# Patient Record
Sex: FEMALE | Race: WHITE
[De-identification: names, ages, dates, MRNs, and addresses within clinical notes are randomized per-mention and may not be internally consistent; named-entity substitution may affect disease eponyms.]

---

## 2017-08-21 ENCOUNTER — HOSPITAL ENCOUNTER (INPATIENT)
Dept: HOSPITAL 17 - NEPE | Age: 37
LOS: 45 days | Discharge: HOME | DRG: 314 | End: 2017-10-05
Attending: HOSPITALIST | Admitting: HOSPITALIST
Payer: MEDICAID

## 2017-08-21 VITALS
DIASTOLIC BLOOD PRESSURE: 56 MMHG | TEMPERATURE: 103.6 F | OXYGEN SATURATION: 100 % | RESPIRATION RATE: 23 BRPM | HEART RATE: 115 BPM | SYSTOLIC BLOOD PRESSURE: 84 MMHG

## 2017-08-21 VITALS
OXYGEN SATURATION: 100 % | HEART RATE: 156 BPM | TEMPERATURE: 104.6 F | SYSTOLIC BLOOD PRESSURE: 118 MMHG | RESPIRATION RATE: 32 BRPM | DIASTOLIC BLOOD PRESSURE: 76 MMHG

## 2017-08-21 VITALS
DIASTOLIC BLOOD PRESSURE: 51 MMHG | RESPIRATION RATE: 30 BRPM | HEART RATE: 117 BPM | SYSTOLIC BLOOD PRESSURE: 94 MMHG | TEMPERATURE: 102.6 F | OXYGEN SATURATION: 100 %

## 2017-08-21 VITALS
HEART RATE: 112 BPM | OXYGEN SATURATION: 100 % | TEMPERATURE: 102.9 F | DIASTOLIC BLOOD PRESSURE: 58 MMHG | SYSTOLIC BLOOD PRESSURE: 99 MMHG | RESPIRATION RATE: 26 BRPM

## 2017-08-21 VITALS
OXYGEN SATURATION: 100 % | DIASTOLIC BLOOD PRESSURE: 55 MMHG | RESPIRATION RATE: 28 BRPM | SYSTOLIC BLOOD PRESSURE: 92 MMHG | TEMPERATURE: 102.9 F | HEART RATE: 113 BPM

## 2017-08-21 VITALS
TEMPERATURE: 104 F | SYSTOLIC BLOOD PRESSURE: 85 MMHG | HEART RATE: 110 BPM | DIASTOLIC BLOOD PRESSURE: 55 MMHG | RESPIRATION RATE: 26 BRPM | OXYGEN SATURATION: 100 %

## 2017-08-21 VITALS
SYSTOLIC BLOOD PRESSURE: 95 MMHG | TEMPERATURE: 103.3 F | RESPIRATION RATE: 25 BRPM | DIASTOLIC BLOOD PRESSURE: 58 MMHG | OXYGEN SATURATION: 100 % | HEART RATE: 112 BPM

## 2017-08-21 VITALS
TEMPERATURE: 103.6 F | OXYGEN SATURATION: 100 % | RESPIRATION RATE: 32 BRPM | SYSTOLIC BLOOD PRESSURE: 123 MMHG | DIASTOLIC BLOOD PRESSURE: 90 MMHG | HEART RATE: 123 BPM

## 2017-08-21 VITALS
DIASTOLIC BLOOD PRESSURE: 51 MMHG | SYSTOLIC BLOOD PRESSURE: 93 MMHG | HEART RATE: 117 BPM | OXYGEN SATURATION: 100 % | TEMPERATURE: 102.2 F | RESPIRATION RATE: 30 BRPM

## 2017-08-21 VITALS — OXYGEN SATURATION: 100 %

## 2017-08-21 VITALS
SYSTOLIC BLOOD PRESSURE: 90 MMHG | RESPIRATION RATE: 16 BRPM | HEART RATE: 119 BPM | DIASTOLIC BLOOD PRESSURE: 70 MMHG | TEMPERATURE: 101.9 F | OXYGEN SATURATION: 92 %

## 2017-08-21 VITALS — OXYGEN SATURATION: 98 %

## 2017-08-21 VITALS
SYSTOLIC BLOOD PRESSURE: 97 MMHG | DIASTOLIC BLOOD PRESSURE: 59 MMHG | TEMPERATURE: 102.9 F | OXYGEN SATURATION: 100 % | HEART RATE: 113 BPM | RESPIRATION RATE: 29 BRPM

## 2017-08-21 VITALS
DIASTOLIC BLOOD PRESSURE: 52 MMHG | OXYGEN SATURATION: 100 % | SYSTOLIC BLOOD PRESSURE: 96 MMHG | TEMPERATURE: 102.2 F | HEART RATE: 112 BPM | RESPIRATION RATE: 30 BRPM

## 2017-08-21 VITALS
TEMPERATURE: 102.9 F | HEART RATE: 115 BPM | RESPIRATION RATE: 27 BRPM | OXYGEN SATURATION: 100 % | SYSTOLIC BLOOD PRESSURE: 94 MMHG | DIASTOLIC BLOOD PRESSURE: 54 MMHG

## 2017-08-21 VITALS — OXYGEN SATURATION: 96 %

## 2017-08-21 VITALS
RESPIRATION RATE: 33 BRPM | HEART RATE: 113 BPM | SYSTOLIC BLOOD PRESSURE: 92 MMHG | DIASTOLIC BLOOD PRESSURE: 56 MMHG | TEMPERATURE: 102.6 F | OXYGEN SATURATION: 100 %

## 2017-08-21 VITALS
RESPIRATION RATE: 34 BRPM | OXYGEN SATURATION: 100 % | SYSTOLIC BLOOD PRESSURE: 88 MMHG | TEMPERATURE: 103.6 F | DIASTOLIC BLOOD PRESSURE: 57 MMHG | HEART RATE: 108 BPM

## 2017-08-21 VITALS
TEMPERATURE: 103.3 F | SYSTOLIC BLOOD PRESSURE: 99 MMHG | DIASTOLIC BLOOD PRESSURE: 56 MMHG | HEART RATE: 114 BPM | OXYGEN SATURATION: 100 % | RESPIRATION RATE: 50 BRPM

## 2017-08-21 VITALS
DIASTOLIC BLOOD PRESSURE: 55 MMHG | OXYGEN SATURATION: 100 % | SYSTOLIC BLOOD PRESSURE: 93 MMHG | HEART RATE: 121 BPM | RESPIRATION RATE: 31 BRPM | TEMPERATURE: 102.2 F

## 2017-08-21 VITALS
RESPIRATION RATE: 31 BRPM | DIASTOLIC BLOOD PRESSURE: 53 MMHG | OXYGEN SATURATION: 100 % | HEART RATE: 117 BPM | SYSTOLIC BLOOD PRESSURE: 92 MMHG | TEMPERATURE: 102.2 F

## 2017-08-21 VITALS
TEMPERATURE: 102.6 F | DIASTOLIC BLOOD PRESSURE: 60 MMHG | OXYGEN SATURATION: 100 % | RESPIRATION RATE: 59 BRPM | HEART RATE: 114 BPM | SYSTOLIC BLOOD PRESSURE: 94 MMHG

## 2017-08-21 VITALS — HEIGHT: 67 IN | WEIGHT: 210.54 LBS | BODY MASS INDEX: 33.04 KG/M2

## 2017-08-21 DIAGNOSIS — E87.6: ICD-10-CM

## 2017-08-21 DIAGNOSIS — R19.7: ICD-10-CM

## 2017-08-21 DIAGNOSIS — Z66: ICD-10-CM

## 2017-08-21 DIAGNOSIS — D50.9: ICD-10-CM

## 2017-08-21 DIAGNOSIS — B19.20: ICD-10-CM

## 2017-08-21 DIAGNOSIS — E86.0: ICD-10-CM

## 2017-08-21 DIAGNOSIS — G93.41: ICD-10-CM

## 2017-08-21 DIAGNOSIS — J96.02: ICD-10-CM

## 2017-08-21 DIAGNOSIS — I11.0: ICD-10-CM

## 2017-08-21 DIAGNOSIS — I07.1: ICD-10-CM

## 2017-08-21 DIAGNOSIS — R47.01: ICD-10-CM

## 2017-08-21 DIAGNOSIS — R65.21: ICD-10-CM

## 2017-08-21 DIAGNOSIS — F17.200: ICD-10-CM

## 2017-08-21 DIAGNOSIS — F41.9: ICD-10-CM

## 2017-08-21 DIAGNOSIS — A41.9: ICD-10-CM

## 2017-08-21 DIAGNOSIS — T82.6XXA: Primary | ICD-10-CM

## 2017-08-21 DIAGNOSIS — Z95.1: ICD-10-CM
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
## 2017-08-21 DIAGNOSIS — E87.2: ICD-10-CM

## 2017-08-21 DIAGNOSIS — E87.0: ICD-10-CM

## 2017-08-21 DIAGNOSIS — R57.0: ICD-10-CM

## 2017-08-21 DIAGNOSIS — I33.0: ICD-10-CM

## 2017-08-21 DIAGNOSIS — I48.92: ICD-10-CM

## 2017-08-21 DIAGNOSIS — F12.90: ICD-10-CM

## 2017-08-21 DIAGNOSIS — G00.3: ICD-10-CM

## 2017-08-21 DIAGNOSIS — E43: ICD-10-CM

## 2017-08-21 DIAGNOSIS — I63.9: ICD-10-CM

## 2017-08-21 DIAGNOSIS — Z95.2: ICD-10-CM

## 2017-08-21 DIAGNOSIS — I47.1: ICD-10-CM

## 2017-08-21 DIAGNOSIS — Z99.11: ICD-10-CM

## 2017-08-21 DIAGNOSIS — Z51.5: ICD-10-CM

## 2017-08-21 DIAGNOSIS — J96.01: ICD-10-CM

## 2017-08-21 DIAGNOSIS — R15.9: ICD-10-CM

## 2017-08-21 DIAGNOSIS — N17.9: ICD-10-CM

## 2017-08-21 DIAGNOSIS — M25.512: ICD-10-CM

## 2017-08-21 DIAGNOSIS — F19.10: ICD-10-CM

## 2017-08-21 DIAGNOSIS — I50.21: ICD-10-CM

## 2017-08-21 DIAGNOSIS — E83.39: ICD-10-CM

## 2017-08-21 DIAGNOSIS — D73.5: ICD-10-CM

## 2017-08-21 DIAGNOSIS — R10.9: ICD-10-CM

## 2017-08-21 DIAGNOSIS — R74.8: ICD-10-CM

## 2017-08-21 DIAGNOSIS — R73.9: ICD-10-CM

## 2017-08-21 LAB
APTT BLD: 43.2 SEC (ref 24.3–30.1)
BACTERIA #/AREA URNS HPF: (no result) /HPF
BASE EXCESS BLD CALC-SCNC: -7.4 MMOL/L (ref -2–2)
BASOPHILS # BLD AUTO: 0 TH/MM3 (ref 0–0.2)
BASOPHILS NFR BLD: 0.3 % (ref 0–2)
BENZODIAZEPINES PNL UR: 98 % (ref 90–100)
BETA HCG QUANT: (no result) MIU/ML (ref 0–5)
BLOOD GAS CARBOXYHEMOGLOBIN: 1.2 % (ref 0–4)
BLOOD GAS HCO3: 17 MMOL/L (ref 22–26)
BLOOD GAS OXYGEN CONTENT: 12.9 VOL % (ref 12–20)
BLOOD GAS PCO2: 31 MMHG (ref 38–42)
CK SERPL-CCNC: 103 U/L (ref 26–192)
COLOR UR: YELLOW
CRITICAL VALUE: NO
DRAW SITE: (no result)
EOSINOPHIL # BLD: 0 TH/MM3 (ref 0–0.4)
EOSINOPHIL NFR BLD: 0 % (ref 0–4)
ERYTHROCYTE [DISTWIDTH] IN BLOOD BY AUTOMATED COUNT: 15.5 % (ref 11.6–17.2)
FIBRINOGEN PPP-MCNC: 289 MG/DL (ref 227–377)
GLUCOSE UR STRIP-MCNC: (no result) MG/DL
HCT VFR BLD CALC: 31.1 % (ref 35–46)
HEMO FLAGS: (no result)
HGB UR QL STRIP: (no result)
I-STAT POTASSIUM: 3.3 MMOL/L (ref 3.5–4.9)
I-STAT SODIUM: 133 MMOL/L (ref 138–146)
INR PPP: 1.4 RATIO
KETONES UR STRIP-MCNC: (no result) MG/DL
LYMPHOCYTES # BLD AUTO: 0.4 TH/MM3 (ref 1–4.8)
LYMPHOCYTES NFR BLD AUTO: 2.2 % (ref 9–44)
MCH RBC QN AUTO: 24 PG (ref 27–34)
MCHC RBC AUTO-ENTMCNC: 31.9 % (ref 32–36)
MCV RBC AUTO: 75.4 FL (ref 80–100)
METHGB MFR BLDA: 0.2 % (ref 0–2)
MONOCYTES NFR BLD: 3.7 % (ref 0–8)
NEUTROPHILS # BLD AUTO: 17.7 TH/MM3 (ref 1.8–7.7)
NEUTROPHILS NFR BLD AUTO: 93.8 % (ref 16–70)
NEUTS BAND # BLD MANUAL: 18 TH/MM3 (ref 1.8–7.7)
NEUTS BAND NFR BLD: 34 % (ref 0–6)
NEUTS SEG NFR BLD MANUAL: 62 % (ref 16–70)
NITRITE UR QL STRIP: (no result)
NUMBER OF ARTERIAL PUNCTURES: 1
O2/TOTAL GAS SETTING VFR VENT: 50 %
OVALOCYTES BLD QL SMEAR: (no result)
OXYGEN DEVICE: (no result)
PLAT MORPH BLD: NORMAL
PLATELET # BLD: 200 TH/MM3 (ref 150–450)
PLATELET BLD QL SMEAR: NORMAL
PO2 BLD: 206 MMHG (ref 61–120)
PROTHROMBIN TIME: 15.4 SEC (ref 9.8–11.6)
RBC # BLD AUTO: 4.13 MIL/MM3 (ref 4–5.3)
SALICYLATES SERPL-MCNC: 9 G/DL (ref 12–16)
SCAN/DIFF: (no result)
SP GR UR STRIP: 1.03 (ref 1–1.03)
SQUAMOUS #/AREA URNS HPF: 2 /HPF (ref 0–5)
STAT: NO
TEMP CORR TO: 98.6
ULNAR PULSE: PRESENT
VENT SETTINGS: (no result)
WBC # BLD AUTO: 18.8 TH/MM3 (ref 4–11)
WBC DIFF SAMPLE: 100

## 2017-08-21 PROCEDURE — 83615 LACTATE (LD) (LDH) ENZYME: CPT

## 2017-08-21 PROCEDURE — 84100 ASSAY OF PHOSPHORUS: CPT

## 2017-08-21 PROCEDURE — 82565 ASSAY OF CREATININE: CPT

## 2017-08-21 PROCEDURE — 86592 SYPHILIS TEST NON-TREP QUAL: CPT

## 2017-08-21 PROCEDURE — 84295 ASSAY OF SERUM SODIUM: CPT

## 2017-08-21 PROCEDURE — 84520 ASSAY OF UREA NITROGEN: CPT

## 2017-08-21 PROCEDURE — 87205 SMEAR GRAM STAIN: CPT

## 2017-08-21 PROCEDURE — 86403 PARTICLE AGGLUT ANTBDY SCRN: CPT

## 2017-08-21 PROCEDURE — 80170 ASSAY OF GENTAMICIN: CPT

## 2017-08-21 PROCEDURE — 87116 MYCOBACTERIA CULTURE: CPT

## 2017-08-21 PROCEDURE — 85007 BL SMEAR W/DIFF WBC COUNT: CPT

## 2017-08-21 PROCEDURE — 94003 VENT MGMT INPAT SUBQ DAY: CPT

## 2017-08-21 PROCEDURE — 93306 TTE W/DOPPLER COMPLETE: CPT

## 2017-08-21 PROCEDURE — 84132 ASSAY OF SERUM POTASSIUM: CPT

## 2017-08-21 PROCEDURE — 74160 CT ABDOMEN W/CONTRAST: CPT

## 2017-08-21 PROCEDURE — 70450 CT HEAD/BRAIN W/O DYE: CPT

## 2017-08-21 PROCEDURE — 86850 RBC ANTIBODY SCREEN: CPT

## 2017-08-21 PROCEDURE — 84484 ASSAY OF TROPONIN QUANT: CPT

## 2017-08-21 PROCEDURE — 85384 FIBRINOGEN ACTIVITY: CPT

## 2017-08-21 PROCEDURE — 71010: CPT

## 2017-08-21 PROCEDURE — 87529 HSV DNA AMP PROBE: CPT

## 2017-08-21 PROCEDURE — 85025 COMPLETE CBC W/AUTO DIFF WBC: CPT

## 2017-08-21 PROCEDURE — 0BH17EZ INSERTION OF ENDOTRACHEAL AIRWAY INTO TRACHEA, VIA NATURAL OR ARTIFICIAL OPENING: ICD-10-PCS | Performed by: EMERGENCY MEDICINE

## 2017-08-21 PROCEDURE — 009U3ZX DRAINAGE OF SPINAL CANAL, PERCUTANEOUS APPROACH, DIAGNOSTIC: ICD-10-PCS | Performed by: INTERNAL MEDICINE

## 2017-08-21 PROCEDURE — 83735 ASSAY OF MAGNESIUM: CPT

## 2017-08-21 PROCEDURE — 80048 BASIC METABOLIC PNL TOTAL CA: CPT

## 2017-08-21 PROCEDURE — 87070 CULTURE OTHR SPECIMN AEROBIC: CPT

## 2017-08-21 PROCEDURE — 93005 ELECTROCARDIOGRAM TRACING: CPT

## 2017-08-21 PROCEDURE — 84702 CHORIONIC GONADOTROPIN TEST: CPT

## 2017-08-21 PROCEDURE — 05HM33Z INSERTION OF INFUSION DEVICE INTO RIGHT INTERNAL JUGULAR VEIN, PERCUTANEOUS APPROACH: ICD-10-PCS | Performed by: EMERGENCY MEDICINE

## 2017-08-21 PROCEDURE — 87040 BLOOD CULTURE FOR BACTERIA: CPT

## 2017-08-21 PROCEDURE — 70490 CT SOFT TISSUE NECK W/O DYE: CPT

## 2017-08-21 PROCEDURE — 87186 SC STD MICRODIL/AGAR DIL: CPT

## 2017-08-21 PROCEDURE — 86901 BLOOD TYPING SEROLOGIC RH(D): CPT

## 2017-08-21 PROCEDURE — 94668 MNPJ CHEST WALL SBSQ: CPT

## 2017-08-21 PROCEDURE — 86618 LYME DISEASE ANTIBODY: CPT

## 2017-08-21 PROCEDURE — 86900 BLOOD TYPING SEROLOGIC ABO: CPT

## 2017-08-21 PROCEDURE — 31500 INSERT EMERGENCY AIRWAY: CPT

## 2017-08-21 PROCEDURE — 82947 ASSAY GLUCOSE BLOOD QUANT: CPT

## 2017-08-21 PROCEDURE — 94002 VENT MGMT INPAT INIT DAY: CPT

## 2017-08-21 PROCEDURE — 76937 US GUIDE VASCULAR ACCESS: CPT

## 2017-08-21 PROCEDURE — 81001 URINALYSIS AUTO W/SCOPE: CPT

## 2017-08-21 PROCEDURE — A9579 GAD-BASE MR CONTRAST NOS,1ML: HCPCS

## 2017-08-21 PROCEDURE — 82945 GLUCOSE OTHER FLUID: CPT

## 2017-08-21 PROCEDURE — 85730 THROMBOPLASTIN TIME PARTIAL: CPT

## 2017-08-21 PROCEDURE — 84157 ASSAY OF PROTEIN OTHER: CPT

## 2017-08-21 PROCEDURE — 80076 HEPATIC FUNCTION PANEL: CPT

## 2017-08-21 PROCEDURE — 87493 C DIFF AMPLIFIED PROBE: CPT

## 2017-08-21 PROCEDURE — 5A1955Z RESPIRATORY VENTILATION, GREATER THAN 96 CONSECUTIVE HOURS: ICD-10-PCS | Performed by: EMERGENCY MEDICINE

## 2017-08-21 PROCEDURE — 85610 PROTHROMBIN TIME: CPT

## 2017-08-21 PROCEDURE — 87102 FUNGUS ISOLATION CULTURE: CPT

## 2017-08-21 PROCEDURE — 87206 SMEAR FLUORESCENT/ACID STAI: CPT

## 2017-08-21 PROCEDURE — 87015 SPECIMEN INFECT AGNT CONCNTJ: CPT

## 2017-08-21 PROCEDURE — 82435 ASSAY OF BLOOD CHLORIDE: CPT

## 2017-08-21 PROCEDURE — 96365 THER/PROPH/DIAG IV INF INIT: CPT

## 2017-08-21 PROCEDURE — 36556 INSERT NON-TUNNEL CV CATH: CPT

## 2017-08-21 PROCEDURE — 85027 COMPLETE CBC AUTOMATED: CPT

## 2017-08-21 PROCEDURE — 96374 THER/PROPH/DIAG INJ IV PUSH: CPT

## 2017-08-21 PROCEDURE — 51702 INSERT TEMP BLADDER CATH: CPT

## 2017-08-21 PROCEDURE — 96375 TX/PRO/DX INJ NEW DRUG ADDON: CPT

## 2017-08-21 PROCEDURE — 87185 SC STD ENZYME DETCJ PER NZM: CPT

## 2017-08-21 PROCEDURE — 82948 REAGENT STRIP/BLOOD GLUCOSE: CPT

## 2017-08-21 PROCEDURE — 70553 MRI BRAIN STEM W/O & W/DYE: CPT

## 2017-08-21 PROCEDURE — 36600 WITHDRAWAL OF ARTERIAL BLOOD: CPT

## 2017-08-21 PROCEDURE — 80053 COMPREHEN METABOLIC PANEL: CPT

## 2017-08-21 PROCEDURE — 87641 MR-STAPH DNA AMP PROBE: CPT

## 2017-08-21 PROCEDURE — 89051 BODY FLUID CELL COUNT: CPT

## 2017-08-21 PROCEDURE — 94640 AIRWAY INHALATION TREATMENT: CPT

## 2017-08-21 PROCEDURE — 82805 BLOOD GASES W/O2 SATURATION: CPT

## 2017-08-21 PROCEDURE — 82550 ASSAY OF CK (CPK): CPT

## 2017-08-21 PROCEDURE — 95819 EEG AWAKE AND ASLEEP: CPT

## 2017-08-21 PROCEDURE — 83605 ASSAY OF LACTIC ACID: CPT

## 2017-08-21 PROCEDURE — 82140 ASSAY OF AMMONIA: CPT

## 2017-08-21 PROCEDURE — 94664 DEMO&/EVAL PT USE INHALER: CPT

## 2017-08-21 PROCEDURE — 80307 DRUG TEST PRSMV CHEM ANLYZR: CPT

## 2017-08-21 PROCEDURE — 94667 MNPJ CHEST WALL 1ST: CPT

## 2017-08-21 RX ADMIN — PROPOFOL PRN MLS/HR: 10 INJECTION, EMULSION INTRAVENOUS at 21:00

## 2017-08-21 RX ADMIN — MIDAZOLAM HYDROCHLORIDE PRN MLS/HR: 5 INJECTION, SOLUTION INTRAMUSCULAR; INTRAVENOUS at 19:35

## 2017-08-21 RX ADMIN — NOREPINEPHRINE BITARTRATE PRN MLS/HR: 1 INJECTION INTRAVENOUS at 21:00

## 2017-08-21 NOTE — RADRPT
EXAM DATE/TIME:  08/21/2017 21:06 

 

[facility] COMPARISON:     

CHEST SINGLE AP, August 21, 2017, 20:13.

 

                     

INDICATIONS :     

Post procedure, line placement.

                     

 

MEDICAL HISTORY :     

None.          

 

SURGICAL HISTORY :     

CABG.   

 

ENCOUNTER:     

Initial                                        

 

ACUITY:     

1 day      

 

PAIN SCORE:     

Non-responsive.

 

LOCATION:     

Bilateral chest 

 

FINDINGS:     

A single view of the chest demonstrates repositioning of the endotracheal tube which is in good posit
ion above the yudelka. There has been interval placement of a right jugular central venous catheter. C
atheter is in good position. There is no evidence of pneumothorax.

 

Vascular congestive changes remain evident. There is mild left basilar airspace disease.

 

CONCLUSION:     

1. Repositioning of endotracheal tube which is now in good position.

2. Interval placement of a right jugular central venous catheter which is in good position.

3. No evidence of pneumothorax.

4. Vascular congestive changes and mild left basilar airspace disease.

 

 

 

 Kristian Frankel MD on August 21, 2017 at 21:27           

Board Certified Radiologist.

 This report was verified electronically.

## 2017-08-21 NOTE — PD
HPI


Chief Complaint:  Stroke Alert


Time Seen by Provider:  19:35


Travel History


International Travel<30 days:  No


Contact w/Intl Traveler<30days:  No


Traveled to known affect area:  No





History of Present Illness


HPI


The patient is a 37-year-old  female who presents to the emergency 

department via EMS as a stroke alert.  According to EMS the patient has a 

history of fungal endocarditis with previous IVDA.  EMS states that the patient 

is currently on hospice, however, they are unsure if the patient is a DNR.  EMS 

states the patient apparently complained of a headache earlier today, was found 

lying on the floor and incontinent of stool at approximate, last seen normal at 

6:45 PM.  Therefore, EMS called a stroke alert in the field as the patient was 

aphasic and confused.  Upon arrival the patient groans, moves all 4 extremities 

and withdraws all 4 extremities, but is unable to provide any information.





PFSH


Past Medical History


Anxiety:  Yes


Cancer:  No


Cardiovascular Problems:  Yes (ENDOCARDITIS 2016)


Diminished Hearing:  No


Endocrine:  No


Immune Disorder:  No


Musculoskeletal:  No


Neurologic:  Yes


Psychiatric:  No


Reproductive:  No


Respiratory:  No


Pneumonia:  Yes


:  2


Para:  2





Social History


Alcohol Use:  Yes (SOC)


Tobacco Use:  No


Substance Use:  Yes (pills percocet, ocycodone and dilaudid last time yesterday

, uses daily )





Allergies-Medications


(Allergen,Severity, Reaction):  


Coded Allergies:  


     No Known Allergies (Verified , 16)


Reported Meds & Prescriptions





Reported Meds & Active Scripts


Active


Kcl 20 Meq Tab (Potassium Chloride) 20 Meq Tabcr 20 Meq PO DAILY 30 Days


Percocet 5-325 mg (Oxycodone/Acetaminophen) 1 Tab 1 Tab PO Q4H PRN


Nebulizer (Miscellaneous Medication)  Mis 1 Unit INH AS DIRECTED


Resp: Albuterol/Ipratropium 2.5 Mg/0.5 Mg (Albuterol/Ipratropium) 1 Amp Nebu 1 

Ampule INH Q4HR NEB PRN 30 Days


Aldactone 25 mg (Spironolactone) 25 Mg Tab 25 Mg PO Q12HR 30 Days


Metoprolol Tartrate 25 mg (Metoprolol Tartrate) 25 Mg Tab 25 Mg PO Q12HR 30 Days


Furosemide 40 Mg Tab 40 Mg PO Q8H 30 Days








Review of Systems


ROS Limitations:  Clinical Condition, Altered Mental Status


Except as stated in HPI:  all other systems reviewed are Neg


Genitourinary:  Positive: Incontinence (incontinent of stool according to EMS)


Neurologic:  Positive: Change in Mentation, Other (aphasia)





Physical Exam


Exam Limitations:  Clinical Condition, Altered Mental Status


Narrative


GENERAL: Eyes open, nonverbal, confused appearing 37-year-old female.


SKIN: Focused skin assessment warm/dry.


HEAD: Atraumatic. Normocephalic. 


EYES: Pupils equal and round.  Pupils are 4 mm bilateral and reactive.


ENT: No nasal bleeding or discharge.  Mucous membranes pink and moist.


NECK: Trachea midline. No JVD. 


CARDIOVASCULAR: Regular, tachycardic with a heart rate of 120.  Well-healed 

midline sternal scar.


RESPIRATORY: No accessory muscle use. Clear to auscultation. Breath sounds 

equal bilaterally. 


GASTROINTESTINAL: Abdomen soft, mild distention.


MUSCULOSKELETAL: No obvious deformities.  No edema noted to lower extremities 

bilaterally.


NEUROLOGICAL: Awake, eyes open, nonverbal, groans.  Withdraws all 4 extremities 

to pain but does not follow commands.  Unable to answer questions regards to 

the person, place, and time.  Does not follow commands in regards to drift of 

the upper or lower extremity is, extraocular muscle movements, visual field 

testing, heel-to-shin, or finger to nose.


PSYCHIATRIC: Appears confused.





Data


Data


Orders





 Orders


Midazolam 100 Mg/100 Ml Inj (Versed Inj) (17 19:36)


Diet Npo (17 Breakfast)


Activity Bed Rest (17 )


Electrocardiogram (17 )


I-Stat Creatinine (17 19:35)


I-Stat Profile (17 19:35)


Prothrombin Time / Inr (Pt) (17 19:35)


Act Partial Throm Time (Ptt) (17 19:35)


Complete Blood Count With Diff (17 19:35)


Fibrinogen (17 19:35)


Creatine Kinase (Cpk) (17 19:35)


Troponin I (17 19:35)


Ua Includes Microscopic (17 19:35)


Drug Screen, Random Urine (17 19:35)


Type And Screen (17 19:35)


Ct Brain W/O Iv Contrast(Rout) (17 )


Chest, Single Ap (17 )


Beta Hcg (Quant/Titer) (17 19:35)


Consult Neurology (17 )


Blood Glucose (17 19:35)


Ecg Monitoring (17 19:35)


Neuro Checks Q2HX12,Q4H (17 19:35)


Nursing Bedside Swallow Assess .ONCE (17 19:35)


Iv Access Insert/Monitor (17 19:35)


NPO (17 19:35)


Oximetry (17 19:35)


Oxygen Administration (17 19:35)


Sodium Chlor 0.9% 1000 Ml Inj (Ns 1000 M (17 19:35)


Resp Oxygen Hal C Titrat 1-4 L (17 19:35)


Cath For Specimen (17 19:35)


Etomidate Inj (Amidate Inj) (17 19:45)


Succinylcholine Inj (Quelicin Inj) (17 19:45)


Sodium Chlor 0.9% 1000 Ml Inj (Ns 1000 M (17 19:45)


Lactic Acid (17 19:35)


Blood Culture (17 19:35)


Ammonia (17 19:35)


Midazolam Inj (Versed Inj) (17 19:45)


Acetaminophen Supp (Tylenol Supp) (17 19:45)


Midazolam Inj (Versed Inj) (17 20:00)


Vecuronium 10 Mg Inj (Norcuron 10 Mg Inj (17 20:00)


Vancomycin Inj (Vancomycin Inj) (17 20:15)


Ceftriaxone Inj (Rocephin Inj) (17 20:15)


Sodium Chlor 0.9% 1000 Ml Inj (Ns 1000 M (17 20:15)


Adenosine Inj (Adenocard Inj) (17 20:15)


Propofol 1000 Mg/100 Ml Inj (Diprivan 10 (17 20:15)


^ Infusion (17 20:05)


RASS (17 20:05)


Neurological Rass Scale GOYO.Q2H (17 20:05)


Diltiazem Inj (Cardizem Inj) (17 20:30)


Ketorolac Inj (Toradol Inj) (17 20:47)


Chest, Single Ap (17 )


Ketorolac Inj (Toradol Inj) (17 21:00)


Sodium Chlor 0.9% 1000 Ml Inj (Ns 1000 M (17 21:00)





Labs





Laboratory Tests








Test


  17


19:30


 


White Blood Count 18.8 TH/MM3 


 


Red Blood Count 4.13 MIL/MM3 


 


Hemoglobin 9.9 GM/DL 


 


Bedside Hemoglobin 10.9 G/DL 


 


Hematocrit 31.1 % 


 


Bedside Hematocrit 32.0 % 


 


Mean Corpuscular Volume 75.4 FL 


 


Mean Corpuscular Hemoglobin 24.0 PG 


 


Mean Corpuscular Hemoglobin


Concent 31.9 % 


 


 


Red Cell Distribution Width 15.5 % 


 


Platelet Count 200 TH/MM3 


 


Mean Platelet Volume 7.7 FL 


 


Neutrophils (%) (Auto) 93.8 % 


 


Lymphocytes (%) (Auto) 2.2 % 


 


Monocytes (%) (Auto) 3.7 % 


 


Eosinophils (%) (Auto) 0.0 % 


 


Basophils (%) (Auto) 0.3 % 


 


Neutrophils # (Auto) 17.7 TH/MM3 


 


Lymphocytes # (Auto) 0.4 TH/MM3 


 


Monocytes # (Auto) 0.7 TH/MM3 


 


Eosinophils # (Auto) 0.0 TH/MM3 


 


Basophils # (Auto) 0.0 TH/MM3 


 


CBC Comment AUTO DIFF 


 


Prothrombin Time 15.4 SEC 


 


Prothromb Time International


Ratio 1.4 RATIO 


 


 


Activated Partial


Thromboplast Time 43.2 SEC 


 


 


Fibrinogen 289 mg/dL 


 


Bedside Sodium 133 MMOL/L 


 


Bedside Potassium 3.3 MMOL/L 


 


Bedside Chloride 98 MMOL/L 


 


Bedside Blood Urea Nitrogen 15 MG/DL 


 


Bedside Creatinine 0.7 MG/DL 


 


Bedside Glucose 139 MG/DL 


 


Total Creatine Kinase 103 U/L 


 


Troponin I 0.61 NG/ML 


 


Human Chorionic Gonadotropin,


Quant LESS THAN 1


MIU/ML











[facility]


Medical Screen Exam Complete:  Yes


Emergency Medical Condition:  Yes


Medical Record Reviewed:  Yes


EKG Prior to Arrival:  Yes


Differential Diagnosis


Differential diagnosis includes meningitis, encephalitis, sepsis, CVA, 

intracranial hemorrhage, IVDA, endocarditis, epidural abscess, UTI, pneumonia.


Narrative Course


IV was established, labs were drawn and sent, and the patient was placed on 

cardiac telemetry monitoring and continuous pulse oximetry monitoring.  The 

patient was intubated secondary to confusion, inability to follow commands, and 

inability to stay still for CT of the brain.  The patient was intubated using 

rapid sequence intubation with etomidate and succinylcholine using a C Mac 

glide scope with a 8.0 endotracheal tube.  The patient was administered Versed 

for sedation, IV fluid bolus, and Tylenol rectally for temperature of 103.9.  

EMS states the patient's Accu-Chek prior to arrival was 149.  The patient went 

immediately to CT for CT of the brain.  I discussed the patient with the on-

call neurologist, Dr. West, who agrees the patient would not be a candidate 

for TPA secondary to the fever, altered mental status, history of IVDA with 

endocarditis, an atypical presentation for acute ischemic CVA.  I discussed the 

patient with the radiologist who states the CT the brain is negative, no acute 

hemorrhage or obvious infarct.





The patient's initial assessment appeared to be encephalopathy, most likely 

sepsis related.  Therefore, a quick NIHSS was performed, approximately 10, the 

patient was intubated and went to CT which was negative.  The patient returned 

a central line was placed in the right internal jugular under ultrasound 

guidance.  Repeat chest x-ray was performed, endotracheal tube was moved 

backwards 2 cm as it was on the yudelka on the initial chest x-ray.  The patient 

did receive Rocephin, vancomycin, and 3 L of IV fluids.  The patient did go in 

atrial flutter with 2:1 block with a rate of 150, was administered adenosine 12 

mg intravenously which did not touch her heart rate, therefore, patient 

received Cardizem 20 mg intravenously.  The patient's rate came down into the 

130s.  The patient appears to have sepsis, most likely endocarditis or blood-

borne infection, cannot rule out meningitis.  Therefore, patient was covered 

with Rocephin 2 g intravenously and acyclovir 80 mg/kg.  I discussed the 

patient with the on-call intensivist, Dr. Virgen, who agrees with admission.


Critical Care Narrative


Aggregate critical care time was 45 minutes. Time to perform other separately 

billable procedures was not included in the critical care time. My time did not 

include minutes spent treating any other patients simultaneously or on 

activities that did not directly contribute to the patient's treatment.  





The services I provided to this patient were to treat and/or prevent clinically 

significant deterioration that could result in: Anoxia, hypoxia, aspiration, 

sepsis, septic shock, death.





I provided critical care services requiring my management, as noted below:


Chart data review, documentation time, medication orders and management, vital 

sign assessments/reviewing monitor data, ordering and reviewing lab tests, 

ordering and interpreting/reviewing x-rays and diagnostic studies, care of the 

patient and discussion of the patient with the admitting physicians.


Stroke Alert  NIHSS


NIH Stroke Scale Result:  10


NIHSS Time Completed:  19:30





Procedures


**Procedure Narrative**





INTUBATION: The patient was put in optimal position for the procedure.  Rapid 

sequence intubation was initiated by me using 20 milligrams of etomidate IV and 

100 milligrams of succinylcholine IV.  The patient was intubated with a 8-0 

cuffed endotracheal tube.  Tube placement was confirmed by visualization of the 

tube and balloon passing through the cords, capnometry and subsequent chest x-

ray.  Breath sounds were equal and well aerated bilaterally postintubation.  No 

breath sounds over stomach.  Patient tolerated procedure well.





CENTRAL VENOUS LINE: The site was prepped with Betadine and sterilely draped. 

It was infiltrated with 1% lidocaine plain. The deep vein was cannulated using 

normal Seldinger technique.  A triple lumen central line was placed in the 

right internal jugular site and secured with simple interrupted suture. The 

site was sterilely dressed.  The patient tolerated the procedure well.


Interpretation(s)


EKG reveals sinus tachycardia with a rate of 120.  RSR prime V1.








Laboratory Tests








Test


  17


19:30


 


White Blood Count 18.8 TH/MM3 


 


Red Blood Count 4.13 MIL/MM3 


 


Hemoglobin 9.9 GM/DL 


 


Bedside Hemoglobin 10.9 G/DL 


 


Hematocrit 31.1 % 


 


Bedside Hematocrit 32.0 % 


 


Mean Corpuscular Volume 75.4 FL 


 


Mean Corpuscular Hemoglobin 24.0 PG 


 


Mean Corpuscular Hemoglobin


Concent 31.9 % 


 


 


Red Cell Distribution Width 15.5 % 


 


Platelet Count 200 TH/MM3 


 


Mean Platelet Volume 7.7 FL 


 


Neutrophils (%) (Auto) 93.8 % 


 


Lymphocytes (%) (Auto) 2.2 % 


 


Monocytes (%) (Auto) 3.7 % 


 


Eosinophils (%) (Auto) 0.0 % 


 


Basophils (%) (Auto) 0.3 % 


 


Neutrophils # (Auto) 17.7 TH/MM3 


 


Lymphocytes # (Auto) 0.4 TH/MM3 


 


Monocytes # (Auto) 0.7 TH/MM3 


 


Eosinophils # (Auto) 0.0 TH/MM3 


 


Basophils # (Auto) 0.0 TH/MM3 


 


CBC Comment AUTO DIFF 


 


Prothrombin Time 15.4 SEC 


 


Prothromb Time International


Ratio 1.4 RATIO 


 


 


Activated Partial


Thromboplast Time 43.2 SEC 


 


 


Fibrinogen 289 mg/dL 


 


Bedside Sodium 133 MMOL/L 


 


Bedside Potassium 3.3 MMOL/L 


 


Bedside Chloride 98 MMOL/L 


 


Bedside Blood Urea Nitrogen 15 MG/DL 


 


Bedside Creatinine 0.7 MG/DL 


 


Bedside Glucose 139 MG/DL 


 


Total Creatine Kinase 103 U/L 


 


Troponin I 0.61 NG/ML 


 


Human Chorionic Gonadotropin,


Quant LESS THAN 1


MIU/ML





CT the brain reveals nothing acute


Chest x-ray reveals cardiomegaly, postoperative changes, bilateral vascular 

congestion.





Physician Communication


Physician Communication


I discussed the patient with the on-call intensivist who agrees with admission.





Diagnosis


Diagnosis:  


 Primary Impression:  


 Sepsis


 Qualified Codes:  A41.9 - Sepsis, unspecified organism


 Additional Impressions:  


 Elevated troponin


 Altered mental status


 Qualified Codes:  R41.82 - Altered mental status, unspecified


Admitting Physician Requests:  Admit


Condition:  Critical











Jovani Perez MD Aug 21, 2017 19:58

## 2017-08-21 NOTE — RADRPT
EXAM DATE/TIME:  08/21/2017 20:13 

 

[facility] COMPARISON:     

CHEST SINGLE AP, July 23, 2016, 12:21.

 

                     

INDICATIONS :     

Stroke alert.

                     

 

MEDICAL HISTORY :     

None.          

 

SURGICAL HISTORY :     

CABG.   

 

ENCOUNTER:     

Initial                                        

 

ACUITY:     

1 day      

 

PAIN SCORE:     

Non-responsive.

 

LOCATION:     

Bilateral cranial 

 

FINDINGS:     

An endotracheal tube has been placed. Its tip is at the level of the yudelka.

 

The lungs are hypoaerated with diffuse vascular engorgement. There is no evidence of consolidating in
filtrate.

 

Heart is moderately enlarged. The sternotomy wires from previous surgery are noted.

 

 

 

CONCLUSION:     

1. Tip of endotracheal tube at the yudelka.

2. Mild diffuse vascular congestion.

3. No evidence of consolidating airspace disease.

4. Cardiomegaly with evidence of previous surgery.

 

 

 

 Kristian Frankel MD on August 21, 2017 at 20:31           

Board Certified Radiologist.

 This report was verified electronically.

## 2017-08-21 NOTE — HHI.HP
HPI


Service


Critical Care Medicine


Primary Care Physician


Unknown


Admission Diagnosis





sepsis, elevated troponin, history of endocarditis/IVDA


Diagnosis:  


Travel History


International Travel<30 Days:  No


Contact w/Intl Traveler <30 Da:  No


Traveled to Known Affected Are:  No


History of Present Illness


37-year-old  female who presents initially as a stroke alert.  

According to EMS report the patient has a history of fungal endocarditis with 

previous IVDA.  EMS states that the patient is currently on hospice, however, 

they are unsure if the patient is a DNR.  EMS states the patient apparently 

complained of a headache earlier today, was found lying on the floor and 

incontinent of stool at approximate, last seen normal at 6:45 PM.  Therefore, 

EMS called a stroke alert in the field as the patient was aphasic and confused.

  Upon arrival the patient groans, moves all 4 extremities and withdraws all 4 

extremities, but is unable to provide any information.  She was intubated in 

the emergency department by ER attending for airway protection.





Review of Systems


ROS


Unable to obtain patient is sedated and intubated





Past Family Social History


Allergies:  


Coded Allergies:  


     No Known Allergies (Verified , 16)


Past Medical History


Hepatitis C


IV drug abuse


Narcotic dependency


Bacterial endocarditis


Past Surgical History


Left forearm surgery


Reported Medications





Reported Meds & Active Scripts


Active


Kcl 20 Meq Tab (Potassium Chloride) 20 Meq Tabcr 20 Meq PO DAILY 30 Days


Percocet 5-325 mg (Oxycodone/Acetaminophen) 1 Tab 1 Tab PO Q4H PRN


Nebulizer (Miscellaneous Medication)  Mis 1 Unit INH AS DIRECTED


Resp: Albuterol/Ipratropium 2.5 Mg/0.5 Mg (Albuterol/Ipratropium) 1 Amp Nebu 1 

Ampule INH Q4HR NEB PRN 30 Days


Aldactone 25 mg (Spironolactone) 25 Mg Tab 25 Mg PO Q12HR 30 Days


Metoprolol Tartrate 25 mg (Metoprolol Tartrate) 25 Mg Tab 25 Mg PO Q12HR 30 Days


Furosemide 40 Mg Tab 40 Mg PO Q8H 30 Days


Active Ordered Medications





Current Medications








 Medications


  (Trade)  Dose


 Ordered  Sig/Nikhil


 Route


 PRN Reason  Start Time


 Stop Time Status Last Admin


Dose Admin


 


 Sodium Chloride  1,000 ml @ 


 70 mls/hr  [id number] ONCE


 IV


   17 19:35


 17 09:52   


 


 


 Propofol  100 ml @ 0


 mls/hr  TITRATE  PRN


 IV


 Ordered RASS  17 20:15


     


 


 


 Norepinephrine


 Bitartrate  250 ml @ 


 7.5 mls/hr  TITRATE  PRN


 IV


 Blood pressure management  17 21:30


     


 


 


 Terbutaline


 Sulfate


  (Brethine Inj)  1 mg  UNSCH  PRN


 SQ


 For Extravasation  17 21:30


     


 


 


 Sodium Chloride


  (NS Flush)  2 ml  UNSCH  PRN


 .XX


 FLUSH AFTER USING IV ACCESS  17 21:30


     


 


 


 Sodium Chloride


  (NS Flush)  2 ml  BID


 .XX


   17 09:00


     


 


 


 Acetaminophen


  (Tylenol)  650 mg  Q6H  PRN


 PO


 PAIN 1-10 AND/OR FEVER >101F  17 21:30


     


 


 


 Morphine Sulfate


  (Morphine Inj)  2 mg  Q2H  PRN


 IV


 PAIN SCALE 6 TO 10  17 21:30


     


 


 


 Famotidine


  (Pepcid Inj)  20 mg  Q12HR


 IV PUSH


   17 09:00


     


 


 


 Midazolam HCl


  (Versed Inj)  2 mg  Q1H  PRN


 IV


 SEDATION  17 21:30


     


 


 


 Artificial Tears


  (Tears Naturale


 Opth Soln)  1 drop  TID


 EACH EYE


   17 09:00


     


 


 


 Ondansetron HCl


  (Zofran Inj)  4 mg  Q6H  PRN


 IV


 NAUSEA OR VOMITING  17 21:30


     


 


 


 Albuterol/


 Ipratropium


  (Duoneb Neb)  1 ampule  Q2HR NEB  PRN


 INH


 WHEEZING  17 21:30


     


 


 


 Heparin Sodium


  (Porcine)


  (Heparin Inj)  5,000 units  Q12H


 SQ


   17 22:00


     


 


 


 Miscellaneous


 Information  1  [id number]


 XX


   17 21:30


     


 


 


 Chlorhexidine


 Gluconate


  (Chlorhexidine


 2% Cloth)  3 pack


 Taper  DAILY@04


 TOP


   17 04:00


 18 03:59   


 


 


 Chlorhexidine


 Gluconate


  (Chlorhexidine


 2% Cloth)  3 pack  UNSCH  PRN


 TOP


 HYGIENIC CARE  17 21:30


     


 


 


 Senna/Docusate


 Sodium


  (Arlen-Colace)  1 tab  BID


 PO


   17 09:00


     


 


 


 Magnesium


 Hydroxide


  (Milk Of


 Magnesia Liq)  30 ml  Q12H  PRN


 PO


 MILD - MODERATE CONSTIPATION  17 21:30


     


 


 


 Sennosides


  (Senokot)  17.2 mg  Q12H  PRN


 PO


 MODERATE - SEVERE CONSTIPATION  17 21:30


     


 


 


 Bisacodyl


  (Dulcolax Supp)  10 mg  DAILY  PRN


 RECTAL


 SEVERE CONSITIPATION  17 21:30


     


 


 


 Lactulose


  (Lactulose Liq)  30 ml  DAILY  PRN


 PO


 SEVERE CONSITIPATION  17 21:30


     


 








Family History


Unobtainable


Social History


History of IV drug abuse


History of opioid dependency


Medical marijuana use





Physical Exam


Vital Signs





Vital Signs








  Date Time  Temp Pulse Resp B/P (MAP) Pulse Ox O2 Delivery O2 Flow Rate FiO2


 


17 22:03 101.9 119 16 90/70 (77) 92   








Physical Exam


GENERAL: Well-nourished, well-developed patient.  Sedated and intubated


SKIN: Warm and dry.


HEAD: Normocephalic.


EYES: No scleral icterus. No injection or drainage. 


NECK: Supple, trachea midline. No JVD or lymphadenopathy.


CARDIOVASCULAR: Regular rate and rhythm without murmurs, gallops, or rubs. 


RESPIRATORY: Breath sounds equal bilaterally. No accessory muscle use.


GASTROINTESTINAL: Abdomen soft, non-tender, nondistended. 


MUSCULOSKELETAL: No cyanosis, or edema. 


BACK: Nontender without obvious deformity. 


NEURO EXAM:


GCS: M 5 V T E 3


Mental Status: The patient is sedated and intubated


Cranial Nerves:Pupils are round, reactive to light. Extraocular movements 

unable to examine


Reflexes: Biceps, patellar, and Achilles are 2/4 bilaterally. No clonus. 


Sensation: Sensation unable to examine


Motor: Good muscle tone.


Cerebellar: Finger-to-nose and heel-to-shin test unable to examine


Laboratory





Laboratory Tests








Test


  17


19:30 17


20:30 17


21:00 17


21:23


 


White Blood Count 18.8    


 


Red Blood Count 4.13    


 


Hemoglobin 9.9    


 


Bedside Hemoglobin 10.9    


 


Hematocrit 31.1    


 


Bedside Hematocrit 32.0    


 


Mean Corpuscular Volume 75.4    


 


Mean Corpuscular Hemoglobin 24.0    


 


Mean Corpuscular Hemoglobin


Concent 31.9 


  


  


  


 


 


Red Cell Distribution Width 15.5    


 


Platelet Count 200    


 


Mean Platelet Volume 7.7    


 


Neutrophils (%) (Auto) 93.8    


 


Lymphocytes (%) (Auto) 2.2    


 


Monocytes (%) (Auto) 3.7    


 


Eosinophils (%) (Auto) 0.0    


 


Basophils (%) (Auto) 0.3    


 


Neutrophils # (Auto) 17.7    


 


Lymphocytes # (Auto) 0.4    


 


Monocytes # (Auto) 0.7    


 


Eosinophils # (Auto) 0.0    


 


Basophils # (Auto) 0.0    


 


CBC Comment AUTO DIFF    


 


Differential Total Cells


Counted 100 


  


  


  


 


 


Neutrophils % (Manual) 62    


 


Band Neutrophils % 34    


 


Lymphocytes % 2    


 


Monocytes % 2    


 


Neutrophils # (Manual) 18.0    


 


Differential Comment


  FINAL DIFF


MANUAL 


  


  


 


 


Platelet Estimate NORMAL    


 


Platelet Morphology Comment NORMAL    


 


Ovalocytes 2+    


 


Prothrombin Time 15.4    


 


Prothromb Time International


Ratio 1.4 


  


  


  


 


 


Activated Partial


Thromboplast Time 43.2 


  


  


  


 


 


Fibrinogen 289    


 


Bedside Sodium 133    


 


Bedside Potassium 3.3    


 


Bedside Chloride 98    


 


Bedside Blood Urea Nitrogen 15    


 


Bedside Creatinine 0.7    


 


Bedside Glucose 139    


 


Total Creatine Kinase 103    


 


Troponin I 0.61    


 


Human Chorionic Gonadotropin,


Quant LESS THAN 1 


  


  


  


 


 


Urine Color  YELLOW   


 


Urine Turbidity  HAZY   


 


Urine pH  6.0   


 


Urine Specific Gravity  1.027   


 


Urine Protein  300   


 


Urine Glucose (UA)  NEG   


 


Urine Ketones  NEG   


 


Urine Occult Blood  MOD   


 


Urine Nitrite  NEG   


 


Urine Bilirubin  NEG   


 


Urine Urobilinogen  LESS THAN 2.0   


 


Urine Leukocyte Esterase  TRACE   


 


Urine RBC  27   


 


Urine WBC  31   


 


Urine Squamous Epithelial


Cells 


  2 


  


  


 


 


Urine Bacteria  OCC   


 


Urine Opiates Screen  POS   


 


Urine Barbiturates Screen  NEG   


 


Urine Amphetamines Screen  NEG   


 


Urine Benzodiazepines Screen  POS   


 


Urine Cocaine Screen  NEG   


 


Urine Cannabinoids Screen  POS   


 


Lactic Acid Level   3.3  


 


Ammonia   60  


 


Blood Gas Puncture Site    RT RADIAL 


 


Blood Gas Patient Temperature    98.6 


 


Blood Gas HCO3    17 


 


Blood Gas Base Excess    -7.4 


 


Blood Gas Oxygen Saturation    98 


 


Arterial Blood pH    7.36 


 


Arterial Blood Partial


Pressure CO2 


  


  


  31 


 


 


Arterial Blood Partial


Pressure O2 


  


  


  206 


 


 


Arterial Blood Oxygen Content    12.9 


 


Arterial Blood


Carboxyhemoglobin 


  


  


  1.2 


 


 


Arterial Blood Methemoglobin    0.2 


 


Blood Gas Hemoglobin    9.0 


 


Oxygen Delivery Device    VENTILATOR 


 


Blood Gas Ventilator Setting


  


  


  


  AC/[id number]


 


 


Blood Gas Inspired Oxygen    50 














 Date/Time


Source Procedure


Growth Status


 


 


 17 20:22


Blood Peripheral Aerobic Blood Culture


Pending Received


 


 17 20:22


Blood Peripheral Anaerobic Blood Culture


Pending Received








Result Diagram:  


17








Caprini VTE Risk Assessment


Caprini VTE Risk Assessment:  Mod/High Risk (score >= 2)


Caprini Risk Assessment Model











 Point Value = 1          Point Value = 2  Point Value = 3  Point Value = 5


 


Age 41-60


Minor surgery


BMI > 25 kg/m2


Swollen legs


Varicose veins


Pregnancy or postpartum


History of unexplained or recurrent


   spontaneous 


Oral contraceptives or hormone


   replacement


Sepsis (< 1 month)


Serious lung disease, including


   pneumonia (< 1 month)


Abnormal pulmonary function


Acute myocardial infarction


Congestive heart failure (< 1 month)


History of inflammatory bowel disease


Medical patient at bed rest Age 61-74


Arthroscopic surgery


Major open surgery (> 45 min)


Laparoscopic surgery (> 45 min)


Malignancy


Confined to bed (> 72 hours)


Immobilizing plaster cast


Central venous access Age >= 75


History of VTE


Family history of VTE


Factor V Leiden


Prothrombin 21819K


Lupus anticoagulant


Anticardiolipin antibodies


Elevated serum homocysteine


Heparin-induced thrombocytopenia


Other congenital or acquired


   thrombophilia Stroke (< 1 month)


Elective arthroplasty


Hip, pelvis, or leg fracture


Acute spinal cord injury (< 1 month)








Prophylaxis Regimen











   Total Risk


Factor Score Risk Level Prophylaxis Regimen


 


0-1      Low Early ambulation


 


2 Moderate Order ONE of the following:


*Sequential Compression Device (SCD)


*Heparin 5000 units SQ BID


 


3-4 Higher Order ONE of the following medications:


*Heparin 5000 units SQ TID


*Enoxaparin/Lovenox 40 mg SQ daily (WT < 150 kg, CrCl > 30 mL/min)


*Enoxaparin/Lovenox 30 mg SQ daily (WT < 150 kg, CrCl > 10-29 mL/min)


*Enoxaparin/Lovenox 30 mg SQ BID (WT < 150 kg, CrCl > 30 mL/min)


AND/OR


*Sequential Compression Device (SCD)


 


5 or more Highest Order ONE of the following medications:


*Heparin 5000 units SQ TID (Preferred with Epidurals)


*Enoxaparin/Lovenox 40 mg SQ daily (WT < 150 kg, CrCl > 30 mL/min)


*Enoxaparin/Lovenox 30 mg SQ daily (WT < 150 kg, CrCl > 10-29 mL/min)


*Enoxaparin/Lovenox 30 mg SQ BID (WT < 150 kg, CrCl > 30 mL/min)


AND


*Sequential Compression Device (SCD)











Assessment and Plan


Assessment and Plan


Respiratory failure


- Intubated for airway protection


- No weaning until neurologically and hemodynamically stable





Altered mental status


- Stat LP


- CT head negative


- Neuro checks per unit protocol





History of fungal endocarditis


- History of IVDA


- Broad-spectrum antibiotic and antiviral and antifungal


- Infectious disease consultation





Hypertension


- Septic shock


- Aggressive IV fluid hydration


- Broad-spectrum antibiotic


- Levophed when necessary to keep MEP above 65





DVT GI prophylaxis


- Teds SCDs


- Subcutaneous heparin


- Pepcid





Critical Care:


The total critical care time was 35 minutes. Time to perform other separately 

billable procedures was not included in the critical care time.











Andres Virgen MD Aug 21, 2017 22:32

## 2017-08-21 NOTE — RADRPT
EXAM DATE/TIME:  08/21/2017 19:47 

 

[facility] COMPARISON:     

CT BRAIN W/O CONTRAST, June 20, 2016, 10:06.

 

 

INDICATIONS :     

***Stroke alert; slurred speach, altered mental status.

                      

 

RADIATION DOSE:     

56.35 CTDIvol (mGy) 

 

This report was called by Yari to Dr. Perez at 1958

 

 

MEDICAL HISTORY :     

Non-responsive.  

 

SURGICAL HISTORY :      

Non-responsive. 

 

ENCOUNTER:      

Initial

 

ACUITY:      

1 day

 

PAIN SCALE:      

Non-responsive

 

LOCATION:        

cranial 

 

TECHNIQUE:     

Multiple contiguous axial images were obtained of the head.  Using automated exposure control and adj
ustment of the mA and/or kV according to patient size, radiation dose was kept as low as reasonably a
chievable to obtain optimal diagnostic quality images.   DICOM format image data is available electro
nically for review and comparison.  

 

FINDINGS:     

 

CEREBRUM:     

The ventricles are normal for age.  No evidence of midline shift, mass lesion, hemorrhage or acute in
farction.  No extra-axial fluid collections are seen.

 

POSTERIOR FOSSA:     

The cerebellum and brainstem are intact.  The 4th ventricle is midline.  The cerebellopontine angle i
s unremarkable.

 

EXTRACRANIAL:     

The visualized portion of the orbits is intact.

 

SKULL:     

The calvaria is intact.  No evidence of skull fracture.

 

CONCLUSION:     

No evidence of acute infarct, hemorrhage, mass or edema.

 

 

 

 Kristian Frankel MD on August 21, 2017 at 19:57           

Board Certified Radiologist.

 This report was verified electronically.

## 2017-08-21 NOTE — PD.PROCEDR
Procedure Note


Procedure


Lumbar puncture





A time-out was completed verifying correct patient, procedure, site, positioning

, and special equipment if applicable. The patient was placed in the left 

lateral decubitus position in a semi-fetal position with help from the nursing 

staff. The area was cleansed and draped in usual sterile fashion. 1% lidocaine 

was used anesthetize the surrounding skin area. A <20-gauge 3.5-inch> spinal 

needle was placed in the <L3-L4/L4-L5> interspace. Clear cerebral spinal fluid 

was obtained and the opening pressure was noted to be <44 cm>. Four tubes were 

filled with 4 mL of CSF. These were sent for the usual tests, including 1 tube 

to be held for further analysis if needed.  Closing pressure was noted to be 38 

cm.


Estimated Blood Loss: 0


The patient tolerated the procedure well and there were no complications.











Andres Virgen MD Aug 21, 2017 22:24

## 2017-08-22 VITALS
RESPIRATION RATE: 30 BRPM | SYSTOLIC BLOOD PRESSURE: 90 MMHG | DIASTOLIC BLOOD PRESSURE: 54 MMHG | OXYGEN SATURATION: 100 % | HEART RATE: 113 BPM

## 2017-08-22 VITALS
OXYGEN SATURATION: 100 % | SYSTOLIC BLOOD PRESSURE: 107 MMHG | RESPIRATION RATE: 30 BRPM | HEART RATE: 118 BPM | DIASTOLIC BLOOD PRESSURE: 62 MMHG | TEMPERATURE: 102.6 F

## 2017-08-22 VITALS
DIASTOLIC BLOOD PRESSURE: 68 MMHG | OXYGEN SATURATION: 100 % | SYSTOLIC BLOOD PRESSURE: 96 MMHG | HEART RATE: 105 BPM | RESPIRATION RATE: 28 BRPM

## 2017-08-22 VITALS
SYSTOLIC BLOOD PRESSURE: 90 MMHG | DIASTOLIC BLOOD PRESSURE: 66 MMHG | OXYGEN SATURATION: 100 % | HEART RATE: 107 BPM | RESPIRATION RATE: 33 BRPM

## 2017-08-22 VITALS
RESPIRATION RATE: 31 BRPM | OXYGEN SATURATION: 100 % | SYSTOLIC BLOOD PRESSURE: 96 MMHG | DIASTOLIC BLOOD PRESSURE: 69 MMHG | HEART RATE: 109 BPM

## 2017-08-22 VITALS
HEART RATE: 114 BPM | RESPIRATION RATE: 35 BRPM | SYSTOLIC BLOOD PRESSURE: 93 MMHG | DIASTOLIC BLOOD PRESSURE: 69 MMHG | OXYGEN SATURATION: 100 %

## 2017-08-22 VITALS — HEART RATE: 115 BPM

## 2017-08-22 VITALS
OXYGEN SATURATION: 100 % | RESPIRATION RATE: 32 BRPM | SYSTOLIC BLOOD PRESSURE: 91 MMHG | DIASTOLIC BLOOD PRESSURE: 66 MMHG | HEART RATE: 107 BPM

## 2017-08-22 VITALS
TEMPERATURE: 102.2 F | DIASTOLIC BLOOD PRESSURE: 52 MMHG | SYSTOLIC BLOOD PRESSURE: 96 MMHG | HEART RATE: 113 BPM | OXYGEN SATURATION: 100 % | RESPIRATION RATE: 30 BRPM

## 2017-08-22 VITALS — HEART RATE: 116 BPM

## 2017-08-22 VITALS
DIASTOLIC BLOOD PRESSURE: 64 MMHG | SYSTOLIC BLOOD PRESSURE: 99 MMHG | RESPIRATION RATE: 31 BRPM | OXYGEN SATURATION: 100 % | HEART RATE: 114 BPM

## 2017-08-22 VITALS
DIASTOLIC BLOOD PRESSURE: 64 MMHG | OXYGEN SATURATION: 100 % | HEART RATE: 106 BPM | RESPIRATION RATE: 34 BRPM | SYSTOLIC BLOOD PRESSURE: 92 MMHG

## 2017-08-22 VITALS
RESPIRATION RATE: 31 BRPM | HEART RATE: 113 BPM | DIASTOLIC BLOOD PRESSURE: 59 MMHG | OXYGEN SATURATION: 100 % | SYSTOLIC BLOOD PRESSURE: 93 MMHG

## 2017-08-22 VITALS
HEART RATE: 111 BPM | OXYGEN SATURATION: 100 % | DIASTOLIC BLOOD PRESSURE: 64 MMHG | SYSTOLIC BLOOD PRESSURE: 89 MMHG | RESPIRATION RATE: 31 BRPM

## 2017-08-22 VITALS
SYSTOLIC BLOOD PRESSURE: 89 MMHG | RESPIRATION RATE: 28 BRPM | OXYGEN SATURATION: 100 % | HEART RATE: 108 BPM | DIASTOLIC BLOOD PRESSURE: 59 MMHG

## 2017-08-22 VITALS
HEART RATE: 106 BPM | SYSTOLIC BLOOD PRESSURE: 92 MMHG | RESPIRATION RATE: 28 BRPM | DIASTOLIC BLOOD PRESSURE: 64 MMHG | OXYGEN SATURATION: 100 %

## 2017-08-22 VITALS
OXYGEN SATURATION: 100 % | DIASTOLIC BLOOD PRESSURE: 68 MMHG | HEART RATE: 106 BPM | SYSTOLIC BLOOD PRESSURE: 93 MMHG | RESPIRATION RATE: 31 BRPM

## 2017-08-22 VITALS
OXYGEN SATURATION: 100 % | DIASTOLIC BLOOD PRESSURE: 63 MMHG | HEART RATE: 111 BPM | SYSTOLIC BLOOD PRESSURE: 93 MMHG | RESPIRATION RATE: 31 BRPM

## 2017-08-22 VITALS
DIASTOLIC BLOOD PRESSURE: 53 MMHG | SYSTOLIC BLOOD PRESSURE: 97 MMHG | TEMPERATURE: 102.6 F | HEART RATE: 116 BPM | OXYGEN SATURATION: 100 % | RESPIRATION RATE: 31 BRPM

## 2017-08-22 VITALS
OXYGEN SATURATION: 100 % | RESPIRATION RATE: 28 BRPM | DIASTOLIC BLOOD PRESSURE: 66 MMHG | HEART RATE: 113 BPM | SYSTOLIC BLOOD PRESSURE: 96 MMHG

## 2017-08-22 VITALS — OXYGEN SATURATION: 100 %

## 2017-08-22 VITALS
HEART RATE: 109 BPM | OXYGEN SATURATION: 100 % | RESPIRATION RATE: 32 BRPM | DIASTOLIC BLOOD PRESSURE: 68 MMHG | SYSTOLIC BLOOD PRESSURE: 88 MMHG

## 2017-08-22 VITALS
DIASTOLIC BLOOD PRESSURE: 61 MMHG | RESPIRATION RATE: 31 BRPM | OXYGEN SATURATION: 100 % | HEART RATE: 106 BPM | SYSTOLIC BLOOD PRESSURE: 95 MMHG

## 2017-08-22 VITALS
OXYGEN SATURATION: 100 % | SYSTOLIC BLOOD PRESSURE: 92 MMHG | RESPIRATION RATE: 31 BRPM | DIASTOLIC BLOOD PRESSURE: 60 MMHG | HEART RATE: 113 BPM

## 2017-08-22 VITALS
RESPIRATION RATE: 30 BRPM | DIASTOLIC BLOOD PRESSURE: 60 MMHG | HEART RATE: 114 BPM | OXYGEN SATURATION: 100 % | SYSTOLIC BLOOD PRESSURE: 99 MMHG

## 2017-08-22 VITALS
SYSTOLIC BLOOD PRESSURE: 93 MMHG | HEART RATE: 117 BPM | OXYGEN SATURATION: 100 % | RESPIRATION RATE: 33 BRPM | DIASTOLIC BLOOD PRESSURE: 64 MMHG

## 2017-08-22 VITALS
HEART RATE: 115 BPM | OXYGEN SATURATION: 100 % | RESPIRATION RATE: 31 BRPM | SYSTOLIC BLOOD PRESSURE: 95 MMHG | DIASTOLIC BLOOD PRESSURE: 62 MMHG

## 2017-08-22 VITALS
DIASTOLIC BLOOD PRESSURE: 62 MMHG | HEART RATE: 109 BPM | RESPIRATION RATE: 30 BRPM | SYSTOLIC BLOOD PRESSURE: 90 MMHG | OXYGEN SATURATION: 100 %

## 2017-08-22 VITALS
TEMPERATURE: 102.6 F | SYSTOLIC BLOOD PRESSURE: 97 MMHG | RESPIRATION RATE: 30 BRPM | OXYGEN SATURATION: 100 % | DIASTOLIC BLOOD PRESSURE: 58 MMHG | HEART RATE: 116 BPM

## 2017-08-22 VITALS
HEART RATE: 116 BPM | RESPIRATION RATE: 30 BRPM | OXYGEN SATURATION: 100 % | DIASTOLIC BLOOD PRESSURE: 59 MMHG | TEMPERATURE: 102.6 F | SYSTOLIC BLOOD PRESSURE: 96 MMHG

## 2017-08-22 VITALS
HEART RATE: 113 BPM | OXYGEN SATURATION: 100 % | RESPIRATION RATE: 31 BRPM | SYSTOLIC BLOOD PRESSURE: 97 MMHG | DIASTOLIC BLOOD PRESSURE: 61 MMHG

## 2017-08-22 VITALS
SYSTOLIC BLOOD PRESSURE: 95 MMHG | DIASTOLIC BLOOD PRESSURE: 63 MMHG | RESPIRATION RATE: 31 BRPM | HEART RATE: 113 BPM | OXYGEN SATURATION: 100 %

## 2017-08-22 VITALS
SYSTOLIC BLOOD PRESSURE: 95 MMHG | HEART RATE: 122 BPM | RESPIRATION RATE: 30 BRPM | OXYGEN SATURATION: 100 % | DIASTOLIC BLOOD PRESSURE: 57 MMHG | TEMPERATURE: 102.6 F

## 2017-08-22 VITALS
OXYGEN SATURATION: 99 % | RESPIRATION RATE: 30 BRPM | SYSTOLIC BLOOD PRESSURE: 90 MMHG | DIASTOLIC BLOOD PRESSURE: 60 MMHG | HEART RATE: 115 BPM

## 2017-08-22 VITALS
OXYGEN SATURATION: 100 % | DIASTOLIC BLOOD PRESSURE: 54 MMHG | HEART RATE: 109 BPM | SYSTOLIC BLOOD PRESSURE: 88 MMHG | RESPIRATION RATE: 27 BRPM

## 2017-08-22 VITALS
DIASTOLIC BLOOD PRESSURE: 60 MMHG | OXYGEN SATURATION: 100 % | RESPIRATION RATE: 30 BRPM | HEART RATE: 109 BPM | SYSTOLIC BLOOD PRESSURE: 89 MMHG

## 2017-08-22 VITALS
OXYGEN SATURATION: 100 % | SYSTOLIC BLOOD PRESSURE: 94 MMHG | RESPIRATION RATE: 31 BRPM | DIASTOLIC BLOOD PRESSURE: 58 MMHG | HEART RATE: 113 BPM

## 2017-08-22 VITALS
RESPIRATION RATE: 32 BRPM | DIASTOLIC BLOOD PRESSURE: 68 MMHG | OXYGEN SATURATION: 100 % | HEART RATE: 109 BPM | SYSTOLIC BLOOD PRESSURE: 97 MMHG

## 2017-08-22 VITALS
HEART RATE: 113 BPM | DIASTOLIC BLOOD PRESSURE: 61 MMHG | SYSTOLIC BLOOD PRESSURE: 96 MMHG | RESPIRATION RATE: 30 BRPM | OXYGEN SATURATION: 100 %

## 2017-08-22 VITALS
OXYGEN SATURATION: 100 % | RESPIRATION RATE: 31 BRPM | HEART RATE: 109 BPM | DIASTOLIC BLOOD PRESSURE: 65 MMHG | SYSTOLIC BLOOD PRESSURE: 95 MMHG

## 2017-08-22 VITALS — HEART RATE: 115 BPM | RESPIRATION RATE: 32 BRPM | OXYGEN SATURATION: 100 %

## 2017-08-22 VITALS
OXYGEN SATURATION: 100 % | RESPIRATION RATE: 44 BRPM | HEART RATE: 111 BPM | DIASTOLIC BLOOD PRESSURE: 66 MMHG | SYSTOLIC BLOOD PRESSURE: 97 MMHG

## 2017-08-22 VITALS
OXYGEN SATURATION: 100 % | HEART RATE: 106 BPM | SYSTOLIC BLOOD PRESSURE: 90 MMHG | RESPIRATION RATE: 29 BRPM | DIASTOLIC BLOOD PRESSURE: 62 MMHG

## 2017-08-22 VITALS
DIASTOLIC BLOOD PRESSURE: 68 MMHG | OXYGEN SATURATION: 100 % | RESPIRATION RATE: 31 BRPM | HEART RATE: 108 BPM | SYSTOLIC BLOOD PRESSURE: 96 MMHG

## 2017-08-22 VITALS
DIASTOLIC BLOOD PRESSURE: 62 MMHG | HEART RATE: 111 BPM | SYSTOLIC BLOOD PRESSURE: 92 MMHG | OXYGEN SATURATION: 100 % | RESPIRATION RATE: 32 BRPM

## 2017-08-22 VITALS — HEART RATE: 105 BPM

## 2017-08-22 VITALS — OXYGEN SATURATION: 99 %

## 2017-08-22 VITALS
HEART RATE: 105 BPM | RESPIRATION RATE: 24 BRPM | DIASTOLIC BLOOD PRESSURE: 69 MMHG | SYSTOLIC BLOOD PRESSURE: 95 MMHG | OXYGEN SATURATION: 100 %

## 2017-08-22 VITALS
DIASTOLIC BLOOD PRESSURE: 62 MMHG | HEART RATE: 113 BPM | RESPIRATION RATE: 30 BRPM | SYSTOLIC BLOOD PRESSURE: 97 MMHG | OXYGEN SATURATION: 100 %

## 2017-08-22 VITALS
HEART RATE: 113 BPM | SYSTOLIC BLOOD PRESSURE: 97 MMHG | OXYGEN SATURATION: 100 % | RESPIRATION RATE: 33 BRPM | DIASTOLIC BLOOD PRESSURE: 69 MMHG

## 2017-08-22 VITALS
RESPIRATION RATE: 38 BRPM | OXYGEN SATURATION: 100 % | HEART RATE: 114 BPM | SYSTOLIC BLOOD PRESSURE: 89 MMHG | DIASTOLIC BLOOD PRESSURE: 52 MMHG

## 2017-08-22 VITALS
HEART RATE: 111 BPM | DIASTOLIC BLOOD PRESSURE: 57 MMHG | RESPIRATION RATE: 29 BRPM | OXYGEN SATURATION: 100 % | SYSTOLIC BLOOD PRESSURE: 90 MMHG

## 2017-08-22 VITALS — HEART RATE: 114 BPM

## 2017-08-22 VITALS
RESPIRATION RATE: 33 BRPM | TEMPERATURE: 102.4 F | SYSTOLIC BLOOD PRESSURE: 86 MMHG | DIASTOLIC BLOOD PRESSURE: 58 MMHG | HEART RATE: 116 BPM | OXYGEN SATURATION: 100 %

## 2017-08-22 LAB
ALP SERPL-CCNC: 93 U/L (ref 45–117)
ALT SERPL-CCNC: 16 U/L (ref 10–53)
ANION GAP SERPL CALC-SCNC: 13 MEQ/L (ref 5–15)
AST SERPL-CCNC: 44 U/L (ref 15–37)
BASOPHILS # BLD AUTO: 0 TH/MM3 (ref 0–0.2)
BASOPHILS NFR BLD: 0.2 % (ref 0–2)
BILIRUB SERPL-MCNC: 0.7 MG/DL (ref 0.2–1)
BUN SERPL-MCNC: 27 MG/DL (ref 7–18)
CALCIUM TP COR SERPL-MCNC: 7.5 MG/DL (ref 8.5–10.1)
CHLORIDE SERPL-SCNC: 100 MEQ/L (ref 98–107)
COLOR CSF: CLEAR
EOSINOPHIL # BLD: 0.1 TH/MM3 (ref 0–0.4)
EOSINOPHIL NFR BLD: 0.4 % (ref 0–4)
ERYTHROCYTE [DISTWIDTH] IN BLOOD BY AUTOMATED COUNT: 16 % (ref 11.6–17.2)
GFR SERPLBLD BASED ON 1.73 SQ M-ARVRAT: 47 ML/MIN (ref 89–?)
GLUCOSE CSF-MCNC: 68 MG/DL (ref 40–80)
GROSS BLOOD TUBE #1: (no result)
GROSS BLOOD TUBE #2: (no result)
GROSS BLOOD TUBE #4: (no result)
HCO3 BLD-SCNC: 18.8 MEQ/L (ref 21–32)
HCT VFR BLD CALC: 31.4 % (ref 35–46)
HEMO FLAGS: (no result)
INR PPP: 1.7 RATIO
LACTATE CSF-MCNC: 4.9 MMOL/L (ref 0–3)
LACTIC ACID GHOST: (no result)
LYMPHOCYTES # BLD AUTO: 0.6 TH/MM3 (ref 1–4.8)
LYMPHOCYTES NFR BLD AUTO: 3.3 % (ref 9–44)
LYMPHOCYTES NFR CSF MANUAL: 1 %
MAGNESIUM SERPL-MCNC: 1.4 MG/DL (ref 1.5–2.5)
MCH RBC QN AUTO: 24 PG (ref 27–34)
MCHC RBC AUTO-ENTMCNC: 31.8 % (ref 32–36)
MCV RBC AUTO: 75.5 FL (ref 80–100)
MONOCYTES NFR BLD: 2.4 % (ref 0–8)
MONOCYTES NFR CSF: 2 %
NEUTROPHILS # BLD AUTO: 17.4 TH/MM3 (ref 1.8–7.7)
NEUTROPHILS NFR BLD AUTO: 93.7 % (ref 16–70)
NEUTROPHILS NFR CSF: 97 %
OTHER ELEMENTS URNS MICRO: 3 ML
PLATELET # BLD: 137 TH/MM3 (ref 150–450)
POTASSIUM SERPL-SCNC: 2.7 MEQ/L (ref 3.5–5.1)
PROTHROMBIN TIME: 18.9 SEC (ref 9.8–11.6)
RBC # BLD AUTO: 4.16 MIL/MM3 (ref 4–5.3)
SODIUM SERPL-SCNC: 132 MEQ/L (ref 136–145)
SPECIMEN VOL CSF: 3.4 ML
SUPERNATE COLOR TUBE #2: CLEAR
SUPERNATE COLOR TUBE #4: CLEAR
TRICHOMONAS UR QL MICRO: CLEAR
VOLUME TUBE # 2: 2.9 ML
VOLUME TUBE # 4: 3.8 ML
WBC # BLD AUTO: 18.6 TH/MM3 (ref 4–11)
WBC TUBE4 # CSF: 1846 /MM3 (ref 0–10)
YEAST URNS QL MICRO: (no result)

## 2017-08-22 PROCEDURE — 0T9B70Z DRAINAGE OF BLADDER WITH DRAINAGE DEVICE, VIA NATURAL OR ARTIFICIAL OPENING: ICD-10-PCS | Performed by: INTERNAL MEDICINE

## 2017-08-22 PROCEDURE — 05H633Z INSERTION OF INFUSION DEVICE INTO LEFT SUBCLAVIAN VEIN, PERCUTANEOUS APPROACH: ICD-10-PCS | Performed by: INTERNAL MEDICINE

## 2017-08-22 RX ADMIN — HEPARIN SODIUM SCH UNITS: 10000 INJECTION, SOLUTION INTRAVENOUS; SUBCUTANEOUS at 02:56

## 2017-08-22 RX ADMIN — MIDAZOLAM HYDROCHLORIDE PRN MLS/HR: 5 INJECTION, SOLUTION INTRAMUSCULAR; INTRAVENOUS at 07:54

## 2017-08-22 RX ADMIN — STANDARDIZED SENNA CONCENTRATE AND DOCUSATE SODIUM SCH TAB: 8.6; 5 TABLET, FILM COATED ORAL at 09:12

## 2017-08-22 RX ADMIN — PROPOFOL PRN MLS/HR: 10 INJECTION, EMULSION INTRAVENOUS at 14:37

## 2017-08-22 RX ADMIN — PROPOFOL PRN MLS/HR: 10 INJECTION, EMULSION INTRAVENOUS at 23:32

## 2017-08-22 RX ADMIN — FLUCONAZOLE SCH MLS/HR: 2 INJECTION INTRAVENOUS at 22:52

## 2017-08-22 RX ADMIN — OXACILLIN SODIUM SCH MLS/HR: 2 INJECTION, POWDER, FOR SOLUTION INTRAMUSCULAR; INTRAVENOUS at 22:52

## 2017-08-22 RX ADMIN — PROPOFOL PRN MLS/HR: 10 INJECTION, EMULSION INTRAVENOUS at 20:56

## 2017-08-22 RX ADMIN — STANDARDIZED SENNA CONCENTRATE AND DOCUSATE SODIUM SCH TAB: 8.6; 5 TABLET, FILM COATED ORAL at 21:00

## 2017-08-22 RX ADMIN — POTASSIUM CHLORIDE PRN MLS/HR: 400 INJECTION, SOLUTION INTRAVENOUS at 06:10

## 2017-08-22 RX ADMIN — ACYCLOVIR SODIUM SCH MLS/HR: 50 INJECTION, SOLUTION INTRAVENOUS at 09:54

## 2017-08-22 RX ADMIN — PROPOFOL PRN MLS/HR: 10 INJECTION, EMULSION INTRAVENOUS at 07:54

## 2017-08-22 RX ADMIN — POTASSIUM CHLORIDE PRN MLS/HR: 400 INJECTION, SOLUTION INTRAVENOUS at 07:54

## 2017-08-22 RX ADMIN — NOREPINEPHRINE BITARTRATE PRN MLS/HR: 1 INJECTION INTRAVENOUS at 03:47

## 2017-08-22 RX ADMIN — CHLORHEXIDINE GLUCONATE SCH PACK: 500 CLOTH TOPICAL at 04:00

## 2017-08-22 RX ADMIN — NOREPINEPHRINE BITARTRATE PRN MLS/HR: 1 INJECTION INTRAVENOUS at 09:41

## 2017-08-22 RX ADMIN — CEFTRIAXONE SODIUM SCH MLS/HR: 2 INJECTION, POWDER, FOR SOLUTION INTRAMUSCULAR; INTRAVENOUS at 07:55

## 2017-08-22 RX ADMIN — FLUCONAZOLE SCH MLS/HR: 2 INJECTION INTRAVENOUS at 20:57

## 2017-08-22 RX ADMIN — POLYVINYL ALCOHOL SCH DROP: 14 SOLUTION/ DROPS OPHTHALMIC at 09:00

## 2017-08-22 RX ADMIN — HEPARIN SODIUM SCH UNITS: 10000 INJECTION, SOLUTION INTRAVENOUS; SUBCUTANEOUS at 09:11

## 2017-08-22 RX ADMIN — PROPOFOL PRN MLS/HR: 10 INJECTION, EMULSION INTRAVENOUS at 03:48

## 2017-08-22 RX ADMIN — RIFAMPIN SCH MLS/HR: 600 INJECTION, POWDER, LYOPHILIZED, FOR SOLUTION INTRAVENOUS at 22:52

## 2017-08-22 RX ADMIN — Medication SCH ML: at 09:11

## 2017-08-22 RX ADMIN — OXACILLIN SODIUM SCH MLS/HR: 2 INJECTION, POWDER, FOR SOLUTION INTRAMUSCULAR; INTRAVENOUS at 18:28

## 2017-08-22 RX ADMIN — FAMOTIDINE SCH MG: 10 INJECTION, SOLUTION INTRAVENOUS at 09:12

## 2017-08-22 RX ADMIN — POLYVINYL ALCOHOL SCH DROP: 14 SOLUTION/ DROPS OPHTHALMIC at 13:00

## 2017-08-22 RX ADMIN — HEPARIN SODIUM SCH UNITS: 10000 INJECTION, SOLUTION INTRAVENOUS; SUBCUTANEOUS at 22:53

## 2017-08-22 RX ADMIN — NOREPINEPHRINE BITARTRATE PRN MLS/HR: 1 INJECTION INTRAVENOUS at 17:24

## 2017-08-22 RX ADMIN — CEFTRIAXONE SODIUM SCH MLS/HR: 2 INJECTION, POWDER, FOR SOLUTION INTRAMUSCULAR; INTRAVENOUS at 20:57

## 2017-08-22 RX ADMIN — Medication SCH ML: at 20:57

## 2017-08-22 RX ADMIN — FAMOTIDINE SCH MG: 10 INJECTION, SOLUTION INTRAVENOUS at 20:57

## 2017-08-22 RX ADMIN — HUMAN INSULIN SCH: 100 INJECTION, SOLUTION SUBCUTANEOUS at 18:00

## 2017-08-22 RX ADMIN — ACYCLOVIR SODIUM SCH MLS/HR: 50 INJECTION, SOLUTION INTRAVENOUS at 01:16

## 2017-08-22 RX ADMIN — GENTAMICIN SULFATE SCH MLS/HR: 0.8 INJECTION, SOLUTION INTRAVENOUS at 17:24

## 2017-08-22 RX ADMIN — PROPOFOL PRN MLS/HR: 10 INJECTION, EMULSION INTRAVENOUS at 17:24

## 2017-08-22 RX ADMIN — POLYVINYL ALCOHOL SCH DROP: 14 SOLUTION/ DROPS OPHTHALMIC at 17:25

## 2017-08-22 RX ADMIN — PROPOFOL PRN MLS/HR: 10 INJECTION, EMULSION INTRAVENOUS at 11:56

## 2017-08-22 NOTE — PD.ID.CON
History of Present Illness


Service


ID


Consult Requested By


Dr Hargrove


Reason for Consult


sepsis, endocarditis ?


Primary Care Physician


Unknown


Diagnoses:  


History of Present Illness


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 

tricuspid valve surgery who presents initially as a stroke alert. 


Per EMS report  the patient apparently complained of a headache earlier today, 

was found lying on the floor and incontinent of stool at approximate, last seen 

normal at 6:45 PM.  Therefore, EMS called a stroke alert in the field as the 

patient was aphasic and confused.  Upon arrival the patient groans, moves all 4 

extremities and withdraws all 4 extremities, but is unable to provide any 

information.  She was intubated in the emergency department by ER attending for 

airway protection.


 SHe remains intubated


LP was done and CSF is c bacterial infx (WBC 1800+ 97% neutrophils, protein 180)

, culture neg @ 24 hrs


Blood clx growintg MSSA 4/4 bottles


Previously she had MSSA PVE in Jan 2016 and  Entrococcal PVE in June 2016. SHe 

was in care of Dr Sher back  then, his notes were reviewed


No mentioning of fungal endocarditis in pt's note other then  per EMS report


SHe remains febrile, hypotensive , on pressors 


SHe has lactic acidosis, leukocytosis of 18 K and NOÉ





Review of Systems


ROS Limitations:  Clinical Condition, Intubated, Altered Mental Status





Past Family Social History


Allergies:  


Coded Allergies:  


     No Known Allergies (Verified , 2/24/16)


Past Medical History


MSSA aortic valve endocarditis 2011


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 


 Hepatitis C, IV


drug use,


Past Surgical History


AVR 2011


Redo AVR 06/2016


history of left forearm surgery.





Active Ordered Medications


Medications where reviewed in EMR


Antibiotics Include:  


AMB


Acyclovir


CTX


vanco


Family History


reviewed. Non-Contributory.


Social History


 


No Tobacco.


No ETOH.


IVDA, active





Physical Exam


Vital Signs





Vital Signs








  Date Time  Temp Pulse Resp B/P (MAP) Pulse Ox O2 Delivery O2 Flow Rate FiO2


 


8/22/17 13:25  115  95/62    


 


8/22/17 13:03     100   40


 


8/22/17 12:00  114      


 


8/22/17 12:00        40


 


8/22/17 11:28 100.2 114 38 89/52 (64) 100   


 


8/22/17 11:15 100.2 111 44 97/66 (76) 100   


 


8/22/17 11:00 100.2 106 31 95/61 (72) 100   


 


8/22/17 10:45 100.4 106 34 92/64 (73) 100   


 


8/22/17 10:30 100.4 106 29 90/62 (71) 100   


 


8/22/17 10:15 100.4 108 31 96/68 (77) 100   


 


8/22/17 10:00 100.4 105 24 95/69 (78) 100   


 


8/22/17 10:00  105      


 


8/22/17 09:45 100.6 106 31 93/68 (76) 100   


 


8/22/17 09:41  107  90/66    


 


8/22/17 09:30 100.6 107 33 90/66 (74) 100   


 


8/22/17 09:15 100.6 107 32 91/66 (74) 100   


 


8/22/17 09:13     100   45


 


8/22/17 09:00 100.8 109 32 88/68 (75) 100   


 


8/22/17 08:45 100.8 109 30 90/62 (71) 100   


 


8/22/17 08:30 100.9 109 30 89/60 (70) 100   


 


8/22/17 08:15 100.9 111 31 89/64 (72) 100   


 


8/22/17 08:00  109      


 


8/22/17 08:00 100.6 109 31 96/69 (78) 100   


 


8/22/17 08:00        50


 


8/22/17 07:46 100.9 109 31 95/65 (75) 100   


 


8/22/17 07:40 100.9 109 32 97/68 (78) 100   


 


8/22/17 07:20 100.8 111 31 93/63 (73) 100   


 


8/22/17 07:00 100.8 111 32 92/62 (72) 100   


 


8/22/17 06:00  114      


 


8/22/17 04:00  117      


 


8/22/17 04:00 102.0 117 33 93/64 (74) 100   


 


8/22/17 04:00        50


 


8/22/17 03:47  121  90/61    


 


8/22/17 03:36        


 


8/22/17 03:07 102.4 116 33 86/58 (67) 100   


 


8/22/17 03:06  116      


 


8/22/17 03:00     100   50


 


8/22/17 01:00 102.6 118 30 107/62 (77) 100   


 


8/22/17 00:32 102.6 122 30 95/57 (70) 100   


 


8/22/17 00:31 102.6 116 30 97/58 (71) 100   


 


8/22/17 00:30 102.6 116 30 96/59 (71) 100   


 


8/22/17 00:15 102.6 116 31 97/53 (68) 100   


 


8/22/17 00:00 102.2 113 30 96/52 (67) 100   


 


8/21/17 23:45 102.2 117 31 92/53 (66) 100   


 


8/21/17 23:35 102.2 121 31 93/55 (68) 100   


 


8/21/17 23:25 102.2 112 30 96/52 (67) 100   


 


8/21/17 23:23 102.2 117 30 93/51 (65) 100   


 


8/21/17 23:19 102.6 117 30 94/51 (65) 100   


 


8/21/17 23:09 102.6 113 33 92/56 (68) 100   


 


8/21/17 22:59 102.6 114 59 94/60 (71) 100   


 


8/21/17 22:49 102.9 113 29 97/59 (72) 100   


 


8/21/17 22:39 102.9 113 28 92/55 (67) 100   


 


8/21/17 22:29 102.9 115 27 94/54 (67) 100   


 


8/21/17 22:19 102.9 112 26 99/58 (72) 100   


 


8/21/17 22:09 103.3 112 25 95/58 (70) 100   


 


8/21/17 22:05 103.3 114 50 99/56 (70) 100   


 


8/21/17 22:03 101.9 119 16 90/70 (77) 92   


 


8/21/17 21:40 103.6 108 34 88/57 (67) 100   


 


8/21/17 21:35 103.6 115 23 84/56 (65) 100   


 


8/21/17 21:30 104.0 110 26 85/55 (65) 100   


 


8/21/17 21:11     96   50


 


8/21/17 21:00  156  78/38    


 


8/21/17 20:58     98   50


 


8/21/17 20:07     100   40


 


8/21/17 20:00     100   100


 


8/21/17 19:57 104.6 156 32 118/76 (90) 100   


 


8/21/17 19:40     100   100


 


8/21/17 19:40 103.6 123 32 123/90 (101) 96   


 


8/21/17 19:34        50


 


8/21/17 19:30     96  4.00 








Physical Exam


CONSTITUTIONAL/GENERAL: This is an adequately nourished patient, in no apparent 

distress. Sedated, int'd on [facility] vent


TUBES/LINES/DRAINS:


SKIN: No jaundice, rashes, or lesions.   Skin temperature appropriate. Not 

diaphoretic. 


No Janeway lesions


HEAD: Atraumatic. Normocephalic.


EYES: Pupils pinpoint, non reactive  equal and round  .   No scleral icterus. 

No injection or drainage. Fundi not examined.


ENT: Hearing grossly normal. Nose without bleeding or purulent drainage. Oral 

musosae  without visible erythema, exudates, masses, or lesions.


NECK: Trachea midline. Supple, nontender.  


CARDIOVASCULAR: Regular rate and rhythm without murmurs, gallops, or rubs. 

Mechanical heart sounds No JVD. Peripheral pulses symmetric.


RESPIRATORY/CHEST: Symmetric, unlabored respirations. Scattered rhonchi to 

auscultation. Breath sounds equal bilaterally.  


GASTROINTESTINAL: Abdomen soft, non-tender,  moderately distended. No hepato-

splenomegaly, or palpable masses. No guarding. Bowel sounds present, hypoactive 


GENITOURINARY: Without palpable bladder distension. Stokes catheter in place 

with clear yellow urine


MUSCULOSKELETAL: Extremities without clubbing, 


+ mild cyanosis of toes 


2+ edema. No joint tenderness or effusion noted. No calf tenderness.  


LYMPHATICS: No palpable cervical or supraclavicular adenopathy.


NEUROLOGICAL:Sedfated. Unresponsive.


Per RN unresponsive of sedation


PSYCHIATRIC: unable to assess


Laboratory





Laboratory Tests








Test


  8/21/17


19:30 8/21/17


20:30 8/21/17


21:00 8/21/17


21:23


 


White Blood Count 18.8    


 


Red Blood Count 4.13    


 


Hemoglobin 9.9    


 


Bedside Hemoglobin 10.9    


 


Hematocrit 31.1    


 


Bedside Hematocrit 32.0    


 


Mean Corpuscular Volume 75.4    


 


Mean Corpuscular Hemoglobin 24.0    


 


Mean Corpuscular Hemoglobin


Concent 31.9 


  


  


  


 


 


Red Cell Distribution Width 15.5    


 


Platelet Count 200    


 


Mean Platelet Volume 7.7    


 


Neutrophils (%) (Auto) 93.8    


 


Lymphocytes (%) (Auto) 2.2    


 


Monocytes (%) (Auto) 3.7    


 


Eosinophils (%) (Auto) 0.0    


 


Basophils (%) (Auto) 0.3    


 


Neutrophils # (Auto) 17.7    


 


Lymphocytes # (Auto) 0.4    


 


Monocytes # (Auto) 0.7    


 


Eosinophils # (Auto) 0.0    


 


Basophils # (Auto) 0.0    


 


CBC Comment AUTO DIFF    


 


Differential Total Cells


Counted 100 


  


  


  


 


 


Neutrophils % (Manual) 62    


 


Band Neutrophils % 34    


 


Lymphocytes % 2    


 


Monocytes % 2    


 


Neutrophils # (Manual) 18.0    


 


Differential Comment


  FINAL DIFF


MANUAL 


  


  


 


 


Platelet Estimate NORMAL    


 


Platelet Morphology Comment NORMAL    


 


Ovalocytes 2+    


 


Prothrombin Time 15.4    


 


Prothromb Time International


Ratio 1.4 


  


  


  


 


 


Activated Partial


Thromboplast Time 43.2 


  


  


  


 


 


Fibrinogen 289    


 


Bedside Sodium 133    


 


Bedside Potassium 3.3    


 


Bedside Chloride 98    


 


Bedside Blood Urea Nitrogen 15    


 


Bedside Creatinine 0.7    


 


Bedside Glucose 139    


 


Total Creatine Kinase 103    


 


Troponin I 0.61    


 


Human Chorionic Gonadotropin,


Quant LESS THAN 1 


  


  


  


 


 


Urine Color  YELLOW   


 


Urine Turbidity  HAZY   


 


Urine pH  6.0   


 


Urine Specific Gravity  1.027   


 


Urine Protein  300   


 


Urine Glucose (UA)  NEG   


 


Urine Ketones  NEG   


 


Urine Occult Blood  MOD   


 


Urine Nitrite  NEG   


 


Urine Bilirubin  NEG   


 


Urine Urobilinogen  LESS THAN 2.0   


 


Urine Leukocyte Esterase  TRACE   


 


Urine RBC  27   


 


Urine WBC  31   


 


Urine Squamous Epithelial


Cells 


  2 


  


  


 


 


Urine Bacteria  OCC   


 


Urine Opiates Screen  POS   


 


Urine Barbiturates Screen  NEG   


 


Urine Amphetamines Screen  NEG   


 


Urine Benzodiazepines Screen  POS   


 


Urine Cocaine Screen  NEG   


 


Urine Cannabinoids Screen  POS   


 


Lactic Acid Level   3.3  


 


Ammonia   60  


 


Blood Gas Puncture Site    RT RADIAL 


 


Blood Gas Patient Temperature    98.6 


 


Blood Gas HCO3    17 


 


Blood Gas Base Excess    -7.4 


 


Blood Gas Oxygen Saturation    98 


 


Arterial Blood pH    7.36 


 


Arterial Blood Partial


Pressure CO2 


  


  


  31 


 


 


Arterial Blood Partial


Pressure O2 


  


  


  206 


 


 


Arterial Blood Oxygen Content    12.9 


 


Arterial Blood


Carboxyhemoglobin 


  


  


  1.2 


 


 


Arterial Blood Methemoglobin    0.2 


 


Blood Gas Hemoglobin    9.0 


 


Oxygen Delivery Device    VENTILATOR 


 


Blood Gas Ventilator Setting


  


  


  


  AC/RR14//[id number]


 


 


Blood Gas Inspired Oxygen    50 


 


Test


  8/21/17


22:00 8/21/17


23:40 8/22/17


01:40 8/22/17


03:00


 


CSF Volume (Tube 1) 3.4    


 


CSF Supernatant Color (tube 1) CLEAR    


 


CSF Gross Blood (Tube 1) 3+    


 


CSF Volume (Tube 2) 2.9    


 


CSF Supernatant Color (tube 2) CLEAR    


 


CSF Gross Blood (Tube 2) 4+    


 


CSF Volume (Tube 3) 3.0    


 


CSF Supernatant Color (tube 3) CLEAR    


 


CSF Gross Blood (Tube 3) 3+    


 


CSF Volume (Tube 4) 3.8    


 


CSF Supernatant Color (tube 4) CLEAR    


 


CSF Gross Blood (Tube 4) 4+    


 


CSF WBC (Tube 4) 1846    


 


CSF RBC (Tube 4) 1909    


 


CSF Neutrophils 97    


 


CSF Lymphocytes 1    


 


CSF Monocytes 2    


 


CSF Glucose 68    


 


CSF Lactate Dehydrogenase 38    


 


CSF Lactic Acid 4.9    


 


CSF Total Protein 180.9    


 


Lactic Acid Level  2.8   


 


Troponin I   1.91  


 


Nasal Screen MRSA (PCR)


  


  


  


  MRSA NOT


DETECTED


 


Test


  8/22/17


04:50 8/22/17


12:05 


  


 


 


White Blood Count 18.6    


 


Red Blood Count 4.16    


 


Hemoglobin 10.0    


 


Hematocrit 31.4    


 


Mean Corpuscular Volume 75.5    


 


Mean Corpuscular Hemoglobin 24.0    


 


Mean Corpuscular Hemoglobin


Concent 31.8 


  


  


  


 


 


Red Cell Distribution Width 16.0    


 


Platelet Count 137    


 


Mean Platelet Volume 7.8    


 


Neutrophils (%) (Auto) 93.7    


 


Lymphocytes (%) (Auto) 3.3    


 


Monocytes (%) (Auto) 2.4    


 


Eosinophils (%) (Auto) 0.4    


 


Basophils (%) (Auto) 0.2    


 


Neutrophils # (Auto) 17.4    


 


Lymphocytes # (Auto) 0.6    


 


Monocytes # (Auto) 0.4    


 


Eosinophils # (Auto) 0.1    


 


Basophils # (Auto) 0.0    


 


CBC Comment DIFF FINAL    


 


Differential Comment     


 


Prothrombin Time 18.9    


 


Prothromb Time International


Ratio 1.7 


  


  


  


 


 


Blood Urea Nitrogen 27    


 


Creatinine 1.30    


 


Random Glucose 137    


 


Total Protein 6.5    


 


Albumin 2.8    


 


Calcium Level 7.2    


 


Phosphorus Level 3.2  3.0   


 


Magnesium Level 1.4    


 


Alkaline Phosphatase 93    


 


Aspartate Amino Transf


(AST/SGOT) 44 


  


  


  


 


 


Alanine Aminotransferase


(ALT/SGPT) 16 


  


  


  


 


 


Total Bilirubin 0.7    


 


Sodium Level 132    


 


Potassium Level 2.7    


 


Chloride Level 100    


 


Carbon Dioxide Level 18.8    


 


Anion Gap 13    


 


Estimat Glomerular Filtration


Rate 47 


  


  


  


 


 


Lactic Acid Level 2.9    


 


Protein Corrected Calcium 7.5    














 Date/Time


Source Procedure


Growth Status


 


 


 8/21/17 20:22


Blood Peripheral Aerobic Blood Culture - Preliminary


Gram Positive Cocci Resulted


 


 8/21/17 20:22 Anaerobic Blood Culture - Preliminary


Staphylococcus Aureus Resulted





 8/21/17 22:00


Cerebral Spinal Fluid Lumbar Puncture Fungal Smear


Pending Received


 


 8/21/17 22:00


Cerebral Spinal Fluid Lumbar Puncture Fungal Culture


Pending Received








Result Diagram:  


8/22/17 0450                                                                   

             8/22/17 0450








Assessment and Plan


Assessment and Plan


Probabale recurrent prosthetic valve endocarditis , PVE  due to MSSA


Previousy multiple episodes of  PVE


   dw Dr Keenan hospitalised in April 2017 for tricuspid valve PVE  - [place] 

parapsolosis (March 15 thru April 15) , Streptoccii


   pt was Rx with combination diflucan 800 + micafungin 150, and was also on 

ambisionme at some point x 2 weeks and did not clear 


Probable bacterial meningitis 


    ? MSSA


Acute VDRF


NOÉ


Elevated troponine ? cardiac injury from infx


Critically ill , unstable





   cont AMB


   add fluconazole


   dc acyclovir


   dc vsancomycin


   start oxacillin + gentamin +RIfampin


   Dc CFTX if CSF clx neg or MSSA


Discussed Condition With


RN


Shlomo Vega,  Catalina Sanchez MD Aug 22, 2017 14:40

## 2017-08-22 NOTE — EKG
Date Performed: 08/21/2017       Time Performed: 19:38:32

 

PTAGE:      37 years

 

EKG:      Probable sinus tachycardia, although atrial flutter cannot be entirely excluded. RIGHT BUND
LE BRANCH BLOCK ABNORMAL ECG

 

PREVIOUS TRACING        7/2/16 Rhythm strip is advised. Since the prior tracing, there has been an in
crease in the sinus tachycardia and some variation in the ST-T wave changes, but no other significant
 serial change.

 

DOCTOR:   Benita Streeter  Interpretating Date/Time  08/22/2017 13:57:00

## 2017-08-22 NOTE — RADRPT
EXAM DATE/TIME:  08/22/2017 00:34 

 

[facility] COMPARISON:     

CHEST SINGLE AP, August 21, 2017, 21:06.

 

                     

INDICATIONS :     

Respiratory disease.

                     

 

MEDICAL HISTORY :     

None.          

 

SURGICAL HISTORY :     

CABG.   

 

ENCOUNTER:     

Subsequent                                        

 

ACUITY:     

1 day      

 

PAIN SCORE:     

0/10

 

LOCATION:     

Bilateral  chest

 

FINDINGS:     

Endotracheal tube tip in gastric position. NG enters stomach. Right central line in superior vena cav
a. Subsegmental basilar air space disease. No pneumothorax. No significant effusion.

 

CONCLUSION:     

1. Endotracheal tube, nasogastric tube and right central line in good position. Subsegmental basilar 
airspace disease, left greater than right similar to August 21.

 

 

 

 Jaspal Issa MD on August 22, 2017 at 1:05           

Board Certified Radiologist.

 This report was verified electronically.

## 2017-08-22 NOTE — ECHRPT
Indication:   cva/tia

 

 CONCLUSIONS

 The left ventricular systolic function is severely reduced with an estimated ejection fraction in th
e range of 

 25-30%. 

 Normal left ventricular size. 

 Mild concentric left ventricular hypertrophy. 

 Trace-to-mild mitral valve regurgitation. 

 No mitral valve stenosis. 

 AV repair 

 meanThere is mild to moderate tricuspid valve regurgitation. 

 The estimated pulmonary arterial pressure is _38_ mmHg. 

 TV vegetation present

 The pulmonary valve is not well visualized.  

 

 BP:        /         HR:                          Rhythm:

 

 MEASUREMENTS  (Male / Female) Normal Values       Technical Quality:Good

 2D ECHO

 LV Diastolic Diameter PLAX        4.4 cm                4.2 - 5.9 / 3.9 - 5.3 cm

 LV Systolic Diameter PLAX         4.0 cm                

 IVS Diastolic Thickness           1.2 cm                0.6 - 1.0 / 0.6 - 0.9 cm

 LVPW Diastolic Thickness          1.3 cm                0.6 - 1.0 / 0.6 - 0.9 cm

 LV Relative Wall Thickness        0.6                   

 RV Internal Dim ED PLAX           2.8 cm                

 LVOT Diameter                     2.0 cm                

 

 M-MODE

 Aortic Root Diameter MM           3.1 cm                

 LA Systolic Diameter MM           4.3 cm                

 LA Ao Ratio MM                    1.4                   

 AV Cusp Separation MM             1.4 cm                

 

 DOPPLER

 AV Peak Velocity                  323.0 cm/s            

 AV Peak Gradient                  41.7 mmHg             

 AV Mean Gradient                  25.0 mmHg             

 AV Velocity Time Integral         63.6 cm               

 LVOT Peak Velocity                78.2 cm/s             

 LVOT Peak Gradient                2.4 mmHg              

 LVOT Velocity Time Integral       12.5 cm               

 AV Area Cont Eq vti               0.6 cm               

 AV Area Cont Eq pk                0.8 cm               

 TR Peak Velocity                  263.0 cm/s            

 TR Peak Gradient                  27.7 mmHg             

 

 

 FINDINGS

 

 LEFT VENTRICLE

 The left ventricular systolic function is severely reduced with an estimated ejection fraction in th
e range of 

 25-30%. 

 Normal left ventricular size. 

 Mild concentric left ventricular hypertrophy. 

 

 RIGHT VENTRICLE

 Normal right ventricular size and systolic function.  

 

 LEFT ATRIUM

 The left atrial size is normal.  

 

 RIGHT ATRIUM

 The right atrial size is normal.  

 

 ATRIAL SEPTUM

 Normal atrial septal thickness without atrial level shunting by limited color doppler interrogation.
  

 

 AORTA

 The aortic root and proximal ascending aorta are normal in size on limited imaging.  

 

 MITRAL VALVE

 Trace-to-mild mitral valve regurgitation. 

 No mitral valve stenosis. 

 

 AORTIC VALVE

 AV repair  Av max 42mmHg, mean 25mmHg

 

 TRICUSPID VALVE

 There is mild to moderate tricuspid valve regurgitation. 

 The estimated pulmonary arterial pressure is _38_ mmHg. 

 TV vegetation

 

 PULMONARY VALVE

 The pulmonary valve is not well visualized.  

 

 VESSELS

 The inferior vena cava is normal in size.  

 

 PERICARDIUM

 No pericardial effusion.  

 

 

 

 

  Lenora Leung MD, FACC

  (Electronically Signed)

  Final Date:22 August 2017 18:21

## 2017-08-22 NOTE — PD.PROCEDR
Procedure Note


Procedure


Central Line Procedure Note





Left subclavian 7 Micronesian triple lumen catheter





Diagnosis: Infective endocarditis





Indications: Need for highly potent vasoactive substances





Consent: Emergent





Anesthesia: None





Description of the Procedure: The patient was placed in the supine, mild-

Trendelenburg position.  The area was prepped and draped sterilely.  A 19g 

needle was inserted under negative pressure aspiration and dark venous blood 

was obtained.  A guidewire was inserted easily without resistance.  A small 

incision was made using a #11 blade. Using a modified Seldinger technique, the 

dilator and 7 Micronesian, 20 cm catheter were advanced over the guidewire without 

resistance.  All ports were aspirated and flushed, and had brisk blood return.  

The line was secured at 20 cm at the skin using 2-0 silk interrupted sutures.  

A Biopatch and Transparent sterile dressing were applied.  There were no 

immediate complications noted.  There was minimal EBL.  The patient tolerated 

the procedure well.





Ultrasound guidance was not used for this procedure





A Chest x-ray has been ordered.





I personally performed the procedure.











Harjeet Garrison MD Aug 22, 2017 13:04

## 2017-08-22 NOTE — EKG
Date Performed: 08/21/2017       Time Performed: 20:19:27

 

PTAGE:      37 years

 

EKG:      Probable sinus tachycardia, although atrial flutter cannot be excluded. INDETERMINATE AXIS 
RIGHT BUNDLE BRANCH BLOCK ABNORMAL ECG

 

PREVIOUS TRACING        8/21/17 Since the prior tracing, there has been an increase in the heart rate
 and the patient now has evidence of a fairly extreme sinus tachycardia. There has otherwise been no 
significant serial change.

 

DOCTOR:   Benita Streeter  Interpretating Date/Time  08/22/2017 13:58:22

## 2017-08-22 NOTE — PD.CONS
Consult


Service


Palliative Care


.


Consult Requested By


Dr. Garrison


.


Primary Care Physician


Unknown


 .


Reason for Consultation


   a.  To assist with evaluation and management of symptoms including: dyspnea. 


   b.  To assist medical decision maker(s) with: better understanding of 

current medical conditions; weighing benefits/burdens of medical treatment 

options; making        


        medical treatment decisions.


.





HPI


History of Present Illness


Miss Hagan is a 37 year old female with past medical history of endocarditis, 

IV drug use, pneumonia and anxiety.  Patient has a history of arteritis status 

post valve replacement in July 2016 at [facility]. Patient was on 

[facility] admitted on 8/10/17 and revoked 8/22/17. In review of 

Hospice notes patient elected FULL CODE indicating she wanted life support for 

2 weeks. No written advanced directives completed, the family indicates patient 

may have completed at [facility].  Hospice notes indicate patient wanted 

to name her stepfather Won as healthcare surrogate though documentation was 

never completed.  Patient was previously hospitalized at [facility] in 

April 2017 for tricuspid prosthetic valve endocarditis, Candiada parapsolosis (

March 15 thru April 15) , Streptococci.  She was treated with combination 

Diflucan and Micafungin and ambisionme for 2 weeks, timing not clear. 





Patient presented to [facility] on 8/21/17 as a stroke alert. Notes 

indicate patient reported a headache earlier in the day was found lying on the 

floor incontinent of stool. Upon EMS arrival patient was a phasic and confused.

  She was unable to provide any history, she was moving extremities and 

intermittently groaning. Since admission, lumbar puncture was done consistent 

with bacterial infection. 





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 

bacterial meningitis, respiratory failure, acute kidney injury and elevated 

troponin secondary to cardiac injury related to infection.  Infectious disease, 

Dr. Teague was consulted with continued antibiotic recommendations.  She 

indicates that patient will need 8 weeks of IV antibiotic with lifetime oral 

regimen.  Dr. Garrison and Dr. Teague indicate overall prognosis poor, patient 

is not a candidate for surgical intervention.   





Palliative care is consulted to assist with further clarification of treatment 

goals.


.





Past Family Social History


Coded Allergies:  


     No Known Allergies (Verified , 2/24/16)


Past Medical History


MSSA aortic valve Endocarditis 2011 


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


CHF


Hepatitis C


IV drug abuse


Anxiety


Pneumonia





.


Past Surgical History


Aortic valve replacement 2011


Redo aortic valve replacement 6/2016 - [facility]


Left wrist cyst removed 


.


Reported Medications


Reported Meds & Active Scripts


Active


Kcl 20 Meq Tab (Potassium Chloride) 20 Meq Tabcr 20 Meq PO DAILY 30 Days


Percocet 5-325 mg (Oxycodone/Acetaminophen) 1 Tab 1 Tab PO Q4H PRN


Nebulizer (Miscellaneous Medication)  Mis 1 Unit INH AS DIRECTED


Resp: Albuterol/Ipratropium 2.5 Mg/0.5 Mg (Albuterol/Ipratropium) 1 Amp Nebu 1 

Ampule INH Q4HR NEB PRN 30 Days


Aldactone 25 mg (Spironolactone) 25 Mg Tab 25 Mg PO Q12HR 30 Days


Metoprolol Tartrate 25 mg (Metoprolol Tartrate) 25 Mg Tab 25 Mg PO Q12HR 30 Days


Furosemide 40 Mg Tab 40 Mg PO Q8H 30 Days


.





Current Medications








 Medications


  (Trade)  Dose


 Ordered  Sig/Nikhil


 Route  Start Time


 Stop Time Status Last Admin


 


 Propofol  100 ml @ 0


 mls/hr  TITRATE  PRN


 IV  8/21/17 20:15


    8/22/17 17:24


 


 


 Norepinephrine


 Bitartrate  250 ml @ 


 7.5 mls/hr  TITRATE  PRN


 IV  8/21/17 21:30


    8/22/17 17:24


 


 


  (Brethine Inj)  1 mg  UNSCH  PRN


 SQ  8/21/17 21:30


     


 


 


  (Tylenol)  650 mg  Q6H  PRN


 PO  8/21/17 21:30


     


 


 


  (Morphine Inj)  2 mg  Q2H  PRN


 IV  8/21/17 21:30


     


 


 


  (Pepcid Inj)  20 mg  Q12HR


 IV PUSH  8/22/17 09:00


    8/22/17 09:12


 


 


  (Tears Naturale


 Opth Soln)  1 drop  TID


 EACH EYE  8/22/17 09:00


     


 


 


  (Zofran Inj)  4 mg  Q6H  PRN


 IV  8/21/17 21:30


     


 


 


  (Duoneb Neb)  1 ampule  Q2HR NEB  PRN


 INH  8/21/17 21:30


     


 


 


  (Heparin Inj)  5,000 units  Q12H


 SQ  8/21/17 22:00


    8/22/17 09:11


 


 


 Miscellaneous


 Information  1  Q361D


 XX  8/21/17 21:30


    8/21/17 21:30


 


 


  (Chlorhexidine


 2% Cloth)  3 pack


 Taper  DAILY@04


 TOP  8/22/17 04:00


 8/18/18 03:59  8/22/17 04:00


 


 


  (Chlorhexidine


 2% Cloth)  3 pack  UNSCH  PRN


 TOP  8/21/17 21:30


     


 


 


  (Arlen-Colace)  1 tab  BID


 PO  8/22/17 09:00


    8/22/17 09:12


 


 


  (Milk Of


 Magnesia Liq)  30 ml  Q12H  PRN


 PO  8/21/17 21:30


     


 


 


  (Senokot)  17.2 mg  Q12H  PRN


 PO  8/21/17 21:30


     


 


 


  (Dulcolax Supp)  10 mg  DAILY  PRN


 RECTAL  8/21/17 21:30


     


 


 


  (Lactulose Liq)  30 ml  DAILY  PRN


 PO  8/21/17 21:30


     


 


 


  (NS Flush)  2 ml  UNSCH  PRN


 IV FLUSH  8/21/17 22:45


     


 


 


  (NS Flush)  2 ml  BID


 IV FLUSH  8/22/17 09:00


    8/22/17 09:11


 


 


 Ceftriaxone


 Sodium 2000 mg/


 Sodium Chloride  100 ml @ 


 200 mls/hr  Q12H


 IV  8/22/17 08:00


    8/22/17 07:55


 


 


 Amphotericin B


 Liposome 340 mg/


 Dextrose  250 ml @ 


 125 mls/hr  Q24H


 IV  8/22/17 02:00


    8/22/17 02:00


 


 


 Potassium Chloride  100 ml @ 


 50 mls/hr  Q2H  PRN


 IV  8/22/17 06:00


    8/22/17 07:54


 


 


 Potassium Chloride  100 ml @ 


 50 mls/hr  Q2H  PRN


 IV  8/22/17 06:00


     


 


 


  (K-Lyte Cl  Eff)  50 meq  UNSCH  PRN


 PO  8/22/17 06:00


     


 


 


 Potassium Chloride  100 ml @ 


 25 mls/hr  UNSCH  PRN


 IV  8/22/17 06:00


     


 


 


 Potassium Chloride  100 ml @ 


 50 mls/hr  Q2H  PRN


 IV  8/22/17 06:00


     


 


 


 Magnesium Sulfate


 4 gm/Sodium


 Chloride  100 ml @ 


 50 mls/hr  UNSCH  PRN


 IV  8/22/17 06:00


     


 


 


  (Mag-Ox)  800 mg  UNSCH  PRN


 PO  8/22/17 06:00


     


 


 


 Magnesium Sulfate


 2 gm/Sodium


 Chloride  100 ml @ 


 50 mls/hr  UNSCH  PRN


 IV  8/22/17 06:00


     


 


 


  (K-Phos)  2,000 mg  Q4H  PRN


 PO  8/22/17 06:00


     


 


 


 Sodium Phosphate


 30 mmol/Sodium


 Chloride  250 ml @ 


 42 mls/hr  UNSCH  PRN


 IV  8/22/17 06:00


     


 


 


  (K-Phos)  2,000 mg  UNSCH  PRN


 PO/TUBE  8/22/17 06:00


     


 


 


 Potassium


 Phosphate 30 mmol/


 Sodium Chloride  260 ml @ 


 42 mls/hr  UNSCH  PRN


 IV  8/22/17 06:00


     


 


 


 Fentanyl Citrate  250 ml @ 0


 mls/hr  TITRATE


 IV  8/22/17 13:00


    8/22/17 14:38


 


 


  (D50w (Vial) Inj)  25 ml  UNSCH  PRN


 IV PUSH  8/22/17 13:00


     


 


 


  (NovoLIN R


 SUPPLEMENTAL


 SCALE)  1  Q6HR


 SQ  8/22/17 18:00


     


 


 


 Oxacillin Sodium


 2 gm/Sodium


 Chloride  100 ml @ 


 200 mls/hr  Q4H


 IV  8/22/17 18:00


     


 


 


 Pharmacy Profile


 Note  0 ml @ 0


 mls/hr  UNSCH


 OTHER  8/22/17 15:30


     


 


 


 Rifampin 300 mg/


 Sodium Chloride  100 ml @ 


 100 mls/hr  Q12H


 IV  8/22/17 21:00


     


 


 


 Gentamicin


 Sulfate/Sodium


 Chloride  100 ml @ 


 200 mls/hr  Q8H


 IV  8/22/17 18:00


    8/22/17 17:24


 


 


 Miscellaneous


 Information  SPECIFIC LAB TO BE


 DRAWN:GENTAMICIN


 TROUGH DATE TO...  ONCE  ONCE


 .XX  8/23/17 09:45


 8/23/17 09:46   


 


 


 Miscellaneous


 Information  SPECIFIC LAB TO BE


 DRAWN:GENTAMICIN


 PEAK DATE TO...  ONCE  ONCE


 .XX  8/23/17 11:30


 8/23/17 11:31   


 


 


 Fluconazole/


 Sodium Chloride  200 ml @ 


 100 mls/hr  Q24H


 IV  8/22/17 20:00


     


 


 


 Fluconazole/


 Sodium Chloride  200 ml @ 


 100 mls/hr  Q24H


 IV  8/22/17 22:00


     


 








Family History


Mother alive. Has 2 sons. Sister with drug abuse. 


.


Substance Use


Per mother report and EMR review:


Tobacco: Smokes daily. 


Alcohol: Drinks 3-5 alcoholic beverages daily. 


Prescription med abuse: Has prescription meds, uses more than prescribed on a 

regular basis. 


Illicits: IV drug abuse and Marijuana. Has used drugs since 20 years of age. 


.


Psychosocial History


From [place]. High school graduate. No college. IV drug use since she was 20. 

Recently worked at [employer]. Single, has a boyfriend Humble. Has 2 juvenile 

children, ages 11 (pts mother has had custody of him since birth) and 8 lives 

with patient (his biologic father not involved, her boyfriend Humble has been 

caring for the child and is planning to get temporary custody). She is 

supported by her mother, Kelly (in [place]), her ex-step-father, Dilshad (lives [facility]) 

and boyfriend, Humble Lives [facility]. 


.


Spiritual/Cultural Factors


Unknown. 


.


Living Will:  Never completed


Health Care Surrogate:  Never completed


Durable Power of :  Never completed


Health Care Surrogate(s):


No known written advanced directives, family is going to try to find HCS 

paperwork they think pt previously completed.  Patient a single.  Children are 

juvenile.  If no written advanced directives, according to [place] statutes 

health care proxy decision-making falls to a parent. 


.


Ethical and Legal Issues


No known written advanced directives, family is going to try to find HCS 

paperwork they think pt previously completed.  Patient a single.  Children are 

juvenile.  If no written advanced directives, according to [place] statutes 

health care proxy decision-making falls to a parent. 


.





Physical Exam





Vital Signs








  Date Time  Temp Pulse Resp B/P (MAP) Pulse Ox O2 Delivery O2 Flow Rate FiO2


 


8/22/17 17:24  115  90/61    


 


8/22/17 13:25  115  95/62    


 


8/22/17 13:03     100   40


 


8/22/17 12:00  114      


 


8/22/17 12:00        40


 


8/22/17 11:28 100.2 114 38 89/52 (64) 100   


 


8/22/17 11:15 100.2 111 44 97/66 (76) 100   


 


8/22/17 11:00 100.2 106 31 95/61 (72) 100   


 


8/22/17 10:45 100.4 106 34 92/64 (73) 100   


 


8/22/17 10:30 100.4 106 29 90/62 (71) 100   


 


8/22/17 10:15 100.4 108 31 96/68 (77) 100   


 


8/22/17 10:00 100.4 105 24 95/69 (78) 100   


 


8/22/17 10:00  105      


 


8/22/17 09:45 100.6 106 31 93/68 (76) 100   


 


8/22/17 09:41  107  90/66    


 


8/22/17 09:30 100.6 107 33 90/66 (74) 100   


 


8/22/17 09:15 100.6 107 32 91/66 (74) 100   


 


8/22/17 09:13     100   45


 


8/22/17 09:00 100.8 109 32 88/68 (75) 100   


 


8/22/17 08:45 100.8 109 30 90/62 (71) 100   


 


8/22/17 08:30 100.9 109 30 89/60 (70) 100   


 


8/22/17 08:15 100.9 111 31 89/64 (72) 100   


 


8/22/17 08:00  109      


 


8/22/17 08:00 100.6 109 31 96/69 (78) 100   


 


8/22/17 08:00        50


 


8/22/17 07:46 100.9 109 31 95/65 (75) 100   


 


8/22/17 07:40 100.9 109 32 97/68 (78) 100   


 


8/22/17 07:20 100.8 111 31 93/63 (73) 100   


 


8/22/17 07:00 100.8 111 32 92/62 (72) 100   


 


8/22/17 06:00  114      


 


8/22/17 04:00  117      


 


8/22/17 04:00 102.0 117 33 93/64 (74) 100   


 


8/22/17 04:00        50


 


8/22/17 03:47  121  90/61    


 


8/22/17 03:36        


 


8/22/17 03:07 102.4 116 33 86/58 (67) 100   


 


8/22/17 03:06  116      


 


8/22/17 03:00     100   50


 


8/22/17 01:00 102.6 118 30 107/62 (77) 100   


 


8/22/17 00:32 102.6 122 30 95/57 (70) 100   


 


8/22/17 00:31 102.6 116 30 97/58 (71) 100   


 


8/22/17 00:30 102.6 116 30 96/59 (71) 100   


 


8/22/17 00:15 102.6 116 31 97/53 (68) 100   


 


8/22/17 00:00 102.2 113 30 96/52 (67) 100   


 


8/21/17 23:45 102.2 117 31 92/53 (66) 100   


 


8/21/17 23:35 102.2 121 31 93/55 (68) 100   


 


8/21/17 23:25 102.2 112 30 96/52 (67) 100   


 


8/21/17 23:23 102.2 117 30 93/51 (65) 100   


 


8/21/17 23:19 102.6 117 30 94/51 (65) 100   


 


8/21/17 23:09 102.6 113 33 92/56 (68) 100   


 


8/21/17 22:59 102.6 114 59 94/60 (71) 100   


 


8/21/17 22:49 102.9 113 29 97/59 (72) 100   


 


8/21/17 22:39 102.9 113 28 92/55 (67) 100   


 


8/21/17 22:29 102.9 115 27 94/54 (67) 100   


 


8/21/17 22:19 102.9 112 26 99/58 (72) 100   


 


8/21/17 22:09 103.3 112 25 95/58 (70) 100   


 


8/21/17 22:05 103.3 114 50 99/56 (70) 100   


 


8/21/17 22:03 101.9 119 16 90/70 (77) 92   


 


8/21/17 21:40 103.6 108 34 88/57 (67) 100   


 


8/21/17 21:35 103.6 115 23 84/56 (65) 100   


 


8/21/17 21:30 104.0 110 26 85/55 (65) 100   


 


8/21/17 21:11     96   50


 


8/21/17 21:00  156  78/38    


 


8/21/17 20:58     98   50


 


8/21/17 20:07     100   40


 


8/21/17 20:00     100   100


 


8/21/17 19:57 104.6 156 32 118/76 (90) 100   


 


8/21/17 19:40     100   100


 


8/21/17 19:40 103.6 123 32 123/90 (101) 96   


 


8/21/17 19:34        50


 


8/21/17 19:30     96  4.00 

















 8/22/17 8/23/17





 19:00 07:00


 


Intake Total 262 ml 


 


Balance 262 ml 


 


  


 


Intake IV Total 262 ml 








Exam


CONSTITUTIONAL/GENERAL: This is an adequately nourished patient, sedated on 

mechanical ventilation.


TUBES/LINES/DRAINS: ETT, NG, left subclavian centerline, PIV right AC, Stokes, 

SCDs. 


SKIN: No jaundice, rashes, or lesions. Skin temperature appropriate. Not 

diaphoretic. 


HEAD: Atraumatic. Normocephalic.


EYES: Pupils pinpoint, non reactive equal and round. No scleral icterus. No 

injection or drainage.


ENT: Unable to assess hearing. Nose with NG tube right nare.  Throat difficult 

to visualize due to ETT.   


NECK: Trachea midline. 


CARDIOVASCULAR: tachycardic, regular rhythm. No JVD. Peripheral pulses 

symmetric.


RESPIRATORY/CHEST: Symmetric, unlabored respirations on vent. Scattered rhonchi 

to auscultation. 


GASTROINTESTINAL: Abdomen soft, moderately distended. Bowel sounds hypoactive. 


GENITOURINARY: Without palpable bladder distension. Stokes catheter in place. 


MUSCULOSKELETAL: Extremities without clubbing, mild cyanosis of toes, 2+ edema. 


LYMPHATICS: No palpable cervical or supraclavicular adenopathy.


NEUROLOGICAL: Sedated. Unresponsive.  No withdrawal to painful stimuli.


PSYCHIATRIC: sedated: unable to assess. 


.





Diagnostic Tests


Laboratory





Laboratory Tests








Test


  8/21/17


19:30 8/21/17


20:30 8/21/17


21:00 8/21/17


21:23


 


White Blood Count


  18.8 TH/MM3


(4.0-11.0) 


  


  


 


 


Red Blood Count


  4.13 MIL/MM3


(4.00-5.30) 


  


  


 


 


Hemoglobin


  9.9 GM/DL


(11.6-15.3) 


  


  


 


 


Bedside Hemoglobin


  10.9 G/DL


(12.0-17.0) 


  


  


 


 


Hematocrit


  31.1 %


(35.0-46.0) 


  


  


 


 


Bedside Hematocrit


  32.0 %


(38.0-51.0) 


  


  


 


 


Mean Corpuscular Volume


  75.4 FL


(80.0-100.0) 


  


  


 


 


Mean Corpuscular Hemoglobin


  24.0 PG


(27.0-34.0) 


  


  


 


 


Mean Corpuscular Hemoglobin


Concent 31.9 %


(32.0-36.0) 


  


  


 


 


Red Cell Distribution Width


  15.5 %


(11.6-17.2) 


  


  


 


 


Platelet Count


  200 TH/MM3


(150-450) 


  


  


 


 


Mean Platelet Volume


  7.7 FL


(7.0-11.0) 


  


  


 


 


Neutrophils (%) (Auto)


  93.8 %


(16.0-70.0) 


  


  


 


 


Lymphocytes (%) (Auto)


  2.2 %


(9.0-44.0) 


  


  


 


 


Monocytes (%) (Auto)


  3.7 %


(0.0-8.0) 


  


  


 


 


Eosinophils (%) (Auto)


  0.0 %


(0.0-4.0) 


  


  


 


 


Basophils (%) (Auto)


  0.3 %


(0.0-2.0) 


  


  


 


 


Neutrophils # (Auto)


  17.7 TH/MM3


(1.8-7.7) 


  


  


 


 


Lymphocytes # (Auto)


  0.4 TH/MM3


(1.0-4.8) 


  


  


 


 


Monocytes # (Auto)


  0.7 TH/MM3


(0-0.9) 


  


  


 


 


Eosinophils # (Auto)


  0.0 TH/MM3


(0-0.4) 


  


  


 


 


Basophils # (Auto)


  0.0 TH/MM3


(0-0.2) 


  


  


 


 


CBC Comment AUTO DIFF    


 


Differential Total Cells


Counted 100 


  


  


  


 


 


Neutrophils % (Manual) 62 % (16-70)    


 


Band Neutrophils % 34 % (0-6)    


 


Lymphocytes % 2 % (9-44)    


 


Monocytes % 2 % (0-8)    


 


Neutrophils # (Manual)


  18.0 TH/MM3


(1.8-7.7) 


  


  


 


 


Differential Comment


  FINAL DIFF


MANUAL 


  


  


 


 


Platelet Estimate


  NORMAL


(NORMAL) 


  


  


 


 


Platelet Morphology Comment


  NORMAL


(NORMAL) 


  


  


 


 


Ovalocytes 2+ (NORMAL)    


 


Prothrombin Time


  15.4 SEC


(9.8-11.6) 


  


  


 


 


Prothromb Time International


Ratio 1.4 RATIO 


  


  


  


 


 


Activated Partial


Thromboplast Time 43.2 SEC


(24.3-30.1) 


  


  


 


 


Fibrinogen


  289 mg/dL


(227-377) 


  


  


 


 


Bedside Sodium


  133 MMOL/L


(138-146) 


  


  


 


 


Bedside Potassium


  3.3 MMOL/L


(3.5-4.9) 


  


  


 


 


Bedside Chloride


  98 MMOL/L


() 


  


  


 


 


Bedside Blood Urea Nitrogen


  15 MG/DL


(8-26) 


  


  


 


 


Bedside Creatinine


  0.7 MG/DL


(0.6-1.0) 


  


  


 


 


Bedside Glucose


  139 MG/DL


(60-95) 


  


  


 


 


Total Creatine Kinase


  103 U/L


() 


  


  


 


 


Troponin I


  0.61 NG/ML


(0.02-0.05) 


  


  


 


 


Human Chorionic Gonadotropin,


Quant LESS THAN 1


MIU/ML (0-5) 


  


  


 


 


Urine Color


  


  YELLOW


(YELLW/STRAW) 


  


 


 


Urine Turbidity  HAZY (CLEAR)   


 


Urine pH  6.0 (5.0-8.5)   


 


Urine Specific Gravity


  


  1.027


(1.002-1.035) 


  


 


 


Urine Protein


  


  300 mg/dL


(NEG-TRACE) 


  


 


 


Urine Glucose (UA)


  


  NEG mg/dL


(NEG) 


  


 


 


Urine Ketones


  


  NEG mg/dL


(NEG) 


  


 


 


Urine Occult Blood  MOD (NEG)   


 


Urine Nitrite  NEG (NEG)   


 


Urine Bilirubin  NEG (NEG)   


 


Urine Urobilinogen


  


  LESS THAN 2.0


MG/DL (LESS 


  


 


 


Urine Leukocyte Esterase  TRACE (NEG)   


 


Urine RBC  27 /hpf (0-3)   


 


Urine WBC  31 /hpf (0-5)   


 


Urine Squamous Epithelial


Cells 


  2 /hpf (0-5) 


  


  


 


 


Urine Bacteria


  


  OCC /hpf


(NONE) 


  


 


 


Urine Opiates Screen  POS (NEG)   


 


Urine Barbiturates Screen  NEG (NEG)   


 


Urine Amphetamines Screen  NEG (NEG)   


 


Urine Benzodiazepines Screen  POS (NEG)   


 


Urine Cocaine Screen  NEG (NEG)   


 


Urine Cannabinoids Screen  POS (NEG)   


 


Lactic Acid Level


  


  


  3.3 mmol/L


(0.4-2.0) 


 


 


Ammonia


  


  


  60 MCMOL/L


(11-32) 


 


 


Blood Gas Puncture Site    RT RADIAL 


 


Blood Gas Patient Temperature    98.6 


 


Blood Gas HCO3


  


  


  


  17 mmol/L


(22-26)


 


Blood Gas Base Excess


  


  


  


  -7.4 mmol/L


(-2-2)


 


Blood Gas Oxygen Saturation    98 % () 


 


Arterial Blood pH


  


  


  


  7.36


(7.380-7.420)


 


Arterial Blood Partial


Pressure CO2 


  


  


  31 mmHg


(38-42)


 


Arterial Blood Partial


Pressure O2 


  


  


  206 mmHG


()


 


Arterial Blood Oxygen Content


  


  


  


  12.9 Vol %


(12.0-20.0)


 


Arterial Blood


Carboxyhemoglobin 


  


  


  1.2 % (0-4) 


 


 


Arterial Blood Methemoglobin    0.2 % (0-2) 


 


Blood Gas Hemoglobin


  


  


  


  9.0 G/DL


(12.0-16.0)


 


Oxygen Delivery Device    VENTILATOR 


 


Blood Gas Ventilator Setting


  


  


  


  AC/RR14//[id number]


 


 


Blood Gas Inspired Oxygen    50 % 


 


Test


  8/21/17


22:00 8/21/17


23:40 8/22/17


01:40 8/22/17


03:00


 


CSF Volume (Tube 1) 3.4 ML    


 


CSF Supernatant Color (tube 1) CLEAR (CLEAR)    


 


CSF Gross Blood (Tube 1) 3+ (0)    


 


CSF Volume (Tube 2) 2.9 ML    


 


CSF Supernatant Color (tube 2) CLEAR (CLEAR)    


 


CSF Gross Blood (Tube 2) 4+ (0)    


 


CSF Volume (Tube 3) 3.0 ML    


 


CSF Supernatant Color (tube 3) CLEAR (CLEAR)    


 


CSF Gross Blood (Tube 3) 3+ (0)    


 


CSF Volume (Tube 4) 3.8 ML    


 


CSF Supernatant Color (tube 4) CLEAR (CLEAR)    


 


CSF Gross Blood (Tube 4) 4+ (0)    


 


CSF WBC (Tube 4)


  1846 /MM3


(0-10) 


  


  


 


 


CSF RBC (Tube 4)


  1909 /MM3


(NONE) 


  


  


 


 


CSF Neutrophils 97 %    


 


CSF Lymphocytes 1 %    


 


CSF Monocytes 2 %    


 


CSF Glucose


  68 MG/DL


(40-80) 


  


  


 


 


CSF Lactate Dehydrogenase 38 U/L    


 


CSF Lactic Acid


  4.9 MMOL/L


(0.0-3.0) 


  


  


 


 


CSF Total Protein


  180.9 MG/DL


(15.0-45.0) 


  


  


 


 


Lactic Acid Level


  


  2.8 mmol/L


(0.4-2.0) 


  


 


 


Troponin I


  


  


  1.91 NG/ML


(0.02-0.05) 


 


 


Nasal Screen MRSA (PCR)


  


  


  


  MRSA NOT


DETECTED (NOT


 


Test


  8/22/17


04:50 8/22/17


12:05 


  


 


 


White Blood Count


  18.6 TH/MM3


(4.0-11.0) 


  


  


 


 


Red Blood Count


  4.16 MIL/MM3


(4.00-5.30) 


  


  


 


 


Hemoglobin


  10.0 GM/DL


(11.6-15.3) 


  


  


 


 


Hematocrit


  31.4 %


(35.0-46.0) 


  


  


 


 


Mean Corpuscular Volume


  75.5 FL


(80.0-100.0) 


  


  


 


 


Mean Corpuscular Hemoglobin


  24.0 PG


(27.0-34.0) 


  


  


 


 


Mean Corpuscular Hemoglobin


Concent 31.8 %


(32.0-36.0) 


  


  


 


 


Red Cell Distribution Width


  16.0 %


(11.6-17.2) 


  


  


 


 


Platelet Count


  137 TH/MM3


(150-450) 


  


  


 


 


Mean Platelet Volume


  7.8 FL


(7.0-11.0) 


  


  


 


 


Neutrophils (%) (Auto)


  93.7 %


(16.0-70.0) 


  


  


 


 


Lymphocytes (%) (Auto)


  3.3 %


(9.0-44.0) 


  


  


 


 


Monocytes (%) (Auto)


  2.4 %


(0.0-8.0) 


  


  


 


 


Eosinophils (%) (Auto)


  0.4 %


(0.0-4.0) 


  


  


 


 


Basophils (%) (Auto)


  0.2 %


(0.0-2.0) 


  


  


 


 


Neutrophils # (Auto)


  17.4 TH/MM3


(1.8-7.7) 


  


  


 


 


Lymphocytes # (Auto)


  0.6 TH/MM3


(1.0-4.8) 


  


  


 


 


Monocytes # (Auto)


  0.4 TH/MM3


(0-0.9) 


  


  


 


 


Eosinophils # (Auto)


  0.1 TH/MM3


(0-0.4) 


  


  


 


 


Basophils # (Auto)


  0.0 TH/MM3


(0-0.2) 


  


  


 


 


CBC Comment DIFF FINAL    


 


Differential Comment     


 


Prothrombin Time


  18.9 SEC


(9.8-11.6) 


  


  


 


 


Prothromb Time International


Ratio 1.7 RATIO 


  


  


  


 


 


Blood Urea Nitrogen


  27 MG/DL


(7-18) 


  


  


 


 


Creatinine


  1.30 MG/DL


(0.50-1.00) 


  


  


 


 


Random Glucose


  137 MG/DL


() 


  


  


 


 


Total Protein


  6.5 GM/DL


(6.4-8.2) 


  


  


 


 


Albumin


  2.8 GM/DL


(3.4-5.0) 


  


  


 


 


Calcium Level


  7.2 MG/DL


(8.5-10.1) 


  


  


 


 


Phosphorus Level


  3.2 MG/DL


(2.5-4.9) 3.0 MG/DL


(2.5-4.9) 


  


 


 


Magnesium Level


  1.4 MG/DL


(1.5-2.5) 


  


  


 


 


Alkaline Phosphatase


  93 U/L


() 


  


  


 


 


Aspartate Amino Transf


(AST/SGOT) 44 U/L (15-37) 


  


  


  


 


 


Alanine Aminotransferase


(ALT/SGPT) 16 U/L (10-53) 


  


  


  


 


 


Total Bilirubin


  0.7 MG/DL


(0.2-1.0) 


  


  


 


 


Sodium Level


  132 MEQ/L


(136-145) 


  


  


 


 


Potassium Level


  2.7 MEQ/L


(3.5-5.1) 


  


  


 


 


Chloride Level


  100 MEQ/L


() 


  


  


 


 


Carbon Dioxide Level


  18.8 MEQ/L


(21.0-32.0) 


  


  


 


 


Anion Gap


  13 MEQ/L


(5-15) 


  


  


 


 


Estimat Glomerular Filtration


Rate 47 ML/MIN


(>89) 


  


  


 


 


Lactic Acid Level


  2.9 mmol/L


(0.4-2.0) 


  


  


 


 


Protein Corrected Calcium


  7.5 MG/DL


(8.5-10.1) 


  


  


 


 


Troponin I


  


  2.34 NG/ML


(0.02-0.05) 


  


 








Result Diagram:  


8/22/17 0450                                                                   

             8/22/17 0450





Microbiology





Microbiology








 Date/Time


Source Procedure


Growth Status


 


 


 8/21/17 20:22


Blood Peripheral Aerobic Blood Culture - Preliminary


Staphylococcus Aureus


Enterococcus Faecalis Resulted


 


 8/21/17 20:22 Anaerobic Blood Culture - Preliminary


Staphylococcus Aureus Resulted





 8/21/17 20:15


Blood Peripheral Aerobic Blood Culture - Preliminary


Gram Positive Cocci Resulted


 


 8/21/17 20:15 Anaerobic Blood Culture - Preliminary


Gram Positive Cocci Resulted





 8/21/17 22:00


Cerebral Spinal Fluid Lumbar Puncture Fungal Smear - Final


NO FUNGAL ELEMENTS SEEN. Resulted


 


 8/21/17 22:00


Cerebral Spinal Fluid Lumbar Puncture Fungal Culture


Pending Resulted





 8/21/17 22:00


Cerebral Spinal Fluid Lumbar Puncture Acid Fast Stain


Pending Received


 


 8/21/17 22:00


Cerebral Spinal Fluid Lumbar Puncture Mycobacterial Culture


Pending Received





 8/21/17 22:00


Cerebral Spinal Fluid Lumbar Puncture Gram Stain - Final Resulted


 


 8/21/17 22:00


Cerebral Spinal Fluid Lumbar Puncture CSF Culture - Preliminary


NO GROWTH IN 24 HOURS. Resulted








Imaging


Last Impressions








Head CT 8/21/17 0000 Signed





Impressions: 





 Service Date/Time:  Monday, August 21, 2017 19:47 - CONCLUSION:  No evidence 

of 





 acute infarct, hemorrhage, mass or edema.     Kristian Frankel MD 


 


Chest X-Ray 8/21/17 0000 Signed





Impressions: 





 Service Date/Time:  Monday, August 21, 2017 21:06 - CONCLUSION:  1. 





 Repositioning of endotracheal tube which is now in good position. 2. Interval 





 placement of a right jugular central venous catheter which is in good 

position. 





 3. No evidence of pneumothorax. 4. Vascular congestive changes and mild left 





 basilar airspace disease.     Kristian Frankel MD 





.


Procedures


* 8/21/17 - left subclavian central placement. 


* 8/21/17 - lumbar puncture


* 8/21/17 - Intubated. 


.





Patient/Family Conference


Present at Family Conference:


Will attempt to call family in AM as they have spoken with hospice today to 

revoke services and reported desire to continue aggressive care including FULL 

CODE. 


.





Assessment and Plan


Disease Oriented Problem List:  


(1) Protein-calorie malnutrition, severe


(2) Elevated troponin


(3) Meningitis


(4) Encephalopathy


(5) Bacterial endocarditis


(6) Polysubstance abuse


(7) Sepsis


(8) Acute kidney injury


Symptom Scale:  


(1) Pain


0-10 Scale:  Unable to quantify





(2) Dyspnea


0-10 Scale:  Unable to quantify





(3) Encephalopathy


0-10 Scale:  Unable to quantify





Pertinent Non-Medical Issues


Psychosocial: single.  2 juvenile children.


Spiritual: Unknown.


Legal: No known written advanced directives, family is going to try to find HCS 

paperwork they think pt previously completed.  Patient a single.  Children are 

juvenile.  If no written advanced directives, according to [place] statutes 

health care proxy decision-making falls to a parent. 


Ethical issues impacting care: No known concerns at this time. 


.


Important Contacts


* Kelly Le, mother: [phone]


* Won Le, stepfather: [phone]


.


Prognosis


Prognosis poor.


Code Status:  Full Code


Plan


* Decision Maker: No known written advanced directives, family is going to try 

to find HCS paperwork they think pt previously completed, will bring copy.  

Patient a single.  Children are juvenile.  If no written advanced directives, 

according to [place] statutes health care proxy decision-making falls to a 

parent. 


* FULL CODE - mother is considering NO CODE, she wants to speak with family 

first. 


* Palliative care spoke with mother via phone for 50 minutes. She has a good 

understanding of current critical illness. She has seen ongoing trajectory of 

decline and continued drug abuse. She understands infection will not be cured 

and that patient may not survive this hospitalization. She is asking 

appropriate questions. She feels patient should be DNR, wants to speak with 

additional family before making a final decision. 


* will attempt to call patient's mother in AM to introduce palliative care 

service, provide medical update and to further clarify treatment goals. She has 

spoken with hospice recently and revoked services with aggressive goals. 


* SYMPTOMS: Pain: No obvious signs of pain.  Potential sources include 

endocarditis, bedbound status, tubes etc.  Currently on fentanyl and propofol.  

Patient with likely high tolerance given history of IV drug use, will monitor 

effective medications.  Dyspnea: remains sedated on mechanical ventilation.  No 

new medication recommendations at this time.


* Palliative care will continue to follow throughout hospital course to assist 

with symptom management and clarification of goals as needed. 


.





Thank you for the opportunity to participate in the care of Ms. Hagan.





Attestation


To help prompt me to consider important information that might be impacting 

today's encounter and assessment, information from prior notes written by 

myself or my colleagues may have been "brought forward" into today's note.  My 

signature on this note, however, is an attestation that I personally performed 

the exam, history, and/or decision-making noted today, and, unless otherwise 

indicated, the interactions with patient, family, and staff as well as the 

review of records all occurred today.  I also attest that the listed assessment 

and stated plan reflect my best clinical judgment today based on the 

combination of historical information, prior notes, and today's exam/ 

interactions.  When time spent is documented, it refers only to time spent 

today by the signer, or if indicated, combined time spent today by 

collaborating physician/nurse practitioner.











Ileana Dang Aug 22, 2017 18:50

## 2017-08-22 NOTE — HHI.CCPN
Subjective


Remarks/Hospital Course


Hospital Course:





37-year-old  female who presents initially as a stroke alert.  

According to EMS report the patient has a history of fungal endocarditis with 

previous IVDA.  EMS states that the patient is currently on hospice, however, 

they are unsure if the patient is a DNR.  EMS states the patient apparently 

complained of a headache earlier today, was found lying on the floor and 

incontinent of stool at approximate, last seen normal at 6:45 PM.  Therefore, 

EMS called a stroke alert in the field as the patient was aphasic and confused.

  Upon arrival the patient groans, moves all 4 extremities and withdraws all 4 

extremities, but is unable to provide any information.  She was intubated in 

the emergency department by ER attending for airway protection.





subjective:





8/22: remains encephalopathic. Blood cultures growing Staph in 4/4 bottles.





Objective





Vital Signs








  Date Time  Temp Pulse Resp B/P (MAP) Pulse Ox O2 Delivery O2 Flow Rate FiO2


 


8/22/17 12:00        40


 


8/22/17 11:28 100.2 114 38 89/52 (64) 100   


 


8/21/17 19:30       4.00 














Intake and Output   


 


 8/22/17 8/22/17 8/22/17





 07:59 15:59 23:59


 


Intake Total 2264 ml  


 


Output Total 500 ml  


 


Balance 1764 ml  








Result Diagram:  


8/22/17 0450                                                                   

             8/22/17 0450





Other Results





Laboratory Tests








Test


  8/21/17


21:23


 


Blood Gas Puncture Site RT RADIAL 


 


Blood Gas Patient Temperature 98.6 


 


Blood Gas HCO3


  17 mmol/L


(22-26)


 


Blood Gas Base Excess


  -7.4 mmol/L


(-2-2)


 


Blood Gas Oxygen Saturation 98 % () 


 


Arterial Blood pH


  7.36


(7.380-7.420)


 


Arterial Blood Partial


Pressure CO2 31 mmHg


(38-42)


 


Arterial Blood Partial


Pressure O2 206 mmHG


()


 


Arterial Blood Oxygen Content


  12.9 Vol %


(12.0-20.0)


 


Arterial Blood


Carboxyhemoglobin 1.2 % (0-4) 


 


 


Arterial Blood Methemoglobin 0.2 % (0-2) 


 


Blood Gas Hemoglobin


  9.0 G/DL


(12.0-16.0)


 


Oxygen Delivery Device VENTILATOR 


 


Blood Gas Ventilator Setting


  AC/[id number]//[id number]


 


 


Blood Gas Inspired Oxygen 50 % 








Objective Remarks


GENERAL: critically ill middle-aged female, Sedated and intubated


SKIN: Warm and dry.


HEAD: Normocephalic.


EYES: No scleral icterus. No injection or drainage. 


NECK:  trachea midline. No JVD 


CARDIOVASCULAR: tachycardic rate, regular rhythm.  


RESPIRATORY: equal chest rise. PRVC, 40% fio2.  No accessory muscle use.


GASTROINTESTINAL: Abdomen soft, non-tender, nondistended.  no guarding.


MUSCULOSKELETAL: No cyanosis, or edema. 


NEURO EXAM:


GCS: M 5 V T E 3


Mental Status: The patient is sedated and intubated, encephalopathic


Cranial Nerves:Pupils are round, reactive to light. Extraocular movements 

unable to examine


RASS -4, does not withdraw to pain.





A/P


Assessment and Plan


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 

meningitis possible bacterial secondary to IVDA, respiratory failure, multi-

organ failure.  Very critically ill. very poor prognosis given her ongoing drug 

abuse. will ask palliative to see. appreciate ID and neuro recs.





Acute hypoxic and hypercarbic Respiratory failure


- no SBT today given persistent encephalopathy


- No weaning until neurologically and hemodynamically stable


- wean fio2 for spo2 > 90%


- hob at 30 degrees, vent bundle, nebs





Acute Encephalopathy


Acute meningitis, possible bacterial


- LP with elevated protein, and low glucose relative to serum glucose.


- CT head negative


- Neuro checks per unit protocol


- f/u MRI





History of fungal endocarditis


Staph Bacteremia


- History of IVDA


- Broad-spectrum antibiotic and antiviral and antifungal


- Infectious disease consultation


- f/u repeat echo





Septic Shock


- Septic shock


- Aggressive IV fluid hydration


- Broad-spectrum antibiotic


- Levophed when necessary to keep MAP above 65





Acute Kidney Injury


-secondary to septic shock


- ivf hydration


- trend.


- molina catheter





Acute Protein calorie malnutrition- severe


- secondary to long-standing IVDA and endocarditis


- start TF





DVT GI prophylaxis


- Teds SCDs


- Subcutaneous heparin


- Pepcid





Critical Care:


The total critical care time was 44 minutes. Time to perform other separately 

billable procedures was not included in the critical care time.











Harjeet Garrison MD Aug 22, 2017 13:02

## 2017-08-22 NOTE — PD.CONS
History of Present Illness


Service


Neurology


Consult Requested By


[facility]


Reason for Consult


confusion


Primary Care Physician


Unknown


History of Present Illness


37-year-old  female who presents initially as a stroke alert.  

According to EMS report the patient has a history of fungal endocarditis with 

previous IVDA.  EMS states that the patient is currently on hospice?


pt intubated on sedation, unable to give any hx. on iv abx. +fever, + 

leukocytosis. 


ct brain naicp.





Review of Systems


ROS


Unable to obtain patient is sedated and intubated





Past Family Social History


Allergies:  


Coded Allergies:  


     No Known Allergies (Verified , 2/24/16)





Past Medical History


Hepatitis C


IV drug abuse


Narcotic dependency


Bacterial endocarditis





Past Surgical History


Left forearm surgery





Family History


Unobtainable





Social History


History of IV drug abuse


History of opioid dependency


Medical marijuana use





Review of Systems


All other ROS:  Unable to obtain





Past Family Social History


Allergies:  


Coded Allergies:  


     No Known Allergies (Verified , 2/24/16)


Active Ordered Medications





Current Medications








 Medications


  (Trade)  Dose


 Ordered  Sig/Nikhil


 Route  Start Time


 Stop Time Status Last Admin


 


 Sodium Chloride  1,000 ml @ 


 70 mls/hr  [id number] ONCE


 IV  8/21/17 19:35


 8/22/17 09:52   


 


 


 Propofol  100 ml @ 0


 mls/hr  TITRATE  PRN


 IV  8/21/17 20:15


    8/22/17 03:48


 


 


 Norepinephrine


 Bitartrate  250 ml @ 


 7.5 mls/hr  TITRATE  PRN


 IV  8/21/17 21:30


    8/22/17 03:47


 


 


  (Brethine Inj)  1 mg  UNSCH  PRN


 SQ  8/21/17 21:30


     


 


 


  (Tylenol)  650 mg  Q6H  PRN


 PO  8/21/17 21:30


     


 


 


  (Morphine Inj)  2 mg  Q2H  PRN


 IV  8/21/17 21:30


     


 


 


  (Pepcid Inj)  20 mg  Q12HR


 IV PUSH  8/22/17 09:00


     


 


 


  (Versed Inj)  2 mg  Q1H  PRN


 IV  8/21/17 21:30


     


 


 


  (Tears Naturale


 Opth Soln)  1 drop  TID


 EACH EYE  8/22/17 09:00


     


 


 


  (Zofran Inj)  4 mg  Q6H  PRN


 IV  8/21/17 21:30


     


 


 


  (Duoneb Neb)  1 ampule  Q2HR NEB  PRN


 INH  8/21/17 21:30


     


 


 


  (Heparin Inj)  5,000 units  Q12H


 SQ  8/21/17 22:00


     


 


 


 Miscellaneous


 Information  1  Q361D


 XX  8/21/17 21:30


    8/21/17 21:30


 


 


  (Chlorhexidine


 2% Cloth)  3 pack


 Taper  DAILY@04


 TOP  8/22/17 04:00


 8/18/18 03:59  8/22/17 04:00


 


 


  (Chlorhexidine


 2% Cloth)  3 pack  UNSCH  PRN


 TOP  8/21/17 21:30


     


 


 


  (Arlen-Colace)  1 tab  BID


 PO  8/22/17 09:00


     


 


 


  (Milk Of


 Magnesia Liq)  30 ml  Q12H  PRN


 PO  8/21/17 21:30


     


 


 


  (Senokot)  17.2 mg  Q12H  PRN


 PO  8/21/17 21:30


     


 


 


  (Dulcolax Supp)  10 mg  DAILY  PRN


 RECTAL  8/21/17 21:30


     


 


 


  (Lactulose Liq)  30 ml  DAILY  PRN


 PO  8/21/17 21:30


     


 


 


  (NS Flush)  2 ml  UNSCH  PRN


 IV FLUSH  8/21/17 22:45


     


 


 


  (NS Flush)  2 ml  BID


 IV FLUSH  8/22/17 09:00


     


 


 


 Ceftriaxone


 Sodium 2000 mg/


 Sodium Chloride  100 ml @ 


 200 mls/hr  Q12H


 IV  8/22/17 08:00


     


 


 


 Pharmacy Profile


 Note  0 ml @ 0


 mls/hr  UNSCH


 OTHER  8/21/17 22:45


     


 


 


 Acyclovir Sodium


 1050 mg/Sodium


 Chloride  150 ml @ 


 150 mls/hr  Q8H


 IV  8/22/17 01:00


    8/22/17 01:16


 


 


 Amphotericin B


 Liposome 340 mg/


 Dextrose  250 ml @ 


 125 mls/hr  Q24H


 IV  8/22/17 02:00


    8/22/17 02:00


 


 


 Potassium Chloride  100 ml @ 


 50 mls/hr  Q2H  PRN


 IV  8/22/17 06:00


    8/22/17 06:10


 


 


 Potassium Chloride  100 ml @ 


 50 mls/hr  Q2H  PRN


 IV  8/22/17 06:00


     


 


 


  (K-Lyte Cl  Eff)  50 meq  UNSCH  PRN


 PO  8/22/17 06:00


     


 


 


 Potassium Chloride  100 ml @ 


 25 mls/hr  UNSCH  PRN


 IV  8/22/17 06:00


     


 


 


 Potassium Chloride  100 ml @ 


 50 mls/hr  Q2H  PRN


 IV  8/22/17 06:00


     


 


 


 Magnesium Sulfate


 4 gm/Sodium


 Chloride  100 ml @ 


 50 mls/hr  UNSCH  PRN


 IV  8/22/17 06:00


     


 


 


  (Mag-Ox)  800 mg  UNSCH  PRN


 PO  8/22/17 06:00


     


 


 


 Magnesium Sulfate


 2 gm/Sodium


 Chloride  100 ml @ 


 50 mls/hr  UNSCH  PRN


 IV  8/22/17 06:00


     


 


 


  (K-Phos)  2,000 mg  Q4H  PRN


 PO  8/22/17 06:00


     


 


 


 Sodium Phosphate


 30 mmol/Sodium


 Chloride  250 ml @ 


 42 mls/hr  UNSCH  PRN


 IV  8/22/17 06:00


     


 


 


  (K-Phos)  2,000 mg  UNSCH  PRN


 PO/TUBE  8/22/17 06:00


     


 


 


 Potassium


 Phosphate 30 mmol/


 Sodium Chloride  260 ml @ 


 42 mls/hr  UNSCH  PRN


 IV  8/22/17 06:00


     


 











Exam


I&O / VS





Vital Signs








  Date Time  Temp Pulse Resp B/P (MAP) Pulse Ox O2 Delivery O2 Flow Rate FiO2


 


8/22/17 06:00  114      


 


8/22/17 04:00  117      


 


8/22/17 04:00 102.0 117 33 93/64 (74) 100   


 


8/22/17 04:00        50


 


8/22/17 03:47  121  90/61    


 


8/22/17 03:36        


 


8/22/17 03:07 102.4 116 33 86/58 (67) 100   


 


8/22/17 03:06  116      


 


8/22/17 03:00     100   50


 


8/22/17 01:00 102.6 118 30 107/62 (77) 100   


 


8/22/17 00:32 102.6 122 30 95/57 (70) 100   


 


8/22/17 00:31 102.6 116 30 97/58 (71) 100   


 


8/22/17 00:30 102.6 116 30 96/59 (71) 100   


 


8/22/17 00:15 102.6 116 31 97/53 (68) 100   


 


8/22/17 00:00 102.2 113 30 96/52 (67) 100   


 


8/21/17 23:45 102.2 117 31 92/53 (66) 100   


 


8/21/17 23:35 102.2 121 31 93/55 (68) 100   


 


8/21/17 23:25 102.2 112 30 96/52 (67) 100   


 


8/21/17 23:23 102.2 117 30 93/51 (65) 100   


 


8/21/17 23:19 102.6 117 30 94/51 (65) 100   


 


8/21/17 23:09 102.6 113 33 92/56 (68) 100   


 


8/21/17 22:59 102.6 114 59 94/60 (71) 100   


 


8/21/17 22:49 102.9 113 29 97/59 (72) 100   


 


8/21/17 22:39 102.9 113 28 92/55 (67) 100   


 


8/21/17 22:29 102.9 115 27 94/54 (67) 100   


 


8/21/17 22:19 102.9 112 26 99/58 (72) 100   


 


8/21/17 22:09 103.3 112 25 95/58 (70) 100   


 


8/21/17 22:05 103.3 114 50 99/56 (70) 100   


 


8/21/17 22:03 101.9 119 16 90/70 (77) 92   


 


8/21/17 21:40 103.6 108 34 88/57 (67) 100   


 


8/21/17 21:35 103.6 115 23 84/56 (65) 100   


 


8/21/17 21:30 104.0 110 26 85/55 (65) 100   


 


8/21/17 21:11     96   50


 


8/21/17 20:58     98   50


 


8/21/17 20:07     100   40


 


8/21/17 20:00     100   100


 


8/21/17 19:57 104.6 156 32 118/76 (90) 100   


 


8/21/17 19:40     100   100


 


8/21/17 19:40 103.6 123 32 123/90 (101) 96   


 


8/21/17 19:34        50


 


8/21/17 19:30     96  4.00 








Exam Comments


intubated, on multiple gtt's, coma state, not following, not verbal, eyes 

looking down, no grimace, ou 3mm sluggish, diminished corneals and dolls, no 

ext movement, no clonus, planterflexor





Review/Management


Diagnosis/Plan:  


(1) Meningitis


ICD Codes:  G03.9 - Meningitis, unspecified


Status:  Acute


Plan:  +neutrophilic pleocytosis on CSF


glucose nml, elevated protein. 





recs


mri brain


ID eval


eeg/ echo





if pt is in hospice, ? endpoint of medical care. may need palliative care 

assistance




















Nacho Montana MD Aug 22, 2017 06:49

## 2017-08-22 NOTE — OTSOAPIP
TIME SESSION COMPLETED:  AM



TREATMENT TIME:  0    MINS.

CHART REVIEWED.  



RECEIVED ORDERS FROM DR BURT LOZOYA TO EVALUATE AND TREAT SINCE THEN PATIENT UNDERWENT CHANGE 
IN MEDICAL STATUS AND WAS REQUIRE TO BE INTUBATED. 



INTERDISCIPLINARY COMMUNICATION: SPOKE WITH NURSING PATIENT WILL REQUIRE RESTART ORDERS 
ONCE MEDICALLY STABLE 



PLAN: OCCUPATIONAL THERAPY SIGNING OFF WILL AWAIT NEW ORDER 







Therapist: VAL FORBES OTR/L

                          Signature on file

## 2017-08-22 NOTE — RADRPT
EXAM DATE/TIME:  08/22/2017 11:56 

 

[facility] COMPARISON:     

CHEST SINGLE AP, August 22, 2017, 0:34.

 

                     

INDICATIONS :     

Central line placement.

                     

 

MEDICAL HISTORY :            

Endocarditis, pneumonia, poly substance abuse. renal failure, respiratory failure.    

 

SURGICAL HISTORY :     

CABG.   

 

ENCOUNTER:     

Initial                                        

 

ACUITY:     

2 months      

 

PAIN SCORE:     

Non-responsive.

 

LOCATION:     

Bilateral cranial 

 

FINDINGS:     

A single portable frontal view of the chest shows interval placement of a left subclavian central dory
e. The tip is at the cavoatrial junction. No pneumothorax. Tip of the endotracheal tube 3 cm from the
 yudelka. Nasogastric tube tip courses off the inferior margin of the film. The right-sided central li
ne has been removed. Median sternotomy wires. Prosthetic aortic valve. Heart is mildly enlarged. Lung
s are grossly clear although somewhat limited in evaluation due to motion artifact.

 

CONCLUSION:     

Left subclavian central line in good position without pneumothorax.

 

 

 

 Scooter Dawson Jr., MD on August 22, 2017 at 12:44           

Board Certified Radiologist.

 This report was verified electronically.

## 2017-08-23 VITALS
RESPIRATION RATE: 24 BRPM | SYSTOLIC BLOOD PRESSURE: 95 MMHG | DIASTOLIC BLOOD PRESSURE: 64 MMHG | OXYGEN SATURATION: 98 % | HEART RATE: 106 BPM

## 2017-08-23 VITALS
OXYGEN SATURATION: 98 % | DIASTOLIC BLOOD PRESSURE: 64 MMHG | RESPIRATION RATE: 24 BRPM | SYSTOLIC BLOOD PRESSURE: 90 MMHG | HEART RATE: 104 BPM

## 2017-08-23 VITALS
HEART RATE: 108 BPM | SYSTOLIC BLOOD PRESSURE: 100 MMHG | RESPIRATION RATE: 25 BRPM | OXYGEN SATURATION: 98 % | DIASTOLIC BLOOD PRESSURE: 68 MMHG

## 2017-08-23 VITALS
SYSTOLIC BLOOD PRESSURE: 93 MMHG | OXYGEN SATURATION: 99 % | HEART RATE: 103 BPM | RESPIRATION RATE: 25 BRPM | DIASTOLIC BLOOD PRESSURE: 57 MMHG

## 2017-08-23 VITALS
OXYGEN SATURATION: 98 % | RESPIRATION RATE: 24 BRPM | DIASTOLIC BLOOD PRESSURE: 60 MMHG | HEART RATE: 104 BPM | SYSTOLIC BLOOD PRESSURE: 94 MMHG

## 2017-08-23 VITALS
OXYGEN SATURATION: 98 % | DIASTOLIC BLOOD PRESSURE: 71 MMHG | HEART RATE: 93 BPM | RESPIRATION RATE: 22 BRPM | SYSTOLIC BLOOD PRESSURE: 96 MMHG

## 2017-08-23 VITALS
OXYGEN SATURATION: 98 % | DIASTOLIC BLOOD PRESSURE: 65 MMHG | HEART RATE: 109 BPM | SYSTOLIC BLOOD PRESSURE: 97 MMHG | RESPIRATION RATE: 23 BRPM

## 2017-08-23 VITALS
OXYGEN SATURATION: 99 % | HEART RATE: 93 BPM | DIASTOLIC BLOOD PRESSURE: 72 MMHG | SYSTOLIC BLOOD PRESSURE: 102 MMHG | TEMPERATURE: 99.2 F | RESPIRATION RATE: 23 BRPM

## 2017-08-23 VITALS — HEART RATE: 106 BPM

## 2017-08-23 VITALS
OXYGEN SATURATION: 98 % | HEART RATE: 97 BPM | SYSTOLIC BLOOD PRESSURE: 87 MMHG | RESPIRATION RATE: 22 BRPM | DIASTOLIC BLOOD PRESSURE: 65 MMHG

## 2017-08-23 VITALS
OXYGEN SATURATION: 98 % | DIASTOLIC BLOOD PRESSURE: 63 MMHG | HEART RATE: 97 BPM | SYSTOLIC BLOOD PRESSURE: 94 MMHG | RESPIRATION RATE: 22 BRPM

## 2017-08-23 VITALS
SYSTOLIC BLOOD PRESSURE: 88 MMHG | RESPIRATION RATE: 24 BRPM | DIASTOLIC BLOOD PRESSURE: 66 MMHG | OXYGEN SATURATION: 98 % | HEART RATE: 105 BPM

## 2017-08-23 VITALS
HEART RATE: 108 BPM | DIASTOLIC BLOOD PRESSURE: 67 MMHG | OXYGEN SATURATION: 98 % | SYSTOLIC BLOOD PRESSURE: 97 MMHG | RESPIRATION RATE: 24 BRPM

## 2017-08-23 VITALS
SYSTOLIC BLOOD PRESSURE: 103 MMHG | DIASTOLIC BLOOD PRESSURE: 72 MMHG | RESPIRATION RATE: 24 BRPM | OXYGEN SATURATION: 99 % | HEART RATE: 93 BPM

## 2017-08-23 VITALS
HEART RATE: 109 BPM | RESPIRATION RATE: 24 BRPM | DIASTOLIC BLOOD PRESSURE: 67 MMHG | SYSTOLIC BLOOD PRESSURE: 99 MMHG | OXYGEN SATURATION: 98 %

## 2017-08-23 VITALS
RESPIRATION RATE: 22 BRPM | OXYGEN SATURATION: 98 % | HEART RATE: 96 BPM | DIASTOLIC BLOOD PRESSURE: 68 MMHG | SYSTOLIC BLOOD PRESSURE: 94 MMHG

## 2017-08-23 VITALS
HEART RATE: 93 BPM | SYSTOLIC BLOOD PRESSURE: 96 MMHG | OXYGEN SATURATION: 98 % | RESPIRATION RATE: 23 BRPM | DIASTOLIC BLOOD PRESSURE: 71 MMHG

## 2017-08-23 VITALS
HEART RATE: 94 BPM | DIASTOLIC BLOOD PRESSURE: 64 MMHG | RESPIRATION RATE: 20 BRPM | OXYGEN SATURATION: 98 % | SYSTOLIC BLOOD PRESSURE: 94 MMHG

## 2017-08-23 VITALS
DIASTOLIC BLOOD PRESSURE: 67 MMHG | HEART RATE: 109 BPM | OXYGEN SATURATION: 97 % | SYSTOLIC BLOOD PRESSURE: 92 MMHG | RESPIRATION RATE: 23 BRPM

## 2017-08-23 VITALS
RESPIRATION RATE: 23 BRPM | OXYGEN SATURATION: 98 % | HEART RATE: 100 BPM | DIASTOLIC BLOOD PRESSURE: 63 MMHG | SYSTOLIC BLOOD PRESSURE: 90 MMHG

## 2017-08-23 VITALS
HEART RATE: 92 BPM | SYSTOLIC BLOOD PRESSURE: 90 MMHG | OXYGEN SATURATION: 98 % | DIASTOLIC BLOOD PRESSURE: 66 MMHG | RESPIRATION RATE: 23 BRPM

## 2017-08-23 VITALS
SYSTOLIC BLOOD PRESSURE: 89 MMHG | OXYGEN SATURATION: 98 % | HEART RATE: 103 BPM | RESPIRATION RATE: 24 BRPM | DIASTOLIC BLOOD PRESSURE: 65 MMHG

## 2017-08-23 VITALS
HEART RATE: 108 BPM | DIASTOLIC BLOOD PRESSURE: 67 MMHG | RESPIRATION RATE: 25 BRPM | OXYGEN SATURATION: 98 % | SYSTOLIC BLOOD PRESSURE: 98 MMHG

## 2017-08-23 VITALS
SYSTOLIC BLOOD PRESSURE: 95 MMHG | OXYGEN SATURATION: 98 % | RESPIRATION RATE: 24 BRPM | DIASTOLIC BLOOD PRESSURE: 68 MMHG | HEART RATE: 108 BPM

## 2017-08-23 VITALS
SYSTOLIC BLOOD PRESSURE: 98 MMHG | DIASTOLIC BLOOD PRESSURE: 71 MMHG | HEART RATE: 92 BPM | OXYGEN SATURATION: 98 % | RESPIRATION RATE: 23 BRPM

## 2017-08-23 VITALS
HEART RATE: 101 BPM | DIASTOLIC BLOOD PRESSURE: 68 MMHG | OXYGEN SATURATION: 98 % | SYSTOLIC BLOOD PRESSURE: 91 MMHG | RESPIRATION RATE: 22 BRPM

## 2017-08-23 VITALS
HEART RATE: 109 BPM | RESPIRATION RATE: 24 BRPM | SYSTOLIC BLOOD PRESSURE: 99 MMHG | OXYGEN SATURATION: 98 % | DIASTOLIC BLOOD PRESSURE: 63 MMHG

## 2017-08-23 VITALS
RESPIRATION RATE: 22 BRPM | DIASTOLIC BLOOD PRESSURE: 60 MMHG | SYSTOLIC BLOOD PRESSURE: 88 MMHG | OXYGEN SATURATION: 98 % | HEART RATE: 97 BPM

## 2017-08-23 VITALS
DIASTOLIC BLOOD PRESSURE: 67 MMHG | RESPIRATION RATE: 24 BRPM | HEART RATE: 107 BPM | SYSTOLIC BLOOD PRESSURE: 94 MMHG | OXYGEN SATURATION: 98 %

## 2017-08-23 VITALS — HEART RATE: 93 BPM

## 2017-08-23 VITALS
DIASTOLIC BLOOD PRESSURE: 70 MMHG | RESPIRATION RATE: 24 BRPM | HEART RATE: 98 BPM | OXYGEN SATURATION: 98 % | SYSTOLIC BLOOD PRESSURE: 92 MMHG

## 2017-08-23 VITALS
RESPIRATION RATE: 23 BRPM | SYSTOLIC BLOOD PRESSURE: 92 MMHG | DIASTOLIC BLOOD PRESSURE: 63 MMHG | OXYGEN SATURATION: 98 % | HEART RATE: 104 BPM

## 2017-08-23 VITALS — OXYGEN SATURATION: 98 %

## 2017-08-23 VITALS
SYSTOLIC BLOOD PRESSURE: 95 MMHG | HEART RATE: 108 BPM | OXYGEN SATURATION: 98 % | DIASTOLIC BLOOD PRESSURE: 65 MMHG | RESPIRATION RATE: 24 BRPM

## 2017-08-23 VITALS — HEART RATE: 101 BPM

## 2017-08-23 VITALS
OXYGEN SATURATION: 97 % | DIASTOLIC BLOOD PRESSURE: 67 MMHG | HEART RATE: 108 BPM | RESPIRATION RATE: 24 BRPM | SYSTOLIC BLOOD PRESSURE: 94 MMHG

## 2017-08-23 VITALS
SYSTOLIC BLOOD PRESSURE: 90 MMHG | HEART RATE: 100 BPM | RESPIRATION RATE: 22 BRPM | OXYGEN SATURATION: 98 % | DIASTOLIC BLOOD PRESSURE: 63 MMHG

## 2017-08-23 VITALS — OXYGEN SATURATION: 99 %

## 2017-08-23 VITALS
SYSTOLIC BLOOD PRESSURE: 101 MMHG | OXYGEN SATURATION: 99 % | DIASTOLIC BLOOD PRESSURE: 69 MMHG | HEART RATE: 92 BPM | RESPIRATION RATE: 23 BRPM

## 2017-08-23 VITALS
RESPIRATION RATE: 23 BRPM | SYSTOLIC BLOOD PRESSURE: 92 MMHG | HEART RATE: 104 BPM | DIASTOLIC BLOOD PRESSURE: 62 MMHG | OXYGEN SATURATION: 98 %

## 2017-08-23 VITALS
HEART RATE: 103 BPM | OXYGEN SATURATION: 98 % | SYSTOLIC BLOOD PRESSURE: 89 MMHG | DIASTOLIC BLOOD PRESSURE: 66 MMHG | RESPIRATION RATE: 23 BRPM

## 2017-08-23 VITALS — OXYGEN SATURATION: 100 %

## 2017-08-23 VITALS
RESPIRATION RATE: 26 BRPM | SYSTOLIC BLOOD PRESSURE: 92 MMHG | OXYGEN SATURATION: 99 % | HEART RATE: 101 BPM | DIASTOLIC BLOOD PRESSURE: 66 MMHG

## 2017-08-23 VITALS — OXYGEN SATURATION: 97 %

## 2017-08-23 VITALS — HEART RATE: 96 BPM

## 2017-08-23 LAB
ANION GAP SERPL CALC-SCNC: 11 MEQ/L (ref 5–15)
BUN SERPL-MCNC: 25 MG/DL (ref 7–18)
CHLORIDE SERPL-SCNC: 105 MEQ/L (ref 98–107)
ERYTHROCYTE [DISTWIDTH] IN BLOOD BY AUTOMATED COUNT: 15.8 % (ref 11.6–17.2)
GFR SERPLBLD BASED ON 1.73 SQ M-ARVRAT: 47 ML/MIN (ref 89–?)
HCO3 BLD-SCNC: 19.9 MEQ/L (ref 21–32)
HCT VFR BLD CALC: 30.8 % (ref 35–46)
MCH RBC QN AUTO: 24.1 PG (ref 27–34)
MCHC RBC AUTO-ENTMCNC: 32.3 % (ref 32–36)
MCV RBC AUTO: 74.6 FL (ref 80–100)
PLATELET # BLD: 138 TH/MM3 (ref 150–450)
POTASSIUM SERPL-SCNC: 3.3 MEQ/L (ref 3.5–5.1)
RBC # BLD AUTO: 4.13 MIL/MM3 (ref 4–5.3)
REVIEW FLAG: (no result)
SODIUM SERPL-SCNC: 136 MEQ/L (ref 136–145)
WBC # BLD AUTO: 18.1 TH/MM3 (ref 4–11)

## 2017-08-23 RX ADMIN — STANDARDIZED SENNA CONCENTRATE AND DOCUSATE SODIUM SCH TAB: 8.6; 5 TABLET, FILM COATED ORAL at 20:05

## 2017-08-23 RX ADMIN — RIFAMPIN SCH MLS/HR: 600 INJECTION, POWDER, LYOPHILIZED, FOR SOLUTION INTRAVENOUS at 09:00

## 2017-08-23 RX ADMIN — OXACILLIN SODIUM SCH MLS/HR: 2 INJECTION, POWDER, FOR SOLUTION INTRAMUSCULAR; INTRAVENOUS at 06:08

## 2017-08-23 RX ADMIN — PROPOFOL PRN MLS/HR: 10 INJECTION, EMULSION INTRAVENOUS at 14:06

## 2017-08-23 RX ADMIN — HUMAN INSULIN SCH: 100 INJECTION, SOLUTION SUBCUTANEOUS at 12:00

## 2017-08-23 RX ADMIN — PROPOFOL PRN MLS/HR: 10 INJECTION, EMULSION INTRAVENOUS at 06:08

## 2017-08-23 RX ADMIN — HUMAN INSULIN SCH: 100 INJECTION, SOLUTION SUBCUTANEOUS at 18:00

## 2017-08-23 RX ADMIN — OXACILLIN SODIUM SCH MLS/HR: 2 INJECTION, POWDER, FOR SOLUTION INTRAMUSCULAR; INTRAVENOUS at 10:13

## 2017-08-23 RX ADMIN — POTASSIUM CHLORIDE PRN MLS/HR: 400 INJECTION, SOLUTION INTRAVENOUS at 06:10

## 2017-08-23 RX ADMIN — PROPOFOL PRN MLS/HR: 10 INJECTION, EMULSION INTRAVENOUS at 17:06

## 2017-08-23 RX ADMIN — NOREPINEPHRINE BITARTRATE PRN MLS/HR: 1 INJECTION INTRAVENOUS at 01:15

## 2017-08-23 RX ADMIN — HUMAN INSULIN SCH: 100 INJECTION, SOLUTION SUBCUTANEOUS at 06:00

## 2017-08-23 RX ADMIN — HUMAN INSULIN SCH: 100 INJECTION, SOLUTION SUBCUTANEOUS at 00:00

## 2017-08-23 RX ADMIN — FAMOTIDINE SCH MG: 10 INJECTION, SOLUTION INTRAVENOUS at 20:04

## 2017-08-23 RX ADMIN — HEPARIN SODIUM SCH UNITS: 10000 INJECTION, SOLUTION INTRAVENOUS; SUBCUTANEOUS at 10:13

## 2017-08-23 RX ADMIN — Medication SCH ML: at 20:04

## 2017-08-23 RX ADMIN — Medication SCH ML: at 09:00

## 2017-08-23 RX ADMIN — PROPOFOL PRN MLS/HR: 10 INJECTION, EMULSION INTRAVENOUS at 11:51

## 2017-08-23 RX ADMIN — BUMETANIDE SCH MG: 0.25 INJECTION, SOLUTION INTRAMUSCULAR; INTRAVENOUS at 12:00

## 2017-08-23 RX ADMIN — PROPOFOL PRN MLS/HR: 10 INJECTION, EMULSION INTRAVENOUS at 02:55

## 2017-08-23 RX ADMIN — POLYVINYL ALCOHOL SCH DROP: 14 SOLUTION/ DROPS OPHTHALMIC at 09:00

## 2017-08-23 RX ADMIN — FAMOTIDINE SCH MG: 10 INJECTION, SOLUTION INTRAVENOUS at 09:00

## 2017-08-23 RX ADMIN — HEPARIN SODIUM SCH UNITS: 10000 INJECTION, SOLUTION INTRAVENOUS; SUBCUTANEOUS at 20:04

## 2017-08-23 RX ADMIN — PROPOFOL PRN MLS/HR: 10 INJECTION, EMULSION INTRAVENOUS at 23:06

## 2017-08-23 RX ADMIN — CEFTRIAXONE SODIUM SCH MLS/HR: 2 INJECTION, POWDER, FOR SOLUTION INTRAMUSCULAR; INTRAVENOUS at 08:00

## 2017-08-23 RX ADMIN — OXACILLIN SODIUM SCH MLS/HR: 2 INJECTION, POWDER, FOR SOLUTION INTRAMUSCULAR; INTRAVENOUS at 17:03

## 2017-08-23 RX ADMIN — GENTAMICIN SULFATE SCH MLS/HR: 0.8 INJECTION, SOLUTION INTRAVENOUS at 10:13

## 2017-08-23 RX ADMIN — PROPOFOL PRN MLS/HR: 10 INJECTION, EMULSION INTRAVENOUS at 20:05

## 2017-08-23 RX ADMIN — POLYVINYL ALCOHOL SCH DROP: 14 SOLUTION/ DROPS OPHTHALMIC at 17:03

## 2017-08-23 RX ADMIN — OXACILLIN SODIUM SCH MLS/HR: 2 INJECTION, POWDER, FOR SOLUTION INTRAMUSCULAR; INTRAVENOUS at 13:22

## 2017-08-23 RX ADMIN — OXACILLIN SODIUM SCH MLS/HR: 2 INJECTION, POWDER, FOR SOLUTION INTRAMUSCULAR; INTRAVENOUS at 02:14

## 2017-08-23 RX ADMIN — POLYVINYL ALCOHOL SCH DROP: 14 SOLUTION/ DROPS OPHTHALMIC at 12:56

## 2017-08-23 RX ADMIN — RIFAMPIN SCH MLS/HR: 600 INJECTION, POWDER, LYOPHILIZED, FOR SOLUTION INTRAVENOUS at 20:05

## 2017-08-23 RX ADMIN — FLUCONAZOLE SCH MLS/HR: 2 INJECTION INTRAVENOUS at 20:04

## 2017-08-23 RX ADMIN — OXACILLIN SODIUM SCH MLS/HR: 2 INJECTION, POWDER, FOR SOLUTION INTRAMUSCULAR; INTRAVENOUS at 22:24

## 2017-08-23 RX ADMIN — FLUCONAZOLE SCH MLS/HR: 2 INJECTION INTRAVENOUS at 22:25

## 2017-08-23 RX ADMIN — STANDARDIZED SENNA CONCENTRATE AND DOCUSATE SODIUM SCH TAB: 8.6; 5 TABLET, FILM COATED ORAL at 09:00

## 2017-08-23 RX ADMIN — CHLORHEXIDINE GLUCONATE SCH PACK: 500 CLOTH TOPICAL at 04:00

## 2017-08-23 RX ADMIN — GENTAMICIN SULFATE SCH MLS/HR: 0.8 INJECTION, SOLUTION INTRAVENOUS at 02:14

## 2017-08-23 NOTE — HHI.IDPN
Subjective


Subjective


Remarks


Doing poorly


remains on vent


Unresponsive


MSSA in CSF


MSSA and enterocci in blood clx


2D echo with TV vegertationand severely reduced EF


Antibiotics


oxacillin


gent


rifampin


AMB


fluconazole


CFTX


Allergies:  


Coded Allergies:  


     No Known Allergies (Verified , 2/24/16)





Objective


.





Vital Signs








  Date Time  Temp Pulse Resp B/P (MAP) Pulse Ox O2 Delivery O2 Flow Rate FiO2


 


8/23/17 16:31     98   35


 


8/23/17 11:11     98   35


 


8/23/17 09:04 99.2 93 23 102/72 (82) 99   


 


8/23/17 08:00  93      


 


8/23/17 08:00        35


 


8/23/17 07:34     99   35


 


8/23/17 06:00  96      


 


8/23/17 04:10     100   35


 


8/23/17 04:00        35


 


8/23/17 04:00  101      


 


8/23/17 04:00 100.0 101 26 92/66 (75) 99   


 


8/23/17 02:00  101      


 


8/23/17 01:15  102      


 


8/23/17 00:00        40


 


8/23/17 00:00 99.9 103 25 93/57 (69) 99   


 


8/23/17 00:00  103      


 


8/22/17 23:31     99   40


 


8/22/17 22:00  105      


 


8/22/17 20:37     99   40


 


8/22/17 20:00        40


 


8/22/17 20:00  115      


 


8/22/17 20:00 100.4 115 30 90/60 (70) 99   








.





Laboratory Tests








Test


  8/22/17


04:50 8/23/17


05:00


 


White Blood Count 18.6 TH/MM3  18.1 TH/MM3 


 


Red Blood Count 4.16 MIL/MM3  4.13 MIL/MM3 


 


Hemoglobin 10.0 GM/DL  9.9 GM/DL 


 


Hematocrit 31.4 %  30.8 % 


 


Mean Corpuscular Volume 75.5 FL  74.6 FL 


 


Mean Corpuscular Hemoglobin 24.0 PG  24.1 PG 


 


Mean Corpuscular Hemoglobin


Concent 31.8 % 


  32.3 % 


 


 


Red Cell Distribution Width 16.0 %  15.8 % 


 


Platelet Count 137 TH/MM3  138 TH/MM3 


 


Mean Platelet Volume 7.8 FL  8.9 FL 


 


Neutrophils (%) (Auto) 93.7 %  


 


Lymphocytes (%) (Auto) 3.3 %  


 


Monocytes (%) (Auto) 2.4 %  


 


Eosinophils (%) (Auto) 0.4 %  


 


Basophils (%) (Auto) 0.2 %  


 


Neutrophils # (Auto) 17.4 TH/MM3  


 


Lymphocytes # (Auto) 0.6 TH/MM3  


 


Monocytes # (Auto) 0.4 TH/MM3  


 


Eosinophils # (Auto) 0.1 TH/MM3  


 


Basophils # (Auto) 0.0 TH/MM3  


 


CBC Comment DIFF FINAL  


 


Differential Comment   








Laboratory Tests








Test


  8/21/17


21:00 8/21/17


23:40 8/22/17


01:40 8/22/17


04:50


 


Lactic Acid Level 3.3 mmol/L  2.8 mmol/L   2.9 mmol/L 


 


Ammonia 60 MCMOL/L    


 


Troponin I   1.91 NG/ML  


 


Blood Urea Nitrogen    27 MG/DL 


 


Creatinine    1.30 MG/DL 


 


Random Glucose    137 MG/DL 


 


Total Protein    6.5 GM/DL 


 


Albumin    2.8 GM/DL 


 


Calcium Level    7.2 MG/DL 


 


Phosphorus Level    3.2 MG/DL 


 


Magnesium Level    1.4 MG/DL 


 


Alkaline Phosphatase    93 U/L 


 


Aspartate Amino Transf


(AST/SGOT) 


  


  


  44 U/L 


 


 


Alanine Aminotransferase


(ALT/SGPT) 


  


  


  16 U/L 


 


 


Total Bilirubin    0.7 MG/DL 


 


Sodium Level    132 MEQ/L 


 


Potassium Level    2.7 MEQ/L 


 


Chloride Level    100 MEQ/L 


 


Carbon Dioxide Level    18.8 MEQ/L 


 


Anion Gap    13 MEQ/L 


 


Estimat Glomerular Filtration


Rate 


  


  


  47 ML/MIN 


 


 


Protein Corrected Calcium    7.5 MG/DL 


 


Test


  8/22/17


12:05 8/23/17


05:00 


  


 


 


Phosphorus Level 3.0 MG/DL    


 


Troponin I 2.34 NG/ML    


 


Blood Urea Nitrogen  25 MG/DL   


 


Creatinine  1.29 MG/DL   


 


Random Glucose  132 MG/DL   


 


Calcium Level  7.8 MG/DL   


 


Sodium Level  136 MEQ/L   


 


Potassium Level  3.3 MEQ/L   


 


Chloride Level  105 MEQ/L   


 


Carbon Dioxide Level  19.9 MEQ/L   


 


Anion Gap  11 MEQ/L   


 


Estimat Glomerular Filtration


Rate 


  47 ML/MIN 


  


  


 








Microbiology








 Date/Time


Source Procedure


Growth Status


 


 


 8/21/17 20:22


Blood Peripheral Aerobic Blood Culture - Preliminary


Staphylococcus Aureus


Enterococcus Faecalis Resulted


 


 8/21/17 20:22 Anaerobic Blood Culture - Preliminary


Staphylococcus Aureus


Enterococcus Faecalis Resulted





 8/21/17 20:15


Blood Peripheral Aerobic Blood Culture - Preliminary


Staphylococcus Aureus


Enterococcus Faecalis Resulted


 


 8/21/17 20:15 Anaerobic Blood Culture - Preliminary


Staphylococcus Aureus


Enterococcus Faecalis Resulted





 8/21/17 22:00


Cerebral Spinal Fluid Lumbar Puncture Fungal Smear - Final


NO FUNGAL ELEMENTS SEEN. Resulted


 


 8/21/17 22:00


Cerebral Spinal Fluid Lumbar Puncture Fungal Culture


Pending Resulted





 8/21/17 22:00


Cerebral Spinal Fluid Lumbar Puncture Acid Fast Stain - Final


NO ACID FAST BACILLI SEEN Resulted


 


 8/21/17 22:00


Cerebral Spinal Fluid Lumbar Puncture Mycobacterial Culture


Pending Resulted





 8/21/17 22:00


Cerebral Spinal Fluid Lumbar Puncture Gram Stain - Final Resulted


 


 8/21/17 22:00 CSF Culture - Preliminary


Staphylococcus Aureus Resulted








Imaging





Last Impressions








Chest X-Ray 8/22/17 0000 Signed





Impressions: 





 Service Date/Time:  Tuesday, August 22, 2017 11:56 - CONCLUSION:  Left 





 subclavian central line in good position without pneumothorax.     Scooter Dawson Jr., MD 


 


Head CT 8/21/17 0000 Signed





Impressions: 





 Service Date/Time:  Monday, August 21, 2017 19:47 - CONCLUSION:  No evidence 

of 





 acute infarct, hemorrhage, mass or edema.     Kristian Frankel MD 








Physical Exam


CONSTITUTIONAL/GENERAL: This is an adequately nourished patient, in no apparent 

distress. Sedated, int'd on [facility] vent


TUBES/LINES/DRAINS:


SKIN: No jaundice, rashes, or lesions.   Skin temperature appropriate. Not 

diaphoretic. 


No Janeway lesions


purple lesion on 2 nd L toe liekly septic embolization


HEAD: Atraumatic. Normocephalic.


EYES: Pupils small  non reactive  equal and round  .   No scleral icterus. No 

injection or drainage. Fundi not examined.


 CARDIOVASCULAR: Regular rate and rhythm without murmurs, gallops, or rubs. 

Mechanical heart sounds No JVD. Peripheral pulses symmetric.


RESPIRATORY/CHEST: Symmetric, unlabored respirations. Scattered rhonchi to 

auscultation. Breath sounds equal bilaterally.  


GASTROINTESTINAL: Abdomen soft, non-tender,  moderately distended. No hepato-

splenomegaly, or palpable masses. No guarding. Bowel sounds present, hypoactive 


GENITOURINARY: Without palpable bladder distension. Stokes catheter in place 

with clear yellow urine


MUSCULOSKELETAL: Extremities without clubbing, 


+ mild cyanosis of toes 


2+ edema. No joint tenderness or effusion noted. No calf tenderness.  


LYMPHATICS: No palpable cervical or supraclavicular adenopathy.


NEUROLOGICAL  Unresponsive.


 PSYCHIATRIC: unable to assess








Assessment & Plan


Remarks


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 

enterococci


   - pt was previously infected with above organisms


Previousy multiple episodes of  PVE


   dw Dr Keenan hospitalised in April 2017 for tricuspid valve PVE  - [place] 

parapsolosis (March 15 thru April 15) , Streptoccii


   pt was Rx with combination diflucan 800 + micafungin 150, and was also on 

ambisionme at some point x 2 weeks and did not clear 


Confiormed  bacterial meningitis  2/2 MSSA - embolic ethiology


Acute VDRF


NOÉ


Elevated troponine ? cardiac injury from infx


Critically ill , unstable


Poor prognosis


Not a surgical candidate 





   dc AMB


   cont  fluconazole


   cont  oxacillin + gentamin +RIfampin


   Dc CFTX











Catalina Teague MD Aug 23, 2017 19:48

## 2017-08-23 NOTE — MG
cc:

TODD LEAL MD

****

 

 

Lab No:  Date:  8/22/17 Age:      Sex:  F Race:

 

DATE OF BIRTH

[birth year]

 

REFERRING PHYSICIAN

Dr. Montana

 

MEDICAL HISTORY

History of headache, congestive heart failure,  abuse of  medication Percocet,

Dilaudid, Oxycodone, Marijuana use, fungal endocarditis with IVAD, alcohol use,

hepatitis C, and tobacco use.

 

MEDICATIONS

1. Levophed

2. Ceftriaxone

3. Acyclovir

4. Norepinephrine

5. Diprivan

6. Versed.

 

DESCRIPTION

Slowing of the background activity superimposed by excess beta rhythm.  There is

polymorphic low amplitude delta and theta activity. Photic stimulation did not

elicit a driving response.Hyperventilation was not done.There were no 
epileptiform discharges or electrographic seizures noted.

 

INTERPRETATION

This is an abnormal electroencephalogram due to generalized background slowing

and encephalopathic pattern.  Excess beta is a non specific finding that may be 
related to

medications adverse effects such as benzo and barbiturates. There were no 
electrocardiographic seizures or epileptiform discharges. Clinical correlation 
is recommended.

 

 

 

                              _________________________________

                              MD VICENTE Salmon/

D:  8/22/2017/10:22 PM

T:  8/23/2017/8:15 AM

Visit #:  [id number]

Job #:  [id number]

MTDD

## 2017-08-23 NOTE — HHI.HCPN
Reason for visit


   a.  To assist with evaluation and management of symptoms including: dyspnea. 


   b.  To assist medical decision maker(s) with: better understanding of 

current medical conditions; weighing benefits/burdens of medical treatment 

options; making        


        medical treatment decisions.


.





Subjective/Interval History


Patient seen and examined in ICU.  No family or friends bedside.  Discussed 

with respiratory therapist and nursing staff. Patient remains sedated Diprivan 

50 and Fentanyl 25mcg) on mech vent. On Levophed at 7. Slight withdraw noted 

bilateral LEs. 





Tmax 100.6. WBC 18.1, hemoglobin 9.9, hematocrit 30.8, platelets 138. 

Creatinine 1.29. Chest xray subsegmental basilar airspace disease, left greater 

than right.  8/21/17 blood cultures positive Staphylococcus aureus and 

enterococcus faecalis all 4 bottles and CSF culture positive rare growth 

Staphylococcus aureus, further sensitivity to follow.





.


Family/friend interactions


Spoke with mother, Kelly and step-father, Won via phone. Review of past 

medical and social history. Lengthy conversation to provide medical update and 

review of current medical conditions, prognosis and concern patient will not 

survive this hospitalization. Family was not able to find written advanced 

directives, therefore health care proxy decision making falls to a parent. 

Mother and step-father elect NO CODE. They understand poor prognosis. I suspect 

they will transition to comfort if further setback or no clinical improvement. 

Family is very appreciative of time spent to provide. 


.





Advance Directives


Living Will:  Never completed


Health Care Surrogate:  Never completed


Durable Power of :  Never completed


Advance Directive Specifics


Health Care Surrogate(s):


No written advanced directives.  Patient a single.  Children are juvenile.  

According to [place] statutes health care proxy decision-making falls to a 

parent. 


.


Significant change in goals:


NO CODE. Goals aggressive short of NO CODE at this time. Will consider 

transition to comfort if no clinical improvement or further setbacks. 


.





Objective





Vital Signs








  Date Time  Temp Pulse Resp B/P (MAP) Pulse Ox O2 Delivery O2 Flow Rate FiO2


 


8/23/17 11:11     98   35


 


8/23/17 09:04 99.2 93 23 102/72 (82) 99   


 


8/23/17 08:00  93      


 


8/23/17 08:00        35


 


8/23/17 07:34     99   35


 


8/23/17 06:00  96      


 


8/23/17 04:10     100   35


 


8/23/17 04:00        35


 


8/23/17 04:00  101      


 


8/23/17 04:00 100.0 101 26 92/66 (75) 99   


 


8/23/17 02:00  101      


 


8/23/17 01:15  102      


 


8/23/17 00:00        40


 


8/23/17 00:00 99.9 103 25 93/57 (69) 99   


 


8/23/17 00:00  103      


 


8/22/17 23:31     99   40


 


8/22/17 22:00  105      


 


8/22/17 20:37     99   40


 


8/22/17 20:00        40


 


8/22/17 20:00  115      


 


8/22/17 20:00 100.4 115 30 90/60 (70) 99   


 


8/22/17 18:00  115      


 


8/22/17 17:24  115  90/61    


 


8/22/17 16:00        40


 


8/22/17 16:00  115      


 


8/22/17 16:00 100.6 115 32  100   


 


8/22/17 15:45 100.4 113 30 90/54 (66) 100   


 


8/22/17 15:30 100.4 113 30 97/62 (74) 100   


 


8/22/17 15:15 100.4 113 30 96/61 (73) 100   


 


8/22/17 15:00 100.4 113 31 94/58 (70) 100   


 


8/22/17 14:45 100.2 113 31 97/61 (73) 100   


 


8/22/17 14:30 100.2 113 31 92/60 (71) 100   


 


8/22/17 14:15 100.0 113 28 96/66 (76) 100   














Intake & Output  


 


 8/23/17 8/23/17





 07:00 19:00


 


Intake Total 3187 ml 


 


Output Total 1750 ml 


 


Balance 1437 ml 


 


  


 


Intake IV Total 3187 ml 


 


Output Urine Total 850 ml 


 


Stool Total 500 ml 


 


Gastric Drainage Total 400 ml 








Physical Exam


CONSTITUTIONAL/GENERAL: This is an adequately nourished patient, sedated on 

mechanical ventilation.


TUBES/LINES/DRAINS: ETT, NG, left subclavian central line, PIV right AC, Stokes, 

SCDs. 


SKIN: No jaundice, rashes, or lesions. Skin temperature appropriate. Not 

diaphoretic. 


EYES: Pupils pinpoint, non reactive equal and round. No scleral icterus. No 

injection or drainage.


ENT: Unable to assess hearing. Nose with NG tube right nare.  Throat difficult 

to visualize due to ETT.   


CARDIOVASCULAR: tachycardic, regular rhythm. 


RESPIRATORY/CHEST: Symmetric, unlabored respirations on vent. Scattered rhonchi 

to auscultation. 


GASTROINTESTINAL: Abdomen soft, moderately distended. Bowel sounds hypoactive. 


GENITOURINARY: Without palpable bladder distension. Stokes catheter in place. 


MUSCULOSKELETAL: Extremities without clubbing, mild cyanosis of toes, 2+ edema. 


NEUROLOGICAL: Sedated. Slight withdrawal to painful stimuli bilateral LEs. 


PSYCHIATRIC: sedated: unable to assess. 


.





Diagnostic Tests


Laboratory





Laboratory Tests








Test


  8/21/17


19:30 8/21/17


20:30 8/21/17


21:00 8/21/17


21:23


 


White Blood Count


  18.8 TH/MM3


(4.0-11.0) 


  


  


 


 


Red Blood Count


  4.13 MIL/MM3


(4.00-5.30) 


  


  


 


 


Hemoglobin


  9.9 GM/DL


(11.6-15.3) 


  


  


 


 


Bedside Hemoglobin


  10.9 G/DL


(12.0-17.0) 


  


  


 


 


Hematocrit


  31.1 %


(35.0-46.0) 


  


  


 


 


Bedside Hematocrit


  32.0 %


(38.0-51.0) 


  


  


 


 


Mean Corpuscular Volume


  75.4 FL


(80.0-100.0) 


  


  


 


 


Mean Corpuscular Hemoglobin


  24.0 PG


(27.0-34.0) 


  


  


 


 


Mean Corpuscular Hemoglobin


Concent 31.9 %


(32.0-36.0) 


  


  


 


 


Red Cell Distribution Width


  15.5 %


(11.6-17.2) 


  


  


 


 


Platelet Count


  200 TH/MM3


(150-450) 


  


  


 


 


Mean Platelet Volume


  7.7 FL


(7.0-11.0) 


  


  


 


 


Neutrophils (%) (Auto)


  93.8 %


(16.0-70.0) 


  


  


 


 


Lymphocytes (%) (Auto)


  2.2 %


(9.0-44.0) 


  


  


 


 


Monocytes (%) (Auto)


  3.7 %


(0.0-8.0) 


  


  


 


 


Eosinophils (%) (Auto)


  0.0 %


(0.0-4.0) 


  


  


 


 


Basophils (%) (Auto)


  0.3 %


(0.0-2.0) 


  


  


 


 


Neutrophils # (Auto)


  17.7 TH/MM3


(1.8-7.7) 


  


  


 


 


Lymphocytes # (Auto)


  0.4 TH/MM3


(1.0-4.8) 


  


  


 


 


Monocytes # (Auto)


  0.7 TH/MM3


(0-0.9) 


  


  


 


 


Eosinophils # (Auto)


  0.0 TH/MM3


(0-0.4) 


  


  


 


 


Basophils # (Auto)


  0.0 TH/MM3


(0-0.2) 


  


  


 


 


CBC Comment AUTO DIFF    


 


Differential Total Cells


Counted 100 


  


  


  


 


 


Neutrophils % (Manual) 62 % (16-70)    


 


Band Neutrophils % 34 % (0-6)    


 


Lymphocytes % 2 % (9-44)    


 


Monocytes % 2 % (0-8)    


 


Neutrophils # (Manual)


  18.0 TH/MM3


(1.8-7.7) 


  


  


 


 


Differential Comment


  FINAL DIFF


MANUAL 


  


  


 


 


Platelet Estimate


  NORMAL


(NORMAL) 


  


  


 


 


Platelet Morphology Comment


  NORMAL


(NORMAL) 


  


  


 


 


Ovalocytes 2+ (NORMAL)    


 


Prothrombin Time


  15.4 SEC


(9.8-11.6) 


  


  


 


 


Prothromb Time International


Ratio 1.4 RATIO 


  


  


  


 


 


Activated Partial


Thromboplast Time 43.2 SEC


(24.3-30.1) 


  


  


 


 


Fibrinogen


  289 mg/dL


(227-377) 


  


  


 


 


Bedside Sodium


  133 MMOL/L


(138-146) 


  


  


 


 


Bedside Potassium


  3.3 MMOL/L


(3.5-4.9) 


  


  


 


 


Bedside Chloride


  98 MMOL/L


() 


  


  


 


 


Bedside Blood Urea Nitrogen


  15 MG/DL


(8-26) 


  


  


 


 


Bedside Creatinine


  0.7 MG/DL


(0.6-1.0) 


  


  


 


 


Bedside Glucose


  139 MG/DL


(60-95) 


  


  


 


 


Total Creatine Kinase


  103 U/L


() 


  


  


 


 


Troponin I


  0.61 NG/ML


(0.02-0.05) 


  


  


 


 


Human Chorionic Gonadotropin,


Quant LESS THAN 1


MIU/ML (0-5) 


  


  


 


 


Urine Color


  


  YELLOW


(YELLW/STRAW) 


  


 


 


Urine Turbidity  HAZY (CLEAR)   


 


Urine pH  6.0 (5.0-8.5)   


 


Urine Specific Gravity


  


  1.027


(1.002-1.035) 


  


 


 


Urine Protein


  


  300 mg/dL


(NEG-TRACE) 


  


 


 


Urine Glucose (UA)


  


  NEG mg/dL


(NEG) 


  


 


 


Urine Ketones


  


  NEG mg/dL


(NEG) 


  


 


 


Urine Occult Blood  MOD (NEG)   


 


Urine Nitrite  NEG (NEG)   


 


Urine Bilirubin  NEG (NEG)   


 


Urine Urobilinogen


  


  LESS THAN 2.0


MG/DL (LESS 


  


 


 


Urine Leukocyte Esterase  TRACE (NEG)   


 


Urine RBC  27 /hpf (0-3)   


 


Urine WBC  31 /hpf (0-5)   


 


Urine Squamous Epithelial


Cells 


  2 /hpf (0-5) 


  


  


 


 


Urine Bacteria


  


  OCC /hpf


(NONE) 


  


 


 


Urine Opiates Screen  POS (NEG)   


 


Urine Barbiturates Screen  NEG (NEG)   


 


Urine Amphetamines Screen  NEG (NEG)   


 


Urine Benzodiazepines Screen  POS (NEG)   


 


Urine Cocaine Screen  NEG (NEG)   


 


Urine Cannabinoids Screen  POS (NEG)   


 


Lactic Acid Level


  


  


  3.3 mmol/L


(0.4-2.0) 


 


 


Ammonia


  


  


  60 MCMOL/L


(11-32) 


 


 


Blood Gas Puncture Site    RT RADIAL 


 


Blood Gas Patient Temperature    98.6 


 


Blood Gas HCO3


  


  


  


  17 mmol/L


(22-26)


 


Blood Gas Base Excess


  


  


  


  -7.4 mmol/L


(-2-2)


 


Blood Gas Oxygen Saturation    98 % () 


 


Arterial Blood pH


  


  


  


  7.36


(7.380-7.420)


 


Arterial Blood Partial


Pressure CO2 


  


  


  31 mmHg


(38-42)


 


Arterial Blood Partial


Pressure O2 


  


  


  206 mmHG


()


 


Arterial Blood Oxygen Content


  


  


  


  12.9 Vol %


(12.0-20.0)


 


Arterial Blood


Carboxyhemoglobin 


  


  


  1.2 % (0-4) 


 


 


Arterial Blood Methemoglobin    0.2 % (0-2) 


 


Blood Gas Hemoglobin


  


  


  


  9.0 G/DL


(12.0-16.0)


 


Oxygen Delivery Device    VENTILATOR 


 


Blood Gas Ventilator Setting


  


  


  


  AC/RR14//[id number]


 


 


Blood Gas Inspired Oxygen    50 % 


 


Test


  8/21/17


22:00 8/21/17


23:40 8/22/17


01:40 8/22/17


03:00


 


CSF Volume (Tube 1) 3.4 ML    


 


CSF Supernatant Color (tube 1) CLEAR (CLEAR)    


 


CSF Gross Blood (Tube 1) 3+ (0)    


 


CSF Volume (Tube 2) 2.9 ML    


 


CSF Supernatant Color (tube 2) CLEAR (CLEAR)    


 


CSF Gross Blood (Tube 2) 4+ (0)    


 


CSF Volume (Tube 3) 3.0 ML    


 


CSF Supernatant Color (tube 3) CLEAR (CLEAR)    


 


CSF Gross Blood (Tube 3) 3+ (0)    


 


CSF Volume (Tube 4) 3.8 ML    


 


CSF Supernatant Color (tube 4) CLEAR (CLEAR)    


 


CSF Gross Blood (Tube 4) 4+ (0)    


 


CSF WBC (Tube 4)


  1846 /MM3


(0-10) 


  


  


 


 


CSF RBC (Tube 4)


  1909 /MM3


(NONE) 


  


  


 


 


CSF Neutrophils 97 %    


 


CSF Lymphocytes 1 %    


 


CSF Monocytes 2 %    


 


CSF Glucose


  68 MG/DL


(40-80) 


  


  


 


 


CSF Lactate Dehydrogenase 38 U/L    


 


CSF Lactic Acid


  4.9 MMOL/L


(0.0-3.0) 


  


  


 


 


CSF Total Protein


  180.9 MG/DL


(15.0-45.0) 


  


  


 


 


Herpes Simplex Virus I DNA


(PCR) Negative


(Negative) 


  


  


 


 


Herpes Simplex Virus II DNA


(PCR) Negative


(Negative) 


  


  


 


 


Lactic Acid Level


  


  2.8 mmol/L


(0.4-2.0) 


  


 


 


Troponin I


  


  


  1.91 NG/ML


(0.02-0.05) 


 


 


Nasal Screen MRSA (PCR)


  


  


  


  MRSA NOT


DETECTED (NOT


 


Test


  8/22/17


04:50 8/22/17


12:05 8/23/17


05:00 8/23/17


09:50


 


White Blood Count


  18.6 TH/MM3


(4.0-11.0) 


  18.1 TH/MM3


(4.0-11.0) 


 


 


Red Blood Count


  4.16 MIL/MM3


(4.00-5.30) 


  4.13 MIL/MM3


(4.00-5.30) 


 


 


Hemoglobin


  10.0 GM/DL


(11.6-15.3) 


  9.9 GM/DL


(11.6-15.3) 


 


 


Hematocrit


  31.4 %


(35.0-46.0) 


  30.8 %


(35.0-46.0) 


 


 


Mean Corpuscular Volume


  75.5 FL


(80.0-100.0) 


  74.6 FL


(80.0-100.0) 


 


 


Mean Corpuscular Hemoglobin


  24.0 PG


(27.0-34.0) 


  24.1 PG


(27.0-34.0) 


 


 


Mean Corpuscular Hemoglobin


Concent 31.8 %


(32.0-36.0) 


  32.3 %


(32.0-36.0) 


 


 


Red Cell Distribution Width


  16.0 %


(11.6-17.2) 


  15.8 %


(11.6-17.2) 


 


 


Platelet Count


  137 TH/MM3


(150-450) 


  138 TH/MM3


(150-450) 


 


 


Mean Platelet Volume


  7.8 FL


(7.0-11.0) 


  8.9 FL


(7.0-11.0) 


 


 


Neutrophils (%) (Auto)


  93.7 %


(16.0-70.0) 


  


  


 


 


Lymphocytes (%) (Auto)


  3.3 %


(9.0-44.0) 


  


  


 


 


Monocytes (%) (Auto)


  2.4 %


(0.0-8.0) 


  


  


 


 


Eosinophils (%) (Auto)


  0.4 %


(0.0-4.0) 


  


  


 


 


Basophils (%) (Auto)


  0.2 %


(0.0-2.0) 


  


  


 


 


Neutrophils # (Auto)


  17.4 TH/MM3


(1.8-7.7) 


  


  


 


 


Lymphocytes # (Auto)


  0.6 TH/MM3


(1.0-4.8) 


  


  


 


 


Monocytes # (Auto)


  0.4 TH/MM3


(0-0.9) 


  


  


 


 


Eosinophils # (Auto)


  0.1 TH/MM3


(0-0.4) 


  


  


 


 


Basophils # (Auto)


  0.0 TH/MM3


(0-0.2) 


  


  


 


 


CBC Comment DIFF FINAL    


 


Differential Comment     


 


Prothrombin Time


  18.9 SEC


(9.8-11.6) 


  


  


 


 


Prothromb Time International


Ratio 1.7 RATIO 


  


  


  


 


 


Blood Urea Nitrogen


  27 MG/DL


(7-18) 


  25 MG/DL


(7-18) 


 


 


Creatinine


  1.30 MG/DL


(0.50-1.00) 


  1.29 MG/DL


(0.50-1.00) 


 


 


Random Glucose


  137 MG/DL


() 


  132 MG/DL


() 


 


 


Total Protein


  6.5 GM/DL


(6.4-8.2) 


  


  


 


 


Albumin


  2.8 GM/DL


(3.4-5.0) 


  


  


 


 


Calcium Level


  7.2 MG/DL


(8.5-10.1) 


  7.8 MG/DL


(8.5-10.1) 


 


 


Phosphorus Level


  3.2 MG/DL


(2.5-4.9) 3.0 MG/DL


(2.5-4.9) 


  


 


 


Magnesium Level


  1.4 MG/DL


(1.5-2.5) 


  


  


 


 


Alkaline Phosphatase


  93 U/L


() 


  


  


 


 


Aspartate Amino Transf


(AST/SGOT) 44 U/L (15-37) 


  


  


  


 


 


Alanine Aminotransferase


(ALT/SGPT) 16 U/L (10-53) 


  


  


  


 


 


Total Bilirubin


  0.7 MG/DL


(0.2-1.0) 


  


  


 


 


Sodium Level


  132 MEQ/L


(136-145) 


  136 MEQ/L


(136-145) 


 


 


Potassium Level


  2.7 MEQ/L


(3.5-5.1) 


  3.3 MEQ/L


(3.5-5.1) 


 


 


Chloride Level


  100 MEQ/L


() 


  105 MEQ/L


() 


 


 


Carbon Dioxide Level


  18.8 MEQ/L


(21.0-32.0) 


  19.9 MEQ/L


(21.0-32.0) 


 


 


Anion Gap


  13 MEQ/L


(5-15) 


  11 MEQ/L


(5-15) 


 


 


Estimat Glomerular Filtration


Rate 47 ML/MIN


(>89) 


  47 ML/MIN


(>89) 


 


 


Lactic Acid Level


  2.9 mmol/L


(0.4-2.0) 


  


  


 


 


Protein Corrected Calcium


  7.5 MG/DL


(8.5-10.1) 


  


  


 


 


Troponin I


  


  2.34 NG/ML


(0.02-0.05) 


  


 


 


Gentamicin Level Trough


  


  


  


  1.7 MCG/ML


(0.0-2.0)


 


Test


  8/23/17


11:50 


  


  


 


 


Gentamicin Level Peak


  3.3 MCG/ML


(4.0-8.0) 


  


  


 








Result Diagram:  


8/23/17 0500                                                                   

             8/23/17 0500





Microbiology





Microbiology








 Date/Time


Source Procedure


Growth Status


 


 


 8/21/17 20:22


Blood Peripheral Aerobic Blood Culture - Preliminary


Staphylococcus Aureus


Enterococcus Faecalis Resulted


 


 8/21/17 20:22 Anaerobic Blood Culture - Preliminary


Staphylococcus Aureus


Enterococcus Faecalis Resulted





 8/21/17 20:15


Blood Peripheral Aerobic Blood Culture - Preliminary


Staphylococcus Aureus


Enterococcus Faecalis Resulted


 


 8/21/17 20:15 Anaerobic Blood Culture - Preliminary


Staphylococcus Aureus


Enterococcus Faecalis Resulted





 8/21/17 22:00


Cerebral Spinal Fluid Lumbar Puncture Fungal Smear - Final


NO FUNGAL ELEMENTS SEEN. Resulted


 


 8/21/17 22:00


Cerebral Spinal Fluid Lumbar Puncture Fungal Culture


Pending Resulted





 8/21/17 22:00


Cerebral Spinal Fluid Lumbar Puncture Acid Fast Stain - Final


NO ACID FAST BACILLI SEEN Resulted


 


 8/21/17 22:00


Cerebral Spinal Fluid Lumbar Puncture Mycobacterial Culture


Pending Resulted





 8/21/17 22:00


Cerebral Spinal Fluid Lumbar Puncture Gram Stain - Final Resulted


 


 8/21/17 22:00 CSF Culture - Preliminary


Staphylococcus Aureus Resulted








Imaging


Last Impressions








Chest X-Ray 8/22/17 0000 Signed





Impressions: 





 Service Date/Time:  Tuesday, August 22, 2017 11:56 - CONCLUSION:  Left 





 subclavian central line in good position without pneumothorax.     Scooter Dawson Jr., MD 


 


Head CT 8/21/17 0000 Signed





Impressions: 





 Service Date/Time:  Monday, August 21, 2017 19:47 - CONCLUSION:  No evidence 

of 





 acute infarct, hemorrhage, mass or edema.     Kristian Frankel MD 





.


Procedures


* 8/21/17 - left subclavian central placement. 


* 8/21/17 - lumbar puncture


* 8/21/17 - Intubated. 


.





Assessment and Plan


Disease Oriented Problem List:  


(1) Protein-calorie malnutrition, severe


(2) Elevated troponin


(3) Meningitis


(4) Encephalopathy


(5) Bacterial endocarditis


(6) Polysubstance abuse


(7) Sepsis


(8) Acute kidney injury


Symptom Scale:  


(1) Pain


0-10 Scale:  Unable to quantify





(2) Dyspnea


0-10 Scale:  Unable to quantify





(3) Encephalopathy


0-10 Scale:  Unable to quantify





Pertinent Non-Medical Issues


Psychosocial: single.  2 juvenile children. Supported by mother, stepfather and 

boyfriend. 


Spiritual: Unknown.


Legal: patient is incapacitated.  No written advanced directives.  Patient a 

single.  Children are juvenile.  According to [place] statutes health care 

proxy decision-making falls to a parent.


Ethical issues impacting care: No known concerns at this time. 


.


Important Contacts


* Kelly Le, mother: [phone]


* Won Le, stepfather: [phone]


.


Prognosis


Prognosis poor.


Code Status:  No Code


Plan


* Decision Maker: patient is incapacitated.  No written advanced directives. 

Patient a single. Children are juvenile. According to [place] statutes health 

care proxy decision-making falls to a parent. 


* NO CODE 


* Spoke with mother, Kelly and step-fatherWon via phone. Review of past 

medical and social history. Lengthy conversation to provide medical update and 

review of current medical conditions, prognosis and concern patient will not 

survive this hospitalization. Family was not able to find written advanced 

directives, therefore health care proxy decision making falls to a parent. 

Mother and step-father elect NO CODE. They understand poor prognosis. I suspect 

they will transition to comfort if further setback or no clinical improvement. 

Family is very appreciative of time spent to provide. 


* SYMPTOMS: Pain: No obvious signs of pain.  Potential sources include 

endocarditis, bedbound status, tubes etc.  Currently on fentanyl and propofol.  

Patient with likely high tolerance given history of IV drug use, will monitor 

effective medications.  Dyspnea: remains sedated on mechanical ventilation.  No 

new medication recommendations at this time.


* Palliative care will continue to follow throughout hospital course to assist 

with symptom management and clarification of goals as needed. 


.





Attestation


To help prompt me to consider important information that might be impacting 

today's encounter and assessment, information from prior notes written by 

myself or my colleagues may have been "brought forward" into today's note.  My 

signature on this note, however, is an attestation that I personally performed 

the exam, history, and/or decision-making noted today, and, unless otherwise 

indicated, the interactions with patient, family, and staff as well as the 

review of records all occurred today.  I also attest that the listed assessment 

and stated plan reflect my best clinical judgment today based on the 

combination of historical information, prior notes, and today's exam/ 

interactions.  When time spent is documented, it refers only to time spent 

today by the signer, or if indicated, combined time spent today by 

collaborating physician/nurse practitioner.











Ileana Dagn Aug 23, 2017 14:48

## 2017-08-23 NOTE — HHI.CCPN
Subjective


Remarks/Hospital Course


Hospital Course:





37-year-old  female who presents initially as a stroke alert.  

According to EMS report the patient has a history of fungal endocarditis with 

previous IVDA.  EMS states that the patient is currently on hospice, however, 

they are unsure if the patient is a DNR.  EMS states the patient apparently 

complained of a headache earlier today, was found lying on the floor and 

incontinent of stool at approximate, last seen normal at 6:45 PM.  Therefore, 

EMS called a stroke alert in the field as the patient was aphasic and confused.

  Upon arrival the patient groans, moves all 4 extremities and withdraws all 4 

extremities, but is unable to provide any information.  She was intubated in 

the emergency department by ER attending for airway protection.





subjective:





8/22: remains encephalopathic. Blood cultures growing Staph in 4/4 bottles. 





8/23:  Remains sedated/ encephalopathic, orally intubated on mechanical 

ventilation.





Objective





Vital Signs








  Date Time  Temp Pulse Resp B/P (MAP) Pulse Ox O2 Delivery O2 Flow Rate FiO2


 


8/23/17 09:04 99.2 93 23 102/72 (82) 99   


 


8/23/17 08:00        35


 


8/21/17 19:30       4.00 


 


8/21/17 19:30      Nasal Cannula  














Intake and Output   


 


 8/23/17 8/23/17 8/24/17





 08:00 16:00 00:00


 


Intake Total 2687 ml  


 


Output Total 1750 ml  


 


Balance 937 ml  








Result Diagram:  


8/23/17 0500                                                                   

             8/23/17 0500





Imaging





Last Impressions








Chest X-Ray 8/22/17 0000 Signed





Impressions: 





 Service Date/Time:  Tuesday, August 22, 2017 11:56 - CONCLUSION:  Left 





 subclavian central line in good position without pneumothorax.     Scooter Dawson Jr., MD 


 


Head CT 8/21/17 0000 Signed





Impressions: 





 Service Date/Time:  Monday, August 21, 2017 19:47 - CONCLUSION:  No evidence 

of 





 acute infarct, hemorrhage, mass or edema.     Kristian Frankel MD 








Objective Remarks


GENERAL: critically ill middle-aged female, Sedated and intubated


SKIN: Warm and dry.


HEAD: Normocephalic.


EYES: No scleral icterus. No injection or drainage. 


NECK:  trachea midline. No JVD 


CARDIOVASCULAR: tachycardic rate, regular rhythm.  


RESPIRATORY: equal chest rise. PRVC, 40% fio2.  No accessory muscle use.


GASTROINTESTINAL: Abdomen soft, non-tender, nondistended.  no guarding.


MUSCULOSKELETAL: No cyanosis, or edema. 


NEURO EXAM:


GCS: M 5 V T E 3


Mental Status: The patient is sedated and intubated, encephalopathic


Cranial Nerves:Pupils are round, reactive to light. Extraocular movements 

unable to examine


RASS -4, does not withdraw to pain.


Urinary Catheter:  Yes


Assessment to:  Continue





A/P


Assessment and Plan


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 

meningitis possible bacterial secondary to IVDA, respiratory failure, multi-

organ failure.  Very critically ill. very poor prognosis given her ongoing drug 

abuse. will ask palliative to see. appreciate ID and neuro recs.





Acute hypoxic and hypercarbic Respiratory failure


- no SBT today given persistent encephalopathy


- No weaning until neurologically and hemodynamically stable


- wean fio2 for spo2 > 90%


- hob at 30 degrees, vent bundle, nebs





Acute Encephalopathy


Acute meningitis, possible bacterial


- LP with elevated protein, and low glucose relative to serum glucose.


- CT head negative


- Neuro checks per unit protocol


- f/u MRI





History of fungal endocarditis


Staph Bacteremia


- History of IVDA


- Broad-spectrum antibiotic and antiviral and antifungal


- Infectious disease consultation


- f/u repeat echo





Septic Shock


- Septic shock


- Aggressive IV fluid hydration


- Broad-spectrum antibiotic


- Levophed when necessary to keep MAP above 65





Acute Kidney Injury


-secondary to septic shock


- Decrease IVF. Start Bumex 1mg IV daily to keep in even fluid balance.


- trend.


- molina catheter





Acute Protein calorie malnutrition- severe


- secondary to long-standing IVDA and endocarditis


- start TF





DVT GI prophylaxis


- Teds SCDs


- Subcutaneous heparin


- Pepcid





Critical Care:


The total critical care time was 40 minutes. Time to perform other separately 

billable procedures was not included in the critical care time.











Clarke Lee MD Aug 23, 2017 10:19

## 2017-08-24 VITALS
HEART RATE: 107 BPM | OXYGEN SATURATION: 99 % | RESPIRATION RATE: 28 BRPM | DIASTOLIC BLOOD PRESSURE: 70 MMHG | SYSTOLIC BLOOD PRESSURE: 96 MMHG | TEMPERATURE: 101.1 F

## 2017-08-24 VITALS
OXYGEN SATURATION: 97 % | DIASTOLIC BLOOD PRESSURE: 64 MMHG | RESPIRATION RATE: 24 BRPM | SYSTOLIC BLOOD PRESSURE: 91 MMHG | HEART RATE: 100 BPM

## 2017-08-24 VITALS — HEART RATE: 111 BPM

## 2017-08-24 VITALS
OXYGEN SATURATION: 97 % | HEART RATE: 108 BPM | RESPIRATION RATE: 22 BRPM | DIASTOLIC BLOOD PRESSURE: 71 MMHG | SYSTOLIC BLOOD PRESSURE: 101 MMHG

## 2017-08-24 VITALS — HEART RATE: 104 BPM

## 2017-08-24 VITALS
DIASTOLIC BLOOD PRESSURE: 70 MMHG | SYSTOLIC BLOOD PRESSURE: 98 MMHG | HEART RATE: 111 BPM | RESPIRATION RATE: 24 BRPM | OXYGEN SATURATION: 97 %

## 2017-08-24 VITALS
SYSTOLIC BLOOD PRESSURE: 100 MMHG | RESPIRATION RATE: 23 BRPM | HEART RATE: 107 BPM | DIASTOLIC BLOOD PRESSURE: 71 MMHG | OXYGEN SATURATION: 97 %

## 2017-08-24 VITALS
SYSTOLIC BLOOD PRESSURE: 97 MMHG | DIASTOLIC BLOOD PRESSURE: 63 MMHG | HEART RATE: 107 BPM | RESPIRATION RATE: 23 BRPM | OXYGEN SATURATION: 97 %

## 2017-08-24 VITALS
HEART RATE: 105 BPM | DIASTOLIC BLOOD PRESSURE: 72 MMHG | SYSTOLIC BLOOD PRESSURE: 99 MMHG | OXYGEN SATURATION: 97 % | RESPIRATION RATE: 23 BRPM

## 2017-08-24 VITALS — OXYGEN SATURATION: 98 %

## 2017-08-24 VITALS
DIASTOLIC BLOOD PRESSURE: 71 MMHG | HEART RATE: 104 BPM | SYSTOLIC BLOOD PRESSURE: 103 MMHG | RESPIRATION RATE: 31 BRPM | OXYGEN SATURATION: 100 %

## 2017-08-24 VITALS
RESPIRATION RATE: 24 BRPM | HEART RATE: 104 BPM | OXYGEN SATURATION: 99 % | DIASTOLIC BLOOD PRESSURE: 63 MMHG | SYSTOLIC BLOOD PRESSURE: 108 MMHG

## 2017-08-24 VITALS
HEART RATE: 109 BPM | SYSTOLIC BLOOD PRESSURE: 95 MMHG | OXYGEN SATURATION: 97 % | DIASTOLIC BLOOD PRESSURE: 68 MMHG | RESPIRATION RATE: 23 BRPM

## 2017-08-24 VITALS
DIASTOLIC BLOOD PRESSURE: 79 MMHG | RESPIRATION RATE: 22 BRPM | OXYGEN SATURATION: 98 % | HEART RATE: 103 BPM | SYSTOLIC BLOOD PRESSURE: 107 MMHG

## 2017-08-24 VITALS
DIASTOLIC BLOOD PRESSURE: 67 MMHG | RESPIRATION RATE: 22 BRPM | OXYGEN SATURATION: 98 % | SYSTOLIC BLOOD PRESSURE: 98 MMHG | HEART RATE: 106 BPM

## 2017-08-24 VITALS
SYSTOLIC BLOOD PRESSURE: 93 MMHG | DIASTOLIC BLOOD PRESSURE: 80 MMHG | OXYGEN SATURATION: 98 % | HEART RATE: 101 BPM | RESPIRATION RATE: 23 BRPM

## 2017-08-24 VITALS
SYSTOLIC BLOOD PRESSURE: 94 MMHG | OXYGEN SATURATION: 98 % | DIASTOLIC BLOOD PRESSURE: 83 MMHG | HEART RATE: 101 BPM | RESPIRATION RATE: 23 BRPM

## 2017-08-24 VITALS
HEART RATE: 104 BPM | DIASTOLIC BLOOD PRESSURE: 83 MMHG | SYSTOLIC BLOOD PRESSURE: 98 MMHG | RESPIRATION RATE: 21 BRPM | OXYGEN SATURATION: 97 %

## 2017-08-24 VITALS — OXYGEN SATURATION: 99 %

## 2017-08-24 VITALS
DIASTOLIC BLOOD PRESSURE: 68 MMHG | OXYGEN SATURATION: 98 % | HEART RATE: 104 BPM | SYSTOLIC BLOOD PRESSURE: 98 MMHG | RESPIRATION RATE: 24 BRPM

## 2017-08-24 VITALS
OXYGEN SATURATION: 97 % | DIASTOLIC BLOOD PRESSURE: 68 MMHG | SYSTOLIC BLOOD PRESSURE: 95 MMHG | HEART RATE: 109 BPM | RESPIRATION RATE: 25 BRPM

## 2017-08-24 VITALS
SYSTOLIC BLOOD PRESSURE: 102 MMHG | HEART RATE: 107 BPM | RESPIRATION RATE: 22 BRPM | OXYGEN SATURATION: 97 % | DIASTOLIC BLOOD PRESSURE: 70 MMHG

## 2017-08-24 VITALS
OXYGEN SATURATION: 98 % | RESPIRATION RATE: 21 BRPM | DIASTOLIC BLOOD PRESSURE: 70 MMHG | SYSTOLIC BLOOD PRESSURE: 93 MMHG | HEART RATE: 105 BPM

## 2017-08-24 VITALS
DIASTOLIC BLOOD PRESSURE: 62 MMHG | OXYGEN SATURATION: 99 % | RESPIRATION RATE: 24 BRPM | HEART RATE: 104 BPM | SYSTOLIC BLOOD PRESSURE: 109 MMHG

## 2017-08-24 VITALS
HEART RATE: 104 BPM | RESPIRATION RATE: 25 BRPM | DIASTOLIC BLOOD PRESSURE: 67 MMHG | SYSTOLIC BLOOD PRESSURE: 97 MMHG | OXYGEN SATURATION: 98 %

## 2017-08-24 VITALS
SYSTOLIC BLOOD PRESSURE: 109 MMHG | OXYGEN SATURATION: 98 % | DIASTOLIC BLOOD PRESSURE: 84 MMHG | HEART RATE: 103 BPM | RESPIRATION RATE: 23 BRPM

## 2017-08-24 VITALS — HEART RATE: 110 BPM

## 2017-08-24 VITALS
SYSTOLIC BLOOD PRESSURE: 90 MMHG | RESPIRATION RATE: 23 BRPM | DIASTOLIC BLOOD PRESSURE: 81 MMHG | HEART RATE: 100 BPM | OXYGEN SATURATION: 98 %

## 2017-08-24 VITALS — HEART RATE: 113 BPM

## 2017-08-24 VITALS
HEART RATE: 106 BPM | RESPIRATION RATE: 23 BRPM | SYSTOLIC BLOOD PRESSURE: 95 MMHG | OXYGEN SATURATION: 98 % | DIASTOLIC BLOOD PRESSURE: 69 MMHG

## 2017-08-24 VITALS — HEART RATE: 109 BPM

## 2017-08-24 VITALS — HEART RATE: 114 BPM

## 2017-08-24 VITALS — OXYGEN SATURATION: 97 %

## 2017-08-24 VITALS — HEART RATE: 103 BPM

## 2017-08-24 LAB
ANION GAP SERPL CALC-SCNC: 10 MEQ/L (ref 5–15)
ANION GAP SERPL CALC-SCNC: 8 MEQ/L (ref 5–15)
ANION GAP SERPL CALC-SCNC: 9 MEQ/L (ref 5–15)
BUN SERPL-MCNC: 19 MG/DL (ref 7–18)
BUN SERPL-MCNC: 19 MG/DL (ref 7–18)
BUN SERPL-MCNC: 20 MG/DL (ref 7–18)
CHLORIDE SERPL-SCNC: 111 MEQ/L (ref 98–107)
CHLORIDE SERPL-SCNC: 112 MEQ/L (ref 98–107)
CHLORIDE SERPL-SCNC: 114 MEQ/L (ref 98–107)
ERYTHROCYTE [DISTWIDTH] IN BLOOD BY AUTOMATED COUNT: 16 % (ref 11.6–17.2)
GFR SERPLBLD BASED ON 1.73 SQ M-ARVRAT: 46 ML/MIN (ref 89–?)
GFR SERPLBLD BASED ON 1.73 SQ M-ARVRAT: 47 ML/MIN (ref 89–?)
GFR SERPLBLD BASED ON 1.73 SQ M-ARVRAT: 47 ML/MIN (ref 89–?)
HCO3 BLD-SCNC: 21.2 MEQ/L (ref 21–32)
HCO3 BLD-SCNC: 21.9 MEQ/L (ref 21–32)
HCO3 BLD-SCNC: 22.9 MEQ/L (ref 21–32)
HCT VFR BLD CALC: 27.2 % (ref 35–46)
MAGNESIUM SERPL-MCNC: 2 MG/DL (ref 1.5–2.5)
MAGNESIUM SERPL-MCNC: 2.1 MG/DL (ref 1.5–2.5)
MCH RBC QN AUTO: 25 PG (ref 27–34)
MCHC RBC AUTO-ENTMCNC: 33.7 % (ref 32–36)
MCV RBC AUTO: 74.3 FL (ref 80–100)
PLATELET # BLD: 138 TH/MM3 (ref 150–450)
POTASSIUM SERPL-SCNC: 3.2 MEQ/L (ref 3.5–5.1)
POTASSIUM SERPL-SCNC: 3.3 MEQ/L (ref 3.5–5.1)
POTASSIUM SERPL-SCNC: 3.8 MEQ/L (ref 3.5–5.1)
RBC # BLD AUTO: 3.67 MIL/MM3 (ref 4–5.3)
REVIEW FLAG: (no result)
SODIUM SERPL-SCNC: 143 MEQ/L (ref 136–145)
SODIUM SERPL-SCNC: 143 MEQ/L (ref 136–145)
SODIUM SERPL-SCNC: 144 MEQ/L (ref 136–145)
WBC # BLD AUTO: 10.8 TH/MM3 (ref 4–11)

## 2017-08-24 PROCEDURE — 03HY32Z INSERTION OF MONITORING DEVICE INTO UPPER ARTERY, PERCUTANEOUS APPROACH: ICD-10-PCS | Performed by: INTERNAL MEDICINE

## 2017-08-24 PROCEDURE — 4A133J1 MONITORING OF ARTERIAL PULSE, PERIPHERAL, PERCUTANEOUS APPROACH: ICD-10-PCS | Performed by: INTERNAL MEDICINE

## 2017-08-24 PROCEDURE — 4A133B1 MONITORING OF ARTERIAL PRESSURE, PERIPHERAL, PERCUTANEOUS APPROACH: ICD-10-PCS | Performed by: INTERNAL MEDICINE

## 2017-08-24 RX ADMIN — HUMAN INSULIN SCH: 100 INJECTION, SOLUTION SUBCUTANEOUS at 00:00

## 2017-08-24 RX ADMIN — FAMOTIDINE SCH MG: 10 INJECTION, SOLUTION INTRAVENOUS at 08:36

## 2017-08-24 RX ADMIN — PROPOFOL PRN MLS/HR: 10 INJECTION, EMULSION INTRAVENOUS at 02:27

## 2017-08-24 RX ADMIN — POLYVINYL ALCOHOL SCH DROP: 14 SOLUTION/ DROPS OPHTHALMIC at 16:03

## 2017-08-24 RX ADMIN — RIFAMPIN SCH MLS/HR: 600 INJECTION, POWDER, LYOPHILIZED, FOR SOLUTION INTRAVENOUS at 21:00

## 2017-08-24 RX ADMIN — AMPICILLIN SODIUM SCH MLS/HR: 2 INJECTION, POWDER, FOR SOLUTION INTRAMUSCULAR; INTRAVENOUS at 13:12

## 2017-08-24 RX ADMIN — PROPOFOL PRN MLS/HR: 10 INJECTION, EMULSION INTRAVENOUS at 23:21

## 2017-08-24 RX ADMIN — PROPOFOL PRN MLS/HR: 10 INJECTION, EMULSION INTRAVENOUS at 21:02

## 2017-08-24 RX ADMIN — CHLORHEXIDINE GLUCONATE SCH PACK: 500 CLOTH TOPICAL at 04:00

## 2017-08-24 RX ADMIN — Medication SCH ML: at 21:00

## 2017-08-24 RX ADMIN — AMPICILLIN SODIUM SCH MLS/HR: 2 INJECTION, POWDER, FOR SOLUTION INTRAMUSCULAR; INTRAVENOUS at 20:59

## 2017-08-24 RX ADMIN — Medication SCH MLS/HR: at 16:11

## 2017-08-24 RX ADMIN — POLYVINYL ALCOHOL SCH DROP: 14 SOLUTION/ DROPS OPHTHALMIC at 08:33

## 2017-08-24 RX ADMIN — POTASSIUM CHLORIDE PRN MLS/HR: 400 INJECTION, SOLUTION INTRAVENOUS at 06:07

## 2017-08-24 RX ADMIN — PROPOFOL PRN MLS/HR: 10 INJECTION, EMULSION INTRAVENOUS at 11:00

## 2017-08-24 RX ADMIN — PROPOFOL PRN MLS/HR: 10 INJECTION, EMULSION INTRAVENOUS at 04:56

## 2017-08-24 RX ADMIN — HUMAN INSULIN SCH: 100 INJECTION, SOLUTION SUBCUTANEOUS at 06:00

## 2017-08-24 RX ADMIN — HUMAN INSULIN SCH: 100 INJECTION, SOLUTION SUBCUTANEOUS at 17:13

## 2017-08-24 RX ADMIN — HEPARIN SODIUM SCH UNITS: 10000 INJECTION, SOLUTION INTRAVENOUS; SUBCUTANEOUS at 08:37

## 2017-08-24 RX ADMIN — ACETAMINOPHEN PRN MG: 325 TABLET ORAL at 17:13

## 2017-08-24 RX ADMIN — RIFAMPIN SCH MLS/HR: 600 INJECTION, POWDER, LYOPHILIZED, FOR SOLUTION INTRAVENOUS at 09:02

## 2017-08-24 RX ADMIN — OXACILLIN SODIUM SCH MLS/HR: 2 INJECTION, POWDER, FOR SOLUTION INTRAMUSCULAR; INTRAVENOUS at 04:56

## 2017-08-24 RX ADMIN — CLINDAMYCIN PHOSPHATE SCH MLS/HR: 150 INJECTION, SOLUTION INTRAMUSCULAR; INTRAVENOUS at 11:36

## 2017-08-24 RX ADMIN — OXACILLIN SODIUM SCH MLS/HR: 2 INJECTION, POWDER, FOR SOLUTION INTRAMUSCULAR; INTRAVENOUS at 21:01

## 2017-08-24 RX ADMIN — STANDARDIZED SENNA CONCENTRATE AND DOCUSATE SODIUM SCH TAB: 8.6; 5 TABLET, FILM COATED ORAL at 08:33

## 2017-08-24 RX ADMIN — FAMOTIDINE SCH MG: 10 INJECTION, SOLUTION INTRAVENOUS at 21:00

## 2017-08-24 RX ADMIN — OXACILLIN SODIUM SCH MLS/HR: 2 INJECTION, POWDER, FOR SOLUTION INTRAMUSCULAR; INTRAVENOUS at 02:27

## 2017-08-24 RX ADMIN — PROPOFOL PRN MLS/HR: 10 INJECTION, EMULSION INTRAVENOUS at 18:01

## 2017-08-24 RX ADMIN — POLYVINYL ALCOHOL SCH DROP: 14 SOLUTION/ DROPS OPHTHALMIC at 13:12

## 2017-08-24 RX ADMIN — PROPOFOL PRN MLS/HR: 10 INJECTION, EMULSION INTRAVENOUS at 08:35

## 2017-08-24 RX ADMIN — Medication SCH ML: at 08:37

## 2017-08-24 RX ADMIN — BUMETANIDE SCH MG: 0.25 INJECTION, SOLUTION INTRAMUSCULAR; INTRAVENOUS at 08:37

## 2017-08-24 RX ADMIN — FLUCONAZOLE SCH MLS/HR: 2 INJECTION INTRAVENOUS at 21:00

## 2017-08-24 RX ADMIN — OXACILLIN SODIUM SCH MLS/HR: 2 INJECTION, POWDER, FOR SOLUTION INTRAMUSCULAR; INTRAVENOUS at 18:01

## 2017-08-24 RX ADMIN — FLUCONAZOLE SCH MLS/HR: 2 INJECTION INTRAVENOUS at 22:55

## 2017-08-24 RX ADMIN — HUMAN INSULIN SCH: 100 INJECTION, SOLUTION SUBCUTANEOUS at 12:00

## 2017-08-24 RX ADMIN — OXACILLIN SODIUM SCH MLS/HR: 2 INJECTION, POWDER, FOR SOLUTION INTRAMUSCULAR; INTRAVENOUS at 11:05

## 2017-08-24 RX ADMIN — CLINDAMYCIN PHOSPHATE SCH MLS/HR: 150 INJECTION, SOLUTION INTRAMUSCULAR; INTRAVENOUS at 00:23

## 2017-08-24 RX ADMIN — HEPARIN SODIUM SCH UNITS: 10000 INJECTION, SOLUTION INTRAVENOUS; SUBCUTANEOUS at 21:01

## 2017-08-24 RX ADMIN — STANDARDIZED SENNA CONCENTRATE AND DOCUSATE SODIUM SCH TAB: 8.6; 5 TABLET, FILM COATED ORAL at 21:00

## 2017-08-24 RX ADMIN — OXACILLIN SODIUM SCH MLS/HR: 2 INJECTION, POWDER, FOR SOLUTION INTRAMUSCULAR; INTRAVENOUS at 13:52

## 2017-08-24 RX ADMIN — AMPICILLIN SODIUM SCH MLS/HR: 2 INJECTION, POWDER, FOR SOLUTION INTRAMUSCULAR; INTRAVENOUS at 16:03

## 2017-08-24 RX ADMIN — BUMETANIDE SCH MLS/HR: 0.25 INJECTION INTRAMUSCULAR; INTRAVENOUS at 10:59

## 2017-08-24 RX ADMIN — POTASSIUM CHLORIDE PRN MLS/HR: 200 INJECTION, SOLUTION INTRAVENOUS at 21:01

## 2017-08-24 RX ADMIN — PROPOFOL PRN MLS/HR: 10 INJECTION, EMULSION INTRAVENOUS at 11:38

## 2017-08-24 RX ADMIN — POTASSIUM CHLORIDE PRN MLS/HR: 400 INJECTION, SOLUTION INTRAVENOUS at 08:34

## 2017-08-24 NOTE — PD.PROCEDR
Procedure Note


Procedure


Procedure: Arterial Line Placement


Left radial arterial line





Diagnosis: Infective endocarditis





Indications: Septic shock requiring vasopressors, need for beat to beat 

hemodynamic monitoring





Consent: Emergent





Description of the Procedure:  The left wrist was prepped and draped sterilely.

  1% lidocaine was used for local anesthesia. The pulse was located and a 

needle was advanced into the artery.  A 20 gauge, 12 cm catheter was advanced 

into the artery using a modified Seldinger technique.  The catheter was sutured 

to the skin and a sterile dressing was applied.  The catheter was connected to 

a pressure transducer and an arterial waveform was noted.





There were no immediate complications noted.  There was minimal EBL.





I personally performed the procedure.











Harjeet Garrison MD Aug 24, 2017 07:46

## 2017-08-24 NOTE — HHI.CCPN
Subjective


Remarks/Hospital Course


Hospital Course:





37-year-old  female who presents initially as a stroke alert.  

According to EMS report the patient has a history of fungal endocarditis with 

previous IVDA.  EMS states that the patient is currently on hospice, however, 

they are unsure if the patient is a DNR.  EMS states the patient apparently 

complained of a headache earlier today, was found lying on the floor and 

incontinent of stool at approximate, last seen normal at 6:45 PM.  Therefore, 

EMS called a stroke alert in the field as the patient was aphasic and confused.

  Upon arrival the patient groans, moves all 4 extremities and withdraws all 4 

extremities, but is unable to provide any information.  She was intubated in 

the emergency department by ER attending for airway protection.





subjective:





8/22: remains encephalopathic. Blood cultures growing Staph in 4/4 bottles. 


8/23:  Remains sedated/ encephalopathic, orally intubated on mechanical 

ventilation.





8/24: still in shock on vasopressors. grossly volume overloaded and 

intravascularly volume overloaded based on echo and clinically. needs 

aggressive diuresis, but still in shock. family made patient DNR last night, 

but want to continue aggressive therapies. still encephalopathic. no 

significant improvements.





Objective





Vital Signs








  Date Time  Temp Pulse Resp B/P (MAP) Pulse Ox O2 Delivery O2 Flow Rate FiO2


 


8/24/17 06:00  104      


 


8/24/17 04:04     98   35


 


8/24/17 04:00 101.1  25 97/67 (77)    


 


8/21/17 19:30       4.00 


 


8/21/17 19:30      Nasal Cannula  














Intake and Output   


 


 8/24/17 8/24/17 8/25/17





 08:00 16:00 00:00


 


Intake Total 2110 ml  


 


Output Total 1350 ml  


 


Balance 760 ml  








Result Diagram:  


8/24/17 0505                                                                   

             8/24/17 0505





Imaging





Last Impressions








Chest X-Ray 8/22/17 0000 Signed





Impressions: 





 Service Date/Time:  Tuesday, August 22, 2017 11:56 - CONCLUSION:  Left 





 subclavian central line in good position without pneumothorax.     Scooter Dawson Jr., MD 


 


Head CT 8/21/17 0000 Signed





Impressions: 





 Service Date/Time:  Monday, August 21, 2017 19:47 - CONCLUSION:  No evidence 

of 





 acute infarct, hemorrhage, mass or edema.     Kristian Frankel MD 








Objective Remarks


GENERAL: critically ill middle-aged female, Sedated and intubated


SKIN: Warm and dry.


HEAD: Normocephalic.


EYES: No scleral icterus. No injection or drainage. 


NECK:  trachea midline. +JVD


CARDIOVASCULAR: tachycardic rate, regular rhythm.  


RESPIRATORY: equal chest rise. PRVC, 40% fio2.  No accessory muscle use.


GASTROINTESTINAL: Abdomen soft, non-tender, nondistended.  no guarding.


MUSCULOSKELETAL: No cyanosis. 3+ edema above thighs. gross anasarca.


NEURO EXAM: purposeful. moves all extremities spontaneously. w/d to pain. 

grimaces to pain. does not open eyes to stimulation. does not follow commands. 

RASS -3.





A/P


Assessment and Plan


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 

meningitis probable bacterial secondary to IVDA, respiratory failure, multi-

organ failure.  Very critically ill. very poor prognosis given her ongoing drug 

abuse. palliative following. still aggressive care for now, though DNR. off 

pathway; if we withdraw support she would die. continues to be critically ill 

at this time.





Acute hypoxic and hypercarbic Respiratory failure


- no SBT today given persistent encephalopathy


- No weaning until neurologically and hemodynamically stable


- wean fio2 for spo2 > 90%


- hob at 30 degrees, vent bundle, nebs





Acute Encephalopathy


Acute meningitis, possible bacterial


- LP with elevated protein, and low glucose relative to serum glucose.


- CT head negative


- Neuro checks per unit protocol





History of fungal endocarditis


Staph Bacteremia


Tricuspid valve infective endocarditis


- History of IVDA


- Broad-spectrum antibiotic and antifungal


- Infectious disease consultation





Mixed Septic/cardiogenic Shock


Acute congestive heart failure secondary to valvulopathy


Tricuspid Regurgitation


- start Bumex drip


- will need to continue vasopressors while we force diuresis to optimize 

cardiac function despite ongoing mixed shock.





Acute Kidney Injury


-secondary to shock


- will need to pursue forced diuresis to optimize cardiac output despite NOÉ.


- molina catheter





Acute Protein calorie malnutrition- severe


- secondary to long-standing IVDA and endocarditis


- continue TF





DVT GI prophylaxis


- Teds SCDs


- Subcutaneous heparin


- Pepcid





Critical Care:


The total critical care time was 40 minutes. Time to perform other separately 

billable procedures was not included in the critical care time.











Harjeet Garrison MD Aug 24, 2017 08:13

## 2017-08-24 NOTE — HHI.IDPN
Subjective


Subjective


Remarks


Doing poorly


remains on vent


Unresponsive


MSSA in CSF


MSSA and enterocci in blood clx


2D echo with TV vegertationand severely reduced EF


off pressors


good UOP


Fever up to 101.7


heavy secretions


Antibiotics


oxacillin


gent


rifampin


 fluconazole


Allergies:  


Coded Allergies:  


     No Known Allergies (Verified , 2/24/16)





Objective


.





Vital Signs








  Date Time  Temp Pulse Resp B/P (MAP) Pulse Ox O2 Delivery O2 Flow Rate FiO2


 


8/24/17 13:18     97   35


 


8/24/17 10:16     98   35


 


8/24/17 08:13     99   35


 


8/24/17 08:00        35


 


8/24/17 06:00  104      


 


8/24/17 04:04     98   35


 


8/24/17 04:00        35


 


8/24/17 04:00  104      


 


8/24/17 04:00 101.1 104 25 97/67 (77) 98   


 


8/24/17 02:00  103      


 


8/24/17 00:00  109      


 


8/24/17 00:00        35


 


8/24/17 00:00 101.3 109 25 95/68 (77) 97   


 


8/23/17 23:28     97   35


 


8/23/17 22:00  106      


 


8/23/17 20:14     98   35


 


8/23/17 20:00  108      


 


8/23/17 20:00 100.9 108 25 100/68 (79) 98   


 


8/23/17 20:00        35


 


8/23/17 19:40 100.9 109 23 97/65 (76) 98   


 


8/23/17 19:20 100.9 108 24 94/67 (76) 97   


 


8/23/17 19:00 100.8 108 24 95/68 (77) 98   


 


8/23/17 18:40 100.8 108 25 98/67 (77) 98   


 


8/23/17 18:20 100.8 109 24 99/63 (75) 98   


 


8/23/17 18:00  109      


 


8/23/17 18:00 100.6 109 24 99/67 (78) 98   


 


8/23/17 17:40 100.6 109 23 92/67 (75) 97   


 


8/23/17 17:20 100.4 108 24 95/65 (75) 98   


 


8/23/17 17:00 100.4 108 24 97/67 (77) 98   


 


8/23/17 16:40 100.4 107 24 94/67 (76) 98   


 


8/23/17 16:31     98   35


 


8/23/17 16:20 100.4 106 24 95/64 (74) 98   


 


8/23/17 16:00        35


 


8/23/17 16:00  105      


 


8/23/17 16:00 100.4 105 24 88/66 (73) 98   


 


8/23/17 15:40 100.2 104 23 92/63 (73) 98   


 


8/23/17 15:20 100.2 104 23 92/62 (72) 98   














 8/24/17 8/24/17 8/25/17





 15:00 23:00 07:00


 


Intake Total 500 ml  


 


Balance 500 ml  


 


   


 


Intake IV Total 500 ml  








.





Laboratory Tests








Test


  8/23/17


05:00 8/24/17


05:05


 


White Blood Count 18.1 TH/MM3  10.8 TH/MM3 


 


Red Blood Count 4.13 MIL/MM3  3.67 MIL/MM3 


 


Hemoglobin 9.9 GM/DL  9.2 GM/DL 


 


Hematocrit 30.8 %  27.2 % 


 


Mean Corpuscular Volume 74.6 FL  74.3 FL 


 


Mean Corpuscular Hemoglobin 24.1 PG  25.0 PG 


 


Mean Corpuscular Hemoglobin


Concent 32.3 % 


  33.7 % 


 


 


Red Cell Distribution Width 15.8 %  16.0 % 


 


Platelet Count 138 TH/MM3  138 TH/MM3 


 


Mean Platelet Volume 8.9 FL  8.9 FL 








Laboratory Tests








Test


  8/23/17


05:00 8/24/17


05:05 8/24/17


11:42


 


Blood Urea Nitrogen 25 MG/DL  20 MG/DL  19 MG/DL 


 


Creatinine 1.29 MG/DL  1.28 MG/DL  1.29 MG/DL 


 


Random Glucose 132 MG/DL  104 MG/DL  105 MG/DL 


 


Calcium Level 7.8 MG/DL  8.0 MG/DL  8.0 MG/DL 


 


Sodium Level 136 MEQ/L  143 MEQ/L  144 MEQ/L 


 


Potassium Level 3.3 MEQ/L  3.2 MEQ/L  3.8 MEQ/L 


 


Chloride Level 105 MEQ/L  112 MEQ/L  114 MEQ/L 


 


Carbon Dioxide Level 19.9 MEQ/L  22.9 MEQ/L  21.2 MEQ/L 


 


Anion Gap 11 MEQ/L  8 MEQ/L  9 MEQ/L 


 


Estimat Glomerular Filtration


Rate 47 ML/MIN 


  47 ML/MIN 


  47 ML/MIN 


 


 


Magnesium Level   2.1 MG/DL 








Microbiology








 Date/Time


Source Procedure


Growth Status


 


 


 8/24/17 09:38


Blood Peripheral Aerobic Blood Culture


Pending Received


 


 8/24/17 09:38


Blood Peripheral Anaerobic Blood Culture


Pending Received





 8/24/17 09:33


Blood Peripheral Aerobic Blood Culture


Pending Received


 


 8/24/17 09:33


Blood Peripheral Anaerobic Blood Culture


Pending Received





 8/21/17 20:22


Blood Peripheral Aerobic Blood Culture - Final


Staphylococcus Aureus


Enterococcus Faecalis Complete


 


 8/21/17 20:22 Anaerobic Blood Culture - Final


Staphylococcus Aureus


Enterococcus Faecalis Complete





 8/21/17 20:15


Blood Peripheral Aerobic Blood Culture - Final


Staphylococcus Aureus


Enterococcus Faecalis Complete


 


 8/21/17 20:15 Anaerobic Blood Culture - Final


Staphylococcus Aureus


Enterococcus Faecalis Complete





 8/21/17 22:00


Cerebral Spinal Fluid Lumbar Puncture Fungal Smear - Final


NO FUNGAL ELEMENTS SEEN. Resulted


 


 8/21/17 22:00


Cerebral Spinal Fluid Lumbar Puncture Fungal Culture


Pending Resulted





 8/21/17 22:00


Cerebral Spinal Fluid Lumbar Puncture Acid Fast Stain - Final


NO ACID FAST BACILLI SEEN Resulted


 


 8/21/17 22:00


Cerebral Spinal Fluid Lumbar Puncture Mycobacterial Culture


Pending Resulted





 8/21/17 22:00


Cerebral Spinal Fluid Lumbar Puncture Gram Stain - Final Complete


 


 8/21/17 22:00 CSF Culture - Final


Staphylococcus Aureus Complete








Imaging





Last Impressions








Chest X-Ray 8/22/17 0000 Signed





Impressions: 





 Service Date/Time:  Tuesday, August 22, 2017 11:56 - CONCLUSION:  Left 





 subclavian central line in good position without pneumothorax.     Scooter Dawson Jr., MD 


 


Head CT 8/21/17 0000 Signed





Impressions: 





 Service Date/Time:  Monday, August 21, 2017 19:47 - CONCLUSION:  No evidence 

of 





 acute infarct, hemorrhage, mass or edema.     Kristian Frankel MD 








Physical Exam


CONSTITUTIONAL/GENERAL: This is an adequately nourished patient, in no apparent 

distress. Sedated, int'd on [facility] vent


TUBES/LINES/DRAINS:


SKIN: No jaundice, rashes, or lesions.   Skin temperature appropriate. Not 

diaphoretic. 


No Janeway lesions


multiple purple lesions cw septic embolization involving both feet


HEAD: Atraumatic. Normocephalic.


EYES: Pupils small  non reactive  equal and round  .   No scleral icterus. No 

injection or drainage. Fundi not examined.


 CARDIOVASCULAR: Regular rate and rhythm without murmurs, gallops, or rubs. 

Mechanical heart sounds No JVD. Peripheral pulses symmetric.


RESPIRATORY/CHEST: Symmetric, unlabored respirations. Scattered rhonchi to 

auscultation. Breath sounds equal bilaterally.  


GASTROINTESTINAL: Abdomen soft, non-tender,  moderately distended. No hepato-

splenomegaly, or palpable masses. No guarding. Bowel sounds present, hypoactive 


GENITOURINARY: Without palpable bladder distension. Stokes catheter in place 

with clear yellow urine


MUSCULOSKELETAL: Extremities without clubbing, 


4+ edema. Anasarca


LYMPHATICS: No palpable cervical or supraclavicular adenopathy.


NEUROLOGICAL  Unresponsive. Off sedation purposeful, buit not following 


 PSYCHIATRIC: unable to assess








Assessment & Plan


Remarks


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 

fecalis S amp/gent 


   - pt was previously infected with above organisms


Previousy multiple episodes of  PVE


   dw Dr Keenan hospitalised in April 2017 for tricuspid valve PVE  - Candiada 

parapsolosis (March 15 thru April 15) , Streptoccii


   pt was Rx with combination diflucan 800 + micafungin 150, and was also on 

ambisionme at some point x 2 weeks and did not clear 


Confiormed  bacterial meningitis  2/2 MSSA - embolic ethiology


Acute VDRF


NOÉ


Elevated troponine ? cardiac injury from infx


Critically ill , unstable


Poor prognosis


Not a surgical candidate 


? PNA





   cont  fluconazole


   cont  oxacillin + gentamin +RIfampin


   start ampicillin for Enterococci


   Dc CFTX 





dw Catalina Gonzalez MD Aug 24, 2017 15:19

## 2017-08-24 NOTE — HHI.PR
Review/Management


Diagnosis/Plan:  


(1) Meningitis


ICD Codes:  G03.9 - Meningitis, unspecified


Status:  Acute


Plan:  Staph meningitis


+neutrophilic pleocytosis on CSF


glucose nml, elevated protein. 





echo TV vegetation. depressed EF








recs


mri brain-pending





prognosis very guarded





appreciate palliative care assistance














Subjective


Subjective Comments


No acute events reported


Active Medications





Current Medications








 Medications


  (Trade)  Dose


 Ordered  Sig/Nikhil


 Route  Start Time


 Stop Time Status Last Admin


 


 Propofol  100 ml @ 0


 mls/hr  TITRATE  PRN


 IV  8/21/17 20:15


    8/24/17 08:35


 


 


 Norepinephrine


 Bitartrate  250 ml @ 


 7.5 mls/hr  TITRATE  PRN


 IV  8/21/17 21:30


    8/23/17 01:15


 


 


  (Brethine Inj)  1 mg  UNSCH  PRN


 SQ  8/21/17 21:30


     


 


 


  (Tylenol)  650 mg  Q6H  PRN


 PO  8/21/17 21:30


     


 


 


  (Morphine Inj)  2 mg  Q2H  PRN


 IV  8/21/17 21:30


     


 


 


  (Pepcid Inj)  20 mg  Q12HR


 IV PUSH  8/22/17 09:00


    8/24/17 08:36


 


 


  (Tears Naturale


 Opth Soln)  1 drop  TID


 EACH EYE  8/22/17 09:00


    8/24/17 08:33


 


 


  (Zofran Inj)  4 mg  Q6H  PRN


 IV  8/21/17 21:30


     


 


 


  (Duoneb Neb)  1 ampule  Q2HR NEB  PRN


 INH  8/21/17 21:30


     


 


 


  (Heparin Inj)  5,000 units  Q12H


 SQ  8/21/17 22:00


    8/24/17 08:37


 


 


 Miscellaneous


 Information  1  Q361D


 XX  8/21/17 21:30


    8/21/17 21:30


 


 


  (Chlorhexidine


 2% Cloth)  3 pack


 Taper  DAILY@04


 TOP  8/22/17 04:00


 8/18/18 03:59  8/24/17 04:00


 


 


  (Chlorhexidine


 2% Cloth)  3 pack  UNSCH  PRN


 TOP  8/21/17 21:30


     


 


 


  (Arlen-Colace)  1 tab  BID


 PO  8/22/17 09:00


    8/24/17 08:33


 


 


  (Milk Of


 Magnesia Liq)  30 ml  Q12H  PRN


 PO  8/21/17 21:30


     


 


 


  (Senokot)  17.2 mg  Q12H  PRN


 PO  8/21/17 21:30


     


 


 


  (Dulcolax Supp)  10 mg  DAILY  PRN


 RECTAL  8/21/17 21:30


     


 


 


  (Lactulose Liq)  30 ml  DAILY  PRN


 PO  8/21/17 21:30


     


 


 


  (NS Flush)  2 ml  UNSCH  PRN


 IV FLUSH  8/21/17 22:45


     


 


 


  (NS Flush)  2 ml  BID


 IV FLUSH  8/22/17 09:00


    8/24/17 08:37


 


 


 Potassium Chloride  100 ml @ 


 50 mls/hr  Q2H  PRN


 IV  8/22/17 06:00


    8/24/17 08:34


 


 


 Potassium Chloride  100 ml @ 


 50 mls/hr  Q2H  PRN


 IV  8/22/17 06:00


     


 


 


  (K-Lyte Cl  Eff)  50 meq  UNSCH  PRN


 PO  8/22/17 06:00


     


 


 


 Potassium Chloride  100 ml @ 


 25 mls/hr  UNSCH  PRN


 IV  8/22/17 06:00


     


 


 


 Potassium Chloride  100 ml @ 


 50 mls/hr  Q2H  PRN


 IV  8/22/17 06:00


     


 


 


 Magnesium Sulfate


 4 gm/Sodium


 Chloride  100 ml @ 


 50 mls/hr  UNSCH  PRN


 IV  8/22/17 06:00


     


 


 


  (Mag-Ox)  800 mg  UNSCH  PRN


 PO  8/22/17 06:00


     


 


 


 Magnesium Sulfate


 2 gm/Sodium


 Chloride  100 ml @ 


 50 mls/hr  UNSCH  PRN


 IV  8/22/17 06:00


     


 


 


  (K-Phos)  2,000 mg  Q4H  PRN


 PO  8/22/17 06:00


     


 


 


 Sodium Phosphate


 30 mmol/Sodium


 Chloride  250 ml @ 


 42 mls/hr  UNSCH  PRN


 IV  8/22/17 06:00


     


 


 


  (K-Phos)  2,000 mg  UNSCH  PRN


 PO/TUBE  8/22/17 06:00


     


 


 


 Potassium


 Phosphate 30 mmol/


 Sodium Chloride  260 ml @ 


 42 mls/hr  UNSCH  PRN


 IV  8/22/17 06:00


     


 


 


 Fentanyl Citrate  250 ml @ 0


 mls/hr  TITRATE


 IV  8/22/17 13:00


    8/22/17 14:38


 


 


  (D50w (Vial) Inj)  25 ml  UNSCH  PRN


 IV PUSH  8/22/17 13:00


     


 


 


  (NovoLIN R


 SUPPLEMENTAL


 SCALE)  1  Q6HR


 SQ  8/22/17 18:00


     


 


 


 Oxacillin Sodium


 2 gm/Sodium


 Chloride  100 ml @ 


 200 mls/hr  Q4H


 IV  8/22/17 18:00


    8/24/17 04:56


 


 


 Pharmacy Profile


 Note  0 ml @ 0


 mls/hr  UNSCH


 OTHER  8/22/17 15:30


     


 


 


 Rifampin 300 mg/


 Sodium Chloride  100 ml @ 


 100 mls/hr  Q12H


 IV  8/22/17 21:00


    8/24/17 09:02


 


 


 Fluconazole/


 Sodium Chloride  200 ml @ 


 100 mls/hr  Q24H


 IV  8/22/17 20:00


    8/23/17 20:04


 


 


 Fluconazole/


 Sodium Chloride  200 ml @ 


 100 mls/hr  Q24H


 IV  8/22/17 22:00


    8/23/17 22:25


 


 


  (Bumex Inj)  1 mg  DAILY


 IV PUSH  8/23/17 10:30


    8/24/17 08:37


 


 


 Gentamicin


 Sulfate 100 mg/


 Sodium Chloride  102.5 ml @ 


 200 mls/hr  Q12H


 IV  8/24/17 00:00


    8/24/17 00:23


 


 


 Miscellaneous


 Information  SPECIFIC LAB TO BE


 DRAWN:GENTAMICIN


 TROUGH DATE TO...  ONCE  ONCE


 .XX  8/25/17 11:45


 8/25/17 11:46   


 








Allergies





Allergies


Coded Allergies


  No Known Allergies (Verified2/24/16)





Review of Systems


All other ROS:  Unable to obtain





Exam


I&O / VS





Vital Signs








  Date Time  Temp Pulse Resp B/P (MAP) Pulse Ox O2 Delivery O2 Flow Rate FiO2


 


8/24/17 08:13     99   35


 


8/24/17 06:00  104      


 


8/24/17 04:04     98   35


 


8/24/17 04:00        35


 


8/24/17 04:00  104      


 


8/24/17 04:00 101.1 104 25 97/67 (77) 98   


 


8/24/17 02:00  103      


 


8/24/17 00:00  109      


 


8/24/17 00:00        35


 


8/24/17 00:00 101.3 109 25 95/68 (77) 97   


 


8/23/17 23:28     97   35


 


8/23/17 22:00  106      


 


8/23/17 20:14     98   35


 


8/23/17 20:00  108      


 


8/23/17 20:00 100.9 108 25 100/68 (79) 98   


 


8/23/17 20:00        35


 


8/23/17 19:40 100.9 109 23 97/65 (76) 98   


 


8/23/17 19:20 100.9 108 24 94/67 (76) 97   


 


8/23/17 19:00 100.8 108 24 95/68 (77) 98   


 


8/23/17 18:40 100.8 108 25 98/67 (77) 98   


 


8/23/17 18:20 100.8 109 24 99/63 (75) 98   


 


8/23/17 18:00  109      


 


8/23/17 18:00 100.6 109 24 99/67 (78) 98   


 


8/23/17 17:40 100.6 109 23 92/67 (75) 97   


 


8/23/17 17:20 100.4 108 24 95/65 (75) 98   


 


8/23/17 17:00 100.4 108 24 97/67 (77) 98   


 


8/23/17 16:40 100.4 107 24 94/67 (76) 98   


 


8/23/17 16:31     98   35


 


8/23/17 16:20 100.4 106 24 95/64 (74) 98   


 


8/23/17 16:00        35


 


8/23/17 16:00  105      


 


8/23/17 16:00 100.4 105 24 88/66 (73) 98   


 


8/23/17 15:40 100.2 104 23 92/63 (73) 98   


 


8/23/17 15:20 100.2 104 23 92/62 (72) 98   


 


8/23/17 15:00 100.0 104 24 90/64 (73) 98   


 


8/23/17 15:00 100.0 104 24 90/64 (73) 98   


 


8/23/17 14:40 100.0 104 24 94/60 (71) 98   


 


8/23/17 14:20 99.9 103 24 89/65 (73) 98   


 


8/23/17 14:00  103      


 


8/23/17 14:00 99.7 103 23 89/66 (74) 98   


 


8/23/17 13:40 99.7 101 22 91/68 (76) 98   


 


8/23/17 13:20 99.5 100 23 90/63 (72) 98   


 


8/23/17 13:00 99.5 100 22 90/63 (72) 98   


 


8/23/17 12:40 99.3 97 22 94/63 (73) 98   


 


8/23/17 12:20 99.3 97 22 87/65 (72) 98   


 


8/23/17 12:00 99.5 98 24 92/70 (77) 98   


 


8/23/17 12:00  98      


 


8/23/17 11:40 99.5 97 22 88/60 (69) 98   


 


8/23/17 11:20 99.5 96 22 94/68 (77) 98   


 


8/23/17 11:11     98   35


 


8/23/17 11:00 99.5 93 22 96/71 (79) 98   


 


8/23/17 10:40 99.5 92 23 98/71 (80) 98   


 


8/23/17 10:20 99.5 93 23 96/71 (79) 98   


 


8/23/17 10:00 99.5 92 23 90/66 (74) 98   


 


8/23/17 10:00  92      


 


8/23/17 09:40 99.5 94 20 94/64 (74) 98   


 


8/23/17 09:20 99.3 92 23 101/69 (80) 99   








Exam Comments


intubated, on propofol gtt's, deep stupor state, not following, not verbal, 

eyes looking down, mild grimace, ou 3mm sluggish, diminished corneals and dolls

, mild foot withdrawal, no clonus, planterflexor





Objective


Micro and Labs





Laboratory Tests








Test


  8/23/17


09:50 8/23/17


11:50 8/24/17


05:05


 


Gentamicin Level Trough 1.7   


 


Gentamicin Level Peak  3.3  


 


White Blood Count   10.8 


 


Red Blood Count   3.67 


 


Hemoglobin   9.2 


 


Hematocrit   27.2 


 


Mean Corpuscular Volume   74.3 


 


Mean Corpuscular Hemoglobin   25.0 


 


Mean Corpuscular Hemoglobin


Concent 


  


  33.7 


 


 


Red Cell Distribution Width   16.0 


 


Platelet Count   138 


 


Mean Platelet Volume   8.9 


 


Blood Urea Nitrogen   20 


 


Creatinine   1.28 


 


Random Glucose   104 


 


Calcium Level   8.0 


 


Sodium Level   143 


 


Potassium Level   3.2 


 


Chloride Level   112 


 


Carbon Dioxide Level   22.9 


 


Anion Gap   8 


 


Estimat Glomerular Filtration


Rate 


  


  47 


 














 Date/Time


Source Procedure


Growth Status


 


 


 8/21/17 20:22


Blood Peripheral Aerobic Blood Culture - Preliminary


Staphylococcus Aureus


Enterococcus Faecalis Resulted


 


 8/21/17 20:22 Anaerobic Blood Culture - Preliminary


Staphylococcus Aureus


Enterococcus Faecalis Resulted





 8/21/17 22:00


Cerebral Spinal Fluid Lumbar Puncture Fungal Smear - Final


NO FUNGAL ELEMENTS SEEN. Resulted


 


 8/21/17 22:00


Cerebral Spinal Fluid Lumbar Puncture Fungal Culture


Pending Resulted

















Nacho Montana MD Aug 24, 2017 09:18

## 2017-08-24 NOTE — HHI.HCPN
Attempted to contact mom, Kelly, to provide additional ongoing support. No 

answer, left VM offered contact for call back if desired. No family at bedside. 

MRI pending. Spoke with RN Jessica. Palliative care will continue to follow 

throughout hospitalization and further address goals of care as needed.











Zoila Dawson MSW, PUJAW Aug 24, 2017 11:29

## 2017-08-25 VITALS
DIASTOLIC BLOOD PRESSURE: 70 MMHG | RESPIRATION RATE: 23 BRPM | SYSTOLIC BLOOD PRESSURE: 98 MMHG | HEART RATE: 114 BPM | OXYGEN SATURATION: 97 %

## 2017-08-25 VITALS — HEART RATE: 93 BPM

## 2017-08-25 VITALS
OXYGEN SATURATION: 98 % | HEART RATE: 102 BPM | SYSTOLIC BLOOD PRESSURE: 98 MMHG | TEMPERATURE: 101.3 F | DIASTOLIC BLOOD PRESSURE: 73 MMHG

## 2017-08-25 VITALS
DIASTOLIC BLOOD PRESSURE: 72 MMHG | RESPIRATION RATE: 23 BRPM | SYSTOLIC BLOOD PRESSURE: 91 MMHG | HEART RATE: 99 BPM | OXYGEN SATURATION: 95 %

## 2017-08-25 VITALS
OXYGEN SATURATION: 98 % | SYSTOLIC BLOOD PRESSURE: 98 MMHG | RESPIRATION RATE: 22 BRPM | HEART RATE: 111 BPM | TEMPERATURE: 101.7 F | DIASTOLIC BLOOD PRESSURE: 76 MMHG

## 2017-08-25 VITALS
HEART RATE: 106 BPM | RESPIRATION RATE: 22 BRPM | DIASTOLIC BLOOD PRESSURE: 63 MMHG | SYSTOLIC BLOOD PRESSURE: 89 MMHG | OXYGEN SATURATION: 97 %

## 2017-08-25 VITALS — HEART RATE: 100 BPM

## 2017-08-25 VITALS — HEART RATE: 109 BPM

## 2017-08-25 VITALS
DIASTOLIC BLOOD PRESSURE: 73 MMHG | HEART RATE: 112 BPM | RESPIRATION RATE: 24 BRPM | OXYGEN SATURATION: 97 % | SYSTOLIC BLOOD PRESSURE: 102 MMHG

## 2017-08-25 VITALS — OXYGEN SATURATION: 98 %

## 2017-08-25 VITALS — HEART RATE: 114 BPM

## 2017-08-25 VITALS
SYSTOLIC BLOOD PRESSURE: 96 MMHG | HEART RATE: 95 BPM | DIASTOLIC BLOOD PRESSURE: 69 MMHG | TEMPERATURE: 99.6 F | OXYGEN SATURATION: 98 % | RESPIRATION RATE: 19 BRPM

## 2017-08-25 VITALS
DIASTOLIC BLOOD PRESSURE: 60 MMHG | RESPIRATION RATE: 24 BRPM | OXYGEN SATURATION: 98 % | SYSTOLIC BLOOD PRESSURE: 92 MMHG | HEART RATE: 109 BPM

## 2017-08-25 VITALS
OXYGEN SATURATION: 97 % | DIASTOLIC BLOOD PRESSURE: 68 MMHG | SYSTOLIC BLOOD PRESSURE: 90 MMHG | RESPIRATION RATE: 23 BRPM | HEART RATE: 104 BPM

## 2017-08-25 VITALS — OXYGEN SATURATION: 100 %

## 2017-08-25 VITALS
DIASTOLIC BLOOD PRESSURE: 68 MMHG | OXYGEN SATURATION: 97 % | HEART RATE: 93 BPM | TEMPERATURE: 99.5 F | RESPIRATION RATE: 21 BRPM | SYSTOLIC BLOOD PRESSURE: 92 MMHG

## 2017-08-25 VITALS — HEART RATE: 101 BPM

## 2017-08-25 VITALS — OXYGEN SATURATION: 97 %

## 2017-08-25 VITALS — HEART RATE: 116 BPM

## 2017-08-25 VITALS — OXYGEN SATURATION: 94 %

## 2017-08-25 LAB
ANION GAP SERPL CALC-SCNC: 12 MEQ/L (ref 5–15)
BUN SERPL-MCNC: 22 MG/DL (ref 7–18)
BUN SERPL-MCNC: 23 MG/DL (ref 7–18)
BUN SERPL-MCNC: 25 MG/DL (ref 7–18)
CHLORIDE SERPL-SCNC: 104 MEQ/L (ref 98–107)
CHLORIDE SERPL-SCNC: 105 MEQ/L (ref 98–107)
CHLORIDE SERPL-SCNC: 106 MEQ/L (ref 98–107)
ERYTHROCYTE [DISTWIDTH] IN BLOOD BY AUTOMATED COUNT: 16 % (ref 11.6–17.2)
GFR SERPLBLD BASED ON 1.73 SQ M-ARVRAT: 42 ML/MIN (ref 89–?)
GFR SERPLBLD BASED ON 1.73 SQ M-ARVRAT: 44 ML/MIN (ref 89–?)
GFR SERPLBLD BASED ON 1.73 SQ M-ARVRAT: 45 ML/MIN (ref 89–?)
HCO3 BLD-SCNC: 24.3 MEQ/L (ref 21–32)
HCO3 BLD-SCNC: 24.9 MEQ/L (ref 21–32)
HCO3 BLD-SCNC: 25.6 MEQ/L (ref 21–32)
HCT VFR BLD CALC: 30.2 % (ref 35–46)
MAGNESIUM SERPL-MCNC: 1.8 MG/DL (ref 1.5–2.5)
MAGNESIUM SERPL-MCNC: 2.1 MG/DL (ref 1.5–2.5)
MAGNESIUM SERPL-MCNC: 2.2 MG/DL (ref 1.5–2.5)
MCH RBC QN AUTO: 24 PG (ref 27–34)
MCHC RBC AUTO-ENTMCNC: 32.5 % (ref 32–36)
MCV RBC AUTO: 73.8 FL (ref 80–100)
PLATELET # BLD: 183 TH/MM3 (ref 150–450)
POTASSIUM SERPL-SCNC: 3 MEQ/L (ref 3.5–5.1)
POTASSIUM SERPL-SCNC: 3.2 MEQ/L (ref 3.5–5.1)
POTASSIUM SERPL-SCNC: 3.7 MEQ/L (ref 3.5–5.1)
RBC # BLD AUTO: 4.1 MIL/MM3 (ref 4–5.3)
REAGIN AB CSF QL: (no result)
REVIEW FLAG: (no result)
SODIUM SERPL-SCNC: 141 MEQ/L (ref 136–145)
SODIUM SERPL-SCNC: 142 MEQ/L (ref 136–145)
SODIUM SERPL-SCNC: 143 MEQ/L (ref 136–145)
WBC # BLD AUTO: 10.7 TH/MM3 (ref 4–11)

## 2017-08-25 RX ADMIN — AMPICILLIN SODIUM SCH MLS/HR: 2 INJECTION, POWDER, FOR SOLUTION INTRAMUSCULAR; INTRAVENOUS at 00:29

## 2017-08-25 RX ADMIN — OXACILLIN SODIUM SCH MLS/HR: 2 INJECTION, POWDER, FOR SOLUTION INTRAMUSCULAR; INTRAVENOUS at 23:44

## 2017-08-25 RX ADMIN — PROPOFOL PRN MLS/HR: 10 INJECTION, EMULSION INTRAVENOUS at 21:40

## 2017-08-25 RX ADMIN — AMPICILLIN SODIUM SCH MLS/HR: 2 INJECTION, POWDER, FOR SOLUTION INTRAMUSCULAR; INTRAVENOUS at 20:27

## 2017-08-25 RX ADMIN — POTASSIUM CHLORIDE PRN MLS/HR: 400 INJECTION, SOLUTION INTRAVENOUS at 08:15

## 2017-08-25 RX ADMIN — POLYVINYL ALCOHOL SCH DROP: 14 SOLUTION/ DROPS OPHTHALMIC at 09:45

## 2017-08-25 RX ADMIN — FLUCONAZOLE SCH MLS/HR: 2 INJECTION INTRAVENOUS at 23:36

## 2017-08-25 RX ADMIN — OXACILLIN SODIUM SCH MLS/HR: 2 INJECTION, POWDER, FOR SOLUTION INTRAMUSCULAR; INTRAVENOUS at 13:05

## 2017-08-25 RX ADMIN — ACETAMINOPHEN PRN MG: 325 TABLET ORAL at 00:29

## 2017-08-25 RX ADMIN — POTASSIUM CHLORIDE PRN MLS/HR: 400 INJECTION, SOLUTION INTRAVENOUS at 10:21

## 2017-08-25 RX ADMIN — OXACILLIN SODIUM SCH MLS/HR: 2 INJECTION, POWDER, FOR SOLUTION INTRAMUSCULAR; INTRAVENOUS at 18:24

## 2017-08-25 RX ADMIN — RIFAMPIN SCH MLS/HR: 600 INJECTION, POWDER, LYOPHILIZED, FOR SOLUTION INTRAVENOUS at 20:28

## 2017-08-25 RX ADMIN — Medication SCH MLS/HR: at 15:22

## 2017-08-25 RX ADMIN — FAMOTIDINE SCH MG: 10 INJECTION, SOLUTION INTRAVENOUS at 20:28

## 2017-08-25 RX ADMIN — BUMETANIDE SCH MLS/HR: 0.25 INJECTION INTRAMUSCULAR; INTRAVENOUS at 18:43

## 2017-08-25 RX ADMIN — FLUCONAZOLE SCH MLS/HR: 2 INJECTION INTRAVENOUS at 20:27

## 2017-08-25 RX ADMIN — BUMETANIDE SCH MG: 0.25 INJECTION, SOLUTION INTRAMUSCULAR; INTRAVENOUS at 09:44

## 2017-08-25 RX ADMIN — POTASSIUM CHLORIDE PRN MLS/HR: 200 INJECTION, SOLUTION INTRAVENOUS at 22:59

## 2017-08-25 RX ADMIN — OXACILLIN SODIUM SCH MLS/HR: 2 INJECTION, POWDER, FOR SOLUTION INTRAMUSCULAR; INTRAVENOUS at 02:37

## 2017-08-25 RX ADMIN — HEPARIN SODIUM SCH UNITS: 10000 INJECTION, SOLUTION INTRAVENOUS; SUBCUTANEOUS at 23:46

## 2017-08-25 RX ADMIN — HUMAN INSULIN SCH: 100 INJECTION, SOLUTION SUBCUTANEOUS at 00:00

## 2017-08-25 RX ADMIN — STANDARDIZED SENNA CONCENTRATE AND DOCUSATE SODIUM SCH TAB: 8.6; 5 TABLET, FILM COATED ORAL at 20:28

## 2017-08-25 RX ADMIN — HEPARIN SODIUM SCH UNITS: 10000 INJECTION, SOLUTION INTRAVENOUS; SUBCUTANEOUS at 09:45

## 2017-08-25 RX ADMIN — CLINDAMYCIN PHOSPHATE SCH MLS/HR: 150 INJECTION, SOLUTION INTRAMUSCULAR; INTRAVENOUS at 00:28

## 2017-08-25 RX ADMIN — AMPICILLIN SODIUM SCH MLS/HR: 2 INJECTION, POWDER, FOR SOLUTION INTRAMUSCULAR; INTRAVENOUS at 16:09

## 2017-08-25 RX ADMIN — HUMAN INSULIN SCH: 100 INJECTION, SOLUTION SUBCUTANEOUS at 05:39

## 2017-08-25 RX ADMIN — POTASSIUM CHLORIDE PRN MLS/HR: 200 INJECTION, SOLUTION INTRAVENOUS at 20:52

## 2017-08-25 RX ADMIN — STANDARDIZED SENNA CONCENTRATE AND DOCUSATE SODIUM SCH TAB: 8.6; 5 TABLET, FILM COATED ORAL at 09:45

## 2017-08-25 RX ADMIN — OXACILLIN SODIUM SCH MLS/HR: 2 INJECTION, POWDER, FOR SOLUTION INTRAMUSCULAR; INTRAVENOUS at 05:39

## 2017-08-25 RX ADMIN — PROPOFOL PRN MLS/HR: 10 INJECTION, EMULSION INTRAVENOUS at 05:40

## 2017-08-25 RX ADMIN — AMPICILLIN SODIUM SCH MLS/HR: 2 INJECTION, POWDER, FOR SOLUTION INTRAMUSCULAR; INTRAVENOUS at 12:23

## 2017-08-25 RX ADMIN — POLYVINYL ALCOHOL SCH DROP: 14 SOLUTION/ DROPS OPHTHALMIC at 12:23

## 2017-08-25 RX ADMIN — PROPOFOL PRN MLS/HR: 10 INJECTION, EMULSION INTRAVENOUS at 02:37

## 2017-08-25 RX ADMIN — POLYVINYL ALCOHOL SCH DROP: 14 SOLUTION/ DROPS OPHTHALMIC at 16:09

## 2017-08-25 RX ADMIN — HUMAN INSULIN SCH: 100 INJECTION, SOLUTION SUBCUTANEOUS at 17:25

## 2017-08-25 RX ADMIN — PROPOFOL PRN MLS/HR: 10 INJECTION, EMULSION INTRAVENOUS at 15:23

## 2017-08-25 RX ADMIN — HUMAN INSULIN SCH: 100 INJECTION, SOLUTION SUBCUTANEOUS at 12:00

## 2017-08-25 RX ADMIN — RIFAMPIN SCH MLS/HR: 600 INJECTION, POWDER, LYOPHILIZED, FOR SOLUTION INTRAVENOUS at 09:00

## 2017-08-25 RX ADMIN — AMPICILLIN SODIUM SCH MLS/HR: 2 INJECTION, POWDER, FOR SOLUTION INTRAMUSCULAR; INTRAVENOUS at 05:38

## 2017-08-25 RX ADMIN — Medication SCH ML: at 09:45

## 2017-08-25 RX ADMIN — POTASSIUM CHLORIDE PRN MLS/HR: 200 INJECTION, SOLUTION INTRAVENOUS at 00:29

## 2017-08-25 RX ADMIN — PROPOFOL PRN MLS/HR: 10 INJECTION, EMULSION INTRAVENOUS at 09:32

## 2017-08-25 RX ADMIN — AMPICILLIN SODIUM SCH MLS/HR: 2 INJECTION, POWDER, FOR SOLUTION INTRAMUSCULAR; INTRAVENOUS at 09:44

## 2017-08-25 RX ADMIN — PROPOFOL PRN MLS/HR: 10 INJECTION, EMULSION INTRAVENOUS at 12:15

## 2017-08-25 RX ADMIN — AMPICILLIN SODIUM SCH MLS/HR: 2 INJECTION, POWDER, FOR SOLUTION INTRAMUSCULAR; INTRAVENOUS at 16:13

## 2017-08-25 RX ADMIN — Medication SCH ML: at 23:45

## 2017-08-25 RX ADMIN — FAMOTIDINE SCH MG: 10 INJECTION, SOLUTION INTRAVENOUS at 09:45

## 2017-08-25 RX ADMIN — CHLORHEXIDINE GLUCONATE SCH PACK: 500 CLOTH TOPICAL at 05:37

## 2017-08-25 RX ADMIN — OXACILLIN SODIUM SCH MLS/HR: 2 INJECTION, POWDER, FOR SOLUTION INTRAMUSCULAR; INTRAVENOUS at 10:06

## 2017-08-25 RX ADMIN — CLINDAMYCIN PHOSPHATE SCH MLS/HR: 150 INJECTION, SOLUTION INTRAMUSCULAR; INTRAVENOUS at 12:13

## 2017-08-25 RX ADMIN — ACETAMINOPHEN PRN MG: 325 TABLET ORAL at 18:24

## 2017-08-25 NOTE — HHI.CCPN
Subjective


Remarks/Hospital Course


Hospital Course:





37-year-old  female who presents initially as a stroke alert.  

According to EMS report the patient has a history of fungal endocarditis with 

previous IVDA.  EMS states that the patient is currently on hospice, however, 

they are unsure if the patient is a DNR.  EMS states the patient apparently 

complained of a headache earlier today, was found lying on the floor and 

incontinent of stool at approximate, last seen normal at 6:45 PM.  Therefore, 

EMS called a stroke alert in the field as the patient was aphasic and confused.

  Upon arrival the patient groans, moves all 4 extremities and withdraws all 4 

extremities, but is unable to provide any information.  She was intubated in 

the emergency department by ER attending for airway protection.





subjective:





8/22: remains encephalopathic. Blood cultures growing Staph in 4/4 bottles. 


8/23:  Remains sedated/ encephalopathic, orally intubated on mechanical 

ventilation.


8/24: still in shock on vasopressors. grossly volume overloaded and 

intravascularly volume overloaded based on echo and clinically. needs 

aggressive diuresis, but still in shock. family made patient DNR last night, 

but want to continue aggressive therapies. still encephalopathic. no 

significant improvements.





8/25: seen and examined at around 06:45am. delayed note entry. remains 

encephalopathic. no significant changes. unlikely to survive hospital stay, no 

additional overall therapies. family wants aggressive treatment. diuresing on 

bumex drip.    net 3.3L negative. still volume overloaded.





Objective





Vital Signs








  Date Time  Temp Pulse Resp B/P (MAP) Pulse Ox O2 Delivery O2 Flow Rate FiO2


 


8/25/17 20:39     98   35


 


8/25/17 18:00  116      


 


8/25/17 16:00 101.8  24 101/70 (80)    





    102/73 (83)    


 


8/21/17 19:30       4.00 


 


8/21/17 19:30      Nasal Cannula  














Intake and Output   


 


 8/25/17 8/25/17 8/25/17





 07:59 15:59 23:59


 


Intake Total 2307 ml 1450 ml 531 ml


 


Output Total 4600 ml 1370 ml 1450 ml


 


Balance -2293 ml 80 ml -919 ml








Result Diagram:  


8/25/17 0600                                                                   

             8/25/17 1814





Imaging





Last Impressions








Chest X-Ray 8/22/17 0000 Signed





Impressions: 





 Service Date/Time:  Tuesday, August 22, 2017 11:56 - CONCLUSION:  Left 





 subclavian central line in good position without pneumothorax.     Scooter Dawson Jr., MD 


 


Head CT 8/21/17 0000 Signed





Impressions: 





 Service Date/Time:  Monday, August 21, 2017 19:47 - CONCLUSION:  No evidence 

of 





 acute infarct, hemorrhage, mass or edema.     Kristian Frankel MD 








Objective Remarks


GENERAL: critically ill middle-aged female, Sedated and intubated


SKIN: Warm and dry.


HEAD: Normocephalic.


EYES: No scleral icterus. No injection or drainage. 


NECK:  trachea midline. +JVD


CARDIOVASCULAR: tachycardic rate, regular rhythm.  


RESPIRATORY: equal chest rise. PRVC, 40% fio2.  No accessory muscle use.


GASTROINTESTINAL: Abdomen soft, non-tender, nondistended.  no guarding.


MUSCULOSKELETAL: No cyanosis. 3+ edema above thighs. gross anasarca.


NEURO EXAM: purposeful. moves all extremities spontaneously. w/d to pain. 

grimaces to pain. does not open eyes to stimulation. does not follow commands. 

RASS -3.





A/P


Assessment and Plan


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 

meningitis probable bacterial secondary to IVDA, respiratory failure, multi-

organ failure.  Very critically ill. very poor prognosis given her ongoing drug 

abuse. palliative following. still aggressive care for now, though DNR. off 

pathway; if we withdraw support she would die. continues to be critically ill 

at this time.





Acute hypoxic and hypercarbic Respiratory failure


- no SBT today given persistent encephalopathy


- No weaning until neurologically and hemodynamically stable


- wean fio2 for spo2 > 90%


- hob at 30 degrees, vent bundle, nebs





Acute Encephalopathy


Acute meningitis, possible bacterial


- LP with elevated protein, and low glucose relative to serum glucose.


- CT head negative


- Neuro checks per unit protocol





History of fungal endocarditis


Staph Bacteremia


Tricuspid valve infective endocarditis


- History of IVDA


- Broad-spectrum antibiotic and antifungal


- Infectious disease consultation





Mixed Septic/cardiogenic Shock


Acute congestive heart failure secondary to valvulopathy


Tricuspid Regurgitation


- start Bumex drip


- will need to continue vasopressors while we force diuresis to optimize 

cardiac function despite ongoing mixed shock.





Acute Kidney Injury


-secondary to shock


- will need to pursue forced diuresis to optimize cardiac output despite NOÉ.


- molina catheter





Acute Protein calorie malnutrition- severe


- secondary to long-standing IVDA and endocarditis


- continue TF





DVT GI prophylaxis


- Teds SCDs


- Subcutaneous heparin


- Harjeet Rodrígeuz MD Aug 25, 2017 22:10

## 2017-08-25 NOTE — HHI.IDPN
Subjective


Subjective


Remarks


Doing poorly


remains on vent


Opens eyes to voice


MSSA in CSF


MSSA and enterocci in blood clx


2D echo with TV vegertationand severely reduced EF


off pressors


good UOP


Fever, low grade 


less secretions


Antibiotics


ampicillin 


oxacillin 


gent 


rifampin 


 fluconazole


Allergies:  


Coded Allergies:  


     No Known Allergies (Verified , 2/24/16)





Objective


.





Vital Signs








  Date Time  Temp Pulse Resp B/P (MAP) Pulse Ox O2 Delivery O2 Flow Rate FiO2


 


8/25/17 16:02     97   35


 


8/25/17 13:11     94   35


 


8/25/17 13:00  109      


 


8/25/17 12:00  106      


 


8/25/17 12:00        35


 


8/25/17 12:00 100.8 106 22 92/69 (77) 97   





    89/63 (72)    


 


8/25/17 11:00  101      


 


8/25/17 10:12     97   35


 


8/25/17 10:00 100.0 104 23 94/68 (77) 97   





    90/64 (73)    


 


8/25/17 10:00  104      


 


8/25/17 09:00 99.7 99 23 94/72 (79) 95   





    91/64 (73)    


 


8/25/17 08:00 99.3 93 21 95/68 (77) 97   





    92/65 (74)    


 


8/25/17 08:00        35


 


8/25/17 08:00 99.5       


 


8/25/17 08:00  93      


 


8/25/17 07:46     97   35


 


8/25/17 06:00  93      


 


8/25/17 04:23     98   35


 


8/25/17 04:00        35


 


8/25/17 04:00 99.6 95 19 104/77 (86) 98   





    96/69 (78)    


 


8/25/17 04:00  95      


 


8/25/17 02:00  100      


 


8/25/17 01:35     98   35


 


8/25/17 00:00        35


 


8/25/17 00:00 101.3 102  100/73 (82) 98   





    98/70 (79)    


 


8/25/17 00:00  102      


 


8/24/17 22:28     98   35


 


8/24/17 22:00  104      


 


8/24/17 20:00 101.1 103 22 114/79 (91) 98   





    107/74 (85)    


 


8/24/17 20:00        35


 


8/24/17 20:00  103      


 


8/24/17 19:58     98   35


 


8/24/17 18:20  110      


 


8/24/17 18:00  111      


 


8/24/17 17:40  109      


 


8/24/17 17:20  111      


 


8/24/17 17:00  114      


 


8/24/17 16:40  114      














 8/25/17 8/25/17 8/26/17





 15:00 23:00 07:00


 


Intake Total 1350 ml 100 ml 


 


Output Total 1370 ml  


 


Balance -20 ml 100 ml 


 


   


 


IV Total 1350 ml 100 ml 


 


Output Urine Total 1370 ml  








.





Laboratory Tests








Test


  8/24/17


05:05 8/25/17


06:00


 


White Blood Count 10.8 TH/MM3  10.7 TH/MM3 


 


Red Blood Count 3.67 MIL/MM3  4.10 MIL/MM3 


 


Hemoglobin 9.2 GM/DL  9.8 GM/DL 


 


Hematocrit 27.2 %  30.2 % 


 


Mean Corpuscular Volume 74.3 FL  73.8 FL 


 


Mean Corpuscular Hemoglobin 25.0 PG  24.0 PG 


 


Mean Corpuscular Hemoglobin


Concent 33.7 % 


  32.5 % 


 


 


Red Cell Distribution Width 16.0 %  16.0 % 


 


Platelet Count 138 TH/MM3  183 TH/MM3 


 


Mean Platelet Volume 8.9 FL  8.5 FL 








Laboratory Tests








Test


  8/24/17


05:05 8/24/17


11:42 8/24/17


15:15 8/25/17


06:00


 


Blood Urea Nitrogen 20 MG/DL  19 MG/DL  19 MG/DL  22 MG/DL 


 


Creatinine 1.28 MG/DL  1.29 MG/DL  1.32 MG/DL  1.35 MG/DL 


 


Random Glucose 104 MG/DL  105 MG/DL  105 MG/DL  106 MG/DL 


 


Calcium Level 8.0 MG/DL  8.0 MG/DL  8.2 MG/DL  8.0 MG/DL 


 


Sodium Level 143 MEQ/L  144 MEQ/L  143 MEQ/L  142 MEQ/L 


 


Potassium Level 3.2 MEQ/L  3.8 MEQ/L  3.3 MEQ/L  3.0 MEQ/L 


 


Chloride Level 112 MEQ/L  114 MEQ/L  111 MEQ/L  106 MEQ/L 


 


Carbon Dioxide Level 22.9 MEQ/L  21.2 MEQ/L  21.9 MEQ/L  24.3 MEQ/L 


 


Anion Gap 8 MEQ/L  9 MEQ/L  10 MEQ/L  12 MEQ/L 


 


Estimat Glomerular Filtration


Rate 47 ML/MIN 


  47 ML/MIN 


  46 ML/MIN 


  45 ML/MIN 


 


 


Magnesium Level  2.1 MG/DL  2.0 MG/DL  1.8 MG/DL 


 


Test


  8/25/17


10:57 


  


  


 


 


Blood Urea Nitrogen 23 MG/DL    


 


Creatinine 1.37 MG/DL    


 


Random Glucose 119 MG/DL    


 


Calcium Level 8.2 MG/DL    


 


Magnesium Level 2.1 MG/DL    


 


Sodium Level 143 MEQ/L    


 


Potassium Level 3.7 MEQ/L    


 


Chloride Level 105 MEQ/L    


 


Carbon Dioxide Level 25.6 MEQ/L    


 


Anion Gap 12 MEQ/L    


 


Estimat Glomerular Filtration


Rate 44 ML/MIN 


  


  


  


 








Microbiology








 Date/Time


Source Procedure


Growth Status


 


 


 8/24/17 09:38


Blood Peripheral Aerobic Blood Culture - Preliminary


NO GROWTH IN 1 DAY Resulted


 


 8/24/17 09:38


Blood Peripheral Anaerobic Blood Culture - Preliminary


NO GROWTH IN 1 DAY Resulted





 8/24/17 09:33


Blood Peripheral Aerobic Blood Culture - Preliminary


NO GROWTH IN 1 DAY Resulted


 


 8/24/17 09:33


Blood Peripheral Anaerobic Blood Culture - Preliminary


NO GROWTH IN 1 DAY Resulted





 8/24/17 21:15


Sputum Endotracheal Gram Stain - Final Resulted


 


 8/24/17 21:15


Sputum Endotracheal Sputum Culture - Preliminary


RESULTS PENDING Resulted








Imaging





Last Impressions








Chest X-Ray 8/22/17 0000 Signed





Impressions: 





 Service Date/Time:  Tuesday, August 22, 2017 11:56 - CONCLUSION:  Left 





 subclavian central line in good position without pneumothorax.     Scooter Dawson Jr., MD 


 


Head CT 8/21/17 0000 Signed





Impressions: 





 Service Date/Time:  Monday, August 21, 2017 19:47 - CONCLUSION:  No evidence 

of 





 acute infarct, hemorrhage, mass or edema.     Kristian Frankel MD 








Physical Exam


CONSTITUTIONAL/GENERAL: This is an adequately nourished patient, in no apparent 

distress. Sedated, int'd on [facility] vent


TUBES/LINES/DRAINS:


SKIN: No jaundice, rashes, or lesions.   Skin temperature appropriate. Not 

diaphoretic. 


 


multiple embolic  lesions cw septic embolization involving L lower leg and  

both feet


HEAD: Atraumatic. Normocephalic.


EYES: Pupils small  non reactive  equal and round  .   No scleral icterus. No 

injection or drainage. Fundi not examined.


 CARDIOVASCULAR: Regular rate and rhythm without murmurs, gallops, or rubs. 

Mechanical heart sounds No JVD. Peripheral pulses symmetric.


RESPIRATORY/CHEST: Symmetric, unlabored respirations. Scattered rhonchi to 

auscultation. Breath sounds equal bilaterally.  


GASTROINTESTINAL: Abdomen soft, non-tender,  moderately distended. No hepato-

splenomegaly, or palpable masses. No guarding. Bowel sounds present, hypoactive 


GENITOURINARY: Without palpable bladder distension. Stokes catheter in place 

with clear yellow urine


MUSCULOSKELETAL: Extremities without clubbing, 


4+ edema. Anasarca


LYMPHATICS: No palpable cervical or supraclavicular adenopathy.


NEUROLOGICAL  Unresponsive. Off sedation purposeful, buit not following 


 PSYCHIATRIC: unable to assess








Assessment & Plan


Remarks


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 

fecalis S amp/gent 


   - pt was previously infected with above organisms


Previousy multiple episodes of  PVE


   dw Dr Keenan hospitalised in April 2017 for tricuspid valve PVE  - [place] 

parapsolosis (March 15 thru April 15) , Streptoccii


   pt was Rx with combination diflucan 800 + micafungin 150, and was also on 

ambisionme at some point x 2 weeks and did not clear 


Confiormed  bacterial meningitis  2/2 MSSA - embolic ethiology


Acute VDRF


NOÉ


Elevated troponine ? cardiac injury from infx


Critically ill , unstable


Poor prognosis


Not a surgical candidate 


? PNA, clx P





   dc  fluconazole


   cont  oxacillin + gentamin +RIfampin


   cont  ampicillin for Enterococci


   fu repeat blood clx











Catalina Teague MD Aug 25, 2017 16:40

## 2017-08-25 NOTE — EKG
Date Performed: 08/24/2017       Time Performed: 19:04:00

 

PTAGE:      37 years

 

EKG:      SINUS TACHYCARDIA RIGHT BUNDLE BRANCH BLOCK MARKED T-WAVE ABNORMALITY, CONSIDER ANTERIOR IS
CHEMIA Prolonged QT interval ABNORMAL ECG

 

PREVIOUS TRACING       : 08/21/2017 20.19

 

DOCTOR:   Yemi Teague  Interpretating Date/Time  08/25/2017 11:36:30

## 2017-08-26 VITALS — DIASTOLIC BLOOD PRESSURE: 60 MMHG | SYSTOLIC BLOOD PRESSURE: 91 MMHG | HEART RATE: 91 BPM | OXYGEN SATURATION: 98 %

## 2017-08-26 VITALS
OXYGEN SATURATION: 100 % | SYSTOLIC BLOOD PRESSURE: 99 MMHG | DIASTOLIC BLOOD PRESSURE: 63 MMHG | HEART RATE: 92 BPM | TEMPERATURE: 98.6 F

## 2017-08-26 VITALS — OXYGEN SATURATION: 96 %

## 2017-08-26 VITALS
SYSTOLIC BLOOD PRESSURE: 107 MMHG | HEART RATE: 110 BPM | DIASTOLIC BLOOD PRESSURE: 57 MMHG | OXYGEN SATURATION: 100 % | RESPIRATION RATE: 17 BRPM | TEMPERATURE: 99.1 F

## 2017-08-26 VITALS
HEART RATE: 115 BPM | OXYGEN SATURATION: 98 % | RESPIRATION RATE: 21 BRPM | DIASTOLIC BLOOD PRESSURE: 72 MMHG | SYSTOLIC BLOOD PRESSURE: 107 MMHG | TEMPERATURE: 102.6 F

## 2017-08-26 VITALS
DIASTOLIC BLOOD PRESSURE: 66 MMHG | OXYGEN SATURATION: 98 % | HEART RATE: 119 BPM | SYSTOLIC BLOOD PRESSURE: 93 MMHG | TEMPERATURE: 102.7 F

## 2017-08-26 VITALS — HEART RATE: 92 BPM | DIASTOLIC BLOOD PRESSURE: 62 MMHG | OXYGEN SATURATION: 100 % | SYSTOLIC BLOOD PRESSURE: 98 MMHG

## 2017-08-26 VITALS — HEART RATE: 106 BPM | OXYGEN SATURATION: 97 % | SYSTOLIC BLOOD PRESSURE: 85 MMHG | DIASTOLIC BLOOD PRESSURE: 60 MMHG

## 2017-08-26 VITALS — HEART RATE: 93 BPM

## 2017-08-26 VITALS — HEART RATE: 93 BPM | DIASTOLIC BLOOD PRESSURE: 57 MMHG | OXYGEN SATURATION: 100 % | SYSTOLIC BLOOD PRESSURE: 88 MMHG

## 2017-08-26 VITALS — OXYGEN SATURATION: 100 %

## 2017-08-26 VITALS — OXYGEN SATURATION: 95 % | HEART RATE: 125 BPM | DIASTOLIC BLOOD PRESSURE: 71 MMHG | SYSTOLIC BLOOD PRESSURE: 101 MMHG

## 2017-08-26 VITALS
TEMPERATURE: 97.7 F | DIASTOLIC BLOOD PRESSURE: 64 MMHG | OXYGEN SATURATION: 99 % | HEART RATE: 85 BPM | SYSTOLIC BLOOD PRESSURE: 98 MMHG

## 2017-08-26 VITALS — OXYGEN SATURATION: 98 %

## 2017-08-26 VITALS — HEART RATE: 198 BPM | DIASTOLIC BLOOD PRESSURE: 64 MMHG | SYSTOLIC BLOOD PRESSURE: 81 MMHG | OXYGEN SATURATION: 98 %

## 2017-08-26 VITALS — SYSTOLIC BLOOD PRESSURE: 80 MMHG | OXYGEN SATURATION: 100 % | DIASTOLIC BLOOD PRESSURE: 53 MMHG | HEART RATE: 93 BPM

## 2017-08-26 VITALS — OXYGEN SATURATION: 100 % | DIASTOLIC BLOOD PRESSURE: 63 MMHG | SYSTOLIC BLOOD PRESSURE: 99 MMHG | HEART RATE: 92 BPM

## 2017-08-26 VITALS — HEART RATE: 119 BPM

## 2017-08-26 VITALS
SYSTOLIC BLOOD PRESSURE: 98 MMHG | HEART RATE: 123 BPM | OXYGEN SATURATION: 96 % | DIASTOLIC BLOOD PRESSURE: 75 MMHG | TEMPERATURE: 103.6 F

## 2017-08-26 VITALS — HEART RATE: 118 BPM

## 2017-08-26 VITALS — HEART RATE: 84 BPM

## 2017-08-26 VITALS — OXYGEN SATURATION: 99 %

## 2017-08-26 VITALS — OXYGEN SATURATION: 97 %

## 2017-08-26 LAB
ANION GAP SERPL CALC-SCNC: 11 MEQ/L (ref 5–15)
ANION GAP SERPL CALC-SCNC: 12 MEQ/L (ref 5–15)
ANION GAP SERPL CALC-SCNC: 13 MEQ/L (ref 5–15)
BASE EXCESS BLD CALC-SCNC: 0.2 MMOL/L (ref -2–2)
BENZODIAZEPINES PNL UR: 96 % (ref 90–100)
BLOOD GAS CARBOXYHEMOGLOBIN: 1.5 % (ref 0–4)
BLOOD GAS HCO3: 24 MMOL/L (ref 22–26)
BLOOD GAS OXYGEN CONTENT: 14.5 VOL % (ref 12–20)
BLOOD GAS PCO2: 35 MMHG (ref 38–42)
BUN SERPL-MCNC: 29 MG/DL (ref 7–18)
BUN SERPL-MCNC: 33 MG/DL (ref 7–18)
BUN SERPL-MCNC: 39 MG/DL (ref 7–18)
CHLORIDE SERPL-SCNC: 104 MEQ/L (ref 98–107)
CHLORIDE SERPL-SCNC: 105 MEQ/L (ref 98–107)
CHLORIDE SERPL-SCNC: 108 MEQ/L (ref 98–107)
CRITICAL VALUE: NO
DRAW SITE: (no result)
ERYTHROCYTE [DISTWIDTH] IN BLOOD BY AUTOMATED COUNT: 16.3 % (ref 11.6–17.2)
GFR SERPLBLD BASED ON 1.73 SQ M-ARVRAT: 37 ML/MIN (ref 89–?)
GFR SERPLBLD BASED ON 1.73 SQ M-ARVRAT: 41 ML/MIN (ref 89–?)
GFR SERPLBLD BASED ON 1.73 SQ M-ARVRAT: 41 ML/MIN (ref 89–?)
HCO3 BLD-SCNC: 24.8 MEQ/L (ref 21–32)
HCO3 BLD-SCNC: 25.3 MEQ/L (ref 21–32)
HCO3 BLD-SCNC: 26.4 MEQ/L (ref 21–32)
HCT VFR BLD CALC: 31.2 % (ref 35–46)
MAGNESIUM SERPL-MCNC: 2.1 MG/DL (ref 1.5–2.5)
MAGNESIUM SERPL-MCNC: 2.1 MG/DL (ref 1.5–2.5)
MAGNESIUM SERPL-MCNC: 2.4 MG/DL (ref 1.5–2.5)
MCH RBC QN AUTO: 24.1 PG (ref 27–34)
MCHC RBC AUTO-ENTMCNC: 32.7 % (ref 32–36)
MCV RBC AUTO: 73.8 FL (ref 80–100)
METHGB MFR BLDA: 1.1 % (ref 0–2)
O2/TOTAL GAS SETTING VFR VENT: 35 %
OXYGEN DEVICE: (no result)
PLATELET # BLD: 217 TH/MM3 (ref 150–450)
PO2 BLD: 128 MMHG (ref 61–120)
POTASSIUM SERPL-SCNC: 3.1 MEQ/L (ref 3.5–5.1)
POTASSIUM SERPL-SCNC: 3.3 MEQ/L (ref 3.5–5.1)
POTASSIUM SERPL-SCNC: 3.4 MEQ/L (ref 3.5–5.1)
RBC # BLD AUTO: 4.23 MIL/MM3 (ref 4–5.3)
REVIEW FLAG: (no result)
SALICYLATES SERPL-MCNC: 10.6 G/DL (ref 12–16)
SODIUM SERPL-SCNC: 141 MEQ/L (ref 136–145)
SODIUM SERPL-SCNC: 143 MEQ/L (ref 136–145)
SODIUM SERPL-SCNC: 145 MEQ/L (ref 136–145)
STAT: NO
TEMP CORR TO: 98.6
ULNAR PULSE: PRESENT
WBC # BLD AUTO: 12.3 TH/MM3 (ref 4–11)

## 2017-08-26 RX ADMIN — Medication SCH ML: at 08:12

## 2017-08-26 RX ADMIN — BUMETANIDE SCH MG: 0.25 INJECTION, SOLUTION INTRAMUSCULAR; INTRAVENOUS at 08:12

## 2017-08-26 RX ADMIN — OXACILLIN SODIUM SCH MLS/HR: 2 INJECTION, POWDER, FOR SOLUTION INTRAMUSCULAR; INTRAVENOUS at 05:26

## 2017-08-26 RX ADMIN — HUMAN INSULIN SCH: 100 INJECTION, SOLUTION SUBCUTANEOUS at 11:17

## 2017-08-26 RX ADMIN — CHLORHEXIDINE GLUCONATE SCH PACK: 500 CLOTH TOPICAL at 04:00

## 2017-08-26 RX ADMIN — OXACILLIN SODIUM SCH MLS/HR: 2 INJECTION, POWDER, FOR SOLUTION INTRAMUSCULAR; INTRAVENOUS at 22:07

## 2017-08-26 RX ADMIN — AMPICILLIN SODIUM SCH MLS/HR: 2 INJECTION, POWDER, FOR SOLUTION INTRAMUSCULAR; INTRAVENOUS at 08:11

## 2017-08-26 RX ADMIN — STANDARDIZED SENNA CONCENTRATE AND DOCUSATE SODIUM SCH TAB: 8.6; 5 TABLET, FILM COATED ORAL at 20:59

## 2017-08-26 RX ADMIN — HEPARIN SODIUM SCH UNITS: 10000 INJECTION, SOLUTION INTRAVENOUS; SUBCUTANEOUS at 20:45

## 2017-08-26 RX ADMIN — AMPICILLIN SODIUM SCH MLS/HR: 2 INJECTION, POWDER, FOR SOLUTION INTRAMUSCULAR; INTRAVENOUS at 13:16

## 2017-08-26 RX ADMIN — Medication SCH ML: at 20:43

## 2017-08-26 RX ADMIN — HUMAN INSULIN SCH: 100 INJECTION, SOLUTION SUBCUTANEOUS at 06:00

## 2017-08-26 RX ADMIN — HUMAN INSULIN SCH: 100 INJECTION, SOLUTION SUBCUTANEOUS at 18:00

## 2017-08-26 RX ADMIN — PROPOFOL PRN MLS/HR: 10 INJECTION, EMULSION INTRAVENOUS at 20:33

## 2017-08-26 RX ADMIN — HUMAN INSULIN SCH: 100 INJECTION, SOLUTION SUBCUTANEOUS at 00:00

## 2017-08-26 RX ADMIN — FLUCONAZOLE SCH MLS/HR: 2 INJECTION INTRAVENOUS at 20:37

## 2017-08-26 RX ADMIN — POTASSIUM CHLORIDE PRN MLS/HR: 200 INJECTION, SOLUTION INTRAVENOUS at 13:04

## 2017-08-26 RX ADMIN — OXACILLIN SODIUM SCH MLS/HR: 2 INJECTION, POWDER, FOR SOLUTION INTRAMUSCULAR; INTRAVENOUS at 18:00

## 2017-08-26 RX ADMIN — AMPICILLIN SODIUM SCH MLS/HR: 2 INJECTION, POWDER, FOR SOLUTION INTRAMUSCULAR; INTRAVENOUS at 01:23

## 2017-08-26 RX ADMIN — GENTAMICIN SULFATE SCH MLS/HR: 0.8 INJECTION, SOLUTION INTRAVENOUS at 22:44

## 2017-08-26 RX ADMIN — HUMAN INSULIN SCH: 100 INJECTION, SOLUTION SUBCUTANEOUS at 23:16

## 2017-08-26 RX ADMIN — POTASSIUM CHLORIDE PRN MLS/HR: 200 INJECTION, SOLUTION INTRAVENOUS at 03:38

## 2017-08-26 RX ADMIN — GENTAMICIN SULFATE SCH MLS/HR: 0.8 INJECTION, SOLUTION INTRAVENOUS at 12:29

## 2017-08-26 RX ADMIN — OXACILLIN SODIUM SCH MLS/HR: 2 INJECTION, POWDER, FOR SOLUTION INTRAMUSCULAR; INTRAVENOUS at 01:22

## 2017-08-26 RX ADMIN — OXACILLIN SODIUM SCH MLS/HR: 2 INJECTION, POWDER, FOR SOLUTION INTRAMUSCULAR; INTRAVENOUS at 09:10

## 2017-08-26 RX ADMIN — POLYVINYL ALCOHOL SCH DROP: 14 SOLUTION/ DROPS OPHTHALMIC at 18:00

## 2017-08-26 RX ADMIN — POTASSIUM CHLORIDE PRN MLS/HR: 200 INJECTION, SOLUTION INTRAVENOUS at 01:32

## 2017-08-26 RX ADMIN — AMPICILLIN SODIUM SCH MLS/HR: 2 INJECTION, POWDER, FOR SOLUTION INTRAMUSCULAR; INTRAVENOUS at 16:38

## 2017-08-26 RX ADMIN — AMPICILLIN SODIUM SCH MLS/HR: 2 INJECTION, POWDER, FOR SOLUTION INTRAMUSCULAR; INTRAVENOUS at 20:40

## 2017-08-26 RX ADMIN — POTASSIUM CHLORIDE PRN MLS/HR: 400 INJECTION, SOLUTION INTRAVENOUS at 09:11

## 2017-08-26 RX ADMIN — STANDARDIZED SENNA CONCENTRATE AND DOCUSATE SODIUM SCH TAB: 8.6; 5 TABLET, FILM COATED ORAL at 08:12

## 2017-08-26 RX ADMIN — FLUCONAZOLE SCH MLS/HR: 2 INJECTION INTRAVENOUS at 22:43

## 2017-08-26 RX ADMIN — PROPOFOL PRN MLS/HR: 10 INJECTION, EMULSION INTRAVENOUS at 07:07

## 2017-08-26 RX ADMIN — PROPOFOL PRN MLS/HR: 10 INJECTION, EMULSION INTRAVENOUS at 04:09

## 2017-08-26 RX ADMIN — ACETAMINOPHEN PRN MG: 10 INJECTION, SOLUTION INTRAVENOUS at 11:10

## 2017-08-26 RX ADMIN — AMPICILLIN SODIUM SCH MLS/HR: 2 INJECTION, POWDER, FOR SOLUTION INTRAMUSCULAR; INTRAVENOUS at 05:26

## 2017-08-26 RX ADMIN — FAMOTIDINE SCH MG: 10 INJECTION, SOLUTION INTRAVENOUS at 20:44

## 2017-08-26 RX ADMIN — ACETAMINOPHEN PRN MG: 325 TABLET ORAL at 09:07

## 2017-08-26 RX ADMIN — GENTAMICIN SULFATE SCH MLS/HR: 0.8 INJECTION, SOLUTION INTRAVENOUS at 00:58

## 2017-08-26 RX ADMIN — PROPOFOL PRN MLS/HR: 10 INJECTION, EMULSION INTRAVENOUS at 00:57

## 2017-08-26 RX ADMIN — AMPICILLIN SODIUM SCH MLS/HR: 2 INJECTION, POWDER, FOR SOLUTION INTRAMUSCULAR; INTRAVENOUS at 23:17

## 2017-08-26 RX ADMIN — ACETAMINOPHEN PRN MG: 325 TABLET ORAL at 00:57

## 2017-08-26 RX ADMIN — PROPOFOL PRN MLS/HR: 10 INJECTION, EMULSION INTRAVENOUS at 13:24

## 2017-08-26 RX ADMIN — POLYVINYL ALCOHOL SCH DROP: 14 SOLUTION/ DROPS OPHTHALMIC at 08:14

## 2017-08-26 RX ADMIN — PROPOFOL PRN MLS/HR: 10 INJECTION, EMULSION INTRAVENOUS at 16:39

## 2017-08-26 RX ADMIN — PROPOFOL PRN MLS/HR: 10 INJECTION, EMULSION INTRAVENOUS at 10:57

## 2017-08-26 RX ADMIN — PROPOFOL PRN MLS/HR: 10 INJECTION, EMULSION INTRAVENOUS at 23:11

## 2017-08-26 RX ADMIN — POLYVINYL ALCOHOL SCH DROP: 14 SOLUTION/ DROPS OPHTHALMIC at 13:03

## 2017-08-26 RX ADMIN — OXACILLIN SODIUM SCH MLS/HR: 2 INJECTION, POWDER, FOR SOLUTION INTRAMUSCULAR; INTRAVENOUS at 13:47

## 2017-08-26 RX ADMIN — FAMOTIDINE SCH MG: 10 INJECTION, SOLUTION INTRAVENOUS at 08:12

## 2017-08-26 RX ADMIN — HEPARIN SODIUM SCH UNITS: 10000 INJECTION, SOLUTION INTRAVENOUS; SUBCUTANEOUS at 08:12

## 2017-08-26 RX ADMIN — RIFAMPIN SCH MLS/HR: 600 INJECTION, POWDER, LYOPHILIZED, FOR SOLUTION INTRAVENOUS at 20:59

## 2017-08-26 RX ADMIN — Medication SCH MLS/HR: at 22:05

## 2017-08-26 RX ADMIN — RIFAMPIN SCH MLS/HR: 600 INJECTION, POWDER, LYOPHILIZED, FOR SOLUTION INTRAVENOUS at 10:39

## 2017-08-26 NOTE — EKG
Date Performed: 08/26/2017       Time Performed: 10:23:16

 

PTAGE:      37 years

 

EKG:      Probable supraventricular tachycardia Right bundle branch block Abnormal ECG

 

PREVIOUS TRACING       : 08/24/2017 19.04 Compared to the previous tracing tachycardia now present

 

DOCTOR:   Demetrius Dunn  Interpretating Date/Time  08/26/2017 15:17:31

## 2017-08-26 NOTE — HHI.CCPN
Subjective


Remarks/Hospital Course


Hospital Course:





37-year-old  female who presents initially as a stroke alert.  

According to EMS report the patient has a history of fungal endocarditis with 

previous IVDA.  EMS states that the patient is currently on hospice, however, 

they are unsure if the patient is a DNR.  EMS states the patient apparently 

complained of a headache earlier today, was found lying on the floor and 

incontinent of stool at approximate, last seen normal at 6:45 PM.  Therefore, 

EMS called a stroke alert in the field as the patient was aphasic and confused.

  Upon arrival the patient groans, moves all 4 extremities and withdraws all 4 

extremities, but is unable to provide any information.  She was intubated in 

the emergency department by ER attending for airway protection.





subjective:





8/22: remains encephalopathic. Blood cultures growing Staph in 4/4 bottles. 


8/23:  Remains sedated/ encephalopathic, orally intubated on mechanical 

ventilation.


8/24: still in shock on vasopressors. grossly volume overloaded and 

intravascularly volume overloaded based on echo and clinically. needs 

aggressive diuresis, but still in shock. family made patient DNR last night, 

but want to continue aggressive therapies. still encephalopathic. no 

significant improvements.





8/25: seen and examined at around 06:45am. delayed note entry. remains 

encephalopathic. no significant changes. unlikely to survive hospital stay, no 

additional overall therapies. family wants aggressive treatment. diuresing on 

bumex drip.    net 3.3L negative. still volume overloaded.





8/26: Tmax 102.8 This am patient went into SVT rhythm heart rate 190's.  

Cooling blanket placed, ice packs in place, metoprolol 5 mg IV push given, 

resolution heart rate mid 90s, MAP ranges 66-68.  Ofirmev ordered.  Bumex 

infusion discontinued secondary to elevation in creatinine.  Chest x-ray 

pending.





Objective





Vital Signs








  Date Time  Temp Pulse Resp B/P (MAP) Pulse Ox O2 Delivery O2 Flow Rate FiO2


 


8/26/17 11:01   19     


 


8/26/17 10:10     97   35


 


8/26/17 06:00  118      


 


8/26/17 04:00 102.7   109/70 (83)    





    93/66 (75)    














Intake and Output   


 


 8/26/17 8/26/17 8/26/17





 07:59 15:59 23:59


 


Intake Total 2849 ml 307 ml 


 


Output Total 2925 ml  


 


Balance -76 ml 307 ml 








Result Diagram:  


8/26/17 0317 8/26/17 0317





Imaging





Last Impressions








Chest X-Ray 8/22/17 0000 Signed





Impressions: 





 Service Date/Time:  Tuesday, August 22, 2017 11:56 - CONCLUSION:  Left 





 subclavian central line in good position without pneumothorax.     Scooter Dawson Jr., MD 


 


Head CT 8/21/17 0000 Signed





Impressions: 





 Service Date/Time:  Monday, August 21, 2017 19:47 - CONCLUSION:  No evidence 

of 





 acute infarct, hemorrhage, mass or edema.     Kristian Frankel MD 








Objective Remarks


GENERAL: This is obese ,critically ill middle-aged female, sedated and intubated


SKIN: Warm and dry.  Ecchymotic bruising noted on peripheral extremities


HEAD: Normocephalic.


EYES: No scleral icterus. No injection or drainage. 


NECK:  trachea midline. +JVD


CARDIOVASCULAR: tachycardic rate, regular rhythm.  


RESPIRATORY: Mechanical ventilation equal chest rise. PRVC, 35% fio2.  No 

accessory muscle use.


GASTROINTESTINAL: Abdomen soft, non-tender, nondistended.  no guarding.


MUSCULOSKELETAL: No cyanosis. 2+ peripheral edema.  Generalized anasarca.  

Multiple petechiae and ecchymotic bruising secondary to most likely septic 

emboli


NEURO EXAM: GCS 6T. moves all extremities spontaneously.  Withdraws to pain. 

does not open eyes to stimulation. does not follow commands. RASS -3.





A/P


Assessment and Plan


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 

meningitis probable bacterial secondary to IVDA, respiratory failure, multi-

organ failure.  Critically ill. Poor prognosis given her ongoing drug abuse. 

Palliative Care  following. still aggressive care for now, though DNR. 





Acute hypoxic and hypercarbic Respiratory failure


- no SBT today given persistent encephalopathy


- No weaning until neurologically and hemodynamically stable


- wean fio2 for spo2 > 90%


- hob at 30 degrees, vent bundle, nebs


-Obtain chest x-ray follow-up results


-ABG 7.44/35/128/23/0.2





Acute Encephalopathy


Acute meningitis, possible bacterial


- LP with elevated protein, and low glucose relative to serum glucose.


- CT head negative


-MRI-small acute and subacute cortical infarct of left parietal lobe


- Neuro checks per unit protocol


-Ofirmev 1 g every 6 hours when necessary temp greater than 101.0


-Apply cooling blanket for temp greater than 101.0





SVT


- Metoprolol 2.5 mg every 6 hours when necessary heart rate greater than 100bpm





History of fungal endocarditis


Staph Bacteremia


Tricuspid valve infective endocarditis


- History of IVDA


- Broad-spectrum antibiotic and antifungal


- Infectious disease following, Dr. Teague





Mixed Septic/cardiogenic Shock


Acute congestive heart failure secondary to valvulopathy


Tricuspid Regurgitation


- Discontinued Bumex drip 8/26


- Monitor CVP, vasopressor support if indicated





Acute Kidney Injury


-secondary to shock


- Strict I and O's


- molina catheter





Acute Protein calorie malnutrition- severe


- secondary to long-standing IVDA and endocarditis


- continue TF





DVT GI prophylaxis


- Teds SCDs


- Subcutaneous heparin


- Pepcid


Dispo:


Discussed with ICU RN at bedside.


 


This patient remains critically ill with one or more organ systems which are or 

may become a threat to life. I have spent in excess of 35 minutes 

discontinuously in the care and management of this patient. This time is 

exclusive of procedures, and includes, but is not limited to, evaluation of the 

patient, review of the medical record, discussions with family, consultants, 

nursing staff, or respiratory therapy, and documentation in the medical record.


Physician


Julia Lewis MD Aug 26, 2017 11:19

## 2017-08-26 NOTE — RADRPT
EXAM DATE/TIME:  08/26/2017 12:00 

 

[facility] COMPARISON:     

CHEST SINGLE AP, August 22, 2017, 11:56.

 

                     

INDICATIONS :     

Shortness of breath.

                     

 

MEDICAL HISTORY :            

Endocarditis, pneumonia, poly substance abuse. renal failure, respiratory   

 

SURGICAL HISTORY :     

CABG.   

 

ENCOUNTER:     

Subsequent                                        

 

ACUITY:     

2 months      

 

PAIN SCORE:     

Non-responsive.

 

LOCATION:     

Bilateral chest 

 

FINDINGS:     

Single AP portable upright view of the chest demonstrates endotracheal tube with the tip overlying th
e level of the clavicles, stable. Stable left-sided central line with the tip overlying the distal SV
C. NG tube in present in extending beyond the imaged portion of the film. The lungs are mildly hypoin
flated but clear. Heart size is normal. Mild cephalization of vasculature which may be secondary to p
ortable technique. All structures are grossly unremarkable.

 

 

CONCLUSION:     

Stable lines and tubes. The lungs are mildly hypoinflated but clear.

 

 

 

 Kourtney Almendarez MD on August 26, 2017 at 12:08           

Board Certified Radiologist.

 This report was verified electronically.

## 2017-08-26 NOTE — HHI.IDPN
Subjective


Subjective


Remarks


having high [id number] up to 103.6 intermittently


remains on vent


Opens eyes to voice


MSSA in CSF


MSSA and enterocci in blood clx


2D echo with TV vegertationand severely reduced EF


off pressors


good UOP


less secretions


Antibiotics


ampicillin 


oxacillin 


gent 


rifampin 


 fluconazole


Allergies:  


Coded Allergies:  


     No Known Allergies (Verified , 2/24/16)





Objective


.





Vital Signs








  Date Time  Temp Pulse Resp B/P (MAP) Pulse Ox O2 Delivery O2 Flow Rate FiO2


 


8/26/17 20:04     100   35


 


8/26/17 18:00  93      


 


8/26/17 17:00  93  80/53 (62) 100   


 


8/26/17 16:14     99   35


 


8/26/17 16:00 97.7       


 


8/26/17 16:00  85  98/64 (75) 99   





    87/59 (68)    


 


8/26/17 16:00  85      


 


8/26/17 16:00        35


 


8/26/17 15:00  84      


 


8/26/17 15:00  84      


 


8/26/17 14:00  93      


 


8/26/17 13:02     100   35


 


8/26/17 12:00        35


 


8/26/17 12:00 99.1 110 17 107/69 (82) 100   





    85/57 (66)    


 


8/26/17 12:00  110      


 


8/26/17 12:00  110  107/69 (82) 100   





    85/57 (66)    


 


8/26/17 11:01   19     


 


8/26/17 11:00 103.6 106  85/60 (68) 97   


 


8/26/17 10:10     97   35


 


8/26/17 10:00 103.8 198  81/64 (70) 98   


 


8/26/17 10:00  198      


 


8/26/17 09:00 103.3 125  101/71 (81) 95   


 


8/26/17 08:00        35


 


8/26/17 08:00 103.6       


 


8/26/17 08:00  123      


 


8/26/17 08:00 102.7 123  115/75 (88) 96   





    98/67 (77)    


 


8/26/17 07:29     96   35


 


8/26/17 06:00  118      


 


8/26/17 04:14     98   35


 


8/26/17 04:00 102.7 119  109/70 (83) 98   





    93/66 (75)    


 


8/26/17 04:00  119      


 


8/26/17 04:00        35


 


8/26/17 02:00  119      


 


8/26/17 00:00  115      


 


8/26/17 00:00        35


 


8/26/17 00:00 102.6 115 21 107/72 (84) 98   





    98/67 (77)    


 


8/25/17 23:51     98   35


 


8/25/17 22:00  114      














 8/26/17 8/26/17 8/27/17





 14:59 22:59 06:59


 


Intake Total 1057 ml 1660 ml 


 


Output Total  2150 ml 


 


Balance 1057 ml -490 ml 


 


   


 


IV Total 1057 ml 917 ml 


 


Tube Feeding  563 ml 


 


Tube Irrigant  180 ml 


 


Output Urine Total  1550 ml 


 


Stool Total  600 ml 








.





Laboratory Tests








Test


  8/25/17


06:00 8/26/17


03:17


 


White Blood Count 10.7 TH/MM3  12.3 TH/MM3 


 


Red Blood Count 4.10 MIL/MM3  4.23 MIL/MM3 


 


Hemoglobin 9.8 GM/DL  10.2 GM/DL 


 


Hematocrit 30.2 %  31.2 % 


 


Mean Corpuscular Volume 73.8 FL  73.8 FL 


 


Mean Corpuscular Hemoglobin 24.0 PG  24.1 PG 


 


Mean Corpuscular Hemoglobin


Concent 32.5 % 


  32.7 % 


 


 


Red Cell Distribution Width 16.0 %  16.3 % 


 


Platelet Count 183 TH/MM3  217 TH/MM3 


 


Mean Platelet Volume 8.5 FL  9.0 FL 








Laboratory Tests








Test


  8/25/17


06:00 8/25/17


10:57 8/25/17


18:14 8/25/17


23:19


 


Blood Urea Nitrogen 22 MG/DL  23 MG/DL  25 MG/DL  29 MG/DL 


 


Creatinine 1.35 MG/DL  1.37 MG/DL  1.41 MG/DL  1.46 MG/DL 


 


Random Glucose 106 MG/DL  119 MG/DL  107 MG/DL  133 MG/DL 


 


Calcium Level 8.0 MG/DL  8.2 MG/DL  8.1 MG/DL  8.0 MG/DL 


 


Magnesium Level 1.8 MG/DL  2.1 MG/DL  2.2 MG/DL  2.1 MG/DL 


 


Sodium Level 142 MEQ/L  143 MEQ/L  141 MEQ/L  141 MEQ/L 


 


Potassium Level 3.0 MEQ/L  3.7 MEQ/L  3.2 MEQ/L  3.3 MEQ/L 


 


Chloride Level 106 MEQ/L  105 MEQ/L  104 MEQ/L  105 MEQ/L 


 


Carbon Dioxide Level 24.3 MEQ/L  25.6 MEQ/L  24.9 MEQ/L  25.3 MEQ/L 


 


Anion Gap 12 MEQ/L  12 MEQ/L  12 MEQ/L  11 MEQ/L 


 


Estimat Glomerular Filtration


Rate 45 ML/MIN 


  44 ML/MIN 


  42 ML/MIN 


  41 ML/MIN 


 


 


Test


  8/26/17


03:17 


  


  


 


 


Blood Urea Nitrogen 33 MG/DL    


 


Creatinine 1.58 MG/DL    


 


Random Glucose 141 MG/DL    


 


Calcium Level 8.2 MG/DL    


 


Magnesium Level 2.1 MG/DL    


 


Sodium Level 143 MEQ/L    


 


Potassium Level 3.4 MEQ/L    


 


Chloride Level 104 MEQ/L    


 


Carbon Dioxide Level 26.4 MEQ/L    


 


Anion Gap 13 MEQ/L    


 


Estimat Glomerular Filtration


Rate 37 ML/MIN 


  


  


  


 








Microbiology








 Date/Time


Source Procedure


Growth Status


 


 


 8/24/17 09:38


Blood Peripheral Aerobic Blood Culture - Preliminary


NO GROWTH IN 2 DAYS Resulted


 


 8/24/17 09:38


Blood Peripheral Anaerobic Blood Culture - Preliminary


NO GROWTH IN 2 DAYS Resulted





 8/24/17 09:33


Blood Peripheral Aerobic Blood Culture - Preliminary


NO GROWTH IN 2 DAYS Resulted


 


 8/24/17 09:33


Blood Peripheral Anaerobic Blood Culture - Preliminary


NO GROWTH IN 2 DAYS Resulted





 8/24/17 21:15


Sputum Endotracheal Gram Stain - Final Resulted


 


 8/24/17 21:15


Sputum Endotracheal Sputum Culture - Preliminary


NO GROWTH IN 24 HOURS. Resulted








Imaging





Last Impressions








Chest X-Ray 8/26/17 0000 Signed





Impressions: 





 Service Date/Time:  Saturday, August 26, 2017 12:00 - CONCLUSION:  Stable 

lines 





 and tubes. The lungs are mildly hypoinflated but clear.     Kourtney Almendarez MD 


 


Brain MRI 8/25/17 0000 Signed





Impressions: 





 Service Date/Time:  Friday, August 25, 2017 17:07 - CONCLUSION:  1. Small 

acute 





 or subacute cortical infarct of the left parietal lobe. 2. Small, faint area 

of 





 nonspecific flair signal abnormality in the left frontal lobe periventricular 





 white matter, nonspecific. No associated mass effect or abnormal enhancement. 

3 





 month followup MRI of the brain recommended. 3.  Mild chronic white matter 





 changes.     Ron Swan MD 


 


Head CT 8/21/17 0000 Signed





Impressions: 





 Service Date/Time:  Monday, August 21, 2017 19:47 - CONCLUSION:  No evidence 

of 





 acute infarct, hemorrhage, mass or edema.     Kristian Frankel MD 








Physical Exam


CONSTITUTIONAL/GENERAL: This is an adequately nourished patient, in no apparent 

distress. Sedated, int'd on mech vent


TUBES/LINES/DRAINS:


SKIN: No jaundice, rashes, or lesions.   Skin temperature appropriate. Not 

diaphoretic. 


 


multiple embolic  lesions cw septic embolization involving L lower leg and  

both feet - evolving


HEAD: Atraumatic. Normocephalic.


EYES: Pupils small  non reactive  equal and round  .   No scleral icterus. No 

injection or drainage. Fundi not examined.


 CARDIOVASCULAR: Regular rate and rhythm without murmurs, gallops, or rubs. 

Mechanical heart sounds No JVD. Peripheral pulses symmetric.


RESPIRATORY/CHEST: Symmetric, unlabored respirations. Scattered rhonchi to 

auscultation. Breath sounds equal bilaterally.  


GASTROINTESTINAL: Abdomen soft, non-tender,  moderately distended. No hepato-

splenomegaly, or palpable masses. No guarding. Bowel sounds present, hypoactive 


GENITOURINARY: Without palpable bladder distension. Stokes catheter in place 

with clear yellow urine


MUSCULOSKELETAL: Extremities without clubbing, 


4+ edema. Anasarca


LYMPHATICS: No palpable cervical or supraclavicular adenopathy.


NEUROLOGICAL  She opens eyes to voice and make s eye contact


not following commands


 PSYCHIATRIC: unable to assess








Assessment & Plan


Remarks


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 

fecalis S amp/gent 


   - pt was previously infected with above organisms


Previousy multiple episodes of  PVE


   dw Dr Keenan hospitalised in April 2017 for tricuspid valve PVE  - [place] 

parapsolosis (March 15 thru April 15) , Streptoccii


   pt was Rx with combination diflucan 800 + micafungin 150, and was also on 

ambisionme at some point x 2 weeks and did not clear 


Confiormed  bacterial meningitis  2/2 MSSA - embolic ethiology


Acute VDRF


NOÉ


Elevated troponine ? cardiac injury from infx


Critically ill , unstable


Poor prognosis


Not a surgical candidate 


? PNA, clx negative


New fever 





   dc  fluconazole


   cont  oxacillin + gentamin +RIfampin


   cont  ampicillin for Enterococci


   fu repeat blood clx  


   monitor closely creatinint and LFTs (2-3x/week)











Catalina Teague MD Aug 26, 2017 21:05

## 2017-08-27 VITALS — OXYGEN SATURATION: 96 % | HEART RATE: 106 BPM | SYSTOLIC BLOOD PRESSURE: 85 MMHG | DIASTOLIC BLOOD PRESSURE: 55 MMHG

## 2017-08-27 VITALS
HEART RATE: 89 BPM | SYSTOLIC BLOOD PRESSURE: 84 MMHG | TEMPERATURE: 98.5 F | OXYGEN SATURATION: 99 % | DIASTOLIC BLOOD PRESSURE: 65 MMHG

## 2017-08-27 VITALS
DIASTOLIC BLOOD PRESSURE: 71 MMHG | OXYGEN SATURATION: 100 % | HEART RATE: 99 BPM | RESPIRATION RATE: 24 BRPM | SYSTOLIC BLOOD PRESSURE: 108 MMHG | TEMPERATURE: 99.7 F

## 2017-08-27 VITALS
HEART RATE: 103 BPM | TEMPERATURE: 98.9 F | RESPIRATION RATE: 24 BRPM | DIASTOLIC BLOOD PRESSURE: 75 MMHG | OXYGEN SATURATION: 100 % | SYSTOLIC BLOOD PRESSURE: 130 MMHG

## 2017-08-27 VITALS — SYSTOLIC BLOOD PRESSURE: 88 MMHG | HEART RATE: 90 BPM | DIASTOLIC BLOOD PRESSURE: 56 MMHG | OXYGEN SATURATION: 100 %

## 2017-08-27 VITALS — DIASTOLIC BLOOD PRESSURE: 63 MMHG | SYSTOLIC BLOOD PRESSURE: 89 MMHG | HEART RATE: 102 BPM | OXYGEN SATURATION: 96 %

## 2017-08-27 VITALS — DIASTOLIC BLOOD PRESSURE: 84 MMHG | HEART RATE: 112 BPM | SYSTOLIC BLOOD PRESSURE: 90 MMHG | OXYGEN SATURATION: 100 %

## 2017-08-27 VITALS
RESPIRATION RATE: 22 BRPM | DIASTOLIC BLOOD PRESSURE: 60 MMHG | SYSTOLIC BLOOD PRESSURE: 108 MMHG | HEART RATE: 97 BPM | TEMPERATURE: 100.3 F | OXYGEN SATURATION: 97 %

## 2017-08-27 VITALS
TEMPERATURE: 99.4 F | OXYGEN SATURATION: 100 % | DIASTOLIC BLOOD PRESSURE: 69 MMHG | HEART RATE: 106 BPM | SYSTOLIC BLOOD PRESSURE: 100 MMHG

## 2017-08-27 VITALS
HEART RATE: 106 BPM | SYSTOLIC BLOOD PRESSURE: 118 MMHG | TEMPERATURE: 100.7 F | OXYGEN SATURATION: 95 % | DIASTOLIC BLOOD PRESSURE: 67 MMHG

## 2017-08-27 VITALS — OXYGEN SATURATION: 94 % | DIASTOLIC BLOOD PRESSURE: 61 MMHG | HEART RATE: 106 BPM | SYSTOLIC BLOOD PRESSURE: 88 MMHG

## 2017-08-27 VITALS — OXYGEN SATURATION: 100 %

## 2017-08-27 VITALS — DIASTOLIC BLOOD PRESSURE: 88 MMHG | HEART RATE: 106 BPM | SYSTOLIC BLOOD PRESSURE: 90 MMHG

## 2017-08-27 VITALS — SYSTOLIC BLOOD PRESSURE: 87 MMHG | HEART RATE: 106 BPM | OXYGEN SATURATION: 100 % | DIASTOLIC BLOOD PRESSURE: 77 MMHG

## 2017-08-27 VITALS — OXYGEN SATURATION: 95 % | HEART RATE: 107 BPM | SYSTOLIC BLOOD PRESSURE: 114 MMHG | DIASTOLIC BLOOD PRESSURE: 66 MMHG

## 2017-08-27 VITALS — OXYGEN SATURATION: 99 %

## 2017-08-27 VITALS — OXYGEN SATURATION: 97 %

## 2017-08-27 VITALS — SYSTOLIC BLOOD PRESSURE: 82 MMHG | OXYGEN SATURATION: 100 % | DIASTOLIC BLOOD PRESSURE: 77 MMHG | HEART RATE: 109 BPM

## 2017-08-27 VITALS — HEART RATE: 109 BPM

## 2017-08-27 VITALS — HEART RATE: 104 BPM

## 2017-08-27 VITALS — HEART RATE: 100 BPM

## 2017-08-27 LAB
ALP SERPL-CCNC: 77 U/L (ref 45–117)
ALT SERPL-CCNC: 11 U/L (ref 10–53)
ANION GAP SERPL CALC-SCNC: 11 MEQ/L (ref 5–15)
AST SERPL-CCNC: 18 U/L (ref 15–37)
BASE EXCESS BLD CALC-SCNC: -1.3 MMOL/L (ref -2–2)
BENZODIAZEPINES PNL UR: 95 % (ref 90–100)
BILIRUB INDIRECT SERPL-MCNC: 0.2 MG/DL (ref 0–0.8)
BILIRUB SERPL-MCNC: 0.5 MG/DL (ref 0.2–1)
BLOOD GAS CARBOXYHEMOGLOBIN: 1.7 % (ref 0–4)
BLOOD GAS HCO3: 22 MMOL/L (ref 22–26)
BLOOD GAS OXYGEN CONTENT: 11.5 VOL % (ref 12–20)
BLOOD GAS PCO2: 33 MMHG (ref 38–42)
BUN SERPL-MCNC: 38 MG/DL (ref 7–18)
CHLORIDE SERPL-SCNC: 110 MEQ/L (ref 98–107)
CRITICAL VALUE: NO
DRAW SITE: (no result)
ERYTHROCYTE [DISTWIDTH] IN BLOOD BY AUTOMATED COUNT: 16.7 % (ref 11.6–17.2)
GFR SERPLBLD BASED ON 1.73 SQ M-ARVRAT: 44 ML/MIN (ref 89–?)
HCO3 BLD-SCNC: 25.1 MEQ/L (ref 21–32)
HCT VFR BLD CALC: 28.9 % (ref 35–46)
MAGNESIUM SERPL-MCNC: 2.4 MG/DL (ref 1.5–2.5)
MCH RBC QN AUTO: 23.7 PG (ref 27–34)
MCHC RBC AUTO-ENTMCNC: 31.7 % (ref 32–36)
MCV RBC AUTO: 74.8 FL (ref 80–100)
METHGB MFR BLDA: 1 % (ref 0–2)
O2/TOTAL GAS SETTING VFR VENT: 35 %
OXYGEN DEVICE: (no result)
PLATELET # BLD: 266 TH/MM3 (ref 150–450)
PO2 BLD: 94 MMHG (ref 61–120)
POTASSIUM SERPL-SCNC: 3.1 MEQ/L (ref 3.5–5.1)
RBC # BLD AUTO: 3.86 MIL/MM3 (ref 4–5.3)
REVIEW FLAG: (no result)
SALICYLATES SERPL-MCNC: 8.5 G/DL (ref 12–16)
SODIUM SERPL-SCNC: 146 MEQ/L (ref 136–145)
STAT: NO
TEMP CORR TO: 98.6
ULNAR PULSE: PRESENT
VENT SETTINGS: (no result)
WBC # BLD AUTO: 10.7 TH/MM3 (ref 4–11)

## 2017-08-27 RX ADMIN — POTASSIUM CHLORIDE PRN MLS/HR: 400 INJECTION, SOLUTION INTRAVENOUS at 06:48

## 2017-08-27 RX ADMIN — HUMAN INSULIN SCH: 100 INJECTION, SOLUTION SUBCUTANEOUS at 06:00

## 2017-08-27 RX ADMIN — OXACILLIN SODIUM SCH MLS/HR: 2 INJECTION, POWDER, FOR SOLUTION INTRAMUSCULAR; INTRAVENOUS at 13:42

## 2017-08-27 RX ADMIN — ACETAMINOPHEN PRN MG: 10 INJECTION, SOLUTION INTRAVENOUS at 23:22

## 2017-08-27 RX ADMIN — STANDARDIZED SENNA CONCENTRATE AND DOCUSATE SODIUM SCH TAB: 8.6; 5 TABLET, FILM COATED ORAL at 22:02

## 2017-08-27 RX ADMIN — Medication SCH ML: at 22:01

## 2017-08-27 RX ADMIN — OXACILLIN SODIUM SCH MLS/HR: 2 INJECTION, POWDER, FOR SOLUTION INTRAMUSCULAR; INTRAVENOUS at 22:01

## 2017-08-27 RX ADMIN — OXACILLIN SODIUM SCH MLS/HR: 2 INJECTION, POWDER, FOR SOLUTION INTRAMUSCULAR; INTRAVENOUS at 16:59

## 2017-08-27 RX ADMIN — PROPOFOL PRN MLS/HR: 10 INJECTION, EMULSION INTRAVENOUS at 10:10

## 2017-08-27 RX ADMIN — AMPICILLIN SODIUM SCH MLS/HR: 2 INJECTION, POWDER, FOR SOLUTION INTRAMUSCULAR; INTRAVENOUS at 16:59

## 2017-08-27 RX ADMIN — FAMOTIDINE SCH MG: 10 INJECTION, SOLUTION INTRAVENOUS at 09:28

## 2017-08-27 RX ADMIN — HEPARIN SODIUM SCH UNITS: 10000 INJECTION, SOLUTION INTRAVENOUS; SUBCUTANEOUS at 22:02

## 2017-08-27 RX ADMIN — FAMOTIDINE SCH MG: 10 INJECTION, SOLUTION INTRAVENOUS at 22:02

## 2017-08-27 RX ADMIN — Medication SCH MLS/HR: at 06:42

## 2017-08-27 RX ADMIN — Medication SCH ML: at 09:29

## 2017-08-27 RX ADMIN — HEPARIN SODIUM SCH UNITS: 10000 INJECTION, SOLUTION INTRAVENOUS; SUBCUTANEOUS at 09:29

## 2017-08-27 RX ADMIN — PROPOFOL PRN MLS/HR: 10 INJECTION, EMULSION INTRAVENOUS at 04:15

## 2017-08-27 RX ADMIN — Medication SCH MLS/HR: at 16:41

## 2017-08-27 RX ADMIN — AMPICILLIN SODIUM SCH MLS/HR: 2 INJECTION, POWDER, FOR SOLUTION INTRAMUSCULAR; INTRAVENOUS at 22:01

## 2017-08-27 RX ADMIN — POLYVINYL ALCOHOL SCH DROP: 14 SOLUTION/ DROPS OPHTHALMIC at 09:00

## 2017-08-27 RX ADMIN — POLYVINYL ALCOHOL SCH DROP: 14 SOLUTION/ DROPS OPHTHALMIC at 17:00

## 2017-08-27 RX ADMIN — MIDAZOLAM HYDROCHLORIDE PRN MLS/HR: 5 INJECTION, SOLUTION INTRAMUSCULAR; INTRAVENOUS at 18:02

## 2017-08-27 RX ADMIN — CHLORHEXIDINE GLUCONATE SCH PACK: 500 CLOTH TOPICAL at 00:38

## 2017-08-27 RX ADMIN — STANDARDIZED SENNA CONCENTRATE AND DOCUSATE SODIUM SCH TAB: 8.6; 5 TABLET, FILM COATED ORAL at 09:28

## 2017-08-27 RX ADMIN — HUMAN INSULIN SCH: 100 INJECTION, SOLUTION SUBCUTANEOUS at 12:00

## 2017-08-27 RX ADMIN — OXACILLIN SODIUM SCH MLS/HR: 2 INJECTION, POWDER, FOR SOLUTION INTRAMUSCULAR; INTRAVENOUS at 09:29

## 2017-08-27 RX ADMIN — RIFAMPIN SCH MLS/HR: 600 INJECTION, POWDER, LYOPHILIZED, FOR SOLUTION INTRAVENOUS at 09:32

## 2017-08-27 RX ADMIN — HUMAN INSULIN SCH: 100 INJECTION, SOLUTION SUBCUTANEOUS at 18:00

## 2017-08-27 RX ADMIN — MIDAZOLAM HYDROCHLORIDE PRN MLS/HR: 5 INJECTION, SOLUTION INTRAMUSCULAR; INTRAVENOUS at 09:28

## 2017-08-27 RX ADMIN — AMPICILLIN SODIUM SCH MLS/HR: 2 INJECTION, POWDER, FOR SOLUTION INTRAMUSCULAR; INTRAVENOUS at 13:43

## 2017-08-27 RX ADMIN — RIFAMPIN SCH MLS/HR: 600 INJECTION, POWDER, LYOPHILIZED, FOR SOLUTION INTRAVENOUS at 22:01

## 2017-08-27 RX ADMIN — AMPICILLIN SODIUM SCH MLS/HR: 2 INJECTION, POWDER, FOR SOLUTION INTRAMUSCULAR; INTRAVENOUS at 04:15

## 2017-08-27 RX ADMIN — PROPOFOL PRN MLS/HR: 10 INJECTION, EMULSION INTRAVENOUS at 09:28

## 2017-08-27 RX ADMIN — OXACILLIN SODIUM SCH MLS/HR: 2 INJECTION, POWDER, FOR SOLUTION INTRAMUSCULAR; INTRAVENOUS at 00:38

## 2017-08-27 RX ADMIN — AMPICILLIN SODIUM SCH MLS/HR: 2 INJECTION, POWDER, FOR SOLUTION INTRAMUSCULAR; INTRAVENOUS at 09:32

## 2017-08-27 RX ADMIN — GENTAMICIN SULFATE SCH MLS/HR: 0.8 INJECTION, SOLUTION INTRAVENOUS at 12:44

## 2017-08-27 RX ADMIN — POLYVINYL ALCOHOL SCH DROP: 14 SOLUTION/ DROPS OPHTHALMIC at 12:44

## 2017-08-27 RX ADMIN — OXACILLIN SODIUM SCH MLS/HR: 2 INJECTION, POWDER, FOR SOLUTION INTRAMUSCULAR; INTRAVENOUS at 04:15

## 2017-08-27 RX ADMIN — BUMETANIDE SCH MG: 0.25 INJECTION, SOLUTION INTRAMUSCULAR; INTRAVENOUS at 09:28

## 2017-08-27 NOTE — HHI.CCPN
Subjective


Remarks/Hospital Course


Hospital Course:





37-year-old  female who presents initially as a stroke alert.  

According to EMS report the patient has a history of fungal endocarditis with 

previous IVDA.  EMS states that the patient is currently on hospice, however, 

they are unsure if the patient is a DNR.  EMS states the patient apparently 

complained of a headache earlier today, was found lying on the floor and 

incontinent of stool at approximate, last seen normal at 6:45 PM.  Therefore, 

EMS called a stroke alert in the field as the patient was aphasic and confused.

  Upon arrival the patient groans, moves all 4 extremities and withdraws all 4 

extremities, but is unable to provide any information.  She was intubated in 

the emergency department by ER attending for airway protection.





subjective:





8/22: remains encephalopathic. Blood cultures growing Staph in 4/4 bottles. 


8/23:  Remains sedated/ encephalopathic, orally intubated on mechanical 

ventilation.


8/24: still in shock on vasopressors. grossly volume overloaded and 

intravascularly volume overloaded based on echo and clinically. needs 

aggressive diuresis, but still in shock. family made patient DNR last night, 

but want to continue aggressive therapies. still encephalopathic. no 

significant improvements.





8/25: seen and examined at around 06:45am. delayed note entry. remains 

encephalopathic. no significant changes. unlikely to survive hospital stay, no 

additional overall therapies. family wants aggressive treatment. diuresing on 

bumex drip.    net 3.3L negative. still volume overloaded.





8/26: Tmax 102.8 This am patient went into SVT rhythm heart rate 190's.  

Cooling blanket placed, ice packs in place, metoprolol 5 mg IV push given, 

resolution heart rate mid 90s, MAP ranges 66-68.  Ofirmev ordered.  Bumex 

infusion discontinued secondary to elevation in creatinine.  Chest x-ray 

pending.





8/27: Continues to be febrile, continues on cooling blanket.  Propofol was 

discontinued secondary to hypotension.  Today the patient continues on Versed 

and fentanyl infusions with the RASS -2.  Patient currently GCS of 11 T, 

responsive.  The patient tolerated CPAP trials for approximately 2.5 hours for 

the last 2 days.





Objective





Vital Signs








  Date Time  Temp Pulse Resp B/P (MAP) Pulse Ox O2 Delivery O2 Flow Rate FiO2


 


8/27/17 16:12     100   35


 


8/27/17 16:00  103      


 


8/27/17 16:00 98.9  24 130/82 (98)    





    115/75 (88)    














Intake and Output   


 


 8/27/17 8/27/17 8/28/17





 08:00 16:00 00:00


 


Intake Total 1470 ml 500 ml 957 ml


 


Output Total 1745 ml  1750 ml


 


Balance -275 ml 500 ml -793 ml








Result Diagram:  


8/27/17 0341                                                                   

             8/27/17 1213





Other Results





Microbiology








 Date/Time


Source Procedure


Growth Status


 


 


 8/24/17 21:15


Sputum Endotracheal Gram Stain - Final Complete


 


 8/24/17 21:15


Sputum Endotracheal Sputum Culture - Final


RARE GROWTH NORMAL RESPIRATORY MYLENE Complete








Laboratory Tests








Test


  8/27/17


04:58


 


Blood Gas Puncture Site DANNY 


 


Blood Gas Patient Temperature 98.6 


 


Blood Gas HCO3


  22 mmol/L


(22-26)


 


Blood Gas Base Excess


  -1.3 mmol/L


(-2-2)


 


Blood Gas Oxygen Saturation 95 % () 


 


Arterial Blood pH


  7.44


(7.380-7.420)


 


Arterial Blood Partial


Pressure CO2 33 mmHg


(38-42)


 


Arterial Blood Partial


Pressure O2 94 mmHg


()


 


Arterial Blood Oxygen Content


  11.5 Vol %


(12.0-20.0)


 


Arterial Blood


Carboxyhemoglobin 1.7 % (0-4) 


 


 


Arterial Blood Methemoglobin 1.0 % (0-2) 


 


Blood Gas Hemoglobin


  8.5 G/DL


(12.0-16.0)


 


Oxygen Delivery Device VENTILATOR 


 


Blood Gas Ventilator Setting


  PRVC14/550/1.0/+5


 


 


Blood Gas Inspired Oxygen 35 % 








Imaging





Last Impressions








Chest X-Ray 8/27/17 0600 Signed





Impressions: 





 Service Date/Time:  Sunday, August 27, 2017 04:06 - CONCLUSION:  Stable 





 appearance of the chest with lungs grossly clear     Won Sharp MD 


 


Brain MRI 8/25/17 0000 Signed





Impressions: 





 Service Date/Time:  Friday, August 25, 2017 17:07 - CONCLUSION:  1. Small 

acute 





 or subacute cortical infarct of the left parietal lobe. 2. Small, faint area 

of 





 nonspecific flair signal abnormality in the left frontal lobe periventricular 





 white matter, nonspecific. No associated mass effect or abnormal enhancement. 

3 





 month followup MRI of the brain recommended. 3.  Mild chronic white matter 





 changes.     Ron Swan MD 


 


Head CT 8/21/17 0000 Signed





Impressions: 





 Service Date/Time:  Monday, August 21, 2017 19:47 - CONCLUSION:  No evidence 

of 





 acute infarct, hemorrhage, mass or edema.     Kristian Frankel MD 








Last Impressions








Chest X-Ray 8/22/17 0000 Signed





Impressions: 





 Service Date/Time:  Tuesday, August 22, 2017 11:56 - CONCLUSION:  Left 





 subclavian central line in good position without pneumothorax.     Scooter Dawson Jr., MD 


 


Head CT 8/21/17 0000 Signed





Impressions: 





 Service Date/Time:  Monday, August 21, 2017 19:47 - CONCLUSION:  No evidence 

of 





 acute infarct, hemorrhage, mass or edema.     Kristian Frankel MD 








Objective Remarks


GENERAL: This is obese ,critically ill middle-aged female, intubated but 

responsive to commands nodding head to yes and no questions


SKIN: Warm and dry.  Ecchymotic bruising noted on peripheral extremities


HEAD: Normocephalic.


EYES: No scleral icterus. No injection or drainage. 


NECK:  trachea midline.  No JVD


CARDIOVASCULAR: tachycardic rate, regular rhythm.  


RESPIRATORY: Mechanical ventilation equal chest rise. PRVC, 35% fio2.  No 

accessory muscle use.


GASTROINTESTINAL: Abdomen soft, non-tender, nondistended.  no guarding.


MUSCULOSKELETAL: No cyanosis. 2+ peripheral edema.  Generalized anasarca.  

Multiple petechiae and ecchymotic bruising secondary to most likely septic 

emboli


NEURO EXAM: GCS 11T. Follows commands, moves extremities 4.  Motor strength 5/

5 bilateral upper and lower extremity.  Cranial nerves II-XII grossly intact.





A/P


Assessment and Plan


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 

meningitis probable bacterial secondary to IVDA, respiratory failure, multi-

organ failure.  Critically ill. Poor prognosis given her ongoing drug abuse. 

Palliative Care  following. still aggressive care for now, though DNR. 





Acute hypoxic and hypercarbic Respiratory failure


- no SBT today given persistent encephalopathy


- No weaning until neurologically and hemodynamically stable


- wean fio2 for spo2 > 90%


- hob at 30 degrees, vent bundle, nebs


- Continue CPAP trials as clinically tolerated





Acute Encephalopathy


Acute meningitis, possible bacterial


- LP with elevated protein, and low glucose relative to serum glucose.


- CT head negative


-MRI-small acute and subacute cortical infarct of left parietal lobe


- Neuro checks per unit protocol


-Ofirmev 1 g every 6 hours when necessary temp greater than 101.0  x 2 days


-Apply cooling blanket for temp greater than 101.0


-GCS 11 T-now following commands





SVT


- Metoprolol 2.5 mg every 6 hours when necessary heart rate greater than 100 bpm





History of fungal endocarditis


Staph Bacteremia


Tricuspid valve infective endocarditis


- History of IVDA


- Broad-spectrum antibiotic and antifungal


- Infectious disease following, Dr. Teague





Mixed Septic/cardiogenic Shock


Acute congestive heart failure secondary to valvulopathy


Tricuspid Regurgitation


- Discontinued Bumex drip 8/26


- Monitor CVP, vasopressor support if indicated





Acute Kidney Injury


-secondary to shock


- Strict I and O's


- molina catheter





Acute Protein calorie malnutrition- severe


- secondary to long-standing IVDA and endocarditis


- continue TF Jevity 1.5 currently at goal 55 cc/hour-minimal  residuals





DVT GI prophylaxis


- Teds SCDs


- Subcutaneous heparin


- Pepcid


Dispo:


Discussed with ICU RN at bedside.


 


This patient remains critically ill with one or more organ systems which are or 

may become a threat to life. I have spent in excess of 30 minutes 

discontinuously in the care and management of this patient. This time is 

exclusive of procedures, and includes, but is not limited to, evaluation of the 

patient, review of the medical record, discussions with family, consultants, 

nursing staff, or respiratory therapy, and documentation in the medical record.


Physician


Julia Lewis MD Aug 27, 2017 18:16

## 2017-08-27 NOTE — RADRPT
EXAM DATE/TIME:  08/27/2017 04:06 

 

[facility] COMPARISON:     

CHEST SINGLE AP, August 26, 2017, 12:00.

 

                     

INDICATIONS :     

Shortness of breath, possible pulmonary disease.

                     

 

MEDICAL HISTORY :            

Endocarditis, pneumonia, poly substance abuse. renal failure, respiratory   

 

SURGICAL HISTORY :     

CABG.   

 

ENCOUNTER:     

Subsequent                                        

 

ACUITY:     

2 months      

 

PAIN SCORE:     

Non-responsive.

 

LOCATION:     

Bilateral chest 

 

FINDINGS:     

A single view of the chest demonstrates the lungs to be symmetrically aerated without evidence of mas
s, infiltrate or effusion. The endotracheal tube, nasogastric tube and left subclavian central line a
re all in good position.  The cardiac silhouette is dilated.  The cardiomediastinal contours are unre
markable.  Osseous structures are intact.

 

CONCLUSION:     

Stable appearance of the chest with lungs grossly clear

 

 

 

 Won Sharp MD on August 27, 2017 at 5:42           

Board Certified Radiologist.

 This report was verified electronically.

## 2017-08-28 VITALS
HEART RATE: 120 BPM | RESPIRATION RATE: 24 BRPM | OXYGEN SATURATION: 100 % | TEMPERATURE: 101.4 F | SYSTOLIC BLOOD PRESSURE: 112 MMHG | DIASTOLIC BLOOD PRESSURE: 81 MMHG

## 2017-08-28 VITALS
OXYGEN SATURATION: 100 % | DIASTOLIC BLOOD PRESSURE: 76 MMHG | RESPIRATION RATE: 38 BRPM | SYSTOLIC BLOOD PRESSURE: 106 MMHG | TEMPERATURE: 102.2 F | HEART RATE: 117 BPM

## 2017-08-28 VITALS — HEART RATE: 90 BPM | DIASTOLIC BLOOD PRESSURE: 66 MMHG | SYSTOLIC BLOOD PRESSURE: 84 MMHG | OXYGEN SATURATION: 97 %

## 2017-08-28 VITALS
OXYGEN SATURATION: 99 % | SYSTOLIC BLOOD PRESSURE: 84 MMHG | TEMPERATURE: 98.6 F | DIASTOLIC BLOOD PRESSURE: 67 MMHG | HEART RATE: 93 BPM

## 2017-08-28 VITALS — SYSTOLIC BLOOD PRESSURE: 92 MMHG | HEART RATE: 96 BPM | OXYGEN SATURATION: 97 % | DIASTOLIC BLOOD PRESSURE: 65 MMHG

## 2017-08-28 VITALS — OXYGEN SATURATION: 100 % | SYSTOLIC BLOOD PRESSURE: 115 MMHG | HEART RATE: 96 BPM | DIASTOLIC BLOOD PRESSURE: 73 MMHG

## 2017-08-28 VITALS — HEART RATE: 127 BPM

## 2017-08-28 VITALS — DIASTOLIC BLOOD PRESSURE: 77 MMHG | SYSTOLIC BLOOD PRESSURE: 116 MMHG | HEART RATE: 92 BPM | OXYGEN SATURATION: 100 %

## 2017-08-28 VITALS
DIASTOLIC BLOOD PRESSURE: 68 MMHG | HEART RATE: 87 BPM | OXYGEN SATURATION: 98 % | TEMPERATURE: 100.9 F | SYSTOLIC BLOOD PRESSURE: 91 MMHG | RESPIRATION RATE: 16 BRPM

## 2017-08-28 VITALS
TEMPERATURE: 98 F | OXYGEN SATURATION: 98 % | SYSTOLIC BLOOD PRESSURE: 87 MMHG | DIASTOLIC BLOOD PRESSURE: 58 MMHG | RESPIRATION RATE: 19 BRPM | HEART RATE: 71 BPM

## 2017-08-28 VITALS — OXYGEN SATURATION: 100 %

## 2017-08-28 VITALS
OXYGEN SATURATION: 97 % | SYSTOLIC BLOOD PRESSURE: 107 MMHG | HEART RATE: 106 BPM | TEMPERATURE: 100.2 F | DIASTOLIC BLOOD PRESSURE: 64 MMHG

## 2017-08-28 VITALS — OXYGEN SATURATION: 97 %

## 2017-08-28 VITALS — HEART RATE: 76 BPM

## 2017-08-28 VITALS — OXYGEN SATURATION: 99 %

## 2017-08-28 VITALS — HEART RATE: 87 BPM

## 2017-08-28 VITALS — HEART RATE: 94 BPM

## 2017-08-28 VITALS — OXYGEN SATURATION: 96 % | HEART RATE: 96 BPM | DIASTOLIC BLOOD PRESSURE: 62 MMHG | SYSTOLIC BLOOD PRESSURE: 74 MMHG

## 2017-08-28 VITALS — OXYGEN SATURATION: 98 %

## 2017-08-28 LAB
ANION GAP SERPL CALC-SCNC: 7 MEQ/L (ref 5–15)
BASE EXCESS BLD CALC-SCNC: -1.6 MMOL/L (ref -2–2)
BENZODIAZEPINES PNL UR: 97 % (ref 90–100)
BLOOD GAS CARBOXYHEMOGLOBIN: 1.5 % (ref 0–4)
BLOOD GAS HCO3: 23 MMOL/L (ref 22–26)
BLOOD GAS OXYGEN CONTENT: 14.4 VOL % (ref 12–20)
BLOOD GAS PCO2: 38 MMHG (ref 38–42)
BUN SERPL-MCNC: 33 MG/DL (ref 7–18)
CHLORIDE SERPL-SCNC: 117 MEQ/L (ref 98–107)
CRITICAL VALUE: NO
DRAW SITE: (no result)
ERYTHROCYTE [DISTWIDTH] IN BLOOD BY AUTOMATED COUNT: 16.5 % (ref 11.6–17.2)
GFR SERPLBLD BASED ON 1.73 SQ M-ARVRAT: 49 ML/MIN (ref 89–?)
HCO3 BLD-SCNC: 26.6 MEQ/L (ref 21–32)
HCT VFR BLD CALC: 26.8 % (ref 35–46)
MAGNESIUM SERPL-MCNC: 2.6 MG/DL (ref 1.5–2.5)
MCH RBC QN AUTO: 24.2 PG (ref 27–34)
MCHC RBC AUTO-ENTMCNC: 31.9 % (ref 32–36)
MCV RBC AUTO: 75.7 FL (ref 80–100)
METHGB MFR BLDA: 0.9 % (ref 0–2)
O2/TOTAL GAS SETTING VFR VENT: 35 %
OXYGEN DEVICE: (no result)
PLATELET # BLD: 311 TH/MM3 (ref 150–450)
PO2 BLD: 159 MMHG (ref 61–120)
POTASSIUM SERPL-SCNC: 3.5 MEQ/L (ref 3.5–5.1)
RBC # BLD AUTO: 3.53 MIL/MM3 (ref 4–5.3)
REVIEW FLAG: (no result)
SALICYLATES SERPL-MCNC: 10.4 G/DL (ref 12–16)
SODIUM SERPL-SCNC: 151 MEQ/L (ref 136–145)
STAT: NO
TEMP CORR TO: 98.6
ULNAR PULSE: PRESENT
VENT SETTINGS: (no result)
WBC # BLD AUTO: 8 TH/MM3 (ref 4–11)

## 2017-08-28 RX ADMIN — GENTAMICIN SULFATE SCH MLS/HR: 0.8 INJECTION, SOLUTION INTRAVENOUS at 11:25

## 2017-08-28 RX ADMIN — STANDARDIZED SENNA CONCENTRATE AND DOCUSATE SODIUM SCH TAB: 8.6; 5 TABLET, FILM COATED ORAL at 21:00

## 2017-08-28 RX ADMIN — AMPICILLIN SODIUM SCH MLS/HR: 2 INJECTION, POWDER, FOR SOLUTION INTRAMUSCULAR; INTRAVENOUS at 08:21

## 2017-08-28 RX ADMIN — HEPARIN SODIUM SCH UNITS: 10000 INJECTION, SOLUTION INTRAVENOUS; SUBCUTANEOUS at 21:45

## 2017-08-28 RX ADMIN — OXACILLIN SODIUM SCH MLS/HR: 2 INJECTION, POWDER, FOR SOLUTION INTRAMUSCULAR; INTRAVENOUS at 21:44

## 2017-08-28 RX ADMIN — STANDARDIZED SENNA CONCENTRATE AND DOCUSATE SODIUM SCH TAB: 8.6; 5 TABLET, FILM COATED ORAL at 08:21

## 2017-08-28 RX ADMIN — OXACILLIN SODIUM SCH MLS/HR: 2 INJECTION, POWDER, FOR SOLUTION INTRAMUSCULAR; INTRAVENOUS at 03:09

## 2017-08-28 RX ADMIN — AMPICILLIN SODIUM SCH MLS/HR: 2 INJECTION, POWDER, FOR SOLUTION INTRAMUSCULAR; INTRAVENOUS at 21:44

## 2017-08-28 RX ADMIN — IPRATROPIUM BROMIDE AND ALBUTEROL SULFATE PRN AMPULE: .5; 3 SOLUTION RESPIRATORY (INHALATION) at 19:47

## 2017-08-28 RX ADMIN — GENTAMICIN SULFATE SCH MLS/HR: 0.8 INJECTION, SOLUTION INTRAVENOUS at 00:04

## 2017-08-28 RX ADMIN — AMPICILLIN SODIUM SCH MLS/HR: 2 INJECTION, POWDER, FOR SOLUTION INTRAMUSCULAR; INTRAVENOUS at 00:05

## 2017-08-28 RX ADMIN — HUMAN INSULIN SCH: 100 INJECTION, SOLUTION SUBCUTANEOUS at 11:36

## 2017-08-28 RX ADMIN — AMPICILLIN SODIUM SCH MLS/HR: 2 INJECTION, POWDER, FOR SOLUTION INTRAMUSCULAR; INTRAVENOUS at 03:09

## 2017-08-28 RX ADMIN — POLYVINYL ALCOHOL SCH DROP: 14 SOLUTION/ DROPS OPHTHALMIC at 08:21

## 2017-08-28 RX ADMIN — BUMETANIDE SCH MG: 0.25 INJECTION, SOLUTION INTRAMUSCULAR; INTRAVENOUS at 08:20

## 2017-08-28 RX ADMIN — DEXMEDETOMIDINE HYDROCHLORIDE PRN MLS/HR: 100 INJECTION, SOLUTION, CONCENTRATE INTRAVENOUS at 11:19

## 2017-08-28 RX ADMIN — RIFAMPIN SCH MLS/HR: 600 INJECTION, POWDER, LYOPHILIZED, FOR SOLUTION INTRAVENOUS at 21:45

## 2017-08-28 RX ADMIN — ACETAMINOPHEN PRN MG: 10 INJECTION, SOLUTION INTRAVENOUS at 12:20

## 2017-08-28 RX ADMIN — Medication SCH ML: at 21:45

## 2017-08-28 RX ADMIN — MIDAZOLAM HYDROCHLORIDE PRN MLS/HR: 5 INJECTION, SOLUTION INTRAMUSCULAR; INTRAVENOUS at 17:14

## 2017-08-28 RX ADMIN — POLYVINYL ALCOHOL SCH DROP: 14 SOLUTION/ DROPS OPHTHALMIC at 17:15

## 2017-08-28 RX ADMIN — HUMAN INSULIN SCH: 100 INJECTION, SOLUTION SUBCUTANEOUS at 00:00

## 2017-08-28 RX ADMIN — HEPARIN SODIUM SCH UNITS: 10000 INJECTION, SOLUTION INTRAVENOUS; SUBCUTANEOUS at 08:19

## 2017-08-28 RX ADMIN — RIFAMPIN SCH MLS/HR: 600 INJECTION, POWDER, LYOPHILIZED, FOR SOLUTION INTRAVENOUS at 08:20

## 2017-08-28 RX ADMIN — POLYVINYL ALCOHOL SCH DROP: 14 SOLUTION/ DROPS OPHTHALMIC at 11:26

## 2017-08-28 RX ADMIN — HUMAN INSULIN SCH: 100 INJECTION, SOLUTION SUBCUTANEOUS at 18:00

## 2017-08-28 RX ADMIN — OXACILLIN SODIUM SCH MLS/HR: 2 INJECTION, POWDER, FOR SOLUTION INTRAMUSCULAR; INTRAVENOUS at 08:21

## 2017-08-28 RX ADMIN — DEXMEDETOMIDINE HYDROCHLORIDE PRN MLS/HR: 100 INJECTION, SOLUTION, CONCENTRATE INTRAVENOUS at 15:40

## 2017-08-28 RX ADMIN — AMPICILLIN SODIUM SCH MLS/HR: 2 INJECTION, POWDER, FOR SOLUTION INTRAMUSCULAR; INTRAVENOUS at 14:20

## 2017-08-28 RX ADMIN — FAMOTIDINE SCH MG: 10 INJECTION, SOLUTION INTRAVENOUS at 21:45

## 2017-08-28 RX ADMIN — OXACILLIN SODIUM SCH MLS/HR: 2 INJECTION, POWDER, FOR SOLUTION INTRAMUSCULAR; INTRAVENOUS at 17:13

## 2017-08-28 RX ADMIN — HUMAN INSULIN SCH: 100 INJECTION, SOLUTION SUBCUTANEOUS at 06:00

## 2017-08-28 RX ADMIN — DEXMEDETOMIDINE HYDROCHLORIDE PRN MLS/HR: 100 INJECTION, SOLUTION, CONCENTRATE INTRAVENOUS at 23:11

## 2017-08-28 RX ADMIN — OXACILLIN SODIUM SCH MLS/HR: 2 INJECTION, POWDER, FOR SOLUTION INTRAMUSCULAR; INTRAVENOUS at 00:04

## 2017-08-28 RX ADMIN — AMPICILLIN SODIUM SCH MLS/HR: 2 INJECTION, POWDER, FOR SOLUTION INTRAMUSCULAR; INTRAVENOUS at 17:13

## 2017-08-28 RX ADMIN — MIDAZOLAM HYDROCHLORIDE PRN MLS/HR: 5 INJECTION, SOLUTION INTRAMUSCULAR; INTRAVENOUS at 06:19

## 2017-08-28 RX ADMIN — OXACILLIN SODIUM SCH MLS/HR: 2 INJECTION, POWDER, FOR SOLUTION INTRAMUSCULAR; INTRAVENOUS at 14:20

## 2017-08-28 RX ADMIN — Medication SCH MLS/HR: at 12:20

## 2017-08-28 RX ADMIN — Medication SCH ML: at 08:20

## 2017-08-28 RX ADMIN — FAMOTIDINE SCH MG: 10 INJECTION, SOLUTION INTRAVENOUS at 08:20

## 2017-08-28 RX ADMIN — CHLORHEXIDINE GLUCONATE SCH PACK: 500 CLOTH TOPICAL at 00:07

## 2017-08-28 RX ADMIN — Medication SCH MLS/HR: at 01:17

## 2017-08-28 NOTE — HHI.CCPN
Subjective


Remarks/Hospital Course


Hospital Course:





37-year-old  female who presents initially as a stroke alert.  

According to EMS report the patient has a history of fungal endocarditis with 

previous IVDA.  EMS states that the patient is currently on hospice, however, 

they are unsure if the patient is a DNR.  EMS states the patient apparently 

complained of a headache earlier today, was found lying on the floor and 

incontinent of stool at approximate, last seen normal at 6:45 PM.  Therefore, 

EMS called a stroke alert in the field as the patient was aphasic and confused.

  Upon arrival the patient groans, moves all 4 extremities and withdraws all 4 

extremities, but is unable to provide any information.  She was intubated in 

the emergency department by ER attending for airway protection.





8/22: remains encephalopathic. Blood cultures growing Staph in 4/4 bottles. 


8/23:  Remains sedated/ encephalopathic, orally intubated on mechanical 

ventilation.


8/24: still in shock on vasopressors. grossly volume overloaded and 

intravascularly volume overloaded based on echo and clinically. needs 

aggressive diuresis, but still in shock. family made patient DNR last night, 

but want to continue aggressive therapies. still encephalopathic. no 

significant improvements.


8/25: seen and examined at around 06:45am. delayed note entry. remains 

encephalopathic. no significant changes. unlikely to survive hospital stay, no 

additional overall therapies. family wants aggressive treatment. diuresing on 

bumex drip.    net 3.3L negative. still volume overloaded.


8/26: Tmax 102.8 This am patient went into SVT rhythm heart rate 190's.  

Cooling blanket placed, ice packs in place, metoprolol 5 mg IV push given, 

resolution heart rate mid 90s, MAP ranges 66-68.  Ofirmev ordered.  Bumex 

infusion discontinued secondary to elevation in creatinine.  Chest x-ray 

pending.


8/27: Continues to be febrile, continues on cooling blanket.  Propofol was 

discontinued secondary to hypotension.  Today the patient continues on Versed 

and fentanyl infusions with the RASS -2.  Patient currently GCS of 11 T, 

responsive.  The patient tolerated CPAP trials for approximately 2.5 hours for 

the last 2 days.





Subjective:





8/28:  Somewhat agitated this AM.  Remains in sinus tachycardia.  On PSV trials 

1.5 hours today.  Tolerating tube feeding.  Tmax 101.4.





Objective





Vital Signs








  Date Time  Temp Pulse Resp B/P (MAP) Pulse Ox O2 Delivery O2 Flow Rate FiO2


 


8/28/17 08:00 101.4 120 24 150/79 (102) 100   





    125/81 (96)    


 


8/28/17 08:00        35














Intake and Output   


 


 8/28/17 8/28/17 8/29/17





 08:00 16:00 00:00


 


Intake Total 1003.7 ml  


 


Output Total 1100 ml  


 


Balance -96.3 ml  








Result Diagram:  


8/28/17 0504                                                                   

             8/28/17 0504





Other Results





Microbiology








 Date/Time


Source Procedure


Growth Status


 


 


 8/24/17 09:38


Blood Peripheral Aerobic Blood Culture - Preliminary


NO GROWTH IN 3 DAYS Resulted


 


 8/24/17 09:38


Blood Peripheral Anaerobic Blood Culture - Preliminary


NO GROWTH IN 3 DAYS Resulted





 8/21/17 22:00


Cerebral Spinal Fluid Lumbar Puncture Fungal Smear - Final


NO FUNGAL ELEMENTS SEEN. Resulted


 


 8/21/17 22:00


Cerebral Spinal Fluid Lumbar Puncture Fungal Culture


Pending Resulted





 8/24/17 21:15


Sputum Endotracheal Gram Stain - Final Complete


 


 8/24/17 21:15


Sputum Endotracheal Sputum Culture - Final


RARE GROWTH NORMAL RESPIRATORY MYLENE Complete








Imaging





Last Impressions








Chest X-Ray 8/28/17 0600 Signed





Impressions: 





 Service Date/Time:  Monday, August 28, 2017 03:28 - CONCLUSION:  Stable chest 





 x-ray with underinflation. No acute finding is identified.     Ron Melgar MD 


 


Brain MRI 8/25/17 0000 Signed





Impressions: 





 Service Date/Time:  Friday, August 25, 2017 17:07 - CONCLUSION:  1. Small 

acute 





 or subacute cortical infarct of the left parietal lobe. 2. Small, faint area 

of 





 nonspecific flair signal abnormality in the left frontal lobe periventricular 





 white matter, nonspecific. No associated mass effect or abnormal enhancement. 

3 





 month followup MRI of the brain recommended. 3.  Mild chronic white matter 





 changes.     Ron Swan MD 


 


Head CT 8/21/17 0000 Signed





Impressions: 





 Service Date/Time:  Monday, August 21, 2017 19:47 - CONCLUSION:  No evidence 

of 





 acute infarct, hemorrhage, mass or edema.     Kristian Frankel MD 








Objective Remarks


GENERAL: 37-year-old  female, critically ill currently resting in bed 

will strictly intubated


SKIN: Warm and dry.  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


HEAD: Normocephalic.


EYES: No scleral icterus. No injection or drainage. 


NECK:  trachea midline.  No JVD


CARDIOVASCULAR: Tachycardic, RR.  S1, S2 no S4.  Without murmur


RESPIRATORY: Essentially clear to auscultation without wheezes rales or rhonchi


GASTROINTESTINAL: Abdomen soft, non-tender, nondistended.  no guarding.


MUSCULOSKELETAL: 1 + peripheral edema.  Generalized anasarca.  Multiple 

petechiae and ecchymotic bruising secondary to most likely septic emboli


NEURO EXAM: GCS 11T. Follows commands, moves extremities 4.  Motor strength 5/

5 bilateral upper and lower extremity.  Cranial nerves II-XII grossly intact.





A/P


Assessment and Plan


Neuro/Psych 





Acute Encephalopathy


Acute meningitis, possible bacterial


Left parietal/frontal CVA





Patient is currently on Versed drip at 10 mg an hour fentanyl drip at 250 mcg 

an hour for sedation/analgesia while intubated


Goal of RA SS -2


Daily sedation vacation


- LP with elevated protein, and low glucose relative to serum glucose.


- CT head negative


-MRI-small acute and subacute cortical infarct of left parietal lobe


- Neuro checks per unit protocol


Acetaminophen for fever


Evaluated by neurology





Resp:





Acute hypoxic and hypercarbic Respiratory failure 





ACV 14/555/35


Ventilator bundle


As needed albuterol therapy


Spontaneous breathing trials daily





Cardiac





SVT


Sinus tachycardia


Mixed Septic/cardiogenic Shock


Acute congestive heart failure secondary to valvulopathy


Tricuspid Regurgitation





- Metoprolol 2.5 mg every 6 hours when necessary heart rate greater than 100 bpm


Echocardiogram 8/17 revealed EF 25-30%.  PAP 34 mmHg.


Currently off all vasopressors.





ID:





History of fungal endocarditis


Staph Bacteremia


Tricuspid valve infective endocarditis


- History of IVDA


- Broad-spectrum antibiotic with ampicillin, gentamicin and rifampin


- Infectious disease following, Dr. Teague





RENAL:





Acute Kidney Injury - resolved





-secondary to shock


- Strict I and O's


- molina catheter





GI"





Acute Protein calorie malnutrition- severe





- secondary to long-standing IVDA and endocarditis


- continue TF Jevity 1.5 currently at goal 55 cc/hour-minimal  residuals


Famotidine for GI prophylaxis


Docusate sodium/senna for bowel regimen





HEME: 





Normocytic anemia





Monitor CBC daily.  Follow trends





Endo





Hyperglycemia critical illness





Sliding scale insulin to maintain euglycemia


DVT GI prophylaxis


- Teds SCDs


- Subcutaneous heparin


-Famotidine


Dispo:


Discussed with ICU RN and significant other at bedside.


 


This patient remains critically ill with one or more organ systems which are or 

may become a threat to life. I have spent in excess of 30 minutes 

discontinuously in the care and management of this patient. This time is 

exclusive of procedures, and includes, but is not limited to, evaluation of the 

patient, review of the medical record, discussions with family, consultants, 

nursing staff, or respiratory therapy, and documentation in the medical record.











Bryson Bustos MD Aug 28, 2017 10:57

## 2017-08-28 NOTE — HHI.IDPN
Subjective


Subjective


Remarks


cont to have fevers in to 103 F


faile d CPAP with tachypnea


Minimal ETT secretions


creatinine is improving


Antibiotics


ampicillin 


oxacillin 


gent 


rifampin


Allergies:  


Coded Allergies:  


     No Known Allergies (Verified , 2/24/16)





Objective


.





Vital Signs








  Date Time  Temp Pulse Resp B/P (MAP) Pulse Ox O2 Delivery O2 Flow Rate FiO2


 


8/28/17 12:16     99   40


 


8/28/17 11:00     100   40


 


8/28/17 10:00  127      


 


8/28/17 08:00 101.4 120 24 150/79 (102) 100   





    125/81 (96)    


 


8/28/17 08:00        35


 


8/28/17 08:00  107  112/70 (84)    


 


8/28/17 08:00  120      


 


8/28/17 07:45     100   35


 


8/28/17 06:00  96      


 


8/28/17 06:00  96  115/73 (87) 100   


 


8/28/17 05:00  92  116/77 (90) 100   


 


8/28/17 04:30     100   35


 


8/28/17 04:00 98.6 93  107/67 (80) 99   





    84/66 (72)    


 


8/28/17 04:00  93      


 


8/28/17 04:00        35


 


8/28/17 03:00  96  92/65 (74) 97   


 


8/28/17 02:00  90      


 


8/28/17 02:00  90  84/66 (72) 97   


 


8/28/17 01:02     98   35


 


8/28/17 01:00  96  74/62 (66) 96   


 


8/28/17 00:07   20     


 


8/28/17 00:00  106      


 


8/28/17 00:00        35


 


8/28/17 00:00  106  78/62 (67)    


 


8/28/17 00:00 100.2 106  107/64 (78) 97   





    78/62 (67)    


 


8/27/17 23:00  112  90/84 (86) 100   


 


8/27/17 22:00 101.1 109  82/77 (79) 100   


 


8/27/17 22:00  109      


 


8/27/17 21:00 100.4 106  87/77 (80) 100   


 


8/27/17 20:00        35


 


8/27/17 20:00 99.4 106  112/74 (87) 100   





    100/69 (79)    


 


8/27/17 20:00  106  100/69 (79)    


 


8/27/17 20:00  106      


 


8/27/17 19:57     100   35


 


8/27/17 19:00  106  90/88 (89)    


 


8/27/17 18:00  109      


 


8/27/17 16:12     100   35


 


8/27/17 16:00  103      


 


8/27/17 16:00        35


 


8/27/17 16:00 98.9 103 24 130/82 (98) 100   





    115/75 (88)    














 8/28/17 8/28/17 8/29/17





 15:00 23:00 07:00


 


Intake Total 500 ml  


 


Balance 500 ml  


 


   


 


IV Total 500 ml  








.





Laboratory Tests








Test


  8/27/17


03:41 8/28/17


05:04


 


White Blood Count 10.7 TH/MM3  8.0 TH/MM3 


 


Red Blood Count 3.86 MIL/MM3  3.53 MIL/MM3 


 


Hemoglobin 9.2 GM/DL  8.5 GM/DL 


 


Hematocrit 28.9 %  26.8 % 


 


Mean Corpuscular Volume 74.8 FL  75.7 FL 


 


Mean Corpuscular Hemoglobin 23.7 PG  24.2 PG 


 


Mean Corpuscular Hemoglobin


Concent 31.7 % 


  31.9 % 


 


 


Red Cell Distribution Width 16.7 %  16.5 % 


 


Platelet Count 266 TH/MM3  311 TH/MM3 


 


Mean Platelet Volume 8.7 FL  7.8 FL 








Laboratory Tests








Test


  8/26/17


21:05 8/27/17


03:41 8/27/17


12:13 8/28/17


05:04


 


Blood Urea Nitrogen 39 MG/DL  38 MG/DL   33 MG/DL 


 


Creatinine 1.46 MG/DL  1.36 MG/DL   1.24 MG/DL 


 


Random Glucose 147 MG/DL  128 MG/DL   134 MG/DL 


 


Calcium Level 8.0 MG/DL  7.9 MG/DL   8.6 MG/DL 


 


Magnesium Level 2.4 MG/DL  2.4 MG/DL   2.6 MG/DL 


 


Sodium Level 145 MEQ/L  146 MEQ/L   151 MEQ/L 


 


Potassium Level 3.1 MEQ/L  3.1 MEQ/L  3.7 MEQ/L  3.5 MEQ/L 


 


Chloride Level 108 MEQ/L  110 MEQ/L   117 MEQ/L 


 


Carbon Dioxide Level 24.8 MEQ/L  25.1 MEQ/L   26.6 MEQ/L 


 


Anion Gap 12 MEQ/L  11 MEQ/L   7 MEQ/L 


 


Estimat Glomerular Filtration


Rate 41 ML/MIN 


  44 ML/MIN 


  


  49 ML/MIN 


 


 


Total Protein  7.3 GM/DL   


 


Albumin  2.3 GM/DL   


 


Phosphorus Level  4.9 MG/DL   4.8 MG/DL 


 


Alkaline Phosphatase  77 U/L   


 


Aspartate Amino Transf


(AST/SGOT) 


  18 U/L 


  


  


 


 


Alanine Aminotransferase


(ALT/SGPT) 


  11 U/L 


  


  


 


 


Total Bilirubin  0.5 MG/DL   


 


Direct Bilirubin  0.3 MG/DL   


 


Indirect Bilirubin  0.2 MG/DL   








Imaging





Last Impressions








Chest X-Ray 8/28/17 0600 Signed





Impressions: 





 Service Date/Time:  Monday, August 28, 2017 03:28 - CONCLUSION:  Stable chest 





 x-ray with underinflation. No acute finding is identified.     Ron Melgar MD 


 


Brain MRI 8/25/17 0000 Signed





Impressions: 





 Service Date/Time:  Friday, August 25, 2017 17:07 - CONCLUSION:  1. Small 

acute 





 or subacute cortical infarct of the left parietal lobe. 2. Small, faint area 

of 





 nonspecific flair signal abnormality in the left frontal lobe periventricular 





 white matter, nonspecific. No associated mass effect or abnormal enhancement. 

3 





 month followup MRI of the brain recommended. 3.  Mild chronic white matter 





 changes.     Ron Swan MD 


 


Head CT 8/21/17 0000 Signed





Impressions: 





 Service Date/Time:  Monday, August 21, 2017 19:47 - CONCLUSION:  No evidence 

of 





 acute infarct, hemorrhage, mass or edema.     Kristian Frankel MD 








Physical Exam


CONSTITUTIONAL/GENERAL: This is an adequately nourished patient, in no apparent 

distress. Sedated, int'd on [facility] vent


TUBES/LINES/DRAINS:


SKIN: No jaundice, rashes, or lesions.   Skin temperature appropriate. Not 

diaphoretic. 


 


multiple embolic  lesions cw septic embolization involving L lower leg and  

both feet - evolving


HEAD: Atraumatic. Normocephalic.


EYES: Pupils small  non reactive  equal and round  .   No scleral icterus. No 

injection or drainage. Fundi not examined.


 CARDIOVASCULAR: Regular rate and rhythm without murmurs, gallops, or rubs. 

Mechanical heart sounds No JVD. Peripheral pulses symmetric.


RESPIRATORY/CHEST: Symmetric, unlabored respirations. Scattered rhonchi to 

auscultation. Breath sounds equal bilaterally.  


GASTROINTESTINAL: Abdomen soft, non-tender,  moderately distended. No hepato-

splenomegaly, or palpable masses. No guarding. Bowel sounds present, hypoactive 


GENITOURINARY: Without palpable bladder distension. Stokes catheter in place 

with clear yellow urine


MUSCULOSKELETAL: Extremities without clubbing, 


2-3  edema. Anasarca


LYMPHATICS: No palpable cervical or supraclavicular adenopathy.


NEUROLOGICAL  sedated currently


off sedation fully responsive, following commands all 4 extremeties


 PSYCHIATRIC: unable to assess








Assessment & Plan


Remarks


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 

fecalis S amp/gent 


   - pt was previously infected with above organisms


Previousy multiple episodes of  PVE


   dw Dr Keenan hospitalised in April 2017 for tricuspid valve PVE  - [place] 

parapsolosis (March 15 thru April 15) , Streptoccii


   pt was Rx with combination diflucan 800 + micafungin 150, and was also on 

ambisionme at some point x 2 weeks and did not clear 


Confiormed  bacterial meningitis  2/2 MSSA - embolic ethiology


Acute VDRF


NOÉ


Elevated troponine ? cardiac injury from infx


Critically ill , unstable


Poor prognosis


Not a surgical candidate 


? PNA, clx negative


New fever 





   repeat blood clx


   - chk diff to monitor eosinophilia  which can be aw curretn abx Rx


   cont  oxacillin + gentamin +RIfampin


   cont  ampicillin for Enterococci


   fu repeat blood clx  


   monitor closely creatinint and LFTs (2-3x/week)





Catalina Chaney RN, MD Aug 28, 2017 14:22

## 2017-08-28 NOTE — HHI.HCPN
Reason for visit


   a.  To assist with evaluation and management of symptoms including: dyspnea. 


   b.  To assist medical decision maker(s) with: better understanding of 

current medical conditions; weighing benefits/burdens of medical treatment 

options; making        


        medical treatment decisions.


.





Subjective/Interval History


Patient seen and examined in ICU. Also present Zoila Dawson LCSW. Discussed 

with Dr. Bustos and nurse. Boyfriend, Humble at bedside. Questions answered. 





[facility] Tolerating brief CPAP trials 1-2 hours per 

Dr. Bustos, he hopes to be able to wean her from vent later this week. Patient is 

awake and alert, nods yes/no to some questions. Off pressors. 





Tmax 101.3. WBC 8.0, hemoglobin 8.5, hematocrit 26.8, platelets 311. Creatinine 

1.24. Chest xray stable with underinflation. MRI brain done 8/25/17 revealed 

small acute or subacute cortical infarct of the left parietal lobe, small faint 

nonspecific flair abnormality, no mass effect or abnormal enhancement, mild 

chrnic white matter changes.  


.


Family/friend interactions


Left message for motherKelly to provide medical update, awaiting return call. 


.





Advance Directives


Living Will:  Never completed


Health Care Surrogate:  Never completed


Durable Power of :  Never completed


Advance Directive Specifics


Health Care Surrogate(s):


No known written advanced directives, family is going to try to find HCS 

paperwork they think pt previously completed.  Patient a single.  Children are 

juvenile.  If no written advanced directives, according to [place] statutes 

health care proxy decision-making falls to a parent. 


.


Significant change in goals:


NO CODE. Continue aggressive care short of NO CODE. 


.





Objective





Vital Signs








  Date Time  Temp Pulse Resp B/P (MAP) Pulse Ox O2 Delivery O2 Flow Rate FiO2


 


8/28/17 12:16     99   40


 


8/28/17 11:00     100   40


 


8/28/17 10:00  127      


 


8/28/17 08:00 101.4 120 24 150/79 (102) 100   





    125/81 (96)    


 


8/28/17 08:00        35


 


8/28/17 08:00  107  112/70 (84)    


 


8/28/17 08:00  120      


 


8/28/17 07:45     100   35


 


8/28/17 06:00  96      


 


8/28/17 06:00  96  115/73 (87) 100   


 


8/28/17 05:00  92  116/77 (90) 100   


 


8/28/17 04:30     100   35


 


8/28/17 04:00 98.6 93  107/67 (80) 99   





    84/66 (72)    


 


8/28/17 04:00  93      


 


8/28/17 04:00        35


 


8/28/17 03:00  96  92/65 (74) 97   


 


8/28/17 02:00  90      


 


8/28/17 02:00  90  84/66 (72) 97   


 


8/28/17 01:02     98   35


 


8/28/17 01:00  96  74/62 (66) 96   


 


8/28/17 00:07   20     


 


8/28/17 00:00  106      


 


8/28/17 00:00        35


 


8/28/17 00:00  106  78/62 (67)    


 


8/28/17 00:00 100.2 106  107/64 (78) 97   





    78/62 (67)    


 


8/27/17 23:00  112  90/84 (86) 100   


 


8/27/17 22:00 101.1 109  82/77 (79) 100   


 


8/27/17 22:00  109      


 


8/27/17 21:00 100.4 106  87/77 (80) 100   


 


8/27/17 20:00        35


 


8/27/17 20:00 99.4 106  112/74 (87) 100   





    100/69 (79)    


 


8/27/17 20:00  106  100/69 (79)    


 


8/27/17 20:00  106      


 


8/27/17 19:57     100   35


 


8/27/17 19:00  106  90/88 (89)    


 


8/27/17 18:00  109      


 


8/27/17 16:12     100   35


 


8/27/17 16:00  103      


 


8/27/17 16:00        35


 


8/27/17 16:00 98.9 103 24 130/82 (98) 100   





    115/75 (88)    


 


8/27/17 14:00  100      














Intake & Output  


 


 8/28/17 8/28/17





 07:00 19:00


 


Intake Total 1104.7 ml 500 ml


 


Output Total 1100 ml 


 


Balance 4.7 ml 500 ml


 


  


 


IV Total 504.7 ml 500 ml


 


Tube Feeding 600 ml 


 


Output Urine Total 1000 ml 


 


Stool Total 100 ml 








Physical Exam


CONSTITUTIONAL/GENERAL: This is an adequately nourished patient, sedated on 

mechanical ventilation.


TUBES/LINES/DRAINS: ETT, NG, left subclavian central line, PIV right AC, Stokes, 

SCDs. 


SKIN: Febrile. Multiple purple lesions bilateral feet.  


EYES: No scleral icterus or injection. 


ENT: Hearing appears grossly normal. Nose with NG tube right nare.  Throat 

difficult to visualize due to ETT.   


CARDIOVASCULAR: tachycardic, regular rhythm. 


RESPIRATORY/CHEST: Symmetric, unlabored respirations on vent. lungs clear. 


GASTROINTESTINAL: Abdomen soft, mild distended. Bowel sounds hypoactive. 


GENITOURINARY: Without palpable bladder distension. Stokes catheter in place. 


MUSCULOSKELETAL: Extremities without clubbing, mild cyanosis of toes, 2+ edema. 


NEUROLOGICAL: Awake and alert, tracking. Nods yes/no to questions. Follows 

simple commands. 


PSYCHIATRIC: denies anxiety. 


.





Diagnostic Tests


Laboratory





Laboratory Tests








Test


  8/25/17


18:14 8/25/17


23:19 8/26/17


03:17 8/26/17


11:00


 


Blood Urea Nitrogen


  25 MG/DL


(7-18) 29 MG/DL


(7-18) 33 MG/DL


(7-18) 


 


 


Creatinine


  1.41 MG/DL


(0.50-1.00) 1.46 MG/DL


(0.50-1.00) 1.58 MG/DL


(0.50-1.00) 


 


 


Random Glucose


  107 MG/DL


() 133 MG/DL


() 141 MG/DL


() 


 


 


Calcium Level


  8.1 MG/DL


(8.5-10.1) 8.0 MG/DL


(8.5-10.1) 8.2 MG/DL


(8.5-10.1) 


 


 


Magnesium Level


  2.2 MG/DL


(1.5-2.5) 2.1 MG/DL


(1.5-2.5) 2.1 MG/DL


(1.5-2.5) 


 


 


Sodium Level


  141 MEQ/L


(136-145) 141 MEQ/L


(136-145) 143 MEQ/L


(136-145) 


 


 


Potassium Level


  3.2 MEQ/L


(3.5-5.1) 3.3 MEQ/L


(3.5-5.1) 3.4 MEQ/L


(3.5-5.1) 


 


 


Chloride Level


  104 MEQ/L


() 105 MEQ/L


() 104 MEQ/L


() 


 


 


Carbon Dioxide Level


  24.9 MEQ/L


(21.0-32.0) 25.3 MEQ/L


(21.0-32.0) 26.4 MEQ/L


(21.0-32.0) 


 


 


Anion Gap


  12 MEQ/L


(5-15) 11 MEQ/L


(5-15) 13 MEQ/L


(5-15) 


 


 


Estimat Glomerular Filtration


Rate 42 ML/MIN


(>89) 41 ML/MIN


(>89) 37 ML/MIN


(>89) 


 


 


White Blood Count


  


  


  12.3 TH/MM3


(4.0-11.0) 


 


 


Red Blood Count


  


  


  4.23 MIL/MM3


(4.00-5.30) 


 


 


Hemoglobin


  


  


  10.2 GM/DL


(11.6-15.3) 


 


 


Hematocrit


  


  


  31.2 %


(35.0-46.0) 


 


 


Mean Corpuscular Volume


  


  


  73.8 FL


(80.0-100.0) 


 


 


Mean Corpuscular Hemoglobin


  


  


  24.1 PG


(27.0-34.0) 


 


 


Mean Corpuscular Hemoglobin


Concent 


  


  32.7 %


(32.0-36.0) 


 


 


Red Cell Distribution Width


  


  


  16.3 %


(11.6-17.2) 


 


 


Platelet Count


  


  


  217 TH/MM3


(150-450) 


 


 


Mean Platelet Volume


  


  


  9.0 FL


(7.0-11.0) 


 


 


Blood Gas Puncture Site    ART LINE 


 


Blood Gas Patient Temperature    98.6 


 


Blood Gas HCO3


  


  


  


  24 mmol/L


(22-26)


 


Blood Gas Base Excess


  


  


  


  0.2 mmol/L


(-2-2)


 


Blood Gas Oxygen Saturation    96 % () 


 


Arterial Blood pH


  


  


  


  7.45


(7.380-7.420)


 


Arterial Blood Partial


Pressure CO2 


  


  


  35 mmHg


(38-42)


 


Arterial Blood Partial


Pressure O2 


  


  


  128 mmHg


()


 


Arterial Blood Oxygen Content


  


  


  


  14.5 Vol %


(12.0-20.0)


 


Arterial Blood


Carboxyhemoglobin 


  


  


  1.5 % (0-4) 


 


 


Arterial Blood Methemoglobin    1.1 % (0-2) 


 


Blood Gas Hemoglobin


  


  


  


  10.6 G/DL


(12.0-16.0)


 


Oxygen Delivery Device    VENTILATOR 


 


Blood Gas Ventilator Setting     


 


Blood Gas Inspired Oxygen    35 % 


 


Test


  8/26/17


21:05 8/27/17


03:41 8/27/17


04:58 8/27/17


12:13


 


Blood Urea Nitrogen


  39 MG/DL


(7-18) 38 MG/DL


(7-18) 


  


 


 


Creatinine


  1.46 MG/DL


(0.50-1.00) 1.36 MG/DL


(0.50-1.00) 


  


 


 


Random Glucose


  147 MG/DL


() 128 MG/DL


() 


  


 


 


Calcium Level


  8.0 MG/DL


(8.5-10.1) 7.9 MG/DL


(8.5-10.1) 


  


 


 


Magnesium Level


  2.4 MG/DL


(1.5-2.5) 2.4 MG/DL


(1.5-2.5) 


  


 


 


Sodium Level


  145 MEQ/L


(136-145) 146 MEQ/L


(136-145) 


  


 


 


Potassium Level


  3.1 MEQ/L


(3.5-5.1) 3.1 MEQ/L


(3.5-5.1) 


  3.7 MEQ/L


(3.5-5.1)


 


Chloride Level


  108 MEQ/L


() 110 MEQ/L


() 


  


 


 


Carbon Dioxide Level


  24.8 MEQ/L


(21.0-32.0) 25.1 MEQ/L


(21.0-32.0) 


  


 


 


Anion Gap


  12 MEQ/L


(5-15) 11 MEQ/L


(5-15) 


  


 


 


Estimat Glomerular Filtration


Rate 41 ML/MIN


(>89) 44 ML/MIN


(>89) 


  


 


 


White Blood Count


  


  10.7 TH/MM3


(4.0-11.0) 


  


 


 


Red Blood Count


  


  3.86 MIL/MM3


(4.00-5.30) 


  


 


 


Hemoglobin


  


  9.2 GM/DL


(11.6-15.3) 


  


 


 


Hematocrit


  


  28.9 %


(35.0-46.0) 


  


 


 


Mean Corpuscular Volume


  


  74.8 FL


(80.0-100.0) 


  


 


 


Mean Corpuscular Hemoglobin


  


  23.7 PG


(27.0-34.0) 


  


 


 


Mean Corpuscular Hemoglobin


Concent 


  31.7 %


(32.0-36.0) 


  


 


 


Red Cell Distribution Width


  


  16.7 %


(11.6-17.2) 


  


 


 


Platelet Count


  


  266 TH/MM3


(150-450) 


  


 


 


Mean Platelet Volume


  


  8.7 FL


(7.0-11.0) 


  


 


 


Total Protein


  


  7.3 GM/DL


(6.4-8.2) 


  


 


 


Albumin


  


  2.3 GM/DL


(3.4-5.0) 


  


 


 


Phosphorus Level


  


  4.9 MG/DL


(2.5-4.9) 


  


 


 


Alkaline Phosphatase


  


  77 U/L


() 


  


 


 


Aspartate Amino Transf


(AST/SGOT) 


  18 U/L (15-37) 


  


  


 


 


Alanine Aminotransferase


(ALT/SGPT) 


  11 U/L (10-53) 


  


  


 


 


Total Bilirubin


  


  0.5 MG/DL


(0.2-1.0) 


  


 


 


Direct Bilirubin


  


  0.3 MG/DL


(0.0-0.2) 


  


 


 


Indirect Bilirubin


  


  0.2 MG/DL


(0.0-0.8) 


  


 


 


Blood Gas Puncture Site   DANNY  


 


Blood Gas Patient Temperature   98.6  


 


Blood Gas HCO3


  


  


  22 mmol/L


(22-26) 


 


 


Blood Gas Base Excess


  


  


  -1.3 mmol/L


(-2-2) 


 


 


Blood Gas Oxygen Saturation   95 % ()  


 


Arterial Blood pH


  


  


  7.44


(7.380-7.420) 


 


 


Arterial Blood Partial


Pressure CO2 


  


  33 mmHg


(38-42) 


 


 


Arterial Blood Partial


Pressure O2 


  


  94 mmHg


() 


 


 


Arterial Blood Oxygen Content


  


  


  11.5 Vol %


(12.0-20.0) 


 


 


Arterial Blood


Carboxyhemoglobin 


  


  1.7 % (0-4) 


  


 


 


Arterial Blood Methemoglobin   1.0 % (0-2)  


 


Blood Gas Hemoglobin


  


  


  8.5 G/DL


(12.0-16.0) 


 


 


Oxygen Delivery Device   VENTILATOR  


 


Blood Gas Ventilator Setting


  


  


  PRVC14/550/1.0/+5


  


 


 


Blood Gas Inspired Oxygen   35 %  


 


Gentamicin Level Trough


  


  


  


  0.8 MCG/ML


(0.0-2.0)


 


Test


  8/28/17


04:50 8/28/17


05:04 


  


 


 


Blood Gas Puncture Site DANNY    


 


Blood Gas Patient Temperature 98.6    


 


Blood Gas HCO3


  23 mmol/L


(22-26) 


  


  


 


 


Blood Gas Base Excess


  -1.6 mmol/L


(-2-2) 


  


  


 


 


Blood Gas Oxygen Saturation 97 % ()    


 


Arterial Blood pH


  7.39


(7.380-7.420) 


  


  


 


 


Arterial Blood Partial


Pressure CO2 38 mmHg


(38-42) 


  


  


 


 


Arterial Blood Partial


Pressure O2 159 mmHg


() 


  


  


 


 


Arterial Blood Oxygen Content


  14.4 Vol %


(12.0-20.0) 


  


  


 


 


Arterial Blood


Carboxyhemoglobin 1.5 % (0-4) 


  


  


  


 


 


Arterial Blood Methemoglobin 0.9 % (0-2)    


 


Blood Gas Hemoglobin


  10.4 G/DL


(12.0-16.0) 


  


  


 


 


Oxygen Delivery Device VENTILATOR    


 


Blood Gas Ventilator Setting AC14/550/+5    


 


Blood Gas Inspired Oxygen 35 %    


 


White Blood Count


  


  8.0 TH/MM3


(4.0-11.0) 


  


 


 


Red Blood Count


  


  3.53 MIL/MM3


(4.00-5.30) 


  


 


 


Hemoglobin


  


  8.5 GM/DL


(11.6-15.3) 


  


 


 


Hematocrit


  


  26.8 %


(35.0-46.0) 


  


 


 


Mean Corpuscular Volume


  


  75.7 FL


(80.0-100.0) 


  


 


 


Mean Corpuscular Hemoglobin


  


  24.2 PG


(27.0-34.0) 


  


 


 


Mean Corpuscular Hemoglobin


Concent 


  31.9 %


(32.0-36.0) 


  


 


 


Red Cell Distribution Width


  


  16.5 %


(11.6-17.2) 


  


 


 


Platelet Count


  


  311 TH/MM3


(150-450) 


  


 


 


Mean Platelet Volume


  


  7.8 FL


(7.0-11.0) 


  


 


 


Blood Urea Nitrogen


  


  33 MG/DL


(7-18) 


  


 


 


Creatinine


  


  1.24 MG/DL


(0.50-1.00) 


  


 


 


Random Glucose


  


  134 MG/DL


() 


  


 


 


Calcium Level


  


  8.6 MG/DL


(8.5-10.1) 


  


 


 


Phosphorus Level


  


  4.8 MG/DL


(2.5-4.9) 


  


 


 


Magnesium Level


  


  2.6 MG/DL


(1.5-2.5) 


  


 


 


Sodium Level


  


  151 MEQ/L


(136-145) 


  


 


 


Potassium Level


  


  3.5 MEQ/L


(3.5-5.1) 


  


 


 


Chloride Level


  


  117 MEQ/L


() 


  


 


 


Carbon Dioxide Level


  


  26.6 MEQ/L


(21.0-32.0) 


  


 


 


Anion Gap  7 MEQ/L (5-15)   


 


Estimat Glomerular Filtration


Rate 


  49 ML/MIN


(>89) 


  


 








Result Diagram:  


8/28/17 0504                                                                   

             8/28/17 0504





Microbiology





Microbiology








 Date/Time


Source Procedure


Growth Status


 


 


 8/24/17 09:38


Blood Peripheral Aerobic Blood Culture - Preliminary


NO GROWTH IN 4 DAYS Resulted


 


 8/24/17 09:38


Blood Peripheral Anaerobic Blood Culture - Preliminary


NO GROWTH IN 4 DAYS Resulted





 8/21/17 22:00


Cerebral Spinal Fluid Lumbar Puncture Fungal Smear - Final


NO FUNGAL ELEMENTS SEEN. Resulted


 


 8/21/17 22:00


Cerebral Spinal Fluid Lumbar Puncture Fungal Culture - Preliminary


NO GROWTH IN 1 WEEK Resulted





 8/24/17 21:15


Sputum Endotracheal Gram Stain - Final Complete


 


 8/24/17 21:15


Sputum Endotracheal Sputum Culture - Final


RARE GROWTH NORMAL RESPIRATORY MYLENE Complete








Imaging





Last Impressions








Chest X-Ray 8/28/17 0600 Signed





Impressions: 





 Service Date/Time:  Monday, August 28, 2017 03:28 - CONCLUSION:  Stable chest 





 x-ray with underinflation. No acute finding is identified.     Ron Melgar MD 


 


Brain MRI 8/25/17 0000 Signed





Impressions: 





 Service Date/Time:  Friday, August 25, 2017 17:07 - CONCLUSION:  1. Small 

acute 





 or subacute cortical infarct of the left parietal lobe. 2. Small, faint area 

of 





 nonspecific flair signal abnormality in the left frontal lobe periventricular 





 white matter, nonspecific. No associated mass effect or abnormal enhancement. 

3 





 month followup MRI of the brain recommended. 3.  Mild chronic white matter 





 changes.     Ron Swan MD 


 


Head CT 8/21/17 0000 Signed





Impressions: 





 Service Date/Time:  Monday, August 21, 2017 19:47 - CONCLUSION:  No evidence 

of 





 acute infarct, hemorrhage, mass or edema.     Kristian Frankel MD 








Procedures


* 8/21/17 - left subclavian central placement. 


* 8/21/17 - lumbar puncture


* 8/21/17 - Intubated. 


.





Assessment and Plan


Disease Oriented Problem List:  


(1) Protein-calorie malnutrition, severe


(2) Elevated troponin


(3) Meningitis


(4) Encephalopathy


(5) Bacterial endocarditis


(6) Polysubstance abuse


(7) Sepsis


(8) Acute kidney injury


Symptom Scale:  


(1) Pain


0-10 Scale:  Unable to quantify





(2) Dyspnea


0-10 Scale:  Unable to quantify





(3) Encephalopathy


0-10 Scale:  Unable to quantify





Pertinent Non-Medical Issues


Psychosocial: single.  2 juvenile children. Supported by mother, stepfather and 

boyfriend. 


Spiritual: Unknown.


Legal: patient is incapacitated.  No written advanced directives.  Patient a 

single.  Children are juvenile.  According to [place] statutes health care 

proxy decision-making falls to a parent.


Ethical issues impacting care: No known concerns at this time. 


.


Important Contacts


* Kelly Le, mother: [phone]


* Wonrobbie Le, stepfather: [phone]


.


Prognosis


Prognosis poor.


Code Status:  No Code


Plan


* Decision Maker: patient is incapacitated.  No written advanced directives. 

Patient a single. Children are juvenile. According to [place] statutes health 

care proxy decision-making falls to a parent. 


* NO CODE 


* 8/28/17 - Left message to provide udpate to mother, Kelly, awaiting return 

call. 


* SYMPTOMS: Pain: No obvious signs of pain, denies today. Potential sources 

include endocarditis, bedbound status, tubes etc.  Patient with likely high 

tolerance given history of IV drug use, will monitor effective medications.  

Dyspnea: remains sedated on mechanical ventilation.  No new medication 

recommendations at this time. 


* Palliative care will continue to follow throughout hospital course to assist 

with symptom management and clarification of goals as needed. 


.





Attestation


To help prompt me to consider important information that might be impacting 

today's encounter and assessment, information from prior notes written by 

myself or my colleagues may have been "brought forward" into today's note.  My 

signature on this note, however, is an attestation that I personally performed 

the exam, history, and/or decision-making noted today, and, unless otherwise 

indicated, the interactions with patient, family, and staff as well as the 

review of records all occurred today.  I also attest that the listed assessment 

and stated plan reflect my best clinical judgment today based on the 

combination of historical information, prior notes, and today's exam/ 

interactions.  When time spent is documented, it refers only to time spent 

today by the signer, or if indicated, combined time spent today by 

collaborating physician/nurse practitioner.











Ileana Dang Aug 28, 2017 14:10

## 2017-08-29 VITALS — OXYGEN SATURATION: 100 %

## 2017-08-29 VITALS
RESPIRATION RATE: 20 BRPM | DIASTOLIC BLOOD PRESSURE: 55 MMHG | HEART RATE: 89 BPM | SYSTOLIC BLOOD PRESSURE: 117 MMHG | OXYGEN SATURATION: 100 % | TEMPERATURE: 99.1 F

## 2017-08-29 VITALS
DIASTOLIC BLOOD PRESSURE: 78 MMHG | HEART RATE: 96 BPM | TEMPERATURE: 98.6 F | RESPIRATION RATE: 24 BRPM | SYSTOLIC BLOOD PRESSURE: 124 MMHG | OXYGEN SATURATION: 100 %

## 2017-08-29 VITALS — HEART RATE: 93 BPM

## 2017-08-29 VITALS
SYSTOLIC BLOOD PRESSURE: 119 MMHG | HEART RATE: 83 BPM | DIASTOLIC BLOOD PRESSURE: 78 MMHG | RESPIRATION RATE: 16 BRPM | OXYGEN SATURATION: 100 %

## 2017-08-29 VITALS
TEMPERATURE: 99.1 F | OXYGEN SATURATION: 63 % | RESPIRATION RATE: 21 BRPM | SYSTOLIC BLOOD PRESSURE: 131 MMHG | HEART RATE: 93 BPM | DIASTOLIC BLOOD PRESSURE: 90 MMHG

## 2017-08-29 VITALS
TEMPERATURE: 101.2 F | HEART RATE: 79 BPM | DIASTOLIC BLOOD PRESSURE: 71 MMHG | SYSTOLIC BLOOD PRESSURE: 96 MMHG | OXYGEN SATURATION: 100 % | RESPIRATION RATE: 16 BRPM

## 2017-08-29 VITALS — OXYGEN SATURATION: 99 %

## 2017-08-29 VITALS — HEART RATE: 92 BPM

## 2017-08-29 VITALS
SYSTOLIC BLOOD PRESSURE: 114 MMHG | DIASTOLIC BLOOD PRESSURE: 71 MMHG | OXYGEN SATURATION: 100 % | TEMPERATURE: 99.8 F | HEART RATE: 95 BPM

## 2017-08-29 VITALS — HEART RATE: 78 BPM

## 2017-08-29 VITALS — HEART RATE: 82 BPM

## 2017-08-29 VITALS — HEART RATE: 85 BPM

## 2017-08-29 LAB
ANION GAP SERPL CALC-SCNC: 9 MEQ/L (ref 5–15)
BASOPHILS # BLD AUTO: 0.1 TH/MM3 (ref 0–0.2)
BASOPHILS NFR BLD: 0.7 % (ref 0–2)
BUN SERPL-MCNC: 31 MG/DL (ref 7–18)
CHLORIDE SERPL-SCNC: 118 MEQ/L (ref 98–107)
EOSINOPHIL # BLD: 0.2 TH/MM3 (ref 0–0.4)
EOSINOPHIL NFR BLD: 2.1 % (ref 0–4)
ERYTHROCYTE [DISTWIDTH] IN BLOOD BY AUTOMATED COUNT: 16.6 % (ref 11.6–17.2)
GFR SERPLBLD BASED ON 1.73 SQ M-ARVRAT: 49 ML/MIN (ref 89–?)
HCO3 BLD-SCNC: 25.8 MEQ/L (ref 21–32)
HCT VFR BLD CALC: 22.3 % (ref 35–46)
HEMO FLAGS: (no result)
LYMPHOCYTES # BLD AUTO: 0.8 TH/MM3 (ref 1–4.8)
LYMPHOCYTES NFR BLD AUTO: 11 % (ref 9–44)
MAGNESIUM SERPL-MCNC: 2.6 MG/DL (ref 1.5–2.5)
MCH RBC QN AUTO: 24.8 PG (ref 27–34)
MCHC RBC AUTO-ENTMCNC: 32.9 % (ref 32–36)
MCV RBC AUTO: 75.3 FL (ref 80–100)
MONOCYTES NFR BLD: 7.2 % (ref 0–8)
NEUTROPHILS # BLD AUTO: 5.6 TH/MM3 (ref 1.8–7.7)
NEUTROPHILS NFR BLD AUTO: 79 % (ref 16–70)
PLATELET # BLD: 292 TH/MM3 (ref 150–450)
POTASSIUM SERPL-SCNC: 3.1 MEQ/L (ref 3.5–5.1)
RBC # BLD AUTO: 2.97 MIL/MM3 (ref 4–5.3)
SODIUM SERPL-SCNC: 153 MEQ/L (ref 136–145)
WBC # BLD AUTO: 7.1 TH/MM3 (ref 4–11)

## 2017-08-29 RX ADMIN — FAMOTIDINE SCH MG: 10 INJECTION, SOLUTION INTRAVENOUS at 21:27

## 2017-08-29 RX ADMIN — OXACILLIN SODIUM SCH MLS/HR: 2 INJECTION, POWDER, FOR SOLUTION INTRAMUSCULAR; INTRAVENOUS at 04:45

## 2017-08-29 RX ADMIN — FAMOTIDINE SCH MG: 10 INJECTION, SOLUTION INTRAVENOUS at 08:59

## 2017-08-29 RX ADMIN — AMPICILLIN SODIUM SCH MLS/HR: 2 INJECTION, POWDER, FOR SOLUTION INTRAMUSCULAR; INTRAVENOUS at 09:01

## 2017-08-29 RX ADMIN — AMPICILLIN SODIUM SCH MLS/HR: 2 INJECTION, POWDER, FOR SOLUTION INTRAMUSCULAR; INTRAVENOUS at 01:01

## 2017-08-29 RX ADMIN — Medication SCH ML: at 14:01

## 2017-08-29 RX ADMIN — RIFAMPIN SCH MLS/HR: 600 INJECTION, POWDER, LYOPHILIZED, FOR SOLUTION INTRAVENOUS at 09:06

## 2017-08-29 RX ADMIN — HEPARIN SODIUM SCH UNITS: 10000 INJECTION, SOLUTION INTRAVENOUS; SUBCUTANEOUS at 21:13

## 2017-08-29 RX ADMIN — BUMETANIDE SCH MG: 0.25 INJECTION, SOLUTION INTRAMUSCULAR; INTRAVENOUS at 09:00

## 2017-08-29 RX ADMIN — GENTAMICIN SULFATE SCH MLS/HR: 0.8 INJECTION, SOLUTION INTRAVENOUS at 11:40

## 2017-08-29 RX ADMIN — GENTAMICIN SULFATE SCH MLS/HR: 0.8 INJECTION, SOLUTION INTRAVENOUS at 01:01

## 2017-08-29 RX ADMIN — HUMAN INSULIN SCH: 100 INJECTION, SOLUTION SUBCUTANEOUS at 00:00

## 2017-08-29 RX ADMIN — Medication SCH MLS/HR: at 18:33

## 2017-08-29 RX ADMIN — OXACILLIN SODIUM SCH MLS/HR: 2 INJECTION, POWDER, FOR SOLUTION INTRAMUSCULAR; INTRAVENOUS at 18:30

## 2017-08-29 RX ADMIN — POLYVINYL ALCOHOL SCH DROP: 14 SOLUTION/ DROPS OPHTHALMIC at 09:00

## 2017-08-29 RX ADMIN — POLYVINYL ALCOHOL SCH DROP: 14 SOLUTION/ DROPS OPHTHALMIC at 14:01

## 2017-08-29 RX ADMIN — DEXMEDETOMIDINE HYDROCHLORIDE PRN MLS/HR: 100 INJECTION, SOLUTION, CONCENTRATE INTRAVENOUS at 09:06

## 2017-08-29 RX ADMIN — Medication SCH ML: at 08:59

## 2017-08-29 RX ADMIN — POTASSIUM CHLORIDE PRN MLS/HR: 400 INJECTION, SOLUTION INTRAVENOUS at 10:38

## 2017-08-29 RX ADMIN — POLYVINYL ALCOHOL SCH DROP: 14 SOLUTION/ DROPS OPHTHALMIC at 17:08

## 2017-08-29 RX ADMIN — STANDARDIZED SENNA CONCENTRATE AND DOCUSATE SODIUM SCH TAB: 8.6; 5 TABLET, FILM COATED ORAL at 09:00

## 2017-08-29 RX ADMIN — HUMAN INSULIN SCH: 100 INJECTION, SOLUTION SUBCUTANEOUS at 18:00

## 2017-08-29 RX ADMIN — AMPICILLIN SODIUM SCH MLS/HR: 2 INJECTION, POWDER, FOR SOLUTION INTRAMUSCULAR; INTRAVENOUS at 17:08

## 2017-08-29 RX ADMIN — HEPARIN SODIUM SCH UNITS: 10000 INJECTION, SOLUTION INTRAVENOUS; SUBCUTANEOUS at 09:00

## 2017-08-29 RX ADMIN — AMPICILLIN SODIUM SCH MLS/HR: 2 INJECTION, POWDER, FOR SOLUTION INTRAMUSCULAR; INTRAVENOUS at 14:01

## 2017-08-29 RX ADMIN — DEXMEDETOMIDINE HYDROCHLORIDE PRN MLS/HR: 100 INJECTION, SOLUTION, CONCENTRATE INTRAVENOUS at 21:33

## 2017-08-29 RX ADMIN — CHLORHEXIDINE GLUCONATE SCH PACK: 500 CLOTH TOPICAL at 04:00

## 2017-08-29 RX ADMIN — OXACILLIN SODIUM SCH MLS/HR: 2 INJECTION, POWDER, FOR SOLUTION INTRAMUSCULAR; INTRAVENOUS at 10:16

## 2017-08-29 RX ADMIN — POTASSIUM CHLORIDE PRN MLS/HR: 400 INJECTION, SOLUTION INTRAVENOUS at 14:00

## 2017-08-29 RX ADMIN — Medication SCH ML: at 21:14

## 2017-08-29 RX ADMIN — HUMAN INSULIN SCH: 100 INJECTION, SOLUTION SUBCUTANEOUS at 06:00

## 2017-08-29 RX ADMIN — AMPICILLIN SODIUM SCH MLS/HR: 2 INJECTION, POWDER, FOR SOLUTION INTRAMUSCULAR; INTRAVENOUS at 04:45

## 2017-08-29 RX ADMIN — OXACILLIN SODIUM SCH MLS/HR: 2 INJECTION, POWDER, FOR SOLUTION INTRAMUSCULAR; INTRAVENOUS at 02:42

## 2017-08-29 RX ADMIN — RIFAMPIN SCH MLS/HR: 600 INJECTION, POWDER, LYOPHILIZED, FOR SOLUTION INTRAVENOUS at 21:13

## 2017-08-29 RX ADMIN — AMPICILLIN SODIUM SCH MLS/HR: 2 INJECTION, POWDER, FOR SOLUTION INTRAMUSCULAR; INTRAVENOUS at 21:13

## 2017-08-29 RX ADMIN — HUMAN INSULIN SCH: 100 INJECTION, SOLUTION SUBCUTANEOUS at 11:40

## 2017-08-29 RX ADMIN — IPRATROPIUM BROMIDE AND ALBUTEROL SULFATE PRN AMPULE: .5; 3 SOLUTION RESPIRATORY (INHALATION) at 03:12

## 2017-08-29 RX ADMIN — OXACILLIN SODIUM SCH MLS/HR: 2 INJECTION, POWDER, FOR SOLUTION INTRAMUSCULAR; INTRAVENOUS at 21:12

## 2017-08-29 RX ADMIN — STANDARDIZED SENNA CONCENTRATE AND DOCUSATE SODIUM SCH TAB: 8.6; 5 TABLET, FILM COATED ORAL at 21:00

## 2017-08-29 RX ADMIN — MIDAZOLAM HYDROCHLORIDE PRN MLS/HR: 5 INJECTION, SOLUTION INTRAMUSCULAR; INTRAVENOUS at 08:59

## 2017-08-29 RX ADMIN — Medication SCH ML: at 21:13

## 2017-08-29 RX ADMIN — OXACILLIN SODIUM SCH MLS/HR: 2 INJECTION, POWDER, FOR SOLUTION INTRAMUSCULAR; INTRAVENOUS at 14:31

## 2017-08-29 RX ADMIN — IPRATROPIUM BROMIDE AND ALBUTEROL SULFATE PRN AMPULE: .5; 3 SOLUTION RESPIRATORY (INHALATION) at 21:04

## 2017-08-29 NOTE — HHI.IDPN
Subjective


Subjective


Remarks


fevers trending down


having diarrhea


Minimal ETT secretions


creatinine is stable


Antibiotics


ampicillin 


oxacillin 


gent 


rifampin


Allergies:  


Coded Allergies:  


     No Known Allergies (Verified , 2/24/16)





Objective


.





Vital Signs








  Date Time  Temp Pulse Resp B/P (MAP) Pulse Ox O2 Delivery O2 Flow Rate FiO2


 


8/29/17 14:03     99   35


 


8/29/17 12:00        35


 


8/29/17 12:00 98.6 96 24 124/78 (93) 100   





    117/77 (90)    


 


8/29/17 12:00  96      


 


8/29/17 10:45        35


 


8/29/17 10:33     99   35


 


8/29/17 10:30        35


 


8/29/17 10:00  92      


 


8/29/17 08:00  89      


 


8/29/17 08:00        35


 


8/29/17 08:00  89  89/55 (66)    





    117/70 (86)    


 


8/29/17 08:00 99.1 89 20 117/70 (86) 100   





    89/55 (66)    


 


8/29/17 07:33     100   35


 


8/29/17 06:00  82      


 


8/29/17 04:20     100   35


 


8/29/17 04:00  83      


 


8/29/17 04:00        35


 


8/29/17 04:00 98.2 83 16 119/78 (92) 100   





    108/78 (88)    


 


8/29/17 02:00  78      


 


8/29/17 01:10     100   35


 


8/29/17 00:00  79      


 


8/29/17 00:00 101.2 79 16 111/67 (82) 100   





    96/71 (79)    


 


8/29/17 00:00    96/71 (79)    





    111/67 (82)    


 


8/29/17 00:00        35


 


8/28/17 22:50     100   35


 


8/28/17 22:00  94      


 


8/28/17 20:00    91/68 (76)    





    109/67 (81)    


 


8/28/17 20:00 100.9 87 16 109/67 (81) 98   





    91/68 (76)    


 


8/28/17 20:00  87      


 


8/28/17 20:00        35


 


8/28/17 19:40     100   35


 


8/28/17 18:00  87      


 


8/28/17 16:17     97   35


 


8/28/17 16:00 98.0 71 19 106/58 (74) 98   





    87/60 (69)    


 


8/28/17 16:00  71      


 


8/28/17 16:00        40














 8/29/17 8/29/17 8/30/17





 15:00 23:00 07:00


 


Intake Total 650 ml  


 


Balance 650 ml  


 


   


 


IV Total 650 ml  








.





Laboratory Tests








Test


  8/28/17


05:04 8/29/17


05:30


 


White Blood Count 8.0 TH/MM3  7.1 TH/MM3 


 


Red Blood Count 3.53 MIL/MM3  2.97 MIL/MM3 


 


Hemoglobin 8.5 GM/DL  7.4 GM/DL 


 


Hematocrit 26.8 %  22.3 % 


 


Mean Corpuscular Volume 75.7 FL  75.3 FL 


 


Mean Corpuscular Hemoglobin 24.2 PG  24.8 PG 


 


Mean Corpuscular Hemoglobin


Concent 31.9 % 


  32.9 % 


 


 


Red Cell Distribution Width 16.5 %  16.6 % 


 


Platelet Count 311 TH/MM3  292 TH/MM3 


 


Mean Platelet Volume 7.8 FL  8.4 FL 


 


Neutrophils (%) (Auto)  79.0 % 


 


Lymphocytes (%) (Auto)  11.0 % 


 


Monocytes (%) (Auto)  7.2 % 


 


Eosinophils (%) (Auto)  2.1 % 


 


Basophils (%) (Auto)  0.7 % 


 


Neutrophils # (Auto)  5.6 TH/MM3 


 


Lymphocytes # (Auto)  0.8 TH/MM3 


 


Monocytes # (Auto)  0.5 TH/MM3 


 


Eosinophils # (Auto)  0.2 TH/MM3 


 


Basophils # (Auto)  0.1 TH/MM3 


 


CBC Comment  DIFF FINAL 


 


Differential Comment   








Laboratory Tests








Test


  8/28/17


05:04 8/29/17


05:30


 


Blood Urea Nitrogen 33 MG/DL  31 MG/DL 


 


Creatinine 1.24 MG/DL  1.24 MG/DL 


 


Random Glucose 134 MG/DL  146 MG/DL 


 


Calcium Level 8.6 MG/DL  8.3 MG/DL 


 


Phosphorus Level 4.8 MG/DL  5.8 MG/DL 


 


Magnesium Level 2.6 MG/DL  2.6 MG/DL 


 


Sodium Level 151 MEQ/L  153 MEQ/L 


 


Potassium Level 3.5 MEQ/L  3.1 MEQ/L 


 


Chloride Level 117 MEQ/L  118 MEQ/L 


 


Carbon Dioxide Level 26.6 MEQ/L  25.8 MEQ/L 


 


Anion Gap 7 MEQ/L  9 MEQ/L 


 


Estimat Glomerular Filtration


Rate 49 ML/MIN 


  49 ML/MIN 


 








Microbiology








 Date/Time


Source Procedure


Growth Status


 


 


 8/28/17 16:55


Blood Peripheral Aerobic Blood Culture - Preliminary


NO GROWTH IN 1 DAY Resulted


 


 8/28/17 16:55


Blood Peripheral Anaerobic Blood Culture - Preliminary


NO GROWTH IN 1 DAY Resulted





 8/28/17 16:48


Blood Peripheral Aerobic Blood Culture - Preliminary


NO GROWTH IN 1 DAY Resulted


 


 8/28/17 16:48


Blood Peripheral Anaerobic Blood Culture - Preliminary


NO GROWTH IN 1 DAY Resulted








Imaging





Last Impressions








Chest X-Ray 8/28/17 0600 Signed





Impressions: 





 Service Date/Time:  Monday, August 28, 2017 03:28 - CONCLUSION:  Stable chest 





 x-ray with underinflation. No acute finding is identified.     Ron Melgar MD 


 


Brain MRI 8/25/17 0000 Signed





Impressions: 





 Service Date/Time:  Friday, August 25, 2017 17:07 - CONCLUSION:  1. Small 

acute 





 or subacute cortical infarct of the left parietal lobe. 2. Small, faint area 

of 





 nonspecific flair signal abnormality in the left frontal lobe periventricular 





 white matter, nonspecific. No associated mass effect or abnormal enhancement. 

3 





 month followup MRI of the brain recommended. 3.  Mild chronic white matter 





 changes.     Ron Swan MD 


 


Head CT 8/21/17 0000 Signed





Impressions: 





 Service Date/Time:  Monday, August 21, 2017 19:47 - CONCLUSION:  No evidence 

of 





 acute infarct, hemorrhage, mass or edema.     Kristian Frankel MD 








Physical Exam


CONSTITUTIONAL/GENERAL: This is an adequately nourished patient, in no apparent 

distress. Sedated, int'd on mech vent


TUBES/LINES/DRAINS:


SKIN: No jaundice, rashes, or lesions.   Skin temperature appropriate. Not 

diaphoretic. 


 


multiple embolic  lesions cw septic embolization involving L lower leg and  

both feet - evolving


No new ones noted 


HEAD: Atraumatic. Normocephalic.


EYES: Pupils small  non reactive  equal and round  .   No scleral icterus. No 

injection or drainage. Fundi not examined.


 CARDIOVASCULAR: Regular rate and rhythm without murmurs, gallops, or rubs. 

Mechanical heart sounds No JVD. Peripheral pulses symmetric.


RESPIRATORY/CHEST: Symmetric, unlabored respirations. Diffuse  rhonchi to 

auscultation. Breath sounds equal bilaterally.  


GASTROINTESTINAL: Abdomen soft, non-tender,  moderately distended. No hepato-

splenomegaly, or palpable masses. No guarding. Bowel sounds present, hypoactive 


Incontinent of liquid brown stool


GENITOURINARY: Without palpable bladder distension. Stokes catheter in place 

with clear yellow urine


MUSCULOSKELETAL: Extremities without clubbing, 


2+  edema.- iporoving


 NEUROLOGICAL   responsive, following commands all 4 extremeties


 PSYCHIATRIC: calm 








Assessment & Plan


Remarks


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 

fecalis S amp/gent 


   - pt was previously infected with above organisms


Previousy multiple episodes of  PVE


   dw Dr Keenan hospitalised in April 2017 for tricuspid valve PVE  - [place] 

parapsolosis (March 15 thru April 15) , Streptoccii


   pt was Rx with combination diflucan 800 + micafungin 150, and was also on 

ambisionme at some point x 2 weeks and did not clear 


Confiormed  bacterial meningitis  2/2 MSSA - embolic ethiology


Acute VDRF


NOÉ


Elevated troponine ? cardiac injury from infx


Critically ill ,  more stable


Poor prognosis


Not a surgical candidate 


? PNA, clx negative


New fever 





   fu repeat blood clx untill final


   - chk diff to monitor eosinophilia  which can be aw curretn abx Rx


   cont  oxacillin + gentamin +RIfampin


   cont  ampicillin for Enterococci


   fu repeat blood clx  


   monitor closely creatinint and LFTs (2-3x/week)


   Unless more positive blood clx anticipate 6 week from 1st neg (8/24) 

followed by  indefinite oral abx suppression tx 


   chk stool for C.diff 





Catalina Chaney RN, MD Aug 29, 2017 15:17

## 2017-08-29 NOTE — HHI.CCPN
Subjective


Remarks/Hospital Course


Hospital Course:





37-year-old  female who presents initially as a stroke alert.  

According to EMS report the patient has a history of fungal endocarditis with 

previous IVDA.  EMS states that the patient is currently on hospice, however, 

they are unsure if the patient is a DNR.  EMS states the patient apparently 

complained of a headache earlier today, was found lying on the floor and 

incontinent of stool at approximate, last seen normal at 6:45 PM.  Therefore, 

EMS called a stroke alert in the field as the patient was aphasic and confused.

  Upon arrival the patient groans, moves all 4 extremities and withdraws all 4 

extremities, but is unable to provide any information.  She was intubated in 

the emergency department by ER attending for airway protection.





8/22: remains encephalopathic. Blood cultures growing Staph in 4/4 bottles. 


8/23:  Remains sedated/ encephalopathic, orally intubated on mechanical 

ventilation.


8/24: still in shock on vasopressors. grossly volume overloaded and 

intravascularly volume overloaded based on echo and clinically. needs 

aggressive diuresis, but still in shock. family made patient DNR last night, 

but want to continue aggressive therapies. still encephalopathic. no 

significant improvements.


8/25: seen and examined at around 06:45am. delayed note entry. remains 

encephalopathic. no significant changes. unlikely to survive hospital stay, no 

additional overall therapies. family wants aggressive treatment. diuresing on 

bumex drip.    net 3.3L negative. still volume overloaded.


8/26: Tmax 102.8 This am patient went into SVT rhythm heart rate 190's.  

Cooling blanket placed, ice packs in place, metoprolol 5 mg IV push given, 

resolution heart rate mid 90s, MAP ranges 66-68.  Ofirmev ordered.  Bumex 

infusion discontinued secondary to elevation in creatinine.  Chest x-ray 

pending.


8/27: Continues to be febrile, continues on cooling blanket.  Propofol was 

discontinued secondary to hypotension.  Today the patient continues on Versed 

and fentanyl infusions with the RASS -2.  Patient currently GCS of 11 T, 

responsive.  The patient tolerated CPAP trials for approximately 2.5 hours for 

the last 2 days.


8/28:  Somewhat agitated this AM.  Remains in sinus tachycardia.  On PSV trials 

1.5 hours today.  Tolerating tube feeding.  Tmax 101.4.





Subjective:





8/29:  Low-grade fevers overnight.  Currently afebrile.  Remains on Precedex, 

Versed and fentanyl drips for sedation.  On PSV trial 2 hours so far.  

Tolerating tube feeds.





Objective





Vital Signs








  Date Time  Temp Pulse Resp B/P (MAP) Pulse Ox O2 Delivery O2 Flow Rate FiO2


 


8/29/17 12:00        35


 


8/29/17 12:00 98.6 96 24 124/78 (93) 100   





    117/77 (90)    














Intake and Output   


 


 8/29/17 8/29/17 8/30/17





 08:00 16:00 00:00


 


Intake Total 1110 ml 350 ml 


 


Output Total 1350 ml  


 


Balance -240 ml 350 ml 








Result Diagram:  


8/29/17 0530                                                                   

             8/29/17 0530





Other Results





Microbiology








 Date/Time


Source Procedure


Growth Status


 


 


 8/28/17 16:55


Blood Peripheral Aerobic Blood Culture - Preliminary


NO GROWTH IN 1 DAY Resulted


 


 8/28/17 16:55


Blood Peripheral Anaerobic Blood Culture - Preliminary


NO GROWTH IN 1 DAY Resulted





 8/21/17 22:00


Cerebral Spinal Fluid Lumbar Puncture Fungal Smear - Final


NO FUNGAL ELEMENTS SEEN. Resulted


 


 8/21/17 22:00


Cerebral Spinal Fluid Lumbar Puncture Fungal Culture - Preliminary


NO GROWTH IN 1 WEEK Resulted





 8/24/17 21:15


Sputum Endotracheal Gram Stain - Final Complete


 


 8/24/17 21:15


Sputum Endotracheal Sputum Culture - Final


RARE GROWTH NORMAL RESPIRATORY MYLENE Complete








Imaging





Last Impressions








Chest X-Ray 8/28/17 0600 Signed





Impressions: 





 Service Date/Time:  Monday, August 28, 2017 03:28 - CONCLUSION:  Stable chest 





 x-ray with underinflation. No acute finding is identified.     Ron Melgar MD 


 


Brain MRI 8/25/17 0000 Signed





Impressions: 





 Service Date/Time:  Friday, August 25, 2017 17:07 - CONCLUSION:  1. Small 

acute 





 or subacute cortical infarct of the left parietal lobe. 2. Small, faint area 

of 





 nonspecific flair signal abnormality in the left frontal lobe periventricular 





 white matter, nonspecific. No associated mass effect or abnormal enhancement. 

3 





 month followup MRI of the brain recommended. 3.  Mild chronic white matter 





 changes.     Ron Swan MD 


 


Head CT 8/21/17 0000 Signed





Impressions: 





 Service Date/Time:  Monday, August 21, 2017 19:47 - CONCLUSION:  No evidence 

of 





 acute infarct, hemorrhage, mass or edema.     Kristian Frankel MD 








Objective Remarks


GENERAL: 37-year-old  female, critically ill currently resting in bed 

will strictly intubated


SKIN: Warm and dry.  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


HEAD: Normocephalic.


EYES: No scleral icterus. No injection or drainage. 


NECK:  trachea midline.  No JVD


CARDIOVASCULAR: Tachycardic, RR.  S1, S2 no S4.  Without murmur


RESPIRATORY: Essentially clear to auscultation without wheezes rales or rhonchi


GASTROINTESTINAL: Abdomen soft, non-tender, nondistended.  no guarding.


MUSCULOSKELETAL: 1 + peripheral edema.  Generalized anasarca.  Multiple 

petechiae and ecchymotic bruising secondary to most likely septic emboli


NEURO EXAM: GCS 11T. Follows commands, moves extremities 4.  Motor strength 5/

5 bilateral upper and lower extremity.  Cranial nerves II-XII grossly intact.





A/P


Assessment and Plan


Neuro/Psych 





Acute Encephalopathy


Acute meningitis, possible bacterial


Left parietal/frontal CVA





Patient is currently on Precedex drip at 0.2, Versed drip at 10 mg an hour 

fentanyl drip at 250 mcg an hour for sedation/analgesia while intubated


Goal of RA SS -2


Daily sedation vacation


- LP with elevated protein, and low glucose relative to serum glucose.


- CT head negative


-MRI-small acute and subacute cortical infarct of left parietal lobe


- Neuro checks per unit protocol


Acetaminophen for fever


Evaluated by neurology





Resp:





Acute hypoxic and hypercarbic Respiratory failure 





ACV 14/555/35


Ventilator bundle


As needed albuterol therapy


Spontaneous breathing trials daily





Cardiac





SVT


Sinus tachycardia


Mixed Septic/cardiogenic Shock


Acute congestive heart failure secondary to valvulopathy


Tricuspid Regurgitation





- Metoprolol 2.5 mg every 6 hours when necessary heart rate greater than 100 bpm


Echocardiogram 8/17 revealed EF 25-30%.  PAP 34 mmHg.


Currently off all vasopressors.





ID:





History of fungal endocarditis


Staph Bacteremia


Tricuspid valve infective endocarditis





- History of IVDA


- Broad-spectrum antibiotic with oxiaccillin, ampicillin, gentamicin and 

rifampin


- Infectious disease following, Dr. Teague





RENAL:





Acute Kidney Injury - resolved





-secondary to shock


- Strict I and O's


- molina catheter





GI"





Acute Protein calorie malnutrition- severe





- secondary to long-standing IVDA and endocarditis


- continue TF Jevity 1.5 currently at goal 55 cc/hour-minimal  residuals


Famotidine for GI prophylaxis


Docusate sodium/senna for bowel regimen





HEME: 





Normocytic anemia





Monitor CBC daily.  Follow trends





Endo





Hyperglycemia critical illness





Sliding scale insulin to maintain euglycemia





FEN:





Hypernatremia


Hypopotassemia





Additional free water flushes 300 every 8 hours.  Replace potassium.  Per ICU 

electrolyte protocol.  Recheck in AM.





DVT GI prophylaxis


- Teds SCDs


- Subcutaneous heparin


-Famotidine


Dispo:


Discussed with ICU RN and significant other at bedside.


30 minutes critical care time











Bryson Bustos MD Aug 29, 2017 13:42

## 2017-08-30 VITALS
HEART RATE: 105 BPM | OXYGEN SATURATION: 98 % | RESPIRATION RATE: 22 BRPM | DIASTOLIC BLOOD PRESSURE: 77 MMHG | TEMPERATURE: 100 F | SYSTOLIC BLOOD PRESSURE: 128 MMHG

## 2017-08-30 VITALS — OXYGEN SATURATION: 100 %

## 2017-08-30 VITALS
HEART RATE: 93 BPM | OXYGEN SATURATION: 100 % | SYSTOLIC BLOOD PRESSURE: 126 MMHG | TEMPERATURE: 100.2 F | RESPIRATION RATE: 20 BRPM | DIASTOLIC BLOOD PRESSURE: 81 MMHG

## 2017-08-30 VITALS
DIASTOLIC BLOOD PRESSURE: 63 MMHG | RESPIRATION RATE: 19 BRPM | HEART RATE: 101 BPM | OXYGEN SATURATION: 100 % | SYSTOLIC BLOOD PRESSURE: 101 MMHG | TEMPERATURE: 99 F

## 2017-08-30 VITALS — HEART RATE: 111 BPM

## 2017-08-30 VITALS
SYSTOLIC BLOOD PRESSURE: 107 MMHG | HEART RATE: 80 BPM | OXYGEN SATURATION: 98 % | RESPIRATION RATE: 19 BRPM | DIASTOLIC BLOOD PRESSURE: 59 MMHG

## 2017-08-30 VITALS — HEART RATE: 88 BPM

## 2017-08-30 VITALS
OXYGEN SATURATION: 98 % | SYSTOLIC BLOOD PRESSURE: 137 MMHG | DIASTOLIC BLOOD PRESSURE: 74 MMHG | HEART RATE: 95 BPM | RESPIRATION RATE: 19 BRPM

## 2017-08-30 VITALS — HEART RATE: 94 BPM

## 2017-08-30 VITALS — HEART RATE: 84 BPM

## 2017-08-30 VITALS — HEART RATE: 92 BPM

## 2017-08-30 VITALS
RESPIRATION RATE: 18 BRPM | OXYGEN SATURATION: 100 % | TEMPERATURE: 98.9 F | SYSTOLIC BLOOD PRESSURE: 125 MMHG | HEART RATE: 87 BPM | DIASTOLIC BLOOD PRESSURE: 75 MMHG

## 2017-08-30 VITALS — OXYGEN SATURATION: 93 %

## 2017-08-30 VITALS — HEART RATE: 107 BPM

## 2017-08-30 LAB
ALP SERPL-CCNC: 78 U/L (ref 45–117)
ALT SERPL-CCNC: 8 U/L (ref 10–53)
ANION GAP SERPL CALC-SCNC: 10 MEQ/L (ref 5–15)
AST SERPL-CCNC: 18 U/L (ref 15–37)
BASOPHILS # BLD AUTO: 0 TH/MM3 (ref 0–0.2)
BASOPHILS NFR BLD: 0.5 % (ref 0–2)
BILIRUB SERPL-MCNC: 0.4 MG/DL (ref 0.2–1)
BUN SERPL-MCNC: 29 MG/DL (ref 7–18)
CHLORIDE SERPL-SCNC: 118 MEQ/L (ref 98–107)
EOSINOPHIL # BLD: 0.1 TH/MM3 (ref 0–0.4)
EOSINOPHIL NFR BLD: 1.6 % (ref 0–4)
ERYTHROCYTE [DISTWIDTH] IN BLOOD BY AUTOMATED COUNT: 16.8 % (ref 11.6–17.2)
GFR SERPLBLD BASED ON 1.73 SQ M-ARVRAT: 48 ML/MIN (ref 89–?)
HCO3 BLD-SCNC: 24.6 MEQ/L (ref 21–32)
HCT VFR BLD CALC: 25.2 % (ref 35–46)
HEMO FLAGS: (no result)
LYMPHOCYTES # BLD AUTO: 0.8 TH/MM3 (ref 1–4.8)
LYMPHOCYTES NFR BLD AUTO: 8.8 % (ref 9–44)
MAGNESIUM SERPL-MCNC: 2.7 MG/DL (ref 1.5–2.5)
MCH RBC QN AUTO: 24.3 PG (ref 27–34)
MCHC RBC AUTO-ENTMCNC: 31.8 % (ref 32–36)
MCV RBC AUTO: 76.5 FL (ref 80–100)
MONOCYTES NFR BLD: 6.7 % (ref 0–8)
NEUTROPHILS # BLD AUTO: 7.2 TH/MM3 (ref 1.8–7.7)
NEUTROPHILS NFR BLD AUTO: 82.4 % (ref 16–70)
PLATELET # BLD: 353 TH/MM3 (ref 150–450)
POTASSIUM SERPL-SCNC: 3.6 MEQ/L (ref 3.5–5.1)
RBC # BLD AUTO: 3.29 MIL/MM3 (ref 4–5.3)
SODIUM SERPL-SCNC: 153 MEQ/L (ref 136–145)
WBC # BLD AUTO: 8.7 TH/MM3 (ref 4–11)

## 2017-08-30 RX ADMIN — OXACILLIN SODIUM SCH MLS/HR: 2 INJECTION, POWDER, FOR SOLUTION INTRAMUSCULAR; INTRAVENOUS at 21:43

## 2017-08-30 RX ADMIN — HEPARIN SODIUM SCH UNITS: 10000 INJECTION, SOLUTION INTRAVENOUS; SUBCUTANEOUS at 09:52

## 2017-08-30 RX ADMIN — HUMAN INSULIN SCH: 100 INJECTION, SOLUTION SUBCUTANEOUS at 00:00

## 2017-08-30 RX ADMIN — POLYVINYL ALCOHOL SCH DROP: 14 SOLUTION/ DROPS OPHTHALMIC at 15:02

## 2017-08-30 RX ADMIN — OXACILLIN SODIUM SCH MLS/HR: 2 INJECTION, POWDER, FOR SOLUTION INTRAMUSCULAR; INTRAVENOUS at 17:28

## 2017-08-30 RX ADMIN — Medication SCH ML: at 13:10

## 2017-08-30 RX ADMIN — HUMAN INSULIN SCH: 100 INJECTION, SOLUTION SUBCUTANEOUS at 17:28

## 2017-08-30 RX ADMIN — AMPICILLIN SODIUM SCH MLS/HR: 2 INJECTION, POWDER, FOR SOLUTION INTRAMUSCULAR; INTRAVENOUS at 19:51

## 2017-08-30 RX ADMIN — Medication SCH ML: at 19:51

## 2017-08-30 RX ADMIN — AMPICILLIN SODIUM SCH MLS/HR: 2 INJECTION, POWDER, FOR SOLUTION INTRAMUSCULAR; INTRAVENOUS at 00:08

## 2017-08-30 RX ADMIN — MIDAZOLAM HYDROCHLORIDE PRN MLS/HR: 5 INJECTION, SOLUTION INTRAMUSCULAR; INTRAVENOUS at 06:30

## 2017-08-30 RX ADMIN — STANDARDIZED SENNA CONCENTRATE AND DOCUSATE SODIUM SCH TAB: 8.6; 5 TABLET, FILM COATED ORAL at 09:53

## 2017-08-30 RX ADMIN — FAMOTIDINE SCH MG: 10 INJECTION, SOLUTION INTRAVENOUS at 09:52

## 2017-08-30 RX ADMIN — Medication SCH MLS/HR: at 18:40

## 2017-08-30 RX ADMIN — FAMOTIDINE SCH MG: 10 INJECTION, SOLUTION INTRAVENOUS at 19:51

## 2017-08-30 RX ADMIN — GENTAMICIN SULFATE SCH MLS/HR: 0.8 INJECTION, SOLUTION INTRAVENOUS at 23:40

## 2017-08-30 RX ADMIN — SODIUM CHLORIDE SCH MLS/HR: 234 INJECTION INTRAMUSCULAR; INTRAVENOUS; SUBCUTANEOUS at 13:14

## 2017-08-30 RX ADMIN — HUMAN INSULIN SCH: 100 INJECTION, SOLUTION SUBCUTANEOUS at 05:06

## 2017-08-30 RX ADMIN — HEPARIN SODIUM SCH UNITS: 10000 INJECTION, SOLUTION INTRAVENOUS; SUBCUTANEOUS at 21:43

## 2017-08-30 RX ADMIN — AMPICILLIN SODIUM SCH MLS/HR: 2 INJECTION, POWDER, FOR SOLUTION INTRAMUSCULAR; INTRAVENOUS at 13:10

## 2017-08-30 RX ADMIN — Medication SCH ML: at 05:04

## 2017-08-30 RX ADMIN — RIFAMPIN SCH MLS/HR: 600 INJECTION, POWDER, LYOPHILIZED, FOR SOLUTION INTRAVENOUS at 19:51

## 2017-08-30 RX ADMIN — OXACILLIN SODIUM SCH MLS/HR: 2 INJECTION, POWDER, FOR SOLUTION INTRAMUSCULAR; INTRAVENOUS at 10:33

## 2017-08-30 RX ADMIN — AMPICILLIN SODIUM SCH MLS/HR: 2 INJECTION, POWDER, FOR SOLUTION INTRAMUSCULAR; INTRAVENOUS at 04:18

## 2017-08-30 RX ADMIN — CHLORHEXIDINE GLUCONATE SCH PACK: 500 CLOTH TOPICAL at 04:00

## 2017-08-30 RX ADMIN — STANDARDIZED SENNA CONCENTRATE AND DOCUSATE SODIUM SCH TAB: 8.6; 5 TABLET, FILM COATED ORAL at 19:51

## 2017-08-30 RX ADMIN — IPRATROPIUM BROMIDE AND ALBUTEROL SULFATE PRN AMPULE: .5; 3 SOLUTION RESPIRATORY (INHALATION) at 17:41

## 2017-08-30 RX ADMIN — OXACILLIN SODIUM SCH MLS/HR: 2 INJECTION, POWDER, FOR SOLUTION INTRAMUSCULAR; INTRAVENOUS at 15:02

## 2017-08-30 RX ADMIN — OXACILLIN SODIUM SCH MLS/HR: 2 INJECTION, POWDER, FOR SOLUTION INTRAMUSCULAR; INTRAVENOUS at 00:59

## 2017-08-30 RX ADMIN — HUMAN INSULIN SCH: 100 INJECTION, SOLUTION SUBCUTANEOUS at 12:00

## 2017-08-30 RX ADMIN — Medication SCH ML: at 09:52

## 2017-08-30 RX ADMIN — RIFAMPIN SCH MLS/HR: 600 INJECTION, POWDER, LYOPHILIZED, FOR SOLUTION INTRAVENOUS at 09:52

## 2017-08-30 RX ADMIN — POLYVINYL ALCOHOL SCH DROP: 14 SOLUTION/ DROPS OPHTHALMIC at 09:53

## 2017-08-30 RX ADMIN — MIDAZOLAM HYDROCHLORIDE PRN MLS/HR: 5 INJECTION, SOLUTION INTRAMUSCULAR; INTRAVENOUS at 18:41

## 2017-08-30 RX ADMIN — AMPICILLIN SODIUM SCH MLS/HR: 2 INJECTION, POWDER, FOR SOLUTION INTRAMUSCULAR; INTRAVENOUS at 23:40

## 2017-08-30 RX ADMIN — AMPICILLIN SODIUM SCH MLS/HR: 2 INJECTION, POWDER, FOR SOLUTION INTRAMUSCULAR; INTRAVENOUS at 17:07

## 2017-08-30 RX ADMIN — AMPICILLIN SODIUM SCH MLS/HR: 2 INJECTION, POWDER, FOR SOLUTION INTRAMUSCULAR; INTRAVENOUS at 09:52

## 2017-08-30 RX ADMIN — BUMETANIDE SCH MG: 0.25 INJECTION, SOLUTION INTRAMUSCULAR; INTRAVENOUS at 09:51

## 2017-08-30 RX ADMIN — IPRATROPIUM BROMIDE AND ALBUTEROL SULFATE PRN AMPULE: .5; 3 SOLUTION RESPIRATORY (INHALATION) at 02:43

## 2017-08-30 RX ADMIN — POLYVINYL ALCOHOL SCH DROP: 14 SOLUTION/ DROPS OPHTHALMIC at 17:07

## 2017-08-30 RX ADMIN — GENTAMICIN SULFATE SCH MLS/HR: 0.8 INJECTION, SOLUTION INTRAVENOUS at 13:10

## 2017-08-30 RX ADMIN — GENTAMICIN SULFATE SCH MLS/HR: 0.8 INJECTION, SOLUTION INTRAVENOUS at 00:07

## 2017-08-30 RX ADMIN — OXACILLIN SODIUM SCH MLS/HR: 2 INJECTION, POWDER, FOR SOLUTION INTRAMUSCULAR; INTRAVENOUS at 05:04

## 2017-08-30 NOTE — HHI.HCPN
Palliative care visit following extubation. Ms. Hagan alert, following commands

, mouthing words. Discussed with her CODE STATUS now that she is extubated. Angeli Benítez RN, boyfriend, and myself all in room. She shakes her head "yes" 

to going back on the ventilator if needed. Also shakes her head "yes" to chest 

compressions. Code status changed to FULL CODE. 





Palliative care will continue to follow throughout hospitalization. Will 

continue to address goals of care as indicated and medical condition evolves.











Zoila Dawson, LCSW Aug 30, 2017 16:59

## 2017-08-30 NOTE — HHI.HCPN
Reason for visit


   a.  To assist with evaluation and management of symptoms including: dyspnea, 

agitation. 


   b.  To assist medical decision maker(s) with: better understanding of 

current medical conditions; weighing benefits/burdens of medical treatment 

options; making        


        medical treatment decisions.


.





Subjective/Interval History


Patient seen and examined in ICU. Also present nurse, Fabiola. Discussed with 

Dr. Bustos. Also present Humble (boyfriend) at bedside. I was called to come to 

unit to reassess goals now that the patient is more awake and asking to be 

taken off the vent and go to hospice. Dr. Bustos reports trach decision will be 

needed Monday if unable to  be medically extubated in the mean time. 





Upon my arrival the patient is awake and alert. Patient remains in ICU on mech 

vent. Tolerating brief CPAP trials a few hours at a time. On CPAP 15/5. Tmax 

99.8 in the past 24 hours. Was 101.2 on 8/29/17. BP stable. Tachycardic. 





WBC 8.7, hemoglobin 8.0, hematocrit 25.2, platelets 353. Creatinine 1.27. 





Medical update provided to patient and Humble. Patient wants to be extubated, 

but when asked she seems to want it to have ice and water. She does not answer 

me when asked about trach or PEG. She nods yes to pain in head, throat, chest 

and legs. She denies pain in abdomen or back. We talked about pain control. She 

remains on Fentanyl drip. Dr. Bustos and I agreed to addition of Methadone. 

Patient asks if I  will be stopping her other meds, I advised we will wean from 

drips once Methadone reached full effect. She agrees. I am not certain how much 

understanding she has to her overall health, she has what appears to be some 

insight, but her judgement may be impaired. 


.


Family/friend interactions


See interval note.





Advance Directives


Living Will:  Never completed


Health Care Surrogate:  Never completed


Durable Power of :  Never completed


Advance Directive Specifics


Health Care Surrogate(s):


No known written advanced directives, family is going to try to find HCS 

paperwork they think pt previously completed.  Patient a single.  Children are 

juvenile.  If no written advanced directives, according to [place] statutes 

health care proxy decision-making falls to a parent. 


.


Significant change in goals:


NO CODE. Goals remain aggressive short of NO CODE for now. 


.





Objective





Vital Signs








  Date Time  Temp Pulse Resp B/P (MAP) Pulse Ox O2 Delivery O2 Flow Rate FiO2


 


8/30/17 11:43     100   35


 


8/30/17 08:00 98.9 87 18 125/75 (92) 100   


 


8/30/17 08:00  87      


 


8/30/17 08:00        35


 


8/30/17 07:58     100   35


 


8/30/17 06:00  92      


 


8/30/17 04:40     100   35


 


8/30/17 04:00        35


 


8/30/17 04:00  95      


 


8/30/17 04:00 99.7 95 19 137/74 (95) 98   


 


8/30/17 02:00  84      


 


8/30/17 01:02     100   35


 


8/30/17 00:00  80      


 


8/30/17 00:00        35


 


8/30/17 00:00 98.2 80 19 107/59 (75) 98   


 


8/29/17 22:00  85      


 


8/29/17 21:03     100   35


 


8/29/17 20:00        35


 


8/29/17 20:00 99.1 93 21 131/90 (104) 63   





    Arterial Line    


 


8/29/17 20:00  93      


 


8/29/17 18:00  93      


 


8/29/17 16:05     100   35


 


8/29/17 16:00 99.8 95  120/71 (87) 100   





    114/78 (90)    


 


8/29/17 16:00        35


 


8/29/17 16:00  95      


 


8/29/17 14:03     99   35


 


8/29/17 14:00  93      














Intake & Output  


 


 8/30/17 8/30/17





 07:00 19:00


 


Intake Total 1960 ml 333 ml


 


Output Total 1300 ml 


 


Balance 660 ml 333 ml


 


  


 


IV Total 800 ml 333 ml


 


Tube Feeding 560 ml 


 


Other 600 ml 


 


Output Urine Total 1000 ml 


 


Stool Total 300 ml 








Physical Exam


CONSTITUTIONAL/GENERAL: This is an adequately nourished patient, sedated on 

mechanical ventilation.


TUBES/LINES/DRAINS: ETT, NG, left subclavian central line, PIV right AC, Stokes, 

SCDs. 


SKIN: Febrile. Multiple purple lesions bilateral feet.  


ENT: Hearing appears grossly normal. Nose with NG tube right nare.  Throat 

difficult to visualize due to ETT.   


CARDIOVASCULAR: tachycardic, regular rhythm. 


RESPIRATORY/CHEST: Symmetric, unlabored respirations on vent. lungs clear. 


GASTROINTESTINAL: Abdomen soft, mild distended. Bowel sounds active. 


GENITOURINARY: Without palpable bladder distension. Stokes catheter in place. 


MUSCULOSKELETAL: Extremities without clubbing. + edema. 


NEUROLOGICAL: Awake and alert, tracking. Nods yes/no to questions. Writes in 

notebook to communicate needs. Follows simple commands. 


PSYCHIATRIC: appears anxious/ agitated.   


.





Diagnostic Tests


Laboratory





Laboratory Tests








Test


  8/28/17


04:50 8/28/17


05:04 8/29/17


05:30 8/30/17


06:36


 


Blood Gas Puncture Site DANNY    


 


Blood Gas Patient Temperature 98.6    


 


Blood Gas HCO3


  23 mmol/L


(22-26) 


  


  


 


 


Blood Gas Base Excess


  -1.6 mmol/L


(-2-2) 


  


  


 


 


Blood Gas Oxygen Saturation 97 % ()    


 


Arterial Blood pH


  7.39


(7.380-7.420) 


  


  


 


 


Arterial Blood Partial


Pressure CO2 38 mmHg


(38-42) 


  


  


 


 


Arterial Blood Partial


Pressure O2 159 mmHg


() 


  


  


 


 


Arterial Blood Oxygen Content


  14.4 Vol %


(12.0-20.0) 


  


  


 


 


Arterial Blood


Carboxyhemoglobin 1.5 % (0-4) 


  


  


  


 


 


Arterial Blood Methemoglobin 0.9 % (0-2)    


 


Blood Gas Hemoglobin


  10.4 G/DL


(12.0-16.0) 


  


  


 


 


Oxygen Delivery Device VENTILATOR    


 


Blood Gas Ventilator Setting AC14/550/+5    


 


Blood Gas Inspired Oxygen 35 %    


 


White Blood Count


  


  8.0 TH/MM3


(4.0-11.0) 7.1 TH/MM3


(4.0-11.0) 8.7 TH/MM3


(4.0-11.0)


 


Red Blood Count


  


  3.53 MIL/MM3


(4.00-5.30) 2.97 MIL/MM3


(4.00-5.30) 3.29 MIL/MM3


(4.00-5.30)


 


Hemoglobin


  


  8.5 GM/DL


(11.6-15.3) 7.4 GM/DL


(11.6-15.3) 8.0 GM/DL


(11.6-15.3)


 


Hematocrit


  


  26.8 %


(35.0-46.0) 22.3 %


(35.0-46.0) 25.2 %


(35.0-46.0)


 


Mean Corpuscular Volume


  


  75.7 FL


(80.0-100.0) 75.3 FL


(80.0-100.0) 76.5 FL


(80.0-100.0)


 


Mean Corpuscular Hemoglobin


  


  24.2 PG


(27.0-34.0) 24.8 PG


(27.0-34.0) 24.3 PG


(27.0-34.0)


 


Mean Corpuscular Hemoglobin


Concent 


  31.9 %


(32.0-36.0) 32.9 %


(32.0-36.0) 31.8 %


(32.0-36.0)


 


Red Cell Distribution Width


  


  16.5 %


(11.6-17.2) 16.6 %


(11.6-17.2) 16.8 %


(11.6-17.2)


 


Platelet Count


  


  311 TH/MM3


(150-450) 292 TH/MM3


(150-450) 353 TH/MM3


(150-450)


 


Mean Platelet Volume


  


  7.8 FL


(7.0-11.0) 8.4 FL


(7.0-11.0) 8.4 FL


(7.0-11.0)


 


Blood Urea Nitrogen


  


  33 MG/DL


(7-18) 31 MG/DL


(7-18) 29 MG/DL


(7-18)


 


Creatinine


  


  1.24 MG/DL


(0.50-1.00) 1.24 MG/DL


(0.50-1.00) 1.27 MG/DL


(0.50-1.00)


 


Random Glucose


  


  134 MG/DL


() 146 MG/DL


() 137 MG/DL


()


 


Calcium Level


  


  8.6 MG/DL


(8.5-10.1) 8.3 MG/DL


(8.5-10.1) 8.6 MG/DL


(8.5-10.1)


 


Phosphorus Level


  


  4.8 MG/DL


(2.5-4.9) 5.8 MG/DL


(2.5-4.9) 5.0 MG/DL


(2.5-4.9)


 


Magnesium Level


  


  2.6 MG/DL


(1.5-2.5) 2.6 MG/DL


(1.5-2.5) 2.7 MG/DL


(1.5-2.5)


 


Sodium Level


  


  151 MEQ/L


(136-145) 153 MEQ/L


(136-145) 153 MEQ/L


(136-145)


 


Potassium Level


  


  3.5 MEQ/L


(3.5-5.1) 3.1 MEQ/L


(3.5-5.1) 3.6 MEQ/L


(3.5-5.1)


 


Chloride Level


  


  117 MEQ/L


() 118 MEQ/L


() 118 MEQ/L


()


 


Carbon Dioxide Level


  


  26.6 MEQ/L


(21.0-32.0) 25.8 MEQ/L


(21.0-32.0) 24.6 MEQ/L


(21.0-32.0)


 


Anion Gap


  


  7 MEQ/L (5-15) 


  9 MEQ/L (5-15) 


  10 MEQ/L


(5-15)


 


Estimat Glomerular Filtration


Rate 


  49 ML/MIN


(>89) 49 ML/MIN


(>89) 48 ML/MIN


(>89)


 


Neutrophils (%) (Auto)


  


  


  79.0 %


(16.0-70.0) 82.4 %


(16.0-70.0)


 


Lymphocytes (%) (Auto)


  


  


  11.0 %


(9.0-44.0) 8.8 %


(9.0-44.0)


 


Monocytes (%) (Auto)


  


  


  7.2 %


(0.0-8.0) 6.7 %


(0.0-8.0)


 


Eosinophils (%) (Auto)


  


  


  2.1 %


(0.0-4.0) 1.6 %


(0.0-4.0)


 


Basophils (%) (Auto)


  


  


  0.7 %


(0.0-2.0) 0.5 %


(0.0-2.0)


 


Neutrophils # (Auto)


  


  


  5.6 TH/MM3


(1.8-7.7) 7.2 TH/MM3


(1.8-7.7)


 


Lymphocytes # (Auto)


  


  


  0.8 TH/MM3


(1.0-4.8) 0.8 TH/MM3


(1.0-4.8)


 


Monocytes # (Auto)


  


  


  0.5 TH/MM3


(0-0.9) 0.6 TH/MM3


(0-0.9)


 


Eosinophils # (Auto)


  


  


  0.2 TH/MM3


(0-0.4) 0.1 TH/MM3


(0-0.4)


 


Basophils # (Auto)


  


  


  0.1 TH/MM3


(0-0.2) 0.0 TH/MM3


(0-0.2)


 


CBC Comment   DIFF FINAL  DIFF FINAL 


 


Differential Comment      


 


Total Protein


  


  


  


  7.3 GM/DL


(6.4-8.2)


 


Albumin


  


  


  


  2.1 GM/DL


(3.4-5.0)


 


Alkaline Phosphatase


  


  


  


  78 U/L


()


 


Aspartate Amino Transf


(AST/SGOT) 


  


  


  18 U/L (15-37) 


 


 


Alanine Aminotransferase


(ALT/SGPT) 


  


  


  8 U/L (10-53) 


 


 


Total Bilirubin


  


  


  


  0.4 MG/DL


(0.2-1.0)








Result Diagram:  


8/30/17 0636                                                                   

             8/30/17 0636





Microbiology





Microbiology








 Date/Time


Source Procedure


Growth Status


 


 


 8/28/17 16:55


Blood Peripheral Aerobic Blood Culture - Preliminary


NO GROWTH IN 2 DAYS Resulted


 


 8/28/17 16:55


Blood Peripheral Anaerobic Blood Culture - Preliminary


NO GROWTH IN 2 DAYS Resulted





 8/28/17 16:48


Blood Peripheral Aerobic Blood Culture - Preliminary


NO GROWTH IN 2 DAYS Resulted


 


 8/28/17 16:48


Blood Peripheral Anaerobic Blood Culture - Preliminary


NO GROWTH IN 2 DAYS Resulted








Imaging


Last Impressions








Chest X-Ray 8/28/17 0600 Signed





Impressions: 





 Service Date/Time:  Monday, August 28, 2017 03:28 - CONCLUSION:  Stable chest 





 x-ray with underinflation. No acute finding is identified.     Ron Melgar MD 


 


Brain MRI 8/25/17 0000 Signed





Impressions: 





 Service Date/Time:  Friday, August 25, 2017 17:07 - CONCLUSION:  1. Small 

acute 





 or subacute cortical infarct of the left parietal lobe. 2. Small, faint area 

of 





 nonspecific flair signal abnormality in the left frontal lobe periventricular 





 white matter, nonspecific. No associated mass effect or abnormal enhancement. 

3 





 month followup MRI of the brain recommended. 3.  Mild chronic white matter 





 changes.     Ron Swan MD 


 


Head CT 8/21/17 0000 Signed





Impressions: 





 Service Date/Time:  Monday, August 21, 2017 19:47 - CONCLUSION:  No evidence 

of 





 acute infarct, hemorrhage, mass or edema.     Kristian Frankel MD 





.


Procedures


* 8/21/17 - left subclavian central placement. 


* 8/21/17 - lumbar puncture


* 8/21/17 - Intubated. 


.





Assessment and Plan


Disease Oriented Problem List:  


(1) Protein-calorie malnutrition, severe


(2) Elevated troponin


(3) Meningitis


(4) Encephalopathy


(5) Bacterial endocarditis


(6) Polysubstance abuse


(7) Sepsis


(8) Acute kidney injury


Symptom Scale:  


(1) Pain


0-10 Scale:  Unable to quantify





(2) Dyspnea


0-10 Scale:  Unable to quantify


Comment:  on vent intermittent CPAP trials. 





(3) Agitation


0-10 Scale:  Unable to quantify





Pertinent Non-Medical Issues


Psychosocial: single.  2 juvenile children. Supported by mother, stepfather and 

boyfriend. 


Spiritual: Unknown.


Legal: patient is incapacitated.  No written advanced directives.  Patient a 

single.  Children are juvenile.  According to [place] statutes health care 

proxy decision-making falls to a parent.


Ethical issues impacting care: No known concerns at this time. 


.


Important Contacts


* Kelly Le, mother: [phone]


* Won Le, stepfather: [phone]


.


Prognosis


Prognosis poor.


Code Status:  No Code


Plan


* Decision Maker: patient is incapacitated.  No written advanced directives. 

Patient a single. Children are juvenile. According to [place] statutes health 

care proxy decision-making falls to a parent. 


* NO CODE 


* 8/30/17 - Met with patient and her boyfriend, she does not appear to be able 

to make her own decisions, has some insight though judgement cannot be 

determined because she refuses to answer questions regarding medical decisions. 

Will continue to follow. 


* SYMPTOMS: Pain: nods yes to pain in  throat, chest and legs. On Fentanyl 50 

and Versed 5. Potential sources include endocarditis, bedbound status, tubes 

etc.  Patient with likely high tolerance given history of IV drug use, will 

monitor effective medications. Recommend Methadone 5 mg PO every 12 hours, will 

discuss dose with Dr. Bustos. Dyspnea: remains sedated on mechanical ventilation.

  


* Palliative care will continue to follow throughout hospital course to assist 

with symptom management and clarification of goals as needed. 


.





Attestation


To help prompt me to consider important information that might be impacting 

today's encounter and assessment, information from prior notes written by 

myself or my colleagues may have been "brought forward" into today's note.  My 

signature on this note, however, is an attestation that I personally performed 

the exam, history, and/or decision-making noted today, and, unless otherwise 

indicated, the interactions with patient, family, and staff as well as the 

review of records all occurred today.  I also attest that the listed assessment 

and stated plan reflect my best clinical judgment today based on the 

combination of historical information, prior notes, and today's exam/ 

interactions.  When time spent is documented, it refers only to time spent 

today by the signer, or if indicated, combined time spent today by 

collaborating physician/nurse practitioner.











Ileana Dang Aug 30, 2017 13:55

## 2017-08-30 NOTE — HHI.CCPN
Subjective


Remarks/Hospital Course


Hospital Course:





37-year-old  female who presents initially as a stroke alert.  

According to EMS report the patient has a history of fungal endocarditis with 

previous IVDA.  EMS states that the patient is currently on hospice, however, 

they are unsure if the patient is a DNR.  EMS states the patient apparently 

complained of a headache earlier today, was found lying on the floor and 

incontinent of stool at approximate, last seen normal at 6:45 PM.  Therefore, 

EMS called a stroke alert in the field as the patient was aphasic and confused.

  Upon arrival the patient groans, moves all 4 extremities and withdraws all 4 

extremities, but is unable to provide any information.  She was intubated in 

the emergency department by ER attending for airway protection.





8/22: remains encephalopathic. Blood cultures growing Staph in 4/4 bottles. 


8/23:  Remains sedated/ encephalopathic, orally intubated on mechanical 

ventilation.


8/24: still in shock on vasopressors. grossly volume overloaded and 

intravascularly volume overloaded based on echo and clinically. needs 

aggressive diuresis, but still in shock. family made patient DNR last night, 

but want to continue aggressive therapies. still encephalopathic. no 

significant improvements.


8/25: seen and examined at around 06:45am. delayed note entry. remains 

encephalopathic. no significant changes. unlikely to survive hospital stay, no 

additional overall therapies. family wants aggressive treatment. diuresing on 

bumex drip.    net 3.3L negative. still volume overloaded.


8/26: Tmax 102.8 This am patient went into SVT rhythm heart rate 190's.  

Cooling blanket placed, ice packs in place, metoprolol 5 mg IV push given, 

resolution heart rate mid 90s, MAP ranges 66-68.  Ofirmev ordered.  Bumex 

infusion discontinued secondary to elevation in creatinine.  Chest x-ray 

pending.


8/27: Continues to be febrile, continues on cooling blanket.  Propofol was 

discontinued secondary to hypotension.  Today the patient continues on Versed 

and fentanyl infusions with the RASS -2.  Patient currently GCS of 11 T, 

responsive.  The patient tolerated CPAP trials for approximately 2.5 hours for 

the last 2 days.


8/28:  Somewhat agitated this AM.  Remains in sinus tachycardia.  On PSV trials 

1.5 hours today.  Tolerating tube feeding.  Tmax 101.4.


8/29:  Low-grade fevers overnight.  Currently afebrile.  Remains on Precedex, 

Versed and fentanyl drips for sedation.  On PSV trial 2 hours so far.  

Tolerating tube feeds.





Subjective:





8/30:  Regular.  Requesting extubation DO NOT INTUBATE hospice today.  Patient 

actually is doing well on her weaning trials.  If passes wean parameters.  We'

ll attempt extubation today.





Objective





Vital Signs








  Date Time  Temp Pulse Resp B/P (MAP) Pulse Ox O2 Delivery O2 Flow Rate FiO2


 


8/30/17 11:43     100   35


 


8/30/17 08:00 98.9 87 18 125/75 (92)    














Intake and Output   


 


 8/30/17 8/30/17 8/30/17





 07:59 15:59 23:59


 


Intake Total 1660 ml 123 ml 


 


Output Total 1300 ml  


 


Balance 360 ml 123 ml 








Result Diagram:  


8/30/17 0636                                                                   

             8/30/17 0636





Other Results





Microbiology








 Date/Time


Source Procedure


Growth Status


 


 


 8/28/17 16:55


Blood Peripheral Aerobic Blood Culture - Preliminary


NO GROWTH IN 2 DAYS Resulted


 


 8/28/17 16:55


Blood Peripheral Anaerobic Blood Culture - Preliminary


NO GROWTH IN 2 DAYS Resulted





 8/21/17 22:00


Cerebral Spinal Fluid Lumbar Puncture Fungal Smear - Final


NO FUNGAL ELEMENTS SEEN. Resulted


 


 8/21/17 22:00


Cerebral Spinal Fluid Lumbar Puncture Fungal Culture - Preliminary


NO GROWTH IN 1 WEEK Resulted





 8/24/17 21:15


Sputum Endotracheal Gram Stain - Final Complete


 


 8/24/17 21:15


Sputum Endotracheal Sputum Culture - Final


RARE GROWTH NORMAL RESPIRATORY MYLENE Complete








Imaging





Last Impressions








Chest X-Ray 8/28/17 0600 Signed





Impressions: 





 Service Date/Time:  Monday, August 28, 2017 03:28 - CONCLUSION:  Stable chest 





 x-ray with underinflation. No acute finding is identified.     Ron Melgar MD 


 


Brain MRI 8/25/17 0000 Signed





Impressions: 





 Service Date/Time:  Friday, August 25, 2017 17:07 - CONCLUSION:  1. Small 

acute 





 or subacute cortical infarct of the left parietal lobe. 2. Small, faint area 

of 





 nonspecific flair signal abnormality in the left frontal lobe periventricular 





 white matter, nonspecific. No associated mass effect or abnormal enhancement. 

3 





 month followup MRI of the brain recommended. 3.  Mild chronic white matter 





 changes.     Ron Swan MD 


 


Head CT 8/21/17 0000 Signed





Impressions: 





 Service Date/Time:  Monday, August 21, 2017 19:47 - CONCLUSION:  No evidence 

of 





 acute infarct, hemorrhage, mass or edema.     Kristian Frankel MD 








Objective Remarks


GENERAL: 37-year-old  female, critically ill currently resting in bed 

orotracheally intubated


SKIN: Warm and dry.  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


HEAD: Normocephalic.


EYES: No scleral icterus. No injection or drainage. 


NECK:  trachea midline.  No JVD


CARDIOVASCULAR: Tachycardic, RR.  S1, S2 no S4.  Without murmur


RESPIRATORY: Essentially clear to auscultation without wheezes rales or rhonchi


GASTROINTESTINAL: Abdomen soft, non-tender, nondistended.  no guarding.


MUSCULOSKELETAL: 1 + peripheral edema.  Generalized anasarca.  Multiple 

petechiae and ecchymotic bruising secondary to most likely septic emboli


NEURO EXAM: GCS 11T. Follows commands, moves extremities 4.  Motor strength 5/

5 bilateral upper and lower extremity.  Cranial nerves II-XII grossly intact.





A/P


Assessment and Plan


Neuro/Psych 





Acute Encephalopathy


Acute meningitis, possible bacterial


Left parietal/frontal CVA





Patient is currently on Versed drip at 10 mg an hour fentanyl drip at 250 mcg 

an hour for sedation/analgesia while intubated


Goal of RA SS -2


Daily sedation vacation


- LP with elevated protein, and low glucose relative to serum glucose.


- CT head negative


-MRI-small acute and subacute cortical infarct of left parietal lobe


- Neuro checks per unit protocol


Acetaminophen for fever


Evaluated by neurology





Resp:





Acute hypoxic and hypercarbic Respiratory failure 





ACV 14/555/35


Ventilator bundle


As needed albuterol therapy


Spontaneous breathing trials daily currently 8/5 and 35%





Cardiac





SVT


Sinus tachycardia


Mixed Septic/cardiogenic Shock


Acute congestive heart failure secondary to valvulopathy


Tricuspid Regurgitation





- Metoprolol 2.5 mg every 6 hours when necessary heart rate greater than 100 bpm


Echocardiogram 8/17 revealed EF 25-30%.  PAP 34 mmHg.


Currently off all vasopressors.





ID:





History of fungal endocarditis


Staph Bacteremia


Tricuspid valve infective endocarditis





- History of IVDA


- Broad-spectrum antibiotic with oxiaccillin, ampicillin, gentamicin and 

rifampin


- Infectious disease following, Dr. Teague





RENAL:





Acute Kidney Injury - resolved





-secondary to shock


- Strict I and O's


- molina catheter





GI"





Acute Protein calorie malnutrition- severe





- secondary to long-standing IVDA and endocarditis


- continue TF Jevity 1.5 currently at goal 55 cc/hour-minimal  residuals


Famotidine for GI prophylaxis


Docusate sodium/senna for bowel regimen





HEME: 





Microcytic anemia





Monitor CBC daily.  Follow trends





Endo





Hyperglycemia critical illness





Sliding scale insulin to maintain euglycemia





FEN:





Hypernatremia





Additional free water flushes 300 every 8 hours.  Add a quarter normal saline 

2 L.  Recheck in AM.





DVT GI prophylaxis


- Teds SCDs


- Subcutaneous heparin


-Famotidine


Dispo:


Discussed with ICU RN and significant other at bedside.


Level II follow-up











Bryson Bustos MD Aug 30, 2017 12:36

## 2017-08-31 VITALS
TEMPERATURE: 99.2 F | DIASTOLIC BLOOD PRESSURE: 75 MMHG | RESPIRATION RATE: 16 BRPM | SYSTOLIC BLOOD PRESSURE: 111 MMHG | OXYGEN SATURATION: 98 % | HEART RATE: 82 BPM

## 2017-08-31 VITALS
RESPIRATION RATE: 18 BRPM | HEART RATE: 97 BPM | SYSTOLIC BLOOD PRESSURE: 122 MMHG | OXYGEN SATURATION: 98 % | TEMPERATURE: 100 F | DIASTOLIC BLOOD PRESSURE: 75 MMHG

## 2017-08-31 VITALS
TEMPERATURE: 98.7 F | RESPIRATION RATE: 19 BRPM | OXYGEN SATURATION: 100 % | DIASTOLIC BLOOD PRESSURE: 76 MMHG | SYSTOLIC BLOOD PRESSURE: 112 MMHG | HEART RATE: 90 BPM

## 2017-08-31 VITALS
HEART RATE: 99 BPM | DIASTOLIC BLOOD PRESSURE: 82 MMHG | TEMPERATURE: 98.9 F | OXYGEN SATURATION: 99 % | RESPIRATION RATE: 21 BRPM | SYSTOLIC BLOOD PRESSURE: 138 MMHG

## 2017-08-31 VITALS — HEART RATE: 100 BPM

## 2017-08-31 VITALS
DIASTOLIC BLOOD PRESSURE: 86 MMHG | RESPIRATION RATE: 20 BRPM | TEMPERATURE: 99 F | SYSTOLIC BLOOD PRESSURE: 131 MMHG | HEART RATE: 100 BPM | OXYGEN SATURATION: 98 %

## 2017-08-31 VITALS — OXYGEN SATURATION: 99 %

## 2017-08-31 VITALS
SYSTOLIC BLOOD PRESSURE: 120 MMHG | DIASTOLIC BLOOD PRESSURE: 71 MMHG | OXYGEN SATURATION: 91 % | RESPIRATION RATE: 22 BRPM | TEMPERATURE: 99.9 F | HEART RATE: 100 BPM

## 2017-08-31 VITALS — OXYGEN SATURATION: 100 %

## 2017-08-31 VITALS — OXYGEN SATURATION: 97 %

## 2017-08-31 VITALS — HEART RATE: 68 BPM

## 2017-08-31 VITALS — HEART RATE: 81 BPM

## 2017-08-31 VITALS — HEART RATE: 92 BPM

## 2017-08-31 VITALS — HEART RATE: 96 BPM

## 2017-08-31 VITALS — HEART RATE: 87 BPM

## 2017-08-31 LAB
ANION GAP SERPL CALC-SCNC: 11 MEQ/L (ref 5–15)
BUN SERPL-MCNC: 26 MG/DL (ref 7–18)
CHLORIDE SERPL-SCNC: 115 MEQ/L (ref 98–107)
ERYTHROCYTE [DISTWIDTH] IN BLOOD BY AUTOMATED COUNT: 16.5 % (ref 11.6–17.2)
GFR SERPLBLD BASED ON 1.73 SQ M-ARVRAT: 50 ML/MIN (ref 89–?)
HCO3 BLD-SCNC: 24 MEQ/L (ref 21–32)
HCT VFR BLD CALC: 24 % (ref 35–46)
MCH RBC QN AUTO: 23.9 PG (ref 27–34)
MCHC RBC AUTO-ENTMCNC: 31.6 % (ref 32–36)
MCV RBC AUTO: 75.5 FL (ref 80–100)
PLATELET # BLD: 419 TH/MM3 (ref 150–450)
POTASSIUM SERPL-SCNC: 3.4 MEQ/L (ref 3.5–5.1)
RBC # BLD AUTO: 3.18 MIL/MM3 (ref 4–5.3)
REVIEW FLAG: (no result)
SODIUM SERPL-SCNC: 150 MEQ/L (ref 136–145)
WBC # BLD AUTO: 8.5 TH/MM3 (ref 4–11)

## 2017-08-31 PROCEDURE — 0BH17EZ INSERTION OF ENDOTRACHEAL AIRWAY INTO TRACHEA, VIA NATURAL OR ARTIFICIAL OPENING: ICD-10-PCS | Performed by: INTERNAL MEDICINE

## 2017-08-31 PROCEDURE — 5A1945Z RESPIRATORY VENTILATION, 24-96 CONSECUTIVE HOURS: ICD-10-PCS | Performed by: INTERNAL MEDICINE

## 2017-08-31 RX ADMIN — HEPARIN SODIUM SCH UNITS: 10000 INJECTION, SOLUTION INTRAVENOUS; SUBCUTANEOUS at 21:35

## 2017-08-31 RX ADMIN — POLYVINYL ALCOHOL SCH DROP: 14 SOLUTION/ DROPS OPHTHALMIC at 18:00

## 2017-08-31 RX ADMIN — Medication SCH ML: at 07:59

## 2017-08-31 RX ADMIN — OXACILLIN SODIUM SCH MLS/HR: 2 INJECTION, POWDER, FOR SOLUTION INTRAMUSCULAR; INTRAVENOUS at 01:09

## 2017-08-31 RX ADMIN — RIFAMPIN SCH MLS/HR: 600 INJECTION, POWDER, LYOPHILIZED, FOR SOLUTION INTRAVENOUS at 07:58

## 2017-08-31 RX ADMIN — FAMOTIDINE SCH MG: 10 INJECTION, SOLUTION INTRAVENOUS at 20:30

## 2017-08-31 RX ADMIN — HEPARIN SODIUM SCH UNITS: 10000 INJECTION, SOLUTION INTRAVENOUS; SUBCUTANEOUS at 12:01

## 2017-08-31 RX ADMIN — GENTAMICIN SULFATE SCH MLS/HR: 0.8 INJECTION, SOLUTION INTRAVENOUS at 13:20

## 2017-08-31 RX ADMIN — RIFAMPIN SCH MG: 150 CAPSULE ORAL at 20:27

## 2017-08-31 RX ADMIN — OXACILLIN SODIUM SCH MLS/HR: 2 INJECTION, POWDER, FOR SOLUTION INTRAMUSCULAR; INTRAVENOUS at 18:33

## 2017-08-31 RX ADMIN — FAMOTIDINE SCH MG: 10 INJECTION, SOLUTION INTRAVENOUS at 08:00

## 2017-08-31 RX ADMIN — OXACILLIN SODIUM SCH MLS/HR: 2 INJECTION, POWDER, FOR SOLUTION INTRAMUSCULAR; INTRAVENOUS at 21:35

## 2017-08-31 RX ADMIN — OXACILLIN SODIUM SCH MLS/HR: 2 INJECTION, POWDER, FOR SOLUTION INTRAMUSCULAR; INTRAVENOUS at 05:07

## 2017-08-31 RX ADMIN — STANDARDIZED SENNA CONCENTRATE AND DOCUSATE SODIUM SCH TAB: 8.6; 5 TABLET, FILM COATED ORAL at 20:30

## 2017-08-31 RX ADMIN — SODIUM CHLORIDE SCH MLS/HR: 234 INJECTION INTRAMUSCULAR; INTRAVENOUS; SUBCUTANEOUS at 01:09

## 2017-08-31 RX ADMIN — AMPICILLIN SODIUM SCH MLS/HR: 2 INJECTION, POWDER, FOR SOLUTION INTRAMUSCULAR; INTRAVENOUS at 04:26

## 2017-08-31 RX ADMIN — DEXAMETHASONE SODIUM PHOSPHATE SCH MG: 4 INJECTION, SOLUTION INTRAMUSCULAR; INTRAVENOUS at 13:20

## 2017-08-31 RX ADMIN — AMPICILLIN SODIUM SCH MLS/HR: 2 INJECTION, POWDER, FOR SOLUTION INTRAMUSCULAR; INTRAVENOUS at 07:58

## 2017-08-31 RX ADMIN — AMPICILLIN SODIUM SCH MLS/HR: 2 INJECTION, POWDER, FOR SOLUTION INTRAMUSCULAR; INTRAVENOUS at 15:59

## 2017-08-31 RX ADMIN — OXACILLIN SODIUM SCH MLS/HR: 2 INJECTION, POWDER, FOR SOLUTION INTRAMUSCULAR; INTRAVENOUS at 15:59

## 2017-08-31 RX ADMIN — STANDARDIZED SENNA CONCENTRATE AND DOCUSATE SODIUM SCH TAB: 8.6; 5 TABLET, FILM COATED ORAL at 08:00

## 2017-08-31 RX ADMIN — Medication SCH ML: at 20:29

## 2017-08-31 RX ADMIN — POLYVINYL ALCOHOL SCH DROP: 14 SOLUTION/ DROPS OPHTHALMIC at 07:57

## 2017-08-31 RX ADMIN — POTASSIUM CHLORIDE PRN MLS/HR: 400 INJECTION, SOLUTION INTRAVENOUS at 08:06

## 2017-08-31 RX ADMIN — POLYVINYL ALCOHOL SCH DROP: 14 SOLUTION/ DROPS OPHTHALMIC at 13:00

## 2017-08-31 RX ADMIN — Medication SCH ML: at 05:07

## 2017-08-31 RX ADMIN — DEXAMETHASONE SODIUM PHOSPHATE SCH MG: 4 INJECTION, SOLUTION INTRAMUSCULAR; INTRAVENOUS at 21:35

## 2017-08-31 RX ADMIN — MIDAZOLAM HYDROCHLORIDE PRN MLS/HR: 5 INJECTION, SOLUTION INTRAMUSCULAR; INTRAVENOUS at 13:55

## 2017-08-31 RX ADMIN — Medication SCH ML: at 21:37

## 2017-08-31 RX ADMIN — AMPICILLIN SODIUM SCH MLS/HR: 2 INJECTION, POWDER, FOR SOLUTION INTRAMUSCULAR; INTRAVENOUS at 20:29

## 2017-08-31 RX ADMIN — AMPICILLIN SODIUM SCH MLS/HR: 2 INJECTION, POWDER, FOR SOLUTION INTRAMUSCULAR; INTRAVENOUS at 13:21

## 2017-08-31 RX ADMIN — Medication SCH ML: at 13:21

## 2017-08-31 RX ADMIN — OXACILLIN SODIUM SCH MLS/HR: 2 INJECTION, POWDER, FOR SOLUTION INTRAMUSCULAR; INTRAVENOUS at 12:01

## 2017-08-31 RX ADMIN — CHLORHEXIDINE GLUCONATE SCH PACK: 500 CLOTH TOPICAL at 04:00

## 2017-08-31 NOTE — HHI.CCPN
Subjective


Remarks/Hospital Course


Hospital Course:





37-year-old  female who presents initially as a stroke alert.  

According to EMS report the patient has a history of fungal endocarditis with 

previous IVDA.  EMS states that the patient is currently on hospice, however, 

they are unsure if the patient is a DNR.  EMS states the patient apparently 

complained of a headache earlier today, was found lying on the floor and 

incontinent of stool at approximate, last seen normal at 6:45 PM.  Therefore, 

EMS called a stroke alert in the field as the patient was aphasic and confused.

  Upon arrival the patient groans, moves all 4 extremities and withdraws all 4 

extremities, but is unable to provide any information.  She was intubated in 

the emergency department by ER attending for airway protection.





8/22: remains encephalopathic. Blood cultures growing Staph in 4/4 bottles. 


8/23:  Remains sedated/ encephalopathic, orally intubated on mechanical 

ventilation.


8/24: still in shock on vasopressors. grossly volume overloaded and 

intravascularly volume overloaded based on echo and clinically. needs 

aggressive diuresis, but still in shock. family made patient DNR last night, 

but want to continue aggressive therapies. still encephalopathic. no 

significant improvements.


8/25: seen and examined at around 06:45am. delayed note entry. remains 

encephalopathic. no significant changes. unlikely to survive hospital stay, no 

additional overall therapies. family wants aggressive treatment. diuresing on 

bumex drip.    net 3.3L negative. still volume overloaded.


8/26: Tmax 102.8 This am patient went into SVT rhythm heart rate 190's.  

Cooling blanket placed, ice packs in place, metoprolol 5 mg IV push given, 

resolution heart rate mid 90s, MAP ranges 66-68.  Ofirmev ordered.  Bumex 

infusion discontinued secondary to elevation in creatinine.  Chest x-ray 

pending.


8/27: Continues to be febrile, continues on cooling blanket.  Propofol was 

discontinued secondary to hypotension.  Today the patient continues on Versed 

and fentanyl infusions with the RASS -2.  Patient currently GCS of 11 T, 

responsive.  The patient tolerated CPAP trials for approximately 2.5 hours for 

the last 2 days.


8/28:  Somewhat agitated this AM.  Remains in sinus tachycardia.  On PSV trials 

1.5 hours today.  Tolerating tube feeding.  Tmax 101.4.


8/29:  Low-grade fevers overnight.  Currently afebrile.  Remains on Precedex, 

Versed and fentanyl drips for sedation.  On PSV trial 2 hours so far.  

Tolerating tube feeds.


8/30:  Regular.  Requesting extubation DO NOT INTUBATE hospice today.  Patient 

actually is doing well on her weaning trials.  If passes wean parameters.  We'

ll attempt extubation today.





Subjective:





8/31:  Extubated yesterday approximately 1 hour 30 minutes.  During that time, 

patient stated she wanted to be a full code.  This was in conflict which she 

had stated earlier while on Precedex drip.  Patient is currently full code.  

Possible stridor according to RN which brought about intubation by overnight 

intensivist.  Currently awake and alert and following commands room





Objective





Vital Signs








  Date Time  Temp Pulse Resp B/P (MAP) Pulse Ox O2 Delivery O2 Flow Rate FiO2


 


8/31/17 07:39     100   35


 


8/31/17 06:00  92      


 


8/31/17 04:00 99.2  16 111/75 (87)    














Intake and Output   


 


 8/31/17 8/31/17 9/1/17





 08:00 16:00 00:00


 


Intake Total 2125 ml  


 


Output Total 850 ml  


 


Balance 1275 ml  








Result Diagram:  


8/31/17 0537                                                                   

             8/31/17 0537





Other Results





Microbiology








 Date/Time


Source Procedure


Growth Status


 


 


 8/28/17 16:55


Blood Peripheral Aerobic Blood Culture - Preliminary


NO GROWTH IN 2 DAYS Resulted


 


 8/28/17 16:55


Blood Peripheral Anaerobic Blood Culture - Preliminary


NO GROWTH IN 2 DAYS Resulted





 8/21/17 22:00


Cerebral Spinal Fluid Lumbar Puncture Fungal Smear - Final


NO FUNGAL ELEMENTS SEEN. Resulted


 


 8/21/17 22:00


Cerebral Spinal Fluid Lumbar Puncture Fungal Culture - Preliminary


NO GROWTH IN 1 WEEK Resulted





 8/24/17 21:15


Sputum Endotracheal Gram Stain - Final Complete


 


 8/24/17 21:15


Sputum Endotracheal Sputum Culture - Final


RARE GROWTH NORMAL RESPIRATORY MYLENE Complete








Imaging





Last Impressions








Chest X-Ray 8/28/17 0600 Signed





Impressions: 





 Service Date/Time:  Monday, August 28, 2017 03:28 - CONCLUSION:  Stable chest 





 x-ray with underinflation. No acute finding is identified.     Ron Melgar MD 


 


Brain MRI 8/25/17 0000 Signed





Impressions: 





 Service Date/Time:  Friday, August 25, 2017 17:07 - CONCLUSION:  1. Small 

acute 





 or subacute cortical infarct of the left parietal lobe. 2. Small, faint area 

of 





 nonspecific flair signal abnormality in the left frontal lobe periventricular 





 white matter, nonspecific. No associated mass effect or abnormal enhancement. 

3 





 month followup MRI of the brain recommended. 3.  Mild chronic white matter 





 changes.     Ron Swan MD 


 


Head CT 8/21/17 0000 Signed





Impressions: 





 Service Date/Time:  Monday, August 21, 2017 19:47 - CONCLUSION:  No evidence 

of 





 acute infarct, hemorrhage, mass or edema.     Kristian Frankel MD 








Objective Remarks


GENERAL: 37-year-old  female, critically ill currently resting in bed 

orotracheally intubated


SKIN: Warm and dry.  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


HEAD: Normocephalic.


EYES: No scleral icterus. No injection or drainage. 


NECK:  trachea midline.  No JVD


CARDIOVASCULAR: Tachycardic, RR.  S1, S2 no S4.  Without murmur


RESPIRATORY: Essentially clear to auscultation without wheezes rales or rhonchi


GASTROINTESTINAL: Abdomen soft, non-tender, nondistended.  no guarding.


MUSCULOSKELETAL: 1 + peripheral edema.  Generalized anasarca.  Multiple 

petechiae and ecchymotic bruising secondary to most likely septic emboli


NEURO EXAM: GCS 11T. Follows commands, moves extremities 4.  Motor strength 5/

5 bilateral upper and lower extremity.  Cranial nerves II-XII grossly intact.





A/P


Assessment and Plan


Neuro/Psych 





Acute Encephalopathy


Acute meningitis, possible bacterial


Left parietal/frontal CVA





Patient is currently on Versed drip at 10 mg an hour fentanyl drip at 250 mcg 

an hour for sedation/analgesia while intubated


Goal of RA SS -2


Daily sedation vacation


- LP with elevated protein, and low glucose relative to serum glucose.


- CT head negative


-MRI-small acute and subacute cortical infarct of left parietal lobe


- Neuro checks per unit protocol


Acetaminophen for fever


Evaluated by neurology





Resp:





Acute hypoxic and hypercarbic Respiratory failure 





ACV 14/555/35


Ventilator bundle


As needed albuterol therapy


Spontaneous breathing trials daily ordered 


CT neck rule out airway edema





Cardiac





SVT


Sinus tachycardia


Mixed Septic/cardiogenic Shock


Acute congestive heart failure secondary to valvulopathy


Tricuspid Regurgitation





- Metoprolol 2.5 mg every 6 hours when necessary heart rate greater than 100 bpm


Echocardiogram 8/17 revealed EF 25-30%.  PAP 34 mmHg.


Currently off all vasopressors.





ID:





History of fungal endocarditis


Staph Bacteremia


Tricuspid valve infective endocarditis





- History of IVDA


- Broad-spectrum antibiotic with oxiaccillin, ampicillin, gentamicin and 

rifampin


- Infectious disease following, Dr. Teague





RENAL:





Acute Kidney Injury - resolved





-secondary to shock


- Strict I and O's


- molina catheter





GI"





Acute Protein calorie malnutrition- severe





- secondary to long-standing IVDA and endocarditis


- continue TF Jevity 1.5 currently at goal 55 cc/hour-minimal  residuals


Famotidine for GI prophylaxis


Docusate sodium/senna for bowel regimen





HEME: 





Microcytic anemia





Monitor CBC daily.  Follow trends





Endo





Hyperglycemia critical illness





Sliding scale insulin to maintain euglycemia





FEN:





Hypernatremia





Additional free water flushes 300 every 8 hours.  Add a quarter normal saline 

2 L.  Recheck in AM.





DVT GI prophylaxis


- Teds SCDs


- Subcutaneous heparin


-Famotidine


Dispo:


Discussed with ICU RN and significant other at bedside.


Level II follow-up











Bryson Bustos MD Aug 31, 2017 10:11

## 2017-08-31 NOTE — HHI.HCPN
Reason for visit


   a.  To assist with evaluation and management of symptoms including: dyspnea, 


   b.  To assist medical decision maker(s) with: better understanding of 

current medical conditions; weighing benefits/burdens of medical treatment 

options; making        


        medical treatment decisions.


.





Subjective/Interval History


Patient seen and examine in ICU.  Eyes open, alert and interactive following 

commands.  Able to nod yes and no to questions, and able to write a bit.   She 

is able to nod yes correctly if she was in [place].  She is able to shake no if 

the andrew is green color on a clear ellie day.  Pt had a change of mind in terms 

of code status, and change to full code.





On my visit, ID physician also present part of the time, and pt was able to 

follow commands.  ID spoke to pt about her medical condition and possibly need 

long term to even life long abx.  She did say repeat culture is negative. 

Unless more positive blood clx anticipate 6 week from 1st neg (8/24) followed 

by likey, indefinite oral abx suppression tx 





I spoke with patient about goals of care.  Her main challenges including her 

secretions.  She endores if she was extubated again, and need reintubation, she 

would do it.  She endorse she is willing to take tracheostomy if necessary.  

Goals are aggressive.  I ask her if it was okay I called her mother and 

informed of her clinical status and decision she state yes.  She's also is 

amenable for mother to make medical decisions should she become incapacitated.


Family/friend interactions


Spoke with mother and updated her on her decision.  She is grateful for phone 

call.





Advance Directives


Living Will:  Never completed


Health Care Surrogate:  Never completed


Durable Power of :  Never completed


Advance Directive Specifics


Health Care Surrogate(s):


No known written advanced directives, family is going to try to find HCS 

paperwork they think pt previously completed.  Patient a single.  Children are 

juvenile.  If no written advanced directives, according to [place] statutes 

health care proxy decision-making falls to a parent. 


.





Objective





Vital Signs








  Date Time  Temp Pulse Resp B/P (MAP) Pulse Ox O2 Delivery O2 Flow Rate FiO2


 


8/31/17 10:54     99   35


 


8/31/17 08:00  97      


 


8/31/17 08:00        35


 


8/31/17 08:00 100.0 97 18 122/75 (91) 98   


 


8/31/17 07:39     100   35


 


8/31/17 06:00  92      


 


8/31/17 04:04     99   35


 


8/31/17 04:00        35


 


8/31/17 04:00 99.2 82 16 111/75 (87) 98   


 


8/31/17 04:00  82      


 


8/31/17 02:07     99   35


 


8/31/17 02:00  96      


 


8/31/17 00:00  90      


 


8/31/17 00:00        35


 


8/31/17 00:00 98.7 90 19 112/76 (88) 100   


 


8/30/17 23:05     100   35


 


8/30/17 22:00  88      


 


8/30/17 22:00        40


 


8/30/17 20:30     100   40


 


8/30/17 20:00        50


 


8/30/17 20:00  101      


 


8/30/17 20:00 99.0 101 19 101/63 (76) 100   


 


8/30/17 18:34     93   50


 


8/30/17 18:00  111      


 


8/30/17 16:00 100.2 93 20 126/81 (96) 100   


 


8/30/17 16:00  93      


 


8/30/17 14:00  107      














Intake & Output  


 


 8/31/17 8/31/17





 07:00 19:00


 


Intake Total 2536 ml 


 


Output Total 850 ml 


 


Balance 1686 ml 


 


  


 


IV Total 1936 ml 


 


Other 600 ml 


 


Output Urine Total 850 ml 








Physical Exam


CONSTITUTIONAL/GENERAL: This is an adequately nourished patient, on mechanical 

ventilation.


TUBES/LINES/DRAINS: ETT, NG, left subclavian central line, PIV right AC, Stokes, 

SCDs. 


SKIN: Febrile. Multiple purple lesions bilateral feet.  


ENT: Hearing appears grossly normal. Nose with NG tube right nare.  Throat 

difficult to visualize due to ETT.   


CARDIOVASCULAR: tachycardic, regular rhythm. 


RESPIRATORY/CHEST: Symmetric, unlabored respirations on vent. lungs clear. 


GASTROINTESTINAL: Abdomen soft, mild distended. Bowel sounds active. 


GENITOURINARY: Without palpable bladder distension. Stokes catheter in place. 


MUSCULOSKELETAL: Extremities without clubbing. + edema. 


NEUROLOGICAL: Awake and alert, tracking. Nods yes/no to questions. Writes in 

notebook to communicate needs. Follows commands. 


PSYCHIATRIC: somewhat exious..   


.





Diagnostic Tests


Laboratory





Laboratory Tests








Test


  8/29/17


05:30 8/30/17


06:36 8/31/17


05:37 8/31/17


12:07


 


White Blood Count


  7.1 TH/MM3


(4.0-11.0) 8.7 TH/MM3


(4.0-11.0) 8.5 TH/MM3


(4.0-11.0) 


 


 


Red Blood Count


  2.97 MIL/MM3


(4.00-5.30) 3.29 MIL/MM3


(4.00-5.30) 3.18 MIL/MM3


(4.00-5.30) 


 


 


Hemoglobin


  7.4 GM/DL


(11.6-15.3) 8.0 GM/DL


(11.6-15.3) 7.6 GM/DL


(11.6-15.3) 


 


 


Hematocrit


  22.3 %


(35.0-46.0) 25.2 %


(35.0-46.0) 24.0 %


(35.0-46.0) 


 


 


Mean Corpuscular Volume


  75.3 FL


(80.0-100.0) 76.5 FL


(80.0-100.0) 75.5 FL


(80.0-100.0) 


 


 


Mean Corpuscular Hemoglobin


  24.8 PG


(27.0-34.0) 24.3 PG


(27.0-34.0) 23.9 PG


(27.0-34.0) 


 


 


Mean Corpuscular Hemoglobin


Concent 32.9 %


(32.0-36.0) 31.8 %


(32.0-36.0) 31.6 %


(32.0-36.0) 


 


 


Red Cell Distribution Width


  16.6 %


(11.6-17.2) 16.8 %


(11.6-17.2) 16.5 %


(11.6-17.2) 


 


 


Platelet Count


  292 TH/MM3


(150-450) 353 TH/MM3


(150-450) 419 TH/MM3


(150-450) 


 


 


Mean Platelet Volume


  8.4 FL


(7.0-11.0) 8.4 FL


(7.0-11.0) 8.0 FL


(7.0-11.0) 


 


 


Neutrophils (%) (Auto)


  79.0 %


(16.0-70.0) 82.4 %


(16.0-70.0) 


  


 


 


Lymphocytes (%) (Auto)


  11.0 %


(9.0-44.0) 8.8 %


(9.0-44.0) 


  


 


 


Monocytes (%) (Auto)


  7.2 %


(0.0-8.0) 6.7 %


(0.0-8.0) 


  


 


 


Eosinophils (%) (Auto)


  2.1 %


(0.0-4.0) 1.6 %


(0.0-4.0) 


  


 


 


Basophils (%) (Auto)


  0.7 %


(0.0-2.0) 0.5 %


(0.0-2.0) 


  


 


 


Neutrophils # (Auto)


  5.6 TH/MM3


(1.8-7.7) 7.2 TH/MM3


(1.8-7.7) 


  


 


 


Lymphocytes # (Auto)


  0.8 TH/MM3


(1.0-4.8) 0.8 TH/MM3


(1.0-4.8) 


  


 


 


Monocytes # (Auto)


  0.5 TH/MM3


(0-0.9) 0.6 TH/MM3


(0-0.9) 


  


 


 


Eosinophils # (Auto)


  0.2 TH/MM3


(0-0.4) 0.1 TH/MM3


(0-0.4) 


  


 


 


Basophils # (Auto)


  0.1 TH/MM3


(0-0.2) 0.0 TH/MM3


(0-0.2) 


  


 


 


CBC Comment DIFF FINAL  DIFF FINAL   


 


Differential Comment      


 


Blood Urea Nitrogen


  31 MG/DL


(7-18) 29 MG/DL


(7-18) 26 MG/DL


(7-18) 


 


 


Creatinine


  1.24 MG/DL


(0.50-1.00) 1.27 MG/DL


(0.50-1.00) 1.22 MG/DL


(0.50-1.00) 


 


 


Random Glucose


  146 MG/DL


() 137 MG/DL


() 89 MG/DL


() 


 


 


Calcium Level


  8.3 MG/DL


(8.5-10.1) 8.6 MG/DL


(8.5-10.1) 8.9 MG/DL


(8.5-10.1) 


 


 


Phosphorus Level


  5.8 MG/DL


(2.5-4.9) 5.0 MG/DL


(2.5-4.9) 


  


 


 


Magnesium Level


  2.6 MG/DL


(1.5-2.5) 2.7 MG/DL


(1.5-2.5) 


  


 


 


Sodium Level


  153 MEQ/L


(136-145) 153 MEQ/L


(136-145) 150 MEQ/L


(136-145) 


 


 


Potassium Level


  3.1 MEQ/L


(3.5-5.1) 3.6 MEQ/L


(3.5-5.1) 3.4 MEQ/L


(3.5-5.1) 


 


 


Chloride Level


  118 MEQ/L


() 118 MEQ/L


() 115 MEQ/L


() 


 


 


Carbon Dioxide Level


  25.8 MEQ/L


(21.0-32.0) 24.6 MEQ/L


(21.0-32.0) 24.0 MEQ/L


(21.0-32.0) 


 


 


Anion Gap


  9 MEQ/L (5-15) 


  10 MEQ/L


(5-15) 11 MEQ/L


(5-15) 


 


 


Estimat Glomerular Filtration


Rate 49 ML/MIN


(>89) 48 ML/MIN


(>89) 50 ML/MIN


(>89) 


 


 


Total Protein


  


  7.3 GM/DL


(6.4-8.2) 


  


 


 


Albumin


  


  2.1 GM/DL


(3.4-5.0) 


  


 


 


Alkaline Phosphatase


  


  78 U/L


() 


  


 


 


Aspartate Amino Transf


(AST/SGOT) 


  18 U/L (15-37) 


  


  


 


 


Alanine Aminotransferase


(ALT/SGPT) 


  8 U/L (10-53) 


  


  


 


 


Total Bilirubin


  


  0.4 MG/DL


(0.2-1.0) 


  


 


 


Ammonia


  


  


  


  22 MCMOL/L


(11-32)








Result Diagram:  


8/31/17 0537                                                                   

             8/31/17 0537





Microbiology





Microbiology








 Date/Time


Source Procedure


Growth Status


 


 


 8/28/17 16:55


Blood Peripheral Aerobic Blood Culture - Preliminary


NO GROWTH IN 3 DAYS Resulted


 


 8/28/17 16:55


Blood Peripheral Anaerobic Blood Culture - Preliminary


NO GROWTH IN 3 DAYS Resulted





 8/28/17 16:48


Blood Peripheral Aerobic Blood Culture - Preliminary


NO GROWTH IN 3 DAYS Resulted


 


 8/28/17 16:48


Blood Peripheral Anaerobic Blood Culture - Preliminary


NO GROWTH IN 3 DAYS Resulted








Imaging





Last Impressions








Chest X-Ray 8/31/17 0000 Signed





Impressions: 





 Service Date/Time:  Thursday, August 31, 2017 10:10 - CONCLUSION:  

Satisfactory 





 support line and tube positioning. Mild infiltrate most mobile in the left 

lung 





 base.     Ron Cruz MD 


 


Brain MRI 8/25/17 0000 Signed





Impressions: 





 Service Date/Time:  Friday, August 25, 2017 17:07 - CONCLUSION:  1. Small 

acute 





 or subacute cortical infarct of the left parietal lobe. 2. Small, faint area 

of 





 nonspecific flair signal abnormality in the left frontal lobe periventricular 





 white matter, nonspecific. No associated mass effect or abnormal enhancement. 

3 





 month followup MRI of the brain recommended. 3.  Mild chronic white matter 





 changes.     Ron Swan MD 


 


Head CT 8/21/17 0000 Signed





Impressions: 





 Service Date/Time:  Monday, August 21, 2017 19:47 - CONCLUSION:  No evidence 

of 





 acute infarct, hemorrhage, mass or edema.     Kristian Frankel MD 








Procedures


* 8/21/17 - left subclavian central placement. 


* 8/21/17 - lumbar puncture


* 8/21/17 - Intubated. 


.





Assessment and Plan


Disease Oriented Problem List:  


(1) Protein-calorie malnutrition, severe


(2) Elevated troponin


(3) Meningitis


(4) Encephalopathy


(5) Bacterial endocarditis


(6) Polysubstance abuse


(7) Sepsis


(8) Acute kidney injury


Symptom Scale:  


(1) Pain


0-10 Scale:  Unable to quantify





(2) Dyspnea


0-10 Scale:  Unable to quantify


Comment:  secretions





(3) Agitation


0-10 Scale:  Unable to quantify





Pertinent Non-Medical Issues


Psychosocial: single.  2 juvenile children. Supported by mother, stepfather and 

boyfriend. 


Spiritual: Unknown.


Legal: patient is incapacitated.  No written advanced directives.  Patient a 

single.  Children are juvenile.  According to [place] statutes health care 

proxy decision-making falls to a parent.


Ethical issues impacting care: No known concerns at this time. 


.


Important Contacts


* Kelly Le, mother: [phone]


* Won Le, stepfather: [phone]


.


Prognosis


Prognosis poor.


Code Status:  Full Code


Plan


* capacity flutuates.  She is cooperative and looks to have capacity to make 

medical decision, especially in terms of code status.  Prior there is a degree 

where she refuses to answer, but she is very cooperative on my visit.


* Decision Maker: mother per [place] statutes.  Pt today indicated she is amenable 

for mother to be decision maker should she become incapacitated again   


* She endorses Full Code.


* Goals of care are aggressive.  She met with ID during part of the meeting.  

Today she endorse continue medical care, and if she needs reintubation, she 

would want it.  She said she is also amenable to trach, as she had before.


* SYMPTOMS: Pain: nods yes to pain in  throat.  No new med rec at this time.  

Dyspnea- secretions.  no new med rec.  Decadron intiated.


* Palliative care will continue to follow throughout hospital course to assist 

with symptom management and clarification of goals as needed. 


.





Time Spent


Total Floor Time (mins):  38


Face to Face Time (mins):  20


>50% Counseling/Coord of Care:  Yes





Attestation


To help prompt me to consider important information that might be impacting 

today's encounter and assessment, information from prior notes written by 

myself or my colleagues may have been "brought forward" into today's note.  My 

signature on this note, however, is an attestation that I personally performed 

the exam, history, and/or decision-making noted today, and, unless otherwise 

indicated, the interactions with patient, family, and staff as well as the 

review of records all occurred today.  I also attest that the listed assessment 

and stated plan reflect my best clinical judgment today based on the 

combination of historical information, prior notes, and today's exam/ 

interactions.  When time spent is documented, it refers only to time spent 

today by the signer, or if indicated, combined time spent today by 

collaborating physician/nurse practitioner.











Fabian Rivera MD Aug 31, 2017 13:46

## 2017-08-31 NOTE — RADRPT
EXAM DATE/TIME:  08/31/2017 10:10 

 

[facility] COMPARISON:     

CHEST SINGLE AP, August 28, 2017, 3:28.

 

                     

INDICATIONS :     

Post intubation

                     

 

MEDICAL HISTORY :            

endocarditis, pneumonia, poly substance abuse, renal failure   

 

SURGICAL HISTORY :     

CABG.   

 

ENCOUNTER:     

Subsequent                                        

 

ACUITY:     

2 months      

 

PAIN SCORE:     

Non-responsive.

 

LOCATION:     

Bilateral chest 

 

FINDINGS:     

Endotracheal tube is present with tip a couple of centimeters above the yudelka. Nasogastric tube desc
ends into the stomach. A left subclavian central line is present with tip overlying SVC. There is mil
d parenchymal opacity most notably in the left lung base. No evidence of effusion. Cardiac contours a
re grossly satisfactory.

 

CONCLUSION:     

Satisfactory support line and tube positioning. Mild infiltrate most mobile in the left lung base.

 

 

 

 Ron Cruz MD on August 31, 2017 at 12:22           

Board Certified Radiologist.

 This report was verified electronically.

## 2017-08-31 NOTE — PD.PROCEDR
Procedure Note


Procedure


Endotracheal Intubation


 


A time-out was completed verifying correct patient, procedure, site, positioning

, and special equipment if applicable. The patient was placed in a flat 

position. Sedation was obtained using  Etomidate 20mg. The patient was easily 

ventilated using an ambu bag. The [employer]/ MAC 4 BLADE  was used 

and inserted into the oropharynx at which time there was a Grade 1 view of the 

vocal cords. A 8-Dutch endotracheal tube was inserted and visualized going 

through the vocal cords. The stylette was removed. Colorimetric change was 

visualized on the CO2 meter. Breath sounds were heard in both lung fields 

equally. The endotracheal tube was placed at 23 cm, measured at the teeth. 


A chest x-ray was ordered to assess for pneumothorax and verify 

endotrachealtube placement. 


Estimated Blood Loss: 0


The patient tolerated the procedure well and there were no complications.











Andres Virgen MD Aug 31, 2017 05:11

## 2017-09-01 VITALS
RESPIRATION RATE: 20 BRPM | SYSTOLIC BLOOD PRESSURE: 121 MMHG | DIASTOLIC BLOOD PRESSURE: 84 MMHG | HEART RATE: 75 BPM | OXYGEN SATURATION: 99 % | TEMPERATURE: 98.4 F

## 2017-09-01 VITALS
TEMPERATURE: 98.4 F | RESPIRATION RATE: 21 BRPM | OXYGEN SATURATION: 100 % | SYSTOLIC BLOOD PRESSURE: 130 MMHG | DIASTOLIC BLOOD PRESSURE: 77 MMHG | HEART RATE: 76 BPM

## 2017-09-01 VITALS
TEMPERATURE: 97.7 F | SYSTOLIC BLOOD PRESSURE: 129 MMHG | RESPIRATION RATE: 20 BRPM | HEART RATE: 77 BPM | OXYGEN SATURATION: 100 % | DIASTOLIC BLOOD PRESSURE: 82 MMHG

## 2017-09-01 VITALS
TEMPERATURE: 98 F | RESPIRATION RATE: 22 BRPM | OXYGEN SATURATION: 96 % | SYSTOLIC BLOOD PRESSURE: 124 MMHG | DIASTOLIC BLOOD PRESSURE: 79 MMHG | HEART RATE: 92 BPM

## 2017-09-01 VITALS — HEART RATE: 93 BPM

## 2017-09-01 VITALS — HEART RATE: 67 BPM

## 2017-09-01 VITALS — OXYGEN SATURATION: 96 %

## 2017-09-01 VITALS
TEMPERATURE: 98.3 F | RESPIRATION RATE: 22 BRPM | SYSTOLIC BLOOD PRESSURE: 115 MMHG | OXYGEN SATURATION: 93 % | HEART RATE: 91 BPM | DIASTOLIC BLOOD PRESSURE: 72 MMHG

## 2017-09-01 VITALS
OXYGEN SATURATION: 99 % | HEART RATE: 91 BPM | DIASTOLIC BLOOD PRESSURE: 75 MMHG | TEMPERATURE: 98.7 F | RESPIRATION RATE: 20 BRPM | SYSTOLIC BLOOD PRESSURE: 126 MMHG

## 2017-09-01 VITALS — OXYGEN SATURATION: 100 %

## 2017-09-01 VITALS — HEART RATE: 79 BPM

## 2017-09-01 VITALS — HEART RATE: 83 BPM

## 2017-09-01 VITALS — HEART RATE: 91 BPM

## 2017-09-01 VITALS — HEART RATE: 82 BPM

## 2017-09-01 LAB
ANION GAP SERPL CALC-SCNC: 12 MEQ/L (ref 5–15)
BASOPHILS # BLD AUTO: 0.1 TH/MM3 (ref 0–0.2)
BASOPHILS NFR BLD: 1.1 % (ref 0–2)
BUN SERPL-MCNC: 30 MG/DL (ref 7–18)
C. DIFF EPI 027: (no result)
CHLORIDE SERPL-SCNC: 111 MEQ/L (ref 98–107)
EOSINOPHIL # BLD: 0 TH/MM3 (ref 0–0.4)
EOSINOPHIL NFR BLD: 0.5 % (ref 0–4)
ERYTHROCYTE [DISTWIDTH] IN BLOOD BY AUTOMATED COUNT: 16.5 % (ref 11.6–17.2)
GFR SERPLBLD BASED ON 1.73 SQ M-ARVRAT: 53 ML/MIN (ref 89–?)
HCO3 BLD-SCNC: 22.6 MEQ/L (ref 21–32)
HCT VFR BLD CALC: 24.5 % (ref 35–46)
HEMO FLAGS: (no result)
LYMPHOCYTES # BLD AUTO: 1 TH/MM3 (ref 1–4.8)
LYMPHOCYTES NFR BLD AUTO: 12.8 % (ref 9–44)
MAGNESIUM SERPL-MCNC: 2.2 MG/DL (ref 1.5–2.5)
MCH RBC QN AUTO: 24.4 PG (ref 27–34)
MCHC RBC AUTO-ENTMCNC: 32.3 % (ref 32–36)
MCV RBC AUTO: 75.5 FL (ref 80–100)
MONOCYTES NFR BLD: 4.9 % (ref 0–8)
NEUTROPHILS # BLD AUTO: 6.3 TH/MM3 (ref 1.8–7.7)
NEUTROPHILS NFR BLD AUTO: 80.7 % (ref 16–70)
PLATELET # BLD: 401 TH/MM3 (ref 150–450)
POTASSIUM SERPL-SCNC: 3.7 MEQ/L (ref 3.5–5.1)
RBC # BLD AUTO: 3.25 MIL/MM3 (ref 4–5.3)
SODIUM SERPL-SCNC: 146 MEQ/L (ref 136–145)
WBC # BLD AUTO: 7.8 TH/MM3 (ref 4–11)

## 2017-09-01 RX ADMIN — POLYVINYL ALCOHOL SCH DROP: 14 SOLUTION/ DROPS OPHTHALMIC at 13:00

## 2017-09-01 RX ADMIN — AMPICILLIN SODIUM SCH MLS/HR: 2 INJECTION, POWDER, FOR SOLUTION INTRAMUSCULAR; INTRAVENOUS at 08:45

## 2017-09-01 RX ADMIN — RIFAMPIN SCH MG: 150 CAPSULE ORAL at 20:21

## 2017-09-01 RX ADMIN — AMPICILLIN SODIUM SCH MLS/HR: 2 INJECTION, POWDER, FOR SOLUTION INTRAMUSCULAR; INTRAVENOUS at 20:20

## 2017-09-01 RX ADMIN — STANDARDIZED SENNA CONCENTRATE AND DOCUSATE SODIUM SCH TAB: 8.6; 5 TABLET, FILM COATED ORAL at 08:46

## 2017-09-01 RX ADMIN — AMPICILLIN SODIUM SCH MLS/HR: 2 INJECTION, POWDER, FOR SOLUTION INTRAMUSCULAR; INTRAVENOUS at 14:06

## 2017-09-01 RX ADMIN — AMPICILLIN SODIUM SCH MLS/HR: 2 INJECTION, POWDER, FOR SOLUTION INTRAMUSCULAR; INTRAVENOUS at 04:18

## 2017-09-01 RX ADMIN — FAMOTIDINE SCH MG: 10 INJECTION, SOLUTION INTRAVENOUS at 20:20

## 2017-09-01 RX ADMIN — IPRATROPIUM BROMIDE AND ALBUTEROL SULFATE PRN AMPULE: .5; 3 SOLUTION RESPIRATORY (INHALATION) at 07:37

## 2017-09-01 RX ADMIN — Medication PRN MLS/HR: at 00:17

## 2017-09-01 RX ADMIN — IPRATROPIUM BROMIDE AND ALBUTEROL SULFATE SCH AMPULE: .5; 3 SOLUTION RESPIRATORY (INHALATION) at 21:48

## 2017-09-01 RX ADMIN — DEXAMETHASONE SODIUM PHOSPHATE SCH MG: 4 INJECTION, SOLUTION INTRAMUSCULAR; INTRAVENOUS at 14:04

## 2017-09-01 RX ADMIN — HEPARIN SODIUM SCH UNITS: 10000 INJECTION, SOLUTION INTRAVENOUS; SUBCUTANEOUS at 22:28

## 2017-09-01 RX ADMIN — Medication SCH ML: at 08:45

## 2017-09-01 RX ADMIN — OXACILLIN SODIUM SCH MLS/HR: 2 INJECTION, POWDER, FOR SOLUTION INTRAMUSCULAR; INTRAVENOUS at 08:46

## 2017-09-01 RX ADMIN — OXACILLIN SODIUM SCH MLS/HR: 2 INJECTION, POWDER, FOR SOLUTION INTRAMUSCULAR; INTRAVENOUS at 22:27

## 2017-09-01 RX ADMIN — OXACILLIN SODIUM SCH MLS/HR: 2 INJECTION, POWDER, FOR SOLUTION INTRAMUSCULAR; INTRAVENOUS at 18:07

## 2017-09-01 RX ADMIN — DEXAMETHASONE SODIUM PHOSPHATE SCH MG: 4 INJECTION, SOLUTION INTRAMUSCULAR; INTRAVENOUS at 22:28

## 2017-09-01 RX ADMIN — CHLORHEXIDINE GLUCONATE SCH PACK: 500 CLOTH TOPICAL at 04:00

## 2017-09-01 RX ADMIN — Medication SCH ML: at 22:00

## 2017-09-01 RX ADMIN — OXACILLIN SODIUM SCH MLS/HR: 2 INJECTION, POWDER, FOR SOLUTION INTRAMUSCULAR; INTRAVENOUS at 01:44

## 2017-09-01 RX ADMIN — Medication SCH ML: at 14:00

## 2017-09-01 RX ADMIN — OXACILLIN SODIUM SCH MLS/HR: 2 INJECTION, POWDER, FOR SOLUTION INTRAMUSCULAR; INTRAVENOUS at 14:04

## 2017-09-01 RX ADMIN — Medication SCH ML: at 06:00

## 2017-09-01 RX ADMIN — IPRATROPIUM BROMIDE AND ALBUTEROL SULFATE SCH AMPULE: .5; 3 SOLUTION RESPIRATORY (INHALATION) at 16:11

## 2017-09-01 RX ADMIN — STANDARDIZED SENNA CONCENTRATE AND DOCUSATE SODIUM SCH TAB: 8.6; 5 TABLET, FILM COATED ORAL at 21:00

## 2017-09-01 RX ADMIN — GENTAMICIN SULFATE SCH MLS/HR: 0.8 INJECTION, SOLUTION INTRAVENOUS at 00:15

## 2017-09-01 RX ADMIN — RIFAMPIN SCH MG: 150 CAPSULE ORAL at 08:46

## 2017-09-01 RX ADMIN — DEXAMETHASONE SODIUM PHOSPHATE SCH MG: 4 INJECTION, SOLUTION INTRAMUSCULAR; INTRAVENOUS at 06:43

## 2017-09-01 RX ADMIN — FAMOTIDINE SCH MG: 10 INJECTION, SOLUTION INTRAVENOUS at 08:45

## 2017-09-01 RX ADMIN — OXACILLIN SODIUM SCH MLS/HR: 2 INJECTION, POWDER, FOR SOLUTION INTRAMUSCULAR; INTRAVENOUS at 06:43

## 2017-09-01 RX ADMIN — HEPARIN SODIUM SCH UNITS: 10000 INJECTION, SOLUTION INTRAVENOUS; SUBCUTANEOUS at 14:05

## 2017-09-01 RX ADMIN — MORPHINE SULFATE PRN MG: 2 INJECTION, SOLUTION INTRAMUSCULAR; INTRAVENOUS at 14:51

## 2017-09-01 RX ADMIN — POLYVINYL ALCOHOL SCH DROP: 14 SOLUTION/ DROPS OPHTHALMIC at 18:00

## 2017-09-01 RX ADMIN — AMPICILLIN SODIUM SCH MLS/HR: 2 INJECTION, POWDER, FOR SOLUTION INTRAMUSCULAR; INTRAVENOUS at 00:16

## 2017-09-01 RX ADMIN — AMPICILLIN SODIUM SCH MLS/HR: 2 INJECTION, POWDER, FOR SOLUTION INTRAMUSCULAR; INTRAVENOUS at 18:07

## 2017-09-01 RX ADMIN — POLYVINYL ALCOHOL SCH DROP: 14 SOLUTION/ DROPS OPHTHALMIC at 08:45

## 2017-09-01 RX ADMIN — GENTAMICIN SULFATE SCH MLS/HR: 0.8 INJECTION, SOLUTION INTRAVENOUS at 14:05

## 2017-09-01 RX ADMIN — Medication SCH ML: at 20:20

## 2017-09-01 NOTE — HHI.CCPN
Subjective


Remarks/Hospital Course


Hospital Course:





37-year-old  female who presents initially as a stroke alert.  

According to EMS report the patient has a history of fungal endocarditis with 

previous IVDA.  EMS states that the patient is currently on hospice, however, 

they are unsure if the patient is a DNR.  EMS states the patient apparently 

complained of a headache earlier today, was found lying on the floor and 

incontinent of stool at approximate, last seen normal at 6:45 PM.  Therefore, 

EMS called a stroke alert in the field as the patient was aphasic and confused.

  Upon arrival the patient groans, moves all 4 extremities and withdraws all 4 

extremities, but is unable to provide any information.  She was intubated in 

the emergency department by ER attending for airway protection.





8/22: remains encephalopathic. Blood cultures growing Staph in 4/4 bottles. 


8/23:  Remains sedated/ encephalopathic, orally intubated on mechanical 

ventilation.


8/24: still in shock on vasopressors. grossly volume overloaded and 

intravascularly volume overloaded based on echo and clinically. needs 

aggressive diuresis, but still in shock. family made patient DNR last night, 

but want to continue aggressive therapies. still encephalopathic. no 

significant improvements.


8/25: seen and examined at around 06:45am. delayed note entry. remains 

encephalopathic. no significant changes. unlikely to survive hospital stay, no 

additional overall therapies. family wants aggressive treatment. diuresing on 

bumex drip.    net 3.3L negative. still volume overloaded.


8/26: Tmax 102.8 This am patient went into SVT rhythm heart rate 190's.  

Cooling blanket placed, ice packs in place, metoprolol 5 mg IV push given, 

resolution heart rate mid 90s, MAP ranges 66-68.  Ofirmev ordered.  Bumex 

infusion discontinued secondary to elevation in creatinine.  Chest x-ray 

pending.


8/27: Continues to be febrile, continues on cooling blanket.  Propofol was 

discontinued secondary to hypotension.  Today the patient continues on Versed 

and fentanyl infusions with the RASS -2.  Patient currently GCS of 11 T, 

responsive.  The patient tolerated CPAP trials for approximately 2.5 hours for 

the last 2 days.


8/28:  Somewhat agitated this AM.  Remains in sinus tachycardia.  On PSV trials 

1.5 hours today.  Tolerating tube feeding.  Tmax 101.4.


8/29:  Low-grade fevers overnight.  Currently afebrile.  Remains on Precedex, 

Versed and fentanyl drips for sedation.  On PSV trial 2 hours so far.  

Tolerating tube feeds.


8/30:  Regular.  Requesting extubation DO NOT INTUBATE hospice today.  Patient 

actually is doing well on her weaning trials.  If passes wean parameters.  We'

ll attempt extubation today.


8/31:  Extubated yesterday approximately 1 hour 30 minutes.  During that time, 

patient stated she wanted to be a full code.  This was in conflict which she 

had stated earlier while on Precedex drip.  Patient is currently full code.  

Possible stridor according to RN which brought about intubation by overnight 

intensivist.  Currently awake and alert and following commands room





Subjective:





9/1: Pending CT neck.  Afebrile.  Remains on full ventilator support.  Awake 

and alert and following commands.  tube feedings resumed.





Objective





Vital Signs








  Date Time  Temp Pulse Resp B/P (MAP) Pulse Ox O2 Delivery O2 Flow Rate FiO2


 


9/1/17 08:00  77      


 


9/1/17 08:00 97.7  20 129/82 (98) 100   


 


9/1/17 08:00        35














Intake and Output   


 


 9/1/17 9/1/17 9/1/17





 07:59 15:59 23:59


 


Intake Total 1952 ml  


 


Output Total 925 ml  


 


Balance 1027 ml  








Result Diagram:  


9/1/17 0415                                                                    

            9/1/17 0415





Other Results





Microbiology








 Date/Time


Source Procedure


Growth Status


 


 


 8/28/17 16:55


Blood Peripheral Aerobic Blood Culture - Preliminary


NO GROWTH IN 3 DAYS Resulted


 


 8/28/17 16:55


Blood Peripheral Anaerobic Blood Culture - Preliminary


NO GROWTH IN 3 DAYS Resulted





 8/21/17 22:00


Cerebral Spinal Fluid Lumbar Puncture Fungal Smear - Final


NO FUNGAL ELEMENTS SEEN. Resulted


 


 8/21/17 22:00


Cerebral Spinal Fluid Lumbar Puncture Fungal Culture - Preliminary


NO GROWTH IN 1 WEEK Resulted





 8/31/17 22:00


Sputum Endotracheal Gram Stain - Final Resulted


 


 8/31/17 22:00


Sputum Endotracheal Sputum Culture


Pending Resulted








Imaging





Last Impressions








Chest X-Ray 8/31/17 0000 Signed





Impressions: 





 Service Date/Time:  Thursday, August 31, 2017 10:10 - CONCLUSION:  

Satisfactory 





 support line and tube positioning. Mild infiltrate most mobile in the left 

lung 





 base.     Ron Cruz MD 


 


Brain MRI 8/25/17 0000 Signed





Impressions: 





 Service Date/Time:  Friday, August 25, 2017 17:07 - CONCLUSION:  1. Small 

acute 





 or subacute cortical infarct of the left parietal lobe. 2. Small, faint area 

of 





 nonspecific flair signal abnormality in the left frontal lobe periventricular 





 white matter, nonspecific. No associated mass effect or abnormal enhancement. 

3 





 month followup MRI of the brain recommended. 3.  Mild chronic white matter 





 changes.     Ron Swan MD 


 


Head CT 8/21/17 0000 Signed





Impressions: 





 Service Date/Time:  Monday, August 21, 2017 19:47 - CONCLUSION:  No evidence 

of 





 acute infarct, hemorrhage, mass or edema.     Kristian Frankel MD 








Objective Remarks


GENERAL: 37-year-old  female, critically ill currently resting in bed 

orotracheally intubated


SKIN: Warm and dry.  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


HEAD: Normocephalic.


EYES: No scleral icterus. No injection or drainage. 


NECK:  trachea midline.  No JVD


CARDIOVASCULAR: Tachycardic, RR.  S1, S2 no S4.  Without murmur


RESPIRATORY: Essentially clear to auscultation without wheezes rales or rhonchi


GASTROINTESTINAL: Abdomen soft, non-tender, nondistended.  no guarding.


MUSCULOSKELETAL: 1 + peripheral edema.  Generalized anasarca.  Multiple 

petechiae and ecchymotic bruising secondary to most likely septic emboli


NEURO EXAM: GCS 11T. Follows commands, moves extremities 4.  Motor strength 5/

5 bilateral upper and lower extremity.  Cranial nerves II-XII grossly intact.





A/P


Assessment and Plan


Neuro/Psych 





Acute Encephalopathy


Acute meningitis, possible bacterial


Left parietal/frontal CVA





Patient is currently on Versed drip at 5 mg an hour fentanyl drip at 70 mcg an 

hour for sedation/analgesia while intubated


Goal of RA SS -2


Daily sedation vacation


- LP with elevated protein, and low glucose relative to serum glucose.


- CT head negative


-MRI-small acute and subacute cortical infarct of left parietal lobe


- Neuro checks per unit protocol


Acetaminophen for fever


Evaluated by neurology





Resp:





Acute hypoxic and hypercarbic Respiratory failure 





PRVV 14/550/1/5/35


Ventilator bundle


Albuterol/ipratropium aerosols every 6 hours with to 2 hours As needed 

albuterol therapy


Spontaneous breathing trials daily ordered 


CT neck rule out airway edema


On dexamethasone 4 mg IV every 8 hours 2 days





Cardiac





SVT


Sinus tachycardia


Mixed Septic/cardiogenic Shock


Acute congestive heart failure secondary to valvulopathy


Tricuspid Regurgitation





- Metoprolol 2.5 mg every 6 hours when necessary heart rate greater than 100 bpm


Echocardiogram 8/17 revealed EF 25-30%.  PAP 34 mmHg.


Currently off all vasopressors.





ID:





History of fungal endocarditis


Staph Bacteremia


Tricuspid valve infective endocarditis





- History of IVDA


- Broad-spectrum antibiotic with oxacillin, ampicillin, gentamicin and rifampin


- Infectious disease following, Dr. Teague





RENAL:





Acute Kidney Injury - resolved





-secondary to shock


- Strict I and O's


- molina catheter





GI"





Acute Protein calorie malnutrition- severe





- secondary to long-standing IVDA and endocarditis


- continue TF Jevity 1.5 currently at goal 55 cc/hour-minimal  residuals resume 

today


Famotidine for GI prophylaxis


Docusate sodium/senna for bowel regimen





HEME: 





Microcytic anemia





Monitor CBC daily.  Follow trends





Endo





Hyperglycemia critical illness





Sliding scale insulin to maintain euglycemia





FEN:





Hypernatremia





Additional free water flushes 300 every 8 hours.  Add a quarter normal saline 

2 L.  Recheck in 





DVT GI prophylaxis


- Teds SCDs


- Subcutaneous heparin


-Famotidine





Dispo:


Discussed with ICU RN and significant other at bedside.





Level II follow-up











Bryson Bustos MD Sep 1, 2017 10:40

## 2017-09-02 VITALS
OXYGEN SATURATION: 95 % | RESPIRATION RATE: 18 BRPM | SYSTOLIC BLOOD PRESSURE: 125 MMHG | TEMPERATURE: 97.9 F | HEART RATE: 80 BPM | DIASTOLIC BLOOD PRESSURE: 88 MMHG

## 2017-09-02 VITALS
TEMPERATURE: 98.4 F | DIASTOLIC BLOOD PRESSURE: 78 MMHG | RESPIRATION RATE: 20 BRPM | OXYGEN SATURATION: 100 % | HEART RATE: 84 BPM | SYSTOLIC BLOOD PRESSURE: 147 MMHG

## 2017-09-02 VITALS — HEART RATE: 84 BPM

## 2017-09-02 VITALS — HEART RATE: 79 BPM

## 2017-09-02 VITALS
TEMPERATURE: 98.1 F | SYSTOLIC BLOOD PRESSURE: 125 MMHG | DIASTOLIC BLOOD PRESSURE: 78 MMHG | HEART RATE: 83 BPM | RESPIRATION RATE: 20 BRPM | OXYGEN SATURATION: 100 %

## 2017-09-02 VITALS
OXYGEN SATURATION: 100 % | RESPIRATION RATE: 20 BRPM | TEMPERATURE: 98.2 F | SYSTOLIC BLOOD PRESSURE: 135 MMHG | HEART RATE: 76 BPM | DIASTOLIC BLOOD PRESSURE: 86 MMHG

## 2017-09-02 VITALS — OXYGEN SATURATION: 100 %

## 2017-09-02 VITALS
DIASTOLIC BLOOD PRESSURE: 81 MMHG | RESPIRATION RATE: 18 BRPM | OXYGEN SATURATION: 100 % | HEART RATE: 82 BPM | TEMPERATURE: 98.4 F | SYSTOLIC BLOOD PRESSURE: 133 MMHG

## 2017-09-02 VITALS — OXYGEN SATURATION: 97 %

## 2017-09-02 VITALS
RESPIRATION RATE: 20 BRPM | SYSTOLIC BLOOD PRESSURE: 127 MMHG | TEMPERATURE: 98 F | HEART RATE: 77 BPM | OXYGEN SATURATION: 100 % | DIASTOLIC BLOOD PRESSURE: 82 MMHG

## 2017-09-02 VITALS — HEART RATE: 82 BPM

## 2017-09-02 VITALS — OXYGEN SATURATION: 96 %

## 2017-09-02 VITALS — OXYGEN SATURATION: 98 %

## 2017-09-02 VITALS — HEART RATE: 68 BPM

## 2017-09-02 LAB
ANION GAP SERPL CALC-SCNC: 11 MEQ/L (ref 5–15)
BUN SERPL-MCNC: 34 MG/DL (ref 7–18)
CHLORIDE SERPL-SCNC: 111 MEQ/L (ref 98–107)
ERYTHROCYTE [DISTWIDTH] IN BLOOD BY AUTOMATED COUNT: 16.5 % (ref 11.6–17.2)
GFR SERPLBLD BASED ON 1.73 SQ M-ARVRAT: 47 ML/MIN (ref 89–?)
HCO3 BLD-SCNC: 24.2 MEQ/L (ref 21–32)
HCT VFR BLD CALC: 25.1 % (ref 35–46)
MAGNESIUM SERPL-MCNC: 2.2 MG/DL (ref 1.5–2.5)
MCH RBC QN AUTO: 23.7 PG (ref 27–34)
MCHC RBC AUTO-ENTMCNC: 31.4 % (ref 32–36)
MCV RBC AUTO: 75.5 FL (ref 80–100)
PLATELET # BLD: 474 TH/MM3 (ref 150–450)
POTASSIUM SERPL-SCNC: 3.4 MEQ/L (ref 3.5–5.1)
RBC # BLD AUTO: 3.33 MIL/MM3 (ref 4–5.3)
REVIEW FLAG: (no result)
SODIUM SERPL-SCNC: 146 MEQ/L (ref 136–145)
WBC # BLD AUTO: 9 TH/MM3 (ref 4–11)

## 2017-09-02 RX ADMIN — Medication SCH ML: at 20:22

## 2017-09-02 RX ADMIN — MIDAZOLAM HYDROCHLORIDE PRN MLS/HR: 5 INJECTION, SOLUTION INTRAMUSCULAR; INTRAVENOUS at 20:23

## 2017-09-02 RX ADMIN — OXACILLIN SODIUM SCH MLS/HR: 2 INJECTION, POWDER, FOR SOLUTION INTRAMUSCULAR; INTRAVENOUS at 01:54

## 2017-09-02 RX ADMIN — Medication PRN MLS/HR: at 10:52

## 2017-09-02 RX ADMIN — Medication SCH ML: at 20:23

## 2017-09-02 RX ADMIN — STANDARDIZED SENNA CONCENTRATE AND DOCUSATE SODIUM SCH TAB: 8.6; 5 TABLET, FILM COATED ORAL at 08:58

## 2017-09-02 RX ADMIN — IPRATROPIUM BROMIDE AND ALBUTEROL SULFATE SCH AMPULE: .5; 3 SOLUTION RESPIRATORY (INHALATION) at 21:21

## 2017-09-02 RX ADMIN — Medication SCH ML: at 08:59

## 2017-09-02 RX ADMIN — STANDARDIZED SENNA CONCENTRATE AND DOCUSATE SODIUM SCH TAB: 8.6; 5 TABLET, FILM COATED ORAL at 20:23

## 2017-09-02 RX ADMIN — AMPICILLIN SODIUM SCH MLS/HR: 2 INJECTION, POWDER, FOR SOLUTION INTRAMUSCULAR; INTRAVENOUS at 20:23

## 2017-09-02 RX ADMIN — FAMOTIDINE SCH MG: 10 INJECTION, SOLUTION INTRAVENOUS at 20:22

## 2017-09-02 RX ADMIN — OXACILLIN SODIUM SCH MLS/HR: 2 INJECTION, POWDER, FOR SOLUTION INTRAMUSCULAR; INTRAVENOUS at 05:35

## 2017-09-02 RX ADMIN — Medication SCH ML: at 13:09

## 2017-09-02 RX ADMIN — CHLORHEXIDINE GLUCONATE SCH PACK: 500 CLOTH TOPICAL at 19:23

## 2017-09-02 RX ADMIN — ACETAMINOPHEN PRN MG: 10 INJECTION, SOLUTION INTRAVENOUS at 13:57

## 2017-09-02 RX ADMIN — POTASSIUM CHLORIDE PRN MLS/HR: 200 INJECTION, SOLUTION INTRAVENOUS at 12:32

## 2017-09-02 RX ADMIN — Medication SCH ML: at 05:35

## 2017-09-02 RX ADMIN — MORPHINE SULFATE PRN MG: 2 INJECTION, SOLUTION INTRAMUSCULAR; INTRAVENOUS at 06:16

## 2017-09-02 RX ADMIN — OXACILLIN SODIUM SCH MLS/HR: 2 INJECTION, POWDER, FOR SOLUTION INTRAMUSCULAR; INTRAVENOUS at 21:29

## 2017-09-02 RX ADMIN — Medication PRN MLS/HR: at 20:23

## 2017-09-02 RX ADMIN — MIDAZOLAM HYDROCHLORIDE PRN MLS/HR: 5 INJECTION, SOLUTION INTRAMUSCULAR; INTRAVENOUS at 06:09

## 2017-09-02 RX ADMIN — RIFAMPIN SCH MG: 150 CAPSULE ORAL at 08:58

## 2017-09-02 RX ADMIN — DEXAMETHASONE SODIUM PHOSPHATE SCH MG: 4 INJECTION, SOLUTION INTRAMUSCULAR; INTRAVENOUS at 05:50

## 2017-09-02 RX ADMIN — RIFAMPIN SCH MG: 150 CAPSULE ORAL at 20:23

## 2017-09-02 RX ADMIN — AMPICILLIN SODIUM SCH MLS/HR: 2 INJECTION, POWDER, FOR SOLUTION INTRAMUSCULAR; INTRAVENOUS at 16:38

## 2017-09-02 RX ADMIN — MORPHINE SULFATE PRN MG: 2 INJECTION, SOLUTION INTRAMUSCULAR; INTRAVENOUS at 15:39

## 2017-09-02 RX ADMIN — POTASSIUM CHLORIDE PRN MLS/HR: 200 INJECTION, SOLUTION INTRAVENOUS at 09:01

## 2017-09-02 RX ADMIN — MORPHINE SULFATE PRN MG: 2 INJECTION, SOLUTION INTRAMUSCULAR; INTRAVENOUS at 09:49

## 2017-09-02 RX ADMIN — IPRATROPIUM BROMIDE AND ALBUTEROL SULFATE SCH AMPULE: .5; 3 SOLUTION RESPIRATORY (INHALATION) at 03:11

## 2017-09-02 RX ADMIN — Medication PRN MLS/HR: at 06:09

## 2017-09-02 RX ADMIN — GENTAMICIN SULFATE SCH MLS/HR: 0.8 INJECTION, SOLUTION INTRAVENOUS at 11:57

## 2017-09-02 RX ADMIN — OXACILLIN SODIUM SCH MLS/HR: 2 INJECTION, POWDER, FOR SOLUTION INTRAMUSCULAR; INTRAVENOUS at 17:29

## 2017-09-02 RX ADMIN — CHLORHEXIDINE GLUCONATE SCH PACK: 500 CLOTH TOPICAL at 04:00

## 2017-09-02 RX ADMIN — GENTAMICIN SULFATE SCH MLS/HR: 0.8 INJECTION, SOLUTION INTRAVENOUS at 00:18

## 2017-09-02 RX ADMIN — FAMOTIDINE SCH MG: 10 INJECTION, SOLUTION INTRAVENOUS at 08:59

## 2017-09-02 RX ADMIN — POLYVINYL ALCOHOL SCH DROP: 14 SOLUTION/ DROPS OPHTHALMIC at 17:29

## 2017-09-02 RX ADMIN — IPRATROPIUM BROMIDE AND ALBUTEROL SULFATE SCH AMPULE: .5; 3 SOLUTION RESPIRATORY (INHALATION) at 15:14

## 2017-09-02 RX ADMIN — AMPICILLIN SODIUM SCH MLS/HR: 2 INJECTION, POWDER, FOR SOLUTION INTRAMUSCULAR; INTRAVENOUS at 08:59

## 2017-09-02 RX ADMIN — IPRATROPIUM BROMIDE AND ALBUTEROL SULFATE SCH AMPULE: .5; 3 SOLUTION RESPIRATORY (INHALATION) at 07:31

## 2017-09-02 RX ADMIN — OXACILLIN SODIUM SCH MLS/HR: 2 INJECTION, POWDER, FOR SOLUTION INTRAMUSCULAR; INTRAVENOUS at 09:47

## 2017-09-02 RX ADMIN — AMPICILLIN SODIUM SCH MLS/HR: 2 INJECTION, POWDER, FOR SOLUTION INTRAMUSCULAR; INTRAVENOUS at 12:31

## 2017-09-02 RX ADMIN — POLYVINYL ALCOHOL SCH DROP: 14 SOLUTION/ DROPS OPHTHALMIC at 09:00

## 2017-09-02 RX ADMIN — AMPICILLIN SODIUM SCH MLS/HR: 2 INJECTION, POWDER, FOR SOLUTION INTRAMUSCULAR; INTRAVENOUS at 00:19

## 2017-09-02 RX ADMIN — POLYVINYL ALCOHOL SCH DROP: 14 SOLUTION/ DROPS OPHTHALMIC at 12:32

## 2017-09-02 RX ADMIN — AMPICILLIN SODIUM SCH MLS/HR: 2 INJECTION, POWDER, FOR SOLUTION INTRAMUSCULAR; INTRAVENOUS at 05:35

## 2017-09-02 RX ADMIN — OXACILLIN SODIUM SCH MLS/HR: 2 INJECTION, POWDER, FOR SOLUTION INTRAMUSCULAR; INTRAVENOUS at 13:58

## 2017-09-02 RX ADMIN — HEPARIN SODIUM SCH UNITS: 10000 INJECTION, SOLUTION INTRAVENOUS; SUBCUTANEOUS at 20:22

## 2017-09-02 RX ADMIN — HEPARIN SODIUM SCH UNITS: 10000 INJECTION, SOLUTION INTRAVENOUS; SUBCUTANEOUS at 09:48

## 2017-09-02 RX ADMIN — MORPHINE SULFATE PRN MG: 2 INJECTION, SOLUTION INTRAMUSCULAR; INTRAVENOUS at 20:22

## 2017-09-02 NOTE — RADRPT
EXAM DATE/TIME:  09/02/2017 11:13 

 

[facility] COMPARISON:     

No previous studies available for comparison.

 

 

INDICATIONS :     

Stridor and airway edema post extubation.

                      

 

RADIATION DOSE:     

27.13 CTDIvol (mGy) 

 

 

MEDICAL HISTORY :     

Congestive hearrt failure. Hepatitis C. Endocarditis.

 

SURGICAL HISTORY :      

None. 

 

ENCOUNTER:      

Initial

 

ACUITY:      

3 days

 

PAIN SCORE:      

Non-responsive

 

LOCATION:        

neck 

 

TECHNIQUE:     

Volumetric scanning of the neck was performed.  Using automated exposure control and adjustment of th
e mA and/or kV according to patient size, radiation dose was kept as low as reasonably achievable to 
obtain optimal diagnostic quality images.  DICOM format image data is available electronically for re
view and comparison.  

 

FINDINGS:     

There is an air-fluid level in the sphenoid sinus, and mild prominence of the nasopharyngeal soft tis
sues. Endotracheal tube is present and extends down to the last image of the scan toward the yudelka. 
NG tube is also noted. Epiglottis above the endotracheal tube but is otherwise normal. Thyroid unrema
rkable. Subcentimeter superior mediastinal lymph nodes are identified. The parotid and submandibular 
glands are normal. No fractures.

 

CONCLUSION:     

1. Patient is intubated which limits this study. The epiglottis is otherwise normal. Secretions are p
resent in the oropharynx and right sphenoid sinus.

 

 

 

 Ej Ewing MD on September 02, 2017 at 11:26           

Board Certified Radiologist.

 This report was verified electronically.

## 2017-09-02 NOTE — HHI.IDPN
Subjective


Subjective


Remarks


pt failed extubation and was re-intubated again


+ throat edema


no fever


a lot of secretions


Antibiotics


ampicillin 


oxacillin 


gent 


rifampin


Allergies:  


Coded Allergies:  


     No Known Allergies (Verified , 2/24/16)





Objective


.





Vital Signs








  Date Time  Temp Pulse Resp B/P (MAP) Pulse Ox O2 Delivery O2 Flow Rate FiO2


 


9/2/17 11:39     100   35


 


9/2/17 10:00  82      


 


9/2/17 08:00 98.1 83 20 125/78 (94) 100   


 


9/2/17 08:00        35


 


9/2/17 08:00  83      


 


9/2/17 07:32     100   35


 


9/2/17 06:00  84      


 


9/2/17 04:05     100   35


 


9/2/17 04:00  77      


 


9/2/17 04:00 98.0 77 20 127/82 (97) 100   


 


9/2/17 04:00        35


 


9/2/17 02:00  68      


 


9/2/17 01:02     100   35


 


9/2/17 00:00 98.4 82 18 133/81 (98) 100   


 


9/2/17 00:00  82      


 


9/2/17 00:00        35


 


9/1/17 22:00  83      


 


9/1/17 21:48     100   35


 


9/1/17 20:00        35


 


9/1/17 20:00 98.7 91 20 126/75 (92) 99   


 


9/1/17 20:00  91      


 


9/1/17 18:00  91      


 


9/1/17 16:12     96   35


 


9/1/17 16:00        35


 


9/1/17 16:00  92      


 


9/1/17 16:00 98.0 92 22 124/79 (94) 96   


 


9/1/17 14:56   14     


 


9/1/17 14:00  93      














 9/2/17 9/2/17 9/3/17





 15:00 23:00 07:00


 


Intake Total 550 ml  


 


Balance 550 ml  


 


   


 


IV Total 550 ml  








.





Laboratory Tests








Test


  9/1/17


04:15 9/2/17


05:50


 


White Blood Count 7.8 TH/MM3  9.0 TH/MM3 


 


Red Blood Count 3.25 MIL/MM3  3.33 MIL/MM3 


 


Hemoglobin 7.9 GM/DL  7.9 GM/DL 


 


Hematocrit 24.5 %  25.1 % 


 


Mean Corpuscular Volume 75.5 FL  75.5 FL 


 


Mean Corpuscular Hemoglobin 24.4 PG  23.7 PG 


 


Mean Corpuscular Hemoglobin


Concent 32.3 % 


  31.4 % 


 


 


Red Cell Distribution Width 16.5 %  16.5 % 


 


Platelet Count 401 TH/MM3  474 TH/MM3 


 


Mean Platelet Volume 8.3 FL  7.8 FL 


 


Neutrophils (%) (Auto) 80.7 %  


 


Lymphocytes (%) (Auto) 12.8 %  


 


Monocytes (%) (Auto) 4.9 %  


 


Eosinophils (%) (Auto) 0.5 %  


 


Basophils (%) (Auto) 1.1 %  


 


Neutrophils # (Auto) 6.3 TH/MM3  


 


Lymphocytes # (Auto) 1.0 TH/MM3  


 


Monocytes # (Auto) 0.4 TH/MM3  


 


Eosinophils # (Auto) 0.0 TH/MM3  


 


Basophils # (Auto) 0.1 TH/MM3  


 


CBC Comment DIFF FINAL  


 


Differential Comment   








Laboratory Tests








Test


  9/1/17


04:15 9/2/17


05:50


 


Blood Urea Nitrogen 30 MG/DL  34 MG/DL 


 


Creatinine 1.17 MG/DL  1.29 MG/DL 


 


Random Glucose 84 MG/DL  152 MG/DL 


 


Calcium Level 9.1 MG/DL  8.6 MG/DL 


 


Phosphorus Level 5.4 MG/DL  5.7 MG/DL 


 


Magnesium Level 2.2 MG/DL  2.2 MG/DL 


 


Sodium Level 146 MEQ/L  146 MEQ/L 


 


Potassium Level 3.7 MEQ/L  3.4 MEQ/L 


 


Chloride Level 111 MEQ/L  111 MEQ/L 


 


Carbon Dioxide Level 22.6 MEQ/L  24.2 MEQ/L 


 


Anion Gap 12 MEQ/L  11 MEQ/L 


 


Estimat Glomerular Filtration


Rate 53 ML/MIN 


  47 ML/MIN 


 








Microbiology








 Date/Time


Source Procedure


Growth Status


 


 


 8/31/17 22:00


Sputum Endotracheal Gram Stain - Final Resulted


 


 8/31/17 22:00


Sputum Endotracheal Sputum Culture - Preliminary


RESULTS PENDING Resulted








Imaging





Last Impressions








Neck CT 9/2/17 0000 Signed





Impressions: 





 Service Date/Time:  Saturday, September 2, 2017 11:13 - CONCLUSION:  1. 

Patient 





 is intubated which limits this study. The epiglottis is otherwise normal. 





 Secretions are present in the oropharynx and right sphenoid sinus.     Ej Ewing MD 


 


Chest X-Ray 8/31/17 0000 Signed





Impressions: 





 Service Date/Time:  Thursday, August 31, 2017 10:10 - CONCLUSION:  

Satisfactory 





 support line and tube positioning. Mild infiltrate most mobile in the left 

lung 





 base.     Ron Cruz MD 


 


Brain MRI 8/25/17 0000 Signed





Impressions: 





 Service Date/Time:  Friday, August 25, 2017 17:07 - CONCLUSION:  1. Small 

acute 





 or subacute cortical infarct of the left parietal lobe. 2. Small, faint area 

of 





 nonspecific flair signal abnormality in the left frontal lobe periventricular 





 white matter, nonspecific. No associated mass effect or abnormal enhancement. 

3 





 month followup MRI of the brain recommended. 3.  Mild chronic white matter 





 changes.     Ron Swan MD 


 


Head CT 8/21/17 0000 Signed





Impressions: 





 Service Date/Time:  Monday, August 21, 2017 19:47 - CONCLUSION:  No evidence 

of 





 acute infarct, hemorrhage, mass or edema.     Kristian Frankel MD 








Physical Exam


CONSTITUTIONAL/GENERAL: This is an adequately nourished patient, in no apparent 

distress. Sedated, int'd on mech vent


TUBES/LINES/DRAINS:


SKIN: No jaundice, rashes, or lesions.   Skin temperature appropriate. Not 

diaphoretic. 


 multiple embolic  lesions cw septic embolization involving L lower leg and  

both feet - resolving 


 EYES: Pupils small  non reactive  equal and round  .   No scleral icterus. No 

injection or drainage. Fundi not examined.


 CARDIOVASCULAR: Regular rate and rhythm without murmurs, gallops, or rubs. 

Mechanical heart sounds No JVD. Peripheral pulses symmetric.


RESPIRATORY/CHEST: Symmetric, unlabored respirations. Diffuse  rhonchi to 

auscultation. Breath sounds equal bilaterally.  


GASTROINTESTINAL: Abdomen soft, non-tender,  moderately distended. No hepato-

splenomegaly, or palpable masses. No guarding. Bowel sounds present, hypoactive 


 GENITOURINARY: Without palpable bladder distension. Stokes catheter in place 

with clear yellow urine


MUSCULOSKELETAL: Extremities without clubbing, 


2+  edema.- imporoving


 NEUROLOGICAL  fully awake and   communicates   following commands all 4 

extremeties


 PSYCHIATRIC: calm 








Assessment & Plan


Remarks


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 

fecalis S amp/gent 


   - pt was previously infected with above organisms


Previousy multiple episodes of  PVE


   dw Dr Keenan hospitalised in April 2017 for tricuspid valve PVE  - [place] 

parapsolosis (March 15 thru April 15) , Streptoccii


   pt was Rx with combination diflucan 800 + micafungin 150, and was also on 

ambisionme at some point x 2 weeks and did not clear 


Confiormed  bacterial meningitis  2/2 MSSA - embolic ethiology


Acute VDRF, failre of estubation 2/2 edema 


NOÉ, improved


Elevated troponine ? cardiac injury from infx


 Not a surgical candidate 


? PNA, clx negative


   - worsening secretions


New fever ; resolved 


Heavy secrerions ? VAP





   fu repeat blood clx untill final


   - chk diff to monitor eosinophilia  which can be aw curretn abx Rx


   cont  oxacillin + gentamin +RIfampin


   - switch to oral ruifampin


   cont  ampicillin for Enterococci


   fu repeat blood clx  


   monitor closely creatinint and LFTs (2-3x/week)


   Unless more positive blood clx anticipate 6 week from 1st neg (8/24) 

followed by  indefinite oral abx suppression tx 


      IV abx stop date tenatively 10/5


         at least weekly CBC with diff (eosinophilia) , creatinine and LFTs 

while on the above Rx


    -fu  sputum clx





dw RN  and RT











Catalina Teague MD Sep 2, 2017 12:35

## 2017-09-02 NOTE — HHI.CCPN
Subjective


Remarks/Hospital Course


Hospital Course:





37-year-old  female who presents initially as a stroke alert.  

According to EMS report the patient has a history of fungal endocarditis with 

previous IVDA.  EMS states that the patient is currently on hospice, however, 

they are unsure if the patient is a DNR.  EMS states the patient apparently 

complained of a headache earlier today, was found lying on the floor and 

incontinent of stool at approximate, last seen normal at 6:45 PM.  Therefore, 

EMS called a stroke alert in the field as the patient was aphasic and confused.

  Upon arrival the patient groans, moves all 4 extremities and withdraws all 4 

extremities, but is unable to provide any information.  She was intubated in 

the emergency department by ER attending for airway protection.





8/22: remains encephalopathic. Blood cultures growing Staph in 4/4 bottles. 


8/23:  Remains sedated/ encephalopathic, orally intubated on mechanical 

ventilation.


8/24: still in shock on vasopressors. grossly volume overloaded and 

intravascularly volume overloaded based on echo and clinically. needs 

aggressive diuresis, but still in shock. family made patient DNR last night, 

but want to continue aggressive therapies. still encephalopathic. no 

significant improvements.


8/25: seen and examined at around 06:45am. delayed note entry. remains 

encephalopathic. no significant changes. unlikely to survive hospital stay, no 

additional overall therapies. family wants aggressive treatment. diuresing on 

bumex drip.    net 3.3L negative. still volume overloaded.


8/26: Tmax 102.8 This am patient went into SVT rhythm heart rate 190's.  

Cooling blanket placed, ice packs in place, metoprolol 5 mg IV push given, 

resolution heart rate mid 90s, MAP ranges 66-68.  Ofirmev ordered.  Bumex 

infusion discontinued secondary to elevation in creatinine.  Chest x-ray 

pending.


8/27: Continues to be febrile, continues on cooling blanket.  Propofol was 

discontinued secondary to hypotension.  Today the patient continues on Versed 

and fentanyl infusions with the RASS -2.  Patient currently GCS of 11 T, 

responsive.  The patient tolerated CPAP trials for approximately 2.5 hours for 

the last 2 days.


8/28:  Somewhat agitated this AM.  Remains in sinus tachycardia.  On PSV trials 

1.5 hours today.  Tolerating tube feeding.  Tmax 101.4.


8/29:  Low-grade fevers overnight.  Currently afebrile.  Remains on Precedex, 

Versed and fentanyl drips for sedation.  On PSV trial 2 hours so far.  

Tolerating tube feeds.


8/30:  Regular.  Requesting extubation DO NOT INTUBATE hospice today.  Patient 

actually is doing well on her weaning trials.  If passes wean parameters.  We'

ll attempt extubation today.


8/31:  Extubated yesterday approximately 1 hour 30 minutes.  During that time, 

patient stated she wanted to be a full code.  This was in conflict which she 

had stated earlier while on Precedex drip.  Patient is currently full code.  

Possible stridor according to RN which brought about intubation by overnight 

intensivist.  Currently awake and alert and following commands room


9/1: Pending CT neck.  Afebrile.  Remains on full ventilator support.  Awake 

and alert and following commands.  tube feedings resumed.





Subjective





9/2:  Afebrile.  CT neck unremarkable.  Awake and alert and following commands.

  Tolerating tube feeding.  One bowel movement yesterday.





Objective





Vital Signs








  Date Time  Temp Pulse Resp B/P (MAP) Pulse Ox O2 Delivery O2 Flow Rate FiO2


 


9/2/17 11:39     100   35


 


9/2/17 10:00  82      


 


9/2/17 08:00 98.1  20 125/78 (94)    














Intake and Output   


 


 9/2/17 9/2/17 9/2/17





 07:59 15:59 23:59


 


Intake Total 2141 ml 450 ml 


 


Output Total 1000 ml  


 


Balance 1141 ml 450 ml 








Result Diagram:  


9/2/17 0550                                                                    

            9/2/17 0550





Other Results





Microbiology








 Date/Time


Source Procedure


Growth Status


 


 


 8/28/17 16:55


Blood Peripheral Aerobic Blood Culture - Final


NO GROWTH IN 5 DAYS Complete


 


 8/28/17 16:55


Blood Peripheral Anaerobic Blood Culture - Final


NO GROWTH IN 5 DAYS Complete





 8/21/17 22:00


Cerebral Spinal Fluid Lumbar Puncture Fungal Smear - Final


NO FUNGAL ELEMENTS SEEN. Resulted


 


 8/21/17 22:00


Cerebral Spinal Fluid Lumbar Puncture Fungal Culture - Preliminary


NO GROWTH IN 1 WEEK Resulted





 8/31/17 22:00


Sputum Endotracheal Gram Stain - Final Resulted


 


 8/31/17 22:00


Sputum Endotracheal Sputum Culture - Preliminary


RESULTS PENDING Resulted








Imaging





Last Impressions








Neck CT 9/2/17 0000 Signed





Impressions: 





 Service Date/Time:  Saturday, September 2, 2017 11:13 - CONCLUSION:  1. 

Patient 





 is intubated which limits this study. The epiglottis is otherwise normal. 





 Secretions are present in the oropharynx and right sphenoid sinus.     Ej Ewing MD 


 


Chest X-Ray 8/31/17 0000 Signed





Impressions: 





 Service Date/Time:  Thursday, August 31, 2017 10:10 - CONCLUSION:  

Satisfactory 





 support line and tube positioning. Mild infiltrate most mobile in the left 

lung 





 base.     Ron Cruz MD 


 


Brain MRI 8/25/17 0000 Signed





Impressions: 





 Service Date/Time:  Friday, August 25, 2017 17:07 - CONCLUSION:  1. Small 

acute 





 or subacute cortical infarct of the left parietal lobe. 2. Small, faint area 

of 





 nonspecific flair signal abnormality in the left frontal lobe periventricular 





 white matter, nonspecific. No associated mass effect or abnormal enhancement. 

3 





 month followup MRI of the brain recommended. 3.  Mild chronic white matter 





 changes.     Ron Swan MD 


 


Head CT 8/21/17 0000 Signed





Impressions: 





 Service Date/Time:  Monday, August 21, 2017 19:47 - CONCLUSION:  No evidence 

of 





 acute infarct, hemorrhage, mass or edema.     Kristian Frankel MD 








Objective Remarks


GENERAL: 37-year-old  female, critically ill currently resting in bed 

orotracheally intubated


SKIN: Warm and dry.  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


HEAD: Normocephalic.


EYES: No scleral icterus. No injection or drainage. 


NECK:  trachea midline.  No JVD


CARDIOVASCULAR: Tachycardic, RR.  S1, S2 no S4.  Without murmur


RESPIRATORY: Essentially clear to auscultation without wheezes rales or rhonchi


GASTROINTESTINAL: Abdomen soft, non-tender, nondistended.  no guarding.


MUSCULOSKELETAL: 1 + peripheral edema.  Generalized anasarca.  Multiple 

petechiae and ecchymotic bruising secondary to most likely septic emboli


NEURO EXAM: GCS 11T. Follows commands, moves extremities 4.  Motor strength 5/

5 bilateral upper and lower extremity.  Cranial nerves II-XII grossly intact.





A/P


Assessment and Plan


Neuro/Psych 





Acute Encephalopathy


Acute meningitis, possible bacterial


Left parietal/frontal CVA





Patient is currently on Versed drip at 5 mg an hour fentanyl drip at 70 mcg an 

hour for sedation/analgesia while intubated


Goal of RA SS -2


Daily sedation vacation


- LP with elevated protein, and low glucose relative to serum glucose.


- CT head negative


-MRI-small acute and subacute cortical infarct of left parietal lobe


- Neuro checks per unit protocol


Acetaminophen for fever


Evaluated by neurology





Resp:





Acute hypoxic and hypercarbic Respiratory failure 





PRVV 14/550/1/5/35


Ventilator bundle


Albuterol/ipratropium aerosols every 6 hours with to 2 hours As needed 

albuterol therapy


Spontaneous breathing trials daily ordered 


CT neck rule out airway edema


On dexamethasone 4 mg IV every 8 hours 2 days completed





Cardiac





SVT


Sinus tachycardia


Mixed Septic/cardiogenic Shock


Acute congestive heart failure secondary to valvulopathy


Tricuspid Regurgitation





- Metoprolol 2.5 mg every 6 hours when necessary heart rate greater than 100 bpm


Echocardiogram 8/17 revealed EF 25-30%.  PAP 34 mmHg.


Currently off all vasopressors.





ID:





History of fungal endocarditis


Staph Bacteremia


Tricuspid valve infective endocarditis





- History of IVDA


- Broad-spectrum antibiotic with oxacillin, ampicillin, gentamicin and rifampin


- Infectious disease following, Dr. Teague





RENAL:





Acute Kidney Injury - resolved





-secondary to shock


- Strict I and O's


- molina catheter





GI"





Acute Protein calorie malnutrition- severe





- secondary to long-standing IVDA and endocarditis


- continue TF Jevity 1.5 currently at goal 55 cc/hour-minimal  residuals resume 

today


Famotidine for GI prophylaxis


Docusate sodium/senna for bowel regimen





HEME: 





Microcytic anemia





Monitor CBC daily.  Follow trends





Endo





Hyperglycemia critical illness





Sliding scale insulin to maintain euglycemia





FEN:





Hypernatremia





Continue free water flushes 300 every 8 hours. 





DVT GI prophylaxis


- Teds SCDs


- Subcutaneous heparin


-Famotidine





Dispo:


Discussed with ICU RN and significant other at bedside.





Level II follow-up











Bryson Bustos MD Sep 2, 2017 11:49

## 2017-09-03 VITALS — HEART RATE: 91 BPM

## 2017-09-03 VITALS — HEART RATE: 98 BPM

## 2017-09-03 VITALS
TEMPERATURE: 98 F | DIASTOLIC BLOOD PRESSURE: 72 MMHG | HEART RATE: 101 BPM | SYSTOLIC BLOOD PRESSURE: 125 MMHG | OXYGEN SATURATION: 95 % | RESPIRATION RATE: 14 BRPM

## 2017-09-03 VITALS — HEART RATE: 99 BPM

## 2017-09-03 VITALS
RESPIRATION RATE: 14 BRPM | OXYGEN SATURATION: 100 % | HEART RATE: 95 BPM | SYSTOLIC BLOOD PRESSURE: 113 MMHG | DIASTOLIC BLOOD PRESSURE: 62 MMHG | TEMPERATURE: 98.7 F

## 2017-09-03 VITALS — OXYGEN SATURATION: 99 %

## 2017-09-03 VITALS — OXYGEN SATURATION: 100 %

## 2017-09-03 VITALS
RESPIRATION RATE: 16 BRPM | TEMPERATURE: 98.8 F | HEART RATE: 84 BPM | DIASTOLIC BLOOD PRESSURE: 65 MMHG | SYSTOLIC BLOOD PRESSURE: 124 MMHG | OXYGEN SATURATION: 96 %

## 2017-09-03 VITALS
SYSTOLIC BLOOD PRESSURE: 117 MMHG | HEART RATE: 91 BPM | DIASTOLIC BLOOD PRESSURE: 80 MMHG | RESPIRATION RATE: 16 BRPM | TEMPERATURE: 98.3 F | OXYGEN SATURATION: 96 %

## 2017-09-03 VITALS — HEART RATE: 87 BPM

## 2017-09-03 VITALS
OXYGEN SATURATION: 100 % | SYSTOLIC BLOOD PRESSURE: 110 MMHG | TEMPERATURE: 98.7 F | DIASTOLIC BLOOD PRESSURE: 71 MMHG | HEART RATE: 79 BPM | RESPIRATION RATE: 14 BRPM

## 2017-09-03 VITALS — OXYGEN SATURATION: 98 %

## 2017-09-03 VITALS
SYSTOLIC BLOOD PRESSURE: 110 MMHG | DIASTOLIC BLOOD PRESSURE: 66 MMHG | OXYGEN SATURATION: 96 % | RESPIRATION RATE: 16 BRPM | HEART RATE: 92 BPM | TEMPERATURE: 97.3 F

## 2017-09-03 VITALS — OXYGEN SATURATION: 97 %

## 2017-09-03 VITALS — HEART RATE: 81 BPM

## 2017-09-03 VITALS — HEART RATE: 88 BPM

## 2017-09-03 LAB
ANION GAP SERPL CALC-SCNC: 10 MEQ/L (ref 5–15)
BUN SERPL-MCNC: 27 MG/DL (ref 7–18)
CHLORIDE SERPL-SCNC: 113 MEQ/L (ref 98–107)
ERYTHROCYTE [DISTWIDTH] IN BLOOD BY AUTOMATED COUNT: 16.8 % (ref 11.6–17.2)
GFR SERPLBLD BASED ON 1.73 SQ M-ARVRAT: 54 ML/MIN (ref 89–?)
HCO3 BLD-SCNC: 25.5 MEQ/L (ref 21–32)
HCT VFR BLD CALC: 25 % (ref 35–46)
MCH RBC QN AUTO: 24.8 PG (ref 27–34)
MCHC RBC AUTO-ENTMCNC: 32.2 % (ref 32–36)
MCV RBC AUTO: 77 FL (ref 80–100)
PLATELET # BLD: 460 TH/MM3 (ref 150–450)
POTASSIUM SERPL-SCNC: 3.3 MEQ/L (ref 3.5–5.1)
RBC # BLD AUTO: 3.24 MIL/MM3 (ref 4–5.3)
REVIEW FLAG: (no result)
SODIUM SERPL-SCNC: 148 MEQ/L (ref 136–145)
WBC # BLD AUTO: 9.9 TH/MM3 (ref 4–11)

## 2017-09-03 RX ADMIN — FAMOTIDINE SCH MG: 10 INJECTION, SOLUTION INTRAVENOUS at 20:14

## 2017-09-03 RX ADMIN — POLYVINYL ALCOHOL SCH DROP: 14 SOLUTION/ DROPS OPHTHALMIC at 08:13

## 2017-09-03 RX ADMIN — AMPICILLIN SODIUM SCH MLS/HR: 2 INJECTION, POWDER, FOR SOLUTION INTRAMUSCULAR; INTRAVENOUS at 23:57

## 2017-09-03 RX ADMIN — RIFAMPIN SCH MG: 150 CAPSULE ORAL at 20:13

## 2017-09-03 RX ADMIN — METHADONE HYDROCHLORIDE SCH MG: 5 SOLUTION ORAL at 20:13

## 2017-09-03 RX ADMIN — GENTAMICIN SULFATE SCH MLS/HR: 0.8 INJECTION, SOLUTION INTRAVENOUS at 11:01

## 2017-09-03 RX ADMIN — OXACILLIN SODIUM SCH MLS/HR: 2 INJECTION, POWDER, FOR SOLUTION INTRAMUSCULAR; INTRAVENOUS at 09:22

## 2017-09-03 RX ADMIN — POLYVINYL ALCOHOL SCH DROP: 14 SOLUTION/ DROPS OPHTHALMIC at 18:00

## 2017-09-03 RX ADMIN — AMPICILLIN SODIUM SCH MLS/HR: 2 INJECTION, POWDER, FOR SOLUTION INTRAMUSCULAR; INTRAVENOUS at 20:14

## 2017-09-03 RX ADMIN — Medication SCH ML: at 18:00

## 2017-09-03 RX ADMIN — POLYVINYL ALCOHOL SCH DROP: 14 SOLUTION/ DROPS OPHTHALMIC at 13:00

## 2017-09-03 RX ADMIN — HEPARIN SODIUM SCH UNITS: 10000 INJECTION, SOLUTION INTRAVENOUS; SUBCUTANEOUS at 11:01

## 2017-09-03 RX ADMIN — OXACILLIN SODIUM SCH MLS/HR: 2 INJECTION, POWDER, FOR SOLUTION INTRAMUSCULAR; INTRAVENOUS at 13:11

## 2017-09-03 RX ADMIN — MORPHINE SULFATE PRN MG: 2 INJECTION, SOLUTION INTRAMUSCULAR; INTRAVENOUS at 08:10

## 2017-09-03 RX ADMIN — OXACILLIN SODIUM SCH MLS/HR: 2 INJECTION, POWDER, FOR SOLUTION INTRAMUSCULAR; INTRAVENOUS at 04:26

## 2017-09-03 RX ADMIN — Medication SCH ML: at 20:14

## 2017-09-03 RX ADMIN — IPRATROPIUM BROMIDE AND ALBUTEROL SULFATE SCH AMPULE: .5; 3 SOLUTION RESPIRATORY (INHALATION) at 03:55

## 2017-09-03 RX ADMIN — Medication SCH ML: at 08:11

## 2017-09-03 RX ADMIN — FAMOTIDINE SCH MG: 10 INJECTION, SOLUTION INTRAVENOUS at 08:09

## 2017-09-03 RX ADMIN — AMPICILLIN SODIUM SCH MLS/HR: 2 INJECTION, POWDER, FOR SOLUTION INTRAMUSCULAR; INTRAVENOUS at 08:09

## 2017-09-03 RX ADMIN — OXACILLIN SODIUM SCH MLS/HR: 2 INJECTION, POWDER, FOR SOLUTION INTRAMUSCULAR; INTRAVENOUS at 20:14

## 2017-09-03 RX ADMIN — STANDARDIZED SENNA CONCENTRATE AND DOCUSATE SODIUM SCH TAB: 8.6; 5 TABLET, FILM COATED ORAL at 08:13

## 2017-09-03 RX ADMIN — IPRATROPIUM BROMIDE AND ALBUTEROL SULFATE SCH AMPULE: .5; 3 SOLUTION RESPIRATORY (INHALATION) at 16:00

## 2017-09-03 RX ADMIN — OXACILLIN SODIUM SCH MLS/HR: 2 INJECTION, POWDER, FOR SOLUTION INTRAMUSCULAR; INTRAVENOUS at 17:41

## 2017-09-03 RX ADMIN — OXACILLIN SODIUM SCH MLS/HR: 2 INJECTION, POWDER, FOR SOLUTION INTRAMUSCULAR; INTRAVENOUS at 00:20

## 2017-09-03 RX ADMIN — Medication SCH ML: at 04:26

## 2017-09-03 RX ADMIN — HEPARIN SODIUM SCH UNITS: 10000 INJECTION, SOLUTION INTRAVENOUS; SUBCUTANEOUS at 20:13

## 2017-09-03 RX ADMIN — IPRATROPIUM BROMIDE AND ALBUTEROL SULFATE SCH AMPULE: .5; 3 SOLUTION RESPIRATORY (INHALATION) at 10:00

## 2017-09-03 RX ADMIN — AMPICILLIN SODIUM SCH MLS/HR: 2 INJECTION, POWDER, FOR SOLUTION INTRAMUSCULAR; INTRAVENOUS at 04:26

## 2017-09-03 RX ADMIN — GENTAMICIN SULFATE SCH MLS/HR: 0.8 INJECTION, SOLUTION INTRAVENOUS at 23:58

## 2017-09-03 RX ADMIN — RIFAMPIN SCH MG: 150 CAPSULE ORAL at 08:09

## 2017-09-03 RX ADMIN — AMPICILLIN SODIUM SCH MLS/HR: 2 INJECTION, POWDER, FOR SOLUTION INTRAMUSCULAR; INTRAVENOUS at 13:11

## 2017-09-03 RX ADMIN — IPRATROPIUM BROMIDE AND ALBUTEROL SULFATE SCH AMPULE: .5; 3 SOLUTION RESPIRATORY (INHALATION) at 21:38

## 2017-09-03 RX ADMIN — AMPICILLIN SODIUM SCH MLS/HR: 2 INJECTION, POWDER, FOR SOLUTION INTRAMUSCULAR; INTRAVENOUS at 17:41

## 2017-09-03 RX ADMIN — METHADONE HYDROCHLORIDE SCH MG: 5 SOLUTION ORAL at 09:23

## 2017-09-03 RX ADMIN — DEXMEDETOMIDINE HYDROCHLORIDE PRN MLS/HR: 100 INJECTION, SOLUTION, CONCENTRATE INTRAVENOUS at 19:23

## 2017-09-03 RX ADMIN — MORPHINE SULFATE PRN MG: 2 INJECTION, SOLUTION INTRAMUSCULAR; INTRAVENOUS at 13:14

## 2017-09-03 RX ADMIN — DEXMEDETOMIDINE HYDROCHLORIDE PRN MLS/HR: 100 INJECTION, SOLUTION, CONCENTRATE INTRAVENOUS at 14:59

## 2017-09-03 RX ADMIN — Medication SCH ML: at 12:00

## 2017-09-03 RX ADMIN — Medication SCH ML: at 23:57

## 2017-09-03 RX ADMIN — DEXMEDETOMIDINE HYDROCHLORIDE PRN MLS/HR: 100 INJECTION, SOLUTION, CONCENTRATE INTRAVENOUS at 09:21

## 2017-09-03 RX ADMIN — AMPICILLIN SODIUM SCH MLS/HR: 2 INJECTION, POWDER, FOR SOLUTION INTRAMUSCULAR; INTRAVENOUS at 00:21

## 2017-09-03 RX ADMIN — MORPHINE SULFATE PRN MG: 2 INJECTION, SOLUTION INTRAMUSCULAR; INTRAVENOUS at 17:43

## 2017-09-03 RX ADMIN — GENTAMICIN SULFATE SCH MLS/HR: 0.8 INJECTION, SOLUTION INTRAVENOUS at 00:17

## 2017-09-03 RX ADMIN — STANDARDIZED SENNA CONCENTRATE AND DOCUSATE SODIUM SCH TAB: 8.6; 5 TABLET, FILM COATED ORAL at 20:13

## 2017-09-03 NOTE — HHI.CCPN
Subjective


Remarks/Hospital Course


Hospital Course:





37-year-old  female who presents initially as a stroke alert.  

According to EMS report the patient has a history of fungal endocarditis with 

previous IVDA.  EMS states that the patient is currently on hospice, however, 

they are unsure if the patient is a DNR.  EMS states the patient apparently 

complained of a headache earlier today, was found lying on the floor and 

incontinent of stool at approximate, last seen normal at 6:45 PM.  Therefore, 

EMS called a stroke alert in the field as the patient was aphasic and confused.

  Upon arrival the patient groans, moves all 4 extremities and withdraws all 4 

extremities, but is unable to provide any information.  She was intubated in 

the emergency department by ER attending for airway protection.





8/22: remains encephalopathic. Blood cultures growing Staph in 4/4 bottles. 


8/23:  Remains sedated/ encephalopathic, orally intubated on mechanical 

ventilation.


8/24: still in shock on vasopressors. grossly volume overloaded and 

intravascularly volume overloaded based on echo and clinically. needs 

aggressive diuresis, but still in shock. family made patient DNR last night, 

but want to continue aggressive therapies. still encephalopathic. no 

significant improvements.


8/25: seen and examined at around 06:45am. delayed note entry. remains 

encephalopathic. no significant changes. unlikely to survive hospital stay, no 

additional overall therapies. family wants aggressive treatment. diuresing on 

bumex drip.    net 3.3L negative. still volume overloaded.


8/26: Tmax 102.8 This am patient went into SVT rhythm heart rate 190's.  

Cooling blanket placed, ice packs in place, metoprolol 5 mg IV push given, 

resolution heart rate mid 90s, MAP ranges 66-68.  Ofirmev ordered.  Bumex 

infusion discontinued secondary to elevation in creatinine.  Chest x-ray 

pending.


8/27: Continues to be febrile, continues on cooling blanket.  Propofol was 

discontinued secondary to hypotension.  Today the patient continues on Versed 

and fentanyl infusions with the RASS -2.  Patient currently GCS of 11 T, 

responsive.  The patient tolerated CPAP trials for approximately 2.5 hours for 

the last 2 days.


8/28:  Somewhat agitated this AM.  Remains in sinus tachycardia.  On PSV trials 

1.5 hours today.  Tolerating tube feeding.  Tmax 101.4.


8/29:  Low-grade fevers overnight.  Currently afebrile.  Remains on Precedex, 

Versed and fentanyl drips for sedation.  On PSV trial 2 hours so far.  

Tolerating tube feeds.


8/30:  Regular.  Requesting extubation DO NOT INTUBATE hospice today.  Patient 

actually is doing well on her weaning trials.  If passes wean parameters.  We'

ll attempt extubation today.


8/31:  Extubated yesterday approximately 1 hour 30 minutes.  During that time, 

patient stated she wanted to be a full code.  This was in conflict which she 

had stated earlier while on Precedex drip.  Patient is currently full code.  

Possible stridor according to RN which brought about intubation by overnight 

intensivist.  Currently awake and alert and following commands room


9/1: Pending CT neck.  Afebrile.  Remains on full ventilator support.  Awake 

and alert and following commands.  tube feedings resumed.


9/2:  Afebrile.  CT neck unremarkable.  Awake and alert and following commands.

  Tolerating tube feeding.  One bowel movement yesterday.





Subjective





9/3:  Resting in bed in no acute distress.  Awake and alert.  We'll attempt 

extubation morning if passes weaning parameters.  Will need to be on Precedex 

drip.  Tolerating tube feeding.





Objective





Vital Signs








  Date Time  Temp Pulse Resp B/P (MAP) Pulse Ox O2 Delivery O2 Flow Rate FiO2


 


9/3/17 07:22     99   35


 


9/3/17 06:00  91      


 


9/3/17 04:00 97.3  16 110/66 (81)    














Intake and Output   


 


 9/3/17 9/3/17 9/4/17





 08:00 16:00 00:00


 


Intake Total 2205 ml  


 


Output Total 1200 ml  


 


Balance 1005 ml  








Result Diagram:  


9/3/17 0540                                                                    

            9/3/17 0540





Other Results





Microbiology








 Date/Time


Source Procedure


Growth Status


 


 


 8/28/17 16:55


Blood Peripheral Aerobic Blood Culture - Final


NO GROWTH IN 5 DAYS Complete


 


 8/28/17 16:55


Blood Peripheral Anaerobic Blood Culture - Final


NO GROWTH IN 5 DAYS Complete





 8/21/17 22:00


Cerebral Spinal Fluid Lumbar Puncture Fungal Smear - Final


NO FUNGAL ELEMENTS SEEN. Resulted


 


 8/21/17 22:00


Cerebral Spinal Fluid Lumbar Puncture Fungal Culture - Preliminary


NO GROWTH IN 1 WEEK Resulted





 8/31/17 22:00


Sputum Endotracheal Gram Stain - Final Resulted


 


 8/31/17 22:00


Sputum Endotracheal Sputum Culture - Preliminary


NO GROWTH IN 24 HOURS. Resulted








Imaging





Last Impressions








Neck CT 9/2/17 0000 Signed





Impressions: 





 Service Date/Time:  Saturday, September 2, 2017 11:13 - CONCLUSION:  1. 

Patient 





 is intubated which limits this study. The epiglottis is otherwise normal. 





 Secretions are present in the oropharynx and right sphenoid sinus.     Ej Ewing MD 


 


Chest X-Ray 8/31/17 0000 Signed





Impressions: 





 Service Date/Time:  Thursday, August 31, 2017 10:10 - CONCLUSION:  

Satisfactory 





 support line and tube positioning. Mild infiltrate most mobile in the left 

lung 





 base.     Ron Cruz MD 


 


Brain MRI 8/25/17 0000 Signed





Impressions: 





 Service Date/Time:  Friday, August 25, 2017 17:07 - CONCLUSION:  1. Small 

acute 





 or subacute cortical infarct of the left parietal lobe. 2. Small, faint area 

of 





 nonspecific flair signal abnormality in the left frontal lobe periventricular 





 white matter, nonspecific. No associated mass effect or abnormal enhancement. 

3 





 month followup MRI of the brain recommended. 3.  Mild chronic white matter 





 changes.     Ron Swan MD 


 


Head CT 8/21/17 0000 Signed





Impressions: 





 Service Date/Time:  Monday, August 21, 2017 19:47 - CONCLUSION:  No evidence 

of 





 acute infarct, hemorrhage, mass or edema.     Kristian Frankel MD 








Objective Remarks


GENERAL: 37-year-old  female, critically ill currently resting in bed 

orotracheally intubated


SKIN: Warm and dry.  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


HEAD: Normocephalic.


EYES: No scleral icterus. No injection or drainage. 


NECK:  trachea midline.  No JVD


CARDIOVASCULAR: Tachycardic, RR.  S1, S2 no S4.  Without murmur


RESPIRATORY: Essentially clear to auscultation without wheezes rales or rhonchi


GASTROINTESTINAL: Abdomen soft, non-tender, nondistended.  no guarding.


MUSCULOSKELETAL: 1 + peripheral edema.  Generalized anasarca.  Multiple 

petechiae and ecchymotic bruising secondary to most likely septic emboli


NEURO EXAM: GCS 11T. Follows commands, moves extremities 4.  Motor strength 5/

5 bilateral upper and lower extremity.  Cranial nerves II-XII grossly intact.





A/P


Assessment and Plan


Neuro/Psych 





Acute Encephalopathy


Acute meningitis, possible bacterial


Left parietal/frontal CVA





Patient is currently on Versed drip at 5 mg an hour fentanyl drip at 70 mcg an 

hour for sedation/analgesia while intubated


Goal of RA SS -2


Daily sedation vacation


- LP with elevated protein, and low glucose relative to serum glucose.


- CT head negative


-MRI-small acute and subacute cortical infarct of left parietal lobe


- Neuro checks per unit protocol


Acetaminophen for fever


Evaluated by neurology





We'll place on Precedex drip and scheduled 5 mg twice a day methadone prior to 

extubation





Resp:





Acute hypoxic and hypercarbic Respiratory failure 





PRVV 14/550/1/5/35


   PSV trial this a.m.


Ventilator bundle


Albuterol/ipratropium aerosols every 6 hours with to 2 hours As needed 

albuterol therapy


Spontaneous breathing trials daily ordered 


CT neck rule out airway edema


On dexamethasone 4 mg IV every 8 hours 2 days completed





Cardiac





SVT


Sinus tachycardia


Mixed Septic/cardiogenic Shock


Acute congestive heart failure secondary to valvulopathy


Tricuspid Regurgitation





- Metoprolol 2.5 mg every 6 hours when necessary heart rate greater than 100 bpm


Echocardiogram 8/17 revealed EF 25-30%.  PAP 34 mmHg.


Currently off all vasopressors.





ID:





History of fungal endocarditis


Staph Bacteremia


Tricuspid valve infective endocarditis





- History of IVDA


- Broad-spectrum antibiotic with oxacillin, ampicillin, gentamicin and rifampin 

- switched to oral today


   Tentative stop date for IV antibiotics 10/5


   Oral suppression indefinitely


- Infectious disease following, Dr. Teague





RENAL:





Acute Kidney Injury - resolved





-secondary to shock


- Strict I and O's


- molina catheter





GI"





Acute Protein calorie malnutrition- severe





- secondary to long-standing IVDA and endocarditis


- continue TF Jevity 1.5 currently at goal 55 cc/hour-minimal  residuals 


Famotidine for GI prophylaxis


Docusate sodium/senna for bowel regimen





HEME: 





Normocytic anemia


Thrombocytosis





Monitor CBC daily.  Follow trends





Endo





Hyperglycemia critical illness





Sliding scale insulin to maintain euglycemia





FEN:





Hypernatremia


Hyperphosphatemia





Continue free water flushes 300 every 6 hours. 





DVT GI prophylaxis


- Teds SCDs


- Subcutaneous heparin


-Famotidine





Dispo:


Discussed with ICU RN and significant other at bedside.





Level II follow-up











Bryson Bustos MD Sep 3, 2017 08:05

## 2017-09-04 VITALS
TEMPERATURE: 98.4 F | HEART RATE: 84 BPM | DIASTOLIC BLOOD PRESSURE: 68 MMHG | RESPIRATION RATE: 22 BRPM | SYSTOLIC BLOOD PRESSURE: 122 MMHG | OXYGEN SATURATION: 100 %

## 2017-09-04 VITALS — HEART RATE: 98 BPM

## 2017-09-04 VITALS — HEART RATE: 101 BPM

## 2017-09-04 VITALS
DIASTOLIC BLOOD PRESSURE: 72 MMHG | RESPIRATION RATE: 18 BRPM | SYSTOLIC BLOOD PRESSURE: 118 MMHG | OXYGEN SATURATION: 98 % | HEART RATE: 87 BPM | TEMPERATURE: 98.3 F

## 2017-09-04 VITALS — OXYGEN SATURATION: 100 %

## 2017-09-04 VITALS — HEART RATE: 77 BPM

## 2017-09-04 VITALS
OXYGEN SATURATION: 93 % | DIASTOLIC BLOOD PRESSURE: 66 MMHG | TEMPERATURE: 98.5 F | SYSTOLIC BLOOD PRESSURE: 124 MMHG | RESPIRATION RATE: 18 BRPM | HEART RATE: 92 BPM

## 2017-09-04 VITALS
DIASTOLIC BLOOD PRESSURE: 73 MMHG | HEART RATE: 79 BPM | TEMPERATURE: 98.6 F | OXYGEN SATURATION: 100 % | RESPIRATION RATE: 22 BRPM | SYSTOLIC BLOOD PRESSURE: 114 MMHG

## 2017-09-04 VITALS
DIASTOLIC BLOOD PRESSURE: 71 MMHG | TEMPERATURE: 98.2 F | SYSTOLIC BLOOD PRESSURE: 123 MMHG | HEART RATE: 87 BPM | OXYGEN SATURATION: 100 % | RESPIRATION RATE: 20 BRPM

## 2017-09-04 VITALS
HEART RATE: 101 BPM | TEMPERATURE: 98 F | OXYGEN SATURATION: 98 % | RESPIRATION RATE: 12 BRPM | DIASTOLIC BLOOD PRESSURE: 58 MMHG | SYSTOLIC BLOOD PRESSURE: 110 MMHG

## 2017-09-04 VITALS — HEART RATE: 83 BPM

## 2017-09-04 VITALS — HEART RATE: 89 BPM

## 2017-09-04 LAB
ANION GAP SERPL CALC-SCNC: 9 MEQ/L (ref 5–15)
BUN SERPL-MCNC: 19 MG/DL (ref 7–18)
CHLORIDE SERPL-SCNC: 108 MEQ/L (ref 98–107)
ERYTHROCYTE [DISTWIDTH] IN BLOOD BY AUTOMATED COUNT: 16.8 % (ref 11.6–17.2)
GFR SERPLBLD BASED ON 1.73 SQ M-ARVRAT: 67 ML/MIN (ref 89–?)
HCO3 BLD-SCNC: 26.3 MEQ/L (ref 21–32)
HCT VFR BLD CALC: 23.7 % (ref 35–46)
MAGNESIUM SERPL-MCNC: 1.8 MG/DL (ref 1.5–2.5)
MCH RBC QN AUTO: 24.2 PG (ref 27–34)
MCHC RBC AUTO-ENTMCNC: 31.6 % (ref 32–36)
MCV RBC AUTO: 76.6 FL (ref 80–100)
PLATELET # BLD: 417 TH/MM3 (ref 150–450)
POTASSIUM SERPL-SCNC: 3.4 MEQ/L (ref 3.5–5.1)
RBC # BLD AUTO: 3.09 MIL/MM3 (ref 4–5.3)
REVIEW FLAG: (no result)
SODIUM SERPL-SCNC: 143 MEQ/L (ref 136–145)
WBC # BLD AUTO: 10.2 TH/MM3 (ref 4–11)

## 2017-09-04 RX ADMIN — METHADONE HYDROCHLORIDE SCH MG: 5 SOLUTION ORAL at 08:00

## 2017-09-04 RX ADMIN — IPRATROPIUM BROMIDE AND ALBUTEROL SULFATE SCH AMPULE: .5; 3 SOLUTION RESPIRATORY (INHALATION) at 07:47

## 2017-09-04 RX ADMIN — OXACILLIN SODIUM SCH MLS/HR: 2 INJECTION, POWDER, FOR SOLUTION INTRAMUSCULAR; INTRAVENOUS at 13:06

## 2017-09-04 RX ADMIN — FAMOTIDINE SCH MG: 10 INJECTION, SOLUTION INTRAVENOUS at 19:59

## 2017-09-04 RX ADMIN — IPRATROPIUM BROMIDE AND ALBUTEROL SULFATE SCH AMPULE: .5; 3 SOLUTION RESPIRATORY (INHALATION) at 15:19

## 2017-09-04 RX ADMIN — POLYVINYL ALCOHOL SCH DROP: 14 SOLUTION/ DROPS OPHTHALMIC at 09:00

## 2017-09-04 RX ADMIN — MORPHINE SULFATE PRN MG: 2 INJECTION, SOLUTION INTRAMUSCULAR; INTRAVENOUS at 18:07

## 2017-09-04 RX ADMIN — OXACILLIN SODIUM SCH MLS/HR: 2 INJECTION, POWDER, FOR SOLUTION INTRAMUSCULAR; INTRAVENOUS at 16:27

## 2017-09-04 RX ADMIN — DEXMEDETOMIDINE HYDROCHLORIDE PRN MLS/HR: 100 INJECTION, SOLUTION, CONCENTRATE INTRAVENOUS at 05:40

## 2017-09-04 RX ADMIN — HEPARIN SODIUM SCH UNITS: 10000 INJECTION, SOLUTION INTRAVENOUS; SUBCUTANEOUS at 09:39

## 2017-09-04 RX ADMIN — RIFAMPIN SCH MG: 150 CAPSULE ORAL at 08:01

## 2017-09-04 RX ADMIN — POTASSIUM CHLORIDE PRN MLS/HR: 400 INJECTION, SOLUTION INTRAVENOUS at 06:10

## 2017-09-04 RX ADMIN — OXACILLIN SODIUM SCH MLS/HR: 2 INJECTION, POWDER, FOR SOLUTION INTRAMUSCULAR; INTRAVENOUS at 05:39

## 2017-09-04 RX ADMIN — IPRATROPIUM BROMIDE AND ALBUTEROL SULFATE PRN AMPULE: .5; 3 SOLUTION RESPIRATORY (INHALATION) at 00:10

## 2017-09-04 RX ADMIN — Medication SCH ML: at 05:35

## 2017-09-04 RX ADMIN — DEXMEDETOMIDINE HYDROCHLORIDE PRN MLS/HR: 100 INJECTION, SOLUTION, CONCENTRATE INTRAVENOUS at 00:00

## 2017-09-04 RX ADMIN — GENTAMICIN SULFATE SCH MLS/HR: 0.8 INJECTION, SOLUTION INTRAVENOUS at 11:06

## 2017-09-04 RX ADMIN — AMPICILLIN SODIUM SCH MLS/HR: 2 INJECTION, POWDER, FOR SOLUTION INTRAMUSCULAR; INTRAVENOUS at 11:44

## 2017-09-04 RX ADMIN — DEXMEDETOMIDINE HYDROCHLORIDE PRN MLS/HR: 100 INJECTION, SOLUTION, CONCENTRATE INTRAVENOUS at 03:49

## 2017-09-04 RX ADMIN — AMPICILLIN SODIUM SCH MLS/HR: 2 INJECTION, POWDER, FOR SOLUTION INTRAMUSCULAR; INTRAVENOUS at 16:25

## 2017-09-04 RX ADMIN — FAMOTIDINE SCH MG: 10 INJECTION, SOLUTION INTRAVENOUS at 08:00

## 2017-09-04 RX ADMIN — Medication SCH ML: at 20:00

## 2017-09-04 RX ADMIN — IPRATROPIUM BROMIDE AND ALBUTEROL SULFATE SCH AMPULE: .5; 3 SOLUTION RESPIRATORY (INHALATION) at 03:55

## 2017-09-04 RX ADMIN — AMPICILLIN SODIUM SCH MLS/HR: 2 INJECTION, POWDER, FOR SOLUTION INTRAMUSCULAR; INTRAVENOUS at 05:39

## 2017-09-04 RX ADMIN — POLYVINYL ALCOHOL SCH DROP: 14 SOLUTION/ DROPS OPHTHALMIC at 11:44

## 2017-09-04 RX ADMIN — AMPICILLIN SODIUM SCH MLS/HR: 2 INJECTION, POWDER, FOR SOLUTION INTRAMUSCULAR; INTRAVENOUS at 23:22

## 2017-09-04 RX ADMIN — MORPHINE SULFATE PRN MG: 2 INJECTION, SOLUTION INTRAMUSCULAR; INTRAVENOUS at 15:30

## 2017-09-04 RX ADMIN — MORPHINE SULFATE PRN MG: 2 INJECTION, SOLUTION INTRAMUSCULAR; INTRAVENOUS at 13:06

## 2017-09-04 RX ADMIN — GENTAMICIN SULFATE SCH MLS/HR: 0.8 INJECTION, SOLUTION INTRAVENOUS at 23:21

## 2017-09-04 RX ADMIN — HEPARIN SODIUM SCH UNITS: 10000 INJECTION, SOLUTION INTRAVENOUS; SUBCUTANEOUS at 20:00

## 2017-09-04 RX ADMIN — MORPHINE SULFATE PRN MG: 2 INJECTION, SOLUTION INTRAMUSCULAR; INTRAVENOUS at 09:39

## 2017-09-04 RX ADMIN — METHADONE HYDROCHLORIDE SCH MG: 5 SOLUTION ORAL at 19:59

## 2017-09-04 RX ADMIN — STANDARDIZED SENNA CONCENTRATE AND DOCUSATE SODIUM SCH TAB: 8.6; 5 TABLET, FILM COATED ORAL at 19:59

## 2017-09-04 RX ADMIN — OXACILLIN SODIUM SCH MLS/HR: 2 INJECTION, POWDER, FOR SOLUTION INTRAMUSCULAR; INTRAVENOUS at 20:00

## 2017-09-04 RX ADMIN — OXACILLIN SODIUM SCH MLS/HR: 2 INJECTION, POWDER, FOR SOLUTION INTRAMUSCULAR; INTRAVENOUS at 09:39

## 2017-09-04 RX ADMIN — AMPICILLIN SODIUM SCH MLS/HR: 2 INJECTION, POWDER, FOR SOLUTION INTRAMUSCULAR; INTRAVENOUS at 08:00

## 2017-09-04 RX ADMIN — AMPICILLIN SODIUM SCH MLS/HR: 2 INJECTION, POWDER, FOR SOLUTION INTRAMUSCULAR; INTRAVENOUS at 19:59

## 2017-09-04 RX ADMIN — RIFAMPIN SCH MG: 150 CAPSULE ORAL at 19:59

## 2017-09-04 RX ADMIN — CHLORHEXIDINE GLUCONATE SCH PACK: 500 CLOTH TOPICAL at 03:48

## 2017-09-04 RX ADMIN — Medication SCH ML: at 08:00

## 2017-09-04 RX ADMIN — OXACILLIN SODIUM SCH MLS/HR: 2 INJECTION, POWDER, FOR SOLUTION INTRAMUSCULAR; INTRAVENOUS at 02:20

## 2017-09-04 RX ADMIN — STANDARDIZED SENNA CONCENTRATE AND DOCUSATE SODIUM SCH TAB: 8.6; 5 TABLET, FILM COATED ORAL at 08:00

## 2017-09-04 RX ADMIN — POLYVINYL ALCOHOL SCH DROP: 14 SOLUTION/ DROPS OPHTHALMIC at 18:00

## 2017-09-04 RX ADMIN — IPRATROPIUM BROMIDE AND ALBUTEROL SULFATE SCH AMPULE: .5; 3 SOLUTION RESPIRATORY (INHALATION) at 21:37

## 2017-09-04 NOTE — RADRPT
EXAM DATE/TIME:  09/04/2017 02:06 

 

[facility] COMPARISON:     

CHEST SINGLE AP, August 31, 2017, 10:10.

 

                     

INDICATIONS :     

Shortness of breath, possible pulmonary disease.

                     

 

MEDICAL HISTORY :            

Endocarditis poly substance abuse renal failure   

 

SURGICAL HISTORY :     

CABG.   

 

ENCOUNTER:     

Subsequent                                        

 

ACUITY:     

2 months      

 

PAIN SCORE:     

Non-responsive.

 

LOCATION:     

Bilateral chest 

 

FINDINGS:     

Mild infiltrate and small effusion seen of the left lung base. Right lung reasonably clear there is n
o pneumothorax seen.

 

Heart size stable, upper limits of normal. Patient has had previous median sternotomy.

 

Patient has been extubated. Nasogastric tube remains in place, tip in the stomach. There is a left du
bclavian central venous catheter again noted, tip in the superior vena cava.

 

CONCLUSION:     

1. Mild consolidation and small effusion of the left lung base not significantly changed. Right lung 
remains reasonably clear.

2. Endotracheal tube bowel in the interim. Nasogastric tube and left subclavian central venous cathet
er remain in place.

 

 

 

 Ron Swan MD on September 04, 2017 at 3:08           

Board Certified Radiologist.

 This report was verified electronically.

## 2017-09-04 NOTE — HHI.CCPN
Subjective


Remarks/Hospital Course


Hospital Course:





37-year-old  female who presents initially as a stroke alert.  

According to EMS report the patient has a history of fungal endocarditis with 

previous IVDA.  EMS states that the patient is currently on hospice, however, 

they are unsure if the patient is a DNR.  EMS states the patient apparently 

complained of a headache earlier today, was found lying on the floor and 

incontinent of stool at approximate, last seen normal at 6:45 PM.  Therefore, 

EMS called a stroke alert in the field as the patient was aphasic and confused.

  Upon arrival the patient groans, moves all 4 extremities and withdraws all 4 

extremities, but is unable to provide any information.  She was intubated in 

the emergency department by ER attending for airway protection.





8/22: remains encephalopathic. Blood cultures growing Staph in 4/4 bottles. 


8/23:  Remains sedated/ encephalopathic, orally intubated on mechanical 

ventilation.


8/24: still in shock on vasopressors. grossly volume overloaded and 

intravascularly volume overloaded based on echo and clinically. needs 

aggressive diuresis, but still in shock. family made patient DNR last night, 

but want to continue aggressive therapies. still encephalopathic. no 

significant improvements.


8/25: seen and examined at around 06:45am. delayed note entry. remains 

encephalopathic. no significant changes. unlikely to survive hospital stay, no 

additional overall therapies. family wants aggressive treatment. diuresing on 

bumex drip.    net 3.3L negative. still volume overloaded.


8/26: Tmax 102.8 This am patient went into SVT rhythm heart rate 190's.  

Cooling blanket placed, ice packs in place, metoprolol 5 mg IV push given, 

resolution heart rate mid 90s, MAP ranges 66-68.  Ofirmev ordered.  Bumex 

infusion discontinued secondary to elevation in creatinine.  Chest x-ray 

pending.


8/27: Continues to be febrile, continues on cooling blanket.  Propofol was 

discontinued secondary to hypotension.  Today the patient continues on Versed 

and fentanyl infusions with the RASS -2.  Patient currently GCS of 11 T, 

responsive.  The patient tolerated CPAP trials for approximately 2.5 hours for 

the last 2 days.


8/28:  Somewhat agitated this AM.  Remains in sinus tachycardia.  On PSV trials 

1.5 hours today.  Tolerating tube feeding.  Tmax 101.4.


8/29:  Low-grade fevers overnight.  Currently afebrile.  Remains on Precedex, 

Versed and fentanyl drips for sedation.  On PSV trial 2 hours so far.  

Tolerating tube feeds.


8/30:  Regular.  Requesting extubation DO NOT INTUBATE hospice today.  Patient 

actually is doing well on her weaning trials.  If passes wean parameters.  We'

ll attempt extubation today.


8/31:  Extubated yesterday approximately 1 hour 30 minutes.  During that time, 

patient stated she wanted to be a full code.  This was in conflict which she 

had stated earlier while on Precedex drip.  Patient is currently full code.  

Possible stridor according to RN which brought about intubation by overnight 

intensivist.  Currently awake and alert and following commands room


9/1: Pending CT neck.  Afebrile.  Remains on full ventilator support.  Awake 

and alert and following commands.  tube feedings resumed.


9/2:  Afebrile.  CT neck unremarkable.  Awake and alert and following commands.

  Tolerating tube feeding.  One bowel movement yesterday.





Subjective





9/3:  Resting in bed in no acute distress.  Awake and alert.  We'll attempt 

extubation morning if passes weaning parameters.  Will need to be on Precedex 

drip.  Tolerating tube feeding.





9/4: Extubated yesterday tolerating well.  Overnight had developed 

postextubation stridor, good response to racemic epi.  Currently on Precedex 

0.4 g per KG per hour.  Will wean to DC. No stridor on exam today





Objective





Vital Signs








  Date Time  Temp Pulse Resp B/P (MAP) Pulse Ox O2 Delivery O2 Flow Rate FiO2


 


9/4/17 07:48     100 Nasal Cannula 2.00 


 


9/4/17 06:00  83      


 


9/4/17 04:00 98.2  20 123/71 (88)    


 


9/3/17 08:00        35














Intake and Output   


 


 9/4/17 9/4/17 9/5/17





 08:00 16:00 00:00


 


Intake Total 1423 ml  


 


Balance 1423 ml  








Result Diagram:  


9/4/17 0400                                                                    

            9/4/17 0400





Imaging





Last Impressions








Neck CT 9/2/17 0000 Signed





Impressions: 





 Service Date/Time:  Saturday, September 2, 2017 11:13 - CONCLUSION:  1. 

Patient 





 is intubated which limits this study. The epiglottis is otherwise normal. 





 Secretions are present in the oropharynx and right sphenoid sinus.     Ej Ewing MD 


 


Chest X-Ray 8/31/17 0000 Signed





Impressions: 





 Service Date/Time:  Thursday, August 31, 2017 10:10 - CONCLUSION:  

Satisfactory 





 support line and tube positioning. Mild infiltrate most mobile in the left 

lung 





 base.     Ron Cruz MD 


 


Brain MRI 8/25/17 0000 Signed





Impressions: 





 Service Date/Time:  Friday, August 25, 2017 17:07 - CONCLUSION:  1. Small 

acute 





 or subacute cortical infarct of the left parietal lobe. 2. Small, faint area 

of 





 nonspecific flair signal abnormality in the left frontal lobe periventricular 





 white matter, nonspecific. No associated mass effect or abnormal enhancement. 

3 





 month followup MRI of the brain recommended. 3.  Mild chronic white matter 





 changes.     Ron Swan MD 


 


Head CT 8/21/17 0000 Signed





Impressions: 





 Service Date/Time:  Monday, August 21, 2017 19:47 - CONCLUSION:  No evidence 

of 





 acute infarct, hemorrhage, mass or edema.     Kristian Frankel MD 








Objective Remarks


GENERAL: 37-year-old  female, currently resting in bed 


SKIN: Warm and dry.  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


HEAD: Normocephalic.


EYES: No scleral icterus. No injection or drainage. 


NECK:  trachea midline.  No JVD.  No stridor


CARDIOVASCULAR: RR.  S1, S2 no S4.  Without murmur


RESPIRATORY: Essentially clear to auscultation without wheezes rales or rhonchi


GASTROINTESTINAL: Abdomen soft, non-tender, nondistended.  no guarding.


MUSCULOSKELETAL: 1 + peripheral edema.  Generalized anasarca.  Multiple 

petechiae and ecchymotic bruising secondary to most likely septic emboli


NEURO EXAM: Alert awake oriented. Follows commands, moves extremities 4.  

Motor strength 5/5 bilateral upper and lower extremity.  Cranial nerves II-XII 

grossly intact.





A/P


Assessment and Plan


Neuro/Psych 





Acute Encephalopathy


Acute meningitis, possible bacterial


Left parietal/frontal CVA





Patient is currently on Precedex 0.4 mg per KG per hour-read to DC


Goal of RASS -2. Daily sedation vacation


- LP with elevated protein, and low glucose relative to serum glucose.


- CT head negative


- MRI-small acute and subacute cortical infarct of left parietal lobe


- Neuro checks per unit protocol


Acetaminophen for fever


Evaluated by neurology


Scheduled methadone 10 mg twice a day. PRN Morphine for breakthrough pain





Resp:





Acute hypoxic and hypercarbic Respiratory failure -resolved


Post extubation stridor





Extubated 9/3/17 tolerating well. Racemic epinephrine when necessary for stridor


Albuterol/ipratropium aerosols every 6 hours with q 2 hours As needed albuterol 

therapy


On dexamethasone 4 mg IV every 8 hours 2 days completed





Cardiac





SVT


Sinus tachycardia


Mixed Septic/cardiogenic Shock-resolved


Acute congestive heart failure secondary to valvulopathy


Tricuspid Regurgitation





- Metoprolol 2.5 mg every 6 hours when necessary heart rate greater than 100 bpm


Echocardiogram 8/17 revealed EF 25-30%.  PAP 34 mmHg.


Currently off all vasopressors.





ID:


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


History of fungal endocarditis





- History of IVDA


- Broad-spectrum antibiotic with oxacillin, ampicillin, gentamicin and rifampin


   Tentative stop date for IV antibiotics 10/5


   Oral suppression indefinitely


- Infectious disease following, Dr. Teague





RENAL:





Acute Kidney Injury - resolved





-secondary to shock


- Strict I and O's


- molina catheter





GI





Acute Protein calorie malnutrition- severe





- secondary to long-standing IVDA and endocarditis


- DC tube feeds and increase oral intake


Famotidine for GI prophylaxis


Docusate sodium/senna for bowel regimen





HEME: 





Normocytic anemia


Thrombocytosis





Monitor CBC daily.  Follow trends





Endo





Hyperglycemia critical illness





Sliding scale insulin to maintain euglycemia





FEN:





Hypernatremia


Hyperphosphatemia


Monitor electrolytes and sodium closely





DVT GI prophylaxis


- Teds SCDs


- Subcutaneous heparin


-Famotidine





Dispo:


Discussed with ICU RN and significant other at bedside.





Level III





Clinically improving but had overnight developed post extubation stridor.  

Monitor in the ICU another 24 hours











Yun Velasquez MD Sep 4, 2017 09:32

## 2017-09-05 VITALS
DIASTOLIC BLOOD PRESSURE: 81 MMHG | RESPIRATION RATE: 20 BRPM | SYSTOLIC BLOOD PRESSURE: 121 MMHG | OXYGEN SATURATION: 100 % | HEART RATE: 96 BPM | TEMPERATURE: 98.4 F

## 2017-09-05 VITALS
SYSTOLIC BLOOD PRESSURE: 124 MMHG | OXYGEN SATURATION: 95 % | HEART RATE: 91 BPM | RESPIRATION RATE: 18 BRPM | TEMPERATURE: 98.1 F | DIASTOLIC BLOOD PRESSURE: 74 MMHG

## 2017-09-05 VITALS
HEART RATE: 100 BPM | OXYGEN SATURATION: 100 % | TEMPERATURE: 98.2 F | DIASTOLIC BLOOD PRESSURE: 82 MMHG | RESPIRATION RATE: 18 BRPM | SYSTOLIC BLOOD PRESSURE: 121 MMHG

## 2017-09-05 VITALS
OXYGEN SATURATION: 100 % | TEMPERATURE: 98.2 F | SYSTOLIC BLOOD PRESSURE: 115 MMHG | DIASTOLIC BLOOD PRESSURE: 66 MMHG | RESPIRATION RATE: 18 BRPM | HEART RATE: 94 BPM

## 2017-09-05 VITALS
DIASTOLIC BLOOD PRESSURE: 63 MMHG | OXYGEN SATURATION: 94 % | HEART RATE: 100 BPM | SYSTOLIC BLOOD PRESSURE: 107 MMHG | TEMPERATURE: 98.2 F | RESPIRATION RATE: 14 BRPM

## 2017-09-05 VITALS — HEART RATE: 100 BPM

## 2017-09-05 VITALS
DIASTOLIC BLOOD PRESSURE: 88 MMHG | OXYGEN SATURATION: 100 % | RESPIRATION RATE: 18 BRPM | SYSTOLIC BLOOD PRESSURE: 130 MMHG | TEMPERATURE: 98 F | HEART RATE: 99 BPM

## 2017-09-05 VITALS — HEART RATE: 101 BPM

## 2017-09-05 VITALS — HEART RATE: 98 BPM

## 2017-09-05 VITALS — OXYGEN SATURATION: 98 %

## 2017-09-05 VITALS — HEART RATE: 102 BPM

## 2017-09-05 LAB
ANION GAP SERPL CALC-SCNC: 7 MEQ/L (ref 5–15)
BASOPHILS # BLD AUTO: 0.1 TH/MM3 (ref 0–0.2)
BASOPHILS NFR BLD: 1.1 % (ref 0–2)
BUN SERPL-MCNC: 11 MG/DL (ref 7–18)
CHLORIDE SERPL-SCNC: 103 MEQ/L (ref 98–107)
EOSINOPHIL # BLD: 0.1 TH/MM3 (ref 0–0.4)
EOSINOPHIL NFR BLD: 1.5 % (ref 0–4)
ERYTHROCYTE [DISTWIDTH] IN BLOOD BY AUTOMATED COUNT: 16.4 % (ref 11.6–17.2)
GFR SERPLBLD BASED ON 1.73 SQ M-ARVRAT: 65 ML/MIN (ref 89–?)
HCO3 BLD-SCNC: 26.6 MEQ/L (ref 21–32)
HCT VFR BLD CALC: 28.1 % (ref 35–46)
HEMO FLAGS: (no result)
LYMPHOCYTES # BLD AUTO: 1.4 TH/MM3 (ref 1–4.8)
LYMPHOCYTES NFR BLD AUTO: 17.2 % (ref 9–44)
MCH RBC QN AUTO: 24.3 PG (ref 27–34)
MCHC RBC AUTO-ENTMCNC: 31.7 % (ref 32–36)
MCV RBC AUTO: 76.7 FL (ref 80–100)
MONOCYTES NFR BLD: 7 % (ref 0–8)
NEUTROPHILS # BLD AUTO: 6.1 TH/MM3 (ref 1.8–7.7)
NEUTROPHILS NFR BLD AUTO: 73.2 % (ref 16–70)
PLATELET # BLD: 415 TH/MM3 (ref 150–450)
POTASSIUM SERPL-SCNC: 3.7 MEQ/L (ref 3.5–5.1)
RBC # BLD AUTO: 3.66 MIL/MM3 (ref 4–5.3)
SODIUM SERPL-SCNC: 137 MEQ/L (ref 136–145)
WBC # BLD AUTO: 8.4 TH/MM3 (ref 4–11)

## 2017-09-05 RX ADMIN — HEPARIN SODIUM SCH UNITS: 10000 INJECTION, SOLUTION INTRAVENOUS; SUBCUTANEOUS at 08:46

## 2017-09-05 RX ADMIN — GENTAMICIN SULFATE SCH MLS/HR: 0.8 INJECTION, SOLUTION INTRAVENOUS at 11:11

## 2017-09-05 RX ADMIN — AMPICILLIN SODIUM SCH MLS/HR: 2 INJECTION, POWDER, FOR SOLUTION INTRAMUSCULAR; INTRAVENOUS at 08:47

## 2017-09-05 RX ADMIN — POTASSIUM CHLORIDE SCH MEQ: 20 TABLET, EXTENDED RELEASE ORAL at 08:47

## 2017-09-05 RX ADMIN — OXACILLIN SODIUM SCH MLS/HR: 2 INJECTION, POWDER, FOR SOLUTION INTRAMUSCULAR; INTRAVENOUS at 13:28

## 2017-09-05 RX ADMIN — IPRATROPIUM BROMIDE AND ALBUTEROL SULFATE SCH AMPULE: .5; 3 SOLUTION RESPIRATORY (INHALATION) at 05:25

## 2017-09-05 RX ADMIN — HEPARIN SODIUM SCH UNITS: 10000 INJECTION, SOLUTION INTRAVENOUS; SUBCUTANEOUS at 17:31

## 2017-09-05 RX ADMIN — MORPHINE SULFATE PRN MG: 2 INJECTION, SOLUTION INTRAMUSCULAR; INTRAVENOUS at 11:12

## 2017-09-05 RX ADMIN — STANDARDIZED SENNA CONCENTRATE AND DOCUSATE SODIUM SCH TAB: 8.6; 5 TABLET, FILM COATED ORAL at 20:19

## 2017-09-05 RX ADMIN — IPRATROPIUM BROMIDE AND ALBUTEROL SULFATE SCH AMPULE: .5; 3 SOLUTION RESPIRATORY (INHALATION) at 15:07

## 2017-09-05 RX ADMIN — AMPICILLIN SODIUM SCH MLS/HR: 2 INJECTION, POWDER, FOR SOLUTION INTRAMUSCULAR; INTRAVENOUS at 03:42

## 2017-09-05 RX ADMIN — POLYVINYL ALCOHOL SCH DROP: 14 SOLUTION/ DROPS OPHTHALMIC at 08:48

## 2017-09-05 RX ADMIN — Medication SCH ML: at 20:06

## 2017-09-05 RX ADMIN — OXACILLIN SODIUM SCH MLS/HR: 2 INJECTION, POWDER, FOR SOLUTION INTRAMUSCULAR; INTRAVENOUS at 22:27

## 2017-09-05 RX ADMIN — CHLORHEXIDINE GLUCONATE SCH PACK: 500 CLOTH TOPICAL at 01:00

## 2017-09-05 RX ADMIN — OXACILLIN SODIUM SCH MLS/HR: 2 INJECTION, POWDER, FOR SOLUTION INTRAMUSCULAR; INTRAVENOUS at 17:31

## 2017-09-05 RX ADMIN — MORPHINE SULFATE PRN MG: 2 INJECTION, SOLUTION INTRAMUSCULAR; INTRAVENOUS at 15:47

## 2017-09-05 RX ADMIN — METHADONE HYDROCHLORIDE SCH MG: 5 SOLUTION ORAL at 20:20

## 2017-09-05 RX ADMIN — OXACILLIN SODIUM SCH MLS/HR: 2 INJECTION, POWDER, FOR SOLUTION INTRAMUSCULAR; INTRAVENOUS at 11:11

## 2017-09-05 RX ADMIN — POLYVINYL ALCOHOL SCH DROP: 14 SOLUTION/ DROPS OPHTHALMIC at 13:00

## 2017-09-05 RX ADMIN — OXACILLIN SODIUM SCH MLS/HR: 2 INJECTION, POWDER, FOR SOLUTION INTRAMUSCULAR; INTRAVENOUS at 00:59

## 2017-09-05 RX ADMIN — AMPICILLIN SODIUM SCH MLS/HR: 2 INJECTION, POWDER, FOR SOLUTION INTRAMUSCULAR; INTRAVENOUS at 13:26

## 2017-09-05 RX ADMIN — FAMOTIDINE SCH MG: 20 TABLET, FILM COATED ORAL at 08:46

## 2017-09-05 RX ADMIN — Medication SCH ML: at 08:47

## 2017-09-05 RX ADMIN — AMPICILLIN SODIUM SCH MLS/HR: 2 INJECTION, POWDER, FOR SOLUTION INTRAMUSCULAR; INTRAVENOUS at 17:31

## 2017-09-05 RX ADMIN — RIFAMPIN SCH MG: 150 CAPSULE ORAL at 20:07

## 2017-09-05 RX ADMIN — POLYVINYL ALCOHOL SCH DROP: 14 SOLUTION/ DROPS OPHTHALMIC at 17:23

## 2017-09-05 RX ADMIN — MORPHINE SULFATE PRN MG: 2 INJECTION, SOLUTION INTRAMUSCULAR; INTRAVENOUS at 19:05

## 2017-09-05 RX ADMIN — IPRATROPIUM BROMIDE AND ALBUTEROL SULFATE PRN AMPULE: .5; 3 SOLUTION RESPIRATORY (INHALATION) at 07:35

## 2017-09-05 RX ADMIN — OXACILLIN SODIUM SCH MLS/HR: 2 INJECTION, POWDER, FOR SOLUTION INTRAMUSCULAR; INTRAVENOUS at 03:46

## 2017-09-05 RX ADMIN — FAMOTIDINE SCH MG: 20 TABLET, FILM COATED ORAL at 20:07

## 2017-09-05 RX ADMIN — STANDARDIZED SENNA CONCENTRATE AND DOCUSATE SODIUM SCH TAB: 8.6; 5 TABLET, FILM COATED ORAL at 08:46

## 2017-09-05 RX ADMIN — RIFAMPIN SCH MG: 150 CAPSULE ORAL at 08:46

## 2017-09-05 RX ADMIN — FUROSEMIDE SCH MG: 20 TABLET ORAL at 08:46

## 2017-09-05 RX ADMIN — MORPHINE SULFATE PRN MG: 2 INJECTION, SOLUTION INTRAMUSCULAR; INTRAVENOUS at 22:26

## 2017-09-05 RX ADMIN — AMPICILLIN SODIUM SCH MLS/HR: 2 INJECTION, POWDER, FOR SOLUTION INTRAMUSCULAR; INTRAVENOUS at 20:03

## 2017-09-05 RX ADMIN — METHADONE HYDROCHLORIDE SCH MG: 5 SOLUTION ORAL at 08:47

## 2017-09-05 RX ADMIN — GENTAMICIN SULFATE SCH MLS/HR: 0.8 INJECTION, SOLUTION INTRAVENOUS at 23:45

## 2017-09-05 NOTE — RADRPT
EXAM DATE/TIME:  09/05/2017 08:07 

 

[facility] COMPARISON:     

CHEST SINGLE AP, September 04, 2017, 2:06.

 

                     

INDICATIONS :     

Short of breath.

                     

 

MEDICAL HISTORY :     

Hepatitis C.       endocarditis, pneumonia, poly substance abuse, renal failure   

 

SURGICAL HISTORY :        

Aortic valve replacement.

 

ENCOUNTER:     

Subsequent                                        

 

ACUITY:     

2 weeks      

 

PAIN SCORE:     

0/10

 

LOCATION:     

Bilateral chest 

 

FINDINGS:     

Small lung findings with vascular crowding and mild edema noted. Small, bilateral pleural effusions a
re likely. There is mild cardiomegaly, unchanged.

 

Nasogastric tube and left subclavian line have been removed.

 

CONCLUSION:     

Suspected mild edema/failure.

 

 

 

 Ron Swan MD on September 05, 2017 at 9:12           

Board Certified Radiologist.

 This report was verified electronically.

## 2017-09-05 NOTE — PD.TRANSFR
Transfer Summary


Admission Date


Aug 21, 2017 at 21:06


Admitting Diagnosis





sepsis, elevated troponin, history of endocarditis/IVDA


Diagnoses:  


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


Diagnosis:  Principal





(2) Severe sepsis


Diagnosis:  Principal





(3) MSSA meningitis


Diagnosis:  Principal





(4) Acute metabolic encephalopathy


Diagnosis:  Principal





(5) Acute kidney injury


Diagnosis:  Principal





(6) Fluid overload


Diagnosis:  Principal





(7) Acute respiratory failure


Diagnosis:  Principal





(8) IVDU (intravenous drug user)


Diagnosis:  Secondary





(9) Polysubstance abuse


Diagnosis:  Secondary





Transfer Summary/Subjective


37-year-old  female who presents initially as a stroke alert.  

According to EMS report the patient has a history of fungal endocarditis with 

previous IVDA.  EMS states that the patient is currently on hospice, however, 

they are unsure if the patient is a DNR.  EMS states the patient apparently 

complained of a headache earlier today, was found lying on the floor and 

incontinent of stool at approximate, last seen normal at 6:45 PM.  Therefore, 

EMS called a stroke alert in the field as the patient was aphasic and confused.

  Upon arrival the patient groans, moves all 4 extremities and withdraws all 4 

extremities, but is unable to provide any information.  She was intubated in 

the emergency department by ER attending for airway protection.





8/22: remains encephalopathic. Blood cultures growing Staph in 4/4 bottles. 


8/23:  Remains sedated/ encephalopathic, orally intubated on mechanical 

ventilation.


8/24: still in shock on vasopressors. grossly volume overloaded and 

intravascularly volume overloaded based on echo and clinically. needs 

aggressive diuresis, but still in shock. family made patient DNR last night, 

but want to continue aggressive therapies. still encephalopathic. no 

significant improvements.


8/25: seen and examined at around 06:45am. delayed note entry. remains 

encephalopathic. no significant changes. unlikely to survive hospital stay, no 

additional overall therapies. family wants aggressive treatment. diuresing on 

bumex drip. net 3.3L negative. still volume overloaded.


8/26: Tmax 102.8 This am patient went into SVT rhythm heart rate 190's.  

Cooling blanket placed, ice packs in place, metoprolol 5 mg IV push given, 

resolution heart rate mid 90s, MAP ranges 66-68.  Ofirmev ordered.  Bumex 

infusion discontinued secondary to elevation in creatinine.  Chest x-ray 

pending.


8/27: Continues to be febrile, continues on cooling blanket.  Propofol was 

discontinued secondary to hypotension.  Today the patient continues on Versed 

and fentanyl infusions with the RASS -2.  Patient currently GCS of 11 T, 

responsive.  The patient tolerated CPAP trials for approximately 2.5 hours for 

the last 2 days.


8/28:  Somewhat agitated this AM.  Remains in sinus tachycardia.  On PSV trials 

1.5 hours today.  Tolerating tube feeding.  Tmax 101.4.


8/29:  Low-grade fevers overnight.  Currently afebrile.  Remains on Precedex, 

Versed and fentanyl drips for sedation.  On PSV trial 2 hours so far.  

Tolerating tube feeds.


8/30:  Regular.  Requesting extubation DO NOT INTUBATE hospice today.  Patient 

actually is doing well on her weaning trials.  If passes wean parameters.  We'

ll attempt extubation today.


8/31:  Extubated yesterday approximately 1 hour 30 minutes.  During that time, 

patient stated she wanted to be a full code.  This was in conflict which she 

had stated earlier while on Precedex drip.  Patient is currently full code.  

Possible stridor according to RN which brought about intubation by overnight 

intensivist.  Currently awake and alert and following commands room


9/1: Pending CT neck.  Afebrile.  Remains on full ventilator support.  Awake 

and alert and following commands.  tube feedings resumed.


9/2:  Afebrile.  CT neck unremarkable.  Awake and alert and following commands.

  Tolerating tube feeding.  One bowel movement yesterday.


9/3:  Resting in bed in no acute distress.  Awake and alert.  We'll attempt 

extubation morning if passes weaning parameters.  Will need to be on Precedex 

drip.  Tolerating tube feeding.


9/4: Extubated yesterday tolerating well.  Overnight had developed 

postextubation stridor, good response to racemic epi.  Currently on Precedex 

0.4 g per KG per hour.  Will wean to DC. No stridor on exam today





9/5: No stridor in the last 24 hours.  Breathing comfortably.  Denies chest 

pain or shortness of breath.  Precedex weaned off.  Plan for transfer to 

[facility] with telemetry





Objective





Vital Signs








  Date Time  Temp Pulse Resp B/P (MAP) Pulse Ox O2 Delivery O2 Flow Rate FiO2


 


9/5/17 04:00 98.1 91 18 124/74 (91) 95   


 


9/4/17 21:37      Nasal Cannula 3.00 


 


9/3/17 08:00        35














Intake and Output   


 


 9/5/17 9/5/17 9/5/17





 07:59 15:59 23:59


 


Intake Total 1320 ml  


 


Balance 1320 ml  








Result Diagram:  


9/4/17 0400                                                                    

            9/4/17 0400





Imaging





Last Impressions








Neck CT 9/2/17 0000 Signed





Impressions: 





 Service Date/Time:  Saturday, September 2, 2017 11:13 - CONCLUSION:  1. 

Patient 





 is intubated which limits this study. The epiglottis is otherwise normal. 





 Secretions are present in the oropharynx and right sphenoid sinus.     Ej Ewing MD 


 


Chest X-Ray 8/31/17 0000 Signed





Impressions: 





 Service Date/Time:  Thursday, August 31, 2017 10:10 - CONCLUSION:  

Satisfactory 





 support line and tube positioning. Mild infiltrate most mobile in the left 

lung 





 base.     Ron Cruz MD 


 


Brain MRI 8/25/17 0000 Signed





Impressions: 





 Service Date/Time:  Friday, August 25, 2017 17:07 - CONCLUSION:  1. Small 

acute 





 or subacute cortical infarct of the left parietal lobe. 2. Small, faint area 

of 





 nonspecific flair signal abnormality in the left frontal lobe periventricular 





 white matter, nonspecific. No associated mass effect or abnormal enhancement. 

3 





 month followup MRI of the brain recommended. 3.  Mild chronic white matter 





 changes.     Ron Swan MD 


 


Head CT 8/21/17 0000 Signed





Impressions: 





 Service Date/Time:  Monday, August 21, 2017 19:47 - CONCLUSION:  No evidence 

of 





 acute infarct, hemorrhage, mass or edema.     Kristian Frankel MD 








Objective Remarks


GENERAL: 37-year-old  female, currently resting in bed 


SKIN: Warm and dry.  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


HEAD: Normocephalic.


EYES: No scleral icterus. No injection or drainage. 


NECK:  trachea midline.  No JVD.  No stridor


CARDIOVASCULAR: RR.  S1, S2 no S4.  Grade 3-4 systolic murmur heard along the 

left sternal border


RESPIRATORY: Few coarse rhonchi and wheezes


GASTROINTESTINAL: Abdomen soft, non-tender, nondistended.  no guarding.


MUSCULOSKELETAL: 1 + peripheral edema.  Mild anasarca.  Multiple petechiae and 

ecchymotic bruising secondary to most likely septic emboli


NEURO EXAM: Alert awake oriented. Follows commands, moves extremities 4.  

Motor strength 5/5 bilateral upper and lower extremity.





A/P


Assessment and Plan


Neuro/Psych 





Acute Encephalopathy


Acute meningitis, bacterial with MSSA


Left parietal/frontal CVA





Off all sedation


- LP with elevated protein, and low glucose relative to serum glucose. Cx MSSA


- CT head negative, MRI-small acute and subacute cortical infarct of left 

parietal lobe


Acetaminophen for fever


Evaluated by neurology


Scheduled methadone 10 mg twice a day. PRN Morphine for breakthrough pain





Resp:





Acute hypoxic and hypercarbic Respiratory failure -resolved


Post extubation stridor-resolved





Extubated 9/3/17 tolerating well. Racemic epinephrine when necessary for stridor


Albuterol/ipratropium aerosols every 6 hours with q 2 hours As needed albuterol 

therapy


On dexamethasone 4 mg IV every 8 hours 2 days completed





Cardiac





SVT-resolved


Sinus tachycardia


Mixed Septic/cardiogenic Shock-resolved


Acute congestive heart failure secondary to valvulopathy


Tricuspid Regurgitation





Metoprolol 2.5 mg every 6 hours when necessary heart rate greater than 100 bpm


Echocardiogram 8/17 revealed EF 25-30%.  PAP 34 mmHg.


Currently off all vasopressors.


Place on Lasix 20 mg po daily, with KCL supplementation





ID:


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 

Faecalis)


MSSA meningitis


History of fungal endocarditis





- History of IVDA


- Broad-spectrum antibiotic with oxacillin, ampicillin, gentamicin and rifampin


   Tentative stop date for IV antibiotics 10/5


   Oral suppression indefinitely


- Infectious disease following, Dr. Teague





RENAL:





Acute Kidney Injury - resolved





-secondary to shock


- Strict I and O's


- molina catheter


- Scheduled Lasix and potassium daily





GI





Acute Protein calorie malnutrition- severe





- Secondary to long-standing IVDA and endocarditis


- Increase oral intake


Famotidine for GI prophylaxis


Docusate sodium/senna for bowel regimen





HEME: 





Normocytic anemia


Thrombocytosis





Monitor CBC daily.  Follow trends





Endo





Hyperglycemia critical illness





Sliding scale insulin to maintain euglycemia





FEN:





Hypernatremia


Hyperphosphatemia


Monitor electrolytes and sodium closely





DVT GI prophylaxis


- Teds SCDs


- Subcutaneous heparin


- Famotidine





Dispo:


Discussed with ICU RN and significant other at bedside.





Level II





Clinically improving, transfer to Med surg with Tele.  Hospitalist consult to 

assume care in Yun Diggs MD Sep 5, 2017 07:45

## 2017-09-06 VITALS
SYSTOLIC BLOOD PRESSURE: 117 MMHG | RESPIRATION RATE: 19 BRPM | HEART RATE: 98 BPM | OXYGEN SATURATION: 100 % | TEMPERATURE: 98.4 F | DIASTOLIC BLOOD PRESSURE: 80 MMHG

## 2017-09-06 VITALS
SYSTOLIC BLOOD PRESSURE: 112 MMHG | DIASTOLIC BLOOD PRESSURE: 74 MMHG | RESPIRATION RATE: 18 BRPM | OXYGEN SATURATION: 97 % | TEMPERATURE: 99.2 F | HEART RATE: 105 BPM

## 2017-09-06 VITALS
HEART RATE: 100 BPM | DIASTOLIC BLOOD PRESSURE: 79 MMHG | SYSTOLIC BLOOD PRESSURE: 118 MMHG | TEMPERATURE: 98.9 F | OXYGEN SATURATION: 93 % | RESPIRATION RATE: 18 BRPM

## 2017-09-06 VITALS
DIASTOLIC BLOOD PRESSURE: 72 MMHG | HEART RATE: 108 BPM | SYSTOLIC BLOOD PRESSURE: 109 MMHG | RESPIRATION RATE: 20 BRPM | OXYGEN SATURATION: 96 % | TEMPERATURE: 98.4 F

## 2017-09-06 VITALS — HEART RATE: 93 BPM

## 2017-09-06 VITALS
HEART RATE: 96 BPM | RESPIRATION RATE: 19 BRPM | SYSTOLIC BLOOD PRESSURE: 118 MMHG | TEMPERATURE: 98.2 F | DIASTOLIC BLOOD PRESSURE: 72 MMHG | OXYGEN SATURATION: 98 %

## 2017-09-06 VITALS
DIASTOLIC BLOOD PRESSURE: 68 MMHG | OXYGEN SATURATION: 100 % | SYSTOLIC BLOOD PRESSURE: 114 MMHG | TEMPERATURE: 98.3 F | RESPIRATION RATE: 19 BRPM | HEART RATE: 112 BPM

## 2017-09-06 VITALS — OXYGEN SATURATION: 100 %

## 2017-09-06 VITALS — OXYGEN SATURATION: 97 %

## 2017-09-06 LAB
ALP SERPL-CCNC: 90 U/L (ref 45–117)
ALT SERPL-CCNC: 11 U/L (ref 10–53)
ANION GAP SERPL CALC-SCNC: 10 MEQ/L (ref 5–15)
AST SERPL-CCNC: 16 U/L (ref 15–37)
BASOPHILS # BLD AUTO: 0.1 TH/MM3 (ref 0–0.2)
BASOPHILS NFR BLD: 1.2 % (ref 0–2)
BILIRUB SERPL-MCNC: 0.3 MG/DL (ref 0.2–1)
BUN SERPL-MCNC: 9 MG/DL (ref 7–18)
CHLORIDE SERPL-SCNC: 101 MEQ/L (ref 98–107)
EOSINOPHIL # BLD: 0.2 TH/MM3 (ref 0–0.4)
EOSINOPHIL NFR BLD: 2 % (ref 0–4)
ERYTHROCYTE [DISTWIDTH] IN BLOOD BY AUTOMATED COUNT: 16.5 % (ref 11.6–17.2)
GFR SERPLBLD BASED ON 1.73 SQ M-ARVRAT: 60 ML/MIN (ref 89–?)
HCO3 BLD-SCNC: 25.1 MEQ/L (ref 21–32)
HCT VFR BLD CALC: 26.5 % (ref 35–46)
HEMO FLAGS: (no result)
LYMPHOCYTES # BLD AUTO: 1.4 TH/MM3 (ref 1–4.8)
LYMPHOCYTES NFR BLD AUTO: 17.7 % (ref 9–44)
MAGNESIUM SERPL-MCNC: 1.6 MG/DL (ref 1.5–2.5)
MCH RBC QN AUTO: 24.6 PG (ref 27–34)
MCHC RBC AUTO-ENTMCNC: 32.5 % (ref 32–36)
MCV RBC AUTO: 75.7 FL (ref 80–100)
MONOCYTES NFR BLD: 9.1 % (ref 0–8)
NEUTROPHILS # BLD AUTO: 5.3 TH/MM3 (ref 1.8–7.7)
NEUTROPHILS NFR BLD AUTO: 70 % (ref 16–70)
PLATELET # BLD: 376 TH/MM3 (ref 150–450)
POTASSIUM SERPL-SCNC: 3.6 MEQ/L (ref 3.5–5.1)
RBC # BLD AUTO: 3.49 MIL/MM3 (ref 4–5.3)
SODIUM SERPL-SCNC: 136 MEQ/L (ref 136–145)
WBC # BLD AUTO: 7.6 TH/MM3 (ref 4–11)

## 2017-09-06 RX ADMIN — HEPARIN SODIUM SCH UNITS: 10000 INJECTION, SOLUTION INTRAVENOUS; SUBCUTANEOUS at 17:29

## 2017-09-06 RX ADMIN — MORPHINE SULFATE PRN MG: 2 INJECTION, SOLUTION INTRAMUSCULAR; INTRAVENOUS at 17:26

## 2017-09-06 RX ADMIN — FUROSEMIDE SCH MG: 20 TABLET ORAL at 09:27

## 2017-09-06 RX ADMIN — POLYVINYL ALCOHOL SCH DROP: 14 SOLUTION/ DROPS OPHTHALMIC at 17:23

## 2017-09-06 RX ADMIN — OXACILLIN SODIUM SCH MLS/HR: 2 INJECTION, POWDER, FOR SOLUTION INTRAMUSCULAR; INTRAVENOUS at 22:00

## 2017-09-06 RX ADMIN — CHLORHEXIDINE GLUCONATE SCH PACK: 500 CLOTH TOPICAL at 01:19

## 2017-09-06 RX ADMIN — HEPARIN SODIUM SCH UNITS: 10000 INJECTION, SOLUTION INTRAVENOUS; SUBCUTANEOUS at 09:27

## 2017-09-06 RX ADMIN — Medication SCH ML: at 09:28

## 2017-09-06 RX ADMIN — OXACILLIN SODIUM SCH MLS/HR: 2 INJECTION, POWDER, FOR SOLUTION INTRAMUSCULAR; INTRAVENOUS at 05:30

## 2017-09-06 RX ADMIN — ONDANSETRON PRN MG: 2 INJECTION, SOLUTION INTRAMUSCULAR; INTRAVENOUS at 02:59

## 2017-09-06 RX ADMIN — POLYVINYL ALCOHOL SCH DROP: 14 SOLUTION/ DROPS OPHTHALMIC at 12:57

## 2017-09-06 RX ADMIN — IPRATROPIUM BROMIDE AND ALBUTEROL SULFATE SCH AMPULE: .5; 3 SOLUTION RESPIRATORY (INHALATION) at 15:24

## 2017-09-06 RX ADMIN — FAMOTIDINE SCH MG: 20 TABLET, FILM COATED ORAL at 20:33

## 2017-09-06 RX ADMIN — AMPICILLIN SODIUM SCH MLS/HR: 2 INJECTION, POWDER, FOR SOLUTION INTRAMUSCULAR; INTRAVENOUS at 12:50

## 2017-09-06 RX ADMIN — IPRATROPIUM BROMIDE AND ALBUTEROL SULFATE SCH AMPULE: .5; 3 SOLUTION RESPIRATORY (INHALATION) at 04:29

## 2017-09-06 RX ADMIN — AMPICILLIN SODIUM SCH MLS/HR: 2 INJECTION, POWDER, FOR SOLUTION INTRAMUSCULAR; INTRAVENOUS at 17:22

## 2017-09-06 RX ADMIN — AMPICILLIN SODIUM SCH MLS/HR: 2 INJECTION, POWDER, FOR SOLUTION INTRAMUSCULAR; INTRAVENOUS at 00:09

## 2017-09-06 RX ADMIN — OXACILLIN SODIUM SCH MLS/HR: 2 INJECTION, POWDER, FOR SOLUTION INTRAMUSCULAR; INTRAVENOUS at 19:04

## 2017-09-06 RX ADMIN — OXACILLIN SODIUM SCH MLS/HR: 2 INJECTION, POWDER, FOR SOLUTION INTRAMUSCULAR; INTRAVENOUS at 12:37

## 2017-09-06 RX ADMIN — OXACILLIN SODIUM SCH MLS/HR: 2 INJECTION, POWDER, FOR SOLUTION INTRAMUSCULAR; INTRAVENOUS at 01:19

## 2017-09-06 RX ADMIN — FAMOTIDINE SCH MG: 20 TABLET, FILM COATED ORAL at 09:27

## 2017-09-06 RX ADMIN — GENTAMICIN SULFATE SCH MLS/HR: 0.8 INJECTION, SOLUTION INTRAVENOUS at 12:00

## 2017-09-06 RX ADMIN — OXACILLIN SODIUM SCH MLS/HR: 2 INJECTION, POWDER, FOR SOLUTION INTRAMUSCULAR; INTRAVENOUS at 17:21

## 2017-09-06 RX ADMIN — STANDARDIZED SENNA CONCENTRATE AND DOCUSATE SODIUM SCH TAB: 8.6; 5 TABLET, FILM COATED ORAL at 09:28

## 2017-09-06 RX ADMIN — RIFAMPIN SCH MG: 150 CAPSULE ORAL at 20:33

## 2017-09-06 RX ADMIN — RIFAMPIN SCH MG: 150 CAPSULE ORAL at 09:27

## 2017-09-06 RX ADMIN — Medication SCH ML: at 20:33

## 2017-09-06 RX ADMIN — POTASSIUM CHLORIDE SCH MEQ: 20 TABLET, EXTENDED RELEASE ORAL at 09:27

## 2017-09-06 RX ADMIN — MORPHINE SULFATE PRN MG: 2 INJECTION, SOLUTION INTRAMUSCULAR; INTRAVENOUS at 02:52

## 2017-09-06 RX ADMIN — AMPICILLIN SODIUM SCH MLS/HR: 2 INJECTION, POWDER, FOR SOLUTION INTRAMUSCULAR; INTRAVENOUS at 20:33

## 2017-09-06 RX ADMIN — POLYVINYL ALCOHOL SCH DROP: 14 SOLUTION/ DROPS OPHTHALMIC at 09:27

## 2017-09-06 RX ADMIN — AMPICILLIN SODIUM SCH MLS/HR: 2 INJECTION, POWDER, FOR SOLUTION INTRAMUSCULAR; INTRAVENOUS at 09:27

## 2017-09-06 RX ADMIN — METHADONE HYDROCHLORIDE SCH MG: 5 SOLUTION ORAL at 20:33

## 2017-09-06 RX ADMIN — IPRATROPIUM BROMIDE AND ALBUTEROL SULFATE SCH AMPULE: .5; 3 SOLUTION RESPIRATORY (INHALATION) at 21:16

## 2017-09-06 RX ADMIN — AMPICILLIN SODIUM SCH MLS/HR: 2 INJECTION, POWDER, FOR SOLUTION INTRAMUSCULAR; INTRAVENOUS at 23:59

## 2017-09-06 RX ADMIN — IPRATROPIUM BROMIDE AND ALBUTEROL SULFATE SCH AMPULE: .5; 3 SOLUTION RESPIRATORY (INHALATION) at 12:10

## 2017-09-06 RX ADMIN — HEPARIN SODIUM SCH UNITS: 10000 INJECTION, SOLUTION INTRAVENOUS; SUBCUTANEOUS at 01:19

## 2017-09-06 RX ADMIN — STANDARDIZED SENNA CONCENTRATE AND DOCUSATE SODIUM SCH TAB: 8.6; 5 TABLET, FILM COATED ORAL at 20:33

## 2017-09-06 RX ADMIN — MORPHINE SULFATE PRN MG: 2 INJECTION, SOLUTION INTRAMUSCULAR; INTRAVENOUS at 06:29

## 2017-09-06 RX ADMIN — METHADONE HYDROCHLORIDE SCH MG: 5 SOLUTION ORAL at 09:28

## 2017-09-06 RX ADMIN — AMPICILLIN SODIUM SCH MLS/HR: 2 INJECTION, POWDER, FOR SOLUTION INTRAMUSCULAR; INTRAVENOUS at 04:10

## 2017-09-06 RX ADMIN — MORPHINE SULFATE PRN MG: 2 INJECTION, SOLUTION INTRAMUSCULAR; INTRAVENOUS at 12:49

## 2017-09-06 NOTE — HHI.IDPN
Subjective


Subjective


Remarks


extubated


on the floor


fully awake and alert


co nausea


RN unable to obtain bloofd for gent level


afebrile


Antibiotics


ampicillin 


oxacillin 


gent 


rifampin


Allergies:  


Coded Allergies:  


     No Known Allergies (Verified , 2/24/16)





Objective


.





Vital Signs








  Date Time  Temp Pulse Resp B/P (MAP) Pulse Ox O2 Delivery O2 Flow Rate FiO2


 


9/6/17 15:47  93      


 


9/6/17 15:24     97   21


 


9/6/17 12:10     97   


 


9/6/17 12:00 98.3 112 19 114/68 (83) 100   


 


9/6/17 09:45      T-Piece 6.00 28


 


9/6/17 08:00 98.2 96 19 118/72 (87) 98   


 


9/6/17 04:30     100 Nasal Cannula 4.00 


 


9/6/17 04:00 98.9 100 18 118/79 (92) 93   


 


9/6/17 00:00 99.2 105 18 112/74 (87) 97   


 


9/5/17 23:00  98      


 


9/5/17 20:00 98.4 96 20 121/81 (94) 100   


 


9/5/17 20:00      Nasal Cannula 2.00 














 9/6/17 9/6/17 9/7/17





 15:00 23:00 07:00


 


Intake Total  300 ml 


 


Balance  300 ml 


 


   


 


IV Total  300 ml 








.





Laboratory Tests








Test


  9/5/17


12:11 9/6/17


06:17


 


White Blood Count 8.4 TH/MM3  7.6 TH/MM3 


 


Red Blood Count 3.66 MIL/MM3  3.49 MIL/MM3 


 


Hemoglobin 8.9 GM/DL  8.6 GM/DL 


 


Hematocrit 28.1 %  26.5 % 


 


Mean Corpuscular Volume 76.7 FL  75.7 FL 


 


Mean Corpuscular Hemoglobin 24.3 PG  24.6 PG 


 


Mean Corpuscular Hemoglobin


Concent 31.7 % 


  32.5 % 


 


 


Red Cell Distribution Width 16.4 %  16.5 % 


 


Platelet Count 415 TH/MM3  376 TH/MM3 


 


Mean Platelet Volume 8.1 FL  8.4 FL 


 


Neutrophils (%) (Auto) 73.2 %  70.0 % 


 


Lymphocytes (%) (Auto) 17.2 %  17.7 % 


 


Monocytes (%) (Auto) 7.0 %  9.1 % 


 


Eosinophils (%) (Auto) 1.5 %  2.0 % 


 


Basophils (%) (Auto) 1.1 %  1.2 % 


 


Neutrophils # (Auto) 6.1 TH/MM3  5.3 TH/MM3 


 


Lymphocytes # (Auto) 1.4 TH/MM3  1.4 TH/MM3 


 


Monocytes # (Auto) 0.6 TH/MM3  0.7 TH/MM3 


 


Eosinophils # (Auto) 0.1 TH/MM3  0.2 TH/MM3 


 


Basophils # (Auto) 0.1 TH/MM3  0.1 TH/MM3 


 


CBC Comment DIFF FINAL  DIFF FINAL 


 


Differential Comment    


 


Hematology Comments   








Laboratory Tests








Test


  9/5/17


12:11 9/6/17


06:17


 


Blood Urea Nitrogen 11 MG/DL  9 MG/DL 


 


Creatinine 0.97 MG/DL  1.04 MG/DL 


 


Random Glucose 99 MG/DL  101 MG/DL 


 


Calcium Level 8.8 MG/DL  8.9 MG/DL 


 


Sodium Level 137 MEQ/L  136 MEQ/L 


 


Potassium Level 3.7 MEQ/L  3.6 MEQ/L 


 


Chloride Level 103 MEQ/L  101 MEQ/L 


 


Carbon Dioxide Level 26.6 MEQ/L  25.1 MEQ/L 


 


Anion Gap 7 MEQ/L  10 MEQ/L 


 


Estimat Glomerular Filtration


Rate 65 ML/MIN 


  60 ML/MIN 


 


 


Total Protein  7.4 GM/DL 


 


Albumin  2.5 GM/DL 


 


Magnesium Level  1.6 MG/DL 


 


Alkaline Phosphatase  90 U/L 


 


Aspartate Amino Transf


(AST/SGOT) 


  16 U/L 


 


 


Alanine Aminotransferase


(ALT/SGPT) 


  11 U/L 


 


 


Total Bilirubin  0.3 MG/DL 








Imaging





Last Impressions








Chest X-Ray 9/5/17 0000 Signed





Impressions: 





 Service Date/Time:  Tuesday, September 5, 2017 08:07 - CONCLUSION:  Suspected 





 mild edema/failure.     Ron Swan MD 


 


Neck CT 9/2/17 0000 Signed





Impressions: 





 Service Date/Time:  Saturday, September 2, 2017 11:13 - CONCLUSION:  1. 

Patient 





 is intubated which limits this study. The epiglottis is otherwise normal. 





 Secretions are present in the oropharynx and right sphenoid sinus.     Ej Ewing MD 


 


Brain MRI 8/25/17 0000 Signed





Impressions: 





 Service Date/Time:  Friday, August 25, 2017 17:07 - CONCLUSION:  1. Small 

acute 





 or subacute cortical infarct of the left parietal lobe. 2. Small, faint area 

of 





 nonspecific flair signal abnormality in the left frontal lobe periventricular 





 white matter, nonspecific. No associated mass effect or abnormal enhancement. 

3 





 month followup MRI of the brain recommended. 3.  Mild chronic white matter 





 changes.     Ron Swan MD 


 


Head CT 8/21/17 0000 Signed





Impressions: 





 Service Date/Time:  Monday, August 21, 2017 19:47 - CONCLUSION:  No evidence 

of 





 acute infarct, hemorrhage, mass or edema.     Kristian Frankel MD 








Physical Exam


CONSTITUTIONAL/GENERAL: This is an adequately nourished patient, in no apparent 

distress. Sedated, int'd on mech vent


TUBES/LINES/DRAINS:


SKIN: No jaundice, rashes, or lesions.   Skin temperature appropriate. Not 

diaphoretic. 


 multiple embolic  lesions cw septic embolization involving L lower leg and  

both feet - resolving 


 EYES: No nystagmus


Hearing OK


 CARDIOVASCULAR: Regular rate and rhythm w


+ 2-3 systo,ic murmur  no  gallops, or rubs. Mechanical heart sounds No JVD. 

Peripheral pulses symmetric.


RESPIRATORY/CHEST: Symmetric, unlabored respirations. Clear  to auscultation. 

Breath sounds equal bilaterally.  


GASTROINTESTINAL: Abdomen soft, non-tender,  moderately distended. No hepato-

splenomegaly, or palpable masses. No guarding. Bowel sounds present, hypoactive 


 GENITOURINARY: Without palpable bladder distension. Stokes catheter in place 

with clear yellow urine


MUSCULOSKELETAL: Extremities without clubbing, 


Increasing 3+   edema.- worse 


 NEUROLOGICAL  fully awake and   communicates   following commands all 4 

extremeties


 PSYCHIATRIC: calm 








Assessment & Plan


Remarks


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 

fecalis S amp/gent 


   - pt was previously infected with above organisms


Previousy multiple episodes of  PVE


   dw Dr Keenan hospitalised in April 2017 for tricuspid valve PVE  - Candiada 

parapsolosis (March 15 thru April 15) , Streptoccii


   pt was Rx with combination diflucan 800 + micafungin 150, and was also on 

ambisionme at some point x 2 weeks and did not clear 


Confiormed  bacterial meningitis  2/2 MSSA - embolic ethiology


Acute VDRF, f resolved 


NOÉ, resolved 


Elevated troponine ? cardiac injury from infx


 Not a surgical candidate 


? PNA, clx negative: clincially resolved





    


    - chk diff to monitor eosinophilia  which can be aw curretn abx Rx


   cont  oxacillin + gentamin +RIfampin


   -  hold gent untill level obtained 


   cont  ampicillin for Enterococci


    monitor closely creatinint and LFTs (2-3x/week)


   Unless more positive blood clx anticipate 6 week from 1st neg (8/24) 

followed by  indefinite oral abx suppression tx 


      IV abx stop date tenatively 10/5


         at least weekly CBC with diff (eosinophilia) , creatinine and LFTs 

while on the above Rx


    





dw Catalina Atkins MD Sep 6, 2017 18:25

## 2017-09-06 NOTE — HHI.PR
Subjective


Remarks


Patient denies cp


denies fevers/chills


Feels mild sob


afebrile


slightly tachycardic





Objective


Vitals





Vital Signs








  Date Time  Temp Pulse Resp B/P (MAP) Pulse Ox O2 Delivery O2 Flow Rate FiO2


 


9/6/17 16:00 98.4 98 19 117/80 (92) 100   


 


9/6/17 15:47  93      


 


9/6/17 15:24     97   21


 


9/6/17 12:10     97   


 


9/6/17 12:00 98.3 112 19 114/68 (83) 100   


 


9/6/17 09:45      T-Piece 6.00 28


 


9/6/17 08:00 98.2 96 19 118/72 (87) 98   


 


9/6/17 04:30     100 Nasal Cannula 4.00 


 


9/6/17 04:00 98.9 100 18 118/79 (92) 93   


 


9/6/17 00:00 99.2 105 18 112/74 (87) 97   


 


9/5/17 23:00  98      


 


9/5/17 20:00 98.4 96 20 121/81 (94) 100   


 


9/5/17 20:00      Nasal Cannula 2.00 














I/O      


 


 9/5/17 9/5/17 9/5/17 9/6/17 9/6/17 9/6/17





 07:00 15:00 23:00 07:00 15:00 23:00


 


Intake Total 720 ml 1400 ml 1150 ml 946.5 ml 720 ml 500 ml


 


Output Total  1250 ml 650 ml 1400 ml  


 


Balance 720 ml 150 ml 500 ml -453.5 ml 720 ml 500 ml


 


      


 


Intake Oral 720 ml 500 ml 750 ml 240 ml 720 ml 


 


IV Total  900 ml 400 ml 706.5 ml  500 ml


 


Output Urine Total  1250 ml 650 ml 1400 ml  


 


# Voids 6    4 


 


# Bowel Movements 3  1 1  








Result Diagram:  


9/6/17 0617                                                                    

            9/6/17 0617





Imaging





Last Impressions








Chest X-Ray 9/5/17 0000 Signed





Impressions: 





 Service Date/Time:  Tuesday, September 5, 2017 08:07 - CONCLUSION:  Suspected 





 mild edema/failure.     Ron Swan MD 


 


Neck CT 9/2/17 0000 Signed





Impressions: 





 Service Date/Time:  Saturday, September 2, 2017 11:13 - CONCLUSION:  1. 

Patient 





 is intubated which limits this study. The epiglottis is otherwise normal. 





 Secretions are present in the oropharynx and right sphenoid sinus.     Ej Ewing MD 


 


Brain MRI 8/25/17 0000 Signed





Impressions: 





 Service Date/Time:  Friday, August 25, 2017 17:07 - CONCLUSION:  1. Small 

acute 





 or subacute cortical infarct of the left parietal lobe. 2. Small, faint area 

of 





 nonspecific flair signal abnormality in the left frontal lobe periventricular 





 white matter, nonspecific. No associated mass effect or abnormal enhancement. 

3 





 month followup MRI of the brain recommended. 3.  Mild chronic white matter 





 changes.     Ron Swan MD 


 


Head CT 8/21/17 0000 Signed





Impressions: 





 Service Date/Time:  Monday, August 21, 2017 19:47 - CONCLUSION:  No evidence 

of 





 acute infarct, hemorrhage, mass or edema.     Kristian Frankel MD 








Objective Remarks


AAOx3, nad sitting in bed


decreased breath sound BL, no crackles or wheezing auscultated


abdomen obese, soft, non tender


Medications and IVs





Current Medications








 Medications


  (Trade)  Dose


 Ordered  Sig/Nikhil


 Route  Start Time


 Stop Time Status Last Admin


 


  (Brethine Inj)  1 mg  UNSCH  PRN


 SQ  8/21/17 21:30


     


 


 


  (Tylenol)  650 mg  Q6H  PRN


 PO  8/21/17 21:30


    8/26/17 09:07


 


 


  (Tears Naturale


 Opth Soln)  1 drop  TID


 EACH EYE  8/22/17 09:00


    9/6/17 09:27


 


 


  (Zofran Inj)  4 mg  Q6H  PRN


 IV  8/21/17 21:30


    9/6/17 02:59


 


 


  (Duoneb Neb)  1 ampule  Q2HR NEB  PRN


 INH  8/21/17 21:30


    9/5/17 07:35


 


 


 Miscellaneous


 Information  1  Q361D


 XX  8/21/17 21:30


    8/21/17 21:30


 


 


  (Chlorhexidine


 2% Cloth)  


 Taper  DAILY@04


 TOP  8/22/17 04:00


 8/18/18 03:59  8/28/17 00:07


 


 


  (Chlorhexidine


 2% Cloth)  3 pack  UNSCH  PRN


 TOP  8/21/17 21:30


     


 


 


  (Arlen-Colace)  1 tab  BID


 PO  8/22/17 09:00


    9/4/17 19:59


 


 


  (Milk Of


 Magnesia Liq)  30 ml  Q12H  PRN


 PO  8/21/17 21:30


     


 


 


  (Senokot)  17.2 mg  Q12H  PRN


 PO  8/21/17 21:30


     


 


 


  (Dulcolax Supp)  10 mg  DAILY  PRN


 RECTAL  8/21/17 21:30


     


 


 


  (Lactulose Liq)  30 ml  DAILY  PRN


 PO  8/21/17 21:30


     


 


 


  (NS Flush)  2 ml  UNSCH  PRN


 IV FLUSH  8/21/17 22:45


     


 


 


  (NS Flush)  2 ml  BID


 IV FLUSH  8/22/17 09:00


    9/6/17 09:28


 


 


 Oxacillin Sodium


 2 gm/Sodium


 Chloride  100 ml @ 


 200 mls/hr  Q4H


 IV  8/22/17 18:00


    9/6/17 17:21


 


 


 Pharmacy Profile


 Note  0 ml @ 0


 mls/hr  UNSCH


 OTHER  8/22/17 15:30


     


 


 


 Ampicillin Sodium


 2000 mg/Sodium


 Chloride  100 ml @ 


 400 mls/hr  Q4H


 IV  8/24/17 13:00


    9/6/17 17:22


 


 


 Gentamicin


 Sulfate/Sodium


 Chloride  100 ml @ 


 200 mls/hr  Q12H


 IV  8/26/17 00:00


    9/5/17 23:45


 


 


  (Rifampin)  300 mg  Q12HR


 PO  8/31/17 21:00


    9/6/17 09:27


 


 


  (Duoneb Neb)  1 ampule  Q6HR  NEB


 NEB  9/5/17 10:00


    9/6/17 15:24


 


 


  (Pepcid)  20 mg  BID


 PO  9/5/17 09:00


    9/6/17 09:27


 


 


  (Heparin Inj)  5,000 units  Q8H


 SQ  9/5/17 10:00


    9/6/17 17:29


 


 


  (Lasix)  20 mg  DAILY


 PO  9/5/17 09:00


    9/6/17 09:27


 


 


  (KCl)  20 meq  DAILY


 PO  9/5/17 09:00


    9/6/17 09:27


 


 


  (Methadone  Liq)  20 mg  Q12HR


 PO  9/5/17 21:00


    9/6/17 09:28


 


 


  (Morphine Inj)  3 mg  Q3H  PRN


 IV  9/5/17 15:45


    9/6/17 17:26


 











A/P


Problem List:  


(1) Severe sepsis


ICD Code:  A41.9 - Sepsis, unspecified organism; R65.20 - Severe sepsis without 

septic shock


Plan:  Sepsis present on admission, the patient presented with leukocytosis and 

tachycardia as well as after mental status.


Initially admitted to the intensive care unit, intubated and cared for by the 

intensivists.


Sepsis found to be secondary to meningitis.


Continue IV antibiotics as per infectious disease recommendations.


The patient is currently on IV ampicillin, oxacillin, gentamicin and rifampin.





(2) MSSA meningitis


Plan:  As above.





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


Plan:  IV antibiotics as per infectious disease.





(4) SVT (supraventricular tachycardia)


ICD Code:  I47.1 - Supraventricular tachycardia


Status:  Resolved


Plan:  SVT now resolved.  Continue metoprolol.


Echocardiogram 8 foci 17 revealed an ejection fraction showed 25-30%.  PAP 34 

mmHg


Initially treated with vasopressors which the patient is now off.


The patient started on Lasix 20 minute grams by mouth daily.  Continue.





(5) Postextubation stridor


ICD Code:  J95.89 - Other postprocedural complications and disorders of 

respiratory system, not elsewhere classified


Plan:  The patient initially extubated on 9/3/17.  The patient was administered 

racemic epinephrine as needed for stridor or


Continue albuterol/ipratropium inhaled treatments every 6 hours with every 2 

hours as needed for albuterol therapy.


Status post today treatment of dexamethasone IV every 8 hours.





(6) Acute hypoxemic respiratory failure


ICD Code:  J96.01 - Acute respiratory failure with hypoxia


Status:  Resolved


Plan:  Continue supplemental oxygen to keep oxygen more than 92%.


Status post intubation and extubation.


Continue DuoNeb treatments.





(7) CVA (cerebral vascular accident)


ICD Code:  I63.9 - Cerebral infarction, unspecified


Plan:  CT head negative, MRI small acute and subacute cortical infarct of the 

left parietal lobe.


The patient was seen and evaluated by neurology.


The patient is on a scheduled methadone 10 mg twice today.





(8) Tricuspid regurgitation


ICD Code:  I07.1 - Rheumatic tricuspid insufficiency


Status:  Acute


Plan:  As seen on echo described above.  Continue oral Lasix.





(9) Acute systolic heart failure


ICD Code:  I50.21 - Acute systolic (congestive) heart failure


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


ICD Code:  T82.6XXA - Infection and inflammatory reaction due to cardiac valve 

prosthesis, initial encounter


Status:  Acute


Plan:  The patient has history of IV drug abuse.


Continue antibiotics as per ID recommendations.


Tentative a stop date is 10/5





(11) NOÉ (acute kidney injury)


ICD Code:  N17.9 - Acute kidney failure, unspecified


Status:  Resolved


Plan:  Now resolved.  Continue to monitor BMP





(12) Protein calorie malnutrition


ICD Code:  E46 - Unspecified protein-calorie malnutrition


Plan:  Secondary to long-standing IV drug abuse and endocarditis.





(13) Microcytic anemia


ICD Code:  D50.9 - Iron deficiency anemia, unspecified


(14) Hyperphosphatemia


ICD Code:  E83.39 - Other disorders of phosphorus metabolism


Plan:  Now resolved.  Continue to monitor levels.





(15) Hypernatremia


ICD Code:  E87.0 - Hyperosmolality and hypernatremia


Plan:  Likely secondary to dehydration.  Sodium now trending down.  Encourage 

oral intake.





(16) IV drug abuse


ICD Code:  F19.10 - Other psychoactive substance abuse, uncomplicated


Status:  Acute


Plan:  Advised cessation.  Currently on methadone.








Problem Qualifiers





(1) Protein calorie malnutrition:  


Qualified Codes:  E44.0 - Moderate protein-calorie malnutrition








Jesus Mae MD Sep 6, 2017 19:08

## 2017-09-07 VITALS
SYSTOLIC BLOOD PRESSURE: 106 MMHG | DIASTOLIC BLOOD PRESSURE: 71 MMHG | RESPIRATION RATE: 20 BRPM | HEART RATE: 108 BPM | TEMPERATURE: 100.4 F | OXYGEN SATURATION: 97 %

## 2017-09-07 VITALS
TEMPERATURE: 97.6 F | OXYGEN SATURATION: 99 % | HEART RATE: 96 BPM | DIASTOLIC BLOOD PRESSURE: 73 MMHG | SYSTOLIC BLOOD PRESSURE: 118 MMHG | RESPIRATION RATE: 20 BRPM

## 2017-09-07 VITALS
HEART RATE: 106 BPM | RESPIRATION RATE: 18 BRPM | DIASTOLIC BLOOD PRESSURE: 72 MMHG | TEMPERATURE: 98.2 F | OXYGEN SATURATION: 99 % | SYSTOLIC BLOOD PRESSURE: 108 MMHG

## 2017-09-07 VITALS
TEMPERATURE: 98.4 F | HEART RATE: 100 BPM | SYSTOLIC BLOOD PRESSURE: 114 MMHG | DIASTOLIC BLOOD PRESSURE: 70 MMHG | RESPIRATION RATE: 19 BRPM | OXYGEN SATURATION: 97 %

## 2017-09-07 VITALS
SYSTOLIC BLOOD PRESSURE: 113 MMHG | DIASTOLIC BLOOD PRESSURE: 81 MMHG | TEMPERATURE: 98.5 F | HEART RATE: 99 BPM | OXYGEN SATURATION: 100 % | RESPIRATION RATE: 18 BRPM

## 2017-09-07 VITALS — OXYGEN SATURATION: 99 %

## 2017-09-07 VITALS
RESPIRATION RATE: 18 BRPM | TEMPERATURE: 98.2 F | OXYGEN SATURATION: 97 % | HEART RATE: 99 BPM | SYSTOLIC BLOOD PRESSURE: 104 MMHG | DIASTOLIC BLOOD PRESSURE: 77 MMHG

## 2017-09-07 VITALS — HEART RATE: 106 BPM

## 2017-09-07 VITALS — OXYGEN SATURATION: 94 %

## 2017-09-07 RX ADMIN — AMPICILLIN SODIUM SCH MLS/HR: 2 INJECTION, POWDER, FOR SOLUTION INTRAMUSCULAR; INTRAVENOUS at 16:55

## 2017-09-07 RX ADMIN — HEPARIN SODIUM SCH UNITS: 10000 INJECTION, SOLUTION INTRAVENOUS; SUBCUTANEOUS at 09:01

## 2017-09-07 RX ADMIN — POLYVINYL ALCOHOL SCH DROP: 14 SOLUTION/ DROPS OPHTHALMIC at 13:19

## 2017-09-07 RX ADMIN — IPRATROPIUM BROMIDE AND ALBUTEROL SULFATE SCH AMPULE: .5; 3 SOLUTION RESPIRATORY (INHALATION) at 22:04

## 2017-09-07 RX ADMIN — Medication SCH ML: at 09:03

## 2017-09-07 RX ADMIN — MORPHINE SULFATE PRN MG: 2 INJECTION, SOLUTION INTRAMUSCULAR; INTRAVENOUS at 09:02

## 2017-09-07 RX ADMIN — OXACILLIN SODIUM SCH MLS/HR: 2 INJECTION, POWDER, FOR SOLUTION INTRAMUSCULAR; INTRAVENOUS at 09:03

## 2017-09-07 RX ADMIN — FAMOTIDINE SCH MG: 20 TABLET, FILM COATED ORAL at 09:01

## 2017-09-07 RX ADMIN — FUROSEMIDE SCH MG: 20 TABLET ORAL at 09:03

## 2017-09-07 RX ADMIN — POLYVINYL ALCOHOL SCH DROP: 14 SOLUTION/ DROPS OPHTHALMIC at 16:56

## 2017-09-07 RX ADMIN — AMPICILLIN SODIUM SCH MLS/HR: 2 INJECTION, POWDER, FOR SOLUTION INTRAMUSCULAR; INTRAVENOUS at 14:46

## 2017-09-07 RX ADMIN — OXACILLIN SODIUM SCH MLS/HR: 2 INJECTION, POWDER, FOR SOLUTION INTRAMUSCULAR; INTRAVENOUS at 01:06

## 2017-09-07 RX ADMIN — IPRATROPIUM BROMIDE AND ALBUTEROL SULFATE SCH AMPULE: .5; 3 SOLUTION RESPIRATORY (INHALATION) at 15:40

## 2017-09-07 RX ADMIN — AMPICILLIN SODIUM SCH MLS/HR: 2 INJECTION, POWDER, FOR SOLUTION INTRAMUSCULAR; INTRAVENOUS at 20:42

## 2017-09-07 RX ADMIN — CHLORHEXIDINE GLUCONATE SCH PACK: 500 CLOTH TOPICAL at 04:00

## 2017-09-07 RX ADMIN — IPRATROPIUM BROMIDE AND ALBUTEROL SULFATE SCH AMPULE: .5; 3 SOLUTION RESPIRATORY (INHALATION) at 04:00

## 2017-09-07 RX ADMIN — OXACILLIN SODIUM SCH MLS/HR: 2 INJECTION, POWDER, FOR SOLUTION INTRAMUSCULAR; INTRAVENOUS at 05:45

## 2017-09-07 RX ADMIN — ONDANSETRON PRN MG: 2 INJECTION, SOLUTION INTRAMUSCULAR; INTRAVENOUS at 13:21

## 2017-09-07 RX ADMIN — OXACILLIN SODIUM SCH MLS/HR: 2 INJECTION, POWDER, FOR SOLUTION INTRAMUSCULAR; INTRAVENOUS at 15:40

## 2017-09-07 RX ADMIN — HEPARIN SODIUM SCH UNITS: 10000 INJECTION, SOLUTION INTRAVENOUS; SUBCUTANEOUS at 16:57

## 2017-09-07 RX ADMIN — RIFAMPIN SCH MG: 150 CAPSULE ORAL at 20:42

## 2017-09-07 RX ADMIN — IPRATROPIUM BROMIDE AND ALBUTEROL SULFATE SCH AMPULE: .5; 3 SOLUTION RESPIRATORY (INHALATION) at 10:25

## 2017-09-07 RX ADMIN — GENTAMICIN SULFATE SCH MLS/HR: 0.8 INJECTION, SOLUTION INTRAVENOUS at 14:46

## 2017-09-07 RX ADMIN — POLYVINYL ALCOHOL SCH DROP: 14 SOLUTION/ DROPS OPHTHALMIC at 09:04

## 2017-09-07 RX ADMIN — ACETAMINOPHEN PRN MG: 325 TABLET ORAL at 16:55

## 2017-09-07 RX ADMIN — ONDANSETRON PRN MG: 2 INJECTION, SOLUTION INTRAMUSCULAR; INTRAVENOUS at 04:41

## 2017-09-07 RX ADMIN — STANDARDIZED SENNA CONCENTRATE AND DOCUSATE SODIUM SCH TAB: 8.6; 5 TABLET, FILM COATED ORAL at 09:04

## 2017-09-07 RX ADMIN — OXACILLIN SODIUM SCH MLS/HR: 2 INJECTION, POWDER, FOR SOLUTION INTRAMUSCULAR; INTRAVENOUS at 18:35

## 2017-09-07 RX ADMIN — FAMOTIDINE SCH MG: 20 TABLET, FILM COATED ORAL at 20:43

## 2017-09-07 RX ADMIN — Medication SCH ML: at 20:42

## 2017-09-07 RX ADMIN — OXACILLIN SODIUM SCH MLS/HR: 2 INJECTION, POWDER, FOR SOLUTION INTRAMUSCULAR; INTRAVENOUS at 22:16

## 2017-09-07 RX ADMIN — METHADONE HYDROCHLORIDE SCH MG: 5 SOLUTION ORAL at 11:37

## 2017-09-07 RX ADMIN — STANDARDIZED SENNA CONCENTRATE AND DOCUSATE SODIUM SCH TAB: 8.6; 5 TABLET, FILM COATED ORAL at 20:43

## 2017-09-07 RX ADMIN — AMPICILLIN SODIUM SCH MLS/HR: 2 INJECTION, POWDER, FOR SOLUTION INTRAMUSCULAR; INTRAVENOUS at 09:03

## 2017-09-07 RX ADMIN — METHADONE HYDROCHLORIDE SCH MG: 5 SOLUTION ORAL at 20:42

## 2017-09-07 RX ADMIN — MORPHINE SULFATE PRN MG: 2 INJECTION, SOLUTION INTRAMUSCULAR; INTRAVENOUS at 20:43

## 2017-09-07 RX ADMIN — MORPHINE SULFATE PRN MG: 2 INJECTION, SOLUTION INTRAMUSCULAR; INTRAVENOUS at 04:42

## 2017-09-07 RX ADMIN — MORPHINE SULFATE PRN MG: 2 INJECTION, SOLUTION INTRAMUSCULAR; INTRAVENOUS at 15:41

## 2017-09-07 RX ADMIN — AMPICILLIN SODIUM SCH MLS/HR: 2 INJECTION, POWDER, FOR SOLUTION INTRAMUSCULAR; INTRAVENOUS at 04:42

## 2017-09-07 RX ADMIN — RIFAMPIN SCH MG: 150 CAPSULE ORAL at 09:01

## 2017-09-07 RX ADMIN — HEPARIN SODIUM SCH UNITS: 10000 INJECTION, SOLUTION INTRAVENOUS; SUBCUTANEOUS at 01:06

## 2017-09-07 RX ADMIN — POTASSIUM CHLORIDE SCH MEQ: 20 TABLET, EXTENDED RELEASE ORAL at 09:01

## 2017-09-07 NOTE — HHI.PR
Subjective


Remarks


Patient c/o severe bifrontal headache


denies nausea or vomiting


denies cp/sob


afebrile


tachycardic





Objective


Vitals





Vital Signs








  Date Time  Temp Pulse Resp B/P (MAP) Pulse Ox O2 Delivery O2 Flow Rate FiO2


 


9/7/17 15:40     99   21


 


9/7/17 12:24 98.2 106 18 108/72 (84) 99   


 


9/7/17 10:26     99   21


 


9/7/17 09:40      Room Air  


 


9/7/17 08:08 98.4 100 19 114/70 (85) 97   


 


9/7/17 04:20     94   


 


9/7/17 04:00 98.2 99 18 104/77 (86) 97   


 


9/7/17 00:11      Nasal Cannula 2.00 


 


9/7/17 00:00 100.4 108 20 106/71 (83) 97   


 


9/6/17 20:00      Nasal Cannula 2.00 


 


9/6/17 20:00 98.4 108 20 109/72 (84) 96   


 


9/6/17 16:00 98.4 98 19 117/80 (92) 100   














I/O      


 


 9/6/17 9/6/17 9/6/17 9/7/17 9/7/17 9/7/17





 06:59 14:59 22:59 06:59 14:59 22:59


 


Intake Total 946.5 ml 720 ml 500 ml 645 ml  


 


Output Total 1400 ml     


 


Balance -453.5 ml 720 ml 500 ml 645 ml  


 


      


 


Intake Oral 240 ml 720 ml  240 ml  


 


IV Total 706.5 ml  500 ml 405 ml  


 


Output Urine Total 1400 ml     


 


# Voids  4  4  


 


# Bowel Movements 1   2  








Result Diagram:  


9/6/17 0617 9/6/17 0617





Imaging





Last Impressions








Chest X-Ray 9/5/17 0000 Signed





Impressions: 





 Service Date/Time:  Tuesday, September 5, 2017 08:07 - CONCLUSION:  Suspected 





 mild edema/failure.     Ron Swan MD 


 


Neck CT 9/2/17 0000 Signed





Impressions: 





 Service Date/Time:  Saturday, September 2, 2017 11:13 - CONCLUSION:  1. 

Patient 





 is intubated which limits this study. The epiglottis is otherwise normal. 





 Secretions are present in the oropharynx and right sphenoid sinus.     Ej Ewing MD 


 


Brain MRI 8/25/17 0000 Signed





Impressions: 





 Service Date/Time:  Friday, August 25, 2017 17:07 - CONCLUSION:  1. Small 

acute 





 or subacute cortical infarct of the left parietal lobe. 2. Small, faint area 

of 





 nonspecific flair signal abnormality in the left frontal lobe periventricular 





 white matter, nonspecific. No associated mass effect or abnormal enhancement. 

3 





 month followup MRI of the brain recommended. 3.  Mild chronic white matter 





 changes.     Ron Swan MD 


 


Head CT 8/21/17 0000 Signed





Impressions: 





 Service Date/Time:  Monday, August 21, 2017 19:47 - CONCLUSION:  No evidence 

of 





 acute infarct, hemorrhage, mass or edema.     Kristian Frankel MD 








Objective Remarks


AAOx3, nad sitting in bed


decreased breath sound BL, no crackles or wheezing auscultated


abdomen obese, soft, non tender


S1s2 qith systolic murmur


Edema in lower extremity (+) 2


Procedures


None


Medications and IVs





Current Medications








 Medications


  (Trade)  Dose


 Ordered  Sig/Nikhil


 Route  Start Time


 Stop Time Status Last Admin


 


  (Brethine Inj)  1 mg  UNSCH  PRN


 SQ  8/21/17 21:30


     


 


 


  (Tylenol)  650 mg  Q6H  PRN


 PO  8/21/17 21:30


    8/26/17 09:07


 


 


  (Tears Naturale


 Opth Soln)  1 drop  TID


 EACH EYE  8/22/17 09:00


    9/6/17 09:27


 


 


  (Zofran Inj)  4 mg  Q6H  PRN


 IV  8/21/17 21:30


    9/7/17 13:21


 


 


  (Duoneb Neb)  1 ampule  Q2HR NEB  PRN


 INH  8/21/17 21:30


    9/5/17 07:35


 


 


 Miscellaneous


 Information  1  Q361D


 XX  8/21/17 21:30


    8/21/17 21:30


 


 


  (Chlorhexidine


 2% Cloth)  


 Taper  DAILY@04


 TOP  8/22/17 04:00


 8/18/18 03:59  8/28/17 00:07


 


 


  (Chlorhexidine


 2% Cloth)  3 pack  UNSCH  PRN


 TOP  8/21/17 21:30


     


 


 


  (Arlen-Colace)  1 tab  BID


 PO  8/22/17 09:00


    9/4/17 19:59


 


 


  (Milk Of


 Magnesia Liq)  30 ml  Q12H  PRN


 PO  8/21/17 21:30


     


 


 


  (Senokot)  17.2 mg  Q12H  PRN


 PO  8/21/17 21:30


     


 


 


  (Dulcolax Supp)  10 mg  DAILY  PRN


 RECTAL  8/21/17 21:30


     


 


 


  (Lactulose Liq)  30 ml  DAILY  PRN


 PO  8/21/17 21:30


     


 


 


  (NS Flush)  2 ml  UNSCH  PRN


 IV FLUSH  8/21/17 22:45


     


 


 


  (NS Flush)  2 ml  BID


 IV FLUSH  8/22/17 09:00


    9/7/17 09:03


 


 


 Oxacillin Sodium


 2 gm/Sodium


 Chloride  100 ml @ 


 200 mls/hr  Q4H


 IV  8/22/17 18:00


    9/7/17 15:40


 


 


 Pharmacy Profile


 Note  0 ml @ 0


 mls/hr  UNSCH


 OTHER  8/22/17 15:30


     


 


 


 Ampicillin Sodium


 2000 mg/Sodium


 Chloride  100 ml @ 


 400 mls/hr  Q4H


 IV  8/24/17 13:00


    9/7/17 14:46


 


 


 Gentamicin


 Sulfate/Sodium


 Chloride  100 ml @ 


 200 mls/hr  Q12H


 IV  8/26/17 00:00


    9/7/17 14:46


 


 


  (Rifampin)  300 mg  Q12HR


 PO  8/31/17 21:00


    9/7/17 09:01


 


 


  (Duoneb Neb)  1 ampule  Q6HR  NEB


 NEB  9/5/17 10:00


    9/7/17 10:25


 


 


  (Pepcid)  20 mg  BID


 PO  9/5/17 09:00


    9/7/17 09:01


 


 


  (Heparin Inj)  5,000 units  Q8H


 SQ  9/5/17 10:00


    9/7/17 09:01


 


 


  (KCl)  20 meq  DAILY


 PO  9/5/17 09:00


    9/7/17 09:01


 


 


  (Methadone  Liq)  20 mg  Q12HR


 PO  9/5/17 21:00


    9/7/17 11:37


 


 


  (Morphine Inj)  3 mg  Q3H  PRN


 IV  9/5/17 15:45


    9/7/17 15:41


 


 


  (Tylenol)  1,000 mg  Q4H  PRN


 PO  9/7/17 16:00


   UNV  


 


 


  (Lasix)  40 mg  DAILY


 PO  9/8/17 09:00


   UNV  


 


 


  (Lasix)  20 mg  ONCE  ONCE


 PO  9/7/17 16:00


 9/7/17 16:01 UNV  


 








Urinary Catheter:  No


Vascular Central Line Catheter:  No





A/P


Problem List:  


(1) Severe sepsis


ICD Code:  A41.9 - Sepsis, unspecified organism; R65.20 - Severe sepsis without 

septic shock


Status:  Acute


Plan:  Sepsis present on admission, the patient presented with leukocytosis and 

tachycardia as well as after mental status.


Initially admitted to the intensive care unit, intubated and cared for by the 

intensivists.


Sepsis found to be secondary to meningitis.


Continue IV antibiotics as per infectious disease recommendations.


The patient is currently on IV ampicillin, oxacillin, gentamicin and rifampin - 

continue.


Unless more positive blood clx anticipate 6 week from 1st neg (8/24) followed 

by  indefinite oral abx suppression tx. IV abx stop date tentatively.


Gentamycin trough 2.0








(2) MSSA meningitis


Status:  Acute


Plan:  As above.





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


Status:  Acute


Plan:  IV antibiotics as per infectious disease as above.





(4) SVT (supraventricular tachycardia)


ICD Code:  I47.1 - Supraventricular tachycardia


Status:  Resolved


Plan:  SVT now resolved.  Continue metoprolol.


Echocardiogram 8 foci 17 revealed an ejection fraction showed 25-30%.  PAP 34 

mmHg


Initially treated with vasopressors which the patient is now off.


The patient started on Lasix 20 minute grams by mouth daily.  Continue.





(5) Postextubation stridor


ICD Code:  J95.89 - Other postprocedural complications and disorders of 

respiratory system, not elsewhere classified


Status:  Resolved


Plan:  The patient initially extubated on 9/3/17.  The patient was administered 

racemic epinephrine as needed for stridor or


Continue albuterol/ipratropium inhaled treatments every 6 hours with every 2 

hours as needed for albuterol therapy.


Status post today treatment of dexamethasone IV every 8 hours.





(6) Acute hypoxemic respiratory failure


ICD Code:  J96.01 - Acute respiratory failure with hypoxia


Status:  Resolved


Plan:  Continue supplemental oxygen to keep oxygen more than 92%.


Status post intubation and extubation.


Continue DuoNeb treatments.





(7) CVA (cerebral vascular accident)


ICD Code:  I63.9 - Cerebral infarction, unspecified


Plan:  CT head negative, MRI small acute and subacute cortical infarct of the 

left parietal lobe.


The patient was seen and evaluated by neurology.


The patient is on a scheduled methadone 10 mg twice today.





(8) Tricuspid regurgitation


ICD Code:  I07.1 - Rheumatic tricuspid insufficiency


Status:  Acute


Plan:  As seen on echo described above.  Continue oral Lasix.





(9) Acute systolic heart failure


ICD Code:  I50.21 - Acute systolic (congestive) heart failure


Plan:  Continue Lasix orally. Increase Lasix to 40 mg po daily.





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


ICD Code:  T82.6XXA - Infection and inflammatory reaction due to cardiac valve 

prosthesis, initial encounter


Status:  Acute


Plan:  The patient has history of IV drug abuse.


Continue antibiotics as per ID recommendations.


Tentative a stop date is 10/5





(11) NOÉ (acute kidney injury)


ICD Code:  N17.9 - Acute kidney failure, unspecified


Status:  Resolved


Plan:  Now resolved.  Continue to monitor BMP





(12) Protein calorie malnutrition


ICD Code:  E46 - Unspecified protein-calorie malnutrition


Plan:  Secondary to long-standing IV drug abuse and endocarditis.





(13) Microcytic anemia


ICD Code:  D50.9 - Iron deficiency anemia, unspecified


Plan:  Hemoglobin seems to be stable at 8.6.  I will check iron studies and 

stool Hemoccult for blood.


Patient had iron studies previously concordant with iron deficiency anemia.  I 

will start the patient on oral iron sulfate.





(14) Hyperphosphatemia


ICD Code:  E83.39 - Other disorders of phosphorus metabolism


Plan:  Now resolved.  Continue to monitor levels.





(15) Hypernatremia


ICD Code:  E87.0 - Hyperosmolality and hypernatremia


Plan:  Likely secondary to dehydration. 


Sodium now within normal range.  Continue to monitor BMP.





(16) IV drug abuse


ICD Code:  F19.10 - Other psychoactive substance abuse, uncomplicated


Status:  Acute


Plan:  Advised cessation.  Currently on methadone.





(17) Headache


ICD Code:  R51 - Headache


Plan:  Headache is bifrontal, patient is afebrile.


Will start the patient on acetaminophen thousand milligrams by mouth every 6 

hours as needed for headache.





Assessment and Plan


GI prophylaxis: Continue famotidine.


DVT prophylaxis: SCDs, ambulation.





Problem Qualifiers





(1) CVA (cerebral vascular accident):  


Qualified Codes:  I63.9 - Cerebral infarction, unspecified


(2) Protein calorie malnutrition:  


Qualified Codes:  E44.0 - Moderate protein-calorie malnutrition








Jesus Mae MD Sep 7, 2017 15:47

## 2017-09-08 VITALS
TEMPERATURE: 97.9 F | SYSTOLIC BLOOD PRESSURE: 116 MMHG | DIASTOLIC BLOOD PRESSURE: 86 MMHG | HEART RATE: 97 BPM | RESPIRATION RATE: 20 BRPM | OXYGEN SATURATION: 98 %

## 2017-09-08 VITALS
DIASTOLIC BLOOD PRESSURE: 88 MMHG | HEART RATE: 102 BPM | RESPIRATION RATE: 19 BRPM | TEMPERATURE: 98.3 F | OXYGEN SATURATION: 95 % | SYSTOLIC BLOOD PRESSURE: 128 MMHG

## 2017-09-08 VITALS — TEMPERATURE: 98.9 F | DIASTOLIC BLOOD PRESSURE: 60 MMHG | SYSTOLIC BLOOD PRESSURE: 92 MMHG

## 2017-09-08 VITALS
HEART RATE: 101 BPM | RESPIRATION RATE: 20 BRPM | DIASTOLIC BLOOD PRESSURE: 78 MMHG | OXYGEN SATURATION: 100 % | TEMPERATURE: 99.1 F | SYSTOLIC BLOOD PRESSURE: 115 MMHG

## 2017-09-08 VITALS
OXYGEN SATURATION: 100 % | SYSTOLIC BLOOD PRESSURE: 126 MMHG | RESPIRATION RATE: 19 BRPM | TEMPERATURE: 97.7 F | HEART RATE: 95 BPM | DIASTOLIC BLOOD PRESSURE: 77 MMHG

## 2017-09-08 VITALS
HEART RATE: 97 BPM | OXYGEN SATURATION: 97 % | DIASTOLIC BLOOD PRESSURE: 76 MMHG | TEMPERATURE: 97.8 F | SYSTOLIC BLOOD PRESSURE: 111 MMHG | RESPIRATION RATE: 20 BRPM

## 2017-09-08 VITALS
RESPIRATION RATE: 19 BRPM | TEMPERATURE: 98.2 F | DIASTOLIC BLOOD PRESSURE: 75 MMHG | HEART RATE: 94 BPM | OXYGEN SATURATION: 100 % | SYSTOLIC BLOOD PRESSURE: 117 MMHG

## 2017-09-08 VITALS
OXYGEN SATURATION: 99 % | TEMPERATURE: 98.1 F | HEART RATE: 96 BPM | DIASTOLIC BLOOD PRESSURE: 68 MMHG | SYSTOLIC BLOOD PRESSURE: 115 MMHG | RESPIRATION RATE: 18 BRPM

## 2017-09-08 VITALS — HEART RATE: 104 BPM

## 2017-09-08 VITALS — HEART RATE: 93 BPM

## 2017-09-08 VITALS — OXYGEN SATURATION: 100 %

## 2017-09-08 RX ADMIN — CHLORHEXIDINE GLUCONATE SCH PACK: 500 CLOTH TOPICAL at 04:00

## 2017-09-08 RX ADMIN — RIFAMPIN SCH MG: 150 CAPSULE ORAL at 08:39

## 2017-09-08 RX ADMIN — IPRATROPIUM BROMIDE AND ALBUTEROL SULFATE SCH AMPULE: .5; 3 SOLUTION RESPIRATORY (INHALATION) at 10:00

## 2017-09-08 RX ADMIN — METHADONE HYDROCHLORIDE SCH MG: 5 SOLUTION ORAL at 08:38

## 2017-09-08 RX ADMIN — STANDARDIZED SENNA CONCENTRATE AND DOCUSATE SODIUM SCH TAB: 8.6; 5 TABLET, FILM COATED ORAL at 21:00

## 2017-09-08 RX ADMIN — MORPHINE SULFATE PRN MG: 2 INJECTION, SOLUTION INTRAMUSCULAR; INTRAVENOUS at 18:22

## 2017-09-08 RX ADMIN — AMPICILLIN SODIUM SCH MLS/HR: 2 INJECTION, POWDER, FOR SOLUTION INTRAMUSCULAR; INTRAVENOUS at 00:03

## 2017-09-08 RX ADMIN — FUROSEMIDE SCH MG: 20 TABLET ORAL at 08:39

## 2017-09-08 RX ADMIN — FAMOTIDINE SCH MG: 20 TABLET, FILM COATED ORAL at 21:28

## 2017-09-08 RX ADMIN — HEPARIN SODIUM SCH UNITS: 10000 INJECTION, SOLUTION INTRAVENOUS; SUBCUTANEOUS at 10:43

## 2017-09-08 RX ADMIN — IPRATROPIUM BROMIDE AND ALBUTEROL SULFATE SCH AMPULE: .5; 3 SOLUTION RESPIRATORY (INHALATION) at 03:48

## 2017-09-08 RX ADMIN — HEPARIN SODIUM SCH UNITS: 10000 INJECTION, SOLUTION INTRAVENOUS; SUBCUTANEOUS at 01:46

## 2017-09-08 RX ADMIN — OXACILLIN SODIUM SCH MLS/HR: 2 INJECTION, POWDER, FOR SOLUTION INTRAMUSCULAR; INTRAVENOUS at 12:41

## 2017-09-08 RX ADMIN — FAMOTIDINE SCH MG: 20 TABLET, FILM COATED ORAL at 08:39

## 2017-09-08 RX ADMIN — AMPICILLIN SODIUM SCH MLS/HR: 2 INJECTION, POWDER, FOR SOLUTION INTRAMUSCULAR; INTRAVENOUS at 16:17

## 2017-09-08 RX ADMIN — AMPICILLIN SODIUM SCH MLS/HR: 2 INJECTION, POWDER, FOR SOLUTION INTRAMUSCULAR; INTRAVENOUS at 08:38

## 2017-09-08 RX ADMIN — Medication SCH ML: at 23:47

## 2017-09-08 RX ADMIN — STANDARDIZED SENNA CONCENTRATE AND DOCUSATE SODIUM SCH TAB: 8.6; 5 TABLET, FILM COATED ORAL at 08:41

## 2017-09-08 RX ADMIN — AMPICILLIN SODIUM SCH MLS/HR: 2 INJECTION, POWDER, FOR SOLUTION INTRAMUSCULAR; INTRAVENOUS at 05:05

## 2017-09-08 RX ADMIN — GENTAMICIN SULFATE SCH MLS/HR: 0.8 INJECTION, SOLUTION INTRAVENOUS at 00:03

## 2017-09-08 RX ADMIN — METHADONE HYDROCHLORIDE SCH MG: 5 SOLUTION ORAL at 21:29

## 2017-09-08 RX ADMIN — POLYVINYL ALCOHOL SCH DROP: 14 SOLUTION/ DROPS OPHTHALMIC at 12:52

## 2017-09-08 RX ADMIN — Medication PRN ML: at 01:47

## 2017-09-08 RX ADMIN — MORPHINE SULFATE PRN MG: 2 INJECTION, SOLUTION INTRAMUSCULAR; INTRAVENOUS at 16:24

## 2017-09-08 RX ADMIN — RIFAMPIN SCH MG: 150 CAPSULE ORAL at 21:28

## 2017-09-08 RX ADMIN — HEPARIN SODIUM SCH UNITS: 10000 INJECTION, SOLUTION INTRAVENOUS; SUBCUTANEOUS at 18:24

## 2017-09-08 RX ADMIN — AMPICILLIN SODIUM SCH MLS/HR: 2 INJECTION, POWDER, FOR SOLUTION INTRAMUSCULAR; INTRAVENOUS at 21:32

## 2017-09-08 RX ADMIN — OXACILLIN SODIUM SCH MLS/HR: 2 INJECTION, POWDER, FOR SOLUTION INTRAMUSCULAR; INTRAVENOUS at 10:42

## 2017-09-08 RX ADMIN — POLYVINYL ALCOHOL SCH DROP: 14 SOLUTION/ DROPS OPHTHALMIC at 17:10

## 2017-09-08 RX ADMIN — OXACILLIN SODIUM SCH MLS/HR: 2 INJECTION, POWDER, FOR SOLUTION INTRAMUSCULAR; INTRAVENOUS at 01:46

## 2017-09-08 RX ADMIN — IPRATROPIUM BROMIDE AND ALBUTEROL SULFATE SCH AMPULE: .5; 3 SOLUTION RESPIRATORY (INHALATION) at 16:00

## 2017-09-08 RX ADMIN — ONDANSETRON PRN MG: 2 INJECTION, SOLUTION INTRAMUSCULAR; INTRAVENOUS at 01:55

## 2017-09-08 RX ADMIN — MORPHINE SULFATE PRN MG: 2 INJECTION, SOLUTION INTRAMUSCULAR; INTRAVENOUS at 11:17

## 2017-09-08 RX ADMIN — MORPHINE SULFATE PRN MG: 2 INJECTION, SOLUTION INTRAMUSCULAR; INTRAVENOUS at 06:09

## 2017-09-08 RX ADMIN — MORPHINE SULFATE PRN MG: 2 INJECTION, SOLUTION INTRAMUSCULAR; INTRAVENOUS at 01:47

## 2017-09-08 RX ADMIN — ACETAMINOPHEN PRN MG: 325 TABLET ORAL at 05:03

## 2017-09-08 RX ADMIN — OXACILLIN SODIUM SCH MLS/HR: 2 INJECTION, POWDER, FOR SOLUTION INTRAMUSCULAR; INTRAVENOUS at 23:47

## 2017-09-08 RX ADMIN — OXACILLIN SODIUM SCH MLS/HR: 2 INJECTION, POWDER, FOR SOLUTION INTRAMUSCULAR; INTRAVENOUS at 05:04

## 2017-09-08 RX ADMIN — AMPICILLIN SODIUM SCH MLS/HR: 2 INJECTION, POWDER, FOR SOLUTION INTRAMUSCULAR; INTRAVENOUS at 12:42

## 2017-09-08 RX ADMIN — MORPHINE SULFATE PRN MG: 2 INJECTION, SOLUTION INTRAMUSCULAR; INTRAVENOUS at 22:33

## 2017-09-08 RX ADMIN — OXACILLIN SODIUM SCH MLS/HR: 2 INJECTION, POWDER, FOR SOLUTION INTRAMUSCULAR; INTRAVENOUS at 16:17

## 2017-09-08 RX ADMIN — ONDANSETRON PRN MG: 2 INJECTION, SOLUTION INTRAMUSCULAR; INTRAVENOUS at 12:47

## 2017-09-08 RX ADMIN — IPRATROPIUM BROMIDE AND ALBUTEROL SULFATE SCH AMPULE: .5; 3 SOLUTION RESPIRATORY (INHALATION) at 21:00

## 2017-09-08 RX ADMIN — MORPHINE SULFATE PRN MG: 2 INJECTION, SOLUTION INTRAMUSCULAR; INTRAVENOUS at 14:20

## 2017-09-08 RX ADMIN — GENTAMICIN SULFATE SCH MLS/HR: 0.8 INJECTION, SOLUTION INTRAVENOUS at 12:50

## 2017-09-08 RX ADMIN — POLYVINYL ALCOHOL SCH DROP: 14 SOLUTION/ DROPS OPHTHALMIC at 08:41

## 2017-09-08 RX ADMIN — POTASSIUM CHLORIDE SCH MEQ: 20 TABLET, EXTENDED RELEASE ORAL at 08:40

## 2017-09-08 RX ADMIN — Medication SCH ML: at 08:41

## 2017-09-08 NOTE — HHI.PR
Subjective


Remarks


Patient c/o difuse abdominal pain, some headache


States has had diarrhea today, nursing states she has not complained of either 

of those today.


denies palpitations


denies fevers/chills


denies sob/cp





Objective


Vitals





Vital Signs








  Date Time  Temp Pulse Resp B/P (MAP) Pulse Ox O2 Delivery O2 Flow Rate FiO2


 


9/8/17 11:49 97.7 95 19 126/77 (93) 100   


 


9/8/17 10:44     100   21


 


9/8/17 08:50  93      


 


9/8/17 08:50      Room Air  


 


9/8/17 08:00 98.2 94 19 117/75 (89) 100   


 


9/8/17 05:02 99.1 101 20 115/78 (90) 100   


 


9/8/17 04:00 97.8 97 20 111/76 (88) 97   


 


9/8/17 00:00 97.9 97 20 116/86 (96) 98   


 


9/7/17 21:20      Room Air  


 


9/7/17 20:00  96      


 


9/7/17 20:00 97.6 95 20 118/73 (88) 99   


 


9/7/17 16:29 98.5 99 18 113/81 (92) 100   


 


9/7/17 15:59  106      


 


9/7/17 15:40     99   21














I/O      


 


 9/7/17 9/7/17 9/7/17 9/8/17 9/8/17 9/8/17





 06:59 14:59 22:59 06:59 14:59 22:59


 


Intake Total 645 ml 200 ml 1100 ml 500 ml 1100 ml 


 


Balance 645 ml 200 ml 1100 ml 500 ml 1100 ml 


 


      


 


Intake Oral 240 ml  600 ml  700 ml 


 


IV Total 405 ml 200 ml 500 ml 500 ml 400 ml 


 


# Voids 4  3 3 5 


 


# Bowel Movements 2  1 0 1 








Result Diagram:  


9/6/17 0617                                                                    

            9/6/17 0617





Imaging





Last Impressions








Chest X-Ray 9/5/17 0000 Signed





Impressions: 





 Service Date/Time:  Tuesday, September 5, 2017 08:07 - CONCLUSION:  Suspected 





 mild edema/failure.     Ron Swan MD 


 


Neck CT 9/2/17 0000 Signed





Impressions: 





 Service Date/Time:  Saturday, September 2, 2017 11:13 - CONCLUSION:  1. 

Patient 





 is intubated which limits this study. The epiglottis is otherwise normal. 





 Secretions are present in the oropharynx and right sphenoid sinus.     Ej Ewing MD 


 


Brain MRI 8/25/17 0000 Signed





Impressions: 





 Service Date/Time:  Friday, August 25, 2017 17:07 - CONCLUSION:  1. Small 

acute 





 or subacute cortical infarct of the left parietal lobe. 2. Small, faint area 

of 





 nonspecific flair signal abnormality in the left frontal lobe periventricular 





 white matter, nonspecific. No associated mass effect or abnormal enhancement. 

3 





 month followup MRI of the brain recommended. 3.  Mild chronic white matter 





 changes.     Ron Swan MD 


 


Head CT 8/21/17 0000 Signed





Impressions: 





 Service Date/Time:  Monday, August 21, 2017 19:47 - CONCLUSION:  No evidence 

of 





 acute infarct, hemorrhage, mass or edema.     Kristian Frankel MD 








Objective Remarks


AAOx3, nad sitting in bed


decreased breath sound BL, no crackles or wheezing auscultated


abdomen obese, soft, tender to palpation diffusely


S1s2 qith systolic murmur


Edema in lower extremities (+) 2


Procedures


None


Medications and IVs





Current Medications








 Medications


  (Trade)  Dose


 Ordered  Sig/Nikhil


 Route  Start Time


 Stop Time Status Last Admin


 


  (Brethine Inj)  1 mg  UNSCH  PRN


 SQ  8/21/17 21:30


     


 


 


  (Tylenol)  650 mg  Q6H  PRN


 PO  8/21/17 21:30


    8/26/17 09:07


 


 


  (Tears Naturale


 Opth Soln)  1 drop  TID


 EACH EYE  8/22/17 09:00


    9/6/17 09:27


 


 


  (Zofran Inj)  4 mg  Q6H  PRN


 IV  8/21/17 21:30


    9/8/17 12:47


 


 


  (Duoneb Neb)  1 ampule  Q2HR NEB  PRN


 INH  8/21/17 21:30


    9/5/17 07:35


 


 


 Miscellaneous


 Information  1  Q361D


 XX  8/21/17 21:30


    8/21/17 21:30


 


 


  (Chlorhexidine


 2% Cloth)  


 Taper  DAILY@04


 TOP  8/22/17 04:00


 8/18/18 03:59  8/28/17 00:07


 


 


  (Chlorhexidine


 2% Cloth)  3 pack  UNSCH  PRN


 TOP  8/21/17 21:30


     


 


 


  (Arlen-Colace)  1 tab  BID


 PO  8/22/17 09:00


    9/4/17 19:59


 


 


  (Milk Of


 Magnesia Liq)  30 ml  Q12H  PRN


 PO  8/21/17 21:30


     


 


 


  (Senokot)  17.2 mg  Q12H  PRN


 PO  8/21/17 21:30


     


 


 


  (Dulcolax Supp)  10 mg  DAILY  PRN


 RECTAL  8/21/17 21:30


     


 


 


  (Lactulose Liq)  30 ml  DAILY  PRN


 PO  8/21/17 21:30


     


 


 


  (NS Flush)  2 ml  UNSCH  PRN


 IV FLUSH  8/21/17 22:45


    9/8/17 01:47


 


 


  (NS Flush)  2 ml  BID


 IV FLUSH  8/22/17 09:00


    9/8/17 08:41


 


 


 Oxacillin Sodium


 2 gm/Sodium


 Chloride  100 ml @ 


 200 mls/hr  Q4H


 IV  8/22/17 18:00


    9/8/17 12:41


 


 


 Pharmacy Profile


 Note  0 ml @ 0


 mls/hr  UNSCH


 OTHER  8/22/17 15:30


     


 


 


 Ampicillin Sodium


 2000 mg/Sodium


 Chloride  100 ml @ 


 400 mls/hr  Q4H


 IV  8/24/17 13:00


    9/8/17 12:42


 


 


 Gentamicin


 Sulfate/Sodium


 Chloride  100 ml @ 


 200 mls/hr  Q12H


 IV  8/26/17 00:00


    9/8/17 12:50


 


 


  (Rifampin)  300 mg  Q12HR


 PO  8/31/17 21:00


    9/8/17 08:39


 


 


  (Duoneb Neb)  1 ampule  Q6HR  NEB


 NEB  9/5/17 10:00


    9/7/17 22:04


 


 


  (Pepcid)  20 mg  BID


 PO  9/5/17 09:00


    9/8/17 08:39


 


 


  (Heparin Inj)  5,000 units  Q8H


 SQ  9/5/17 10:00


    9/8/17 10:43


 


 


  (KCl)  20 meq  DAILY


 PO  9/5/17 09:00


    9/8/17 08:40


 


 


  (Methadone  Liq)  20 mg  Q12HR


 PO  9/5/17 21:00


    9/8/17 08:38


 


 


  (Morphine Inj)  3 mg  Q3H  PRN


 IV  9/5/17 15:45


    9/8/17 14:20


 


 


  (Tylenol)  1,000 mg  Q4H  PRN


 PO  9/7/17 16:00


    9/8/17 05:03


 


 


  (Lasix)  40 mg  DAILY


 PO  9/8/17 09:00


    9/8/17 08:39


 











A/P


Problem List:  


(1) Severe sepsis


ICD Code:  A41.9 - Sepsis, unspecified organism; R65.20 - Severe sepsis without 

septic shock


Status:  Acute


Plan:  Sepsis present on admission, the patient presented with leukocytosis and 

tachycardia as well as after mental status.


Initially admitted to the intensive care unit, intubated and cared for by the 

intensivists.


Sepsis found to be secondary to meningitis.


Continue IV antibiotics as per infectious disease recommendations.


The patient is currently on IV ampicillin, oxacillin, gentamicin and rifampin - 

continue.


Unless more positive blood cx anticipate 6 week from 1st neg (8/24) followed by

  indefinite oral abx suppression tx. IV abx stop date tentatively.


Gentamycin trough 2.0








(2) MSSA meningitis


Status:  Acute


Plan:  As above.





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


Status:  Acute


Plan:  IV antibiotics as per infectious disease as above.





(4) SVT (supraventricular tachycardia)


ICD Code:  I47.1 - Supraventricular tachycardia


Status:  Resolved


Plan:  SVT now resolved.  Continue metoprolol.


Echocardiogram 8 foci 17 revealed an ejection fraction showed 25-30%.  PAP 34 

mmHg


Initially treated with vasopressors which the patient is now off.


The patient started on Lasix 20 minute grams by mouth daily.  Continue.





(5) Postextubation stridor


ICD Code:  J95.89 - Other postprocedural complications and disorders of 

respiratory system, not elsewhere classified


Status:  Resolved


Plan:  The patient initially extubated on 9/3/17.  The patient was administered 

racemic epinephrine as needed for stridor or


Continue albuterol/ipratropium inhaled treatments every 6 hours with every 2 

hours as needed for albuterol therapy.


Status post today treatment of dexamethasone IV every 8 hours.





(6) Acute hypoxemic respiratory failure


ICD Code:  J96.01 - Acute respiratory failure with hypoxia


Status:  Resolved


Plan:  Continue supplemental oxygen to keep oxygen more than 92%.


Status post intubation and extubation.


Continue DuoNeb treatments.





(7) CVA (cerebral vascular accident)


ICD Code:  I63.9 - Cerebral infarction, unspecified


Plan:  CT head negative, MRI small acute and subacute cortical infarct of the 

left parietal lobe.


The patient was seen and evaluated by neurology.


The patient is on a scheduled methadone 10 mg twice today.





(8) Tricuspid regurgitation


ICD Code:  I07.1 - Rheumatic tricuspid insufficiency


Status:  Acute


Plan:  As seen on echo described above.  Continue oral Lasix.





(9) Acute systolic heart failure


ICD Code:  I50.21 - Acute systolic (congestive) heart failure


Plan:  Continue Lasix orally. continue Lasix 40 mg po daily.





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


ICD Code:  T82.6XXA - Infection and inflammatory reaction due to cardiac valve 

prosthesis, initial encounter


Status:  Acute


Plan:  The patient has history of IV drug abuse.


Continue antibiotics as per ID recommendations.


Tentative a stop date is 10/5





(11) NOÉ (acute kidney injury)


ICD Code:  N17.9 - Acute kidney failure, unspecified


Status:  Resolved


Plan:  Now resolved.  Continue to monitor BMP





(12) Protein calorie malnutrition


ICD Code:  E46 - Unspecified protein-calorie malnutrition


Plan:  Secondary to long-standing IV drug abuse and endocarditis.





(13) Microcytic anemia


ICD Code:  D50.9 - Iron deficiency anemia, unspecified


Plan:  Hemoglobin seems to be stable at 8.6.  I will check iron studies and 

stool Hemoccult for blood.


Patient had iron studies previously concordant with iron deficiency anemia.  I 

will start the patient on oral iron sulfate.





(14) Hyperphosphatemia


ICD Code:  E83.39 - Other disorders of phosphorus metabolism


Plan:  Now resolved.  Continue to monitor levels.





(15) Hypernatremia


ICD Code:  E87.0 - Hyperosmolality and hypernatremia


Plan:  Likely secondary to dehydration. 


Sodium now within normal range.  Continue to monitor BMP.





(16) IV drug abuse


ICD Code:  F19.10 - Other psychoactive substance abuse, uncomplicated


Status:  Acute


Plan:  Advised cessation.  Currently on methadone.





(17) Headache


ICD Code:  R51 - Headache


Plan:  Headache is bifrontal, patient is afebrile.


Continue Acetaminophen 1000 mg po Q 6 hrs prn headache.





(18) Abdominal pain


ICD Code:  R10.9 - Unspecified abdominal pain


Plan:  check CT abdomen and pelvis. Check stool for C diff PCR.





Assessment and Plan


GI prophylaxis: Continue famotidine.


DVT prophylaxis: SCDs, ambulation.





Problem Qualifiers





(1) CVA (cerebral vascular accident):  


Qualified Codes:  I63.9 - Cerebral infarction, unspecified


(2) Protein calorie malnutrition:  


Qualified Codes:  E44.0 - Moderate protein-calorie malnutrition








Jesus Mae MD Sep 8, 2017 15:40

## 2017-09-08 NOTE — RADRPT
EXAM DATE/TIME:  09/08/2017 20:26 

 

[facility] COMPARISON:     

No previous studies available for comparison.

 

 

INDICATIONS :     

Diffuse lower abdomen pain.

                  

 

IV CONTRAST:     

96 cc Omnipaque 350 (iohexol) IV 

 

 

ORAL CONTRAST:      

Prescribed oral contrast ingested.

                  

 

RADIATION DOSE:     

12.8 CTDIvol (mGy) 

 

 

MEDICAL HISTORY :     

Cardiovascular disease. Congestive heart failure. Hepatitis C.

 

SURGICAL HISTORY :      

None. 

 

ENCOUNTER:      

Initial

 

ACUITY:      

1 week

 

PAIN SCALE:      

6/10

 

LOCATION:       

Bilateral lower quadrant 

 

TECHNIQUE:              

Volumetric scanning of the abdomen was performed.  Using automated exposure control and adjustment of
 the mA and/or kV according to patient size, radiation dose was kept as low as reasonably achievable 
to obtain optimal diagnostic quality images.  DICOM format image data is available electronically for
 review and comparison.  

 

FINDINGS:     

Minimal subsegmental airspace disease at the lung bases. No pleural or pericardial effusion. Previous
 aortic valve replacement and external pacer leads present.

 

No acute findings in the liver. Peripheral low attenuation lesions in the spleen are most characteris
tic of splenic infarcts. Adrenals, kidneys and pancreas demonstrate no acute findings. No calcified g
allstones or biliary ductal dilatation. Small fat-containing ventral hernia measuring about 4 cm in m
aximal diameter. Also small fat-containing umbilical hernia.

 

Pelvis demonstrates intrauterine device. 3.4 cm right ovarian cyst. No obstruction of bowel. No free 
air or free fluid. Appendix normal. No acute bony anomalies.

 

CONCLUSION:     

1. Peripheral low attenuation lesions in the spleen most characteristic of multiple splenic infarcts.
 Some of these are new findings since 2016 comparison.

2. Minimal subsegmental airspace disease at the lung bases.

3. 3.4 cm right adnexal cyst. Intrauterine device present.

4. Small fat containing ventral hernias.

 

 

 

 Jaspal Issa MD on September 08, 2017 at 20:49           

Board Certified Radiologist.

 This report was verified electronically.

## 2017-09-09 VITALS
SYSTOLIC BLOOD PRESSURE: 122 MMHG | OXYGEN SATURATION: 98 % | HEART RATE: 105 BPM | DIASTOLIC BLOOD PRESSURE: 74 MMHG | RESPIRATION RATE: 20 BRPM | TEMPERATURE: 99.4 F

## 2017-09-09 VITALS
SYSTOLIC BLOOD PRESSURE: 116 MMHG | RESPIRATION RATE: 20 BRPM | HEART RATE: 104 BPM | OXYGEN SATURATION: 98 % | DIASTOLIC BLOOD PRESSURE: 76 MMHG | TEMPERATURE: 97.7 F

## 2017-09-09 VITALS
SYSTOLIC BLOOD PRESSURE: 107 MMHG | RESPIRATION RATE: 16 BRPM | DIASTOLIC BLOOD PRESSURE: 71 MMHG | TEMPERATURE: 99.1 F | HEART RATE: 97 BPM | OXYGEN SATURATION: 97 %

## 2017-09-09 VITALS
HEART RATE: 102 BPM | TEMPERATURE: 97.4 F | DIASTOLIC BLOOD PRESSURE: 65 MMHG | RESPIRATION RATE: 20 BRPM | OXYGEN SATURATION: 98 % | SYSTOLIC BLOOD PRESSURE: 118 MMHG

## 2017-09-09 VITALS
TEMPERATURE: 97.9 F | DIASTOLIC BLOOD PRESSURE: 74 MMHG | HEART RATE: 96 BPM | OXYGEN SATURATION: 100 % | RESPIRATION RATE: 20 BRPM | SYSTOLIC BLOOD PRESSURE: 123 MMHG

## 2017-09-09 VITALS
SYSTOLIC BLOOD PRESSURE: 119 MMHG | HEART RATE: 101 BPM | TEMPERATURE: 97.2 F | OXYGEN SATURATION: 100 % | RESPIRATION RATE: 20 BRPM | DIASTOLIC BLOOD PRESSURE: 81 MMHG

## 2017-09-09 VITALS — HEART RATE: 100 BPM

## 2017-09-09 RX ADMIN — MORPHINE SULFATE PRN MG: 2 INJECTION, SOLUTION INTRAMUSCULAR; INTRAVENOUS at 01:47

## 2017-09-09 RX ADMIN — HEPARIN SODIUM SCH UNITS: 10000 INJECTION, SOLUTION INTRAVENOUS; SUBCUTANEOUS at 10:02

## 2017-09-09 RX ADMIN — Medication SCH ML: at 21:11

## 2017-09-09 RX ADMIN — OXACILLIN SODIUM SCH MLS/HR: 2 INJECTION, POWDER, FOR SOLUTION INTRAMUSCULAR; INTRAVENOUS at 21:13

## 2017-09-09 RX ADMIN — AMPICILLIN SODIUM SCH MLS/HR: 2 INJECTION, POWDER, FOR SOLUTION INTRAMUSCULAR; INTRAVENOUS at 04:18

## 2017-09-09 RX ADMIN — MORPHINE SULFATE PRN MG: 2 INJECTION, SOLUTION INTRAMUSCULAR; INTRAVENOUS at 05:24

## 2017-09-09 RX ADMIN — Medication PRN ML: at 01:47

## 2017-09-09 RX ADMIN — OXACILLIN SODIUM SCH MLS/HR: 2 INJECTION, POWDER, FOR SOLUTION INTRAMUSCULAR; INTRAVENOUS at 14:55

## 2017-09-09 RX ADMIN — CHLORHEXIDINE GLUCONATE SCH PACK: 500 CLOTH TOPICAL at 04:00

## 2017-09-09 RX ADMIN — GENTAMICIN SULFATE SCH MLS/HR: 0.8 INJECTION, SOLUTION INTRAVENOUS at 22:24

## 2017-09-09 RX ADMIN — POTASSIUM CHLORIDE SCH MEQ: 20 TABLET, EXTENDED RELEASE ORAL at 09:59

## 2017-09-09 RX ADMIN — RIFAMPIN SCH MG: 150 CAPSULE ORAL at 21:12

## 2017-09-09 RX ADMIN — FAMOTIDINE SCH MG: 20 TABLET, FILM COATED ORAL at 09:54

## 2017-09-09 RX ADMIN — HEPARIN SODIUM SCH UNITS: 10000 INJECTION, SOLUTION INTRAVENOUS; SUBCUTANEOUS at 18:00

## 2017-09-09 RX ADMIN — MORPHINE SULFATE PRN MG: 2 INJECTION, SOLUTION INTRAMUSCULAR; INTRAVENOUS at 22:21

## 2017-09-09 RX ADMIN — POLYVINYL ALCOHOL SCH DROP: 14 SOLUTION/ DROPS OPHTHALMIC at 18:00

## 2017-09-09 RX ADMIN — METHADONE HYDROCHLORIDE SCH MG: 5 SOLUTION ORAL at 21:10

## 2017-09-09 RX ADMIN — Medication PRN ML: at 22:22

## 2017-09-09 RX ADMIN — RIFAMPIN SCH MG: 150 CAPSULE ORAL at 09:53

## 2017-09-09 RX ADMIN — AMPICILLIN SODIUM SCH MLS/HR: 2 INJECTION, POWDER, FOR SOLUTION INTRAMUSCULAR; INTRAVENOUS at 01:45

## 2017-09-09 RX ADMIN — GENTAMICIN SULFATE SCH MLS/HR: 0.8 INJECTION, SOLUTION INTRAVENOUS at 12:21

## 2017-09-09 RX ADMIN — AMPICILLIN SODIUM SCH MLS/HR: 2 INJECTION, POWDER, FOR SOLUTION INTRAMUSCULAR; INTRAVENOUS at 13:00

## 2017-09-09 RX ADMIN — METHADONE HYDROCHLORIDE SCH MG: 5 SOLUTION ORAL at 09:55

## 2017-09-09 RX ADMIN — MORPHINE SULFATE PRN MG: 2 INJECTION, SOLUTION INTRAMUSCULAR; INTRAVENOUS at 13:54

## 2017-09-09 RX ADMIN — FUROSEMIDE SCH MG: 20 TABLET ORAL at 09:53

## 2017-09-09 RX ADMIN — IPRATROPIUM BROMIDE AND ALBUTEROL SULFATE SCH AMPULE: .5; 3 SOLUTION RESPIRATORY (INHALATION) at 04:00

## 2017-09-09 RX ADMIN — OXACILLIN SODIUM SCH MLS/HR: 2 INJECTION, POWDER, FOR SOLUTION INTRAMUSCULAR; INTRAVENOUS at 02:55

## 2017-09-09 RX ADMIN — Medication SCH ML: at 10:00

## 2017-09-09 RX ADMIN — POLYVINYL ALCOHOL SCH DROP: 14 SOLUTION/ DROPS OPHTHALMIC at 13:00

## 2017-09-09 RX ADMIN — OXACILLIN SODIUM SCH MLS/HR: 2 INJECTION, POWDER, FOR SOLUTION INTRAMUSCULAR; INTRAVENOUS at 05:11

## 2017-09-09 RX ADMIN — GENTAMICIN SULFATE SCH MLS/HR: 0.8 INJECTION, SOLUTION INTRAVENOUS at 00:36

## 2017-09-09 RX ADMIN — STANDARDIZED SENNA CONCENTRATE AND DOCUSATE SODIUM SCH TAB: 8.6; 5 TABLET, FILM COATED ORAL at 21:00

## 2017-09-09 RX ADMIN — Medication PRN ML: at 05:24

## 2017-09-09 RX ADMIN — FAMOTIDINE SCH MG: 20 TABLET, FILM COATED ORAL at 21:12

## 2017-09-09 RX ADMIN — OXACILLIN SODIUM SCH MLS/HR: 2 INJECTION, POWDER, FOR SOLUTION INTRAMUSCULAR; INTRAVENOUS at 11:38

## 2017-09-09 RX ADMIN — MORPHINE SULFATE PRN MG: 2 INJECTION, SOLUTION INTRAMUSCULAR; INTRAVENOUS at 09:55

## 2017-09-09 RX ADMIN — STANDARDIZED SENNA CONCENTRATE AND DOCUSATE SODIUM SCH TAB: 8.6; 5 TABLET, FILM COATED ORAL at 09:00

## 2017-09-09 RX ADMIN — OXACILLIN SODIUM SCH MLS/HR: 2 INJECTION, POWDER, FOR SOLUTION INTRAMUSCULAR; INTRAVENOUS at 18:30

## 2017-09-09 RX ADMIN — HEPARIN SODIUM SCH UNITS: 10000 INJECTION, SOLUTION INTRAVENOUS; SUBCUTANEOUS at 01:52

## 2017-09-09 RX ADMIN — POLYVINYL ALCOHOL SCH DROP: 14 SOLUTION/ DROPS OPHTHALMIC at 09:00

## 2017-09-09 RX ADMIN — AMPICILLIN SODIUM SCH MLS/HR: 2 INJECTION, POWDER, FOR SOLUTION INTRAMUSCULAR; INTRAVENOUS at 21:18

## 2017-09-09 RX ADMIN — AMPICILLIN SODIUM SCH MLS/HR: 2 INJECTION, POWDER, FOR SOLUTION INTRAMUSCULAR; INTRAVENOUS at 09:53

## 2017-09-09 RX ADMIN — AMPICILLIN SODIUM SCH MLS/HR: 2 INJECTION, POWDER, FOR SOLUTION INTRAMUSCULAR; INTRAVENOUS at 17:20

## 2017-09-09 RX ADMIN — MORPHINE SULFATE PRN MG: 2 INJECTION, SOLUTION INTRAMUSCULAR; INTRAVENOUS at 17:20

## 2017-09-09 NOTE — HHI.PR
Subjective


Remarks


Had 5 BM yesterday diarrhea and 3 since morning. Says she has some cramps 

associated. No nausea or vomiting able to keep down some food. Did not give 

ample to check for Cdiff . Discussed with the patient say she will give stool 

sample. Discussed with the nurse as well. 


Headache improved, she is alert and oriented. No neck rigidity.





Objective


Vitals





Vital Signs








  Date Time  Temp Pulse Resp B/P (MAP) Pulse Ox O2 Delivery O2 Flow Rate FiO2


 


9/9/17 04:00 99.1 97 16 107/71 (83) 97   


 


9/9/17 00:00 97.9 96 20 123/74 (90) 100   


 


9/8/17 20:00      Room Air  


 


9/8/17 20:00 98.1 96 18 115/68 (84) 99   


 


9/8/17 19:45  104      


 


9/8/17 16:00 98.3 102 19 128/88 (101) 95   


 


9/8/17 11:49 97.7 95 19 126/77 (93) 100   














I/O      


 


 9/8/17 9/8/17 9/8/17 9/9/17 9/9/17 9/9/17





 06:59 14:59 22:59 06:59 14:59 22:59


 


Intake Total 500 ml 1100 ml  1080 ml  


 


Balance 500 ml 1100 ml  1080 ml  


 


      


 


Intake Oral  700 ml  480 ml  


 


IV Total 500 ml 400 ml  600 ml  


 


# Voids 3 5  3  


 


# Bowel Movements 0 1    








Result Diagram:  


9/6/17 0617 9/6/17 0617





Imaging





Last Impressions








Abdomen CT 9/8/17 0000 Signed





Impressions: 





 Service Date/Time:  Friday, September 8, 2017 20:26 - CONCLUSION:  1. 

Peripheral 





 low attenuation lesions in the spleen most characteristic of multiple splenic 





 infarcts. Some of these are new findings since 2016 comparison. 2. Minimal 





 subsegmental airspace disease at the lung bases. 3. 3.4 cm right adnexal cyst. 





 Intrauterine device present. 4. Small fat containing ventral hernias.     

Jaspal Issa MD 


 


Chest X-Ray 9/5/17 0000 Signed





Impressions: 





 Service Date/Time:  Tuesday, September 5, 2017 08:07 - CONCLUSION:  Suspected 





 mild edema/failure.     Ron Swan MD 


 


Neck CT 9/2/17 0000 Signed





Impressions: 





 Service Date/Time:  Saturday, September 2, 2017 11:13 - CONCLUSION:  1. 

Patient 





 is intubated which limits this study. The epiglottis is otherwise normal. 





 Secretions are present in the oropharynx and right sphenoid sinus.     Ej Ewing MD 


 


Brain MRI 8/25/17 0000 Signed





Impressions: 





 Service Date/Time:  Friday, August 25, 2017 17:07 - CONCLUSION:  1. Small 

acute 





 or subacute cortical infarct of the left parietal lobe. 2. Small, faint area 

of 





 nonspecific flair signal abnormality in the left frontal lobe periventricular 





 white matter, nonspecific. No associated mass effect or abnormal enhancement. 

3 





 month followup MRI of the brain recommended. 3.  Mild chronic white matter 





 changes.     Ron Swan MD 


 


Head CT 8/21/17 0000 Signed





Impressions: 





 Service Date/Time:  Monday, August 21, 2017 19:47 - CONCLUSION:  No evidence 

of 





 acute infarct, hemorrhage, mass or edema.     Kristian Frankel MD 








Objective Remarks


GENERAL:  Young female, appears in nad. 


SKIN: Warm and dry.


CARDIOVASCULAR: Regular rate and rhythm with systolic  murmur. 


RESPIRATORY: Decreased breath sound BL, no crackles or wheezing auscultated. No 

accessory muscle use.


GASTROINTESTINAL: Abdomen soft, non-tender, nondistended. 


MUSCULOSKELETAL: No cyanosis. +2  LE  edema. 


BACK: Nontender without obvious deformity. No CVA tenderness.


Procedures


None





A/P


Problem List:  


(1) Severe sepsis


ICD Code:  A41.9 - Sepsis, unspecified organism; R65.20 - Severe sepsis without 

septic shock


Status:  Acute


(2) MSSA meningitis


Status:  Acute


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


Status:  Acute


(4) SVT (supraventricular tachycardia)


ICD Code:  I47.1 - Supraventricular tachycardia


Status:  Resolved


(5) Postextubation stridor


ICD Code:  J95.89 - Other postprocedural complications and disorders of 

respiratory system, not elsewhere classified


Status:  Resolved


(6) Acute hypoxemic respiratory failure


ICD Code:  J96.01 - Acute respiratory failure with hypoxia


Status:  Resolved


(7) CVA (cerebral vascular accident)


ICD Code:  I63.9 - Cerebral infarction, unspecified


(8) Tricuspid regurgitation


ICD Code:  I07.1 - Rheumatic tricuspid insufficiency


Status:  Acute


(9) Acute systolic heart failure


ICD Code:  I50.21 - Acute systolic (congestive) heart failure


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


ICD Code:  T82.6XXA - Infection and inflammatory reaction due to cardiac valve 

prosthesis, initial encounter


Status:  Acute


(11) NOÉ (acute kidney injury)


ICD Code:  N17.9 - Acute kidney failure, unspecified


Status:  Resolved


(12) Protein calorie malnutrition


ICD Code:  E46 - Unspecified protein-calorie malnutrition


(13) Microcytic anemia


ICD Code:  D50.9 - Iron deficiency anemia, unspecified


(14) Hyperphosphatemia


ICD Code:  E83.39 - Other disorders of phosphorus metabolism


(15) Hypernatremia


ICD Code:  E87.0 - Hyperosmolality and hypernatremia


(16) IV drug abuse


ICD Code:  F19.10 - Other psychoactive substance abuse, uncomplicated


Status:  Acute


(17) Headache


ICD Code:  R51 - Headache


(18) Abdominal pain


ICD Code:  R10.9 - Unspecified abdominal pain


Assessment and Plan








(1) Severe sepsis


ICD Code:  A41.9 - Sepsis, unspecified organism; R65.20 - Severe sepsis without 

septic shock


Status:  Acute


Plan:  Sepsis present on admission, the patient presented with leukocytosis and 

tachycardia as well as after mental status.


Initially admitted to the intensive care unit, intubated and cared for by the 

intensivists.


Sepsis found to be secondary to meningitis.


Continue IV antibiotics as per infectious disease recommendations.


The patient is currently on IV ampicillin, oxacillin, gentamicin and rifampin - 

continue.


Unless more positive blood cx anticipate 6 week from 1st neg (8/24) followed by

  indefinite oral abx suppression tx. IV abx stop date tentatively.


Gentamycin trough 2.0








(2) MSSA meningitis


Status:  Acute


Plan:  As above.





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


Status:  Acute


Plan:  IV antibiotics as per infectious disease as above.





(4) SVT (supraventricular tachycardia)


ICD Code:  I47.1 - Supraventricular tachycardia


Status:  Resolved


Plan:  SVT now resolved.  Continue metoprolol.


Echocardiogram 8 foci 17 revealed an ejection fraction showed 25-30%.  PAP 34 

mmHg


Initially treated with vasopressors which the patient is now off.


The patient started on Lasix 20 minute grams by mouth daily.  Continue.





(5) Postextubation stridor


ICD Code:  J95.89 - Other postprocedural complications and disorders of 

respiratory system, not elsewhere classified


Status:  Resolved


Plan:  The patient initially extubated on 9/3/17.  The patient was administered 

racemic epinephrine as needed for stridor or


Continue albuterol/ipratropium inhaled treatments every 6 hours with every 2 

hours as needed for albuterol therapy.


Status post today treatment of dexamethasone IV every 8 hours.





(6) Acute hypoxemic respiratory failure


ICD Code:  J96.01 - Acute respiratory failure with hypoxia


Status:  Resolved


Plan:  Continue supplemental oxygen to keep oxygen more than 92%.


Status post intubation and extubation.


Continue DuoNeb treatments.





(7) CVA (cerebral vascular accident)


ICD Code:  I63.9 - Cerebral infarction, unspecified


Plan:  CT head negative, MRI small acute and subacute cortical infarct of the 

left parietal lobe.


The patient was seen and evaluated by neurology.


The patient is on a scheduled methadone 10 mg twice today.





(8) Tricuspid regurgitation


ICD Code:  I07.1 - Rheumatic tricuspid insufficiency


Status:  Acute


Plan:  As seen on echo described above.  Continue oral Lasix.





(9) Acute systolic heart failure


ICD Code:  I50.21 - Acute systolic (congestive) heart failure


Plan:  Continue Lasix orally. continue Lasix 40 mg po daily.





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


ICD Code:  T82.6XXA - Infection and inflammatory reaction due to cardiac valve 

prosthesis, initial encounter


Status:  Acute


Plan:  The patient has history of IV drug abuse.


Continue antibiotics as per ID recommendations.


Tentative a stop date is 10/5





(11) NOÉ (acute kidney injury)


ICD Code:  N17.9 - Acute kidney failure, unspecified


Status:  Resolved


Plan:  Now resolved.  Continue to monitor BMP





(12) Protein calorie malnutrition


ICD Code:  E46 - Unspecified protein-calorie malnutrition


Plan:  Secondary to long-standing IV drug abuse and endocarditis.





(13) Microcytic anemia


ICD Code:  D50.9 - Iron deficiency anemia, unspecified


Plan:  Hemoglobin seems to be stable at 8.6.  I will check iron studies and 

stool Hemoccult for blood.


Patient had iron studies previously concordant with iron deficiency anemia.  I 

will start the patient on oral iron sulfate.





(14) Hyperphosphatemia


ICD Code:  E83.39 - Other disorders of phosphorus metabolism


Plan:  Now resolved.  Continue to monitor levels.





(15) Hypernatremia


ICD Code:  E87.0 - Hyperosmolality and hypernatremia


Plan:  Likely secondary to dehydration. 


Sodium now within normal range.  Continue to monitor BMP.





(16) IV drug abuse


ICD Code:  F19.10 - Other psychoactive substance abuse, uncomplicated


Status:  Acute


Plan:  Advised cessation.  Currently on methadone.





(17) Headache


ICD Code:  R51 - Headache


Plan:  Headache is bifrontal, patient is afebrile.


Continue Acetaminophen 1000 mg po Q 6 hrs prn headache.





(18) Abdominal pain 


ICD Code:  R10.9 - Unspecified abdominal pain


Plan:  check CT abdomen and pelvis. Check stool for C diff PCR.





(19) Diarrhea 


Plan: 


Check for C diff. Will add probiotic. 





Assessment and Plan


GI prophylaxis: Continue famotidine.


DVT prophylaxis: SCDs, ambulation.








DC plan: needs inpatient treatment. DC when cleared by ID specialist.





Problem Qualifiers





(1) CVA (cerebral vascular accident):  


Qualified Codes:  I63.9 - Cerebral infarction, unspecified


(2) Protein calorie malnutrition:  


Qualified Codes:  E44.0 - Moderate protein-calorie malnutrition








Jyothi Saunders MD Sep 9, 2017 11:04

## 2017-09-10 VITALS
HEART RATE: 100 BPM | OXYGEN SATURATION: 96 % | DIASTOLIC BLOOD PRESSURE: 73 MMHG | SYSTOLIC BLOOD PRESSURE: 131 MMHG | RESPIRATION RATE: 18 BRPM | TEMPERATURE: 98.6 F

## 2017-09-10 VITALS
SYSTOLIC BLOOD PRESSURE: 100 MMHG | RESPIRATION RATE: 20 BRPM | HEART RATE: 92 BPM | TEMPERATURE: 98.2 F | OXYGEN SATURATION: 96 % | DIASTOLIC BLOOD PRESSURE: 64 MMHG

## 2017-09-10 VITALS
OXYGEN SATURATION: 100 % | TEMPERATURE: 98.4 F | RESPIRATION RATE: 18 BRPM | DIASTOLIC BLOOD PRESSURE: 55 MMHG | SYSTOLIC BLOOD PRESSURE: 108 MMHG | HEART RATE: 114 BPM

## 2017-09-10 VITALS
SYSTOLIC BLOOD PRESSURE: 114 MMHG | TEMPERATURE: 98.3 F | OXYGEN SATURATION: 96 % | HEART RATE: 97 BPM | DIASTOLIC BLOOD PRESSURE: 69 MMHG | RESPIRATION RATE: 18 BRPM

## 2017-09-10 VITALS — HEART RATE: 101 BPM

## 2017-09-10 VITALS
SYSTOLIC BLOOD PRESSURE: 112 MMHG | HEART RATE: 91 BPM | RESPIRATION RATE: 18 BRPM | TEMPERATURE: 98.6 F | DIASTOLIC BLOOD PRESSURE: 63 MMHG | OXYGEN SATURATION: 97 %

## 2017-09-10 VITALS — HEART RATE: 107 BPM

## 2017-09-10 LAB
ANION GAP SERPL CALC-SCNC: 9 MEQ/L (ref 5–15)
BASOPHILS # BLD AUTO: 0.1 TH/MM3 (ref 0–0.2)
BASOPHILS NFR BLD: 0.9 % (ref 0–2)
BUN SERPL-MCNC: 9 MG/DL (ref 7–18)
C. DIFF EPI 027: (no result)
CHLORIDE SERPL-SCNC: 101 MEQ/L (ref 98–107)
EOSINOPHIL # BLD: 0.1 TH/MM3 (ref 0–0.4)
EOSINOPHIL NFR BLD: 1.5 % (ref 0–4)
ERYTHROCYTE [DISTWIDTH] IN BLOOD BY AUTOMATED COUNT: 17.5 % (ref 11.6–17.2)
GFR SERPLBLD BASED ON 1.73 SQ M-ARVRAT: 50 ML/MIN (ref 89–?)
HCO3 BLD-SCNC: 26.6 MEQ/L (ref 21–32)
HCT VFR BLD CALC: 27.4 % (ref 35–46)
HEMO FLAGS: (no result)
LYMPHOCYTES # BLD AUTO: 1 TH/MM3 (ref 1–4.8)
LYMPHOCYTES NFR BLD AUTO: 14.5 % (ref 9–44)
MCH RBC QN AUTO: 24.9 PG (ref 27–34)
MCHC RBC AUTO-ENTMCNC: 32.7 % (ref 32–36)
MCV RBC AUTO: 76.1 FL (ref 80–100)
MONOCYTES NFR BLD: 9.1 % (ref 0–8)
NEUTROPHILS # BLD AUTO: 5.1 TH/MM3 (ref 1.8–7.7)
NEUTROPHILS NFR BLD AUTO: 74 % (ref 16–70)
PLATELET # BLD: 308 TH/MM3 (ref 150–450)
POTASSIUM SERPL-SCNC: 3 MEQ/L (ref 3.5–5.1)
RBC # BLD AUTO: 3.6 MIL/MM3 (ref 4–5.3)
SODIUM SERPL-SCNC: 137 MEQ/L (ref 136–145)
WBC # BLD AUTO: 7 TH/MM3 (ref 4–11)

## 2017-09-10 RX ADMIN — METHADONE HYDROCHLORIDE SCH MG: 5 SOLUTION ORAL at 08:41

## 2017-09-10 RX ADMIN — HEPARIN SODIUM SCH UNITS: 10000 INJECTION, SOLUTION INTRAVENOUS; SUBCUTANEOUS at 08:41

## 2017-09-10 RX ADMIN — Medication SCH ML: at 08:40

## 2017-09-10 RX ADMIN — MORPHINE SULFATE PRN MG: 2 INJECTION, SOLUTION INTRAMUSCULAR; INTRAVENOUS at 02:04

## 2017-09-10 RX ADMIN — RIFAMPIN SCH MG: 150 CAPSULE ORAL at 08:40

## 2017-09-10 RX ADMIN — Medication PRN ML: at 06:10

## 2017-09-10 RX ADMIN — AMPICILLIN SODIUM SCH MLS/HR: 2 INJECTION, POWDER, FOR SOLUTION INTRAMUSCULAR; INTRAVENOUS at 19:58

## 2017-09-10 RX ADMIN — FAMOTIDINE SCH MG: 20 TABLET, FILM COATED ORAL at 19:58

## 2017-09-10 RX ADMIN — MORPHINE SULFATE PRN MG: 2 INJECTION, SOLUTION INTRAMUSCULAR; INTRAVENOUS at 18:15

## 2017-09-10 RX ADMIN — STANDARDIZED SENNA CONCENTRATE AND DOCUSATE SODIUM SCH TAB: 8.6; 5 TABLET, FILM COATED ORAL at 09:00

## 2017-09-10 RX ADMIN — METHADONE HYDROCHLORIDE SCH MG: 5 SOLUTION ORAL at 19:58

## 2017-09-10 RX ADMIN — CHLORHEXIDINE GLUCONATE SCH PACK: 500 CLOTH TOPICAL at 03:51

## 2017-09-10 RX ADMIN — MORPHINE SULFATE PRN MG: 2 INJECTION, SOLUTION INTRAMUSCULAR; INTRAVENOUS at 12:01

## 2017-09-10 RX ADMIN — STANDARDIZED SENNA CONCENTRATE AND DOCUSATE SODIUM SCH TAB: 8.6; 5 TABLET, FILM COATED ORAL at 19:56

## 2017-09-10 RX ADMIN — OXACILLIN SODIUM SCH MLS/HR: 2 INJECTION, POWDER, FOR SOLUTION INTRAMUSCULAR; INTRAVENOUS at 15:29

## 2017-09-10 RX ADMIN — AMPICILLIN SODIUM SCH MLS/HR: 2 INJECTION, POWDER, FOR SOLUTION INTRAMUSCULAR; INTRAVENOUS at 12:01

## 2017-09-10 RX ADMIN — MORPHINE SULFATE PRN MG: 2 INJECTION, SOLUTION INTRAMUSCULAR; INTRAVENOUS at 21:25

## 2017-09-10 RX ADMIN — POLYVINYL ALCOHOL SCH DROP: 14 SOLUTION/ DROPS OPHTHALMIC at 08:38

## 2017-09-10 RX ADMIN — POLYVINYL ALCOHOL SCH DROP: 14 SOLUTION/ DROPS OPHTHALMIC at 18:00

## 2017-09-10 RX ADMIN — AMPICILLIN SODIUM SCH MLS/HR: 2 INJECTION, POWDER, FOR SOLUTION INTRAMUSCULAR; INTRAVENOUS at 02:02

## 2017-09-10 RX ADMIN — Medication PRN ML: at 02:05

## 2017-09-10 RX ADMIN — FUROSEMIDE SCH MG: 20 TABLET ORAL at 08:40

## 2017-09-10 RX ADMIN — HEPARIN SODIUM SCH UNITS: 10000 INJECTION, SOLUTION INTRAVENOUS; SUBCUTANEOUS at 02:07

## 2017-09-10 RX ADMIN — AMPICILLIN SODIUM SCH MLS/HR: 2 INJECTION, POWDER, FOR SOLUTION INTRAMUSCULAR; INTRAVENOUS at 16:11

## 2017-09-10 RX ADMIN — RIFAMPIN SCH MG: 150 CAPSULE ORAL at 19:57

## 2017-09-10 RX ADMIN — OXACILLIN SODIUM SCH MLS/HR: 2 INJECTION, POWDER, FOR SOLUTION INTRAMUSCULAR; INTRAVENOUS at 08:41

## 2017-09-10 RX ADMIN — POLYVINYL ALCOHOL SCH DROP: 14 SOLUTION/ DROPS OPHTHALMIC at 12:34

## 2017-09-10 RX ADMIN — GENTAMICIN SULFATE SCH MLS/HR: 0.8 INJECTION, SOLUTION INTRAVENOUS at 12:03

## 2017-09-10 RX ADMIN — OXACILLIN SODIUM SCH MLS/HR: 2 INJECTION, POWDER, FOR SOLUTION INTRAMUSCULAR; INTRAVENOUS at 06:04

## 2017-09-10 RX ADMIN — MORPHINE SULFATE PRN MG: 2 INJECTION, SOLUTION INTRAMUSCULAR; INTRAVENOUS at 08:41

## 2017-09-10 RX ADMIN — OXACILLIN SODIUM SCH MLS/HR: 2 INJECTION, POWDER, FOR SOLUTION INTRAMUSCULAR; INTRAVENOUS at 21:25

## 2017-09-10 RX ADMIN — MORPHINE SULFATE PRN MG: 2 INJECTION, SOLUTION INTRAMUSCULAR; INTRAVENOUS at 15:29

## 2017-09-10 RX ADMIN — OXACILLIN SODIUM SCH MLS/HR: 2 INJECTION, POWDER, FOR SOLUTION INTRAMUSCULAR; INTRAVENOUS at 16:11

## 2017-09-10 RX ADMIN — AMPICILLIN SODIUM SCH MLS/HR: 2 INJECTION, POWDER, FOR SOLUTION INTRAMUSCULAR; INTRAVENOUS at 05:00

## 2017-09-10 RX ADMIN — FAMOTIDINE SCH MG: 20 TABLET, FILM COATED ORAL at 08:40

## 2017-09-10 RX ADMIN — POTASSIUM CHLORIDE SCH MEQ: 20 TABLET, EXTENDED RELEASE ORAL at 08:40

## 2017-09-10 RX ADMIN — OXACILLIN SODIUM SCH MLS/HR: 2 INJECTION, POWDER, FOR SOLUTION INTRAMUSCULAR; INTRAVENOUS at 02:03

## 2017-09-10 RX ADMIN — Medication SCH ML: at 19:57

## 2017-09-10 RX ADMIN — AMPICILLIN SODIUM SCH MLS/HR: 2 INJECTION, POWDER, FOR SOLUTION INTRAMUSCULAR; INTRAVENOUS at 08:41

## 2017-09-10 NOTE — HHI.PR
Subjective


Remarks


Less diarrhea today. Some abdominal pain intermittent. No fever or chills. 

Feels tired. Has LE edema. No nausea or vomiting able to eat.  Headache on/off. 

Gets sob with exertion.





Objective


Vitals





Vital Signs








  Date Time  Temp Pulse Resp B/P (MAP) Pulse Ox O2 Delivery O2 Flow Rate FiO2


 


9/10/17 04:00 98.2 92 20 100/64 (76) 96   


 


9/9/17 21:15      Room Air  


 


9/9/17 20:00  102      


 


9/9/17 20:00 97.4 102 20 118/65 (82) 98   


 


9/9/17 16:00 97.7 104 20 116/76 (89) 98   


 


9/9/17 12:00 99.4 105 20 122/74 (90) 98   














I/O      


 


 9/9/17 9/9/17 9/9/17 9/10/17 9/10/17 9/10/17





 07:00 15:00 23:00 07:00 15:00 23:00


 


Intake Total 1080 ml  780 ml   


 


Output Total   1000 ml   


 


Balance 1080 ml  -220 ml   


 


      


 


Intake Oral 480 ml  780 ml   


 


IV Total 600 ml     


 


Output Urine Total   1000 ml   


 


# Voids 3     


 


# Bowel Movements   1   








Result Diagram:  


9/6/17 0617                                                                    

            9/6/17 0617





Imaging





Last Impressions








Abdomen CT 9/8/17 0000 Signed





Impressions: 





 Service Date/Time:  Friday, September 8, 2017 20:26 - CONCLUSION:  1. 

Peripheral 





 low attenuation lesions in the spleen most characteristic of multiple splenic 





 infarcts. Some of these are new findings since 2016 comparison. 2. Minimal 





 subsegmental airspace disease at the lung bases. 3. 3.4 cm right adnexal cyst. 





 Intrauterine device present. 4. Small fat containing ventral hernias.     

Jaspal Issa MD 


 


Chest X-Ray 9/5/17 0000 Signed





Impressions: 





 Service Date/Time:  Tuesday, September 5, 2017 08:07 - CONCLUSION:  Suspected 





 mild edema/failure.     Ron Swan MD 


 


Neck CT 9/2/17 0000 Signed





Impressions: 





 Service Date/Time:  Saturday, September 2, 2017 11:13 - CONCLUSION:  1. 

Patient 





 is intubated which limits this study. The epiglottis is otherwise normal. 





 Secretions are present in the oropharynx and right sphenoid sinus.     Ej Ewing MD 


 


Brain MRI 8/25/17 0000 Signed





Impressions: 





 Service Date/Time:  Friday, August 25, 2017 17:07 - CONCLUSION:  1. Small 

acute 





 or subacute cortical infarct of the left parietal lobe. 2. Small, faint area 

of 





 nonspecific flair signal abnormality in the left frontal lobe periventricular 





 white matter, nonspecific. No associated mass effect or abnormal enhancement. 

3 





 month followup MRI of the brain recommended. 3.  Mild chronic white matter 





 changes.     Ron Swan MD 


 


Head CT 8/21/17 0000 Signed





Impressions: 





 Service Date/Time:  Monday, August 21, 2017 19:47 - CONCLUSION:  No evidence 

of 





 acute infarct, hemorrhage, mass or edema.     Kristian Frankel MD 








Objective Remarks


GENERAL:  Young female, appears in nad. 


SKIN: Warm and dry.


CARDIOVASCULAR: Regular rate and rhythm with systolic  murmur. 


RESPIRATORY: Decreased breath sound BL, no crackles or wheezing auscultated. No 

accessory muscle use.


GASTROINTESTINAL: Abdomen soft, non-tender, nondistended. 


MUSCULOSKELETAL: No cyanosis. +3  LE  edema. 


BACK: Nontender without obvious deformity. No CVA tenderness.


Procedures


None





A/P


Problem List:  


(1) Severe sepsis


ICD Code:  A41.9 - Sepsis, unspecified organism; R65.20 - Severe sepsis without 

septic shock


Status:  Acute


(2) MSSA meningitis


Status:  Acute


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


Status:  Acute


(4) SVT (supraventricular tachycardia)


ICD Code:  I47.1 - Supraventricular tachycardia


Status:  Resolved


(5) Postextubation stridor


ICD Code:  J95.89 - Other postprocedural complications and disorders of 

respiratory system, not elsewhere classified


Status:  Resolved


(6) Acute hypoxemic respiratory failure


ICD Code:  J96.01 - Acute respiratory failure with hypoxia


Status:  Resolved


(7) CVA (cerebral vascular accident)


ICD Code:  I63.9 - Cerebral infarction, unspecified


(8) Tricuspid regurgitation


ICD Code:  I07.1 - Rheumatic tricuspid insufficiency


Status:  Acute


(9) Acute systolic heart failure


ICD Code:  I50.21 - Acute systolic (congestive) heart failure


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


ICD Code:  T82.6XXA - Infection and inflammatory reaction due to cardiac valve 

prosthesis, initial encounter


Status:  Acute


(11) NOÉ (acute kidney injury)


ICD Code:  N17.9 - Acute kidney failure, unspecified


Status:  Resolved


(12) Protein calorie malnutrition


ICD Code:  E46 - Unspecified protein-calorie malnutrition


(13) Microcytic anemia


ICD Code:  D50.9 - Iron deficiency anemia, unspecified


(14) Hyperphosphatemia


ICD Code:  E83.39 - Other disorders of phosphorus metabolism


(15) Hypernatremia


ICD Code:  E87.0 - Hyperosmolality and hypernatremia


(16) IV drug abuse


ICD Code:  F19.10 - Other psychoactive substance abuse, uncomplicated


Status:  Acute


(17) Headache


ICD Code:  R51 - Headache


(18) Abdominal pain


ICD Code:  R10.9 - Unspecified abdominal pain


Assessment and Plan








(1) Severe sepsis


Multiple splenic infarcts 


MSSA meningitis 


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


Prosthetic valve endocarditis


ICD Code:  A41.9 - Sepsis, unspecified organism; R65.20 - Severe sepsis without 

septic shock


Status:  Acute


Plan:  Sepsis present on admission, the patient presented with leukocytosis and 

tachycardia as well as after mental status.


Initially admitted to the intensive care unit, intubated and cared for by the 

intensivists.


Sepsis found to be secondary to meningitis.


Continue IV antibiotics as per infectious disease recommendations.


The patient is currently on IV ampicillin, oxacillin, gentamicin and rifampin - 

continue.


Unless more positive blood cx anticipate 6 week from 1st neg (8/24) followed by

  indefinite oral abx suppression tx. IV abx stop date tentatively.


Gentamycin trough 2.0








(2) MSSA meningitis


Status:  Acute


Plan:  As above.





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


Status:  Acute


Plan:  IV antibiotics as per infectious disease as above.





(4) SVT (supraventricular tachycardia)


ICD Code:  I47.1 - Supraventricular tachycardia


Status:  Resolved


Plan:  SVT now resolved.  Continue metoprolol.


Echocardiogram 8 foci 17 revealed an ejection fraction showed 25-30%.  PAP 34 

mmHg


Initially treated with vasopressors which the patient is now off.


The patient started on Lasix 20 minute grams by mouth daily.  Continue.





(5) Postextubation stridor


ICD Code:  J95.89 - Other postprocedural complications and disorders of 

respiratory system, not elsewhere classified


Status:  Resolved


Plan:  The patient initially extubated on 9/3/17.  The patient was administered 

racemic epinephrine as needed for stridor or


Continue albuterol/ipratropium inhaled treatments every 6 hours with every 2 

hours as needed for albuterol therapy.


Status post today treatment of dexamethasone IV every 8 hours.





(6) Acute hypoxemic respiratory failure


ICD Code:  J96.01 - Acute respiratory failure with hypoxia


Status:  Resolved


Plan:  Continue supplemental oxygen to keep oxygen more than 92%.


Status post intubation and extubation.


Continue DuoNeb treatments.





(7) CVA (cerebral vascular accident)


ICD Code:  I63.9 - Cerebral infarction, unspecified


Plan:  CT head negative, MRI small acute and subacute cortical infarct of the 

left parietal lobe.


The patient was seen and evaluated by neurology.


The patient is on a scheduled methadone 10 mg twice today.





(8) Tricuspid regurgitation


ICD Code:  I07.1 - Rheumatic tricuspid insufficiency


Status:  Acute


Plan:  As seen on echo described above.  Continue oral Lasix.





(9) Acute systolic heart failure


ICD Code:  I50.21 - Acute systolic (congestive) heart failure


Plan:  Continue Lasix orally. continue Lasix 40 mg po daily.





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


ICD Code:  T82.6XXA - Infection and inflammatory reaction due to cardiac valve 

prosthesis, initial encounter


Status:  Acute


Plan:  The patient has history of IV drug abuse.


Continue antibiotics as per ID recommendations. Tentative a stop date is 10/5





(11) NOÉ (acute kidney injury)


ICD Code:  N17.9 - Acute kidney failure, unspecified


Status:  Resolved


Plan:  Now resolved.  Continue to monitor BMP





(12) Protein calorie malnutrition


ICD Code:  E46 - Unspecified protein-calorie malnutrition


Plan:  Secondary to long-standing IV drug abuse and endocarditis.





(13) Microcytic anemia


ICD Code:  D50.9 - Iron deficiency anemia, unspecified


Plan:  Hemoglobin seems to be stable at 8.6.  I will check iron studies and 

stool Hemoccult for blood.


Patient had iron studies previously concordant with iron deficiency anemia.  I 

will start the patient on oral iron sulfate.





(14) Hyperphosphatemia


ICD Code:  E83.39 - Other disorders of phosphorus metabolism


Plan:  Now resolved.  Continue to monitor levels.





(15) Hypernatremia


ICD Code:  E87.0 - Hyperosmolality and hypernatremia


Plan:  Likely secondary to dehydration. 


Sodium now within normal range.  Continue to monitor BMP.





(16) IV drug abuse


ICD Code:  F19.10 - Other psychoactive substance abuse, uncomplicated


Status:  Acute


Plan:  Advised cessation.  Currently on methadone.





(17) Headache


ICD Code:  R51 - Headache


Plan:  Headache is bifrontal, patient is afebrile.


Continue Acetaminophen 1000 mg po Q 6 hrs prn headache.





(18) Abdominal pain 


ICD Code:  R10.9 - Unspecified abdominal pain


Plan:  check CT abdomen and pelvis. Check stool for C diff PCR.





(19) Diarrhea, improving 


Plan: 


C diff. is negative  Continue probiotic. 





(20) Anasarca.


Continue lasix po Will give extra dose of lasix 40 mg by IV. Monitor UPO and 

kidney function closely. 





Assessment and Plan


GI prophylaxis: Continue famotidine.


DVT prophylaxis: SCDs, ambulation.








DC plan: needs inpatient treatment. DC when cleared by ID specialist.





Problem Qualifiers





(1) CVA (cerebral vascular accident):  


Qualified Codes:  I63.9 - Cerebral infarction, unspecified


(2) Protein calorie malnutrition:  


Qualified Codes:  E44.0 - Moderate protein-calorie malnutrition








Jyothi Saunders MD Sep 10, 2017 09:37

## 2017-09-11 VITALS
TEMPERATURE: 98.2 F | RESPIRATION RATE: 18 BRPM | DIASTOLIC BLOOD PRESSURE: 60 MMHG | SYSTOLIC BLOOD PRESSURE: 100 MMHG | OXYGEN SATURATION: 97 % | HEART RATE: 88 BPM

## 2017-09-11 VITALS
HEART RATE: 91 BPM | RESPIRATION RATE: 18 BRPM | OXYGEN SATURATION: 96 % | SYSTOLIC BLOOD PRESSURE: 96 MMHG | DIASTOLIC BLOOD PRESSURE: 62 MMHG | TEMPERATURE: 98.6 F

## 2017-09-11 VITALS
RESPIRATION RATE: 18 BRPM | SYSTOLIC BLOOD PRESSURE: 102 MMHG | HEART RATE: 103 BPM | TEMPERATURE: 98.1 F | DIASTOLIC BLOOD PRESSURE: 66 MMHG | OXYGEN SATURATION: 97 %

## 2017-09-11 VITALS
DIASTOLIC BLOOD PRESSURE: 81 MMHG | HEART RATE: 104 BPM | OXYGEN SATURATION: 98 % | TEMPERATURE: 99.4 F | RESPIRATION RATE: 20 BRPM | SYSTOLIC BLOOD PRESSURE: 125 MMHG

## 2017-09-11 VITALS
SYSTOLIC BLOOD PRESSURE: 140 MMHG | TEMPERATURE: 98.4 F | DIASTOLIC BLOOD PRESSURE: 69 MMHG | HEART RATE: 91 BPM | OXYGEN SATURATION: 100 % | RESPIRATION RATE: 18 BRPM

## 2017-09-11 VITALS
HEART RATE: 104 BPM | SYSTOLIC BLOOD PRESSURE: 115 MMHG | TEMPERATURE: 98.3 F | RESPIRATION RATE: 18 BRPM | OXYGEN SATURATION: 100 % | DIASTOLIC BLOOD PRESSURE: 76 MMHG

## 2017-09-11 VITALS — HEART RATE: 98 BPM

## 2017-09-11 LAB
ANION GAP SERPL CALC-SCNC: 12 MEQ/L (ref 5–15)
BASOPHILS # BLD AUTO: 0.1 TH/MM3 (ref 0–0.2)
BASOPHILS NFR BLD: 0.8 % (ref 0–2)
BUN SERPL-MCNC: 15 MG/DL (ref 7–18)
CHLORIDE SERPL-SCNC: 101 MEQ/L (ref 98–107)
EOSINOPHIL # BLD: 0.1 TH/MM3 (ref 0–0.4)
EOSINOPHIL NFR BLD: 1.6 % (ref 0–4)
ERYTHROCYTE [DISTWIDTH] IN BLOOD BY AUTOMATED COUNT: 17.8 % (ref 11.6–17.2)
GFR SERPLBLD BASED ON 1.73 SQ M-ARVRAT: 55 ML/MIN (ref 89–?)
HCO3 BLD-SCNC: 24 MEQ/L (ref 21–32)
HCT VFR BLD CALC: 35.5 % (ref 35–46)
HEMO FLAGS: (no result)
LYMPHOCYTES # BLD AUTO: 1.4 TH/MM3 (ref 1–4.8)
LYMPHOCYTES NFR BLD AUTO: 19.6 % (ref 9–44)
MAGNESIUM SERPL-MCNC: 1.9 MG/DL (ref 1.5–2.5)
MCH RBC QN AUTO: 23.8 PG (ref 27–34)
MCHC RBC AUTO-ENTMCNC: 31.2 % (ref 32–36)
MCV RBC AUTO: 76.4 FL (ref 80–100)
MONOCYTES NFR BLD: 14.5 % (ref 0–8)
NEUTROPHILS # BLD AUTO: 4.6 TH/MM3 (ref 1.8–7.7)
NEUTROPHILS NFR BLD AUTO: 63.5 % (ref 16–70)
PLATELET # BLD: 246 TH/MM3 (ref 150–450)
POTASSIUM SERPL-SCNC: 4.1 MEQ/L (ref 3.5–5.1)
RBC # BLD AUTO: 4.64 MIL/MM3 (ref 4–5.3)
SODIUM SERPL-SCNC: 137 MEQ/L (ref 136–145)
WBC # BLD AUTO: 7.3 TH/MM3 (ref 4–11)

## 2017-09-11 RX ADMIN — OXACILLIN SODIUM SCH MLS/HR: 2 INJECTION, POWDER, FOR SOLUTION INTRAMUSCULAR; INTRAVENOUS at 22:06

## 2017-09-11 RX ADMIN — OXACILLIN SODIUM SCH MLS/HR: 2 INJECTION, POWDER, FOR SOLUTION INTRAMUSCULAR; INTRAVENOUS at 16:55

## 2017-09-11 RX ADMIN — HEPARIN SODIUM SCH UNITS: 10000 INJECTION, SOLUTION INTRAVENOUS; SUBCUTANEOUS at 02:00

## 2017-09-11 RX ADMIN — AMPICILLIN SODIUM SCH MLS/HR: 2 INJECTION, POWDER, FOR SOLUTION INTRAMUSCULAR; INTRAVENOUS at 04:36

## 2017-09-11 RX ADMIN — AMPICILLIN SODIUM SCH MLS/HR: 2 INJECTION, POWDER, FOR SOLUTION INTRAMUSCULAR; INTRAVENOUS at 20:39

## 2017-09-11 RX ADMIN — RIFAMPIN SCH MG: 150 CAPSULE ORAL at 20:39

## 2017-09-11 RX ADMIN — FAMOTIDINE SCH MG: 20 TABLET, FILM COATED ORAL at 07:57

## 2017-09-11 RX ADMIN — AMPICILLIN SODIUM SCH MLS/HR: 2 INJECTION, POWDER, FOR SOLUTION INTRAMUSCULAR; INTRAVENOUS at 00:28

## 2017-09-11 RX ADMIN — GENTAMICIN SULFATE SCH MLS/HR: 0.8 INJECTION, SOLUTION INTRAVENOUS at 01:16

## 2017-09-11 RX ADMIN — OXACILLIN SODIUM SCH MLS/HR: 2 INJECTION, POWDER, FOR SOLUTION INTRAMUSCULAR; INTRAVENOUS at 02:24

## 2017-09-11 RX ADMIN — POTASSIUM CHLORIDE SCH MEQ: 20 TABLET, EXTENDED RELEASE ORAL at 07:57

## 2017-09-11 RX ADMIN — MORPHINE SULFATE PRN MG: 2 INJECTION, SOLUTION INTRAMUSCULAR; INTRAVENOUS at 04:37

## 2017-09-11 RX ADMIN — STANDARDIZED SENNA CONCENTRATE AND DOCUSATE SODIUM SCH TAB: 8.6; 5 TABLET, FILM COATED ORAL at 08:04

## 2017-09-11 RX ADMIN — METHADONE HYDROCHLORIDE SCH MG: 5 SOLUTION ORAL at 07:56

## 2017-09-11 RX ADMIN — HEPARIN SODIUM SCH UNITS: 10000 INJECTION, SOLUTION INTRAVENOUS; SUBCUTANEOUS at 08:04

## 2017-09-11 RX ADMIN — AMPICILLIN SODIUM SCH MLS/HR: 2 INJECTION, POWDER, FOR SOLUTION INTRAMUSCULAR; INTRAVENOUS at 16:55

## 2017-09-11 RX ADMIN — MORPHINE SULFATE PRN MG: 2 INJECTION, SOLUTION INTRAMUSCULAR; INTRAVENOUS at 22:06

## 2017-09-11 RX ADMIN — FUROSEMIDE SCH MG: 20 TABLET ORAL at 07:57

## 2017-09-11 RX ADMIN — OXACILLIN SODIUM SCH MLS/HR: 2 INJECTION, POWDER, FOR SOLUTION INTRAMUSCULAR; INTRAVENOUS at 08:04

## 2017-09-11 RX ADMIN — POLYVINYL ALCOHOL SCH DROP: 14 SOLUTION/ DROPS OPHTHALMIC at 08:04

## 2017-09-11 RX ADMIN — Medication SCH ML: at 20:39

## 2017-09-11 RX ADMIN — MORPHINE SULFATE PRN MG: 2 INJECTION, SOLUTION INTRAMUSCULAR; INTRAVENOUS at 00:30

## 2017-09-11 RX ADMIN — AMPICILLIN SODIUM SCH MLS/HR: 2 INJECTION, POWDER, FOR SOLUTION INTRAMUSCULAR; INTRAVENOUS at 12:16

## 2017-09-11 RX ADMIN — CHLORHEXIDINE GLUCONATE SCH PACK: 500 CLOTH TOPICAL at 03:52

## 2017-09-11 RX ADMIN — RIFAMPIN SCH MG: 150 CAPSULE ORAL at 07:56

## 2017-09-11 RX ADMIN — GENTAMICIN SULFATE SCH MLS/HR: 0.8 INJECTION, SOLUTION INTRAVENOUS at 12:47

## 2017-09-11 RX ADMIN — AMPICILLIN SODIUM SCH MLS/HR: 2 INJECTION, POWDER, FOR SOLUTION INTRAMUSCULAR; INTRAVENOUS at 07:57

## 2017-09-11 RX ADMIN — POLYVINYL ALCOHOL SCH DROP: 14 SOLUTION/ DROPS OPHTHALMIC at 17:03

## 2017-09-11 RX ADMIN — OXACILLIN SODIUM SCH MLS/HR: 2 INJECTION, POWDER, FOR SOLUTION INTRAMUSCULAR; INTRAVENOUS at 05:27

## 2017-09-11 RX ADMIN — POLYVINYL ALCOHOL SCH DROP: 14 SOLUTION/ DROPS OPHTHALMIC at 12:20

## 2017-09-11 RX ADMIN — STANDARDIZED SENNA CONCENTRATE AND DOCUSATE SODIUM SCH TAB: 8.6; 5 TABLET, FILM COATED ORAL at 20:39

## 2017-09-11 RX ADMIN — MORPHINE SULFATE PRN MG: 2 INJECTION, SOLUTION INTRAMUSCULAR; INTRAVENOUS at 16:55

## 2017-09-11 RX ADMIN — FAMOTIDINE SCH MG: 20 TABLET, FILM COATED ORAL at 20:39

## 2017-09-11 RX ADMIN — OXACILLIN SODIUM SCH MLS/HR: 2 INJECTION, POWDER, FOR SOLUTION INTRAMUSCULAR; INTRAVENOUS at 12:16

## 2017-09-11 RX ADMIN — Medication SCH ML: at 08:04

## 2017-09-11 RX ADMIN — METHADONE HYDROCHLORIDE SCH MG: 5 SOLUTION ORAL at 20:40

## 2017-09-11 RX ADMIN — MORPHINE SULFATE PRN MG: 2 INJECTION, SOLUTION INTRAMUSCULAR; INTRAVENOUS at 10:37

## 2017-09-11 RX ADMIN — HEPARIN SODIUM SCH UNITS: 10000 INJECTION, SOLUTION INTRAVENOUS; SUBCUTANEOUS at 17:03

## 2017-09-11 RX ADMIN — MORPHINE SULFATE PRN MG: 2 INJECTION, SOLUTION INTRAMUSCULAR; INTRAVENOUS at 13:50

## 2017-09-11 NOTE — HHI.PR
Subjective


Remarks


In the chair. Says she feels tired. No n/v. Still with diarrhea 4 Bm since 

yesterday. LE edema imprpved some,. says extra lasix IV worked well. 


No sob, however she is sob with exertion. Will give additional lasix today IV





Objective


Vitals





Vital Signs








  Date Time  Temp Pulse Resp B/P (MAP) Pulse Ox O2 Delivery O2 Flow Rate FiO2


 


9/11/17 08:50  98      


 


9/11/17 08:50      Room Air  


 


9/11/17 04:00 98.2 88 18 100/60 (73) 97   


 


9/11/17 04:00      Room Air  


 


9/11/17 00:54 98.1 103 18 102/66 (78) 97   


 


9/11/17 00:00      Room Air  


 


9/10/17 20:44 98.4 114 18 108/55 (72) 100   


 


9/10/17 20:00  107      


 


9/10/17 20:00      Room Air  


 


9/10/17 16:00 98.6 100 18 131/73 (92) 96   














I/O      


 


 9/10/17 9/10/17 9/10/17 9/11/17 9/11/17 9/11/17





 07:00 15:00 23:00 07:00 15:00 23:00


 


Intake Total  200 ml 700 ml  200 ml 


 


Output Total   1000 ml   


 


Balance  200 ml -300 ml  200 ml 


 


      


 


Intake Oral   500 ml   


 


IV Total  200 ml 200 ml  200 ml 


 


Output Urine Total   1000 ml   


 


# Voids    4  


 


# Bowel Movements   1 0  








Result Diagram:  


9/11/17 0640                                                                   

             9/11/17 0640





Imaging





Last Impressions








Abdomen CT 9/8/17 0000 Signed





Impressions: 





 Service Date/Time:  Friday, September 8, 2017 20:26 - CONCLUSION:  1. 

Peripheral 





 low attenuation lesions in the spleen most characteristic of multiple splenic 





 infarcts. Some of these are new findings since 2016 comparison. 2. Minimal 





 subsegmental airspace disease at the lung bases. 3. 3.4 cm right adnexal cyst. 





 Intrauterine device present. 4. Small fat containing ventral hernias.     

Jaspal Issa MD 


 


Chest X-Ray 9/5/17 0000 Signed





Impressions: 





 Service Date/Time:  Tuesday, September 5, 2017 08:07 - CONCLUSION:  Suspected 





 mild edema/failure.     Ron Swan MD 


 


Neck CT 9/2/17 0000 Signed





Impressions: 





 Service Date/Time:  Saturday, September 2, 2017 11:13 - CONCLUSION:  1. 

Patient 





 is intubated which limits this study. The epiglottis is otherwise normal. 





 Secretions are present in the oropharynx and right sphenoid sinus.     Ej Ewing MD 


 


Brain MRI 8/25/17 0000 Signed





Impressions: 





 Service Date/Time:  Friday, August 25, 2017 17:07 - CONCLUSION:  1. Small 

acute 





 or subacute cortical infarct of the left parietal lobe. 2. Small, faint area 

of 





 nonspecific flair signal abnormality in the left frontal lobe periventricular 





 white matter, nonspecific. No associated mass effect or abnormal enhancement. 

3 





 month followup MRI of the brain recommended. 3.  Mild chronic white matter 





 changes.     Ron Swan MD 


 


Head CT 8/21/17 0000 Signed





Impressions: 





 Service Date/Time:  Monday, August 21, 2017 19:47 - CONCLUSION:  No evidence 

of 





 acute infarct, hemorrhage, mass or edema.     Kristian Frankel MD 








Objective Remarks


GENERAL:  Young female, appears in nad. 


SKIN: Warm and dry.


CARDIOVASCULAR: Regular rate and rhythm with systolic  murmur. 


RESPIRATORY: Decreased breath sound BL, no crackles or wheezing auscultated. No 

accessory muscle use.


GASTROINTESTINAL: Abdomen soft, non-tender, nondistended. 


MUSCULOSKELETAL: No cyanosis. +3  LE  edema. 


BACK: Nontender without obvious deformity. No CVA tenderness.


Procedures


None





A/P


Problem List:  


(1) Severe sepsis


ICD Code:  A41.9 - Sepsis, unspecified organism; R65.20 - Severe sepsis without 

septic shock


Status:  Acute


(2) MSSA meningitis


Status:  Acute


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


Status:  Acute


(4) SVT (supraventricular tachycardia)


ICD Code:  I47.1 - Supraventricular tachycardia


Status:  Resolved


(5) Postextubation stridor


ICD Code:  J95.89 - Other postprocedural complications and disorders of 

respiratory system, not elsewhere classified


Status:  Resolved


(6) Acute hypoxemic respiratory failure


ICD Code:  J96.01 - Acute respiratory failure with hypoxia


Status:  Resolved


(7) CVA (cerebral vascular accident)


ICD Code:  I63.9 - Cerebral infarction, unspecified


(8) Tricuspid regurgitation


ICD Code:  I07.1 - Rheumatic tricuspid insufficiency


Status:  Acute


(9) Acute systolic heart failure


ICD Code:  I50.21 - Acute systolic (congestive) heart failure


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


ICD Code:  T82.6XXA - Infection and inflammatory reaction due to cardiac valve 

prosthesis, initial encounter


Status:  Acute


(11) NOÉ (acute kidney injury)


ICD Code:  N17.9 - Acute kidney failure, unspecified


Status:  Resolved


(12) Protein calorie malnutrition


ICD Code:  E46 - Unspecified protein-calorie malnutrition


(13) Microcytic anemia


ICD Code:  D50.9 - Iron deficiency anemia, unspecified


(14) Hyperphosphatemia


ICD Code:  E83.39 - Other disorders of phosphorus metabolism


(15) Hypernatremia


ICD Code:  E87.0 - Hyperosmolality and hypernatremia


(16) IV drug abuse


ICD Code:  F19.10 - Other psychoactive substance abuse, uncomplicated


Status:  Acute


(17) Headache


ICD Code:  R51 - Headache


(18) Abdominal pain


ICD Code:  R10.9 - Unspecified abdominal pain


Assessment and Plan








(1) Severe sepsis


Multiple splenic infarcts 


MSSA meningitis 


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


Prosthetic valve endocarditis


ICD Code:  A41.9 - Sepsis, unspecified organism; R65.20 - Severe sepsis without 

septic shock


Status:  Acute


Plan:  Sepsis present on admission, the patient presented with leukocytosis and 

tachycardia as well as after mental status.


Initially admitted to the intensive care unit, intubated and cared for by the 

intensivists.


Sepsis found to be secondary to meningitis.


Continue IV antibiotics as per infectious disease recommendations.


The patient is currently on IV ampicillin, oxacillin, gentamicin and rifampin - 

continue.


Unless more positive blood cx anticipate 6 week from 1st neg (8/24) followed by

  indefinite oral abx suppression tx. IV abx stop date tentatively.


Gentamycin trough 2.0








(2) MSSA meningitis


Status:  Acute


Plan:  As above.





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


Status:  Acute


Plan:  IV antibiotics as per infectious disease as above.





(4) SVT (supraventricular tachycardia)


ICD Code:  I47.1 - Supraventricular tachycardia


Status:  Resolved


Plan:  SVT now resolved.  Continue metoprolol.


Echocardiogram 8 foci 17 revealed an ejection fraction showed 25-30%.  PAP 34 

mmHg


Initially treated with vasopressors which the patient is now off.


The patient started on Lasix 20 minute grams by mouth daily.  Continue.





(5) Postextubation stridor


ICD Code:  J95.89 - Other postprocedural complications and disorders of 

respiratory system, not elsewhere classified


Status:  Resolved


Plan:  The patient initially extubated on 9/3/17.  The patient was administered 

racemic epinephrine as needed for stridor or


Continue albuterol/ipratropium inhaled treatments every 6 hours with every 2 

hours as needed for albuterol therapy.


Status post today treatment of dexamethasone IV every 8 hours.





(6) Acute hypoxemic respiratory failure


ICD Code:  J96.01 - Acute respiratory failure with hypoxia


Status:  Resolved


Plan:  Continue supplemental oxygen to keep oxygen more than 92%.


Status post intubation and extubation.


Continue DuoNeb treatments.





(7) CVA (cerebral vascular accident)


ICD Code:  I63.9 - Cerebral infarction, unspecified


Plan:  CT head negative, MRI small acute and subacute cortical infarct of the 

left parietal lobe.


The patient was seen and evaluated by neurology.


The patient is on a scheduled methadone 10 mg twice today.





(8) Tricuspid regurgitation


ICD Code:  I07.1 - Rheumatic tricuspid insufficiency


Status:  Acute


Plan:  As seen on echo described above.  Continue oral Lasix.





(9) Acute systolic heart failure


ICD Code:  I50.21 - Acute systolic (congestive) heart failure


Plan:  Continue Lasix orally. continue Lasix 40 mg po daily.





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


ICD Code:  T82.6XXA - Infection and inflammatory reaction due to cardiac valve 

prosthesis, initial encounter


Status:  Acute


Plan:  The patient has history of IV drug abuse.


Continue antibiotics as per ID recommendations. Tentative a stop date is 10/5





(11) NOÉ (acute kidney injury)


ICD Code:  N17.9 - Acute kidney failure, unspecified


Status:  Resolved


Plan:  Now resolved.  Continue to monitor BMP





(12) Protein calorie malnutrition


ICD Code:  E46 - Unspecified protein-calorie malnutrition


Plan:  Secondary to long-standing IV drug abuse and endocarditis.





(13) Microcytic anemia


ICD Code:  D50.9 - Iron deficiency anemia, unspecified


Plan:  Hemoglobin seems to be stable at 8.6.  I will check iron studies and 

stool Hemoccult for blood.


Patient had iron studies previously concordant with iron deficiency anemia.  I 

will start the patient on oral iron sulfate.





(14) Hyperphosphatemia


ICD Code:  E83.39 - Other disorders of phosphorus metabolism


Plan:  Now resolved.  Continue to monitor levels.





(15) Hypernatremia


ICD Code:  E87.0 - Hyperosmolality and hypernatremia


Plan:  Likely secondary to dehydration. 


Sodium now within normal range.  Continue to monitor BMP.





(16) IV drug abuse


ICD Code:  F19.10 - Other psychoactive substance abuse, uncomplicated


Status:  Acute


Plan:  Advised cessation.  Currently on methadone.





(17) Headache


ICD Code:  R51 - Headache


Plan:  Headache is bifrontal, patient is afebrile.


Continue Acetaminophen 1000 mg po Q 6 hrs prn headache.





(18) Abdominal pain 


ICD Code:  R10.9 - Unspecified abdominal pain


Plan:  check CT abdomen and pelvis. Check stool for C diff PCR.





(19) Diarrhea, improving 


Plan: 


C diff. is negative  Continue probiotic. 





(20) Anasarca.


Continue lasix po Will give extra dose of lasix 40 mg by IV. Monitor UPO and 

kidney function closely. 





Assessment and Plan


GI prophylaxis: Continue famotidine.


DVT prophylaxis: SCDs, ambulation.








DC plan: needs inpatient treatment. DC when cleared by ID specialist.





Problem Qualifiers





(1) CVA (cerebral vascular accident):  


Qualified Codes:  I63.9 - Cerebral infarction, unspecified


(2) Protein calorie malnutrition:  


Qualified Codes:  E44.0 - Moderate protein-calorie malnutrition








Jyothi Saunders MD Sep 11, 2017 12:40

## 2017-09-12 VITALS
OXYGEN SATURATION: 99 % | DIASTOLIC BLOOD PRESSURE: 73 MMHG | TEMPERATURE: 98.7 F | RESPIRATION RATE: 20 BRPM | SYSTOLIC BLOOD PRESSURE: 111 MMHG | HEART RATE: 92 BPM

## 2017-09-12 VITALS
DIASTOLIC BLOOD PRESSURE: 72 MMHG | TEMPERATURE: 98 F | RESPIRATION RATE: 20 BRPM | SYSTOLIC BLOOD PRESSURE: 104 MMHG | HEART RATE: 95 BPM | OXYGEN SATURATION: 98 %

## 2017-09-12 VITALS
DIASTOLIC BLOOD PRESSURE: 74 MMHG | OXYGEN SATURATION: 97 % | SYSTOLIC BLOOD PRESSURE: 114 MMHG | HEART RATE: 89 BPM | RESPIRATION RATE: 16 BRPM | TEMPERATURE: 98.9 F

## 2017-09-12 VITALS
HEART RATE: 95 BPM | OXYGEN SATURATION: 98 % | RESPIRATION RATE: 18 BRPM | DIASTOLIC BLOOD PRESSURE: 74 MMHG | SYSTOLIC BLOOD PRESSURE: 113 MMHG | TEMPERATURE: 98.2 F

## 2017-09-12 VITALS
SYSTOLIC BLOOD PRESSURE: 116 MMHG | HEART RATE: 97 BPM | RESPIRATION RATE: 16 BRPM | OXYGEN SATURATION: 100 % | TEMPERATURE: 98.3 F | DIASTOLIC BLOOD PRESSURE: 82 MMHG

## 2017-09-12 VITALS
RESPIRATION RATE: 19 BRPM | SYSTOLIC BLOOD PRESSURE: 116 MMHG | TEMPERATURE: 98.5 F | HEART RATE: 95 BPM | DIASTOLIC BLOOD PRESSURE: 75 MMHG | OXYGEN SATURATION: 100 %

## 2017-09-12 VITALS — HEART RATE: 107 BPM

## 2017-09-12 VITALS
HEART RATE: 95 BPM | RESPIRATION RATE: 19 BRPM | DIASTOLIC BLOOD PRESSURE: 79 MMHG | TEMPERATURE: 98.7 F | SYSTOLIC BLOOD PRESSURE: 122 MMHG | OXYGEN SATURATION: 100 %

## 2017-09-12 VITALS — HEART RATE: 97 BPM

## 2017-09-12 RX ADMIN — AMPICILLIN SODIUM SCH MLS/HR: 2 INJECTION, POWDER, FOR SOLUTION INTRAMUSCULAR; INTRAVENOUS at 01:27

## 2017-09-12 RX ADMIN — RIFAMPIN SCH MG: 150 CAPSULE ORAL at 21:30

## 2017-09-12 RX ADMIN — STANDARDIZED SENNA CONCENTRATE AND DOCUSATE SODIUM SCH TAB: 8.6; 5 TABLET, FILM COATED ORAL at 09:00

## 2017-09-12 RX ADMIN — STANDARDIZED SENNA CONCENTRATE AND DOCUSATE SODIUM SCH TAB: 8.6; 5 TABLET, FILM COATED ORAL at 21:00

## 2017-09-12 RX ADMIN — OXACILLIN SODIUM SCH MLS/HR: 2 INJECTION, POWDER, FOR SOLUTION INTRAMUSCULAR; INTRAVENOUS at 11:07

## 2017-09-12 RX ADMIN — GENTAMICIN SULFATE SCH MLS/HR: 0.8 INJECTION, SOLUTION INTRAVENOUS at 13:19

## 2017-09-12 RX ADMIN — POLYVINYL ALCOHOL SCH DROP: 14 SOLUTION/ DROPS OPHTHALMIC at 09:00

## 2017-09-12 RX ADMIN — GENTAMICIN SULFATE SCH MLS/HR: 0.8 INJECTION, SOLUTION INTRAVENOUS at 22:57

## 2017-09-12 RX ADMIN — HEPARIN SODIUM SCH UNITS: 10000 INJECTION, SOLUTION INTRAVENOUS; SUBCUTANEOUS at 02:00

## 2017-09-12 RX ADMIN — Medication SCH ML: at 09:00

## 2017-09-12 RX ADMIN — Medication SCH ML: at 21:29

## 2017-09-12 RX ADMIN — MORPHINE SULFATE PRN MG: 2 INJECTION, SOLUTION INTRAMUSCULAR; INTRAVENOUS at 02:30

## 2017-09-12 RX ADMIN — AMPICILLIN SODIUM SCH MLS/HR: 2 INJECTION, POWDER, FOR SOLUTION INTRAMUSCULAR; INTRAVENOUS at 18:29

## 2017-09-12 RX ADMIN — OXACILLIN SODIUM SCH MLS/HR: 2 INJECTION, POWDER, FOR SOLUTION INTRAMUSCULAR; INTRAVENOUS at 06:40

## 2017-09-12 RX ADMIN — CHLORHEXIDINE GLUCONATE SCH PACK: 500 CLOTH TOPICAL at 03:00

## 2017-09-12 RX ADMIN — RIFAMPIN SCH MG: 150 CAPSULE ORAL at 09:19

## 2017-09-12 RX ADMIN — FAMOTIDINE SCH MG: 20 TABLET, FILM COATED ORAL at 21:30

## 2017-09-12 RX ADMIN — METHADONE HYDROCHLORIDE SCH MG: 5 SOLUTION ORAL at 09:17

## 2017-09-12 RX ADMIN — HEPARIN SODIUM SCH UNITS: 10000 INJECTION, SOLUTION INTRAVENOUS; SUBCUTANEOUS at 18:00

## 2017-09-12 RX ADMIN — POLYVINYL ALCOHOL SCH DROP: 14 SOLUTION/ DROPS OPHTHALMIC at 18:00

## 2017-09-12 RX ADMIN — AMPICILLIN SODIUM SCH MLS/HR: 2 INJECTION, POWDER, FOR SOLUTION INTRAMUSCULAR; INTRAVENOUS at 09:18

## 2017-09-12 RX ADMIN — FAMOTIDINE SCH MG: 20 TABLET, FILM COATED ORAL at 09:19

## 2017-09-12 RX ADMIN — OXACILLIN SODIUM SCH MLS/HR: 2 INJECTION, POWDER, FOR SOLUTION INTRAMUSCULAR; INTRAVENOUS at 22:03

## 2017-09-12 RX ADMIN — AMPICILLIN SODIUM SCH MLS/HR: 2 INJECTION, POWDER, FOR SOLUTION INTRAMUSCULAR; INTRAVENOUS at 15:41

## 2017-09-12 RX ADMIN — POLYVINYL ALCOHOL SCH DROP: 14 SOLUTION/ DROPS OPHTHALMIC at 13:00

## 2017-09-12 RX ADMIN — MORPHINE SULFATE PRN MG: 2 INJECTION, SOLUTION INTRAMUSCULAR; INTRAVENOUS at 11:07

## 2017-09-12 RX ADMIN — MORPHINE SULFATE PRN MG: 2 INJECTION, SOLUTION INTRAMUSCULAR; INTRAVENOUS at 14:51

## 2017-09-12 RX ADMIN — OXACILLIN SODIUM SCH MLS/HR: 2 INJECTION, POWDER, FOR SOLUTION INTRAMUSCULAR; INTRAVENOUS at 16:23

## 2017-09-12 RX ADMIN — OXACILLIN SODIUM SCH MLS/HR: 2 INJECTION, POWDER, FOR SOLUTION INTRAMUSCULAR; INTRAVENOUS at 02:30

## 2017-09-12 RX ADMIN — FUROSEMIDE SCH MG: 20 TABLET ORAL at 09:18

## 2017-09-12 RX ADMIN — MORPHINE SULFATE PRN MG: 2 INJECTION, SOLUTION INTRAMUSCULAR; INTRAVENOUS at 18:38

## 2017-09-12 RX ADMIN — HEPARIN SODIUM SCH UNITS: 10000 INJECTION, SOLUTION INTRAVENOUS; SUBCUTANEOUS at 09:31

## 2017-09-12 RX ADMIN — GENTAMICIN SULFATE SCH MLS/HR: 0.8 INJECTION, SOLUTION INTRAVENOUS at 00:45

## 2017-09-12 RX ADMIN — AMPICILLIN SODIUM SCH MLS/HR: 2 INJECTION, POWDER, FOR SOLUTION INTRAMUSCULAR; INTRAVENOUS at 05:51

## 2017-09-12 RX ADMIN — AMPICILLIN SODIUM SCH MLS/HR: 2 INJECTION, POWDER, FOR SOLUTION INTRAMUSCULAR; INTRAVENOUS at 21:29

## 2017-09-12 RX ADMIN — OXACILLIN SODIUM SCH MLS/HR: 2 INJECTION, POWDER, FOR SOLUTION INTRAMUSCULAR; INTRAVENOUS at 18:31

## 2017-09-12 RX ADMIN — MORPHINE SULFATE PRN MG: 2 INJECTION, SOLUTION INTRAMUSCULAR; INTRAVENOUS at 06:40

## 2017-09-12 RX ADMIN — MORPHINE SULFATE PRN MG: 2 INJECTION, SOLUTION INTRAMUSCULAR; INTRAVENOUS at 22:57

## 2017-09-12 RX ADMIN — POTASSIUM CHLORIDE SCH MEQ: 20 TABLET, EXTENDED RELEASE ORAL at 09:18

## 2017-09-12 RX ADMIN — METHADONE HYDROCHLORIDE SCH MG: 5 SOLUTION ORAL at 21:30

## 2017-09-12 NOTE — HHI.PR
Subjective


Remarks


In the chair. Says she still has LE edema. No fever or chills. No n/v.  Patient 

still with diarrhea after she is eating. not much abdominal pain. C diff is neg.





Objective


Vitals





Vital Signs








  Date Time  Temp Pulse Resp B/P (MAP) Pulse Ox O2 Delivery O2 Flow Rate FiO2


 


9/12/17 08:00 98.2 95 18 113/74 (87) 98   


 


9/12/17 04:00      Room Air  


 


9/12/17 04:00 98.5 95 19 116/75 (89) 100   


 


9/12/17 00:00 98.7 95 19 122/79 (93) 100   


 


9/12/17 00:00      Room Air  


 


9/11/17 20:00  101      


 


9/11/17 20:00      Room Air  


 


9/11/17 20:00 99.4 104 20 125/81 (96) 98   


 


9/11/17 16:00 98.3 104 18 115/76 (89) 100   


 


9/11/17 12:00 98.4 91 18 140/69 (92) 100   














I/O      


 


 9/11/17 9/11/17 9/11/17 9/12/17 9/12/17 9/12/17





 07:00 15:00 23:00 07:00 15:00 23:00


 


Intake Total  400 ml 900 ml   


 


Output Total   800 ml   


 


Balance  400 ml 100 ml   


 


      


 


Intake Oral   600 ml   


 


IV Total  400 ml 300 ml   


 


Output Urine Total   800 ml   


 


# Voids 4     


 


# Bowel Movements 0  1   








Result Diagram:  


9/11/17 0640                                                                   

             9/11/17 0640





Imaging





Last Impressions








Abdomen CT 9/8/17 0000 Signed





Impressions: 





 Service Date/Time:  Friday, September 8, 2017 20:26 - CONCLUSION:  1. 

Peripheral 





 low attenuation lesions in the spleen most characteristic of multiple splenic 





 infarcts. Some of these are new findings since 2016 comparison. 2. Minimal 





 subsegmental airspace disease at the lung bases. 3. 3.4 cm right adnexal cyst. 





 Intrauterine device present. 4. Small fat containing ventral hernias.     

Jaspal Issa MD 


 


Chest X-Ray 9/5/17 0000 Signed





Impressions: 





 Service Date/Time:  Tuesday, September 5, 2017 08:07 - CONCLUSION:  Suspected 





 mild edema/failure.     Ron Swan MD 


 


Neck CT 9/2/17 0000 Signed





Impressions: 





 Service Date/Time:  Saturday, September 2, 2017 11:13 - CONCLUSION:  1. 

Patient 





 is intubated which limits this study. The epiglottis is otherwise normal. 





 Secretions are present in the oropharynx and right sphenoid sinus.     Ej Ewing MD 


 


Brain MRI 8/25/17 0000 Signed





Impressions: 





 Service Date/Time:  Friday, August 25, 2017 17:07 - CONCLUSION:  1. Small 

acute 





 or subacute cortical infarct of the left parietal lobe. 2. Small, faint area 

of 





 nonspecific flair signal abnormality in the left frontal lobe periventricular 





 white matter, nonspecific. No associated mass effect or abnormal enhancement. 

3 





 month followup MRI of the brain recommended. 3.  Mild chronic white matter 





 changes.     Ron Swan MD 


 


Head CT 8/21/17 0000 Signed





Impressions: 





 Service Date/Time:  Monday, August 21, 2017 19:47 - CONCLUSION:  No evidence 

of 





 acute infarct, hemorrhage, mass or edema.     Kristian Frankel MD 








Objective Remarks


GENERAL:  Young female, appears in nad. 


SKIN: Warm and dry.


CARDIOVASCULAR: Regular rate and rhythm with systolic  murmur. 


RESPIRATORY: Decreased breath sound BL, no crackles or wheezing auscultated. No 

accessory muscle use.


GASTROINTESTINAL: Abdomen soft, non-tender, nondistended. 


MUSCULOSKELETAL: No cyanosis. +3  LE  edema. 


BACK: Nontender without obvious deformity. No CVA tenderness.


Procedures


None





A/P


Problem List:  


(1) Severe sepsis


ICD Code:  A41.9 - Sepsis, unspecified organism; R65.20 - Severe sepsis without 

septic shock


Status:  Acute


(2) MSSA meningitis


Status:  Acute


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


Status:  Acute


(4) SVT (supraventricular tachycardia)


ICD Code:  I47.1 - Supraventricular tachycardia


Status:  Resolved


(5) Postextubation stridor


ICD Code:  J95.89 - Other postprocedural complications and disorders of 

respiratory system, not elsewhere classified


Status:  Resolved


(6) Acute hypoxemic respiratory failure


ICD Code:  J96.01 - Acute respiratory failure with hypoxia


Status:  Resolved


(7) CVA (cerebral vascular accident)


ICD Code:  I63.9 - Cerebral infarction, unspecified


(8) Tricuspid regurgitation


ICD Code:  I07.1 - Rheumatic tricuspid insufficiency


Status:  Acute


(9) Acute systolic heart failure


ICD Code:  I50.21 - Acute systolic (congestive) heart failure


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


ICD Code:  T82.6XXA - Infection and inflammatory reaction due to cardiac valve 

prosthesis, initial encounter


Status:  Acute


(11) NOÉ (acute kidney injury)


ICD Code:  N17.9 - Acute kidney failure, unspecified


Status:  Resolved


(12) Protein calorie malnutrition


ICD Code:  E46 - Unspecified protein-calorie malnutrition


(13) Microcytic anemia


ICD Code:  D50.9 - Iron deficiency anemia, unspecified


(14) Hyperphosphatemia


ICD Code:  E83.39 - Other disorders of phosphorus metabolism


(15) Hypernatremia


ICD Code:  E87.0 - Hyperosmolality and hypernatremia


(16) IV drug abuse


ICD Code:  F19.10 - Other psychoactive substance abuse, uncomplicated


Status:  Acute


(17) Headache


ICD Code:  R51 - Headache


(18) Abdominal pain


ICD Code:  R10.9 - Unspecified abdominal pain


Assessment and Plan








(1) Severe sepsis


Multiple splenic infarcts 


MSSA meningitis 


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


Prosthetic valve endocarditis


ICD Code:  A41.9 - Sepsis, unspecified organism; R65.20 - Severe sepsis without 

septic shock


Status:  Acute


Plan:  Sepsis present on admission, the patient presented with leukocytosis and 

tachycardia as well as after mental status.


Initially admitted to the intensive care unit, intubated and cared for by the 

intensivists.


Sepsis found to be secondary to meningitis.


Continue IV antibiotics as per infectious disease recommendations.


The patient is currently on IV ampicillin, oxacillin, gentamicin and rifampin - 

continue.


Unless more positive blood cx anticipate 6 week from 1st neg (8/24) followed by

  indefinite oral abx suppression tx. IV abx stop date tentatively.


Gentamycin trough 2.0








(2) MSSA meningitis


Status:  Acute


Plan:  As above.





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


Status:  Acute


Plan:  IV antibiotics as per infectious disease as above.





(4) SVT (supraventricular tachycardia)


ICD Code:  I47.1 - Supraventricular tachycardia


Status:  Resolved


Plan:  SVT now resolved.  Continue metoprolol.


Echocardiogram 8 foci 17 revealed an ejection fraction showed 25-30%.  PAP 34 

mmHg


Initially treated with vasopressors which the patient is now off.


The patient started on Lasix 20 minute grams by mouth daily.  Continue.





(5) Postextubation stridor


ICD Code:  J95.89 - Other postprocedural complications and disorders of 

respiratory system, not elsewhere classified


Status:  Resolved


Plan:  The patient initially extubated on 9/3/17.  The patient was administered 

racemic epinephrine as needed for stridor or


Continue albuterol/ipratropium inhaled treatments every 6 hours with every 2 

hours as needed for albuterol therapy.


Status post today treatment of dexamethasone IV every 8 hours.





(6) Acute hypoxemic respiratory failure


ICD Code:  J96.01 - Acute respiratory failure with hypoxia


Status:  Resolved


Plan:  Continue supplemental oxygen to keep oxygen more than 92%.


Status post intubation and extubation.


Continue DuoNeb treatments.





(7) CVA (cerebral vascular accident)


ICD Code:  I63.9 - Cerebral infarction, unspecified


Plan:  CT head negative, MRI small acute and subacute cortical infarct of the 

left parietal lobe.


The patient was seen and evaluated by neurology.


The patient is on a scheduled methadone 10 mg twice today.





(8) Tricuspid regurgitation


ICD Code:  I07.1 - Rheumatic tricuspid insufficiency


Status:  Acute


Plan:  As seen on echo described above.  Continue oral Lasix.





(9) Acute systolic heart failure


ICD Code:  I50.21 - Acute systolic (congestive) heart failure


Plan:  Continue Lasix orally. continue Lasix 40 mg po daily.





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


ICD Code:  T82.6XXA - Infection and inflammatory reaction due to cardiac valve 

prosthesis, initial encounter


Status:  Acute


Plan:  The patient has history of IV drug abuse.


Continue antibiotics as per ID recommendations. Tentative a stop date is 10/5





(11) NOÉ (acute kidney injury)


ICD Code:  N17.9 - Acute kidney failure, unspecified


Status:  Resolved


Plan:  Now resolved.  Continue to monitor BMP





(12) Protein calorie malnutrition


ICD Code:  E46 - Unspecified protein-calorie malnutrition


Plan:  Secondary to long-standing IV drug abuse and endocarditis.





(13) Microcytic anemia


ICD Code:  D50.9 - Iron deficiency anemia, unspecified


Plan:  Hemoglobin seems to be stable at 8.6.  I will check iron studies and 

stool Hemoccult for blood.


Patient had iron studies previously concordant with iron deficiency anemia.  I 

will start the patient on oral iron sulfate.





(14) Hyperphosphatemia


ICD Code:  E83.39 - Other disorders of phosphorus metabolism


Plan:  Now resolved.  Continue to monitor levels.





(15) Hypernatremia


ICD Code:  E87.0 - Hyperosmolality and hypernatremia


Plan:  Likely secondary to dehydration. 


Sodium now within normal range.  Continue to monitor BMP.





(16) IV drug abuse


ICD Code:  F19.10 - Other psychoactive substance abuse, uncomplicated


Status:  Acute


Plan:  Advised cessation.  Currently on methadone.





(17) Headache


ICD Code:  R51 - Headache


Plan:  Headache is bifrontal, patient is afebrile.


Continue Acetaminophen 1000 mg po Q 6 hrs prn headache.





(18) Abdominal pain 


ICD Code:  R10.9 - Unspecified abdominal pain


Plan:  check CT abdomen and pelvis. Check stool for C diff PCR.





(19) Diarrhea, improving 


Plan: 


C diff. is negative  Continue probiotic. Add Imodium  





(20) Anasarca.


Continue lasix po.  Will give extra dose of lasix 40 mg by IV. Monitor UPO and 

kidney function closely. 





Assessment and Plan


GI prophylaxis: Continue famotidine.


DVT prophylaxis: SCDs, ambulation.








DC plan: needs inpatient treatment. DC when cleared by ID specialist.





Problem Qualifiers





(1) CVA (cerebral vascular accident):  


Qualified Codes:  I63.9 - Cerebral infarction, unspecified


(2) Protein calorie malnutrition:  


Qualified Codes:  E44.0 - Moderate protein-calorie malnutrition








Jyothi Saunders MD Sep 12, 2017 10:36

## 2017-09-13 VITALS
DIASTOLIC BLOOD PRESSURE: 81 MMHG | OXYGEN SATURATION: 99 % | RESPIRATION RATE: 18 BRPM | SYSTOLIC BLOOD PRESSURE: 120 MMHG | TEMPERATURE: 98 F | HEART RATE: 94 BPM

## 2017-09-13 VITALS
SYSTOLIC BLOOD PRESSURE: 128 MMHG | TEMPERATURE: 98 F | OXYGEN SATURATION: 98 % | HEART RATE: 89 BPM | RESPIRATION RATE: 16 BRPM | DIASTOLIC BLOOD PRESSURE: 75 MMHG

## 2017-09-13 VITALS
SYSTOLIC BLOOD PRESSURE: 115 MMHG | TEMPERATURE: 98.7 F | DIASTOLIC BLOOD PRESSURE: 74 MMHG | OXYGEN SATURATION: 96 % | HEART RATE: 79 BPM | RESPIRATION RATE: 16 BRPM

## 2017-09-13 VITALS — HEART RATE: 98 BPM

## 2017-09-13 VITALS
OXYGEN SATURATION: 97 % | TEMPERATURE: 98.4 F | SYSTOLIC BLOOD PRESSURE: 110 MMHG | RESPIRATION RATE: 20 BRPM | DIASTOLIC BLOOD PRESSURE: 86 MMHG | HEART RATE: 97 BPM

## 2017-09-13 VITALS
RESPIRATION RATE: 16 BRPM | TEMPERATURE: 98.7 F | HEART RATE: 99 BPM | OXYGEN SATURATION: 96 % | DIASTOLIC BLOOD PRESSURE: 71 MMHG | SYSTOLIC BLOOD PRESSURE: 120 MMHG

## 2017-09-13 VITALS
RESPIRATION RATE: 18 BRPM | SYSTOLIC BLOOD PRESSURE: 121 MMHG | TEMPERATURE: 98.6 F | OXYGEN SATURATION: 98 % | DIASTOLIC BLOOD PRESSURE: 78 MMHG | HEART RATE: 88 BPM

## 2017-09-13 VITALS — HEART RATE: 102 BPM

## 2017-09-13 LAB
ANION GAP SERPL CALC-SCNC: 12 MEQ/L (ref 5–15)
BASOPHILS # BLD AUTO: 0.1 TH/MM3 (ref 0–0.2)
BASOPHILS NFR BLD: 0.9 % (ref 0–2)
BUN SERPL-MCNC: 10 MG/DL (ref 7–18)
CHLORIDE SERPL-SCNC: 100 MEQ/L (ref 98–107)
EOSINOPHIL # BLD: 0.1 TH/MM3 (ref 0–0.4)
EOSINOPHIL NFR BLD: 1.6 % (ref 0–4)
ERYTHROCYTE [DISTWIDTH] IN BLOOD BY AUTOMATED COUNT: 17.6 % (ref 11.6–17.2)
GFR SERPLBLD BASED ON 1.73 SQ M-ARVRAT: 48 ML/MIN (ref 89–?)
HCO3 BLD-SCNC: 25.1 MEQ/L (ref 21–32)
HCT VFR BLD CALC: 29.3 % (ref 35–46)
HEMO FLAGS: (no result)
LYMPHOCYTES # BLD AUTO: 1.2 TH/MM3 (ref 1–4.8)
LYMPHOCYTES NFR BLD AUTO: 13.1 % (ref 9–44)
MAGNESIUM SERPL-MCNC: 1.9 MG/DL (ref 1.5–2.5)
MCH RBC QN AUTO: 24.1 PG (ref 27–34)
MCHC RBC AUTO-ENTMCNC: 32.2 % (ref 32–36)
MCV RBC AUTO: 74.9 FL (ref 80–100)
MONOCYTES NFR BLD: 10.2 % (ref 0–8)
NEUTROPHILS # BLD AUTO: 6.6 TH/MM3 (ref 1.8–7.7)
NEUTROPHILS NFR BLD AUTO: 74.2 % (ref 16–70)
PLATELET # BLD: 313 TH/MM3 (ref 150–450)
POTASSIUM SERPL-SCNC: 3.4 MEQ/L (ref 3.5–5.1)
RBC # BLD AUTO: 3.91 MIL/MM3 (ref 4–5.3)
SODIUM SERPL-SCNC: 137 MEQ/L (ref 136–145)
WBC # BLD AUTO: 9 TH/MM3 (ref 4–11)

## 2017-09-13 RX ADMIN — METHADONE HYDROCHLORIDE SCH MG: 5 SOLUTION ORAL at 09:15

## 2017-09-13 RX ADMIN — POTASSIUM CHLORIDE SCH MEQ: 20 TABLET, EXTENDED RELEASE ORAL at 09:17

## 2017-09-13 RX ADMIN — AMPICILLIN SODIUM SCH MLS/HR: 2 INJECTION, POWDER, FOR SOLUTION INTRAMUSCULAR; INTRAVENOUS at 09:16

## 2017-09-13 RX ADMIN — AMPICILLIN SODIUM SCH MLS/HR: 2 INJECTION, POWDER, FOR SOLUTION INTRAMUSCULAR; INTRAVENOUS at 05:44

## 2017-09-13 RX ADMIN — AMPICILLIN SODIUM SCH MLS/HR: 2 INJECTION, POWDER, FOR SOLUTION INTRAMUSCULAR; INTRAVENOUS at 21:25

## 2017-09-13 RX ADMIN — MORPHINE SULFATE PRN MG: 2 INJECTION, SOLUTION INTRAMUSCULAR; INTRAVENOUS at 10:45

## 2017-09-13 RX ADMIN — Medication SCH ML: at 21:26

## 2017-09-13 RX ADMIN — OXACILLIN SODIUM SCH MLS/HR: 2 INJECTION, POWDER, FOR SOLUTION INTRAMUSCULAR; INTRAVENOUS at 17:45

## 2017-09-13 RX ADMIN — MORPHINE SULFATE PRN MG: 2 INJECTION, SOLUTION INTRAMUSCULAR; INTRAVENOUS at 22:44

## 2017-09-13 RX ADMIN — METHADONE HYDROCHLORIDE SCH MG: 5 SOLUTION ORAL at 21:26

## 2017-09-13 RX ADMIN — POLYVINYL ALCOHOL SCH DROP: 14 SOLUTION/ DROPS OPHTHALMIC at 17:47

## 2017-09-13 RX ADMIN — OXACILLIN SODIUM SCH MLS/HR: 2 INJECTION, POWDER, FOR SOLUTION INTRAMUSCULAR; INTRAVENOUS at 05:59

## 2017-09-13 RX ADMIN — AMPICILLIN SODIUM SCH MLS/HR: 2 INJECTION, POWDER, FOR SOLUTION INTRAMUSCULAR; INTRAVENOUS at 13:01

## 2017-09-13 RX ADMIN — GENTAMICIN SULFATE SCH MLS/HR: 0.8 INJECTION, SOLUTION INTRAVENOUS at 13:01

## 2017-09-13 RX ADMIN — FAMOTIDINE SCH MG: 20 TABLET, FILM COATED ORAL at 09:16

## 2017-09-13 RX ADMIN — RIFAMPIN SCH MG: 150 CAPSULE ORAL at 09:00

## 2017-09-13 RX ADMIN — OXACILLIN SODIUM SCH MLS/HR: 2 INJECTION, POWDER, FOR SOLUTION INTRAMUSCULAR; INTRAVENOUS at 21:58

## 2017-09-13 RX ADMIN — MORPHINE SULFATE PRN MG: 2 INJECTION, SOLUTION INTRAMUSCULAR; INTRAVENOUS at 02:51

## 2017-09-13 RX ADMIN — RIFAMPIN SCH MG: 150 CAPSULE ORAL at 21:26

## 2017-09-13 RX ADMIN — AMPICILLIN SODIUM SCH MLS/HR: 2 INJECTION, POWDER, FOR SOLUTION INTRAMUSCULAR; INTRAVENOUS at 17:45

## 2017-09-13 RX ADMIN — OXACILLIN SODIUM SCH MLS/HR: 2 INJECTION, POWDER, FOR SOLUTION INTRAMUSCULAR; INTRAVENOUS at 09:19

## 2017-09-13 RX ADMIN — STANDARDIZED SENNA CONCENTRATE AND DOCUSATE SODIUM SCH TAB: 8.6; 5 TABLET, FILM COATED ORAL at 09:00

## 2017-09-13 RX ADMIN — FAMOTIDINE SCH MG: 20 TABLET, FILM COATED ORAL at 21:26

## 2017-09-13 RX ADMIN — HEPARIN SODIUM SCH UNITS: 10000 INJECTION, SOLUTION INTRAVENOUS; SUBCUTANEOUS at 17:55

## 2017-09-13 RX ADMIN — Medication SCH ML: at 09:00

## 2017-09-13 RX ADMIN — OXACILLIN SODIUM SCH MLS/HR: 2 INJECTION, POWDER, FOR SOLUTION INTRAMUSCULAR; INTRAVENOUS at 13:01

## 2017-09-13 RX ADMIN — MORPHINE SULFATE PRN MG: 2 INJECTION, SOLUTION INTRAMUSCULAR; INTRAVENOUS at 06:00

## 2017-09-13 RX ADMIN — GENTAMICIN SULFATE SCH MLS/HR: 0.8 INJECTION, SOLUTION INTRAVENOUS at 22:44

## 2017-09-13 RX ADMIN — HEPARIN SODIUM SCH UNITS: 10000 INJECTION, SOLUTION INTRAVENOUS; SUBCUTANEOUS at 00:53

## 2017-09-13 RX ADMIN — POLYVINYL ALCOHOL SCH DROP: 14 SOLUTION/ DROPS OPHTHALMIC at 13:00

## 2017-09-13 RX ADMIN — CHLORHEXIDINE GLUCONATE SCH PACK: 500 CLOTH TOPICAL at 03:55

## 2017-09-13 RX ADMIN — HEPARIN SODIUM SCH UNITS: 10000 INJECTION, SOLUTION INTRAVENOUS; SUBCUTANEOUS at 09:52

## 2017-09-13 RX ADMIN — MORPHINE SULFATE PRN MG: 2 INJECTION, SOLUTION INTRAMUSCULAR; INTRAVENOUS at 14:31

## 2017-09-13 RX ADMIN — FUROSEMIDE SCH MG: 20 TABLET ORAL at 09:16

## 2017-09-13 RX ADMIN — STANDARDIZED SENNA CONCENTRATE AND DOCUSATE SODIUM SCH TAB: 8.6; 5 TABLET, FILM COATED ORAL at 21:00

## 2017-09-13 RX ADMIN — POLYVINYL ALCOHOL SCH DROP: 14 SOLUTION/ DROPS OPHTHALMIC at 09:00

## 2017-09-13 RX ADMIN — OXACILLIN SODIUM SCH MLS/HR: 2 INJECTION, POWDER, FOR SOLUTION INTRAMUSCULAR; INTRAVENOUS at 00:53

## 2017-09-13 RX ADMIN — AMPICILLIN SODIUM SCH MLS/HR: 2 INJECTION, POWDER, FOR SOLUTION INTRAMUSCULAR; INTRAVENOUS at 00:19

## 2017-09-13 NOTE — HHI.PR
Subjective


Remarks


She is in the chair appears in not acute distress at this time.  Says lower 

extremity edema is improved but not significantly.  Says late extra Lasix 

helps.  No fever or chills.  No chest pain or shortness of breath.  Says has 

normal headache





Objective


Vitals





Vital Signs








  Date Time  Temp Pulse Resp B/P (MAP) Pulse Ox O2 Delivery O2 Flow Rate FiO2


 


9/13/17 12:00 98.6 88 18 121/78 (92) 98   


 


9/13/17 08:00 98.0 94 18 120/81 (94) 99   


 


9/13/17 05:40 98.0 89 16 128/75 (92) 98   


 


9/12/17 23:57 98.9 89 16 114/74 (87) 97   


 


9/12/17 21:48 98.3 97 16 116/82 (93) 100   


 


9/12/17 20:00  107      


 


9/12/17 20:00      Room Air  


 


9/12/17 16:00 98.7 92 20 111/73 (86) 99   














I/O      


 


 9/12/17 9/12/17 9/12/17 9/13/17 9/13/17 9/13/17





 07:00 15:00 23:00 07:00 15:00 23:00


 


Intake Total   480 ml 720 ml  


 


Output Total   700 ml 2400 ml  


 


Balance   -220 ml -1680 ml  


 


      


 


Intake Oral   480 ml 720 ml  


 


Output Urine Total   700 ml 2400 ml  


 


# Voids  3    


 


# Bowel Movements  2 2 1  








Result Diagram:  


9/13/17 0948                                                                   

             9/13/17 0948





Imaging





Last Impressions








Abdomen CT 9/8/17 0000 Signed





Impressions: 





 Service Date/Time:  Friday, September 8, 2017 20:26 - CONCLUSION:  1. 

Peripheral 





 low attenuation lesions in the spleen most characteristic of multiple splenic 





 infarcts. Some of these are new findings since 2016 comparison. 2. Minimal 





 subsegmental airspace disease at the lung bases. 3. 3.4 cm right adnexal cyst. 





 Intrauterine device present. 4. Small fat containing ventral hernias.     

Jaspal Issa MD 


 


Chest X-Ray 9/5/17 0000 Signed





Impressions: 





 Service Date/Time:  Tuesday, September 5, 2017 08:07 - CONCLUSION:  Suspected 





 mild edema/failure.     Ron Swan MD 


 


Neck CT 9/2/17 0000 Signed





Impressions: 





 Service Date/Time:  Saturday, September 2, 2017 11:13 - CONCLUSION:  1. 

Patient 





 is intubated which limits this study. The epiglottis is otherwise normal. 





 Secretions are present in the oropharynx and right sphenoid sinus.     Ej Ewing MD 


 


Brain MRI 8/25/17 0000 Signed





Impressions: 





 Service Date/Time:  Friday, August 25, 2017 17:07 - CONCLUSION:  1. Small 

acute 





 or subacute cortical infarct of the left parietal lobe. 2. Small, faint area 

of 





 nonspecific flair signal abnormality in the left frontal lobe periventricular 





 white matter, nonspecific. No associated mass effect or abnormal enhancement. 

3 





 month followup MRI of the brain recommended. 3.  Mild chronic white matter 





 changes.     Ron Swan MD 


 


Head CT 8/21/17 0000 Signed





Impressions: 





 Service Date/Time:  Monday, August 21, 2017 19:47 - CONCLUSION:  No evidence 

of 





 acute infarct, hemorrhage, mass or edema.     Kristian Frankel MD 








Objective Remarks


GENERAL:  Young female, appears in nad. 


SKIN: Warm and dry.


CARDIOVASCULAR: Regular rate and rhythm with systolic  murmur. 


RESPIRATORY: Decreased breath sound BL, no crackles or wheezing auscultated. No 

accessory muscle use.


GASTROINTESTINAL: Abdomen soft, non-tender, nondistended. 


MUSCULOSKELETAL: No cyanosis. +3  LE  edema. 


BACK: Nontender without obvious deformity. No CVA tenderness.


Procedures


None





A/P


Problem List:  


(1) Severe sepsis


ICD Code:  A41.9 - Sepsis, unspecified organism; R65.20 - Severe sepsis without 

septic shock


Status:  Acute


(2) MSSA meningitis


Status:  Acute


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


Status:  Acute


(4) SVT (supraventricular tachycardia)


ICD Code:  I47.1 - Supraventricular tachycardia


Status:  Resolved


(5) Postextubation stridor


ICD Code:  J95.89 - Other postprocedural complications and disorders of 

respiratory system, not elsewhere classified


Status:  Resolved


(6) Acute hypoxemic respiratory failure


ICD Code:  J96.01 - Acute respiratory failure with hypoxia


Status:  Resolved


(7) CVA (cerebral vascular accident)


ICD Code:  I63.9 - Cerebral infarction, unspecified


(8) Tricuspid regurgitation


ICD Code:  I07.1 - Rheumatic tricuspid insufficiency


Status:  Acute


(9) Acute systolic heart failure


ICD Code:  I50.21 - Acute systolic (congestive) heart failure


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


ICD Code:  T82.6XXA - Infection and inflammatory reaction due to cardiac valve 

prosthesis, initial encounter


Status:  Acute


(11) NOÉ (acute kidney injury)


ICD Code:  N17.9 - Acute kidney failure, unspecified


Status:  Resolved


(12) Protein calorie malnutrition


ICD Code:  E46 - Unspecified protein-calorie malnutrition


(13) Microcytic anemia


ICD Code:  D50.9 - Iron deficiency anemia, unspecified


(14) Hyperphosphatemia


ICD Code:  E83.39 - Other disorders of phosphorus metabolism


(15) Hypernatremia


ICD Code:  E87.0 - Hyperosmolality and hypernatremia


(16) IV drug abuse


ICD Code:  F19.10 - Other psychoactive substance abuse, uncomplicated


Status:  Acute


(17) Headache


ICD Code:  R51 - Headache


(18) Abdominal pain


ICD Code:  R10.9 - Unspecified abdominal pain


Assessment and Plan








(1) Severe sepsis


Multiple splenic infarcts 


MSSA meningitis 


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


Prosthetic valve endocarditis


ICD Code:  A41.9 - Sepsis, unspecified organism; R65.20 - Severe sepsis without 

septic shock


Status:  Acute


Plan:  Sepsis present on admission, the patient presented with leukocytosis and 

tachycardia as well as after mental status.


Initially admitted to the intensive care unit, intubated and cared for by the 

intensivists.


Sepsis found to be secondary to meningitis.


Continue IV antibiotics as per infectious disease recommendations.


The patient is currently on IV ampicillin, oxacillin, gentamicin and rifampin - 

continue.


Unless more positive blood cx anticipate 6 week from 1st neg (8/24) followed by

  indefinite oral abx suppression tx. IV abx stop date tentatively.


Gentamycin trough 2.0








(2) MSSA meningitis


Status:  Acute


Plan:  As above.





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


Status:  Acute


Plan:  IV antibiotics as per infectious disease as above.





(4) SVT (supraventricular tachycardia)


ICD Code:  I47.1 - Supraventricular tachycardia


Status:  Resolved


Plan:  SVT now resolved.  Continue metoprolol.


Echocardiogram 8 foci 17 revealed an ejection fraction showed 25-30%.  PAP 34 

mmHg


Initially treated with vasopressors which the patient is now off.


The patient started on Lasix 20 minute grams by mouth daily.  Continue.





(5) Postextubation stridor


ICD Code:  J95.89 - Other postprocedural complications and disorders of 

respiratory system, not elsewhere classified


Status:  Resolved


Plan:  The patient initially extubated on 9/3/17.  The patient was administered 

racemic epinephrine as needed for stridor or


Continue albuterol/ipratropium inhaled treatments every 6 hours with every 2 

hours as needed for albuterol therapy.


Status post today treatment of dexamethasone IV every 8 hours.





(6) Acute hypoxemic respiratory failure


ICD Code:  J96.01 - Acute respiratory failure with hypoxia


Status:  Resolved


Plan:  Continue supplemental oxygen to keep oxygen more than 92%.


Status post intubation and extubation.


Continue DuoNeb treatments.





(7) CVA (cerebral vascular accident)


ICD Code:  I63.9 - Cerebral infarction, unspecified


Plan:  CT head negative, MRI small acute and subacute cortical infarct of the 

left parietal lobe.


The patient was seen and evaluated by neurology.


The patient is on a scheduled methadone 10 mg twice today.





(8) Tricuspid regurgitation


ICD Code:  I07.1 - Rheumatic tricuspid insufficiency


Status:  Acute


Plan:  As seen on echo described above.  Continue oral Lasix.





(9) Acute systolic heart failure


ICD Code:  I50.21 - Acute systolic (congestive) heart failure


Plan:  Continue Lasix orally. continue Lasix 40 mg po daily.





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


ICD Code:  T82.6XXA - Infection and inflammatory reaction due to cardiac valve 

prosthesis, initial encounter


Status:  Acute


Plan:  The patient has history of IV drug abuse.


Continue antibiotics as per ID recommendations. Tentative a stop date is 10/5





(11) NOÉ (acute kidney injury)


ICD Code:  N17.9 - Acute kidney failure, unspecified


Status:  Resolved


Plan:  Now resolved.  Continue to monitor BMP





(12) Protein calorie malnutrition


ICD Code:  E46 - Unspecified protein-calorie malnutrition


Plan:  Secondary to long-standing IV drug abuse and endocarditis.





(13) Microcytic anemia


ICD Code:  D50.9 - Iron deficiency anemia, unspecified


Plan:  Hemoglobin seems to be stable at 8.6.  I will check iron studies and 

stool Hemoccult for blood.


Patient had iron studies previously concordant with iron deficiency anemia.  I 

will start the patient on oral iron sulfate.





(14) Hyperphosphatemia


ICD Code:  E83.39 - Other disorders of phosphorus metabolism


Plan:  Now resolved.  Continue to monitor levels.





(15) Hypernatremia


ICD Code:  E87.0 - Hyperosmolality and hypernatremia


Plan:  Likely secondary to dehydration. 


Sodium now within normal range.  Continue to monitor BMP.





(16) IV drug abuse


ICD Code:  F19.10 - Other psychoactive substance abuse, uncomplicated


Status:  Acute


Plan:  Advised cessation.  Currently on methadone.





(17) Headache


ICD Code:  R51 - Headache


Plan:  Headache is bifrontal, patient is afebrile.


Continue Acetaminophen 1000 mg po Q 6 hrs prn headache.





(18) Abdominal pain 


ICD Code:  R10.9 - Unspecified abdominal pain


Plan:  check CT abdomen and pelvis. Check stool for C diff PCR.





(19) Diarrhea, improving 


Plan: 


C diff. is negative  Continue probiotic. Add Imodium  





(20) Anasarca.


Continue lasix po.  Will give extra dose of lasix 40 mg by IV. Monitor UPO and 

kidney function closely. 





Assessment and Plan


GI prophylaxis: Continue famotidine.


DVT prophylaxis: SCDs, ambulation.








DC plan: needs inpatient treatment. DC when cleared by ID specialist.





Problem Qualifiers





(1) CVA (cerebral vascular accident):  


Qualified Codes:  I63.9 - Cerebral infarction, unspecified


(2) Protein calorie malnutrition:  


Qualified Codes:  E44.0 - Moderate protein-calorie malnutrition








Jyothi Saunders MD Sep 13, 2017 13:49

## 2017-09-14 VITALS
HEART RATE: 89 BPM | RESPIRATION RATE: 18 BRPM | SYSTOLIC BLOOD PRESSURE: 105 MMHG | DIASTOLIC BLOOD PRESSURE: 78 MMHG | OXYGEN SATURATION: 97 % | TEMPERATURE: 98.3 F

## 2017-09-14 VITALS
DIASTOLIC BLOOD PRESSURE: 73 MMHG | RESPIRATION RATE: 16 BRPM | OXYGEN SATURATION: 96 % | TEMPERATURE: 98.6 F | SYSTOLIC BLOOD PRESSURE: 113 MMHG | HEART RATE: 102 BPM

## 2017-09-14 VITALS
OXYGEN SATURATION: 100 % | HEART RATE: 91 BPM | DIASTOLIC BLOOD PRESSURE: 70 MMHG | SYSTOLIC BLOOD PRESSURE: 120 MMHG | RESPIRATION RATE: 16 BRPM | TEMPERATURE: 98.4 F

## 2017-09-14 VITALS
DIASTOLIC BLOOD PRESSURE: 74 MMHG | OXYGEN SATURATION: 98 % | SYSTOLIC BLOOD PRESSURE: 110 MMHG | RESPIRATION RATE: 18 BRPM | TEMPERATURE: 98.7 F | HEART RATE: 89 BPM

## 2017-09-14 VITALS
RESPIRATION RATE: 22 BRPM | SYSTOLIC BLOOD PRESSURE: 107 MMHG | DIASTOLIC BLOOD PRESSURE: 66 MMHG | TEMPERATURE: 100.1 F | OXYGEN SATURATION: 98 % | HEART RATE: 95 BPM

## 2017-09-14 VITALS
DIASTOLIC BLOOD PRESSURE: 75 MMHG | OXYGEN SATURATION: 98 % | RESPIRATION RATE: 20 BRPM | TEMPERATURE: 98.7 F | SYSTOLIC BLOOD PRESSURE: 116 MMHG | HEART RATE: 88 BPM

## 2017-09-14 RX ADMIN — HEPARIN SODIUM SCH UNITS: 10000 INJECTION, SOLUTION INTRAVENOUS; SUBCUTANEOUS at 17:07

## 2017-09-14 RX ADMIN — AMPICILLIN SODIUM SCH MLS/HR: 2 INJECTION, POWDER, FOR SOLUTION INTRAMUSCULAR; INTRAVENOUS at 17:07

## 2017-09-14 RX ADMIN — MORPHINE SULFATE PRN MG: 2 INJECTION, SOLUTION INTRAMUSCULAR; INTRAVENOUS at 13:46

## 2017-09-14 RX ADMIN — AMPICILLIN SODIUM SCH MLS/HR: 2 INJECTION, POWDER, FOR SOLUTION INTRAMUSCULAR; INTRAVENOUS at 13:17

## 2017-09-14 RX ADMIN — AMPICILLIN SODIUM SCH MLS/HR: 2 INJECTION, POWDER, FOR SOLUTION INTRAMUSCULAR; INTRAVENOUS at 20:50

## 2017-09-14 RX ADMIN — RIFAMPIN SCH MG: 150 CAPSULE ORAL at 20:57

## 2017-09-14 RX ADMIN — HEPARIN SODIUM SCH UNITS: 10000 INJECTION, SOLUTION INTRAVENOUS; SUBCUTANEOUS at 09:06

## 2017-09-14 RX ADMIN — Medication SCH ML: at 20:52

## 2017-09-14 RX ADMIN — MORPHINE SULFATE PRN MG: 2 INJECTION, SOLUTION INTRAMUSCULAR; INTRAVENOUS at 02:05

## 2017-09-14 RX ADMIN — OXACILLIN SODIUM SCH MLS/HR: 2 INJECTION, POWDER, FOR SOLUTION INTRAMUSCULAR; INTRAVENOUS at 21:04

## 2017-09-14 RX ADMIN — OXACILLIN SODIUM SCH MLS/HR: 2 INJECTION, POWDER, FOR SOLUTION INTRAMUSCULAR; INTRAVENOUS at 17:09

## 2017-09-14 RX ADMIN — AMPICILLIN SODIUM SCH MLS/HR: 2 INJECTION, POWDER, FOR SOLUTION INTRAMUSCULAR; INTRAVENOUS at 04:40

## 2017-09-14 RX ADMIN — AMPICILLIN SODIUM SCH MLS/HR: 2 INJECTION, POWDER, FOR SOLUTION INTRAMUSCULAR; INTRAVENOUS at 01:01

## 2017-09-14 RX ADMIN — RIFAMPIN SCH MG: 150 CAPSULE ORAL at 09:05

## 2017-09-14 RX ADMIN — MORPHINE SULFATE PRN MG: 2 INJECTION, SOLUTION INTRAMUSCULAR; INTRAVENOUS at 10:57

## 2017-09-14 RX ADMIN — CHLORHEXIDINE GLUCONATE SCH PACK: 500 CLOTH TOPICAL at 04:00

## 2017-09-14 RX ADMIN — POTASSIUM CHLORIDE SCH MEQ: 20 TABLET, EXTENDED RELEASE ORAL at 09:05

## 2017-09-14 RX ADMIN — METHADONE HYDROCHLORIDE SCH MG: 5 SOLUTION ORAL at 09:09

## 2017-09-14 RX ADMIN — Medication SCH ML: at 09:00

## 2017-09-14 RX ADMIN — FAMOTIDINE SCH MG: 20 TABLET, FILM COATED ORAL at 09:04

## 2017-09-14 RX ADMIN — OXACILLIN SODIUM SCH MLS/HR: 2 INJECTION, POWDER, FOR SOLUTION INTRAMUSCULAR; INTRAVENOUS at 09:32

## 2017-09-14 RX ADMIN — POLYVINYL ALCOHOL SCH DROP: 14 SOLUTION/ DROPS OPHTHALMIC at 13:00

## 2017-09-14 RX ADMIN — POLYVINYL ALCOHOL SCH DROP: 14 SOLUTION/ DROPS OPHTHALMIC at 17:07

## 2017-09-14 RX ADMIN — GENTAMICIN SULFATE SCH MLS/HR: 0.8 INJECTION, SOLUTION INTRAVENOUS at 10:57

## 2017-09-14 RX ADMIN — FUROSEMIDE SCH MG: 20 TABLET ORAL at 09:06

## 2017-09-14 RX ADMIN — METHADONE HYDROCHLORIDE SCH MG: 5 SOLUTION ORAL at 21:05

## 2017-09-14 RX ADMIN — MORPHINE SULFATE PRN MG: 2 INJECTION, SOLUTION INTRAMUSCULAR; INTRAVENOUS at 06:11

## 2017-09-14 RX ADMIN — STANDARDIZED SENNA CONCENTRATE AND DOCUSATE SODIUM SCH TAB: 8.6; 5 TABLET, FILM COATED ORAL at 20:57

## 2017-09-14 RX ADMIN — OXACILLIN SODIUM SCH MLS/HR: 2 INJECTION, POWDER, FOR SOLUTION INTRAMUSCULAR; INTRAVENOUS at 13:20

## 2017-09-14 RX ADMIN — AMPICILLIN SODIUM SCH MLS/HR: 2 INJECTION, POWDER, FOR SOLUTION INTRAMUSCULAR; INTRAVENOUS at 09:07

## 2017-09-14 RX ADMIN — FAMOTIDINE SCH MG: 20 TABLET, FILM COATED ORAL at 20:57

## 2017-09-14 RX ADMIN — HEPARIN SODIUM SCH UNITS: 10000 INJECTION, SOLUTION INTRAVENOUS; SUBCUTANEOUS at 01:07

## 2017-09-14 RX ADMIN — OXACILLIN SODIUM SCH MLS/HR: 2 INJECTION, POWDER, FOR SOLUTION INTRAMUSCULAR; INTRAVENOUS at 04:54

## 2017-09-14 RX ADMIN — MORPHINE SULFATE PRN MG: 2 INJECTION, SOLUTION INTRAMUSCULAR; INTRAVENOUS at 17:07

## 2017-09-14 RX ADMIN — POLYVINYL ALCOHOL SCH DROP: 14 SOLUTION/ DROPS OPHTHALMIC at 09:00

## 2017-09-14 RX ADMIN — OXACILLIN SODIUM SCH MLS/HR: 2 INJECTION, POWDER, FOR SOLUTION INTRAMUSCULAR; INTRAVENOUS at 01:04

## 2017-09-14 RX ADMIN — MORPHINE SULFATE PRN MG: 2 INJECTION, SOLUTION INTRAMUSCULAR; INTRAVENOUS at 20:51

## 2017-09-14 RX ADMIN — STANDARDIZED SENNA CONCENTRATE AND DOCUSATE SODIUM SCH TAB: 8.6; 5 TABLET, FILM COATED ORAL at 09:05

## 2017-09-14 NOTE — HHI.PR
Subjective


Remarks


Says LE edema is improving. No n/v/d/c. Has some intermittent chest pain. SOB 

at baseline. 


No fever or  chills.





Objective


Vitals





Vital Signs








  Date Time  Temp Pulse Resp B/P (MAP) Pulse Ox O2 Delivery O2 Flow Rate FiO2


 


9/14/17 08:00 98.3 89 18 105/78 (87) 97   


 


9/14/17 05:11 98.4 91 16 120/70 (87) 100   


 


9/13/17 23:48 98.7 79 16 115/74 (88) 96   


 


9/13/17 22:00 98.7 99 16 120/71 (87) 96   


 


9/13/17 20:00  98      


 


9/13/17 20:00      Room Air  


 


9/13/17 16:00 98.4 97 20 110/86 (94) 97   


 


9/13/17 12:00 98.6 88 18 121/78 (92) 98   


 


9/13/17 10:00  102      














I/O      


 


 9/13/17 9/13/17 9/13/17 9/14/17 9/14/17 9/14/17





 06:59 14:59 22:59 06:59 14:59 22:59


 


Intake Total 720 ml 200 ml 400 ml 1940 ml  


 


Output Total 2400 ml   2050 ml  


 


Balance -1680 ml 200 ml 400 ml -110 ml  


 


      


 


Intake Oral 720 ml   1440 ml  


 


IV Total  200 ml 400 ml 500 ml  


 


Output Urine Total 2400 ml   2050 ml  


 


# Bowel Movements 1   0  








Result Diagram:  


9/13/17 0948                                                                   

             9/13/17 0948





Imaging





Last Impressions








Abdomen CT 9/8/17 0000 Signed





Impressions: 





 Service Date/Time:  Friday, September 8, 2017 20:26 - CONCLUSION:  1. 

Peripheral 





 low attenuation lesions in the spleen most characteristic of multiple splenic 





 infarcts. Some of these are new findings since 2016 comparison. 2. Minimal 





 subsegmental airspace disease at the lung bases. 3. 3.4 cm right adnexal cyst. 





 Intrauterine device present. 4. Small fat containing ventral hernias.     

Jaspal Issa MD 


 


Chest X-Ray 9/5/17 0000 Signed





Impressions: 





 Service Date/Time:  Tuesday, September 5, 2017 08:07 - CONCLUSION:  Suspected 





 mild edema/failure.     Ron Swan MD 


 


Neck CT 9/2/17 0000 Signed





Impressions: 





 Service Date/Time:  Saturday, September 2, 2017 11:13 - CONCLUSION:  1. 

Patient 





 is intubated which limits this study. The epiglottis is otherwise normal. 





 Secretions are present in the oropharynx and right sphenoid sinus.     Ej Ewing MD 


 


Brain MRI 8/25/17 0000 Signed





Impressions: 





 Service Date/Time:  Friday, August 25, 2017 17:07 - CONCLUSION:  1. Small 

acute 





 or subacute cortical infarct of the left parietal lobe. 2. Small, faint area 

of 





 nonspecific flair signal abnormality in the left frontal lobe periventricular 





 white matter, nonspecific. No associated mass effect or abnormal enhancement. 

3 





 month followup MRI of the brain recommended. 3.  Mild chronic white matter 





 changes.     Ron Swan MD 


 


Head CT 8/21/17 0000 Signed





Impressions: 





 Service Date/Time:  Monday, August 21, 2017 19:47 - CONCLUSION:  No evidence 

of 





 acute infarct, hemorrhage, mass or edema.     Kristian Frankel MD 








Objective Remarks


GENERAL:  Young female, appears in nad. 


SKIN: Warm and dry.


CARDIOVASCULAR: Regular rate and rhythm with systolic  murmur. 


RESPIRATORY: Decreased breath sound BL, no crackles or wheezing auscultated. No 

accessory muscle use.


GASTROINTESTINAL: Abdomen soft, non-tender, nondistended. 


MUSCULOSKELETAL: No cyanosis. +3  LE  edema. 


BACK: Nontender without obvious deformity. No CVA tenderness.


Procedures


None





A/P


Problem List:  


(1) Severe sepsis


ICD Code:  A41.9 - Sepsis, unspecified organism; R65.20 - Severe sepsis without 

septic shock


Status:  Acute


(2) MSSA meningitis


Status:  Acute


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


Status:  Acute


(4) SVT (supraventricular tachycardia)


ICD Code:  I47.1 - Supraventricular tachycardia


Status:  Resolved


(5) Postextubation stridor


ICD Code:  J95.89 - Other postprocedural complications and disorders of 

respiratory system, not elsewhere classified


Status:  Resolved


(6) Acute hypoxemic respiratory failure


ICD Code:  J96.01 - Acute respiratory failure with hypoxia


Status:  Resolved


(7) CVA (cerebral vascular accident)


ICD Code:  I63.9 - Cerebral infarction, unspecified


(8) Tricuspid regurgitation


ICD Code:  I07.1 - Rheumatic tricuspid insufficiency


Status:  Acute


(9) Acute systolic heart failure


ICD Code:  I50.21 - Acute systolic (congestive) heart failure


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


ICD Code:  T82.6XXA - Infection and inflammatory reaction due to cardiac valve 

prosthesis, initial encounter


Status:  Acute


(11) NOÉ (acute kidney injury)


ICD Code:  N17.9 - Acute kidney failure, unspecified


Status:  Resolved


(12) Protein calorie malnutrition


ICD Code:  E46 - Unspecified protein-calorie malnutrition


(13) Microcytic anemia


ICD Code:  D50.9 - Iron deficiency anemia, unspecified


(14) Hyperphosphatemia


ICD Code:  E83.39 - Other disorders of phosphorus metabolism


(15) Hypernatremia


ICD Code:  E87.0 - Hyperosmolality and hypernatremia


(16) IV drug abuse


ICD Code:  F19.10 - Other psychoactive substance abuse, uncomplicated


Status:  Acute


(17) Headache


ICD Code:  R51 - Headache


(18) Abdominal pain


ICD Code:  R10.9 - Unspecified abdominal pain


Assessment and Plan








(1) Severe sepsis


Multiple splenic infarcts 


MSSA meningitis 


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


Prosthetic valve endocarditis


ICD Code:  A41.9 - Sepsis, unspecified organism; R65.20 - Severe sepsis without 

septic shock


Status:  Acute


Plan:  Sepsis present on admission, the patient presented with leukocytosis and 

tachycardia as well as after mental status.


Initially admitted to the intensive care unit, intubated and cared for by the 

intensivists.


Sepsis found to be secondary to meningitis.


Continue IV antibiotics as per infectious disease recommendations.


The patient is currently on IV ampicillin, oxacillin, gentamicin and rifampin - 

continue.


Unless more positive blood cx anticipate 6 week from 1st neg (8/24) followed by

  indefinite oral abx suppression tx. IV abx stop date tentatively.


Gentamycin trough 2.0








(2) MSSA meningitis


Status:  Acute


Plan:  As above.





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


Status:  Acute


Plan:  IV antibiotics as per infectious disease as above.





(4) SVT (supraventricular tachycardia)


ICD Code:  I47.1 - Supraventricular tachycardia


Status:  Resolved


Plan:  SVT now resolved.  Continue metoprolol.


Echocardiogram 8 foci 17 revealed an ejection fraction showed 25-30%.  PAP 34 

mmHg


Initially treated with vasopressors which the patient is now off.


The patient started on Lasix 20 minute grams by mouth daily.  Continue.





(5) Postextubation stridor


ICD Code:  J95.89 - Other postprocedural complications and disorders of 

respiratory system, not elsewhere classified


Status:  Resolved


Plan:  The patient initially extubated on 9/3/17.  The patient was administered 

racemic epinephrine as needed for stridor or


Continue albuterol/ipratropium inhaled treatments every 6 hours with every 2 

hours as needed for albuterol therapy.


Status post today treatment of dexamethasone IV every 8 hours.





(6) Acute hypoxemic respiratory failure


ICD Code:  J96.01 - Acute respiratory failure with hypoxia


Status:  Resolved


Plan:  Continue supplemental oxygen to keep oxygen more than 92%.


Status post intubation and extubation.


Continue DuoNeb treatments.





(7) CVA (cerebral vascular accident)


ICD Code:  I63.9 - Cerebral infarction, unspecified


Plan:  CT head negative, MRI small acute and subacute cortical infarct of the 

left parietal lobe.


The patient was seen and evaluated by neurology.


The patient is on a scheduled methadone 10 mg twice today.





(8) Tricuspid regurgitation


ICD Code:  I07.1 - Rheumatic tricuspid insufficiency


Status:  Acute


Plan:  As seen on echo described above.  Continue oral Lasix.





(9) Acute systolic heart failure


ICD Code:  I50.21 - Acute systolic (congestive) heart failure


Plan:  Continue Lasix orally. continue Lasix 40 mg po daily.





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


ICD Code:  T82.6XXA - Infection and inflammatory reaction due to cardiac valve 

prosthesis, initial encounter


Status:  Acute


Plan:  The patient has history of IV drug abuse.


Continue antibiotics as per ID recommendations. Tentative a stop date is 10/5





(11) NOÉ (acute kidney injury)


ICD Code:  N17.9 - Acute kidney failure, unspecified


Status:  Resolved


Plan:  Now resolved.  Continue to monitor BMP





(12) Protein calorie malnutrition


ICD Code:  E46 - Unspecified protein-calorie malnutrition


Plan:  Secondary to long-standing IV drug abuse and endocarditis.





(13) Microcytic anemia


ICD Code:  D50.9 - Iron deficiency anemia, unspecified


Plan:  Hemoglobin seems to be stable at 8.6.  I will check iron studies and 

stool Hemoccult for blood.


Patient had iron studies previously concordant with iron deficiency anemia.  I 

will start the patient on oral iron sulfate.





(14) Hyperphosphatemia


ICD Code:  E83.39 - Other disorders of phosphorus metabolism


Plan:  Now resolved.  Continue to monitor levels.





(15) Hypernatremia


ICD Code:  E87.0 - Hyperosmolality and hypernatremia


Plan:  Likely secondary to dehydration. 


Sodium now within normal range.  Continue to monitor BMP.





(16) IV drug abuse


ICD Code:  F19.10 - Other psychoactive substance abuse, uncomplicated


Status:  Acute


Plan:  Advised cessation.  Currently on methadone.





(17) Headache


ICD Code:  R51 - Headache


Plan:  Headache is bifrontal, patient is afebrile.


Continue Acetaminophen 1000 mg po Q 6 hrs prn headache.





(18) Abdominal pain 


ICD Code:  R10.9 - Unspecified abdominal pain


Plan:  check CT abdomen and pelvis. Check stool for C diff PCR.





(19) Diarrhea, improving 


Plan: 


C diff. is negative  Continue probiotic. Add Imodium  





(20) Anasarca.


Continue lasix po.  Monitor UPO and kidney function closely. 





Assessment and Plan


GI prophylaxis: Continue famotidine.


DVT prophylaxis: SCDs, ambulation.








DC plan: needs inpatient treatment. DC when cleared by ID specialist.





Problem Qualifiers





(1) CVA (cerebral vascular accident):  


Qualified Codes:  I63.9 - Cerebral infarction, unspecified


(2) Protein calorie malnutrition:  


Qualified Codes:  E44.0 - Moderate protein-calorie malnutrition








Jyothi Saunders MD Sep 14, 2017 09:29

## 2017-09-15 VITALS
DIASTOLIC BLOOD PRESSURE: 68 MMHG | SYSTOLIC BLOOD PRESSURE: 110 MMHG | TEMPERATURE: 98.1 F | OXYGEN SATURATION: 98 % | HEART RATE: 94 BPM | RESPIRATION RATE: 20 BRPM

## 2017-09-15 VITALS
SYSTOLIC BLOOD PRESSURE: 107 MMHG | OXYGEN SATURATION: 100 % | HEART RATE: 97 BPM | TEMPERATURE: 98.2 F | RESPIRATION RATE: 20 BRPM | DIASTOLIC BLOOD PRESSURE: 70 MMHG

## 2017-09-15 VITALS
OXYGEN SATURATION: 100 % | TEMPERATURE: 98 F | RESPIRATION RATE: 20 BRPM | SYSTOLIC BLOOD PRESSURE: 118 MMHG | HEART RATE: 92 BPM | DIASTOLIC BLOOD PRESSURE: 76 MMHG

## 2017-09-15 VITALS
DIASTOLIC BLOOD PRESSURE: 86 MMHG | OXYGEN SATURATION: 97 % | SYSTOLIC BLOOD PRESSURE: 117 MMHG | TEMPERATURE: 98 F | RESPIRATION RATE: 20 BRPM | HEART RATE: 93 BPM

## 2017-09-15 VITALS
RESPIRATION RATE: 20 BRPM | OXYGEN SATURATION: 100 % | HEART RATE: 95 BPM | SYSTOLIC BLOOD PRESSURE: 112 MMHG | TEMPERATURE: 98 F | DIASTOLIC BLOOD PRESSURE: 77 MMHG

## 2017-09-15 LAB
ANION GAP SERPL CALC-SCNC: 11 MEQ/L (ref 5–15)
BASOPHILS # BLD AUTO: 0.1 TH/MM3 (ref 0–0.2)
BASOPHILS NFR BLD: 0.9 % (ref 0–2)
BUN SERPL-MCNC: 11 MG/DL (ref 7–18)
CHLORIDE SERPL-SCNC: 100 MEQ/L (ref 98–107)
EOSINOPHIL # BLD: 0.2 TH/MM3 (ref 0–0.4)
EOSINOPHIL NFR BLD: 1.6 % (ref 0–4)
ERYTHROCYTE [DISTWIDTH] IN BLOOD BY AUTOMATED COUNT: 17.5 % (ref 11.6–17.2)
GFR SERPLBLD BASED ON 1.73 SQ M-ARVRAT: 46 ML/MIN (ref 89–?)
HCO3 BLD-SCNC: 25.4 MEQ/L (ref 21–32)
HCT VFR BLD CALC: 30.9 % (ref 35–46)
HEMO FLAGS: (no result)
LYMPHOCYTES # BLD AUTO: 1.5 TH/MM3 (ref 1–4.8)
LYMPHOCYTES NFR BLD AUTO: 12.9 % (ref 9–44)
MCH RBC QN AUTO: 24.2 PG (ref 27–34)
MCHC RBC AUTO-ENTMCNC: 31.9 % (ref 32–36)
MCV RBC AUTO: 75.8 FL (ref 80–100)
MONOCYTES NFR BLD: 11.2 % (ref 0–8)
NEUTROPHILS # BLD AUTO: 8.4 TH/MM3 (ref 1.8–7.7)
NEUTROPHILS NFR BLD AUTO: 73.4 % (ref 16–70)
PLATELET # BLD: 303 TH/MM3 (ref 150–450)
POTASSIUM SERPL-SCNC: 3.9 MEQ/L (ref 3.5–5.1)
RBC # BLD AUTO: 4.08 MIL/MM3 (ref 4–5.3)
SCAN/DIFF: (no result)
SODIUM SERPL-SCNC: 136 MEQ/L (ref 136–145)
WBC # BLD AUTO: 11.4 TH/MM3 (ref 4–11)

## 2017-09-15 RX ADMIN — AMPICILLIN SODIUM SCH MLS/HR: 2 INJECTION, POWDER, FOR SOLUTION INTRAMUSCULAR; INTRAVENOUS at 18:43

## 2017-09-15 RX ADMIN — AMPICILLIN SODIUM SCH MLS/HR: 2 INJECTION, POWDER, FOR SOLUTION INTRAMUSCULAR; INTRAVENOUS at 20:36

## 2017-09-15 RX ADMIN — OXACILLIN SODIUM SCH MLS/HR: 2 INJECTION, POWDER, FOR SOLUTION INTRAMUSCULAR; INTRAVENOUS at 18:48

## 2017-09-15 RX ADMIN — POTASSIUM CHLORIDE SCH MEQ: 20 TABLET, EXTENDED RELEASE ORAL at 09:01

## 2017-09-15 RX ADMIN — POLYVINYL ALCOHOL SCH DROP: 14 SOLUTION/ DROPS OPHTHALMIC at 09:00

## 2017-09-15 RX ADMIN — Medication SCH ML: at 20:37

## 2017-09-15 RX ADMIN — HEPARIN SODIUM SCH UNITS: 10000 INJECTION, SOLUTION INTRAVENOUS; SUBCUTANEOUS at 18:00

## 2017-09-15 RX ADMIN — AMPICILLIN SODIUM SCH MLS/HR: 2 INJECTION, POWDER, FOR SOLUTION INTRAMUSCULAR; INTRAVENOUS at 01:00

## 2017-09-15 RX ADMIN — STANDARDIZED SENNA CONCENTRATE AND DOCUSATE SODIUM SCH TAB: 8.6; 5 TABLET, FILM COATED ORAL at 09:00

## 2017-09-15 RX ADMIN — HEPARIN SODIUM SCH UNITS: 10000 INJECTION, SOLUTION INTRAVENOUS; SUBCUTANEOUS at 02:00

## 2017-09-15 RX ADMIN — STANDARDIZED SENNA CONCENTRATE AND DOCUSATE SODIUM SCH TAB: 8.6; 5 TABLET, FILM COATED ORAL at 20:23

## 2017-09-15 RX ADMIN — FAMOTIDINE SCH MG: 20 TABLET, FILM COATED ORAL at 09:01

## 2017-09-15 RX ADMIN — MORPHINE SULFATE PRN MG: 2 INJECTION, SOLUTION INTRAMUSCULAR; INTRAVENOUS at 18:47

## 2017-09-15 RX ADMIN — CHLORHEXIDINE GLUCONATE SCH PACK: 500 CLOTH TOPICAL at 04:00

## 2017-09-15 RX ADMIN — GENTAMICIN SULFATE SCH MLS/HR: 0.8 INJECTION, SOLUTION INTRAVENOUS at 12:13

## 2017-09-15 RX ADMIN — Medication SCH ML: at 09:00

## 2017-09-15 RX ADMIN — MORPHINE SULFATE PRN MG: 2 INJECTION, SOLUTION INTRAMUSCULAR; INTRAVENOUS at 21:52

## 2017-09-15 RX ADMIN — METHADONE HYDROCHLORIDE SCH MG: 5 SOLUTION ORAL at 09:03

## 2017-09-15 RX ADMIN — OXACILLIN SODIUM SCH MLS/HR: 2 INJECTION, POWDER, FOR SOLUTION INTRAMUSCULAR; INTRAVENOUS at 05:37

## 2017-09-15 RX ADMIN — RIFAMPIN SCH MG: 150 CAPSULE ORAL at 20:23

## 2017-09-15 RX ADMIN — AMPICILLIN SODIUM SCH MLS/HR: 2 INJECTION, POWDER, FOR SOLUTION INTRAMUSCULAR; INTRAVENOUS at 12:14

## 2017-09-15 RX ADMIN — MORPHINE SULFATE PRN MG: 2 INJECTION, SOLUTION INTRAMUSCULAR; INTRAVENOUS at 05:42

## 2017-09-15 RX ADMIN — MORPHINE SULFATE PRN MG: 2 INJECTION, SOLUTION INTRAMUSCULAR; INTRAVENOUS at 12:17

## 2017-09-15 RX ADMIN — MORPHINE SULFATE PRN MG: 2 INJECTION, SOLUTION INTRAMUSCULAR; INTRAVENOUS at 15:29

## 2017-09-15 RX ADMIN — OXACILLIN SODIUM SCH MLS/HR: 2 INJECTION, POWDER, FOR SOLUTION INTRAMUSCULAR; INTRAVENOUS at 12:14

## 2017-09-15 RX ADMIN — OXACILLIN SODIUM SCH MLS/HR: 2 INJECTION, POWDER, FOR SOLUTION INTRAMUSCULAR; INTRAVENOUS at 09:05

## 2017-09-15 RX ADMIN — RIFAMPIN SCH MG: 150 CAPSULE ORAL at 09:00

## 2017-09-15 RX ADMIN — HEPARIN SODIUM SCH UNITS: 10000 INJECTION, SOLUTION INTRAVENOUS; SUBCUTANEOUS at 09:06

## 2017-09-15 RX ADMIN — MORPHINE SULFATE PRN MG: 2 INJECTION, SOLUTION INTRAMUSCULAR; INTRAVENOUS at 01:49

## 2017-09-15 RX ADMIN — POLYVINYL ALCOHOL SCH DROP: 14 SOLUTION/ DROPS OPHTHALMIC at 12:17

## 2017-09-15 RX ADMIN — POLYVINYL ALCOHOL SCH DROP: 14 SOLUTION/ DROPS OPHTHALMIC at 17:39

## 2017-09-15 RX ADMIN — FUROSEMIDE SCH MG: 20 TABLET ORAL at 09:01

## 2017-09-15 RX ADMIN — OXACILLIN SODIUM SCH MLS/HR: 2 INJECTION, POWDER, FOR SOLUTION INTRAMUSCULAR; INTRAVENOUS at 02:00

## 2017-09-15 RX ADMIN — GENTAMICIN SULFATE SCH MLS/HR: 0.8 INJECTION, SOLUTION INTRAVENOUS at 00:00

## 2017-09-15 RX ADMIN — OXACILLIN SODIUM SCH MLS/HR: 2 INJECTION, POWDER, FOR SOLUTION INTRAMUSCULAR; INTRAVENOUS at 21:52

## 2017-09-15 RX ADMIN — METHADONE HYDROCHLORIDE SCH MG: 5 SOLUTION ORAL at 20:25

## 2017-09-15 RX ADMIN — AMPICILLIN SODIUM SCH MLS/HR: 2 INJECTION, POWDER, FOR SOLUTION INTRAMUSCULAR; INTRAVENOUS at 08:56

## 2017-09-15 RX ADMIN — AMPICILLIN SODIUM SCH MLS/HR: 2 INJECTION, POWDER, FOR SOLUTION INTRAMUSCULAR; INTRAVENOUS at 05:34

## 2017-09-15 RX ADMIN — FAMOTIDINE SCH MG: 20 TABLET, FILM COATED ORAL at 20:23

## 2017-09-15 RX ADMIN — MORPHINE SULFATE PRN MG: 2 INJECTION, SOLUTION INTRAMUSCULAR; INTRAVENOUS at 08:56

## 2017-09-15 NOTE — HHI.IDPN
Subjective


Subjective


Remarks


doing OK


co new L shoulder pain for few days to a week , worse with movement s


no fever


Antibiotics


ampicillin 


oxacillin 


gent 


rifampin


Allergies:  


Coded Allergies:  


     No Known Allergies (Verified , 2/24/16)





Objective


.





Vital Signs








  Date Time  Temp Pulse Resp B/P (MAP) Pulse Ox O2 Delivery O2 Flow Rate FiO2


 


9/15/17 10:03   18     


 


9/15/17 08:00 98.1 92 20 110/68 (82) 98   


 


9/15/17 08:00      Nasal Cannula  


 


9/15/17 04:00 98.2 97 20 107/70 (82) 100   


 


9/14/17 23:48 98.6 102 16 113/73 (86) 96   


 


9/14/17 20:15      Room Air  


 


9/14/17 20:00 100.1 95 22 107/66 (80) 98   


 


9/14/17 20:00  96      








.





Laboratory Tests








Test


  9/15/17


10:03


 


White Blood Count 11.4 TH/MM3 


 


Red Blood Count 4.08 MIL/MM3 


 


Hemoglobin 9.9 GM/DL 


 


Hematocrit 30.9 % 


 


Mean Corpuscular Volume 75.8 FL 


 


Mean Corpuscular Hemoglobin 24.2 PG 


 


Mean Corpuscular Hemoglobin


Concent 31.9 % 


 


 


Red Cell Distribution Width 17.5 % 


 


Platelet Count 303 TH/MM3 


 


Mean Platelet Volume 8.3 FL 


 


Neutrophils (%) (Auto) 73.4 % 


 


Lymphocytes (%) (Auto) 12.9 % 


 


Monocytes (%) (Auto) 11.2 % 


 


Eosinophils (%) (Auto) 1.6 % 


 


Basophils (%) (Auto) 0.9 % 


 


Neutrophils # (Auto) 8.4 TH/MM3 


 


Lymphocytes # (Auto) 1.5 TH/MM3 


 


Monocytes # (Auto) 1.3 TH/MM3 


 


Eosinophils # (Auto) 0.2 TH/MM3 


 


Basophils # (Auto) 0.1 TH/MM3 


 


CBC Comment AUTO DIFF 


 


Differential Comment


  AUTO DIFF


CONFIRMED


 


Hematology Comments  








Laboratory Tests








Test


  9/15/17


09:03


 


Blood Urea Nitrogen 11 MG/DL 


 


Creatinine 1.32 MG/DL 


 


Random Glucose 87 MG/DL 


 


Calcium Level 9.3 MG/DL 


 


Sodium Level 136 MEQ/L 


 


Potassium Level 3.9 MEQ/L 


 


Chloride Level 100 MEQ/L 


 


Carbon Dioxide Level 25.4 MEQ/L 


 


Anion Gap 11 MEQ/L 


 


Estimat Glomerular Filtration


Rate 46 ML/MIN 


 








Imaging


 


Last Impressions








Abdomen CT 9/8/17 0000 Signed





Impressions: 





 Service Date/Time:  Friday, September 8, 2017 20:26 - CONCLUSION:  1. 

Peripheral 





 low attenuation lesions in the spleen most characteristic of multiple splenic 





 infarcts. Some of these are new findings since 2016 comparison. 2. Minimal 





 subsegmental airspace disease at the lung bases. 3. 3.4 cm right adnexal cyst. 





 Intrauterine device present. 4. Small fat containing ventral hernias.     

Jaspal Issa MD 


 


Chest X-Ray 9/5/17 0000 Signed





Impressions: 





 Service Date/Time:  Tuesday, September 5, 2017 08:07 - CONCLUSION:  Suspected 





 mild edema/failure.     Ron Swan MD 


 


Neck CT 9/2/17 0000 Signed





Impressions: 





 Service Date/Time:  Saturday, September 2, 2017 11:13 - CONCLUSION:  1. 

Patient 





 is intubated which limits this study. The epiglottis is otherwise normal. 





 Secretions are present in the oropharynx and right sphenoid sinus.     Ej Ewing MD 


 


Brain MRI 8/25/17 0000 Signed





Impressions: 





 Service Date/Time:  Friday, August 25, 2017 17:07 - CONCLUSION:  1. Small 

acute 





 or subacute cortical infarct of the left parietal lobe. 2. Small, faint area 

of 





 nonspecific flair signal abnormality in the left frontal lobe periventricular 





 white matter, nonspecific. No associated mass effect or abnormal enhancement. 

3 





 month followup MRI of the brain recommended. 3.  Mild chronic white matter 





 changes.     Ron Swan MD 


 


Head CT 8/21/17 0000 Signed





Impressions: 





 Service Date/Time:  Monday, August 21, 2017 19:47 - CONCLUSION:  No evidence 

of 





 acute infarct, hemorrhage, mass or edema.     Kristian Frankel MD 








Physical Exam


CONSTITUTIONAL/GENERAL: awake alert, , in no apparent distress.  


TUBES/LINES/DRAINS:


SKIN: No jaundice, rashes, or lesions.   Skin temperature appropriate. Not 

diaphoretic. 


 multiple embolic  lesions cw septic embolization involving L lower leg and  

both feet - resolved 


 EYES: No nystagmus


Hearing OK


 CARDIOVASCULAR: Regular rate and rhythm w


+ 2-3 systo,ic murmur  no  gallops, or rubs. Mechanical heart sounds No JVD. 

Peripheral pulses symmetric.


RESPIRATORY/CHEST: Symmetric, unlabored respirations. Clear  to auscultation. 

Breath sounds equal bilaterally.  


GASTROINTESTINAL: Abdomen soft, non-tender,  moderately distended. No hepato-

splenomegaly, or palpable masses. No guarding. Bowel sounds present, hypoactive 


 GENITOURINARY: Without palpable bladder distension. Stokes catheter in place 

with clear yellow urine


MUSCULOSKELETAL: Extremities without clubbing, 


trace pedal  edema.


L shoulder without edema,erythema, normal landmarks


Full but painful ROM


+ crepitus on PROM 


 NEUROLOGICAL  fully awake and   communicates  Non focal following commands all 

4 extremeties


 PSYCHIATRIC: calm and cooperative








Assessment & Plan


Remarks


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

gent 


   - pt was previously infected with above organisms


Previousy multiple episodes of  PVE


   dw Dr Keenan hospitalised in April 2017 for tricuspid valve PVE  - [place] 

parapsolosis (March 15 thru April 15) , Streptoccii


   pt was Rx with combination diflucan 800 + micafungin 150, and was also on 

ambisionme at some point x 2 weeks and did not clear 


Confiormed  bacterial meningitis  2/2 MSSA - embolic ethiology


Acute VDRF,  resolved 


NOÉ, resolved 


Elevated troponine ? cardiac injury from infx


 Not a surgical candidate 


New issue : L shoulder pain: no effusion to suggest septic arthritis, likley 

musculoskeletal or DJD pain





    - chk diff to monitor eosinophilia  which can be aw curretn abx Rx


   cont  oxacillin + gentamin +RIfampin


    


   cont  ampicillin for Enterococci


    monitor closely creatinint and LFTs (2-3x/week)


   Unless more positive blood clx anticipate 6 week from 1st neg (8/24) 

followed by  indefinite oral abx suppression tx 


      IV abx stop date tenatively 10/5


         at least weekly CBC with diff (eosinophilia) , creatinine and LFTs 

while on the above Rx


    Monitor shoulder pain, if persists will get imaging study (MRI) 





dw Dr Nicky Teague,[place] GRULLON MD Sep 15, 2017 16:48

## 2017-09-15 NOTE — HHI.PR
Subjective


Remarks


In  the chair. Patient says she has some pain in her left arm, also she can't 

move it much. No fever or chills. Says she has intermittent chest pain and also 

pain in her upper abdomen. She is able to eat and tolerates food well. No fever 

or chills.  Patien tis requesting for more pain meds. 


Discussed with Dr Wilkes from ID, will eval patient and will decide if needs 

MRI of left shoulder. 


Also will consult palliative care for management of pain meds as patient 

requesting for more pain meds.





Objective


Vitals





Vital Signs








  Date Time  Temp Pulse Resp B/P (MAP) Pulse Ox O2 Delivery O2 Flow Rate FiO2


 


9/15/17 10:03   18     


 


9/15/17 08:00 98.1 92 20 110/68 (82) 98   


 


9/15/17 04:00 98.2 97 20 107/70 (82) 100   


 


9/14/17 23:48 98.6 102 16 113/73 (86) 96   


 


9/14/17 20:15      Room Air  


 


9/14/17 20:00 100.1 95 22 107/66 (80) 98   


 


9/14/17 20:00  96      


 


9/14/17 16:00 98.7 89 18 110/74 (86) 98   














I/O      


 


 9/14/17 9/14/17 9/14/17 9/15/17 9/15/17 9/15/17





 07:00 15:00 23:00 07:00 15:00 23:00


 


Intake Total 1940 ml 960 ml 480 ml 360 ml  


 


Output Total 2050 ml 1500 ml 700 ml 800 ml  


 


Balance -110 ml -540 ml -220 ml -440 ml  


 


      


 


Intake Oral 1440 ml 960 ml 480 ml 360 ml  


 


IV Total 500 ml     


 


Output Urine Total 2050 ml 1500 ml 700 ml 800 ml  


 


# Bowel Movements 0 1 0   








Result Diagram:  


9/15/17 1003                                                                   

             9/15/17 0903





Imaging





Last Impressions








Abdomen CT 9/8/17 0000 Signed





Impressions: 





 Service Date/Time:  Friday, September 8, 2017 20:26 - CONCLUSION:  1. 

Peripheral 





 low attenuation lesions in the spleen most characteristic of multiple splenic 





 infarcts. Some of these are new findings since 2016 comparison. 2. Minimal 





 subsegmental airspace disease at the lung bases. 3. 3.4 cm right adnexal cyst. 





 Intrauterine device present. 4. Small fat containing ventral hernias.     

Jaspal Issa MD 


 


Chest X-Ray 9/5/17 0000 Signed





Impressions: 





 Service Date/Time:  Tuesday, September 5, 2017 08:07 - CONCLUSION:  Suspected 





 mild edema/failure.     Ron Swan MD 


 


Neck CT 9/2/17 0000 Signed





Impressions: 





 Service Date/Time:  Saturday, September 2, 2017 11:13 - CONCLUSION:  1. 

Patient 





 is intubated which limits this study. The epiglottis is otherwise normal. 





 Secretions are present in the oropharynx and right sphenoid sinus.     Ej Ewing MD 


 


Brain MRI 8/25/17 0000 Signed





Impressions: 





 Service Date/Time:  Friday, August 25, 2017 17:07 - CONCLUSION:  1. Small 

acute 





 or subacute cortical infarct of the left parietal lobe. 2. Small, faint area 

of 





 nonspecific flair signal abnormality in the left frontal lobe periventricular 





 white matter, nonspecific. No associated mass effect or abnormal enhancement. 

3 





 month followup MRI of the brain recommended. 3.  Mild chronic white matter 





 changes.     Ron Swan MD 


 


Head CT 8/21/17 0000 Signed





Impressions: 





 Service Date/Time:  Monday, August 21, 2017 19:47 - CONCLUSION:  No evidence 

of 





 acute infarct, hemorrhage, mass or edema.     Kristian Frankel MD 








Objective Remarks


GENERAL:  Young female, appears in nad. 


SKIN: Warm and dry.


CARDIOVASCULAR: Regular rate and rhythm with systolic  murmur. 


RESPIRATORY: Decreased breath sound BL, no crackles or wheezing auscultated. No 

accessory muscle use.


GASTROINTESTINAL: Abdomen soft, non-tender, nondistended. 


MUSCULOSKELETAL: Left shoulder pain with palpation and movement, decreased ROM 2

/2 pain.  No cyanosis. 1+  LE  edema improving. 


BACK: Nontender without obvious deformity. No CVA tenderness.


Procedures


None





A/P


Problem List:  


(1) Severe sepsis


ICD Code:  A41.9 - Sepsis, unspecified organism; R65.20 - Severe sepsis without 

septic shock


Status:  Acute


(2) MSSA meningitis


Status:  Acute


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


Status:  Acute


(4) SVT (supraventricular tachycardia)


ICD Code:  I47.1 - Supraventricular tachycardia


Status:  Resolved


(5) Postextubation stridor


ICD Code:  J95.89 - Other postprocedural complications and disorders of 

respiratory system, not elsewhere classified


Status:  Resolved


(6) Acute hypoxemic respiratory failure


ICD Code:  J96.01 - Acute respiratory failure with hypoxia


Status:  Resolved


(7) CVA (cerebral vascular accident)


ICD Code:  I63.9 - Cerebral infarction, unspecified


(8) Tricuspid regurgitation


ICD Code:  I07.1 - Rheumatic tricuspid insufficiency


Status:  Acute


(9) Acute systolic heart failure


ICD Code:  I50.21 - Acute systolic (congestive) heart failure


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


ICD Code:  T82.6XXA - Infection and inflammatory reaction due to cardiac valve 

prosthesis, initial encounter


Status:  Acute


(11) NOÉ (acute kidney injury)


ICD Code:  N17.9 - Acute kidney failure, unspecified


Status:  Resolved


(12) Protein calorie malnutrition


ICD Code:  E46 - Unspecified protein-calorie malnutrition


(13) Microcytic anemia


ICD Code:  D50.9 - Iron deficiency anemia, unspecified


(14) Hyperphosphatemia


ICD Code:  E83.39 - Other disorders of phosphorus metabolism


(15) Hypernatremia


ICD Code:  E87.0 - Hyperosmolality and hypernatremia


(16) IV drug abuse


ICD Code:  F19.10 - Other psychoactive substance abuse, uncomplicated


Status:  Acute


(17) Headache


ICD Code:  R51 - Headache


(18) Abdominal pain


ICD Code:  R10.9 - Unspecified abdominal pain


Assessment and Plan








(1) Severe sepsis


Multiple splenic infarcts 


MSSA meningitis 


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


Prosthetic valve endocarditis


Left shoulder pain Discussed with Dr Wilkes from ID, will eval patient and 

will decide if needs MRI of left shoulder. 


Also will consult palliative care for management of pain meds as patient 

requesting for more pain meds. 





ICD Code:  A41.9 - Sepsis, unspecified organism; R65.20 - Severe sepsis without 

septic shock


Status:  Acute


Plan:  Sepsis present on admission, the patient presented with leukocytosis and 

tachycardia as well as after mental status.


Initially admitted to the intensive care unit, intubated and cared for by the 

intensivists.


Sepsis found to be secondary to meningitis.


Continue IV antibiotics as per infectious disease recommendations.


The patient is currently on IV ampicillin, oxacillin, gentamicin and rifampin - 

continue.


Unless more positive blood cx anticipate 6 week from 1st neg (8/24) followed by

  indefinite oral abx suppression tx. IV abx stop date tentatively.


Gentamycin trough 2.0








(2) MSSA meningitis


Status:  Acute


Plan:  As above.





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


Status:  Acute


Plan:  IV antibiotics as per infectious disease as above.





(4) SVT (supraventricular tachycardia)


ICD Code:  I47.1 - Supraventricular tachycardia


Status:  Resolved


Plan:  SVT now resolved.  Continue metoprolol.


Echocardiogram 8 foci 17 revealed an ejection fraction showed 25-30%.  PAP 34 

mmHg


Initially treated with vasopressors which the patient is now off.


The patient started on Lasix 20 minute grams by mouth daily.  Continue.





(5) Postextubation stridor


ICD Code:  J95.89 - Other postprocedural complications and disorders of 

respiratory system, not elsewhere classified


Status:  Resolved


Plan:  The patient initially extubated on 9/3/17.  The patient was administered 

racemic epinephrine as needed for stridor or


Continue albuterol/ipratropium inhaled treatments every 6 hours with every 2 

hours as needed for albuterol therapy.


Status post today treatment of dexamethasone IV every 8 hours.





(6) Acute hypoxemic respiratory failure


ICD Code:  J96.01 - Acute respiratory failure with hypoxia


Status:  Resolved


Plan:  Continue supplemental oxygen to keep oxygen more than 92%.


Status post intubation and extubation.


Continue DuoNeb treatments.





(7) CVA (cerebral vascular accident)


ICD Code:  I63.9 - Cerebral infarction, unspecified


Plan:  CT head negative, MRI small acute and subacute cortical infarct of the 

left parietal lobe.


The patient was seen and evaluated by neurology.


The patient is on a scheduled methadone 10 mg twice





(8) Tricuspid regurgitation


ICD Code:  I07.1 - Rheumatic tricuspid insufficiency


Status:  Acute


Plan:  As seen on echo described above.  Continue oral Lasix.





(9) Acute systolic heart failure


ICD Code:  I50.21 - Acute systolic (congestive) heart failure


Plan:  Continue Lasix orally. continue Lasix 40 mg po daily.





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


ICD Code:  T82.6XXA - Infection and inflammatory reaction due to cardiac valve 

prosthesis, initial encounter


Status:  Acute


Plan:  The patient has history of IV drug abuse.


Continue antibiotics as per ID recommendations. Tentative a stop date is 10/5





(11) NOÉ (acute kidney injury)


ICD Code:  N17.9 - Acute kidney failure, unspecified


Status:  Resolved


Plan:  Now resolved.  Continue to monitor BMP





(12) Protein calorie malnutrition


ICD Code:  E46 - Unspecified protein-calorie malnutrition


Plan:  Secondary to long-standing IV drug abuse and endocarditis.





(13) Microcytic anemia


ICD Code:  D50.9 - Iron deficiency anemia, unspecified


Plan:  Hemoglobin seems to be stable at 8.6.  I will check iron studies and 

stool Hemoccult for blood.


Patient had iron studies previously concordant with iron deficiency anemia.  I 

will start the patient on oral iron sulfate.





(14) Hyperphosphatemia


ICD Code:  E83.39 - Other disorders of phosphorus metabolism


Plan:  Now resolved.  Continue to monitor levels.





(15) Hypernatremia


ICD Code:  E87.0 - Hyperosmolality and hypernatremia


Plan:  Likely secondary to dehydration. 


Sodium now within normal range.  Continue to monitor BMP.





(16) IV drug abuse


ICD Code:  F19.10 - Other psychoactive substance abuse, uncomplicated


Status:  Acute


Plan:  Advised cessation.  Currently on methadone.





(17) Headache


ICD Code:  R51 - Headache


Plan:  Headache is bifrontal, patient is afebrile.


Continue Acetaminophen 1000 mg po Q 6 hrs prn headache.





(18) Abdominal pain 


ICD Code:  R10.9 - Unspecified abdominal pain


Plan:  check CT abdomen and pelvis. Check stool for C diff PCR.





(19) Diarrhea, improving 


Plan: 


C diff. is negative  Continue probiotic. Add Imodium  





(20) Anasarca.


Continue lasix po.  Monitor UPO and kidney function closely. 





Assessment and Plan


GI prophylaxis: Continue famotidine.


DVT prophylaxis: SCDs, ambulation.








DC plan: needs inpatient treatment. DC when cleared by ID specialist. 


Patient has left shoulder p0ain poss plan for MRI , ID will eval patient and 

decide. Also will consult palliative cre as patient is asking for more pain 

meds constantly





Problem Qualifiers





(1) CVA (cerebral vascular accident):  


Qualified Codes:  I63.9 - Cerebral infarction, unspecified


(2) Protein calorie malnutrition:  


Qualified Codes:  E44.0 - Moderate protein-calorie malnutrition








Jyothi Saunders MD Sep 15, 2017 14:28

## 2017-09-15 NOTE — HHI.HCPN
Reason for visit


   a.  To assist with evaluation and management of symptoms including: pain. 


   b.  To assist medical decision maker(s) with: better understanding of 

current medical conditions; weighing benefits/burdens of medical treatment 

options; making        


        medical treatment decisions.


.





Subjective/Interval History


Spoke with Dr. Teague. Palliative care was reconsulted for pain management. 





Patient seen and examined in room. She is sitting in chair watching TV. She is 

awake and alert. She tells me she "doesn't feel good, doesn't feel like she is 

getting better." I reviewed endocarditis is not curable. Reviewed plan for 

treatment including IV ABX through 10/5/17 and lifetime oral ABX. She 

verbalizes understanding and is angry about IV through 10/5. I attempted to 

revisit goals of care discussion, prior hospice services and CODE status. She 

becomes frustrated and says "do we have to talk about this right now?" I told 

her of my prior conversations with family regarding what they thought she 

wanted and she tells me "they know what I want." 





She reports pain in her chest, abdomen and left shoulder. She rates pain 5/10, 

reports IV Morphine decreases pain to about 3/10. She reports chest pain is 

stable, does not seem worse. She reports pain in left shoulder for "about a 

week." Hurts with movement, worsens when raising it up and down. Aches when at 

rest but pain increases with movement. She will not attempt to lift her arm for 

me, unable to assess ROM. She remains on Methadone 20mg every 12 hours ATC 

since 9/5/17. She reports no relief with Methadone, I explained long acting 

nature of Methadone and that it will not work like PRN Morphine. She has had 7 

doses of PRN Morphine 3mg IV in the past 24 hours. Will increase Methadone to 

25 mg PO every 12 hours. I would recommend transition from IV Morphine in the 

coming days/weeks to oral as she cannot go home on IV Morphine. Recommend 

imaging of left shoulder. 





Tmax 100.1. VSS. On room air.  WBC 11.4, hemoglobin 9.9, hematocrit 30.9, 

platelet 303.  Creatinine 1.32.  Otherwise labs stable.  Recent cultures 

negative to date.  No new imaging.


.


Family/friend interactions


No family at bedside.





Advance Directives


Living Will:  Never completed


Health Care Surrogate:  Never completed


Durable Power of :  Never completed


Advance Directive Specifics


Health Care Surrogate(s):


No known written advanced directives, family is going to try to find HCS 

paperwork they think pt previously completed.  Patient a single.  Children are 

juvenile.  If no written advanced directives, according to [place] statutes 

health care proxy decision-making falls to a parent. 


.


Significant change in goals:


FULL CODE.  Goals remain aggressive.





Objective





Vital Signs








  Date Time  Temp Pulse Resp B/P (MAP) Pulse Ox O2 Delivery O2 Flow Rate FiO2


 


9/15/17 10:03   18     


 


9/15/17 08:00  94      


 


9/15/17 08:00 98.1 92 20 110/68 (82) 98   


 


9/15/17 08:00      Nasal Cannula  


 


9/15/17 04:00 98.2 97 20 107/70 (82) 100   


 


9/14/17 23:48 98.6 102 16 113/73 (86) 96   


 


9/14/17 20:15      Room Air  


 


9/14/17 20:00 100.1 95 22 107/66 (80) 98   


 


9/14/17 20:00  96      








Physical Exam


CONSTITUTIONAL/GENERAL: This is an adequately nourished patient, sitting in 

chair.


TUBES/LINES/DRAINS: PIV


SKIN: afebrile.  


CARDIOVASCULAR: regular rate and rhythm, + murmur.


RESPIRATORY/CHEST: decreased breath sounds bilaterally.


GASTROINTESTINAL: Abdomen soft, non-distended, nontender. Bowel sounds active. 


GENITOURINARY: Without palpable bladder distension. 


MUSCULOSKELETAL: left shoulder pain, tender to touch, unable to assess ROM. + 

LE edema. 


NEUROLOGICAL: Awake and alert.  Cognitively sharp.  Follows commands.  


PSYCHIATRIC: agitated with conversation. 


.





Diagnostic Tests


Laboratory





Laboratory Tests








Test


  9/13/17


09:48 9/15/17


09:03 9/15/17


10:03


 


White Blood Count


  9.0 TH/MM3


(4.0-11.0) 


  11.4 TH/MM3


(4.0-11.0)


 


Red Blood Count


  3.91 MIL/MM3


(4.00-5.30) 


  4.08 MIL/MM3


(4.00-5.30)


 


Hemoglobin


  9.4 GM/DL


(11.6-15.3) 


  9.9 GM/DL


(11.6-15.3)


 


Hematocrit


  29.3 %


(35.0-46.0) 


  30.9 %


(35.0-46.0)


 


Mean Corpuscular Volume


  74.9 FL


(80.0-100.0) 


  75.8 FL


(80.0-100.0)


 


Mean Corpuscular Hemoglobin


  24.1 PG


(27.0-34.0) 


  24.2 PG


(27.0-34.0)


 


Mean Corpuscular Hemoglobin


Concent 32.2 %


(32.0-36.0) 


  31.9 %


(32.0-36.0)


 


Red Cell Distribution Width


  17.6 %


(11.6-17.2) 


  17.5 %


(11.6-17.2)


 


Platelet Count


  313 TH/MM3


(150-450) 


  303 TH/MM3


(150-450)


 


Mean Platelet Volume


  7.8 FL


(7.0-11.0) 


  8.3 FL


(7.0-11.0)


 


Neutrophils (%) (Auto)


  74.2 %


(16.0-70.0) 


  73.4 %


(16.0-70.0)


 


Lymphocytes (%) (Auto)


  13.1 %


(9.0-44.0) 


  12.9 %


(9.0-44.0)


 


Monocytes (%) (Auto)


  10.2 %


(0.0-8.0) 


  11.2 %


(0.0-8.0)


 


Eosinophils (%) (Auto)


  1.6 %


(0.0-4.0) 


  1.6 %


(0.0-4.0)


 


Basophils (%) (Auto)


  0.9 %


(0.0-2.0) 


  0.9 %


(0.0-2.0)


 


Neutrophils # (Auto)


  6.6 TH/MM3


(1.8-7.7) 


  8.4 TH/MM3


(1.8-7.7)


 


Lymphocytes # (Auto)


  1.2 TH/MM3


(1.0-4.8) 


  1.5 TH/MM3


(1.0-4.8)


 


Monocytes # (Auto)


  0.9 TH/MM3


(0-0.9) 


  1.3 TH/MM3


(0-0.9)


 


Eosinophils # (Auto)


  0.1 TH/MM3


(0-0.4) 


  0.2 TH/MM3


(0-0.4)


 


Basophils # (Auto)


  0.1 TH/MM3


(0-0.2) 


  0.1 TH/MM3


(0-0.2)


 


CBC Comment DIFF FINAL   AUTO DIFF 


 


Differential Comment


   


  


  AUTO DIFF


CONFIRMED


 


Hematology Comments     


 


Blood Urea Nitrogen


  10 MG/DL


(7-18) 11 MG/DL


(7-18) 


 


 


Creatinine


  1.26 MG/DL


(0.50-1.00) 1.32 MG/DL


(0.50-1.00) 


 


 


Random Glucose


  137 MG/DL


() 87 MG/DL


() 


 


 


Calcium Level


  9.3 MG/DL


(8.5-10.1) 9.3 MG/DL


(8.5-10.1) 


 


 


Magnesium Level


  1.9 MG/DL


(1.5-2.5) 


  


 


 


Sodium Level


  137 MEQ/L


(136-145) 136 MEQ/L


(136-145) 


 


 


Potassium Level


  3.4 MEQ/L


(3.5-5.1) 3.9 MEQ/L


(3.5-5.1) 


 


 


Chloride Level


  100 MEQ/L


() 100 MEQ/L


() 


 


 


Carbon Dioxide Level


  25.1 MEQ/L


(21.0-32.0) 25.4 MEQ/L


(21.0-32.0) 


 


 


Anion Gap


  12 MEQ/L


(5-15) 11 MEQ/L


(5-15) 


 


 


Estimat Glomerular Filtration


Rate 48 ML/MIN


(>89) 46 ML/MIN


(>89) 


 








Result Diagram:  


9/15/17 1003                                                                   

             9/15/17 0903





Microbiology





Microbiology








 Date/Time


Source Procedure


Growth Status


 


 


 8/28/17 16:55


Blood Peripheral Aerobic Blood Culture - Final


NO GROWTH IN 5 DAYS Complete


 


 8/28/17 16:55


Blood Peripheral Anaerobic Blood Culture - Final


NO GROWTH IN 5 DAYS Complete





 8/21/17 22:00


Cerebral Spinal Fluid Lumbar Puncture Fungal Smear - Final


NO FUNGAL ELEMENTS SEEN. Resulted


 


 8/21/17 22:00


Cerebral Spinal Fluid Lumbar Puncture Fungal Culture - Preliminary


NO GROWTH IN 3 WEEKS Resulted





 8/31/17 22:00


Sputum Endotracheal Gram Stain - Final Complete


 


 8/31/17 22:00


Sputum Endotracheal Sputum Culture - Final


NO GROWTH IN 48 HOURS. Complete








Imaging





Last Impressions








Abdomen CT 9/8/17 0000 Signed





Impressions: 





 Service Date/Time:  Friday, September 8, 2017 20:26 - CONCLUSION:  1. 

Peripheral 





 low attenuation lesions in the spleen most characteristic of multiple splenic 





 infarcts. Some of these are new findings since 2016 comparison. 2. Minimal 





 subsegmental airspace disease at the lung bases. 3. 3.4 cm right adnexal cyst. 





 Intrauterine device present. 4. Small fat containing ventral hernias.     

Jaspal Issa MD 


 


Chest X-Ray 9/5/17 0000 Signed





Impressions: 





 Service Date/Time:  Tuesday, September 5, 2017 08:07 - CONCLUSION:  Suspected 





 mild edema/failure.     Ron Swan MD 


 


Neck CT 9/2/17 0000 Signed





Impressions: 





 Service Date/Time:  Saturday, September 2, 2017 11:13 - CONCLUSION:  1. 

Patient 





 is intubated which limits this study. The epiglottis is otherwise normal. 





 Secretions are present in the oropharynx and right sphenoid sinus.     Ej Ewing MD 


 


Brain MRI 8/25/17 0000 Signed





Impressions: 





 Service Date/Time:  Friday, August 25, 2017 17:07 - CONCLUSION:  1. Small 

acute 





 or subacute cortical infarct of the left parietal lobe. 2. Small, faint area 

of 





 nonspecific flair signal abnormality in the left frontal lobe periventricular 





 white matter, nonspecific. No associated mass effect or abnormal enhancement. 

3 





 month followup MRI of the brain recommended. 3.  Mild chronic white matter 





 changes.     Ron Swan MD 


 


Head CT 8/21/17 0000 Signed





Impressions: 





 Service Date/Time:  Monday, August 21, 2017 19:47 - CONCLUSION:  No evidence 

of 





 acute infarct, hemorrhage, mass or edema.     Kristian Frankel MD 








Procedures


* 8/21/17 - left subclavian central placement. 


* 8/21/17 - lumbar puncture


* 8/21/17 - Intubated. 


.





Assessment and Plan


Disease Oriented Problem List:  


(1) Bacterial endocarditis


(2) Protein-calorie malnutrition, severe


(3) Meningitis


(4) Polysubstance abuse


(5) Acute kidney injury


Symptom Scale:  


(1) Pain


0-10 Scale:  5





(2) Dyspnea


0-10 Scale:  Unable to quantify


Comment:  secretions





(3) Agitation


0-10 Scale:  Unable to quantify





Pertinent Non-Medical Issues


Psychosocial: single.  2 juvenile children. Supported by mother, stepfather and 

boyfriend. 


Spiritual: Unknown.


Legal: patient is incapacitated.  No written advanced directives.  Patient a 

single.  Children are juvenile.  According to [place] statutes health care 

proxy decision-making falls to a parent.


Ethical issues impacting care: No known concerns at this time. 


.


Important Contacts


* Kelly Le, mother: [phone]


* Won Le, stepfather: [phone]


.


Prognosis


Prognosis poor.


Code Status:  Full Code


Plan


* Decision Maker: Patient is currently capacitated to make her own decisions.  

Patient is not .  According to [place] statutes health care proxy 

decision-making falls to the patient's mother.  


* FULL CODE.  


* Goals of care are aggressive.  


* SYMPTOMS: Pain: rates pain 5 out of 10 new pain left shoulder, recommend 

imaging.  Patient is taken 7 doses of IV morphine 3 mg in the past 24 hours, 

will increase methadone to 25 mg Q 12 hours.  Recommend transitioning patient 

from IV morphine to oral in anticipation of discharge in the coming weeks.  

Dyspnea- denies.  


* Palliative care will continue to follow throughout hospital course to assist 

with symptom management and clarification of goals as needed. 


.





Attestation


To help prompt me to consider important information that might be impacting 

today's encounter and assessment, information from prior notes written by 

myself or my colleagues may have been "brought forward" into today's note.  My 

signature on this note, however, is an attestation that I personally performed 

the exam, history, and/or decision-making noted today, and, unless otherwise 

indicated, the interactions with patient, family, and staff as well as the 

review of records all occurred today.  I also attest that the listed assessment 

and stated plan reflect my best clinical judgment today based on the 

combination of historical information, prior notes, and today's exam/ 

interactions.  When time spent is documented, it refers only to time spent 

today by the signer, or if indicated, combined time spent today by 

collaborating physician/nurse practitioner.











Ileana Dang Sep 15, 2017 19:23

## 2017-09-16 VITALS
TEMPERATURE: 97.3 F | HEART RATE: 85 BPM | DIASTOLIC BLOOD PRESSURE: 86 MMHG | RESPIRATION RATE: 18 BRPM | OXYGEN SATURATION: 97 % | SYSTOLIC BLOOD PRESSURE: 110 MMHG

## 2017-09-16 VITALS
TEMPERATURE: 98.3 F | HEART RATE: 84 BPM | OXYGEN SATURATION: 99 % | SYSTOLIC BLOOD PRESSURE: 118 MMHG | RESPIRATION RATE: 18 BRPM | DIASTOLIC BLOOD PRESSURE: 71 MMHG

## 2017-09-16 VITALS — HEART RATE: 86 BPM

## 2017-09-16 VITALS
OXYGEN SATURATION: 98 % | RESPIRATION RATE: 18 BRPM | SYSTOLIC BLOOD PRESSURE: 110 MMHG | HEART RATE: 95 BPM | TEMPERATURE: 98.6 F | DIASTOLIC BLOOD PRESSURE: 60 MMHG

## 2017-09-16 VITALS
DIASTOLIC BLOOD PRESSURE: 70 MMHG | SYSTOLIC BLOOD PRESSURE: 115 MMHG | OXYGEN SATURATION: 98 % | RESPIRATION RATE: 18 BRPM | TEMPERATURE: 98.4 F | HEART RATE: 85 BPM

## 2017-09-16 VITALS
SYSTOLIC BLOOD PRESSURE: 110 MMHG | RESPIRATION RATE: 16 BRPM | DIASTOLIC BLOOD PRESSURE: 70 MMHG | HEART RATE: 85 BPM | TEMPERATURE: 98.5 F | OXYGEN SATURATION: 99 %

## 2017-09-16 VITALS — HEART RATE: 96 BPM

## 2017-09-16 LAB
ALP SERPL-CCNC: 96 U/L (ref 45–117)
ALT SERPL-CCNC: 10 U/L (ref 10–53)
ANION GAP SERPL CALC-SCNC: 13 MEQ/L (ref 5–15)
AST SERPL-CCNC: 18 U/L (ref 15–37)
BASOPHILS # BLD AUTO: 0.1 TH/MM3 (ref 0–0.2)
BASOPHILS NFR BLD: 1 % (ref 0–2)
BILIRUB SERPL-MCNC: 0.3 MG/DL (ref 0.2–1)
BUN SERPL-MCNC: 12 MG/DL (ref 7–18)
CHLORIDE SERPL-SCNC: 98 MEQ/L (ref 98–107)
EOSINOPHIL # BLD: 0.2 TH/MM3 (ref 0–0.4)
EOSINOPHIL NFR BLD: 1.9 % (ref 0–4)
ERYTHROCYTE [DISTWIDTH] IN BLOOD BY AUTOMATED COUNT: 17.7 % (ref 11.6–17.2)
GFR SERPLBLD BASED ON 1.73 SQ M-ARVRAT: 49 ML/MIN (ref 89–?)
HCO3 BLD-SCNC: 25.4 MEQ/L (ref 21–32)
HCT VFR BLD CALC: 28.5 % (ref 35–46)
HEMO FLAGS: (no result)
LYMPHOCYTES # BLD AUTO: 1.2 TH/MM3 (ref 1–4.8)
LYMPHOCYTES NFR BLD AUTO: 15.4 % (ref 9–44)
MCH RBC QN AUTO: 24 PG (ref 27–34)
MCHC RBC AUTO-ENTMCNC: 32 % (ref 32–36)
MCV RBC AUTO: 75.1 FL (ref 80–100)
MONOCYTES NFR BLD: 9.1 % (ref 0–8)
NEUTROPHILS # BLD AUTO: 5.7 TH/MM3 (ref 1.8–7.7)
NEUTROPHILS NFR BLD AUTO: 72.6 % (ref 16–70)
PLATELET # BLD: 306 TH/MM3 (ref 150–450)
POTASSIUM SERPL-SCNC: 3.5 MEQ/L (ref 3.5–5.1)
RBC # BLD AUTO: 3.8 MIL/MM3 (ref 4–5.3)
SODIUM SERPL-SCNC: 136 MEQ/L (ref 136–145)
WBC # BLD AUTO: 7.8 TH/MM3 (ref 4–11)

## 2017-09-16 RX ADMIN — CHLORHEXIDINE GLUCONATE SCH PACK: 500 CLOTH TOPICAL at 02:10

## 2017-09-16 RX ADMIN — POLYVINYL ALCOHOL SCH DROP: 14 SOLUTION/ DROPS OPHTHALMIC at 13:00

## 2017-09-16 RX ADMIN — FAMOTIDINE SCH MG: 20 TABLET, FILM COATED ORAL at 09:04

## 2017-09-16 RX ADMIN — Medication SCH ML: at 09:00

## 2017-09-16 RX ADMIN — MORPHINE SULFATE PRN MG: 2 INJECTION, SOLUTION INTRAMUSCULAR; INTRAVENOUS at 05:31

## 2017-09-16 RX ADMIN — OXACILLIN SODIUM SCH MLS/HR: 2 INJECTION, POWDER, FOR SOLUTION INTRAMUSCULAR; INTRAVENOUS at 21:19

## 2017-09-16 RX ADMIN — OXACILLIN SODIUM SCH MLS/HR: 2 INJECTION, POWDER, FOR SOLUTION INTRAMUSCULAR; INTRAVENOUS at 14:18

## 2017-09-16 RX ADMIN — AMPICILLIN SODIUM SCH MLS/HR: 2 INJECTION, POWDER, FOR SOLUTION INTRAMUSCULAR; INTRAVENOUS at 21:17

## 2017-09-16 RX ADMIN — METHADONE HYDROCHLORIDE SCH MG: 5 SOLUTION ORAL at 09:06

## 2017-09-16 RX ADMIN — MORPHINE SULFATE PRN MG: 2 INJECTION, SOLUTION INTRAMUSCULAR; INTRAVENOUS at 10:37

## 2017-09-16 RX ADMIN — HEPARIN SODIUM SCH UNITS: 10000 INJECTION, SOLUTION INTRAVENOUS; SUBCUTANEOUS at 17:28

## 2017-09-16 RX ADMIN — HEPARIN SODIUM SCH UNITS: 10000 INJECTION, SOLUTION INTRAVENOUS; SUBCUTANEOUS at 10:28

## 2017-09-16 RX ADMIN — OXACILLIN SODIUM SCH MLS/HR: 2 INJECTION, POWDER, FOR SOLUTION INTRAMUSCULAR; INTRAVENOUS at 02:10

## 2017-09-16 RX ADMIN — AMPICILLIN SODIUM SCH MLS/HR: 2 INJECTION, POWDER, FOR SOLUTION INTRAMUSCULAR; INTRAVENOUS at 09:14

## 2017-09-16 RX ADMIN — MORPHINE SULFATE PRN MG: 2 INJECTION, SOLUTION INTRAMUSCULAR; INTRAVENOUS at 21:19

## 2017-09-16 RX ADMIN — MORPHINE SULFATE PRN MG: 2 INJECTION, SOLUTION INTRAMUSCULAR; INTRAVENOUS at 18:01

## 2017-09-16 RX ADMIN — STANDARDIZED SENNA CONCENTRATE AND DOCUSATE SODIUM SCH TAB: 8.6; 5 TABLET, FILM COATED ORAL at 09:00

## 2017-09-16 RX ADMIN — OXACILLIN SODIUM SCH MLS/HR: 2 INJECTION, POWDER, FOR SOLUTION INTRAMUSCULAR; INTRAVENOUS at 10:27

## 2017-09-16 RX ADMIN — FUROSEMIDE SCH MG: 20 TABLET ORAL at 09:04

## 2017-09-16 RX ADMIN — OXACILLIN SODIUM SCH MLS/HR: 2 INJECTION, POWDER, FOR SOLUTION INTRAMUSCULAR; INTRAVENOUS at 18:02

## 2017-09-16 RX ADMIN — STANDARDIZED SENNA CONCENTRATE AND DOCUSATE SODIUM SCH TAB: 8.6; 5 TABLET, FILM COATED ORAL at 21:18

## 2017-09-16 RX ADMIN — METHADONE HYDROCHLORIDE SCH MG: 5 SOLUTION ORAL at 21:17

## 2017-09-16 RX ADMIN — RIFAMPIN SCH MG: 150 CAPSULE ORAL at 21:18

## 2017-09-16 RX ADMIN — POLYVINYL ALCOHOL SCH DROP: 14 SOLUTION/ DROPS OPHTHALMIC at 09:00

## 2017-09-16 RX ADMIN — Medication SCH ML: at 22:06

## 2017-09-16 RX ADMIN — AMPICILLIN SODIUM SCH MLS/HR: 2 INJECTION, POWDER, FOR SOLUTION INTRAMUSCULAR; INTRAVENOUS at 12:31

## 2017-09-16 RX ADMIN — AMPICILLIN SODIUM SCH MLS/HR: 2 INJECTION, POWDER, FOR SOLUTION INTRAMUSCULAR; INTRAVENOUS at 17:29

## 2017-09-16 RX ADMIN — HEPARIN SODIUM SCH UNITS: 10000 INJECTION, SOLUTION INTRAVENOUS; SUBCUTANEOUS at 02:09

## 2017-09-16 RX ADMIN — GENTAMICIN SULFATE SCH MLS/HR: 0.8 INJECTION, SOLUTION INTRAVENOUS at 00:17

## 2017-09-16 RX ADMIN — OXACILLIN SODIUM SCH MLS/HR: 2 INJECTION, POWDER, FOR SOLUTION INTRAMUSCULAR; INTRAVENOUS at 06:16

## 2017-09-16 RX ADMIN — POLYVINYL ALCOHOL SCH DROP: 14 SOLUTION/ DROPS OPHTHALMIC at 17:27

## 2017-09-16 RX ADMIN — POTASSIUM CHLORIDE SCH MEQ: 20 TABLET, EXTENDED RELEASE ORAL at 09:04

## 2017-09-16 RX ADMIN — GENTAMICIN SULFATE SCH MLS/HR: 0.8 INJECTION, SOLUTION INTRAVENOUS at 23:13

## 2017-09-16 RX ADMIN — MORPHINE SULFATE PRN MG: 2 INJECTION, SOLUTION INTRAMUSCULAR; INTRAVENOUS at 14:10

## 2017-09-16 RX ADMIN — MORPHINE SULFATE PRN MG: 2 INJECTION, SOLUTION INTRAMUSCULAR; INTRAVENOUS at 01:03

## 2017-09-16 RX ADMIN — AMPICILLIN SODIUM SCH MLS/HR: 2 INJECTION, POWDER, FOR SOLUTION INTRAMUSCULAR; INTRAVENOUS at 01:09

## 2017-09-16 RX ADMIN — RIFAMPIN SCH MG: 150 CAPSULE ORAL at 09:04

## 2017-09-16 RX ADMIN — FAMOTIDINE SCH MG: 20 TABLET, FILM COATED ORAL at 21:18

## 2017-09-16 RX ADMIN — GENTAMICIN SULFATE SCH MLS/HR: 0.8 INJECTION, SOLUTION INTRAVENOUS at 11:37

## 2017-09-16 RX ADMIN — AMPICILLIN SODIUM SCH MLS/HR: 2 INJECTION, POWDER, FOR SOLUTION INTRAMUSCULAR; INTRAVENOUS at 05:31

## 2017-09-16 NOTE — HHI.PR
Subjective


Remarks


Seen by ID also if persistent shoulder pain will get MRI


Patient says she is still with shoulder pain. No n/v/d/c. Denies fever or 

chills. No n/v/d/c. 


SOB at baseline. LE edema improving.





Objective


Vitals





Vital Signs








  Date Time  Temp Pulse Resp B/P (MAP) Pulse Ox O2 Delivery O2 Flow Rate FiO2


 


9/16/17 08:09 98.4 85 18 115/70 (85) 98   


 


9/16/17 04:00 98.5 85 16 110/70 (83) 99   


 


9/16/17 04:00      Room Air  


 


9/16/17 00:00 98.3 84 18 118/71 (87) 99   


 


9/16/17 00:00      Room Air  


 


9/15/17 20:00      Room Air  


 


9/15/17 20:00  93      


 


9/15/17 20:00 98.0 99 20 117/86 (96) 97   


 


9/15/17 16:00 98.0 95 20 112/77 (89) 100   


 


9/15/17 15:34   18     


 


9/15/17 12:00 98.0 92 20 118/76 (90) 100   


 


9/15/17 10:03   18     














I/O      


 


 9/15/17 9/15/17 9/15/17 9/16/17 9/16/17 9/16/17





 07:00 15:00 23:00 07:00 15:00 23:00


 


Intake Total 860 ml 1400 ml 1920 ml 420 ml  


 


Output Total 800 ml  950 ml   


 


Balance 60 ml 1400 ml 970 ml 420 ml  


 


      


 


Intake Oral 360 ml  720 ml 420 ml  


 


IV Total 500 ml 1400 ml 1200 ml   


 


Output Urine Total 800 ml  950 ml   


 


# Voids    3  


 


# Bowel Movements   0   








Result Diagram:  


9/15/17 1003                                                                   

             9/15/17 0903





Imaging





Last Impressions








Abdomen CT 9/8/17 0000 Signed





Impressions: 





 Service Date/Time:  Friday, September 8, 2017 20:26 - CONCLUSION:  1. 

Peripheral 





 low attenuation lesions in the spleen most characteristic of multiple splenic 





 infarcts. Some of these are new findings since 2016 comparison. 2. Minimal 





 subsegmental airspace disease at the lung bases. 3. 3.4 cm right adnexal cyst. 





 Intrauterine device present. 4. Small fat containing ventral hernias.     

Jaspal Issa MD 


 


Chest X-Ray 9/5/17 0000 Signed





Impressions: 





 Service Date/Time:  Tuesday, September 5, 2017 08:07 - CONCLUSION:  Suspected 





 mild edema/failure.     Ron Swan MD 


 


Neck CT 9/2/17 0000 Signed





Impressions: 





 Service Date/Time:  Saturday, September 2, 2017 11:13 - CONCLUSION:  1. 

Patient 





 is intubated which limits this study. The epiglottis is otherwise normal. 





 Secretions are present in the oropharynx and right sphenoid sinus.     Ej Ewing MD 


 


Brain MRI 8/25/17 0000 Signed





Impressions: 





 Service Date/Time:  Friday, August 25, 2017 17:07 - CONCLUSION:  1. Small 

acute 





 or subacute cortical infarct of the left parietal lobe. 2. Small, faint area 

of 





 nonspecific flair signal abnormality in the left frontal lobe periventricular 





 white matter, nonspecific. No associated mass effect or abnormal enhancement. 

3 





 month followup MRI of the brain recommended. 3.  Mild chronic white matter 





 changes.     Ron Swan MD 


 


Head CT 8/21/17 0000 Signed





Impressions: 





 Service Date/Time:  Monday, August 21, 2017 19:47 - CONCLUSION:  No evidence 

of 





 acute infarct, hemorrhage, mass or edema.     Kristian Frankel MD 








Objective Remarks


GENERAL:  Young female, appears in nad. 


SKIN: Warm and dry.


CARDIOVASCULAR: Regular rate and rhythm with systolic  murmur. 


RESPIRATORY: Decreased breath sound BL, no crackles or wheezing auscultated. No 

accessory muscle use.


GASTROINTESTINAL: Abdomen soft, non-tender, nondistended. 


MUSCULOSKELETAL: Left shoulder pain with palpation and movement, decreased ROM 2

/2 pain.  No cyanosis. 1+  LE  edema improving. 


BACK: Nontender without obvious deformity. No CVA tenderness.


Procedures


None





A/P


Problem List:  


(1) Severe sepsis


ICD Code:  A41.9 - Sepsis, unspecified organism; R65.20 - Severe sepsis without 

septic shock


Status:  Acute


(2) MSSA meningitis


Status:  Acute


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


Status:  Acute


(4) SVT (supraventricular tachycardia)


ICD Code:  I47.1 - Supraventricular tachycardia


Status:  Resolved


(5) Postextubation stridor


ICD Code:  J95.89 - Other postprocedural complications and disorders of 

respiratory system, not elsewhere classified


Status:  Resolved


(6) Acute hypoxemic respiratory failure


ICD Code:  J96.01 - Acute respiratory failure with hypoxia


Status:  Resolved


(7) CVA (cerebral vascular accident)


ICD Code:  I63.9 - Cerebral infarction, unspecified


(8) Tricuspid regurgitation


ICD Code:  I07.1 - Rheumatic tricuspid insufficiency


Status:  Acute


(9) Acute systolic heart failure


ICD Code:  I50.21 - Acute systolic (congestive) heart failure


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


ICD Code:  T82.6XXA - Infection and inflammatory reaction due to cardiac valve 

prosthesis, initial encounter


Status:  Acute


(11) NOÉ (acute kidney injury)


ICD Code:  N17.9 - Acute kidney failure, unspecified


Status:  Resolved


(12) Protein calorie malnutrition


ICD Code:  E46 - Unspecified protein-calorie malnutrition


(13) Microcytic anemia


ICD Code:  D50.9 - Iron deficiency anemia, unspecified


(14) Hyperphosphatemia


ICD Code:  E83.39 - Other disorders of phosphorus metabolism


(15) Hypernatremia


ICD Code:  E87.0 - Hyperosmolality and hypernatremia


(16) IV drug abuse


ICD Code:  F19.10 - Other psychoactive substance abuse, uncomplicated


Status:  Acute


(17) Headache


ICD Code:  R51 - Headache


(18) Abdominal pain


ICD Code:  R10.9 - Unspecified abdominal pain


Assessment and Plan








(1) Severe sepsis


Multiple splenic infarcts 


MSSA meningitis 


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


Prosthetic valve endocarditis


Left shoulder pain Discussed with Dr Wilkes from ID, will eval patient and 

will decide if needs MRI of left shoulder. Seen by ID also if persistent 

shoulder pain will get MRI. Will consult OT 


Also will consult palliative care for management of pain meds as patient 

requesting for more pain meds. 





ICD Code:  A41.9 - Sepsis, unspecified organism; R65.20 - Severe sepsis without 

septic shock


Status:  Acute


Plan:  Sepsis present on admission, the patient presented with leukocytosis and 

tachycardia as well as after mental status.


Initially admitted to the intensive care unit, intubated and cared for by the 

intensivists.


Sepsis found to be secondary to meningitis.


Continue IV antibiotics as per infectious disease recommendations.


The patient is currently on IV ampicillin, oxacillin, gentamicin and rifampin - 

continue.


Unless more positive blood cx anticipate 6 week from 1st neg (8/24) followed by

  indefinite oral abx suppression tx. IV abx stop date tentatively.


Gentamycin trough 2.0








(2) MSSA meningitis


Status:  Acute


Plan:  As above.





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


Status:  Acute


Plan:  IV antibiotics as per infectious disease as above.





(4) SVT (supraventricular tachycardia)


ICD Code:  I47.1 - Supraventricular tachycardia


Status:  Resolved


Plan:  SVT now resolved.  Continue metoprolol.


Echocardiogram 8 foci 17 revealed an ejection fraction showed 25-30%.  PAP 34 

mmHg


Initially treated with vasopressors which the patient is now off.


The patient started on Lasix 20 minute grams by mouth daily.  Continue.





(5) Postextubation stridor


ICD Code:  J95.89 - Other postprocedural complications and disorders of 

respiratory system, not elsewhere classified


Status:  Resolved


Plan:  The patient initially extubated on 9/3/17.  The patient was administered 

racemic epinephrine as needed for stridor or


Continue albuterol/ipratropium inhaled treatments every 6 hours with every 2 

hours as needed for albuterol therapy.


Status post today treatment of dexamethasone IV every 8 hours.





(6) Acute hypoxemic respiratory failure


ICD Code:  J96.01 - Acute respiratory failure with hypoxia


Status:  Resolved


Plan:  Continue supplemental oxygen to keep oxygen more than 92%.


Status post intubation and extubation.


Continue DuoNeb treatments.





(7) CVA (cerebral vascular accident)


ICD Code:  I63.9 - Cerebral infarction, unspecified


Plan:  CT head negative, MRI small acute and subacute cortical infarct of the 

left parietal lobe.


The patient was seen and evaluated by neurology.


The patient is on a scheduled methadone 10 mg twice





(8) Tricuspid regurgitation


ICD Code:  I07.1 - Rheumatic tricuspid insufficiency


Status:  Acute


Plan:  As seen on echo described above.  Continue oral Lasix.





(9) Acute systolic heart failure


ICD Code:  I50.21 - Acute systolic (congestive) heart failure


Plan:  Continue Lasix orally. continue Lasix 40 mg po daily.





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


ICD Code:  T82.6XXA - Infection and inflammatory reaction due to cardiac valve 

prosthesis, initial encounter


Status:  Acute


Plan:  The patient has history of IV drug abuse.


Continue antibiotics as per ID recommendations. Tentative a stop date is 10/5





(11) NOÉ (acute kidney injury)


ICD Code:  N17.9 - Acute kidney failure, unspecified


Status:  Resolved


Plan:  Now resolved.  Continue to monitor BMP





(12) Protein calorie malnutrition


ICD Code:  E46 - Unspecified protein-calorie malnutrition


Plan:  Secondary to long-standing IV drug abuse and endocarditis.





(13) Microcytic anemia


ICD Code:  D50.9 - Iron deficiency anemia, unspecified


Plan:  Hemoglobin seems to be stable at 8.6.  I will check iron studies and 

stool Hemoccult for blood.


Patient had iron studies previously concordant with iron deficiency anemia.  I 

will start the patient on oral iron sulfate.





(14) Hyperphosphatemia


ICD Code:  E83.39 - Other disorders of phosphorus metabolism


Plan:  Now resolved.  Continue to monitor levels.





(15) Hypernatremia


ICD Code:  E87.0 - Hyperosmolality and hypernatremia


Plan:  Likely secondary to dehydration. 


Sodium now within normal range.  Continue to monitor BMP.





(16) IV drug abuse


ICD Code:  F19.10 - Other psychoactive substance abuse, uncomplicated


Status:  Acute


Plan:  Advised cessation.  Currently on methadone.





(17) Headache


ICD Code:  R51 - Headache


Plan:  Headache is bifrontal, patient is afebrile.


Continue Acetaminophen 1000 mg po Q 6 hrs prn headache.





(18) Abdominal pain 


ICD Code:  R10.9 - Unspecified abdominal pain


Plan:  check CT abdomen and pelvis. Check stool for C diff PCR.





(19) Diarrhea, improving 


Plan: 


C diff. is negative  Continue probiotic. Add Imodium  





(20) Anasarca.


Continue lasix po.  Monitor UPO and kidney function closely. 





Assessment and Plan


GI prophylaxis: Continue famotidine.


DVT prophylaxis: SCDs, ambulation.








DC plan: needs inpatient treatment. DC when cleared by ID specialist. 


Patient has left shoulder p0ain poss plan for MRI , ID will eval patient and 

decide. Also will consult palliative cre as patient is asking for more pain 

meds constantly





Problem Qualifiers





(1) CVA (cerebral vascular accident):  


Qualified Codes:  I63.9 - Cerebral infarction, unspecified


(2) Protein calorie malnutrition:  


Qualified Codes:  E44.0 - Moderate protein-calorie malnutrition








Jyothi Saunders MD Sep 16, 2017 09:35

## 2017-09-17 VITALS
TEMPERATURE: 98.3 F | RESPIRATION RATE: 17 BRPM | DIASTOLIC BLOOD PRESSURE: 72 MMHG | SYSTOLIC BLOOD PRESSURE: 103 MMHG | HEART RATE: 89 BPM | OXYGEN SATURATION: 97 %

## 2017-09-17 VITALS
OXYGEN SATURATION: 96 % | TEMPERATURE: 98.4 F | RESPIRATION RATE: 16 BRPM | SYSTOLIC BLOOD PRESSURE: 120 MMHG | HEART RATE: 95 BPM | DIASTOLIC BLOOD PRESSURE: 75 MMHG

## 2017-09-17 VITALS
SYSTOLIC BLOOD PRESSURE: 111 MMHG | OXYGEN SATURATION: 100 % | DIASTOLIC BLOOD PRESSURE: 71 MMHG | HEART RATE: 84 BPM | TEMPERATURE: 98.5 F | RESPIRATION RATE: 17 BRPM

## 2017-09-17 VITALS
TEMPERATURE: 98.7 F | RESPIRATION RATE: 16 BRPM | DIASTOLIC BLOOD PRESSURE: 62 MMHG | HEART RATE: 79 BPM | SYSTOLIC BLOOD PRESSURE: 102 MMHG | OXYGEN SATURATION: 98 %

## 2017-09-17 VITALS — HEART RATE: 94 BPM

## 2017-09-17 VITALS — HEART RATE: 86 BPM

## 2017-09-17 VITALS
TEMPERATURE: 98 F | HEART RATE: 85 BPM | DIASTOLIC BLOOD PRESSURE: 63 MMHG | RESPIRATION RATE: 18 BRPM | SYSTOLIC BLOOD PRESSURE: 106 MMHG | OXYGEN SATURATION: 98 %

## 2017-09-17 VITALS
RESPIRATION RATE: 20 BRPM | OXYGEN SATURATION: 99 % | SYSTOLIC BLOOD PRESSURE: 138 MMHG | DIASTOLIC BLOOD PRESSURE: 77 MMHG | TEMPERATURE: 98.5 F | HEART RATE: 81 BPM

## 2017-09-17 RX ADMIN — OXACILLIN SODIUM SCH MLS/HR: 2 INJECTION, POWDER, FOR SOLUTION INTRAMUSCULAR; INTRAVENOUS at 09:56

## 2017-09-17 RX ADMIN — OXACILLIN SODIUM SCH MLS/HR: 2 INJECTION, POWDER, FOR SOLUTION INTRAMUSCULAR; INTRAVENOUS at 05:43

## 2017-09-17 RX ADMIN — AMPICILLIN SODIUM SCH MLS/HR: 2 INJECTION, POWDER, FOR SOLUTION INTRAMUSCULAR; INTRAVENOUS at 16:38

## 2017-09-17 RX ADMIN — MORPHINE SULFATE PRN MG: 2 INJECTION, SOLUTION INTRAMUSCULAR; INTRAVENOUS at 09:56

## 2017-09-17 RX ADMIN — POTASSIUM CHLORIDE SCH MEQ: 20 TABLET, EXTENDED RELEASE ORAL at 08:45

## 2017-09-17 RX ADMIN — CHLORHEXIDINE GLUCONATE SCH PACK: 500 CLOTH TOPICAL at 04:00

## 2017-09-17 RX ADMIN — HEPARIN SODIUM SCH UNITS: 10000 INJECTION, SOLUTION INTRAVENOUS; SUBCUTANEOUS at 01:39

## 2017-09-17 RX ADMIN — OXACILLIN SODIUM SCH MLS/HR: 2 INJECTION, POWDER, FOR SOLUTION INTRAMUSCULAR; INTRAVENOUS at 23:40

## 2017-09-17 RX ADMIN — RIFAMPIN SCH MG: 150 CAPSULE ORAL at 08:45

## 2017-09-17 RX ADMIN — MORPHINE SULFATE PRN MG: 2 INJECTION, SOLUTION INTRAMUSCULAR; INTRAVENOUS at 20:14

## 2017-09-17 RX ADMIN — AMPICILLIN SODIUM SCH MLS/HR: 2 INJECTION, POWDER, FOR SOLUTION INTRAMUSCULAR; INTRAVENOUS at 12:58

## 2017-09-17 RX ADMIN — POLYVINYL ALCOHOL SCH DROP: 14 SOLUTION/ DROPS OPHTHALMIC at 12:56

## 2017-09-17 RX ADMIN — Medication SCH ML: at 08:46

## 2017-09-17 RX ADMIN — OXACILLIN SODIUM SCH MLS/HR: 2 INJECTION, POWDER, FOR SOLUTION INTRAMUSCULAR; INTRAVENOUS at 01:36

## 2017-09-17 RX ADMIN — Medication SCH ML: at 21:41

## 2017-09-17 RX ADMIN — AMPICILLIN SODIUM SCH MLS/HR: 2 INJECTION, POWDER, FOR SOLUTION INTRAMUSCULAR; INTRAVENOUS at 04:37

## 2017-09-17 RX ADMIN — MORPHINE SULFATE PRN MG: 2 INJECTION, SOLUTION INTRAMUSCULAR; INTRAVENOUS at 16:38

## 2017-09-17 RX ADMIN — STANDARDIZED SENNA CONCENTRATE AND DOCUSATE SODIUM SCH TAB: 8.6; 5 TABLET, FILM COATED ORAL at 08:45

## 2017-09-17 RX ADMIN — MORPHINE SULFATE PRN MG: 2 INJECTION, SOLUTION INTRAMUSCULAR; INTRAVENOUS at 00:42

## 2017-09-17 RX ADMIN — MORPHINE SULFATE PRN MG: 2 INJECTION, SOLUTION INTRAMUSCULAR; INTRAVENOUS at 23:50

## 2017-09-17 RX ADMIN — AMPICILLIN SODIUM SCH MLS/HR: 2 INJECTION, POWDER, FOR SOLUTION INTRAMUSCULAR; INTRAVENOUS at 08:45

## 2017-09-17 RX ADMIN — HEPARIN SODIUM SCH UNITS: 10000 INJECTION, SOLUTION INTRAVENOUS; SUBCUTANEOUS at 10:00

## 2017-09-17 RX ADMIN — Medication PRN ML: at 21:43

## 2017-09-17 RX ADMIN — METHADONE HYDROCHLORIDE SCH MG: 5 SOLUTION ORAL at 21:41

## 2017-09-17 RX ADMIN — POLYVINYL ALCOHOL SCH DROP: 14 SOLUTION/ DROPS OPHTHALMIC at 17:47

## 2017-09-17 RX ADMIN — FUROSEMIDE SCH MG: 20 TABLET ORAL at 08:45

## 2017-09-17 RX ADMIN — RIFAMPIN SCH MG: 150 CAPSULE ORAL at 21:42

## 2017-09-17 RX ADMIN — METHADONE HYDROCHLORIDE SCH MG: 5 SOLUTION ORAL at 08:44

## 2017-09-17 RX ADMIN — STANDARDIZED SENNA CONCENTRATE AND DOCUSATE SODIUM SCH TAB: 8.6; 5 TABLET, FILM COATED ORAL at 21:42

## 2017-09-17 RX ADMIN — POLYVINYL ALCOHOL SCH DROP: 14 SOLUTION/ DROPS OPHTHALMIC at 08:45

## 2017-09-17 RX ADMIN — OXACILLIN SODIUM SCH MLS/HR: 2 INJECTION, POWDER, FOR SOLUTION INTRAMUSCULAR; INTRAVENOUS at 14:00

## 2017-09-17 RX ADMIN — GENTAMICIN SULFATE SCH MLS/HR: 0.8 INJECTION, SOLUTION INTRAVENOUS at 12:00

## 2017-09-17 RX ADMIN — MORPHINE SULFATE PRN MG: 2 INJECTION, SOLUTION INTRAMUSCULAR; INTRAVENOUS at 04:37

## 2017-09-17 RX ADMIN — AMPICILLIN SODIUM SCH MLS/HR: 2 INJECTION, POWDER, FOR SOLUTION INTRAMUSCULAR; INTRAVENOUS at 21:40

## 2017-09-17 RX ADMIN — FAMOTIDINE SCH MG: 20 TABLET, FILM COATED ORAL at 08:45

## 2017-09-17 RX ADMIN — FAMOTIDINE SCH MG: 20 TABLET, FILM COATED ORAL at 21:41

## 2017-09-17 RX ADMIN — MORPHINE SULFATE PRN MG: 2 INJECTION, SOLUTION INTRAMUSCULAR; INTRAVENOUS at 12:59

## 2017-09-17 RX ADMIN — HEPARIN SODIUM SCH UNITS: 10000 INJECTION, SOLUTION INTRAVENOUS; SUBCUTANEOUS at 17:47

## 2017-09-17 RX ADMIN — OXACILLIN SODIUM SCH MLS/HR: 2 INJECTION, POWDER, FOR SOLUTION INTRAMUSCULAR; INTRAVENOUS at 17:49

## 2017-09-17 RX ADMIN — AMPICILLIN SODIUM SCH MLS/HR: 2 INJECTION, POWDER, FOR SOLUTION INTRAMUSCULAR; INTRAVENOUS at 00:42

## 2017-09-17 RX ADMIN — HEPARIN SODIUM SCH UNITS: 10000 INJECTION, SOLUTION INTRAVENOUS; SUBCUTANEOUS at 09:56

## 2017-09-17 NOTE — HHI.PR
Subjective


Remarks


In bed, doesn't appear in distress, however she is complaining of pain. Says 

still with left shoulder pain , chest pain and abdominal pain. Says has 

diarrhea at times. No vomiting or nausea.





Objective


Vitals





Vital Signs








  Date Time  Temp Pulse Resp B/P (MAP) Pulse Ox O2 Delivery O2 Flow Rate FiO2


 


9/17/17 08:12 98.5 84 17 111/71 (84) 100   


 


9/17/17 04:00      Room Air  


 


9/17/17 04:00 98.5 81 20 138/77 (97) 99   


 


9/17/17 00:00      Room Air  


 


9/17/17 00:00 98.7 79 16 102/62 (75) 98   


 


9/16/17 20:00      Room Air  


 


9/16/17 20:00  96      


 


9/16/17 16:09 98.6 95 18 110/60 (77) 98   


 


9/16/17 12:09 97.3 85 18 110/86 (94) 97   














I/O      


 


 9/16/17 9/16/17 9/16/17 9/17/17 9/17/17 9/17/17





 07:00 15:00 23:00 07:00 15:00 23:00


 


Intake Total 420 ml  940 ml 1080 ml  


 


Output Total   2200 ml   


 


Balance 420 ml  -1260 ml 1080 ml  


 


      


 


Intake Oral 420 ml  840 ml 480 ml  


 


IV Total   100 ml 600 ml  


 


Output Urine Total   2200 ml   


 


# Voids 3   3  


 


# Bowel Movements   1 0  








Result Diagram:  


9/16/17 1140                                                                   

             9/16/17 1140





Imaging





Last Impressions








Abdomen CT 9/8/17 0000 Signed





Impressions: 





 Service Date/Time:  Friday, September 8, 2017 20:26 - CONCLUSION:  1. 

Peripheral 





 low attenuation lesions in the spleen most characteristic of multiple splenic 





 infarcts. Some of these are new findings since 2016 comparison. 2. Minimal 





 subsegmental airspace disease at the lung bases. 3. 3.4 cm right adnexal cyst. 





 Intrauterine device present. 4. Small fat containing ventral hernias.     

Jaspal Issa MD 


 


Chest X-Ray 9/5/17 0000 Signed





Impressions: 





 Service Date/Time:  Tuesday, September 5, 2017 08:07 - CONCLUSION:  Suspected 





 mild edema/failure.     Ron Swan MD 


 


Neck CT 9/2/17 0000 Signed





Impressions: 





 Service Date/Time:  Saturday, September 2, 2017 11:13 - CONCLUSION:  1. 

Patient 





 is intubated which limits this study. The epiglottis is otherwise normal. 





 Secretions are present in the oropharynx and right sphenoid sinus.     Ej Ewing MD 


 


Brain MRI 8/25/17 0000 Signed





Impressions: 





 Service Date/Time:  Friday, August 25, 2017 17:07 - CONCLUSION:  1. Small 

acute 





 or subacute cortical infarct of the left parietal lobe. 2. Small, faint area 

of 





 nonspecific flair signal abnormality in the left frontal lobe periventricular 





 white matter, nonspecific. No associated mass effect or abnormal enhancement. 

3 





 month followup MRI of the brain recommended. 3.  Mild chronic white matter 





 changes.     Ron Swan MD 


 


Head CT 8/21/17 0000 Signed





Impressions: 





 Service Date/Time:  Monday, August 21, 2017 19:47 - CONCLUSION:  No evidence 

of 





 acute infarct, hemorrhage, mass or edema.     Kristian Frankel MD 








Objective Remarks


GENERAL:  Young female, appears in nad. 


SKIN: Warm and dry.


CARDIOVASCULAR: Regular rate and rhythm with systolic  murmur. 


RESPIRATORY: Decreased breath sound BL, no crackles or wheezing auscultated. No 

accessory muscle use.


GASTROINTESTINAL: Abdomen soft, non-tender, nondistended. 


MUSCULOSKELETAL: Left shoulder pain with palpation and movement, decreased ROM 2

/2 pain.  No cyanosis. 1+  LE  edema improving. 


BACK: Nontender without obvious deformity. No CVA tenderness.


Procedures


None





A/P


Problem List:  


(1) Severe sepsis


ICD Code:  A41.9 - Sepsis, unspecified organism; R65.20 - Severe sepsis without 

septic shock


Status:  Acute


(2) MSSA meningitis


Status:  Acute


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


Status:  Acute


(4) SVT (supraventricular tachycardia)


ICD Code:  I47.1 - Supraventricular tachycardia


Status:  Resolved


(5) Postextubation stridor


ICD Code:  J95.89 - Other postprocedural complications and disorders of 

respiratory system, not elsewhere classified


Status:  Resolved


(6) Acute hypoxemic respiratory failure


ICD Code:  J96.01 - Acute respiratory failure with hypoxia


Status:  Resolved


(7) CVA (cerebral vascular accident)


ICD Code:  I63.9 - Cerebral infarction, unspecified


(8) Tricuspid regurgitation


ICD Code:  I07.1 - Rheumatic tricuspid insufficiency


Status:  Acute


(9) Acute systolic heart failure


ICD Code:  I50.21 - Acute systolic (congestive) heart failure


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


ICD Code:  T82.6XXA - Infection and inflammatory reaction due to cardiac valve 

prosthesis, initial encounter


Status:  Acute


(11) NOÉ (acute kidney injury)


ICD Code:  N17.9 - Acute kidney failure, unspecified


Status:  Resolved


(12) Protein calorie malnutrition


ICD Code:  E46 - Unspecified protein-calorie malnutrition


(13) Microcytic anemia


ICD Code:  D50.9 - Iron deficiency anemia, unspecified


(14) Hyperphosphatemia


ICD Code:  E83.39 - Other disorders of phosphorus metabolism


(15) Hypernatremia


ICD Code:  E87.0 - Hyperosmolality and hypernatremia


(16) IV drug abuse


ICD Code:  F19.10 - Other psychoactive substance abuse, uncomplicated


Status:  Acute


(17) Headache


ICD Code:  R51 - Headache


(18) Abdominal pain


ICD Code:  R10.9 - Unspecified abdominal pain


Assessment and Plan








(1) Severe sepsis


Multiple splenic infarcts 


MSSA meningitis 


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


Prosthetic valve endocarditis


Left shoulder pain Discussed with Dr Wlikes from ID, will eval patient and 

will decide if needs MRI of left shoulder. Seen by ID also if persistent 

shoulder pain will get MRI. Will consult OT 


Also will consult palliative care for management of pain meds as patient 

requesting for more pain meds. 





ICD Code:  A41.9 - Sepsis, unspecified organism; R65.20 - Severe sepsis without 

septic shock


Status:  Acute


Plan:  Sepsis present on admission, the patient presented with leukocytosis and 

tachycardia as well as after mental status.


Initially admitted to the intensive care unit, intubated and cared for by the 

intensivists.


Sepsis found to be secondary to meningitis.


Continue IV antibiotics as per infectious disease recommendations.


The patient is currently on IV ampicillin, oxacillin, gentamicin and rifampin - 

continue.


Unless more positive blood cx anticipate 6 week from 1st neg (8/24) followed by

  indefinite oral abx suppression tx. IV abx stop date tentatively.


Gentamycin trough 2.0








(2) MSSA meningitis


Status:  Acute


Plan:  As above.





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


Status:  Acute


Plan:  IV antibiotics as per infectious disease as above.





(4) SVT (supraventricular tachycardia)


ICD Code:  I47.1 - Supraventricular tachycardia


Status:  Resolved


Plan:  SVT now resolved.  Continue metoprolol.


Echocardiogram 8 foci 17 revealed an ejection fraction showed 25-30%.  PAP 34 

mmHg


Initially treated with vasopressors which the patient is now off.


The patient started on Lasix 20 minute grams by mouth daily.  Continue.





(5) Postextubation stridor


ICD Code:  J95.89 - Other postprocedural complications and disorders of 

respiratory system, not elsewhere classified


Status:  Resolved


Plan:  The patient initially extubated on 9/3/17.  The patient was administered 

racemic epinephrine as needed for stridor or


Continue albuterol/ipratropium inhaled treatments every 6 hours with every 2 

hours as needed for albuterol therapy.


Status post today treatment of dexamethasone IV every 8 hours.





(6) Acute hypoxemic respiratory failure


ICD Code:  J96.01 - Acute respiratory failure with hypoxia


Status:  Resolved


Plan:  Continue supplemental oxygen to keep oxygen more than 92%.


Status post intubation and extubation.


Continue DuoNeb treatments.





(7) CVA (cerebral vascular accident)


ICD Code:  I63.9 - Cerebral infarction, unspecified


Plan:  CT head negative, MRI small acute and subacute cortical infarct of the 

left parietal lobe.


The patient was seen and evaluated by neurology.


The patient is on a scheduled methadone 10 mg twice





(8) Tricuspid regurgitation


ICD Code:  I07.1 - Rheumatic tricuspid insufficiency


Status:  Acute


Plan:  As seen on echo described above.  Continue oral Lasix.





(9) Acute systolic heart failure


ICD Code:  I50.21 - Acute systolic (congestive) heart failure


Plan:  Continue Lasix orally. continue Lasix 40 mg po daily.





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


ICD Code:  T82.6XXA - Infection and inflammatory reaction due to cardiac valve 

prosthesis, initial encounter


Status:  Acute


Plan:  The patient has history of IV drug abuse.


Continue antibiotics as per ID recommendations. Tentative a stop date is 10/5





(11) NOÉ (acute kidney injury)


ICD Code:  N17.9 - Acute kidney failure, unspecified


Status:  Resolved


Plan:  Now resolved.  Continue to monitor BMP





(12) Protein calorie malnutrition


ICD Code:  E46 - Unspecified protein-calorie malnutrition


Plan:  Secondary to long-standing IV drug abuse and endocarditis.





(13) Microcytic anemia


ICD Code:  D50.9 - Iron deficiency anemia, unspecified


Plan:  Hemoglobin seems to be stable at 8.6.  I will check iron studies and 

stool Hemoccult for blood.


Patient had iron studies previously concordant with iron deficiency anemia.  I 

will start the patient on oral iron sulfate.





(14) Hyperphosphatemia


ICD Code:  E83.39 - Other disorders of phosphorus metabolism


Plan:  Now resolved.  Continue to monitor levels.





(15) Hypernatremia


ICD Code:  E87.0 - Hyperosmolality and hypernatremia


Plan:  Likely secondary to dehydration. 


Sodium now within normal range.  Continue to monitor BMP.





(16) IV drug abuse


ICD Code:  F19.10 - Other psychoactive substance abuse, uncomplicated


Status:  Acute


Plan:  Advised cessation.  Currently on methadone.





(17) Headache


ICD Code:  R51 - Headache


Plan:  Headache is bifrontal, patient is afebrile.


Continue Acetaminophen 1000 mg po Q 6 hrs prn headache.





(18) Abdominal pain 


ICD Code:  R10.9 - Unspecified abdominal pain


Plan:  check CT abdomen and pelvis. Check stool for C diff PCR.





(19) Diarrhea, improving 


Plan: 


C diff. is negative  Continue probiotic. Add Imodium  





(20) Anasarca.


Continue lasix po.  Monitor UPO and kidney function closely. 





Assessment and Plan


GI prophylaxis: Continue famotidine.


DVT prophylaxis: SCDs, ambulation.








DC plan: needs inpatient treatment. DC when cleared by ID specialist. 


Patient has left shoulder pain poss plan for MRI if pain persists , ID will  

decide. Also palliative care consulted as patient is asking for more pain meds 

constantly





Problem Qualifiers





(1) CVA (cerebral vascular accident):  


Qualified Codes:  I63.9 - Cerebral infarction, unspecified


(2) Protein calorie malnutrition:  


Qualified Codes:  E44.0 - Moderate protein-calorie malnutrition








Jyothi Saunders MD Sep 17, 2017 10:02

## 2017-09-18 VITALS
HEART RATE: 83 BPM | RESPIRATION RATE: 20 BRPM | SYSTOLIC BLOOD PRESSURE: 120 MMHG | DIASTOLIC BLOOD PRESSURE: 86 MMHG | TEMPERATURE: 98.6 F | OXYGEN SATURATION: 98 %

## 2017-09-18 VITALS
DIASTOLIC BLOOD PRESSURE: 67 MMHG | TEMPERATURE: 97.8 F | HEART RATE: 85 BPM | SYSTOLIC BLOOD PRESSURE: 101 MMHG | RESPIRATION RATE: 16 BRPM | OXYGEN SATURATION: 97 %

## 2017-09-18 VITALS
HEART RATE: 81 BPM | OXYGEN SATURATION: 96 % | SYSTOLIC BLOOD PRESSURE: 96 MMHG | DIASTOLIC BLOOD PRESSURE: 65 MMHG | TEMPERATURE: 97.9 F | RESPIRATION RATE: 16 BRPM

## 2017-09-18 VITALS
HEART RATE: 89 BPM | SYSTOLIC BLOOD PRESSURE: 138 MMHG | DIASTOLIC BLOOD PRESSURE: 86 MMHG | RESPIRATION RATE: 20 BRPM | TEMPERATURE: 98.7 F | OXYGEN SATURATION: 100 %

## 2017-09-18 VITALS
TEMPERATURE: 98.2 F | OXYGEN SATURATION: 100 % | DIASTOLIC BLOOD PRESSURE: 76 MMHG | SYSTOLIC BLOOD PRESSURE: 115 MMHG | HEART RATE: 86 BPM | RESPIRATION RATE: 20 BRPM

## 2017-09-18 VITALS
HEART RATE: 80 BPM | TEMPERATURE: 98.4 F | SYSTOLIC BLOOD PRESSURE: 96 MMHG | OXYGEN SATURATION: 97 % | RESPIRATION RATE: 16 BRPM | DIASTOLIC BLOOD PRESSURE: 62 MMHG

## 2017-09-18 VITALS — HEART RATE: 84 BPM

## 2017-09-18 RX ADMIN — MORPHINE SULFATE PRN MG: 2 INJECTION, SOLUTION INTRAMUSCULAR; INTRAVENOUS at 20:14

## 2017-09-18 RX ADMIN — METHADONE HYDROCHLORIDE SCH MG: 5 SOLUTION ORAL at 21:47

## 2017-09-18 RX ADMIN — Medication SCH ML: at 09:21

## 2017-09-18 RX ADMIN — AMPICILLIN SODIUM SCH MLS/HR: 2 INJECTION, POWDER, FOR SOLUTION INTRAMUSCULAR; INTRAVENOUS at 17:00

## 2017-09-18 RX ADMIN — MORPHINE SULFATE PRN MG: 2 INJECTION, SOLUTION INTRAMUSCULAR; INTRAVENOUS at 17:10

## 2017-09-18 RX ADMIN — STANDARDIZED SENNA CONCENTRATE AND DOCUSATE SODIUM SCH TAB: 8.6; 5 TABLET, FILM COATED ORAL at 09:17

## 2017-09-18 RX ADMIN — RIFAMPIN SCH MG: 150 CAPSULE ORAL at 09:17

## 2017-09-18 RX ADMIN — POTASSIUM CHLORIDE SCH MEQ: 20 TABLET, EXTENDED RELEASE ORAL at 09:00

## 2017-09-18 RX ADMIN — OXACILLIN SODIUM SCH MLS/HR: 2 INJECTION, POWDER, FOR SOLUTION INTRAMUSCULAR; INTRAVENOUS at 02:56

## 2017-09-18 RX ADMIN — OXACILLIN SODIUM SCH MLS/HR: 2 INJECTION, POWDER, FOR SOLUTION INTRAMUSCULAR; INTRAVENOUS at 09:22

## 2017-09-18 RX ADMIN — OXACILLIN SODIUM SCH MLS/HR: 2 INJECTION, POWDER, FOR SOLUTION INTRAMUSCULAR; INTRAVENOUS at 14:00

## 2017-09-18 RX ADMIN — MORPHINE SULFATE PRN MG: 2 INJECTION, SOLUTION INTRAMUSCULAR; INTRAVENOUS at 09:23

## 2017-09-18 RX ADMIN — MORPHINE SULFATE PRN MG: 2 INJECTION, SOLUTION INTRAMUSCULAR; INTRAVENOUS at 23:31

## 2017-09-18 RX ADMIN — HEPARIN SODIUM SCH UNITS: 10000 INJECTION, SOLUTION INTRAVENOUS; SUBCUTANEOUS at 18:31

## 2017-09-18 RX ADMIN — MORPHINE SULFATE PRN MG: 2 INJECTION, SOLUTION INTRAMUSCULAR; INTRAVENOUS at 02:56

## 2017-09-18 RX ADMIN — OXACILLIN SODIUM SCH MLS/HR: 2 INJECTION, POWDER, FOR SOLUTION INTRAMUSCULAR; INTRAVENOUS at 06:25

## 2017-09-18 RX ADMIN — STANDARDIZED SENNA CONCENTRATE AND DOCUSATE SODIUM SCH TAB: 8.6; 5 TABLET, FILM COATED ORAL at 20:15

## 2017-09-18 RX ADMIN — MORPHINE SULFATE PRN MG: 2 INJECTION, SOLUTION INTRAMUSCULAR; INTRAVENOUS at 06:23

## 2017-09-18 RX ADMIN — AMPICILLIN SODIUM SCH MLS/HR: 2 INJECTION, POWDER, FOR SOLUTION INTRAMUSCULAR; INTRAVENOUS at 01:55

## 2017-09-18 RX ADMIN — HEPARIN SODIUM SCH UNITS: 10000 INJECTION, SOLUTION INTRAVENOUS; SUBCUTANEOUS at 02:56

## 2017-09-18 RX ADMIN — POLYVINYL ALCOHOL SCH DROP: 14 SOLUTION/ DROPS OPHTHALMIC at 18:00

## 2017-09-18 RX ADMIN — GENTAMICIN SULFATE SCH MLS/HR: 0.8 INJECTION, SOLUTION INTRAVENOUS at 01:00

## 2017-09-18 RX ADMIN — HEPARIN SODIUM SCH UNITS: 10000 INJECTION, SOLUTION INTRAVENOUS; SUBCUTANEOUS at 02:00

## 2017-09-18 RX ADMIN — FUROSEMIDE SCH MG: 20 TABLET ORAL at 09:17

## 2017-09-18 RX ADMIN — AMPICILLIN SODIUM SCH MLS/HR: 2 INJECTION, POWDER, FOR SOLUTION INTRAMUSCULAR; INTRAVENOUS at 13:00

## 2017-09-18 RX ADMIN — HEPARIN SODIUM SCH UNITS: 10000 INJECTION, SOLUTION INTRAVENOUS; SUBCUTANEOUS at 09:16

## 2017-09-18 RX ADMIN — METHADONE HYDROCHLORIDE SCH MG: 5 SOLUTION ORAL at 09:21

## 2017-09-18 RX ADMIN — OXACILLIN SODIUM SCH MLS/HR: 2 INJECTION, POWDER, FOR SOLUTION INTRAMUSCULAR; INTRAVENOUS at 18:30

## 2017-09-18 RX ADMIN — FAMOTIDINE SCH MG: 20 TABLET, FILM COATED ORAL at 09:17

## 2017-09-18 RX ADMIN — RIFAMPIN SCH MG: 150 CAPSULE ORAL at 20:16

## 2017-09-18 RX ADMIN — MORPHINE SULFATE PRN MG: 2 INJECTION, SOLUTION INTRAMUSCULAR; INTRAVENOUS at 13:11

## 2017-09-18 RX ADMIN — AMPICILLIN SODIUM SCH MLS/HR: 2 INJECTION, POWDER, FOR SOLUTION INTRAMUSCULAR; INTRAVENOUS at 09:00

## 2017-09-18 RX ADMIN — AMPICILLIN SODIUM SCH MLS/HR: 2 INJECTION, POWDER, FOR SOLUTION INTRAMUSCULAR; INTRAVENOUS at 05:30

## 2017-09-18 RX ADMIN — CHLORHEXIDINE GLUCONATE SCH PACK: 500 CLOTH TOPICAL at 02:56

## 2017-09-18 RX ADMIN — AMPICILLIN SODIUM SCH MLS/HR: 2 INJECTION, POWDER, FOR SOLUTION INTRAMUSCULAR; INTRAVENOUS at 20:14

## 2017-09-18 RX ADMIN — POLYVINYL ALCOHOL SCH DROP: 14 SOLUTION/ DROPS OPHTHALMIC at 09:00

## 2017-09-18 RX ADMIN — POLYVINYL ALCOHOL SCH DROP: 14 SOLUTION/ DROPS OPHTHALMIC at 13:00

## 2017-09-18 RX ADMIN — FAMOTIDINE SCH MG: 20 TABLET, FILM COATED ORAL at 20:16

## 2017-09-18 RX ADMIN — GENTAMICIN SULFATE SCH MLS/HR: 0.8 INJECTION, SOLUTION INTRAVENOUS at 23:31

## 2017-09-18 RX ADMIN — Medication SCH ML: at 20:15

## 2017-09-18 RX ADMIN — OXACILLIN SODIUM SCH MLS/HR: 2 INJECTION, POWDER, FOR SOLUTION INTRAMUSCULAR; INTRAVENOUS at 21:47

## 2017-09-18 NOTE — HHI.PR
Subjective


Remarks


Patient in the chair. Says shoulder pain improved with OT and she can move it 

better. No fever or chills. Chest pain on /off, says has abd pain like a belt 

under the ribs. No cough, fever or chills. Has diarrhea.


LE edema improved.





Objective


Vitals





Vital Signs








  Date Time  Temp Pulse Resp B/P (MAP) Pulse Ox O2 Delivery O2 Flow Rate FiO2


 


9/18/17 08:00 98.2 86 20 115/76 (89) 100   


 


9/18/17 04:12 98.4 80 16 96/62 (73) 97   


 


9/18/17 00:20 97.9 81 16 96/65 (75) 96   


 


9/17/17 20:16 98.4 95 16 120/75 (90) 96   


 


9/17/17 20:00      Room Air  


 


9/17/17 19:52  94      


 


9/17/17 16:12 98.0 85 18 106/63 (77) 98   


 


9/17/17 12:38 98.3 89 17 103/72 (82) 97   














I/O      


 


 9/17/17 9/17/17 9/17/17 9/18/17 9/18/17 9/18/17





 07:00 15:00 23:00 07:00 15:00 23:00


 


Intake Total 1080 ml  960 ml   


 


Output Total   2000 ml   


 


Balance 1080 ml  -1040 ml   


 


      


 


Intake Oral 480 ml  960 ml   


 


IV Total 600 ml     


 


Output Urine Total   2000 ml   


 


# Voids 3  2 4  


 


# Bowel Movements 0  1   








Result Diagram:  


9/16/17 1140                                                                   

             9/16/17 1140





Objective Remarks


GENERAL:  Young female, appears in nad. 


SKIN: Warm and dry.


CARDIOVASCULAR: Regular rate and rhythm with systolic  murmur. 


RESPIRATORY: Decreased breath sound BL, no crackles or wheezing auscultated. No 

accessory muscle use.


GASTROINTESTINAL: Abdomen soft, non-tender, nondistended. 


MUSCULOSKELETAL: Left shoulder pain with palpation and movement, decreased ROM 2

/2 pain, improving with OT.  No cyanosis. 1+  LE  edema improving. 


BACK: Nontender without obvious deformity. No CVA tenderness.


Procedures


None





A/P


Problem List:  


(1) Severe sepsis


ICD Code:  A41.9 - Sepsis, unspecified organism; R65.20 - Severe sepsis without 

septic shock


Status:  Acute


(2) MSSA meningitis


Status:  Acute


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


Status:  Acute


(4) SVT (supraventricular tachycardia)


ICD Code:  I47.1 - Supraventricular tachycardia


Status:  Resolved


(5) Postextubation stridor


ICD Code:  J95.89 - Other postprocedural complications and disorders of 

respiratory system, not elsewhere classified


Status:  Resolved


(6) Acute hypoxemic respiratory failure


ICD Code:  J96.01 - Acute respiratory failure with hypoxia


Status:  Resolved


(7) CVA (cerebral vascular accident)


ICD Code:  I63.9 - Cerebral infarction, unspecified


(8) Tricuspid regurgitation


ICD Code:  I07.1 - Rheumatic tricuspid insufficiency


Status:  Acute


(9) Acute systolic heart failure


ICD Code:  I50.21 - Acute systolic (congestive) heart failure


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


ICD Code:  T82.6XXA - Infection and inflammatory reaction due to cardiac valve 

prosthesis, initial encounter


Status:  Acute


(11) NOÉ (acute kidney injury)


ICD Code:  N17.9 - Acute kidney failure, unspecified


Status:  Resolved


(12) Protein calorie malnutrition


ICD Code:  E46 - Unspecified protein-calorie malnutrition


(13) Microcytic anemia


ICD Code:  D50.9 - Iron deficiency anemia, unspecified


(14) Hyperphosphatemia


ICD Code:  E83.39 - Other disorders of phosphorus metabolism


(15) Hypernatremia


ICD Code:  E87.0 - Hyperosmolality and hypernatremia


(16) IV drug abuse


ICD Code:  F19.10 - Other psychoactive substance abuse, uncomplicated


Status:  Acute


(17) Headache


ICD Code:  R51 - Headache


(18) Abdominal pain


ICD Code:  R10.9 - Unspecified abdominal pain


Assessment and Plan








(1) Severe sepsis


Multiple splenic infarcts 


MSSA meningitis 


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


Prosthetic valve endocarditis


Left shoulder pain Discussed with Dr Wilkes from ID, will eval patient and 

will decide if needs MRI of left shoulder. Seen by ID also if persistent 

shoulder pain will get MRI. Will consult OT 


Also will consult palliative care for management of pain meds as patient 

requesting for more pain meds. 





ICD Code:  A41.9 - Sepsis, unspecified organism; R65.20 - Severe sepsis without 

septic shock


Status:  Acute


Plan:  Sepsis present on admission, the patient presented with leukocytosis and 

tachycardia as well as after mental status.


Initially admitted to the intensive care unit, intubated and cared for by the 

intensivists.


Sepsis found to be secondary to meningitis.


Continue IV antibiotics as per infectious disease recommendations.


The patient is currently on IV ampicillin, oxacillin, gentamicin and rifampin - 

continue.


Unless more positive blood cx anticipate 6 week from 1st neg (8/24) followed by

  indefinite oral abx suppression tx. IV abx stop date tentatively.


Gentamycin trough 2.0








(2) MSSA meningitis


Status:  Acute


Plan:  As above.





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


Status:  Acute


Plan:  IV antibiotics as per infectious disease as above.





(4) SVT (supraventricular tachycardia)


ICD Code:  I47.1 - Supraventricular tachycardia


Status:  Resolved


Plan:  SVT now resolved.  Continue metoprolol.


Echocardiogram 8 foci 17 revealed an ejection fraction showed 25-30%.  PAP 34 

mmHg


Initially treated with vasopressors which the patient is now off.


The patient started on Lasix 20 minute grams by mouth daily.  Continue.





(5) Postextubation stridor


ICD Code:  J95.89 - Other postprocedural complications and disorders of 

respiratory system, not elsewhere classified


Status:  Resolved


Plan:  The patient initially extubated on 9/3/17.  The patient was administered 

racemic epinephrine as needed for stridor or


Continue albuterol/ipratropium inhaled treatments every 6 hours with every 2 

hours as needed for albuterol therapy.


Status post today treatment of dexamethasone IV every 8 hours.





(6) Acute hypoxemic respiratory failure


ICD Code:  J96.01 - Acute respiratory failure with hypoxia


Status:  Resolved


Plan:  Continue supplemental oxygen to keep oxygen more than 92%.


Status post intubation and extubation.


Continue DuoNeb treatments.





(7) CVA (cerebral vascular accident)


ICD Code:  I63.9 - Cerebral infarction, unspecified


Plan:  CT head negative, MRI small acute and subacute cortical infarct of the 

left parietal lobe.


The patient was seen and evaluated by neurology.


The patient is on a scheduled methadone 10 mg twice





(8) Tricuspid regurgitation


ICD Code:  I07.1 - Rheumatic tricuspid insufficiency


Status:  Acute


Plan:  As seen on echo described above.  Continue oral Lasix.





(9) Acute systolic heart failure


ICD Code:  I50.21 - Acute systolic (congestive) heart failure


Plan:  Continue Lasix orally. continue Lasix 40 mg po daily.





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


ICD Code:  T82.6XXA - Infection and inflammatory reaction due to cardiac valve 

prosthesis, initial encounter


Status:  Acute


Plan:  The patient has history of IV drug abuse.


Continue antibiotics as per ID recommendations. Tentative a stop date is 10/5





(11) NOÉ (acute kidney injury)


ICD Code:  N17.9 - Acute kidney failure, unspecified


Status:  Resolved


Plan:  Now resolved.  Continue to monitor BMP





(12) Protein calorie malnutrition


ICD Code:  E46 - Unspecified protein-calorie malnutrition


Plan:  Secondary to long-standing IV drug abuse and endocarditis.





(13) Microcytic anemia


ICD Code:  D50.9 - Iron deficiency anemia, unspecified


Plan:  Hemoglobin seems to be stable at 8.6.  I will check iron studies and 

stool Hemoccult for blood.


Patient had iron studies previously concordant with iron deficiency anemia.  I 

will start the patient on oral iron sulfate.





(14) Hyperphosphatemia


ICD Code:  E83.39 - Other disorders of phosphorus metabolism


Plan:  Now resolved.  Continue to monitor levels.





(15) Hypernatremia


ICD Code:  E87.0 - Hyperosmolality and hypernatremia


Plan:  Likely secondary to dehydration. 


Sodium now within normal range.  Continue to monitor BMP.





(16) IV drug abuse


ICD Code:  F19.10 - Other psychoactive substance abuse, uncomplicated


Status:  Acute


Plan:  Advised cessation.  Currently on methadone.





(17) Headache


ICD Code:  R51 - Headache


Plan:  Headache is bifrontal, patient is afebrile.


Continue Acetaminophen 1000 mg po Q 6 hrs prn headache.





(18) Abdominal pain 


ICD Code:  R10.9 - Unspecified abdominal pain


Plan:  check CT abdomen and pelvis. Check stool for C diff PCR.





(19) Diarrhea, improving 


Plan: 


C diff. is negative  Continue probiotic. Add Imodium  





(20) Anasarca.


Continue lasix po.  Monitor UPO and kidney function closely. 





Assessment and Plan


GI prophylaxis: Continue famotidine.


DVT prophylaxis: SCDs, ambulation.








DC plan: needs inpatient treatment. DC when cleared by ID specialist. 


Patient has left shoulder pain poss plan for MRI if pain persists , ID will  

decide. Also palliative care consulted as patient is asking for more pain meds 

constantly





Problem Qualifiers





(1) CVA (cerebral vascular accident):  


Qualified Codes:  I63.9 - Cerebral infarction, unspecified


(2) Protein calorie malnutrition:  


Qualified Codes:  E44.0 - Moderate protein-calorie malnutrition








Jyothi Saunders MD Sep 18, 2017 10:43

## 2017-09-19 VITALS
OXYGEN SATURATION: 96 % | HEART RATE: 81 BPM | DIASTOLIC BLOOD PRESSURE: 65 MMHG | SYSTOLIC BLOOD PRESSURE: 111 MMHG | RESPIRATION RATE: 16 BRPM | TEMPERATURE: 97.6 F

## 2017-09-19 VITALS
DIASTOLIC BLOOD PRESSURE: 65 MMHG | SYSTOLIC BLOOD PRESSURE: 106 MMHG | RESPIRATION RATE: 16 BRPM | TEMPERATURE: 98.5 F | HEART RATE: 72 BPM | OXYGEN SATURATION: 97 %

## 2017-09-19 VITALS
HEART RATE: 87 BPM | RESPIRATION RATE: 18 BRPM | SYSTOLIC BLOOD PRESSURE: 116 MMHG | OXYGEN SATURATION: 97 % | DIASTOLIC BLOOD PRESSURE: 76 MMHG | TEMPERATURE: 98.1 F

## 2017-09-19 VITALS
DIASTOLIC BLOOD PRESSURE: 69 MMHG | TEMPERATURE: 98.2 F | HEART RATE: 78 BPM | SYSTOLIC BLOOD PRESSURE: 96 MMHG | OXYGEN SATURATION: 99 % | RESPIRATION RATE: 20 BRPM

## 2017-09-19 VITALS
TEMPERATURE: 98.2 F | RESPIRATION RATE: 20 BRPM | HEART RATE: 83 BPM | OXYGEN SATURATION: 100 % | SYSTOLIC BLOOD PRESSURE: 100 MMHG | DIASTOLIC BLOOD PRESSURE: 74 MMHG

## 2017-09-19 VITALS
HEART RATE: 84 BPM | RESPIRATION RATE: 20 BRPM | SYSTOLIC BLOOD PRESSURE: 107 MMHG | TEMPERATURE: 98.2 F | OXYGEN SATURATION: 99 % | DIASTOLIC BLOOD PRESSURE: 68 MMHG

## 2017-09-19 VITALS — OXYGEN SATURATION: 99 %

## 2017-09-19 VITALS — HEART RATE: 87 BPM

## 2017-09-19 RX ADMIN — MORPHINE SULFATE PRN MG: 2 INJECTION, SOLUTION INTRAMUSCULAR; INTRAVENOUS at 15:15

## 2017-09-19 RX ADMIN — MORPHINE SULFATE PRN MG: 2 INJECTION, SOLUTION INTRAMUSCULAR; INTRAVENOUS at 05:54

## 2017-09-19 RX ADMIN — GENTAMICIN SULFATE SCH MLS/HR: 0.8 INJECTION, SOLUTION INTRAVENOUS at 23:22

## 2017-09-19 RX ADMIN — AMPICILLIN SODIUM SCH MLS/HR: 2 INJECTION, POWDER, FOR SOLUTION INTRAMUSCULAR; INTRAVENOUS at 11:58

## 2017-09-19 RX ADMIN — ACETAMINOPHEN PRN MG: 325 TABLET ORAL at 00:49

## 2017-09-19 RX ADMIN — Medication SCH ML: at 20:28

## 2017-09-19 RX ADMIN — FAMOTIDINE SCH MG: 20 TABLET, FILM COATED ORAL at 20:29

## 2017-09-19 RX ADMIN — HEPARIN SODIUM SCH UNITS: 10000 INJECTION, SOLUTION INTRAVENOUS; SUBCUTANEOUS at 10:10

## 2017-09-19 RX ADMIN — METHADONE HYDROCHLORIDE SCH MG: 5 SOLUTION ORAL at 08:44

## 2017-09-19 RX ADMIN — STANDARDIZED SENNA CONCENTRATE AND DOCUSATE SODIUM SCH TAB: 8.6; 5 TABLET, FILM COATED ORAL at 08:48

## 2017-09-19 RX ADMIN — AMPICILLIN SODIUM SCH MLS/HR: 2 INJECTION, POWDER, FOR SOLUTION INTRAMUSCULAR; INTRAVENOUS at 17:09

## 2017-09-19 RX ADMIN — POLYVINYL ALCOHOL SCH DROP: 14 SOLUTION/ DROPS OPHTHALMIC at 11:53

## 2017-09-19 RX ADMIN — AMPICILLIN SODIUM SCH MLS/HR: 2 INJECTION, POWDER, FOR SOLUTION INTRAMUSCULAR; INTRAVENOUS at 08:47

## 2017-09-19 RX ADMIN — FAMOTIDINE SCH MG: 20 TABLET, FILM COATED ORAL at 08:47

## 2017-09-19 RX ADMIN — POLYVINYL ALCOHOL SCH DROP: 14 SOLUTION/ DROPS OPHTHALMIC at 17:09

## 2017-09-19 RX ADMIN — HEPARIN SODIUM SCH UNITS: 10000 INJECTION, SOLUTION INTRAVENOUS; SUBCUTANEOUS at 17:09

## 2017-09-19 RX ADMIN — STANDARDIZED SENNA CONCENTRATE AND DOCUSATE SODIUM SCH TAB: 8.6; 5 TABLET, FILM COATED ORAL at 20:29

## 2017-09-19 RX ADMIN — MORPHINE SULFATE PRN MG: 2 INJECTION, SOLUTION INTRAMUSCULAR; INTRAVENOUS at 21:39

## 2017-09-19 RX ADMIN — MORPHINE SULFATE PRN MG: 2 INJECTION, SOLUTION INTRAMUSCULAR; INTRAVENOUS at 11:59

## 2017-09-19 RX ADMIN — OXACILLIN SODIUM SCH MLS/HR: 2 INJECTION, POWDER, FOR SOLUTION INTRAMUSCULAR; INTRAVENOUS at 13:37

## 2017-09-19 RX ADMIN — OXACILLIN SODIUM SCH MLS/HR: 2 INJECTION, POWDER, FOR SOLUTION INTRAMUSCULAR; INTRAVENOUS at 10:10

## 2017-09-19 RX ADMIN — ACETAMINOPHEN PRN MG: 325 TABLET ORAL at 18:43

## 2017-09-19 RX ADMIN — RIFAMPIN SCH MG: 150 CAPSULE ORAL at 08:47

## 2017-09-19 RX ADMIN — POTASSIUM CHLORIDE SCH MEQ: 20 TABLET, EXTENDED RELEASE ORAL at 08:47

## 2017-09-19 RX ADMIN — OXACILLIN SODIUM SCH MLS/HR: 2 INJECTION, POWDER, FOR SOLUTION INTRAMUSCULAR; INTRAVENOUS at 17:09

## 2017-09-19 RX ADMIN — POLYVINYL ALCOHOL SCH DROP: 14 SOLUTION/ DROPS OPHTHALMIC at 08:47

## 2017-09-19 RX ADMIN — MORPHINE SULFATE PRN MG: 2 INJECTION, SOLUTION INTRAMUSCULAR; INTRAVENOUS at 08:58

## 2017-09-19 RX ADMIN — AMPICILLIN SODIUM SCH MLS/HR: 2 INJECTION, POWDER, FOR SOLUTION INTRAMUSCULAR; INTRAVENOUS at 04:50

## 2017-09-19 RX ADMIN — FUROSEMIDE SCH MG: 20 TABLET ORAL at 08:48

## 2017-09-19 RX ADMIN — METHADONE HYDROCHLORIDE SCH MG: 5 SOLUTION ORAL at 20:29

## 2017-09-19 RX ADMIN — OXACILLIN SODIUM SCH MLS/HR: 2 INJECTION, POWDER, FOR SOLUTION INTRAMUSCULAR; INTRAVENOUS at 05:54

## 2017-09-19 RX ADMIN — OXACILLIN SODIUM SCH MLS/HR: 2 INJECTION, POWDER, FOR SOLUTION INTRAMUSCULAR; INTRAVENOUS at 21:38

## 2017-09-19 RX ADMIN — RIFAMPIN SCH MG: 150 CAPSULE ORAL at 20:29

## 2017-09-19 RX ADMIN — OXACILLIN SODIUM SCH MLS/HR: 2 INJECTION, POWDER, FOR SOLUTION INTRAMUSCULAR; INTRAVENOUS at 02:30

## 2017-09-19 RX ADMIN — AMPICILLIN SODIUM SCH MLS/HR: 2 INJECTION, POWDER, FOR SOLUTION INTRAMUSCULAR; INTRAVENOUS at 00:49

## 2017-09-19 RX ADMIN — CHLORHEXIDINE GLUCONATE SCH PACK: 500 CLOTH TOPICAL at 04:00

## 2017-09-19 RX ADMIN — ACETAMINOPHEN PRN MG: 325 TABLET ORAL at 10:11

## 2017-09-19 RX ADMIN — MORPHINE SULFATE PRN MG: 2 INJECTION, SOLUTION INTRAMUSCULAR; INTRAVENOUS at 18:43

## 2017-09-19 RX ADMIN — HEPARIN SODIUM SCH UNITS: 10000 INJECTION, SOLUTION INTRAVENOUS; SUBCUTANEOUS at 02:30

## 2017-09-19 RX ADMIN — Medication SCH ML: at 08:47

## 2017-09-19 RX ADMIN — AMPICILLIN SODIUM SCH MLS/HR: 2 INJECTION, POWDER, FOR SOLUTION INTRAMUSCULAR; INTRAVENOUS at 20:28

## 2017-09-19 RX ADMIN — GENTAMICIN SULFATE SCH MLS/HR: 0.8 INJECTION, SOLUTION INTRAVENOUS at 11:53

## 2017-09-19 NOTE — HHI.PR
Subjective


Remarks


Patient appears in nad. However says she still has chest pain, left shoulder 

pain. Says has loose stools after eating. No fever or chills. No nausea or 

vomiting.





Objective


Vitals





Vital Signs








  Date Time  Temp Pulse Resp B/P (MAP) Pulse Ox O2 Delivery O2 Flow Rate FiO2


 


9/19/17 08:00 98.2 78 20 96/69 (78) 99   


 


9/19/17 04:00 97.6 81 16 111/65 (80) 96   


 


9/19/17 00:00 98.5 72 16 106/65 (79) 97   


 


9/18/17 20:00      Room Air  


 


9/18/17 20:00 97.8 85 16 101/67 (78) 97   


 


9/18/17 20:00  86      


 


9/18/17 16:00 98.7 89 20 138/86 (103) 100   


 


9/18/17 12:00 98.6 83 20 120/86 (97) 98   














I/O      


 


 9/18/17 9/18/17 9/18/17 9/19/17 9/19/17 9/19/17





 07:00 15:00 23:00 07:00 15:00 23:00


 


Intake Total   360 ml 1170 ml  


 


Balance   360 ml 1170 ml  


 


      


 


Intake Oral   360 ml 470 ml  


 


IV Total    700 ml  


 


# Voids 4  4 5  


 


# Bowel Movements   1 1  








Result Diagram:  


9/16/17 1140                                                                   

             9/16/17 1140





Imaging





Last Impressions








Abdomen CT 9/8/17 0000 Signed





Impressions: 





 Service Date/Time:  Friday, September 8, 2017 20:26 - CONCLUSION:  1. 

Peripheral 





 low attenuation lesions in the spleen most characteristic of multiple splenic 





 infarcts. Some of these are new findings since 2016 comparison. 2. Minimal 





 subsegmental airspace disease at the lung bases. 3. 3.4 cm right adnexal cyst. 





 Intrauterine device present. 4. Small fat containing ventral hernias.     

Jaspal Issa MD 


 


Chest X-Ray 9/5/17 0000 Signed





Impressions: 





 Service Date/Time:  Tuesday, September 5, 2017 08:07 - CONCLUSION:  Suspected 





 mild edema/failure.     Ron Swan MD 


 


Neck CT 9/2/17 0000 Signed





Impressions: 





 Service Date/Time:  Saturday, September 2, 2017 11:13 - CONCLUSION:  1. 

Patient 





 is intubated which limits this study. The epiglottis is otherwise normal. 





 Secretions are present in the oropharynx and right sphenoid sinus.     Ej Ewing MD 


 


Brain MRI 8/25/17 0000 Signed





Impressions: 





 Service Date/Time:  Friday, August 25, 2017 17:07 - CONCLUSION:  1. Small 

acute 





 or subacute cortical infarct of the left parietal lobe. 2. Small, faint area 

of 





 nonspecific flair signal abnormality in the left frontal lobe periventricular 





 white matter, nonspecific. No associated mass effect or abnormal enhancement. 

3 





 month followup MRI of the brain recommended. 3.  Mild chronic white matter 





 changes.     Ron Swan MD 


 


Head CT 8/21/17 0000 Signed





Impressions: 





 Service Date/Time:  Monday, August 21, 2017 19:47 - CONCLUSION:  No evidence 

of 





 acute infarct, hemorrhage, mass or edema.     Kristian Frankel MD 








Objective Remarks


GENERAL:  Young female, appears in nad. 


SKIN: Warm and dry.


CARDIOVASCULAR: Regular rate and rhythm with systolic  murmur. 


RESPIRATORY: Decreased breath sound BL, no crackles or wheezing auscultated. No 

accessory muscle use.


GASTROINTESTINAL: Abdomen soft, non-tender, nondistended. 


MUSCULOSKELETAL: Left shoulder pain with palpation and movement, decreased ROM 2

/2 pain, improving with OT.  No cyanosis. 1+  LE  edema improving. 


BACK: Nontender without obvious deformity. No CVA tenderness.


Procedures


None





A/P


Problem List:  


(1) Severe sepsis


ICD Code:  A41.9 - Sepsis, unspecified organism; R65.20 - Severe sepsis without 

septic shock


Status:  Acute


(2) MSSA meningitis


Status:  Acute


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


Status:  Acute


(4) SVT (supraventricular tachycardia)


ICD Code:  I47.1 - Supraventricular tachycardia


Status:  Resolved


(5) Postextubation stridor


ICD Code:  J95.89 - Other postprocedural complications and disorders of 

respiratory system, not elsewhere classified


Status:  Resolved


(6) Acute hypoxemic respiratory failure


ICD Code:  J96.01 - Acute respiratory failure with hypoxia


Status:  Resolved


(7) CVA (cerebral vascular accident)


ICD Code:  I63.9 - Cerebral infarction, unspecified


(8) Tricuspid regurgitation


ICD Code:  I07.1 - Rheumatic tricuspid insufficiency


Status:  Acute


(9) Acute systolic heart failure


ICD Code:  I50.21 - Acute systolic (congestive) heart failure


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


ICD Code:  T82.6XXA - Infection and inflammatory reaction due to cardiac valve 

prosthesis, initial encounter


Status:  Acute


(11) NOÉ (acute kidney injury)


ICD Code:  N17.9 - Acute kidney failure, unspecified


Status:  Resolved


(12) Protein calorie malnutrition


ICD Code:  E46 - Unspecified protein-calorie malnutrition


(13) Microcytic anemia


ICD Code:  D50.9 - Iron deficiency anemia, unspecified


(14) Hyperphosphatemia


ICD Code:  E83.39 - Other disorders of phosphorus metabolism


(15) Hypernatremia


ICD Code:  E87.0 - Hyperosmolality and hypernatremia


(16) IV drug abuse


ICD Code:  F19.10 - Other psychoactive substance abuse, uncomplicated


Status:  Acute


(17) Headache


ICD Code:  R51 - Headache


(18) Abdominal pain


ICD Code:  R10.9 - Unspecified abdominal pain


Assessment and Plan








(1) Severe sepsis


Multiple splenic infarcts 


MSSA meningitis 


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


Prosthetic valve endocarditis


Left shoulder pain Discussed with Dr Wilkes from ID, will eval patient and 

will decide if needs MRI of left shoulder. Seen by ID also if persistent 

shoulder pain will get MRI. Will consult OT 


Also will consult palliative care for management of pain meds as patient 

requesting for more pain meds. 





ICD Code:  A41.9 - Sepsis, unspecified organism; R65.20 - Severe sepsis without 

septic shock


Status:  Acute


Plan:  Sepsis present on admission, the patient presented with leukocytosis and 

tachycardia as well as after mental status.


Initially admitted to the intensive care unit, intubated and cared for by the 

intensivists.


Sepsis found to be secondary to meningitis.


Continue IV antibiotics as per infectious disease recommendations.


The patient is currently on IV ampicillin, oxacillin, gentamicin and rifampin - 

continue.


Unless more positive blood cx anticipate 6 week from 1st neg (8/24) followed by

  indefinite oral abx suppression tx. IV abx stop date tentatively.


Gentamycin trough 2.0








(2) MSSA meningitis


Status:  Acute


Plan:  As above.





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


Status:  Acute


Plan:  IV antibiotics as per infectious disease as above.





(4) SVT (supraventricular tachycardia)


ICD Code:  I47.1 - Supraventricular tachycardia


Status:  Resolved


Plan:  SVT now resolved.  Continue metoprolol.


Echocardiogram 8 foci 17 revealed an ejection fraction showed 25-30%.  PAP 34 

mmHg


Initially treated with vasopressors which the patient is now off.


The patient started on Lasix 20 minute grams by mouth daily.  Continue.





(5) Postextubation stridor


ICD Code:  J95.89 - Other postprocedural complications and disorders of 

respiratory system, not elsewhere classified


Status:  Resolved


Plan:  The patient initially extubated on 9/3/17.  The patient was administered 

racemic epinephrine as needed for stridor or


Continue albuterol/ipratropium inhaled treatments every 6 hours with every 2 

hours as needed for albuterol therapy.


Status post today treatment of dexamethasone IV every 8 hours.





(6) Acute hypoxemic respiratory failure


ICD Code:  J96.01 - Acute respiratory failure with hypoxia


Status:  Resolved


Plan:  Continue supplemental oxygen to keep oxygen more than 92%.


Status post intubation and extubation.


Continue DuoNeb treatments.





(7) CVA (cerebral vascular accident)


ICD Code:  I63.9 - Cerebral infarction, unspecified


Plan:  CT head negative, MRI small acute and subacute cortical infarct of the 

left parietal lobe.


The patient was seen and evaluated by neurology.


The patient is on a scheduled methadone 10 mg twice





(8) Tricuspid regurgitation


ICD Code:  I07.1 - Rheumatic tricuspid insufficiency


Status:  Acute


Plan:  As seen on echo described above.  Continue oral Lasix.





(9) Acute systolic heart failure


ICD Code:  I50.21 - Acute systolic (congestive) heart failure


Plan:  Continue Lasix orally. continue Lasix 40 mg po daily.





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


ICD Code:  T82.6XXA - Infection and inflammatory reaction due to cardiac valve 

prosthesis, initial encounter


Status:  Acute


Plan:  The patient has history of IV drug abuse.


Continue antibiotics as per ID recommendations. Tentative a stop date is 10/5





(11) NOÉ (acute kidney injury)


ICD Code:  N17.9 - Acute kidney failure, unspecified


Status:  Resolved


Plan:  Now resolved.  Continue to monitor BMP





(12) Protein calorie malnutrition


ICD Code:  E46 - Unspecified protein-calorie malnutrition


Plan:  Secondary to long-standing IV drug abuse and endocarditis.





(13) Microcytic anemia


ICD Code:  D50.9 - Iron deficiency anemia, unspecified


Plan:  Hemoglobin seems to be stable at 8.6.  I will check iron studies and 

stool Hemoccult for blood.


Patient had iron studies previously concordant with iron deficiency anemia.  I 

will start the patient on oral iron sulfate.





(14) Hyperphosphatemia


ICD Code:  E83.39 - Other disorders of phosphorus metabolism


Plan:  Now resolved.  Continue to monitor levels.





(15) Hypernatremia


ICD Code:  E87.0 - Hyperosmolality and hypernatremia


Plan:  Likely secondary to dehydration. 


Sodium now within normal range.  Continue to monitor BMP.





(16) IV drug abuse


ICD Code:  F19.10 - Other psychoactive substance abuse, uncomplicated


Status:  Acute


Plan:  Advised cessation.  Currently on methadone.





(17) Headache


ICD Code:  R51 - Headache


Plan:  Headache is bifrontal, patient is afebrile.


Continue Acetaminophen 1000 mg po Q 6 hrs prn headache.





(18) Abdominal pain 


ICD Code:  R10.9 - Unspecified abdominal pain


Plan:  check CT abdomen and pelvis. Check stool for C diff PCR.





(19) Diarrhea, improving 


Plan: 


C diff. is negative  Continue probiotic. Add Imodium  





(20) Anasarca.


Continue lasix po.  Monitor UPO and kidney function closely. 





Assessment and Plan


GI prophylaxis: Continue famotidine.


DVT prophylaxis: SCDs, ambulation.








DC plan: needs inpatient treatment. DC when cleared by ID specialist. 


Patient has left shoulder pain poss plan for MRI if pain persists , ID will  

decide. Also palliative care consulted as patient is asking for more pain meds 

constantly, pan management per palliative care transition to PO





Problem Qualifiers





(1) CVA (cerebral vascular accident):  


Qualified Codes:  I63.9 - Cerebral infarction, unspecified


(2) Protein calorie malnutrition:  


Qualified Codes:  E44.0 - Moderate protein-calorie malnutrition








Jyothi Saunders MD Sep 19, 2017 10:51

## 2017-09-20 VITALS
TEMPERATURE: 97.6 F | DIASTOLIC BLOOD PRESSURE: 75 MMHG | OXYGEN SATURATION: 98 % | SYSTOLIC BLOOD PRESSURE: 104 MMHG | HEART RATE: 93 BPM | RESPIRATION RATE: 18 BRPM

## 2017-09-20 VITALS
HEART RATE: 86 BPM | DIASTOLIC BLOOD PRESSURE: 79 MMHG | RESPIRATION RATE: 18 BRPM | OXYGEN SATURATION: 100 % | SYSTOLIC BLOOD PRESSURE: 116 MMHG | TEMPERATURE: 98 F

## 2017-09-20 VITALS
HEART RATE: 81 BPM | TEMPERATURE: 98.3 F | RESPIRATION RATE: 18 BRPM | OXYGEN SATURATION: 100 % | SYSTOLIC BLOOD PRESSURE: 117 MMHG | DIASTOLIC BLOOD PRESSURE: 76 MMHG

## 2017-09-20 VITALS
SYSTOLIC BLOOD PRESSURE: 127 MMHG | TEMPERATURE: 97.3 F | DIASTOLIC BLOOD PRESSURE: 85 MMHG | OXYGEN SATURATION: 99 % | HEART RATE: 86 BPM | RESPIRATION RATE: 18 BRPM

## 2017-09-20 VITALS
DIASTOLIC BLOOD PRESSURE: 66 MMHG | HEART RATE: 85 BPM | TEMPERATURE: 98 F | OXYGEN SATURATION: 97 % | SYSTOLIC BLOOD PRESSURE: 112 MMHG | RESPIRATION RATE: 16 BRPM

## 2017-09-20 VITALS
DIASTOLIC BLOOD PRESSURE: 58 MMHG | RESPIRATION RATE: 18 BRPM | HEART RATE: 82 BPM | SYSTOLIC BLOOD PRESSURE: 90 MMHG | OXYGEN SATURATION: 96 % | TEMPERATURE: 97.4 F

## 2017-09-20 LAB — GFR SERPLBLD BASED ON 1.73 SQ M-ARVRAT: 48 ML/MIN (ref 89–?)

## 2017-09-20 RX ADMIN — HEPARIN SODIUM SCH UNITS: 10000 INJECTION, SOLUTION INTRAVENOUS; SUBCUTANEOUS at 17:36

## 2017-09-20 RX ADMIN — ACETAMINOPHEN PRN MG: 325 TABLET ORAL at 12:33

## 2017-09-20 RX ADMIN — STANDARDIZED SENNA CONCENTRATE AND DOCUSATE SODIUM SCH TAB: 8.6; 5 TABLET, FILM COATED ORAL at 20:49

## 2017-09-20 RX ADMIN — MORPHINE SULFATE PRN MG: 2 INJECTION, SOLUTION INTRAMUSCULAR; INTRAVENOUS at 14:26

## 2017-09-20 RX ADMIN — OXACILLIN SODIUM SCH MLS/HR: 2 INJECTION, POWDER, FOR SOLUTION INTRAMUSCULAR; INTRAVENOUS at 14:55

## 2017-09-20 RX ADMIN — OXACILLIN SODIUM SCH MLS/HR: 2 INJECTION, POWDER, FOR SOLUTION INTRAMUSCULAR; INTRAVENOUS at 10:13

## 2017-09-20 RX ADMIN — METHADONE HYDROCHLORIDE SCH MG: 5 SOLUTION ORAL at 20:49

## 2017-09-20 RX ADMIN — MORPHINE SULFATE PRN MG: 2 INJECTION, SOLUTION INTRAMUSCULAR; INTRAVENOUS at 00:49

## 2017-09-20 RX ADMIN — RIFAMPIN SCH MG: 150 CAPSULE ORAL at 20:49

## 2017-09-20 RX ADMIN — POTASSIUM CHLORIDE SCH MEQ: 20 TABLET, EXTENDED RELEASE ORAL at 07:57

## 2017-09-20 RX ADMIN — METHADONE HYDROCHLORIDE SCH MG: 5 SOLUTION ORAL at 07:58

## 2017-09-20 RX ADMIN — POLYVINYL ALCOHOL SCH DROP: 14 SOLUTION/ DROPS OPHTHALMIC at 17:36

## 2017-09-20 RX ADMIN — OXACILLIN SODIUM SCH MLS/HR: 2 INJECTION, POWDER, FOR SOLUTION INTRAMUSCULAR; INTRAVENOUS at 17:46

## 2017-09-20 RX ADMIN — FUROSEMIDE SCH MG: 20 TABLET ORAL at 07:58

## 2017-09-20 RX ADMIN — AMPICILLIN SODIUM SCH MLS/HR: 2 INJECTION, POWDER, FOR SOLUTION INTRAMUSCULAR; INTRAVENOUS at 17:39

## 2017-09-20 RX ADMIN — OXACILLIN SODIUM SCH MLS/HR: 2 INJECTION, POWDER, FOR SOLUTION INTRAMUSCULAR; INTRAVENOUS at 20:50

## 2017-09-20 RX ADMIN — POLYVINYL ALCOHOL SCH DROP: 14 SOLUTION/ DROPS OPHTHALMIC at 07:58

## 2017-09-20 RX ADMIN — STANDARDIZED SENNA CONCENTRATE AND DOCUSATE SODIUM SCH TAB: 8.6; 5 TABLET, FILM COATED ORAL at 07:59

## 2017-09-20 RX ADMIN — OXACILLIN SODIUM SCH MLS/HR: 2 INJECTION, POWDER, FOR SOLUTION INTRAMUSCULAR; INTRAVENOUS at 05:55

## 2017-09-20 RX ADMIN — AMPICILLIN SODIUM SCH MLS/HR: 2 INJECTION, POWDER, FOR SOLUTION INTRAMUSCULAR; INTRAVENOUS at 16:48

## 2017-09-20 RX ADMIN — MORPHINE SULFATE PRN MG: 2 INJECTION, SOLUTION INTRAMUSCULAR; INTRAVENOUS at 05:00

## 2017-09-20 RX ADMIN — STANDARDIZED SENNA CONCENTRATE AND DOCUSATE SODIUM SCH TAB: 8.6; 5 TABLET, FILM COATED ORAL at 20:52

## 2017-09-20 RX ADMIN — Medication SCH ML: at 20:50

## 2017-09-20 RX ADMIN — POLYVINYL ALCOHOL SCH DROP: 14 SOLUTION/ DROPS OPHTHALMIC at 12:33

## 2017-09-20 RX ADMIN — FAMOTIDINE SCH MG: 20 TABLET, FILM COATED ORAL at 07:57

## 2017-09-20 RX ADMIN — AMPICILLIN SODIUM SCH MLS/HR: 2 INJECTION, POWDER, FOR SOLUTION INTRAMUSCULAR; INTRAVENOUS at 07:58

## 2017-09-20 RX ADMIN — CHLORHEXIDINE GLUCONATE SCH PACK: 500 CLOTH TOPICAL at 02:35

## 2017-09-20 RX ADMIN — MORPHINE SULFATE PRN MG: 2 INJECTION, SOLUTION INTRAMUSCULAR; INTRAVENOUS at 11:08

## 2017-09-20 RX ADMIN — RIFAMPIN SCH MG: 150 CAPSULE ORAL at 07:56

## 2017-09-20 RX ADMIN — MORPHINE SULFATE PRN MG: 2 INJECTION, SOLUTION INTRAMUSCULAR; INTRAVENOUS at 08:04

## 2017-09-20 RX ADMIN — Medication SCH ML: at 07:27

## 2017-09-20 RX ADMIN — MORPHINE SULFATE PRN MG: 2 INJECTION, SOLUTION INTRAMUSCULAR; INTRAVENOUS at 17:36

## 2017-09-20 RX ADMIN — MORPHINE SULFATE PRN MG: 2 INJECTION, SOLUTION INTRAMUSCULAR; INTRAVENOUS at 20:51

## 2017-09-20 RX ADMIN — AMPICILLIN SODIUM SCH MLS/HR: 2 INJECTION, POWDER, FOR SOLUTION INTRAMUSCULAR; INTRAVENOUS at 04:54

## 2017-09-20 RX ADMIN — ACETAMINOPHEN PRN MG: 325 TABLET ORAL at 23:00

## 2017-09-20 RX ADMIN — ACETAMINOPHEN PRN MG: 325 TABLET ORAL at 17:38

## 2017-09-20 RX ADMIN — OXACILLIN SODIUM SCH MLS/HR: 2 INJECTION, POWDER, FOR SOLUTION INTRAMUSCULAR; INTRAVENOUS at 02:33

## 2017-09-20 RX ADMIN — AMPICILLIN SODIUM SCH MLS/HR: 2 INJECTION, POWDER, FOR SOLUTION INTRAMUSCULAR; INTRAVENOUS at 00:49

## 2017-09-20 RX ADMIN — GENTAMICIN SULFATE SCH MLS/HR: 0.8 INJECTION, SOLUTION INTRAVENOUS at 12:49

## 2017-09-20 RX ADMIN — FAMOTIDINE SCH MG: 20 TABLET, FILM COATED ORAL at 20:49

## 2017-09-20 RX ADMIN — HEPARIN SODIUM SCH UNITS: 10000 INJECTION, SOLUTION INTRAVENOUS; SUBCUTANEOUS at 00:49

## 2017-09-20 RX ADMIN — HEPARIN SODIUM SCH UNITS: 10000 INJECTION, SOLUTION INTRAVENOUS; SUBCUTANEOUS at 10:00

## 2017-09-20 RX ADMIN — AMPICILLIN SODIUM SCH MLS/HR: 2 INJECTION, POWDER, FOR SOLUTION INTRAMUSCULAR; INTRAVENOUS at 12:33

## 2017-09-20 NOTE — HHI.PR
Subjective


Remarks


In the chair. Patient denies any chest pain or sob. Says pain in her left 

shoulder is improving and can move better her lft arm and shoulder. :LEedema 

has imprpved. No fever ro chills. No abd pain. Diarrhea on/off.





Objective


Vitals





Vital Signs








  Date Time  Temp Pulse Resp B/P (MAP) Pulse Ox O2 Delivery O2 Flow Rate FiO2


 


9/20/17 10:54      Room Air  21


 


9/20/17 08:00 98.0 85 16 112/66 (81) 97   


 


9/20/17 04:00 97.6 93 18 104/75 (85) 98   


 


9/20/17 03:56      Room Air  


 


9/20/17 00:00 97.4 82 18 90/58 (69) 96   


 


9/20/17 00:00      Room Air  


 


9/19/17 20:18  87      


 


9/19/17 20:00      Room Air  


 


9/19/17 20:00 98.1 87 18 116/76 (89) 97   


 


9/19/17 17:39     99   21


 


9/19/17 16:00 98.2 84 20 107/68 (81) 99   


 


9/19/17 14:37      Room Air  21














I/O      


 


 9/19/17 9/19/17 9/19/17 9/20/17 9/20/17 9/20/17





 07:00 15:00 23:00 07:00 15:00 23:00


 


Intake Total 1170 ml  780 ml 700 ml  


 


Output Total   1000 ml   


 


Balance 1170 ml  -220 ml 700 ml  


 


      


 


Intake Oral 470 ml  780 ml   


 


IV Total 700 ml   700 ml  


 


Output Urine Total   1000 ml   


 


# Voids 5     


 


# Bowel Movements 1  1   








Result Diagram:  


9/16/17 1140                                                                   

             9/20/17 0855





Imaging





Last Impressions








Abdomen CT 9/8/17 0000 Signed





Impressions: 





 Service Date/Time:  Friday, September 8, 2017 20:26 - CONCLUSION:  1. 

Peripheral 





 low attenuation lesions in the spleen most characteristic of multiple splenic 





 infarcts. Some of these are new findings since 2016 comparison. 2. Minimal 





 subsegmental airspace disease at the lung bases. 3. 3.4 cm right adnexal cyst. 





 Intrauterine device present. 4. Small fat containing ventral hernias.     

Jaspal Issa MD 


 


Chest X-Ray 9/5/17 0000 Signed





Impressions: 





 Service Date/Time:  Tuesday, September 5, 2017 08:07 - CONCLUSION:  Suspected 





 mild edema/failure.     Ron Swan MD 


 


Neck CT 9/2/17 0000 Signed





Impressions: 





 Service Date/Time:  Saturday, September 2, 2017 11:13 - CONCLUSION:  1. 

Patient 





 is intubated which limits this study. The epiglottis is otherwise normal. 





 Secretions are present in the oropharynx and right sphenoid sinus.     Ej Ewing MD 


 


Brain MRI 8/25/17 0000 Signed





Impressions: 





 Service Date/Time:  Friday, August 25, 2017 17:07 - CONCLUSION:  1. Small 

acute 





 or subacute cortical infarct of the left parietal lobe. 2. Small, faint area 

of 





 nonspecific flair signal abnormality in the left frontal lobe periventricular 





 white matter, nonspecific. No associated mass effect or abnormal enhancement. 

3 





 month followup MRI of the brain recommended. 3.  Mild chronic white matter 





 changes.     Ron Swan MD 


 


Head CT 8/21/17 0000 Signed





Impressions: 





 Service Date/Time:  Monday, August 21, 2017 19:47 - CONCLUSION:  No evidence 

of 





 acute infarct, hemorrhage, mass or edema.     Kristian Frankel MD 








Objective Remarks


GENERAL:  Young female, appears in nad. 


SKIN: Warm and dry.


CARDIOVASCULAR: Regular rate and rhythm with systolic  murmur. 


RESPIRATORY: Decreased breath sound BL, no crackles or wheezing auscultated. No 

accessory muscle use.


GASTROINTESTINAL: Abdomen soft, non-tender, nondistended. 


MUSCULOSKELETAL: Left shoulder pain with palpation and movement, decreased ROM 2

/2 pain, improving with OT.  No cyanosis. 1+  LE  edema improving. 


BACK: Nontender without obvious deformity. No CVA tenderness.


Procedures


None





A/P


Problem List:  


(1) Severe sepsis


ICD Code:  A41.9 - Sepsis, unspecified organism; R65.20 - Severe sepsis without 

septic shock


Status:  Acute


(2) MSSA meningitis


Status:  Acute


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


Status:  Acute


(4) SVT (supraventricular tachycardia)


ICD Code:  I47.1 - Supraventricular tachycardia


Status:  Resolved


(5) Postextubation stridor


ICD Code:  J95.89 - Other postprocedural complications and disorders of 

respiratory system, not elsewhere classified


Status:  Resolved


(6) Acute hypoxemic respiratory failure


ICD Code:  J96.01 - Acute respiratory failure with hypoxia


Status:  Resolved


(7) CVA (cerebral vascular accident)


ICD Code:  I63.9 - Cerebral infarction, unspecified


(8) Tricuspid regurgitation


ICD Code:  I07.1 - Rheumatic tricuspid insufficiency


Status:  Acute


(9) Acute systolic heart failure


ICD Code:  I50.21 - Acute systolic (congestive) heart failure


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


ICD Code:  T82.6XXA - Infection and inflammatory reaction due to cardiac valve 

prosthesis, initial encounter


Status:  Acute


(11) NOÉ (acute kidney injury)


ICD Code:  N17.9 - Acute kidney failure, unspecified


Status:  Resolved


(12) Protein calorie malnutrition


ICD Code:  E46 - Unspecified protein-calorie malnutrition


(13) Microcytic anemia


ICD Code:  D50.9 - Iron deficiency anemia, unspecified


(14) Hyperphosphatemia


ICD Code:  E83.39 - Other disorders of phosphorus metabolism


(15) Hypernatremia


ICD Code:  E87.0 - Hyperosmolality and hypernatremia


(16) IV drug abuse


ICD Code:  F19.10 - Other psychoactive substance abuse, uncomplicated


Status:  Acute


(17) Headache


ICD Code:  R51 - Headache


(18) Abdominal pain


ICD Code:  R10.9 - Unspecified abdominal pain


Assessment and Plan








(1) Severe sepsis


Multiple splenic infarcts 


MSSA meningitis 


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


Prosthetic valve endocarditis


Left shoulder pain Discussed with Dr Wilkse from ID, will eval patient and 

will decide if needs MRI of left shoulder. Seen by ID also if persistent 

shoulder pain will get MRI. Will consult OT 


Also will consult palliative care for management of pain meds as patient 

requesting for more pain meds. 





ICD Code:  A41.9 - Sepsis, unspecified organism; R65.20 - Severe sepsis without 

septic shock


Status:  Acute


Plan:  Sepsis present on admission, the patient presented with leukocytosis and 

tachycardia as well as after mental status.


Initially admitted to the intensive care unit, intubated and cared for by the 

intensivists.


Sepsis found to be secondary to meningitis.


Continue IV antibiotics as per infectious disease recommendations.


The patient is currently on IV ampicillin, oxacillin, gentamicin and rifampin - 

continue.


Unless more positive blood cx anticipate 6 week from 1st neg (8/24) followed by

  indefinite oral abx suppression tx. IV abx stop date tentatively.


Gentamycin trough 2.0








(2) MSSA meningitis


Status:  Acute


Plan:  As above.





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


Status:  Acute


Plan:  IV antibiotics as per infectious disease as above.





(4) SVT (supraventricular tachycardia)


ICD Code:  I47.1 - Supraventricular tachycardia


Status:  Resolved


Plan:  SVT now resolved.  Continue metoprolol.


Echocardiogram 8 foci 17 revealed an ejection fraction showed 25-30%.  PAP 34 

mmHg


Initially treated with vasopressors which the patient is now off.


The patient started on Lasix 20 minute grams by mouth daily.  Continue.





(5) Postextubation stridor


ICD Code:  J95.89 - Other postprocedural complications and disorders of 

respiratory system, not elsewhere classified


Status:  Resolved


Plan:  The patient initially extubated on 9/3/17.  The patient was administered 

racemic epinephrine as needed for stridor or


Continue albuterol/ipratropium inhaled treatments every 6 hours with every 2 

hours as needed for albuterol therapy.


Status post today treatment of dexamethasone IV every 8 hours.





(6) Acute hypoxemic respiratory failure


ICD Code:  J96.01 - Acute respiratory failure with hypoxia


Status:  Resolved


Plan:  Continue supplemental oxygen to keep oxygen more than 92%.


Status post intubation and extubation.


Continue DuoNeb treatments.





(7) CVA (cerebral vascular accident)


ICD Code:  I63.9 - Cerebral infarction, unspecified


Plan:  CT head negative, MRI small acute and subacute cortical infarct of the 

left parietal lobe.


The patient was seen and evaluated by neurology.


The patient is on a scheduled methadone 10 mg twice





(8) Tricuspid regurgitation


ICD Code:  I07.1 - Rheumatic tricuspid insufficiency


Status:  Acute


Plan:  As seen on echo described above.  Continue oral Lasix.





(9) Acute systolic heart failure


ICD Code:  I50.21 - Acute systolic (congestive) heart failure


Plan:  Continue Lasix orally. continue Lasix 40 mg po daily.





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


ICD Code:  T82.6XXA - Infection and inflammatory reaction due to cardiac valve 

prosthesis, initial encounter


Status:  Acute


Plan:  The patient has history of IV drug abuse.


Continue antibiotics as per ID recommendations. Tentative a stop date is 10/5





(11) NOÉ (acute kidney injury)


ICD Code:  N17.9 - Acute kidney failure, unspecified


Status:  Resolved


Plan:  Now resolved.  Continue to monitor BMP





(12) Protein calorie malnutrition


ICD Code:  E46 - Unspecified protein-calorie malnutrition


Plan:  Secondary to long-standing IV drug abuse and endocarditis.





(13) Microcytic anemia


ICD Code:  D50.9 - Iron deficiency anemia, unspecified


Plan:  Hemoglobin seems to be stable at 8.6.  I will check iron studies and 

stool Hemoccult for blood.


Patient had iron studies previously concordant with iron deficiency anemia.  I 

will start the patient on oral iron sulfate.





(14) Hyperphosphatemia


ICD Code:  E83.39 - Other disorders of phosphorus metabolism


Plan:  Now resolved.  Continue to monitor levels.





(15) Hypernatremia


ICD Code:  E87.0 - Hyperosmolality and hypernatremia


Plan:  Likely secondary to dehydration. 


Sodium now within normal range.  Continue to monitor BMP.





(16) IV drug abuse


ICD Code:  F19.10 - Other psychoactive substance abuse, uncomplicated


Status:  Acute


Plan:  Advised cessation.  Currently on methadone.





(17) Headache


ICD Code:  R51 - Headache


Plan:  Headache is bifrontal, patient is afebrile.


Continue Acetaminophen 1000 mg po Q 6 hrs prn headache.





(18) Abdominal pain 


ICD Code:  R10.9 - Unspecified abdominal pain


Plan:  check CT abdomen and pelvis. Check stool for C diff PCR.





(19) Diarrhea, improving 


Plan: 


C diff. is negative  Continue probiotic. Add Imodium  





(20) Anasarca.


Continue lasix po.  Monitor UPO and kidney function closely. 





Assessment and Plan


GI prophylaxis: Continue famotidine.


DVT prophylaxis: SCDs, ambulation.








DC plan: needs inpatient treatment. DC when cleared by ID specialist. 


Patient has left shoulder pain poss plan for MRI if pain persists , ID will  

decide. Also palliative care consulted as patient is asking for more pain meds 

constantly, pan management per palliative care transition to PO





Problem Qualifiers





(1) CVA (cerebral vascular accident):  


Qualified Codes:  I63.9 - Cerebral infarction, unspecified


(2) Protein calorie malnutrition:  


Qualified Codes:  E44.0 - Moderate protein-calorie malnutrition








Jyothi Saunders MD Sep 20, 2017 12:05

## 2017-09-21 VITALS
SYSTOLIC BLOOD PRESSURE: 127 MMHG | OXYGEN SATURATION: 100 % | RESPIRATION RATE: 16 BRPM | DIASTOLIC BLOOD PRESSURE: 77 MMHG | HEART RATE: 77 BPM | TEMPERATURE: 97.8 F

## 2017-09-21 VITALS
DIASTOLIC BLOOD PRESSURE: 78 MMHG | HEART RATE: 80 BPM | RESPIRATION RATE: 18 BRPM | SYSTOLIC BLOOD PRESSURE: 112 MMHG | TEMPERATURE: 97.8 F | OXYGEN SATURATION: 97 %

## 2017-09-21 VITALS
TEMPERATURE: 98.3 F | SYSTOLIC BLOOD PRESSURE: 105 MMHG | HEART RATE: 85 BPM | OXYGEN SATURATION: 99 % | DIASTOLIC BLOOD PRESSURE: 71 MMHG | RESPIRATION RATE: 16 BRPM

## 2017-09-21 VITALS
DIASTOLIC BLOOD PRESSURE: 64 MMHG | HEART RATE: 84 BPM | TEMPERATURE: 98.3 F | SYSTOLIC BLOOD PRESSURE: 126 MMHG | OXYGEN SATURATION: 99 % | RESPIRATION RATE: 18 BRPM

## 2017-09-21 VITALS
DIASTOLIC BLOOD PRESSURE: 79 MMHG | TEMPERATURE: 97.9 F | SYSTOLIC BLOOD PRESSURE: 129 MMHG | OXYGEN SATURATION: 99 % | HEART RATE: 72 BPM | RESPIRATION RATE: 18 BRPM

## 2017-09-21 VITALS
RESPIRATION RATE: 18 BRPM | HEART RATE: 88 BPM | SYSTOLIC BLOOD PRESSURE: 132 MMHG | OXYGEN SATURATION: 98 % | TEMPERATURE: 98.5 F | DIASTOLIC BLOOD PRESSURE: 78 MMHG

## 2017-09-21 RX ADMIN — POTASSIUM CHLORIDE SCH MEQ: 20 TABLET, EXTENDED RELEASE ORAL at 09:10

## 2017-09-21 RX ADMIN — HEPARIN SODIUM SCH UNITS: 10000 INJECTION, SOLUTION INTRAVENOUS; SUBCUTANEOUS at 09:14

## 2017-09-21 RX ADMIN — MORPHINE SULFATE PRN MG: 2 INJECTION, SOLUTION INTRAMUSCULAR; INTRAVENOUS at 05:08

## 2017-09-21 RX ADMIN — STANDARDIZED SENNA CONCENTRATE AND DOCUSATE SODIUM SCH TAB: 8.6; 5 TABLET, FILM COATED ORAL at 20:05

## 2017-09-21 RX ADMIN — METHADONE HYDROCHLORIDE SCH MG: 5 SOLUTION ORAL at 09:13

## 2017-09-21 RX ADMIN — MORPHINE SULFATE PRN MG: 2 INJECTION, SOLUTION INTRAMUSCULAR; INTRAVENOUS at 12:57

## 2017-09-21 RX ADMIN — FAMOTIDINE SCH MG: 20 TABLET, FILM COATED ORAL at 09:13

## 2017-09-21 RX ADMIN — GENTAMICIN SULFATE SCH MLS/HR: 0.8 INJECTION, SOLUTION INTRAVENOUS at 05:07

## 2017-09-21 RX ADMIN — METHADONE HYDROCHLORIDE SCH MG: 5 SOLUTION ORAL at 22:13

## 2017-09-21 RX ADMIN — AMPICILLIN SODIUM SCH MLS/HR: 2 INJECTION, POWDER, FOR SOLUTION INTRAMUSCULAR; INTRAVENOUS at 20:05

## 2017-09-21 RX ADMIN — AMPICILLIN SODIUM SCH MLS/HR: 2 INJECTION, POWDER, FOR SOLUTION INTRAMUSCULAR; INTRAVENOUS at 09:07

## 2017-09-21 RX ADMIN — OXACILLIN SODIUM SCH MLS/HR: 2 INJECTION, POWDER, FOR SOLUTION INTRAMUSCULAR; INTRAVENOUS at 05:07

## 2017-09-21 RX ADMIN — RIFAMPIN SCH MG: 150 CAPSULE ORAL at 09:13

## 2017-09-21 RX ADMIN — AMPICILLIN SODIUM SCH MLS/HR: 2 INJECTION, POWDER, FOR SOLUTION INTRAMUSCULAR; INTRAVENOUS at 05:08

## 2017-09-21 RX ADMIN — RIFAMPIN SCH MG: 150 CAPSULE ORAL at 20:04

## 2017-09-21 RX ADMIN — POLYVINYL ALCOHOL SCH DROP: 14 SOLUTION/ DROPS OPHTHALMIC at 18:49

## 2017-09-21 RX ADMIN — GENTAMICIN SULFATE SCH MLS/HR: 0.8 INJECTION, SOLUTION INTRAVENOUS at 23:15

## 2017-09-21 RX ADMIN — POLYVINYL ALCOHOL SCH DROP: 14 SOLUTION/ DROPS OPHTHALMIC at 09:08

## 2017-09-21 RX ADMIN — Medication SCH ML: at 20:04

## 2017-09-21 RX ADMIN — FUROSEMIDE SCH MG: 20 TABLET ORAL at 09:11

## 2017-09-21 RX ADMIN — HEPARIN SODIUM SCH UNITS: 10000 INJECTION, SOLUTION INTRAVENOUS; SUBCUTANEOUS at 18:50

## 2017-09-21 RX ADMIN — MORPHINE SULFATE PRN MG: 2 INJECTION, SOLUTION INTRAMUSCULAR; INTRAVENOUS at 23:15

## 2017-09-21 RX ADMIN — AMPICILLIN SODIUM SCH MLS/HR: 2 INJECTION, POWDER, FOR SOLUTION INTRAMUSCULAR; INTRAVENOUS at 16:41

## 2017-09-21 RX ADMIN — OXACILLIN SODIUM SCH MLS/HR: 2 INJECTION, POWDER, FOR SOLUTION INTRAMUSCULAR; INTRAVENOUS at 22:13

## 2017-09-21 RX ADMIN — AMPICILLIN SODIUM SCH MLS/HR: 2 INJECTION, POWDER, FOR SOLUTION INTRAMUSCULAR; INTRAVENOUS at 00:48

## 2017-09-21 RX ADMIN — AMPICILLIN SODIUM SCH MLS/HR: 2 INJECTION, POWDER, FOR SOLUTION INTRAMUSCULAR; INTRAVENOUS at 12:56

## 2017-09-21 RX ADMIN — OXACILLIN SODIUM SCH MLS/HR: 2 INJECTION, POWDER, FOR SOLUTION INTRAMUSCULAR; INTRAVENOUS at 00:51

## 2017-09-21 RX ADMIN — FAMOTIDINE SCH MG: 20 TABLET, FILM COATED ORAL at 20:04

## 2017-09-21 RX ADMIN — MORPHINE SULFATE PRN MG: 2 INJECTION, SOLUTION INTRAMUSCULAR; INTRAVENOUS at 09:16

## 2017-09-21 RX ADMIN — POLYVINYL ALCOHOL SCH DROP: 14 SOLUTION/ DROPS OPHTHALMIC at 12:56

## 2017-09-21 RX ADMIN — Medication SCH ML: at 09:00

## 2017-09-21 RX ADMIN — OXACILLIN SODIUM SCH MLS/HR: 2 INJECTION, POWDER, FOR SOLUTION INTRAMUSCULAR; INTRAVENOUS at 14:00

## 2017-09-21 RX ADMIN — HEPARIN SODIUM SCH UNITS: 10000 INJECTION, SOLUTION INTRAVENOUS; SUBCUTANEOUS at 00:51

## 2017-09-21 RX ADMIN — STANDARDIZED SENNA CONCENTRATE AND DOCUSATE SODIUM SCH TAB: 8.6; 5 TABLET, FILM COATED ORAL at 09:00

## 2017-09-21 RX ADMIN — MORPHINE SULFATE PRN MG: 2 INJECTION, SOLUTION INTRAMUSCULAR; INTRAVENOUS at 00:44

## 2017-09-21 RX ADMIN — OXACILLIN SODIUM SCH MLS/HR: 2 INJECTION, POWDER, FOR SOLUTION INTRAMUSCULAR; INTRAVENOUS at 10:30

## 2017-09-21 RX ADMIN — OXACILLIN SODIUM SCH MLS/HR: 2 INJECTION, POWDER, FOR SOLUTION INTRAMUSCULAR; INTRAVENOUS at 18:49

## 2017-09-21 RX ADMIN — MORPHINE SULFATE PRN MG: 2 INJECTION, SOLUTION INTRAMUSCULAR; INTRAVENOUS at 20:04

## 2017-09-21 RX ADMIN — CHLORHEXIDINE GLUCONATE SCH PACK: 500 CLOTH TOPICAL at 04:00

## 2017-09-21 NOTE — HHI.PR
Subjective


Remarks


Patient in bed, says she has pain in her chest , left shoulder and also feels 

sob, says she did not get yet pain meds. She si satting well on room air.  No n/

v/d/c.





Objective


Vitals





Vital Signs








  Date Time  Temp Pulse Resp B/P (MAP) Pulse Ox O2 Delivery O2 Flow Rate FiO2


 


9/21/17 04:00 97.9 72 18 129/79 (96) 99   


 


9/21/17 00:00 97.8 80 18 112/78 (89) 97   


 


9/20/17 20:00 98.3 84 18 117/76 (90) 100   


 


9/20/17 20:00  81      


 


9/20/17 19:30      Room Air  


 


9/20/17 16:00 98.0 86 18 116/79 (91) 100   


 


9/20/17 12:00 97.3 86 18 127/85 (99) 99   


 


9/20/17 10:54      Room Air  21














I/O      


 


 9/20/17 9/20/17 9/20/17 9/21/17 9/21/17 9/21/17





 07:00 15:00 23:00 07:00 15:00 23:00


 


Intake Total 700 ml  100 ml 1020 ml  


 


Balance 700 ml  100 ml 1020 ml  


 


      


 


Intake Oral    720 ml  


 


IV Total 700 ml  100 ml 300 ml  


 


# Voids    3  


 


# Bowel Movements    0  








Result Diagram:  


9/20/17 0855





Imaging





Last Impressions








Abdomen CT 9/8/17 0000 Signed





Impressions: 





 Service Date/Time:  Friday, September 8, 2017 20:26 - CONCLUSION:  1. 

Peripheral 





 low attenuation lesions in the spleen most characteristic of multiple splenic 





 infarcts. Some of these are new findings since 2016 comparison. 2. Minimal 





 subsegmental airspace disease at the lung bases. 3. 3.4 cm right adnexal cyst. 





 Intrauterine device present. 4. Small fat containing ventral hernias.     

Jaspal Issa MD 


 


Chest X-Ray 9/5/17 0000 Signed





Impressions: 





 Service Date/Time:  Tuesday, September 5, 2017 08:07 - CONCLUSION:  Suspected 





 mild edema/failure.     Ron Swan MD 


 


Neck CT 9/2/17 0000 Signed





Impressions: 





 Service Date/Time:  Saturday, September 2, 2017 11:13 - CONCLUSION:  1. 

Patient 





 is intubated which limits this study. The epiglottis is otherwise normal. 





 Secretions are present in the oropharynx and right sphenoid sinus.     Ej Ewing MD 


 


Brain MRI 8/25/17 0000 Signed





Impressions: 





 Service Date/Time:  Friday, August 25, 2017 17:07 - CONCLUSION:  1. Small 

acute 





 or subacute cortical infarct of the left parietal lobe. 2. Small, faint area 

of 





 nonspecific flair signal abnormality in the left frontal lobe periventricular 





 white matter, nonspecific. No associated mass effect or abnormal enhancement. 

3 





 month followup MRI of the brain recommended. 3.  Mild chronic white matter 





 changes.     Ron Swan MD 


 


Head CT 8/21/17 0000 Signed





Impressions: 





 Service Date/Time:  Monday, August 21, 2017 19:47 - CONCLUSION:  No evidence 

of 





 acute infarct, hemorrhage, mass or edema.     Kristian Frankel MD 








Objective Remarks


GENERAL:  Young female, appears in nad. 


SKIN: Warm and dry.


CARDIOVASCULAR: Regular rate and rhythm with systolic  murmur. 


RESPIRATORY: Decreased breath sound BL, no crackles or wheezing auscultated. No 

accessory muscle use.


GASTROINTESTINAL: Abdomen soft, non-tender, nondistended. 


MUSCULOSKELETAL: Left shoulder pain with palpation and movement, decreased ROM 2

/2 pain, improving with OT.  No cyanosis. 1+  LE  edema improving. 


BACK: Nontender without obvious deformity. No CVA tenderness.


Procedures


None





A/P


Problem List:  


(1) Severe sepsis


ICD Code:  A41.9 - Sepsis, unspecified organism; R65.20 - Severe sepsis without 

septic shock


Status:  Acute


(2) MSSA meningitis


Status:  Acute


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


Status:  Acute


(4) SVT (supraventricular tachycardia)


ICD Code:  I47.1 - Supraventricular tachycardia


Status:  Resolved


(5) Postextubation stridor


ICD Code:  J95.89 - Other postprocedural complications and disorders of 

respiratory system, not elsewhere classified


Status:  Resolved


(6) Acute hypoxemic respiratory failure


ICD Code:  J96.01 - Acute respiratory failure with hypoxia


Status:  Resolved


(7) CVA (cerebral vascular accident)


ICD Code:  I63.9 - Cerebral infarction, unspecified


(8) Tricuspid regurgitation


ICD Code:  I07.1 - Rheumatic tricuspid insufficiency


Status:  Acute


(9) Acute systolic heart failure


ICD Code:  I50.21 - Acute systolic (congestive) heart failure


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


ICD Code:  T82.6XXA - Infection and inflammatory reaction due to cardiac valve 

prosthesis, initial encounter


Status:  Acute


(11) NOÉ (acute kidney injury)


ICD Code:  N17.9 - Acute kidney failure, unspecified


Status:  Resolved


(12) Protein calorie malnutrition


ICD Code:  E46 - Unspecified protein-calorie malnutrition


(13) Microcytic anemia


ICD Code:  D50.9 - Iron deficiency anemia, unspecified


(14) Hyperphosphatemia


ICD Code:  E83.39 - Other disorders of phosphorus metabolism


(15) Hypernatremia


ICD Code:  E87.0 - Hyperosmolality and hypernatremia


(16) IV drug abuse


ICD Code:  F19.10 - Other psychoactive substance abuse, uncomplicated


Status:  Acute


(17) Headache


ICD Code:  R51 - Headache


(18) Abdominal pain


ICD Code:  R10.9 - Unspecified abdominal pain


Assessment and Plan








(1) Severe sepsis


Multiple splenic infarcts 


MSSA meningitis 


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


Prosthetic valve endocarditis


Left shoulder pain Discussed with Dr Wilkes from ID, will eval patient and 

will decide if needs MRI of left shoulder. Seen by ID also if persistent 

shoulder pain will get MRI. Will consult OT 


Also will consult palliative care for management of pain meds as patient 

requesting for more pain meds. 





ICD Code:  A41.9 - Sepsis, unspecified organism; R65.20 - Severe sepsis without 

septic shock


Status:  Acute


Plan:  Sepsis present on admission, the patient presented with leukocytosis and 

tachycardia as well as after mental status.


Initially admitted to the intensive care unit, intubated and cared for by the 

intensivists.


Sepsis found to be secondary to meningitis.


Continue IV antibiotics as per infectious disease recommendations.


The patient is currently on IV ampicillin, oxacillin, gentamicin and rifampin - 

continue.


Unless more positive blood cx anticipate 6 week from 1st neg (8/24) followed by

  indefinite oral abx suppression tx. IV abx stop date tentatively.


Gentamycin trough 2.0








(2) MSSA meningitis


Status:  Acute


Plan:  As above.





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


Status:  Acute


Plan:  IV antibiotics as per infectious disease as above.





(4) SVT (supraventricular tachycardia)


ICD Code:  I47.1 - Supraventricular tachycardia


Status:  Resolved


Plan:  SVT now resolved.  Continue metoprolol.


Echocardiogram 8 foci 17 revealed an ejection fraction showed 25-30%.  PAP 34 

mmHg


Initially treated with vasopressors which the patient is now off.


The patient started on Lasix 20 minute grams by mouth daily.  Continue.





(5) Postextubation stridor


ICD Code:  J95.89 - Other postprocedural complications and disorders of 

respiratory system, not elsewhere classified


Status:  Resolved


Plan:  The patient initially extubated on 9/3/17.  The patient was administered 

racemic epinephrine as needed for stridor or


Continue albuterol/ipratropium inhaled treatments every 6 hours with every 2 

hours as needed for albuterol therapy.


Status post today treatment of dexamethasone IV every 8 hours.





(6) Acute hypoxemic respiratory failure


ICD Code:  J96.01 - Acute respiratory failure with hypoxia


Status:  Resolved


Plan:  Continue supplemental oxygen to keep oxygen more than 92%.


Status post intubation and extubation.


Continue DuoNeb treatments.





(7) CVA (cerebral vascular accident)


ICD Code:  I63.9 - Cerebral infarction, unspecified


Plan:  CT head negative, MRI small acute and subacute cortical infarct of the 

left parietal lobe.


The patient was seen and evaluated by neurology.


The patient is on a scheduled methadone 10 mg twice





(8) Tricuspid regurgitation


ICD Code:  I07.1 - Rheumatic tricuspid insufficiency


Status:  Acute


Plan:  As seen on echo described above.  Continue oral Lasix.





(9) Acute systolic heart failure


ICD Code:  I50.21 - Acute systolic (congestive) heart failure


Plan:  Continue Lasix orally. continue Lasix 40 mg po daily.





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


ICD Code:  T82.6XXA - Infection and inflammatory reaction due to cardiac valve 

prosthesis, initial encounter


Status:  Acute


Plan:  The patient has history of IV drug abuse.


Continue antibiotics as per ID recommendations. Tentative a stop date is 10/5





(11) NOÉ (acute kidney injury)


ICD Code:  N17.9 - Acute kidney failure, unspecified


Status:  Resolved


Plan:  Now resolved.  Continue to monitor BMP





(12) Protein calorie malnutrition


ICD Code:  E46 - Unspecified protein-calorie malnutrition


Plan:  Secondary to long-standing IV drug abuse and endocarditis.





(13) Microcytic anemia


ICD Code:  D50.9 - Iron deficiency anemia, unspecified


Plan:  Hemoglobin seems to be stable at 8.6.  I will check iron studies and 

stool Hemoccult for blood.


Patient had iron studies previously concordant with iron deficiency anemia.  I 

will start the patient on oral iron sulfate.





(14) Hyperphosphatemia


ICD Code:  E83.39 - Other disorders of phosphorus metabolism


Plan:  Now resolved.  Continue to monitor levels.





(15) Hypernatremia


ICD Code:  E87.0 - Hyperosmolality and hypernatremia


Plan:  Likely secondary to dehydration. 


Sodium now within normal range.  Continue to monitor BMP.





(16) IV drug abuse


ICD Code:  F19.10 - Other psychoactive substance abuse, uncomplicated


Status:  Acute


Plan:  Advised cessation.  Currently on methadone.





(17) Headache


ICD Code:  R51 - Headache


Plan:  Headache is bifrontal, patient is afebrile.


Continue Acetaminophen 1000 mg po Q 6 hrs prn headache.





(18) Abdominal pain 


ICD Code:  R10.9 - Unspecified abdominal pain


Plan:  check CT abdomen and pelvis. Check stool for C diff PCR.





(19) Diarrhea, improving 


Plan: 


C diff. is negative  Continue probiotic. Add Imodium  





(20) Anasarca.


Continue lasix po.  Monitor UPO and kidney function closely. 





Assessment and Plan


GI prophylaxis: Continue famotidine.


DVT prophylaxis: SCDs, ambulation.








DC plan: needs inpatient treatment. DC when cleared by ID specialist. 


Patient has left shoulder pain poss plan for MRI if pain persists , ID will  

decide. Also palliative care consulted as patient is asking for more pain meds 

constantly, pan management per palliative care transition to PO and taper down





Problem Qualifiers





(1) CVA (cerebral vascular accident):  


Qualified Codes:  I63.9 - Cerebral infarction, unspecified


(2) Protein calorie malnutrition:  


Qualified Codes:  E44.0 - Moderate protein-calorie malnutrition








Jyothi Saunders MD Sep 21, 2017 09:14

## 2017-09-22 VITALS
RESPIRATION RATE: 18 BRPM | OXYGEN SATURATION: 98 % | HEART RATE: 74 BPM | DIASTOLIC BLOOD PRESSURE: 73 MMHG | TEMPERATURE: 98.1 F | SYSTOLIC BLOOD PRESSURE: 115 MMHG

## 2017-09-22 VITALS
DIASTOLIC BLOOD PRESSURE: 67 MMHG | RESPIRATION RATE: 18 BRPM | HEART RATE: 78 BPM | OXYGEN SATURATION: 97 % | TEMPERATURE: 97.6 F | SYSTOLIC BLOOD PRESSURE: 107 MMHG

## 2017-09-22 VITALS
DIASTOLIC BLOOD PRESSURE: 69 MMHG | SYSTOLIC BLOOD PRESSURE: 107 MMHG | TEMPERATURE: 98 F | RESPIRATION RATE: 16 BRPM | OXYGEN SATURATION: 97 % | HEART RATE: 79 BPM

## 2017-09-22 VITALS
RESPIRATION RATE: 18 BRPM | DIASTOLIC BLOOD PRESSURE: 72 MMHG | SYSTOLIC BLOOD PRESSURE: 116 MMHG | HEART RATE: 78 BPM | TEMPERATURE: 98.2 F | OXYGEN SATURATION: 98 %

## 2017-09-22 VITALS
HEART RATE: 84 BPM | SYSTOLIC BLOOD PRESSURE: 126 MMHG | DIASTOLIC BLOOD PRESSURE: 86 MMHG | TEMPERATURE: 98.3 F | OXYGEN SATURATION: 97 % | RESPIRATION RATE: 17 BRPM

## 2017-09-22 VITALS — HEART RATE: 74 BPM

## 2017-09-22 VITALS
TEMPERATURE: 98.1 F | OXYGEN SATURATION: 100 % | DIASTOLIC BLOOD PRESSURE: 77 MMHG | HEART RATE: 74 BPM | RESPIRATION RATE: 16 BRPM | SYSTOLIC BLOOD PRESSURE: 121 MMHG

## 2017-09-22 LAB — GFR SERPLBLD BASED ON 1.73 SQ M-ARVRAT: 48 ML/MIN (ref 89–?)

## 2017-09-22 RX ADMIN — POLYVINYL ALCOHOL SCH DROP: 14 SOLUTION/ DROPS OPHTHALMIC at 09:24

## 2017-09-22 RX ADMIN — STANDARDIZED SENNA CONCENTRATE AND DOCUSATE SODIUM SCH TAB: 8.6; 5 TABLET, FILM COATED ORAL at 09:00

## 2017-09-22 RX ADMIN — POTASSIUM CHLORIDE SCH MEQ: 20 TABLET, EXTENDED RELEASE ORAL at 09:24

## 2017-09-22 RX ADMIN — MORPHINE SULFATE PRN MG: 2 INJECTION, SOLUTION INTRAMUSCULAR; INTRAVENOUS at 02:23

## 2017-09-22 RX ADMIN — RIFAMPIN SCH MG: 150 CAPSULE ORAL at 09:26

## 2017-09-22 RX ADMIN — METHADONE HYDROCHLORIDE SCH MG: 5 SOLUTION ORAL at 20:45

## 2017-09-22 RX ADMIN — HEPARIN SODIUM SCH UNITS: 10000 INJECTION, SOLUTION INTRAVENOUS; SUBCUTANEOUS at 01:02

## 2017-09-22 RX ADMIN — GENTAMICIN SULFATE SCH MLS/HR: 0.8 INJECTION, SOLUTION INTRAVENOUS at 19:04

## 2017-09-22 RX ADMIN — AMPICILLIN SODIUM SCH MLS/HR: 2 INJECTION, POWDER, FOR SOLUTION INTRAMUSCULAR; INTRAVENOUS at 17:24

## 2017-09-22 RX ADMIN — Medication SCH ML: at 20:45

## 2017-09-22 RX ADMIN — OXACILLIN SODIUM SCH MLS/HR: 2 INJECTION, POWDER, FOR SOLUTION INTRAMUSCULAR; INTRAVENOUS at 10:21

## 2017-09-22 RX ADMIN — FAMOTIDINE SCH MG: 20 TABLET, FILM COATED ORAL at 20:43

## 2017-09-22 RX ADMIN — MORPHINE SULFATE PRN MG: 2 INJECTION, SOLUTION INTRAMUSCULAR; INTRAVENOUS at 21:52

## 2017-09-22 RX ADMIN — POLYVINYL ALCOHOL SCH DROP: 14 SOLUTION/ DROPS OPHTHALMIC at 19:04

## 2017-09-22 RX ADMIN — AMPICILLIN SODIUM SCH MLS/HR: 2 INJECTION, POWDER, FOR SOLUTION INTRAMUSCULAR; INTRAVENOUS at 20:43

## 2017-09-22 RX ADMIN — CHLORHEXIDINE GLUCONATE SCH PACK: 500 CLOTH TOPICAL at 03:31

## 2017-09-22 RX ADMIN — RIFAMPIN SCH MG: 150 CAPSULE ORAL at 20:43

## 2017-09-22 RX ADMIN — STANDARDIZED SENNA CONCENTRATE AND DOCUSATE SODIUM SCH TAB: 8.6; 5 TABLET, FILM COATED ORAL at 20:44

## 2017-09-22 RX ADMIN — HEPARIN SODIUM SCH UNITS: 10000 INJECTION, SOLUTION INTRAVENOUS; SUBCUTANEOUS at 18:00

## 2017-09-22 RX ADMIN — OXACILLIN SODIUM SCH MLS/HR: 2 INJECTION, POWDER, FOR SOLUTION INTRAMUSCULAR; INTRAVENOUS at 05:53

## 2017-09-22 RX ADMIN — OXACILLIN SODIUM SCH MLS/HR: 2 INJECTION, POWDER, FOR SOLUTION INTRAMUSCULAR; INTRAVENOUS at 02:22

## 2017-09-22 RX ADMIN — OXACILLIN SODIUM SCH MLS/HR: 2 INJECTION, POWDER, FOR SOLUTION INTRAMUSCULAR; INTRAVENOUS at 14:00

## 2017-09-22 RX ADMIN — FUROSEMIDE SCH MG: 20 TABLET ORAL at 09:25

## 2017-09-22 RX ADMIN — Medication SCH ML: at 09:00

## 2017-09-22 RX ADMIN — AMPICILLIN SODIUM SCH MLS/HR: 2 INJECTION, POWDER, FOR SOLUTION INTRAMUSCULAR; INTRAVENOUS at 09:23

## 2017-09-22 RX ADMIN — HEPARIN SODIUM SCH UNITS: 10000 INJECTION, SOLUTION INTRAVENOUS; SUBCUTANEOUS at 10:00

## 2017-09-22 RX ADMIN — MORPHINE SULFATE PRN MG: 2 INJECTION, SOLUTION INTRAMUSCULAR; INTRAVENOUS at 05:06

## 2017-09-22 RX ADMIN — OXACILLIN SODIUM SCH MLS/HR: 2 INJECTION, POWDER, FOR SOLUTION INTRAMUSCULAR; INTRAVENOUS at 21:50

## 2017-09-22 RX ADMIN — FAMOTIDINE SCH MG: 20 TABLET, FILM COATED ORAL at 09:26

## 2017-09-22 RX ADMIN — METHADONE HYDROCHLORIDE SCH MG: 5 SOLUTION ORAL at 09:25

## 2017-09-22 RX ADMIN — AMPICILLIN SODIUM SCH MLS/HR: 2 INJECTION, POWDER, FOR SOLUTION INTRAMUSCULAR; INTRAVENOUS at 01:12

## 2017-09-22 RX ADMIN — OXACILLIN SODIUM SCH MLS/HR: 2 INJECTION, POWDER, FOR SOLUTION INTRAMUSCULAR; INTRAVENOUS at 19:05

## 2017-09-22 RX ADMIN — AMPICILLIN SODIUM SCH MLS/HR: 2 INJECTION, POWDER, FOR SOLUTION INTRAMUSCULAR; INTRAVENOUS at 05:05

## 2017-09-22 RX ADMIN — AMPICILLIN SODIUM SCH MLS/HR: 2 INJECTION, POWDER, FOR SOLUTION INTRAMUSCULAR; INTRAVENOUS at 14:00

## 2017-09-22 RX ADMIN — MORPHINE SULFATE PRN MG: 2 INJECTION, SOLUTION INTRAMUSCULAR; INTRAVENOUS at 09:26

## 2017-09-22 RX ADMIN — POLYVINYL ALCOHOL SCH DROP: 14 SOLUTION/ DROPS OPHTHALMIC at 14:00

## 2017-09-22 NOTE — HHI.PR
Subjective


Remarks


Has chest pain  with deep breathing, satting well on room air.  Has left 

shoulder pain, improved with OT. No fever or chills. No cough. 


No n/v/d/c.





Objective


Vitals





Vital Signs








  Date Time  Temp Pulse Resp B/P (MAP) Pulse Ox O2 Delivery O2 Flow Rate FiO2


 


9/22/17 10:21   18     


 


9/22/17 10:21   18     


 


9/22/17 08:00 98.2 78 18 116/72 (87) 98   


 


9/22/17 04:00 98.1 74 18 115/73 (87) 98   


 


9/22/17 00:00 97.6 78 18 107/67 (80) 97   


 


9/21/17 20:00 98.5 86 18 132/78 (96) 98   


 


9/21/17 20:00  88      


 


9/21/17 19:00      Room Air  


 


9/21/17 18:23     99 Room Air  


 


9/21/17 16:00 98.3 85 16 105/71 (82) 99   


 


9/21/17 12:00 97.8 77 16 127/77 (94) 100   














I/O      


 


 9/21/17 9/21/17 9/21/17 9/22/17 9/22/17 9/22/17





 07:00 15:00 23:00 07:00 15:00 23:00


 


Intake Total 1120 ml 200 ml 1740 ml 1640 ml  


 


Balance 1120 ml 200 ml 1740 ml 1640 ml  


 


      


 


Intake Oral 720 ml  1440 ml 580 ml  


 


IV Total 400 ml 200 ml 300 ml 1060 ml  


 


# Voids 3  7 6  


 


# Bowel Movements 0  1 0  








Result Diagram:  


9/22/17 0535





Imaging





Last Impressions








Abdomen CT 9/8/17 0000 Signed





Impressions: 





 Service Date/Time:  Friday, September 8, 2017 20:26 - CONCLUSION:  1. 

Peripheral 





 low attenuation lesions in the spleen most characteristic of multiple splenic 





 infarcts. Some of these are new findings since 2016 comparison. 2. Minimal 





 subsegmental airspace disease at the lung bases. 3. 3.4 cm right adnexal cyst. 





 Intrauterine device present. 4. Small fat containing ventral hernias.     

Jaspal Issa MD 


 


Chest X-Ray 9/5/17 0000 Signed





Impressions: 





 Service Date/Time:  Tuesday, September 5, 2017 08:07 - CONCLUSION:  Suspected 





 mild edema/failure.     Ron Swan MD 


 


Neck CT 9/2/17 0000 Signed





Impressions: 





 Service Date/Time:  Saturday, September 2, 2017 11:13 - CONCLUSION:  1. 

Patient 





 is intubated which limits this study. The epiglottis is otherwise normal. 





 Secretions are present in the oropharynx and right sphenoid sinus.     Ej Ewing MD 


 


Brain MRI 8/25/17 0000 Signed





Impressions: 





 Service Date/Time:  Friday, August 25, 2017 17:07 - CONCLUSION:  1. Small 

acute 





 or subacute cortical infarct of the left parietal lobe. 2. Small, faint area 

of 





 nonspecific flair signal abnormality in the left frontal lobe periventricular 





 white matter, nonspecific. No associated mass effect or abnormal enhancement. 

3 





 month followup MRI of the brain recommended. 3.  Mild chronic white matter 





 changes.     Ron Swan MD 


 


Head CT 8/21/17 0000 Signed





Impressions: 





 Service Date/Time:  Monday, August 21, 2017 19:47 - CONCLUSION:  No evidence 

of 





 acute infarct, hemorrhage, mass or edema.     Kristian Frankel MD 








Objective Remarks


GENERAL:  Young female, appears in nad. 


SKIN: Warm and dry.


CARDIOVASCULAR: Regular rate and rhythm with systolic  murmur. 


RESPIRATORY: Decreased breath sound BL, no crackles or wheezing auscultated. No 

accessory muscle use.


GASTROINTESTINAL: Abdomen soft, non-tender, nondistended. 


MUSCULOSKELETAL: Left shoulder pain with palpation and movement, decreased ROM 2

/2 pain, improving with OT.  No cyanosis. 1+  LE  edema improving. 


BACK: Nontender without obvious deformity. No CVA tenderness.


Procedures


None





A/P


Problem List:  


(1) Severe sepsis


ICD Code:  A41.9 - Sepsis, unspecified organism; R65.20 - Severe sepsis without 

septic shock


Status:  Acute


(2) MSSA meningitis


Status:  Acute


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


Status:  Acute


(4) SVT (supraventricular tachycardia)


ICD Code:  I47.1 - Supraventricular tachycardia


Status:  Resolved


(5) Postextubation stridor


ICD Code:  J95.89 - Other postprocedural complications and disorders of 

respiratory system, not elsewhere classified


Status:  Resolved


(6) Acute hypoxemic respiratory failure


ICD Code:  J96.01 - Acute respiratory failure with hypoxia


Status:  Resolved


(7) CVA (cerebral vascular accident)


ICD Code:  I63.9 - Cerebral infarction, unspecified


(8) Tricuspid regurgitation


ICD Code:  I07.1 - Rheumatic tricuspid insufficiency


Status:  Acute


(9) Acute systolic heart failure


ICD Code:  I50.21 - Acute systolic (congestive) heart failure


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


ICD Code:  T82.6XXA - Infection and inflammatory reaction due to cardiac valve 

prosthesis, initial encounter


Status:  Acute


(11) NOÉ (acute kidney injury)


ICD Code:  N17.9 - Acute kidney failure, unspecified


Status:  Resolved


(12) Protein calorie malnutrition


ICD Code:  E46 - Unspecified protein-calorie malnutrition


(13) Microcytic anemia


ICD Code:  D50.9 - Iron deficiency anemia, unspecified


(14) Hyperphosphatemia


ICD Code:  E83.39 - Other disorders of phosphorus metabolism


(15) Hypernatremia


ICD Code:  E87.0 - Hyperosmolality and hypernatremia


(16) IV drug abuse


ICD Code:  F19.10 - Other psychoactive substance abuse, uncomplicated


Status:  Acute


(17) Headache


ICD Code:  R51 - Headache


(18) Abdominal pain


ICD Code:  R10.9 - Unspecified abdominal pain


Assessment and Plan








(1) Severe sepsis


Multiple splenic infarcts 


MSSA meningitis 


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


Prosthetic valve endocarditis


Left shoulder pain Discussed with Dr Wilkes from ID, will eval patient and 

will decide if needs MRI of left shoulder. Seen by ID also if persistent 

shoulder pain will get MRI. Will consult OT 


Also will consult palliative care for management of pain meds as patient 

requesting for more pain meds. 





ICD Code:  A41.9 - Sepsis, unspecified organism; R65.20 - Severe sepsis without 

septic shock


Status:  Acute


Plan:  Sepsis present on admission, the patient presented with leukocytosis and 

tachycardia as well as after mental status.


Initially admitted to the intensive care unit, intubated and cared for by the 

intensivists.


Sepsis found to be secondary to meningitis.


Continue IV antibiotics as per infectious disease recommendations.


The patient is currently on IV ampicillin, oxacillin, gentamicin and rifampin - 

continue.


Unless more positive blood cx anticipate 6 week from 1st neg (8/24) followed by

  indefinite oral abx suppression tx. IV abx stop date tentatively.


Gentamycin trough 2.0








(2) MSSA meningitis


Status:  Acute


Plan:  As above.





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


Status:  Acute


Plan:  IV antibiotics as per infectious disease as above.





(4) SVT (supraventricular tachycardia)


ICD Code:  I47.1 - Supraventricular tachycardia


Status:  Resolved


Plan:  SVT now resolved.  Continue metoprolol.


Echocardiogram 8 foci 17 revealed an ejection fraction showed 25-30%.  PAP 34 

mmHg


Initially treated with vasopressors which the patient is now off.


The patient started on Lasix 20 minute grams by mouth daily.  Continue.





(5) Postextubation stridor


ICD Code:  J95.89 - Other postprocedural complications and disorders of 

respiratory system, not elsewhere classified


Status:  Resolved


Plan:  The patient initially extubated on 9/3/17.  The patient was administered 

racemic epinephrine as needed for stridor or


Continue albuterol/ipratropium inhaled treatments every 6 hours with every 2 

hours as needed for albuterol therapy.


Status post today treatment of dexamethasone IV every 8 hours.





(6) Acute hypoxemic respiratory failure


ICD Code:  J96.01 - Acute respiratory failure with hypoxia


Status:  Resolved


Plan:  Continue supplemental oxygen to keep oxygen more than 92%.


Status post intubation and extubation.


Continue DuoNeb treatments.





(7) CVA (cerebral vascular accident)


ICD Code:  I63.9 - Cerebral infarction, unspecified


Plan:  CT head negative, MRI small acute and subacute cortical infarct of the 

left parietal lobe.


The patient was seen and evaluated by neurology.


The patient is on a scheduled methadone 10 mg twice





(8) Tricuspid regurgitation


ICD Code:  I07.1 - Rheumatic tricuspid insufficiency


Status:  Acute


Plan:  As seen on echo described above.  Continue oral Lasix.





(9) Acute systolic heart failure


ICD Code:  I50.21 - Acute systolic (congestive) heart failure


Plan:  Continue Lasix orally. continue Lasix 40 mg po daily.





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


ICD Code:  T82.6XXA - Infection and inflammatory reaction due to cardiac valve 

prosthesis, initial encounter


Status:  Acute


Plan:  The patient has history of IV drug abuse.


Continue antibiotics as per ID recommendations. Tentative a stop date is 10/5





(11) NOÉ (acute kidney injury)


ICD Code:  N17.9 - Acute kidney failure, unspecified


Status:  Resolved


Plan:  Now resolved.  Continue to monitor BMP





(12) Protein calorie malnutrition


ICD Code:  E46 - Unspecified protein-calorie malnutrition


Plan:  Secondary to long-standing IV drug abuse and endocarditis.





(13) Microcytic anemia


ICD Code:  D50.9 - Iron deficiency anemia, unspecified


Plan:  Hemoglobin seems to be stable at 8.6.  I will check iron studies and 

stool Hemoccult for blood.


Patient had iron studies previously concordant with iron deficiency anemia.  I 

will start the patient on oral iron sulfate.





(14) Hyperphosphatemia


ICD Code:  E83.39 - Other disorders of phosphorus metabolism


Plan:  Now resolved.  Continue to monitor levels.





(15) Hypernatremia


ICD Code:  E87.0 - Hyperosmolality and hypernatremia


Plan:  Likely secondary to dehydration. 


Sodium now within normal range.  Continue to monitor BMP.





(16) IV drug abuse


ICD Code:  F19.10 - Other psychoactive substance abuse, uncomplicated


Status:  Acute


Plan:  Advised cessation.  Currently on methadone.





(17) Headache


ICD Code:  R51 - Headache


Plan:  Headache is bifrontal, patient is afebrile.


Continue Acetaminophen 1000 mg po Q 6 hrs prn headache.





(18) Abdominal pain 


ICD Code:  R10.9 - Unspecified abdominal pain


Plan:  check CT abdomen and pelvis. Check stool for C diff PCR.





(19) Diarrhea, improving 


Plan: 


C diff. is negative  Continue probiotic. Add Imodium  





(20) Anasarca.


Continue lasix po.  Monitor UPO and kidney function closely. 





Assessment and Plan


GI prophylaxis: Continue famotidine.


DVT prophylaxis: SCDs, ambulation.








DC plan: needs inpatient treatment. DC when cleared by ID specialist. 


Patient has left shoulder pain poss plan for MRI if pain persists , ID will  

decide. Also palliative care consulted as patient is asking for more pain meds 

constantly, pan management per palliative care, transition to PO and taper down 

as tolerated





Problem Qualifiers





(1) CVA (cerebral vascular accident):  


Qualified Codes:  I63.9 - Cerebral infarction, unspecified


(2) Protein calorie malnutrition:  


Qualified Codes:  E44.0 - Moderate protein-calorie malnutrition








Jyothi Saunders MD Sep 22, 2017 11:21

## 2017-09-23 VITALS
HEART RATE: 82 BPM | OXYGEN SATURATION: 97 % | RESPIRATION RATE: 16 BRPM | DIASTOLIC BLOOD PRESSURE: 68 MMHG | TEMPERATURE: 98.6 F | SYSTOLIC BLOOD PRESSURE: 121 MMHG

## 2017-09-23 VITALS
SYSTOLIC BLOOD PRESSURE: 129 MMHG | HEART RATE: 77 BPM | RESPIRATION RATE: 16 BRPM | TEMPERATURE: 98.1 F | DIASTOLIC BLOOD PRESSURE: 83 MMHG | OXYGEN SATURATION: 99 %

## 2017-09-23 VITALS
DIASTOLIC BLOOD PRESSURE: 74 MMHG | SYSTOLIC BLOOD PRESSURE: 121 MMHG | OXYGEN SATURATION: 98 % | TEMPERATURE: 99.3 F | HEART RATE: 89 BPM | RESPIRATION RATE: 16 BRPM

## 2017-09-23 VITALS
DIASTOLIC BLOOD PRESSURE: 72 MMHG | SYSTOLIC BLOOD PRESSURE: 121 MMHG | RESPIRATION RATE: 16 BRPM | OXYGEN SATURATION: 99 % | HEART RATE: 89 BPM | TEMPERATURE: 97.9 F

## 2017-09-23 VITALS
DIASTOLIC BLOOD PRESSURE: 75 MMHG | OXYGEN SATURATION: 99 % | RESPIRATION RATE: 19 BRPM | SYSTOLIC BLOOD PRESSURE: 127 MMHG | TEMPERATURE: 98 F | HEART RATE: 75 BPM

## 2017-09-23 VITALS
SYSTOLIC BLOOD PRESSURE: 143 MMHG | HEART RATE: 78 BPM | RESPIRATION RATE: 20 BRPM | TEMPERATURE: 97.6 F | DIASTOLIC BLOOD PRESSURE: 81 MMHG | OXYGEN SATURATION: 99 %

## 2017-09-23 VITALS — HEART RATE: 85 BPM

## 2017-09-23 RX ADMIN — ACETAMINOPHEN PRN MG: 325 TABLET ORAL at 22:17

## 2017-09-23 RX ADMIN — Medication SCH ML: at 21:10

## 2017-09-23 RX ADMIN — OXACILLIN SODIUM SCH MLS/HR: 2 INJECTION, POWDER, FOR SOLUTION INTRAMUSCULAR; INTRAVENOUS at 21:12

## 2017-09-23 RX ADMIN — POLYVINYL ALCOHOL SCH DROP: 14 SOLUTION/ DROPS OPHTHALMIC at 12:54

## 2017-09-23 RX ADMIN — OXACILLIN SODIUM SCH MLS/HR: 2 INJECTION, POWDER, FOR SOLUTION INTRAMUSCULAR; INTRAVENOUS at 10:00

## 2017-09-23 RX ADMIN — FAMOTIDINE SCH MG: 20 TABLET, FILM COATED ORAL at 08:22

## 2017-09-23 RX ADMIN — GENTAMICIN SULFATE SCH MLS/HR: 0.8 INJECTION, SOLUTION INTRAVENOUS at 13:59

## 2017-09-23 RX ADMIN — MORPHINE SULFATE PRN MG: 2 INJECTION, SOLUTION INTRAMUSCULAR; INTRAVENOUS at 08:34

## 2017-09-23 RX ADMIN — POLYVINYL ALCOHOL SCH DROP: 14 SOLUTION/ DROPS OPHTHALMIC at 17:08

## 2017-09-23 RX ADMIN — MORPHINE SULFATE PRN MG: 2 INJECTION, SOLUTION INTRAMUSCULAR; INTRAVENOUS at 00:56

## 2017-09-23 RX ADMIN — HEPARIN SODIUM SCH UNITS: 10000 INJECTION, SOLUTION INTRAVENOUS; SUBCUTANEOUS at 10:00

## 2017-09-23 RX ADMIN — MORPHINE SULFATE PRN MG: 2 INJECTION, SOLUTION INTRAMUSCULAR; INTRAVENOUS at 22:17

## 2017-09-23 RX ADMIN — FUROSEMIDE SCH MG: 20 TABLET ORAL at 08:23

## 2017-09-23 RX ADMIN — MORPHINE SULFATE PRN MG: 2 INJECTION, SOLUTION INTRAMUSCULAR; INTRAVENOUS at 14:42

## 2017-09-23 RX ADMIN — OXACILLIN SODIUM SCH MLS/HR: 2 INJECTION, POWDER, FOR SOLUTION INTRAMUSCULAR; INTRAVENOUS at 18:15

## 2017-09-23 RX ADMIN — AMPICILLIN SODIUM SCH MLS/HR: 2 INJECTION, POWDER, FOR SOLUTION INTRAMUSCULAR; INTRAVENOUS at 21:12

## 2017-09-23 RX ADMIN — METHADONE HYDROCHLORIDE SCH MG: 5 SOLUTION ORAL at 21:09

## 2017-09-23 RX ADMIN — MORPHINE SULFATE PRN MG: 2 INJECTION, SOLUTION INTRAMUSCULAR; INTRAVENOUS at 05:35

## 2017-09-23 RX ADMIN — OXACILLIN SODIUM SCH MLS/HR: 2 INJECTION, POWDER, FOR SOLUTION INTRAMUSCULAR; INTRAVENOUS at 06:44

## 2017-09-23 RX ADMIN — RIFAMPIN SCH MG: 150 CAPSULE ORAL at 08:23

## 2017-09-23 RX ADMIN — OXACILLIN SODIUM SCH MLS/HR: 2 INJECTION, POWDER, FOR SOLUTION INTRAMUSCULAR; INTRAVENOUS at 14:00

## 2017-09-23 RX ADMIN — RIFAMPIN SCH MG: 150 CAPSULE ORAL at 21:10

## 2017-09-23 RX ADMIN — Medication PRN ML: at 22:18

## 2017-09-23 RX ADMIN — POLYVINYL ALCOHOL SCH DROP: 14 SOLUTION/ DROPS OPHTHALMIC at 08:25

## 2017-09-23 RX ADMIN — CHLORHEXIDINE GLUCONATE SCH PACK: 500 CLOTH TOPICAL at 04:00

## 2017-09-23 RX ADMIN — OXACILLIN SODIUM SCH MLS/HR: 2 INJECTION, POWDER, FOR SOLUTION INTRAMUSCULAR; INTRAVENOUS at 01:01

## 2017-09-23 RX ADMIN — HEPARIN SODIUM SCH UNITS: 10000 INJECTION, SOLUTION INTRAVENOUS; SUBCUTANEOUS at 17:06

## 2017-09-23 RX ADMIN — MORPHINE SULFATE PRN MG: 2 INJECTION, SOLUTION INTRAMUSCULAR; INTRAVENOUS at 11:29

## 2017-09-23 RX ADMIN — AMPICILLIN SODIUM SCH MLS/HR: 2 INJECTION, POWDER, FOR SOLUTION INTRAMUSCULAR; INTRAVENOUS at 00:15

## 2017-09-23 RX ADMIN — AMPICILLIN SODIUM SCH MLS/HR: 2 INJECTION, POWDER, FOR SOLUTION INTRAMUSCULAR; INTRAVENOUS at 05:38

## 2017-09-23 RX ADMIN — AMPICILLIN SODIUM SCH MLS/HR: 2 INJECTION, POWDER, FOR SOLUTION INTRAMUSCULAR; INTRAVENOUS at 08:23

## 2017-09-23 RX ADMIN — AMPICILLIN SODIUM SCH MLS/HR: 2 INJECTION, POWDER, FOR SOLUTION INTRAMUSCULAR; INTRAVENOUS at 12:52

## 2017-09-23 RX ADMIN — AMPICILLIN SODIUM SCH MLS/HR: 2 INJECTION, POWDER, FOR SOLUTION INTRAMUSCULAR; INTRAVENOUS at 17:05

## 2017-09-23 RX ADMIN — HEPARIN SODIUM SCH UNITS: 10000 INJECTION, SOLUTION INTRAVENOUS; SUBCUTANEOUS at 01:01

## 2017-09-23 RX ADMIN — METHADONE HYDROCHLORIDE SCH MG: 5 SOLUTION ORAL at 08:22

## 2017-09-23 RX ADMIN — Medication SCH ML: at 08:22

## 2017-09-23 RX ADMIN — MORPHINE SULFATE PRN MG: 2 INJECTION, SOLUTION INTRAMUSCULAR; INTRAVENOUS at 18:14

## 2017-09-23 RX ADMIN — STANDARDIZED SENNA CONCENTRATE AND DOCUSATE SODIUM SCH TAB: 8.6; 5 TABLET, FILM COATED ORAL at 07:58

## 2017-09-23 RX ADMIN — STANDARDIZED SENNA CONCENTRATE AND DOCUSATE SODIUM SCH TAB: 8.6; 5 TABLET, FILM COATED ORAL at 21:00

## 2017-09-23 RX ADMIN — FAMOTIDINE SCH MG: 20 TABLET, FILM COATED ORAL at 21:10

## 2017-09-23 RX ADMIN — POTASSIUM CHLORIDE SCH MEQ: 20 TABLET, EXTENDED RELEASE ORAL at 08:22

## 2017-09-23 NOTE — HHI.PR
Subjective


Remarks


Follow-up sepsis


09/23/17-patient seen and examined, she was up and ambulated and denies any 

acute event overnight.  Currently afebrile





Objective


Vitals





Vital Signs








  Date Time  Temp Pulse Resp B/P (MAP) Pulse Ox O2 Delivery O2 Flow Rate FiO2


 


9/23/17 08:00 98.6 82 16 121/68 (85) 97   


 


9/23/17 04:00 98.0 75 19 127/75 (92) 99   


 


9/23/17 00:00 97.6 78 20 143/81 (101) 99   


 


9/22/17 20:00  81      


 


9/22/17 20:00 98.3 84 17 126/86 (99) 97   


 


9/22/17 19:00      Room Air  


 


9/22/17 17:16  74      


 


9/22/17 17:16     100 Room Air  


 


9/22/17 16:00 98.1 74 16 121/77 (92) 100   


 


9/22/17 12:00 98.0 79 16 107/69 (82) 97   














I/O      


 


 9/22/17 9/22/17 9/22/17 9/23/17 9/23/17 9/23/17





 07:00 15:00 23:00 07:00 15:00 23:00


 


Intake Total 1640 ml  1880 ml 1840 ml  


 


Balance 1640 ml  1880 ml 1840 ml  


 


      


 


Intake Oral 580 ml  1680 ml 880 ml  


 


IV Total 1060 ml  200 ml 960 ml  


 


# Voids 6  6 6  


 


# Bowel Movements 0  1 0  








Result Diagram:  


9/22/17 0535





Imaging





Last Impressions








Abdomen CT 9/8/17 0000 Signed





Impressions: 





 Service Date/Time:  Friday, September 8, 2017 20:26 - CONCLUSION:  1. 

Peripheral 





 low attenuation lesions in the spleen most characteristic of multiple splenic 





 infarcts. Some of these are new findings since 2016 comparison. 2. Minimal 





 subsegmental airspace disease at the lung bases. 3. 3.4 cm right adnexal cyst. 





 Intrauterine device present. 4. Small fat containing ventral hernias.     

Jaspal Issa MD 


 


Chest X-Ray 9/5/17 0000 Signed





Impressions: 





 Service Date/Time:  Tuesday, September 5, 2017 08:07 - CONCLUSION:  Suspected 





 mild edema/failure.     Ron Swan MD 


 


Neck CT 9/2/17 0000 Signed





Impressions: 





 Service Date/Time:  Saturday, September 2, 2017 11:13 - CONCLUSION:  1. 

Patient 





 is intubated which limits this study. The epiglottis is otherwise normal. 





 Secretions are present in the oropharynx and right sphenoid sinus.     Ej Ewing MD 


 


Brain MRI 8/25/17 0000 Signed





Impressions: 





 Service Date/Time:  Friday, August 25, 2017 17:07 - CONCLUSION:  1. Small 

acute 





 or subacute cortical infarct of the left parietal lobe. 2. Small, faint area 

of 





 nonspecific flair signal abnormality in the left frontal lobe periventricular 





 white matter, nonspecific. No associated mass effect or abnormal enhancement. 

3 





 month followup MRI of the brain recommended. 3.  Mild chronic white matter 





 changes.     Ron Swan MD 


 


Head CT 8/21/17 0000 Signed





Impressions: 





 Service Date/Time:  Monday, August 21, 2017 19:47 - CONCLUSION:  No evidence 

of 





 acute infarct, hemorrhage, mass or edema.     Kristian Frankel MD 








Objective Remarks


GENERAL: NAD


SKIN: Warm and dry.


HEAD: Normocephalic.


EYES: No scleral icterus. No injection or drainage. 


NECK: Supple, trachea midline. No JVD or lymphadenopathy.


CARDIOVASCULAR: Regular rate and rhythm without murmurs, gallops, or rubs. 


RESPIRATORY: Breath sounds equal bilaterally. No accessory muscle use.


GASTROINTESTINAL: Abdomen soft, non-tender, nondistended. 


MUSCULOSKELETAL: No cyanosis, or edema. 


BACK: Nontender without obvious deformity. No CVA tenderness.





Procedures


None





A/P


Problem List:  


(1) Severe sepsis


ICD Code:  A41.9 - Sepsis, unspecified organism; R65.20 - Severe sepsis without 

septic shock


Status:  Acute


(2) MSSA meningitis


Status:  Acute


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


Status:  Acute


(4) SVT (supraventricular tachycardia)


ICD Code:  I47.1 - Supraventricular tachycardia


Status:  Resolved


(5) Postextubation stridor


ICD Code:  J95.89 - Other postprocedural complications and disorders of 

respiratory system, not elsewhere classified


Status:  Resolved


(6) Acute hypoxemic respiratory failure


ICD Code:  J96.01 - Acute respiratory failure with hypoxia


Status:  Resolved


(7) CVA (cerebral vascular accident)


ICD Code:  I63.9 - Cerebral infarction, unspecified


(8) Tricuspid regurgitation


ICD Code:  I07.1 - Rheumatic tricuspid insufficiency


Status:  Acute


(9) Acute systolic heart failure


ICD Code:  I50.21 - Acute systolic (congestive) heart failure


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


ICD Code:  T82.6XXA - Infection and inflammatory reaction due to cardiac valve 

prosthesis, initial encounter


Status:  Acute


(11) NOÉ (acute kidney injury)


ICD Code:  N17.9 - Acute kidney failure, unspecified


Status:  Resolved


(12) Protein calorie malnutrition


ICD Code:  E46 - Unspecified protein-calorie malnutrition


(13) Microcytic anemia


ICD Code:  D50.9 - Iron deficiency anemia, unspecified


(14) Hyperphosphatemia


ICD Code:  E83.39 - Other disorders of phosphorus metabolism


(15) Hypernatremia


ICD Code:  E87.0 - Hyperosmolality and hypernatremia


(16) IV drug abuse


ICD Code:  F19.10 - Other psychoactive substance abuse, uncomplicated


Status:  Acute


(17) Headache


ICD Code:  R51 - Headache


(18) Abdominal pain


ICD Code:  R10.9 - Unspecified abdominal pain


Assessment and Plan


37-year-old female with





Severe sepsis-resolved


Multiple splenic infarcts 


MSSA meningitis 


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


Prosthetic valve endocarditis


    Currently on IV ampicillin, oxacillin, gentamicin and rifampin 


   Management per infectious disease specialist


   Anticipate 6 week from 1st neg (8/24) followed by  indefinite oral abx 

suppression tx. 


   Stop date for antibiotics 10/05/17








Supraventricular tachycardia-resolved


   Echocardiogram 8 foci 17 revealed an ejection fraction showed 25-30%.


    Continue metoprolol.





Postextubation stridor


   Resolved, and continue with neb when necessary as well as scheduled





Acute hypoxemic respiratory failure


   Resolved


   Continue DuoNeb treatments.  Maintain oxygen saturation above 92%





CVA (cerebral vascular accident)


   CT head negative, MRI small acute and subacute cortical infarct of the left 

parietal lobe.


   Continue with scheduled methadone 10 mg twice


   Appreciate input from neurology





Tricuspid regurgitation


   Continue oral Lasix.





Acute systolic heart failure


   continue Lasix 40 mg po daily.





Acute kidney injury)


   Now resolved. 





Protein calorie malnutrition


   Secondary to long-standing IV drug abuse and endocarditis.





Microcytic anemia


   Continue with oral iron sulfate.





Hyperphosphatemia


   Now resolved.  Continue to monitor levels.





Hypernatremia


   Resolved.  Continue to monitor BMP.





IV drug abuse


   Advised cessation.  Currently on methadone.





Headache


Resolved and Continue Acetaminophen 1000 mg po Q 6 hrs prn 





Abdominal pain 


   Resolved





Diarrhea, improving 


   C diff. is negative  Continue probiotic.





Anasarca.


Continue lasix po.  Monitor UPO and kidney function closely.





Problem Qualifiers





(1) CVA (cerebral vascular accident):  


Qualified Codes:  I63.9 - Cerebral infarction, unspecified


(2) Protein calorie malnutrition:  


Qualified Codes:  E44.0 - Moderate protein-calorie malnutrition








Wilfred Diaz MD Sep 23, 2017 11:50

## 2017-09-24 VITALS
SYSTOLIC BLOOD PRESSURE: 107 MMHG | HEART RATE: 90 BPM | DIASTOLIC BLOOD PRESSURE: 70 MMHG | OXYGEN SATURATION: 100 % | RESPIRATION RATE: 16 BRPM | TEMPERATURE: 99 F

## 2017-09-24 VITALS
HEART RATE: 78 BPM | OXYGEN SATURATION: 97 % | SYSTOLIC BLOOD PRESSURE: 122 MMHG | RESPIRATION RATE: 18 BRPM | DIASTOLIC BLOOD PRESSURE: 71 MMHG | TEMPERATURE: 97.7 F

## 2017-09-24 VITALS
OXYGEN SATURATION: 99 % | DIASTOLIC BLOOD PRESSURE: 67 MMHG | TEMPERATURE: 98.2 F | SYSTOLIC BLOOD PRESSURE: 126 MMHG | RESPIRATION RATE: 18 BRPM | HEART RATE: 77 BPM

## 2017-09-24 VITALS — HEART RATE: 87 BPM

## 2017-09-24 VITALS
TEMPERATURE: 97.9 F | HEART RATE: 77 BPM | SYSTOLIC BLOOD PRESSURE: 109 MMHG | OXYGEN SATURATION: 98 % | RESPIRATION RATE: 18 BRPM | DIASTOLIC BLOOD PRESSURE: 69 MMHG

## 2017-09-24 VITALS
RESPIRATION RATE: 20 BRPM | OXYGEN SATURATION: 98 % | HEART RATE: 66 BPM | SYSTOLIC BLOOD PRESSURE: 104 MMHG | TEMPERATURE: 98.2 F

## 2017-09-24 VITALS
RESPIRATION RATE: 18 BRPM | HEART RATE: 79 BPM | DIASTOLIC BLOOD PRESSURE: 63 MMHG | OXYGEN SATURATION: 97 % | SYSTOLIC BLOOD PRESSURE: 116 MMHG | TEMPERATURE: 98.2 F

## 2017-09-24 VITALS — TEMPERATURE: 98.5 F

## 2017-09-24 RX ADMIN — Medication SCH ML: at 20:20

## 2017-09-24 RX ADMIN — AMPICILLIN SODIUM SCH MLS/HR: 2 INJECTION, POWDER, FOR SOLUTION INTRAMUSCULAR; INTRAVENOUS at 17:46

## 2017-09-24 RX ADMIN — Medication PRN ML: at 04:59

## 2017-09-24 RX ADMIN — MORPHINE SULFATE PRN MG: 2 INJECTION, SOLUTION INTRAMUSCULAR; INTRAVENOUS at 21:23

## 2017-09-24 RX ADMIN — AMPICILLIN SODIUM SCH MLS/HR: 2 INJECTION, POWDER, FOR SOLUTION INTRAMUSCULAR; INTRAVENOUS at 08:20

## 2017-09-24 RX ADMIN — AMPICILLIN SODIUM SCH MLS/HR: 2 INJECTION, POWDER, FOR SOLUTION INTRAMUSCULAR; INTRAVENOUS at 12:30

## 2017-09-24 RX ADMIN — MORPHINE SULFATE PRN MG: 2 INJECTION, SOLUTION INTRAMUSCULAR; INTRAVENOUS at 18:13

## 2017-09-24 RX ADMIN — Medication PRN ML: at 01:36

## 2017-09-24 RX ADMIN — FAMOTIDINE SCH MG: 20 TABLET, FILM COATED ORAL at 08:20

## 2017-09-24 RX ADMIN — AMPICILLIN SODIUM SCH MLS/HR: 2 INJECTION, POWDER, FOR SOLUTION INTRAMUSCULAR; INTRAVENOUS at 20:21

## 2017-09-24 RX ADMIN — CHLORHEXIDINE GLUCONATE SCH PACK: 500 CLOTH TOPICAL at 03:21

## 2017-09-24 RX ADMIN — METHADONE HYDROCHLORIDE SCH MG: 5 SOLUTION ORAL at 08:22

## 2017-09-24 RX ADMIN — STANDARDIZED SENNA CONCENTRATE AND DOCUSATE SODIUM SCH TAB: 8.6; 5 TABLET, FILM COATED ORAL at 08:18

## 2017-09-24 RX ADMIN — OXACILLIN SODIUM SCH MLS/HR: 2 INJECTION, POWDER, FOR SOLUTION INTRAMUSCULAR; INTRAVENOUS at 10:23

## 2017-09-24 RX ADMIN — RIFAMPIN SCH MG: 150 CAPSULE ORAL at 20:18

## 2017-09-24 RX ADMIN — AMPICILLIN SODIUM SCH MLS/HR: 2 INJECTION, POWDER, FOR SOLUTION INTRAMUSCULAR; INTRAVENOUS at 01:25

## 2017-09-24 RX ADMIN — MORPHINE SULFATE PRN MG: 2 INJECTION, SOLUTION INTRAMUSCULAR; INTRAVENOUS at 12:31

## 2017-09-24 RX ADMIN — AMPICILLIN SODIUM SCH MLS/HR: 2 INJECTION, POWDER, FOR SOLUTION INTRAMUSCULAR; INTRAVENOUS at 05:00

## 2017-09-24 RX ADMIN — HEPARIN SODIUM SCH UNITS: 10000 INJECTION, SOLUTION INTRAVENOUS; SUBCUTANEOUS at 17:38

## 2017-09-24 RX ADMIN — GENTAMICIN SULFATE SCH MLS/HR: 0.8 INJECTION, SOLUTION INTRAVENOUS at 06:00

## 2017-09-24 RX ADMIN — POLYVINYL ALCOHOL SCH DROP: 14 SOLUTION/ DROPS OPHTHALMIC at 12:34

## 2017-09-24 RX ADMIN — MORPHINE SULFATE PRN MG: 2 INJECTION, SOLUTION INTRAMUSCULAR; INTRAVENOUS at 04:59

## 2017-09-24 RX ADMIN — RIFAMPIN SCH MG: 150 CAPSULE ORAL at 08:20

## 2017-09-24 RX ADMIN — STANDARDIZED SENNA CONCENTRATE AND DOCUSATE SODIUM SCH TAB: 8.6; 5 TABLET, FILM COATED ORAL at 20:19

## 2017-09-24 RX ADMIN — OXACILLIN SODIUM SCH MLS/HR: 2 INJECTION, POWDER, FOR SOLUTION INTRAMUSCULAR; INTRAVENOUS at 05:02

## 2017-09-24 RX ADMIN — METHADONE HYDROCHLORIDE SCH MG: 5 SOLUTION ORAL at 20:18

## 2017-09-24 RX ADMIN — HEPARIN SODIUM SCH UNITS: 10000 INJECTION, SOLUTION INTRAVENOUS; SUBCUTANEOUS at 09:40

## 2017-09-24 RX ADMIN — FAMOTIDINE SCH MG: 20 TABLET, FILM COATED ORAL at 20:18

## 2017-09-24 RX ADMIN — HEPARIN SODIUM SCH UNITS: 10000 INJECTION, SOLUTION INTRAVENOUS; SUBCUTANEOUS at 01:33

## 2017-09-24 RX ADMIN — OXACILLIN SODIUM SCH MLS/HR: 2 INJECTION, POWDER, FOR SOLUTION INTRAMUSCULAR; INTRAVENOUS at 18:14

## 2017-09-24 RX ADMIN — FUROSEMIDE SCH MG: 20 TABLET ORAL at 08:21

## 2017-09-24 RX ADMIN — OXACILLIN SODIUM SCH MLS/HR: 2 INJECTION, POWDER, FOR SOLUTION INTRAMUSCULAR; INTRAVENOUS at 01:33

## 2017-09-24 RX ADMIN — POLYVINYL ALCOHOL SCH DROP: 14 SOLUTION/ DROPS OPHTHALMIC at 08:28

## 2017-09-24 RX ADMIN — Medication PRN ML: at 21:23

## 2017-09-24 RX ADMIN — OXACILLIN SODIUM SCH MLS/HR: 2 INJECTION, POWDER, FOR SOLUTION INTRAMUSCULAR; INTRAVENOUS at 14:34

## 2017-09-24 RX ADMIN — MORPHINE SULFATE PRN MG: 2 INJECTION, SOLUTION INTRAMUSCULAR; INTRAVENOUS at 09:38

## 2017-09-24 RX ADMIN — MORPHINE SULFATE PRN MG: 2 INJECTION, SOLUTION INTRAMUSCULAR; INTRAVENOUS at 01:35

## 2017-09-24 RX ADMIN — POTASSIUM CHLORIDE SCH MEQ: 20 TABLET, EXTENDED RELEASE ORAL at 08:20

## 2017-09-24 RX ADMIN — POLYVINYL ALCOHOL SCH DROP: 14 SOLUTION/ DROPS OPHTHALMIC at 17:46

## 2017-09-24 RX ADMIN — Medication SCH ML: at 08:21

## 2017-09-24 RX ADMIN — MORPHINE SULFATE PRN MG: 2 INJECTION, SOLUTION INTRAMUSCULAR; INTRAVENOUS at 15:30

## 2017-09-24 RX ADMIN — OXACILLIN SODIUM SCH MLS/HR: 2 INJECTION, POWDER, FOR SOLUTION INTRAMUSCULAR; INTRAVENOUS at 21:23

## 2017-09-25 VITALS — HEART RATE: 82 BPM

## 2017-09-25 VITALS
RESPIRATION RATE: 18 BRPM | OXYGEN SATURATION: 98 % | DIASTOLIC BLOOD PRESSURE: 62 MMHG | SYSTOLIC BLOOD PRESSURE: 121 MMHG | HEART RATE: 75 BPM | TEMPERATURE: 97.6 F

## 2017-09-25 VITALS
TEMPERATURE: 97.4 F | RESPIRATION RATE: 18 BRPM | OXYGEN SATURATION: 95 % | SYSTOLIC BLOOD PRESSURE: 126 MMHG | DIASTOLIC BLOOD PRESSURE: 80 MMHG | HEART RATE: 80 BPM

## 2017-09-25 VITALS
SYSTOLIC BLOOD PRESSURE: 122 MMHG | RESPIRATION RATE: 18 BRPM | TEMPERATURE: 98.2 F | HEART RATE: 83 BPM | DIASTOLIC BLOOD PRESSURE: 76 MMHG | OXYGEN SATURATION: 100 %

## 2017-09-25 VITALS
TEMPERATURE: 98.1 F | HEART RATE: 80 BPM | DIASTOLIC BLOOD PRESSURE: 67 MMHG | OXYGEN SATURATION: 98 % | RESPIRATION RATE: 20 BRPM | SYSTOLIC BLOOD PRESSURE: 114 MMHG

## 2017-09-25 VITALS
RESPIRATION RATE: 18 BRPM | DIASTOLIC BLOOD PRESSURE: 77 MMHG | SYSTOLIC BLOOD PRESSURE: 126 MMHG | TEMPERATURE: 97.8 F | HEART RATE: 76 BPM | OXYGEN SATURATION: 98 %

## 2017-09-25 VITALS
OXYGEN SATURATION: 97 % | RESPIRATION RATE: 18 BRPM | HEART RATE: 82 BPM | TEMPERATURE: 97.8 F | SYSTOLIC BLOOD PRESSURE: 115 MMHG | DIASTOLIC BLOOD PRESSURE: 63 MMHG

## 2017-09-25 VITALS — HEART RATE: 72 BPM

## 2017-09-25 RX ADMIN — CHLORHEXIDINE GLUCONATE SCH PACK: 500 CLOTH TOPICAL at 03:08

## 2017-09-25 RX ADMIN — Medication SCH ML: at 20:46

## 2017-09-25 RX ADMIN — RIFAMPIN SCH MG: 150 CAPSULE ORAL at 20:32

## 2017-09-25 RX ADMIN — AMPICILLIN SODIUM SCH MLS/HR: 2 INJECTION, POWDER, FOR SOLUTION INTRAMUSCULAR; INTRAVENOUS at 17:08

## 2017-09-25 RX ADMIN — AMPICILLIN SODIUM SCH MLS/HR: 2 INJECTION, POWDER, FOR SOLUTION INTRAMUSCULAR; INTRAVENOUS at 04:39

## 2017-09-25 RX ADMIN — MORPHINE SULFATE PRN MG: 2 INJECTION, SOLUTION INTRAMUSCULAR; INTRAVENOUS at 11:14

## 2017-09-25 RX ADMIN — ACETAMINOPHEN PRN MG: 325 TABLET ORAL at 15:49

## 2017-09-25 RX ADMIN — OXACILLIN SODIUM SCH MLS/HR: 2 INJECTION, POWDER, FOR SOLUTION INTRAMUSCULAR; INTRAVENOUS at 20:51

## 2017-09-25 RX ADMIN — OXACILLIN SODIUM SCH MLS/HR: 2 INJECTION, POWDER, FOR SOLUTION INTRAMUSCULAR; INTRAVENOUS at 13:58

## 2017-09-25 RX ADMIN — MORPHINE SULFATE PRN MG: 100 SOLUTION ORAL at 14:39

## 2017-09-25 RX ADMIN — Medication PRN ML: at 00:20

## 2017-09-25 RX ADMIN — POLYVINYL ALCOHOL SCH DROP: 14 SOLUTION/ DROPS OPHTHALMIC at 13:27

## 2017-09-25 RX ADMIN — FAMOTIDINE SCH MG: 20 TABLET, FILM COATED ORAL at 08:09

## 2017-09-25 RX ADMIN — AMPICILLIN SODIUM SCH MLS/HR: 2 INJECTION, POWDER, FOR SOLUTION INTRAMUSCULAR; INTRAVENOUS at 13:27

## 2017-09-25 RX ADMIN — OXACILLIN SODIUM SCH MLS/HR: 2 INJECTION, POWDER, FOR SOLUTION INTRAMUSCULAR; INTRAVENOUS at 02:00

## 2017-09-25 RX ADMIN — RIFAMPIN SCH MG: 150 CAPSULE ORAL at 08:09

## 2017-09-25 RX ADMIN — POLYVINYL ALCOHOL SCH DROP: 14 SOLUTION/ DROPS OPHTHALMIC at 08:09

## 2017-09-25 RX ADMIN — OXACILLIN SODIUM SCH MLS/HR: 2 INJECTION, POWDER, FOR SOLUTION INTRAMUSCULAR; INTRAVENOUS at 05:11

## 2017-09-25 RX ADMIN — MORPHINE SULFATE PRN MG: 2 INJECTION, SOLUTION INTRAMUSCULAR; INTRAVENOUS at 04:36

## 2017-09-25 RX ADMIN — POLYVINYL ALCOHOL SCH DROP: 14 SOLUTION/ DROPS OPHTHALMIC at 17:09

## 2017-09-25 RX ADMIN — FUROSEMIDE SCH MG: 20 TABLET ORAL at 08:08

## 2017-09-25 RX ADMIN — HEPARIN SODIUM SCH UNITS: 10000 INJECTION, SOLUTION INTRAVENOUS; SUBCUTANEOUS at 20:34

## 2017-09-25 RX ADMIN — MORPHINE SULFATE PRN MG: 2 INJECTION, SOLUTION INTRAMUSCULAR; INTRAVENOUS at 00:21

## 2017-09-25 RX ADMIN — MORPHINE SULFATE PRN MG: 2 INJECTION, SOLUTION INTRAMUSCULAR; INTRAVENOUS at 08:09

## 2017-09-25 RX ADMIN — AMPICILLIN SODIUM SCH MLS/HR: 2 INJECTION, POWDER, FOR SOLUTION INTRAMUSCULAR; INTRAVENOUS at 08:09

## 2017-09-25 RX ADMIN — HEPARIN SODIUM SCH UNITS: 10000 INJECTION, SOLUTION INTRAVENOUS; SUBCUTANEOUS at 08:14

## 2017-09-25 RX ADMIN — FAMOTIDINE SCH MG: 20 TABLET, FILM COATED ORAL at 20:32

## 2017-09-25 RX ADMIN — STANDARDIZED SENNA CONCENTRATE AND DOCUSATE SODIUM SCH TAB: 8.6; 5 TABLET, FILM COATED ORAL at 20:32

## 2017-09-25 RX ADMIN — Medication SCH ML: at 08:09

## 2017-09-25 RX ADMIN — OXACILLIN SODIUM SCH MLS/HR: 2 INJECTION, POWDER, FOR SOLUTION INTRAMUSCULAR; INTRAVENOUS at 18:15

## 2017-09-25 RX ADMIN — AMPICILLIN SODIUM SCH MLS/HR: 2 INJECTION, POWDER, FOR SOLUTION INTRAMUSCULAR; INTRAVENOUS at 20:46

## 2017-09-25 RX ADMIN — POTASSIUM CHLORIDE SCH MEQ: 20 TABLET, EXTENDED RELEASE ORAL at 08:09

## 2017-09-25 RX ADMIN — Medication PRN ML: at 04:36

## 2017-09-25 RX ADMIN — HEPARIN SODIUM SCH UNITS: 10000 INJECTION, SOLUTION INTRAVENOUS; SUBCUTANEOUS at 02:00

## 2017-09-25 RX ADMIN — MORPHINE SULFATE PRN MG: 100 SOLUTION ORAL at 17:09

## 2017-09-25 RX ADMIN — MORPHINE SULFATE PRN MG: 100 SOLUTION ORAL at 20:43

## 2017-09-25 RX ADMIN — AMPICILLIN SODIUM SCH MLS/HR: 2 INJECTION, POWDER, FOR SOLUTION INTRAMUSCULAR; INTRAVENOUS at 00:56

## 2017-09-25 RX ADMIN — OXACILLIN SODIUM SCH MLS/HR: 2 INJECTION, POWDER, FOR SOLUTION INTRAMUSCULAR; INTRAVENOUS at 09:20

## 2017-09-25 RX ADMIN — METHADONE HYDROCHLORIDE SCH MG: 5 SOLUTION ORAL at 09:19

## 2017-09-25 RX ADMIN — GENTAMICIN SULFATE SCH MLS/HR: 0.8 INJECTION, SOLUTION INTRAVENOUS at 17:31

## 2017-09-25 RX ADMIN — GENTAMICIN SULFATE SCH MLS/HR: 0.8 INJECTION, SOLUTION INTRAVENOUS at 00:17

## 2017-09-25 RX ADMIN — METHADONE HYDROCHLORIDE SCH MG: 5 SOLUTION ORAL at 20:33

## 2017-09-25 RX ADMIN — STANDARDIZED SENNA CONCENTRATE AND DOCUSATE SODIUM SCH TAB: 8.6; 5 TABLET, FILM COATED ORAL at 08:09

## 2017-09-25 RX ADMIN — HEPARIN SODIUM SCH UNITS: 10000 INJECTION, SOLUTION INTRAVENOUS; SUBCUTANEOUS at 18:00

## 2017-09-25 NOTE — HHI.HCPN
Reason for visit


   a.  To assist with evaluation and management of symptoms including: pain. 


   b.  To assist medical decision maker(s) with: better understanding of 

current medical conditions; weighing benefits/burdens of medical treatment 

options; making        


        medical treatment decisions.


.





Subjective/Interval History


Patient seen and examined in room. She is sitting in chair watching TV. She is 

awake and alert. She tells me she has been walking in halls.  She reports 

intermittent achy pain in left shoulder, has been working with physical therapy 

with increasing range of motion and mobility. She also has some discomfort, 

tightness in her chest area, she feels like she can't take a deep breath as 

well.  Currently rates for pain 3 out of 10, reports some relief with PRN 

morphine. Remains on Methadone 25mg PO every 12 hours. 





Reviewed plan for treatment including IV ABX through 10/5/17 and lifetime oral 

ABX. She verbalizes understanding, she is anxious to go home.  I advised her 

that we need to discontinue her IV morphine and transition her to oral 

medication in preparation for discharge.  She is agreeable.  She reports that 

she used oral morphine in the past and that it worked well for her pain.  Given 

current dosing of IV morphine will transition to liquid morphine 5 to 10 mg Q3 

hours PRN breakthrough pain.  Advised that I will continue current dose of 

methadone and continue to use this as her long-acting pain medication she 

agrees.  She reports in the past she was on long-acting morphine, advised given 

that her pain is overall well-controlled with current dose of morphine I do not 

want to change this at this time.





We discussed discharge planning and the fact that she was previously on hospice 

services, patient indicates that she would like to go back on hospice upon 

discharge.  We talked about her overall goals in that she wants to continue 

hospice services for the support they offer, pain medication and that she knows 

that she is terminal.  However the patient indicates that she desires FULL CODE

, would want mechanical ventilation and resuscitation again if needed.  She 

indicates that she was recently on the ventilator and it saved her and gave her 

more quality time. She desires continued aggressive care and goals do not seem 

hospice appropriate at this time.  She verbalizes wanting to spend as much time 

with her 7-year-old son as she can.  I suspect given her age it will be hard 

for her to have completely comfort oriented goals until she is unable to make 

her own medical decisions.





Afebrile.  Vital signs stable.  No new labs.  Patient indicates that she feels 

like she's "getting pneumonia again."  She feels like no one is listening to 

her.  I advised her that she remains on multiple antibiotics and that her chest 

discomfort may be secondary to underlying endocarditis.  After conversation she 

feels better knowing that her treatment plan may not change even if she did 

have a chest x-ray.  Lungs are clear.  Attempted to reassure her that she is 

being monitored, remains afebrile and no obvious signs of pneumonia (fever, 

cough, elevated WBC, etc). Will discuss possible updated CXR with medical team. 





She tells me stories about her 7 year old playing baseball and wishing she 

could see his games. Questions answered. 





.


Family/friend interactions


No family at bedside. 


.





Advance Directives


Living Will:  Never completed


Health Care Surrogate:  Never completed


Durable Power of :  Never completed


Advance Directive Specifics


Health Care Surrogate(s):


No known written advanced directives, family is going to try to find HCS 

paperwork they think pt previously completed.  Patient a single.  Children are 

juvenile.  If no written advanced directives, according to [place] statutes 

health care proxy decision-making falls to a parent. 


.


Significant change in goals:


FULL CODE. Goals remain aggressive. 


.





Objective





Vital Signs








  Date Time  Temp Pulse Resp B/P (MAP) Pulse Ox O2 Delivery O2 Flow Rate FiO2


 


9/25/17 08:05      Room Air  


 


9/25/17 08:05  72      


 


9/25/17 08:00 97.8 76 18 126/77 (93) 98   


 


9/25/17 04:00 98.1 80 20 114/67 (83) 98   


 


9/25/17 00:00 97.6 75 18 121/62 (81) 98   


 


9/24/17 20:25      Room Air  


 


9/24/17 20:10 99.0 90 16 107/70 (82) 100   


 


9/24/17 20:00  87      


 


9/24/17 16:00 98.2 77 18 126/67 (86) 99   


 


9/24/17 12:00 97.7 78 18 122/71 (88) 97   














Intake & Output  


 


 9/25/17 9/25/17





 07:00 19:00


 


Intake Total 920 ml 


 


Balance 920 ml 


 


  


 


Intake Oral 720 ml 


 


IV Total 200 ml 


 


# Voids 2 








Physical Exam


CONSTITUTIONAL/GENERAL: This is an adequately nourished patient, sitting in 

chair.


TUBES/LINES/DRAINS: PIV


SKIN: afebrile.  


CARDIOVASCULAR: regular rate and rhythm, + murmur.


RESPIRATORY/CHEST: decreased breath sounds bilaterally. Clear. 


GASTROINTESTINAL: Abdomen soft, non-distended, nontender. Bowel sounds active. 


GENITOURINARY: Without palpable bladder distension. 


MUSCULOSKELETAL: Left shoulder pain improving ROM.


NEUROLOGICAL: Awake and alert. Cognitively sharp. Follows commands.  


PSYCHIATRIC: calm. 


.





Diagnostic Tests


Laboratory





Laboratory Tests








Test


  9/23/17


13:01


 


Gentamicin Level Trough


  0.6 MCG/ML


(0.0-2.0)








Result Diagram:  


9/22/17 0535





Microbiology





Microbiology








 Date/Time


Source Procedure


Growth Status


 


 


 8/28/17 16:55


Blood Peripheral Aerobic Blood Culture - Final


NO GROWTH IN 5 DAYS Complete


 


 8/28/17 16:55


Blood Peripheral Anaerobic Blood Culture - Final


NO GROWTH IN 5 DAYS Complete





 8/21/17 22:00


Cerebral Spinal Fluid Lumbar Puncture Fungal Smear - Final


NO FUNGAL ELEMENTS SEEN. Complete


 


 8/21/17 22:00


Cerebral Spinal Fluid Lumbar Puncture Fungal Culture - Final


NO GROWTH IN 4 WEEKS Complete





 8/31/17 22:00


Sputum Endotracheal Gram Stain - Final Complete


 


 8/31/17 22:00


Sputum Endotracheal Sputum Culture - Final


NO GROWTH IN 48 HOURS. Complete








Imaging





Last Impressions








Abdomen CT 9/8/17 0000 Signed





Impressions: 





 Service Date/Time:  Friday, September 8, 2017 20:26 - CONCLUSION:  1. 

Peripheral 





 low attenuation lesions in the spleen most characteristic of multiple splenic 





 infarcts. Some of these are new findings since 2016 comparison. 2. Minimal 





 subsegmental airspace disease at the lung bases. 3. 3.4 cm right adnexal cyst. 





 Intrauterine device present. 4. Small fat containing ventral hernias.     

Jaspal Issa MD 


 


Chest X-Ray 9/5/17 0000 Signed





Impressions: 





 Service Date/Time:  Tuesday, September 5, 2017 08:07 - CONCLUSION:  Suspected 





 mild edema/failure.     Ron Swan MD 


 


Neck CT 9/2/17 0000 Signed





Impressions: 





 Service Date/Time:  Saturday, September 2, 2017 11:13 - CONCLUSION:  1. 

Patient 





 is intubated which limits this study. The epiglottis is otherwise normal. 





 Secretions are present in the oropharynx and right sphenoid sinus.     Ej Ewing MD 


 


Brain MRI 8/25/17 0000 Signed





Impressions: 





 Service Date/Time:  Friday, August 25, 2017 17:07 - CONCLUSION:  1. Small 

acute 





 or subacute cortical infarct of the left parietal lobe. 2. Small, faint area 

of 





 nonspecific flair signal abnormality in the left frontal lobe periventricular 





 white matter, nonspecific. No associated mass effect or abnormal enhancement. 

3 





 month followup MRI of the brain recommended. 3.  Mild chronic white matter 





 changes.     Ron Swan MD 


 


Head CT 8/21/17 0000 Signed





Impressions: 





 Service Date/Time:  Monday, August 21, 2017 19:47 - CONCLUSION:  No evidence 

of 





 acute infarct, hemorrhage, mass or edema.     Kristian Frankel MD 








Procedures


* 8/21/17 - left subclavian central placement. 


* 8/21/17 - lumbar puncture


* 8/21/17 - Intubated. 


.





Assessment and Plan


Disease Oriented Problem List:  


(1) Bacterial endocarditis


(2) Protein-calorie malnutrition, severe


(3) Meningitis


(4) Polysubstance abuse


(5) Acute kidney injury


Symptom Scale:  


(1) Pain


0-10 Scale:  3





(2) Dyspnea


0-10 Scale:  2





(3) Agitation


0-10 Scale:  0





Pertinent Non-Medical Issues


Psychosocial: single.  2 juvenile children. Supported by mother, stepfather and 

boyfriend. 


Spiritual: Unknown.


Legal: patient is incapacitated.  No written advanced directives.  Patient a 

single.  Children are juvenile.  According to [place] statutes health care 

proxy decision-making falls to a parent.


Ethical issues impacting care: No known concerns at this time. 


.


Important Contacts


* Kelly Le, mother: [phone]


* Won Le, stepfather: [phone]


.


Prognosis


Prognosis poor.


Code Status:  Full Code


Plan


* Decision Maker: Patient is currently capacitated to make her own decisions.  

Patient is not .  According to [place] statutes health care proxy 

decision-making falls to the patient's mother.  


* FULL CODE.  


* Goals of care are aggressive.  


* SYMPTOMS: Pain: rates pain 3 out of 10 new pain left shoulder, improving and 

chest tightness. On Methadone 25 mg PO every 12 hours ATC. DC IV Morphine. 

Start 5-10mg PO every 3 hours PRN BTP.  Dyspnea- increased. Lungs. clear. May 

consider updated CXR. .  


* Palliative care will continue to follow throughout hospital course to assist 

with symptom management and clarification of goals as needed. 


.





Attestation


To help prompt me to consider important information that might be impacting 

today's encounter and assessment, information from prior notes written by 

myself or my colleagues may have been "brought forward" into today's note.  My 

signature on this note, however, is an attestation that I personally performed 

the exam, history, and/or decision-making noted today, and, unless otherwise 

indicated, the interactions with patient, family, and staff as well as the 

review of records all occurred today.  I also attest that the listed assessment 

and stated plan reflect my best clinical judgment today based on the 

combination of historical information, prior notes, and today's exam/ 

interactions.  When time spent is documented, it refers only to time spent 

today by the signer, or if indicated, combined time spent today by 

collaborating physician/nurse practitioner.











Ileana Dang Sep 25, 2017 12:03

## 2017-09-25 NOTE — HHI.PR
Subjective


Remarks


Follow-up sepsis


09/23/17-patient seen and examined, she was up and ambulated and denies any 

acute event overnight.  Currently afebrile


09/24/17-patient seen and examined, stable and no change.  Patient appears to 

be a difficult stick this morning for lab work


09/25/17-patient seen and examined, patient complains of back pain otherwise 

stable and no other issues.





Objective


Vitals





Vital Signs








  Date Time  Temp Pulse Resp B/P (MAP) Pulse Ox O2 Delivery O2 Flow Rate FiO2


 


9/25/17 08:05      Room Air  


 


9/25/17 08:05  72      


 


9/25/17 08:00 97.8 76 18 126/77 (93) 98   


 


9/25/17 04:00 98.1 80 20 114/67 (83) 98   


 


9/25/17 00:00 97.6 75 18 121/62 (81) 98   


 


9/24/17 20:25      Room Air  


 


9/24/17 20:10 99.0 90 16 107/70 (82) 100   


 


9/24/17 20:00  87      


 


9/24/17 16:00 98.2 77 18 126/67 (86) 99   


 


9/24/17 12:00 97.7 78 18 122/71 (88) 97   














I/O      


 


 9/24/17 9/24/17 9/24/17 9/25/17 9/25/17 9/25/17





 06:59 14:59 22:59 06:59 14:59 22:59


 


Intake Total 400 ml 480 ml 240 ml 680 ml  


 


Balance 400 ml 480 ml 240 ml 680 ml  


 


      


 


Intake Oral  480 ml 240 ml 480 ml  


 


IV Total 400 ml   200 ml  


 


# Voids  3  2  


 


# Bowel Movements  1    








Result Diagram:  


9/22/17 0535





Objective Remarks


GENERAL: NAD


SKIN: Warm and dry.


HEAD: Normocephalic.


EYES: No scleral icterus. No injection or drainage. 


NECK: Supple, trachea midline. No JVD or lymphadenopathy.


CARDIOVASCULAR: Regular rate and rhythm without murmurs, gallops, or rubs. 


RESPIRATORY: Breath sounds equal bilaterally. No accessory muscle use.


GASTROINTESTINAL: Abdomen soft, non-tender, nondistended. 


MUSCULOSKELETAL: No cyanosis, or edema. 


BACK: Nontender without obvious deformity. No CVA tenderness.





Procedures


None





A/P


Problem List:  


(1) Severe sepsis


ICD Code:  A41.9 - Sepsis, unspecified organism; R65.20 - Severe sepsis without 

septic shock


Status:  Acute


(2) MSSA meningitis


Status:  Acute


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


Status:  Acute


(4) SVT (supraventricular tachycardia)


ICD Code:  I47.1 - Supraventricular tachycardia


Status:  Resolved


(5) Postextubation stridor


ICD Code:  J95.89 - Other postprocedural complications and disorders of 

respiratory system, not elsewhere classified


Status:  Resolved


(6) Acute hypoxemic respiratory failure


ICD Code:  J96.01 - Acute respiratory failure with hypoxia


Status:  Resolved


(7) CVA (cerebral vascular accident)


ICD Code:  I63.9 - Cerebral infarction, unspecified


(8) Tricuspid regurgitation


ICD Code:  I07.1 - Rheumatic tricuspid insufficiency


Status:  Acute


(9) Acute systolic heart failure


ICD Code:  I50.21 - Acute systolic (congestive) heart failure


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


ICD Code:  T82.6XXA - Infection and inflammatory reaction due to cardiac valve 

prosthesis, initial encounter


Status:  Acute


(11) NOÉ (acute kidney injury)


ICD Code:  N17.9 - Acute kidney failure, unspecified


Status:  Resolved


(12) Protein calorie malnutrition


ICD Code:  E46 - Unspecified protein-calorie malnutrition


(13) Microcytic anemia


ICD Code:  D50.9 - Iron deficiency anemia, unspecified


(14) Hyperphosphatemia


ICD Code:  E83.39 - Other disorders of phosphorus metabolism


(15) Hypernatremia


ICD Code:  E87.0 - Hyperosmolality and hypernatremia


(16) IV drug abuse


ICD Code:  F19.10 - Other psychoactive substance abuse, uncomplicated


Status:  Acute


(17) Headache


ICD Code:  R51 - Headache


(18) Abdominal pain


ICD Code:  R10.9 - Unspecified abdominal pain


Assessment and Plan


37-year-old female with





Severe sepsis-resolved


Multiple splenic infarcts 


MSSA meningitis 


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


Prosthetic valve endocarditis


    Currently on IV ampicillin, oxacillin, gentamicin and rifampin 


   Management per infectious disease specialist


   Anticipate 6 week from 1st neg (8/24) followed by  indefinite oral abx 

suppression tx. 


   Stop date for antibiotics 10/05/17








Supraventricular tachycardia-resolved


   Echocardiogram 8 foci 17 revealed an ejection fraction showed 25-30%.


    Continue metoprolol.





Postextubation stridor


   Resolved, and continue with neb when necessary as well as scheduled





Acute hypoxemic respiratory failure


   Resolved


   Continue DuoNeb treatments.  Maintain oxygen saturation above 92%





CVA (cerebral vascular accident)


   CT head negative, MRI small acute and subacute cortical infarct of the left 

parietal lobe.


   Continue with scheduled methadone 10 mg twice


   Appreciate input from neurology





Tricuspid regurgitation


   Continue oral Lasix.





Acute systolic heart failure


   continue Lasix 40 mg po daily.





Acute kidney injury)


   Now resolved. 





Protein calorie malnutrition


   Secondary to long-standing IV drug abuse and endocarditis.





Microcytic anemia


   Continue with oral iron sulfate.





Hyperphosphatemia


   Now resolved.  Continue to monitor levels.





Hypernatremia


   Resolved.  Continue to monitor BMP.





IV drug abuse


   Advised cessation.  Currently on methadone.





Headache


Resolved





Abdominal pain 


   Resolved





Diarrhea, resolved


   C diff. is negative  Continue probiotic.





Anasarca.


   Improving


   Continue Lasix po.





Problem Qualifiers





(1) CVA (cerebral vascular accident):  


Qualified Codes:  I63.9 - Cerebral infarction, unspecified


(2) Protein calorie malnutrition:  


Qualified Codes:  E44.0 - Moderate protein-calorie malnutrition








Wilfred Diaz MD Sep 25, 2017 11:34

## 2017-09-26 VITALS
SYSTOLIC BLOOD PRESSURE: 117 MMHG | TEMPERATURE: 97.5 F | HEART RATE: 83 BPM | DIASTOLIC BLOOD PRESSURE: 70 MMHG | OXYGEN SATURATION: 98 % | RESPIRATION RATE: 18 BRPM

## 2017-09-26 VITALS
HEART RATE: 78 BPM | DIASTOLIC BLOOD PRESSURE: 80 MMHG | RESPIRATION RATE: 18 BRPM | TEMPERATURE: 98.4 F | OXYGEN SATURATION: 97 % | SYSTOLIC BLOOD PRESSURE: 121 MMHG

## 2017-09-26 VITALS
RESPIRATION RATE: 18 BRPM | DIASTOLIC BLOOD PRESSURE: 68 MMHG | OXYGEN SATURATION: 98 % | SYSTOLIC BLOOD PRESSURE: 119 MMHG | TEMPERATURE: 99.5 F | HEART RATE: 78 BPM

## 2017-09-26 VITALS
OXYGEN SATURATION: 99 % | HEART RATE: 100 BPM | RESPIRATION RATE: 18 BRPM | DIASTOLIC BLOOD PRESSURE: 65 MMHG | TEMPERATURE: 98.6 F | SYSTOLIC BLOOD PRESSURE: 106 MMHG

## 2017-09-26 VITALS
OXYGEN SATURATION: 98 % | SYSTOLIC BLOOD PRESSURE: 114 MMHG | TEMPERATURE: 98 F | RESPIRATION RATE: 18 BRPM | DIASTOLIC BLOOD PRESSURE: 75 MMHG | HEART RATE: 72 BPM

## 2017-09-26 VITALS — HEART RATE: 75 BPM

## 2017-09-26 LAB — GFR SERPLBLD BASED ON 1.73 SQ M-ARVRAT: 47 ML/MIN (ref 89–?)

## 2017-09-26 RX ADMIN — AMPICILLIN SODIUM SCH MLS/HR: 2 INJECTION, POWDER, FOR SOLUTION INTRAMUSCULAR; INTRAVENOUS at 17:09

## 2017-09-26 RX ADMIN — MORPHINE SULFATE PRN MG: 100 SOLUTION ORAL at 11:45

## 2017-09-26 RX ADMIN — GENTAMICIN SULFATE SCH MLS/HR: 0.8 INJECTION, SOLUTION INTRAVENOUS at 11:51

## 2017-09-26 RX ADMIN — HEPARIN SODIUM SCH UNITS: 10000 INJECTION, SOLUTION INTRAVENOUS; SUBCUTANEOUS at 08:27

## 2017-09-26 RX ADMIN — AMPICILLIN SODIUM SCH MLS/HR: 2 INJECTION, POWDER, FOR SOLUTION INTRAMUSCULAR; INTRAVENOUS at 08:27

## 2017-09-26 RX ADMIN — METHADONE HYDROCHLORIDE SCH MG: 5 SOLUTION ORAL at 08:26

## 2017-09-26 RX ADMIN — AMPICILLIN SODIUM SCH MLS/HR: 2 INJECTION, POWDER, FOR SOLUTION INTRAMUSCULAR; INTRAVENOUS at 21:39

## 2017-09-26 RX ADMIN — FAMOTIDINE SCH MG: 20 TABLET, FILM COATED ORAL at 08:25

## 2017-09-26 RX ADMIN — STANDARDIZED SENNA CONCENTRATE AND DOCUSATE SODIUM SCH TAB: 8.6; 5 TABLET, FILM COATED ORAL at 21:00

## 2017-09-26 RX ADMIN — Medication SCH ML: at 08:27

## 2017-09-26 RX ADMIN — AMPICILLIN SODIUM SCH MLS/HR: 2 INJECTION, POWDER, FOR SOLUTION INTRAMUSCULAR; INTRAVENOUS at 12:31

## 2017-09-26 RX ADMIN — OXACILLIN SODIUM SCH MLS/HR: 2 INJECTION, POWDER, FOR SOLUTION INTRAMUSCULAR; INTRAVENOUS at 05:29

## 2017-09-26 RX ADMIN — POLYVINYL ALCOHOL SCH DROP: 14 SOLUTION/ DROPS OPHTHALMIC at 11:46

## 2017-09-26 RX ADMIN — AMPICILLIN SODIUM SCH MLS/HR: 2 INJECTION, POWDER, FOR SOLUTION INTRAMUSCULAR; INTRAVENOUS at 01:00

## 2017-09-26 RX ADMIN — MORPHINE SULFATE PRN MG: 100 SOLUTION ORAL at 05:25

## 2017-09-26 RX ADMIN — STANDARDIZED SENNA CONCENTRATE AND DOCUSATE SODIUM SCH TAB: 8.6; 5 TABLET, FILM COATED ORAL at 08:27

## 2017-09-26 RX ADMIN — FAMOTIDINE SCH MG: 20 TABLET, FILM COATED ORAL at 21:45

## 2017-09-26 RX ADMIN — POLYVINYL ALCOHOL SCH DROP: 14 SOLUTION/ DROPS OPHTHALMIC at 17:02

## 2017-09-26 RX ADMIN — Medication SCH ML: at 21:42

## 2017-09-26 RX ADMIN — POLYVINYL ALCOHOL SCH DROP: 14 SOLUTION/ DROPS OPHTHALMIC at 08:27

## 2017-09-26 RX ADMIN — RIFAMPIN SCH MG: 150 CAPSULE ORAL at 21:45

## 2017-09-26 RX ADMIN — OXACILLIN SODIUM SCH MLS/HR: 2 INJECTION, POWDER, FOR SOLUTION INTRAMUSCULAR; INTRAVENOUS at 15:06

## 2017-09-26 RX ADMIN — MORPHINE SULFATE PRN MG: 100 SOLUTION ORAL at 15:06

## 2017-09-26 RX ADMIN — MORPHINE SULFATE PRN MG: 100 SOLUTION ORAL at 08:26

## 2017-09-26 RX ADMIN — OXACILLIN SODIUM SCH MLS/HR: 2 INJECTION, POWDER, FOR SOLUTION INTRAMUSCULAR; INTRAVENOUS at 10:04

## 2017-09-26 RX ADMIN — OXACILLIN SODIUM SCH MLS/HR: 2 INJECTION, POWDER, FOR SOLUTION INTRAMUSCULAR; INTRAVENOUS at 22:06

## 2017-09-26 RX ADMIN — RIFAMPIN SCH MG: 150 CAPSULE ORAL at 08:26

## 2017-09-26 RX ADMIN — FUROSEMIDE SCH MG: 20 TABLET ORAL at 08:26

## 2017-09-26 RX ADMIN — OXACILLIN SODIUM SCH MLS/HR: 2 INJECTION, POWDER, FOR SOLUTION INTRAMUSCULAR; INTRAVENOUS at 02:12

## 2017-09-26 RX ADMIN — OXACILLIN SODIUM SCH MLS/HR: 2 INJECTION, POWDER, FOR SOLUTION INTRAMUSCULAR; INTRAVENOUS at 17:33

## 2017-09-26 RX ADMIN — HEPARIN SODIUM SCH UNITS: 10000 INJECTION, SOLUTION INTRAVENOUS; SUBCUTANEOUS at 17:02

## 2017-09-26 RX ADMIN — MORPHINE SULFATE PRN MG: 100 SOLUTION ORAL at 21:44

## 2017-09-26 RX ADMIN — CHLORHEXIDINE GLUCONATE SCH PACK: 500 CLOTH TOPICAL at 03:44

## 2017-09-26 RX ADMIN — MORPHINE SULFATE PRN MG: 100 SOLUTION ORAL at 18:14

## 2017-09-26 RX ADMIN — AMPICILLIN SODIUM SCH MLS/HR: 2 INJECTION, POWDER, FOR SOLUTION INTRAMUSCULAR; INTRAVENOUS at 05:26

## 2017-09-26 RX ADMIN — METHADONE HYDROCHLORIDE SCH MG: 5 SOLUTION ORAL at 21:43

## 2017-09-26 RX ADMIN — POTASSIUM CHLORIDE SCH MEQ: 20 TABLET, EXTENDED RELEASE ORAL at 08:26

## 2017-09-26 NOTE — HHI.PR
Subjective


Remarks


Follow-up sepsis


09/23/17-patient seen and examined, she was up and ambulated and denies any 

acute event overnight.  Currently afebrile


09/24/17-patient seen and examined, stable and no change.  Patient appears to 

be a difficult stick this morning for lab work


09/25/17-patient seen and examined, patient complains of back pain otherwise 

stable and no other issues.


09/26/17-patient seen and examined, no complaint today.  Vitals stable





Objective


Vitals





Vital Signs








  Date Time  Temp Pulse Resp B/P (MAP) Pulse Ox O2 Delivery O2 Flow Rate FiO2


 


9/26/17 08:00 98.0 72 18 114/75 (88) 98   


 


9/26/17 05:28 98.4 78 18 121/80 (94) 97   


 


9/26/17 00:00 97.5 83 18 117/70 (86) 98   


 


9/25/17 20:32 98.2 83 18 122/76 (91) 100   


 


9/25/17 20:00  82      


 


9/25/17 20:00      Room Air  


 


9/25/17 16:00 97.8 82 18 115/63 (80) 97   


 


9/25/17 12:00 97.4 80 18 126/80 (95) 95   














I/O      


 


 9/25/17 9/25/17 9/25/17 9/26/17 9/26/17 9/26/17





 07:00 15:00 23:00 07:00 15:00 23:00


 


Intake Total 680 ml 480 ml 2032 ml 1600 ml  


 


Balance 680 ml 480 ml 2032 ml 1600 ml  


 


      


 


Intake Oral 480 ml 480 ml  1200 ml  


 


IV Total 200 ml  2032 ml 400 ml  


 


# Voids 2 4  5  


 


# Bowel Movements  0  1  








Result Diagram:  


9/26/17 0646





Objective Remarks


GENERAL: NAD


SKIN: Warm and dry.


HEAD: Normocephalic.


EYES: No scleral icterus. No injection or drainage. 


NECK: Supple, trachea midline. No JVD or lymphadenopathy.


CARDIOVASCULAR: Regular rate and rhythm without murmurs, gallops, or rubs. 


RESPIRATORY: Breath sounds equal bilaterally. No accessory muscle use.


GASTROINTESTINAL: Abdomen soft, non-tender, nondistended. 


MUSCULOSKELETAL: No cyanosis, or edema. 


BACK: Nontender without obvious deformity. No CVA tenderness.





Procedures


None





A/P


Problem List:  


(1) Severe sepsis


ICD Code:  A41.9 - Sepsis, unspecified organism; R65.20 - Severe sepsis without 

septic shock


Status:  Acute


(2) MSSA meningitis


Status:  Acute


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


Status:  Acute


(4) SVT (supraventricular tachycardia)


ICD Code:  I47.1 - Supraventricular tachycardia


Status:  Resolved


(5) Postextubation stridor


ICD Code:  J95.89 - Other postprocedural complications and disorders of 

respiratory system, not elsewhere classified


Status:  Resolved


(6) Acute hypoxemic respiratory failure


ICD Code:  J96.01 - Acute respiratory failure with hypoxia


Status:  Resolved


(7) CVA (cerebral vascular accident)


ICD Code:  I63.9 - Cerebral infarction, unspecified


(8) Tricuspid regurgitation


ICD Code:  I07.1 - Rheumatic tricuspid insufficiency


Status:  Acute


(9) Acute systolic heart failure


ICD Code:  I50.21 - Acute systolic (congestive) heart failure


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


ICD Code:  T82.6XXA - Infection and inflammatory reaction due to cardiac valve 

prosthesis, initial encounter


Status:  Acute


(11) NOÉ (acute kidney injury)


ICD Code:  N17.9 - Acute kidney failure, unspecified


Status:  Resolved


(12) Protein calorie malnutrition


ICD Code:  E46 - Unspecified protein-calorie malnutrition


(13) Microcytic anemia


ICD Code:  D50.9 - Iron deficiency anemia, unspecified


(14) Hyperphosphatemia


ICD Code:  E83.39 - Other disorders of phosphorus metabolism


(15) Hypernatremia


ICD Code:  E87.0 - Hyperosmolality and hypernatremia


(16) IV drug abuse


ICD Code:  F19.10 - Other psychoactive substance abuse, uncomplicated


Status:  Acute


(17) Headache


ICD Code:  R51 - Headache


(18) Abdominal pain


ICD Code:  R10.9 - Unspecified abdominal pain


Assessment and Plan


37-year-old female with





Severe sepsis-resolved


Multiple splenic infarcts 


MSSA meningitis 


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


Prosthetic valve endocarditis


    Currently on IV ampicillin, oxacillin, gentamicin and rifampin 


   Management per infectious disease specialist


   Anticipate 6 week from 1st neg (8/24) followed by  indefinite oral abx 

suppression tx. 


   Stop date for antibiotics 10/05/17


   Continue to monitor for sign of infection





Supraventricular tachycardia-resolved


   Echocardiogram 8 foci 17 revealed an ejection fraction showed 25-30%.


    Continue metoprolol.





Postextubation stridor


   Resolved, and continue with neb when necessary as well as scheduled





Acute hypoxemic respiratory failure


   Resolved


   Continue DuoNeb treatments.  Maintain oxygen saturation above 92%





CVA (cerebral vascular accident)


   CT head negative, MRI small acute and subacute cortical infarct of the left 

parietal lobe.


   Continue with scheduled methadone 10 mg twice


   Appreciate input from neurology





Tricuspid regurgitation


   Continue oral Lasix.





Acute systolic heart failure


   continue Lasix 40 mg po daily.





Acute kidney injury)


   Now resolved. 





Protein calorie malnutrition


   Secondary to long-standing IV drug abuse and endocarditis.





Microcytic anemia


   Continue with oral iron sulfate.





Hyperphosphatemia


   Now resolved.  Continue to monitor levels.





Hypernatremia


   Resolved.  Continue to monitor BMP.





IV drug abuse


   Advised cessation.  Currently on methadone.





Headache


Resolved





Abdominal pain 


   Resolved





Diarrhea, resolved


   C diff. is negative  Continue probiotic.





Anasarca.


   Improved


   Continue Lasix po.





Problem Qualifiers





(1) CVA (cerebral vascular accident):  


Qualified Codes:  I63.9 - Cerebral infarction, unspecified


(2) Protein calorie malnutrition:  


Qualified Codes:  E44.0 - Moderate protein-calorie malnutrition








Wilfred Diaz MD Sep 26, 2017 10:03

## 2017-09-27 VITALS
RESPIRATION RATE: 18 BRPM | SYSTOLIC BLOOD PRESSURE: 127 MMHG | TEMPERATURE: 98.1 F | HEART RATE: 79 BPM | DIASTOLIC BLOOD PRESSURE: 84 MMHG | OXYGEN SATURATION: 99 %

## 2017-09-27 VITALS
RESPIRATION RATE: 16 BRPM | TEMPERATURE: 98.4 F | OXYGEN SATURATION: 96 % | SYSTOLIC BLOOD PRESSURE: 104 MMHG | DIASTOLIC BLOOD PRESSURE: 70 MMHG | HEART RATE: 79 BPM

## 2017-09-27 VITALS
TEMPERATURE: 98.4 F | SYSTOLIC BLOOD PRESSURE: 122 MMHG | OXYGEN SATURATION: 100 % | RESPIRATION RATE: 16 BRPM | DIASTOLIC BLOOD PRESSURE: 88 MMHG | HEART RATE: 73 BPM

## 2017-09-27 VITALS
SYSTOLIC BLOOD PRESSURE: 104 MMHG | RESPIRATION RATE: 18 BRPM | DIASTOLIC BLOOD PRESSURE: 69 MMHG | OXYGEN SATURATION: 97 % | HEART RATE: 80 BPM | TEMPERATURE: 98.4 F

## 2017-09-27 VITALS
SYSTOLIC BLOOD PRESSURE: 104 MMHG | HEART RATE: 79 BPM | RESPIRATION RATE: 16 BRPM | OXYGEN SATURATION: 96 % | TEMPERATURE: 98.3 F | DIASTOLIC BLOOD PRESSURE: 70 MMHG

## 2017-09-27 VITALS — HEART RATE: 75 BPM

## 2017-09-27 VITALS — HEART RATE: 77 BPM

## 2017-09-27 LAB
ALP SERPL-CCNC: 102 U/L (ref 45–117)
ALT SERPL-CCNC: 12 U/L (ref 10–53)
AST SERPL-CCNC: 18 U/L (ref 15–37)
BASOPHILS # BLD AUTO: 0.1 TH/MM3 (ref 0–0.2)
BASOPHILS NFR BLD: 1.1 % (ref 0–2)
BILIRUB INDIRECT SERPL-MCNC: 0.1 MG/DL (ref 0–0.8)
BILIRUB SERPL-MCNC: 0.2 MG/DL (ref 0.2–1)
EOSINOPHIL # BLD: 0.2 TH/MM3 (ref 0–0.4)
EOSINOPHIL NFR BLD: 3.2 % (ref 0–4)
ERYTHROCYTE [DISTWIDTH] IN BLOOD BY AUTOMATED COUNT: 19.1 % (ref 11.6–17.2)
HCT VFR BLD CALC: 27.3 % (ref 35–46)
HEMO FLAGS: (no result)
LYMPHOCYTES # BLD AUTO: 1.2 TH/MM3 (ref 1–4.8)
LYMPHOCYTES NFR BLD AUTO: 17.2 % (ref 9–44)
MCH RBC QN AUTO: 24.7 PG (ref 27–34)
MCHC RBC AUTO-ENTMCNC: 32.5 % (ref 32–36)
MCV RBC AUTO: 75.8 FL (ref 80–100)
MONOCYTES NFR BLD: 8.4 % (ref 0–8)
NEUTROPHILS # BLD AUTO: 4.9 TH/MM3 (ref 1.8–7.7)
NEUTROPHILS NFR BLD AUTO: 70.1 % (ref 16–70)
PLATELET # BLD: 295 TH/MM3 (ref 150–450)
RBC # BLD AUTO: 3.59 MIL/MM3 (ref 4–5.3)
WBC # BLD AUTO: 6.9 TH/MM3 (ref 4–11)

## 2017-09-27 RX ADMIN — RIFAMPIN SCH MG: 150 CAPSULE ORAL at 22:01

## 2017-09-27 RX ADMIN — AMPICILLIN SODIUM SCH MLS/HR: 2 INJECTION, POWDER, FOR SOLUTION INTRAMUSCULAR; INTRAVENOUS at 08:05

## 2017-09-27 RX ADMIN — MORPHINE SULFATE PRN MG: 100 SOLUTION ORAL at 19:20

## 2017-09-27 RX ADMIN — MORPHINE SULFATE PRN MG: 100 SOLUTION ORAL at 05:06

## 2017-09-27 RX ADMIN — OXACILLIN SODIUM SCH MLS/HR: 2 INJECTION, POWDER, FOR SOLUTION INTRAMUSCULAR; INTRAVENOUS at 23:09

## 2017-09-27 RX ADMIN — AMPICILLIN SODIUM SCH MLS/HR: 2 INJECTION, POWDER, FOR SOLUTION INTRAMUSCULAR; INTRAVENOUS at 22:04

## 2017-09-27 RX ADMIN — CHLORHEXIDINE GLUCONATE SCH PACK: 500 CLOTH TOPICAL at 03:08

## 2017-09-27 RX ADMIN — METHADONE HYDROCHLORIDE SCH MG: 5 SOLUTION ORAL at 21:59

## 2017-09-27 RX ADMIN — RIFAMPIN SCH MG: 150 CAPSULE ORAL at 08:41

## 2017-09-27 RX ADMIN — AMPICILLIN SODIUM SCH MLS/HR: 2 INJECTION, POWDER, FOR SOLUTION INTRAMUSCULAR; INTRAVENOUS at 05:02

## 2017-09-27 RX ADMIN — OXACILLIN SODIUM SCH MLS/HR: 2 INJECTION, POWDER, FOR SOLUTION INTRAMUSCULAR; INTRAVENOUS at 02:04

## 2017-09-27 RX ADMIN — FUROSEMIDE SCH MG: 20 TABLET ORAL at 08:42

## 2017-09-27 RX ADMIN — HEPARIN SODIUM SCH UNITS: 10000 INJECTION, SOLUTION INTRAVENOUS; SUBCUTANEOUS at 08:41

## 2017-09-27 RX ADMIN — MORPHINE SULFATE PRN MG: 100 SOLUTION ORAL at 22:09

## 2017-09-27 RX ADMIN — STANDARDIZED SENNA CONCENTRATE AND DOCUSATE SODIUM SCH TAB: 8.6; 5 TABLET, FILM COATED ORAL at 21:00

## 2017-09-27 RX ADMIN — POLYVINYL ALCOHOL SCH DROP: 14 SOLUTION/ DROPS OPHTHALMIC at 08:41

## 2017-09-27 RX ADMIN — HEPARIN SODIUM SCH UNITS: 10000 INJECTION, SOLUTION INTRAVENOUS; SUBCUTANEOUS at 17:22

## 2017-09-27 RX ADMIN — ACETAMINOPHEN PRN MG: 325 TABLET ORAL at 16:16

## 2017-09-27 RX ADMIN — Medication SCH ML: at 08:05

## 2017-09-27 RX ADMIN — FAMOTIDINE SCH MG: 20 TABLET, FILM COATED ORAL at 08:41

## 2017-09-27 RX ADMIN — MORPHINE SULFATE PRN MG: 100 SOLUTION ORAL at 16:16

## 2017-09-27 RX ADMIN — Medication SCH ML: at 22:02

## 2017-09-27 RX ADMIN — MORPHINE SULFATE PRN MG: 100 SOLUTION ORAL at 09:48

## 2017-09-27 RX ADMIN — GENTAMICIN SULFATE SCH MLS/HR: 0.8 INJECTION, SOLUTION INTRAVENOUS at 05:40

## 2017-09-27 RX ADMIN — METHADONE HYDROCHLORIDE SCH MG: 5 SOLUTION ORAL at 08:43

## 2017-09-27 RX ADMIN — POTASSIUM CHLORIDE SCH MEQ: 20 TABLET, EXTENDED RELEASE ORAL at 08:41

## 2017-09-27 RX ADMIN — OXACILLIN SODIUM SCH MLS/HR: 2 INJECTION, POWDER, FOR SOLUTION INTRAMUSCULAR; INTRAVENOUS at 09:47

## 2017-09-27 RX ADMIN — OXACILLIN SODIUM SCH MLS/HR: 2 INJECTION, POWDER, FOR SOLUTION INTRAMUSCULAR; INTRAVENOUS at 17:22

## 2017-09-27 RX ADMIN — OXACILLIN SODIUM SCH MLS/HR: 2 INJECTION, POWDER, FOR SOLUTION INTRAMUSCULAR; INTRAVENOUS at 06:18

## 2017-09-27 RX ADMIN — MORPHINE SULFATE PRN MG: 100 SOLUTION ORAL at 13:04

## 2017-09-27 RX ADMIN — ACETAMINOPHEN PRN MG: 325 TABLET ORAL at 23:49

## 2017-09-27 RX ADMIN — FAMOTIDINE SCH MG: 20 TABLET, FILM COATED ORAL at 21:00

## 2017-09-27 RX ADMIN — HEPARIN SODIUM SCH UNITS: 10000 INJECTION, SOLUTION INTRAVENOUS; SUBCUTANEOUS at 02:00

## 2017-09-27 RX ADMIN — AMPICILLIN SODIUM SCH MLS/HR: 2 INJECTION, POWDER, FOR SOLUTION INTRAMUSCULAR; INTRAVENOUS at 12:20

## 2017-09-27 RX ADMIN — AMPICILLIN SODIUM SCH MLS/HR: 2 INJECTION, POWDER, FOR SOLUTION INTRAMUSCULAR; INTRAVENOUS at 01:48

## 2017-09-27 RX ADMIN — POLYVINYL ALCOHOL SCH DROP: 14 SOLUTION/ DROPS OPHTHALMIC at 12:20

## 2017-09-27 RX ADMIN — MORPHINE SULFATE PRN MG: 100 SOLUTION ORAL at 02:02

## 2017-09-27 RX ADMIN — OXACILLIN SODIUM SCH MLS/HR: 2 INJECTION, POWDER, FOR SOLUTION INTRAMUSCULAR; INTRAVENOUS at 13:04

## 2017-09-27 RX ADMIN — STANDARDIZED SENNA CONCENTRATE AND DOCUSATE SODIUM SCH TAB: 8.6; 5 TABLET, FILM COATED ORAL at 08:41

## 2017-09-27 RX ADMIN — AMPICILLIN SODIUM SCH MLS/HR: 2 INJECTION, POWDER, FOR SOLUTION INTRAMUSCULAR; INTRAVENOUS at 16:17

## 2017-09-27 RX ADMIN — GENTAMICIN SULFATE SCH MLS/HR: 0.8 INJECTION, SOLUTION INTRAVENOUS at 23:47

## 2017-09-27 RX ADMIN — POLYVINYL ALCOHOL SCH DROP: 14 SOLUTION/ DROPS OPHTHALMIC at 17:22

## 2017-09-27 NOTE — [facility].PR
Subjective


Remarks


Follow-up sepsis


09/23/17-patient seen and examined, she was up and ambulated and denies any 

acute event overnight.  Currently afebrile


09/24/17-patient seen and examined, stable and no change.  Patient appears to 

be a difficult stick this morning for lab work


09/25/17-patient seen and examined, patient complains of back pain otherwise 

stable and no other issues.


09/26/17-patient seen and examined, no complaint today.  Vitals stable


09/27/17-patient seen and examined complains of some numbness to left foot and 

states he doesn't feel the top of her foot otherwise no other issues.





Objective


Vitals





Vital Signs








  Date Time  Temp Pulse Resp B/P (MAP) Pulse Ox O2 Delivery O2 Flow Rate FiO2


 


9/27/17 08:00      Room Air  


 


9/27/17 04:00 98.3 79 16 104/70 (81) 96   


 


9/27/17 00:00 98.4 79 16 104/70 (81) 96   


 


9/26/17 20:00      Room Air  


 


9/26/17 20:00  78      


 


9/26/17 20:00 99.5 85 18 119/68 (85) 98   


 


9/26/17 16:00 98.6 100 18 106/65 (79) 99   


 


9/26/17 11:36  75      














I/O      


 


 9/26/17 9/26/17 9/26/17 9/27/17 9/27/17 9/27/17





 07:00 15:00 23:00 07:00 15:00 23:00


 


Intake Total 1600 ml  1160 ml 1200 ml 100 ml 


 


Balance 1600 ml  1160 ml 1200 ml 100 ml 


 


      


 


Intake Oral 1200 ml  960 ml   


 


IV Total 400 ml  200 ml 1200 ml 100 ml 


 


# Voids 5  3 3  


 


# Bowel Movements 1  1   








Result Diagram:  


9/26/17 0646





Objective Remarks


GENERAL: NAD


SKIN: Warm and dry.


HEAD: Normocephalic.


EYES: No scleral icterus. No injection or drainage. 


NECK: Supple, trachea midline. No JVD or lymphadenopathy.


CARDIOVASCULAR: Regular rate and rhythm without murmurs, gallops, or rubs. 


RESPIRATORY: Breath sounds equal bilaterally. No accessory muscle use.


GASTROINTESTINAL: Abdomen soft, non-tender, nondistended. 


MUSCULOSKELETAL: No cyanosis, or edema. 


BACK: Nontender without obvious deformity. No CVA tenderness.





Procedures


None





A/P


Problem List:  


(1) Severe sepsis


ICD Code:  A41.9 - Sepsis, unspecified organism; R65.20 - Severe sepsis without 

septic shock


Status:  Acute


(2) MSSA meningitis


Status:  Acute


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


Status:  Acute


(4) SVT (supraventricular tachycardia)


ICD Code:  I47.1 - Supraventricular tachycardia


Status:  Resolved


(5) Postextubation stridor


ICD Code:  J95.89 - Other postprocedural complications and disorders of 

respiratory system, not elsewhere classified


Status:  Resolved


(6) Acute hypoxemic respiratory failure


ICD Code:  J96.01 - Acute respiratory failure with hypoxia


Status:  Resolved


(7) CVA (cerebral vascular accident)


ICD Code:  I63.9 - Cerebral infarction, unspecified


(8) Tricuspid regurgitation


ICD Code:  I07.1 - Rheumatic tricuspid insufficiency


Status:  Acute


(9) Acute systolic heart failure


ICD Code:  I50.21 - Acute systolic (congestive) heart failure


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


ICD Code:  T82.6XXA - Infection and inflammatory reaction due to cardiac valve 

prosthesis, initial encounter


Status:  Acute


(11) NOÉ (acute kidney injury)


ICD Code:  N17.9 - Acute kidney failure, unspecified


Status:  Resolved


(12) Protein calorie malnutrition


ICD Code:  E46 - Unspecified protein-calorie malnutrition


(13) Microcytic anemia


ICD Code:  D50.9 - Iron deficiency anemia, unspecified


(14) Hyperphosphatemia


ICD Code:  E83.39 - Other disorders of phosphorus metabolism


(15) Hypernatremia


ICD Code:  E87.0 - Hyperosmolality and hypernatremia


(16) IV drug abuse


ICD Code:  F19.10 - Other psychoactive substance abuse, uncomplicated


Status:  Acute


(17) Headache


ICD Code:  R51 - Headache


(18) Abdominal pain


ICD Code:  R10.9 - Unspecified abdominal pain


Assessment and Plan


37-year-old female with





Severe sepsis-resolved


Multiple splenic infarcts 


MSSA meningitis 


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


Prosthetic valve endocarditis


    Currently on IV ampicillin, oxacillin, gentamicin and rifampin 


   Management per infectious disease specialist


   Anticipate 6 week from 1st neg (8/24) followed by  indefinite oral abx 

suppression tx. 


   Stop date for antibiotics 10/05/17





Supraventricular tachycardia-resolved


   Echocardiogram 8 foci 17 revealed an ejection fraction showed 25-30%.


    Continue metoprolol.





Postextubation stridor


   Resolved, and continue with neb when necessary as well as scheduled





Acute hypoxemic respiratory failure


   Resolved


   Continue DuoNeb treatments.  Maintain oxygen saturation above 92%





CVA (cerebral vascular accident)


   CT head negative, MRI small acute and subacute cortical infarct of the left 

parietal lobe.


   Continue with scheduled methadone 10 mg twice


   Appreciate input from neurology who signed off


   Continue with physical therapy





Tricuspid regurgitation


   Continue oral Lasix.





Acute systolic heart failure


   continue Lasix 40 mg po daily.





Acute kidney injury)


   Now resolved. 





Protein calorie malnutrition


   Secondary to long-standing IV drug abuse and endocarditis.





Microcytic anemia


   Continue with oral iron sulfate.





Hyperphosphatemia


   Now resolved.  Continue to monitor levels.





Hypernatremia


   Resolved.  Continue to monitor BMP.





IV drug abuse


   Advised cessation.  Currently on methadone.





Headache


Resolved





Abdominal pain 


   Resolved





Diarrhea, resolved


   C diff. is negative  Continue probiotic.





Anasarca.


   Improved


   Continue Lasix po.





Problem Qualifiers





(1) CVA (cerebral vascular accident):  


Qualified Codes:  I63.9 - Cerebral infarction, unspecified


(2) Protein calorie malnutrition:  


Qualified Codes:  E44.0 - Moderate protein-calorie malnutrition








Wilfred Diaz MD Sep 27, 2017 10:14

## 2017-09-28 VITALS
HEART RATE: 67 BPM | RESPIRATION RATE: 16 BRPM | TEMPERATURE: 98.4 F | OXYGEN SATURATION: 98 % | SYSTOLIC BLOOD PRESSURE: 113 MMHG | DIASTOLIC BLOOD PRESSURE: 73 MMHG

## 2017-09-28 VITALS
OXYGEN SATURATION: 97 % | TEMPERATURE: 98 F | RESPIRATION RATE: 16 BRPM | HEART RATE: 72 BPM | DIASTOLIC BLOOD PRESSURE: 72 MMHG | SYSTOLIC BLOOD PRESSURE: 131 MMHG

## 2017-09-28 VITALS
TEMPERATURE: 98.3 F | DIASTOLIC BLOOD PRESSURE: 62 MMHG | OXYGEN SATURATION: 98 % | HEART RATE: 71 BPM | RESPIRATION RATE: 16 BRPM | SYSTOLIC BLOOD PRESSURE: 99 MMHG

## 2017-09-28 VITALS — HEART RATE: 69 BPM

## 2017-09-28 VITALS
RESPIRATION RATE: 16 BRPM | OXYGEN SATURATION: 98 % | HEART RATE: 70 BPM | DIASTOLIC BLOOD PRESSURE: 78 MMHG | SYSTOLIC BLOOD PRESSURE: 131 MMHG | TEMPERATURE: 98.3 F

## 2017-09-28 VITALS
DIASTOLIC BLOOD PRESSURE: 73 MMHG | OXYGEN SATURATION: 100 % | TEMPERATURE: 97.7 F | SYSTOLIC BLOOD PRESSURE: 126 MMHG | HEART RATE: 76 BPM | RESPIRATION RATE: 16 BRPM

## 2017-09-28 VITALS
HEART RATE: 73 BPM | TEMPERATURE: 97.5 F | RESPIRATION RATE: 16 BRPM | SYSTOLIC BLOOD PRESSURE: 109 MMHG | DIASTOLIC BLOOD PRESSURE: 76 MMHG | OXYGEN SATURATION: 97 %

## 2017-09-28 LAB — GFR SERPLBLD BASED ON 1.73 SQ M-ARVRAT: 51 ML/MIN (ref 89–?)

## 2017-09-28 RX ADMIN — AMPICILLIN SODIUM SCH MLS/HR: 2 INJECTION, POWDER, FOR SOLUTION INTRAMUSCULAR; INTRAVENOUS at 05:18

## 2017-09-28 RX ADMIN — HEPARIN SODIUM SCH UNITS: 10000 INJECTION, SOLUTION INTRAVENOUS; SUBCUTANEOUS at 01:50

## 2017-09-28 RX ADMIN — POLYVINYL ALCOHOL SCH DROP: 14 SOLUTION/ DROPS OPHTHALMIC at 18:00

## 2017-09-28 RX ADMIN — Medication SCH ML: at 07:53

## 2017-09-28 RX ADMIN — FAMOTIDINE SCH MG: 20 TABLET, FILM COATED ORAL at 07:52

## 2017-09-28 RX ADMIN — AMPICILLIN SODIUM SCH MLS/HR: 2 INJECTION, POWDER, FOR SOLUTION INTRAMUSCULAR; INTRAVENOUS at 01:31

## 2017-09-28 RX ADMIN — MORPHINE SULFATE PRN MG: 100 SOLUTION ORAL at 21:57

## 2017-09-28 RX ADMIN — MORPHINE SULFATE PRN MG: 100 SOLUTION ORAL at 09:20

## 2017-09-28 RX ADMIN — STANDARDIZED SENNA CONCENTRATE AND DOCUSATE SODIUM SCH TAB: 8.6; 5 TABLET, FILM COATED ORAL at 07:52

## 2017-09-28 RX ADMIN — MORPHINE SULFATE PRN MG: 100 SOLUTION ORAL at 01:29

## 2017-09-28 RX ADMIN — HEPARIN SODIUM SCH UNITS: 10000 INJECTION, SOLUTION INTRAVENOUS; SUBCUTANEOUS at 09:59

## 2017-09-28 RX ADMIN — METHADONE HYDROCHLORIDE SCH MG: 5 SOLUTION ORAL at 21:57

## 2017-09-28 RX ADMIN — MORPHINE SULFATE PRN MG: 100 SOLUTION ORAL at 18:41

## 2017-09-28 RX ADMIN — CHLORHEXIDINE GLUCONATE SCH PACK: 500 CLOTH TOPICAL at 04:00

## 2017-09-28 RX ADMIN — AMPICILLIN SODIUM SCH MLS/HR: 2 INJECTION, POWDER, FOR SOLUTION INTRAMUSCULAR; INTRAVENOUS at 21:59

## 2017-09-28 RX ADMIN — AMPICILLIN SODIUM SCH MLS/HR: 2 INJECTION, POWDER, FOR SOLUTION INTRAMUSCULAR; INTRAVENOUS at 17:33

## 2017-09-28 RX ADMIN — ACETAMINOPHEN PRN MG: 325 TABLET ORAL at 15:37

## 2017-09-28 RX ADMIN — FAMOTIDINE SCH MG: 20 TABLET, FILM COATED ORAL at 21:00

## 2017-09-28 RX ADMIN — METHADONE HYDROCHLORIDE SCH MG: 5 SOLUTION ORAL at 07:53

## 2017-09-28 RX ADMIN — AMPICILLIN SODIUM SCH MLS/HR: 2 INJECTION, POWDER, FOR SOLUTION INTRAMUSCULAR; INTRAVENOUS at 13:47

## 2017-09-28 RX ADMIN — HEPARIN SODIUM SCH UNITS: 10000 INJECTION, SOLUTION INTRAVENOUS; SUBCUTANEOUS at 18:00

## 2017-09-28 RX ADMIN — POLYVINYL ALCOHOL SCH DROP: 14 SOLUTION/ DROPS OPHTHALMIC at 07:51

## 2017-09-28 RX ADMIN — RIFAMPIN SCH MG: 150 CAPSULE ORAL at 07:52

## 2017-09-28 RX ADMIN — OXACILLIN SODIUM SCH MLS/HR: 2 INJECTION, POWDER, FOR SOLUTION INTRAMUSCULAR; INTRAVENOUS at 09:15

## 2017-09-28 RX ADMIN — OXACILLIN SODIUM SCH MLS/HR: 2 INJECTION, POWDER, FOR SOLUTION INTRAMUSCULAR; INTRAVENOUS at 09:59

## 2017-09-28 RX ADMIN — OXACILLIN SODIUM SCH MLS/HR: 2 INJECTION, POWDER, FOR SOLUTION INTRAMUSCULAR; INTRAVENOUS at 18:07

## 2017-09-28 RX ADMIN — OXACILLIN SODIUM SCH MLS/HR: 2 INJECTION, POWDER, FOR SOLUTION INTRAMUSCULAR; INTRAVENOUS at 05:37

## 2017-09-28 RX ADMIN — POTASSIUM CHLORIDE SCH MEQ: 20 TABLET, EXTENDED RELEASE ORAL at 07:53

## 2017-09-28 RX ADMIN — MORPHINE SULFATE PRN MG: 100 SOLUTION ORAL at 05:18

## 2017-09-28 RX ADMIN — MORPHINE SULFATE PRN MG: 100 SOLUTION ORAL at 12:20

## 2017-09-28 RX ADMIN — POLYVINYL ALCOHOL SCH DROP: 14 SOLUTION/ DROPS OPHTHALMIC at 12:13

## 2017-09-28 RX ADMIN — OXACILLIN SODIUM SCH MLS/HR: 2 INJECTION, POWDER, FOR SOLUTION INTRAMUSCULAR; INTRAVENOUS at 14:26

## 2017-09-28 RX ADMIN — RIFAMPIN SCH MG: 150 CAPSULE ORAL at 21:56

## 2017-09-28 RX ADMIN — AMPICILLIN SODIUM SCH MLS/HR: 2 INJECTION, POWDER, FOR SOLUTION INTRAMUSCULAR; INTRAVENOUS at 07:58

## 2017-09-28 RX ADMIN — FUROSEMIDE SCH MG: 20 TABLET ORAL at 07:53

## 2017-09-28 RX ADMIN — STANDARDIZED SENNA CONCENTRATE AND DOCUSATE SODIUM SCH TAB: 8.6; 5 TABLET, FILM COATED ORAL at 21:00

## 2017-09-28 RX ADMIN — OXACILLIN SODIUM SCH MLS/HR: 2 INJECTION, POWDER, FOR SOLUTION INTRAMUSCULAR; INTRAVENOUS at 22:31

## 2017-09-28 RX ADMIN — GENTAMICIN SULFATE SCH MLS/HR: 0.8 INJECTION, SOLUTION INTRAVENOUS at 18:40

## 2017-09-28 RX ADMIN — MORPHINE SULFATE PRN MG: 100 SOLUTION ORAL at 15:37

## 2017-09-28 RX ADMIN — Medication SCH ML: at 21:58

## 2017-09-28 RX ADMIN — OXACILLIN SODIUM SCH MLS/HR: 2 INJECTION, POWDER, FOR SOLUTION INTRAMUSCULAR; INTRAVENOUS at 01:50

## 2017-09-28 NOTE — [facility].PR
Subjective


Remarks


Follow-up sepsis


09/23/17-patient seen and examined, she was up and ambulated and denies any 

acute event overnight.  Currently afebrile


09/24/17-patient seen and examined, stable and no change.  Patient appears to 

be a difficult stick this morning for lab work


09/25/17-patient seen and examined, patient complains of back pain otherwise 

stable and no other issues.


09/26/17-patient seen and examined, no complaint today.  Vitals stable


09/27/17-patient seen and examined complains of some numbness to left foot and 

states he doesn't feel the top of her foot otherwise no other issues.


09/28/17-patient seen and examined, stable and afebrile.  Requesting if she can 

be taken off fluid restriction





Objective


Vitals





Vital Signs








  Date Time  Temp Pulse Resp B/P (MAP) Pulse Ox O2 Delivery O2 Flow Rate FiO2


 


9/28/17 09:00 97.5 73 16 109/76 (87) 97   


 


9/28/17 08:00      Room Air  


 


9/28/17 06:20 98.4 67 16 113/73 (86) 98   


 


9/28/17 00:17 98.3 71 16 99/62 (74) 98   


 


9/27/17 22:00 98.4 73 16 122/88 (99) 100   


 


9/27/17 20:00      Room Air  


 


9/27/17 20:00  77      


 


9/27/17 16:00 98.4 80 18 104/69 (81) 97   


 


9/27/17 16:00      Room Air  


 


9/27/17 12:00      Room Air  


 


9/27/17 12:00 98.1 79 18 127/84 (98) 99   














I/O      


 


 9/27/17 9/27/17 9/27/17 9/28/17 9/28/17 9/28/17





 07:00 15:00 23:00 07:00 15:00 23:00


 


Intake Total 1200 ml 400 ml 1260 ml 1320 ml 100 ml 


 


Output Total    0 ml  


 


Balance 1200 ml 400 ml 1260 ml 1320 ml 100 ml 


 


      


 


Intake Oral   960 ml 720 ml  


 


IV Total 1200 ml 400 ml 300 ml 600 ml 100 ml 


 


Stool Total    0 ml  


 


# Voids 3  3 4  


 


# Bowel Movements   1   








Result Diagram:  


9/27/17 1250                                                                   

             9/26/17 0646





Objective Remarks


GENERAL: NAD


SKIN: Warm and dry.


HEAD: Normocephalic.


EYES: No scleral icterus. No injection or drainage. 


NECK: Supple, trachea midline. No JVD or lymphadenopathy.


CARDIOVASCULAR: Regular rate and rhythm without murmurs, gallops, or rubs. 


RESPIRATORY: Breath sounds equal bilaterally. No accessory muscle use.


GASTROINTESTINAL: Abdomen soft, non-tender, nondistended. 


MUSCULOSKELETAL: No cyanosis, or edema. 


BACK: Nontender without obvious deformity. No CVA tenderness.





Procedures


None





A/P


Problem List:  


(1) Severe sepsis


ICD Code:  A41.9 - Sepsis, unspecified organism; R65.20 - Severe sepsis without 

septic shock


Status:  Acute


(2) MSSA meningitis


Status:  Acute


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


Status:  Acute


(4) SVT (supraventricular tachycardia)


ICD Code:  I47.1 - Supraventricular tachycardia


Status:  Resolved


(5) Postextubation stridor


ICD Code:  J95.89 - Other postprocedural complications and disorders of 

respiratory system, not elsewhere classified


Status:  Resolved


(6) Acute hypoxemic respiratory failure


ICD Code:  J96.01 - Acute respiratory failure with hypoxia


Status:  Resolved


(7) CVA (cerebral vascular accident)


ICD Code:  I63.9 - Cerebral infarction, unspecified


(8) Tricuspid regurgitation


ICD Code:  I07.1 - Rheumatic tricuspid insufficiency


Status:  Acute


(9) Acute systolic heart failure


ICD Code:  I50.21 - Acute systolic (congestive) heart failure


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


ICD Code:  T82.6XXA - Infection and inflammatory reaction due to cardiac valve 

prosthesis, initial encounter


Status:  Acute


(11) NOÉ (acute kidney injury)


ICD Code:  N17.9 - Acute kidney failure, unspecified


Status:  Resolved


(12) Protein calorie malnutrition


ICD Code:  E46 - Unspecified protein-calorie malnutrition


(13) Microcytic anemia


ICD Code:  D50.9 - Iron deficiency anemia, unspecified


(14) Hyperphosphatemia


ICD Code:  E83.39 - Other disorders of phosphorus metabolism


(15) Hypernatremia


ICD Code:  E87.0 - Hyperosmolality and hypernatremia


(16) IV drug abuse


ICD Code:  F19.10 - Other psychoactive substance abuse, uncomplicated


Status:  Acute


(17) Headache


ICD Code:  R51 - Headache


(18) Abdominal pain


ICD Code:  R10.9 - Unspecified abdominal pain


Assessment and Plan


37-year-old female with





Severe sepsis-resolved


Multiple splenic infarcts 


MSSA meningitis 


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


Prosthetic valve endocarditis


    On IV ampicillin, oxacillin, gentamicin and rifampin 


   Management per infectious disease specialist


   Anticipate 6 week from 1st neg (8/24) followed by  indefinite oral abx 

suppression tx. 


   Stop date for antibiotics 10/05/17


   Continue current treatment





Supraventricular tachycardia-resolved


   Echocardiogram 8 foci 17 revealed an ejection fraction showed 25-30%.


    Continue metoprolol.





Postextubation stridor


   Resolved, and continue with neb when necessary as well as scheduled





Acute hypoxemic respiratory failure


   Resolved


   Continue DuoNeb treatments.  Maintain oxygen saturation above 92%





CVA (cerebral vascular accident)


   CT head negative, MRI small acute and subacute cortical infarct of the left 

parietal lobe.


   Continue with scheduled methadone 10 mg twice


   Appreciate input from neurology who signed off


   Continue with physical therapy





Tricuspid regurgitation


   Continue oral Lasix.





Acute systolic heart failure


   continue Lasix 40 mg po daily.





Acute kidney injury)


   Now resolved. 





Protein calorie malnutrition


   Secondary to long-standing IV drug abuse and endocarditis.





Microcytic anemia


   Continue with oral iron sulfate.





Hyperphosphatemia


   Now resolved.  Continue to monitor levels.





Hypernatremia


   Resolved.  Continue to monitor BMP.





IV drug abuse


   Advised cessation.  Currently on methadone.





Headache


Resolved





Abdominal pain 


   Resolved





Diarrhea, resolved


   C diff. is negative  Continue probiotic.





Anasarca.


   Improved


   Continue Lasix po.





Problem Qualifiers





(1) CVA (cerebral vascular accident):  


Qualified Codes:  I63.9 - Cerebral infarction, unspecified


(2) Protein calorie malnutrition:  


Qualified Codes:  E44.0 - Moderate protein-calorie malnutrition








Wilfred Diaz MD Sep 28, 2017 10:10

## 2017-09-29 VITALS
SYSTOLIC BLOOD PRESSURE: 115 MMHG | DIASTOLIC BLOOD PRESSURE: 78 MMHG | RESPIRATION RATE: 18 BRPM | HEART RATE: 87 BPM | TEMPERATURE: 98.9 F | OXYGEN SATURATION: 98 %

## 2017-09-29 VITALS
HEART RATE: 77 BPM | DIASTOLIC BLOOD PRESSURE: 77 MMHG | RESPIRATION RATE: 18 BRPM | OXYGEN SATURATION: 100 % | SYSTOLIC BLOOD PRESSURE: 117 MMHG | TEMPERATURE: 98.2 F

## 2017-09-29 VITALS
SYSTOLIC BLOOD PRESSURE: 117 MMHG | HEART RATE: 80 BPM | RESPIRATION RATE: 16 BRPM | TEMPERATURE: 98.3 F | DIASTOLIC BLOOD PRESSURE: 74 MMHG | OXYGEN SATURATION: 97 %

## 2017-09-29 VITALS
TEMPERATURE: 98.8 F | RESPIRATION RATE: 18 BRPM | DIASTOLIC BLOOD PRESSURE: 83 MMHG | OXYGEN SATURATION: 96 % | HEART RATE: 82 BPM | SYSTOLIC BLOOD PRESSURE: 126 MMHG

## 2017-09-29 VITALS
TEMPERATURE: 98.4 F | SYSTOLIC BLOOD PRESSURE: 118 MMHG | RESPIRATION RATE: 16 BRPM | OXYGEN SATURATION: 97 % | DIASTOLIC BLOOD PRESSURE: 77 MMHG | HEART RATE: 87 BPM

## 2017-09-29 VITALS
RESPIRATION RATE: 18 BRPM | OXYGEN SATURATION: 94 % | TEMPERATURE: 98.3 F | SYSTOLIC BLOOD PRESSURE: 125 MMHG | DIASTOLIC BLOOD PRESSURE: 61 MMHG | HEART RATE: 71 BPM

## 2017-09-29 RX ADMIN — MORPHINE SULFATE PRN MG: 100 SOLUTION ORAL at 20:45

## 2017-09-29 RX ADMIN — METHADONE HYDROCHLORIDE SCH MG: 5 SOLUTION ORAL at 09:55

## 2017-09-29 RX ADMIN — AMPICILLIN SODIUM SCH MLS/HR: 2 INJECTION, POWDER, FOR SOLUTION INTRAMUSCULAR; INTRAVENOUS at 17:39

## 2017-09-29 RX ADMIN — POLYVINYL ALCOHOL SCH DROP: 14 SOLUTION/ DROPS OPHTHALMIC at 13:00

## 2017-09-29 RX ADMIN — Medication SCH ML: at 20:46

## 2017-09-29 RX ADMIN — OXACILLIN SODIUM SCH MLS/HR: 2 INJECTION, POWDER, FOR SOLUTION INTRAMUSCULAR; INTRAVENOUS at 17:39

## 2017-09-29 RX ADMIN — RIFAMPIN SCH MG: 150 CAPSULE ORAL at 09:56

## 2017-09-29 RX ADMIN — POTASSIUM CHLORIDE SCH MEQ: 20 TABLET, EXTENDED RELEASE ORAL at 09:56

## 2017-09-29 RX ADMIN — CHLORHEXIDINE GLUCONATE SCH PACK: 500 CLOTH TOPICAL at 04:00

## 2017-09-29 RX ADMIN — FAMOTIDINE SCH MG: 20 TABLET, FILM COATED ORAL at 20:46

## 2017-09-29 RX ADMIN — STANDARDIZED SENNA CONCENTRATE AND DOCUSATE SODIUM SCH TAB: 8.6; 5 TABLET, FILM COATED ORAL at 09:00

## 2017-09-29 RX ADMIN — MORPHINE SULFATE PRN MG: 100 SOLUTION ORAL at 11:23

## 2017-09-29 RX ADMIN — OXACILLIN SODIUM SCH MLS/HR: 2 INJECTION, POWDER, FOR SOLUTION INTRAMUSCULAR; INTRAVENOUS at 22:17

## 2017-09-29 RX ADMIN — HEPARIN SODIUM SCH UNITS: 10000 INJECTION, SOLUTION INTRAVENOUS; SUBCUTANEOUS at 10:00

## 2017-09-29 RX ADMIN — OXACILLIN SODIUM SCH MLS/HR: 2 INJECTION, POWDER, FOR SOLUTION INTRAMUSCULAR; INTRAVENOUS at 02:35

## 2017-09-29 RX ADMIN — AMPICILLIN SODIUM SCH MLS/HR: 2 INJECTION, POWDER, FOR SOLUTION INTRAMUSCULAR; INTRAVENOUS at 01:52

## 2017-09-29 RX ADMIN — AMPICILLIN SODIUM SCH MLS/HR: 2 INJECTION, POWDER, FOR SOLUTION INTRAMUSCULAR; INTRAVENOUS at 10:02

## 2017-09-29 RX ADMIN — METHADONE HYDROCHLORIDE SCH MG: 5 SOLUTION ORAL at 22:15

## 2017-09-29 RX ADMIN — OXACILLIN SODIUM SCH MLS/HR: 2 INJECTION, POWDER, FOR SOLUTION INTRAMUSCULAR; INTRAVENOUS at 11:26

## 2017-09-29 RX ADMIN — POLYVINYL ALCOHOL SCH DROP: 14 SOLUTION/ DROPS OPHTHALMIC at 18:00

## 2017-09-29 RX ADMIN — AMPICILLIN SODIUM SCH MLS/HR: 2 INJECTION, POWDER, FOR SOLUTION INTRAMUSCULAR; INTRAVENOUS at 20:47

## 2017-09-29 RX ADMIN — OXACILLIN SODIUM SCH MLS/HR: 2 INJECTION, POWDER, FOR SOLUTION INTRAMUSCULAR; INTRAVENOUS at 06:04

## 2017-09-29 RX ADMIN — MORPHINE SULFATE PRN MG: 100 SOLUTION ORAL at 05:48

## 2017-09-29 RX ADMIN — HEPARIN SODIUM SCH UNITS: 10000 INJECTION, SOLUTION INTRAVENOUS; SUBCUTANEOUS at 02:00

## 2017-09-29 RX ADMIN — GENTAMICIN SULFATE SCH MLS/HR: 0.8 INJECTION, SOLUTION INTRAVENOUS at 10:03

## 2017-09-29 RX ADMIN — FAMOTIDINE SCH MG: 20 TABLET, FILM COATED ORAL at 09:00

## 2017-09-29 RX ADMIN — MORPHINE SULFATE PRN MG: 100 SOLUTION ORAL at 14:27

## 2017-09-29 RX ADMIN — FUROSEMIDE SCH MG: 20 TABLET ORAL at 09:57

## 2017-09-29 RX ADMIN — AMPICILLIN SODIUM SCH MLS/HR: 2 INJECTION, POWDER, FOR SOLUTION INTRAMUSCULAR; INTRAVENOUS at 14:21

## 2017-09-29 RX ADMIN — RIFAMPIN SCH MG: 150 CAPSULE ORAL at 20:43

## 2017-09-29 RX ADMIN — MORPHINE SULFATE PRN MG: 100 SOLUTION ORAL at 01:51

## 2017-09-29 RX ADMIN — OXACILLIN SODIUM SCH MLS/HR: 2 INJECTION, POWDER, FOR SOLUTION INTRAMUSCULAR; INTRAVENOUS at 14:21

## 2017-09-29 RX ADMIN — AMPICILLIN SODIUM SCH MLS/HR: 2 INJECTION, POWDER, FOR SOLUTION INTRAMUSCULAR; INTRAVENOUS at 05:45

## 2017-09-29 RX ADMIN — POLYVINYL ALCOHOL SCH DROP: 14 SOLUTION/ DROPS OPHTHALMIC at 09:00

## 2017-09-29 RX ADMIN — STANDARDIZED SENNA CONCENTRATE AND DOCUSATE SODIUM SCH TAB: 8.6; 5 TABLET, FILM COATED ORAL at 20:50

## 2017-09-29 RX ADMIN — MORPHINE SULFATE PRN MG: 100 SOLUTION ORAL at 17:40

## 2017-09-29 NOTE — [facility]HI.PR
Subjective


Remarks


Follow-up sepsis


09/23/17-patient seen and examined, she was up and ambulated and denies any 

acute event overnight.  Currently afebrile


09/24/17-patient seen and examined, stable and no change.  Patient appears to 

be a difficult stick this morning for lab work


09/25/17-patient seen and examined, patient complains of back pain otherwise 

stable and no other issues.


09/26/17-patient seen and examined, no complaint today.  Vitals stable


09/27/17-patient seen and examined complains of some numbness to left foot and 

states he doesn't feel the top of her foot otherwise no other issues.


09/28/17-patient seen and examined, stable and afebrile.  Requesting if she can 

be taken off fluid restriction


09/29/17-no acute event overnight, currently afebrile and she denies any chest 

pain or shortness of breath.





Objective


Vitals





Vital Signs








  Date Time  Temp Pulse Resp B/P (MAP) Pulse Ox O2 Delivery O2 Flow Rate FiO2


 


9/29/17 08:00 98.3 74 18 125/61 (82) 94   


 


9/29/17 04:00 98.3 80 16 117/74 (88) 97   


 


9/29/17 00:14 98.4 87 16 118/77 (91) 97   


 


9/28/17 20:00  71      


 


9/28/17 20:00 98.0 72 16 131/72 (91) 97   


 


9/28/17 20:00      Room Air  


 


9/28/17 16:00 97.7 76 16 126/73 (90) 100   


 


9/28/17 16:00      Room Air  


 


9/28/17 12:00 98.3 70 16 131/78 (95) 98   


 


9/28/17 12:00      Room Air  














I/O      


 


 9/28/17 9/28/17 9/28/17 9/29/17 9/29/17 9/29/17





 07:00 15:00 23:00 07:00 15:00 23:00


 


Intake Total 1320 ml 200 ml 880 ml 880 ml  


 


Output Total 0 ml  900 ml   


 


Balance 1320 ml 200 ml -20 ml 880 ml  


 


      


 


Intake Oral 720 ml  480 ml 480 ml  


 


IV Total 600 ml 200 ml 400 ml 400 ml  


 


Output Urine Total   900 ml   


 


Stool Total 0 ml     


 


# Voids 4   4  


 


# Bowel Movements   1 0  








Result Diagram:  


9/27/17 1250                                                                   

             9/28/17 1157





Objective Remarks


GENERAL: NAD


SKIN: Warm and dry.


HEAD: Normocephalic.


EYES: No scleral icterus. No injection or drainage. 


NECK: Supple, trachea midline. No JVD or lymphadenopathy.


CARDIOVASCULAR: Regular rate and rhythm without murmurs, gallops, or rubs. 


RESPIRATORY: Breath sounds equal bilaterally. No accessory muscle use.


GASTROINTESTINAL: Abdomen soft, non-tender, nondistended. 


MUSCULOSKELETAL: No cyanosis, or edema. 


BACK: Nontender without obvious deformity. No CVA tenderness.





Procedures


None





A/P


Problem List:  


(1) Severe sepsis


ICD Code:  A41.9 - Sepsis, unspecified organism; R65.20 - Severe sepsis without 

septic shock


Status:  Acute


(2) MSSA meningitis


Status:  Acute


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


Status:  Acute


(4) SVT (supraventricular tachycardia)


ICD Code:  I47.1 - Supraventricular tachycardia


Status:  Resolved


(5) Postextubation stridor


ICD Code:  J95.89 - Other postprocedural complications and disorders of 

respiratory system, not elsewhere classified


Status:  Resolved


(6) Acute hypoxemic respiratory failure


ICD Code:  J96.01 - Acute respiratory failure with hypoxia


Status:  Resolved


(7) CVA (cerebral vascular accident)


ICD Code:  I63.9 - Cerebral infarction, unspecified


(8) Tricuspid regurgitation


ICD Code:  I07.1 - Rheumatic tricuspid insufficiency


Status:  Acute


(9) Acute systolic heart failure


ICD Code:  I50.21 - Acute systolic (congestive) heart failure


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


ICD Code:  T82.6XXA - Infection and inflammatory reaction due to cardiac valve 

prosthesis, initial encounter


Status:  Acute


(11) NOÉ (acute kidney injury)


ICD Code:  N17.9 - Acute kidney failure, unspecified


Status:  Resolved


(12) Protein calorie malnutrition


ICD Code:  E46 - Unspecified protein-calorie malnutrition


(13) Microcytic anemia


ICD Code:  D50.9 - Iron deficiency anemia, unspecified


(14) Hyperphosphatemia


ICD Code:  E83.39 - Other disorders of phosphorus metabolism


(15) Hypernatremia


ICD Code:  E87.0 - Hyperosmolality and hypernatremia


(16) IV drug abuse


ICD Code:  F19.10 - Other psychoactive substance abuse, uncomplicated


Status:  Acute


(17) Headache


ICD Code:  R51 - Headache


(18) Abdominal pain


ICD Code:  R10.9 - Unspecified abdominal pain


Assessment and Plan


37-year-old female with





Severe sepsis-resolved


Multiple splenic infarcts 


MSSA meningitis 


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


Prosthetic valve endocarditis


    On IV ampicillin, oxacillin, gentamicin and rifampin 


   Management per infectious disease specialist


   Anticipate 6 week from 1st neg (8/24) followed by  indefinite oral abx 

suppression tx. 


   Stop date for antibiotics 10/05/17


   Continue current treatment





Supraventricular tachycardia-resolved


   Echocardiogram 8 foci 17 revealed an ejection fraction showed 25-30%.


    Continue metoprolol.





Postextubation stridor


   Resolved, and continue with neb when necessary as well as scheduled





Acute hypoxemic respiratory failure


   Resolved


   Continue DuoNeb treatments.  Maintain oxygen saturation above 92%





CVA (cerebral vascular accident)


   CT head negative, MRI small acute and subacute cortical infarct of the left 

parietal lobe.


   Continue with scheduled methadone 10 mg twice


   Appreciate input from neurology who signed off


   PT to treat and eval





Tricuspid regurgitation


   Continue oral Lasix.





Acute systolic heart failure


   continue Lasix 40 mg po daily.





Acute kidney injury)


   Now resolved. 





Protein calorie malnutrition


   Secondary to long-standing IV drug abuse and endocarditis.





Microcytic anemia


   Continue with oral iron sulfate.


   Monitor H&H





Hyperphosphatemia


   Now resolved.  Continue to monitor levels.





Hypernatremia


   Resolved.  Continue to monitor BMP.





IV drug abuse


   Advised cessation.  Currently on methadone.





Headache


Resolved





Abdominal pain 


   Resolved





Diarrhea, resolved


   C diff. is negative  Continue probiotic.





Anasarca.


   Improved


   Continue Lasix po.





Problem Qualifiers





(1) CVA (cerebral vascular accident):  


Qualified Codes:  I63.9 - Cerebral infarction, unspecified


(2) Protein calorie malnutrition:  


Qualified Codes:  E44.0 - Moderate protein-calorie malnutrition








Wilfred Diaz MD Sep 29, 2017 10:25

## 2017-09-30 VITALS
DIASTOLIC BLOOD PRESSURE: 84 MMHG | RESPIRATION RATE: 18 BRPM | OXYGEN SATURATION: 100 % | SYSTOLIC BLOOD PRESSURE: 146 MMHG | TEMPERATURE: 97.9 F | HEART RATE: 77 BPM

## 2017-09-30 VITALS
SYSTOLIC BLOOD PRESSURE: 116 MMHG | HEART RATE: 80 BPM | TEMPERATURE: 98.3 F | OXYGEN SATURATION: 95 % | RESPIRATION RATE: 18 BRPM | DIASTOLIC BLOOD PRESSURE: 76 MMHG

## 2017-09-30 VITALS
SYSTOLIC BLOOD PRESSURE: 122 MMHG | OXYGEN SATURATION: 99 % | RESPIRATION RATE: 18 BRPM | DIASTOLIC BLOOD PRESSURE: 81 MMHG | TEMPERATURE: 98.2 F | HEART RATE: 75 BPM

## 2017-09-30 VITALS
HEART RATE: 72 BPM | OXYGEN SATURATION: 95 % | TEMPERATURE: 98.1 F | DIASTOLIC BLOOD PRESSURE: 72 MMHG | SYSTOLIC BLOOD PRESSURE: 121 MMHG | RESPIRATION RATE: 18 BRPM

## 2017-09-30 VITALS
DIASTOLIC BLOOD PRESSURE: 87 MMHG | TEMPERATURE: 97.2 F | SYSTOLIC BLOOD PRESSURE: 129 MMHG | HEART RATE: 77 BPM | OXYGEN SATURATION: 97 % | RESPIRATION RATE: 18 BRPM

## 2017-09-30 VITALS
HEART RATE: 74 BPM | OXYGEN SATURATION: 98 % | TEMPERATURE: 98.4 F | DIASTOLIC BLOOD PRESSURE: 65 MMHG | SYSTOLIC BLOOD PRESSURE: 113 MMHG | RESPIRATION RATE: 16 BRPM

## 2017-09-30 LAB — GFR SERPLBLD BASED ON 1.73 SQ M-ARVRAT: 47 ML/MIN (ref 89–?)

## 2017-09-30 RX ADMIN — MORPHINE SULFATE PRN MG: 100 SOLUTION ORAL at 03:54

## 2017-09-30 RX ADMIN — MORPHINE SULFATE PRN MG: 100 SOLUTION ORAL at 22:10

## 2017-09-30 RX ADMIN — RIFAMPIN SCH MG: 150 CAPSULE ORAL at 21:04

## 2017-09-30 RX ADMIN — Medication SCH ML: at 21:03

## 2017-09-30 RX ADMIN — POLYVINYL ALCOHOL SCH DROP: 14 SOLUTION/ DROPS OPHTHALMIC at 09:00

## 2017-09-30 RX ADMIN — STANDARDIZED SENNA CONCENTRATE AND DOCUSATE SODIUM SCH TAB: 8.6; 5 TABLET, FILM COATED ORAL at 21:00

## 2017-09-30 RX ADMIN — MORPHINE SULFATE PRN MG: 100 SOLUTION ORAL at 13:05

## 2017-09-30 RX ADMIN — HEPARIN SODIUM SCH UNITS: 10000 INJECTION, SOLUTION INTRAVENOUS; SUBCUTANEOUS at 01:52

## 2017-09-30 RX ADMIN — OXACILLIN SODIUM SCH MLS/HR: 2 INJECTION, POWDER, FOR SOLUTION INTRAMUSCULAR; INTRAVENOUS at 18:02

## 2017-09-30 RX ADMIN — AMPICILLIN SODIUM SCH MLS/HR: 2 INJECTION, POWDER, FOR SOLUTION INTRAMUSCULAR; INTRAVENOUS at 04:48

## 2017-09-30 RX ADMIN — OXACILLIN SODIUM SCH MLS/HR: 2 INJECTION, POWDER, FOR SOLUTION INTRAMUSCULAR; INTRAVENOUS at 21:50

## 2017-09-30 RX ADMIN — AMPICILLIN SODIUM SCH MLS/HR: 2 INJECTION, POWDER, FOR SOLUTION INTRAMUSCULAR; INTRAVENOUS at 13:00

## 2017-09-30 RX ADMIN — HEPARIN SODIUM SCH UNITS: 10000 INJECTION, SOLUTION INTRAVENOUS; SUBCUTANEOUS at 10:00

## 2017-09-30 RX ADMIN — POLYVINYL ALCOHOL SCH DROP: 14 SOLUTION/ DROPS OPHTHALMIC at 18:00

## 2017-09-30 RX ADMIN — POLYVINYL ALCOHOL SCH DROP: 14 SOLUTION/ DROPS OPHTHALMIC at 13:00

## 2017-09-30 RX ADMIN — Medication SCH ML: at 08:33

## 2017-09-30 RX ADMIN — MORPHINE SULFATE PRN MG: 100 SOLUTION ORAL at 07:15

## 2017-09-30 RX ADMIN — METHADONE HYDROCHLORIDE SCH MG: 5 SOLUTION ORAL at 08:30

## 2017-09-30 RX ADMIN — METHADONE HYDROCHLORIDE SCH MG: 5 SOLUTION ORAL at 21:05

## 2017-09-30 RX ADMIN — FAMOTIDINE SCH MG: 20 TABLET, FILM COATED ORAL at 21:04

## 2017-09-30 RX ADMIN — OXACILLIN SODIUM SCH MLS/HR: 2 INJECTION, POWDER, FOR SOLUTION INTRAMUSCULAR; INTRAVENOUS at 10:00

## 2017-09-30 RX ADMIN — AMPICILLIN SODIUM SCH MLS/HR: 2 INJECTION, POWDER, FOR SOLUTION INTRAMUSCULAR; INTRAVENOUS at 01:30

## 2017-09-30 RX ADMIN — MORPHINE SULFATE PRN MG: 100 SOLUTION ORAL at 19:08

## 2017-09-30 RX ADMIN — FUROSEMIDE SCH MG: 20 TABLET ORAL at 08:31

## 2017-09-30 RX ADMIN — POTASSIUM CHLORIDE SCH MEQ: 20 TABLET, EXTENDED RELEASE ORAL at 09:29

## 2017-09-30 RX ADMIN — AMPICILLIN SODIUM SCH MLS/HR: 2 INJECTION, POWDER, FOR SOLUTION INTRAMUSCULAR; INTRAVENOUS at 16:25

## 2017-09-30 RX ADMIN — CHLORHEXIDINE GLUCONATE SCH PACK: 500 CLOTH TOPICAL at 04:00

## 2017-09-30 RX ADMIN — MORPHINE SULFATE PRN MG: 100 SOLUTION ORAL at 09:55

## 2017-09-30 RX ADMIN — RIFAMPIN SCH MG: 150 CAPSULE ORAL at 09:27

## 2017-09-30 RX ADMIN — GENTAMICIN SULFATE SCH MLS/HR: 0.8 INJECTION, SOLUTION INTRAVENOUS at 07:14

## 2017-09-30 RX ADMIN — AMPICILLIN SODIUM SCH MLS/HR: 2 INJECTION, POWDER, FOR SOLUTION INTRAMUSCULAR; INTRAVENOUS at 21:04

## 2017-09-30 RX ADMIN — OXACILLIN SODIUM SCH MLS/HR: 2 INJECTION, POWDER, FOR SOLUTION INTRAMUSCULAR; INTRAVENOUS at 13:49

## 2017-09-30 RX ADMIN — FAMOTIDINE SCH MG: 20 TABLET, FILM COATED ORAL at 21:00

## 2017-09-30 RX ADMIN — STANDARDIZED SENNA CONCENTRATE AND DOCUSATE SODIUM SCH TAB: 8.6; 5 TABLET, FILM COATED ORAL at 08:31

## 2017-09-30 RX ADMIN — OXACILLIN SODIUM SCH MLS/HR: 2 INJECTION, POWDER, FOR SOLUTION INTRAMUSCULAR; INTRAVENOUS at 05:08

## 2017-09-30 RX ADMIN — AMPICILLIN SODIUM SCH MLS/HR: 2 INJECTION, POWDER, FOR SOLUTION INTRAMUSCULAR; INTRAVENOUS at 08:30

## 2017-09-30 RX ADMIN — OXACILLIN SODIUM SCH MLS/HR: 2 INJECTION, POWDER, FOR SOLUTION INTRAMUSCULAR; INTRAVENOUS at 01:49

## 2017-09-30 RX ADMIN — MORPHINE SULFATE PRN MG: 100 SOLUTION ORAL at 00:19

## 2017-09-30 RX ADMIN — MORPHINE SULFATE PRN MG: 100 SOLUTION ORAL at 16:24

## 2017-09-30 RX ADMIN — FAMOTIDINE SCH MG: 20 TABLET, FILM COATED ORAL at 08:31

## 2017-09-30 NOTE — [facility].PR
Subjective


Remarks


Follow-up sepsis


09/23/17-patient seen and examined, she was up and ambulated and denies any 

acute event overnight.  Currently afebrile


09/24/17-patient seen and examined, stable and no change.  Patient appears to 

be a difficult stick this morning for lab work


09/25/17-patient seen and examined, patient complains of back pain otherwise 

stable and no other issues.


09/26/17-patient seen and examined, no complaint today.  Vitals stable


09/27/17-patient seen and examined complains of some numbness to left foot and 

states he doesn't feel the top of her foot otherwise no other issues.


09/28/17-patient seen and examined, stable and afebrile.  Requesting if she can 

be taken off fluid restriction


09/29/17-no acute event overnight, currently afebrile and she denies any chest 

pain or shortness of breath.


09/30/17-patient seen and she is stable, has no complaints.  Afebrile.





Objective


Vitals





Vital Signs








  Date Time  Temp Pulse Resp B/P (MAP) Pulse Ox O2 Delivery O2 Flow Rate FiO2


 


9/30/17 08:00 98.1 73 18 121/72 (88) 95   


 


9/30/17 04:00      Room Air  


 


9/30/17 04:00 98.4 74 16 113/65 (81) 98   


 


9/30/17 00:00 98.3 80 18 116/76 (89) 95   


 


9/30/17 00:00      Room Air  


 


9/29/17 20:00  78      


 


9/29/17 20:00      Room Air  


 


9/29/17 20:00 98.2 77 18 117/77 (90) 100   


 


9/29/17 16:00 98.9 87 18 115/78 (90) 98   


 


9/29/17 12:30   16     


 


9/29/17 12:00 98.8 82 18 126/83 (97) 96   














I/O      


 


 9/29/17 9/29/17 9/29/17 9/30/17 9/30/17 9/30/17





 07:00 15:00 23:00 07:00 15:00 23:00


 


Intake Total 880 ml 480 ml 500 ml 400 ml  


 


Output Total    240 ml  


 


Balance 880 ml 480 ml 500 ml 160 ml  


 


      


 


Intake Oral 480 ml 480 ml  100 ml  


 


IV Total 400 ml  500 ml 300 ml  


 


Output Urine Total    240 ml  


 


# Voids 4 3    


 


# Bowel Movements 0 1    








Result Diagram:  


9/27/17 1250                                                                   

             9/30/17 0706





Objective Remarks


GENERAL: NAD


SKIN: Warm and dry.


HEAD: Normocephalic.


EYES: No scleral icterus. No injection or drainage. 


NECK: Supple, trachea midline. No JVD or lymphadenopathy.


CARDIOVASCULAR: Regular rate and rhythm without murmurs, gallops, or rubs. 


RESPIRATORY: Breath sounds equal bilaterally. No accessory muscle use.


GASTROINTESTINAL: Abdomen soft, non-tender, nondistended. 


MUSCULOSKELETAL: No cyanosis, or edema. 


BACK: Nontender without obvious deformity. No CVA tenderness.





Procedures


None





A/P


Problem List:  


(1) Severe sepsis


ICD Code:  A41.9 - Sepsis, unspecified organism; R65.20 - Severe sepsis without 

septic shock


Status:  Acute


(2) MSSA meningitis


Status:  Acute


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


Status:  Acute


(4) SVT (supraventricular tachycardia)


ICD Code:  I47.1 - Supraventricular tachycardia


Status:  Resolved


(5) Postextubation stridor


ICD Code:  J95.89 - Other postprocedural complications and disorders of 

respiratory system, not elsewhere classified


Status:  Resolved


(6) Acute hypoxemic respiratory failure


ICD Code:  J96.01 - Acute respiratory failure with hypoxia


Status:  Resolved


(7) CVA (cerebral vascular accident)


ICD Code:  I63.9 - Cerebral infarction, unspecified


(8) Tricuspid regurgitation


ICD Code:  I07.1 - Rheumatic tricuspid insufficiency


Status:  Acute


(9) Acute systolic heart failure


ICD Code:  I50.21 - Acute systolic (congestive) heart failure


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


ICD Code:  T82.6XXA - Infection and inflammatory reaction due to cardiac valve 

prosthesis, initial encounter


Status:  Acute


(11) NOÉ (acute kidney injury)


ICD Code:  N17.9 - Acute kidney failure, unspecified


Status:  Resolved


(12) Protein calorie malnutrition


ICD Code:  E46 - Unspecified protein-calorie malnutrition


(13) Microcytic anemia


ICD Code:  D50.9 - Iron deficiency anemia, unspecified


(14) Hyperphosphatemia


ICD Code:  E83.39 - Other disorders of phosphorus metabolism


(15) Hypernatremia


ICD Code:  E87.0 - Hyperosmolality and hypernatremia


(16) IV drug abuse


ICD Code:  F19.10 - Other psychoactive substance abuse, uncomplicated


Status:  Acute


(17) Headache


ICD Code:  R51 - Headache


(18) Abdominal pain


ICD Code:  R10.9 - Unspecified abdominal pain


Assessment and Plan


37-year-old female with





Severe sepsis-resolved


Multiple splenic infarcts 


MSSA meningitis 


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


Prosthetic valve endocarditis


    On IV ampicillin, oxacillin, gentamicin and rifampin per infectious disease 

specialist


   Anticipate 6 week from 1st neg (8/24) followed by  indefinite oral abx 

suppression tx. 


   Stop date for antibiotics 10/05/17


   Continue current treatment





Supraventricular tachycardia-resolved


   Echocardiogram 8 foci 17 revealed an ejection fraction showed 25-30%.


    Continue metoprolol.





Postextubation stridor


   Resolved, and continue with neb when necessary as well as scheduled





Acute hypoxemic respiratory failure


   Resolved


   Continue DuoNeb treatments.  Maintain oxygen saturation above 92%





CVA (cerebral vascular accident)


   CT head negative, MRI small acute and subacute cortical infarct of the left 

parietal lobe.


   Continue with scheduled methadone 10 mg twice


   Appreciate input from neurology who signed off


   PT to treat and eval





Tricuspid regurgitation


   Continue oral Lasix.





Acute systolic heart failure


   continue Lasix 40 mg po daily.





Acute kidney injury)


   Now resolved. 





Protein calorie malnutrition


   Secondary to long-standing IV drug abuse and endocarditis.





Microcytic anemia


   Continue with oral iron sulfate.


   Monitor H&H





Hyperphosphatemia


   Now resolved.  Continue to monitor levels.





Hypernatremia


   Resolved.  Continue to monitor BMP.





IV drug abuse


   Advised cessation.  Currently on methadone.





Headache


Resolved





Abdominal pain 


   Resolved





Diarrhea, resolved


   C diff. is negative  Continue probiotic.





Anasarca.


   Improved


   Continue Lasix po.





Problem Qualifiers





(1) CVA (cerebral vascular accident):  


Qualified Codes:  I63.9 - Cerebral infarction, unspecified


(2) Protein calorie malnutrition:  


Qualified Codes:  E44.0 - Moderate protein-calorie malnutrition








Wilfred Diaz MD Sep 30, 2017 09:39

## 2017-10-01 VITALS
SYSTOLIC BLOOD PRESSURE: 125 MMHG | HEART RATE: 74 BPM | DIASTOLIC BLOOD PRESSURE: 81 MMHG | RESPIRATION RATE: 18 BRPM | OXYGEN SATURATION: 98 % | TEMPERATURE: 98.5 F

## 2017-10-01 VITALS
HEART RATE: 73 BPM | SYSTOLIC BLOOD PRESSURE: 119 MMHG | OXYGEN SATURATION: 98 % | DIASTOLIC BLOOD PRESSURE: 61 MMHG | RESPIRATION RATE: 18 BRPM | TEMPERATURE: 97.7 F

## 2017-10-01 VITALS
SYSTOLIC BLOOD PRESSURE: 121 MMHG | OXYGEN SATURATION: 100 % | HEART RATE: 73 BPM | RESPIRATION RATE: 18 BRPM | DIASTOLIC BLOOD PRESSURE: 78 MMHG | TEMPERATURE: 98.2 F

## 2017-10-01 VITALS
OXYGEN SATURATION: 96 % | RESPIRATION RATE: 18 BRPM | HEART RATE: 79 BPM | DIASTOLIC BLOOD PRESSURE: 83 MMHG | TEMPERATURE: 98.6 F | SYSTOLIC BLOOD PRESSURE: 112 MMHG

## 2017-10-01 VITALS — HEART RATE: 77 BPM

## 2017-10-01 VITALS
SYSTOLIC BLOOD PRESSURE: 125 MMHG | TEMPERATURE: 98.4 F | DIASTOLIC BLOOD PRESSURE: 80 MMHG | OXYGEN SATURATION: 99 % | RESPIRATION RATE: 18 BRPM | HEART RATE: 70 BPM

## 2017-10-01 VITALS
SYSTOLIC BLOOD PRESSURE: 125 MMHG | RESPIRATION RATE: 18 BRPM | DIASTOLIC BLOOD PRESSURE: 80 MMHG | HEART RATE: 18 BPM | OXYGEN SATURATION: 98 %

## 2017-10-01 RX ADMIN — FUROSEMIDE SCH MG: 20 TABLET ORAL at 08:41

## 2017-10-01 RX ADMIN — OXACILLIN SODIUM SCH MLS/HR: 2 INJECTION, POWDER, FOR SOLUTION INTRAMUSCULAR; INTRAVENOUS at 05:13

## 2017-10-01 RX ADMIN — MORPHINE SULFATE PRN MG: 100 SOLUTION ORAL at 04:54

## 2017-10-01 RX ADMIN — AMPICILLIN SODIUM SCH MLS/HR: 2 INJECTION, POWDER, FOR SOLUTION INTRAMUSCULAR; INTRAVENOUS at 00:46

## 2017-10-01 RX ADMIN — MORPHINE SULFATE PRN MG: 100 SOLUTION ORAL at 01:05

## 2017-10-01 RX ADMIN — FAMOTIDINE SCH MG: 20 TABLET, FILM COATED ORAL at 21:00

## 2017-10-01 RX ADMIN — HEPARIN SODIUM SCH UNITS: 10000 INJECTION, SOLUTION INTRAVENOUS; SUBCUTANEOUS at 09:33

## 2017-10-01 RX ADMIN — AMPICILLIN SODIUM SCH MLS/HR: 2 INJECTION, POWDER, FOR SOLUTION INTRAMUSCULAR; INTRAVENOUS at 08:42

## 2017-10-01 RX ADMIN — CHLORHEXIDINE GLUCONATE SCH PACK: 500 CLOTH TOPICAL at 04:00

## 2017-10-01 RX ADMIN — ACETAMINOPHEN PRN MG: 325 TABLET ORAL at 15:12

## 2017-10-01 RX ADMIN — OXACILLIN SODIUM SCH MLS/HR: 2 INJECTION, POWDER, FOR SOLUTION INTRAMUSCULAR; INTRAVENOUS at 21:53

## 2017-10-01 RX ADMIN — STANDARDIZED SENNA CONCENTRATE AND DOCUSATE SODIUM SCH TAB: 8.6; 5 TABLET, FILM COATED ORAL at 21:00

## 2017-10-01 RX ADMIN — OXACILLIN SODIUM SCH MLS/HR: 2 INJECTION, POWDER, FOR SOLUTION INTRAMUSCULAR; INTRAVENOUS at 09:22

## 2017-10-01 RX ADMIN — OXACILLIN SODIUM SCH MLS/HR: 2 INJECTION, POWDER, FOR SOLUTION INTRAMUSCULAR; INTRAVENOUS at 13:18

## 2017-10-01 RX ADMIN — POLYVINYL ALCOHOL SCH DROP: 14 SOLUTION/ DROPS OPHTHALMIC at 13:00

## 2017-10-01 RX ADMIN — METHADONE HYDROCHLORIDE SCH MG: 5 SOLUTION ORAL at 21:29

## 2017-10-01 RX ADMIN — MORPHINE SULFATE PRN MG: 100 SOLUTION ORAL at 12:03

## 2017-10-01 RX ADMIN — Medication SCH ML: at 08:42

## 2017-10-01 RX ADMIN — FAMOTIDINE SCH MG: 20 TABLET, FILM COATED ORAL at 08:41

## 2017-10-01 RX ADMIN — HEPARIN SODIUM SCH UNITS: 10000 INJECTION, SOLUTION INTRAVENOUS; SUBCUTANEOUS at 17:45

## 2017-10-01 RX ADMIN — AMPICILLIN SODIUM SCH MLS/HR: 2 INJECTION, POWDER, FOR SOLUTION INTRAMUSCULAR; INTRAVENOUS at 16:48

## 2017-10-01 RX ADMIN — POLYVINYL ALCOHOL SCH DROP: 14 SOLUTION/ DROPS OPHTHALMIC at 17:47

## 2017-10-01 RX ADMIN — Medication SCH ML: at 21:31

## 2017-10-01 RX ADMIN — AMPICILLIN SODIUM SCH MLS/HR: 2 INJECTION, POWDER, FOR SOLUTION INTRAMUSCULAR; INTRAVENOUS at 12:05

## 2017-10-01 RX ADMIN — OXACILLIN SODIUM SCH MLS/HR: 2 INJECTION, POWDER, FOR SOLUTION INTRAMUSCULAR; INTRAVENOUS at 02:10

## 2017-10-01 RX ADMIN — POLYVINYL ALCOHOL SCH DROP: 14 SOLUTION/ DROPS OPHTHALMIC at 09:00

## 2017-10-01 RX ADMIN — RIFAMPIN SCH MG: 150 CAPSULE ORAL at 08:40

## 2017-10-01 RX ADMIN — MORPHINE SULFATE PRN MG: 100 SOLUTION ORAL at 22:15

## 2017-10-01 RX ADMIN — HEPARIN SODIUM SCH UNITS: 10000 INJECTION, SOLUTION INTRAVENOUS; SUBCUTANEOUS at 02:00

## 2017-10-01 RX ADMIN — MORPHINE SULFATE PRN MG: 100 SOLUTION ORAL at 18:49

## 2017-10-01 RX ADMIN — MORPHINE SULFATE PRN MG: 100 SOLUTION ORAL at 09:21

## 2017-10-01 RX ADMIN — MORPHINE SULFATE PRN MG: 100 SOLUTION ORAL at 15:13

## 2017-10-01 RX ADMIN — AMPICILLIN SODIUM SCH MLS/HR: 2 INJECTION, POWDER, FOR SOLUTION INTRAMUSCULAR; INTRAVENOUS at 21:31

## 2017-10-01 RX ADMIN — AMPICILLIN SODIUM SCH MLS/HR: 2 INJECTION, POWDER, FOR SOLUTION INTRAMUSCULAR; INTRAVENOUS at 04:53

## 2017-10-01 RX ADMIN — OXACILLIN SODIUM SCH MLS/HR: 2 INJECTION, POWDER, FOR SOLUTION INTRAMUSCULAR; INTRAVENOUS at 17:44

## 2017-10-01 RX ADMIN — POTASSIUM CHLORIDE SCH MEQ: 20 TABLET, EXTENDED RELEASE ORAL at 08:40

## 2017-10-01 RX ADMIN — METHADONE HYDROCHLORIDE SCH MG: 5 SOLUTION ORAL at 08:40

## 2017-10-01 RX ADMIN — RIFAMPIN SCH MG: 150 CAPSULE ORAL at 21:29

## 2017-10-01 RX ADMIN — GENTAMICIN SULFATE SCH MLS/HR: 0.8 INJECTION, SOLUTION INTRAVENOUS at 00:05

## 2017-10-01 RX ADMIN — STANDARDIZED SENNA CONCENTRATE AND DOCUSATE SODIUM SCH TAB: 8.6; 5 TABLET, FILM COATED ORAL at 08:41

## 2017-10-01 RX ADMIN — GENTAMICIN SULFATE SCH MLS/HR: 0.8 INJECTION, SOLUTION INTRAVENOUS at 18:34

## 2017-10-01 NOTE — [facility].PR
Subjective


Remarks


Follow-up sepsis


09/23/17-patient seen and examined, she was up and ambulated and denies any 

acute event overnight.  Currently afebrile


09/24/17-patient seen and examined, stable and no change.  Patient appears to 

be a difficult stick this morning for lab work


09/25/17-patient seen and examined, patient complains of back pain otherwise 

stable and no other issues.


09/26/17-patient seen and examined, no complaint today.  Vitals stable


09/27/17-patient seen and examined complains of some numbness to left foot and 

states he doesn't feel the top of her foot otherwise no other issues.


09/28/17-patient seen and examined, stable and afebrile.  Requesting if she can 

be taken off fluid restriction


09/29/17-no acute event overnight, currently afebrile and she denies any chest 

pain or shortness of breath.


09/30/17-patient seen and she is stable, has no complaints.  Afebrile.


10/01/17-she was in tears this morning crying over the fact that she was told 

she only has 6 months to live.  Otherwise no other problem she remains afebrile 

and denies any chest pain or shortness of breath.





Objective


Vitals





Vital Signs








  Date Time  Temp Pulse Resp B/P (MAP) Pulse Ox O2 Delivery O2 Flow Rate FiO2


 


10/1/17 08:55 98.5 74 18 125/81 (96) 98   


 


10/1/17 04:00 76.0 18 18 125/80 (95) 98   


 


10/1/17 00:24      Room Air  


 


10/1/17 00:00 97.7 73 18 119/61 (80) 98   


 


9/30/17 21:00      Room Air  


 


9/30/17 20:00  74      


 


9/30/17 20:00 97.9 77 18 146/84 (104) 100   


 


9/30/17 16:00 98.2 75 18 122/81 (95) 99   


 


9/30/17 12:00 97.2 77 18 129/87 (101) 97   


 


9/30/17 10:15   16     














I/O      


 


 9/30/17 9/30/17 9/30/17 10/1/17 10/1/17 10/1/17





 07:00 15:00 23:00 07:00 15:00 23:00


 


Intake Total 400 ml  800 ml 520 ml  


 


Output Total 240 ml     


 


Balance 160 ml  800 ml 520 ml  


 


      


 


Intake Oral 100 ml  600 ml 120 ml  


 


IV Total 300 ml  200 ml 400 ml  


 


Output Urine Total 240 ml     


 


# Voids   3 4  


 


# Bowel Movements   1 0  








Result Diagram:  


9/27/17 1250                                                                   

             9/30/17 0706





Objective Remarks


GENERAL: NAD


SKIN: Warm and dry.


HEAD: Normocephalic.


EYES: No scleral icterus. No injection or drainage. 


NECK: Supple, trachea midline. No JVD or lymphadenopathy.


CARDIOVASCULAR: Regular rate and rhythm without murmurs, gallops, or rubs. 


RESPIRATORY: Breath sounds equal bilaterally. No accessory muscle use.


GASTROINTESTINAL: Abdomen soft, non-tender, nondistended. 


MUSCULOSKELETAL: No cyanosis, or edema. 


BACK: Nontender without obvious deformity. No CVA tenderness.





Procedures


None





A/P


Problem List:  


(1) Severe sepsis


ICD Code:  A41.9 - Sepsis, unspecified organism; R65.20 - Severe sepsis without 

septic shock


Status:  Acute


(2) MSSA meningitis


Status:  Acute


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


Status:  Acute


(4) SVT (supraventricular tachycardia)


ICD Code:  I47.1 - Supraventricular tachycardia


Status:  Resolved


(5) Postextubation stridor


ICD Code:  J95.89 - Other postprocedural complications and disorders of 

respiratory system, not elsewhere classified


Status:  Resolved


(6) Acute hypoxemic respiratory failure


ICD Code:  J96.01 - Acute respiratory failure with hypoxia


Status:  Resolved


(7) CVA (cerebral vascular accident)


ICD Code:  I63.9 - Cerebral infarction, unspecified


(8) Tricuspid regurgitation


ICD Code:  I07.1 - Rheumatic tricuspid insufficiency


Status:  Acute


(9) Acute systolic heart failure


ICD Code:  I50.21 - Acute systolic (congestive) heart failure


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


ICD Code:  T82.6XXA - Infection and inflammatory reaction due to cardiac valve 

prosthesis, initial encounter


Status:  Acute


(11) NOÉ (acute kidney injury)


ICD Code:  N17.9 - Acute kidney failure, unspecified


Status:  Resolved


(12) Protein calorie malnutrition


ICD Code:  E46 - Unspecified protein-calorie malnutrition


(13) Microcytic anemia


ICD Code:  D50.9 - Iron deficiency anemia, unspecified


(14) Hyperphosphatemia


ICD Code:  E83.39 - Other disorders of phosphorus metabolism


(15) Hypernatremia


ICD Code:  E87.0 - Hyperosmolality and hypernatremia


(16) IV drug abuse


ICD Code:  F19.10 - Other psychoactive substance abuse, uncomplicated


Status:  Acute


(17) Headache


ICD Code:  R51 - Headache


(18) Abdominal pain


ICD Code:  R10.9 - Unspecified abdominal pain


Assessment and Plan


37-year-old female with





Severe sepsis-resolved


Multiple splenic infarcts 


MSSA meningitis 


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


Prosthetic valve endocarditis


    On IV ampicillin, oxacillin, gentamicin and rifampin per infectious disease 

specialist


   Anticipate 6 week from 1st neg (8/24) followed by  indefinite oral abx 

suppression tx. 


   Stop date for antibiotics 10/05/17


   Continue current treatment





Supraventricular tachycardia-resolved


   Echocardiogram 8 foci 17 revealed an ejection fraction showed 25-30%.


    Continue metoprolol.





Postextubation stridor


   Resolved, and continue with neb when necessary as well as scheduled





Acute hypoxemic respiratory failure


   Resolved


   Continue DuoNeb treatments.  Maintain oxygen saturation above 92%





CVA (cerebral vascular accident)


   CT head negative, MRI small acute and subacute cortical infarct of the left 

parietal lobe.


   Continue with scheduled methadone 10 mg twice


   Appreciate input from neurology who signed off


   PT to treat and eval





Tricuspid regurgitation


   Continue oral Lasix.





Acute systolic heart failure


   continue Lasix 40 mg po daily.





Acute kidney injury)


   Now resolved. 





Protein calorie malnutrition


   Secondary to long-standing IV drug abuse and endocarditis.





Microcytic anemia


   Continue with oral iron sulfate.


   Monitor H&H





Hyperphosphatemia


   Now resolved.  Continue to monitor levels.





Hypernatremia


   Resolved.  Continue to monitor BMP.





IV drug abuse


   Advised cessation.  Currently on methadone.





Headache


Resolved





Abdominal pain 


   Resolved





Diarrhea, resolved


   C diff. is negative  Continue probiotic.





Anasarca.


   Improved


   Continue Lasix po. 





Continue current treatment





Problem Qualifiers





(1) CVA (cerebral vascular accident):  


Qualified Codes:  I63.9 - Cerebral infarction, unspecified


(2) Protein calorie malnutrition:  


Qualified Codes:  E44.0 - Moderate protein-calorie malnutrition








Wilfred Diaz MD Oct 1, 2017 10:10

## 2017-10-02 VITALS
SYSTOLIC BLOOD PRESSURE: 119 MMHG | HEART RATE: 100 BPM | OXYGEN SATURATION: 97 % | RESPIRATION RATE: 17 BRPM | DIASTOLIC BLOOD PRESSURE: 80 MMHG | TEMPERATURE: 98.1 F

## 2017-10-02 VITALS
HEART RATE: 74 BPM | RESPIRATION RATE: 17 BRPM | TEMPERATURE: 97.8 F | SYSTOLIC BLOOD PRESSURE: 115 MMHG | OXYGEN SATURATION: 99 % | DIASTOLIC BLOOD PRESSURE: 65 MMHG

## 2017-10-02 VITALS
OXYGEN SATURATION: 100 % | TEMPERATURE: 98 F | DIASTOLIC BLOOD PRESSURE: 87 MMHG | HEART RATE: 81 BPM | RESPIRATION RATE: 16 BRPM | SYSTOLIC BLOOD PRESSURE: 134 MMHG

## 2017-10-02 VITALS
DIASTOLIC BLOOD PRESSURE: 83 MMHG | HEART RATE: 78 BPM | OXYGEN SATURATION: 100 % | SYSTOLIC BLOOD PRESSURE: 121 MMHG | TEMPERATURE: 98.4 F | RESPIRATION RATE: 16 BRPM

## 2017-10-02 VITALS
HEART RATE: 74 BPM | RESPIRATION RATE: 16 BRPM | TEMPERATURE: 98.5 F | OXYGEN SATURATION: 96 % | DIASTOLIC BLOOD PRESSURE: 68 MMHG | SYSTOLIC BLOOD PRESSURE: 108 MMHG

## 2017-10-02 VITALS — HEART RATE: 72 BPM

## 2017-10-02 VITALS
TEMPERATURE: 97.7 F | RESPIRATION RATE: 17 BRPM | SYSTOLIC BLOOD PRESSURE: 118 MMHG | HEART RATE: 81 BPM | OXYGEN SATURATION: 96 % | DIASTOLIC BLOOD PRESSURE: 81 MMHG

## 2017-10-02 VITALS — HEART RATE: 76 BPM

## 2017-10-02 LAB — GFR SERPLBLD BASED ON 1.73 SQ M-ARVRAT: 57 ML/MIN (ref 89–?)

## 2017-10-02 RX ADMIN — MORPHINE SULFATE PRN MG: 100 SOLUTION ORAL at 19:06

## 2017-10-02 RX ADMIN — AMPICILLIN SODIUM SCH MLS/HR: 2 INJECTION, POWDER, FOR SOLUTION INTRAMUSCULAR; INTRAVENOUS at 01:02

## 2017-10-02 RX ADMIN — POLYVINYL ALCOHOL SCH DROP: 14 SOLUTION/ DROPS OPHTHALMIC at 08:31

## 2017-10-02 RX ADMIN — AMPICILLIN SODIUM SCH MLS/HR: 2 INJECTION, POWDER, FOR SOLUTION INTRAMUSCULAR; INTRAVENOUS at 21:56

## 2017-10-02 RX ADMIN — POLYVINYL ALCOHOL SCH DROP: 14 SOLUTION/ DROPS OPHTHALMIC at 12:21

## 2017-10-02 RX ADMIN — RIFAMPIN SCH MG: 150 CAPSULE ORAL at 22:00

## 2017-10-02 RX ADMIN — FAMOTIDINE SCH MG: 20 TABLET, FILM COATED ORAL at 08:39

## 2017-10-02 RX ADMIN — POLYVINYL ALCOHOL SCH DROP: 14 SOLUTION/ DROPS OPHTHALMIC at 17:10

## 2017-10-02 RX ADMIN — Medication SCH ML: at 22:01

## 2017-10-02 RX ADMIN — MORPHINE SULFATE PRN MG: 100 SOLUTION ORAL at 08:31

## 2017-10-02 RX ADMIN — OXACILLIN SODIUM SCH MLS/HR: 2 INJECTION, POWDER, FOR SOLUTION INTRAMUSCULAR; INTRAVENOUS at 22:32

## 2017-10-02 RX ADMIN — MORPHINE SULFATE PRN MG: 100 SOLUTION ORAL at 01:04

## 2017-10-02 RX ADMIN — STANDARDIZED SENNA CONCENTRATE AND DOCUSATE SODIUM SCH TAB: 8.6; 5 TABLET, FILM COATED ORAL at 08:39

## 2017-10-02 RX ADMIN — OXACILLIN SODIUM SCH MLS/HR: 2 INJECTION, POWDER, FOR SOLUTION INTRAMUSCULAR; INTRAVENOUS at 05:45

## 2017-10-02 RX ADMIN — METHADONE HYDROCHLORIDE SCH MG: 5 SOLUTION ORAL at 21:59

## 2017-10-02 RX ADMIN — GENTAMICIN SULFATE SCH MLS/HR: 0.8 INJECTION, SOLUTION INTRAVENOUS at 12:21

## 2017-10-02 RX ADMIN — Medication SCH ML: at 08:32

## 2017-10-02 RX ADMIN — FUROSEMIDE SCH MG: 20 TABLET ORAL at 08:30

## 2017-10-02 RX ADMIN — AMPICILLIN SODIUM SCH MLS/HR: 2 INJECTION, POWDER, FOR SOLUTION INTRAMUSCULAR; INTRAVENOUS at 17:11

## 2017-10-02 RX ADMIN — OXACILLIN SODIUM SCH MLS/HR: 2 INJECTION, POWDER, FOR SOLUTION INTRAMUSCULAR; INTRAVENOUS at 09:37

## 2017-10-02 RX ADMIN — MORPHINE SULFATE PRN MG: 100 SOLUTION ORAL at 04:43

## 2017-10-02 RX ADMIN — POTASSIUM CHLORIDE SCH MEQ: 20 TABLET, EXTENDED RELEASE ORAL at 08:30

## 2017-10-02 RX ADMIN — MORPHINE SULFATE PRN MG: 100 SOLUTION ORAL at 22:30

## 2017-10-02 RX ADMIN — CHLORHEXIDINE GLUCONATE SCH PACK: 500 CLOTH TOPICAL at 04:00

## 2017-10-02 RX ADMIN — AMPICILLIN SODIUM SCH MLS/HR: 2 INJECTION, POWDER, FOR SOLUTION INTRAMUSCULAR; INTRAVENOUS at 13:35

## 2017-10-02 RX ADMIN — MORPHINE SULFATE PRN MG: 100 SOLUTION ORAL at 16:01

## 2017-10-02 RX ADMIN — OXACILLIN SODIUM SCH MLS/HR: 2 INJECTION, POWDER, FOR SOLUTION INTRAMUSCULAR; INTRAVENOUS at 14:04

## 2017-10-02 RX ADMIN — OXACILLIN SODIUM SCH MLS/HR: 2 INJECTION, POWDER, FOR SOLUTION INTRAMUSCULAR; INTRAVENOUS at 17:49

## 2017-10-02 RX ADMIN — FAMOTIDINE SCH MG: 20 TABLET, FILM COATED ORAL at 21:00

## 2017-10-02 RX ADMIN — STANDARDIZED SENNA CONCENTRATE AND DOCUSATE SODIUM SCH TAB: 8.6; 5 TABLET, FILM COATED ORAL at 21:00

## 2017-10-02 RX ADMIN — AMPICILLIN SODIUM SCH MLS/HR: 2 INJECTION, POWDER, FOR SOLUTION INTRAMUSCULAR; INTRAVENOUS at 04:45

## 2017-10-02 RX ADMIN — HEPARIN SODIUM SCH UNITS: 10000 INJECTION, SOLUTION INTRAVENOUS; SUBCUTANEOUS at 02:00

## 2017-10-02 RX ADMIN — OXACILLIN SODIUM SCH MLS/HR: 2 INJECTION, POWDER, FOR SOLUTION INTRAMUSCULAR; INTRAVENOUS at 01:55

## 2017-10-02 RX ADMIN — MORPHINE SULFATE PRN MG: 100 SOLUTION ORAL at 12:20

## 2017-10-02 RX ADMIN — HEPARIN SODIUM SCH UNITS: 10000 INJECTION, SOLUTION INTRAVENOUS; SUBCUTANEOUS at 08:39

## 2017-10-02 RX ADMIN — RIFAMPIN SCH MG: 150 CAPSULE ORAL at 08:30

## 2017-10-02 RX ADMIN — HEPARIN SODIUM SCH UNITS: 10000 INJECTION, SOLUTION INTRAVENOUS; SUBCUTANEOUS at 17:10

## 2017-10-02 RX ADMIN — METHADONE HYDROCHLORIDE SCH MG: 5 SOLUTION ORAL at 10:25

## 2017-10-02 RX ADMIN — AMPICILLIN SODIUM SCH MLS/HR: 2 INJECTION, POWDER, FOR SOLUTION INTRAMUSCULAR; INTRAVENOUS at 08:31

## 2017-10-02 NOTE — [facility].PR
Subjective


Remarks


Follow-up sepsis


09/23/17-patient seen and examined, she was up and ambulated and denies any 

acute event overnight.  Currently afebrile


09/24/17-patient seen and examined, stable and no change.  Patient appears to 

be a difficult stick this morning for lab work


09/25/17-patient seen and examined, patient complains of back pain otherwise 

stable and no other issues.


09/26/17-patient seen and examined, no complaint today.  Vitals stable


09/27/17-patient seen and examined complains of some numbness to left foot and 

states he doesn't feel the top of her foot otherwise no other issues.


09/28/17-patient seen and examined, stable and afebrile.  Requesting if she can 

be taken off fluid restriction


09/29/17-no acute event overnight, currently afebrile and she denies any chest 

pain or shortness of breath.


09/30/17-patient seen and she is stable, has no complaints.  Afebrile.


10/01/17-she was in tears this morning crying over the fact that she was told 

she only has 6 months to live.  Otherwise no other problem she remains afebrile 

and denies any chest pain or shortness of breath.


10/02/17-patient seen and examined, stable today, no complaints and states she'

s looking for discharge in 4 days





Objective


Vitals





Vital Signs








  Date Time  Temp Pulse Resp B/P (MAP) Pulse Ox O2 Delivery O2 Flow Rate FiO2


 


10/2/17 08:00 97.7 81 17 118/81 (93) 96   


 


10/2/17 07:00      Room Air  


 


10/2/17 04:00 98.5 74 16 108/68 (81) 96   


 


10/2/17 00:00 98.4 78 16 121/83 (96) 100   


 


10/2/17 00:00      Room Air  


 


10/1/17 20:00 98.2 114 18 121/78 (92) 100   


 


10/1/17 20:00      Room Air  


 


10/1/17 20:00  73      


 


10/1/17 17:10 98.4 70 18 125/80 (95) 99   


 


10/1/17 12:44 98.6 79 18 112/83 (93) 96   














I/O      


 


 10/1/17 10/1/17 10/1/17 10/2/17 10/2/17 10/2/17





 07:00 15:00 23:00 07:00 15:00 23:00


 


Intake Total 520 ml  300 ml 300 ml 200 ml 


 


Balance 520 ml  300 ml 300 ml 200 ml 


 


      


 


Intake Oral 120 ml     


 


IV Total 400 ml  300 ml 300 ml 200 ml 


 


# Voids 4   3  


 


# Bowel Movements 0     








Result Diagram:  


9/30/17 0706





Objective Remarks


GENERAL: NAD


SKIN: Warm and dry.


HEAD: Normocephalic.


EYES: No scleral icterus. No injection or drainage. 


NECK: Supple, trachea midline. No JVD or lymphadenopathy.


CARDIOVASCULAR: Regular rate and rhythm without murmurs, gallops, or rubs. 


RESPIRATORY: Breath sounds equal bilaterally. No accessory muscle use.


GASTROINTESTINAL: Abdomen soft, non-tender, nondistended. 


MUSCULOSKELETAL: No cyanosis, or edema. 


BACK: Nontender without obvious deformity. No CVA tenderness.





Procedures


None





A/P


Problem List:  


(1) Severe sepsis


ICD Code:  A41.9 - Sepsis, unspecified organism; R65.20 - Severe sepsis without 

septic shock


Status:  Acute


(2) MSSA meningitis


Status:  Acute


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


Status:  Acute


(4) SVT (supraventricular tachycardia)


ICD Code:  I47.1 - Supraventricular tachycardia


Status:  Resolved


(5) Postextubation stridor


ICD Code:  J95.89 - Other postprocedural complications and disorders of 

respiratory system, not elsewhere classified


Status:  Resolved


(6) Acute hypoxemic respiratory failure


ICD Code:  J96.01 - Acute respiratory failure with hypoxia


Status:  Resolved


(7) CVA (cerebral vascular accident)


ICD Code:  I63.9 - Cerebral infarction, unspecified


(8) Tricuspid regurgitation


ICD Code:  I07.1 - Rheumatic tricuspid insufficiency


Status:  Acute


(9) Acute systolic heart failure


ICD Code:  I50.21 - Acute systolic (congestive) heart failure


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


ICD Code:  T82.6XXA - Infection and inflammatory reaction due to cardiac valve 

prosthesis, initial encounter


Status:  Acute


(11) NOÉ (acute kidney injury)


ICD Code:  N17.9 - Acute kidney failure, unspecified


Status:  Resolved


(12) Protein calorie malnutrition


ICD Code:  E46 - Unspecified protein-calorie malnutrition


(13) Microcytic anemia


ICD Code:  D50.9 - Iron deficiency anemia, unspecified


(14) Hyperphosphatemia


ICD Code:  E83.39 - Other disorders of phosphorus metabolism


(15) Hypernatremia


ICD Code:  E87.0 - Hyperosmolality and hypernatremia


(16) IV drug abuse


ICD Code:  F19.10 - Other psychoactive substance abuse, uncomplicated


Status:  Acute


(17) Headache


ICD Code:  R51 - Headache


(18) Abdominal pain


ICD Code:  R10.9 - Unspecified abdominal pain


Assessment and Plan


37-year-old female with





Severe sepsis-resolved


Multiple splenic infarcts 


MSSA meningitis 


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


Prosthetic valve endocarditis


    On IV ampicillin, oxacillin, gentamicin and rifampin per infectious disease 

specialist


   Anticipate 6 week from 1st neg (8/24) followed by  indefinite oral abx 

suppression tx. 


   Stop date for antibiotics 10/05/17


   Continue current treatment





Supraventricular tachycardia-resolved


   Echocardiogram 8 foci 17 revealed an ejection fraction showed 25-30%.


    Continue metoprolol.





Postextubation stridor


   Resolved, and continue with neb when necessary as well as scheduled





Acute hypoxemic respiratory failure


   Resolved


   Continue DuoNeb treatments.  Maintain oxygen saturation above 92%





CVA (cerebral vascular accident)


   CT head negative, MRI small acute and subacute cortical infarct of the left 

parietal lobe.


   Continue with scheduled methadone 10 mg twice


   Appreciate input from neurology who signed off


   PT to treat and eval





Tricuspid regurgitation


   Continue oral Lasix.





Acute systolic heart failure


   continue Lasix 40 mg po daily.





Acute kidney injury)


   Now resolved. 





Protein calorie malnutrition


   Secondary to long-standing IV drug abuse and endocarditis.





Microcytic anemia


   Continue with oral iron sulfate.


   Monitor H&H





Hyperphosphatemia


   Now resolved.  Continue to monitor levels.





Hypernatremia


   Resolved.  Continue to monitor BMP.





IV drug abuse


   Advised cessation.  Currently on methadone.





Diarrhea, resolved


   C diff. is negative  Continue probiotic.





Anasarca.


   Improved


   Continue Lasix po.





Problem Qualifiers





(1) CVA (cerebral vascular accident):  


Qualified Codes:  I63.9 - Cerebral infarction, unspecified


(2) Protein calorie malnutrition:  


Qualified Codes:  E44.0 - Moderate protein-calorie malnutrition








Wilfred Diaz MD Oct 2, 2017 10:34

## 2017-10-03 VITALS
SYSTOLIC BLOOD PRESSURE: 107 MMHG | OXYGEN SATURATION: 96 % | TEMPERATURE: 97.9 F | DIASTOLIC BLOOD PRESSURE: 56 MMHG | RESPIRATION RATE: 16 BRPM | HEART RATE: 79 BPM

## 2017-10-03 VITALS
OXYGEN SATURATION: 100 % | SYSTOLIC BLOOD PRESSURE: 109 MMHG | TEMPERATURE: 98.6 F | DIASTOLIC BLOOD PRESSURE: 82 MMHG | RESPIRATION RATE: 20 BRPM | HEART RATE: 83 BPM

## 2017-10-03 VITALS
OXYGEN SATURATION: 100 % | SYSTOLIC BLOOD PRESSURE: 127 MMHG | RESPIRATION RATE: 20 BRPM | HEART RATE: 76 BPM | DIASTOLIC BLOOD PRESSURE: 87 MMHG | TEMPERATURE: 98.3 F

## 2017-10-03 VITALS
OXYGEN SATURATION: 96 % | SYSTOLIC BLOOD PRESSURE: 108 MMHG | RESPIRATION RATE: 18 BRPM | HEART RATE: 75 BPM | TEMPERATURE: 98 F | DIASTOLIC BLOOD PRESSURE: 57 MMHG

## 2017-10-03 VITALS
TEMPERATURE: 99 F | RESPIRATION RATE: 20 BRPM | SYSTOLIC BLOOD PRESSURE: 121 MMHG | HEART RATE: 82 BPM | OXYGEN SATURATION: 100 % | DIASTOLIC BLOOD PRESSURE: 77 MMHG

## 2017-10-03 VITALS
RESPIRATION RATE: 16 BRPM | HEART RATE: 78 BPM | SYSTOLIC BLOOD PRESSURE: 104 MMHG | TEMPERATURE: 97.5 F | DIASTOLIC BLOOD PRESSURE: 73 MMHG | OXYGEN SATURATION: 97 %

## 2017-10-03 LAB
ALP SERPL-CCNC: 103 U/L (ref 45–117)
ALT SERPL-CCNC: 14 U/L (ref 10–53)
ANION GAP SERPL CALC-SCNC: 9 MEQ/L (ref 5–15)
AST SERPL-CCNC: 14 U/L (ref 15–37)
BASOPHILS # BLD AUTO: 0.1 TH/MM3 (ref 0–0.2)
BASOPHILS NFR BLD: 1.2 % (ref 0–2)
BILIRUB SERPL-MCNC: 0.2 MG/DL (ref 0.2–1)
BUN SERPL-MCNC: 9 MG/DL (ref 7–18)
CHLORIDE SERPL-SCNC: 102 MEQ/L (ref 98–107)
EOSINOPHIL # BLD: 0.3 TH/MM3 (ref 0–0.4)
EOSINOPHIL NFR BLD: 4.1 % (ref 0–4)
ERYTHROCYTE [DISTWIDTH] IN BLOOD BY AUTOMATED COUNT: 19.9 % (ref 11.6–17.2)
GFR SERPLBLD BASED ON 1.73 SQ M-ARVRAT: 54 ML/MIN (ref 89–?)
HCO3 BLD-SCNC: 25.7 MEQ/L (ref 21–32)
HCT VFR BLD CALC: 30.4 % (ref 35–46)
HEMO FLAGS: (no result)
LYMPHOCYTES # BLD AUTO: 1.8 TH/MM3 (ref 1–4.8)
LYMPHOCYTES NFR BLD AUTO: 25.8 % (ref 9–44)
MCH RBC QN AUTO: 25.2 PG (ref 27–34)
MCHC RBC AUTO-ENTMCNC: 32.4 % (ref 32–36)
MCV RBC AUTO: 77.9 FL (ref 80–100)
MONOCYTES NFR BLD: 7.1 % (ref 0–8)
NEUTROPHILS # BLD AUTO: 4.3 TH/MM3 (ref 1.8–7.7)
NEUTROPHILS NFR BLD AUTO: 61.8 % (ref 16–70)
PLATELET # BLD: 322 TH/MM3 (ref 150–450)
POTASSIUM SERPL-SCNC: 3.2 MEQ/L (ref 3.5–5.1)
RBC # BLD AUTO: 3.91 MIL/MM3 (ref 4–5.3)
SODIUM SERPL-SCNC: 137 MEQ/L (ref 136–145)
WBC # BLD AUTO: 7 TH/MM3 (ref 4–11)

## 2017-10-03 RX ADMIN — POLYVINYL ALCOHOL SCH DROP: 14 SOLUTION/ DROPS OPHTHALMIC at 17:41

## 2017-10-03 RX ADMIN — OXACILLIN SODIUM SCH MLS/HR: 2 INJECTION, POWDER, FOR SOLUTION INTRAMUSCULAR; INTRAVENOUS at 02:21

## 2017-10-03 RX ADMIN — AMPICILLIN SODIUM SCH MLS/HR: 2 INJECTION, POWDER, FOR SOLUTION INTRAMUSCULAR; INTRAVENOUS at 14:31

## 2017-10-03 RX ADMIN — STANDARDIZED SENNA CONCENTRATE AND DOCUSATE SODIUM SCH TAB: 8.6; 5 TABLET, FILM COATED ORAL at 08:47

## 2017-10-03 RX ADMIN — Medication SCH ML: at 21:39

## 2017-10-03 RX ADMIN — OXACILLIN SODIUM SCH MLS/HR: 2 INJECTION, POWDER, FOR SOLUTION INTRAMUSCULAR; INTRAVENOUS at 11:25

## 2017-10-03 RX ADMIN — AMPICILLIN SODIUM SCH MLS/HR: 2 INJECTION, POWDER, FOR SOLUTION INTRAMUSCULAR; INTRAVENOUS at 17:32

## 2017-10-03 RX ADMIN — STANDARDIZED SENNA CONCENTRATE AND DOCUSATE SODIUM SCH TAB: 8.6; 5 TABLET, FILM COATED ORAL at 21:00

## 2017-10-03 RX ADMIN — AMPICILLIN SODIUM SCH MLS/HR: 2 INJECTION, POWDER, FOR SOLUTION INTRAMUSCULAR; INTRAVENOUS at 08:48

## 2017-10-03 RX ADMIN — Medication SCH ML: at 08:51

## 2017-10-03 RX ADMIN — OXACILLIN SODIUM SCH MLS/HR: 2 INJECTION, POWDER, FOR SOLUTION INTRAMUSCULAR; INTRAVENOUS at 14:42

## 2017-10-03 RX ADMIN — FUROSEMIDE SCH MG: 20 TABLET ORAL at 08:47

## 2017-10-03 RX ADMIN — HEPARIN SODIUM SCH UNITS: 10000 INJECTION, SOLUTION INTRAVENOUS; SUBCUTANEOUS at 17:41

## 2017-10-03 RX ADMIN — OXACILLIN SODIUM SCH MLS/HR: 2 INJECTION, POWDER, FOR SOLUTION INTRAMUSCULAR; INTRAVENOUS at 22:20

## 2017-10-03 RX ADMIN — AMPICILLIN SODIUM SCH MLS/HR: 2 INJECTION, POWDER, FOR SOLUTION INTRAMUSCULAR; INTRAVENOUS at 01:51

## 2017-10-03 RX ADMIN — HEPARIN SODIUM SCH UNITS: 10000 INJECTION, SOLUTION INTRAVENOUS; SUBCUTANEOUS at 10:00

## 2017-10-03 RX ADMIN — FAMOTIDINE SCH MG: 20 TABLET, FILM COATED ORAL at 08:47

## 2017-10-03 RX ADMIN — MORPHINE SULFATE PRN MG: 100 SOLUTION ORAL at 01:51

## 2017-10-03 RX ADMIN — OXACILLIN SODIUM SCH MLS/HR: 2 INJECTION, POWDER, FOR SOLUTION INTRAMUSCULAR; INTRAVENOUS at 17:33

## 2017-10-03 RX ADMIN — FAMOTIDINE SCH MG: 20 TABLET, FILM COATED ORAL at 21:00

## 2017-10-03 RX ADMIN — MORPHINE SULFATE PRN MG: 100 SOLUTION ORAL at 14:42

## 2017-10-03 RX ADMIN — MORPHINE SULFATE PRN MG: 100 SOLUTION ORAL at 21:41

## 2017-10-03 RX ADMIN — CHLORHEXIDINE GLUCONATE SCH PACK: 500 CLOTH TOPICAL at 02:22

## 2017-10-03 RX ADMIN — MORPHINE SULFATE PRN MG: 100 SOLUTION ORAL at 04:59

## 2017-10-03 RX ADMIN — RIFAMPIN SCH MG: 150 CAPSULE ORAL at 21:40

## 2017-10-03 RX ADMIN — POLYVINYL ALCOHOL SCH DROP: 14 SOLUTION/ DROPS OPHTHALMIC at 08:51

## 2017-10-03 RX ADMIN — RIFAMPIN SCH MG: 150 CAPSULE ORAL at 08:47

## 2017-10-03 RX ADMIN — MORPHINE SULFATE PRN MG: 100 SOLUTION ORAL at 17:32

## 2017-10-03 RX ADMIN — POTASSIUM CHLORIDE SCH MEQ: 20 TABLET, EXTENDED RELEASE ORAL at 08:46

## 2017-10-03 RX ADMIN — AMPICILLIN SODIUM SCH MLS/HR: 2 INJECTION, POWDER, FOR SOLUTION INTRAMUSCULAR; INTRAVENOUS at 05:00

## 2017-10-03 RX ADMIN — METHADONE HYDROCHLORIDE SCH MG: 5 SOLUTION ORAL at 08:45

## 2017-10-03 RX ADMIN — METHADONE HYDROCHLORIDE SCH MG: 5 SOLUTION ORAL at 21:40

## 2017-10-03 RX ADMIN — AMPICILLIN SODIUM SCH MLS/HR: 2 INJECTION, POWDER, FOR SOLUTION INTRAMUSCULAR; INTRAVENOUS at 21:39

## 2017-10-03 RX ADMIN — MORPHINE SULFATE PRN MG: 100 SOLUTION ORAL at 11:26

## 2017-10-03 RX ADMIN — HEPARIN SODIUM SCH UNITS: 10000 INJECTION, SOLUTION INTRAVENOUS; SUBCUTANEOUS at 02:00

## 2017-10-03 RX ADMIN — GENTAMICIN SULFATE SCH MLS/HR: 0.8 INJECTION, SOLUTION INTRAVENOUS at 06:12

## 2017-10-03 RX ADMIN — POLYVINYL ALCOHOL SCH DROP: 14 SOLUTION/ DROPS OPHTHALMIC at 11:20

## 2017-10-03 RX ADMIN — OXACILLIN SODIUM SCH MLS/HR: 2 INJECTION, POWDER, FOR SOLUTION INTRAMUSCULAR; INTRAVENOUS at 05:22

## 2017-10-03 RX ADMIN — MORPHINE SULFATE PRN MG: 100 SOLUTION ORAL at 08:51

## 2017-10-03 NOTE — HHI.PR
Subjective


Remarks


in no acute distress.


no fever.


has some nonspecific joint pains.





Objective


Vitals





Vital Signs








  Date Time  Temp Pulse Resp B/P (MAP) Pulse Ox O2 Delivery O2 Flow Rate FiO2


 


10/3/17 08:00      Room Air  


 


10/3/17 08:00  70      


 


10/3/17 08:00 98.0 75 18 108/57 (74) 96   


 


10/3/17 04:00 97.9 79 16 107/56 (73) 96   


 


10/3/17 00:00 97.5 78 16 104/73 (83) 97   


 


10/2/17 20:00 98.0 81 16 134/87 (103) 100   


 


10/2/17 20:00  76      


 


10/2/17 20:00      Room Air  


 


10/2/17 16:00 97.8 74 17 115/65 (82) 99   


 


10/2/17 15:00  72      


 


10/2/17 12:00 98.1 100 17 119/80 (93) 97   














I/O      


 


 10/2/17 10/2/17 10/2/17 10/3/17 10/3/17 10/3/17





 07:00 15:00 23:00 07:00 15:00 23:00


 


Intake Total 300 ml 1220 ml 500 ml 600 ml 100 ml 


 


Balance 300 ml 1220 ml 500 ml 600 ml 100 ml 


 


      


 


Intake Oral  720 ml 200 ml   


 


IV Total 300 ml 500 ml 300 ml 600 ml 100 ml 


 


# Voids 3 4  5  


 


# Bowel Movements  1  1  








Result Diagram:  


10/2/17 1040





Imaging





Last Impressions








Abdomen CT 9/8/17 0000 Signed





Impressions: 





 Service Date/Time:  Friday, September 8, 2017 20:26 - CONCLUSION:  1. 

Peripheral 





 low attenuation lesions in the spleen most characteristic of multiple splenic 





 infarcts. Some of these are new findings since 2016 comparison. 2. Minimal 





 subsegmental airspace disease at the lung bases. 3. 3.4 cm right adnexal cyst. 





 Intrauterine device present. 4. Small fat containing ventral hernias.     

Jaspal Issa MD 


 


Chest X-Ray 9/5/17 0000 Signed





Impressions: 





 Service Date/Time:  Tuesday, September 5, 2017 08:07 - CONCLUSION:  Suspected 





 mild edema/failure.     Ron Swan MD 


 


Neck CT 9/2/17 0000 Signed





Impressions: 





 Service Date/Time:  Saturday, September 2, 2017 11:13 - CONCLUSION:  1. 

Patient 





 is intubated which limits this study. The epiglottis is otherwise normal. 





 Secretions are present in the oropharynx and right sphenoid sinus.     Ej Ewing MD 


 


Brain MRI 8/25/17 0000 Signed





Impressions: 





 Service Date/Time:  Friday, August 25, 2017 17:07 - CONCLUSION:  1. Small 

acute 





 or subacute cortical infarct of the left parietal lobe. 2. Small, faint area 

of 





 nonspecific flair signal abnormality in the left frontal lobe periventricular 





 white matter, nonspecific. No associated mass effect or abnormal enhancement. 

3 





 month followup MRI of the brain recommended. 3.  Mild chronic white matter 





 changes.     Ron Swan MD 


 


Head CT 8/21/17 0000 Signed





Impressions: 





 Service Date/Time:  Monday, August 21, 2017 19:47 - CONCLUSION:  No evidence 

of 





 acute infarct, hemorrhage, mass or edema.     Kristian Frankel MD 








Objective Remarks


GENERAL: This is a well-nourished, well-developed patient, in no apparent 

distress.


CARDIOVASCULAR: Regular rate and regular rhythm without murmurs, gallops, or 

rubs. 


RESPIRATORY: Clear to auscultation. Breath sounds equal bilaterally. No wheezes

, rales, or rhonchi.  


GASTROINTESTINAL: Abdomen soft, non-tender, nondistended. 


Normal, active bowel sounds


MUSCULOSKELETAL: Extremities without clubbing, cyanosis, or edema.


NEURO:  Alert & Oriented x4 to person, place, time, situation.  Moves all ext x4


Procedures


None


Medications and IVs





Current Medications


Midazolam HCl 100 ml @  As Directed STK-MED ONCE .ROUTE ;  Start 8/21/17 at 19:

36;  Stop 8/21/17 at 19:37;  Status DC


Sodium Chloride 1,000 ml @  70 mls/hr [id number] ONCE IV  Last administered on 9/15

/17at 18:50;  Start 8/21/17 at 19:35;  Stop 8/22/17 at 09:52;  Status DC


Etomidate (Amidate Inj) 20 mg ONCE  ONCE IV PUSH  Last administered on 8/21/ 17at 19:31;  Start 8/21/17 at 19:45;  Stop 8/21/17 at 19:46;  Status DC


Succinylcholine Chloride (Quelicin Inj) 100 mg ONCE  ONCE IV PUSH  Last 

administered on 8/21/17at 19:45;  Start 8/21/17 at 19:45;  Stop 8/21/17 at 19:46

;  Status DC


Sodium Chloride 1,000 ml @  999 mls/hr BOLUS  ONCE IV  Last administered on 8/21 /17at 19:45;  Start 8/21/17 at 19:45;  Stop 8/21/17 at 20:45;  Status DC


Midazolam HCl (Versed Inj) 5 mg ONCE  ONCE IV PUSH  Last administered on 8/21/ 17at 19:45;  Start 8/21/17 at 19:45;  Stop 8/21/17 at 19:46;  Status DC


Acetaminophen (Tylenol Supp) 650 mg ONCE  ONCE RECTAL  Last administered on 8/21 /17at 19:45;  Start 8/21/17 at 19:45;  Stop 8/21/17 at 19:46;  Status DC


Midazolam HCl (Versed Inj) 5 mg ONCE  ONCE IV PUSH  Last administered on 8/21/ 17at 20:00;  Start 8/21/17 at 20:00;  Stop 8/21/17 at 20:01;  Status DC


Vecuronium Bromide (Norcuron 10 Mg Inj) 10 mg ONCE  ONCE IV PUSH  Last 

administered on 8/21/17at 20:00;  Start 8/21/17 at 20:00;  Stop 8/21/17 at 20:01

;  Status DC


Vancomycin HCl 1000 mg/Sodium Chloride 250 ml @  250 mls/hr ONCE  ONCE IV ;  

Start 8/21/17 at 20:15;  Stop 8/21/17 at 21:14;  Status Cancel


Ceftriaxone Sodium 2000 mg/ Sodium Chloride 100 ml @  200 mls/hr ONCE  ONCE IV  

Last administered on 8/21/17at 20:15;  Start 8/21/17 at 20:15;  Stop 8/21/17 at 

20:44;  Status DC


Sodium Chloride 1,000 ml @  999 mls/hr BOLUS  ONCE IV  Last administered on 8/21 /17at 20:15;  Start 8/21/17 at 20:15;  Stop 8/21/17 at 21:15;  Status DC


Adenosine (Adenocard Inj) 12 mg ONCE  ONCE IV PUSH  Last administered on 8/21/ 17at 20:15;  Start 8/21/17 at 20:15;  Stop 8/21/17 at 20:16;  Status DC


Propofol 100 ml @ 0 mls/hr TITRATE  PRN IV Ordered RASS Last administered on 8/ 27/17at 10:10;  Start 8/21/17 at 20:15;  Stop 8/27/17 at 20:06;  Status DC


Diltiazem HCl (Cardizem Inj) 20 mg ONCE  ONCE IV  Last administered on 8/21/ 17at 20:30;  Start 8/21/17 at 20:30;  Stop 8/21/17 at 20:31;  Status DC


Ketorolac Tromethamine (Toradol Inj) 30 mg STK-MED ONCE .ROUTE ;  Start 8/21/17 

at 20:47;  Stop 8/21/17 at 20:48;  Status DC


Ketorolac Tromethamine (Toradol Inj) 30 mg ONCE  ONCE IV PUSH  Last 

administered on 8/21/17at 21:00;  Start 8/21/17 at 21:00;  Stop 8/21/17 at 21:02

;  Status DC


Sodium Chloride 1,000 ml @  999 mls/hr BOLUS  ONCE IV  Last administered on 8/21 /17at 21:00;  Start 8/21/17 at 21:00;  Stop 8/21/17 at 22:00;  Status DC


Acyclovir Sodium 1000 mg/Sodium Chloride 150 ml @  150 mls/hr ONCE  ONCE IV ;  

Start 8/21/17 at 21:15;  Stop 8/21/17 at 22:14;  Status DC


Norepinephrine Bitartrate 250 ml @  7.5 mls/hr TITRATE  PRN IV Blood pressure 

management Last administered on 8/23/17at 01:15;  Start 8/21/17 at 21:30;  Stop 

8/30/17 at 12:30;  Status DC


Terbutaline Sulfate (Brethine Inj) 1 mg UNSCH  PRN SQ For Extravasation;  Start 

8/21/17 at 21:30


Norepinephrine Bitartrate (Levophed Inj) 4 mg STK-MED ONCE .ROUTE ;  Start 8/21/ 17 at 21:17;  Stop 8/21/17 at 21:18;  Status DC


Sodium Chloride (NS Flush) 2 ml UNSCH  PRN .XX FLUSH AFTER USING IV ACCESS;  

Start 8/21/17 at 21:30;  Stop 8/21/17 at 22:59;  Status DC


Sodium Chloride (NS Flush) 2 ml BID .XX ;  Start 8/22/17 at 09:00;  Stop 8/22/ 17 at 09:00;  Status DC


Acetaminophen (Tylenol) 650 mg Q6H  PRN PO FEVER >101F Last administered on 10/1

/17at 15:12;  Start 8/21/17 at 21:30


Morphine Sulfate (Morphine Inj) 2 mg Q2H  PRN IV PAIN SCALE 6 TO 10 Last 

administered on 9/5/17at 11:12;  Start 8/21/17 at 21:30;  Stop 9/5/17 at 15:35;

  Status DC


Famotidine (Pepcid Inj) 20 mg Q12HR IV PUSH  Last administered on 9/4/17at 19:59

;  Start 8/22/17 at 09:00;  Stop 9/5/17 at 08:02;  Status DC


Midazolam HCl (Versed Inj) 2 mg Q1H  PRN IV SEDATION;  Start 8/21/17 at 21:30;  

Stop 8/22/17 at 13:03;  Status DC


Artificial Tears (Tears Naturale Opth Soln) 1 drop TID EACH EYE  Last 

administered on 9/27/17at 12:20;  Start 8/22/17 at 09:00


Ondansetron HCl (Zofran Inj) 4 mg Q6H  PRN IV NAUSEA OR VOMITING Last 

administered on 9/8/17at 12:47;  Start 8/21/17 at 21:30


Albuterol/ Ipratropium (Duoneb Neb) 1 ampule Q2HR NEB  PRN INH WHEEZING Last 

administered on 9/5/17at 07:35;  Start 8/21/17 at 21:30


Heparin Sodium (Porcine) (Heparin Inj) 5,000 units Q12H SQ  Last administered 

on 9/4/17at 20:00;  Start 8/21/17 at 22:00;  Stop 9/5/17 at 08:03;  Status DC


Miscellaneous Information 1 [id number]  Last administered on 8/21/17at 21:30;  

Start 8/21/17 at 21:30


Chlorhexidine Gluconate (Chlorhexidine 2% Cloth) 3 pack Taper DAILY@04 TOP  

Last administered on 8/28/17at 00:07;  Start 8/22/17 at 04:00;  Stop 8/18/18 at 

03:59


Chlorhexidine Gluconate (Chlorhexidine 2% Cloth) 3 pack UNSCH  PRN TOP HYGIENIC 

CARE;  Start 8/21/17 at 21:30


Senna/Docusate Sodium (Arlen-Colace) 1 tab BID PO  Last administered on 9/18/ 17at 09:17;  Start 8/22/17 at 09:00


Magnesium Hydroxide (Milk Of Magnesia Liq) 30 ml Q12H  PRN PO MILD - MODERATE 

CONSTIPATION;  Start 8/21/17 at 21:30


Sennosides (Senokot) 17.2 mg Q12H  PRN PO MODERATE - SEVERE CONSTIPATION;  

Start 8/21/17 at 21:30


Bisacodyl (Dulcolax Supp) 10 mg DAILY  PRN RECTAL SEVERE CONSITIPATION;  Start 8 /21/17 at 21:30


Lactulose (Lactulose Liq) 30 ml DAILY  PRN PO SEVERE CONSITIPATION;  Start 8/21/ 17 at 21:30


Sodium Chloride (NS Flush) 2 ml UNSCH  PRN IV FLUSH FLUSH AFTER USING IV ACCESS 

Last administered on 9/25/17at 04:36;  Start 8/21/17 at 22:45


Sodium Chloride (NS Flush) 2 ml BID IV FLUSH  Last administered on 10/3/17at 08:

51;  Start 8/22/17 at 09:00


Ceftriaxone Sodium 2000 mg/ Sodium Chloride 100 ml @  200 mls/hr Q12H IV  Last 

administered on 8/23/17at 08:00;  Start 8/22/17 at 08:00;  Stop 8/23/17 at 18:22

;  Status DC


Pharmacy Profile Note 0 ml @ 0 mls/hr UNSCH OTHER ;  Start 8/21/17 at 22:45;  

Stop 8/22/17 at 15:32;  Status DC


Acyclovir Sodium 1050 mg/Sodium Chloride 150 ml @  150 mls/hr Q8H IV  Last 

administered on 8/22/17at 09:54;  Start 8/22/17 at 01:00;  Stop 8/22/17 at 15:32

;  Status DC


Amphotericin B Liposome 340 mg/ Dextrose 250 ml @  125 mls/hr Q24H IV ;  Start 8 /21/17 at 23:45;  Stop 8/22/17 at 00:37;  Status DC


Sodium Chloride 1,000 ml @  1,000 mls/hr Q1H ONCE IV  Last administered on 8/21/ 17at 22:20;  Start 8/21/17 at 22:33;  Stop 8/21/17 at 23:32;  Status DC


Sodium Chloride 1,000 ml @  1,000 mls/hr Q1H ONCE IV  Last administered on 8/21/ 17at 23:20;  Start 8/21/17 at 22:33;  Stop 8/21/17 at 23:32;  Status DC


Sodium Chloride 100 ml @  1,000 mls/hr Q6M ONCE IV  Last administered on 8/21/ 17at 22:33;  Start 8/21/17 at 22:33;  Stop 8/21/17 at 22:42;  Status DC


Vancomycin HCl 2000 mg/Sodium Chloride 520 ml @  250 mls/hr ONCE  ONCE IV  Last 

administered on 8/22/17at 01:15;  Start 8/22/17 at 01:00;  Stop 8/22/17 at 03:04

;  Status DC


Amphotericin B Liposome 340 mg/ Dextrose 250 ml @  125 mls/hr Q24H IV ;  Start 8 /22/17 at 00:45;  Status Cancel


Amphotericin B Liposome 340 mg/ Dextrose 250 ml @  125 mls/hr Q24H IV  Last 

administered on 8/22/17at 02:00;  Start 8/22/17 at 02:00;  Stop 8/22/17 at 21:57

;  Status DC


Potassium Chloride 100 ml @  50 mls/hr Q2H  PRN IV For Potassium 2.8 - 3.2 mEq/

L Last administered on 8/31/17at 08:06;  Start 8/22/17 at 06:00;  Stop 9/5/17 

at 17:47;  Status DC


Potassium Chloride 100 ml @  50 mls/hr Q2H  PRN IV For Potassium 2.8 - 3.2 mEq/

L Last administered on 8/26/17at 13:04;  Start 8/22/17 at 06:00;  Stop 9/5/17 

at 17:47;  Status DC


Potassium Bicarb/ Potassium Chloride (K-Lyte Cl  Eff) 50 meq UNSCH  PRN PO For 

Potassium 3.3 - 3.5 mEq/L Last administered on 9/3/17at 08:10;  Start 8/22/17 

at 06:00;  Stop 9/5/17 at 17:47;  Status DC


Potassium Chloride 100 ml @  25 mls/hr UNSCH  PRN IV For Potassium 3.3 - 3.5 mEq

/L Last administered on 9/4/17at 06:10;  Start 8/22/17 at 06:00;  Stop 9/5/17 

at 17:47;  Status DC


Potassium Chloride 100 ml @  50 mls/hr Q2H  PRN IV For Potassium 3.3 - 3.5 mEq/

L Last administered on 9/2/17at 12:32;  Start 8/22/17 at 06:00;  Stop 9/5/17 at 

17:47;  Status DC


Magnesium Sulfate 4 gm/Sodium Chloride 100 ml @  50 mls/hr UNSCH  PRN IV For 

Magnesium 0.9 - 1.1 mg/dL;  Start 8/22/17 at 06:00;  Stop 8/30/17 at 12:30;  

Status DC


Magnesium Oxide (Mag-Ox) 800 mg UNSCH  PRN PO For Magnesium 1.2 - 1.6 mg/dL;  

Start 8/22/17 at 06:00;  Stop 8/30/17 at 12:31;  Status DC


Magnesium Sulfate 2 gm/Sodium Chloride 100 ml @  50 mls/hr UNSCH  PRN IV For 

Magnesium 1.2 - 1.6 mg/dL Last administered on 8/25/17at 09:45;  Start 8/22/17 

at 06:00;  Stop 8/30/17 at 12:31;  Status DC


Potassium Phosphate (K-Phos) 2,000 mg Q4H  PRN PO For Phosphorus < 2.5 mg/dL;  

Start 8/22/17 at 06:00;  Stop 9/5/17 at 17:47;  Status DC


Sodium Phosphate 30 mmol/Sodium Chloride 250 ml @  42 mls/hr UNSCH  PRN IV For 

Phosphorus < 2.5 mg/dL;  Start 8/22/17 at 06:00;  Stop 9/5/17 at 17:47;  Status 

DC


Potassium Phosphate (K-Phos) 2,000 mg UNSCH  PRN PO/TUBE SEE LABEL COMMENTS;  

Start 8/22/17 at 06:00;  Stop 9/5/17 at 17:47;  Status DC


Potassium Phosphate 30 mmol/ Sodium Chloride 260 ml @  42 mls/hr UNSCH  PRN IV 

SEE LABEL COMMENTS;  Start 8/22/17 at 06:00;  Stop 9/5/17 at 17:47;  Status DC


Midazolam HCl 100 ml @ 2 mls/hr TITRATE  PRN IV SEDATION Last administered on 8/ 22/17at 07:54;  Start 8/22/17 at 07:00;  Stop 8/22/17 at 13:03;  Status DC


Vancomycin HCl 1500 mg/Sodium Chloride 515 ml @  250 mls/hr Q18H IV ;  Start 8/ 22/17 at 21:00;  Stop 8/22/17 at 21:00;  Status DC


Miscellaneous Information SPECIFIC LAB TO BE DRAWN:VANCOMYCIN TROUGH DATE TO... 

ONCE  ONCE .XX ;  Start 8/24/17 at 08:45;  Stop 8/24/17 at 08:46;  Status Cancel


Fentanyl Citrate 250 ml @ 0 mls/hr TITRATE IV  Last administered on 8/22/17at 14

:38;  Start 8/22/17 at 13:00;  Stop 8/24/17 at 14:33;  Status DC


Dextrose (D50w (Vial) Inj) 25 ml UNSCH  PRN IV PUSH HYPOGLYCEMIA-SEE COMMENTS;  

Start 8/22/17 at 13:00;  Stop 8/30/17 at 20:43;  Status DC


Insulin Human Regular (NovoLIN R SUPPLEMENTAL SCALE) 1 Q6HR SQ ;  Start 8/22/17 

at 18:00;  Stop 8/30/17 at 20:43;  Status DC


Oxacillin Sodium 2 gm/Sodium Chloride 100 ml @  200 mls/hr Q4H IV  Last 

administered on 10/3/17at 05:22;  Start 8/22/17 at 18:00


Pharmacy Profile Note 0 ml @ 0 mls/hr UNSCH OTHER ;  Start 8/22/17 at 15:30


Fluconazole/ Sodium Chloride 400 ml @  100 mls/hr Q24H IV ;  Start 8/22/17 at 18

:00;  Stop 8/22/17 at 18:00;  Status DC


Rifampin 300 mg/ Sodium Chloride 100 ml @  100 mls/hr Q12H IV  Last 

administered on 8/31/17at 07:58;  Start 8/22/17 at 21:00;  Stop 8/31/17 at 14:31

;  Status DC


Gentamicin Sulfate/Sodium Chloride 100 ml @  200 mls/hr Q8H IV  Last 

administered on 8/23/17at 10:13;  Start 8/22/17 at 18:00;  Stop 8/23/17 at 14:36

;  Status DC


Miscellaneous Information SPECIFIC LAB TO BE DRAWN:GENTAMICIN TROUGH DATE TO... 

ONCE  ONCE .XX  Last administered on 8/23/17at 09:50;  Start 8/23/17 at 09:45;  

Stop 8/23/17 at 10:04;  Status DC


Miscellaneous Information SPECIFIC LAB TO BE DRAWN:GENTAMICIN PEAK DATE TO... 

ONCE  ONCE .XX  Last administered on 8/23/17at 11:45;  Start 8/23/17 at 11:30;  

Stop 8/23/17 at 11:31;  Status DC


Fluconazole/ Sodium Chloride 400 ml @  200 mls/hr Q24H IV ;  Start 8/22/17 at 19

:00;  Stop 8/22/17 at 19:00;  Status DC


Fluconazole/ Sodium Chloride 200 ml @  100 mls/hr Q24H IV ;  Start 8/22/17 at 18

:00;  Stop 8/22/17 at 18:00;  Status DC


Fluconazole/ Sodium Chloride 200 ml @  100 mls/hr Q24H IV  Last administered on 

8/26/17at 20:37;  Start 8/22/17 at 20:00;  Stop 8/27/17 at 18:11;  Status DC


Fluconazole/ Sodium Chloride 200 ml @  100 mls/hr Q24H IV  Last administered on 

8/26/17at 22:43;  Start 8/22/17 at 22:00;  Stop 8/27/17 at 18:11;  Status DC


Amphotericin B Liposome 340 mg/ Dextrose 235 ml @  117.5 mls/ hr Q24H IV  Last 

administered on 8/23/17at 02:14;  Start 8/23/17 at 02:00;  Stop 8/23/17 at 18:22

;  Status DC


Bumetanide (Bumex Inj) 1 mg DAILY IV PUSH  Last administered on 8/30/17at 09:51

;  Start 8/23/17 at 10:30;  Stop 8/30/17 at 12:31;  Status DC


Gentamicin Sulfate 100 mg/ Sodium Chloride 102.5 ml @  200 mls/hr Q12H IV  Last 

administered on 8/25/17at 12:13;  Start 8/24/17 at 00:00;  Stop 8/25/17 at 13:07

;  Status DC


Miscellaneous Information SPECIFIC LAB TO BE DRAWN:GENTAMICIN TROUGH DATE TO... 

ONCE  ONCE .XX  Last administered on 8/25/17at 11:45;  Start 8/25/17 at 11:45;  

Stop 8/25/17 at 11:46;  Status DC


Bumetanide (Bumex Inj) 1 mg NOW  ONCE IV PUSH  Last administered on 8/24/17at 11

:20;  Start 8/24/17 at 09:45;  Stop 8/24/17 at 10:22;  Status DC


Bumetanide 100 ml @ 2 mls/hr Q24H IV  Last administered on 8/25/17at 18:43;  

Start 8/24/17 at 12:45;  Stop 8/26/17 at 10:57;  Status DC


Ampicillin Sodium 2000 mg/Sodium Chloride 100 ml @  400 mls/hr Q4H IV  Last 

administered on 10/3/17at 08:48;  Start 8/24/17 at 13:00


Fentanyl Citrate 250 ml @ 0 mls/hr TITRATE IV ;  Start 8/24/17 at 14:45;  Stop 8 /24/17 at 15:53;  Status DC


Fentanyl Citrate 250 ml @  2.5 mls/hr TITRATE IV  Last administered on 8/30/ 17at 18:40;  Start 8/24/17 at 16:00;  Stop 8/31/17 at 18:04;  Status DC


Gentamicin Sulfate/Sodium Chloride 100 ml @  200 mls/hr Q12H IV  Last 

administered on 9/20/17at 12:49;  Start 8/26/17 at 00:00;  Stop 9/20/17 at 14:04

;  Status DC


Miscellaneous Information SPECIFIC LAB TO BE RICCARDO... ONCE  ONCE .XX  Last 

administered on 8/27/17at 11:45;  Start 8/27/17 at 11:45;  Stop 8/27/17 at 11:46

;  Status DC


Gadodiamide (Omniscan Pf Inj) 20 ml STK-MED ONCE IVCONTRAST  Last administered 

on 8/25/17at 17:25;  Start 8/25/17 at 17:25;  Stop 8/25/17 at 17:26;  Status DC


Metoprolol Tartrate (Lopressor Inj) 5 mg STK-MED ONCE .ROUTE  Last administered 

on 8/26/17at 10:58;  Start 8/26/17 at 10:29;  Stop 8/26/17 at 10:30;  Status DC


Diltiazem HCl (Cardizem Inj) 20 mg ONCE  ONCE IV PUSH ;  Start 8/26/17 at 10:30

;  Stop 8/26/17 at 10:40;  Status DC


Diltiazem HCl 125 mg/Sodium Chloride 125 ml @ 5 mls/hr TITRATE  PRN IV 

Tachycardia;  Start 8/26/17 at 10:30;  Stop 8/30/17 at 12:31;  Status DC


Diltiazem HCl (Cardizem Inj) 20 mg UNSCH X1  PRN IV PUSH SEE LABEL COMMENTS;  

Start 8/26/17 at 11:00;  Stop 8/26/17 at 13:00;  Status DC


Metoprolol Tartrate (Lopressor Inj) 2.5 mg Q6H  PRN IV PUSH Heart rate > 100BPM 

Last administered on 8/30/17at 18:55;  Start 8/26/17 at 11:00;  Stop 9/5/17 at 

17:46;  Status DC


Acetaminophen (Ofirmev 1000 Mg/ 100 ml Inj) 1,000 mg Q6H  PRN IV PAIN SCALE 6 

TO 8 Last administered on 9/2/17at 13:57;  Start 8/26/17 at 11:00;  Stop 9/5/17 

at 08:02;  Status DC


Midazolam HCl 100 ml @ 2 mls/hr TITRATE  PRN IV SEDATION Last administered on 9/ 2/17at 20:23;  Start 8/27/17 at 09:30;  Stop 9/3/17 at 10:28;  Status DC


Dexmedetomidine HCl 200 mcg/ Sodium Chloride 52 ml @  5.28 mls/hr Q9H51M PRN IV 

SEDATION Last administered on 8/29/17at 21:33;  Start 8/28/17 at 10:29;  Stop 9/

3/17 at 09:04;  Status DC


Water (Free Water) VOLUME: 300 ML Q8HR G-TUBE  Last administered on 9/3/17at 04:

26;  Start 8/29/17 at 14:00;  Stop 9/3/17 at 08:00;  Status DC


Sodium Chloride 38.5 meq/Sterile Water 1,009.625 ml @ 84 mls/hr Q12H2M IV  Last 

administered on 8/31/17at 01:09;  Start 8/30/17 at 14:00;  Stop 8/31/17 at 14:02

;  Status DC


Potassium Chloride (KCl Powder) 20 meq ONCE  ONCE PO  Last administered on 8/30/ 17at 15:02;  Start 8/30/17 at 12:45;  Stop 8/30/17 at 13:07;  Status DC


Etomidate (Amidate Inj) 20 mg STK-MED ONCE .ROUTE ;  Start 8/30/17 at 18:15;  

Stop 8/30/17 at 18:16;  Status DC


Dexamethasone Sodium Phosphate (Decadron Inj) 4 mg Q8HR IV PUSH  Last 

administered on 9/2/17at 05:50;  Start 8/31/17 at 14:00;  Stop 9/2/17 at 13:59;

  Status DC


Rifampin (Rifampin) 300 mg Q12HR PO  Last administered on 10/3/17at 08:47;  

Start 8/31/17 at 21:00


Fentanyl Citrate 250 ml @ 5 mls/hr TITRATE  PRN IV Sedation Last administered 

on 9/2/17at 20:23;  Start 8/31/17 at 18:15;  Stop 9/3/17 at 10:28;  Status DC


Potassium Chloride 100 ml @  50 mls/hr BOLUS  ONCE IV  Last administered on 9/1/ 17at 14:05;  Start 9/1/17 at 12:00;  Stop 9/1/17 at 13:59;  Status DC


Albuterol/ Ipratropium (Duoneb Neb) 1 ampule Q6HR  NEB NEB  Last administered 

on 9/5/17at 05:25;  Start 9/1/17 at 16:00;  Stop 9/5/17 at 07:34;  Status DC


Methadone HCl (Methadone  Liq) 5 mg ONCE  ONCE NG  Last administered on 9/1/ 17at 23:07;  Start 9/1/17 at 23:00;  Stop 9/1/17 at 23:01;  Status DC


Potassium Chloride (KCl Powder) 20 meq ONCE  ONCE PO  Last administered on 9/3/

17at 08:15;  Start 9/3/17 at 08:15;  Stop 9/3/17 at 08:16;  Status DC


Potassium Chloride 100 ml @  25 mls/hr BOLUS  ONCE IV  Last administered on 9/3/

17at 09:22;  Start 9/3/17 at 08:15;  Stop 9/3/17 at 12:14;  Status DC


Water (Free Water) VOLUME: 300 ML Q6HR G-TUBE  Last administered on 9/4/17at 05:

35;  Start 9/3/17 at 12:00;  Stop 9/4/17 at 09:30;  Status DC


Methadone HCl (Methadone  Liq) 10 mg Q12HR PO  Last administered on 9/5/17at 08:

47;  Start 9/3/17 at 10:00;  Stop 9/5/17 at 15:35;  Status DC


Dexmedetomidine HCl 200 mcg/ Sodium Chloride 52 ml @  5.44 mls/hr TITRATE  PRN 

IV SEDATION Last administered on 9/4/17at 05:40;  Start 9/3/17 at 09:00;  Stop 9 /5/17 at 08:02;  Status DC


Racepinephrine (Racepinephrine 2.25% Neb) 0.5 ml ONCE  ONCE NEB  Last 

administered on 9/4/17at 01:59;  Start 9/4/17 at 01:45;  Stop 9/4/17 at 01:51;  

Status DC


Racepinephrine (Racepinephrine 2.25% Neb) 0.5 ml ONCE  PRN NEB STRIDOR;  Start 9 /4/17 at 01:45;  Stop 9/5/17 at 01:44;  Status DC


Albuterol/ Ipratropium (Duoneb Neb) 1 ampule Q6HR  NEB NEB  Last administered 

on 9/7/17at 22:04;  Start 9/5/17 at 10:00;  Stop 9/9/17 at 09:59;  Status DC


Famotidine (Pepcid) 20 mg BID PO  Last administered on 9/30/17at 08:31;  Start 9 /5/17 at 09:00


Heparin Sodium (Porcine) (Heparin Inj) 5,000 units Q8H SQ  Last administered on 

9/21/17at 18:50;  Start 9/5/17 at 10:00


Furosemide (Lasix) 20 mg DAILY PO  Last administered on 9/7/17at 09:03;  Start 9 /5/17 at 09:00;  Stop 9/7/17 at 15:54;  Status DC


Potassium Chloride (KCl) 20 meq DAILY PO  Last administered on 10/3/17at 08:46;

  Start 9/5/17 at 09:00


Miscellaneous Information SPECIFIC LAB TO BE DRAWN:GENTAMICIN TROUGH DATE TO... 

ONCE  ONCE .XX ;  Start 9/6/17 at 11:45;  Stop 9/6/17 at 11:46;  Status DC


Methadone HCl (Methadone  Liq) 20 mg Q12HR PO  Last administered on 9/15/17at 09

:03;  Start 9/5/17 at 21:00;  Stop 9/15/17 at 17:25;  Status DC


Morphine Sulfate (Morphine Inj) 3 mg Q3H  PRN IV PAIN SCALE 6 TO 10 Last 

administered on 9/25/17at 11:14;  Start 9/5/17 at 15:45;  Stop 9/25/17 at 11:51

;  Status DC


Miscellaneous Information SPECIFIC LAB TO BE DRAWN:GENTAMICIN TROUGH DATE TO... 

ONCE  ONCE .XX  Last administered on 9/7/17at 13:18;  Start 9/7/17 at 11:45;  

Stop 9/7/17 at 11:46;  Status DC


Acetaminophen (Tylenol) 650 mg Q4H  PRN PO HEADACHE;  Start 9/7/17 at 16:00;  

Stop 9/7/17 at 16:00;  Status DC


Acetaminophen (Tylenol) 1,000 mg Q4H  PRN PO HEADACHE Last administered on 9/28/ 17at 15:37;  Start 9/7/17 at 16:00


Furosemide (Lasix) 40 mg DAILY PO  Last administered on 10/3/17at 08:47;  Start 

9/8/17 at 09:00


Furosemide (Lasix) 20 mg ONCE  ONCE PO  Last administered on 9/7/17at 16:55;  

Start 9/7/17 at 16:00;  Stop 9/7/17 at 16:04;  Status DC


Diatrizoate Meglum/ Diatrizoate Sod (Md Gastroview Liq) 18 ml ONCE  ONCE PO  

Last administered on 9/8/17at 16:17;  Start 9/8/17 at 16:00;  Stop 9/8/17 at 16:

01;  Status DC


Iohexol (Omnipaque 350 Inj) 95 ml STK-MED ONCE IVCONTRAST  Last administered on 

9/8/17at 20:45;  Start 9/8/17 at 20:45;  Stop 9/8/17 at 20:46;  Status DC


Furosemide (Lasix Inj) 40 mg ONCE  ONCE IV PUSH  Last administered on 9/10/17at 

18:15;  Start 9/10/17 at 16:45;  Stop 9/10/17 at 17:37;  Status DC


Potassium Chloride (KCl) 80 meq ONCE  ONCE PO ;  Start 9/10/17 at 16:45;  Stop 9

/10/17 at 16:46;  Status UNV


Potassium Chloride (KCl) 60 meq ONCE  ONCE PO  Last administered on 9/10/17at 19

:58;  Start 9/10/17 at 18:45;  Stop 9/10/17 at 18:46;  Status DC


Furosemide (Lasix Inj) 40 mg ONCE  ONCE IV PUSH  Last administered on 9/11/17at 

16:55;  Start 9/11/17 at 16:00;  Stop 9/11/17 at 16:01;  Status DC


Loperamide HCl (Imodium) 2 mg UNSCH  PRN PO DIARRHEA;  Start 9/12/17 at 10:45


Furosemide (Lasix Inj) 40 mg ONCE  ONCE IV PUSH  Last administered on 9/12/17at 

11:08;  Start 9/12/17 at 10:45;  Stop 9/12/17 at 10:46;  Status DC


Furosemide (Lasix Inj) 40 mg ONCE  ONCE IV PUSH  Last administered on 9/13/17at 

17:46;  Start 9/13/17 at 17:30;  Stop 9/13/17 at 17:31;  Status DC


Potassium Chloride (KCl) 40 meq ONCE  ONCE PO  Last administered on 9/13/17at 17

:47;  Start 9/13/17 at 17:30;  Stop 9/13/17 at 17:31;  Status DC


Miscellaneous Information SPECIFIC LAB TO BE RICCARDO... ONCE  ONCE .XX  Last 

administered on 9/15/17at 23:45;  Start 9/15/17 at 23:45;  Stop 9/15/17 at 23:46

;  Status DC


Methadone HCl (Methadone  Liq) 25 mg Q12HR PO  Last administered on 10/3/17at 08

:45;  Start 9/15/17 at 21:00


Gentamicin Sulfate 80 mg/ Sodium Chloride 102 ml @  204 mls/hr Q12H IV ;  Start 

9/19/17 at 00:00;  Stop 9/19/17 at 00:00;  Status DC


Miscellaneous Information SPECIFIC LAB TO BE DRAWN:GENTAMICIN TROUGH DATE TO... 

ONCE  ONCE .XX  Last administered on 9/20/17at 11:45;  Start 9/20/17 at 11:45;  

Stop 9/20/17 at 11:46;  Status DC


Gentamicin Sulfate/Sodium Chloride 100 ml @  200 mls/hr Q18H IV  Last 

administered on 10/3/17at 06:12;  Start 9/21/17 at 06:00


Miscellaneous Information SPECIFIC LAB TO BE DRAWN:GENTAMICIN TROUGH DATE TO... 

ONCE  ONCE .XX  Last administered on 9/23/17at 13:00;  Start 9/23/17 at 11:45;  

Stop 9/23/17 at 11:46;  Status DC


Morphine Sulfate (Roxanol  Liq) 10 mg Q3H  PRN PO pain 6-10 Last administered 

on 10/3/17at 08:51;  Start 9/25/17 at 12:00


Morphine Sulfate (Roxanol  Liq) 5 mg Q3H  PRN PO pain 1-5;  Start 9/25/17 at 12:

00


Miscellaneous Information SPECIFIC LAB TO BE DRAWN:GENTAMICIN TROUGH DATE TO... 

ONCE  ONCE .XX  Last administered on 9/30/17at 07:00;  Start 9/30/17 at 05:45;  

Stop 9/30/17 at 05:46;  Status DC





A/P


Assessment and Plan


A/P





Severe sepsis-resolved


Multiple splenic infarcts 


MSSA meningitis 


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


Prosthetic valve endocarditis


    On IV ampicillin, oxacillin, gentamicin and rifampin per infectious disease 

specialist


   Anticipate 6 week from 1st neg (8/24) followed by  indefinite oral abx 

suppression tx. 


   Stop date for antibiotics 10/05/17


   Continue current treatment





Supraventricular tachycardia-resolved


   Echocardiogram 8 foci 17 revealed an ejection fraction showed 25-30%.


    Continue metoprolol.








Acute hypoxemic respiratory failure


   Resolved


   Continue DuoNeb treatments.  Maintain oxygen saturation above 92%





CVA (cerebral vascular accident)


   CT head negative, MRI small acute and subacute cortical infarct of the left 

parietal lobe.


   taper down methadone 10 mg twice


   Appreciate input from neurology who signed off


   PT to treat and eval





Tricuspid regurgitation


   Continue oral Lasix.





Acute systolic heart failure


   continue Lasix 40 mg po daily.





Acute kidney injury)


   Now resolved. 





Protein calorie malnutrition


   Secondary to long-standing IV drug abuse and endocarditis.





Microcytic anemia


   Continue with oral iron sulfate.


   Monitor H&H








IV drug abuse


   Advised cessation.  Currently on methadone.





Diarrhea, resolved


   C diff. is negative  Continue probiotic.





Anasarca.


   Improved


   Continue Lasix po.


Discharge Planning


patient was on hospice before and would like to go back to hospice upon 

discharge; hospice consulted.


dc home towards the end of the week if stable.











Eneida Javed MD Oct 3, 2017 11:14

## 2017-10-04 VITALS
DIASTOLIC BLOOD PRESSURE: 77 MMHG | HEART RATE: 81 BPM | RESPIRATION RATE: 20 BRPM | TEMPERATURE: 98.5 F | SYSTOLIC BLOOD PRESSURE: 120 MMHG | OXYGEN SATURATION: 99 %

## 2017-10-04 VITALS
RESPIRATION RATE: 18 BRPM | DIASTOLIC BLOOD PRESSURE: 69 MMHG | OXYGEN SATURATION: 100 % | HEART RATE: 79 BPM | SYSTOLIC BLOOD PRESSURE: 109 MMHG | TEMPERATURE: 98.4 F

## 2017-10-04 VITALS
DIASTOLIC BLOOD PRESSURE: 92 MMHG | OXYGEN SATURATION: 100 % | SYSTOLIC BLOOD PRESSURE: 131 MMHG | HEART RATE: 86 BPM | RESPIRATION RATE: 20 BRPM | TEMPERATURE: 99 F

## 2017-10-04 VITALS — OXYGEN SATURATION: 98 %

## 2017-10-04 VITALS
TEMPERATURE: 98.4 F | HEART RATE: 83 BPM | DIASTOLIC BLOOD PRESSURE: 82 MMHG | OXYGEN SATURATION: 98 % | RESPIRATION RATE: 20 BRPM | SYSTOLIC BLOOD PRESSURE: 121 MMHG

## 2017-10-04 VITALS
OXYGEN SATURATION: 98 % | DIASTOLIC BLOOD PRESSURE: 78 MMHG | TEMPERATURE: 98.6 F | RESPIRATION RATE: 18 BRPM | SYSTOLIC BLOOD PRESSURE: 119 MMHG | HEART RATE: 76 BPM

## 2017-10-04 VITALS
SYSTOLIC BLOOD PRESSURE: 118 MMHG | TEMPERATURE: 98.2 F | OXYGEN SATURATION: 99 % | RESPIRATION RATE: 18 BRPM | HEART RATE: 82 BPM | DIASTOLIC BLOOD PRESSURE: 83 MMHG

## 2017-10-04 RX ADMIN — RIFAMPIN SCH MG: 150 CAPSULE ORAL at 08:31

## 2017-10-04 RX ADMIN — HEPARIN SODIUM SCH UNITS: 10000 INJECTION, SOLUTION INTRAVENOUS; SUBCUTANEOUS at 10:00

## 2017-10-04 RX ADMIN — HEPARIN SODIUM SCH UNITS: 10000 INJECTION, SOLUTION INTRAVENOUS; SUBCUTANEOUS at 18:00

## 2017-10-04 RX ADMIN — GENTAMICIN SULFATE SCH MLS/HR: 0.8 INJECTION, SOLUTION INTRAVENOUS at 17:47

## 2017-10-04 RX ADMIN — AMPICILLIN SODIUM SCH MLS/HR: 2 INJECTION, POWDER, FOR SOLUTION INTRAMUSCULAR; INTRAVENOUS at 02:24

## 2017-10-04 RX ADMIN — POTASSIUM CHLORIDE SCH MEQ: 20 TABLET, EXTENDED RELEASE ORAL at 08:32

## 2017-10-04 RX ADMIN — POLYVINYL ALCOHOL SCH DROP: 14 SOLUTION/ DROPS OPHTHALMIC at 08:35

## 2017-10-04 RX ADMIN — MORPHINE SULFATE PRN MG: 100 SOLUTION ORAL at 11:31

## 2017-10-04 RX ADMIN — FAMOTIDINE SCH MG: 20 TABLET, FILM COATED ORAL at 08:31

## 2017-10-04 RX ADMIN — STANDARDIZED SENNA CONCENTRATE AND DOCUSATE SODIUM SCH TAB: 8.6; 5 TABLET, FILM COATED ORAL at 21:00

## 2017-10-04 RX ADMIN — OXACILLIN SODIUM SCH MLS/HR: 2 INJECTION, POWDER, FOR SOLUTION INTRAMUSCULAR; INTRAVENOUS at 18:29

## 2017-10-04 RX ADMIN — OXACILLIN SODIUM SCH MLS/HR: 2 INJECTION, POWDER, FOR SOLUTION INTRAMUSCULAR; INTRAVENOUS at 14:32

## 2017-10-04 RX ADMIN — RIFAMPIN SCH MG: 150 CAPSULE ORAL at 21:23

## 2017-10-04 RX ADMIN — AMPICILLIN SODIUM SCH MLS/HR: 2 INJECTION, POWDER, FOR SOLUTION INTRAMUSCULAR; INTRAVENOUS at 05:35

## 2017-10-04 RX ADMIN — METHADONE HYDROCHLORIDE SCH MG: 5 SOLUTION ORAL at 08:32

## 2017-10-04 RX ADMIN — MORPHINE SULFATE PRN MG: 100 SOLUTION ORAL at 08:37

## 2017-10-04 RX ADMIN — AMPICILLIN SODIUM SCH MLS/HR: 2 INJECTION, POWDER, FOR SOLUTION INTRAMUSCULAR; INTRAVENOUS at 21:19

## 2017-10-04 RX ADMIN — MORPHINE SULFATE PRN MG: 100 SOLUTION ORAL at 01:49

## 2017-10-04 RX ADMIN — Medication SCH ML: at 21:24

## 2017-10-04 RX ADMIN — OXACILLIN SODIUM SCH MLS/HR: 2 INJECTION, POWDER, FOR SOLUTION INTRAMUSCULAR; INTRAVENOUS at 22:02

## 2017-10-04 RX ADMIN — STANDARDIZED SENNA CONCENTRATE AND DOCUSATE SODIUM SCH TAB: 8.6; 5 TABLET, FILM COATED ORAL at 08:31

## 2017-10-04 RX ADMIN — AMPICILLIN SODIUM SCH MLS/HR: 2 INJECTION, POWDER, FOR SOLUTION INTRAMUSCULAR; INTRAVENOUS at 08:31

## 2017-10-04 RX ADMIN — OXACILLIN SODIUM SCH MLS/HR: 2 INJECTION, POWDER, FOR SOLUTION INTRAMUSCULAR; INTRAVENOUS at 03:07

## 2017-10-04 RX ADMIN — OXACILLIN SODIUM SCH MLS/HR: 2 INJECTION, POWDER, FOR SOLUTION INTRAMUSCULAR; INTRAVENOUS at 10:23

## 2017-10-04 RX ADMIN — FAMOTIDINE SCH MG: 20 TABLET, FILM COATED ORAL at 21:00

## 2017-10-04 RX ADMIN — CHLORHEXIDINE GLUCONATE SCH PACK: 500 CLOTH TOPICAL at 03:15

## 2017-10-04 RX ADMIN — OXACILLIN SODIUM SCH MLS/HR: 2 INJECTION, POWDER, FOR SOLUTION INTRAMUSCULAR; INTRAVENOUS at 05:57

## 2017-10-04 RX ADMIN — POLYVINYL ALCOHOL SCH DROP: 14 SOLUTION/ DROPS OPHTHALMIC at 12:50

## 2017-10-04 RX ADMIN — MORPHINE SULFATE PRN MG: 100 SOLUTION ORAL at 05:34

## 2017-10-04 RX ADMIN — GENTAMICIN SULFATE SCH MLS/HR: 0.8 INJECTION, SOLUTION INTRAVENOUS at 01:30

## 2017-10-04 RX ADMIN — MORPHINE SULFATE PRN MG: 100 SOLUTION ORAL at 21:22

## 2017-10-04 RX ADMIN — FUROSEMIDE SCH MG: 20 TABLET ORAL at 08:32

## 2017-10-04 RX ADMIN — METHADONE HYDROCHLORIDE SCH MG: 5 SOLUTION ORAL at 21:18

## 2017-10-04 RX ADMIN — AMPICILLIN SODIUM SCH MLS/HR: 2 INJECTION, POWDER, FOR SOLUTION INTRAMUSCULAR; INTRAVENOUS at 12:39

## 2017-10-04 RX ADMIN — HEPARIN SODIUM SCH UNITS: 10000 INJECTION, SOLUTION INTRAVENOUS; SUBCUTANEOUS at 02:00

## 2017-10-04 RX ADMIN — Medication SCH ML: at 08:35

## 2017-10-04 RX ADMIN — POLYVINYL ALCOHOL SCH DROP: 14 SOLUTION/ DROPS OPHTHALMIC at 17:48

## 2017-10-04 RX ADMIN — AMPICILLIN SODIUM SCH MLS/HR: 2 INJECTION, POWDER, FOR SOLUTION INTRAMUSCULAR; INTRAVENOUS at 17:03

## 2017-10-04 RX ADMIN — MORPHINE SULFATE PRN MG: 100 SOLUTION ORAL at 14:32

## 2017-10-04 RX ADMIN — MORPHINE SULFATE PRN MG: 100 SOLUTION ORAL at 17:47

## 2017-10-04 NOTE — HHI.PR
Subjective


Remarks


in no distress.


no fever.


no new complaints.





Objective


Vitals





Vital Signs








  Date Time  Temp Pulse Resp B/P (MAP) Pulse Ox O2 Delivery O2 Flow Rate FiO2


 


10/4/17 08:00 98.6 78 18 119/78 (92) 98   


 


10/4/17 04:00 98.4 79 18 109/69 (82) 100   


 


10/4/17 00:00 98.5 81 20 120/77 (91) 99   


 


10/3/17 20:00  74      


 


10/3/17 20:00 98.3 76 20 127/87 (100) 100   


 


10/3/17 20:00      Room Air  


 


10/3/17 16:00 98.6 83 20 109/82 (91) 100   


 


10/3/17 12:00 99.0 82 20 121/77 (92) 100   














I/O      


 


 10/3/17 10/3/17 10/3/17 10/4/17 10/4/17 10/4/17





 07:00 15:00 23:00 07:00 15:00 23:00


 


Intake Total 600 ml 400 ml 1100 ml 500 ml  


 


Balance 600 ml 400 ml 1100 ml 500 ml  


 


      


 


Intake Oral   700 ml   


 


IV Total 600 ml 400 ml 400 ml 500 ml  


 


# Voids 5  5 3  


 


# Bowel Movements 1     








Result Diagram:  


10/3/17 1400                                                                   

             10/3/17 1400





Imaging





Last Impressions








Abdomen CT 9/8/17 0000 Signed





Impressions: 





 Service Date/Time:  Friday, September 8, 2017 20:26 - CONCLUSION:  1. 

Peripheral 





 low attenuation lesions in the spleen most characteristic of multiple splenic 





 infarcts. Some of these are new findings since 2016 comparison. 2. Minimal 





 subsegmental airspace disease at the lung bases. 3. 3.4 cm right adnexal cyst. 





 Intrauterine device present. 4. Small fat containing ventral hernias.     

Jaspal Issa MD 


 


Chest X-Ray 9/5/17 0000 Signed





Impressions: 





 Service Date/Time:  Tuesday, September 5, 2017 08:07 - CONCLUSION:  Suspected 





 mild edema/failure.     Ron Swan MD 


 


Neck CT 9/2/17 0000 Signed





Impressions: 





 Service Date/Time:  Saturday, September 2, 2017 11:13 - CONCLUSION:  1. 

Patient 





 is intubated which limits this study. The epiglottis is otherwise normal. 





 Secretions are present in the oropharynx and right sphenoid sinus.     Ej Ewing MD 


 


Brain MRI 8/25/17 0000 Signed





Impressions: 





 Service Date/Time:  Friday, August 25, 2017 17:07 - CONCLUSION:  1. Small 

acute 





 or subacute cortical infarct of the left parietal lobe. 2. Small, faint area 

of 





 nonspecific flair signal abnormality in the left frontal lobe periventricular 





 white matter, nonspecific. No associated mass effect or abnormal enhancement. 

3 





 month followup MRI of the brain recommended. 3.  Mild chronic white matter 





 changes.     Ron Swan MD 


 


Head CT 8/21/17 0000 Signed





Impressions: 





 Service Date/Time:  Monday, August 21, 2017 19:47 - CONCLUSION:  No evidence 

of 





 acute infarct, hemorrhage, mass or edema.     Kristian Frankel MD 








Objective Remarks


GENERAL: This is a well-nourished, well-developed patient, in no apparent 

distress.


CARDIOVASCULAR: Regular rate and regular rhythm without murmurs, gallops, or 

rubs. 


RESPIRATORY: Clear to auscultation. Breath sounds equal bilaterally. No wheezes

, rales, or rhonchi.  


GASTROINTESTINAL: Abdomen soft, non-tender, nondistended. 


Normal, active bowel sounds


MUSCULOSKELETAL: Extremities without clubbing, cyanosis, or edema.


NEURO:  Alert & Oriented x4 to person, place, time, situation.  Moves all ext x4


Procedures


None


Medications and IVs





Current Medications


Midazolam HCl 100 ml @  As Directed STK-MED ONCE .ROUTE ;  Start 8/21/17 at 19:

36;  Stop 8/21/17 at 19:37;  Status DC


Sodium Chloride 1,000 ml @  70 mls/hr [id number] ONCE IV  Last administered on 9/15

/17at 18:50;  Start 8/21/17 at 19:35;  Stop 8/22/17 at 09:52;  Status DC


Etomidate (Amidate Inj) 20 mg ONCE  ONCE IV PUSH  Last administered on 8/21/ 17at 19:31;  Start 8/21/17 at 19:45;  Stop 8/21/17 at 19:46;  Status DC


Succinylcholine Chloride (Quelicin Inj) 100 mg ONCE  ONCE IV PUSH  Last 

administered on 8/21/17at 19:45;  Start 8/21/17 at 19:45;  Stop 8/21/17 at 19:46

;  Status DC


Sodium Chloride 1,000 ml @  999 mls/hr BOLUS  ONCE IV  Last administered on 8/21 /17at 19:45;  Start 8/21/17 at 19:45;  Stop 8/21/17 at 20:45;  Status DC


Midazolam HCl (Versed Inj) 5 mg ONCE  ONCE IV PUSH  Last administered on 8/21/ 17at 19:45;  Start 8/21/17 at 19:45;  Stop 8/21/17 at 19:46;  Status DC


Acetaminophen (Tylenol Supp) 650 mg ONCE  ONCE RECTAL  Last administered on 8/21 /17at 19:45;  Start 8/21/17 at 19:45;  Stop 8/21/17 at 19:46;  Status DC


Midazolam HCl (Versed Inj) 5 mg ONCE  ONCE IV PUSH  Last administered on 8/21/ 17at 20:00;  Start 8/21/17 at 20:00;  Stop 8/21/17 at 20:01;  Status DC


Vecuronium Bromide (Norcuron 10 Mg Inj) 10 mg ONCE  ONCE IV PUSH  Last 

administered on 8/21/17at 20:00;  Start 8/21/17 at 20:00;  Stop 8/21/17 at 20:01

;  Status DC


Vancomycin HCl 1000 mg/Sodium Chloride 250 ml @  250 mls/hr ONCE  ONCE IV ;  

Start 8/21/17 at 20:15;  Stop 8/21/17 at 21:14;  Status Cancel


Ceftriaxone Sodium 2000 mg/ Sodium Chloride 100 ml @  200 mls/hr ONCE  ONCE IV  

Last administered on 8/21/17at 20:15;  Start 8/21/17 at 20:15;  Stop 8/21/17 at 

20:44;  Status DC


Sodium Chloride 1,000 ml @  999 mls/hr BOLUS  ONCE IV  Last administered on 8/21 /17at 20:15;  Start 8/21/17 at 20:15;  Stop 8/21/17 at 21:15;  Status DC


Adenosine (Adenocard Inj) 12 mg ONCE  ONCE IV PUSH  Last administered on 8/21/ 17at 20:15;  Start 8/21/17 at 20:15;  Stop 8/21/17 at 20:16;  Status DC


Propofol 100 ml @ 0 mls/hr TITRATE  PRN IV Ordered RASS Last administered on 8/ 27/17at 10:10;  Start 8/21/17 at 20:15;  Stop 8/27/17 at 20:06;  Status DC


Diltiazem HCl (Cardizem Inj) 20 mg ONCE  ONCE IV  Last administered on 8/21/ 17at 20:30;  Start 8/21/17 at 20:30;  Stop 8/21/17 at 20:31;  Status DC


Ketorolac Tromethamine (Toradol Inj) 30 mg STK-MED ONCE .ROUTE ;  Start 8/21/17 

at 20:47;  Stop 8/21/17 at 20:48;  Status DC


Ketorolac Tromethamine (Toradol Inj) 30 mg ONCE  ONCE IV PUSH  Last 

administered on 8/21/17at 21:00;  Start 8/21/17 at 21:00;  Stop 8/21/17 at 21:02

;  Status DC


Sodium Chloride 1,000 ml @  999 mls/hr BOLUS  ONCE IV  Last administered on 8/21 /17at 21:00;  Start 8/21/17 at 21:00;  Stop 8/21/17 at 22:00;  Status DC


Acyclovir Sodium 1000 mg/Sodium Chloride 150 ml @  150 mls/hr ONCE  ONCE IV ;  

Start 8/21/17 at 21:15;  Stop 8/21/17 at 22:14;  Status DC


Norepinephrine Bitartrate 250 ml @  7.5 mls/hr TITRATE  PRN IV Blood pressure 

management Last administered on 8/23/17at 01:15;  Start 8/21/17 at 21:30;  Stop 

8/30/17 at 12:30;  Status DC


Terbutaline Sulfate (Brethine Inj) 1 mg UNSCH  PRN SQ For Extravasation;  Start 

8/21/17 at 21:30


Norepinephrine Bitartrate (Levophed Inj) 4 mg STK-MED ONCE .ROUTE ;  Start 8/21/ 17 at 21:17;  Stop 8/21/17 at 21:18;  Status DC


Sodium Chloride (NS Flush) 2 ml UNSCH  PRN .XX FLUSH AFTER USING IV ACCESS;  

Start 8/21/17 at 21:30;  Stop 8/21/17 at 22:59;  Status DC


Sodium Chloride (NS Flush) 2 ml BID .XX ;  Start 8/22/17 at 09:00;  Stop 8/22/ 17 at 09:00;  Status DC


Acetaminophen (Tylenol) 650 mg Q6H  PRN PO FEVER >101F Last administered on 10/1

/17at 15:12;  Start 8/21/17 at 21:30


Morphine Sulfate (Morphine Inj) 2 mg Q2H  PRN IV PAIN SCALE 6 TO 10 Last 

administered on 9/5/17at 11:12;  Start 8/21/17 at 21:30;  Stop 9/5/17 at 15:35;

  Status DC


Famotidine (Pepcid Inj) 20 mg Q12HR IV PUSH  Last administered on 9/4/17at 19:59

;  Start 8/22/17 at 09:00;  Stop 9/5/17 at 08:02;  Status DC


Midazolam HCl (Versed Inj) 2 mg Q1H  PRN IV SEDATION;  Start 8/21/17 at 21:30;  

Stop 8/22/17 at 13:03;  Status DC


Artificial Tears (Tears Naturale Opth Soln) 1 drop TID EACH EYE  Last 

administered on 10/4/17at 08:35;  Start 8/22/17 at 09:00


Ondansetron HCl (Zofran Inj) 4 mg Q6H  PRN IV NAUSEA OR VOMITING Last 

administered on 9/8/17at 12:47;  Start 8/21/17 at 21:30


Albuterol/ Ipratropium (Duoneb Neb) 1 ampule Q2HR NEB  PRN INH WHEEZING Last 

administered on 9/5/17at 07:35;  Start 8/21/17 at 21:30


Heparin Sodium (Porcine) (Heparin Inj) 5,000 units Q12H SQ  Last administered 

on 9/4/17at 20:00;  Start 8/21/17 at 22:00;  Stop 9/5/17 at 08:03;  Status DC


Miscellaneous Information 1 [id number]  Last administered on 8/21/17at 21:30;  

Start 8/21/17 at 21:30


Chlorhexidine Gluconate (Chlorhexidine 2% Cloth) Taper DAILY@04 TOP  Last 

administered on 8/28/17at 00:07;  Start 8/22/17 at 04:00;  Stop 8/18/18 at 03:59


Chlorhexidine Gluconate (Chlorhexidine 2% Cloth) 3 pack UNSCH  PRN TOP HYGIENIC 

CARE;  Start 8/21/17 at 21:30


Senna/Docusate Sodium (Arlen-Colace) 1 tab BID PO  Last administered on 10/4/

17at 08:31;  Start 8/22/17 at 09:00


Magnesium Hydroxide (Milk Of Magnesia Liq) 30 ml Q12H  PRN PO MILD - MODERATE 

CONSTIPATION;  Start 8/21/17 at 21:30


Sennosides (Senokot) 17.2 mg Q12H  PRN PO MODERATE - SEVERE CONSTIPATION;  

Start 8/21/17 at 21:30


Bisacodyl (Dulcolax Supp) 10 mg DAILY  PRN RECTAL SEVERE CONSITIPATION;  Start 8 /21/17 at 21:30


Lactulose (Lactulose Liq) 30 ml DAILY  PRN PO SEVERE CONSITIPATION;  Start 8/21/ 17 at 21:30


Sodium Chloride (NS Flush) 2 ml UNSCH  PRN IV FLUSH FLUSH AFTER USING IV ACCESS 

Last administered on 9/25/17at 04:36;  Start 8/21/17 at 22:45


Sodium Chloride (NS Flush) 2 ml BID IV FLUSH  Last administered on 10/4/17at 08:

35;  Start 8/22/17 at 09:00


Ceftriaxone Sodium 2000 mg/ Sodium Chloride 100 ml @  200 mls/hr Q12H IV  Last 

administered on 8/23/17at 08:00;  Start 8/22/17 at 08:00;  Stop 8/23/17 at 18:22

;  Status DC


Pharmacy Profile Note 0 ml @ 0 mls/hr UNSCH OTHER ;  Start 8/21/17 at 22:45;  

Stop 8/22/17 at 15:32;  Status DC


Acyclovir Sodium 1050 mg/Sodium Chloride 150 ml @  150 mls/hr Q8H IV  Last 

administered on 8/22/17at 09:54;  Start 8/22/17 at 01:00;  Stop 8/22/17 at 15:32

;  Status DC


Amphotericin B Liposome 340 mg/ Dextrose 250 ml @  125 mls/hr Q24H IV ;  Start 8 /21/17 at 23:45;  Stop 8/22/17 at 00:37;  Status DC


Sodium Chloride 1,000 ml @  1,000 mls/hr Q1H ONCE IV  Last administered on 8/21/ 17at 22:20;  Start 8/21/17 at 22:33;  Stop 8/21/17 at 23:32;  Status DC


Sodium Chloride 1,000 ml @  1,000 mls/hr Q1H ONCE IV  Last administered on 8/21/ 17at 23:20;  Start 8/21/17 at 22:33;  Stop 8/21/17 at 23:32;  Status DC


Sodium Chloride 100 ml @  1,000 mls/hr Q6M ONCE IV  Last administered on 8/21/ 17at 22:33;  Start 8/21/17 at 22:33;  Stop 8/21/17 at 22:42;  Status DC


Vancomycin HCl 2000 mg/Sodium Chloride 520 ml @  250 mls/hr ONCE  ONCE IV  Last 

administered on 8/22/17at 01:15;  Start 8/22/17 at 01:00;  Stop 8/22/17 at 03:04

;  Status DC


Amphotericin B Liposome 340 mg/ Dextrose 250 ml @  125 mls/hr Q24H IV ;  Start 8 /22/17 at 00:45;  Status Cancel


Amphotericin B Liposome 340 mg/ Dextrose 250 ml @  125 mls/hr Q24H IV  Last 

administered on 8/22/17at 02:00;  Start 8/22/17 at 02:00;  Stop 8/22/17 at 21:57

;  Status DC


Potassium Chloride 100 ml @  50 mls/hr Q2H  PRN IV For Potassium 2.8 - 3.2 mEq/

L Last administered on 8/31/17at 08:06;  Start 8/22/17 at 06:00;  Stop 9/5/17 

at 17:47;  Status DC


Potassium Chloride 100 ml @  50 mls/hr Q2H  PRN IV For Potassium 2.8 - 3.2 mEq/

L Last administered on 8/26/17at 13:04;  Start 8/22/17 at 06:00;  Stop 9/5/17 

at 17:47;  Status DC


Potassium Bicarb/ Potassium Chloride (K-Lyte Cl  Eff) 50 meq UNSCH  PRN PO For 

Potassium 3.3 - 3.5 mEq/L Last administered on 9/3/17at 08:10;  Start 8/22/17 

at 06:00;  Stop 9/5/17 at 17:47;  Status DC


Potassium Chloride 100 ml @  25 mls/hr UNSCH  PRN IV For Potassium 3.3 - 3.5 mEq

/L Last administered on 9/4/17at 06:10;  Start 8/22/17 at 06:00;  Stop 9/5/17 

at 17:47;  Status DC


Potassium Chloride 100 ml @  50 mls/hr Q2H  PRN IV For Potassium 3.3 - 3.5 mEq/

L Last administered on 9/2/17at 12:32;  Start 8/22/17 at 06:00;  Stop 9/5/17 at 

17:47;  Status DC


Magnesium Sulfate 4 gm/Sodium Chloride 100 ml @  50 mls/hr UNSCH  PRN IV For 

Magnesium 0.9 - 1.1 mg/dL;  Start 8/22/17 at 06:00;  Stop 8/30/17 at 12:30;  

Status DC


Magnesium Oxide (Mag-Ox) 800 mg UNSCH  PRN PO For Magnesium 1.2 - 1.6 mg/dL;  

Start 8/22/17 at 06:00;  Stop 8/30/17 at 12:31;  Status DC


Magnesium Sulfate 2 gm/Sodium Chloride 100 ml @  50 mls/hr UNSCH  PRN IV For 

Magnesium 1.2 - 1.6 mg/dL Last administered on 8/25/17at 09:45;  Start 8/22/17 

at 06:00;  Stop 8/30/17 at 12:31;  Status DC


Potassium Phosphate (K-Phos) 2,000 mg Q4H  PRN PO For Phosphorus < 2.5 mg/dL;  

Start 8/22/17 at 06:00;  Stop 9/5/17 at 17:47;  Status DC


Sodium Phosphate 30 mmol/Sodium Chloride 250 ml @  42 mls/hr UNSCH  PRN IV For 

Phosphorus < 2.5 mg/dL;  Start 8/22/17 at 06:00;  Stop 9/5/17 at 17:47;  Status 

DC


Potassium Phosphate (K-Phos) 2,000 mg UNSCH  PRN PO/TUBE SEE LABEL COMMENTS;  

Start 8/22/17 at 06:00;  Stop 9/5/17 at 17:47;  Status DC


Potassium Phosphate 30 mmol/ Sodium Chloride 260 ml @  42 mls/hr UNSCH  PRN IV 

SEE LABEL COMMENTS;  Start 8/22/17 at 06:00;  Stop 9/5/17 at 17:47;  Status DC


Midazolam HCl 100 ml @ 2 mls/hr TITRATE  PRN IV SEDATION Last administered on 8/ 22/17at 07:54;  Start 8/22/17 at 07:00;  Stop 8/22/17 at 13:03;  Status DC


Vancomycin HCl 1500 mg/Sodium Chloride 515 ml @  250 mls/hr Q18H IV ;  Start 8/ 22/17 at 21:00;  Stop 8/22/17 at 21:00;  Status DC


Miscellaneous Information SPECIFIC LAB TO BE DRAWN:VANCOMYCIN TROUGH DATE TO... 

ONCE  ONCE .XX ;  Start 8/24/17 at 08:45;  Stop 8/24/17 at 08:46;  Status Cancel


Fentanyl Citrate 250 ml @ 0 mls/hr TITRATE IV  Last administered on 8/22/17at 14

:38;  Start 8/22/17 at 13:00;  Stop 8/24/17 at 14:33;  Status DC


Dextrose (D50w (Vial) Inj) 25 ml UNSCH  PRN IV PUSH HYPOGLYCEMIA-SEE COMMENTS;  

Start 8/22/17 at 13:00;  Stop 8/30/17 at 20:43;  Status DC


Insulin Human Regular (NovoLIN R SUPPLEMENTAL SCALE) 1 Q6HR SQ ;  Start 8/22/17 

at 18:00;  Stop 8/30/17 at 20:43;  Status DC


Oxacillin Sodium 2 gm/Sodium Chloride 100 ml @  200 mls/hr Q4H IV  Last 

administered on 10/4/17at 05:57;  Start 8/22/17 at 18:00


Pharmacy Profile Note 0 ml @ 0 mls/hr UNSCH OTHER ;  Start 8/22/17 at 15:30


Fluconazole/ Sodium Chloride 400 ml @  100 mls/hr Q24H IV ;  Start 8/22/17 at 18

:00;  Stop 8/22/17 at 18:00;  Status DC


Rifampin 300 mg/ Sodium Chloride 100 ml @  100 mls/hr Q12H IV  Last 

administered on 8/31/17at 07:58;  Start 8/22/17 at 21:00;  Stop 8/31/17 at 14:31

;  Status DC


Gentamicin Sulfate/Sodium Chloride 100 ml @  200 mls/hr Q8H IV  Last 

administered on 8/23/17at 10:13;  Start 8/22/17 at 18:00;  Stop 8/23/17 at 14:36

;  Status DC


Miscellaneous Information SPECIFIC LAB TO BE DRAWN:GENTAMICIN TROUGH DATE TO... 

ONCE  ONCE .XX  Last administered on 8/23/17at 09:50;  Start 8/23/17 at 09:45;  

Stop 8/23/17 at 10:04;  Status DC


Miscellaneous Information SPECIFIC LAB TO BE DRAWN:GENTAMICIN PEAK DATE TO... 

ONCE  ONCE .XX  Last administered on 8/23/17at 11:45;  Start 8/23/17 at 11:30;  

Stop 8/23/17 at 11:31;  Status DC


Fluconazole/ Sodium Chloride 400 ml @  200 mls/hr Q24H IV ;  Start 8/22/17 at 19

:00;  Stop 8/22/17 at 19:00;  Status DC


Fluconazole/ Sodium Chloride 200 ml @  100 mls/hr Q24H IV ;  Start 8/22/17 at 18

:00;  Stop 8/22/17 at 18:00;  Status DC


Fluconazole/ Sodium Chloride 200 ml @  100 mls/hr Q24H IV  Last administered on 

8/26/17at 20:37;  Start 8/22/17 at 20:00;  Stop 8/27/17 at 18:11;  Status DC


Fluconazole/ Sodium Chloride 200 ml @  100 mls/hr Q24H IV  Last administered on 

8/26/17at 22:43;  Start 8/22/17 at 22:00;  Stop 8/27/17 at 18:11;  Status DC


Amphotericin B Liposome 340 mg/ Dextrose 235 ml @  117.5 mls/ hr Q24H IV  Last 

administered on 8/23/17at 02:14;  Start 8/23/17 at 02:00;  Stop 8/23/17 at 18:22

;  Status DC


Bumetanide (Bumex Inj) 1 mg DAILY IV PUSH  Last administered on 8/30/17at 09:51

;  Start 8/23/17 at 10:30;  Stop 8/30/17 at 12:31;  Status DC


Gentamicin Sulfate 100 mg/ Sodium Chloride 102.5 ml @  200 mls/hr Q12H IV  Last 

administered on 8/25/17at 12:13;  Start 8/24/17 at 00:00;  Stop 8/25/17 at 13:07

;  Status DC


Miscellaneous Information SPECIFIC LAB TO BE DRAWN:GENTAMICIN TROUGH DATE TO... 

ONCE  ONCE .XX  Last administered on 8/25/17at 11:45;  Start 8/25/17 at 11:45;  

Stop 8/25/17 at 11:46;  Status DC


Bumetanide (Bumex Inj) 1 mg NOW  ONCE IV PUSH  Last administered on 8/24/17at 11

:20;  Start 8/24/17 at 09:45;  Stop 8/24/17 at 10:22;  Status DC


Bumetanide 100 ml @ 2 mls/hr Q24H IV  Last administered on 8/25/17at 18:43;  

Start 8/24/17 at 12:45;  Stop 8/26/17 at 10:57;  Status DC


Ampicillin Sodium 2000 mg/Sodium Chloride 100 ml @  400 mls/hr Q4H IV  Last 

administered on 10/4/17at 08:31;  Start 8/24/17 at 13:00


Fentanyl Citrate 250 ml @ 0 mls/hr TITRATE IV ;  Start 8/24/17 at 14:45;  Stop 8 /24/17 at 15:53;  Status DC


Fentanyl Citrate 250 ml @  2.5 mls/hr TITRATE IV  Last administered on 8/30/ 17at 18:40;  Start 8/24/17 at 16:00;  Stop 8/31/17 at 18:04;  Status DC


Gentamicin Sulfate/Sodium Chloride 100 ml @  200 mls/hr Q12H IV  Last 

administered on 9/20/17at 12:49;  Start 8/26/17 at 00:00;  Stop 9/20/17 at 14:04

;  Status DC


Miscellaneous Information SPECIFIC LAB TO BE RICCARDO... ONCE  ONCE .XX  Last 

administered on 8/27/17at 11:45;  Start 8/27/17 at 11:45;  Stop 8/27/17 at 11:46

;  Status DC


Gadodiamide (Omniscan Pf Inj) 20 ml STK-MED ONCE IVCONTRAST  Last administered 

on 8/25/17at 17:25;  Start 8/25/17 at 17:25;  Stop 8/25/17 at 17:26;  Status DC


Metoprolol Tartrate (Lopressor Inj) 5 mg STK-MED ONCE .ROUTE  Last administered 

on 8/26/17at 10:58;  Start 8/26/17 at 10:29;  Stop 8/26/17 at 10:30;  Status DC


Diltiazem HCl (Cardizem Inj) 20 mg ONCE  ONCE IV PUSH ;  Start 8/26/17 at 10:30

;  Stop 8/26/17 at 10:40;  Status DC


Diltiazem HCl 125 mg/Sodium Chloride 125 ml @ 5 mls/hr TITRATE  PRN IV 

Tachycardia;  Start 8/26/17 at 10:30;  Stop 8/30/17 at 12:31;  Status DC


Diltiazem HCl (Cardizem Inj) 20 mg UNSCH X1  PRN IV PUSH SEE LABEL COMMENTS;  

Start 8/26/17 at 11:00;  Stop 8/26/17 at 13:00;  Status DC


Metoprolol Tartrate (Lopressor Inj) 2.5 mg Q6H  PRN IV PUSH Heart rate > 100BPM 

Last administered on 8/30/17at 18:55;  Start 8/26/17 at 11:00;  Stop 9/5/17 at 

17:46;  Status DC


Acetaminophen (Ofirmev 1000 Mg/ 100 ml Inj) 1,000 mg Q6H  PRN IV PAIN SCALE 6 

TO 8 Last administered on 9/2/17at 13:57;  Start 8/26/17 at 11:00;  Stop 9/5/17 

at 08:02;  Status DC


Midazolam HCl 100 ml @ 2 mls/hr TITRATE  PRN IV SEDATION Last administered on 9/ 2/17at 20:23;  Start 8/27/17 at 09:30;  Stop 9/3/17 at 10:28;  Status DC


Dexmedetomidine HCl 200 mcg/ Sodium Chloride 52 ml @  5.28 mls/hr Q9H51M PRN IV 

SEDATION Last administered on 8/29/17at 21:33;  Start 8/28/17 at 10:29;  Stop 9/

3/17 at 09:04;  Status DC


Water (Free Water) VOLUME: 300 ML Q8HR G-TUBE  Last administered on 9/3/17at 04:

26;  Start 8/29/17 at 14:00;  Stop 9/3/17 at 08:00;  Status DC


Sodium Chloride 38.5 meq/Sterile Water 1,009.625 ml @ 84 mls/hr Q12H2M IV  Last 

administered on 8/31/17at 01:09;  Start 8/30/17 at 14:00;  Stop 8/31/17 at 14:02

;  Status DC


Potassium Chloride (KCl Powder) 20 meq ONCE  ONCE PO  Last administered on 8/30/ 17at 15:02;  Start 8/30/17 at 12:45;  Stop 8/30/17 at 13:07;  Status DC


Etomidate (Amidate Inj) 20 mg STK-MED ONCE .ROUTE ;  Start 8/30/17 at 18:15;  

Stop 8/30/17 at 18:16;  Status DC


Dexamethasone Sodium Phosphate (Decadron Inj) 4 mg Q8HR IV PUSH  Last 

administered on 9/2/17at 05:50;  Start 8/31/17 at 14:00;  Stop 9/2/17 at 13:59;

  Status DC


Rifampin (Rifampin) 300 mg Q12HR PO  Last administered on 10/4/17at 08:31;  

Start 8/31/17 at 21:00


Fentanyl Citrate 250 ml @ 5 mls/hr TITRATE  PRN IV Sedation Last administered 

on 9/2/17at 20:23;  Start 8/31/17 at 18:15;  Stop 9/3/17 at 10:28;  Status DC


Potassium Chloride 100 ml @  50 mls/hr BOLUS  ONCE IV  Last administered on 9/1/ 17at 14:05;  Start 9/1/17 at 12:00;  Stop 9/1/17 at 13:59;  Status DC


Albuterol/ Ipratropium (Duoneb Neb) 1 ampule Q6HR  NEB NEB  Last administered 

on 9/5/17at 05:25;  Start 9/1/17 at 16:00;  Stop 9/5/17 at 07:34;  Status DC


Methadone HCl (Methadone  Liq) 5 mg ONCE  ONCE NG  Last administered on 9/1/ 17at 23:07;  Start 9/1/17 at 23:00;  Stop 9/1/17 at 23:01;  Status DC


Potassium Chloride (KCl Powder) 20 meq ONCE  ONCE PO  Last administered on 9/3/

17at 08:15;  Start 9/3/17 at 08:15;  Stop 9/3/17 at 08:16;  Status DC


Potassium Chloride 100 ml @  25 mls/hr BOLUS  ONCE IV  Last administered on 9/3/

17at 09:22;  Start 9/3/17 at 08:15;  Stop 9/3/17 at 12:14;  Status DC


Water (Free Water) VOLUME: 300 ML Q6HR G-TUBE  Last administered on 9/4/17at 05:

35;  Start 9/3/17 at 12:00;  Stop 9/4/17 at 09:30;  Status DC


Methadone HCl (Methadone  Liq) 10 mg Q12HR PO  Last administered on 9/5/17at 08:

47;  Start 9/3/17 at 10:00;  Stop 9/5/17 at 15:35;  Status DC


Dexmedetomidine HCl 200 mcg/ Sodium Chloride 52 ml @  5.44 mls/hr TITRATE  PRN 

IV SEDATION Last administered on 9/4/17at 05:40;  Start 9/3/17 at 09:00;  Stop 9 /5/17 at 08:02;  Status DC


Racepinephrine (Racepinephrine 2.25% Neb) 0.5 ml ONCE  ONCE NEB  Last 

administered on 9/4/17at 01:59;  Start 9/4/17 at 01:45;  Stop 9/4/17 at 01:51;  

Status DC


Racepinephrine (Racepinephrine 2.25% Neb) 0.5 ml ONCE  PRN NEB STRIDOR;  Start 9 /4/17 at 01:45;  Stop 9/5/17 at 01:44;  Status DC


Albuterol/ Ipratropium (Duoneb Neb) 1 ampule Q6HR  NEB NEB  Last administered 

on 9/7/17at 22:04;  Start 9/5/17 at 10:00;  Stop 9/9/17 at 09:59;  Status DC


Famotidine (Pepcid) 20 mg BID PO  Last administered on 10/4/17at 08:31;  Start 9 /5/17 at 09:00


Heparin Sodium (Porcine) (Heparin Inj) 5,000 units Q8H SQ  Last administered on 

9/21/17at 18:50;  Start 9/5/17 at 10:00


Furosemide (Lasix) 20 mg DAILY PO  Last administered on 9/7/17at 09:03;  Start 9 /5/17 at 09:00;  Stop 9/7/17 at 15:54;  Status DC


Potassium Chloride (KCl) 20 meq DAILY PO  Last administered on 10/4/17at 08:32;

  Start 9/5/17 at 09:00


Miscellaneous Information SPECIFIC LAB TO BE DRAWN:GENTAMICIN TROUGH DATE TO... 

ONCE  ONCE .XX ;  Start 9/6/17 at 11:45;  Stop 9/6/17 at 11:46;  Status DC


Methadone HCl (Methadone  Liq) 20 mg Q12HR PO  Last administered on 9/15/17at 09

:03;  Start 9/5/17 at 21:00;  Stop 9/15/17 at 17:25;  Status DC


Morphine Sulfate (Morphine Inj) 3 mg Q3H  PRN IV PAIN SCALE 6 TO 10 Last 

administered on 9/25/17at 11:14;  Start 9/5/17 at 15:45;  Stop 9/25/17 at 11:51

;  Status DC


Miscellaneous Information SPECIFIC LAB TO BE DRAWN:GENTAMICIN TROUGH DATE TO... 

ONCE  ONCE .XX  Last administered on 9/7/17at 13:18;  Start 9/7/17 at 11:45;  

Stop 9/7/17 at 11:46;  Status DC


Acetaminophen (Tylenol) 650 mg Q4H  PRN PO HEADACHE;  Start 9/7/17 at 16:00;  

Stop 9/7/17 at 16:00;  Status DC


Acetaminophen (Tylenol) 1,000 mg Q4H  PRN PO HEADACHE Last administered on 9/28/ 17at 15:37;  Start 9/7/17 at 16:00


Furosemide (Lasix) 40 mg DAILY PO  Last administered on 10/4/17at 08:32;  Start 

9/8/17 at 09:00


Furosemide (Lasix) 20 mg ONCE  ONCE PO  Last administered on 9/7/17at 16:55;  

Start 9/7/17 at 16:00;  Stop 9/7/17 at 16:04;  Status DC


Diatrizoate Meglum/ Diatrizoate Sod (Md Gastroview Liq) 18 ml ONCE  ONCE PO  

Last administered on 9/8/17at 16:17;  Start 9/8/17 at 16:00;  Stop 9/8/17 at 16:

01;  Status DC


Iohexol (Omnipaque 350 Inj) 95 ml STK-MED ONCE IVCONTRAST  Last administered on 

9/8/17at 20:45;  Start 9/8/17 at 20:45;  Stop 9/8/17 at 20:46;  Status DC


Furosemide (Lasix Inj) 40 mg ONCE  ONCE IV PUSH  Last administered on 9/10/17at 

18:15;  Start 9/10/17 at 16:45;  Stop 9/10/17 at 17:37;  Status DC


Potassium Chloride (KCl) 80 meq ONCE  ONCE PO ;  Start 9/10/17 at 16:45;  Stop 9

/10/17 at 16:46;  Status UNV


Potassium Chloride (KCl) 60 meq ONCE  ONCE PO  Last administered on 9/10/17at 19

:58;  Start 9/10/17 at 18:45;  Stop 9/10/17 at 18:46;  Status DC


Furosemide (Lasix Inj) 40 mg ONCE  ONCE IV PUSH  Last administered on 9/11/17at 

16:55;  Start 9/11/17 at 16:00;  Stop 9/11/17 at 16:01;  Status DC


Loperamide HCl (Imodium) 2 mg UNSCH  PRN PO DIARRHEA;  Start 9/12/17 at 10:45


Furosemide (Lasix Inj) 40 mg ONCE  ONCE IV PUSH  Last administered on 9/12/17at 

11:08;  Start 9/12/17 at 10:45;  Stop 9/12/17 at 10:46;  Status DC


Furosemide (Lasix Inj) 40 mg ONCE  ONCE IV PUSH  Last administered on 9/13/17at 

17:46;  Start 9/13/17 at 17:30;  Stop 9/13/17 at 17:31;  Status DC


Potassium Chloride (KCl) 40 meq ONCE  ONCE PO  Last administered on 9/13/17at 17

:47;  Start 9/13/17 at 17:30;  Stop 9/13/17 at 17:31;  Status DC


Miscellaneous Information SPECIFIC LAB TO BE RICCARDO... ONCE  ONCE .XX  Last 

administered on 9/15/17at 23:45;  Start 9/15/17 at 23:45;  Stop 9/15/17 at 23:46

;  Status DC


Methadone HCl (Methadone  Liq) 25 mg Q12HR PO  Last administered on 10/3/17at 08

:45;  Start 9/15/17 at 21:00;  Stop 10/3/17 at 11:15;  Status DC


Gentamicin Sulfate 80 mg/ Sodium Chloride 102 ml @  204 mls/hr Q12H IV ;  Start 

9/19/17 at 00:00;  Stop 9/19/17 at 00:00;  Status DC


Miscellaneous Information SPECIFIC LAB TO BE DRAWN:GENTAMICIN TROUGH DATE TO... 

ONCE  ONCE .XX  Last administered on 9/20/17at 11:45;  Start 9/20/17 at 11:45;  

Stop 9/20/17 at 11:46;  Status DC


Gentamicin Sulfate/Sodium Chloride 100 ml @  200 mls/hr Q18H IV  Last 

administered on 10/4/17at 01:30;  Start 9/21/17 at 06:00


Miscellaneous Information SPECIFIC LAB TO BE DRAWN:GENTAMICIN TROUGH DATE TO... 

ONCE  ONCE .XX  Last administered on 9/23/17at 13:00;  Start 9/23/17 at 11:45;  

Stop 9/23/17 at 11:46;  Status DC


Morphine Sulfate (Roxanol  Liq) 10 mg Q3H  PRN PO pain 6-10 Last administered 

on 10/4/17at 08:37;  Start 9/25/17 at 12:00


Morphine Sulfate (Roxanol  Liq) 5 mg Q3H  PRN PO pain 1-5;  Start 9/25/17 at 12:

00


Miscellaneous Information SPECIFIC LAB TO BE DRAWN:GENTAMICIN TROUGH DATE TO... 

ONCE  ONCE .XX  Last administered on 9/30/17at 07:00;  Start 9/30/17 at 05:45;  

Stop 9/30/17 at 05:46;  Status DC


Methadone HCl (Methadone  Liq) 10 mg Q12HR PO  Last administered on 10/4/17at 08

:32;  Start 10/3/17 at 21:00


Potassium Chloride (KCl) 40 meq ONCE  ONCE PO  Last administered on 10/3/17at 18

:15;  Start 10/3/17 at 17:45;  Stop 10/3/17 at 18:05;  Status DC





A/P


Assessment and Plan


A/P





Severe sepsis-resolved


Multiple splenic infarcts 


MSSA meningitis 


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


Prosthetic valve endocarditis


    On IV ampicillin, oxacillin, gentamicin and rifampin per infectious disease 

specialist


   Anticipate 6 week from 1st neg (8/24) followed by  indefinite oral abx 

suppression tx. 


   Stop date for antibiotics 10/05/17


   Continue current treatment





Supraventricular tachycardia-resolved


   Echocardiogram 8 foci 17 revealed an ejection fraction showed 25-30%.


    Continue metoprolol.








Acute hypoxemic respiratory failure


   Resolved


   Continue DuoNeb treatments.  Maintain oxygen saturation above 92%





CVA (cerebral vascular accident)


   CT head negative, MRI small acute and subacute cortical infarct of the left 

parietal lobe.


   taper down methadone 5 mg twice daily.


   Appreciate input from neurology who signed off


   PT to treat and eval





Tricuspid regurgitation


   Continue oral Lasix.





Acute systolic heart failure


   continue Lasix 40 mg po daily.





Acute kidney injury)


   Now resolved. 





Protein calorie malnutrition


   Secondary to long-standing IV drug abuse and endocarditis.





Microcytic anemia


   Continue with oral iron sulfate.


   Monitor H&H








IV drug abuse


   Advised cessation.  Currently on methadone.





Diarrhea, resolved


   C diff. is negative  Continue probiotic.





Anasarca.


   Improved


   Continue Lasix po.


Discharge Planning


patient was on hospice before and would like to go back to hospice upon 

discharge; hospice consulted.


dc home towards the end of the week if stable.











Eneida Javed MD Oct 4, 2017 09:48

## 2017-10-04 NOTE — HHI.IDPN
Subjective


Subjective


Remarks


delayed entry


pt seen around 1330


 


pt is doing  better


co BLE edema


afebrile


denies tinnitus or hearing problems


Antibiotics


ampicillin 


oxacillin 


gent 


rifampin


Allergies:  


Coded Allergies:  


     No Known Allergies (Verified , 2/24/16)





Objective


.





Vital Signs








  Date Time  Temp Pulse Resp B/P (MAP) Pulse Ox O2 Delivery O2 Flow Rate FiO2


 


10/4/17 20:00 98.2 82 18 118/83 (95) 99   


 


10/4/17 16:00 98.4 83 20 121/82 (95) 98   


 


10/4/17 12:00 99.0 86 20 131/92 (105) 100   


 


10/4/17 11:16     98   


 


10/4/17 08:00     96 Room Air  21


 


10/4/17 08:00 98.6 78 18 119/78 (92) 98   


 


10/4/17 08:00  76      


 


10/4/17 04:00 98.4 79 18 109/69 (82) 100   


 


10/4/17 00:00 98.5 81 20 120/77 (91) 99   














 10/4/17 10/4/17 10/5/17





 15:00 23:00 07:00


 


Intake Total 300 ml 1120 ml 


 


Balance 300 ml 1120 ml 


 


   


 


Intake Oral  720 ml 


 


IV Total 300 ml 400 ml 


 


# Voids  4 


 


# Bowel Movements  1 








.





Laboratory Tests








Test


  10/3/17


14:00


 


White Blood Count 7.0 TH/MM3 


 


Red Blood Count 3.91 MIL/MM3 


 


Hemoglobin 9.9 GM/DL 


 


Hematocrit 30.4 % 


 


Mean Corpuscular Volume 77.9 FL 


 


Mean Corpuscular Hemoglobin 25.2 PG 


 


Mean Corpuscular Hemoglobin


Concent 32.4 % 


 


 


Red Cell Distribution Width 19.9 % 


 


Platelet Count 322 TH/MM3 


 


Mean Platelet Volume 7.9 FL 


 


Neutrophils (%) (Auto) 61.8 % 


 


Lymphocytes (%) (Auto) 25.8 % 


 


Monocytes (%) (Auto) 7.1 % 


 


Eosinophils (%) (Auto) 4.1 % 


 


Basophils (%) (Auto) 1.2 % 


 


Neutrophils # (Auto) 4.3 TH/MM3 


 


Lymphocytes # (Auto) 1.8 TH/MM3 


 


Monocytes # (Auto) 0.5 TH/MM3 


 


Eosinophils # (Auto) 0.3 TH/MM3 


 


Basophils # (Auto) 0.1 TH/MM3 


 


CBC Comment DIFF FINAL 


 


Differential Comment  








Laboratory Tests








Test


  10/3/17


14:00


 


Blood Urea Nitrogen 9 MG/DL 


 


Creatinine 1.15 MG/DL 


 


Random Glucose 125 MG/DL 


 


Total Protein 8.1 GM/DL 


 


Albumin 3.0 GM/DL 


 


Calcium Level 9.5 MG/DL 


 


Alkaline Phosphatase 103 U/L 


 


Aspartate Amino Transf


(AST/SGOT) 14 U/L 


 


 


Alanine Aminotransferase


(ALT/SGPT) 14 U/L 


 


 


Total Bilirubin 0.2 MG/DL 


 


Sodium Level 137 MEQ/L 


 


Potassium Level 3.2 MEQ/L 


 


Chloride Level 102 MEQ/L 


 


Carbon Dioxide Level 25.7 MEQ/L 


 


Anion Gap 9 MEQ/L 


 


Estimat Glomerular Filtration


Rate 54 ML/MIN 


 








Imaging


 


Last Impressions








Abdomen CT 9/8/17 0000 Signed





Impressions: 





 Service Date/Time:  Friday, September 8, 2017 20:26 - CONCLUSION:  1. 

Peripheral 





 low attenuation lesions in the spleen most characteristic of multiple splenic 





 infarcts. Some of these are new findings since 2016 comparison. 2. Minimal 





 subsegmental airspace disease at the lung bases. 3. 3.4 cm right adnexal cyst. 





 Intrauterine device present. 4. Small fat containing ventral hernias.     

Jaspal Issa MD 


 


Chest X-Ray 9/5/17 0000 Signed





Impressions: 





 Service Date/Time:  Tuesday, September 5, 2017 08:07 - CONCLUSION:  Suspected 





 mild edema/failure.     Ron Swan MD 


 


Neck CT 9/2/17 0000 Signed





Impressions: 





 Service Date/Time:  Saturday, September 2, 2017 11:13 - CONCLUSION:  1. 

Patient 





 is intubated which limits this study. The epiglottis is otherwise normal. 





 Secretions are present in the oropharynx and right sphenoid sinus.     Ej Ewing MD 


 


Brain MRI 8/25/17 0000 Signed





Impressions: 





 Service Date/Time:  Friday, August 25, 2017 17:07 - CONCLUSION:  1. Small 

acute 





 or subacute cortical infarct of the left parietal lobe. 2. Small, faint area 

of 





 nonspecific flair signal abnormality in the left frontal lobe periventricular 





 white matter, nonspecific. No associated mass effect or abnormal enhancement. 

3 





 month followup MRI of the brain recommended. 3.  Mild chronic white matter 





 changes.     Ron Swan MD 


 


Head CT 8/21/17 0000 Signed





Impressions: 





 Service Date/Time:  Monday, August 21, 2017 19:47 - CONCLUSION:  No evidence 

of 





 acute infarct, hemorrhage, mass or edema.     Kristian Frankel MD 








Physical Exam


CONSTITUTIONAL/GENERAL: awake alert, , in no apparent distress.  


TUBES/LINES/DRAINS:


SKIN: No jaundice, rashes, or lesions.   Skin temperature appropriate. Not 

diaphoretic. 


 multiple embolic  lesions cw septic embolization involving L lower leg and  

both feet - resolved 


 EYES: No nystagmus


Hearing OK


 CARDIOVASCULAR: Regular rate and rhythm w


+ 2-3 systo,ic murmur  no  gallops, or rubs. Mechanical heart sounds No JVD. 

Peripheral pulses symmetric.


RESPIRATORY/CHEST: Symmetric, unlabored respirations. Clear  to auscultation. 

Breath sounds equal bilaterally.  


GASTROINTESTINAL: Abdomen soft, non-tender,  moderately distended. No hepato-

splenomegaly, or palpable masses. No guarding. Bowel sounds present, hypoactive 


 GENITOURINARY: Without palpable bladder distension. Stokes catheter in place 

with clear yellow urine


MUSCULOSKELETAL: Extremities without clubbing, 


trace BLE edema 





 NEUROLOGICAL  fully awake and   communicates  Grossly non focal


 PSYCHIATRIC: calm and cooperative








Assessment & Plan


Remarks


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

gent 


   - pt was previously infected with above organisms


Previousy multiple episodes of  PVE


   dw Dr Keenan hospitalised in April 2017 for tricuspid valve PVE  - [place] 

parapsolosis (March 15 thru April 15) , Streptoccii


   pt was Rx with combination diflucan 800 + micafungin 150, and was also on 

ambisionme at some point x 2 weeks and did not clear 


Confiormed  bacterial meningitis  2/2 MSSA - embolic ethiology


Acute VDRF,  resolved 


NOÉ, resolved 


Elevated troponine ? cardiac injury from infx


  


    -  cont  oxacillin + gentamin +RIfampin


   - complete  abx  10/5


   will  start oral chronic suppression tx


   will need fu as o/p





dw case mngr


dw Catalina Hightower MD Oct 4, 2017 23:45

## 2017-10-04 NOTE — HHI.PR
Addendum to Inpatient Note


Additional Information


pt seen around 1330


full note to follow





 better


co BLE edema





on exam: + murmur, no nystagmus


trace BLE edema 





Anticipate to complete IV abx tomorrow and start oral chronic suppression tx


will need fu as o/p





dw case mngr


dw PCP











Catalina Teague MD Oct 4, 2017 15:01

## 2017-10-05 VITALS
HEART RATE: 83 BPM | OXYGEN SATURATION: 96 % | DIASTOLIC BLOOD PRESSURE: 77 MMHG | TEMPERATURE: 98.8 F | RESPIRATION RATE: 20 BRPM | SYSTOLIC BLOOD PRESSURE: 126 MMHG

## 2017-10-05 VITALS
OXYGEN SATURATION: 98 % | TEMPERATURE: 97.8 F | SYSTOLIC BLOOD PRESSURE: 116 MMHG | RESPIRATION RATE: 20 BRPM | HEART RATE: 87 BPM | DIASTOLIC BLOOD PRESSURE: 70 MMHG

## 2017-10-05 VITALS
SYSTOLIC BLOOD PRESSURE: 107 MMHG | RESPIRATION RATE: 18 BRPM | DIASTOLIC BLOOD PRESSURE: 81 MMHG | HEART RATE: 82 BPM | TEMPERATURE: 97.3 F | OXYGEN SATURATION: 96 %

## 2017-10-05 VITALS
HEART RATE: 77 BPM | TEMPERATURE: 98.2 F | RESPIRATION RATE: 18 BRPM | DIASTOLIC BLOOD PRESSURE: 66 MMHG | OXYGEN SATURATION: 97 % | SYSTOLIC BLOOD PRESSURE: 109 MMHG

## 2017-10-05 VITALS
OXYGEN SATURATION: 97 % | DIASTOLIC BLOOD PRESSURE: 80 MMHG | TEMPERATURE: 98.5 F | SYSTOLIC BLOOD PRESSURE: 116 MMHG | HEART RATE: 83 BPM | RESPIRATION RATE: 18 BRPM

## 2017-10-05 LAB
ALP SERPL-CCNC: 96 U/L (ref 45–117)
ALT SERPL-CCNC: 12 U/L (ref 10–53)
AST SERPL-CCNC: 12 U/L (ref 15–37)
BASOPHILS # BLD AUTO: 0.1 TH/MM3 (ref 0–0.2)
BASOPHILS NFR BLD: 1.1 % (ref 0–2)
BILIRUB INDIRECT SERPL-MCNC: 0.1 MG/DL (ref 0–0.8)
BILIRUB SERPL-MCNC: 0.2 MG/DL (ref 0.2–1)
EOSINOPHIL # BLD: 0.2 TH/MM3 (ref 0–0.4)
EOSINOPHIL NFR BLD: 3.4 % (ref 0–4)
ERYTHROCYTE [DISTWIDTH] IN BLOOD BY AUTOMATED COUNT: 20.5 % (ref 11.6–17.2)
GFR SERPLBLD BASED ON 1.73 SQ M-ARVRAT: 54 ML/MIN (ref 89–?)
HCT VFR BLD CALC: 30 % (ref 35–46)
HEMO FLAGS: (no result)
LYMPHOCYTES # BLD AUTO: 1.8 TH/MM3 (ref 1–4.8)
LYMPHOCYTES NFR BLD AUTO: 26.8 % (ref 9–44)
MCH RBC QN AUTO: 25.1 PG (ref 27–34)
MCHC RBC AUTO-ENTMCNC: 32.9 % (ref 32–36)
MCV RBC AUTO: 76.3 FL (ref 80–100)
MONOCYTES NFR BLD: 9.1 % (ref 0–8)
NEUTROPHILS # BLD AUTO: 3.9 TH/MM3 (ref 1.8–7.7)
NEUTROPHILS NFR BLD AUTO: 59.6 % (ref 16–70)
PLATELET # BLD: 310 TH/MM3 (ref 150–450)
RBC # BLD AUTO: 3.93 MIL/MM3 (ref 4–5.3)
WBC # BLD AUTO: 6.6 TH/MM3 (ref 4–11)

## 2017-10-05 RX ADMIN — METHADONE HYDROCHLORIDE SCH MG: 5 SOLUTION ORAL at 08:34

## 2017-10-05 RX ADMIN — MORPHINE SULFATE PRN MG: 100 SOLUTION ORAL at 14:38

## 2017-10-05 RX ADMIN — AMPICILLIN SODIUM SCH MLS/HR: 2 INJECTION, POWDER, FOR SOLUTION INTRAMUSCULAR; INTRAVENOUS at 00:54

## 2017-10-05 RX ADMIN — AMPICILLIN SODIUM SCH MLS/HR: 2 INJECTION, POWDER, FOR SOLUTION INTRAMUSCULAR; INTRAVENOUS at 05:39

## 2017-10-05 RX ADMIN — HEPARIN SODIUM SCH UNITS: 10000 INJECTION, SOLUTION INTRAVENOUS; SUBCUTANEOUS at 10:00

## 2017-10-05 RX ADMIN — OXACILLIN SODIUM SCH MLS/HR: 2 INJECTION, POWDER, FOR SOLUTION INTRAMUSCULAR; INTRAVENOUS at 10:02

## 2017-10-05 RX ADMIN — MORPHINE SULFATE PRN MG: 100 SOLUTION ORAL at 08:34

## 2017-10-05 RX ADMIN — POTASSIUM CHLORIDE SCH MEQ: 20 TABLET, EXTENDED RELEASE ORAL at 08:32

## 2017-10-05 RX ADMIN — AMPICILLIN SODIUM SCH MLS/HR: 2 INJECTION, POWDER, FOR SOLUTION INTRAMUSCULAR; INTRAVENOUS at 08:34

## 2017-10-05 RX ADMIN — OXACILLIN SODIUM SCH MLS/HR: 2 INJECTION, POWDER, FOR SOLUTION INTRAMUSCULAR; INTRAVENOUS at 01:20

## 2017-10-05 RX ADMIN — FAMOTIDINE SCH MG: 20 TABLET, FILM COATED ORAL at 09:00

## 2017-10-05 RX ADMIN — STANDARDIZED SENNA CONCENTRATE AND DOCUSATE SODIUM SCH TAB: 8.6; 5 TABLET, FILM COATED ORAL at 09:00

## 2017-10-05 RX ADMIN — HEPARIN SODIUM SCH UNITS: 10000 INJECTION, SOLUTION INTRAVENOUS; SUBCUTANEOUS at 00:55

## 2017-10-05 RX ADMIN — MORPHINE SULFATE PRN MG: 100 SOLUTION ORAL at 05:39

## 2017-10-05 RX ADMIN — Medication SCH ML: at 10:05

## 2017-10-05 RX ADMIN — CHLORHEXIDINE GLUCONATE SCH PACK: 500 CLOTH TOPICAL at 04:00

## 2017-10-05 RX ADMIN — MORPHINE SULFATE PRN MG: 100 SOLUTION ORAL at 11:45

## 2017-10-05 RX ADMIN — RIFAMPIN SCH MG: 150 CAPSULE ORAL at 08:32

## 2017-10-05 RX ADMIN — MORPHINE SULFATE PRN MG: 100 SOLUTION ORAL at 00:52

## 2017-10-05 RX ADMIN — OXACILLIN SODIUM SCH MLS/HR: 2 INJECTION, POWDER, FOR SOLUTION INTRAMUSCULAR; INTRAVENOUS at 06:14

## 2017-10-05 RX ADMIN — FUROSEMIDE SCH MG: 20 TABLET ORAL at 08:32

## 2017-10-05 NOTE — HHI.PR
Subjective


Remarks


in no acute distress.


afebrile.


still with some on and off pleuritic chest pain.


no other complaints.





Objective


Vitals





Vital Signs








  Date Time  Temp Pulse Resp B/P (MAP) Pulse Ox O2 Delivery O2 Flow Rate FiO2


 


10/5/17 08:00 98.2 77 18 109/66 (80) 97   


 


10/5/17 04:00 97.3 82 18 107/81 (90) 96   


 


10/5/17 00:00 98.5 83 18 116/80 (92) 97   


 


10/4/17 20:00 98.2 82 18 118/83 (95) 99   


 


10/4/17 20:00      Room Air  


 


10/4/17 16:00 98.4 83 20 121/82 (95) 98   


 


10/4/17 12:00 99.0 86 20 131/92 (105) 100   


 


10/4/17 11:16     98   














I/O      


 


 10/4/17 10/4/17 10/4/17 10/5/17 10/5/17 10/5/17





 07:00 15:00 23:00 07:00 15:00 23:00


 


Intake Total 500 ml 300 ml 1220 ml 600 ml  


 


Balance 500 ml 300 ml 1220 ml 600 ml  


 


      


 


Intake Oral   720 ml 200 ml  


 


IV Total 500 ml 300 ml 500 ml 400 ml  


 


# Voids 3  4 3  


 


# Bowel Movements   1   








Result Diagram:  


10/5/17 0819                                                                   

             10/3/17 1400





Imaging





Last Impressions








Abdomen CT 9/8/17 0000 Signed





Impressions: 





 Service Date/Time:  Friday, September 8, 2017 20:26 - CONCLUSION:  1. 

Peripheral 





 low attenuation lesions in the spleen most characteristic of multiple splenic 





 infarcts. Some of these are new findings since 2016 comparison. 2. Minimal 





 subsegmental airspace disease at the lung bases. 3. 3.4 cm right adnexal cyst. 





 Intrauterine device present. 4. Small fat containing ventral hernias.     

Jaspal Issa MD 


 


Chest X-Ray 9/5/17 0000 Signed





Impressions: 





 Service Date/Time:  Tuesday, September 5, 2017 08:07 - CONCLUSION:  Suspected 





 mild edema/failure.     Ron Swan MD 


 


Neck CT 9/2/17 0000 Signed





Impressions: 





 Service Date/Time:  Saturday, September 2, 2017 11:13 - CONCLUSION:  1. 

Patient 





 is intubated which limits this study. The epiglottis is otherwise normal. 





 Secretions are present in the oropharynx and right sphenoid sinus.     Ej Ewing MD 


 


Brain MRI 8/25/17 0000 Signed





Impressions: 





 Service Date/Time:  Friday, August 25, 2017 17:07 - CONCLUSION:  1. Small 

acute 





 or subacute cortical infarct of the left parietal lobe. 2. Small, faint area 

of 





 nonspecific flair signal abnormality in the left frontal lobe periventricular 





 white matter, nonspecific. No associated mass effect or abnormal enhancement. 

3 





 month followup MRI of the brain recommended. 3.  Mild chronic white matter 





 changes.     Ron Swan MD 


 


Head CT 8/21/17 0000 Signed





Impressions: 





 Service Date/Time:  Monday, August 21, 2017 19:47 - CONCLUSION:  No evidence 

of 





 acute infarct, hemorrhage, mass or edema.     Kristian Frankel MD 








Objective Remarks


GENERAL: This is a well-nourished, well-developed patient, in no apparent 

distress.


CARDIOVASCULAR: Regular rate and regular rhythm without murmurs, gallops, or 

rubs. 


RESPIRATORY: Clear to auscultation. Breath sounds equal bilaterally. No wheezes

, rales, or rhonchi.  


GASTROINTESTINAL: Abdomen soft, non-tender, nondistended. 


Normal, active bowel sounds


MUSCULOSKELETAL: Extremities without clubbing, cyanosis, or edema.


NEURO:  Alert & Oriented x4 to person, place, time, situation.  Moves all ext x4


Procedures


None


Medications and IVs





Current Medications


Midazolam HCl 100 ml @  As Directed STK-MED ONCE .ROUTE ;  Start 8/21/17 at 19:

36;  Stop 8/21/17 at 19:37;  Status DC


Sodium Chloride 1,000 ml @  70 mls/hr [id number] ONCE IV  Last administered on 9/15

/17at 18:50;  Start 8/21/17 at 19:35;  Stop 8/22/17 at 09:52;  Status DC


Etomidate (Amidate Inj) 20 mg ONCE  ONCE IV PUSH  Last administered on 8/21/ 17at 19:31;  Start 8/21/17 at 19:45;  Stop 8/21/17 at 19:46;  Status DC


Succinylcholine Chloride (Quelicin Inj) 100 mg ONCE  ONCE IV PUSH  Last 

administered on 8/21/17at 19:45;  Start 8/21/17 at 19:45;  Stop 8/21/17 at 19:46

;  Status DC


Sodium Chloride 1,000 ml @  999 mls/hr BOLUS  ONCE IV  Last administered on 8/21 /17at 19:45;  Start 8/21/17 at 19:45;  Stop 8/21/17 at 20:45;  Status DC


Midazolam HCl (Versed Inj) 5 mg ONCE  ONCE IV PUSH  Last administered on 8/21/ 17at 19:45;  Start 8/21/17 at 19:45;  Stop 8/21/17 at 19:46;  Status DC


Acetaminophen (Tylenol Supp) 650 mg ONCE  ONCE RECTAL  Last administered on 8/21 /17at 19:45;  Start 8/21/17 at 19:45;  Stop 8/21/17 at 19:46;  Status DC


Midazolam HCl (Versed Inj) 5 mg ONCE  ONCE IV PUSH  Last administered on 8/21/ 17at 20:00;  Start 8/21/17 at 20:00;  Stop 8/21/17 at 20:01;  Status DC


Vecuronium Bromide (Norcuron 10 Mg Inj) 10 mg ONCE  ONCE IV PUSH  Last 

administered on 8/21/17at 20:00;  Start 8/21/17 at 20:00;  Stop 8/21/17 at 20:01

;  Status DC


Vancomycin HCl 1000 mg/Sodium Chloride 250 ml @  250 mls/hr ONCE  ONCE IV ;  

Start 8/21/17 at 20:15;  Stop 8/21/17 at 21:14;  Status Cancel


Ceftriaxone Sodium 2000 mg/ Sodium Chloride 100 ml @  200 mls/hr ONCE  ONCE IV  

Last administered on 8/21/17at 20:15;  Start 8/21/17 at 20:15;  Stop 8/21/17 at 

20:44;  Status DC


Sodium Chloride 1,000 ml @  999 mls/hr BOLUS  ONCE IV  Last administered on 8/21 /17at 20:15;  Start 8/21/17 at 20:15;  Stop 8/21/17 at 21:15;  Status DC


Adenosine (Adenocard Inj) 12 mg ONCE  ONCE IV PUSH  Last administered on 8/21/ 17at 20:15;  Start 8/21/17 at 20:15;  Stop 8/21/17 at 20:16;  Status DC


Propofol 100 ml @ 0 mls/hr TITRATE  PRN IV Ordered RASS Last administered on 8/ 27/17at 10:10;  Start 8/21/17 at 20:15;  Stop 8/27/17 at 20:06;  Status DC


Diltiazem HCl (Cardizem Inj) 20 mg ONCE  ONCE IV  Last administered on 8/21/ 17at 20:30;  Start 8/21/17 at 20:30;  Stop 8/21/17 at 20:31;  Status DC


Ketorolac Tromethamine (Toradol Inj) 30 mg STK-MED ONCE .ROUTE ;  Start 8/21/17 

at 20:47;  Stop 8/21/17 at 20:48;  Status DC


Ketorolac Tromethamine (Toradol Inj) 30 mg ONCE  ONCE IV PUSH  Last 

administered on 8/21/17at 21:00;  Start 8/21/17 at 21:00;  Stop 8/21/17 at 21:02

;  Status DC


Sodium Chloride 1,000 ml @  999 mls/hr BOLUS  ONCE IV  Last administered on 8/21 /17at 21:00;  Start 8/21/17 at 21:00;  Stop 8/21/17 at 22:00;  Status DC


Acyclovir Sodium 1000 mg/Sodium Chloride 150 ml @  150 mls/hr ONCE  ONCE IV ;  

Start 8/21/17 at 21:15;  Stop 8/21/17 at 22:14;  Status DC


Norepinephrine Bitartrate 250 ml @  7.5 mls/hr TITRATE  PRN IV Blood pressure 

management Last administered on 8/23/17at 01:15;  Start 8/21/17 at 21:30;  Stop 

8/30/17 at 12:30;  Status DC


Terbutaline Sulfate (Brethine Inj) 1 mg UNSCH  PRN SQ For Extravasation;  Start 

8/21/17 at 21:30


Norepinephrine Bitartrate (Levophed Inj) 4 mg STK-MED ONCE .ROUTE ;  Start 8/21/ 17 at 21:17;  Stop 8/21/17 at 21:18;  Status DC


Sodium Chloride (NS Flush) 2 ml UNSCH  PRN .XX FLUSH AFTER USING IV ACCESS;  

Start 8/21/17 at 21:30;  Stop 8/21/17 at 22:59;  Status DC


Sodium Chloride (NS Flush) 2 ml BID .XX ;  Start 8/22/17 at 09:00;  Stop 8/22/ 17 at 09:00;  Status DC


Acetaminophen (Tylenol) 650 mg Q6H  PRN PO FEVER >101F Last administered on 10/1

/17at 15:12;  Start 8/21/17 at 21:30


Morphine Sulfate (Morphine Inj) 2 mg Q2H  PRN IV PAIN SCALE 6 TO 10 Last 

administered on 9/5/17at 11:12;  Start 8/21/17 at 21:30;  Stop 9/5/17 at 15:35;

  Status DC


Famotidine (Pepcid Inj) 20 mg Q12HR IV PUSH  Last administered on 9/4/17at 19:59

;  Start 8/22/17 at 09:00;  Stop 9/5/17 at 08:02;  Status DC


Midazolam HCl (Versed Inj) 2 mg Q1H  PRN IV SEDATION;  Start 8/21/17 at 21:30;  

Stop 8/22/17 at 13:03;  Status DC


Artificial Tears (Tears Naturale Opth Soln) 1 drop TID EACH EYE  Last 

administered on 10/4/17at 17:48;  Start 8/22/17 at 09:00


Ondansetron HCl (Zofran Inj) 4 mg Q6H  PRN IV NAUSEA OR VOMITING Last 

administered on 9/8/17at 12:47;  Start 8/21/17 at 21:30


Albuterol/ Ipratropium (Duoneb Neb) 1 ampule Q2HR NEB  PRN INH WHEEZING Last 

administered on 9/5/17at 07:35;  Start 8/21/17 at 21:30


Heparin Sodium (Porcine) (Heparin Inj) 5,000 units Q12H SQ  Last administered 

on 9/4/17at 20:00;  Start 8/21/17 at 22:00;  Stop 9/5/17 at 08:03;  Status DC


Miscellaneous Information 1 [id number]  Last administered on 8/21/17at 21:30;  

Start 8/21/17 at 21:30


Chlorhexidine Gluconate (Chlorhexidine 2% Cloth) Taper DAILY@04 TOP  Last 

administered on 8/28/17at 00:07;  Start 8/22/17 at 04:00;  Stop 8/18/18 at 03:59


Chlorhexidine Gluconate (Chlorhexidine 2% Cloth) 3 pack UNSCH  PRN TOP HYGIENIC 

CARE;  Start 8/21/17 at 21:30


Senna/Docusate Sodium (Arlen-Colace) 1 tab BID PO  Last administered on 10/4/

17at 08:31;  Start 8/22/17 at 09:00


Magnesium Hydroxide (Milk Of Magnesia Liq) 30 ml Q12H  PRN PO MILD - MODERATE 

CONSTIPATION;  Start 8/21/17 at 21:30


Sennosides (Senokot) 17.2 mg Q12H  PRN PO MODERATE - SEVERE CONSTIPATION;  

Start 8/21/17 at 21:30


Bisacodyl (Dulcolax Supp) 10 mg DAILY  PRN RECTAL SEVERE CONSITIPATION;  Start 8 /21/17 at 21:30


Lactulose (Lactulose Liq) 30 ml DAILY  PRN PO SEVERE CONSITIPATION;  Start 8/21/ 17 at 21:30


Sodium Chloride (NS Flush) 2 ml UNSCH  PRN IV FLUSH FLUSH AFTER USING IV ACCESS 

Last administered on 9/25/17at 04:36;  Start 8/21/17 at 22:45


Sodium Chloride (NS Flush) 2 ml BID IV FLUSH  Last administered on 10/4/17at 21:

24;  Start 8/22/17 at 09:00


Ceftriaxone Sodium 2000 mg/ Sodium Chloride 100 ml @  200 mls/hr Q12H IV  Last 

administered on 8/23/17at 08:00;  Start 8/22/17 at 08:00;  Stop 8/23/17 at 18:22

;  Status DC


Pharmacy Profile Note 0 ml @ 0 mls/hr UNSCH OTHER ;  Start 8/21/17 at 22:45;  

Stop 8/22/17 at 15:32;  Status DC


Acyclovir Sodium 1050 mg/Sodium Chloride 150 ml @  150 mls/hr Q8H IV  Last 

administered on 8/22/17at 09:54;  Start 8/22/17 at 01:00;  Stop 8/22/17 at 15:32

;  Status DC


Amphotericin B Liposome 340 mg/ Dextrose 250 ml @  125 mls/hr Q24H IV ;  Start 8 /21/17 at 23:45;  Stop 8/22/17 at 00:37;  Status DC


Sodium Chloride 1,000 ml @  1,000 mls/hr Q1H ONCE IV  Last administered on 8/21/ 17at 22:20;  Start 8/21/17 at 22:33;  Stop 8/21/17 at 23:32;  Status DC


Sodium Chloride 1,000 ml @  1,000 mls/hr Q1H ONCE IV  Last administered on 8/21/ 17at 23:20;  Start 8/21/17 at 22:33;  Stop 8/21/17 at 23:32;  Status DC


Sodium Chloride 100 ml @  1,000 mls/hr Q6M ONCE IV  Last administered on 8/21/ 17at 22:33;  Start 8/21/17 at 22:33;  Stop 8/21/17 at 22:42;  Status DC


Vancomycin HCl 2000 mg/Sodium Chloride 520 ml @  250 mls/hr ONCE  ONCE IV  Last 

administered on 8/22/17at 01:15;  Start 8/22/17 at 01:00;  Stop 8/22/17 at 03:04

;  Status DC


Amphotericin B Liposome 340 mg/ Dextrose 250 ml @  125 mls/hr Q24H IV ;  Start 8 /22/17 at 00:45;  Status Cancel


Amphotericin B Liposome 340 mg/ Dextrose 250 ml @  125 mls/hr Q24H IV  Last 

administered on 8/22/17at 02:00;  Start 8/22/17 at 02:00;  Stop 8/22/17 at 21:57

;  Status DC


Potassium Chloride 100 ml @  50 mls/hr Q2H  PRN IV For Potassium 2.8 - 3.2 mEq/

L Last administered on 8/31/17at 08:06;  Start 8/22/17 at 06:00;  Stop 9/5/17 

at 17:47;  Status DC


Potassium Chloride 100 ml @  50 mls/hr Q2H  PRN IV For Potassium 2.8 - 3.2 mEq/

L Last administered on 8/26/17at 13:04;  Start 8/22/17 at 06:00;  Stop 9/5/17 

at 17:47;  Status DC


Potassium Bicarb/ Potassium Chloride (K-Lyte Cl  Eff) 50 meq UNSCH  PRN PO For 

Potassium 3.3 - 3.5 mEq/L Last administered on 9/3/17at 08:10;  Start 8/22/17 

at 06:00;  Stop 9/5/17 at 17:47;  Status DC


Potassium Chloride 100 ml @  25 mls/hr UNSCH  PRN IV For Potassium 3.3 - 3.5 mEq

/L Last administered on 9/4/17at 06:10;  Start 8/22/17 at 06:00;  Stop 9/5/17 

at 17:47;  Status DC


Potassium Chloride 100 ml @  50 mls/hr Q2H  PRN IV For Potassium 3.3 - 3.5 mEq/

L Last administered on 9/2/17at 12:32;  Start 8/22/17 at 06:00;  Stop 9/5/17 at 

17:47;  Status DC


Magnesium Sulfate 4 gm/Sodium Chloride 100 ml @  50 mls/hr UNSCH  PRN IV For 

Magnesium 0.9 - 1.1 mg/dL;  Start 8/22/17 at 06:00;  Stop 8/30/17 at 12:30;  

Status DC


Magnesium Oxide (Mag-Ox) 800 mg UNSCH  PRN PO For Magnesium 1.2 - 1.6 mg/dL;  

Start 8/22/17 at 06:00;  Stop 8/30/17 at 12:31;  Status DC


Magnesium Sulfate 2 gm/Sodium Chloride 100 ml @  50 mls/hr UNSCH  PRN IV For 

Magnesium 1.2 - 1.6 mg/dL Last administered on 8/25/17at 09:45;  Start 8/22/17 

at 06:00;  Stop 8/30/17 at 12:31;  Status DC


Potassium Phosphate (K-Phos) 2,000 mg Q4H  PRN PO For Phosphorus < 2.5 mg/dL;  

Start 8/22/17 at 06:00;  Stop 9/5/17 at 17:47;  Status DC


Sodium Phosphate 30 mmol/Sodium Chloride 250 ml @  42 mls/hr UNSCH  PRN IV For 

Phosphorus < 2.5 mg/dL;  Start 8/22/17 at 06:00;  Stop 9/5/17 at 17:47;  Status 

DC


Potassium Phosphate (K-Phos) 2,000 mg UNSCH  PRN PO/TUBE SEE LABEL COMMENTS;  

Start 8/22/17 at 06:00;  Stop 9/5/17 at 17:47;  Status DC


Potassium Phosphate 30 mmol/ Sodium Chloride 260 ml @  42 mls/hr UNSCH  PRN IV 

SEE LABEL COMMENTS;  Start 8/22/17 at 06:00;  Stop 9/5/17 at 17:47;  Status DC


Midazolam HCl 100 ml @ 2 mls/hr TITRATE  PRN IV SEDATION Last administered on 8/ 22/17at 07:54;  Start 8/22/17 at 07:00;  Stop 8/22/17 at 13:03;  Status DC


Vancomycin HCl 1500 mg/Sodium Chloride 515 ml @  250 mls/hr Q18H IV ;  Start 8/ 22/17 at 21:00;  Stop 8/22/17 at 21:00;  Status DC


Miscellaneous Information SPECIFIC LAB TO BE DRAWN:VANCOMYCIN TROUGH DATE TO... 

ONCE  ONCE .XX ;  Start 8/24/17 at 08:45;  Stop 8/24/17 at 08:46;  Status Cancel


Fentanyl Citrate 250 ml @ 0 mls/hr TITRATE IV  Last administered on 8/22/17at 14

:38;  Start 8/22/17 at 13:00;  Stop 8/24/17 at 14:33;  Status DC


Dextrose (D50w (Vial) Inj) 25 ml UNSCH  PRN IV PUSH HYPOGLYCEMIA-SEE COMMENTS;  

Start 8/22/17 at 13:00;  Stop 8/30/17 at 20:43;  Status DC


Insulin Human Regular (NovoLIN R SUPPLEMENTAL SCALE) 1 Q6HR SQ ;  Start 8/22/17 

at 18:00;  Stop 8/30/17 at 20:43;  Status DC


Oxacillin Sodium 2 gm/Sodium Chloride 100 ml @  200 mls/hr Q4H IV  Last 

administered on 10/5/17at 06:14;  Start 8/22/17 at 18:00


Pharmacy Profile Note 0 ml @ 0 mls/hr UNSCH OTHER ;  Start 8/22/17 at 15:30


Fluconazole/ Sodium Chloride 400 ml @  100 mls/hr Q24H IV ;  Start 8/22/17 at 18

:00;  Stop 8/22/17 at 18:00;  Status DC


Rifampin 300 mg/ Sodium Chloride 100 ml @  100 mls/hr Q12H IV  Last 

administered on 8/31/17at 07:58;  Start 8/22/17 at 21:00;  Stop 8/31/17 at 14:31

;  Status DC


Gentamicin Sulfate/Sodium Chloride 100 ml @  200 mls/hr Q8H IV  Last 

administered on 8/23/17at 10:13;  Start 8/22/17 at 18:00;  Stop 8/23/17 at 14:36

;  Status DC


Miscellaneous Information SPECIFIC LAB TO BE DRAWN:GENTAMICIN TROUGH DATE TO... 

ONCE  ONCE .XX  Last administered on 8/23/17at 09:50;  Start 8/23/17 at 09:45;  

Stop 8/23/17 at 10:04;  Status DC


Miscellaneous Information SPECIFIC LAB TO BE DRAWN:GENTAMICIN PEAK DATE TO... 

ONCE  ONCE .XX  Last administered on 8/23/17at 11:45;  Start 8/23/17 at 11:30;  

Stop 8/23/17 at 11:31;  Status DC


Fluconazole/ Sodium Chloride 400 ml @  200 mls/hr Q24H IV ;  Start 8/22/17 at 19

:00;  Stop 8/22/17 at 19:00;  Status DC


Fluconazole/ Sodium Chloride 200 ml @  100 mls/hr Q24H IV ;  Start 8/22/17 at 18

:00;  Stop 8/22/17 at 18:00;  Status DC


Fluconazole/ Sodium Chloride 200 ml @  100 mls/hr Q24H IV  Last administered on 

8/26/17at 20:37;  Start 8/22/17 at 20:00;  Stop 8/27/17 at 18:11;  Status DC


Fluconazole/ Sodium Chloride 200 ml @  100 mls/hr Q24H IV  Last administered on 

8/26/17at 22:43;  Start 8/22/17 at 22:00;  Stop 8/27/17 at 18:11;  Status DC


Amphotericin B Liposome 340 mg/ Dextrose 235 ml @  117.5 mls/ hr Q24H IV  Last 

administered on 8/23/17at 02:14;  Start 8/23/17 at 02:00;  Stop 8/23/17 at 18:22

;  Status DC


Bumetanide (Bumex Inj) 1 mg DAILY IV PUSH  Last administered on 8/30/17at 09:51

;  Start 8/23/17 at 10:30;  Stop 8/30/17 at 12:31;  Status DC


Gentamicin Sulfate 100 mg/ Sodium Chloride 102.5 ml @  200 mls/hr Q12H IV  Last 

administered on 8/25/17at 12:13;  Start 8/24/17 at 00:00;  Stop 8/25/17 at 13:07

;  Status DC


Miscellaneous Information SPECIFIC LAB TO BE DRAWN:GENTAMICIN TROUGH DATE TO... 

ONCE  ONCE .XX  Last administered on 8/25/17at 11:45;  Start 8/25/17 at 11:45;  

Stop 8/25/17 at 11:46;  Status DC


Bumetanide (Bumex Inj) 1 mg NOW  ONCE IV PUSH  Last administered on 8/24/17at 11

:20;  Start 8/24/17 at 09:45;  Stop 8/24/17 at 10:22;  Status DC


Bumetanide 100 ml @ 2 mls/hr Q24H IV  Last administered on 8/25/17at 18:43;  

Start 8/24/17 at 12:45;  Stop 8/26/17 at 10:57;  Status DC


Ampicillin Sodium 2000 mg/Sodium Chloride 100 ml @  400 mls/hr Q4H IV  Last 

administered on 10/5/17at 08:34;  Start 8/24/17 at 13:00


Fentanyl Citrate 250 ml @ 0 mls/hr TITRATE IV ;  Start 8/24/17 at 14:45;  Stop 8 /24/17 at 15:53;  Status DC


Fentanyl Citrate 250 ml @  2.5 mls/hr TITRATE IV  Last administered on 8/30/ 17at 18:40;  Start 8/24/17 at 16:00;  Stop 8/31/17 at 18:04;  Status DC


Gentamicin Sulfate/Sodium Chloride 100 ml @  200 mls/hr Q12H IV  Last 

administered on 9/20/17at 12:49;  Start 8/26/17 at 00:00;  Stop 9/20/17 at 14:04

;  Status DC


Miscellaneous Information SPECIFIC LAB TO BE RICCARDO... ONCE  ONCE .XX  Last 

administered on 8/27/17at 11:45;  Start 8/27/17 at 11:45;  Stop 8/27/17 at 11:46

;  Status DC


Gadodiamide (Omniscan Pf Inj) 20 ml STK-MED ONCE IVCONTRAST  Last administered 

on 8/25/17at 17:25;  Start 8/25/17 at 17:25;  Stop 8/25/17 at 17:26;  Status DC


Metoprolol Tartrate (Lopressor Inj) 5 mg STK-MED ONCE .ROUTE  Last administered 

on 8/26/17at 10:58;  Start 8/26/17 at 10:29;  Stop 8/26/17 at 10:30;  Status DC


Diltiazem HCl (Cardizem Inj) 20 mg ONCE  ONCE IV PUSH ;  Start 8/26/17 at 10:30

;  Stop 8/26/17 at 10:40;  Status DC


Diltiazem HCl 125 mg/Sodium Chloride 125 ml @ 5 mls/hr TITRATE  PRN IV 

Tachycardia;  Start 8/26/17 at 10:30;  Stop 8/30/17 at 12:31;  Status DC


Diltiazem HCl (Cardizem Inj) 20 mg UNSCH X1  PRN IV PUSH SEE LABEL COMMENTS;  

Start 8/26/17 at 11:00;  Stop 8/26/17 at 13:00;  Status DC


Metoprolol Tartrate (Lopressor Inj) 2.5 mg Q6H  PRN IV PUSH Heart rate > 100BPM 

Last administered on 8/30/17at 18:55;  Start 8/26/17 at 11:00;  Stop 9/5/17 at 

17:46;  Status DC


Acetaminophen (Ofirmev 1000 Mg/ 100 ml Inj) 1,000 mg Q6H  PRN IV PAIN SCALE 6 

TO 8 Last administered on 9/2/17at 13:57;  Start 8/26/17 at 11:00;  Stop 9/5/17 

at 08:02;  Status DC


Midazolam HCl 100 ml @ 2 mls/hr TITRATE  PRN IV SEDATION Last administered on 9/ 2/17at 20:23;  Start 8/27/17 at 09:30;  Stop 9/3/17 at 10:28;  Status DC


Dexmedetomidine HCl 200 mcg/ Sodium Chloride 52 ml @  5.28 mls/hr Q9H51M PRN IV 

SEDATION Last administered on 8/29/17at 21:33;  Start 8/28/17 at 10:29;  Stop 9/

3/17 at 09:04;  Status DC


Water (Free Water) VOLUME: 300 ML Q8HR G-TUBE  Last administered on 9/3/17at 04:

26;  Start 8/29/17 at 14:00;  Stop 9/3/17 at 08:00;  Status DC


Sodium Chloride 38.5 meq/Sterile Water 1,009.625 ml @ 84 mls/hr Q12H2M IV  Last 

administered on 8/31/17at 01:09;  Start 8/30/17 at 14:00;  Stop 8/31/17 at 14:02

;  Status DC


Potassium Chloride (KCl Powder) 20 meq ONCE  ONCE PO  Last administered on 8/30/ 17at 15:02;  Start 8/30/17 at 12:45;  Stop 8/30/17 at 13:07;  Status DC


Etomidate (Amidate Inj) 20 mg STK-MED ONCE .ROUTE ;  Start 8/30/17 at 18:15;  

Stop 8/30/17 at 18:16;  Status DC


Dexamethasone Sodium Phosphate (Decadron Inj) 4 mg Q8HR IV PUSH  Last 

administered on 9/2/17at 05:50;  Start 8/31/17 at 14:00;  Stop 9/2/17 at 13:59;

  Status DC


Rifampin (Rifampin) 300 mg Q12HR PO  Last administered on 10/5/17at 08:32;  

Start 8/31/17 at 21:00


Fentanyl Citrate 250 ml @ 5 mls/hr TITRATE  PRN IV Sedation Last administered 

on 9/2/17at 20:23;  Start 8/31/17 at 18:15;  Stop 9/3/17 at 10:28;  Status DC


Potassium Chloride 100 ml @  50 mls/hr BOLUS  ONCE IV  Last administered on 9/1/ 17at 14:05;  Start 9/1/17 at 12:00;  Stop 9/1/17 at 13:59;  Status DC


Albuterol/ Ipratropium (Duoneb Neb) 1 ampule Q6HR  NEB NEB  Last administered 

on 9/5/17at 05:25;  Start 9/1/17 at 16:00;  Stop 9/5/17 at 07:34;  Status DC


Methadone HCl (Methadone  Liq) 5 mg ONCE  ONCE NG  Last administered on 9/1/ 17at 23:07;  Start 9/1/17 at 23:00;  Stop 9/1/17 at 23:01;  Status DC


Potassium Chloride (KCl Powder) 20 meq ONCE  ONCE PO  Last administered on 9/3/

17at 08:15;  Start 9/3/17 at 08:15;  Stop 9/3/17 at 08:16;  Status DC


Potassium Chloride 100 ml @  25 mls/hr BOLUS  ONCE IV  Last administered on 9/3/

17at 09:22;  Start 9/3/17 at 08:15;  Stop 9/3/17 at 12:14;  Status DC


Water (Free Water) VOLUME: 300 ML Q6HR G-TUBE  Last administered on 9/4/17at 05:

35;  Start 9/3/17 at 12:00;  Stop 9/4/17 at 09:30;  Status DC


Methadone HCl (Methadone  Liq) 10 mg Q12HR PO  Last administered on 9/5/17at 08:

47;  Start 9/3/17 at 10:00;  Stop 9/5/17 at 15:35;  Status DC


Dexmedetomidine HCl 200 mcg/ Sodium Chloride 52 ml @  5.44 mls/hr TITRATE  PRN 

IV SEDATION Last administered on 9/4/17at 05:40;  Start 9/3/17 at 09:00;  Stop 9 /5/17 at 08:02;  Status DC


Racepinephrine (Racepinephrine 2.25% Neb) 0.5 ml ONCE  ONCE NEB  Last 

administered on 9/4/17at 01:59;  Start 9/4/17 at 01:45;  Stop 9/4/17 at 01:51;  

Status DC


Racepinephrine (Racepinephrine 2.25% Neb) 0.5 ml ONCE  PRN NEB STRIDOR;  Start 9 /4/17 at 01:45;  Stop 9/5/17 at 01:44;  Status DC


Albuterol/ Ipratropium (Duoneb Neb) 1 ampule Q6HR  NEB NEB  Last administered 

on 9/7/17at 22:04;  Start 9/5/17 at 10:00;  Stop 9/9/17 at 09:59;  Status DC


Famotidine (Pepcid) 20 mg BID PO  Last administered on 10/4/17at 08:31;  Start 9 /5/17 at 09:00


Heparin Sodium (Porcine) (Heparin Inj) 5,000 units Q8H SQ  Last administered on 

9/21/17at 18:50;  Start 9/5/17 at 10:00


Furosemide (Lasix) 20 mg DAILY PO  Last administered on 9/7/17at 09:03;  Start 9 /5/17 at 09:00;  Stop 9/7/17 at 15:54;  Status DC


Potassium Chloride (KCl) 20 meq DAILY PO  Last administered on 10/5/17at 08:32;

  Start 9/5/17 at 09:00


Miscellaneous Information SPECIFIC LAB TO BE DRAWN:GENTAMICIN TROUGH DATE TO... 

ONCE  ONCE .XX ;  Start 9/6/17 at 11:45;  Stop 9/6/17 at 11:46;  Status DC


Methadone HCl (Methadone  Liq) 20 mg Q12HR PO  Last administered on 9/15/17at 09

:03;  Start 9/5/17 at 21:00;  Stop 9/15/17 at 17:25;  Status DC


Morphine Sulfate (Morphine Inj) 3 mg Q3H  PRN IV PAIN SCALE 6 TO 10 Last 

administered on 9/25/17at 11:14;  Start 9/5/17 at 15:45;  Stop 9/25/17 at 11:51

;  Status DC


Miscellaneous Information SPECIFIC LAB TO BE DRAWN:GENTAMICIN TROUGH DATE TO... 

ONCE  ONCE .XX  Last administered on 9/7/17at 13:18;  Start 9/7/17 at 11:45;  

Stop 9/7/17 at 11:46;  Status DC


Acetaminophen (Tylenol) 650 mg Q4H  PRN PO HEADACHE;  Start 9/7/17 at 16:00;  

Stop 9/7/17 at 16:00;  Status DC


Acetaminophen (Tylenol) 1,000 mg Q4H  PRN PO HEADACHE Last administered on 9/28/ 17at 15:37;  Start 9/7/17 at 16:00


Furosemide (Lasix) 40 mg DAILY PO  Last administered on 10/5/17at 08:32;  Start 

9/8/17 at 09:00


Furosemide (Lasix) 20 mg ONCE  ONCE PO  Last administered on 9/7/17at 16:55;  

Start 9/7/17 at 16:00;  Stop 9/7/17 at 16:04;  Status DC


Diatrizoate Meglum/ Diatrizoate Sod (Md Gastroview Liq) 18 ml ONCE  ONCE PO  

Last administered on 9/8/17at 16:17;  Start 9/8/17 at 16:00;  Stop 9/8/17 at 16:

01;  Status DC


Iohexol (Omnipaque 350 Inj) 95 ml STK-MED ONCE IVCONTRAST  Last administered on 

9/8/17at 20:45;  Start 9/8/17 at 20:45;  Stop 9/8/17 at 20:46;  Status DC


Furosemide (Lasix Inj) 40 mg ONCE  ONCE IV PUSH  Last administered on 9/10/17at 

18:15;  Start 9/10/17 at 16:45;  Stop 9/10/17 at 17:37;  Status DC


Potassium Chloride (KCl) 80 meq ONCE  ONCE PO ;  Start 9/10/17 at 16:45;  Stop 9

/10/17 at 16:46;  Status UNV


Potassium Chloride (KCl) 60 meq ONCE  ONCE PO  Last administered on 9/10/17at 19

:58;  Start 9/10/17 at 18:45;  Stop 9/10/17 at 18:46;  Status DC


Furosemide (Lasix Inj) 40 mg ONCE  ONCE IV PUSH  Last administered on 9/11/17at 

16:55;  Start 9/11/17 at 16:00;  Stop 9/11/17 at 16:01;  Status DC


Loperamide HCl (Imodium) 2 mg UNSCH  PRN PO DIARRHEA;  Start 9/12/17 at 10:45


Furosemide (Lasix Inj) 40 mg ONCE  ONCE IV PUSH  Last administered on 9/12/17at 

11:08;  Start 9/12/17 at 10:45;  Stop 9/12/17 at 10:46;  Status DC


Furosemide (Lasix Inj) 40 mg ONCE  ONCE IV PUSH  Last administered on 9/13/17at 

17:46;  Start 9/13/17 at 17:30;  Stop 9/13/17 at 17:31;  Status DC


Potassium Chloride (KCl) 40 meq ONCE  ONCE PO  Last administered on 9/13/17at 17

:47;  Start 9/13/17 at 17:30;  Stop 9/13/17 at 17:31;  Status DC


Miscellaneous Information SPECIFIC LAB TO BE RICCARDO... ONCE  ONCE .XX  Last 

administered on 9/15/17at 23:45;  Start 9/15/17 at 23:45;  Stop 9/15/17 at 23:46

;  Status DC


Methadone HCl (Methadone  Liq) 25 mg Q12HR PO  Last administered on 10/3/17at 08

:45;  Start 9/15/17 at 21:00;  Stop 10/3/17 at 11:15;  Status DC


Gentamicin Sulfate 80 mg/ Sodium Chloride 102 ml @  204 mls/hr Q12H IV ;  Start 

9/19/17 at 00:00;  Stop 9/19/17 at 00:00;  Status DC


Miscellaneous Information SPECIFIC LAB TO BE DRAWN:GENTAMICIN TROUGH DATE TO... 

ONCE  ONCE .XX  Last administered on 9/20/17at 11:45;  Start 9/20/17 at 11:45;  

Stop 9/20/17 at 11:46;  Status DC


Gentamicin Sulfate/Sodium Chloride 100 ml @  200 mls/hr Q18H IV  Last 

administered on 10/4/17at 17:47;  Start 9/21/17 at 06:00


Miscellaneous Information SPECIFIC LAB TO BE DRAWN:GENTAMICIN TROUGH DATE TO... 

ONCE  ONCE .XX  Last administered on 9/23/17at 13:00;  Start 9/23/17 at 11:45;  

Stop 9/23/17 at 11:46;  Status DC


Morphine Sulfate (Roxanol  Liq) 10 mg Q3H  PRN PO pain 6-10 Last administered 

on 10/5/17at 08:34;  Start 9/25/17 at 12:00


Morphine Sulfate (Roxanol  Liq) 5 mg Q3H  PRN PO pain 1-5;  Start 9/25/17 at 12:

00


Miscellaneous Information SPECIFIC LAB TO BE DRAWN:GENTAMICIN TROUGH DATE TO... 

ONCE  ONCE .XX  Last administered on 9/30/17at 07:00;  Start 9/30/17 at 05:45;  

Stop 9/30/17 at 05:46;  Status DC


Methadone HCl (Methadone  Liq) 10 mg Q12HR PO  Last administered on 10/4/17at 08

:32;  Start 10/3/17 at 21:00;  Stop 10/4/17 at 09:50;  Status DC


Potassium Chloride (KCl) 40 meq ONCE  ONCE PO  Last administered on 10/3/17at 18

:15;  Start 10/3/17 at 17:45;  Stop 10/3/17 at 18:05;  Status DC


Methadone HCl (Methadone  Liq) 5 mg Q12HR PO  Last administered on 10/5/17at 08:

34;  Start 10/4/17 at 21:00





A/P


Assessment and Plan


A/P





Severe sepsis-resolved


Multiple splenic infarcts 


MSSA meningitis 


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


Prosthetic valve endocarditis


   finished the course of  IV antibiotics-


   will continue with suppressive oral antibiotic


   cleared by ID for discharge with outpatient f/u.


   Continue current treatment


             





Supraventricular tachycardia-resolved


   Echocardiogram 8 foci 17 revealed an ejection fraction showed 25-30%.


    Continue metoprolol.


            





Acute hypoxemic respiratory failure


   Resolved


   Continue DuoNeb treatments.  Maintain oxygen saturation above 92%





CVA (cerebral vascular accident)


   CT head negative, MRI small acute and subacute cortical infarct of the left 

parietal lobe.


   tapered off the methadone.


   Appreciate input from neurology who signed off


   PT to treat and eval





Tricuspid regurgitation


   Continue oral Lasix.





Acute systolic heart failure


   continue Lasix 40 mg po daily.





Acute kidney injury)


   Now resolved. 





Protein calorie malnutrition


   Secondary to long-standing IV drug abuse and endocarditis.





Microcytic anemia


   Continue with oral iron sulfate.


   Monitor H&H








IV drug abuse


   Advised cessation. tapered off the methadone.





Diarrhea, resolved


   C diff. is negative  Continue probiotic.





Anasarca.


   Improved


   Continue Lasix po.


Discharge Planning


dc home today with f/u by PCP.


see med list.


d/w the patient and .





time spent 35 min.











Eneida Javed MD Oct 5, 2017 09:18

## 2017-10-05 NOTE — HHI.PR
Addendum to Inpatient Note


Additional Information


completed IV tx


Pt will need to be on chronic suppression


will dc on doxycycline 


She needs to be f/u in [facility]


OK to


dw Catalina Hightower MD Oct 5, 2017 10:49

## 2017-10-05 NOTE — HHI.DS
__________________________________________________





Discharge Summary


Admission Date


Aug 21, 2017 at 21:06


Discharge Date:  Oct 5, 2017


Admitting Diagnosis





sepsis, elevated troponin, history of endocarditis/IVDA





(1) Severe sepsis


ICD Code:  A41.9 - Sepsis, unspecified organism; R65.20 - Severe sepsis without 

septic shock


Diagnosis:  Principal


Status:  Acute


(2) MSSA meningitis


Diagnosis:  Principal


Status:  Acute


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


Diagnosis:  Principal


Status:  Acute


(4) SVT (supraventricular tachycardia)


ICD Code:  I47.1 - Supraventricular tachycardia


Diagnosis:  Principal


Status:  Resolved


(5) Acute hypoxemic respiratory failure


ICD Code:  J96.01 - Acute respiratory failure with hypoxia


Diagnosis:  Principal


Status:  Resolved


(6) CVA (cerebral vascular accident)


ICD Code:  I63.9 - Cerebral infarction, unspecified


Diagnosis:  Principal





(7) Tricuspid regurgitation


ICD Code:  I07.1 - Rheumatic tricuspid insufficiency


Diagnosis:  Principal


Status:  Acute


(8) Acute systolic heart failure


ICD Code:  I50.21 - Acute systolic (congestive) heart failure


Diagnosis:  Principal





(9) Prosthetic valve endocarditis


ICD Code:  T82.6XXA - Infection and inflammatory reaction due to cardiac valve 

prosthesis, initial encounter


Diagnosis:  Principal


Status:  Acute


(10) NOÉ (acute kidney injury)


ICD Code:  N17.9 - Acute kidney failure, unspecified


Diagnosis:  Principal


Status:  Resolved


(11) Protein calorie malnutrition


ICD Code:  E46 - Unspecified protein-calorie malnutrition


Diagnosis:  Principal





(12) Microcytic anemia


ICD Code:  D50.9 - Iron deficiency anemia, unspecified


Diagnosis:  Secondary





(13) Hypernatremia


ICD Code:  E87.0 - Hyperosmolality and hypernatremia


Diagnosis:  Secondary





(14) IV drug abuse


ICD Code:  F19.10 - Other psychoactive substance abuse, uncomplicated


Diagnosis:  Secondary


Status:  Acute


Procedures


None


Brief History - From Admission


37-year-old  female who presents initially as a stroke alert.  

According to EMS report the patient has a history of fungal endocarditis with 

previous IVDA.  EMS states that the patient is currently on hospice, however, 

they are unsure if the patient is a DNR.  EMS states the patient apparently 

complained of a headache earlier today, was found lying on the floor and 

incontinent of stool at approximate, last seen normal at 6:45 PM.  Therefore, 

EMS called a stroke alert in the field as the patient was aphasic and confused.

  Upon arrival the patient groans, moves all 4 extremities and withdraws all 4 

extremities, but is unable to provide any information.  She was intubated in 

the emergency department by ER attending for airway protection.


CBC/BMP:  


10/5/17 0819                                                                   

             10/3/17 1400





Significant Findings





Laboratory Tests








Test


  10/2/17


10:40 10/3/17


14:00 10/5/17


08:19


 


Creatinine


  1.10 MG/DL


(0.50-1.00) 1.15 MG/DL


(0.50-1.00) 1.14 MG/DL


(0.50-1.00)


 


Estimat Glomerular Filtration


Rate 57 ML/MIN


(>89) 54 ML/MIN


(>89) 54 ML/MIN


(>89)


 


Red Blood Count


  


  3.91 MIL/MM3


(4.00-5.30) 3.93 MIL/MM3


(4.00-5.30)


 


Hemoglobin


  


  9.9 GM/DL


(11.6-15.3) 9.9 GM/DL


(11.6-15.3)


 


Hematocrit


  


  30.4 %


(35.0-46.0) 30.0 %


(35.0-46.0)


 


Mean Corpuscular Volume


  


  77.9 FL


(80.0-100.0) 76.3 FL


(80.0-100.0)


 


Mean Corpuscular Hemoglobin


  


  25.2 PG


(27.0-34.0) 25.1 PG


(27.0-34.0)


 


Red Cell Distribution Width


  


  19.9 %


(11.6-17.2) 20.5 %


(11.6-17.2)


 


Eosinophils (%) (Auto)


  


  4.1 %


(0.0-4.0) 


 


 


Random Glucose


  


  125 MG/DL


() 


 


 


Albumin


  


  3.0 GM/DL


(3.4-5.0) 2.9 GM/DL


(3.4-5.0)


 


Aspartate Amino Transf


(AST/SGOT) 


  14 U/L (15-37) 


  12 U/L (15-37) 


 


 


Potassium Level


  


  3.2 MEQ/L


(3.5-5.1) 


 


 


Monocytes (%) (Auto)


  


  


  9.1 %


(0.0-8.0)








Imaging





Last Impressions








Abdomen CT 9/8/17 0000 Signed





Impressions: 





 Service Date/Time:  Friday, September 8, 2017 20:26 - CONCLUSION:  1. 

Peripheral 





 low attenuation lesions in the spleen most characteristic of multiple splenic 





 infarcts. Some of these are new findings since 2016 comparison. 2. Minimal 





 subsegmental airspace disease at the lung bases. 3. 3.4 cm right adnexal cyst. 





 Intrauterine device present. 4. Small fat containing ventral hernias.     

Jaspal Issa MD 


 


Chest X-Ray 9/5/17 0000 Signed





Impressions: 





 Service Date/Time:  Tuesday, September 5, 2017 08:07 - CONCLUSION:  Suspected 





 mild edema/failure.     Ron Swan MD 


 


Neck CT 9/2/17 0000 Signed





Impressions: 





 Service Date/Time:  Saturday, September 2, 2017 11:13 - CONCLUSION:  1. 

Patient 





 is intubated which limits this study. The epiglottis is otherwise normal. 





 Secretions are present in the oropharynx and right sphenoid sinus.     Ej Ewing MD 


 


Brain MRI 8/25/17 0000 Signed





Impressions: 





 Service Date/Time:  Friday, August 25, 2017 17:07 - CONCLUSION:  1. Small 

acute 





 or subacute cortical infarct of the left parietal lobe. 2. Small, faint area 

of 





 nonspecific flair signal abnormality in the left frontal lobe periventricular 





 white matter, nonspecific. No associated mass effect or abnormal enhancement. 

3 





 month followup MRI of the brain recommended. 3.  Mild chronic white matter 





 changes.     Ron Swan MD 


 


Head CT 8/21/17 0000 Signed





Impressions: 





 Service Date/Time:  Monday, August 21, 2017 19:47 - CONCLUSION:  No evidence 

of 





 acute infarct, hemorrhage, mass or edema.     Kristian Frankel MD 








PE at Discharge


GENERAL: This is a well-nourished, well-developed patient, in no apparent 

distress.


CARDIOVASCULAR: Regular rate and regular rhythm without murmurs, gallops, or 

rubs. 


RESPIRATORY: Clear to auscultation. Breath sounds equal bilaterally. No wheezes

, rales, or rhonchi.  


GASTROINTESTINAL: Abdomen soft, non-tender, nondistended. 


Normal, active bowel sounds


MUSCULOSKELETAL: Extremities without clubbing, cyanosis, or edema.


NEURO:  Alert & Oriented x4 to person, place, time, situation.  Moves all ext x4


Transfer Summary


37-year-old  female who presents initially as a stroke alert.  

According to EMS report the patient has a history of fungal endocarditis with 

previous IVDA.  EMS states that the patient is currently on hospice, however, 

they are unsure if the patient is a DNR.  EMS states the patient apparently 

complained of a headache earlier today, was found lying on the floor and 

incontinent of stool at approximate, last seen normal at 6:45 PM.  Therefore, 

EMS called a stroke alert in the field as the patient was aphasic and confused.

  Upon arrival the patient groans, moves all 4 extremities and withdraws all 4 

extremities, but is unable to provide any information.  She was intubated in 

the emergency department by ER attending for airway protection.





8/22: remains encephalopathic. Blood cultures growing Staph in 4/4 bottles. 


8/23:  Remains sedated/ encephalopathic, orally intubated on mechanical 

ventilation.


8/24: still in shock on vasopressors. grossly volume overloaded and 

intravascularly volume overloaded based on echo and clinically. needs 

aggressive diuresis, but still in shock. family made patient DNR last night, 

but want to continue aggressive therapies. still encephalopathic. no 

significant improvements.


8/25: seen and examined at around 06:45am. delayed note entry. remains 

encephalopathic. no significant changes. unlikely to survive hospital stay, no 

additional overall therapies. family wants aggressive treatment. diuresing on 

bumex drip. net 3.3L negative. still volume overloaded.


8/26: Tmax 102.8 This am patient went into SVT rhythm heart rate 190's.  

Cooling blanket placed, ice packs in place, metoprolol 5 mg IV push given, 

resolution heart rate mid 90s, MAP ranges 66-68.  Ofirmev ordered.  Bumex 

infusion discontinued secondary to elevation in creatinine.  Chest x-ray 

pending.


8/27: Continues to be febrile, continues on cooling blanket.  Propofol was 

discontinued secondary to hypotension.  Today the patient continues on Versed 

and fentanyl infusions with the RASS -2.  Patient currently GCS of 11 T, 

responsive.  The patient tolerated CPAP trials for approximately 2.5 hours for 

the last 2 days.


8/28:  Somewhat agitated this AM.  Remains in sinus tachycardia.  On PSV trials 

1.5 hours today.  Tolerating tube feeding.  Tmax 101.4.


8/29:  Low-grade fevers overnight.  Currently afebrile.  Remains on Precedex, 

Versed and fentanyl drips for sedation.  On PSV trial 2 hours so far.  

Tolerating tube feeds.


8/30:  Regular.  Requesting extubation DO NOT INTUBATE hospice today.  Patient 

actually is doing well on her weaning trials.  If passes wean parameters.  We'

ll attempt extubation today.


8/31:  Extubated yesterday approximately 1 hour 30 minutes.  During that time, 

patient stated she wanted to be a full code.  This was in conflict which she 

had stated earlier while on Precedex drip.  Patient is currently full code.  

Possible stridor according to RN which brought about intubation by overnight 

intensivist.  Currently awake and alert and following commands room


9/1: Pending CT neck.  Afebrile.  Remains on full ventilator support.  Awake 

and alert and following commands.  tube feedings resumed.


9/2:  Afebrile.  CT neck unremarkable.  Awake and alert and following commands.

  Tolerating tube feeding.  One bowel movement yesterday.


9/3:  Resting in bed in no acute distress.  Awake and alert.  We'll attempt 

extubation morning if passes weaning parameters.  Will need to be on Precedex 

drip.  Tolerating tube feeding.


9/4: Extubated yesterday tolerating well.  Overnight had developed 

postextubation stridor, good response to racemic epi.  Currently on Precedex 

0.4 g per KG per hour.  Will wean to DC. No stridor on exam today





9/5: No stridor in the last 24 hours.  Breathing comfortably.  Denies chest 

pain or shortness of breath.  Precedex weaned off.  Plan for transfer to 

[facility] with telemetry


Hospital Course


Severe sepsis-resolved


Multiple splenic infarcts 


MSSA meningitis 


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


Prosthetic valve endocarditis


   finished the course of  IV antibiotics-


   will continue with suppressive oral antibiotic


   cleared by ID for discharge with outpatient f/u.


   Continue current treatment





Supraventricular tachycardia-resolved


   Echocardiogram 8 foci 17 revealed an ejection fraction showed 25-30%.


    Continue metoprolol.








Acute hypoxemic respiratory failure


   Resolved


   Continue DuoNeb treatments.  Maintain oxygen saturation above 92%





CVA (cerebral vascular accident)


   CT head negative, MRI small acute and subacute cortical infarct of the left 

parietal lobe.


   tapered off the methadone.


   Appreciate input from neurology who signed off


   PT to treat and eval





Tricuspid regurgitation


   Continue oral Lasix.





Acute systolic heart failure


   continue Lasix 40 mg po daily.





Acute kidney injury)


   Now resolved. 





Protein calorie malnutrition


   Secondary to long-standing IV drug abuse and endocarditis.





Microcytic anemia


   Continue with oral iron sulfate.


   Monitor H&H








IV drug abuse


   Advised cessation. tapered off the methadone.





Diarrhea, resolved


   C diff. is negative  Continue probiotic.





Anasarca.


   Improved


   Continue Lasix po.


Pt Condition on Discharge:  Fair


Discharge Disposition:  Discharge Home


Discharge Time:  > 30 minutes


Discharge Instructions


DIET: Follow Instructions for:  Heart Healthy Diet


Speech Therapy-Diet Recommends:  Soft, Nectar Thickened Liquids


Activities you can perform:  Regular-No Restrictions


Follow up Referrals:  


PCP Follow-up





New Medications:  


Doxycycline Hyclate (Doxycycline Hyclate) 100 Mg Cap


100 MG PO BID for Infection for 60 Days, #120 CAP 0 Refills





Hydrocodone-Acetaminophen (Lortab) 5-325 Mg Tab


1 TAB PO Q6H PRN for PAIN, #10 TAB 0 Refills





Metoprolol Tartrate (Metoprolol Tartrate) 25 Mg Tab


12.5 MG PO DAILY for cad for 30 Days, #15 TAB 0 Refills





 


Continued Medications:  


Albuterol/Ipratropium (Resp: Albuterol/Ipratropium 2.5 Mg/0.5 Mg) 1 Amp Nebu


1 AMPULE INH Q4HR NEB PRN for WHEEZING for 30 Days, ML





Furosemide (Furosemide) 40 Mg Tab


40 MG PO Q8H for Shortness of Breath for 30 Days, TAB 3 Refills





Potassium Chloride (Kcl 20 Meq Tab) 20 Meq Tabcr


20 MEQ PO DAILY for supplement for 30 Days, TAB.SR





 


Discontinued Medications:  


Metoprolol Tartrate 25 mg (Metoprolol Tartrate 25 mg) 25 Mg Tab


25 MG PO Q12HR for chf for 30 Days, TAB 3 Refills





Oxycodone-Acetaminophen 5-325 mg (Percocet 5-325 mg) 1 Tab


1 TAB PO Q4H PRN for PAIN, #60 TAB 0 Refills





Spironolactone (Aldactone 25 mg) 25 Mg Tab


25 MG PO Q12HR for Shortness of Breath for 30 Days, TAB 3 Refills

















Eneida Javed MD Oct 5, 2017 09:23

## 2017-10-09 NOTE — PQ
Physician Query Response Document

 

 

PATIENT:               LEONEL SALES

ACCT #:                  [id number]

MRN:                       [id number]

:                       1980

ADMIT DATE:       2017 9:06 PM

DISCH DATE:        10/5/2017 5:46 PM

RESPONDING

PROVIDER #:        [email or web address]

 

 

QUERY TEXT:

 

Clarification of Clinical Diagnostic Findings

 

Please clarify documentation or clinical relevance for the clinical / diagnostic findings or whether 
those are insignificant or unable to be further specified.

WAS SEPSIS DUE TO:

1)unknown etiology

2)infected prosthetic valve/endocarditis

3)meningitis

4)other(Please Specify)_________________

 

 

The patient's Clinical Indicators include:

Dr. Javed, patient was admitted with Sepsis.

 

Soure of Sepsis not clearly documented.  Please review the question below and answer to the best of y
our ability

 

THANK YOU

 

 

 

Query created by: Kleber Anton on 10/6/2017 10:42 AM

 

RESPONSE TEXT:

 

Sepsis due to endocarditis.

 

 

 

 

 

 

 

Electronically signed by:  Eneida Javed MD 10/9/2017 12:56 PM

## 2017-12-18 NOTE — RADRPT
EXAM DATE/TIME:  08/25/2017 17:07 

 

[facility] COMPARISON:     

CT BRAIN W/O CONTRAST, August 21, 2017, 19:47.  MRI BRAIN  W & W/O CONTRAST, January 05, 2016, 17:05.


       

 

 

INDICATIONS :     

Encephalitis.

                     

 

CONTRAST:     

20 cc Multihance (gadobenate) IV

                     

 

MEDICAL HISTORY :     

Hepatitis C. Congestive heart failure.   Endocarditis.

 

SURGICAL HISTORY :           

Aortic valve replacement.

 

ENCOUNTER:     

Initial

 

ACUITY:     

1 day

 

PAIN SCORE:     

0/10

 

LOCATION:       

cranial 

 

TECHNIQUE:     

Multiplanar, multisequence MRI of the brain was performed both prior to and following the administrat
ion of paramagnetic contrast.

 

FINDINGS:     

7 x 11 mm focal area of restricted diffusion seen in the cortical region of the left parietal lobe, s
eries 7 image 43.

 

Faint flair and T1 signal abnormality seen in the periventricular white matter of the left parietal l
obe without abnormal enhancement. Otherwise, scattered, chronic subcentimeter foci of flair signal ab
normality seen in the periventricular white matter of posterior ebral hemispheres.

 

No mass lesion or midline shift.

 

CONCLUSION:     

1. Small acute or subacute cortical infarct of the left parietal lobe.

2. Small, faint area of nonspecific flair signal abnormality in the left frontal lobe periventricular
 white matter, nonspecific. No associated mass effect or abnormal enhancement. 3 month followup MRI o
f the brain recommended.

3.  Mild chronic white matter changes.

 

 

 

 Ron Swan MD on August 25, 2017 at 18:03           

Board Certified Radiologist.

 This report was verified electronically. Patient calling to report today is day three? Patient's  said it started at 4:00pm on Saturday.     [id number]

## 2018-02-23 ENCOUNTER — HOSPITAL ENCOUNTER (INPATIENT)
Dept: HOSPITAL 17 - NEPE | Age: 38
LOS: 38 days | DRG: 314 | End: 2018-04-02
Attending: HOSPITALIST | Admitting: HOSPITALIST
Payer: MEDICAID

## 2018-02-23 VITALS
SYSTOLIC BLOOD PRESSURE: 86 MMHG | DIASTOLIC BLOOD PRESSURE: 48 MMHG | RESPIRATION RATE: 22 BRPM | OXYGEN SATURATION: 99 % | HEART RATE: 109 BPM

## 2018-02-23 VITALS
SYSTOLIC BLOOD PRESSURE: 92 MMHG | RESPIRATION RATE: 18 BRPM | OXYGEN SATURATION: 99 % | HEART RATE: 93 BPM | DIASTOLIC BLOOD PRESSURE: 39 MMHG

## 2018-02-23 VITALS
TEMPERATURE: 100.2 F | RESPIRATION RATE: 18 BRPM | OXYGEN SATURATION: 92 % | DIASTOLIC BLOOD PRESSURE: 38 MMHG | HEART RATE: 120 BPM | SYSTOLIC BLOOD PRESSURE: 72 MMHG

## 2018-02-23 VITALS
OXYGEN SATURATION: 99 % | DIASTOLIC BLOOD PRESSURE: 62 MMHG | HEART RATE: 88 BPM | RESPIRATION RATE: 18 BRPM | SYSTOLIC BLOOD PRESSURE: 99 MMHG

## 2018-02-23 VITALS
DIASTOLIC BLOOD PRESSURE: 52 MMHG | OXYGEN SATURATION: 95 % | SYSTOLIC BLOOD PRESSURE: 102 MMHG | HEART RATE: 90 BPM | RESPIRATION RATE: 21 BRPM

## 2018-02-23 VITALS — OXYGEN SATURATION: 97 % | RESPIRATION RATE: 22 BRPM

## 2018-02-23 VITALS — BODY MASS INDEX: 40.03 KG/M2 | HEIGHT: 67 IN | WEIGHT: 255.07 LBS

## 2018-02-23 DIAGNOSIS — R06.2: ICD-10-CM

## 2018-02-23 DIAGNOSIS — F41.9: ICD-10-CM

## 2018-02-23 DIAGNOSIS — F11.20: ICD-10-CM

## 2018-02-23 DIAGNOSIS — R09.02: ICD-10-CM

## 2018-02-23 DIAGNOSIS — L03.116: ICD-10-CM

## 2018-02-23 DIAGNOSIS — N18.9: ICD-10-CM

## 2018-02-23 DIAGNOSIS — R23.0: ICD-10-CM

## 2018-02-23 DIAGNOSIS — D50.9: ICD-10-CM

## 2018-02-23 DIAGNOSIS — R06.03: ICD-10-CM

## 2018-02-23 DIAGNOSIS — R44.3: ICD-10-CM

## 2018-02-23 DIAGNOSIS — R65.21: ICD-10-CM

## 2018-02-23 DIAGNOSIS — T82.6XXA: Primary | ICD-10-CM

## 2018-02-23 DIAGNOSIS — E87.6: ICD-10-CM

## 2018-02-23 DIAGNOSIS — G93.40: ICD-10-CM

## 2018-02-23 DIAGNOSIS — I49.01: ICD-10-CM

## 2018-02-23 DIAGNOSIS — I26.90: ICD-10-CM

## 2018-02-23 DIAGNOSIS — D73.5: ICD-10-CM

## 2018-02-23 DIAGNOSIS — N28.0: ICD-10-CM

## 2018-02-23 DIAGNOSIS — I70.92: ICD-10-CM

## 2018-02-23 DIAGNOSIS — B18.2: ICD-10-CM

## 2018-02-23 DIAGNOSIS — D65: ICD-10-CM

## 2018-02-23 DIAGNOSIS — I33.0: ICD-10-CM

## 2018-02-23 DIAGNOSIS — T50.1X5A: ICD-10-CM

## 2018-02-23 DIAGNOSIS — R59.0: ICD-10-CM

## 2018-02-23 DIAGNOSIS — I50.43: ICD-10-CM

## 2018-02-23 DIAGNOSIS — B95.4: ICD-10-CM

## 2018-02-23 DIAGNOSIS — T36.8X5A: ICD-10-CM

## 2018-02-23 DIAGNOSIS — I75.022: ICD-10-CM

## 2018-02-23 DIAGNOSIS — E87.3: ICD-10-CM

## 2018-02-23 DIAGNOSIS — E86.0: ICD-10-CM

## 2018-02-23 DIAGNOSIS — I36.1: ICD-10-CM

## 2018-02-23 DIAGNOSIS — N17.0: ICD-10-CM

## 2018-02-23 DIAGNOSIS — A41.9: ICD-10-CM

## 2018-02-23 DIAGNOSIS — I27.20: ICD-10-CM

## 2018-02-23 DIAGNOSIS — I63.9: ICD-10-CM

## 2018-02-23 DIAGNOSIS — R80.9: ICD-10-CM

## 2018-02-23 DIAGNOSIS — E87.1: ICD-10-CM

## 2018-02-23 DIAGNOSIS — Z86.711: ICD-10-CM

## 2018-02-23 DIAGNOSIS — I70.202: ICD-10-CM

## 2018-02-23 DIAGNOSIS — E46: ICD-10-CM

## 2018-02-23 DIAGNOSIS — I46.9: ICD-10-CM

## 2018-02-23 DIAGNOSIS — I76: ICD-10-CM

## 2018-02-23 DIAGNOSIS — R00.0: ICD-10-CM

## 2018-02-23 LAB
ALBUMIN SERPL-MCNC: 2.5 GM/DL (ref 3.4–5)
ALP SERPL-CCNC: 125 U/L (ref 45–117)
ALT SERPL-CCNC: (no result) U/L (ref 10–53)
APAP SERPL-MCNC: 6.1 MCG/ML (ref 10–30)
AST SERPL-CCNC: 24 U/L (ref 15–37)
BACTERIA #/AREA URNS HPF: (no result) /HPF
BASOPHILS # BLD AUTO: 0 TH/MM3 (ref 0–0.2)
BASOPHILS NFR BLD: 0.2 % (ref 0–2)
BILIRUB SERPL-MCNC: 0.9 MG/DL (ref 0.2–1)
BUN SERPL-MCNC: 57 MG/DL (ref 7–18)
CALCIUM SERPL-MCNC: 8.1 MG/DL (ref 8.5–10.1)
CHLORIDE SERPL-SCNC: 91 MEQ/L (ref 98–107)
COLOR UR: (no result)
CREAT SERPL-MCNC: 4.5 MG/DL (ref 0.5–1)
EOSINOPHIL # BLD: 0.2 TH/MM3 (ref 0–0.4)
EOSINOPHIL NFR BLD: 1 % (ref 0–4)
ERYTHROCYTE [DISTWIDTH] IN BLOOD BY AUTOMATED COUNT: 16.5 % (ref 11.6–17.2)
GFR SERPLBLD BASED ON 1.73 SQ M-ARVRAT: 11 ML/MIN (ref 89–?)
GLUCOSE SERPL-MCNC: 107 MG/DL (ref 74–106)
GLUCOSE UR STRIP-MCNC: (no result) MG/DL
HCO3 BLD-SCNC: 21.8 MEQ/L (ref 21–32)
HCT VFR BLD CALC: 25.2 % (ref 35–46)
HGB BLD-MCNC: 8.3 GM/DL (ref 11.6–15.3)
HGB UR QL STRIP: (no result)
INR PPP: 1.3 RATIO
KETONES UR STRIP-MCNC: (no result) MG/DL
LYMPHOCYTES # BLD AUTO: 1.1 TH/MM3 (ref 1–4.8)
LYMPHOCYTES NFR BLD AUTO: 6 % (ref 9–44)
MAGNESIUM SERPL-MCNC: 1.9 MG/DL (ref 1.5–2.5)
MCH RBC QN AUTO: 22.1 PG (ref 27–34)
MCHC RBC AUTO-ENTMCNC: 32.9 % (ref 32–36)
MCV RBC AUTO: 67.1 FL (ref 80–100)
MONOCYTE #: 0.8 TH/MM3 (ref 0–0.9)
MONOCYTES NFR BLD: 4.1 % (ref 0–8)
NEUTROPHILS # BLD AUTO: 16.7 TH/MM3 (ref 1.8–7.7)
NEUTROPHILS NFR BLD AUTO: 88.7 % (ref 16–70)
NITRITE UR QL STRIP: (no result)
PLATELET # BLD: 119 TH/MM3 (ref 150–450)
PMV BLD AUTO: 10.6 FL (ref 7–11)
PROT SERPL-MCNC: 6.3 GM/DL (ref 6.4–8.2)
PROTHROMBIN TIME: 13.1 SEC (ref 9.8–11.6)
RBC # BLD AUTO: 3.75 MIL/MM3 (ref 4–5.3)
SODIUM SERPL-SCNC: 125 MEQ/L (ref 136–145)
SP GR UR STRIP: 1.02 (ref 1–1.03)
TROPONIN I SERPL-MCNC: 0.26 NG/ML (ref 0.02–0.05)
URINE LEUKOCYTE ESTERASE: (no result)
WBC # BLD AUTO: 18.8 TH/MM3 (ref 4–11)

## 2018-02-23 PROCEDURE — 36556 INSERT NON-TUNNEL CV CATH: CPT

## 2018-02-23 PROCEDURE — 84702 CHORIONIC GONADOTROPIN TEST: CPT

## 2018-02-23 PROCEDURE — 85025 COMPLETE CBC W/AUTO DIFF WBC: CPT

## 2018-02-23 PROCEDURE — 87641 MR-STAPH DNA AMP PROBE: CPT

## 2018-02-23 PROCEDURE — 71275 CT ANGIOGRAPHY CHEST: CPT

## 2018-02-23 PROCEDURE — 86078 PHYS BLOOD BANK SERV REACTJ: CPT

## 2018-02-23 PROCEDURE — 96365 THER/PROPH/DIAG IV INF INIT: CPT

## 2018-02-23 PROCEDURE — 85610 PROTHROMBIN TIME: CPT

## 2018-02-23 PROCEDURE — 87186 SC STD MICRODIL/AGAR DIL: CPT

## 2018-02-23 PROCEDURE — 70553 MRI BRAIN STEM W/O & W/DYE: CPT

## 2018-02-23 PROCEDURE — 83020 HEMOGLOBIN ELECTROPHORESIS: CPT

## 2018-02-23 PROCEDURE — 92950 HEART/LUNG RESUSCITATION CPR: CPT

## 2018-02-23 PROCEDURE — 83735 ASSAY OF MAGNESIUM: CPT

## 2018-02-23 PROCEDURE — 87040 BLOOD CULTURE FOR BACTERIA: CPT

## 2018-02-23 PROCEDURE — 82668 ASSAY OF ERYTHROPOIETIN: CPT

## 2018-02-23 PROCEDURE — 83880 ASSAY OF NATRIURETIC PEPTIDE: CPT

## 2018-02-23 PROCEDURE — 85300 ANTITHROMBIN III ACTIVITY: CPT

## 2018-02-23 PROCEDURE — 81002 URINALYSIS NONAUTO W/O SCOPE: CPT

## 2018-02-23 PROCEDURE — 86850 RBC ANTIBODY SCREEN: CPT

## 2018-02-23 PROCEDURE — 93306 TTE W/DOPPLER COMPLETE: CPT

## 2018-02-23 PROCEDURE — 83010 ASSAY OF HAPTOGLOBIN QUANT: CPT

## 2018-02-23 PROCEDURE — 82607 VITAMIN B-12: CPT

## 2018-02-23 PROCEDURE — 93005 ELECTROCARDIOGRAM TRACING: CPT

## 2018-02-23 PROCEDURE — 80307 DRUG TEST PRSMV CHEM ANLYZR: CPT

## 2018-02-23 PROCEDURE — 76775 US EXAM ABDO BACK WALL LIM: CPT

## 2018-02-23 PROCEDURE — 84132 ASSAY OF SERUM POTASSIUM: CPT

## 2018-02-23 PROCEDURE — 71250 CT THORAX DX C-: CPT

## 2018-02-23 PROCEDURE — 83935 ASSAY OF URINE OSMOLALITY: CPT

## 2018-02-23 PROCEDURE — 96368 THER/DIAG CONCURRENT INF: CPT

## 2018-02-23 PROCEDURE — 85044 MANUAL RETICULOCYTE COUNT: CPT

## 2018-02-23 PROCEDURE — P9016 RBC LEUKOCYTES REDUCED: HCPCS

## 2018-02-23 PROCEDURE — 83615 LACTATE (LD) (LDH) ENZYME: CPT

## 2018-02-23 PROCEDURE — 86077 PHYS BLOOD BANK SERV XMATCH: CPT

## 2018-02-23 PROCEDURE — 82746 ASSAY OF FOLIC ACID SERUM: CPT

## 2018-02-23 PROCEDURE — 94664 DEMO&/EVAL PT USE INHALER: CPT

## 2018-02-23 PROCEDURE — 83690 ASSAY OF LIPASE: CPT

## 2018-02-23 PROCEDURE — 87205 SMEAR GRAM STAIN: CPT

## 2018-02-23 PROCEDURE — 85018 HEMOGLOBIN: CPT

## 2018-02-23 PROCEDURE — 85730 THROMBOPLASTIN TIME PARTIAL: CPT

## 2018-02-23 PROCEDURE — 83540 ASSAY OF IRON: CPT

## 2018-02-23 PROCEDURE — 84484 ASSAY OF TROPONIN QUANT: CPT

## 2018-02-23 PROCEDURE — 85384 FIBRINOGEN ACTIVITY: CPT

## 2018-02-23 PROCEDURE — 77001 FLUOROGUIDE FOR VEIN DEVICE: CPT

## 2018-02-23 PROCEDURE — 76642 ULTRASOUND BREAST LIMITED: CPT

## 2018-02-23 PROCEDURE — 81001 URINALYSIS AUTO W/SCOPE: CPT

## 2018-02-23 PROCEDURE — 80069 RENAL FUNCTION PANEL: CPT

## 2018-02-23 PROCEDURE — 76937 US GUIDE VASCULAR ACCESS: CPT

## 2018-02-23 PROCEDURE — 86901 BLOOD TYPING SEROLOGIC RH(D): CPT

## 2018-02-23 PROCEDURE — 82272 OCCULT BLD FECES 1-3 TESTS: CPT

## 2018-02-23 PROCEDURE — 80202 ASSAY OF VANCOMYCIN: CPT

## 2018-02-23 PROCEDURE — 80053 COMPREHEN METABOLIC PANEL: CPT

## 2018-02-23 PROCEDURE — 87086 URINE CULTURE/COLONY COUNT: CPT

## 2018-02-23 PROCEDURE — 86922 COMPATIBILITY TEST ANTIGLOB: CPT

## 2018-02-23 PROCEDURE — 71045 X-RAY EXAM CHEST 1 VIEW: CPT

## 2018-02-23 PROCEDURE — 84443 ASSAY THYROID STIM HORMONE: CPT

## 2018-02-23 PROCEDURE — 83550 IRON BINDING TEST: CPT

## 2018-02-23 PROCEDURE — 36580 REPLACE CVAD CATH: CPT

## 2018-02-23 PROCEDURE — 94640 AIRWAY INHALATION TREATMENT: CPT

## 2018-02-23 PROCEDURE — 84300 ASSAY OF URINE SODIUM: CPT

## 2018-02-23 PROCEDURE — 86880 COOMBS TEST DIRECT: CPT

## 2018-02-23 PROCEDURE — 80048 BASIC METABOLIC PNL TOTAL CA: CPT

## 2018-02-23 PROCEDURE — 70450 CT HEAD/BRAIN W/O DYE: CPT

## 2018-02-23 PROCEDURE — 84100 ASSAY OF PHOSPHORUS: CPT

## 2018-02-23 PROCEDURE — 83605 ASSAY OF LACTIC ACID: CPT

## 2018-02-23 PROCEDURE — P9047 ALBUMIN (HUMAN), 25%, 50ML: HCPCS

## 2018-02-23 PROCEDURE — 86870 RBC ANTIBODY IDENTIFICATION: CPT

## 2018-02-23 PROCEDURE — 93308 TTE F-UP OR LMTD: CPT

## 2018-02-23 PROCEDURE — 86160 COMPLEMENT ANTIGEN: CPT

## 2018-02-23 PROCEDURE — 75635 CT ANGIO ABDOMINAL ARTERIES: CPT

## 2018-02-23 PROCEDURE — 86920 COMPATIBILITY TEST SPIN: CPT

## 2018-02-23 PROCEDURE — 36430 TRANSFUSION BLD/BLD COMPNT: CPT

## 2018-02-23 PROCEDURE — 82550 ASSAY OF CK (CPK): CPT

## 2018-02-23 PROCEDURE — 94150 VITAL CAPACITY TEST: CPT

## 2018-02-23 PROCEDURE — 82565 ASSAY OF CREATININE: CPT

## 2018-02-23 PROCEDURE — 86900 BLOOD TYPING SEROLOGIC ABO: CPT

## 2018-02-23 PROCEDURE — 93922 UPR/L XTREMITY ART 2 LEVELS: CPT

## 2018-02-23 PROCEDURE — 93970 EXTREMITY STUDY: CPT

## 2018-02-23 PROCEDURE — A9579 GAD-BASE MR CONTRAST NOS,1ML: HCPCS

## 2018-02-23 PROCEDURE — 31500 INSERT EMERGENCY AIRWAY: CPT

## 2018-02-23 PROCEDURE — 85027 COMPLETE CBC AUTOMATED: CPT

## 2018-02-23 PROCEDURE — 87070 CULTURE OTHR SPECIMN AEROBIC: CPT

## 2018-02-23 PROCEDURE — 85014 HEMATOCRIT: CPT

## 2018-02-23 PROCEDURE — 82728 ASSAY OF FERRITIN: CPT

## 2018-02-23 RX ADMIN — PHENYTOIN SODIUM SCH MLS/HR: 50 INJECTION INTRAMUSCULAR; INTRAVENOUS at 23:00

## 2018-02-23 NOTE — PD
HPI


Chief Complaint:  Respiratory Symptoms


Time Seen by Provider:  19:56


Travel History


International Travel<30 days:  No


Contact w/Intl Traveler<30days:  No


Traveled to known affect area:  No





History of Present Illness


HPI


37-year-old female that presents to the ED for evaluation of shortness of 

breath and swelling.  Patient has a significant history of endocarditis, IV 

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 

continues use of IV drug use that presents to the ED for evaluation of acute 

onset of shortness of breath with swelling.  Patient initially did not mention 

to anybody that she was doing drugs but she did tell me that she injected 

herself about 4 days ago.  She uses Dilaudid.  She states that she's been 

having fevers.  Patient is somewhat somnolent and hard to assess she doesn't 

really provide much information.  Family members at the bedside and he provides 

more information about the heart.  Per patient she'll he takes 2 medications.  

Patient asked if she is taking any antibiotics and she said yes but when I ask 

her what kind is she is taking currently she states that she has not been on 

antibiotics for some time.  Patient apparently does follow with a local 

cardiologist but she cannot tell me the name of it.  She states that she's been 

having swelling to her legs for the past month.  She denies any recent travel 

or injury.  No other medical issues at this time.  No allergies to medication.





PFSH


Past Medical History


Anxiety:  Yes


Cancer:  No


Cardiovascular Problems:  Yes (ENDOCARDITIS 2016)


Congestive Heart Failure:  Yes


Diminished Hearing:  No


Endocrine:  No


Gastrointestinal Disorders:  Yes (a few times a week )


Headaches:  Yes


Hepatitis:  Yes (hep c)


Immune Disorder:  No


Musculoskeletal:  No


Neurologic:  Yes


Psychiatric:  No


Reproductive:  No


Respiratory:  No


Pneumonia:  Yes


Tetanus Vaccination:  Unknown


Influenza Vaccination:  Yes


Pregnant?:  Not Pregnant


:  2


Para:  2





Past Surgical History


Other Surgery:  Yes





Social History


Alcohol Use:  Yes (SOC)


Tobacco Use:  No


Substance Use:  Yes (pills percocet, ocycodone and dilaudid, uses daily. 

Marijuana)





Allergies-Medications


(Allergen,Severity, Reaction):  


Coded Allergies:  


     No Known Allergies (Verified  Allergy, Unknown, 18)


Reported Meds & Prescriptions





Reported Meds & Active Scripts


Active


Metoprolol Tartrate 25 Mg Tab 12.5 Mg PO DAILY 30 Days


Doxycycline Hyclate 100 Mg Cap 100 Mg PO BID 60 Days


Lortab (Hydrocodone-Acetaminophen) 5-325 Mg Tab 1 Tab PO Q6H PRN


Kcl 20 Meq Tab (Potassium Chloride) 20 Meq Tabcr 20 Meq PO DAILY 30 Days


Nebulizer (Miscellaneous Medication)  Mis 1 Unit INH AS DIRECTED


Resp: Albuterol/Ipratropium 2.5 Mg/0.5 Mg (Albuterol/Ipratropium) 1 Amp Nebu 1 

Ampule INH Q4HR NEB PRN 30 Days


Furosemide 40 Mg Tab 40 Mg PO Q8H 30 Days








Review of Systems


Except as stated in HPI:  all other systems reviewed are Neg





Physical Exam


Narrative


GENERAL: 


SKIN: Warm and dry.  Patient does appear to have puncture wounds from where she 

states been injecting recently in her arms.


HEAD: Atraumatic. Normocephalic. 


EYES: Pupils equal and round. No scleral icterus. No injection or drainage. 


ENT: No nasal bleeding or discharge.  Mucous membranes pink and moist.  Tongue 

is midline.  No uvula deviation.


NECK: Trachea midline. No JVD. 


CARDIOVASCULAR: Regular rate and rhythm.  murmurs noted.


RESPIRATORY: No accessory muscle use. Clear to auscultation. Breath sounds 

equal bilaterally. 


GASTROINTESTINAL: Abdomen soft, non-tender, nondistended. Hepatic and splenic 

margins not palpable. 


MUSCULOSKELETAL: Extremities without clubbing, cyanosis, or edema. No obvious 

deformities.  2+ pitting edema to the lower extremities.  Full range of motion 

of the upper and lower extremities bilaterally.


NEUROLOGICAL: Awake and alert. No obvious cranial nerve deficits.  Motor 

grossly within normal limits. Five out of 5 muscle strength in the arms and 

legs.  Normal speech.


PSYCHIATRIC: Appropriate mood and affect; insight and judgment normal.





Data


Data


Last Documented VS





Vital Signs








  Date Time  Temp Pulse Resp B/P (MAP) Pulse Ox O2 Delivery O2 Flow Rate FiO2


 


18 20:11  109 22 86/48 (61) 99 Nasal Cannula 2.00 


 


18 19:41 100.2       








Orders





 Orders


Electrocardiogram (18 19:51)


Complete Blood Count With Diff (18 19:51)


Comprehensive Metabolic Panel (18 19:51)


Ckmb (Isoenzyme) Profile (18 19:51)


Troponin I (18 19:51)


B-Type Natriuretic Peptide (18 19:51)


Prothrombin Time / Inr (Pt) (18 19:51)


Act Partial Throm Time (Ptt) (18 19:51)


Blood Culture (18 19:51)


Lipase (18 19:51)


Urinalysis - C+S If Indicated (18 19:51)


Magnesium (Mg) (18 19:51)


Thyroid Stimulating Hormone (18 19:51)


Chest, Single Ap (18 19:51)


Iv Access Insert/Monitor (18 19:51)


Ecg Monitoring (18 19:51)


Oximetry (18 19:51)


Drug Screen, Random Urine (18 19:51)


Alcohol (Ethanol) (18 19:51)


Salicylates (Aspirin) (18 19:51)


Tylenol (Acetaminophen) (18 19:51)


Sepsis Workup Initiated (18 )


Lactic Acid Sepsis Protocol (18 19:57)


Rifampin Inj (Rifampin Inj) (18 19:57)


Vancomycin Inj (Vancomycin Inj) (18 19:57)


Piperacil-Tazo 4.5 Gm Premix (Zosyn 4.5 (18 19:57)


Sodium Chlor 0.9% 1000 Ml Inj (Ns 1000 M (18 19:57)


Sodium Chlor 0.9% 1000 Ml Inj (Ns 1000 M (18 19:57)


Acetaminophen (Tylenol) (18 20:15)


Us Leg Venous Doppler Bilat (18 )


^ Infusion (18 )


Dopamine Inj Premix (Dopamine Inj Premix (18 21:15)


Terbutaline Inj (Brethine Inj) (18 21:15)


Admit Order (Ed Use Only) (18 21:21)





Labs





Laboratory Tests








Test


  18


20:15


 


White Blood Count 18.8 TH/MM3 


 


Red Blood Count 3.75 MIL/MM3 


 


Hemoglobin 8.3 GM/DL 


 


Hematocrit 25.2 % 


 


Mean Corpuscular Volume 67.1 FL 


 


Mean Corpuscular Hemoglobin 22.1 PG 


 


Mean Corpuscular Hemoglobin


Concent 32.9 % 


 


 


Red Cell Distribution Width 16.5 % 


 


Platelet Count 119 TH/MM3 


 


Mean Platelet Volume 10.6 FL 


 


Neutrophils (%) (Auto) 88.7 % 


 


Lymphocytes (%) (Auto) 6.0 % 


 


Monocytes (%) (Auto) 4.1 % 


 


Eosinophils (%) (Auto) 1.0 % 


 


Basophils (%) (Auto) 0.2 % 


 


Neutrophils # (Auto) 16.7 TH/MM3 


 


Lymphocytes # (Auto) 1.1 TH/MM3 


 


Monocytes # (Auto) 0.8 TH/MM3 


 


Eosinophils # (Auto) 0.2 TH/MM3 


 


Basophils # (Auto) 0.0 TH/MM3 


 


CBC Comment DIFF FINAL 


 


Differential Comment  


 


Prothrombin Time 13.1 SEC 


 


Prothromb Time International


Ratio 1.3 RATIO 


 


 


Activated Partial


Thromboplast Time 32.0 SEC 


 


 


Blood Urea Nitrogen 57 MG/DL 


 


Creatinine 4.50 MG/DL 


 


Random Glucose 107 MG/DL 


 


Total Protein 6.3 GM/DL 


 


Albumin 2.5 GM/DL 


 


Calcium Level 8.1 MG/DL 


 


Magnesium Level 1.9 MG/DL 


 


Alkaline Phosphatase 125 U/L 


 


Aspartate Amino Transf


(AST/SGOT) 24 U/L 


 


 


Alanine Aminotransferase


(ALT/SGPT) LESS THAN 6


U/L


 


Total Bilirubin 0.9 MG/DL 


 


Sodium Level 125 MEQ/L 


 


Potassium Level 3.9 MEQ/L 


 


Chloride Level 91 MEQ/L 


 


Carbon Dioxide Level 21.8 MEQ/L 


 


Anion Gap 12 MEQ/L 


 


Estimat Glomerular Filtration


Rate 11 ML/MIN 


 


 


Lactic Acid Level 1.2 mmol/L 


 


Total Creatine Kinase 48 U/L 


 


Troponin I 0.26 NG/ML 


 


B-Type Natriuretic Peptide 106 PG/ML 


 


Lipase 81 U/L 


 


Thyroid Stimulating Hormone


3rd Gen 2.000 uIU/ML 


 


 


Salicylates Level


  LESS THAN 1.7


MG/DL


 


Acetaminophen Level 6.1 MCG/ML 


 


Ethyl Alcohol Level


  LESS THAN 3


MG/DL











[facility]


Medical Decision Making


Medical Screen Exam Complete:  Yes


Emergency Medical Condition:  Yes


Medical Record Reviewed:  Yes


Interpretation(s)


CBC & BMP Diagram


18 20:15








Total Protein 6.3 L, Albumin 2.5 L, Calcium Level 8.1 L, Magnesium Level 1.9, 

Alkaline Phosphatase 125 H, Aspartate Amino Transf (AST/SGOT) 24, Alanine 

Aminotransferase (ALT/SGPT) LESS THAN 6 L, Total Bilirubin 0.9








Last Impressions








Chest X-Ray 18 Signed





Impressions: 





 Service Date/Time:  2018 20:00 - CONCLUSION:        1. No 





 acute cardiopulmonary disease.     jR Whitten MD 





troponin elevated


INR slightly elevated


EKG shows sinus tachycardia but no sign of acute ischemia or arrythmia read by 

me and attending.


Differential Diagnosis


Sepsis versus endocarditis versus CHF exacerbation versus respiratory failure 

versus septic emboli versus pneumonia versus altered mental status versus 

substance abuse


Narrative Course


37-year-old female that presents to the ED for evaluation of shortness of 

breath.  Patient was properly examined and was found to have signs and symptoms 

very concerning for bacterial endocarditis.  She has a significant history of 

this in the past and she's even had surgery for her bowels secondary to the 

infection.  Per old records she's had about 3 surgeries on her bowels and she 

has had infection on the bowels multiple times.  She was last admitted here at 

the end of last year and she was released on October.  She is continuing to use 

IV drugs.  She does appear to have significant swelling of her legs which 

appear to be likely from CHF.  She complains of shortness of breath and she is 

somewhat somnolent on exam but unclear if this is related to substance abuse or 

medical illness.  She is also very hypotensive as well as tachycardic with a 

slight fever.  My attending Dr. Stallings was made aware of findings and he 

immediately came and evaluated the patient with me.  He agrees with sepsis 

workup and at this time patient will be given 1 L of fluid as patient does 

appear to be fluid overloaded at this time.  Patient was started immediately on 

antibiotics.  Labs and imaging were ordered.  Labs and imaging showed signs and 

symptoms very consistent with endocarditis and sepsis.  Patient has a positive 

troponin, she is tachycardic and hypotensive.  She has an elevated white blood 

cell count of the her lactic acid is normal.  She does have a fever.  She is 

appears to be in acute kidney failure with her creatinine slightly elevated at 

4.  On her last admission her creatinine was recently within normal limits.  

Cholesterol recommendations for admission to the ICU.  My attending evaluated 

the patient during this time and agrees with this.  Case discussed with Dr. Virgen who agrees to admission.  Per my attending that she started patient on 

dopamine secondary to patient not responding to fluids.





Sepsis Criteria


SIRS Criteria (2 or more):  Heart rate over 90, WBC > 22917, < 4000 or > 10% 

bands


Sepsis Criteria (SIRS+source):  Infect source susp/known


Severe Sepsis (+one):  Organ Dysfunction, Hypotension


Criteria Outcome:  Meets severe sepsis criteria





Diagnosis





 Primary Impression:  


 Endocarditis


 Qualified Codes:  I33.0 - Acute and subacute infective endocarditis


 Additional Impressions:  


 Acute kidney injury


 CHF (congestive heart failure), NYHA class IV


 Qualified Codes:  I50.23 - Acute on chronic systolic (congestive) heart failure


 Severe sepsis


 Elevated troponin





Admitting Information


Admitting Physician Requests:  Admit











Balwinder Mendez 2018 20:42

## 2018-02-23 NOTE — RADRPT
EXAM DATE/TIME:  02/23/2018 21:22 

 

[facility] COMPARISON:     

US LEG BILATERAL VENOUS DOPPLER, Marni 15, 2016, 22:22.

        

 

 

INDICATIONS :                

Bilateral leg swelling.

            

 

MEDICAL HISTORY :     

Congestive heart failure. Pregnancy. Hepatitis C. Headaches. Endocarditis. Pneumonia. Anxiety. 

 

SURGICAL HISTORY :      

Cyst from wrist removed.

 

ENCOUNTER:     

[facility]

 

ACUITY:     

1 day

 

PAIN SCORE:      

3/10

 

LOCATION:      

Bilateral  legs.

                       

 

TECHNIQUE:     

Venous ultrasound of the left and right leg was performed from the inguinal ligament to the proximal 
calf.  Real-time, color Doppler and spectral tracing, compression and augmentation techniques were us
ed.  

 

FINDINGS:     

 

RIGHT LEG:     

There is normal compressibility of the deep venous system from the inguinal region to the proximal ca
lf.  No echogenic clot is seen in the lumen of the common femoral, femoral, popliteal, and posterior 
tibial veins.  There is a normal response of the venous system to proximal and distal augmentation an
d respiration.  

 

LEFT LEG:     

There is normal compressibility of the deep venous system from the inguinal region to the proximal ca
lf.  No echogenic clot is seen in the lumen of the common femoral, femoral, popliteal, and posterior 
tibial veins.  There is a normal response of the venous system to proximal and distal augmentation an
d respiration.  

 

CONCLUSION:     

1. No sonographic evidence for lower extremity DVT.

2. Incidental note of bilateral inguinal adenopathy with the largest on the right measuring 3.3 cm an
d the largest on the left measuring 2.6 cm. This finding is nonspecific but is typically reactive in 
etiology.

 

 

 

 Rj Whitten MD on February 23, 2018 at 22:08           

Board Certified Radiologist.

 This report was verified electronically.

## 2018-02-23 NOTE — EKG
Date Performed: 02/23/2018       Time Performed: 20:11:20

 

PTAGE:      37 years

 

EKG:      SINUS TACHYCARDIA WITH FIRST DEGREE AV BLOCK RIGHT BUNDLE BRANCH BLOCK MINIMAL VOLTAGE CRIT
ERIA FOR LVH, CONSIDER NORMAL VARIANT NONSPECIFIC T-WAVE ABNORMALITY ABNORMAL ECG

 

PREVIOUS TRACING       : 08/26/2017 10.23 Compared to previous tracing, Sinus rhythm 

 

 has replaced supraventricular tachycardia.

 

DOCTOR:   Caleb Davis  Interpretating Date/Time  02/23/2018 22:59:10

## 2018-02-23 NOTE — RADRPT
EXAM DATE/TIME:  02/23/2018 20:00 

 

[facility] COMPARISON:     CHEST SINGLE AP, September 05, 2017, 8:07.

 

                     

INDICATIONS :     Patient having shortness of breath and bilateral lower extremity swelling.

                     

MEDICAL HISTORY :            Cardiovascular disease. Congestive heart failure. Hepatitis C.   

SURGICAL HISTORY :        aortic valve replacement

ENCOUNTER:     Initial                                        

ACUITY:     2 weeks      

PAIN SCORE:     5/10

LOCATION:     Bilateral upper chest 

 

FINDINGS:     

Median sternotomy wires. No significant focal pleural or parenchymal opacities. Cardiomediastinal con
tours are stable. Remainder of exam is unchanged.

 

CONCLUSION:

     

1. No acute cardiopulmonary disease. 

 

 

 Rj Whitten MD on February 23, 2018 at 20:17           

Board Certified Radiologist.

 This report was verified electronically.

## 2018-02-23 NOTE — HHI.HP
[facility]


Service


Critical Care Medicine


Primary Care Physician


Ron Jordan MD


Admission Diagnosis





endocarditis, severe sepsis, CHF, IVDA, Acute kidney failure


Diagnosis:  


Travel History


International Travel<30 Days:  No


Contact w/Intl Traveler <30 Da:  No


Traveled to Known Affected Are:  No


History of Present Illness


37-year-old female that presents to the ED for evaluation of shortness of 

breath and swelling.  Patient has a significant history of endocarditis, IV 

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 

continues use of IV drug use that presents to the ED for evaluation of acute 

onset of shortness of breath with swelling.  Patient initially did not mention 

to anybody that she was doing drugs but she did tell me that she injected 

herself about 4 days ago.  She uses Dilaudid.  She states that she's been 

having fevers.  Patient is somewhat somnolent and hard to assess she doesn't 

really provide much information.  Family members at the bedside and he provides 

more information about the heart.  Per patient she'll he takes 2 medications.  

Patient asked if she is taking any antibiotics and she said yes but when I ask 

her what kind is she is taking currently she states that she has not been on 

antibiotics for some time.  Patient apparently does follow with a local 

cardiologist but she cannot tell me the name of it.  She states that she's been 

having swelling to her legs for the past month.  She denies any recent travel 

or injury.  No other medical issues at this time.  No allergies to medication.





Review of Systems


ROS Limitations:  Clinical Condition, Altered Mental Status


ROS


Unable to obtain patient is lethargic





Past Family Social History


Allergies:  


Coded Allergies:  


     No Known Allergies (Verified  Allergy, Unknown, 18)


Past Medical History


Hepatitis C


IV drug abuse


Narcotic dependency


Bacterial endocarditis


Past Surgical History


Left forearm surgery


Reported Medications





Reported Meds & Active Scripts


Active


Active Prescriptions or Reported Medications Unobtainable


Active Ordered Medications





Current Medications








 Medications


  (Trade)  Dose


 Ordered  Sig/Nikhil


 Route


 PRN Reason  Start Time


 Stop Time Status Last Admin


Dose Admin


 


 Dopamine HCl/


 Dextrose  500 ml @ 0


 mls/hr  TITRATE  PRN


 IV


 Blood Pressure Management  18 21:15


    18 22:01


 


 


 Terbutaline


 Sulfate


  (Brethine Inj)  1 mg  UNSCH  PRN


 SQ


 For Extravasation  18 21:15


     


 


 


 Sodium Chloride  1,000 ml @ 


 84 mls/hr  [id number]


 IV


   18 21:57


     


 


 


 Sodium Chloride


  (NS Flush)  2 ml  UNSCH  PRN


 IV FLUSH


 FLUSH AFTER USING IV ACCESS  18 22:00


     


 


 


 Sodium Chloride


  (NS Flush)  2 ml  BID


 IV FLUSH


   18 09:00


     


 


 


 Acetaminophen


  (Tylenol)  650 mg  Q6H  PRN


 PO


 PAIN 1-5 AND/OR FEVER >101F  18 22:00


     


 


 


 Hydromorphone HCl


  (Dilaudid Pf Inj)  1 mg  Q4H  PRN


 IV PUSH


 PAIN SCALE 6 TO 10  18 23:00


     


 


 


 Famotidine


  (Pepcid Inj)  10 mg  Q12HR


 IV PUSH


   18 09:00


     


 


 


 Lorazepam


  (Ativan Inj)  2 mg  Q4H  PRN


 IV PUSH


 Agitation/Sedation  18 22:00


     


 


 


 Ondansetron HCl


  (Zofran Inj)  4 mg  Q6H  PRN


 IV PUSH


 NAUSEA OR VOMITING  18 22:00


     


 


 


 Temazepam


  (Restoril)  15 mg  HS  PRN


 PO


 INSOMNIA  18 22:00


     


 


 


 Albuterol/


 Ipratropium


  (Duoneb Neb)  1 ampule  Q2HR NEB  PRN


 INH


 WHEEZING  18 22:00


     


 


 


 Heparin Sodium


  (Porcine)


  (Heparin Inj)  5,000 units  Q8H


 SQ


   18 22:00


     


 


 


 Miscellaneous


 Information  1  [id number]


 XX


   18 22:00


     


 


 


 Chlorhexidine


 Gluconate


  (Chlorhexidine


 2% Cloth)  3 pack


 Taper  DAILY@04


 TOP


   18 04:00


 19 03:59   


 


 


 Chlorhexidine


 Gluconate


  (Chlorhexidine


 2% Cloth)  3 pack  UNSCH  PRN


 TOP


 HYGIENIC CARE  18 22:00


     


 


 


 Senna/Docusate


 Sodium


  (Arlen-Colace)  1 tab  BID


 PO


   18 09:00


     


 


 


 Magnesium


 Hydroxide


  (Milk Of


 Magnesia Liq)  30 ml  Q12H  PRN


 PO


 Mild constipation  18 22:00


     


 


 


 Sennosides


  (Senokot)  17.2 mg  Q12H  PRN


 PO


 Moderate constipation  18 22:00


     


 


 


 Bisacodyl


  (Dulcolax Supp)  10 mg  DAILY  PRN


 RECTAL


 SEVERE CONSITIPATION  18 22:00


     


 


 


 Lactulose


  (Lactulose Liq)  30 ml  DAILY  PRN


 PO


 SEVERE CONSITIPATION  18 22:00


     


 


 


 Pharmacy Profile


 Note  0 ml @ 0


 mls/hr  UNSCH


 OTHER


   18 22:15


     


 


 


 Piperacillin Sod/


 Tazobactam Sod  50 ml @ 


 100 mls/hr  Q6H


 IV


   18 02:00


     


 








Family History


No family history of coronary artery disease or malignancy


Social History


History of IV drug abuse


History of opioid dependency


Medical marijuana use





Physical Exam


Vital Signs





Vital Signs








  Date Time  Temp Pulse Resp B/P (MAP) Pulse Ox O2 Delivery O2 Flow Rate FiO2


 


18 20:11  109 22 86/48 (61) 99 Nasal Cannula 2.00 


 


18 20:00   22  97 Nasal Cannula 2.00 


 


18 19:49   24  97 Nasal Cannula 2.00 


 


18 19:41 100.2 120 18 72/38 (49) 92 Room Air  








Physical Exam


GENERAL: Well-developed young female in moderate distress, somehow lethargic


SKIN: Warm and dry.  Patient does appear to have puncture wounds from where she 

states been injecting recently in her arms.


HEAD: Atraumatic. Normocephalic. 


EYES: Pupils equal and round. No scleral icterus. No injection or drainage. 


ENT: No nasal bleeding or discharge.  Mucous membranes pink and moist.  Tongue 

is midline.  No uvula deviation.


NECK: Trachea midline. No JVD. 


CARDIOVASCULAR: Regular rate and rhythm.  murmurs noted.


RESPIRATORY: No accessory muscle use. Clear to auscultation. Breath sounds 

equal bilaterally. 


GASTROINTESTINAL: Abdomen soft, non-tender, nondistended. Hepatic and splenic 

margins not palpable. 


MUSCULOSKELETAL: Extremities without clubbing, cyanosis, or edema. No obvious 

deformities.  2+ pitting edema to the lower extremities.  Full range of motion 

of the upper and lower extremities bilaterally.


NEUROLOGICAL: Awake and alert. No obvious cranial nerve deficits.  Motor 

grossly within normal limits. Five out of 5 muscle strength in the arms and 

legs.  Normal speech.


Laboratory





Laboratory Tests








Test


  18


20:15


 


White Blood Count 18.8 


 


Red Blood Count 3.75 


 


Hemoglobin 8.3 


 


Hematocrit 25.2 


 


Mean Corpuscular Volume 67.1 


 


Mean Corpuscular Hemoglobin 22.1 


 


Mean Corpuscular Hemoglobin


Concent 32.9 


 


 


Red Cell Distribution Width 16.5 


 


Platelet Count 119 


 


Mean Platelet Volume 10.6 


 


Neutrophils (%) (Auto) 88.7 


 


Lymphocytes (%) (Auto) 6.0 


 


Monocytes (%) (Auto) 4.1 


 


Eosinophils (%) (Auto) 1.0 


 


Basophils (%) (Auto) 0.2 


 


Neutrophils # (Auto) 16.7 


 


Lymphocytes # (Auto) 1.1 


 


Monocytes # (Auto) 0.8 


 


Eosinophils # (Auto) 0.2 


 


Basophils # (Auto) 0.0 


 


CBC Comment DIFF FINAL 


 


Differential Comment  


 


Prothrombin Time 13.1 


 


Prothromb Time International


Ratio 1.3 


 


 


Activated Partial


Thromboplast Time 32.0 


 


 


Blood Urea Nitrogen 57 


 


Creatinine 4.50 


 


Random Glucose 107 


 


Total Protein 6.3 


 


Albumin 2.5 


 


Calcium Level 8.1 


 


Magnesium Level 1.9 


 


Alkaline Phosphatase 125 


 


Aspartate Amino Transf


(AST/SGOT) 24 


 


 


Alanine Aminotransferase


(ALT/SGPT) LESS THAN 6 


 


 


Total Bilirubin 0.9 


 


Sodium Level 125 


 


Potassium Level 3.9 


 


Chloride Level 91 


 


Carbon Dioxide Level 21.8 


 


Anion Gap 12 


 


Estimat Glomerular Filtration


Rate 11 


 


 


Lactic Acid Level 1.2 


 


Total Creatine Kinase 48 


 


Troponin I 0.26 


 


B-Type Natriuretic Peptide 106 


 


Lipase 81 


 


Thyroid Stimulating Hormone


3rd Gen 2.000 


 


 


Salicylates Level LESS THAN 1.7 


 


Acetaminophen Level 6.1 


 


Ethyl Alcohol Level LESS THAN 3 














 Date/Time


Source Procedure


Growth Status


 


 


 18 20:15


Blood Peripheral Aerobic Blood Culture


Pending Received


 


 18 20:15


Blood Peripheral Anaerobic Blood Culture


Pending Received








Result Diagram:  


18





Imaging





Last 24 hours Impressions








Chest X-Ray 18 Signed





Impressions: 





 Service Date/Time:  2018 20:00 - CONCLUSION:        1. No 





 acute cardiopulmonary disease.     Rj Whitten MD 











Septic Shock Reassessment


Septic shock perfusion:  reassessment completed





Caprini VTE Risk Assessment


Caprini VTE Risk Assessment:  Mod/High Risk (score >= 2)


Caprini Risk Assessment Model











 Point Value = 1          Point Value = 2  Point Value = 3  Point Value = 5


 


Age 41-60


Minor surgery


BMI > 25 kg/m2


Swollen legs


Varicose veins


Pregnancy or postpartum


History of unexplained or recurrent


   spontaneous 


Oral contraceptives or hormone


   replacement


Sepsis (< 1 month)


Serious lung disease, including


   pneumonia (< 1 month)


Abnormal pulmonary function


Acute myocardial infarction


Congestive heart failure (< 1 month)


History of inflammatory bowel disease


Medical patient at bed rest Age 61-74


Arthroscopic surgery


Major open surgery (> 45 min)


Laparoscopic surgery (> 45 min)


Malignancy


Confined to bed (> 72 hours)


Immobilizing plaster cast


Central venous access Age >= 75


History of VTE


Family history of VTE


Factor V Leiden


Prothrombin [id number]


Lupus anticoagulant


Anticardiolipin antibodies


Elevated serum homocysteine


Heparin-induced thrombocytopenia


Other congenital or acquired


   thrombophilia Stroke (< 1 month)


Elective arthroplasty


Hip, pelvis, or leg fracture


Acute spinal cord injury (< 1 month)








Prophylaxis Regimen











   Total Risk


Factor Score Risk Level Prophylaxis Regimen


 


0-1      Low Early ambulation


 


2 Moderate Order ONE of the following:


*Sequential Compression Device (SCD)


*Heparin 5000 units SQ BID


 


3-4 Higher Order ONE of the following medications:


*Heparin 5000 units SQ TID


*Enoxaparin/Lovenox 40 mg SQ daily (WT < 150 kg, CrCl > 30 mL/min)


*Enoxaparin/Lovenox 30 mg SQ daily (WT < 150 kg, CrCl > 10-29 mL/min)


*Enoxaparin/Lovenox 30 mg SQ BID (WT < 150 kg, CrCl > 30 mL/min)


AND/OR


*Sequential Compression Device (SCD)


 


5 or more Highest Order ONE of the following medications:


*Heparin 5000 units SQ TID (Preferred with Epidurals)


*Enoxaparin/Lovenox 40 mg SQ daily (WT < 150 kg, CrCl > 30 mL/min)


*Enoxaparin/Lovenox 30 mg SQ daily (WT < 150 kg, CrCl > 10-29 mL/min)


*Enoxaparin/Lovenox 30 mg SQ BID (WT < 150 kg, CrCl > 30 mL/min)


AND


*Sequential Compression Device (SCD)











Assessment and Plan


Assessment and Plan


History of fungal endocarditis


- History of IVDA


- Broad-spectrum antibiotic 


- Infectious disease consultation





Hypotension


- Septic shock


- Aggressive IV fluid hydration


- Broad-spectrum antibiotic


- Infectious disease consultation





Narcotic dependency


- Monitor for withdrawal


- Dilaudid when necessary





Hepatitis C


- Supportive care


- Monitor LFTs





DVT GI prophylaxis


- Teds SCDs


- Subcutaneous heparin


- Pepcid





Critical Care:


The total critical care time was 35 minutes. Time to perform other separately 

billable procedures was not included in the critical care time.











Andres Virgen MD 2018 22:14

## 2018-02-24 VITALS
DIASTOLIC BLOOD PRESSURE: 68 MMHG | HEART RATE: 92 BPM | SYSTOLIC BLOOD PRESSURE: 108 MMHG | RESPIRATION RATE: 25 BRPM | TEMPERATURE: 98.7 F | OXYGEN SATURATION: 99 %

## 2018-02-24 VITALS
RESPIRATION RATE: 21 BRPM | HEART RATE: 81 BPM | OXYGEN SATURATION: 95 % | DIASTOLIC BLOOD PRESSURE: 55 MMHG | SYSTOLIC BLOOD PRESSURE: 101 MMHG

## 2018-02-24 VITALS
OXYGEN SATURATION: 95 % | SYSTOLIC BLOOD PRESSURE: 110 MMHG | HEART RATE: 96 BPM | DIASTOLIC BLOOD PRESSURE: 60 MMHG | RESPIRATION RATE: 22 BRPM

## 2018-02-24 VITALS
RESPIRATION RATE: 25 BRPM | DIASTOLIC BLOOD PRESSURE: 58 MMHG | HEART RATE: 94 BPM | OXYGEN SATURATION: 93 % | SYSTOLIC BLOOD PRESSURE: 103 MMHG

## 2018-02-24 VITALS
OXYGEN SATURATION: 94 % | DIASTOLIC BLOOD PRESSURE: 58 MMHG | RESPIRATION RATE: 28 BRPM | HEART RATE: 92 BPM | SYSTOLIC BLOOD PRESSURE: 111 MMHG | TEMPERATURE: 98 F

## 2018-02-24 VITALS
OXYGEN SATURATION: 96 % | DIASTOLIC BLOOD PRESSURE: 62 MMHG | SYSTOLIC BLOOD PRESSURE: 125 MMHG | HEART RATE: 103 BPM | TEMPERATURE: 99.6 F

## 2018-02-24 VITALS
HEART RATE: 96 BPM | SYSTOLIC BLOOD PRESSURE: 106 MMHG | OXYGEN SATURATION: 98 % | RESPIRATION RATE: 24 BRPM | DIASTOLIC BLOOD PRESSURE: 56 MMHG

## 2018-02-24 VITALS
SYSTOLIC BLOOD PRESSURE: 105 MMHG | DIASTOLIC BLOOD PRESSURE: 58 MMHG | RESPIRATION RATE: 25 BRPM | HEART RATE: 91 BPM | TEMPERATURE: 98.3 F | OXYGEN SATURATION: 93 %

## 2018-02-24 VITALS
RESPIRATION RATE: 24 BRPM | SYSTOLIC BLOOD PRESSURE: 106 MMHG | HEART RATE: 96 BPM | DIASTOLIC BLOOD PRESSURE: 57 MMHG | OXYGEN SATURATION: 94 %

## 2018-02-24 VITALS — OXYGEN SATURATION: 96 % | HEART RATE: 81 BPM | RESPIRATION RATE: 22 BRPM

## 2018-02-24 VITALS
HEART RATE: 90 BPM | DIASTOLIC BLOOD PRESSURE: 59 MMHG | OXYGEN SATURATION: 93 % | SYSTOLIC BLOOD PRESSURE: 113 MMHG | RESPIRATION RATE: 30 BRPM

## 2018-02-24 VITALS
SYSTOLIC BLOOD PRESSURE: 105 MMHG | HEART RATE: 84 BPM | TEMPERATURE: 97.6 F | DIASTOLIC BLOOD PRESSURE: 53 MMHG | RESPIRATION RATE: 21 BRPM | OXYGEN SATURATION: 95 %

## 2018-02-24 VITALS — OXYGEN SATURATION: 99 %

## 2018-02-24 VITALS
TEMPERATURE: 98.3 F | SYSTOLIC BLOOD PRESSURE: 109 MMHG | DIASTOLIC BLOOD PRESSURE: 56 MMHG | RESPIRATION RATE: 26 BRPM | HEART RATE: 90 BPM | OXYGEN SATURATION: 99 %

## 2018-02-24 VITALS
RESPIRATION RATE: 20 BRPM | DIASTOLIC BLOOD PRESSURE: 56 MMHG | SYSTOLIC BLOOD PRESSURE: 105 MMHG | OXYGEN SATURATION: 92 % | HEART RATE: 90 BPM

## 2018-02-24 VITALS
SYSTOLIC BLOOD PRESSURE: 97 MMHG | OXYGEN SATURATION: 99 % | RESPIRATION RATE: 22 BRPM | DIASTOLIC BLOOD PRESSURE: 51 MMHG | HEART RATE: 82 BPM

## 2018-02-24 VITALS
RESPIRATION RATE: 24 BRPM | HEART RATE: 93 BPM | OXYGEN SATURATION: 95 % | SYSTOLIC BLOOD PRESSURE: 110 MMHG | DIASTOLIC BLOOD PRESSURE: 59 MMHG

## 2018-02-24 VITALS
OXYGEN SATURATION: 92 % | SYSTOLIC BLOOD PRESSURE: 111 MMHG | DIASTOLIC BLOOD PRESSURE: 61 MMHG | RESPIRATION RATE: 24 BRPM | HEART RATE: 92 BPM

## 2018-02-24 VITALS
OXYGEN SATURATION: 98 % | SYSTOLIC BLOOD PRESSURE: 102 MMHG | DIASTOLIC BLOOD PRESSURE: 66 MMHG | HEART RATE: 81 BPM | RESPIRATION RATE: 23 BRPM

## 2018-02-24 VITALS — OXYGEN SATURATION: 94 %

## 2018-02-24 VITALS — HEART RATE: 100 BPM

## 2018-02-24 VITALS — HEART RATE: 103 BPM

## 2018-02-24 VITALS — OXYGEN SATURATION: 93 %

## 2018-02-24 LAB
ALBUMIN SERPL-MCNC: 2.3 GM/DL (ref 3.4–5)
ALP SERPL-CCNC: 111 U/L (ref 45–117)
ALT SERPL-CCNC: 11 U/L (ref 10–53)
AST SERPL-CCNC: 30 U/L (ref 15–37)
BASOPHILS # BLD AUTO: 0.1 TH/MM3 (ref 0–0.2)
BASOPHILS NFR BLD: 0.7 % (ref 0–2)
BILIRUB SERPL-MCNC: 1.8 MG/DL (ref 0.2–1)
BUN SERPL-MCNC: 56 MG/DL (ref 7–18)
CALCIUM SERPL-MCNC: 7.9 MG/DL (ref 8.5–10.1)
CHLORIDE SERPL-SCNC: 96 MEQ/L (ref 98–107)
CREAT SERPL-MCNC: 3.93 MG/DL (ref 0.5–1)
EOSINOPHIL # BLD: 0.2 TH/MM3 (ref 0–0.4)
EOSINOPHIL NFR BLD: 1.5 % (ref 0–4)
ERYTHROCYTE [DISTWIDTH] IN BLOOD BY AUTOMATED COUNT: 17 % (ref 11.6–17.2)
GFR SERPLBLD BASED ON 1.73 SQ M-ARVRAT: 13 ML/MIN (ref 89–?)
GLUCOSE SERPL-MCNC: 115 MG/DL (ref 74–106)
HCO3 BLD-SCNC: 25.7 MEQ/L (ref 21–32)
HCT VFR BLD CALC: 24.7 % (ref 35–46)
HGB BLD-MCNC: 8.1 GM/DL (ref 11.6–15.3)
INR PPP: 1.3 RATIO
LYMPHOCYTES # BLD AUTO: 1.4 TH/MM3 (ref 1–4.8)
LYMPHOCYTES NFR BLD AUTO: 9.2 % (ref 9–44)
MAGNESIUM SERPL-MCNC: 2.3 MG/DL (ref 1.5–2.5)
MCH RBC QN AUTO: 22.5 PG (ref 27–34)
MCHC RBC AUTO-ENTMCNC: 32.8 % (ref 32–36)
MCV RBC AUTO: 68.5 FL (ref 80–100)
MONOCYTE #: 0.6 TH/MM3 (ref 0–0.9)
MONOCYTES NFR BLD: 4.1 % (ref 0–8)
NEUTROPHILS # BLD AUTO: 13.3 TH/MM3 (ref 1.8–7.7)
NEUTROPHILS NFR BLD AUTO: 84.5 % (ref 16–70)
PHOSPHATE SERPL-MCNC: 5.2 MG/DL (ref 2.5–4.9)
PLATELET # BLD: 93 TH/MM3 (ref 150–450)
PMV BLD AUTO: 10 FL (ref 7–11)
PROT SERPL-MCNC: 6.1 GM/DL (ref 6.4–8.2)
PROTHROMBIN TIME: 12.8 SEC (ref 9.8–11.6)
RBC # BLD AUTO: 3.61 MIL/MM3 (ref 4–5.3)
SODIUM SERPL-SCNC: 132 MEQ/L (ref 136–145)
TROPONIN I SERPL-MCNC: 0.26 NG/ML (ref 0.02–0.05)
WBC # BLD AUTO: 15.7 TH/MM3 (ref 4–11)

## 2018-02-24 PROCEDURE — 02HV33Z INSERTION OF INFUSION DEVICE INTO SUPERIOR VENA CAVA, PERCUTANEOUS APPROACH: ICD-10-PCS | Performed by: INTERNAL MEDICINE

## 2018-02-24 RX ADMIN — SODIUM CHLORIDE SCH MLS/HR: 900 INJECTION INTRAVENOUS at 15:07

## 2018-02-24 RX ADMIN — HEPARIN SODIUM SCH UNITS: 10000 INJECTION, SOLUTION INTRAVENOUS; SUBCUTANEOUS at 14:26

## 2018-02-24 RX ADMIN — HYDROMORPHONE HYDROCHLORIDE PRN MG: 2 INJECTION INTRAMUSCULAR; INTRAVENOUS; SUBCUTANEOUS at 13:18

## 2018-02-24 RX ADMIN — FAMOTIDINE SCH MG: 10 INJECTION, SOLUTION INTRAVENOUS at 20:11

## 2018-02-24 RX ADMIN — TEMAZEPAM PRN MG: 15 CAPSULE ORAL at 21:26

## 2018-02-24 RX ADMIN — HEPARIN SODIUM SCH UNITS: 10000 INJECTION, SOLUTION INTRAVENOUS; SUBCUTANEOUS at 21:25

## 2018-02-24 RX ADMIN — STANDARDIZED SENNA CONCENTRATE AND DOCUSATE SODIUM SCH TAB: 8.6; 5 TABLET, FILM COATED ORAL at 20:11

## 2018-02-24 RX ADMIN — Medication SCH ML: at 20:11

## 2018-02-24 RX ADMIN — HYDROMORPHONE HYDROCHLORIDE PRN MG: 2 INJECTION INTRAMUSCULAR; INTRAVENOUS; SUBCUTANEOUS at 08:50

## 2018-02-24 RX ADMIN — PHENYTOIN SODIUM SCH MLS/HR: 50 INJECTION INTRAMUSCULAR; INTRAVENOUS at 13:15

## 2018-02-24 RX ADMIN — RIFAMPIN SCH MG: 150 CAPSULE ORAL at 20:11

## 2018-02-24 RX ADMIN — CHLORHEXIDINE GLUCONATE SCH PACK: 500 CLOTH TOPICAL at 04:00

## 2018-02-24 RX ADMIN — HYDROMORPHONE HYDROCHLORIDE PRN MG: 2 INJECTION INTRAMUSCULAR; INTRAVENOUS; SUBCUTANEOUS at 17:07

## 2018-02-24 RX ADMIN — Medication SCH ML: at 14:22

## 2018-02-24 RX ADMIN — HYDROMORPHONE HYDROCHLORIDE PRN MG: 2 INJECTION INTRAMUSCULAR; INTRAVENOUS; SUBCUTANEOUS at 21:31

## 2018-02-24 RX ADMIN — SODIUM CHLORIDE PRN MLS/HR: 900 IRRIGANT IRRIGATION at 12:12

## 2018-02-24 RX ADMIN — HEPARIN SODIUM SCH UNITS: 10000 INJECTION, SOLUTION INTRAVENOUS; SUBCUTANEOUS at 07:01

## 2018-02-24 RX ADMIN — FAMOTIDINE SCH MG: 10 INJECTION, SOLUTION INTRAVENOUS at 14:22

## 2018-02-24 RX ADMIN — AMPICILLIN SODIUM SCH MLS/HR: 2 INJECTION, POWDER, FOR SOLUTION INTRAMUSCULAR; INTRAVENOUS at 14:23

## 2018-02-24 RX ADMIN — AMPICILLIN SODIUM SCH MLS/HR: 2 INJECTION, POWDER, FOR SOLUTION INTRAMUSCULAR; INTRAVENOUS at 20:11

## 2018-02-24 RX ADMIN — PHENYTOIN SODIUM SCH MLS/HR: 50 INJECTION INTRAMUSCULAR; INTRAVENOUS at 21:28

## 2018-02-24 RX ADMIN — STANDARDIZED SENNA CONCENTRATE AND DOCUSATE SODIUM SCH TAB: 8.6; 5 TABLET, FILM COATED ORAL at 09:00

## 2018-02-24 NOTE — RADRPT
EXAM DATE/TIME:  02/24/2018 03:56 

 

[facility] COMPARISON:     

CHEST SINGLE AP, February 23, 2018, 20:00.

 

                     

INDICATIONS :     

Shortness of breath, possible pulmonary disease.

                     

 

MEDICAL HISTORY :     

Cardiovascular disease.  Congestive heart failure.  Hepatitis C.      

 

SURGICAL HISTORY :        

AVR

 

ENCOUNTER:     

Subsequent                                        

 

ACUITY:     

2 days      

 

PAIN SCORE:     

0/10

 

LOCATION:     

Bilateral chest 

 

FINDINGS:     

Clear lungs. No pleural effusion or pneumothorax.

 

Heart size upper limits of normal. Previous CABG.

 

CONCLUSION:     

No acute cardiopulmonary disease demonstrated.

 

 

 

 Ron Swan MD on February 24, 2018 at 6:02           

Board Certified Radiologist.

 This report was verified electronically.

## 2018-02-24 NOTE — PD.PROCEDR
Central Line Procedure


REASON FOR PROCEDURE


Central venous access





PROCEDURE PERFORMED


Central line placement: RIJ central line





CONSENT


Informed consent for procedure was obtained ad time out performed.  The risks 

and benefits of the procedure were discussed to include but limited to bleeding

, clot formation, infection, and even death.





ANESTHESIA


Local injection of 1% Lidocaine





DESCRIPTION OF THE PROCEDURE


The patient was placed in supine, mild Trendelenburg position.  The area was 

exposed and cleansed with ChloraPrep, times two.  Large sterile drape was used 

to cover the patient, with the site exposed, under sterile conditions including 

cap, face mask, sterile gown, and sterile gloves.  On single attempt, the 

introducer needle was inserted with negative pressure in syringe and venous 

flash was obtained.  The guide wire was then advanced without any restriction 

and the needle was removed.  The dilator was used without any complications.  

Using Seldinger technique the 20 cm 7F triple lumen catheter was advanced over 

the guide wire to a depth of 20 centimeters.  The guide wire was removed.  All 

ports were aspirated with dark venous blood return and flushed easily with 

sterile saline.  All ports were capped.  Antibiotic disc was placed around 

central line at puncture site.  The central line was secured to the skin with 

two interrupted 2.0 silk sutures.  The area was bandaged with sterile see-

through central line bandage.





RADIOLOGICAL DATA


Ultrasound guidance was used to locate RIJ central line.  Doppler/color flow 

was used to confirm venous flow.





COMPLICATIONS:


No apparent complications





ESTIMATED BLOOD LOSS:


Less than 1 cc.











Yun Velasquez MD Feb 24, 2018 09:08

## 2018-02-24 NOTE — RADRPT
EXAM DATE/TIME:  02/24/2018 09:36 

 

[facility] COMPARISON:     

CHEST SINGLE AP, February 24, 2018, 3:56.

 

                     

INDICATIONS :     

Confirm central line placement.

                     

 

MEDICAL HISTORY :            

Cardiovascular disease. Congestive heart failure. Hepatitis C.   

 

SURGICAL HISTORY :        

AVR

 

ENCOUNTER:     

Subsequent                                        

 

ACUITY:     

1 day      

 

PAIN SCORE:     

Non-responsive.

 

LOCATION:     

Bilateral chest 

 

FINDINGS:     

The patient is status post sternotomy. The heart size is borderline enlarged. There is a right intern
al jugular central line in place with the tip overlying the right atrium. No pneumothorax is seen. Th
e lungs appear grossly clear. The costophrenic angles are clear.

 

CONCLUSION:     

Right internal jugular central line in place with the tip overlying the SVC. A pneumothorax is not se
en.

 

 

 

 Ron Brown MD on February 24, 2018 at 10:11           

Board Certified Radiologist.

 This report was verified electronically.

## 2018-02-24 NOTE — MB
cc:

DARBY ARMENDARIZ MD, JAN MD

****

 

 

DATE OF CONSULTATION:  2/24/2018

 

REQUESTING PHYSICIAN:

Dr. Virgen.

 

REASON FOR CONSULTATION:

Endocarditis.  Sepsis.

 

HISTORY OF PRESENT ILLNESS

This is a 37-year-old white female who has a history of endocarditis and has

been admitted several times for the same.  The patient developed shortness of

breath, fever and chills, and  presented to the emergency department.  The

patient is noted to be actively using IV drugs.  She has history of significant

CHF from prior valvular heart disease related to endocarditis.  She states that

she started feeling short of breath about a week ago and the shortness of

breath worsened.  The patient has profuse cough currently.  She has lacy

purpuric changes at the distal left tibia and punctate purplish lesion at the

plantar aspect of the left great toe.  The left lower extremity is very tender

on palpation.  It is warm.  Blood cultures were taken on admission and has

gram-positive cocci in both sets.  The patient was last treated for

endocarditis in August 2017.  She received IV antibiotics for Enterococcus

faecalis and staph aureus.  She also had staph aureus in the cerebrospinal

fluid at that time.  After she completed the IV antibiotics in October she was

put on doxycycline prophylaxis.  She reports that she has not been taking the

doxycycline.  The patient has had aortic valve endocarditis due to MSSA in 2011

and she had prosthetic aortic valve replacement in 2011 and then redo aortic

valve replacement in June 2016 and she subsequently has had further bouts of

endocarditis with candida and also the Enterococcus faecalis and staph aureus.

 

PAST MEDICAL HISTORY:

Hepatitis C.

IV drug use.

 

MEDICATIONS:

1. Vancomycin one dose given.

2. Piperacillin/tazobactam.

3. Norepinephrine.

4. Arlen-Colace.

5. Heparin.

6. Dopamine.

 

SOCIAL HISTORY:

Positive occasional alcohol.  No tobacco. IV drug use in the form of Dilaudid,

oxycodone.  The patient also uses marijuana.

 

FAMILY HISTORY:

Noncontributory.

 

REVIEW OF SYSTEMS:

Significant for cough, chills and pain in the left lower extremities, and

fever.

 

PHYSICAL EXAMINATION

This is an obese female who is in mild distress because of coughing.

VITAL SIGNS: Include temperature 98.3, blood pressure 113/59, heart rate 92.

Respiratory rate 16.

HEENT:  Head atraumatic.  Extraocular movements grossly intact.  Pupils

reactive to light.  No icterus.  Oropharynx moist mucosa, no lesions.

Neck:  Supple without adenopathy.

Lungs:  Decreased breath sounds throughout.

Heart: 5/6 blowing systolic murmur at the upper right sternal border.

Abdomen: Bowel sounds present, soft, obese, nontender.

Rectal:  Not performed.

Extremities: Diffuse 2+ edema of the lower extremities. Left tibia distally has

a lacy purpuric discoloration of the skin and there is punctate purpuric lesion

at the plantar aspect of the right great toe.  The leg is extremely tender on

palpation.  It is warm.  No calf tenderness.  No nail bed hemorrhages.  The

skin otherwise has no diffuse rash.

Neuro: No gross focal findings.

Psychiatric: The patient is calm and cooperative.

 

LABORATORY DATA

WBC 15.7, platelets 93, hemoglobin 8.1, 84% neutrophils, creatinine 3.93, BUN

56.  Sodium 132, liver function tests normal, albumin 2.3.

 

IMPRESSION

1. Sepsis.

2. Bacteremia.

3. History of prostatic aortic valve and so likely recurrent prosthetic valve

endocarditis.

4. Leukocytosis secondary to sepsis.

5. Left lower extremity cellulitis/embolic lesions from showering of emboli

from endocarditis.

6. Patient with profuse cough but negative chest x-ray. Obtain influenza

testing.

7. Acute renal failure.

 

RECOMMENDATIONS

1. Begin ampicillin intravenous given her prior history of endocarditis due to

     Enterococcus.

2. Continue rifampin intravenous.

3. Add ceftriaxone.

4. Monitor the blood cultures.

5. Discontinue Piperacillin/tazobactam.

6. Monitor clinical status.  The patient is very critically-ill and her outlook

     is very poor with repeated prostatic valve endocarditis.  She indicated

     that she has had difficulty getting into rehab programs for drugs.  This

     is a grave problem in this young lady who is not able to quit using IV

     drugs of her own free will.

 

Thank you for the consultation. Her progress will be monitored.

 

 

 

                              _________________________________

                              Darby Armendariz MD FD/AMBER

D:  2/24/2018/11:34 AM

T:  2/24/2018/4:16 PM

Visit #:  [id number]

Job #:  [id number]

KERA

## 2018-02-24 NOTE — HHI.CCPN
Subjective


Remarks/Hospital Course


37-year-old female that presents to the ED for evaluation of shortness of 

breath and swelling.  Patient has a significant history of endocarditis, IV 

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 

continues use of IV drug use that presents to the ED for evaluation of acute 

onset of shortness of breath with swelling.  Patient initially did not mention 

to anybody that she was doing drugs but she did tell me that she injected 

herself about 4 days ago.  She uses Dilaudid.  She states that she's been 

having fevers.  Patient is somewhat somnolent and hard to assess she doesn't 

really provide much information.  Family members at the bedside and he provides 

more information about the heart.  Per patient she'll he takes 2 medications.  

Patient asked if she is taking any antibiotics and she said yes but when I ask 

her what kind is she is taking currently she states that she has not been on 

antibiotics for some time.  Patient apparently does follow with a local 

cardiologist but she cannot tell me the name of it.  She states that she's been 

having swelling to her legs for the past month.  She denies any recent travel 

or injury.  No other medical issues at this time.  No allergies to medication.





SUBJ 2/24: remains critical on Dopamine 10 mcg/ min. Appears ill. Limited 

bedside echo did not show any large vegetation, but exam was limited, tricuspid 

and aortic valve not well visualized. Also states that had right breast abscess 

2 months ago, drained by herself. Now may have abscess vs scar tissue. Injects 

upper arm and bilateral breasts. US of right breast ordered





Objective





Vital Signs








  Date Time  Temp Pulse Resp B/P (MAP) Pulse Ox O2 Delivery O2 Flow Rate FiO2


 


2/24/18 06:43  96  106/57    


 


2/24/18 06:00   24  94   


 


2/24/18 04:00 98.7       


 


2/24/18 00:55      Nasal Cannula 2.00 














Intake and Output   


 


 2/24/18 2/24/18 2/25/18





 08:00 16:00 00:00


 


Intake Total 726 ml  


 


Output Total 2600 ml  


 


Balance -1874 ml  








Result Diagram:  


2/24/18 0236                                                                   

             2/24/18 0236





Imaging





Last 24 hours Impressions








Chest X-Ray 2/23/18 1951 Signed





Impressions: 





 Service Date/Time:  Friday, February 23, 2018 20:00 - CONCLUSION:        1. No 





 acute cardiopulmonary disease.     Rj Whitten MD 








Objective Remarks


GENERAL: Well-developed young female in moderate distress, somehow lethargic.  

On 10 mcg/kg/min of dopamine


SKIN: Warm and dry.  Patient does appear to have puncture wounds from where she 

states been injecting recently in her arms, bilateral upper breast.


HEAD: Atraumatic. Normocephalic. 


EYES: Pupils equal and round. No scleral icterus. No injection or drainage. 


ENT: No nasal bleeding or discharge.  Mucous membranes pink and moist.  Tongue 

is midline.  No uvula deviation.


NECK: Trachea midline. No JVD. 


CHEST: Bilateral upper breast has puncture wounds from injection.  Questionable 

abscess versus fibrous tissue right upper quadrant of right breast


CARDIOVASCULAR: Regular rate and rhythm.  Systolic and diastolic murmurs best 

heard in the left sternal border.  Limited bedside echo did not show any large 

vegetations, tricuspid and aortic valve not well visualized


RESPIRATORY: Positive accessory muscle use.  Bilateral coarse rhonchi and few 

crackles are. Breath sounds equal bilaterally. 


GASTROINTESTINAL: Abdomen soft, non-tender, nondistended. Hepatic and splenic 

margins not palpable. 


MUSCULOSKELETAL: 2+ pitting edema to the lower extremities.  Erythematous rash 

of the left lower extremity anterior shin. Full range of motion of the upper 

and lower extremities bilaterally.


NEUROLOGICAL: Awake and alert. No obvious cranial nerve deficits.  Motor 

grossly within normal limits. Five out of 5 muscle strength in the arms and 

legs.  Normal speech.





A/P


Assessment and Plan


Neuro:


IVDU


- Continue pain control with as needed Dilaudid but minimize use


- Once acute pain is controlled attempt to discontinue opiates


- Ativan as needed for anxiety





CVS/ID:


Septi  shock


Infective endocarditis involving prosthetic valve


History of fungal and bacterial prosthetic valve endocarditis


GPC bacteremia


- Continued IVDU with Dilaudid


- Recurrent prosthetic valve endocarditis , PVE due to MSSA, Ent fecalis in 10/

2017


- Previously multiple episodes of  PVE April 2017 for tricuspid valve PVE  - 

Candida parapsilosis , Streptococci


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


- Broad-spectrum antibiotic with Vanc and Zosyn.


- Infectious disease consultation


- Aggressive fluid resuscitation given additional 1 L fluid bolus and increase 

normal saline infusion to 150 mL/h





Resp:


Respiratory insufficiency


Hypoxia


- Oxygen to keep oxygen saturation above 90%


- DuoNeb every 6 hours as needed





GI:


Hepatitis C


- Heart healthy diet


- IV famotidine





MSK:


Superficial thrombophlebitis/cellulitis involving left lower extremity (

anterior shin)


History of right breast abscess with possible recurrence


- Ultrasound of the left lower extremity negative for DVT


- Check right breast ultrasound for abscess





:


Acute on chronic kidney disease


- Acute renal failure secondary to ATN and dehydration


- Continue aggressive fluid resuscitation


- Check renal ultrasound





Endo:


- Electrolyte replacement per protocol carefully





Heme:


- Monitor CBC CMP and coags





DVT GI prophylaxis


- Teds SCDs


- Subcutaneous heparin


- Pepcid





Critical Care:


The total critical care time was 40 minutes. Time to perform other separately 

billable procedures was not included in the critical care time.











Yun Velasquez MD Feb 24, 2018 09:10

## 2018-02-24 NOTE — RADRPT
EXAM DATE/TIME:  02/24/2018 10:53 

 

[facility] COMPARISON:     

No previous studies available for comparison.

        

 

 

INDICATIONS :     

Renal Failure.

                     

 

MEDICAL HISTORY :           

Congestive heart failure. Pregnancy. Hepatitis C. Headaches. Endocarditis.

 

SURGICAL HISTORY :          

Cyst removed from Right wrist.

 

ENCOUNTER:     

Initial

 

ACUITY:     

1 day

 

PAIN SCORE:     

0/10

 

LOCATION:     

Bilateral flank 

MEASUREMENTS:     

 

RIGHT KIDNEY:     

13.8 x 6.4 x 4.6 cm

 

LEFT KIDNEY:     

13.7 x 4.8 x 5.2  cm

 

FINDINGS:     

 

RIGHT KIDNEY:     

Renal cortex is normal in thickness and echotexture.  No hydronephrosis, stone, or mass.  

 

LEFT KIDNEY:     

Renal cortex is normal in thickness and echotexture.  No hydronephrosis, stone, or mass.  

 

BLADDER:     

There is a Stokes catheter in urinary bladder. The bladder is decompressed. The bladder wall appears t
o be thickened at 1.1 cm.

 

Incidental finding of prominent splenomegaly. The spleen measures 21.0 cm. There is a splenule in the
 splenic hilum.

 

CONCLUSION:     

1. No evidence of hydronephrosis.

2. Thickening of the urinary bladder wall a 1.1 cm.

3. Prominent splenomegaly.

 

 

 

 Elgin Walton MD on February 24, 2018 at 11:48           

Board Certified Radiologist.

 This report was verified electronically.

## 2018-02-24 NOTE — RADRPT
EXAM DATE/TIME:  02/24/2018 10:48 

 

[facility] COMPARISON:     

No previous studies available for comparison.

        

 

 

INDICATIONS :     

Abscess/ Scar Tissue

                     

 

MEDICAL HISTORY :           

Congestive heart failure. Pregnancy. Hepatitis C. Headaches. Endocarditis.

 

SURGICAL HISTORY :          

Cyst removed Right wrist.

 

ENCOUNTER:     

Initial

 

ACUITY:     

1 month

 

PAIN SCORE:     

0/10

 

LOCATION:     

Right   Breast.

                     

 

 

FINDINGS:     

 

A targeted ultrasound of the right breast in the region of clinical concern at the 10 to 11:

00 location has been performed. No mass or fluid collection is seen.

 

CONCLUSION:     

Negative targeted right breast ultrasound examination.

 

 

 

 Ron Brown MD on February 24, 2018 at 11:14           

Board Certified Radiologist.

 This report was verified electronically.

## 2018-02-25 VITALS — HEART RATE: 104 BPM

## 2018-02-25 VITALS
RESPIRATION RATE: 38 BRPM | TEMPERATURE: 101.9 F | HEART RATE: 104 BPM | OXYGEN SATURATION: 92 % | DIASTOLIC BLOOD PRESSURE: 59 MMHG | SYSTOLIC BLOOD PRESSURE: 105 MMHG

## 2018-02-25 VITALS
HEART RATE: 99 BPM | DIASTOLIC BLOOD PRESSURE: 64 MMHG | TEMPERATURE: 101 F | RESPIRATION RATE: 28 BRPM | SYSTOLIC BLOOD PRESSURE: 87 MMHG | OXYGEN SATURATION: 97 %

## 2018-02-25 VITALS
RESPIRATION RATE: 31 BRPM | TEMPERATURE: 100.2 F | SYSTOLIC BLOOD PRESSURE: 104 MMHG | DIASTOLIC BLOOD PRESSURE: 68 MMHG | HEART RATE: 110 BPM | OXYGEN SATURATION: 96 %

## 2018-02-25 VITALS
OXYGEN SATURATION: 94 % | TEMPERATURE: 100.2 F | DIASTOLIC BLOOD PRESSURE: 58 MMHG | RESPIRATION RATE: 30 BRPM | SYSTOLIC BLOOD PRESSURE: 99 MMHG | HEART RATE: 97 BPM

## 2018-02-25 VITALS
SYSTOLIC BLOOD PRESSURE: 108 MMHG | RESPIRATION RATE: 21 BRPM | HEART RATE: 106 BPM | DIASTOLIC BLOOD PRESSURE: 55 MMHG | TEMPERATURE: 101.2 F | OXYGEN SATURATION: 93 %

## 2018-02-25 VITALS — HEART RATE: 98 BPM

## 2018-02-25 VITALS — HEART RATE: 102 BPM

## 2018-02-25 VITALS
OXYGEN SATURATION: 95 % | DIASTOLIC BLOOD PRESSURE: 67 MMHG | SYSTOLIC BLOOD PRESSURE: 114 MMHG | RESPIRATION RATE: 32 BRPM | TEMPERATURE: 99.9 F | HEART RATE: 95 BPM

## 2018-02-25 VITALS
TEMPERATURE: 100.4 F | HEART RATE: 99 BPM | SYSTOLIC BLOOD PRESSURE: 92 MMHG | DIASTOLIC BLOOD PRESSURE: 58 MMHG | RESPIRATION RATE: 26 BRPM | OXYGEN SATURATION: 93 %

## 2018-02-25 VITALS — HEART RATE: 99 BPM

## 2018-02-25 VITALS
OXYGEN SATURATION: 92 % | SYSTOLIC BLOOD PRESSURE: 105 MMHG | DIASTOLIC BLOOD PRESSURE: 59 MMHG | HEART RATE: 104 BPM | RESPIRATION RATE: 28 BRPM | TEMPERATURE: 101.9 F

## 2018-02-25 VITALS
HEART RATE: 101 BPM | RESPIRATION RATE: 32 BRPM | SYSTOLIC BLOOD PRESSURE: 108 MMHG | OXYGEN SATURATION: 98 % | TEMPERATURE: 100.5 F | DIASTOLIC BLOOD PRESSURE: 70 MMHG

## 2018-02-25 VITALS — HEART RATE: 100 BPM

## 2018-02-25 VITALS — HEART RATE: 95 BPM

## 2018-02-25 VITALS — OXYGEN SATURATION: 92 %

## 2018-02-25 VITALS — HEART RATE: 97 BPM

## 2018-02-25 VITALS — HEART RATE: 103 BPM

## 2018-02-25 LAB
ALBUMIN SERPL-MCNC: 2.1 GM/DL (ref 3.4–5)
ALP SERPL-CCNC: 89 U/L (ref 45–117)
ALT SERPL-CCNC: 11 U/L (ref 10–53)
AST SERPL-CCNC: 35 U/L (ref 15–37)
BASOPHILS # BLD AUTO: 0 TH/MM3 (ref 0–0.2)
BASOPHILS NFR BLD: 0.3 % (ref 0–2)
BILIRUB SERPL-MCNC: 1.2 MG/DL (ref 0.2–1)
BUN SERPL-MCNC: 25 MG/DL (ref 7–18)
CALCIUM SERPL-MCNC: 7.9 MG/DL (ref 8.5–10.1)
CHLORIDE SERPL-SCNC: 101 MEQ/L (ref 98–107)
CREAT SERPL-MCNC: 1.13 MG/DL (ref 0.5–1)
EOSINOPHIL # BLD: 0.1 TH/MM3 (ref 0–0.4)
EOSINOPHIL NFR BLD: 0.4 % (ref 0–4)
ERYTHROCYTE [DISTWIDTH] IN BLOOD BY AUTOMATED COUNT: 16.6 % (ref 11.6–17.2)
ERYTHROCYTE [DISTWIDTH] IN BLOOD BY AUTOMATED COUNT: 16.6 % (ref 11.6–17.2)
GFR SERPLBLD BASED ON 1.73 SQ M-ARVRAT: 55 ML/MIN (ref 89–?)
GLUCOSE SERPL-MCNC: 113 MG/DL (ref 74–106)
HCO3 BLD-SCNC: 25.7 MEQ/L (ref 21–32)
HCT VFR BLD CALC: 21.7 % (ref 35–46)
HCT VFR BLD CALC: 21.8 % (ref 35–46)
HGB BLD-MCNC: 7.1 GM/DL (ref 11.6–15.3)
HGB BLD-MCNC: 7.2 GM/DL (ref 11.6–15.3)
INR PPP: 1.2 RATIO
LYMPHOCYTES # BLD AUTO: 1.2 TH/MM3 (ref 1–4.8)
LYMPHOCYTES NFR BLD AUTO: 8.5 % (ref 9–44)
MAGNESIUM SERPL-MCNC: 2 MG/DL (ref 1.5–2.5)
MCH RBC QN AUTO: 22.3 PG (ref 27–34)
MCH RBC QN AUTO: 22.4 PG (ref 27–34)
MCHC RBC AUTO-ENTMCNC: 32.7 % (ref 32–36)
MCHC RBC AUTO-ENTMCNC: 33 % (ref 32–36)
MCV RBC AUTO: 67.9 FL (ref 80–100)
MCV RBC AUTO: 68.3 FL (ref 80–100)
MONOCYTE #: 1.5 TH/MM3 (ref 0–0.9)
MONOCYTES NFR BLD: 10.7 % (ref 0–8)
NEUTROPHILS # BLD AUTO: 11.3 TH/MM3 (ref 1.8–7.7)
NEUTROPHILS NFR BLD AUTO: 80.1 % (ref 16–70)
PHOSPHATE SERPL-MCNC: 3.3 MG/DL (ref 2.5–4.9)
PLATELET # BLD: 109 TH/MM3 (ref 150–450)
PLATELET # BLD: 115 TH/MM3 (ref 150–450)
PMV BLD AUTO: 10 FL (ref 7–11)
PMV BLD AUTO: 10.1 FL (ref 7–11)
PROT SERPL-MCNC: 6.1 GM/DL (ref 6.4–8.2)
PROTHROMBIN TIME: 12.4 SEC (ref 9.8–11.6)
RANDOM VANCOMYCIN: 7.1 COMMENT
RBC # BLD AUTO: 3.18 MIL/MM3 (ref 4–5.3)
RBC # BLD AUTO: 3.21 MIL/MM3 (ref 4–5.3)
SODIUM SERPL-SCNC: 134 MEQ/L (ref 136–145)
WBC # BLD AUTO: 12.6 TH/MM3 (ref 4–11)
WBC # BLD AUTO: 14.1 TH/MM3 (ref 4–11)

## 2018-02-25 PROCEDURE — 30233N1 TRANSFUSION OF NONAUTOLOGOUS RED BLOOD CELLS INTO PERIPHERAL VEIN, PERCUTANEOUS APPROACH: ICD-10-PCS | Performed by: INTERNAL MEDICINE

## 2018-02-25 RX ADMIN — STANDARDIZED SENNA CONCENTRATE AND DOCUSATE SODIUM SCH TAB: 8.6; 5 TABLET, FILM COATED ORAL at 20:13

## 2018-02-25 RX ADMIN — HYDROMORPHONE HYDROCHLORIDE PRN MG: 2 INJECTION INTRAMUSCULAR; INTRAVENOUS; SUBCUTANEOUS at 21:26

## 2018-02-25 RX ADMIN — SODIUM CHLORIDE PRN MLS/HR: 900 IRRIGANT IRRIGATION at 14:44

## 2018-02-25 RX ADMIN — HYDROMORPHONE HYDROCHLORIDE PRN MG: 2 INJECTION INTRAMUSCULAR; INTRAVENOUS; SUBCUTANEOUS at 17:29

## 2018-02-25 RX ADMIN — Medication SCH ML: at 20:12

## 2018-02-25 RX ADMIN — HYDROMORPHONE HYDROCHLORIDE PRN MG: 2 INJECTION INTRAMUSCULAR; INTRAVENOUS; SUBCUTANEOUS at 09:55

## 2018-02-25 RX ADMIN — AMPICILLIN SODIUM SCH MLS/HR: 2 INJECTION, POWDER, FOR SOLUTION INTRAMUSCULAR; INTRAVENOUS at 08:46

## 2018-02-25 RX ADMIN — HEPARIN SODIUM PRN MLS/HR: 10000 INJECTION, SOLUTION INTRAVENOUS at 11:38

## 2018-02-25 RX ADMIN — STANDARDIZED SENNA CONCENTRATE AND DOCUSATE SODIUM SCH TAB: 8.6; 5 TABLET, FILM COATED ORAL at 07:42

## 2018-02-25 RX ADMIN — RIFAMPIN SCH MG: 150 CAPSULE ORAL at 20:13

## 2018-02-25 RX ADMIN — SODIUM CHLORIDE SCH MLS/HR: 900 INJECTION INTRAVENOUS at 23:49

## 2018-02-25 RX ADMIN — HYDROMORPHONE HYDROCHLORIDE PRN MG: 2 INJECTION INTRAMUSCULAR; INTRAVENOUS; SUBCUTANEOUS at 01:33

## 2018-02-25 RX ADMIN — SODIUM CHLORIDE SCH MLS/HR: 900 INJECTION INTRAVENOUS at 15:09

## 2018-02-25 RX ADMIN — HEPARIN SODIUM SCH MLS/HR: 10000 INJECTION, SOLUTION INTRAVENOUS; SUBCUTANEOUS at 09:47

## 2018-02-25 RX ADMIN — HEPARIN SODIUM SCH UNITS: 10000 INJECTION, SOLUTION INTRAVENOUS; SUBCUTANEOUS at 05:24

## 2018-02-25 RX ADMIN — HEPARIN SODIUM SCH MLS/HR: 10000 INJECTION, SOLUTION INTRAVENOUS; SUBCUTANEOUS at 18:00

## 2018-02-25 RX ADMIN — AMPICILLIN SODIUM SCH MLS/HR: 2 INJECTION, POWDER, FOR SOLUTION INTRAMUSCULAR; INTRAVENOUS at 19:37

## 2018-02-25 RX ADMIN — FAMOTIDINE SCH MG: 10 INJECTION, SOLUTION INTRAVENOUS at 20:13

## 2018-02-25 RX ADMIN — HYDROMORPHONE HYDROCHLORIDE PRN MG: 2 INJECTION INTRAMUSCULAR; INTRAVENOUS; SUBCUTANEOUS at 05:20

## 2018-02-25 RX ADMIN — AMPICILLIN SODIUM SCH MLS/HR: 2 INJECTION, POWDER, FOR SOLUTION INTRAMUSCULAR; INTRAVENOUS at 15:09

## 2018-02-25 RX ADMIN — Medication SCH ML: at 08:48

## 2018-02-25 RX ADMIN — CHLORHEXIDINE GLUCONATE SCH PACK: 500 CLOTH TOPICAL at 04:00

## 2018-02-25 RX ADMIN — AMPICILLIN SODIUM SCH MLS/HR: 2 INJECTION, POWDER, FOR SOLUTION INTRAMUSCULAR; INTRAVENOUS at 02:00

## 2018-02-25 RX ADMIN — PHENYTOIN SODIUM SCH MLS/HR: 50 INJECTION INTRAMUSCULAR; INTRAVENOUS at 05:46

## 2018-02-25 RX ADMIN — FAMOTIDINE SCH MG: 10 INJECTION, SOLUTION INTRAVENOUS at 08:47

## 2018-02-25 RX ADMIN — RIFAMPIN SCH MG: 150 CAPSULE ORAL at 08:48

## 2018-02-25 RX ADMIN — ACETAMINOPHEN PRN MG: 325 TABLET ORAL at 21:25

## 2018-02-25 RX ADMIN — HYDROMORPHONE HYDROCHLORIDE PRN MG: 2 INJECTION INTRAMUSCULAR; INTRAVENOUS; SUBCUTANEOUS at 13:37

## 2018-02-25 RX ADMIN — SODIUM CHLORIDE SCH MLS/HR: 900 INJECTION INTRAVENOUS at 11:33

## 2018-02-25 RX ADMIN — SODIUM CHLORIDE SCH MLS/HR: 900 INJECTION INTRAVENOUS at 01:00

## 2018-02-25 NOTE — HHI.IDPN
Note


Infectious Disease Note








Patient feels the same.


Still complains of severe pain in the left foot and left leg. 


Log grade fever.


(+) SOB.











37-year-old white female who has a history of endocarditis and has


been admitted several times for the same.  The patient developed shortness of


breath, fever and chills, and  presented to the emergency department.  The


patient is noted to be actively using IV drugs.  She has history of significant


CHF from prior valvular heart disease related to endocarditis.  She states that


she started feeling short of breath about a week ago and the shortness of


breath worsened.  The patient has profuse cough currently.  She has lacy


purpuric changes at the distal left tibia and punctate purplish lesion at the


plantar aspect of the left great toe.  The left lower extremity is very tender


on palpation.  It is warm.  Blood cultures were taken on admission and has


gram-positive cocci in both sets.  The patient was last treated for


endocarditis in August 2017.  She received IV antibiotics for Enterococcus


faecalis and staph aureus.  She also had staph aureus in the cerebrospinal


fluid at that time.  After she completed the IV antibiotics in October she was


put on doxycycline prophylaxis.  She reports that she has not been taking the


doxycycline.  


 


PAST MEDICAL HISTORY:


Hepatitis C.


IV drug use.


prosthetic aortic valve replacement in 2011 and then redo aortic


valve replacement in June 2016 





MEDICATIONS:


1. Vancomycin.


2. Ampicillin.


3. Rifampin. 








Current Medications








 Medications


  (Trade)  Dose


 Ordered  Sig/Nikhil


 Route


 PRN Reason  Start Time


 Stop Time Status Last Admin


Dose Admin


 


 Sodium Chloride


  (NS Flush)  2 ml  UNSCH  PRN


 IV FLUSH


 FLUSH AFTER USING IV ACCESS  2/23/18 22:00


     


 


 


 Sodium Chloride


  (NS Flush)  2 ml  BID


 IV FLUSH


   2/24/18 09:00


    2/25/18 08:48


 


 


 Acetaminophen


  (Tylenol)  650 mg  Q6H  PRN


 PO


 PAIN 1-5 AND/OR FEVER >101F  2/23/18 22:00


     


 


 


 Famotidine


  (Pepcid Inj)  10 mg  Q12HR


 IV PUSH


   2/24/18 09:00


    2/25/18 08:47


 


 


 Lorazepam


  (Ativan Inj)  2 mg  Q4H  PRN


 IV PUSH


 Agitation/Sedation  2/23/18 22:00


     


 


 


 Ondansetron HCl


  (Zofran Inj)  4 mg  Q6H  PRN


 IV PUSH


 NAUSEA OR VOMITING  2/23/18 22:00


     


 


 


 Temazepam


  (Restoril)  15 mg  HS  PRN


 PO


 INSOMNIA  2/23/18 22:00


    2/24/18 21:26


 


 


 Albuterol/


 Ipratropium


  (Duoneb Neb)  1 ampule  Q2HR NEB  PRN


 INH


 WHEEZING  2/23/18 22:00


     


 


 


 Miscellaneous


 Information  1  Q361D


 XX


   2/23/18 22:00


     


 


 


 Chlorhexidine


 Gluconate


  (Chlorhexidine


 2% Cloth)  3 pack


 Taper  DAILY@04


 TOP


   2/24/18 04:00


 2/20/19 03:59  2/25/18 04:00


 


 


 Chlorhexidine


 Gluconate


  (Chlorhexidine


 2% Cloth)  3 pack  UNSCH  PRN


 TOP


 HYGIENIC CARE  2/23/18 22:00


     


 


 


 Senna/Docusate


 Sodium


  (Arlen-Colace)  1 tab  BID


 PO


   2/24/18 09:00


     


 


 


 Magnesium


 Hydroxide


  (Milk Of


 Magnesia Liq)  30 ml  Q12H  PRN


 PO


 Mild constipation  2/23/18 22:00


     


 


 


 Sennosides


  (Senokot)  17.2 mg  Q12H  PRN


 PO


 Moderate constipation  2/23/18 22:00


     


 


 


 Bisacodyl


  (Dulcolax Supp)  10 mg  DAILY  PRN


 RECTAL


 SEVERE CONSITIPATION  2/23/18 22:00


     


 


 


 Lactulose


  (Lactulose Liq)  30 ml  DAILY  PRN


 PO


 SEVERE CONSITIPATION  2/23/18 22:00


     


 


 


 Pharmacy Profile


 Note  0 ml @ 0


 mls/hr  UNSCH


 OTHER


   2/23/18 22:15


     


 


 


 Norepinephrine


 Bitartrate 4 mg/


 Sodium Chloride  250 ml @ 


 7.5 mls/hr  TITRATE  PRN


 IV


 Blood pressure management  2/24/18 09:15


    2/25/18 14:44


 


 


 Terbutaline


 Sulfate


  (Brethine Inj)  1 mg  UNSCH  PRN


 SQ


 For Extravasation  2/24/18 09:15


     


 


 


 Ceftriaxone


 Sodium 1000 mg/


 Sodium Chloride  100 ml @ 


 200 mls/hr  Q12H


 IV


   2/24/18 12:00


    2/25/18 11:33


 


 


 Ampicillin Sodium


 2000 mg/Sodium


 Chloride  100 ml @ 


 400 mls/hr  Q6H


 IV


   2/24/18 14:00


    2/25/18 15:09


 


 


 Rifampin


  (Rifampin)  300 mg  Q12HR


 PO


   2/24/18 21:00


    2/25/18 08:48


 


 


 Sodium


 Bicarbonate 75


 meq/Sodium


 Chloride  1,075 ml @ 


 125 mls/hr  Q8H36M


 IV


   2/25/18 09:00


    2/25/18 09:47


 


 


 Heparin Sodium/


 Dextrose  250 ml @ 


 10 mls/hr  TITRATE  PRN


 IV


 Coagulation Management  2/25/18 11:00


    2/25/18 11:38


 


 


 Vancomycin HCl


 1750 mg/Sodium


 Chloride  517.5 ml @ 


 257.5 mls/


 hr  Q12H


 IV


   2/25/18 13:00


    2/25/18 15:09


 


 


 Miscellaneous


 Information  SPECIFIC


 LAB TO BE


 RICCARDO...  ONCE  ONCE


 .XX


   2/26/18 12:45


 2/26/18 12:46   


 


 


 Hydromorphone HCl


  (Dilaudid Pf Inj)  2 mg  Q4H  PRN


 IV PUSH


 PAIN SCALE 6 TO 10  2/25/18 15:00


    2/25/18 17:29


 











 


SOCIAL HISTORY:


Positive occasional alcohol.  No tobacco. IV drug use in the form of Dilaudid,


oxycodone.  The patient also uses marijuana.


 








OBJECTIVE:





Vital Signs








  Date Time  Temp Pulse Resp B/P (MAP) Pulse Ox O2 Delivery O2 Flow Rate FiO2


 


2/25/18 14:44  102  101/58    


 


2/25/18 14:00  98      


 


2/25/18 13:00  95      


 


2/25/18 12:00 99.9 95 32 114/67 (83) 95   


 


2/25/18 12:00  95      


 


2/25/18 11:00  98      


 


2/25/18 10:00  97      


 


2/25/18 09:00  99      


 


2/25/18 08:00  97      


 


2/25/18 08:00 100.2 97 30 99/58 (72) 94   


 


2/25/18 07:00  100      


 


2/25/18 06:00  98      


 


2/25/18 05:48  97  114/55    


 


2/25/18 04:00  99      


 


2/25/18 04:00 100.4 99 26 92/58 (69) 93   


 


2/25/18 03:00  101      


 


2/25/18 02:03   20     


 


2/25/18 02:00  103      


 


2/25/18 02:00  103  90/57    


 


2/25/18 00:00  110      


 


2/25/18 00:00 100.2 110 31 104/68 (80) 96   


 


2/24/18 22:00  103      


 


2/24/18 20:22     94 Nasal Cannula 2.00 


 


2/24/18 20:00 99.6 103  125/62 (83) 96   


 


2/24/18 20:00  103      


 


2/24/18 19:00  99  114/69    


 


2/24/18 18:00  100      








Laboratory Tests








Test


  2/25/18


06:44 2/25/18


10:40


 


White Blood Count 14.1 TH/MM3  12.6 TH/MM3 


 


Red Blood Count 3.21 MIL/MM3  3.18 MIL/MM3 


 


Hemoglobin 7.2 GM/DL  7.1 GM/DL 


 


Hematocrit 21.8 %  21.7 % 


 


Mean Corpuscular Volume 67.9 FL  68.3 FL 


 


Mean Corpuscular Hemoglobin 22.4 PG  22.3 PG 


 


Mean Corpuscular Hemoglobin


Concent 33.0 % 


  32.7 % 


 


 


Red Cell Distribution Width 16.6 %  16.6 % 


 


Platelet Count 115 TH/MM3  109 TH/MM3 


 


Mean Platelet Volume 10.0 FL  10.1 FL 


 


Neutrophils (%) (Auto) 80.1 %  


 


Lymphocytes (%) (Auto) 8.5 %  


 


Monocytes (%) (Auto) 10.7 %  


 


Eosinophils (%) (Auto) 0.4 %  


 


Basophils (%) (Auto) 0.3 %  


 


Neutrophils # (Auto) 11.3 TH/MM3  


 


Lymphocytes # (Auto) 1.2 TH/MM3  


 


Monocytes # (Auto) 1.5 TH/MM3  


 


Eosinophils # (Auto) 0.1 TH/MM3  


 


Basophils # (Auto) 0.0 TH/MM3  


 


CBC Comment DIFF FINAL  


 


Differential Comment   


 


Blood Urea Nitrogen 25 MG/DL  


 


Creatinine 1.13 MG/DL  


 


Random Glucose 113 MG/DL  


 


Total Protein 6.1 GM/DL  


 


Albumin 2.1 GM/DL  


 


Calcium Level 7.9 MG/DL  


 


Phosphorus Level 3.3 MG/DL  


 


Magnesium Level 2.0 MG/DL  


 


Alkaline Phosphatase 89 U/L  


 


Aspartate Amino Transf


(AST/SGOT) 35 U/L 


  


 


 


Alanine Aminotransferase


(ALT/SGPT) 11 U/L 


  


 


 


Total Bilirubin 1.2 MG/DL  


 


Sodium Level 134 MEQ/L  


 


Potassium Level 3.6 MEQ/L  


 


Chloride Level 101 MEQ/L  


 


Carbon Dioxide Level 25.7 MEQ/L  


 


Anion Gap 7 MEQ/L  


 


Estimat Glomerular Filtration


Rate 55 ML/MIN 


  


 


 


Random Vancomycin Level 7.1 COMMENT  


 


Prothrombin Time  12.4 SEC 


 


Prothromb Time International


Ratio 


  1.2 RATIO 


 


 


Activated Partial


Thromboplast Time 


  25.3 SEC 


 








 


IMAGING:














Renal Ultrasound 2/24/18 0000 Signed





Impressions: 





 Service Date/Time:  Saturday, February 24, 2018 10:53 - CONCLUSION:  1. No 





 evidence of hydronephrosis. 2. Thickening of the urinary bladder wall a 1.1 

cm. 





 3. Prominent splenomegaly.     Elgin Walton MD 


 


Chest X-Ray 2/24/18 0000 Signed





Impressions: 





 Service Date/Time:  Saturday, February 24, 2018 09:36 - CONCLUSION:  Right 





 internal jugular central line in place with the tip overlying the SVC. A 





 pneumothorax is not seen.     Ron Brown MD 


 


Breast Ultrasound 2/24/18 0000 Signed





Impressions: 





 Service Date/Time:  Saturday, February 24, 2018 10:48 - CONCLUSION:  Negative 





 targeted right breast ultrasound examination.     Ron Brown MD 


 


Lower Extremity Ultrasound 2/23/18 0000 Signed





Impressions: 





 Service Date/Time:  Friday, February 23, 2018 21:22 - CONCLUSION:  1. No 





 sonographic evidence for lower extremity DVT. 2. Incidental note of bilateral 





 inguinal adenopathy with the largest on the right measuring 3.3 cm and the 





 largest on the left measuring 2.6 cm. This finding is nonspecific but is 





 typically reactive in etiology.     Rj Whitten MD 














PHYSICAL EXAMINATION


GENERAL: No acute distress. 


HEENT:  Head atraumatic.  Extraocular movements grossly intact.  Pupils


reactive to light.  No icterus.  Oropharynx moist mucosa, no lesions.


Neck:  Supple without adenopathy.


Lungs:  Decreased breath sounds.


Heart: 5/6 blowing systolic murmur at the upper right sternal border.


Abdomen: Bowel sounds present, soft, obese, nontender.


Extremities: Diffuse 2+ edema of the lower extremities. Left tibia distally has


lacy purpuric discoloration of the skin and there is punctate purpuric lesion


at the plantar aspect of the right great toe.  Toes 3 and 5 are cyanotic. 


The leg is extremely tender on palpation.  It is warm.  No calf tenderness.  


No nail bed hemorrhages.  The skin otherwise has no diffuse rash.


Neuro: No gross focal findings.


Psychiatric: calm and cooperative.





 


IMPRESSION


1. Sepsis. Strep species. 


2. Bacteremia.


3. History of prostatic aortic valve and so likely recurrent prosthetic valve


endocarditis.


4. Leukocytosis secondary to sepsis.


5. Left lower extremity cellulitis/embolic lesions from showering of emboli


from endocarditis.


6. Patient with profuse cough but negative chest x-ray. 


7. Acute renal failure. Kidney function improving. 


 


RECOMMENDATIONS


1. Continue Ampicillin intravenous given her prior history of endocarditis due 

to


     Enterococcus.


2. Continue rifampin PO.


3. Continue Vancomycin.


4. Continue Ceftriaxone. 


5. Monitor the blood cultures.


6. Monitor clinical status.  The patient is very critically-ill and her outlook


     is very poor with repeated prostatic valve endocarditis.  She indicated


     that she has had difficulty getting into rehab programs for drugs.  This


     is a grave problem in this young lady who is not able to quit using IV


     drugs of her own free will.











Robinson Carr MD Feb 25, 2018 18:07

## 2018-02-25 NOTE — PD.VS.CON
History of Present Illness


Chief Complaint:


L LE ischemia


Consult Requested by:


Dr. Velasquez, critical care


History of Present Illness


38 yo female with h/o bacterial endocarditis s/p surgical valve replacement x 2 

(2011, 2016) adm with MS changes and found to have cold L LE. Pt notes extreme 

pain in L LE for several days.


Motor intact. 


No history of DVT.





Past/Family/Social History


Past Medical History


endocarditis (E faecalis, staph, candida)


IVDA


hep C


Past Surgical History


valve replacement 2011 and 2016


Social History


IVDA


Family History


NC


Home Medications


Discontinued Scripts


Metoprolol Tartrate (Metoprolol Tartrate) 25 Mg Tab, 12.5 MG PO DAILY for cad 

for 30 Days, #15 TAB 0 Refills


   Prov:Eneida Javed MD         10/5/17


Doxycycline Hyclate (Doxycycline Hyclate) 100 Mg Cap, 100 MG PO BID for 

Infection for 60 Days, #120 CAP 0 Refills


   Prov:Catalina Teague MD         10/5/17


Coded Allergies:  


     No Known Allergies (Verified  Allergy, Unknown, 2/23/18)





Review of Systems


Constitutional:  COMPLAINS OF: Fever


Cardiovascular:  COMPLAINS OF: Dyspnea on Exertion, Lower Extremity Edema


Musculoskeletal:  COMPLAINS OF: Joint pain, Muscle aches, Stiffness, Joint 

Swelling


Integumentary:  COMPLAINS OF: Abnormal pigmentation





Physical Exam


Vitals/I&O











  Date Time  Temp Pulse Resp B/P (MAP) Pulse Ox O2 Delivery O2 Flow Rate FiO2


 


2/25/18 08:00  97      


 


2/25/18 08:00 100.2 97 30 99/58 (72) 94   


 


2/25/18 07:00  100      


 


2/25/18 06:00  98      


 


2/25/18 05:48  97  114/55    


 


2/25/18 04:00  99      


 


2/25/18 04:00 100.4 99 26 92/58 (69) 93   


 


2/25/18 03:00  101      


 


2/25/18 02:03   20     


 


2/25/18 02:00  103      


 


2/25/18 02:00  103  90/57    


 


2/25/18 00:00  110      


 


2/25/18 00:00 100.2 110 31 104/68 (80) 96   


 


2/24/18 22:00  103      


 


2/24/18 20:22     94 Nasal Cannula 2.00 


 


2/24/18 20:00 99.6 103  125/62 (83) 96   


 


2/24/18 20:00  103      


 


2/24/18 19:00  99  114/69    


 


2/24/18 18:00  100      


 


2/24/18 16:00  90      


 


2/24/18 16:00 98.3 90 26 109/56 (73) 99   


 


2/24/18 14:23  96  110/59    


 


2/24/18 14:00  93 24 110/59 (76) 95   


 


2/24/18 14:00  93      


 


2/24/18 13:14  98  110/60    


 


2/24/18 13:00  96      


 


2/24/18 13:00  96 22 110/60 (77) 95   


 


2/24/18 12:12  93  111/58    


 


2/24/18 12:00  92      


 


2/24/18 12:00 98.0 92 28 111/58 (75) 94   


 


2/24/18 11:00  90      


 


2/24/18 11:00  90 30 113/59 (77) 93   


 


2/24/18 10:00  92      


 


2/24/18 10:00  92 24 111/61 (78) 92   














 2/25/18 2/25/18 2/25/18





 07:00 15:00 23:00


 


Intake Total 1456 ml  


 


Output Total 2450 ml  


 


Balance -994 ml  








Neuro:


mild distress from pain


HEENT:


NC/AT; pupils equal


anicteric sclera


Neck:


no JVD


Heart:


reg rate


Lungs:


clear


Vascular:


difficult to palpate femoral pulses


Strong multiphasic L DP and PT


Extremities:


motor intact L LE


skin changes on distal anterior calf but foot largely spared





Laboratory Tests








Test


  2/24/18


12:50 2/25/18


06:44


 


Troponin I 0.15  


 


White Blood Count  14.1 


 


Red Blood Count  3.21 


 


Hemoglobin  7.2 


 


Hematocrit  21.8 


 


Mean Corpuscular Volume  67.9 


 


Mean Corpuscular Hemoglobin  22.4 


 


Mean Corpuscular Hemoglobin


Concent 


  33.0 


 


 


Red Cell Distribution Width  16.6 


 


Platelet Count  115 


 


Mean Platelet Volume  10.0 


 


Neutrophils (%) (Auto)  80.1 


 


Lymphocytes (%) (Auto)  8.5 


 


Monocytes (%) (Auto)  10.7 


 


Eosinophils (%) (Auto)  0.4 


 


Basophils (%) (Auto)  0.3 


 


Neutrophils # (Auto)  11.3 


 


Lymphocytes # (Auto)  1.2 


 


Monocytes # (Auto)  1.5 


 


Eosinophils # (Auto)  0.1 


 


Basophils # (Auto)  0.0 


 


CBC Comment  DIFF FINAL 


 


Differential Comment   


 


Blood Urea Nitrogen  25 


 


Creatinine  1.13 


 


Random Glucose  113 


 


Total Protein  6.1 


 


Albumin  2.1 


 


Calcium Level  7.9 


 


Phosphorus Level  3.3 


 


Magnesium Level  2.0 


 


Alkaline Phosphatase  89 


 


Aspartate Amino Transf


(AST/SGOT) 


  35 


 


 


Alanine Aminotransferase


(ALT/SGPT) 


  11 


 


 


Total Bilirubin  1.2 


 


Sodium Level  134 


 


Potassium Level  3.6 


 


Chloride Level  101 


 


Carbon Dioxide Level  25.7 


 


Anion Gap  7 


 


Estimat Glomerular Filtration


Rate 


  55 


 


 


Random Vancomycin Level  7.1 














 Date/Time


Source Procedure


Growth Status


 


 


 2/23/18 20:15


Blood Peripheral Aerobic Blood Culture - Preliminary


Gram Positive Cocci Resulted


 


 2/23/18 20:15 Anaerobic Blood Culture - Preliminary


Gram Positive Cocci Resulted


 


 2/23/18 22:30


Urine Suprapubic Urine Urine Culture - Preliminary


NO GROWTH IN 24 HOURS. Resulted











Last 48 hours Impressions








Renal Ultrasound 2/24/18 0000 Signed





Impressions: 





 Service Date/Time:  Saturday, February 24, 2018 10:53 - CONCLUSION:  1. No 





 evidence of hydronephrosis. 2. Thickening of the urinary bladder wall a 1.1 

cm. 





 3. Prominent splenomegaly.     Elgin Walton MD 


 


Chest X-Ray 2/24/18 0000 Signed





Impressions: 





 Service Date/Time:  Saturday, February 24, 2018 09:36 - CONCLUSION:  Right 





 internal jugular central line in place with the tip overlying the SVC. A 





 pneumothorax is not seen.     Ron Brown MD 


 


Chest X-Ray 2/24/18 0000 Signed





Impressions: 





 Service Date/Time:  Saturday, February 24, 2018 03:56 - CONCLUSION:  No acute 





 cardiopulmonary disease demonstrated.     Ron Swan MD 


 


Breast Ultrasound 2/24/18 0000 Signed





Impressions: 





 Service Date/Time:  Saturday, February 24, 2018 10:48 - CONCLUSION:  Negative 





 targeted right breast ultrasound examination.     Ron Brown MD 


 


Chest X-Ray 2/23/18 1951 Signed





Impressions: 





 Service Date/Time:  Friday, February 23, 2018 20:00 - CONCLUSION:        1. No 





 acute cardiopulmonary disease.     Rj Whitten MD 











Assessment and Plan


Plan


Likely L LE arterial emboli secondary to bacterial endocarditis; she is s/p 

valve replacement x 2





1. CTA with runoff


2. IV heparin gtt


3. Pain control


4. May need surgical embolectomy if motor status worsens





Discussed with pt and  at bedside.





Will follow closely








Michi Sears MD FACS RPVI





Associate Professor of Surgery


[facility] - Heart and Vascular Surgery at [facility]

















Michi Sears MD Feb 25, 2018 09:46

## 2018-02-25 NOTE — RADRPT
EXAM DATE/TIME:  02/25/2018 12:47 

 

[facility] COMPARISON:     

No previous studies available for comparison.

 

 

INDICATIONS :     

Left lower extremity pain for five days.

                      

 

IV CONTRAST:     

99 cc Omnipaque 350 (iohexol) IV 

 

 

RADIATION DOSE:     

9.03 CTDIvol (mGy) 

 

 

MEDICAL HISTORY :     

Congestive heart failure. Hepatitis C. 

 

SURGICAL HISTORY :      

None. 

 

ENCOUNTER:      

Initial

 

ACUITY:      

4 - 6 days

 

PAIN SCALE:      

5/10

 

LOCATION:       

Left  lower extremity

 

TECHNIQUE:     

Volumetric scanning was performed using a multi-row detector CT scanner.  The data was post processed
 with a variety of visualization algorithms including full volume maximum intensity projection, multi
-planar sliding thin slab reformation, curved planar reformation, and surface rendering techniques.  
Using automated exposure control and adjustment of the mA and/or kV according to patient size, radiat
ion dose was kept as low as reasonably achievable to obtain optimal diagnostic quality images.   DICO
M format image data is available electronically for review and comparison.  

 

FINDINGS:     

The abdominal aorta and iliacs are widely patent. The aortic visceral vessels are satisfactory in jennifer
earance. Specifically, the celiac, SMA, renal arteries and YESIKA are widely patent. The internal iliacs
 are patent bilaterally. The common femoral arteries are healthy in appearance and the profundas are 
patent bilaterally.

 

In the left leg, there is a 5-6 cm long focal occlusion of the proximal SFA. The vessel reconstitutes
 and thereafter appears normal. The popliteal artery is intact. Satisfactory calf runoff is present, 
posterior tibial dominant on this left side.

 

In the contralateral right leg, the superficial femoral artery, popliteal artery and tibial vessels a
re intact. Dominant calf run off is via anterior tibial on this right side.

 

Elsewhere on the exam, note is made of diminished cortical contrast enhancement involving a significa
nt portion of the mid to upper pole anterior and lateral cortex of the left kidney. This is likely re
lated to embolic event in the kidney.

 

The spleen is heterogeneous and mildly enlarged. The liver is mildly enlarged. Small low density lesi
on in the inferior aspect of the spleen and minimal perisplenic fluid. There is a small fat containin
g supraumbilical hernia and a tiny fat containing umbilical hernia present. Intrauterine device is no
vernon. Nodular opacities are present in the lung bases bilaterally.

 

CONCLUSION:     

Left renal cortical infarction.

5-6 cm length total occlusion of the left superficial femoral artery in the proximal thigh.

Nodular parenchymal opacities in the lung bases bilaterally

See above discussion.

 

 

 

 Ron Cruz MD on February 25, 2018 at 14:14           

Board Certified Radiologist.

 This report was verified electronically.

## 2018-02-25 NOTE — HHI.CCPN
Subjective


Remarks/Hospital Course


37-year-old female that presents to the ED for evaluation of shortness of 

breath and swelling.  Patient has a significant history of endocarditis, IV 

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 

continues use of IV drug use that presents to the ED for evaluation of acute 

onset of shortness of breath with swelling.  Patient initially did not mention 

to anybody that she was doing drugs but she did tell me that she injected 

herself about 4 days ago.  She uses Dilaudid.  She states that she's been 

having fevers.  Patient is somewhat somnolent and hard to assess she doesn't 

really provide much information.  Family members at the bedside and he provides 

more information about the heart.  Per patient she'll he takes 2 medications.  

Patient asked if she is taking any antibiotics and she said yes but when I ask 

her what kind is she is taking currently she states that she has not been on 

antibiotics for some time.  Patient apparently does follow with a local 

cardiologist but she cannot tell me the name of it.  She states that she's been 

having swelling to her legs for the past month.  She denies any recent travel 

or injury.  No other medical issues at this time.  No allergies to medication.





SUBJ 2/24: remains critical on Dopamine 10 mcg/ min. Appears ill. Limited 

bedside echo did not show any large vegetation, but exam was limited, tricuspid 

and aortic valve not well visualized. Also states that had right breast abscess 

2 months ago, drained by herself. Now may have abscess vs scar tissue. Injects 

upper arm and bilateral breasts. US of right breast ordered





2/25: Remains on 3 mcg/min of Levophed.  Complaints of severe left lower 

extremity pain.  Erythematous purpuric purple discoloration of the left lower 

anterior segment left dorsum of the foot with cyanotic nailbeds. Weak DP pulses+

. 2D Echo failed to reveal definite vegetation. Will consider FLORENCIO





Objective





Vital Signs








  Date Time  Temp Pulse Resp B/P (MAP) Pulse Ox O2 Delivery O2 Flow Rate FiO2


 


2/25/18 06:00  98      


 


2/25/18 05:48    114/55    


 


2/25/18 04:00 100.4  26  93   


 


2/24/18 20:22      Nasal Cannula 2.00 














Intake and Output   


 


 2/25/18 2/25/18 2/26/18





 08:00 16:00 00:00


 


Intake Total 1356 ml  


 


Output Total 2450 ml  


 


Balance -1094 ml  








Result Diagram:  


2/25/18 0644                                                                   

             2/24/18 0236





Imaging





Last 24 hours Impressions








Chest X-Ray 2/23/18 1951 Signed





Impressions: 





 Service Date/Time:  Friday, February 23, 2018 20:00 - CONCLUSION:        1. No 





 acute cardiopulmonary disease.     Rj Whitten MD 








Objective Remarks


GENERAL: Well-developed young female in severe distress due to pain, somehow 

lethargic.  On 3 mcg/min of Levophed


SKIN: Warm and dry.  Patient does appear to have puncture wounds from where she 

states been injecting recently in her arms, bilateral upper breast.


HEAD: Atraumatic. Normocephalic. 


EYES: Pupils equal and round. No scleral icterus. No injection or drainage. 


ENT: No nasal bleeding or discharge.  Mucous membranes pink and moist.  Tongue 

is midline.  No uvula deviation.


NECK: Trachea midline. No JVD. 


CHEST: Bilateral upper breast has puncture wounds from injection.  Questionable 

abscess versus fibrous tissue right upper quadrant of right breast


CARDIOVASCULAR: Regular rate and rhythm.  Systolic and diastolic murmurs best 

heard in the left sternal border.  Limited bedside echo did not show any large 

vegetations, tricuspid and aortic valve not well visualized


RESPIRATORY: Positive accessory muscle use.  Bilateral coarse rhonchi and few 

crackles are. Breath sounds equal bilaterally. 


GASTROINTESTINAL: Abdomen soft, non-tender, nondistended. Hepatic and splenic 

margins not palpable. 


MUSCULOSKELETAL: 2+ pitting edema to the lower extremities.  Erythematous 

purpuric rash of the left lower extremity anterior shin and foot, punctate 

lesion plantar right great toe. Left leg extremely tender below the left knee. 

Weak DP pulses bilaterally


NEUROLOGICAL: Awake and alert. Motor grossly within normal limits. Five out of 

5 muscle strength in the arms and legs.  Normal speech.





A/P


Assessment and Plan


Neuro:


IVDU


- Continue pain control with as needed Dilaudid but minimize use


- Once acute pain is controlled attempt to discontinue opiates


- Ativan as needed for anxiety





CVS/ID:


Septi  shock


Infective endocarditis involving prosthetic valve


History of fungal and bacterial prosthetic valve endocarditis


GPC bacteremia


LLE ischemia secondary to septic emboli


- Recurrent prosthetic valve endocarditis. - Now with GPC in blood, ID pending


- Previously multiple episodes of , PVE due to MSSA, Ent fecalis in 10/2017, 

PVE April 2017 for tricuspid valve PVE  - Candida parapsilosis , Streptococci


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


- Broad-spectrum antibiotic with Vanc, ampicillin, rifampin and ceftriaxone.


- Vascular surgery consult for probable emboli to left LE. D/W Dr. Sears


- ?Anticoagulation with Heparin after vascular surgery consult. 


- Infectious disease consultation appreciated


- Aggressive fluid resuscitation, change to bicarb gtt 125 ml/hr 


- Levophed to keep map above 65, currently at 3 mcg/min





Resp:


Respiratory insufficiency


Hypoxia


- Oxygen to keep oxygen saturation above 90%


- DuoNeb every 6 hours as needed





GI:


Hepatitis C


- Heart healthy diet


- IV famotidine





MSK:


History of right breast abscess with possible recurrence


- Ultrasound of the left lower extremity negative for DVT


- Right breast ultrasound negative for abscess





:


Acute on chronic kidney disease


- Acute renal failure secondary to ATN and dehydration


- Continue aggressive fluid resuscitation, change fluid to bicarb with 1/2 NS 

at 125 ml per hour


- Renal ultrasound-thickening of urinary bladder





Endo:


- Electrolyte replacement as needed





Heme:


- Monitor CBC CMP and coags





DVT GI prophylaxis


- Teds SCDs


- Subcutaneous heparin, may need IV Heparin


- Pepcid





Critical Care:


The total critical care time was 40 minutes. Time to perform other separately 

billable procedures was not included in the critical care time.


D/W Yun Rhoades MD Feb 25, 2018 07:43

## 2018-02-25 NOTE — ECHRPT
Indication:

 

 CONCLUSIONS

 Normal left ventricular size and wall thickness. The left ventricular systolic function is normal wi
th an 

 estimated ejection fraction in the range of 50-55%.   Normal wall motion.

 

 Linear mobile echodensity within the right atrial cavity, possibly representing the tip of a central
 line, or 

 Eustachian valve.  It's appearance it not consistent with vegetation.  The right atrial size is mild
ly enlarged.

 

 The aortic valve is not well visualized.   It may be a bioprosthetic valve.  Aortic valve mean gradi
ent was 

 measured at  51 mmHg suggesting severe aortic stenosis; however, the technician may have included th
e 

 tricuspid regurgitation jet in the measurement, falsely elevating the aortic mean gradient.

  

 The tricuspid valve is not well visualized. There is mild tricuspid valve regurgitation. 

 The estimated pulmonary arterial pressure is 40 mmHg. 

 

 BP:        /         HR:                          Rhythm:

 

 MEASUREMENTS  (Male / Female) Normal Values       Technical Quality:

 2D ECHO

 LV Diastolic Diameter PLAX        4.2 cm                4.2 - 5.9 / 3.9 - 5.3 cm

 LV Systolic Diameter PLAX         3.1 cm                

 IVS Diastolic Thickness           1.3 cm                0.6 - 1.0 / 0.6 - 0.9 cm

 LVPW Diastolic Thickness          1.2 cm                0.6 - 1.0 / 0.6 - 0.9 cm

 LV Relative Wall Thickness        0.6                   

 RV Internal Dim ED PLAX           3.1 cm                

 LVOT Diameter                     2.0 cm                

 

 M-MODE

 Aortic Root Diameter MM           4.0 cm                

 LA Systolic Diameter MM           4.2 cm                

 LA Ao Ratio MM                    1.1                   

 AV Cusp Separation MM             1.6 cm                

 

 DOPPLER

 AV Peak Velocity                  464.0 cm/s            

 AV Peak Gradient                  86.1 mmHg             

 AV Mean Gradient                  51.0 mmHg             

 AV Velocity Time Integral         85.3 cm               

 LVOT Peak Velocity                97.0 cm/s             

 LVOT Peak Gradient                3.8 mmHg              

 LVOT Velocity Time Integral       19.8 cm               

 AV Area Cont Eq vti               0.7 cm               

 AV Area Cont Eq pk                0.7 cm               

 TR Peak Velocity                  277.0 cm/s            

 TR Peak Gradient                  30.7 mmHg             

 Right Atrial Pressure             10.0 mmHg             

 Pulmonary Artery Systolic Pressu  40.7 mmHg             

 Right Ventricular Systolic Press  40.7 mmHg             

 

 

 FINDINGS

 

 LEFT VENTRICLE

 Normal left ventricular size and wall thickness. The left ventricular systolic function is normal wi
th an 

 estimated ejection fraction in the range of 50-55%.   Normal wall motion.

 

 RIGHT VENTRICLE

 The right ventricular size is normal. 

 

 LEFT ATRIUM

 The left atrial size is upper limits of normal. 

 

 RIGHT ATRIUM

 Linear mobile echodensity within the right atrial cavity, possibly representing the tip of a central
 line, or 

 Eustachian valve.  It's appearance it not consistent with vegetation.  The right atrial size is mild
ly enlarged.

 

 ATRIAL SEPTUM

 Normal atrial septal thickness without atrial level shunting by limited color doppler interrogation.
 

 

 AORTA

 The aortic root and proximal ascending aorta are not well visualized. 

 

 MITRAL VALVE

 No mitral valve regurgitation. 

 

 AORTIC VALVE

 The aortic valve is not well visualized.   It may be a bioprosthetic valve.  Aortic valve mean gradi
ent was 

 measured at  51 mmHg suggesting severe aortic stenosis; however, the technician may have included th
e 

 tricuspid regurgitation jet in the measurement, falsely elevating the aortic mean gradient. 

 

 TRICUSPID VALVE

 The tricuspid valve is not well visualized. There is mild tricuspid valve regurgitation. 

 The estimated pulmonary arterial pressure is 40 mmHg. 

 

 

 PULMONARY VALVE

 Trivial pulmonary valve regurgitation. 

 

 VESSELS

 The inferior vena cava is dilated. 

 

 PERICARDIUM

 No pericardial effusion. 

 

 

 

 

  Caleb Davis MD

  (Electronically Signed)

  Final Date:25 February 2018 07:05

## 2018-02-26 VITALS
TEMPERATURE: 100.1 F | SYSTOLIC BLOOD PRESSURE: 102 MMHG | OXYGEN SATURATION: 96 % | HEART RATE: 94 BPM | DIASTOLIC BLOOD PRESSURE: 65 MMHG | RESPIRATION RATE: 30 BRPM

## 2018-02-26 VITALS
RESPIRATION RATE: 27 BRPM | HEART RATE: 85 BPM | DIASTOLIC BLOOD PRESSURE: 55 MMHG | SYSTOLIC BLOOD PRESSURE: 106 MMHG | TEMPERATURE: 97.8 F | OXYGEN SATURATION: 95 %

## 2018-02-26 VITALS
TEMPERATURE: 99.9 F | RESPIRATION RATE: 25 BRPM | DIASTOLIC BLOOD PRESSURE: 71 MMHG | HEART RATE: 96 BPM | OXYGEN SATURATION: 100 % | SYSTOLIC BLOOD PRESSURE: 109 MMHG

## 2018-02-26 VITALS
DIASTOLIC BLOOD PRESSURE: 75 MMHG | SYSTOLIC BLOOD PRESSURE: 111 MMHG | OXYGEN SATURATION: 92 % | RESPIRATION RATE: 26 BRPM | TEMPERATURE: 101.5 F | HEART RATE: 99 BPM

## 2018-02-26 VITALS — HEART RATE: 109 BPM

## 2018-02-26 VITALS
SYSTOLIC BLOOD PRESSURE: 89 MMHG | TEMPERATURE: 98.9 F | OXYGEN SATURATION: 91 % | RESPIRATION RATE: 23 BRPM | DIASTOLIC BLOOD PRESSURE: 54 MMHG | HEART RATE: 97 BPM

## 2018-02-26 VITALS
RESPIRATION RATE: 25 BRPM | OXYGEN SATURATION: 100 % | HEART RATE: 83 BPM | SYSTOLIC BLOOD PRESSURE: 108 MMHG | DIASTOLIC BLOOD PRESSURE: 73 MMHG | TEMPERATURE: 98.3 F

## 2018-02-26 VITALS — HEART RATE: 102 BPM

## 2018-02-26 VITALS — HEART RATE: 97 BPM

## 2018-02-26 VITALS — HEART RATE: 80 BPM

## 2018-02-26 VITALS — HEART RATE: 94 BPM

## 2018-02-26 LAB
ALBUMIN SERPL-MCNC: 2.2 GM/DL (ref 3.4–5)
ALP SERPL-CCNC: 87 U/L (ref 45–117)
ALT SERPL-CCNC: 7 U/L (ref 10–53)
AST SERPL-CCNC: 21 U/L (ref 15–37)
BASOPHILS # BLD AUTO: 0.1 TH/MM3 (ref 0–0.2)
BASOPHILS NFR BLD: 0.5 % (ref 0–2)
BILIRUB SERPL-MCNC: 1.3 MG/DL (ref 0.2–1)
BUN SERPL-MCNC: 13 MG/DL (ref 7–18)
CALCIUM SERPL-MCNC: 8.3 MG/DL (ref 8.5–10.1)
CHLORIDE SERPL-SCNC: 102 MEQ/L (ref 98–107)
CREAT SERPL-MCNC: 0.68 MG/DL (ref 0.5–1)
EOSINOPHIL # BLD: 0.1 TH/MM3 (ref 0–0.4)
EOSINOPHIL NFR BLD: 0.9 % (ref 0–4)
ERYTHROCYTE [DISTWIDTH] IN BLOOD BY AUTOMATED COUNT: 16.5 % (ref 11.6–17.2)
GFR SERPLBLD BASED ON 1.73 SQ M-ARVRAT: 98 ML/MIN (ref 89–?)
GLUCOSE SERPL-MCNC: 100 MG/DL (ref 74–106)
HCO3 BLD-SCNC: 31.6 MEQ/L (ref 21–32)
HCT VFR BLD CALC: 22.2 % (ref 35–46)
HCT VFR BLD CALC: 22.7 % (ref 35–46)
HGB BLD-MCNC: 7.3 GM/DL (ref 11.6–15.3)
HGB BLD-MCNC: 7.4 GM/DL (ref 11.6–15.3)
LYMPHOCYTES # BLD AUTO: 1.7 TH/MM3 (ref 1–4.8)
LYMPHOCYTES NFR BLD AUTO: 12.2 % (ref 9–44)
MCH RBC QN AUTO: 22.5 PG (ref 27–34)
MCHC RBC AUTO-ENTMCNC: 32.9 % (ref 32–36)
MCV RBC AUTO: 68.4 FL (ref 80–100)
MONOCYTE #: 1.3 TH/MM3 (ref 0–0.9)
MONOCYTES NFR BLD: 9.2 % (ref 0–8)
NEUTROPHILS # BLD AUTO: 11 TH/MM3 (ref 1.8–7.7)
NEUTROPHILS NFR BLD AUTO: 77.2 % (ref 16–70)
PLATELET # BLD: 120 TH/MM3 (ref 150–450)
PMV BLD AUTO: 9.5 FL (ref 7–11)
PROT SERPL-MCNC: 6.1 GM/DL (ref 6.4–8.2)
RBC # BLD AUTO: 3.24 MIL/MM3 (ref 4–5.3)
SODIUM SERPL-SCNC: 138 MEQ/L (ref 136–145)
WBC # BLD AUTO: 14.2 TH/MM3 (ref 4–11)

## 2018-02-26 RX ADMIN — AMPICILLIN SODIUM SCH MLS/HR: 2 INJECTION, POWDER, FOR SOLUTION INTRAMUSCULAR; INTRAVENOUS at 19:44

## 2018-02-26 RX ADMIN — HEPARIN SODIUM SCH MLS/HR: 10000 INJECTION, SOLUTION INTRAVENOUS; SUBCUTANEOUS at 02:55

## 2018-02-26 RX ADMIN — STANDARDIZED SENNA CONCENTRATE AND DOCUSATE SODIUM SCH TAB: 8.6; 5 TABLET, FILM COATED ORAL at 19:44

## 2018-02-26 RX ADMIN — CLINDAMYCIN PHOSPHATE SCH MLS/HR: 150 INJECTION, SOLUTION INTRAMUSCULAR; INTRAVENOUS at 19:44

## 2018-02-26 RX ADMIN — STANDARDIZED SENNA CONCENTRATE AND DOCUSATE SODIUM SCH TAB: 8.6; 5 TABLET, FILM COATED ORAL at 09:00

## 2018-02-26 RX ADMIN — ACETAMINOPHEN PRN MG: 325 TABLET ORAL at 17:58

## 2018-02-26 RX ADMIN — LOPERAMIDE HYDROCHLORIDE PRN MG: 2 CAPSULE ORAL at 16:27

## 2018-02-26 RX ADMIN — HYDROMORPHONE HYDROCHLORIDE PRN MG: 2 INJECTION INTRAMUSCULAR; INTRAVENOUS; SUBCUTANEOUS at 05:37

## 2018-02-26 RX ADMIN — LOPERAMIDE HYDROCHLORIDE PRN MG: 2 CAPSULE ORAL at 20:45

## 2018-02-26 RX ADMIN — AMPICILLIN SODIUM SCH MLS/HR: 2 INJECTION, POWDER, FOR SOLUTION INTRAMUSCULAR; INTRAVENOUS at 02:55

## 2018-02-26 RX ADMIN — HYDROMORPHONE HYDROCHLORIDE PRN MG: 2 INJECTION INTRAMUSCULAR; INTRAVENOUS; SUBCUTANEOUS at 15:46

## 2018-02-26 RX ADMIN — HYDROMORPHONE HYDROCHLORIDE PRN MG: 2 INJECTION INTRAMUSCULAR; INTRAVENOUS; SUBCUTANEOUS at 01:37

## 2018-02-26 RX ADMIN — HYDROMORPHONE HYDROCHLORIDE PRN MG: 2 INJECTION INTRAMUSCULAR; INTRAVENOUS; SUBCUTANEOUS at 09:34

## 2018-02-26 RX ADMIN — SODIUM CHLORIDE SCH MLS/HR: 900 INJECTION INTRAVENOUS at 13:55

## 2018-02-26 RX ADMIN — Medication SCH ML: at 09:07

## 2018-02-26 RX ADMIN — SODIUM CHLORIDE SCH MLS/HR: 900 INJECTION INTRAVENOUS at 11:25

## 2018-02-26 RX ADMIN — RIFAMPIN SCH MG: 150 CAPSULE ORAL at 09:08

## 2018-02-26 RX ADMIN — AMPICILLIN SODIUM SCH MLS/HR: 2 INJECTION, POWDER, FOR SOLUTION INTRAMUSCULAR; INTRAVENOUS at 13:54

## 2018-02-26 RX ADMIN — HEPARIN SODIUM SCH MLS/HR: 10000 INJECTION, SOLUTION INTRAVENOUS; SUBCUTANEOUS at 10:35

## 2018-02-26 RX ADMIN — HEPARIN SODIUM PRN MLS/HR: 10000 INJECTION, SOLUTION INTRAVENOUS at 10:40

## 2018-02-26 RX ADMIN — Medication SCH ML: at 19:45

## 2018-02-26 RX ADMIN — HYDROMORPHONE HYDROCHLORIDE PRN MG: 2 INJECTION INTRAMUSCULAR; INTRAVENOUS; SUBCUTANEOUS at 23:38

## 2018-02-26 RX ADMIN — CHLORHEXIDINE GLUCONATE SCH PACK: 500 CLOTH TOPICAL at 02:56

## 2018-02-26 RX ADMIN — AMPICILLIN SODIUM SCH MLS/HR: 2 INJECTION, POWDER, FOR SOLUTION INTRAMUSCULAR; INTRAVENOUS at 09:08

## 2018-02-26 RX ADMIN — FAMOTIDINE SCH MG: 10 INJECTION, SOLUTION INTRAVENOUS at 09:07

## 2018-02-26 RX ADMIN — HYDROMORPHONE HYDROCHLORIDE PRN MG: 2 INJECTION INTRAMUSCULAR; INTRAVENOUS; SUBCUTANEOUS at 19:43

## 2018-02-26 RX ADMIN — FAMOTIDINE SCH MG: 10 INJECTION, SOLUTION INTRAVENOUS at 19:44

## 2018-02-26 RX ADMIN — SODIUM CHLORIDE SCH MLS/HR: 900 INJECTION INTRAVENOUS at 01:11

## 2018-02-26 NOTE — RADRPT
EXAM DATE/TIME:  02/26/2018 03:30 

 

[facility] COMPARISON:     

CHEST SINGLE AP, February 24, 2018, 9:36.

 

                     

INDICATIONS :     

Shortness of breath, possible pulmonary disease.

                     

 

MEDICAL HISTORY :     

Cardiovascular disease.  Congestive heart failure.  Hepatitis C.      

 

SURGICAL HISTORY :        

AVR

 

ENCOUNTER:     

Subsequent                                        

 

ACUITY:     

4 - 6 days      

 

PAIN SCORE:     

0/10

 

LOCATION:     

Bilateral chest 

 

FINDINGS:     

Stable right IJ central line. Lungs are hypoaerated which accentuates interstitial markings. Minimal 
bibasilar airspace disease. Cardiomediastinal contours are within normal limits given portable techni
que and degree of underinflation. Remainder of the exam is unchanged.

 

CONCLUSION:     

1. Low lung volumes with bibasilar mild airspace disease, likely atelectasis.

 

 

 

 Rj Whitten MD on February 26, 2018 at 5:30           

Board Certified Radiologist.

 This report was verified electronically.

## 2018-02-26 NOTE — HHI.CCPN
Subjective


Remarks/Hospital Course


37-year-old female that presents to the ED for evaluation of shortness of 

breath and swelling.  Patient has a significant history of endocarditis, IV 

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 

continues use of IV drug use that presents to the ED for evaluation of acute 

onset of shortness of breath with swelling.  Patient initially did not mention 

to anybody that she was doing drugs but she did tell me that she injected 

herself about 4 days ago.  She uses Dilaudid.  She states that she's been 

having fevers.  Patient is somewhat somnolent and hard to assess she doesn't 

really provide much information.  Family members at the bedside and he provides 

more information about the heart.  Per patient she'll he takes 2 medications.  

Patient asked if she is taking any antibiotics and she said yes but when I ask 

her what kind is she is taking currently she states that she has not been on 

antibiotics for some time.  Patient apparently does follow with a local 

cardiologist but she cannot tell me the name of it.  She states that she's been 

having swelling to her legs for the past month.  She denies any recent travel 

or injury.  No other medical issues at this time.  No allergies to medication.





SUBJ 2/24: remains critical on Dopamine 10 mcg/ min. Appears ill. Limited 

bedside echo did not show any large vegetation, but exam was limited, tricuspid 

and aortic valve not well visualized. Also states that had right breast abscess 

2 months ago, drained by herself. Now may have abscess vs scar tissue. Injects 

upper arm and bilateral breasts. US of right breast ordered





2/25: Remains on 3 mcg/min of Levophed.  Complaints of severe left lower 

extremity pain.  Erythematous purpuric purple discoloration of the left lower 

anterior segment left dorsum of the foot with cyanotic nailbeds. Weak DP pulses+

. 2D Echo failed to reveal definite vegetation. Will consider FLORENCIO





2/26: Continues to be on Levophed 3 mcg/min, remains critical.  We are left 

lower extremity pain but slightly better.  Currently on heparin not therapeutic 

yet.  CTA with runoff showed Left renal cortical infarction. 5-6 cm length 

total occlusion of the left superficial femoral artery in the proximal thigh 

but good distal opacification.  Dr. Souza recommended IV heparin no surgical 

intervention at this time.  Patient is still at high risk for further embolic 

episodes.  Blood cultures growing strep viridans.  Also patient complains about 

epigastric and left-sided abdominal pain and left-sided chest pain which is 

more pleuritic.  Pain is severe on deep inspiration





Objective





Vital Signs








  Date Time  Temp Pulse Resp B/P (MAP) Pulse Ox O2 Delivery O2 Flow Rate FiO2


 


2/26/18 10:36   24     


 


2/26/18 06:00  97      


 


2/26/18 04:00 98.3   108/73 (85) 100   


 


2/25/18 19:50        21


 


2/24/18 20:22      Nasal Cannula 2.00 














Intake and Output   


 


 2/26/18 2/26/18 2/26/18





 07:59 15:59 23:59


 


Intake Total 2658.5 ml  


 


Output Total 2300 ml  


 


Balance 358.5 ml  








Result Diagram:  


2/26/18 0350                                                                   

             2/26/18 0350





Other Results





Microbiology








 Date/Time


Source Procedure


Growth Status


 


 


 2/23/18 20:15


Blood Peripheral Aerobic Blood Culture - Final


Viridans Streptococcus Grp Complete


 


 2/23/18 20:15 Anaerobic Blood Culture - Final


Viridans Streptococcus Grp Complete





 2/23/18 20:10


Blood Peripheral Aerobic Blood Culture - Final


Viridans Streptococcus Grp Complete


 


 2/23/18 20:10 Anaerobic Blood Culture - Final


Viridans Streptococcus Grp Complete


 


 2/25/18 21:00


Stool Stool Stool Occult Blood (GILA) - Final


HEMOCCULT NEGATIVE Complete


 


 2/23/18 22:30


Urine Suprapubic Urine Urine Culture - Final


NO GROWTH IN 48 HOURS. Complete








Imaging





Last 24 hours Impressions








Chest X-Ray 2/23/18 1951 Signed





Impressions: 





 Service Date/Time:  Friday, February 23, 2018 20:00 - CONCLUSION:        1. No 





 acute cardiopulmonary disease.     Rj Whitten MD 








Objective Remarks


GENERAL: Well-developed young female in moderate distress due to pain, somehow 

lethargic.  On 3 mcg/min of Levophed


SKIN: Warm and dry.  Patient does appear to have puncture wounds from where she 

states been injecting recently in her arms, bilateral upper breast.


HEAD: Atraumatic. Normocephalic. 


EYES: Pupils equal and round. No scleral icterus. No injection or drainage. 


ENT: No nasal bleeding or discharge.  Mucous membranes pink and moist.  


NECK: Trachea midline. No JVD. 


CHEST: Bilateral upper breast has puncture wounds from injection. Severe point 

tendreness left upper later chest and over lateral left ribs


CARDIOVASCULAR: Systolic and diastolic murmurs best heard in the left sternal 

border.  Limited bedside echo did not show any large vegetations, tricuspid and 

aortic valve not well visualized.  Remains on Levophed at 3 mcg/min


RESPIRATORY: Positive accessory muscle use.  Bilateral coarse rhonchi and few 

crackles are. Breath sounds equal bilaterally. 


GASTROINTESTINAL: Abdomen soft, TTP epigastric and bilateral flanks. Hepatic 

and splenic margins palpable. 


MUSCULOSKELETAL: 2+ pitting edema to the lower extremities.  Erythematous 

purpuric rash of the left lower extremity anterior shin and foot, punctate 

lesion plantar right great toe. Left leg extremely tender below the left knee. 

Weak DP pulses bilaterally


NEUROLOGICAL: Awake and alert. Motor grossly within normal limits. Five out of 

5 muscle strength in the arms and legs.  Normal speech.





A/P


Assessment and Plan


Neuro:


IVDU


- Continue pain control with as needed Dilaudid. (patient has severpain from 

ischemic leg)


- Ativan as needed for anxiety





CVS/ID:


Septi  shock


Infective endocarditis involving prosthetic valve


Multiple septic emboli


History of fungal and bacterial prosthetic valve endocarditis


Strep viridans bacteremia


5-6 cm length total occlusion of the left superficial femoral artery in the 

proximal thigh


- Recurrent prosthetic valve endocarditis. - Now with strep viridans in blood 

cultures


- Broad-spectrum antibiotic with Vanc, ampicillin, rifampin and ceftriaxone.


- Previously multiple episodes of , PVE due to MSSA, Ent fecalis in 10/2017, 

PVE April 2017 for tricuspid valve PVE  - Candida parapsilosis , Streptococci


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


- Vascular surgery Dr. Sears following


- Anticoagulation with Heparin 


- Infectious disease Dr. Carr


- IVF change to NS at 42 ml per hour


- Levophed to keep map above 65, currently at 3 mcg/min


- A FLORENCIO at this point will not 


- Extensive counselling given about IP Rehab





Resp:


Respiratory insufficiency


Hypoxia


- Oxygen to keep oxygen saturation above 90%


- DuoNeb every 6 hours as needed


- CT chest to rule out septic emboli to the lung





GI:


Hepatitis C


Splenic and renal infarct


- Heart healthy diet


- IV famotidine





MSK:


History of right breast abscess


- Ultrasound of the left lower extremity negative for DVT


- Right breast ultrasound negative for abscess





:


Acute on chronic kidney disease


- Acute renal failure secondary to ATN and dehydration


- IVF NS at 42 ml per hour


- Renal ultrasound-thickening of urinary bladder





Endo:


- Electrolyte replacement as needed





Heme:


- Monitor CBC CMP and coags





DVT GI prophylaxis


- Teds SCDs


- IV Heparin


- Pepcid





Critical Care:


The total critical care time was 32 minutes. Time to perform other separately 

billable procedures was not included in the critical care time.


D/W Dr. Sears





Remains critically ill still requiring vasopressors.  There is evidence of 

multiple septic emboli including embolic occlusion of left SFA, splenic infarct 

renal infarct and possible septic emboli to the lung.  Prognosis guarded 

especially with continued IV drug use.  I counselled her extensively regarding 

inpatient rehab











Yun Velasquez MD Feb 26, 2018 12:24

## 2018-02-26 NOTE — RADRPT
EXAM DATE/TIME:  02/26/2018 12:41 

 

[facility] COMPARISON:     

CTA RUNOFF W 3D RECON, February 25, 2018, 12:47.

 

 

INDICATIONS :     

Evaluate for septic emboli

                      

 

RADIATION DOSE:     

16.41 CTDIvol (mGy) 

 

 

MEDICAL HISTORY :     

Cardiovascular disease. Hepatitis C.  

 

SURGICAL HISTORY :      

None.  

 

ENCOUNTER:      

Initial

 

ACUITY:      

1 day

 

PAIN SCALE:      

0/10

 

LOCATION:        

chest 

 

TECHNIQUE:      

Volumetric scanning of the chest was performed.  Using automated exposure control and adjustment of t
he mA and/or kV according to patient size, radiation dose was kept as low as reasonably achievable to
 obtain optimal diagnostic quality images.  DICOM format image data is available electronically for r
eview and comparison.  

 

Follow-up recommendations for detected pulmonary nodules are based at a minimum on nodule size and pa
tient risk factors according to Fleischner Society Guidelines.

 

FINDINGS:     

 

LUNGS:     

There is respiratory motion artifact. At least 5 peripherally located pulmonary nodules are identifie
d with the largest in the right lower lobe measuring 19 mm. These demonstrate no cavitation. There is
 mild atelectasis at the lung bases. No pneumothorax is present.

 

PLEURAE:     

There is no pleural thickening or pleural effusion.

 

MEDIASTINUM:     

Heart is enlarged and patient is post aortic valve replacement. There is a right IJ line present with
 distal tip in the right atrium. Superficial pericardial wires remain present anteriorly areas there 
is a single mildly enlarged AP window lymph node measuring 11 mm in short axis diameter. Low density 
of the cardiac blood pool suggests anemia.

 

AXILLAE:     

Within normal limits.  No lymphadenopathy.

 

MUSCULOSKELETAL:     

There are mild degenerative changes of the thoracic spine. Patient is post median sternotomy. No acut
e osseous abnormality is seen.

 

MISCELLANEOUS:     

The spleen remains enlarged measuring approximately 15.8 cm. There are areas of low density in the mi
d spleen.

 

CONCLUSION:     

1. There are at least 5 pulmonary nodules present bilaterally with the largest measuring 19 mm. None 
demonstrate cavitation but it is possible that these represent septic emboli. Suggest followup to ass
ess for change.

2. Splenomegaly with subtle wedge-shaped areas of low-density in the mid spleen which could represent
 infarcts.

3. Mild cardiomegaly in this patient post aortic valve replacement. Low density of the cardiac blood 
pool suggests anemia.

 

 

 

 

 Ron Melgar MD on February 26, 2018 at 13:00           

Board Certified Radiologist.

 This report was verified electronically.

## 2018-02-26 NOTE — PD.VS.PN
Subjective


Subjective/Hospital Course


Pt with persistently painful L foot, motor remains intact.


Started hep gtt yesterday but not therapeutic yet.


On IV antibiotics for recurrent bacterial endocarditis.


CTA yesterday shows focal SFA occlusion but good distal opacification.





Objective


Vitals/I&O











  Date Time  Temp Pulse Resp B/P (MAP) Pulse Ox O2 Delivery O2 Flow Rate FiO2


 


2/26/18 06:00  97      


 


2/26/18 04:00 98.3 83 25 108/73 (85) 100   


 


2/26/18 04:00  83      


 


2/26/18 02:00  80      


 


2/26/18 00:00 97.8 85 27 106/55 (72) 95   


 


2/26/18 00:00  85      


 


2/25/18 22:00  100      


 


2/25/18 21:57 101.0 99 28 87/64 97   


 


2/25/18 20:11 101.9 104 28 105/59 92   


 


2/25/18 20:00 101.9 104 38 105/59 (74) 92   


 


2/25/18 20:00  104      


 


2/25/18 19:51 101.2 106 21 108/55 93   


 


2/25/18 19:50     92   21


 


2/25/18 19:00  105  104/60    


 


2/25/18 18:00  104      


 


2/25/18 17:30  102  116/66    


 


2/25/18 17:00  102      


 


2/25/18 16:00  101      


 


2/25/18 16:00 100.5 101 32 108/70 (83) 98   


 


2/25/18 15:00  102      


 


2/25/18 14:44  102  101/58    


 


2/25/18 14:00  98      


 


2/25/18 13:00  95      


 


2/25/18 12:00 99.9 95 32 114/67 (83) 95   


 


2/25/18 12:00  95      


 


2/25/18 11:00  98      


 


2/25/18 10:00  97      


 


2/25/18 09:00  99      


 


2/25/18 08:00  97      


 


2/25/18 08:00 100.2 97 30 99/58 (72) 94   














 2/26/18 2/26/18 2/26/18





 07:00 15:00 23:00


 


Intake Total 2658.5 ml  


 


Output Total 2300 ml  


 


Balance 358.5 ml  








Physical Exam


L foot warm.


Strong Doppler signals DP/PT, multiphasic


Motor intact


+ swelling


anterior calf cyanosis of skin


Laboratory





Laboratory Tests








Test


  2/25/18


10:40 2/25/18


19:00 2/25/18


23:45 2/26/18


03:50


 


White Blood Count 12.6    14.2 


 


Red Blood Count 3.18    3.24 


 


Hemoglobin 7.1   7.4  7.3 


 


Hematocrit 21.7   22.7  22.2 


 


Mean Corpuscular Volume 68.3    68.4 


 


Mean Corpuscular Hemoglobin 22.3    22.5 


 


Mean Corpuscular Hemoglobin


Concent 32.7 


  


  


  32.9 


 


 


Red Cell Distribution Width 16.6    16.5 


 


Platelet Count 109    120 


 


Mean Platelet Volume 10.1    9.5 


 


Prothrombin Time 12.4    


 


Prothromb Time International


Ratio 1.2 


  


  


  


 


 


Activated Partial


Thromboplast Time 25.3 


  29.2 


  


  25.2 


 


 


Neutrophils (%) (Auto)    77.2 


 


Lymphocytes (%) (Auto)    12.2 


 


Monocytes (%) (Auto)    9.2 


 


Eosinophils (%) (Auto)    0.9 


 


Basophils (%) (Auto)    0.5 


 


Neutrophils # (Auto)    11.0 


 


Lymphocytes # (Auto)    1.7 


 


Monocytes # (Auto)    1.3 


 


Eosinophils # (Auto)    0.1 


 


Basophils # (Auto)    0.1 


 


CBC Comment    DIFF FINAL 


 


Differential Comment     


 


Blood Urea Nitrogen    13 


 


Creatinine    0.68 


 


Random Glucose    100 


 


Total Protein    6.1 


 


Albumin    2.2 


 


Calcium Level    8.3 


 


Alkaline Phosphatase    87 


 


Aspartate Amino Transf


(AST/SGOT) 


  


  


  21 


 


 


Alanine Aminotransferase


(ALT/SGPT) 


  


  


  7 


 


 


Total Bilirubin    1.3 


 


Sodium Level    138 


 


Potassium Level    3.6 


 


Chloride Level    102 


 


Carbon Dioxide Level    31.6 


 


Anion Gap    4 


 


Estimat Glomerular Filtration


Rate 


  


  


  98 


 














 Date/Time


Source Procedure


Growth Status


 


 


 2/23/18 20:15


Blood Peripheral Aerobic Blood Culture - Preliminary


Streptococcus Species Resulted


 


 2/23/18 20:15 Anaerobic Blood Culture - Preliminary


Gram Positive Cocci Resulted


 


 2/25/18 21:00


Stool Stool Stool Occult Blood (GILA) - Final


HEMOCCULT NEGATIVE Complete


 


 2/23/18 22:30


Urine Suprapubic Urine Urine Culture - Final


NO GROWTH IN 48 HOURS. Complete








Imaging





Last 48 hours Impressions








Aorta w/Runoff CTA 2/25/18 0000 Signed





Impressions: 





 Service Date/Time:  Sunday, February 25, 2018 12:47 - CONCLUSION:  Left renal 





 cortical infarction. 5-6 cm length total occlusion of the left superficial 





 femoral artery in the proximal thigh. Nodular parenchymal opacities in the 

lung 





 bases bilaterally See above discussion.     Ron Cruz MD 











Assessment and Plan


Plan


Likely L LE arterial emboli secondary to bacterial endocarditis; she is s/p 

valve replacement x 2





1. IV heparin gtt - aggressively to therapeutic


2. Pain control


3. OOB and WBAT


4. Continue neurovascular checks











Will follow closely








Michi Sears MD FACS RPVI





Associate Professor of Surgery


[facility] at [facility]

















Michi Sears MD Feb 26, 2018 07:09

## 2018-02-26 NOTE — HHI.IDPN
Note


Infectious Disease Note








Patient complains of severe pain in the left foot and left leg. 


Log grade fever.


(+) SOB is the same.


Awake and alert. 


No chest pain.





Blood culture has strep viridans. 











37-year-old white female who has a history of endocarditis and has


been admitted several times for the same.  The patient developed shortness of


breath, fever and chills, and  presented to the emergency department.  The


patient is noted to be actively using IV drugs.  She has history of significant


CHF from prior valvular heart disease related to endocarditis.  She states that


she started feeling short of breath about a week ago and the shortness of


breath worsened.  After she completed IV antibiotics in October she was


put on doxycycline prophylaxis.  She reports that she has not been taking the


doxycycline.  


 


PAST MEDICAL HISTORY:


Hepatitis C.


IV drug use.


prosthetic aortic valve replacement in 2011 and then redo aortic


valve replacement in June 2016 





MEDICATIONS:


1. Vancomycin.


2. Ampicillin.


3. Ceftriaxone.


4. Rifampin. 








Current Medications








 Medications


  (Trade)  Dose


 Ordered  Sig/Nikhil


 Route


 PRN Reason  Start Time


 Stop Time Status Last Admin


Dose Admin


 


 Sodium Chloride


  (NS Flush)  2 ml  UNSCH  PRN


 IV FLUSH


 FLUSH AFTER USING IV ACCESS  2/23/18 22:00


     


 


 


 Sodium Chloride


  (NS Flush)  2 ml  BID


 IV FLUSH


   2/24/18 09:00


    2/26/18 09:07


 


 


 Acetaminophen


  (Tylenol)  650 mg  Q6H  PRN


 PO


 PAIN 1-5 AND/OR FEVER >101F  2/23/18 22:00


    2/25/18 21:25


 


 


 Lorazepam


  (Ativan Inj)  2 mg  Q4H  PRN


 IV PUSH


 Agitation/Sedation  2/23/18 22:00


     


 


 


 Ondansetron HCl


  (Zofran Inj)  4 mg  Q6H  PRN


 IV PUSH


 NAUSEA OR VOMITING  2/23/18 22:00


     


 


 


 Temazepam


  (Restoril)  15 mg  HS  PRN


 PO


 INSOMNIA  2/23/18 22:00


    2/24/18 21:26


 


 


 Albuterol/


 Ipratropium


  (Duoneb Neb)  1 ampule  Q2HR NEB  PRN


 INH


 WHEEZING  2/23/18 22:00


     


 


 


 Miscellaneous


 Information  1  Q361D


 XX


   2/23/18 22:00


     


 


 


 Chlorhexidine


 Gluconate


  (Chlorhexidine


 2% Cloth)  3 pack


 Taper  DAILY@04


 TOP


   2/24/18 04:00


 2/20/19 03:59  2/26/18 02:56


 


 


 Chlorhexidine


 Gluconate


  (Chlorhexidine


 2% Cloth)  3 pack  UNSCH  PRN


 TOP


 HYGIENIC CARE  2/23/18 22:00


     


 


 


 Senna/Docusate


 Sodium


  (Arlen-Colace)  1 tab  BID


 PO


   2/24/18 09:00


     


 


 


 Magnesium


 Hydroxide


  (Milk Of


 Magnesia Liq)  30 ml  Q12H  PRN


 PO


 Mild constipation  2/23/18 22:00


     


 


 


 Sennosides


  (Senokot)  17.2 mg  Q12H  PRN


 PO


 Moderate constipation  2/23/18 22:00


     


 


 


 Bisacodyl


  (Dulcolax Supp)  10 mg  DAILY  PRN


 RECTAL


 SEVERE CONSITIPATION  2/23/18 22:00


     


 


 


 Lactulose


  (Lactulose Liq)  30 ml  DAILY  PRN


 PO


 SEVERE CONSITIPATION  2/23/18 22:00


     


 


 


 Pharmacy Profile


 Note  0 ml @ 0


 mls/hr  UNSCH


 OTHER


   2/23/18 22:15


     


 


 


 Norepinephrine


 Bitartrate 4 mg/


 Sodium Chloride  250 ml @ 


 7.5 mls/hr  TITRATE  PRN


 IV


 Blood pressure management  2/24/18 09:15


    2/25/18 14:44


 


 


 Terbutaline


 Sulfate


  (Brethine Inj)  1 mg  UNSCH  PRN


 SQ


 For Extravasation  2/24/18 09:15


     


 


 


 Ceftriaxone


 Sodium 1000 mg/


 Sodium Chloride  100 ml @ 


 200 mls/hr  Q12H


 IV


   2/24/18 12:00


    2/26/18 11:25


 


 


 Ampicillin Sodium


 2000 mg/Sodium


 Chloride  100 ml @ 


 400 mls/hr  Q6H


 IV


   2/24/18 14:00


    2/26/18 13:54


 


 


 Rifampin


  (Rifampin)  300 mg  Q12HR


 PO


   2/24/18 21:00


    2/26/18 09:08


 


 


 Heparin Sodium/


 Dextrose  250 ml @ 


 10 mls/hr  TITRATE  PRN


 IV


 Coagulation Management  2/25/18 11:00


    2/26/18 10:40


 


 


 Vancomycin HCl


 1750 mg/Sodium


 Chloride  517.5 ml @ 


 257.5 mls/


 hr  Q12H


 IV


   2/25/18 13:00


    2/26/18 13:55


 


 


 Hydromorphone HCl


  (Dilaudid Pf Inj)  2 mg  Q4H  PRN


 IV PUSH


 PAIN SCALE 6 TO 10  2/25/18 15:00


    2/26/18 15:46


 


 


 Sodium Chloride  1,000 ml @ 


 42 mls/hr  [id number]


 IV


   2/26/18 11:30


    2/26/18 13:55


 


 


 Famotidine


  (Pepcid Inj)  20 mg  Q12HR


 IV PUSH


   2/26/18 21:00


     


 


 


 Loperamide HCl


  (Imodium)  2 mg  Q4H  PRN


 PO


 Diarrhea  2/26/18 16:15


    2/26/18 16:27


 


 


 Miscellaneous


 Information  SPECIFIC


 LAB TO BE


 RICCARDO...  ONCE  ONCE


 .XX


   2/28/18 00:45


 2/28/18 00:46   


 











 


SOCIAL HISTORY:


Positive occasional alcohol.  No tobacco. IV drug use in the form of Dilaudid,


oxycodone.  The patient also uses marijuana.


 








OBJECTIVE:





Vital Signs








  Date Time  Temp Pulse Resp B/P (MAP) Pulse Ox O2 Delivery O2 Flow Rate FiO2


 


2/26/18 16:26   24     


 


2/26/18 16:00  99      


 


2/26/18 14:00  102      


 


2/26/18 12:00 100.1 94 30 102/65 (77) 96   


 


2/26/18 12:00  94      


 


2/26/18 10:00  94      


 


2/26/18 08:00  96      


 


2/26/18 08:00 99.9 96 25 109/71 (84) 100   


 


2/26/18 06:00  97      


 


2/26/18 04:00 98.3 83 25 108/73 (85) 100   


 


2/26/18 04:00  83      


 


2/26/18 02:00  80      


 


2/26/18 00:00 97.8 85 27 106/55 (72) 95   


 


2/26/18 00:00  85      


 


2/25/18 22:00  100      


 


2/25/18 21:57 101.0 99 28 87/64 97   


 


2/25/18 20:11 101.9 104 28 105/59 92   


 


2/25/18 20:00 101.9 104 38 105/59 (74) 92   


 


2/25/18 20:00  104      


 


2/25/18 19:51 101.2 106 21 108/55 93   


 


2/25/18 19:50     92   21


 


2/25/18 19:00  105  104/60    


 


2/25/18 18:00  104      














Laboratory Tests








Test


  2/25/18


06:44 2/25/18


10:40 2/25/18


23:45 2/26/18


03:50


 


White Blood Count 14.1 TH/MM3  12.6 TH/MM3   14.2 TH/MM3 


 


Red Blood Count 3.21 MIL/MM3  3.18 MIL/MM3   3.24 MIL/MM3 


 


Hemoglobin 7.2 GM/DL  7.1 GM/DL  7.4 GM/DL  7.3 GM/DL 


 


Hematocrit 21.8 %  21.7 %  22.7 %  22.2 % 


 


Mean Corpuscular Volume 67.9 FL  68.3 FL   68.4 FL 


 


Mean Corpuscular Hemoglobin 22.4 PG  22.3 PG   22.5 PG 


 


Mean Corpuscular Hemoglobin


Concent 33.0 % 


  32.7 % 


  


  32.9 % 


 


 


Red Cell Distribution Width 16.6 %  16.6 %   16.5 % 


 


Platelet Count 115 TH/MM3  109 TH/MM3   120 TH/MM3 


 


Mean Platelet Volume 10.0 FL  10.1 FL   9.5 FL 


 


Neutrophils (%) (Auto) 80.1 %    77.2 % 


 


Lymphocytes (%) (Auto) 8.5 %    12.2 % 


 


Monocytes (%) (Auto) 10.7 %    9.2 % 


 


Eosinophils (%) (Auto) 0.4 %    0.9 % 


 


Basophils (%) (Auto) 0.3 %    0.5 % 


 


Neutrophils # (Auto) 11.3 TH/MM3    11.0 TH/MM3 


 


Lymphocytes # (Auto) 1.2 TH/MM3    1.7 TH/MM3 


 


Monocytes # (Auto) 1.5 TH/MM3    1.3 TH/MM3 


 


Eosinophils # (Auto) 0.1 TH/MM3    0.1 TH/MM3 


 


Basophils # (Auto) 0.0 TH/MM3    0.1 TH/MM3 


 


CBC Comment DIFF FINAL    DIFF FINAL 


 


Differential Comment      








Laboratory Tests








Test


  2/25/18


06:44 2/26/18


03:50


 


Blood Urea Nitrogen 25 MG/DL  13 MG/DL 


 


Creatinine 1.13 MG/DL  0.68 MG/DL 


 


Random Glucose 113 MG/DL  100 MG/DL 


 


Total Protein 6.1 GM/DL  6.1 GM/DL 


 


Albumin 2.1 GM/DL  2.2 GM/DL 


 


Calcium Level 7.9 MG/DL  8.3 MG/DL 


 


Phosphorus Level 3.3 MG/DL  


 


Magnesium Level 2.0 MG/DL  


 


Alkaline Phosphatase 89 U/L  87 U/L 


 


Aspartate Amino Transf


(AST/SGOT) 35 U/L 


  21 U/L 


 


 


Alanine Aminotransferase


(ALT/SGPT) 11 U/L 


  7 U/L 


 


 


Total Bilirubin 1.2 MG/DL  1.3 MG/DL 


 


Sodium Level 134 MEQ/L  138 MEQ/L 


 


Potassium Level 3.6 MEQ/L  3.6 MEQ/L 


 


Chloride Level 101 MEQ/L  102 MEQ/L 


 


Carbon Dioxide Level 25.7 MEQ/L  31.6 MEQ/L 


 


Anion Gap 7 MEQ/L  4 MEQ/L 


 


Estimat Glomerular Filtration


Rate 55 ML/MIN 


  98 ML/MIN 


 








Microbiology








 Date/Time


Source Procedure


Growth Status


 


 


 2/23/18 20:15


Blood Peripheral Aerobic Blood Culture - Final


Viridans Streptococcus Grp Complete


 


 2/23/18 20:15 Anaerobic Blood Culture - Final


Viridans Streptococcus Grp Complete





 2/23/18 20:10


Blood Peripheral Aerobic Blood Culture - Final


Viridans Streptococcus Grp Complete


 


 2/23/18 20:10 Anaerobic Blood Culture - Final


Viridans Streptococcus Grp Complete


 


 2/25/18 21:00


Stool Stool Stool Occult Blood (GILA) - Final


HEMOCCULT NEGATIVE Complete


 


 2/23/18 22:30


Urine Suprapubic Urine Urine Culture - Final


NO GROWTH IN 48 HOURS. Complete











IMAGING:

















Chest X-Ray 2/26/18 0600 Signed





Impressions: 





 Service Date/Time:  Monday, February 26, 2018 03:30 - CONCLUSION:  1. Low lung 





 volumes with bibasilar mild airspace disease, likely atelectasis.     Rj Whitten MD 


 


Aorta w/Runoff CTA 2/25/18 0000 Signed





Impressions: 





 Service Date/Time:  Sunday, February 25, 2018 12:47 - CONCLUSION:  Left renal 





 cortical infarction. 5-6 cm length total occlusion of the left superficial 





 femoral artery in the proximal thigh. Nodular parenchymal opacities in the 

lung 





 bases bilaterally See above discussion.     Ron Cruz MD 




















Renal Ultrasound 2/24/18 0000 Signed





Impressions: 





 Service Date/Time:  Saturday, February 24, 2018 10:53 - CONCLUSION:  1. No 





 evidence of hydronephrosis. 2. Thickening of the urinary bladder wall a 1.1 

cm. 





 3. Prominent splenomegaly.     Elgin Walton MD 


 


Chest X-Ray 2/24/18 0000 Signed





Impressions: 





 Service Date/Time:  Saturday, February 24, 2018 09:36 - CONCLUSION:  Right 





 internal jugular central line in place with the tip overlying the SVC. A 





 pneumothorax is not seen.     Ron Brown MD 


 


Breast Ultrasound 2/24/18 0000 Signed





Impressions: 





 Service Date/Time:  Saturday, February 24, 2018 10:48 - CONCLUSION:  Negative 





 targeted right breast ultrasound examination.     Ron Brown MD 


 


Lower Extremity Ultrasound 2/23/18 0000 Signed





Impressions: 





 Service Date/Time:  Friday, February 23, 2018 21:22 - CONCLUSION:  1. No 





 sonographic evidence for lower extremity DVT. 2. Incidental note of bilateral 





 inguinal adenopathy with the largest on the right measuring 3.3 cm and the 





 largest on the left measuring 2.6 cm. This finding is nonspecific but is 





 typically reactive in etiology.     Rj Whitten MD 














PHYSICAL EXAMINATION


GENERAL: No acute distress. 


HEENT:  Head atraumatic.  Extraocular movements grossly intact.  Pupils


reactive to light.  No icterus.  Oropharynx moist mucosa, no lesions.


Neck:  Supple without adenopathy.


Lungs:  Decreased breath sounds.


Heart: 5/6 blowing systolic murmur at the upper right sternal border.


Abdomen: Bowel sounds present, soft, obese, nontender.


Extremities: Diffuse 2+ edema of the lower extremities. Left tibia distally has


lacy purpuric discoloration of the skin and there is punctate purpuric lesion


at the plantar aspect of the right great toe.  Toes 3 and 5 are cyanotic. 


The leg is extremely tender on palpation.  It is warm.  No calf tenderness.  


No nail bed hemorrhages.  The skin otherwise has no diffuse rash.


Neuro: No gross focal findings.


Psychiatric: calm and cooperative.





 


IMPRESSION


1. Sepsis. Strep Viridans. 


2. Bacteremia.


3. History of prosthetic aortic valve and so likely recurrent prosthetic valve


endocarditis.


Left renal cortical and pulmonary and LE septic emboli. 


4. Leukocytosis secondary to sepsis from showering of emboli


from endocarditis.


6. Acute renal failure. Kidney function improved. 


 


RECOMMENDATIONS


1. Continue Ampicillin intravenous.


2. Add Gentamycin. now that the kidney function is improved but follow the 

renal function. 


3. Stop rifampin.


4. Stop Vancomycin.


5. Stop Ceftriaxone. 


I do not think we need to get FLORENCIO since it will not alter treatment. 


Monitor clinical status.











Robinson Carr MD Feb 26, 2018 17:49

## 2018-02-27 VITALS — OXYGEN SATURATION: 99 %

## 2018-02-27 VITALS
HEART RATE: 107 BPM | OXYGEN SATURATION: 97 % | SYSTOLIC BLOOD PRESSURE: 129 MMHG | RESPIRATION RATE: 32 BRPM | DIASTOLIC BLOOD PRESSURE: 75 MMHG | TEMPERATURE: 98.6 F

## 2018-02-27 VITALS — HEART RATE: 109 BPM

## 2018-02-27 VITALS — HEART RATE: 105 BPM

## 2018-02-27 VITALS
HEART RATE: 100 BPM | RESPIRATION RATE: 30 BRPM | SYSTOLIC BLOOD PRESSURE: 113 MMHG | OXYGEN SATURATION: 93 % | DIASTOLIC BLOOD PRESSURE: 63 MMHG | TEMPERATURE: 99 F

## 2018-02-27 VITALS
SYSTOLIC BLOOD PRESSURE: 114 MMHG | TEMPERATURE: 99.5 F | RESPIRATION RATE: 29 BRPM | HEART RATE: 93 BPM | DIASTOLIC BLOOD PRESSURE: 65 MMHG | OXYGEN SATURATION: 88 %

## 2018-02-27 VITALS
SYSTOLIC BLOOD PRESSURE: 122 MMHG | TEMPERATURE: 99 F | HEART RATE: 96 BPM | DIASTOLIC BLOOD PRESSURE: 64 MMHG | RESPIRATION RATE: 27 BRPM | OXYGEN SATURATION: 96 %

## 2018-02-27 VITALS
HEART RATE: 109 BPM | TEMPERATURE: 99.1 F | OXYGEN SATURATION: 87 % | RESPIRATION RATE: 33 BRPM | DIASTOLIC BLOOD PRESSURE: 83 MMHG | SYSTOLIC BLOOD PRESSURE: 118 MMHG

## 2018-02-27 VITALS
OXYGEN SATURATION: 98 % | DIASTOLIC BLOOD PRESSURE: 80 MMHG | HEART RATE: 103 BPM | RESPIRATION RATE: 31 BRPM | TEMPERATURE: 99.5 F | SYSTOLIC BLOOD PRESSURE: 106 MMHG

## 2018-02-27 VITALS — HEART RATE: 102 BPM

## 2018-02-27 VITALS — HEART RATE: 97 BPM

## 2018-02-27 VITALS — HEART RATE: 104 BPM

## 2018-02-27 LAB
ALBUMIN SERPL-MCNC: 2.2 GM/DL (ref 3.4–5)
ALP SERPL-CCNC: 91 U/L (ref 45–117)
ALT SERPL-CCNC: 11 U/L (ref 10–53)
AST SERPL-CCNC: 27 U/L (ref 15–37)
BASOPHILS # BLD AUTO: 0 TH/MM3 (ref 0–0.2)
BASOPHILS NFR BLD: 0.3 % (ref 0–2)
BILIRUB SERPL-MCNC: 0.6 MG/DL (ref 0.2–1)
BUN SERPL-MCNC: 7 MG/DL (ref 7–18)
CALCIUM SERPL-MCNC: 7.8 MG/DL (ref 8.5–10.1)
CHLORIDE SERPL-SCNC: 101 MEQ/L (ref 98–107)
CREAT SERPL-MCNC: 0.62 MG/DL (ref 0.5–1)
EOSINOPHIL # BLD: 0.2 TH/MM3 (ref 0–0.4)
EOSINOPHIL NFR BLD: 1.2 % (ref 0–4)
ERYTHROCYTE [DISTWIDTH] IN BLOOD BY AUTOMATED COUNT: 17.1 % (ref 11.6–17.2)
GFR SERPLBLD BASED ON 1.73 SQ M-ARVRAT: 110 ML/MIN (ref 89–?)
GLUCOSE SERPL-MCNC: 97 MG/DL (ref 74–106)
HCO3 BLD-SCNC: 27.8 MEQ/L (ref 21–32)
HCT VFR BLD CALC: 22.7 % (ref 35–46)
HGB BLD-MCNC: 7.4 GM/DL (ref 11.6–15.3)
LYMPHOCYTES # BLD AUTO: 1.3 TH/MM3 (ref 1–4.8)
LYMPHOCYTES NFR BLD AUTO: 9.9 % (ref 9–44)
MCH RBC QN AUTO: 22.6 PG (ref 27–34)
MCHC RBC AUTO-ENTMCNC: 32.8 % (ref 32–36)
MCV RBC AUTO: 68.9 FL (ref 80–100)
MONOCYTE #: 1 TH/MM3 (ref 0–0.9)
MONOCYTES NFR BLD: 7.2 % (ref 0–8)
NEUTROPHILS # BLD AUTO: 10.8 TH/MM3 (ref 1.8–7.7)
NEUTROPHILS NFR BLD AUTO: 81.4 % (ref 16–70)
PLATELET # BLD: 170 TH/MM3 (ref 150–450)
PMV BLD AUTO: 9.4 FL (ref 7–11)
PROT SERPL-MCNC: 6.3 GM/DL (ref 6.4–8.2)
RBC # BLD AUTO: 3.29 MIL/MM3 (ref 4–5.3)
SODIUM SERPL-SCNC: 135 MEQ/L (ref 136–145)
WBC # BLD AUTO: 13.3 TH/MM3 (ref 4–11)

## 2018-02-27 RX ADMIN — HYDROMORPHONE HYDROCHLORIDE PRN MG: 2 INJECTION INTRAMUSCULAR; INTRAVENOUS; SUBCUTANEOUS at 12:09

## 2018-02-27 RX ADMIN — CLINDAMYCIN PHOSPHATE SCH MLS/HR: 150 INJECTION, SOLUTION INTRAMUSCULAR; INTRAVENOUS at 20:28

## 2018-02-27 RX ADMIN — HYDROMORPHONE HYDROCHLORIDE PRN MG: 2 INJECTION INTRAMUSCULAR; INTRAVENOUS; SUBCUTANEOUS at 20:30

## 2018-02-27 RX ADMIN — Medication SCH ML: at 07:35

## 2018-02-27 RX ADMIN — STANDARDIZED SENNA CONCENTRATE AND DOCUSATE SODIUM SCH TAB: 8.6; 5 TABLET, FILM COATED ORAL at 07:40

## 2018-02-27 RX ADMIN — CLINDAMYCIN PHOSPHATE SCH MLS/HR: 150 INJECTION, SOLUTION INTRAMUSCULAR; INTRAVENOUS at 05:27

## 2018-02-27 RX ADMIN — FAMOTIDINE SCH MG: 20 TABLET, FILM COATED ORAL at 20:29

## 2018-02-27 RX ADMIN — HYDROMORPHONE HYDROCHLORIDE PRN MG: 2 INJECTION INTRAMUSCULAR; INTRAVENOUS; SUBCUTANEOUS at 23:47

## 2018-02-27 RX ADMIN — HYDROCODONE BITARTRATE AND ACETAMINOPHEN PRN TAB: 10; 325 TABLET ORAL at 16:40

## 2018-02-27 RX ADMIN — STANDARDIZED SENNA CONCENTRATE AND DOCUSATE SODIUM SCH TAB: 8.6; 5 TABLET, FILM COATED ORAL at 20:29

## 2018-02-27 RX ADMIN — HEPARIN SODIUM PRN MLS/HR: 10000 INJECTION, SOLUTION INTRAVENOUS at 07:34

## 2018-02-27 RX ADMIN — AMPICILLIN SODIUM SCH MLS/HR: 2 INJECTION, POWDER, FOR SOLUTION INTRAMUSCULAR; INTRAVENOUS at 01:38

## 2018-02-27 RX ADMIN — CLINDAMYCIN PHOSPHATE SCH MLS/HR: 150 INJECTION, SOLUTION INTRAMUSCULAR; INTRAVENOUS at 13:36

## 2018-02-27 RX ADMIN — HYDROMORPHONE HYDROCHLORIDE PRN MG: 2 INJECTION INTRAMUSCULAR; INTRAVENOUS; SUBCUTANEOUS at 07:45

## 2018-02-27 RX ADMIN — AMPICILLIN SODIUM SCH MLS/HR: 2 INJECTION, POWDER, FOR SOLUTION INTRAMUSCULAR; INTRAVENOUS at 13:36

## 2018-02-27 RX ADMIN — HYDROMORPHONE HYDROCHLORIDE PRN MG: 2 INJECTION INTRAMUSCULAR; INTRAVENOUS; SUBCUTANEOUS at 03:46

## 2018-02-27 RX ADMIN — HYDROMORPHONE HYDROCHLORIDE PRN MG: 2 INJECTION INTRAMUSCULAR; INTRAVENOUS; SUBCUTANEOUS at 16:44

## 2018-02-27 RX ADMIN — LOPERAMIDE HYDROCHLORIDE PRN MG: 2 CAPSULE ORAL at 06:59

## 2018-02-27 RX ADMIN — AMPICILLIN SODIUM SCH MLS/HR: 2 INJECTION, POWDER, FOR SOLUTION INTRAMUSCULAR; INTRAVENOUS at 07:32

## 2018-02-27 RX ADMIN — FAMOTIDINE SCH MG: 10 INJECTION, SOLUTION INTRAVENOUS at 07:35

## 2018-02-27 RX ADMIN — CHLORHEXIDINE GLUCONATE SCH PACK: 500 CLOTH TOPICAL at 04:00

## 2018-02-27 RX ADMIN — Medication SCH ML: at 20:29

## 2018-02-27 RX ADMIN — AMPICILLIN SODIUM SCH MLS/HR: 2 INJECTION, POWDER, FOR SOLUTION INTRAMUSCULAR; INTRAVENOUS at 20:29

## 2018-02-27 NOTE — HHI.CCPN
Subjective


Remarks/Hospital Course


37-year-old female that presents to the ED for evaluation of shortness of 

breath and swelling.  Patient has a significant history of endocarditis, IV 

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 

continues use of IV drug use that presents to the ED for evaluation of acute 

onset of shortness of breath with swelling.  Patient initially did not mention 

to anybody that she was doing drugs but she did tell me that she injected 

herself about 4 days ago.  She uses Dilaudid.  She states that she's been 

having fevers.  Patient is somewhat somnolent and hard to assess she doesn't 

really provide much information.  Family members at the bedside and he provides 

more information about the heart.  Per patient she'll he takes 2 medications.  

Patient asked if she is taking any antibiotics and she said yes but when I ask 

her what kind is she is taking currently she states that she has not been on 

antibiotics for some time.  Patient apparently does follow with a local 

cardiologist but she cannot tell me the name of it.  She states that she's been 

having swelling to her legs for the past month.  She denies any recent travel 

or injury.  No other medical issues at this time.  No allergies to medication.





2/24: remains critical on Dopamine 10 mcg/ min. Appears ill. Limited bedside 

echo did not show any large vegetation, but exam was limited, tricuspid and 

aortic valve not well visualized. Also states that had right breast abscess 2 

months ago, drained by herself. Now may have abscess vs scar tissue. Injects 

upper arm and bilateral breasts. US of right breast ordered


2/25: Remains on 3 mcg/min of Levophed.  Complaints of severe left lower 

extremity pain.  Erythematous purpuric purple discoloration of the left lower 

anterior segment left dorsum of the foot with cyanotic nailbeds. Weak DP pulses+

. 2D Echo failed to reveal definite vegetation. Will consider FLORENCIO


2/26: Continues to be on Levophed 3 mcg/min, remains critical.  We are left 

lower extremity pain but slightly better.  Currently on heparin not therapeutic 

yet.  CTA with runoff showed Left renal cortical infarction. 5-6 cm length 

total occlusion of the left superficial femoral artery in the proximal thigh 

but good distal opacification.  Dr. Souza recommended IV heparin no surgical 

intervention at this time.  Patient is still at high risk for further embolic 

episodes.  Blood cultures growing strep viridans.  Also patient complains about 

epigastric and left-sided abdominal pain and left-sided chest pain which is 

more pleuritic.  Pain is severe on deep inspiration





Subjective





2/27: Resting in bed in no acute distress.  Dopplerable lower extremity pulses 

noted.  Remains on heparin drip.  Vascular surgery continues to follow.





Objective





Vital Signs








  Date Time  Temp Pulse Resp B/P (MAP) Pulse Ox O2 Delivery O2 Flow Rate FiO2


 


2/27/18 10:00  109      


 


2/27/18 08:00 99.0  27 122/64 (83) 96   


 


2/25/18 19:50        21


 


2/24/18 20:22      Nasal Cannula 2.00 














Intake and Output   


 


 2/27/18 2/27/18 2/28/18





 08:00 16:00 00:00


 


Intake Total 750 ml  


 


Output Total 2550 ml  


 


Balance -1800 ml  








Result Diagram:  


2/26/18 0350                                                                   

             2/27/18 0746





Other Results





Microbiology








 Date/Time


Source Procedure


Growth Status


 


 


 2/27/18 04:25


Blood Peripheral Aerobic Blood Culture


Pending Received


 


 2/27/18 04:25


Blood Peripheral Anaerobic Blood Culture


Pending Received


 


 2/25/18 21:00


Stool Stool Stool Occult Blood (GILA) - Final


HEMOCCULT NEGATIVE Complete


 


 2/23/18 22:30


Urine Suprapubic Urine Urine Culture - Final


NO GROWTH IN 48 HOURS. Complete








Imaging





Last Impressions








Chest X-Ray 2/26/18 0600 Signed





Impressions: 





 Service Date/Time:  Monday, February 26, 2018 03:30 - CONCLUSION:  1. Low lung 





 volumes with bibasilar mild airspace disease, likely atelectasis.     Rj Whitten MD 


 


Chest CT 2/26/18 0000 Signed





Impressions: 





 Service Date/Time:  Monday, February 26, 2018 12:41 - CONCLUSION:  1. There 

are 





 at least 5 pulmonary nodules present bilaterally with the largest measuring 19 





 mm. None demonstrate cavitation but it is possible that these represent septic 





 emboli. Suggest followup to assess for change. 2. Splenomegaly with subtle 





 wedge-shaped areas of low-density in the mid spleen which could represent 





 infarcts. 3. Mild cardiomegaly in this patient post aortic valve replacement. 





 Low density of the cardiac blood pool suggests anemia.      Ron Melgar MD 


 


Aorta w/Runoff CTA 2/25/18 0000 Signed





Impressions: 





 Service Date/Time:  Sunday, February 25, 2018 12:47 - CONCLUSION:  Left renal 





 cortical infarction. 5-6 cm length total occlusion of the left superficial 





 femoral artery in the proximal thigh. Nodular parenchymal opacities in the 

lung 





 bases bilaterally See above discussion.     Ron Cruz MD 


 


Renal Ultrasound 2/24/18 0000 Signed





Impressions: 





 Service Date/Time:  Saturday, February 24, 2018 10:53 - CONCLUSION:  1. No 





 evidence of hydronephrosis. 2. Thickening of the urinary bladder wall a 1.1 

cm. 





 3. Prominent splenomegaly.     Elgin Walton MD 


 


Breast Ultrasound 2/24/18 0000 Signed





Impressions: 





 Service Date/Time:  Saturday, February 24, 2018 10:48 - CONCLUSION:  Negative 





 targeted right breast ultrasound examination.     Ron Brown MD 


 


Lower Extremity Ultrasound 2/23/18 0000 Signed





Impressions: 





 Service Date/Time:  Friday, February 23, 2018 21:22 - CONCLUSION:  1. No 





 sonographic evidence for lower extremity DVT. 2. Incidental note of bilateral 





 inguinal adenopathy with the largest on the right measuring 3.3 cm and the 





 largest on the left measuring 2.6 cm. This finding is nonspecific but is 





 typically reactive in etiology.     Rj Whitten MD 








Objective Remarks


GENERAL: 37-year-old  female resting in bed in no acute distress


SKIN: Warm and dry.  Patient does appear to have puncture wounds from where she 

states been injecting recently in her arms, bilateral upper breast.


HEAD: Atraumatic. Normocephalic. 


EYES: Pupils equal and round. No scleral icterus. No injection or drainage. 


ENT: No nasal bleeding or discharge.  Mucous membranes pink and moist.  


NECK: Trachea midline. No JVD. 


CHEST: Bilateral upper breast has puncture wounds from injection.  Pain to 

point palpation left upper thorax


CARDIOVASCULAR: 2/6 pansystolic murmurat the left lower sternal border.  


RESPIRATORY: Few fine crackles present bilaterally.  No wheezing


GASTROINTESTINAL: Abdomen soft, tender to deep palpation.  No guarding 

rigidity.  Positive hepatosplenomegaly


MUSCULOSKELETAL: 2+ pitting edema to the lower extremities.  Erythematous 

purpuric rash of the left lower extremity anterior shin and foot, punctate 

lesion plantar right great toe. Left leg extremely tender below the left knee.  

Dopplerable DP pulses bilaterally


NEUROLOGICAL: Awake and alert. Motor grossly within normal limits. Five out of 

5 muscle strength in the arms and legs.  Normal speech.


Urinary Catheter:  No


Assessment to:  Continue


Vascular Central Line Catheter:  Yes


Assessment to:  Continue


Date of Insertion:  Feb 24, 2018


Line:  Central Venous Catheter


Side:  Right


Location:  Internal, Jugular





A/P


Assessment and Plan


Neuro/Psych:





IVDU -hydromorphone


History of THC use





- Continue pain control hydrocodone/acetaminophen 10/325 1 tablet every 6 hours 

as needed pain 1 through 5 with hydromorphone 2 mg IV every 4 hours as needed 

pain 6-10 (patient has severpain from ischemic leg)


- Acetaminophen 650 mg p.o. every 6 hours as needed fever





CVS/ID





Septic  shock


Infective endocarditis involving aortic prosthetic valve


Multiple septic emboli including pulmonary, splenic and vascular


History of fungal and bacterial prosthetic valve endocarditis


Strep viridans bacteremia


5-6 cm length total occlusion of the left superficial femoral artery in the 

proximal thigh





- Recurrent aortic prosthetic valve endocarditis -initially placed 2011 with 

redo 2016. - Now with strep viridans in blood cultures 2/23


- Broad-spectrum antibiotic with ampicillin and gentamicin.


   Previously treated with vancomycin and ceftriaxone


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 

April 2017 for tricuspid valve PVE  - Candida parapsilosis , Streptococci


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


- Vascular surgery Dr. Sears following


- Anticoagulation with Heparin gtt


- Infectious disease Dr. Carr


Norepinephrine to maintain mean arterial pressures greater than equal to 65


- A FLORENCIO at this point will not 


- Extensive counselling given about IP Rehab





Microbiology





2/27 -blood cultures 2 -pending


2/23 -urine culture -no growth


2/23 -blood cultures 2 -strep viridian





Resp:





Acute respiratory insufficiency


5 pulmonary nodules likely septic emboli





-Nasal cannula to keep oxygen saturation above 92%


-Incentives spirometry while awake


-As needed albuterol aerosols every 2 hours.  Dyspnea


- CT thorax revealed 5 bilateral pulmonary lesions/nodules largest which is 19 

mm.  No cavitation noted but likely septic emboli.





GI:





Hepatitis C


Splenic infarction


Hypoalbuminemia





- Heart healthy diet


-Famotidine for GI prophylaxis


-Docusate sodium/senna 1 tablet twice daily for bowel regimen








Renal/:





Acute on chronic kidney disease


Left renal cortical infarction





- Acute renal failure secondary to ATN and dehydration


- CT imaging revealed left renal cortical infarction


- Renal ultrasound-thickening of urinary bladder


-Recheck BMP today





Endo:





Sliding scale insulin if indicated to maintain euglycemia





Heme:





Anemia


Leukocytosis





- Monitor CBC CMP and coags


-Does not meet transfusion threshold at this time





FEN:





Hyponatremia





Replace electrolytes as clinically indicated





MSK:





History of right breast abscess





- Ultrasound of the left lower extremity negative for DVT


- Right breast ultrasound negative for abscess





DVT GI prophylaxis





- Heparin gtt


- famotidine





Level 2 follow-up











Bryson Bustos MD Feb 27, 2018 11:37

## 2018-02-27 NOTE — PD.VS.PN
Subjective


Subjective/Hospital Course


37/F w/ focal SFA occlusion


Pt c/o L foot pain (stable) 


LE warm w/ motor intact


Biphasic L DP/PT heard via Doppler





Objective


Vitals/I&O











  Date Time  Temp Pulse Resp B/P (MAP) Pulse Ox O2 Delivery O2 Flow Rate FiO2


 


2/27/18 08:00  96      


 


2/27/18 08:00 99.0 96 27 122/64 (83) 96   


 


2/27/18 06:00  109      


 


2/27/18 05:17   19     


 


2/27/18 04:00 99.1 109 33 118/83 (95) 87   


 


2/27/18 04:00  109      


 


2/27/18 02:00  104      


 


2/27/18 00:00  93      


 


2/27/18 00:00 99.5 93 29 114/65 (81) 88   


 


2/26/18 22:00  97      


 


2/26/18 21:00   19     


 


2/26/18 20:00 98.9 97 23 89/54 (66) 91   


 


2/26/18 20:00  97      


 


2/26/18 18:00  109      


 


2/26/18 16:00 101.5 99 26 111/75 (87) 92   


 


2/26/18 16:00  99      


 


2/26/18 14:00  102      


 


2/26/18 12:00 100.1 94 30 102/65 (77) 96   


 


2/26/18 12:00  94      














 2/27/18 2/27/18 2/27/18





 07:00 15:00 23:00


 


Intake Total 650 ml  


 


Output Total 2550 ml  


 


Balance -1900 ml  








Physical Exam


GENERAL: A&Ox3


SKIN: 


LE Warm and dry w/ motor intact


L LE swelling present 


L LE (anterior aspect) with mottling 


+ Biphasic L DP/PT heard via Doppler

















Laboratory





Laboratory Tests








Test


  2/26/18


10:33 2/26/18


14:05 2/26/18


15:35 2/26/18


21:15


 


Activated Partial


Thromboplast Time 29.8 


  


  29.2 


  29.7 


 


 


Vancomycin Level Trough  12.9   


 


Test


  2/27/18


07:46 


  


  


 


 


Activated Partial


Thromboplast Time 29.7 


  


  


  


 


 


Blood Urea Nitrogen 7    


 


Creatinine 0.62    


 


Random Glucose 97    


 


Total Protein 6.3    


 


Albumin 2.2    


 


Calcium Level 7.8    


 


Alkaline Phosphatase 91    


 


Aspartate Amino Transf


(AST/SGOT) 27 


  


  


  


 


 


Alanine Aminotransferase


(ALT/SGPT) 11 


  


  


  


 


 


Total Bilirubin 0.6    


 


Sodium Level 135    


 


Potassium Level 3.8    


 


Chloride Level 101    


 


Carbon Dioxide Level 27.8    


 


Anion Gap 6    


 


Estimat Glomerular Filtration


Rate 110 


  


  


  


 














 Date/Time


Source Procedure


Growth Status


 


 


 2/27/18 04:25


Blood Peripheral Aerobic Blood Culture


Pending Received


 


 2/27/18 04:25


Blood Peripheral Anaerobic Blood Culture


Pending Received


 


 2/25/18 21:00


Stool Stool Stool Occult Blood (GILA) - Final


HEMOCCULT NEGATIVE Complete


 


 2/23/18 22:30


Urine Suprapubic Urine Urine Culture - Final


NO GROWTH IN 48 HOURS. Complete








Imaging





Last 48 hours Impressions








Chest X-Ray 2/26/18 0600 Signed





Impressions: 





 Service Date/Time:  Monday, February 26, 2018 03:30 - CONCLUSION:  1. Low lung 





 volumes with bibasilar mild airspace disease, likely atelectasis.     Rj Whitten MD 


 


Chest CT 2/26/18 0000 Signed





Impressions: 





 Service Date/Time:  Monday, February 26, 2018 12:41 - CONCLUSION:  1. There 

are 





 at least 5 pulmonary nodules present bilaterally with the largest measuring 19 





 mm. None demonstrate cavitation but it is possible that these represent septic 





 emboli. Suggest followup to assess for change. 2. Splenomegaly with subtle 





 wedge-shaped areas of low-density in the mid spleen which could represent 





 infarcts. 3. Mild cardiomegaly in this patient post aortic valve replacement. 





 Low density of the cardiac blood pool suggests anemia.      Ron Melgar MD 











Assessment and Plan


Plan


Likely L LE arterial emboli secondary to bacterial endocarditis; she is s/p 

valve replacement x 2


CTA- Pt w/ focal SFA occlusion but good distal opacification.


LE warm w/ motor intact and strong distal pulses present 





Plan


Continue anticoagulation- IV heparin gtt - aggressively to therapeutic


Continue Pain control


Continue neurovascular checks


OOB and WBAT





Sommer Smith NP


[facility]/"[employer]


[phone]











Sommer Smith Feb 27, 2018 10:41

## 2018-02-28 VITALS
HEART RATE: 99 BPM | SYSTOLIC BLOOD PRESSURE: 126 MMHG | TEMPERATURE: 99.8 F | OXYGEN SATURATION: 98 % | RESPIRATION RATE: 28 BRPM | DIASTOLIC BLOOD PRESSURE: 74 MMHG

## 2018-02-28 VITALS — HEART RATE: 96 BPM

## 2018-02-28 VITALS
SYSTOLIC BLOOD PRESSURE: 94 MMHG | DIASTOLIC BLOOD PRESSURE: 66 MMHG | OXYGEN SATURATION: 96 % | RESPIRATION RATE: 29 BRPM | TEMPERATURE: 98.3 F | HEART RATE: 100 BPM

## 2018-02-28 VITALS
RESPIRATION RATE: 34 BRPM | SYSTOLIC BLOOD PRESSURE: 104 MMHG | DIASTOLIC BLOOD PRESSURE: 74 MMHG | TEMPERATURE: 99 F | HEART RATE: 104 BPM | OXYGEN SATURATION: 96 %

## 2018-02-28 VITALS
HEART RATE: 97 BPM | SYSTOLIC BLOOD PRESSURE: 111 MMHG | DIASTOLIC BLOOD PRESSURE: 85 MMHG | RESPIRATION RATE: 26 BRPM | OXYGEN SATURATION: 96 % | TEMPERATURE: 98.6 F

## 2018-02-28 VITALS — HEART RATE: 100 BPM

## 2018-02-28 VITALS
OXYGEN SATURATION: 95 % | SYSTOLIC BLOOD PRESSURE: 112 MMHG | RESPIRATION RATE: 32 BRPM | HEART RATE: 102 BPM | DIASTOLIC BLOOD PRESSURE: 69 MMHG | TEMPERATURE: 98.9 F

## 2018-02-28 VITALS — HEART RATE: 109 BPM

## 2018-02-28 VITALS — OXYGEN SATURATION: 95 %

## 2018-02-28 VITALS — HEART RATE: 102 BPM

## 2018-02-28 VITALS
HEART RATE: 95 BPM | TEMPERATURE: 98.8 F | OXYGEN SATURATION: 100 % | RESPIRATION RATE: 23 BRPM | DIASTOLIC BLOOD PRESSURE: 71 MMHG | SYSTOLIC BLOOD PRESSURE: 123 MMHG

## 2018-02-28 VITALS — HEART RATE: 101 BPM

## 2018-02-28 VITALS — OXYGEN SATURATION: 99 %

## 2018-02-28 LAB
ALBUMIN SERPL-MCNC: 2.2 GM/DL (ref 3.4–5)
ALP SERPL-CCNC: 92 U/L (ref 45–117)
ALT SERPL-CCNC: 12 U/L (ref 10–53)
AST SERPL-CCNC: 27 U/L (ref 15–37)
BASOPHILS # BLD AUTO: 0 TH/MM3 (ref 0–0.2)
BASOPHILS NFR BLD: 0.3 % (ref 0–2)
BILIRUB SERPL-MCNC: 0.5 MG/DL (ref 0.2–1)
BUN SERPL-MCNC: 7 MG/DL (ref 7–18)
CALCIUM SERPL-MCNC: 8.1 MG/DL (ref 8.5–10.1)
CHLORIDE SERPL-SCNC: 100 MEQ/L (ref 98–107)
CREAT SERPL-MCNC: 0.59 MG/DL (ref 0.5–1)
EOSINOPHIL # BLD: 0.2 TH/MM3 (ref 0–0.4)
EOSINOPHIL NFR BLD: 1.6 % (ref 0–4)
ERYTHROCYTE [DISTWIDTH] IN BLOOD BY AUTOMATED COUNT: 16.9 % (ref 11.6–17.2)
GFR SERPLBLD BASED ON 1.73 SQ M-ARVRAT: 116 ML/MIN (ref 89–?)
GLUCOSE SERPL-MCNC: 100 MG/DL (ref 74–106)
HCO3 BLD-SCNC: 29.1 MEQ/L (ref 21–32)
HCT VFR BLD CALC: 21.6 % (ref 35–46)
HGB BLD-MCNC: 7.1 GM/DL (ref 11.6–15.3)
LYMPHOCYTES # BLD AUTO: 1.4 TH/MM3 (ref 1–4.8)
LYMPHOCYTES NFR BLD AUTO: 10.8 % (ref 9–44)
MAGNESIUM SERPL-MCNC: 1.5 MG/DL (ref 1.5–2.5)
MCH RBC QN AUTO: 22.8 PG (ref 27–34)
MCHC RBC AUTO-ENTMCNC: 32.9 % (ref 32–36)
MCV RBC AUTO: 69.2 FL (ref 80–100)
MONOCYTE #: 0.9 TH/MM3 (ref 0–0.9)
MONOCYTES NFR BLD: 6.9 % (ref 0–8)
NEUTROPHILS # BLD AUTO: 10.1 TH/MM3 (ref 1.8–7.7)
NEUTROPHILS NFR BLD AUTO: 80.4 % (ref 16–70)
PHOSPHATE SERPL-MCNC: 3.4 MG/DL (ref 2.5–4.9)
PLATELET # BLD: 189 TH/MM3 (ref 150–450)
PMV BLD AUTO: 9 FL (ref 7–11)
PROT SERPL-MCNC: 6.3 GM/DL (ref 6.4–8.2)
RBC # BLD AUTO: 3.12 MIL/MM3 (ref 4–5.3)
SODIUM SERPL-SCNC: 136 MEQ/L (ref 136–145)
WBC # BLD AUTO: 12.5 TH/MM3 (ref 4–11)

## 2018-02-28 RX ADMIN — CLINDAMYCIN PHOSPHATE SCH MLS/HR: 150 INJECTION, SOLUTION INTRAMUSCULAR; INTRAVENOUS at 20:01

## 2018-02-28 RX ADMIN — CLINDAMYCIN PHOSPHATE SCH MLS/HR: 150 INJECTION, SOLUTION INTRAMUSCULAR; INTRAVENOUS at 04:24

## 2018-02-28 RX ADMIN — FAMOTIDINE SCH MG: 20 TABLET, FILM COATED ORAL at 09:28

## 2018-02-28 RX ADMIN — FAMOTIDINE SCH MG: 20 TABLET, FILM COATED ORAL at 20:02

## 2018-02-28 RX ADMIN — AMPICILLIN SODIUM SCH MLS/HR: 2 INJECTION, POWDER, FOR SOLUTION INTRAMUSCULAR; INTRAVENOUS at 20:01

## 2018-02-28 RX ADMIN — HYDROMORPHONE HYDROCHLORIDE PRN MG: 2 INJECTION INTRAMUSCULAR; INTRAVENOUS; SUBCUTANEOUS at 16:02

## 2018-02-28 RX ADMIN — HYDROMORPHONE HYDROCHLORIDE PRN MG: 2 INJECTION INTRAMUSCULAR; INTRAVENOUS; SUBCUTANEOUS at 20:03

## 2018-02-28 RX ADMIN — Medication SCH ML: at 20:02

## 2018-02-28 RX ADMIN — HYDROMORPHONE HYDROCHLORIDE PRN MG: 2 INJECTION INTRAMUSCULAR; INTRAVENOUS; SUBCUTANEOUS at 07:03

## 2018-02-28 RX ADMIN — HYDROMORPHONE HYDROCHLORIDE PRN MG: 2 INJECTION INTRAMUSCULAR; INTRAVENOUS; SUBCUTANEOUS at 03:19

## 2018-02-28 RX ADMIN — Medication SCH ML: at 09:28

## 2018-02-28 RX ADMIN — CHLORHEXIDINE GLUCONATE SCH PACK: 500 CLOTH TOPICAL at 00:34

## 2018-02-28 RX ADMIN — AMPICILLIN SODIUM SCH MLS/HR: 2 INJECTION, POWDER, FOR SOLUTION INTRAMUSCULAR; INTRAVENOUS at 09:27

## 2018-02-28 RX ADMIN — LOPERAMIDE HYDROCHLORIDE PRN MG: 2 CAPSULE ORAL at 14:32

## 2018-02-28 RX ADMIN — MAGNESIUM SULFATE IN DEXTROSE SCH MLS/HR: 10 INJECTION, SOLUTION INTRAVENOUS at 17:56

## 2018-02-28 RX ADMIN — HEPARIN SODIUM PRN MLS/HR: 10000 INJECTION, SOLUTION INTRAVENOUS at 02:16

## 2018-02-28 RX ADMIN — AMPICILLIN SODIUM SCH MLS/HR: 2 INJECTION, POWDER, FOR SOLUTION INTRAMUSCULAR; INTRAVENOUS at 03:20

## 2018-02-28 RX ADMIN — STANDARDIZED SENNA CONCENTRATE AND DOCUSATE SODIUM SCH TAB: 8.6; 5 TABLET, FILM COATED ORAL at 20:02

## 2018-02-28 RX ADMIN — STANDARDIZED SENNA CONCENTRATE AND DOCUSATE SODIUM SCH TAB: 8.6; 5 TABLET, FILM COATED ORAL at 09:00

## 2018-02-28 RX ADMIN — MAGNESIUM SULFATE IN DEXTROSE SCH MLS/HR: 10 INJECTION, SOLUTION INTRAVENOUS at 16:50

## 2018-02-28 RX ADMIN — HYDROCODONE BITARTRATE AND ACETAMINOPHEN PRN TAB: 10; 325 TABLET ORAL at 14:32

## 2018-02-28 RX ADMIN — HEPARIN SODIUM PRN MLS/HR: 10000 INJECTION, SOLUTION INTRAVENOUS at 17:55

## 2018-02-28 RX ADMIN — CLINDAMYCIN PHOSPHATE SCH MLS/HR: 150 INJECTION, SOLUTION INTRAMUSCULAR; INTRAVENOUS at 14:33

## 2018-02-28 RX ADMIN — AMPICILLIN SODIUM SCH MLS/HR: 2 INJECTION, POWDER, FOR SOLUTION INTRAMUSCULAR; INTRAVENOUS at 14:33

## 2018-02-28 RX ADMIN — HYDROMORPHONE HYDROCHLORIDE PRN MG: 2 INJECTION INTRAMUSCULAR; INTRAVENOUS; SUBCUTANEOUS at 11:31

## 2018-02-28 RX ADMIN — MAGNESIUM OXIDE TAB 400 MG (241.3 MG ELEMENTAL MG) SCH MG: 400 (241.3 MG) TAB at 20:02

## 2018-02-28 NOTE — HHI.IDPN
Note


Infectious Disease Note








Patient having coughing spell.


Low grade fever.  Temperature is better.


Still gets shortness of breath with exertion. 


Continues to have pain in the left leg and left foot.  


Awake and alert. 


No chest pain.





Blood culture has strep viridans. 











37-year-old white female who has a history of endocarditis and has


been admitted several times for the same.  The patient developed shortness of


breath, fever and chills, and  presented to the emergency department.  The


patient is noted to be actively using IV drugs.  She has history of significant


CHF from prior valvular heart disease related to endocarditis.  She states that


she started feeling short of breath about a week ago and the shortness of


breath worsened.  After she completed IV antibiotics in October she was


put on doxycycline prophylaxis.  She reports that she has not been taking the


doxycycline.  


 


PAST MEDICAL HISTORY:


Hepatitis C.


IV drug use.


prosthetic aortic valve replacement in 2011 and then redo aortic


valve replacement in June 2016 





MEDICATIONS:


1. Gentamicin.


2. Ampicillin.








Current Medications








 Medications


  (Trade)  Dose


 Ordered  Sig/Nikhil


 Route


 PRN Reason  Start Time


 Stop Time Status Last Admin


Dose Admin


 


 Sodium Chloride


  (NS Flush)  2 ml  UNSCH  PRN


 IV FLUSH


 FLUSH AFTER USING IV ACCESS  2/23/18 22:00


     


 


 


 Sodium Chloride


  (NS Flush)  2 ml  BID


 IV FLUSH


   2/24/18 09:00


    2/28/18 09:28


 


 


 Acetaminophen


  (Tylenol)  650 mg  Q6H  PRN


 PO


 fever  2/23/18 22:00


    2/26/18 17:58


 


 


 Ondansetron HCl


  (Zofran Inj)  4 mg  Q6H  PRN


 IV PUSH


 NAUSEA OR VOMITING  2/23/18 22:00


     


 


 


 Temazepam


  (Restoril)  15 mg  HS  PRN


 PO


 INSOMNIA  2/23/18 22:00


    2/24/18 21:26


 


 


 Miscellaneous


 Information  1  Q361D


 XX


   2/23/18 22:00


     


 


 


 Chlorhexidine


 Gluconate


  (Chlorhexidine


 2% Cloth)  3 pack


 Taper  DAILY@04


 TOP


   2/24/18 04:00


 2/20/19 03:59  2/28/18 00:34


 


 


 Chlorhexidine


 Gluconate


  (Chlorhexidine


 2% Cloth)  3 pack  UNSCH  PRN


 TOP


 HYGIENIC CARE  2/23/18 22:00


     


 


 


 Senna/Docusate


 Sodium


  (Arlen-Colace)  1 tab  BID


 PO


   2/24/18 09:00


     


 


 


 Magnesium


 Hydroxide


  (Milk Of


 Magnesia Liq)  30 ml  Q12H  PRN


 PO


 Mild constipation  2/23/18 22:00


     


 


 


 Sennosides


  (Senokot)  17.2 mg  Q12H  PRN


 PO


 Moderate constipation  2/23/18 22:00


     


 


 


 Bisacodyl


  (Dulcolax Supp)  10 mg  DAILY  PRN


 RECTAL


 SEVERE CONSITIPATION  2/23/18 22:00


     


 


 


 Lactulose


  (Lactulose Liq)  30 ml  DAILY  PRN


 PO


 SEVERE CONSITIPATION  2/23/18 22:00


     


 


 


 Norepinephrine


 Bitartrate 4 mg/


 Sodium Chloride  250 ml @ 


 7.5 mls/hr  TITRATE  PRN


 IV


 Blood pressure management  2/24/18 09:15


    2/25/18 14:44


 


 


 Terbutaline


 Sulfate


  (Brethine Inj)  1 mg  UNSCH  PRN


 SQ


 For Extravasation  2/24/18 09:15


     


 


 


 Ampicillin Sodium


 2000 mg/Sodium


 Chloride  100 ml @ 


 400 mls/hr  Q6H


 IV


   2/24/18 14:00


    2/28/18 14:33


 


 


 Heparin Sodium/


 Dextrose  250 ml @ 


 10 mls/hr  TITRATE  PRN


 IV


 Coagulation Management  2/25/18 11:00


    2/28/18 02:16


 


 


 Hydromorphone HCl


  (Dilaudid Pf Inj)  2 mg  Q4H  PRN


 IV PUSH


 PAIN SCALE 6 TO 10  2/25/18 15:00


    2/28/18 11:31


 


 


 Loperamide HCl


  (Imodium)  2 mg  Q4H  PRN


 PO


 Diarrhea  2/26/18 16:15


    2/28/18 14:32


 


 


 Gentamicin


 Sulfate 100 mg/


 Sodium Chloride  102.5 ml @ 


 100 mls/hr  Q8H


 IV


   2/26/18 21:00


    2/28/18 14:33


 


 


 Albuterol Sulfate


  (Albuterol Neb)  2.5 mg  Q2HR NEB  PRN


 NEB


 dyspnea  2/27/18 11:30


     


 


 


 Acetaminophen/


 Hydrocodone Bitart


  (Norco  Mg)  1 tab  Q6H  PRN


 PO


 pain 1-5  2/27/18 11:30


    2/28/18 14:32


 


 


 Famotidine


  (Pepcid)  20 mg  BID


 PO


   2/27/18 21:00


    2/28/18 09:28


 


 


 Magnesium Sulfate/


 Dextrose  100 ml @ 


 100 mls/hr  Q1H


 IV


   2/28/18 15:00


 2/28/18 16:59   


 


 


 Magnesium Oxide


  (Mag-Ox)  400 mg  Q12HR


 PO


   2/28/18 21:00


 3/2/18 20:59   


 











 


SOCIAL HISTORY:


Positive occasional alcohol.  No tobacco. IV drug use in the form of Dilaudid,


oxycodone.  The patient also uses marijuana.


 








OBJECTIVE:








Vital Signs








  Date Time  Temp Pulse Resp B/P (MAP) Pulse Ox O2 Delivery O2 Flow Rate FiO2


 


2/28/18 08:19     95 Nasal Cannula 2.00 


 


2/28/18 06:00  100      


 


2/28/18 04:00  99      


 


2/28/18 04:00 99.8 99 28 126/74 (91) 98   


 


2/28/18 02:00  109      


 


2/28/18 00:00  102      


 


2/28/18 00:00 98.9 102 32 112/69 (83) 95   


 


2/27/18 22:00  102      


 


2/27/18 20:19     99 Nasal Cannula 2.00 


 


2/27/18 20:00  103      


 


2/27/18 20:00 99.5 103 31 106/80 (89) 98   


 


2/27/18 18:46     99 Nasal Cannula 2.00 


 


2/27/18 18:38   28     


 


2/27/18 18:00  105      


 


2/27/18 16:00 99.0 100 30 113/63 (80) 93   


 


2/27/18 16:00  100      








Laboratory Tests








Test


  2/27/18


12:27 2/28/18


03:38


 


White Blood Count 13.3 TH/MM3  12.5 TH/MM3 


 


Red Blood Count 3.29 MIL/MM3  3.12 MIL/MM3 


 


Hemoglobin 7.4 GM/DL  7.1 GM/DL 


 


Hematocrit 22.7 %  21.6 % 


 


Mean Corpuscular Volume 68.9 FL  69.2 FL 


 


Mean Corpuscular Hemoglobin 22.6 PG  22.8 PG 


 


Mean Corpuscular Hemoglobin


Concent 32.8 % 


  32.9 % 


 


 


Red Cell Distribution Width 17.1 %  16.9 % 


 


Platelet Count 170 TH/MM3  189 TH/MM3 


 


Mean Platelet Volume 9.4 FL  9.0 FL 


 


Neutrophils (%) (Auto) 81.4 %  80.4 % 


 


Lymphocytes (%) (Auto) 9.9 %  10.8 % 


 


Monocytes (%) (Auto) 7.2 %  6.9 % 


 


Eosinophils (%) (Auto) 1.2 %  1.6 % 


 


Basophils (%) (Auto) 0.3 %  0.3 % 


 


Neutrophils # (Auto) 10.8 TH/MM3  10.1 TH/MM3 


 


Lymphocytes # (Auto) 1.3 TH/MM3  1.4 TH/MM3 


 


Monocytes # (Auto) 1.0 TH/MM3  0.9 TH/MM3 


 


Eosinophils # (Auto) 0.2 TH/MM3  0.2 TH/MM3 


 


Basophils # (Auto) 0.0 TH/MM3  0.0 TH/MM3 


 


CBC Comment DIFF FINAL  DIFF FINAL 


 


Differential Comment    








Laboratory Tests








Test


  2/27/18


07:46 2/28/18


03:38


 


Blood Urea Nitrogen 7 MG/DL  7 MG/DL 


 


Creatinine 0.62 MG/DL  0.59 MG/DL 


 


Random Glucose 97 MG/DL  100 MG/DL 


 


Total Protein 6.3 GM/DL  6.3 GM/DL 


 


Albumin 2.2 GM/DL  2.2 GM/DL 


 


Calcium Level 7.8 MG/DL  8.1 MG/DL 


 


Alkaline Phosphatase 91 U/L  92 U/L 


 


Aspartate Amino Transf


(AST/SGOT) 27 U/L 


  27 U/L 


 


 


Alanine Aminotransferase


(ALT/SGPT) 11 U/L 


  12 U/L 


 


 


Total Bilirubin 0.6 MG/DL  0.5 MG/DL 


 


Sodium Level 135 MEQ/L  136 MEQ/L 


 


Potassium Level 3.8 MEQ/L  4.0 MEQ/L 


 


Chloride Level 101 MEQ/L  100 MEQ/L 


 


Carbon Dioxide Level 27.8 MEQ/L  29.1 MEQ/L 


 


Anion Gap 6 MEQ/L  7 MEQ/L 


 


Estimat Glomerular Filtration


Rate 110 ML/MIN 


  116 ML/MIN 


 


 


Phosphorus Level  3.4 MG/DL 


 


Magnesium Level  1.5 MG/DL 








Microbiology








 Date/Time


Source Procedure


Growth Status


 


 


 2/27/18 04:25


Blood Peripheral Aerobic Blood Culture - Preliminary


NO GROWTH IN 1 DAY Resulted


 


 2/27/18 04:25


Blood Peripheral Anaerobic Blood Culture - Preliminary


NO GROWTH IN 1 DAY Resulted





 2/27/18 04:20


Blood Peripheral Aerobic Blood Culture - Preliminary


NO GROWTH IN 1 DAY Resulted


 


 2/27/18 04:20


Blood Peripheral Anaerobic Blood Culture - Preliminary


NO GROWTH IN 1 DAY Resulted


 


 2/25/18 21:00


Stool Stool Stool Occult Blood (GILA) - Final


HEMOCCULT NEGATIVE Complete








Microbiology








 Date/Time


Source Procedure


Growth Status


 


 


 2/23/18 20:15


Blood Peripheral Aerobic Blood Culture - Final


Viridans Streptococcus Grp Complete


 


 2/23/18 20:15 Anaerobic Blood Culture - Final


Viridans Streptococcus Grp Complete





 2/23/18 20:10


Blood Peripheral Aerobic Blood Culture - Final


Viridans Streptococcus Grp Complete


 


 2/23/18 20:10 Anaerobic Blood Culture - Final


Viridans Streptococcus Grp Complete


 


 2/25/18 21:00


Stool Stool Stool Occult Blood (GILA) - Final


HEMOCCULT NEGATIVE Complete


 


 2/23/18 22:30


Urine Suprapubic Urine Urine Culture - Final


NO GROWTH IN 48 HOURS. Complete











IMAGING:

















Chest X-Ray 2/26/18 0600 Signed





Impressions: 





 Service Date/Time:  Monday, February 26, 2018 03:30 - CONCLUSION:  1. Low lung 





 volumes with bibasilar mild airspace disease, likely atelectasis.     Rj Whitten MD 


 


Aorta w/Runoff CTA 2/25/18 0000 Signed





Impressions: 





 Service Date/Time:  Sunday, February 25, 2018 12:47 - CONCLUSION:  Left renal 





 cortical infarction. 5-6 cm length total occlusion of the left superficial 





 femoral artery in the proximal thigh. Nodular parenchymal opacities in the 

lung 





 bases bilaterally See above discussion.     Ron Cruz MD 




















Renal Ultrasound 2/24/18 0000 Signed





Impressions: 





 Service Date/Time:  Saturday, February 24, 2018 10:53 - CONCLUSION:  1. No 





 evidence of hydronephrosis. 2. Thickening of the urinary bladder wall a 1.1 

cm. 





 3. Prominent splenomegaly.     Elgin Walton MD 


 


Chest X-Ray 2/24/18 0000 Signed





Impressions: 





 Service Date/Time:  Saturday, February 24, 2018 09:36 - CONCLUSION:  Right 





 internal jugular central line in place with the tip overlying the SVC. A 





 pneumothorax is not seen.     Ron Brown MD 


 


Breast Ultrasound 2/24/18 0000 Signed





Impressions: 





 Service Date/Time:  Saturday, February 24, 2018 10:48 - CONCLUSION:  Negative 





 targeted right breast ultrasound examination.     Ron Brown MD 


 


Lower Extremity Ultrasound 2/23/18 0000 Signed





Impressions: 





 Service Date/Time:  Friday, February 23, 2018 21:22 - CONCLUSION:  1. No 





 sonographic evidence for lower extremity DVT. 2. Incidental note of bilateral 





 inguinal adenopathy with the largest on the right measuring 3.3 cm and the 





 largest on the left measuring 2.6 cm. This finding is nonspecific but is 





 typically reactive in etiology.     Rj Whitten MD 














PHYSICAL EXAMINATION


GENERAL: No acute distress.  Looks fatigued.


HEENT:  Head atraumatic.  Extraocular movements grossly intact.  Pupils


reactive to light.  No icterus.  Oropharynx moist mucosa, no lesions.


Neck:  Supple without adenopathy.


Lungs: Coarse bilateral rhonchi.


Heart: 5/6 blowing systolic murmur at the upper right sternal border.


Abdomen: Bowel sounds present, soft, obese, nontender.


Extremities: Diffuse 2+ edema of the lower extremities. Left tibia distally has


lacy purpuric discoloration of the skin and there is punctate purpuric lesion


at the plantar aspect of the right great toe.  The lesions are not as dark 

purple 


as before.  Toes 3 and 5 are cyanotic. 


The leg is extremely tender on palpation.  It is warm.  No calf tenderness.  


No nail bed hemorrhages.  The skin otherwise has no diffuse rash.


Neuro: No gross focal findings.


Psychiatric: calm and cooperative.





 


IMPRESSION


1. Sepsis. Strep Viridans. 


2. Bacteremia.  Strep viridans.


3. History of prostatic aortic valve and so likely recurrent prosthetic valve


endocarditis.


4. Left renal cortical and pulmonary and lower extremity septic emboli. 


5. Leukocytosis secondary to sepsis from showering of emboli


from endocarditis.


6. Acute renal failure. Kidney function improved. 


 


RECOMMENDATIONS


1. Continue Ampicillin intravenous.


2. Continue Gentamicin. now that the kidney function is improved but follow the 

renal function. 


3.  Monitor clinical response.


Still very critically ill.


I do not think we need to get FLORENCIO since it will not alter treatment. 


Monitor clinical status.











Robinson Carr MD Feb 28, 2018 14:57

## 2018-02-28 NOTE — HHI.CCPN
Subjective


Remarks/Hospital Course


37-year-old female that presents to the ED for evaluation of shortness of 

breath and swelling.  Patient has a significant history of endocarditis, IV 

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 

continues use of IV drug use that presents to the ED for evaluation of acute 

onset of shortness of breath with swelling.  Patient initially did not mention 

to anybody that she was doing drugs but she did tell me that she injected 

herself about 4 days ago.  She uses Dilaudid.  She states that she's been 

having fevers.  Patient is somewhat somnolent and hard to assess she doesn't 

really provide much information.  Family members at the bedside and he provides 

more information about the heart.  Per patient she'll he takes 2 medications.  

Patient asked if she is taking any antibiotics and she said yes but when I ask 

her what kind is she is taking currently she states that she has not been on 

antibiotics for some time.  Patient apparently does follow with a local 

cardiologist but she cannot tell me the name of it.  She states that she's been 

having swelling to her legs for the past month.  She denies any recent travel 

or injury.  No other medical issues at this time.  No allergies to medication.





2/24: remains critical on Dopamine 10 mcg/ min. Appears ill. Limited bedside 

echo did not show any large vegetation, but exam was limited, tricuspid and 

aortic valve not well visualized. Also states that had right breast abscess 2 

months ago, drained by herself. Now may have abscess vs scar tissue. Injects 

upper arm and bilateral breasts. US of right breast ordered


2/25: Remains on 3 mcg/min of Levophed.  Complaints of severe left lower 

extremity pain.  Erythematous purpuric purple discoloration of the left lower 

anterior segment left dorsum of the foot with cyanotic nailbeds. Weak DP pulses+

. 2D Echo failed to reveal definite vegetation. Will consider FLORENCIO


2/26: Continues to be on Levophed 3 mcg/min, remains critical.  We are left 

lower extremity pain but slightly better.  Currently on heparin not therapeutic 

yet.  CTA with runoff showed Left renal cortical infarction. 5-6 cm length 

total occlusion of the left superficial femoral artery in the proximal thigh 

but good distal opacification.  Dr. Souza recommended IV heparin no surgical 

intervention at this time.  Patient is still at high risk for further embolic 

episodes.  Blood cultures growing strep viridans.  Also patient complains about 

epigastric and left-sided abdominal pain and left-sided chest pain which is 

more pleuritic.  Pain is severe on deep inspiration


2/27: Resting in bed in no acute distress.  Dopplerable lower extremity pulses 

noted.  Remains on heparin drip.  Vascular surgery continues to follow.





Subjective





2/28: Resting in bed in mild respiratory distress.  Continues to cough.  

Nonproductive.  Left lower extremity leg improved.  Reviewed vascular surgeries 

note.  Likely will require long-term medical regulation post hospitalization





Objective





Vital Signs








  Date Time  Temp Pulse Resp B/P (MAP) Pulse Ox O2 Delivery O2 Flow Rate FiO2


 


2/28/18 08:19     95 Nasal Cannula 2.00 


 


2/28/18 06:00  100      


 


2/28/18 04:00 99.8  28 126/74 (91)    


 


2/25/18 19:50        21














Intake and Output   


 


 2/28/18 2/28/18 2/28/18





 07:59 15:59 23:59


 


Intake Total 682.5 ml  


 


Balance 682.5 ml  








Result Diagram:  


2/28/18 0338                                                                   

             2/28/18 0338





Other Results





Microbiology








 Date/Time


Source Procedure


Growth Status


 


 


 2/27/18 04:25


Blood Peripheral Aerobic Blood Culture - Preliminary


NO GROWTH IN 1 DAY Resulted


 


 2/27/18 04:25


Blood Peripheral Anaerobic Blood Culture - Preliminary


NO GROWTH IN 1 DAY Resulted


 


 2/25/18 21:00


Stool Stool Stool Occult Blood (GILA) - Final


HEMOCCULT NEGATIVE Complete


 


 2/23/18 22:30


Urine Suprapubic Urine Urine Culture - Final


NO GROWTH IN 48 HOURS. Complete








Imaging





Last Impressions








Chest X-Ray 2/26/18 0600 Signed





Impressions: 





 Service Date/Time:  Monday, February 26, 2018 03:30 - CONCLUSION:  1. Low lung 





 volumes with bibasilar mild airspace disease, likely atelectasis.     Rj Whitten MD 


 


Chest CT 2/26/18 0000 Signed





Impressions: 





 Service Date/Time:  Monday, February 26, 2018 12:41 - CONCLUSION:  1. There 

are 





 at least 5 pulmonary nodules present bilaterally with the largest measuring 19 





 mm. None demonstrate cavitation but it is possible that these represent septic 





 emboli. Suggest followup to assess for change. 2. Splenomegaly with subtle 





 wedge-shaped areas of low-density in the mid spleen which could represent 





 infarcts. 3. Mild cardiomegaly in this patient post aortic valve replacement. 





 Low density of the cardiac blood pool suggests anemia.      Ron Melgar MD 


 


Aorta w/Runoff CTA 2/25/18 0000 Signed





Impressions: 





 Service Date/Time:  Sunday, February 25, 2018 12:47 - CONCLUSION:  Left renal 





 cortical infarction. 5-6 cm length total occlusion of the left superficial 





 femoral artery in the proximal thigh. Nodular parenchymal opacities in the 

lung 





 bases bilaterally See above discussion.     Ron Cruz MD 


 


Renal Ultrasound 2/24/18 0000 Signed





Impressions: 





 Service Date/Time:  Saturday, February 24, 2018 10:53 - CONCLUSION:  1. No 





 evidence of hydronephrosis. 2. Thickening of the urinary bladder wall a 1.1 

cm. 





 3. Prominent splenomegaly.     Elgin Walton MD 


 


Breast Ultrasound 2/24/18 0000 Signed





Impressions: 





 Service Date/Time:  Saturday, February 24, 2018 10:48 - CONCLUSION:  Negative 





 targeted right breast ultrasound examination.     Ron Brown MD 


 


Lower Extremity Ultrasound 2/23/18 0000 Signed





Impressions: 





 Service Date/Time:  Friday, February 23, 2018 21:22 - CONCLUSION:  1. No 





 sonographic evidence for lower extremity DVT. 2. Incidental note of bilateral 





 inguinal adenopathy with the largest on the right measuring 3.3 cm and the 





 largest on the left measuring 2.6 cm. This finding is nonspecific but is 





 typically reactive in etiology.     Rj Whitten MD 








Objective Remarks


GENERAL: 37-year-old  female resting in bed in no acute distress


SKIN: Warm and dry.  Patient does appear to have puncture wounds from where she 

states been injecting recently in her arms, bilateral upper breast.


HEAD: Atraumatic. Normocephalic. 


EYES: Pupils equal and round. No scleral icterus. No injection or drainage. 


ENT: No nasal bleeding or discharge.  Mucous membranes pink and moist.  


NECK: Trachea midline. No JVD. 


CHEST: Bilateral upper breast has puncture wounds from injection.  Pain to 

point palpation left upper thorax


CARDIOVASCULAR: 2/6 pansystolic murmurat the left lower sternal border.  


RESPIRATORY: Few fine crackles present bilaterally.  No wheezing


GASTROINTESTINAL: Abdomen soft, tender to deep palpation.  No guarding 

rigidity.  Positive hepatosplenomegaly


MUSCULOSKELETAL: 2+ pitting edema to the lower extremities.  Erythematous 

purpuric rash of the left lower extremity anterior shin and foot, punctate 

lesion plantar right great toe. Left leg extremely tender below the left knee.  

Dopplerable DP pulses bilaterally


NEUROLOGICAL: Awake and alert. Motor grossly within normal limits. Five out of 

5 muscle strength in the arms and legs.  Normal speech.


Date of Insertion:  Feb 24, 2018


Line:  Central Venous Catheter


Side:  Right


Location:  Internal, Jugular





A/P


Assessment and Plan


Neuro/Psych:





IVDU -hydromorphone


History of THC use





- Continue pain control hydrocodone/acetaminophen 10/325 1 tablet every 6 hours 

as needed pain 1 through 5 with hydromorphone 2 mg IV every 4 hours as needed 

pain 6-10 (patient has severpain from ischemic leg)


- Acetaminophen 650 mg p.o. every 6 hours as needed fever





CVS/ID





Septic  shock


Infective endocarditis involving aortic prosthetic valve


Multiple septic emboli including pulmonary, splenic and vascular


History of fungal and bacterial prosthetic valve endocarditis


Strep viridans bacteremia


5-6 cm length total occlusion of the left superficial femoral artery in the 

proximal thigh





- Recurrent aortic prosthetic valve endocarditis -initially placed 2011 with 

redo 2016. - Now with strep viridans in blood cultures 2/23


- Broad-spectrum antibiotic with ampicillin and gentamicin.


   Previously treated with vancomycin and ceftriaxone


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 

April 2017 for tricuspid valve PVE  - Candida parapsilosis , Streptococci


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


- Vascular surgery Dr. Sears following


- Anticoagulation with Heparin gtt


- Infectious disease Dr. Carr


Norepinephrine to maintain mean arterial pressures greater than equal to 65


- A FLORENCIO at this point will not 


- Extensive counselling given about IP Rehab





Microbiology





2/27 -blood cultures 2 -pending


2/23 -urine culture -no growth


2/23 -blood cultures 2 -strep viridian





Resp:





Acute respiratory insufficiency


5 pulmonary nodules likely septic emboli





-Nasal cannula to keep oxygen saturation above 92%


-Incentives spirometry while awake


-As needed albuterol aerosols every 2 hours.  Dyspnea


- CT thorax revealed 5 bilateral pulmonary lesions/nodules largest which is 19 

mm.  No cavitation noted but likely septic emboli.





GI:





Hepatitis C


Splenic infarction


Hypoalbuminemia





- Heart healthy diet


-Famotidine for GI prophylaxis


-Docusate sodium/senna 1 tablet twice daily for bowel regimen








Renal/:





Acute on chronic kidney disease


Left renal cortical infarction





- Acute renal failure secondary to ATN and dehydration


- CT imaging revealed left renal cortical infarction


- Renal ultrasound-thickening of urinary bladder


-Recheck BMP today





Endo:





Sliding scale insulin if indicated to maintain euglycemia





Heme:





Anemia


Leukocytosis





- Monitor CBC CMP and coags


-Does not meet transfusion threshold at this time





FEN:





Hypomagnesia





Replace electrolytes as clinically indicated





MSK:





History of right breast abscess


Elevated BMI





- Ultrasound of the left lower extremity negative for DVT


- Right breast ultrasound negative for abscess


-Weight loss encouraged





DVT GI prophylaxis





- Heparin gtt


- famotidine





Level 2 follow-up











Bryson Bustos MD Feb 28, 2018 14:26

## 2018-02-28 NOTE — PD.VS.PN
Subjective


Subjective/Hospital Course


Foot continues to improve


Less sensitive


Motor remains in tact





Objective


Vitals/I&O











  Date Time  Temp Pulse Resp B/P (MAP) Pulse Ox O2 Delivery O2 Flow Rate FiO2


 


2/28/18 08:19     95 Nasal Cannula 2.00 


 


2/28/18 06:00  100      


 


2/28/18 04:00  99      


 


2/28/18 04:00 99.8 99 28 126/74 (91) 98   


 


2/28/18 02:00  109      


 


2/28/18 00:00  102      


 


2/28/18 00:00 98.9 102 32 112/69 (83) 95   


 


2/27/18 22:00  102      


 


2/27/18 20:19     99 Nasal Cannula 2.00 


 


2/27/18 20:00  103      


 


2/27/18 20:00 99.5 103 31 106/80 (89) 98   


 


2/27/18 18:46     99 Nasal Cannula 2.00 


 


2/27/18 18:38   28     


 


2/27/18 18:00  105      


 


2/27/18 16:00 99.0 100 30 113/63 (80) 93   


 


2/27/18 16:00  100      


 


2/27/18 14:00  97      


 


2/27/18 12:00  107      


 


2/27/18 12:00 98.6 107 32 129/75 (93) 97   


 


2/27/18 10:00  109      














 2/28/18 2/28/18 2/28/18





 07:00 15:00 23:00


 


Intake Total 682.5 ml  


 


Balance 682.5 ml  








Physical Exam


L foot motor intact


strong pedal signals


foot warm


Laboratory





Laboratory Tests








Test


  2/27/18


12:27 2/27/18


15:06 2/27/18


20:45 2/28/18


03:38


 


White Blood Count 13.3    12.5 


 


Red Blood Count 3.29    3.12 


 


Hemoglobin 7.4    7.1 


 


Hematocrit 22.7    21.6 


 


Mean Corpuscular Volume 68.9    69.2 


 


Mean Corpuscular Hemoglobin 22.6    22.8 


 


Mean Corpuscular Hemoglobin


Concent 32.8 


  


  


  32.9 


 


 


Red Cell Distribution Width 17.1    16.9 


 


Platelet Count 170    189 


 


Mean Platelet Volume 9.4    9.0 


 


Neutrophils (%) (Auto) 81.4    80.4 


 


Lymphocytes (%) (Auto) 9.9    10.8 


 


Monocytes (%) (Auto) 7.2    6.9 


 


Eosinophils (%) (Auto) 1.2    1.6 


 


Basophils (%) (Auto) 0.3    0.3 


 


Neutrophils # (Auto) 10.8    10.1 


 


Lymphocytes # (Auto) 1.3    1.4 


 


Monocytes # (Auto) 1.0    0.9 


 


Eosinophils # (Auto) 0.2    0.2 


 


Basophils # (Auto) 0.0    0.0 


 


CBC Comment DIFF FINAL    DIFF FINAL 


 


Differential Comment      


 


Activated Partial


Thromboplast Time 


  29.7 


  30.3 


  33.4 


 


 


Blood Urea Nitrogen    7 


 


Creatinine    0.59 


 


Random Glucose    100 


 


Total Protein    6.3 


 


Albumin    2.2 


 


Calcium Level    8.1 


 


Phosphorus Level    3.4 


 


Magnesium Level    1.5 


 


Alkaline Phosphatase    92 


 


Aspartate Amino Transf


(AST/SGOT) 


  


  


  27 


 


 


Alanine Aminotransferase


(ALT/SGPT) 


  


  


  12 


 


 


Total Bilirubin    0.5 


 


Sodium Level    136 


 


Potassium Level    4.0 


 


Chloride Level    100 


 


Carbon Dioxide Level    29.1 


 


Anion Gap    7 


 


Estimat Glomerular Filtration


Rate 


  


  


  116 


 














 Date/Time


Source Procedure


Growth Status


 


 


 2/27/18 04:25


Blood Peripheral Aerobic Blood Culture


Pending Received


 


 2/27/18 04:25


Blood Peripheral Anaerobic Blood Culture


Pending Received


 


 2/25/18 21:00


Stool Stool Stool Occult Blood (GILA) - Final


HEMOCCULT NEGATIVE Complete


 


 2/23/18 22:30


Urine Suprapubic Urine Urine Culture - Final


NO GROWTH IN 48 HOURS. Complete











Assessment and Plan


Plan


continue hep gtt


neurovascular checks


unlikely to need vascular intervention


would anticipate home anticoagulation











Michi Sears MD Feb 28, 2018 09:08

## 2018-03-01 VITALS
RESPIRATION RATE: 29 BRPM | HEART RATE: 97 BPM | TEMPERATURE: 97.9 F | SYSTOLIC BLOOD PRESSURE: 100 MMHG | DIASTOLIC BLOOD PRESSURE: 73 MMHG | OXYGEN SATURATION: 95 %

## 2018-03-01 VITALS
SYSTOLIC BLOOD PRESSURE: 115 MMHG | OXYGEN SATURATION: 99 % | TEMPERATURE: 100.6 F | DIASTOLIC BLOOD PRESSURE: 83 MMHG | HEART RATE: 105 BPM | RESPIRATION RATE: 32 BRPM

## 2018-03-01 VITALS
OXYGEN SATURATION: 98 % | TEMPERATURE: 97.7 F | DIASTOLIC BLOOD PRESSURE: 78 MMHG | RESPIRATION RATE: 25 BRPM | SYSTOLIC BLOOD PRESSURE: 119 MMHG | HEART RATE: 101 BPM

## 2018-03-01 VITALS
DIASTOLIC BLOOD PRESSURE: 65 MMHG | TEMPERATURE: 97.7 F | SYSTOLIC BLOOD PRESSURE: 112 MMHG | RESPIRATION RATE: 25 BRPM | OXYGEN SATURATION: 98 % | HEART RATE: 109 BPM

## 2018-03-01 VITALS
OXYGEN SATURATION: 92 % | DIASTOLIC BLOOD PRESSURE: 65 MMHG | TEMPERATURE: 99.4 F | RESPIRATION RATE: 27 BRPM | SYSTOLIC BLOOD PRESSURE: 100 MMHG | HEART RATE: 107 BPM

## 2018-03-01 VITALS
SYSTOLIC BLOOD PRESSURE: 99 MMHG | DIASTOLIC BLOOD PRESSURE: 66 MMHG | RESPIRATION RATE: 28 BRPM | OXYGEN SATURATION: 92 % | TEMPERATURE: 98.6 F | HEART RATE: 97 BPM

## 2018-03-01 VITALS — HEART RATE: 112 BPM

## 2018-03-01 VITALS — HEART RATE: 100 BPM

## 2018-03-01 VITALS — HEART RATE: 96 BPM

## 2018-03-01 VITALS — OXYGEN SATURATION: 98 %

## 2018-03-01 VITALS — HEART RATE: 105 BPM

## 2018-03-01 VITALS — HEART RATE: 109 BPM

## 2018-03-01 VITALS — OXYGEN SATURATION: 93 %

## 2018-03-01 VITALS — HEART RATE: 110 BPM

## 2018-03-01 LAB
ALBUMIN SERPL-MCNC: 2.3 GM/DL (ref 3.4–5)
ALP SERPL-CCNC: 113 U/L (ref 45–117)
ALT SERPL-CCNC: 19 U/L (ref 10–53)
AST SERPL-CCNC: 40 U/L (ref 15–37)
BASOPHILS # BLD AUTO: 0.1 TH/MM3 (ref 0–0.2)
BASOPHILS NFR BLD: 0.6 % (ref 0–2)
BILIRUB SERPL-MCNC: 0.6 MG/DL (ref 0.2–1)
BUN SERPL-MCNC: 7 MG/DL (ref 7–18)
CALCIUM SERPL-MCNC: 8.2 MG/DL (ref 8.5–10.1)
CHLORIDE SERPL-SCNC: 101 MEQ/L (ref 98–107)
CREAT SERPL-MCNC: 0.59 MG/DL (ref 0.5–1)
EOSINOPHIL # BLD: 0.2 TH/MM3 (ref 0–0.4)
EOSINOPHIL NFR BLD: 1.7 % (ref 0–4)
ERYTHROCYTE [DISTWIDTH] IN BLOOD BY AUTOMATED COUNT: 17.8 % (ref 11.6–17.2)
GFR SERPLBLD BASED ON 1.73 SQ M-ARVRAT: 116 ML/MIN (ref 89–?)
GLUCOSE SERPL-MCNC: 94 MG/DL (ref 74–106)
HCO3 BLD-SCNC: 26.6 MEQ/L (ref 21–32)
HCT VFR BLD CALC: 22.8 % (ref 35–46)
HGB BLD-MCNC: 7.4 GM/DL (ref 11.6–15.3)
INR PPP: 1.2 RATIO
LYMPHOCYTES # BLD AUTO: 1.1 TH/MM3 (ref 1–4.8)
LYMPHOCYTES NFR BLD AUTO: 7.3 % (ref 9–44)
MAGNESIUM SERPL-MCNC: 1.9 MG/DL (ref 1.5–2.5)
MCH RBC QN AUTO: 22.7 PG (ref 27–34)
MCHC RBC AUTO-ENTMCNC: 32.4 % (ref 32–36)
MCV RBC AUTO: 70 FL (ref 80–100)
MONOCYTE #: 0.7 TH/MM3 (ref 0–0.9)
MONOCYTES NFR BLD: 4.8 % (ref 0–8)
NEUTROPHILS # BLD AUTO: 12.3 TH/MM3 (ref 1.8–7.7)
NEUTROPHILS NFR BLD AUTO: 85.6 % (ref 16–70)
PHOSPHATE SERPL-MCNC: 3.7 MG/DL (ref 2.5–4.9)
PLATELET # BLD: 213 TH/MM3 (ref 150–450)
PMV BLD AUTO: 8.7 FL (ref 7–11)
PROT SERPL-MCNC: 6.7 GM/DL (ref 6.4–8.2)
PROTHROMBIN TIME: 12.3 SEC (ref 9.8–11.6)
RBC # BLD AUTO: 3.26 MIL/MM3 (ref 4–5.3)
SODIUM SERPL-SCNC: 134 MEQ/L (ref 136–145)
WBC # BLD AUTO: 14.4 TH/MM3 (ref 4–11)

## 2018-03-01 RX ADMIN — FAMOTIDINE SCH MG: 20 TABLET, FILM COATED ORAL at 20:12

## 2018-03-01 RX ADMIN — HYDROMORPHONE HYDROCHLORIDE PRN MG: 2 INJECTION INTRAMUSCULAR; INTRAVENOUS; SUBCUTANEOUS at 12:30

## 2018-03-01 RX ADMIN — ALBUTEROL SULFATE PRN MG: 2.5 SOLUTION RESPIRATORY (INHALATION) at 23:57

## 2018-03-01 RX ADMIN — ACETAMINOPHEN PRN MG: 325 TABLET ORAL at 23:41

## 2018-03-01 RX ADMIN — AMPICILLIN SODIUM SCH MLS/HR: 2 INJECTION, POWDER, FOR SOLUTION INTRAMUSCULAR; INTRAVENOUS at 13:36

## 2018-03-01 RX ADMIN — Medication SCH ML: at 08:16

## 2018-03-01 RX ADMIN — MAGNESIUM OXIDE TAB 400 MG (241.3 MG ELEMENTAL MG) SCH MG: 400 (241.3 MG) TAB at 08:15

## 2018-03-01 RX ADMIN — STANDARDIZED SENNA CONCENTRATE AND DOCUSATE SODIUM SCH TAB: 8.6; 5 TABLET, FILM COATED ORAL at 20:11

## 2018-03-01 RX ADMIN — HYDROMORPHONE HYDROCHLORIDE PRN MG: 2 INJECTION INTRAMUSCULAR; INTRAVENOUS; SUBCUTANEOUS at 00:27

## 2018-03-01 RX ADMIN — CLINDAMYCIN PHOSPHATE SCH MLS/HR: 150 INJECTION, SOLUTION INTRAMUSCULAR; INTRAVENOUS at 04:13

## 2018-03-01 RX ADMIN — HYDROCODONE BITARTRATE AND ACETAMINOPHEN PRN TAB: 10; 325 TABLET ORAL at 23:04

## 2018-03-01 RX ADMIN — CLINDAMYCIN PHOSPHATE SCH MLS/HR: 150 INJECTION, SOLUTION INTRAMUSCULAR; INTRAVENOUS at 12:30

## 2018-03-01 RX ADMIN — HYDROMORPHONE HYDROCHLORIDE PRN MG: 2 INJECTION INTRAMUSCULAR; INTRAVENOUS; SUBCUTANEOUS at 04:13

## 2018-03-01 RX ADMIN — ARGATROBAN PRN MLS/HR: 100 INJECTION, SOLUTION INTRAVENOUS at 20:14

## 2018-03-01 RX ADMIN — HYDROMORPHONE HYDROCHLORIDE PRN MG: 2 INJECTION INTRAMUSCULAR; INTRAVENOUS; SUBCUTANEOUS at 16:12

## 2018-03-01 RX ADMIN — HYDROMORPHONE HYDROCHLORIDE PRN MG: 2 INJECTION INTRAMUSCULAR; INTRAVENOUS; SUBCUTANEOUS at 23:42

## 2018-03-01 RX ADMIN — CLINDAMYCIN PHOSPHATE SCH MLS/HR: 150 INJECTION, SOLUTION INTRAMUSCULAR; INTRAVENOUS at 20:12

## 2018-03-01 RX ADMIN — TEMAZEPAM PRN MG: 15 CAPSULE ORAL at 23:04

## 2018-03-01 RX ADMIN — HYDROMORPHONE HYDROCHLORIDE PRN MG: 2 INJECTION INTRAMUSCULAR; INTRAVENOUS; SUBCUTANEOUS at 20:11

## 2018-03-01 RX ADMIN — HYDROMORPHONE HYDROCHLORIDE PRN MG: 2 INJECTION INTRAMUSCULAR; INTRAVENOUS; SUBCUTANEOUS at 08:17

## 2018-03-01 RX ADMIN — STANDARDIZED SENNA CONCENTRATE AND DOCUSATE SODIUM SCH TAB: 8.6; 5 TABLET, FILM COATED ORAL at 08:15

## 2018-03-01 RX ADMIN — AMPICILLIN SODIUM SCH MLS/HR: 2 INJECTION, POWDER, FOR SOLUTION INTRAMUSCULAR; INTRAVENOUS at 00:27

## 2018-03-01 RX ADMIN — CHLORHEXIDINE GLUCONATE SCH PACK: 500 CLOTH TOPICAL at 20:59

## 2018-03-01 RX ADMIN — AMPICILLIN SODIUM SCH MLS/HR: 2 INJECTION, POWDER, FOR SOLUTION INTRAMUSCULAR; INTRAVENOUS at 08:16

## 2018-03-01 RX ADMIN — Medication SCH ML: at 20:12

## 2018-03-01 RX ADMIN — AMPICILLIN SODIUM SCH MLS/HR: 2 INJECTION, POWDER, FOR SOLUTION INTRAMUSCULAR; INTRAVENOUS at 20:00

## 2018-03-01 RX ADMIN — HYDROCODONE BITARTRATE AND ACETAMINOPHEN PRN TAB: 10; 325 TABLET ORAL at 18:07

## 2018-03-01 RX ADMIN — CHLORHEXIDINE GLUCONATE SCH PACK: 500 CLOTH TOPICAL at 00:27

## 2018-03-01 RX ADMIN — AMPICILLIN SODIUM SCH MLS/HR: 2 INJECTION, POWDER, FOR SOLUTION INTRAMUSCULAR; INTRAVENOUS at 23:42

## 2018-03-01 RX ADMIN — HEPARIN SODIUM PRN MLS/HR: 10000 INJECTION, SOLUTION INTRAVENOUS at 09:52

## 2018-03-01 RX ADMIN — MAGNESIUM OXIDE TAB 400 MG (241.3 MG ELEMENTAL MG) SCH MG: 400 (241.3 MG) TAB at 20:12

## 2018-03-01 RX ADMIN — HYDROCODONE BITARTRATE AND ACETAMINOPHEN PRN TAB: 10; 325 TABLET ORAL at 11:08

## 2018-03-01 RX ADMIN — HYDROCODONE BITARTRATE AND ACETAMINOPHEN PRN TAB: 10; 325 TABLET ORAL at 03:12

## 2018-03-01 RX ADMIN — FAMOTIDINE SCH MG: 20 TABLET, FILM COATED ORAL at 08:15

## 2018-03-01 NOTE — RADRPT
EXAM DATE/TIME:  03/01/2018 03:05 

 

[facility] COMPARISON:     

CT THORAX W/O CONTRAST, February 26, 2018, 12:41.  CHEST SINGLE AP, February 26, 2018, 3:30.

 

                     

INDICATIONS :     

Shortness of breath, possible pulmonary disease.

                     

 

MEDICAL HISTORY :     

Cardiovascular disease.  Congestive heart failure.  Hepatitis C.      

 

SURGICAL HISTORY :        

AVR

 

ENCOUNTER:     

Subsequent                                        

 

ACUITY:     

1 week      

 

PAIN SCORE:     

0/10

 

LOCATION:     

Bilateral chest 

 

FINDINGS:     

Stable right IJ central line. Mild patchy airspace disease at the lung bases are. Cardiac silhouette 
is enlarged. Remainder of the exam is unchanged.

 

CONCLUSION:     

1. Stable patchy bilateral lower lung zone airspace disease.

2. No significant interval change. 

 

 

 Rj Whitten MD on March 01, 2018 at 4:36           

Board Certified Radiologist.

 This report was verified electronically.

## 2018-03-01 NOTE — PD.VS.PN
Subjective


Subjective/Hospital Course


Pt w/ improved swelling to L LE 


B LE warm w/ motor intact


Faint Palpable L DP noted 


Pt w/ strong L Multiphasic DP/PT





Objective


Vitals/I&O











  Date Time  Temp Pulse Resp B/P (MAP) Pulse Ox O2 Delivery O2 Flow Rate FiO2


 


3/1/18 08:02     93   21


 


3/1/18 06:00  96      


 


3/1/18 04:00  107      


 


3/1/18 04:00 99.4 107 27 100/65 (77) 92   


 


3/1/18 02:00  112      


 


3/1/18 00:00 100.6 105 32 115/83 (94) 99   


 


3/1/18 00:00  105      


 


2/28/18 22:00  100      


 


2/28/18 20:00  100      


 


2/28/18 20:00 98.3 100 29 94/66 (75) 96   


 


2/28/18 19:40     99 Nasal Cannula 2.00 


 


2/28/18 18:00  96      


 


2/28/18 16:00  95      


 


2/28/18 16:00 98.8 95 23 123/71 (88) 100   


 


2/28/18 14:00  102      


 


2/28/18 12:00 99.0 104 34 104/74 (84) 96   


 


2/28/18 12:00  104      


 


2/28/18 10:00  101      














 3/1/18 3/1/18 3/1/18





 07:00 15:00 23:00


 


Intake Total 922.5 ml  


 


Output Total 300 ml  


 


Balance 622.5 ml  








Physical Exam


GENERAL: A&OX3,NAD,GCS15


SKIN: LE Warm and dry w/ motor intact 


HEAD: Normocephalic.


EYES: No scleral icterus. No injection or drainage. 


NECK: Supple, trachea midline. No JVD or lymphadenopathy.


CARDIOVASCULAR: Regular rate and rhythm without murmurs, gallops, or rubs. 


RESPIRATORY: Breath sounds equal bilaterally. No accessory muscle use.


Pt with stable improved L LE swelling and discoloration





Laboratory





Laboratory Tests








Test


  2/28/18


11:15 2/28/18


20:15 3/1/18


02:18 3/1/18


04:21


 


Activated Partial


Thromboplast Time 29.9 


  39.0 


  30.9 


  


 


 


White Blood Count    14.4 


 


Red Blood Count    3.26 


 


Hemoglobin    7.4 


 


Hematocrit    22.8 


 


Mean Corpuscular Volume    70.0 


 


Mean Corpuscular Hemoglobin    22.7 


 


Mean Corpuscular Hemoglobin


Concent 


  


  


  32.4 


 


 


Red Cell Distribution Width    17.8 


 


Platelet Count    213 


 


Mean Platelet Volume    8.7 


 


Neutrophils (%) (Auto)    85.6 


 


Lymphocytes (%) (Auto)    7.3 


 


Monocytes (%) (Auto)    4.8 


 


Eosinophils (%) (Auto)    1.7 


 


Basophils (%) (Auto)    0.6 


 


Neutrophils # (Auto)    12.3 


 


Lymphocytes # (Auto)    1.1 


 


Monocytes # (Auto)    0.7 


 


Eosinophils # (Auto)    0.2 


 


Basophils # (Auto)    0.1 


 


CBC Comment    DIFF FINAL 


 


Differential Comment     


 


Blood Urea Nitrogen    7 


 


Creatinine    0.59 


 


Random Glucose    94 


 


Total Protein    6.7 


 


Albumin    2.3 


 


Calcium Level    8.2 


 


Phosphorus Level    3.7 


 


Magnesium Level    1.9 


 


Alkaline Phosphatase    113 


 


Aspartate Amino Transf


(AST/SGOT) 


  


  


  40 


 


 


Alanine Aminotransferase


(ALT/SGPT) 


  


  


  19 


 


 


Total Bilirubin    0.6 


 


Sodium Level    134 


 


Potassium Level    4.4 


 


Chloride Level    101 


 


Carbon Dioxide Level    26.6 


 


Anion Gap    6 


 


Estimat Glomerular Filtration


Rate 


  


  


  116 


 


 


Human Chorionic Gonadotropin,


Quant 


  


  


  LESS THAN 1 


 














 Date/Time


Source Procedure


Growth Status


 


 


 2/27/18 04:25


Blood Peripheral Aerobic Blood Culture - Preliminary


NO GROWTH IN 1 DAY Resulted


 


 2/27/18 04:25


Blood Peripheral Anaerobic Blood Culture - Preliminary


NO GROWTH IN 1 DAY Resulted


 


 2/25/18 21:00


Stool Stool Stool Occult Blood (GILA) - Final


HEMOCCULT NEGATIVE Complete


 


 2/23/18 22:30


Urine Suprapubic Urine Urine Culture - Final


NO GROWTH IN 48 HOURS. Complete








Imaging





Last 48 hours Impressions








Chest X-Ray 3/1/18 0600 Signed





Impressions: 





 Service Date/Time:  Thursday, March 1, 2018 03:05 - CONCLUSION:  1. Stable 





 patchy bilateral lower lung zone airspace disease. 2. No significant interval 





 change.     Rj Whitten MD 











Assessment and Plan


Plan


37/F w/ focal SFA occlusion but good distal opacification.


LE warm w/ motor intact and strong distal pulses present 


Pt w/ improved swelling and discoloration (L LE)





Plan


Continue anticoagulation- IV heparin gtt - aggressively to therapeutic


Continue Pain control


Continue neurovascular checks


May continue PT/OOB/WBAT


Pt unlikely to need vascular intervention


Would recommend home anticoagulation








Sommer Smith NP


[facility]/[employer]


[phone]











Sommer Smith [facility] Mar 1, 2018 09:38

## 2018-03-01 NOTE — MB
cc:

Henny Smith MD, Ruby Anne E MD Biga,Bryson DEL REAL MD

****

 

 

DATE OF CONSULT:

03/01/2018

 

DATE OF SERVICE:

03/01/2018

 

REFERRING PHYSICIAN:

Dr. Bustos

 

CHIEF COMPLAINT:

Dr. Bustos requests a consultation for Ms. Hagan regarding heparin 

refractory.

 

HISTORY OF PRESENT ILLNESS:

Ms. Hagan is a 37-year-old woman, with history of IV drug abuse and 

recurrent endocarditis.  She has had a relapse of her activity.  She 

has history of pulmonary embolism and infected valves.  She has 

significant congestive heart failure and presented to the emergency 

room on 02/23/2018, with sepsis.

 

She is followed by Infectious Disease for her endocarditis.  She 

previously received antibiotic therapy for enterococcus faecalis and 

staph aureus. There is questionable compliance. Her current blood 

culture from 02/23/2018, shows viridans streptococcus.  Two out of two

bottles were positive.  She is on ampicillin and gentamicin.  She 

remains critically ill.

 

Her course is complicated by cold left lower extremity, evaluated by 

Vascular Surgery.  A CT angiogram showed 5-6 cm length total occlusion

of the left superficial femoral artery in the proximal thigh.  There 

is satisfactory calf runoff present.  For this reason, conservative 

management with anticoagulation is continued.

 

She was placed on unfractionated heparin.  Her heparin dose has been 

titrated from 1000 units/hour to 2500 units/hour with a PTT remaining 

subtherapeutic.  Her last PTT is 32 seconds from 03/01/2018, at 2 p.m.

 For this reason, Hematology/Oncology is consulted.

 

She denies any bleeding.  She reports some improvement in the way that

the left leg looks.  She still has ischemic changes in the skin.  She 

still has a significant amount of pain in the left foot.  Pulse is 

weak, but palpable on the left foot.  She remains afebrile,  except 

for a fever spike early this morning at 100.6.  She is tachycardic and

has continued cough.  She still feels unwell.

 

Significant finding is a microcytic anemia.  Her hemoglobin is 7.4.  

Her platelet count is normal. White blood cell count continues to be 

elevated, currently 14.4, predominantly neutrophils.  Ultrasound of 

the lower extremity from 02/23/2018, shows no evidence of deep vein 

thrombosis.  There is some reactive inguinal adenopathy.

 

PAST MEDICAL HISTORY:

Recurrent bacterial endocarditis, bacteremia, history of prosthetic 

aortic valve and subsequent redo, chronic active hepatitis C, 

intravenous drug use, microcytic anemia, consistent with iron 

deficiency, left lower extremity arterial event, bacterial 

endocarditis with viridans streptococcus.

 

PAST SURGICAL HISTORY:

Left forearm surgery, right neck central line placement, aortic valve 

replacement in 2011 and 2006.

 

FAMILY HISTORY:

No significant family history of venous thromboembolic events. Family 

history does not appear to be related to antithrombin deficiency.  

Both parents are alive.  Father is reportedly messed up, per patient. 

Mother is doing well.

 

SOCIAL HISTORY:

Significant for IV drug abuse. She drinks alcohol socially.  She 

denies any tobacco use.

 

PHYSICAL EXAMINATION:

VITAL SIGNS:  Temperature 97.7, heart rate 110, blood pressure 119/78,

saturation 98%, respiratory rate 25.

GENERAL:  Ms. Hagan is a well-developed, well-nourished young woman, 

who looks critically ill.  She looks anxious and wide-eyed sitting in 

bed.  She has a productive cough and difficulty breathing.  Her main 

complaint is related to pain in the left leg.

LUNGS:  Clear anteriorly.

CARDIOVASCULAR: Tachycardia.

ABDOMEN:  Benign.

LOWER EXTREMITIES:  Right foot has mild swelling, good pulses.  The 

left foot with ischemic skin changes in the left 5th digit, dorsum of 

the foot, in anterior aspect of the left lower extremity.  There are 

diminished pulses.  She has extreme pain.

 

LABORATORY DATA:

Significant for microcytic anemia with a hemoglobin of 7.4, MCV is 7. 

White blood cell count elevated.  BUN and creatinine are normal. 

Albumin is decreased at 2.3.  PTT 32 seconds.  Hepatitis antibody 

positive from 06/21/2016.

 

ASSESSMENT AND PLAN:

Mrs. Hagan is a 37-year-old woman, with significant history of 

intravenous drug abuse, recurrent bacterial endocarditis, status post 

aortic valve replacement x2.  She presents with sepsis and new 

bacterial endocarditis with strep viridans. She is on antibiotic 

therapy.

 

Her course is complicated by occlusion of the left lower extremity.  

She is being managed medically, as she is refractory to heparin 

anticoagulation, with subtherapeutic PTT despite increase in heparin 

dose to 2500 units/hour.

 

I had a lengthy discussion with Ms. Hagan, the etiology of her 

subtherapeutic INR.  She has no significant family history of 

thrombotic events to suggest hereditary antithrombin deficiency.  I 

suspect the antithrombin deficiency comes from her impaired production

from her liver disease.  She has chronic active hepatitis C.  She has 

increased consumption from disseminated intravascular coagulation and 

acute illness, bacterial endocarditis.  Protein losses are also 

considered.  She had mild proteinuria when she first was admitted.

 

Thrombin inactivates antithrombin.  This would abrogate the effect of 

unfractionated heparin and low molecular weight heparin.

 

We discussed the options of replacing her deficient antithrombin, 

which is common in critically ill patients.  Antithrombin recombinant 

and plasma derives have a half-life of 2-4 days.  This would help with

her anticoagulant therapy.  However, we do not have Thrombate or ATryn

available.  It is not clear when we would be able to get this drug.

 

She is clinically improving on 2500 units of unfractionated heparin. 

However, a better anticoagulant may be argatroban, which is a direct 

thrombin inhibitor and does not require antithrombin effect.  Baseline

antithrombin functional activity level will be obtained today.  I 

anticipate it would take several days to get result back.  In the 

meantime, anticoagulant therapy with a direct thrombin inhibitor may 

be more beneficial.  We will start argatroban tonight.  Her case was 

discussed with Pharmacy.  Her questions were answered to her 

satisfaction.

 

 

__________________________________

MD FARHAT Kraus//

D: 03/01/2018, 07:21 PM

T: 03/01/2018, 08:49 PM

Visit #: [id number]

Job #: [id number]

## 2018-03-01 NOTE — HHI.IDPN
Note


Infectious Disease Note








Patient feels better.


Low grade fever. 


Shortness of breath with exertion. (+) cough. No sputum. 


Continues to have pain in the left leg and left foot.  


Awake and alert. 


No chest pain.





Blood culture has strep viridans. 











37-year-old white female who has a history of endocarditis and has


been admitted several times for the same.  The patient developed shortness of


breath, fever and chills, and  presented to the emergency department.  The


patient is noted to be actively using IV drugs.  She has history of significant


CHF from prior valvular heart disease related to endocarditis.  She states that


she started feeling short of breath about a week ago and the shortness of


breath worsened.  After she completed IV antibiotics in October she was


put on doxycycline prophylaxis.  She reports that she has not been taking the


doxycycline.  


 


PAST MEDICAL HISTORY:


Hepatitis C.


IV drug use.


prosthetic aortic valve replacement in 2011 and then redo aortic


valve replacement in June 2016 





MEDICATIONS:


1. Gentamicin.


2. Ampicillin.








Current Medications








 Medications


  (Trade)  Dose


 Ordered  Sig/Nikhil


 Route


 PRN Reason  Start Time


 Stop Time Status Last Admin


Dose Admin


 


 Sodium Chloride


  (NS Flush)  2 ml  UNSCH  PRN


 IV FLUSH


 FLUSH AFTER USING IV ACCESS  2/23/18 22:00


     


 


 


 Sodium Chloride


  (NS Flush)  2 ml  BID


 IV FLUSH


   2/24/18 09:00


    3/1/18 08:16


 


 


 Acetaminophen


  (Tylenol)  650 mg  Q6H  PRN


 PO


 fever  2/23/18 22:00


    2/26/18 17:58


 


 


 Ondansetron HCl


  (Zofran Inj)  4 mg  Q6H  PRN


 IV PUSH


 NAUSEA OR VOMITING  2/23/18 22:00


     


 


 


 Temazepam


  (Restoril)  15 mg  HS  PRN


 PO


 INSOMNIA  2/23/18 22:00


    2/24/18 21:26


 


 


 Miscellaneous


 Information  1  Q361D


 XX


   2/23/18 22:00


     


 


 


 Chlorhexidine


 Gluconate


  (Chlorhexidine


 2% Cloth)  


 Taper  DAILY@04


 TOP


   2/24/18 04:00


 2/20/19 03:59  3/1/18 00:27


 


 


 Chlorhexidine


 Gluconate


  (Chlorhexidine


 2% Cloth)  3 pack  UNSCH  PRN


 TOP


 HYGIENIC CARE  2/23/18 22:00


     


 


 


 Senna/Docusate


 Sodium


  (Arlen-Colace)  1 tab  BID


 PO


   2/24/18 09:00


     


 


 


 Magnesium


 Hydroxide


  (Milk Of


 Magnesia Liq)  30 ml  Q12H  PRN


 PO


 Mild constipation  2/23/18 22:00


     


 


 


 Sennosides


  (Senokot)  17.2 mg  Q12H  PRN


 PO


 Moderate constipation  2/23/18 22:00


     


 


 


 Bisacodyl


  (Dulcolax Supp)  10 mg  DAILY  PRN


 RECTAL


 SEVERE CONSITIPATION  2/23/18 22:00


     


 


 


 Lactulose


  (Lactulose Liq)  30 ml  DAILY  PRN


 PO


 SEVERE CONSITIPATION  2/23/18 22:00


     


 


 


 Norepinephrine


 Bitartrate 4 mg/


 Sodium Chloride  250 ml @ 


 7.5 mls/hr  TITRATE  PRN


 IV


 Blood pressure management  2/24/18 09:15


    2/25/18 14:44


 


 


 Terbutaline


 Sulfate


  (Brethine Inj)  1 mg  UNSCH  PRN


 SQ


 For Extravasation  2/24/18 09:15


     


 


 


 Ampicillin Sodium


 2000 mg/Sodium


 Chloride  100 ml @ 


 400 mls/hr  Q6H


 IV


   2/24/18 14:00


    3/1/18 08:16


 


 


 Heparin Sodium/


 Dextrose  250 ml @ 


 10 mls/hr  TITRATE  PRN


 IV


 Coagulation Management  2/25/18 11:00


    3/1/18 09:52


 


 


 Hydromorphone HCl


  (Dilaudid Pf Inj)  2 mg  Q4H  PRN


 IV PUSH


 PAIN SCALE 6 TO 10  2/25/18 15:00


    3/1/18 12:30


 


 


 Loperamide HCl


  (Imodium)  2 mg  Q4H  PRN


 PO


 Diarrhea  2/26/18 16:15


    2/28/18 14:32


 


 


 Gentamicin


 Sulfate 100 mg/


 Sodium Chloride  102.5 ml @ 


 100 mls/hr  Q8H


 IV


   2/26/18 21:00


    3/1/18 12:30


 


 


 Albuterol Sulfate


  (Albuterol Neb)  2.5 mg  Q2HR NEB  PRN


 NEB


 dyspnea  2/27/18 11:30


     


 


 


 Acetaminophen/


 Hydrocodone Bitart


  (Norco  Mg)  1 tab  Q6H  PRN


 PO


 pain 1-5  2/27/18 11:30


    3/1/18 11:08


 


 


 Famotidine


  (Pepcid)  20 mg  BID


 PO


   2/27/18 21:00


    3/1/18 08:15


 


 


 Magnesium Oxide


  (Mag-Ox)  400 mg  Q12HR


 PO


   2/28/18 21:00


 3/2/18 20:59  3/1/18 08:15


 











 


SOCIAL HISTORY:


Positive occasional alcohol.  No tobacco. IV drug use in the form of Dilaudid,


oxycodone.  The patient also uses marijuana.


 








OBJECTIVE:








Vital Signs








  Date Time  Temp Pulse Resp B/P (MAP) Pulse Ox O2 Delivery O2 Flow Rate FiO2


 


3/1/18 10:00  105      


 


3/1/18 08:02     93   21


 


3/1/18 08:00  97      


 


3/1/18 08:00 98.6 97 28 99/66 (77) 92   


 


3/1/18 06:00  96      


 


3/1/18 04:00  107      


 


3/1/18 04:00 99.4 107 27 100/65 (77) 92   


 


3/1/18 02:00  112      


 


3/1/18 00:00 100.6 105 32 115/83 (94) 99   


 


3/1/18 00:00  105      


 


2/28/18 22:00  100      


 


2/28/18 20:00  100      


 


2/28/18 20:00 98.3 100 29 94/66 (75) 96   


 


2/28/18 19:40     99 Nasal Cannula 2.00 


 


2/28/18 18:00  96      


 


2/28/18 16:00  95      


 


2/28/18 16:00 98.8 95 23 123/71 (88) 100   


 


2/28/18 14:00  102      








Laboratory Tests








Test


  2/28/18


03:38 3/1/18


04:21


 


White Blood Count 12.5 TH/MM3  14.4 TH/MM3 


 


Red Blood Count 3.12 MIL/MM3  3.26 MIL/MM3 


 


Hemoglobin 7.1 GM/DL  7.4 GM/DL 


 


Hematocrit 21.6 %  22.8 % 


 


Mean Corpuscular Volume 69.2 FL  70.0 FL 


 


Mean Corpuscular Hemoglobin 22.8 PG  22.7 PG 


 


Mean Corpuscular Hemoglobin


Concent 32.9 % 


  32.4 % 


 


 


Red Cell Distribution Width 16.9 %  17.8 % 


 


Platelet Count 189 TH/MM3  213 TH/MM3 


 


Mean Platelet Volume 9.0 FL  8.7 FL 


 


Neutrophils (%) (Auto) 80.4 %  85.6 % 


 


Lymphocytes (%) (Auto) 10.8 %  7.3 % 


 


Monocytes (%) (Auto) 6.9 %  4.8 % 


 


Eosinophils (%) (Auto) 1.6 %  1.7 % 


 


Basophils (%) (Auto) 0.3 %  0.6 % 


 


Neutrophils # (Auto) 10.1 TH/MM3  12.3 TH/MM3 


 


Lymphocytes # (Auto) 1.4 TH/MM3  1.1 TH/MM3 


 


Monocytes # (Auto) 0.9 TH/MM3  0.7 TH/MM3 


 


Eosinophils # (Auto) 0.2 TH/MM3  0.2 TH/MM3 


 


Basophils # (Auto) 0.0 TH/MM3  0.1 TH/MM3 


 


CBC Comment DIFF FINAL  DIFF FINAL 


 


Differential Comment    








Laboratory Tests








Test


  2/28/18


03:38 3/1/18


04:21


 


Blood Urea Nitrogen 7 MG/DL  7 MG/DL 


 


Creatinine 0.59 MG/DL  0.59 MG/DL 


 


Random Glucose 100 MG/DL  94 MG/DL 


 


Total Protein 6.3 GM/DL  6.7 GM/DL 


 


Albumin 2.2 GM/DL  2.3 GM/DL 


 


Calcium Level 8.1 MG/DL  8.2 MG/DL 


 


Phosphorus Level 3.4 MG/DL  3.7 MG/DL 


 


Magnesium Level 1.5 MG/DL  1.9 MG/DL 


 


Alkaline Phosphatase 92 U/L  113 U/L 


 


Aspartate Amino Transf


(AST/SGOT) 27 U/L 


  40 U/L 


 


 


Alanine Aminotransferase


(ALT/SGPT) 12 U/L 


  19 U/L 


 


 


Total Bilirubin 0.5 MG/DL  0.6 MG/DL 


 


Sodium Level 136 MEQ/L  134 MEQ/L 


 


Potassium Level 4.0 MEQ/L  4.4 MEQ/L 


 


Chloride Level 100 MEQ/L  101 MEQ/L 


 


Carbon Dioxide Level 29.1 MEQ/L  26.6 MEQ/L 


 


Anion Gap 7 MEQ/L  6 MEQ/L 


 


Estimat Glomerular Filtration


Rate 116 ML/MIN 


  116 ML/MIN 


 


 


Human Chorionic Gonadotropin,


Quant 


  LESS THAN 1


MIU/ML








Microbiology








 Date/Time


Source Procedure


Growth Status


 


 


 2/27/18 04:25


Blood Peripheral Aerobic Blood Culture - Preliminary


NO GROWTH IN 2 DAYS Resulted


 


 2/27/18 04:25


Blood Peripheral Anaerobic Blood Culture - Preliminary


NO GROWTH IN 2 DAYS Resulted





 2/27/18 04:20


Blood Peripheral Aerobic Blood Culture - Preliminary


NO GROWTH IN 2 DAYS Resulted


 


 2/27/18 04:20


Blood Peripheral Anaerobic Blood Culture - Preliminary


NO GROWTH IN 2 DAYS Resulted











Microbiology








 Date/Time


Source Procedure


Growth Status


 


 


 2/23/18 20:15


Blood Peripheral Aerobic Blood Culture - Final


Viridans Streptococcus Grp Complete


 


 2/23/18 20:15 Anaerobic Blood Culture - Final


Viridans Streptococcus Grp Complete





 2/23/18 20:10


Blood Peripheral Aerobic Blood Culture - Final


Viridans Streptococcus Grp Complete


 


 2/23/18 20:10 Anaerobic Blood Culture - Final


Viridans Streptococcus Grp Complete


 


 2/25/18 21:00


Stool Stool Stool Occult Blood (GILA) - Final


HEMOCCULT NEGATIVE Complete


 


 2/23/18 22:30


Urine Suprapubic Urine Urine Culture - Final


NO GROWTH IN 48 HOURS. Complete











IMAGING:

















Chest X-Ray 2/26/18 0600 Signed





Impressions: 





 Service Date/Time:  Monday, February 26, 2018 03:30 - CONCLUSION:  1. Low lung 





 volumes with bibasilar mild airspace disease, likely atelectasis.     Rj Whitten MD 


 


Aorta w/Runoff CTA 2/25/18 0000 Signed





Impressions: 





 Service Date/Time:  Sunday, February 25, 2018 12:47 - CONCLUSION:  Left renal 





 cortical infarction. 5-6 cm length total occlusion of the left superficial 





 femoral artery in the proximal thigh. Nodular parenchymal opacities in the 

lung 





 bases bilaterally See above discussion.     Ron Cruz MD 




















Renal Ultrasound 2/24/18 0000 Signed





Impressions: 





 Service Date/Time:  Saturday, February 24, 2018 10:53 - CONCLUSION:  1. No 





 evidence of hydronephrosis. 2. Thickening of the urinary bladder wall a 1.1 

cm. 





 3. Prominent splenomegaly.     Elgin Walton MD 


 


Chest X-Ray 2/24/18 0000 Signed





Impressions: 





 Service Date/Time:  Saturday, February 24, 2018 09:36 - CONCLUSION:  Right 





 internal jugular central line in place with the tip overlying the SVC. A 





 pneumothorax is not seen.     Ron Brown MD 


 


Breast Ultrasound 2/24/18 0000 Signed





Impressions: 





 Service Date/Time:  Saturday, February 24, 2018 10:48 - CONCLUSION:  Negative 





 targeted right breast ultrasound examination.     Ron Brown MD 


 


Lower Extremity Ultrasound 2/23/18 0000 Signed





Impressions: 





 Service Date/Time:  Friday, February 23, 2018 21:22 - CONCLUSION:  1. No 





 sonographic evidence for lower extremity DVT. 2. Incidental note of bilateral 





 inguinal adenopathy with the largest on the right measuring 3.3 cm and the 





 largest on the left measuring 2.6 cm. This finding is nonspecific but is 





 typically reactive in etiology.     Rj Whitten MD 














PHYSICAL EXAMINATION


GENERAL: No acute distress.  Looks fatigued.


HEENT:  Head atraumatic.  Extraocular movements grossly intact.  Pupils


reactive to light.  No icterus.  Oropharynx moist mucosa, no lesions.


Neck:  Supple without adenopathy.


Lungs: Coarse bilateral rhonchi.


Heart: 5/6 blowing systolic murmur at the upper right sternal border.


Abdomen: Bowel sounds present, soft, obese, nontender.


Extremities: Diffuse 2+ edema of the lower extremities. Lesions at Left tibia 


distally and left foot and great toe are more red and not purpuric as before.


Toes 3 and 5 are cyanotic. The leg is tender on palpation.  It is warm.  


No calf tenderness.   No nail bed hemorrhages.  


SKIN: no diffuse rash.


Neuro: No gross focal findings.


Psychiatric: calm and cooperative.





 


IMPRESSION


1. Sepsis. Strep Viridans. 


2. Bacteremia.  Strep viridans.


3. History of prostatic aortic valve and so likely recurrent prosthetic valve


endocarditis.


4. Left renal cortical and pulmonary and lower extremity septic emboli. 


5. Leukocytosis secondary to sepsis from showering of emboli


from endocarditis.


6. Acute renal failure. Kidney function improved. Stable. 


 


RECOMMENDATIONS


1. Continue Ampicillin intravenous.


2. Continue Gentamicin. now that the kidney function is improved but follow the 

renal function. 


3.  Monitor clinical response.


I do not think we need to get FLORENCIO since it will not alter treatment. 


Monitor clinical status.











Robinson Carr MD Mar 1, 2018 13:16

## 2018-03-01 NOTE — HHI.CCPN
Subjective


Remarks/Hospital Course


37-year-old female that presents to the ED for evaluation of shortness of 

breath and swelling.  Patient has a significant history of endocarditis, IV 

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 

continues use of IV drug use that presents to the ED for evaluation of acute 

onset of shortness of breath with swelling.  Patient initially did not mention 

to anybody that she was doing drugs but she did tell me that she injected 

herself about 4 days ago.  She uses Dilaudid.  She states that she's been 

having fevers.  Patient is somewhat somnolent and hard to assess she doesn't 

really provide much information.  Family members at the bedside and he provides 

more information about the heart.  Per patient she'll he takes 2 medications.  

Patient asked if she is taking any antibiotics and she said yes but when I ask 

her what kind is she is taking currently she states that she has not been on 

antibiotics for some time.  Patient apparently does follow with a local 

cardiologist but she cannot tell me the name of it.  She states that she's been 

having swelling to her legs for the past month.  She denies any recent travel 

or injury.  No other medical issues at this time.  No allergies to medication.





2/24: remains critical on Dopamine 10 mcg/ min. Appears ill. Limited bedside 

echo did not show any large vegetation, but exam was limited, tricuspid and 

aortic valve not well visualized. Also states that had right breast abscess 2 

months ago, drained by herself. Now may have abscess vs scar tissue. Injects 

upper arm and bilateral breasts. US of right breast ordered


2/25: Remains on 3 mcg/min of Levophed.  Complaints of severe left lower 

extremity pain.  Erythematous purpuric purple discoloration of the left lower 

anterior segment left dorsum of the foot with cyanotic nailbeds. Weak DP pulses+

. 2D Echo failed to reveal definite vegetation. Will consider FLORENCIO


2/26: Continues to be on Levophed 3 mcg/min, remains critical.  We are left 

lower extremity pain but slightly better.  Currently on heparin not therapeutic 

yet.  CTA with runoff showed Left renal cortical infarction. 5-6 cm length 

total occlusion of the left superficial femoral artery in the proximal thigh 

but good distal opacification.  Dr. Souza recommended IV heparin no surgical 

intervention at this time.  Patient is still at high risk for further embolic 

episodes.  Blood cultures growing strep viridans.  Also patient complains about 

epigastric and left-sided abdominal pain and left-sided chest pain which is 

more pleuritic.  Pain is severe on deep inspiration


2/27: Resting in bed in no acute distress.  Dopplerable lower extremity pulses 

noted.  Remains on heparin drip.  Vascular surgery continues to follow.


2/28: Resting in bed in mild respiratory distress.  Continues to cough.  

Nonproductive.  Left lower extremity leg improved.  Reviewed vascular surgeries 

note.  Likely will require long-term medical regulation post hospitalization





Subjective





3/1: Improve respiratory status today.  Pain in left lower extremity much 

improved.  Tolerating diet.





Objective





Vital Signs








  Date Time  Temp Pulse Resp B/P (MAP) Pulse Ox O2 Delivery O2 Flow Rate FiO2


 


3/1/18 10:00  105      


 


3/1/18 08:02     93   21


 


3/1/18 08:00 98.6  28 99/66 (77)    


 


2/28/18 19:40      Nasal Cannula 2.00 














Intake and Output   


 


 3/1/18 3/1/18 3/1/18





 07:59 15:59 23:59


 


Intake Total 922.5 ml 100 ml 


 


Output Total 300 ml  


 


Balance 622.5 ml 100 ml 








Result Diagram:  


3/1/18 0421                                                                    

            3/1/18 0421





Other Results





Microbiology








 Date/Time


Source Procedure


Growth Status


 


 


 2/27/18 04:25


Blood Peripheral Aerobic Blood Culture - Preliminary


NO GROWTH IN 2 DAYS Resulted


 


 2/27/18 04:25


Blood Peripheral Anaerobic Blood Culture - Preliminary


NO GROWTH IN 2 DAYS Resulted


 


 2/25/18 21:00


Stool Stool Stool Occult Blood (GILA) - Final


HEMOCCULT NEGATIVE Complete


 


 2/23/18 22:30


Urine Suprapubic Urine Urine Culture - Final


NO GROWTH IN 48 HOURS. Complete








Imaging





Last Impressions








Chest X-Ray 3/1/18 0600 Signed





Impressions: 





 Service Date/Time:  Thursday, March 1, 2018 03:05 - CONCLUSION:  1. Stable 





 patchy bilateral lower lung zone airspace disease. 2. No significant interval 





 change.     Rj Whitten MD 


 


Chest CT 2/26/18 0000 Signed





Impressions: 





 Service Date/Time:  Monday, February 26, 2018 12:41 - CONCLUSION:  1. There 

are 





 at least 5 pulmonary nodules present bilaterally with the largest measuring 19 





 mm. None demonstrate cavitation but it is possible that these represent septic 





 emboli. Suggest followup to assess for change. 2. Splenomegaly with subtle 





 wedge-shaped areas of low-density in the mid spleen which could represent 





 infarcts. 3. Mild cardiomegaly in this patient post aortic valve replacement. 





 Low density of the cardiac blood pool suggests anemia.      Ron Melgar MD 


 


Aorta w/Runoff CTA 2/25/18 0000 Signed





Impressions: 





 Service Date/Time:  Sunday, February 25, 2018 12:47 - CONCLUSION:  Left renal 





 cortical infarction. 5-6 cm length total occlusion of the left superficial 





 femoral artery in the proximal thigh. Nodular parenchymal opacities in the 

lung 





 bases bilaterally See above discussion.     Ron Cruz MD 


 


Renal Ultrasound 2/24/18 0000 Signed





Impressions: 





 Service Date/Time:  Saturday, February 24, 2018 10:53 - CONCLUSION:  1. No 





 evidence of hydronephrosis. 2. Thickening of the urinary bladder wall a 1.1 

cm. 





 3. Prominent splenomegaly.     Elgin Walton MD 


 


Breast Ultrasound 2/24/18 0000 Signed





Impressions: 





 Service Date/Time:  Saturday, February 24, 2018 10:48 - CONCLUSION:  Negative 





 targeted right breast ultrasound examination.     Ron Brown MD 


 


Lower Extremity Ultrasound 2/23/18 0000 Signed





Impressions: 





 Service Date/Time:  Friday, February 23, 2018 21:22 - CONCLUSION:  1. No 





 sonographic evidence for lower extremity DVT. 2. Incidental note of bilateral 





 inguinal adenopathy with the largest on the right measuring 3.3 cm and the 





 largest on the left measuring 2.6 cm. This finding is nonspecific but is 





 typically reactive in etiology.     Rj Whitten MD 








Objective Remarks


GENERAL: 37-year-old  female resting in bed in no acute distress


SKIN: Warm and dry.  Patient does appear to have puncture wounds from where she 

states been injecting recently in her arms, bilateral upper breast.


HEAD: Atraumatic. Normocephalic. 


EYES: Pupils equal and round. No scleral icterus. No injection or drainage. 


ENT: No nasal bleeding or discharge.  Mucous membranes pink and moist.  


NECK: Trachea midline. No JVD. 


CHEST: Bilateral upper breast has puncture wounds from injection.  Pain to 

point palpation left upper thorax


CARDIOVASCULAR: 2/6 pansystolic murmurat the left lower sternal border.  


RESPIRATORY: Few fine crackles present bilaterally.  No wheezing


GASTROINTESTINAL: Abdomen soft, tender to deep palpation.  No guarding 

rigidity.  Positive hepatosplenomegaly


MUSCULOSKELETAL: 2+ pitting edema to the lower extremities.  Erythematous 

purpuric rash of the left lower extremity anterior shin and foot, punctate 

lesion plantar right great toe. Left leg extremely tender below the left knee.  

Dopplerable DP pulses bilaterally


NEUROLOGICAL: Awake and alert. Motor grossly within normal limits. Five out of 

5 muscle strength in the arms and legs.  Normal speech.


Urinary Catheter:  No


Assessment to:  Continue


Vascular Central Line Catheter:  Yes


Assessment to:  Continue


Date of Insertion:  Feb 24, 2018


Line:  Central Venous Catheter


Side:  Right


Location:  Internal, Jugular





A/P


Assessment and Plan


Neuro/Psych:





IVDU -hydromorphone


History of THC use





- Continue pain control hydrocodone/acetaminophen 10/325 1 tablet every 6 hours 

as needed pain 1 through 5 with hydromorphone 2 mg IV every 4 hours as needed 

pain 6-10 (patient has severpain from ischemic leg)


- Acetaminophen 650 mg p.o. every 6 hours as needed fever





CVS/ID





Septic  shock


Infective endocarditis involving aortic prosthetic valve


Multiple septic emboli including pulmonary, splenic and vascular


History of fungal and bacterial prosthetic valve endocarditis


Strep viridans bacteremia


5-6 cm length total occlusion of the left superficial femoral artery in the 

proximal thigh





- Recurrent aortic prosthetic valve endocarditis -initially placed 2011 with 

redo 2016. - Now with strep viridans in blood cultures 2/23


- Broad-spectrum antibiotic with ampicillin and gentamicin.


   Previously treated with vancomycin and ceftriaxone


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 

April 2017 for tricuspid valve PVE  - Candida parapsilosis , Streptococci


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


- Vascular surgery Dr. Sears following


- Anticoagulation with Heparin gtt


- Infectious disease Dr. Carr


Norepinephrine to maintain mean arterial pressures greater than equal to 65


- A FLORENCIO at this point will not 


- Extensive counselling given about IP Rehab





Microbiology





2/27 -blood cultures 2 -pending


2/23 -urine culture -no growth


2/23 -blood cultures 2 -strep viridian





Resp:





Acute respiratory insufficiency


5 pulmonary nodules likely septic emboli





-Nasal cannula to keep oxygen saturation above 92%


-Incentives spirometry while awake


-As needed albuterol aerosols every 2 hours.  Dyspnea


- CT thorax revealed 5 bilateral pulmonary lesions/nodules largest which is 19 

mm.  No cavitation noted but likely septic emboli.





GI:





Hepatitis C


Splenic infarction


Hypoalbuminemia





- Heart healthy diet


-Famotidine for GI prophylaxis


-Docusate sodium/senna 1 tablet twice daily for bowel regimen








Renal/:





Acute on chronic kidney disease


Left renal cortical infarction





- Acute renal failure secondary to ATN and dehydration


- CT imaging revealed left renal cortical infarction


- Renal ultrasound-thickening of urinary bladder





Endo:





Sliding scale insulin if indicated to maintain euglycemia





Heme:





Anemia


Leukocytosis





- Monitor CBC CMP and coags


-Does not meet transfusion threshold at this time





FEN:





Hyponatremia





Replace electrolytes as clinically indicated





MSK:





History of right breast abscess


Elevated BMI





- Ultrasound of the left lower extremity negative for DVT


- Right breast ultrasound negative for abscess


-Weight loss encouraged





DVT GI prophylaxis





- Heparin gtt


- famotidine





Level 2 follow-up











Bryson Bustos MD Mar 1, 2018 14:58

## 2018-03-02 VITALS
HEART RATE: 117 BPM | SYSTOLIC BLOOD PRESSURE: 113 MMHG | OXYGEN SATURATION: 98 % | TEMPERATURE: 99.9 F | DIASTOLIC BLOOD PRESSURE: 70 MMHG | RESPIRATION RATE: 25 BRPM

## 2018-03-02 VITALS — HEART RATE: 116 BPM

## 2018-03-02 VITALS — HEART RATE: 127 BPM

## 2018-03-02 VITALS — OXYGEN SATURATION: 95 %

## 2018-03-02 VITALS
HEART RATE: 109 BPM | DIASTOLIC BLOOD PRESSURE: 70 MMHG | OXYGEN SATURATION: 98 % | RESPIRATION RATE: 25 BRPM | TEMPERATURE: 101.3 F | SYSTOLIC BLOOD PRESSURE: 113 MMHG

## 2018-03-02 VITALS
DIASTOLIC BLOOD PRESSURE: 62 MMHG | RESPIRATION RATE: 30 BRPM | SYSTOLIC BLOOD PRESSURE: 97 MMHG | OXYGEN SATURATION: 100 % | TEMPERATURE: 99.4 F | HEART RATE: 116 BPM

## 2018-03-02 VITALS
OXYGEN SATURATION: 99 % | HEART RATE: 112 BPM | TEMPERATURE: 100.3 F | SYSTOLIC BLOOD PRESSURE: 101 MMHG | DIASTOLIC BLOOD PRESSURE: 60 MMHG | RESPIRATION RATE: 30 BRPM

## 2018-03-02 VITALS
SYSTOLIC BLOOD PRESSURE: 96 MMHG | TEMPERATURE: 98.9 F | OXYGEN SATURATION: 94 % | DIASTOLIC BLOOD PRESSURE: 62 MMHG | RESPIRATION RATE: 31 BRPM | HEART RATE: 114 BPM

## 2018-03-02 VITALS — HEART RATE: 111 BPM

## 2018-03-02 VITALS
RESPIRATION RATE: 18 BRPM | DIASTOLIC BLOOD PRESSURE: 69 MMHG | SYSTOLIC BLOOD PRESSURE: 98 MMHG | OXYGEN SATURATION: 94 % | HEART RATE: 115 BPM | TEMPERATURE: 99.5 F

## 2018-03-02 VITALS
SYSTOLIC BLOOD PRESSURE: 104 MMHG | OXYGEN SATURATION: 98 % | HEART RATE: 111 BPM | RESPIRATION RATE: 25 BRPM | DIASTOLIC BLOOD PRESSURE: 63 MMHG | TEMPERATURE: 98.7 F

## 2018-03-02 VITALS — HEART RATE: 180 BPM

## 2018-03-02 VITALS — HEART RATE: 109 BPM

## 2018-03-02 LAB
BUN SERPL-MCNC: 9 MG/DL (ref 7–18)
CALCIUM SERPL-MCNC: 8.4 MG/DL (ref 8.5–10.1)
CHLORIDE SERPL-SCNC: 102 MEQ/L (ref 98–107)
CREAT SERPL-MCNC: 0.72 MG/DL (ref 0.5–1)
ERYTHROCYTE [DISTWIDTH] IN BLOOD BY AUTOMATED COUNT: 17.6 % (ref 11.6–17.2)
GFR SERPLBLD BASED ON 1.73 SQ M-ARVRAT: 92 ML/MIN (ref 89–?)
GLUCOSE SERPL-MCNC: 141 MG/DL (ref 74–106)
HCO3 BLD-SCNC: 29 MEQ/L (ref 21–32)
HCT VFR BLD CALC: 22.6 % (ref 35–46)
HGB BLD-MCNC: 7.3 GM/DL (ref 11.6–15.3)
MAGNESIUM SERPL-MCNC: 1.8 MG/DL (ref 1.5–2.5)
MCH RBC QN AUTO: 22.8 PG (ref 27–34)
MCHC RBC AUTO-ENTMCNC: 32.6 % (ref 32–36)
MCV RBC AUTO: 70.1 FL (ref 80–100)
PHOSPHATE SERPL-MCNC: 4.8 MG/DL (ref 2.5–4.9)
PLATELET # BLD: 250 TH/MM3 (ref 150–450)
PMV BLD AUTO: 8.5 FL (ref 7–11)
RBC # BLD AUTO: 3.22 MIL/MM3 (ref 4–5.3)
SODIUM SERPL-SCNC: 137 MEQ/L (ref 136–145)
WBC # BLD AUTO: 13.6 TH/MM3 (ref 4–11)

## 2018-03-02 RX ADMIN — Medication SCH ML: at 07:53

## 2018-03-02 RX ADMIN — STANDARDIZED SENNA CONCENTRATE AND DOCUSATE SODIUM SCH TAB: 8.6; 5 TABLET, FILM COATED ORAL at 20:07

## 2018-03-02 RX ADMIN — HYDROCODONE BITARTRATE AND ACETAMINOPHEN PRN TAB: 10; 325 TABLET ORAL at 06:22

## 2018-03-02 RX ADMIN — HYDROMORPHONE HYDROCHLORIDE PRN MG: 2 INJECTION INTRAMUSCULAR; INTRAVENOUS; SUBCUTANEOUS at 20:05

## 2018-03-02 RX ADMIN — MAGNESIUM SULFATE IN DEXTROSE SCH MLS/HR: 10 INJECTION, SOLUTION INTRAVENOUS at 17:47

## 2018-03-02 RX ADMIN — HYDROMORPHONE HYDROCHLORIDE PRN MG: 2 INJECTION INTRAMUSCULAR; INTRAVENOUS; SUBCUTANEOUS at 11:57

## 2018-03-02 RX ADMIN — Medication SCH ML: at 20:06

## 2018-03-02 RX ADMIN — AMPICILLIN SODIUM SCH MLS/HR: 2 INJECTION, POWDER, FOR SOLUTION INTRAMUSCULAR; INTRAVENOUS at 14:51

## 2018-03-02 RX ADMIN — CLINDAMYCIN PHOSPHATE SCH MLS/HR: 150 INJECTION, SOLUTION INTRAMUSCULAR; INTRAVENOUS at 04:26

## 2018-03-02 RX ADMIN — HYDROMORPHONE HYDROCHLORIDE PRN MG: 2 INJECTION INTRAMUSCULAR; INTRAVENOUS; SUBCUTANEOUS at 15:56

## 2018-03-02 RX ADMIN — CLINDAMYCIN PHOSPHATE SCH MLS/HR: 150 INJECTION, SOLUTION INTRAMUSCULAR; INTRAVENOUS at 20:06

## 2018-03-02 RX ADMIN — HYDROMORPHONE HYDROCHLORIDE PRN MG: 2 INJECTION INTRAMUSCULAR; INTRAVENOUS; SUBCUTANEOUS at 23:58

## 2018-03-02 RX ADMIN — MAGNESIUM OXIDE TAB 400 MG (241.3 MG ELEMENTAL MG) SCH MG: 400 (241.3 MG) TAB at 07:53

## 2018-03-02 RX ADMIN — AMPICILLIN SODIUM SCH MLS/HR: 2 INJECTION, POWDER, FOR SOLUTION INTRAMUSCULAR; INTRAVENOUS at 07:53

## 2018-03-02 RX ADMIN — HYDROMORPHONE HYDROCHLORIDE PRN MG: 2 INJECTION INTRAMUSCULAR; INTRAVENOUS; SUBCUTANEOUS at 04:09

## 2018-03-02 RX ADMIN — STANDARDIZED SENNA CONCENTRATE AND DOCUSATE SODIUM SCH TAB: 8.6; 5 TABLET, FILM COATED ORAL at 07:53

## 2018-03-02 RX ADMIN — MAGNESIUM SULFATE IN DEXTROSE SCH MLS/HR: 10 INJECTION, SOLUTION INTRAVENOUS at 16:37

## 2018-03-02 RX ADMIN — FAMOTIDINE SCH MG: 20 TABLET, FILM COATED ORAL at 20:06

## 2018-03-02 RX ADMIN — ACETAMINOPHEN PRN MG: 325 TABLET ORAL at 23:57

## 2018-03-02 RX ADMIN — FAMOTIDINE SCH MG: 20 TABLET, FILM COATED ORAL at 07:52

## 2018-03-02 RX ADMIN — HYDROMORPHONE HYDROCHLORIDE PRN MG: 2 INJECTION INTRAMUSCULAR; INTRAVENOUS; SUBCUTANEOUS at 08:00

## 2018-03-02 RX ADMIN — CLINDAMYCIN PHOSPHATE SCH MLS/HR: 150 INJECTION, SOLUTION INTRAMUSCULAR; INTRAVENOUS at 11:56

## 2018-03-02 RX ADMIN — AMPICILLIN SODIUM SCH MLS/HR: 2 INJECTION, POWDER, FOR SOLUTION INTRAMUSCULAR; INTRAVENOUS at 20:06

## 2018-03-02 RX ADMIN — HYDROCODONE BITARTRATE AND ACETAMINOPHEN PRN TAB: 10; 325 TABLET ORAL at 14:51

## 2018-03-02 RX ADMIN — ALBUTEROL SULFATE PRN MG: 2.5 SOLUTION RESPIRATORY (INHALATION) at 02:47

## 2018-03-02 RX ADMIN — HYDROCODONE BITARTRATE AND ACETAMINOPHEN PRN TAB: 10; 325 TABLET ORAL at 22:54

## 2018-03-02 NOTE — PD.VS.PN
Subjective


Subjective/Hospital Course


continues to improve in foot motor function


some rigors this morning - cultures obtained last night


resistant to heparin - possible AT III deficiency; on argatroban





Objective


Vitals/I&O











  Date Time  Temp Pulse Resp B/P (MAP) Pulse Ox O2 Delivery O2 Flow Rate FiO2


 


3/2/18 06:00  111      


 


3/2/18 04:00  111      


 


3/2/18 04:00 98.7 92 25 104/63 (77) 98   


 


3/2/18 02:00  111      


 


3/2/18 00:16 99.9 117 25 113/70 (84) 98   


 


3/2/18 00:00 101.3 116 25 113/70 (84) 98   


 


3/2/18 00:00  109      


 


3/1/18 22:00  109      


 


3/1/18 20:10     98   21


 


3/1/18 20:00 97.7 109 25 112/65 (81) 98   


 


3/1/18 20:00  109      


 


3/1/18 18:00  110      


 


3/1/18 16:00 97.7 101 25 119/78 (92) 98   


 


3/1/18 16:00  101      


 


3/1/18 14:00  100      


 


3/1/18 12:00  97      


 


3/1/18 12:00 97.9 97 24 100/73 (82) 95   


 


3/1/18 10:00  105      


 


3/1/18 08:02     93   21


 


3/1/18 08:00  97      


 


3/1/18 08:00 98.6 97 28 99/66 (77) 92   














 3/2/18 3/2/18 3/2/18





 07:00 15:00 23:00


 


Intake Total 525 ml  


 


Output Total 1375 ml  


 


Balance -850 ml  








Physical Exam


L foot warm


motor intact


less swelling


skin no change


strong Doppler signals


Laboratory





Laboratory Tests








Test


  3/1/18


08:26 3/1/18


14:00 3/1/18


19:20 3/1/18


23:51


 


Activated Partial


Thromboplast Time 31.1 


  32.0 


  32.2 


  58.7 


 


 


Prothrombin Time   12.3  


 


Prothromb Time International


Ratio 


  


  1.2 


  


 


 


Test


  3/2/18


03:50 3/2/18


05:36 


  


 


 


White Blood Count 13.6    


 


Red Blood Count 3.22    


 


Hemoglobin 7.3    


 


Hematocrit 22.6    


 


Mean Corpuscular Volume 70.1    


 


Mean Corpuscular Hemoglobin 22.8    


 


Mean Corpuscular Hemoglobin


Concent 32.6 


  


  


  


 


 


Red Cell Distribution Width 17.6    


 


Platelet Count 250    


 


Mean Platelet Volume 8.5    


 


Blood Urea Nitrogen 9    


 


Creatinine 0.72    


 


Random Glucose 141    


 


Calcium Level 8.4    


 


Phosphorus Level 4.8    


 


Magnesium Level 1.8    


 


Sodium Level 137    


 


Potassium Level 4.2    


 


Chloride Level 102    


 


Carbon Dioxide Level 29.0    


 


Anion Gap 6    


 


Estimat Glomerular Filtration


Rate 92 


  


  


  


 


 


Activated Partial


Thromboplast Time 


  53.8 


  


  


 














 Date/Time


Source Procedure


Growth Status


 


 


 3/2/18 05:43


Blood Peripheral Aerobic Blood Culture


Pending Received


 


 3/2/18 05:43


Blood Peripheral Anaerobic Blood Culture


Pending Received


 


 2/25/18 21:00


Stool Stool Stool Occult Blood (GILA) - Final


HEMOCCULT NEGATIVE Complete


 


 2/23/18 22:30


Urine Suprapubic Urine Urine Culture - Final


NO GROWTH IN 48 HOURS. Complete








Imaging





Last 48 hours Impressions








Chest X-Ray 3/1/18 0600 Signed





Impressions: 





 Service Date/Time:  Thursday, March 1, 2018 03:05 - CONCLUSION:  1. Stable 





 patchy bilateral lower lung zone airspace disease. 2. No significant interval 





 change.     Rj Whitten MD 











Assessment and Plan


Plan


Likely embolic from BE





needs aggressive anticoagulation but defer to primary service/hematology of 

choice of agents


antibiotics per primary service


continue neurovasc checks


Discharge Planning


on anticoagulation but anytime from vascular surgery standpoint


ok to WBAT


Will arrange f/u in 2-3 weeks with Michi uBrgos MD Mar 2, 2018 07:14

## 2018-03-02 NOTE — PD.ONC.PN
Subjective


Subjective


Remarks


Tmax 101.3 overnight. 


Patient resting in bed, fatigued. 


complains of intermittent pain in left leg. 


otherwise without complaints.





Objective


Data











  Date Time  Temp Pulse Resp B/P (MAP) Pulse Ox O2 Delivery O2 Flow Rate FiO2


 


3/2/18 10:00  111      


 


3/2/18 08:00 98.9 114 31 96/62 (73) 95   


 


3/2/18 08:00     94 Nasal Cannula 3.00 


 


3/2/18 08:00  114      


 


3/2/18 06:00  111      


 


3/2/18 04:00  111      


 


3/2/18 04:00 98.7 92 25 104/63 (77) 98   


 


3/2/18 02:00  111      


 


3/2/18 00:16 99.9 117 25 113/70 (84) 98   


 


3/2/18 00:00 101.3 116 25 113/70 (84) 98   


 


3/2/18 00:00  109      


 


3/1/18 22:00  109      


 


3/1/18 20:10     98   21


 


3/1/18 20:00 97.7 109 25 112/65 (81) 98   


 


3/1/18 20:00  109      


 


3/1/18 18:00  110      


 


3/1/18 16:00 97.7 101 25 119/78 (92) 98   


 


3/1/18 16:00  101      


 


3/1/18 14:00  100      














 3/2/18 3/2/18 3/2/18





 07:00 15:00 23:00


 


Intake Total 525 ml  


 


Output Total 1375 ml  


 


Balance -850 ml  








Result Diagram:  


3/2/18 0350                                                                    

            3/2/18 0350





Laboratory Results





Laboratory Tests








Test


  3/1/18


14:00 3/1/18


19:20 3/1/18


23:51 3/2/18


03:50


 


Activated Partial


Thromboplast Time 32.0 SEC 


  32.2 SEC 


  58.7 SEC 


  


 


 


Prothrombin Time  12.3 SEC   


 


Prothromb Time International


Ratio 


  1.2 RATIO 


  


  


 


 


White Blood Count    13.6 TH/MM3 


 


Red Blood Count    3.22 MIL/MM3 


 


Hemoglobin    7.3 GM/DL 


 


Hematocrit    22.6 % 


 


Mean Corpuscular Volume    70.1 FL 


 


Mean Corpuscular Hemoglobin    22.8 PG 


 


Mean Corpuscular Hemoglobin


Concent 


  


  


  32.6 % 


 


 


Red Cell Distribution Width    17.6 % 


 


Platelet Count    250 TH/MM3 


 


Mean Platelet Volume    8.5 FL 


 


Blood Urea Nitrogen    9 MG/DL 


 


Creatinine    0.72 MG/DL 


 


Random Glucose    141 MG/DL 


 


Calcium Level    8.4 MG/DL 


 


Phosphorus Level    4.8 MG/DL 


 


Magnesium Level    1.8 MG/DL 


 


Sodium Level    137 MEQ/L 


 


Potassium Level    4.2 MEQ/L 


 


Chloride Level    102 MEQ/L 


 


Carbon Dioxide Level    29.0 MEQ/L 


 


Anion Gap    6 MEQ/L 


 


Estimat Glomerular Filtration


Rate 


  


  


  92 ML/MIN 


 


 


Test


  3/2/18


05:36 


  


  


 


 


Activated Partial


Thromboplast Time 53.8 SEC 


  


  


  


 








Culture Results





Microbiology








 Date/Time


Source Procedure


Growth Status


 


 


 3/2/18 05:43


Blood Peripheral Aerobic Blood Culture


Pending Received


 


 3/2/18 05:43


Blood Peripheral Anaerobic Blood Culture


Pending Received





 3/2/18 05:36


Blood Peripheral Aerobic Blood Culture


Pending Received


 


 3/2/18 05:36


Blood Peripheral Anaerobic Blood Culture


Pending Received











Administered Medications








 Medications


  (Trade)  Dose


 Ordered  Sig/Nikhil


 Route


 PRN Reason  Start Time


 Stop Time Status Last Admin


Dose Admin


 


 Sodium Chloride


  (NS Flush)  2 ml  BID


 IV FLUSH


   2/24/18 09:00


    3/2/18 07:53


 


 


 Acetaminophen


  (Tylenol)  650 mg  Q6H  PRN


 PO


 fever  2/23/18 22:00


    3/1/18 23:41


 


 


 Temazepam


  (Restoril)  15 mg  HS  PRN


 PO


 INSOMNIA  2/23/18 22:00


    3/1/18 23:04


 


 


 Chlorhexidine


 Gluconate


  (Chlorhexidine


 2% Cloth)  


 Taper  DAILY@04


 TOP


   2/24/18 04:00


 2/20/19 03:59  3/1/18 20:59


 


 


 Senna/Docusate


 Sodium


  (Arlen-Colace)  1 tab  BID


 PO


   2/24/18 09:00


    3/1/18 20:11


 


 


 Norepinephrine


 Bitartrate 4 mg/


 Sodium Chloride  250 ml @ 


 7.5 mls/hr  TITRATE  PRN


 IV


 Blood pressure management  2/24/18 09:15


    2/25/18 14:44


 


 


 Ampicillin Sodium


 2000 mg/Sodium


 Chloride  100 ml @ 


 400 mls/hr  Q6H


 IV


   2/24/18 14:00


    3/2/18 07:53


 


 


 Hydromorphone HCl


  (Dilaudid Pf Inj)  2 mg  Q4H  PRN


 IV PUSH


 PAIN SCALE 6 TO 10  2/25/18 15:00


    3/2/18 11:57


 


 


 Loperamide HCl


  (Imodium)  2 mg  Q4H  PRN


 PO


 Diarrhea  2/26/18 16:15


    2/28/18 14:32


 


 


 Gentamicin


 Sulfate 100 mg/


 Sodium Chloride  102.5 ml @ 


 100 mls/hr  Q8H


 IV


   2/26/18 21:00


    3/2/18 11:56


 


 


 Albuterol Sulfate


  (Albuterol Neb)  2.5 mg  Q2HR NEB  PRN


 NEB


 dyspnea  2/27/18 11:30


    3/2/18 02:47


 


 


 Acetaminophen/


 Hydrocodone Bitart


  (Norco  Mg)  1 tab  Q6H  PRN


 PO


 pain 1-5  2/27/18 11:30


    3/2/18 06:22


 


 


 Famotidine


  (Pepcid)  20 mg  BID


 PO


   2/27/18 21:00


    3/2/18 07:52


 


 


 Magnesium Oxide


  (Mag-Ox)  400 mg  Q12HR


 PO


   2/28/18 21:00


 3/2/18 20:59  3/2/18 07:53


 


 


 Argatroban 250 mg/


 Sodium Chloride  252.5 ml @ 


 3.11 mls/hr  TITRATE  PRN


 IV


 aPTT < 50  3/1/18 19:15


    3/1/18 20:14


 








Objective Remarks


GENERAL: Young woman, lying supine in bed, lethargic. 


SKIN: Warm and dry.


HEAD: Normocephalic.


EYES: No scleral icterus. No injection or drainage. 


NECK: Supple, trachea midline.


CARDIOVASCULAR: +S1/S2


RESPIRATORY: anterior fields with occasional rhonchi. on 3L O2 via NC


GASTROINTESTINAL: Abdomen soft, non-tender, nondistended. 


EXTREMITIES: left leg with diminished pulses. right foot pulse palpable 


NEUROLOGICAL: awake and alert. normal speech.





Assessment/Plan


Problem List:  


(1) arterial blood clot


Plan:  --on Argatroban (direct thrombin inhibitor)


-- no significant family history of thrombotic events to suggest hereditary 

antithrombin deficiency.  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 

impaired production from her liver disease. 


--has chronic active hepatitis C.  ++increased consumption from disseminated 

intravascular coagulation and acute illness, bacterial endocarditis.  


--Protein losses are also considered.  She had mild proteinuria when she first 

was admitted.


--Thrombin inactivates antithrombin.  This would abrogate the effect of 

unfractionated heparin and low molecular weight heparin.





Assessment


38y/o female admitted with recurrent endocarditis. hematology consulted for 

anticoagulation assistance.


h/o Recurrent bacterial endocarditis, bacteremia, history of prosthetic aortic 

valve and subsequent redo, chronic active hepatitis C, intravenous drug use, 

microcytic anemia, consistent with iron deficiency, left lower extremity 

arterial event, bacterial endocarditis with viridans streptococcus.





HPI (brought forward for continuity of care): history of IV drug abuse and  

recurrent endocarditis. had a relapse of her activity. has history of pulmonary 

embolism and infected valves. +significant congestive heart failure and 

presented to the emergency room on 02/23/2018, with sepsis. is followed by 

Infectious Disease for her endocarditis.  previously received antibiotic 

therapy for enterococcus faecalis and staph aureus. There is questionable 

compliance. Her current blood 


culture from 02/23/2018, shows viridans streptococcus.  Two out of two bottles 

were positive.  She is on ampicillin and gentamicin. course is complicated by 

cold left lower extremity, evaluated by  Vascular Surgery.  A CT angiogram 

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 

the proximal thigh.  There is satisfactory calf runoff present.  For this reason

, conservative management with anticoagulation is continued. was placed on 

unfractionated heparin.  Her heparin dose has been titrated from 1000 units/

hour to 2500 units/hour with a PTT remaining subtherapeutic.  Her last PTT is 

32 seconds from 03/01/2018, at 2 p.m.  For this reason, Hematology/Oncology is 

consulted.


Plan


1. continue Argatroban


2. monitor pulses


3. monitor CBC


Attending Statement


The exam, history, and the medical decision-making described in the above note 

were completed with the assistance of the mid-level provider. I reviewed and 

agree with the findings presented.  I attest that I had a face-to-face 

encounter with the patient on the same day, and personally performed and 

documented my assessment and findings in the medical record.





Lethargic but arousable.


Subjectively feel L leg is better, denies any bleeding on argatroban.


Noted PTT higher.


AT III activity still pending- of academic nature.


Continue argatroban until more stable from infection and sepsis, anticipate we 

maybe able to ultimately switch to [facility].











Zoe Chau Mar 2, 2018 12:42


Henny Smith MD Mar 2, 2018 18:04

## 2018-03-02 NOTE — HHI.CCPN
Subjective


Remarks/Hospital Course


37-year-old female that presents to the ED for evaluation of shortness of 

breath and swelling.  Patient has a significant history of endocarditis, IV 

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 

continues use of IV drug use that presents to the ED for evaluation of acute 

onset of shortness of breath with swelling.  Patient initially did not mention 

to anybody that she was doing drugs but she did tell me that she injected 

herself about 4 days ago.  She uses Dilaudid.  She states that she's been 

having fevers.  Patient is somewhat somnolent and hard to assess she doesn't 

really provide much information.  Family members at the bedside and he provides 

more information about the heart.  Per patient she'll he takes 2 medications.  

Patient asked if she is taking any antibiotics and she said yes but when I ask 

her what kind is she is taking currently she states that she has not been on 

antibiotics for some time.  Patient apparently does follow with a local 

cardiologist but she cannot tell me the name of it.  She states that she's been 

having swelling to her legs for the past month.  She denies any recent travel 

or injury.  No other medical issues at this time.  No allergies to medication.





2/24: remains critical on Dopamine 10 mcg/ min. Appears ill. Limited bedside 

echo did not show any large vegetation, but exam was limited, tricuspid and 

aortic valve not well visualized. Also states that had right breast abscess 2 

months ago, drained by herself. Now may have abscess vs scar tissue. Injects 

upper arm and bilateral breasts. US of right breast ordered


2/25: Remains on 3 mcg/min of Levophed.  Complaints of severe left lower 

extremity pain.  Erythematous purpuric purple discoloration of the left lower 

anterior segment left dorsum of the foot with cyanotic nailbeds. Weak DP pulses+

. 2D Echo failed to reveal definite vegetation. Will consider FLORENCIO


2/26: Continues to be on Levophed 3 mcg/min, remains critical.  We are left 

lower extremity pain but slightly better.  Currently on heparin not therapeutic 

yet.  CTA with runoff showed Left renal cortical infarction. 5-6 cm length 

total occlusion of the left superficial femoral artery in the proximal thigh 

but good distal opacification.  Dr. Souza recommended IV heparin no surgical 

intervention at this time.  Patient is still at high risk for further embolic 

episodes.  Blood cultures growing strep viridans.  Also patient complains about 

epigastric and left-sided abdominal pain and left-sided chest pain which is 

more pleuritic.  Pain is severe on deep inspiration


2/27: Resting in bed in no acute distress.  Dopplerable lower extremity pulses 

noted.  Remains on heparin drip.  Vascular surgery continues to follow.


2/28: Resting in bed in mild respiratory distress.  Continues to cough.  

Nonproductive.  Left lower extremity leg improved.  Reviewed vascular surgeries 

note.  Likely will require long-term medical regulation post hospitalization


3/1: Improve respiratory status today.  Pain in left lower extremity much 

improved.  Tolerating diet.





Subjective





3/2: Currently on nasal cannula.  Switch to Argatroban overnight by hematology.

  Okayed with vascular surgery.  Pain in left lower extremity slowly improving 

day by day.





Objective





Vital Signs








  Date Time  Temp Pulse Resp B/P (MAP) Pulse Ox O2 Delivery O2 Flow Rate FiO2


 


3/2/18 14:00  116      


 


3/2/18 12:00 100.3  30 101/60 (74) 99   


 


3/2/18 08:00      Nasal Cannula 3.00 


 


3/1/18 20:10        21














Intake and Output   


 


 3/2/18 3/2/18 3/2/18





 07:59 15:59 23:59


 


Intake Total 525 ml  


 


Output Total 1375 ml  


 


Balance -850 ml  








Result Diagram:  


3/2/18 0350                                                                    

            3/2/18 0350





Other Results





Microbiology








 Date/Time


Source Procedure


Growth Status


 


 


 3/2/18 05:43


Blood Peripheral Aerobic Blood Culture


Pending Received


 


 3/2/18 05:43


Blood Peripheral Anaerobic Blood Culture


Pending Received


 


 2/25/18 21:00


Stool Stool Stool Occult Blood (GILA) - Final


HEMOCCULT NEGATIVE Complete


 


 2/23/18 22:30


Urine Suprapubic Urine Urine Culture - Final


NO GROWTH IN 48 HOURS. Complete








Imaging





Last Impressions








Chest X-Ray 3/1/18 0600 Signed





Impressions: 





 Service Date/Time:  Thursday, March 1, 2018 03:05 - CONCLUSION:  1. Stable 





 patchy bilateral lower lung zone airspace disease. 2. No significant interval 





 change.     Rj Whitten MD 


 


Chest CT 2/26/18 0000 Signed





Impressions: 





 Service Date/Time:  Monday, February 26, 2018 12:41 - CONCLUSION:  1. There 

are 





 at least 5 pulmonary nodules present bilaterally with the largest measuring 19 





 mm. None demonstrate cavitation but it is possible that these represent septic 





 emboli. Suggest followup to assess for change. 2. Splenomegaly with subtle 





 wedge-shaped areas of low-density in the mid spleen which could represent 





 infarcts. 3. Mild cardiomegaly in this patient post aortic valve replacement. 





 Low density of the cardiac blood pool suggests anemia.      Ron Melgar MD 


 


Aorta w/Runoff CTA 2/25/18 0000 Signed





Impressions: 





 Service Date/Time:  Sunday, February 25, 2018 12:47 - CONCLUSION:  Left renal 





 cortical infarction. 5-6 cm length total occlusion of the left superficial 





 femoral artery in the proximal thigh. Nodular parenchymal opacities in the 

lung 





 bases bilaterally See above discussion.     Ron Cruz MD 


 


Renal Ultrasound 2/24/18 0000 Signed





Impressions: 





 Service Date/Time:  Saturday, February 24, 2018 10:53 - CONCLUSION:  1. No 





 evidence of hydronephrosis. 2. Thickening of the urinary bladder wall a 1.1 

cm. 





 3. Prominent splenomegaly.     Elgin Walton MD 


 


Breast Ultrasound 2/24/18 0000 Signed





Impressions: 





 Service Date/Time:  Saturday, February 24, 2018 10:48 - CONCLUSION:  Negative 





 targeted right breast ultrasound examination.     Ron Brown MD 


 


Lower Extremity Ultrasound 2/23/18 0000 Signed





Impressions: 





 Service Date/Time:  Friday, February 23, 2018 21:22 - CONCLUSION:  1. No 





 sonographic evidence for lower extremity DVT. 2. Incidental note of bilateral 





 inguinal adenopathy with the largest on the right measuring 3.3 cm and the 





 largest on the left measuring 2.6 cm. This finding is nonspecific but is 





 typically reactive in etiology.     Rj Whitten MD 








Objective Remarks


GENERAL: 37-year-old  female resting in bed in no acute distress


SKIN: Warm and dry.  Patient does appear to have puncture wounds from where she 

states been injecting recently in her arms, bilateral upper breast.


HEAD: Atraumatic. Normocephalic. 


EYES: Pupils equal and round. No scleral icterus. No injection or drainage. 


ENT: No nasal bleeding or discharge.  Mucous membranes pink and moist.  


NECK: Trachea midline. No JVD. 


CHEST: Bilateral upper breast has puncture wounds from injection.  Pain to 

point palpation left upper thorax


CARDIOVASCULAR: 2/6 pansystolic murmurat the left lower sternal border.  


RESPIRATORY: Few fine crackles present bilaterally.  No wheezing


GASTROINTESTINAL: Abdomen soft, tender to deep palpation.  No guarding 

rigidity.  Positive hepatosplenomegaly


MUSCULOSKELETAL: 2+ pitting edema to the lower extremities.  Erythematous 

purpuric rash of the left lower extremity anterior shin and foot, punctate 

lesion plantar right great toe. Left leg extremely tender below the left knee.  

Dopplerable DP pulses bilaterally


NEUROLOGICAL: Awake and alert. Motor grossly within normal limits. Five out of 

5 muscle strength in the arms and legs.  Normal speech.


Urinary Catheter:  No


Assessment to:  Continue


Vascular Central Line Catheter:  Yes


Assessment to:  Continue


Date of Insertion:  Feb 24, 2018


Line:  Central Venous Catheter


Side:  Right


Location:  Internal, Jugular





A/P


Assessment and Plan


Neuro/Psych:





IVDU -hydromorphone


History of THC use





- Continue pain control hydrocodone/acetaminophen 10/325 1 tablet every 6 hours 

as needed pain 1 through 5 with hydromorphone 2 mg IV every 4 hours as needed 

pain 6-10 (patient has severpain from ischemic leg)


- Acetaminophen 650 mg p.o. every 6 hours as needed fever





CVS/ID





Septic  shock


Infective endocarditis involving aortic prosthetic valve


Multiple septic emboli including pulmonary, splenic and vascular


History of fungal and bacterial prosthetic valve endocarditis


Strep viridans bacteremia


5-6 cm length total occlusion of the left superficial femoral artery in the 

proximal thigh





- Recurrent aortic prosthetic valve endocarditis -initially placed 2011 with 

redo 2016. - Now with strep viridans in blood cultures 2/23


- Broad-spectrum antibiotic with ampicillin and gentamicin.


   Previously treated with vancomycin and ceftriaxone


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 

April 2017 for tricuspid valve PVE  - Candida parapsilosis , Streptococci


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


- Vascular surgery Dr. Sears following


- Anticoagulation with argatroban gtt at 0.75 mcg/kg/min


- Infectious disease Dr. Carr


Norepinephrine to maintain mean arterial pressures greater than equal to 65


- A FLORENCIO at this point will not 


- Extensive counselling given about IP Rehab





Microbiology





2/27 -blood cultures 2 -pending


2/23 -urine culture -no growth


2/23 -blood cultures 2 -strep viridians





Resp:





Acute respiratory insufficiency


5 pulmonary nodules likely septic emboli





-Nasal cannula to keep oxygen saturation above 92%


-Incentives spirometry while awake


-As needed albuterol aerosols every 2 hours.  Dyspnea


- CT thorax revealed 5 bilateral pulmonary lesions/nodules largest which is 19 

mm.  No cavitation noted but likely septic emboli.





GI:





Hepatitis C


Splenic infarction


Hypoalbuminemia





- Heart healthy diet


-Famotidine for GI prophylaxis


-Docusate sodium/senna 1 tablet twice daily for bowel regimen








Renal/:





Acute on chronic kidney disease


Left renal cortical infarction





- Acute renal failure secondary to ATN and dehydration


- CT imaging revealed left renal cortical infarction


- Renal ultrasound-thickening of urinary bladder





Endo:





Sliding scale insulin if indicated to maintain euglycemia





Heme:





Microcytic anemia


Leukocytosis





- Monitor CBC CMP and coags


-Does not meet transfusion threshold at this time





FEN:





Hypomagnesia





Replace electrolytes as clinically indicated.  2 g mag sulfate IV 1 now.





MSK:





History of right breast abscess


Elevated BMI





- Ultrasound of the left lower extremity negative for DVT


- Right breast ultrasound negative for abscess


-Weight loss encouraged





DVT GI prophylaxis





- argatroban gtt


- famotidine





Level 2 follow-up











Bryson Bustos MD Mar 2, 2018 16:30

## 2018-03-02 NOTE — HHI.IDPN
Note


Infectious Disease Note








Patient feels okay.


Temperature spike of 101 yesterday.


Has occasional coughing spells.


No sputum production.


Shortness of breath with exertion. 


Continues to have pain in the left leg and left foot.  


Awake and alert. 


No chest pain.





37-year-old white female who has a history of endocarditis and has


been admitted several times for the same.  The patient developed shortness of


breath, fever and chills, and  presented to the emergency department.  The


patient is noted to be actively using IV drugs.  She has history of significant


CHF from prior valvular heart disease related to endocarditis.  She states that


she started feeling short of breath about a week ago and the shortness of


breath worsened.  After she completed IV antibiotics in October she was


put on doxycycline prophylaxis.  She reports that she has not been taking the


doxycycline.  


 


PAST MEDICAL HISTORY:


Hepatitis C.


IV drug use.


prosthetic aortic valve replacement in 2011 and then redo aortic


valve replacement in June 2016 





MEDICATIONS:


1. Gentamicin.


2. Ampicillin.








Current Medications








 Medications


  (Trade)  Dose


 Ordered  Sig/Nikhil


 Route


 PRN Reason  Start Time


 Stop Time Status Last Admin


Dose Admin


 


 Sodium Chloride


  (NS Flush)  2 ml  UNSCH  PRN


 IV FLUSH


 FLUSH AFTER USING IV ACCESS  2/23/18 22:00


     


 


 


 Sodium Chloride


  (NS Flush)  2 ml  BID


 IV FLUSH


   2/24/18 09:00


    3/2/18 07:53


 


 


 Acetaminophen


  (Tylenol)  650 mg  Q6H  PRN


 PO


 fever  2/23/18 22:00


    3/1/18 23:41


 


 


 Ondansetron HCl


  (Zofran Inj)  4 mg  Q6H  PRN


 IV PUSH


 NAUSEA OR VOMITING  2/23/18 22:00


     


 


 


 Temazepam


  (Restoril)  15 mg  HS  PRN


 PO


 INSOMNIA  2/23/18 22:00


    3/1/18 23:04


 


 


 Miscellaneous


 Information  1  Q361D


 XX


   2/23/18 22:00


     


 


 


 Chlorhexidine


 Gluconate


  (Chlorhexidine


 2% Cloth)  


 Taper  DAILY@04


 TOP


   2/24/18 04:00


 2/20/19 03:59  3/1/18 20:59


 


 


 Chlorhexidine


 Gluconate


  (Chlorhexidine


 2% Cloth)  3 pack  UNSCH  PRN


 TOP


 HYGIENIC CARE  2/23/18 22:00


     


 


 


 Senna/Docusate


 Sodium


  (Arlen-Colace)  1 tab  BID


 PO


   2/24/18 09:00


    3/1/18 20:11


 


 


 Magnesium


 Hydroxide


  (Milk Of


 Magnesia Liq)  30 ml  Q12H  PRN


 PO


 Mild constipation  2/23/18 22:00


     


 


 


 Sennosides


  (Senokot)  17.2 mg  Q12H  PRN


 PO


 Moderate constipation  2/23/18 22:00


     


 


 


 Bisacodyl


  (Dulcolax Supp)  10 mg  DAILY  PRN


 RECTAL


 SEVERE CONSITIPATION  2/23/18 22:00


     


 


 


 Lactulose


  (Lactulose Liq)  30 ml  DAILY  PRN


 PO


 SEVERE CONSITIPATION  2/23/18 22:00


     


 


 


 Norepinephrine


 Bitartrate 4 mg/


 Sodium Chloride  250 ml @ 


 7.5 mls/hr  TITRATE  PRN


 IV


 Blood pressure management  2/24/18 09:15


    2/25/18 14:44


 


 


 Terbutaline


 Sulfate


  (Brethine Inj)  1 mg  UNSCH  PRN


 SQ


 For Extravasation  2/24/18 09:15


     


 


 


 Ampicillin Sodium


 2000 mg/Sodium


 Chloride  100 ml @ 


 400 mls/hr  Q6H


 IV


   2/24/18 14:00


    3/2/18 07:53


 


 


 Hydromorphone HCl


  (Dilaudid Pf Inj)  2 mg  Q4H  PRN


 IV PUSH


 PAIN SCALE 6 TO 10  2/25/18 15:00


    3/2/18 04:09


 


 


 Loperamide HCl


  (Imodium)  2 mg  Q4H  PRN


 PO


 Diarrhea  2/26/18 16:15


    2/28/18 14:32


 


 


 Gentamicin


 Sulfate 100 mg/


 Sodium Chloride  102.5 ml @ 


 100 mls/hr  Q8H


 IV


   2/26/18 21:00


    3/2/18 04:26


 


 


 Albuterol Sulfate


  (Albuterol Neb)  2.5 mg  Q2HR NEB  PRN


 NEB


 dyspnea  2/27/18 11:30


    3/2/18 02:47


 


 


 Acetaminophen/


 Hydrocodone Bitart


  (Norco  Mg)  1 tab  Q6H  PRN


 PO


 pain 1-5  2/27/18 11:30


    3/2/18 06:22


 


 


 Famotidine


  (Pepcid)  20 mg  BID


 PO


   2/27/18 21:00


    3/2/18 07:52


 


 


 Magnesium Oxide


  (Mag-Ox)  400 mg  Q12HR


 PO


   2/28/18 21:00


 3/2/18 20:59  3/2/18 07:53


 


 


 Argatroban 250 mg/


 Sodium Chloride  252.5 ml @ 


 3.11 mls/hr  TITRATE  PRN


 IV


 aPTT < 50  3/1/18 19:15


    3/1/18 20:14


 








SOCIAL HISTORY:


Positive occasional alcohol.  No tobacco. IV drug use in the form of Dilaudid,


oxycodone.  The patient also uses marijuana.


 








OBJECTIVE:








Vital Signs








  Date Time  Temp Pulse Resp B/P (MAP) Pulse Ox O2 Delivery O2 Flow Rate FiO2


 


3/2/18 10:00  111      


 


3/2/18 08:00 98.9 114 31 96/62 (73) 95   


 


3/2/18 08:00     94 Nasal Cannula 3.00 


 


3/2/18 08:00  114      


 


3/2/18 06:00  111      


 


3/2/18 04:00  111      


 


3/2/18 04:00 98.7 92 25 104/63 (77) 98   


 


3/2/18 02:00  111      


 


3/2/18 00:16 99.9 117 25 113/70 (84) 98   


 


3/2/18 00:00 101.3 116 25 113/70 (84) 98   


 


3/2/18 00:00  109      


 


3/1/18 22:00  109      


 


3/1/18 20:10     98   21


 


3/1/18 20:00 97.7 109 25 112/65 (81) 98   


 


3/1/18 20:00  109      


 


3/1/18 18:00  110      


 


3/1/18 16:00 97.7 101 25 119/78 (92) 98   


 


3/1/18 16:00  101      


 


3/1/18 14:00  100      


 


3/1/18 12:00  97      


 


3/1/18 12:00 97.9 97 24 100/73 (82) 95   














Laboratory Tests








Test


  3/1/18


04:21 3/2/18


03:50


 


White Blood Count 14.4 TH/MM3  13.6 TH/MM3 


 


Red Blood Count 3.26 MIL/MM3  3.22 MIL/MM3 


 


Hemoglobin 7.4 GM/DL  7.3 GM/DL 


 


Hematocrit 22.8 %  22.6 % 


 


Mean Corpuscular Volume 70.0 FL  70.1 FL 


 


Mean Corpuscular Hemoglobin 22.7 PG  22.8 PG 


 


Mean Corpuscular Hemoglobin


Concent 32.4 % 


  32.6 % 


 


 


Red Cell Distribution Width 17.8 %  17.6 % 


 


Platelet Count 213 TH/MM3  250 TH/MM3 


 


Mean Platelet Volume 8.7 FL  8.5 FL 


 


Neutrophils (%) (Auto) 85.6 %  


 


Lymphocytes (%) (Auto) 7.3 %  


 


Monocytes (%) (Auto) 4.8 %  


 


Eosinophils (%) (Auto) 1.7 %  


 


Basophils (%) (Auto) 0.6 %  


 


Neutrophils # (Auto) 12.3 TH/MM3  


 


Lymphocytes # (Auto) 1.1 TH/MM3  


 


Monocytes # (Auto) 0.7 TH/MM3  


 


Eosinophils # (Auto) 0.2 TH/MM3  


 


Basophils # (Auto) 0.1 TH/MM3  


 


CBC Comment DIFF FINAL  


 


Differential Comment   








Laboratory Tests








Test


  3/1/18


04:21 3/2/18


03:50


 


Blood Urea Nitrogen 7 MG/DL  9 MG/DL 


 


Creatinine 0.59 MG/DL  0.72 MG/DL 


 


Random Glucose 94 MG/DL  141 MG/DL 


 


Total Protein 6.7 GM/DL  


 


Albumin 2.3 GM/DL  


 


Calcium Level 8.2 MG/DL  8.4 MG/DL 


 


Phosphorus Level 3.7 MG/DL  4.8 MG/DL 


 


Magnesium Level 1.9 MG/DL  1.8 MG/DL 


 


Alkaline Phosphatase 113 U/L  


 


Aspartate Amino Transf


(AST/SGOT) 40 U/L 


  


 


 


Alanine Aminotransferase


(ALT/SGPT) 19 U/L 


  


 


 


Total Bilirubin 0.6 MG/DL  


 


Sodium Level 134 MEQ/L  137 MEQ/L 


 


Potassium Level 4.4 MEQ/L  4.2 MEQ/L 


 


Chloride Level 101 MEQ/L  102 MEQ/L 


 


Carbon Dioxide Level 26.6 MEQ/L  29.0 MEQ/L 


 


Anion Gap 6 MEQ/L  6 MEQ/L 


 


Estimat Glomerular Filtration


Rate 116 ML/MIN 


  92 ML/MIN 


 


 


Ferritin 411 NG/ML  


 


Human Chorionic Gonadotropin,


Quant LESS THAN 1


MIU/ML 


 








Microbiology








 Date/Time


Source Procedure


Growth Status


 


 


 3/2/18 05:43


Blood Peripheral Aerobic Blood Culture


Pending Received


 


 3/2/18 05:43


Blood Peripheral Anaerobic Blood Culture


Pending Received





 3/2/18 05:36


Blood Peripheral Aerobic Blood Culture


Pending Received


 


 3/2/18 05:36


Blood Peripheral Anaerobic Blood Culture


Pending Received











Microbiology








 Date/Time


Source Procedure


Growth Status


 


 


 2/27/18 04:25


Blood Peripheral Aerobic Blood Culture - Preliminary


NO GROWTH IN 2 DAYS Resulted


 


 2/27/18 04:25


Blood Peripheral Anaerobic Blood Culture - Preliminary


NO GROWTH IN 2 DAYS Resulted





 2/27/18 04:20


Blood Peripheral Aerobic Blood Culture - Preliminary


NO GROWTH IN 2 DAYS Resulted


 


 2/27/18 04:20


Blood Peripheral Anaerobic Blood Culture - Preliminary


NO GROWTH IN 2 DAYS Resulted











Microbiology








 Date/Time


Source Procedure


Growth Status


 


 


 2/23/18 20:15


Blood Peripheral Aerobic Blood Culture - Final


Viridans Streptococcus Grp Complete


 


 2/23/18 20:15 Anaerobic Blood Culture - Final


Viridans Streptococcus Grp Complete





 2/23/18 20:10


Blood Peripheral Aerobic Blood Culture - Final


Viridans Streptococcus Grp Complete


 


 2/23/18 20:10 Anaerobic Blood Culture - Final


Viridans Streptococcus Grp Complete


 


 2/25/18 21:00


Stool Stool Stool Occult Blood (GILA) - Final


HEMOCCULT NEGATIVE Complete


 


 2/23/18 22:30


Urine Suprapubic Urine Urine Culture - Final


NO GROWTH IN 48 HOURS. Complete











IMAGING:

















Chest X-Ray 2/26/18 0600 Signed





Impressions: 





 Service Date/Time:  Monday, February 26, 2018 03:30 - CONCLUSION:  1. Low lung 





 volumes with bibasilar mild airspace disease, likely atelectasis.     Rj Whitten MD 


 


Aorta w/Runoff CTA 2/25/18 0000 Signed





Impressions: 





 Service Date/Time:  Sunday, February 25, 2018 12:47 - CONCLUSION:  Left renal 





 cortical infarction. 5-6 cm length total occlusion of the left superficial 





 femoral artery in the proximal thigh. Nodular parenchymal opacities in the 

lung 





 bases bilaterally See above discussion.     Ron Cruz MD 




















Renal Ultrasound 2/24/18 0000 Signed





Impressions: 





 Service Date/Time:  Saturday, February 24, 2018 10:53 - CONCLUSION:  1. No 





 evidence of hydronephrosis. 2. Thickening of the urinary bladder wall a 1.1 

cm. 





 3. Prominent splenomegaly.     Elgin Walton MD 


 


Chest X-Ray 2/24/18 0000 Signed





Impressions: 





 Service Date/Time:  Saturday, February 24, 2018 09:36 - CONCLUSION:  Right 





 internal jugular central line in place with the tip overlying the SVC. A 





 pneumothorax is not seen.     Ron Brown MD 


 


Breast Ultrasound 2/24/18 0000 Signed





Impressions: 





 Service Date/Time:  Saturday, February 24, 2018 10:48 - CONCLUSION:  Negative 





 targeted right breast ultrasound examination.     Ron Brown MD 


 


Lower Extremity Ultrasound 2/23/18 0000 Signed





Impressions: 





 Service Date/Time:  Friday, February 23, 2018 21:22 - CONCLUSION:  1. No 





 sonographic evidence for lower extremity DVT. 2. Incidental note of bilateral 





 inguinal adenopathy with the largest on the right measuring 3.3 cm and the 





 largest on the left measuring 2.6 cm. This finding is nonspecific but is 





 typically reactive in etiology.     Rj Whitten MD 














PHYSICAL EXAMINATION


GENERAL: No acute distress. 


HEENT:  Head atraumatic.  Extraocular movements grossly intact.  Pupils


reactive to light.  No icterus.  Oropharynx moist mucosa, no lesions.


Neck:  Supple without adenopathy.


Lungs: Coarse bilateral rhonchi.  Mild wheezing at the bases.


Heart: 5/6 blowing systolic murmur at the upper right sternal border.


Abdomen: Bowel sounds present, soft, obese, nontender.


Extremities: Diffuse 2+ edema of the lower extremities. Lesions at Left tibia 


distally and left foot and great toe are more red and not purpuric as before.


Toes 3 and 5 are cyanotic. The leg is tender on palpation.  It is warm.  


No calf tenderness.   No nail bed hemorrhages.  


SKIN: no diffuse rash.


Neuro: No gross focal findings.


Psychiatric: calm and cooperative.





 


IMPRESSION


1. Sepsis. Strep Viridans. 


2. Bacteremia.  Strep viridans.


3. History of prostatic aortic valve and so likely recurrent prosthetic valve


endocarditis.


4. Left renal cortical and pulmonary and lower extremity septic emboli. 


5. Leukocytosis secondary to sepsis from showering of emboli


from endocarditis.


6. Acute renal failure. Kidney function improved. Stable. 


 


RECOMMENDATIONS


1. Continue Ampicillin intravenous.


2. Continue Gentamicin. now that the kidney function is improved but follow the 

renal function. 


3.  Monitor clinical response.


4.  Continue to monitor the left leg lesions.


Plan on 6 weeks of intravenous antibiotic treatment.


I do not think we need to get FLORENCIO since it will not alter treatment.











Robinson Carr MD Mar 2, 2018 11:18

## 2018-03-03 VITALS
DIASTOLIC BLOOD PRESSURE: 63 MMHG | TEMPERATURE: 98.6 F | OXYGEN SATURATION: 98 % | HEART RATE: 107 BPM | RESPIRATION RATE: 26 BRPM | SYSTOLIC BLOOD PRESSURE: 104 MMHG

## 2018-03-03 VITALS
TEMPERATURE: 99.6 F | OXYGEN SATURATION: 96 % | HEART RATE: 114 BPM | DIASTOLIC BLOOD PRESSURE: 61 MMHG | RESPIRATION RATE: 23 BRPM | SYSTOLIC BLOOD PRESSURE: 96 MMHG

## 2018-03-03 VITALS
DIASTOLIC BLOOD PRESSURE: 64 MMHG | SYSTOLIC BLOOD PRESSURE: 94 MMHG | TEMPERATURE: 102.1 F | RESPIRATION RATE: 29 BRPM | OXYGEN SATURATION: 95 % | HEART RATE: 128 BPM

## 2018-03-03 VITALS
OXYGEN SATURATION: 98 % | TEMPERATURE: 99.5 F | RESPIRATION RATE: 41 BRPM | SYSTOLIC BLOOD PRESSURE: 100 MMHG | DIASTOLIC BLOOD PRESSURE: 68 MMHG | HEART RATE: 126 BPM

## 2018-03-03 VITALS
TEMPERATURE: 102.2 F | HEART RATE: 129 BPM | RESPIRATION RATE: 38 BRPM | SYSTOLIC BLOOD PRESSURE: 109 MMHG | OXYGEN SATURATION: 87 % | DIASTOLIC BLOOD PRESSURE: 78 MMHG

## 2018-03-03 VITALS
SYSTOLIC BLOOD PRESSURE: 106 MMHG | HEART RATE: 110 BPM | DIASTOLIC BLOOD PRESSURE: 73 MMHG | OXYGEN SATURATION: 99 % | RESPIRATION RATE: 30 BRPM | TEMPERATURE: 99.2 F

## 2018-03-03 VITALS — HEART RATE: 112 BPM

## 2018-03-03 VITALS — OXYGEN SATURATION: 97 % | HEART RATE: 125 BPM

## 2018-03-03 VITALS — HEART RATE: 123 BPM

## 2018-03-03 VITALS — HEART RATE: 118 BPM

## 2018-03-03 VITALS — HEART RATE: 108 BPM

## 2018-03-03 VITALS — OXYGEN SATURATION: 94 %

## 2018-03-03 LAB
BUN SERPL-MCNC: 10 MG/DL (ref 7–18)
CALCIUM SERPL-MCNC: 8.5 MG/DL (ref 8.5–10.1)
CHLORIDE SERPL-SCNC: 100 MEQ/L (ref 98–107)
CREAT SERPL-MCNC: 0.73 MG/DL (ref 0.5–1)
ERYTHROCYTE [DISTWIDTH] IN BLOOD BY AUTOMATED COUNT: 17.9 % (ref 11.6–17.2)
GFR SERPLBLD BASED ON 1.73 SQ M-ARVRAT: 91 ML/MIN (ref 89–?)
GLUCOSE SERPL-MCNC: 113 MG/DL (ref 74–106)
HCO3 BLD-SCNC: 28.2 MEQ/L (ref 21–32)
HCT VFR BLD CALC: 23 % (ref 35–46)
HGB BLD-MCNC: 7.4 GM/DL (ref 11.6–15.3)
INR PPP: 2.1 RATIO
MCH RBC QN AUTO: 22.7 PG (ref 27–34)
MCHC RBC AUTO-ENTMCNC: 32.3 % (ref 32–36)
MCV RBC AUTO: 70.3 FL (ref 80–100)
PLATELET # BLD: 272 TH/MM3 (ref 150–450)
PMV BLD AUTO: 8.5 FL (ref 7–11)
PROTHROMBIN TIME: 21.3 SEC (ref 9.8–11.6)
RBC # BLD AUTO: 3.27 MIL/MM3 (ref 4–5.3)
SODIUM SERPL-SCNC: 135 MEQ/L (ref 136–145)
WBC # BLD AUTO: 17.7 TH/MM3 (ref 4–11)

## 2018-03-03 RX ADMIN — HYDROMORPHONE HYDROCHLORIDE PRN MG: 2 INJECTION INTRAMUSCULAR; INTRAVENOUS; SUBCUTANEOUS at 04:11

## 2018-03-03 RX ADMIN — AMPICILLIN SODIUM SCH MLS/HR: 2 INJECTION, POWDER, FOR SOLUTION INTRAMUSCULAR; INTRAVENOUS at 21:30

## 2018-03-03 RX ADMIN — CLINDAMYCIN PHOSPHATE SCH MLS/HR: 150 INJECTION, SOLUTION INTRAMUSCULAR; INTRAVENOUS at 04:03

## 2018-03-03 RX ADMIN — FAMOTIDINE SCH MG: 20 TABLET, FILM COATED ORAL at 08:18

## 2018-03-03 RX ADMIN — STANDARDIZED SENNA CONCENTRATE AND DOCUSATE SODIUM SCH TAB: 8.6; 5 TABLET, FILM COATED ORAL at 08:18

## 2018-03-03 RX ADMIN — Medication SCH ML: at 08:18

## 2018-03-03 RX ADMIN — AMPICILLIN SODIUM SCH MLS/HR: 2 INJECTION, POWDER, FOR SOLUTION INTRAMUSCULAR; INTRAVENOUS at 13:38

## 2018-03-03 RX ADMIN — ARGATROBAN PRN MLS/HR: 100 INJECTION, SOLUTION INTRAVENOUS at 18:58

## 2018-03-03 RX ADMIN — ACETAMINOPHEN PRN MG: 325 TABLET ORAL at 21:31

## 2018-03-03 RX ADMIN — HYDROCODONE BITARTRATE AND ACETAMINOPHEN PRN TAB: 10; 325 TABLET ORAL at 10:08

## 2018-03-03 RX ADMIN — Medication SCH ML: at 21:32

## 2018-03-03 RX ADMIN — HYDROCODONE BITARTRATE AND ACETAMINOPHEN PRN TAB: 10; 325 TABLET ORAL at 21:31

## 2018-03-03 RX ADMIN — CLINDAMYCIN PHOSPHATE SCH MLS/HR: 150 INJECTION, SOLUTION INTRAMUSCULAR; INTRAVENOUS at 11:57

## 2018-03-03 RX ADMIN — HYDROMORPHONE HYDROCHLORIDE PRN MG: 2 INJECTION INTRAMUSCULAR; INTRAVENOUS; SUBCUTANEOUS at 19:50

## 2018-03-03 RX ADMIN — CLINDAMYCIN PHOSPHATE SCH MLS/HR: 150 INJECTION, SOLUTION INTRAMUSCULAR; INTRAVENOUS at 21:42

## 2018-03-03 RX ADMIN — HYDROMORPHONE HYDROCHLORIDE PRN MG: 2 INJECTION INTRAMUSCULAR; INTRAVENOUS; SUBCUTANEOUS at 08:18

## 2018-03-03 RX ADMIN — FAMOTIDINE SCH MG: 20 TABLET, FILM COATED ORAL at 21:31

## 2018-03-03 RX ADMIN — AMPICILLIN SODIUM SCH MLS/HR: 2 INJECTION, POWDER, FOR SOLUTION INTRAMUSCULAR; INTRAVENOUS at 02:11

## 2018-03-03 RX ADMIN — HYDROMORPHONE HYDROCHLORIDE PRN MG: 2 INJECTION INTRAMUSCULAR; INTRAVENOUS; SUBCUTANEOUS at 14:30

## 2018-03-03 RX ADMIN — CHLORHEXIDINE GLUCONATE SCH PACK: 500 CLOTH TOPICAL at 03:09

## 2018-03-03 RX ADMIN — AMPICILLIN SODIUM SCH MLS/HR: 2 INJECTION, POWDER, FOR SOLUTION INTRAMUSCULAR; INTRAVENOUS at 08:18

## 2018-03-03 RX ADMIN — STANDARDIZED SENNA CONCENTRATE AND DOCUSATE SODIUM SCH TAB: 8.6; 5 TABLET, FILM COATED ORAL at 21:31

## 2018-03-03 NOTE — HHI.IDPN
Note


Infectious Disease Note


Id xcover for 





 is a 36 y/o CF, prosthetic aortic valve replacement in 2011 and then 

redo aortic valve replacement in June 2016, and prior h/o endocarditis who was 

on Doxy oral suppression. The patient developed shortness of breath, fever and 

chills, and  presented to the emergency department.  The patient is noted to be 

actively using IV drugs.  She has history of significant CHF from prior 

valvular heart disease related to endocarditis.  She states that she started 

feeling short of breath about a week ago and the shortness of breath worsened.  

After she completed IV antibiotics in October she was put on doxycycline 

prophylaxis.  She reports that she has not been taking the doxycycline.  





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 Patient complained of 

chest pain overnight.


CT chest repeated and significantly worse with increased septic emboli multiple 

on both sides.


Tmax 102 F


Has occasional coughing spells.


No sputum production.


Shortness of breath with exertion. 


Continues to have pain in the left leg and left foot.  


Awake and alert. 


 


PAST MEDICAL HISTORY:


Hepatitis C.


IV drug use.


prosthetic aortic valve replacement in 2011 and then redo aortic valve 

replacement in June 2016 





MEDICATIONS:


1. Gentamicin.


2. Ampicillin.





Current Medications








 Medications


  (Trade)  Dose


 Ordered  Sig/Nikhil


 Route  Start Time


 Stop Time Status Last Admin


 


  (NS Flush)  2 ml  UNSCH  PRN


 IV FLUSH  2/23/18 22:00


     


 


 


  (NS Flush)  2 ml  BID


 IV FLUSH  2/24/18 09:00


    3/3/18 08:18


 


 


  (Tylenol)  650 mg  Q6H  PRN


 PO  2/23/18 22:00


    3/2/18 23:57


 


 


  (Zofran Inj)  4 mg  Q6H  PRN


 IV PUSH  2/23/18 22:00


     


 


 


  (Restoril)  15 mg  HS  PRN


 PO  2/23/18 22:00


    3/1/18 23:04


 


 


 Miscellaneous


 Information  1  Q361D


 XX  2/23/18 22:00


     


 


 


  (Chlorhexidine


 2% Cloth)  


 Taper  DAILY@04


 TOP  2/24/18 04:00


 2/20/19 03:59  3/1/18 20:59


 


 


  (Chlorhexidine


 2% Cloth)  3 pack  UNSCH  PRN


 TOP  2/23/18 22:00


     


 


 


  (Arlen-Colace)  1 tab  BID


 PO  2/24/18 09:00


    3/1/18 20:11


 


 


  (Milk Of


 Magnesia Liq)  30 ml  Q12H  PRN


 PO  2/23/18 22:00


     


 


 


  (Senokot)  17.2 mg  Q12H  PRN


 PO  2/23/18 22:00


     


 


 


  (Dulcolax Supp)  10 mg  DAILY  PRN


 RECTAL  2/23/18 22:00


     


 


 


  (Lactulose Liq)  30 ml  DAILY  PRN


 PO  2/23/18 22:00


     


 


 


 Ampicillin Sodium


 2000 mg/Sodium


 Chloride  100 ml @ 


 400 mls/hr  Q6H


 IV  2/24/18 14:00


    3/3/18 13:38


 


 


  (Dilaudid Pf Inj)  2 mg  Q4H  PRN


 IV PUSH  2/25/18 15:00


    3/3/18 14:30


 


 


  (Imodium)  2 mg  Q4H  PRN


 PO  2/26/18 16:15


    2/28/18 14:32


 


 


 Gentamicin


 Sulfate 100 mg/


 Sodium Chloride  102.5 ml @ 


 100 mls/hr  Q8H


 IV  2/26/18 21:00


    3/3/18 11:57


 


 


  (Albuterol Neb)  2.5 mg  Q2HR NEB  PRN


 NEB  2/27/18 11:30


    3/2/18 02:47


 


 


  (Norco  Mg)  1 tab  Q6H  PRN


 PO  2/27/18 11:30


    3/3/18 10:08


 


 


  (Pepcid)  20 mg  BID


 PO  2/27/18 21:00


    3/3/18 08:18


 


 


 Argatroban 250 mg/


 Sodium Chloride  252.5 ml @ 


 3.11 mls/hr  TITRATE  PRN


 IV  3/1/18 19:15


    3/1/18 20:14


 


 


 Pharmacy Profile


 Note  0 ml @ 0


 mls/hr  UNSCH


 OTHER  3/3/18 16:45


     


 


 


 Vancomycin HCl


 2500 mg/Sodium


 Chloride  525 ml @ 


 250 mls/hr  ONCE  ONCE


 IV  3/3/18 18:00


 3/3/18 20:05   


 








SOCIAL HISTORY:


Positive occasional alcohol.  No tobacco. IV drug use in the form of Dilaudid,


oxycodone.  The patient also uses marijuana.





OBJECTIVE:





Vital Signs








  Date Time  Temp Pulse Resp B/P (MAP) Pulse Ox O2 Delivery O2 Flow Rate FiO2


 


3/3/18 16:00  110      


 


3/3/18 15:00   22     


 


3/3/18 14:00  108      


 


3/3/18 12:27   22     


 


3/3/18 12:00  114      


 


3/3/18 12:00  123      


 


3/3/18 12:00 99.6 114 23 96/61 (73) 96   


 


3/3/18 11:58   22     


 


3/3/18 10:00  123      


 


3/3/18 09:45     94 Nasal Cannula 2.00 


 


3/3/18 08:00  126      


 


3/3/18 08:00 99.5 126 41 100/68 (79) 98   


 


3/3/18 06:00  108      


 


3/3/18 04:00 98.6 107 26 104/63 (77) 98   


 


3/3/18 04:00  107      


 


3/3/18 02:00  118      


 


3/3/18 00:57   28     


 


3/3/18 00:00 102.1 128 29 94/64 (74) 95   


 


3/3/18 00:00  128      


 


3/2/18 22:00  127      


 


3/2/18 20:27     95 Nasal Cannula 3.00 


 


3/2/18 20:00  116      


 


3/2/18 20:00 99.4 116 30 97/62 (74) 100   


 


3/2/18 18:00  109      


 


3/2/18 17:46  180      











Laboratory Tests








Test


  3/2/18


03:50 3/3/18


03:45


 


White Blood Count 13.6 TH/MM3  17.7 TH/MM3 


 


Red Blood Count 3.22 MIL/MM3  3.27 MIL/MM3 


 


Hemoglobin 7.3 GM/DL  7.4 GM/DL 


 


Hematocrit 22.6 %  23.0 % 


 


Mean Corpuscular Volume 70.1 FL  70.3 FL 


 


Mean Corpuscular Hemoglobin 22.8 PG  22.7 PG 


 


Mean Corpuscular Hemoglobin


Concent 32.6 % 


  32.3 % 


 


 


Red Cell Distribution Width 17.6 %  17.9 % 


 


Platelet Count 250 TH/MM3  272 TH/MM3 


 


Mean Platelet Volume 8.5 FL  8.5 FL 








Laboratory Tests








Test


  3/2/18


03:50 3/3/18


03:45


 


Blood Urea Nitrogen 9 MG/DL  10 MG/DL 


 


Creatinine 0.72 MG/DL  0.73 MG/DL 


 


Random Glucose 141 MG/DL  113 MG/DL 


 


Calcium Level 8.4 MG/DL  8.5 MG/DL 


 


Phosphorus Level 4.8 MG/DL  


 


Magnesium Level 1.8 MG/DL  


 


Sodium Level 137 MEQ/L  135 MEQ/L 


 


Potassium Level 4.2 MEQ/L  4.5 MEQ/L 


 


Chloride Level 102 MEQ/L  100 MEQ/L 


 


Carbon Dioxide Level 29.0 MEQ/L  28.2 MEQ/L 


 


Anion Gap 6 MEQ/L  7 MEQ/L 


 


Estimat Glomerular Filtration


Rate 92 ML/MIN 


  91 ML/MIN 


 











Microbiology








 Date/Time


Source Procedure


Growth Status


 


 


 3/2/18 05:43


Blood Peripheral Aerobic Blood Culture - Preliminary


NO GROWTH IN 1 DAY Resulted


 


 3/2/18 05:43


Blood Peripheral Anaerobic Blood Culture - Preliminary


NO GROWTH IN 1 DAY Resulted


 


 2/25/18 21:00


Stool Stool Stool Occult Blood (GILA) - Final


HEMOCCULT NEGATIVE Complete


 


 2/23/18 22:30


Urine Suprapubic Urine Urine Culture - Final


NO GROWTH IN 48 HOURS. Complete




















 Date/Time


Source Procedure


Growth Status


 


 


 3/2/18 05:43


Blood Peripheral Aerobic Blood Culture - Preliminary


NO GROWTH IN 1 DAY Resulted


 


 3/2/18 05:43


Blood Peripheral Anaerobic Blood Culture - Preliminary


NO GROWTH IN 1 DAY Resulted





 3/2/18 05:36


Blood Peripheral Aerobic Blood Culture - Preliminary


NO GROWTH IN 1 DAY Resulted


 


 3/2/18 05:36


Blood Peripheral Anaerobic Blood Culture - Preliminary


NO GROWTH IN 1 DAY Resulted











Microbiology








 Date/Time


Source Procedure


Growth Status


 


 


 3/2/18 05:43


Blood Peripheral Aerobic Blood Culture


Pending Received


 


 3/2/18 05:43


Blood Peripheral Anaerobic Blood Culture


Pending Received





 3/2/18 05:36


Blood Peripheral Aerobic Blood Culture


Pending Received


 


 3/2/18 05:36


Blood Peripheral Anaerobic Blood Culture


Pending Received











Microbiology








 Date/Time


Source Procedure


Growth Status


 


 


 2/27/18 04:25


Blood Peripheral Aerobic Blood Culture - Preliminary


NO GROWTH IN 2 DAYS Resulted


 


 2/27/18 04:25


Blood Peripheral Anaerobic Blood Culture - Preliminary


NO GROWTH IN 2 DAYS Resulted





 2/27/18 04:20


Blood Peripheral Aerobic Blood Culture - Preliminary


NO GROWTH IN 2 DAYS Resulted


 


 2/27/18 04:20


Blood Peripheral Anaerobic Blood Culture - Preliminary


NO GROWTH IN 2 DAYS Resulted











Microbiology








 Date/Time


Source Procedure


Growth Status


 


 


 2/23/18 20:15


Blood Peripheral Aerobic Blood Culture - Final


Viridans Streptococcus Grp Complete


 


 2/23/18 20:15 Anaerobic Blood Culture - Final


Viridans Streptococcus Grp Complete





 2/23/18 20:10


Blood Peripheral Aerobic Blood Culture - Final


Viridans Streptococcus Grp Complete


 


 2/23/18 20:10 Anaerobic Blood Culture - Final


Viridans Streptococcus Grp Complete


 


 2/25/18 21:00


Stool Stool Stool Occult Blood (GILA) - Final


HEMOCCULT NEGATIVE Complete


 


 2/23/18 22:30


Urine Suprapubic Urine Urine Culture - Final


NO GROWTH IN 48 HOURS. Complete











IMAGING:





Last Impressions








Head CT 3/3/18 0000 Signed





Impressions: 





 Service Date/Time:  Saturday, March 3, 2018 10:22 - CONCLUSION:  1. Focal area 





 of decreased density involving the right parietal lobe. As a new finding from 





 the prior exam. This could relate to a small infarct. MRI of the brain with 





 gadolinium suggested to further evaluate. 2. Small lacunar infarction 

involving 





 the left centrum semiovale.     Scooter Dawson Jr., MD 


 


CT Angiography 3/3/18 0000 Signed





Impressions: 





 Service Date/Time:  Saturday, March 3, 2018 10:28 - CONCLUSION:  There 

extensive 





 pulmonary emboli bilaterally. There is near complete cut off of the right 

lower 





 lobe pulmonary artery. Prominent filling defects on the left as well. These 





 findings were relayed to Dr. Bustos immediately at the completion of the study.  





 Scattered pulmonary infiltrates, small pleural effusion and extensive 





 mediastinal lymphadenopathy as described above.      Won Sharp MD 


 


Chest X-Ray 3/1/18 0600 Signed





Impressions: 





 Service Date/Time:  Thursday, March 1, 2018 03:05 - CONCLUSION:  1. Stable 





 patchy bilateral lower lung zone airspace disease. 2. No significant interval 





 change.     Rj Whitten MD 


 


Chest CT 2/26/18 0000 Signed





Impressions: 





 Service Date/Time:  Monday, February 26, 2018 12:41 - CONCLUSION:  1. There 

are 





 at least 5 pulmonary nodules present bilaterally with the largest measuring 19 





 mm. None demonstrate cavitation but it is possible that these represent septic 





 emboli. Suggest followup to assess for change. 2. Splenomegaly with subtle 





 wedge-shaped areas of low-density in the mid spleen which could represent 





 infarcts. 3. Mild cardiomegaly in this patient post aortic valve replacement. 





 Low density of the cardiac blood pool suggests anemia.      Ron Melgar MD 


 


Aorta w/Runoff CTA 2/25/18 0000 Signed





Impressions: 





 Service Date/Time:  Sunday, February 25, 2018 12:47 - CONCLUSION:  Left renal 





 cortical infarction. 5-6 cm length total occlusion of the left superficial 





 femoral artery in the proximal thigh. Nodular parenchymal opacities in the 

lung 





 bases bilaterally See above discussion.     Ron Cruz MD 


 


Renal Ultrasound 2/24/18 0000 Signed





Impressions: 





 Service Date/Time:  Saturday, February 24, 2018 10:53 - CONCLUSION:  1. No 





 evidence of hydronephrosis. 2. Thickening of the urinary bladder wall a 1.1 

cm. 





 3. Prominent splenomegaly.     Elgin Walton MD 


 


Breast Ultrasound 2/24/18 0000 Signed





Impressions: 





 Service Date/Time:  Saturday, February 24, 2018 10:48 - CONCLUSION:  Negative 





 targeted right breast ultrasound examination.     Ron Brown MD 


 


Lower Extremity Ultrasound 2/23/18 0000 Signed





Impressions: 





 Service Date/Time:  Friday, February 23, 2018 21:22 - CONCLUSION:  1. No 





 sonographic evidence for lower extremity DVT. 2. Incidental note of bilateral 





 inguinal adenopathy with the largest on the right measuring 3.3 cm and the 





 largest on the left measuring 2.6 cm. This finding is nonspecific but is 





 typically reactive in etiology.     Rj Whitten MD 








PHYSICAL EXAMINATION


GENERAL: No acute distress. 


HEENT:  Head atraumatic.  Extraocular movements grossly intact.  Pupils


reactive to light.  No icterus.  Oropharynx moist mucosa, no lesions.


Neck:  Supple without adenopathy.


Lungs: Coarse bilateral rhonchi.  Mild wheezing at the bases.


Heart: 5/6 blowing systolic murmur at the upper right sternal border.


Abdomen: Bowel sounds present, soft, obese, nontender.


Extremities: Diffuse 2+ edema of the lower extremities. Lesions at Left tibia 


distally and left foot and great toe are more red and not purpuric as before.


Toes 3 and 5 are cyanotic. The leg is tender on palpation.  It is warm.  


No calf tenderness.   No nail bed hemorrhages.  


SKIN: no diffuse rash.


Neuro: No gross focal findings.


Psychiatric: calm and cooperative.





IMPRESSION


Sepsis. Strep Viridans. 


Bacteremia: Strep viridans.


Septic pulmonary emboli multiple.


TV valve endocarditis.


History of prosthetic aortic valve and so likely recurrent prosthetic valve


endocarditis.


Left renal cortical and pulmonary and lower extremity septic emboli. 


Leukocytosis secondary to sepsis from showering of emboli


from endocarditis.


Acute renal failure. Kidney function improved. Stable. 


 


RECOMMENDATIONS


Continue Ampicillin intravenous.


Continue Gentamicin. now that the kidney function is improved but follow the 

renal function. 


Repeat Blood cultures (1 PIV and 1 RIJ CL)


Start Vanco IV (target 15-20)


Severe TR on repeat ECHO with new vegetations and increased pulm emboli.


Recommend FLORENCIO to look for size, number of lesions and if any perivalvular 

abscess given severe TR.


If change in mentation consider MRI brain and LP.


If abdominal discomfort persists and leucocytosis persistent or worsening 

consider CT Abd/pelvis with contrast.


If any vision change consider MRI brain and opthalm consult to r.o 

endopthalmitis.


angel Poe


Critical thinking and decision making.











Olesya Lee MD Mar 3, 2018 17:11

## 2018-03-03 NOTE — RADRPT
EXAM DATE/TIME:  03/03/2018 10:28 

 

[facility] COMPARISON:     

No previous studies available for comparison.

 

 

INDICATIONS :     

Shortness of breath, history of septic emboli.

                      

 

IV CONTRAST:     

74 cc Omnipaque 350 (iohexol) IV 

 

 

RADIATION DOSE:     

22.87 CTDIvol (mGy) 

 

 

MEDICAL HISTORY :     

Cardiovascular disease. Congestive heart failure. Hepatitis C.Endocarditits.

 

SURGICAL HISTORY :      

None. 

 

ENCOUNTER:      

Initial

 

ACUITY:      

1 day

 

PAIN SCALE:      

0/10

 

LOCATION:       

Bilateral chest 

 

TECHNIQUE:     

Volumetric scanning of the chest was performed using a pulmonary embolism protocol MIP images were re
constructed.  Using automated exposure control and adjustment of the mA and/or kV according to patien
t size, radiation dose was kept as low as reasonably achievable to obtain optimal diagnostic quality 
images.   DICOM format image data is available electronically for review and comparison.  

 

Follow-up recommendations for detected pulmonary nodules are based at a minimum on nodule size and pa
tient risk factors according to Fleischner Society Guidelines.

 

FINDINGS:     

 

PULMONARY ARTERIES: 

There are extensive pulmonary filling defects in both lower lobe pulmonary arteries with near complet
e cut off of the right lower lobe pulmonary artery consistent with pulmonary emboli.

 

LUNGS:     

There is a nodule infiltrates in the right lung base and a few small pulmonology right upper lobe..

 

PLEURAE:     

There is a small right pleural effusion..

 

MEDIASTINUM:     

There is extensive adenopathy throughout the mediastinum and the hilum..

 

MUSCULOSKELETAL:     

Within normal limits for patient age.

 

MISCELLANEOUS:     

The visualized upper abdominal organs demonstrate no acute abnormality. The spleen is quite enlarged

 

CONCLUSION:     

There extensive pulmonary emboli bilaterally. There is near complete cut off of the right lower lobe 
pulmonary artery. Prominent filling defects on the left as well. These findings were relayed to Dr. MABLE harrell immediately at the completion of the study.

 

Scattered pulmonary infiltrates, small pleural effusion and extensive mediastinal lymphadenopathy as 
described above.

 

 

 

 

 Won Sharp MD on March 03, 2018 at 10:41           

Board Certified Radiologist.

 This report was verified electronically.

## 2018-03-03 NOTE — ECHRPT
Indication:   EF assesment of CHF

 

 CONCLUSIONS

 The left ventricular systolic function is low normal with an estimated ejection fraction in the rang
e of 50-

 55%. 

 Wall thickness is measured at the upper limits of normal. 

 Normal left ventricular size. 

 There is severe tricuspid regurgitation. 

 Aortic prosthesis with prolapse and possible vegetation.

 The estimated pulmonary arterial pressure is 65.7 mmHg. 

 Tricuspid valve prosthesis

 Mobile vegetation seen on the tricuspid valve. 2x4 cm

 There is a small pericardial effusion present. 

 

 BP:        /         HR: 100                      Rhythm:           Sinus

 

 MEASUREMENTS  (Male / Female) Normal Values       Technical Quality:Good

 2D ECHO

 LV Diastolic Diameter PLAX        5.1 cm                4.2 - 5.9 / 3.9 - 5.3 cm

 LV Systolic Diameter PLAX         4.0 cm                

 IVS Diastolic Thickness           1.2 cm                0.6 - 1.0 / 0.6 - 0.9 cm

 LVPW Diastolic Thickness          1.2 cm                0.6 - 1.0 / 0.6 - 0.9 cm

 LV Relative Wall Thickness        0.5                   

 LVOT Diameter                     2.0 cm                

 

 DOPPLER

 AV Peak Velocity                  460.0 cm/s            

 AV Peak Gradient                  84.6 mmHg             

 AV Mean Gradient                  64.0 mmHg             

 AV Velocity Time Integral         81.8 cm               

 TR Peak Velocity                  373.0 cm/s            

 TR Peak Gradient                  55.7 mmHg             

 Right Atrial Pressure             10.0 mmHg             

 Pulmonary Artery Systolic Pressu  65.7 mmHg             

 Right Ventricular Systolic Press  65.7 mmHg             

 

 

 FINDINGS

 

 LEFT VENTRICLE

 The left ventricular systolic function is low normal with an estimated ejection fraction in the rang
e of 50-

 55%. 

 Wall thickness is measured at the upper limits of normal. 

 Normal left ventricular size. 

 

 RIGHT VENTRICLE

 Normal right ventricular size and systolic function.  

 

 LEFT ATRIUM

 The left atrial size is normal.  

 

 RIGHT ATRIUM

 The right atrial size is normal.  

 

 ATRIAL SEPTUM

 Normal atrial septal thickness without atrial level shunting by limited color doppler interrogation.
  

 

 AORTA

 The aortic root and proximal ascending aorta are normal in size on limited imaging.  

 

 MITRAL VALVE

 Structurally normal mitral valve. No mitral valve stenosis or regurgitation.  

 

 AORTIC VALVE

 Appears prosthetic.  There is some prolapse into the LV which isunusual for a prosthesis and thus 

 concerning for vegetation also

 

 TRICUSPID VALVE

 Appears prosthetic

 There is severe tricuspid regurgitation. 

 The estimated pulmonary arterial pressure is 65.7 mmHg. 

 Mobile vegetation seen on the tricuspid valve.  2x4cm

 

 PULMONARY VALVE

 The pulmonary valve is not well visualized.  

 

 VESSELS

 The inferior vena cava is normal in size.  

 

 PERICARDIUM

 There is a small pericardial effusion present. 

 

 

 

 

  Lenora Leung MD, FACC

  (Electronically Signed)

  Final Date:03 March 2018 14:55

## 2018-03-03 NOTE — RADRPT
EXAM DATE/TIME:  03/03/2018 10:22 

 

[facility] COMPARISON:     

CT BRAIN W/O CONTRAST, August 21, 2017, 19:47.

 

 

INDICATIONS :     

Altered mental status. 

                      

 

RADIATION DOSE:     

48.19 CTDIvol (mGy) 

 

 

 

MEDICAL HISTORY :     

Cardiovascular disease. Hepatitis C. Endocarditis.

 

SURGICAL HISTORY :      

None. 

 

ENCOUNTER:      

Initial

 

ACUITY:      

1 day

 

PAIN SCALE:      

0/10

 

LOCATION:       

Bilateral cranial 

 

TECHNIQUE:     

Multiple contiguous axial images were obtained of the head.  Using automated exposure control and adj
ustment of the mA and/or kV according to patient size, radiation dose was kept as low as reasonably a
chievable to obtain optimal diagnostic quality images.   DICOM format image data is available electro
nically for review and comparison.  

 

FINDINGS:     

There is a focal area of decreased density involving the cortex and immediate subcortical white matte
r involving the right parietal lobe near the vertex. This is a new finding from the prior exam. A sma
ll chronic lacunar infarction is seen involving the left centrum semiovale. This is a new finding fro
m the prior study. The remaining brain parenchyma shows normal attenuation. No hemorrhage observe. Ve
ntricles are normal in size. Calvarium is intact. Paranasal sinuses and mastoid air cells are clear.

 

CONCLUSION:     

1. Focal area of decreased density involving the right parietal lobe. As a new finding from the prior
 exam. This could relate to a small infarct. MRI of the brain with gadolinium suggested to further ev
aluate.

2. Small lacunar infarction involving the left centrum semiovale.

 

 

 

 Scooter Dawson Jr., MD on March 03, 2018 at 10:28           

Board Certified Radiologist.

 This report was verified electronically.

## 2018-03-03 NOTE — HHI.CCPN
Subjective


Remarks/Hospital Course


37-year-old female that presents to the ED for evaluation of shortness of 

breath and swelling.  Patient has a significant history of endocarditis, IV 

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 

continues use of IV drug use that presents to the ED for evaluation of acute 

onset of shortness of breath with swelling.  Patient initially did not mention 

to anybody that she was doing drugs but she did tell me that she injected 

herself about 4 days ago.  She uses Dilaudid.  She states that she's been 

having fevers.  Patient is somewhat somnolent and hard to assess she doesn't 

really provide much information.  Family members at the bedside and he provides 

more information about the heart.  Per patient she'll he takes 2 medications.  

Patient asked if she is taking any antibiotics and she said yes but when I ask 

her what kind is she is taking currently she states that she has not been on 

antibiotics for some time.  Patient apparently does follow with a local 

cardiologist but she cannot tell me the name of it.  She states that she's been 

having swelling to her legs for the past month.  She denies any recent travel 

or injury.  No other medical issues at this time.  No allergies to medication.





2/24: remains critical on Dopamine 10 mcg/ min. Appears ill. Limited bedside 

echo did not show any large vegetation, but exam was limited, tricuspid and 

aortic valve not well visualized. Also states that had right breast abscess 2 

months ago, drained by herself. Now may have abscess vs scar tissue. Injects 

upper arm and bilateral breasts. US of right breast ordered


2/25: Remains on 3 mcg/min of Levophed.  Complaints of severe left lower 

extremity pain.  Erythematous purpuric purple discoloration of the left lower 

anterior segment left dorsum of the foot with cyanotic nailbeds. Weak DP pulses+

. 2D Echo failed to reveal definite vegetation. Will consider FLORENCIO


2/26: Continues to be on Levophed 3 mcg/min, remains critical.  We are left 

lower extremity pain but slightly better.  Currently on heparin not therapeutic 

yet.  CTA with runoff showed Left renal cortical infarction. 5-6 cm length 

total occlusion of the left superficial femoral artery in the proximal thigh 

but good distal opacification.  Dr. Souza recommended IV heparin no surgical 

intervention at this time.  Patient is still at high risk for further embolic 

episodes.  Blood cultures growing strep viridans.  Also patient complains about 

epigastric and left-sided abdominal pain and left-sided chest pain which is 

more pleuritic.  Pain is severe on deep inspiration


2/27: Resting in bed in no acute distress.  Dopplerable lower extremity pulses 

noted.  Remains on heparin drip.  Vascular surgery continues to follow.


2/28: Resting in bed in mild respiratory distress.  Continues to cough.  

Nonproductive.  Left lower extremity leg improved.  Reviewed vascular surgeries 

note.  Likely will require long-term medical regulation post hospitalization


3/1: Improve respiratory status today.  Pain in left lower extremity much 

improved.  Tolerating diet.


3/2: Currently on nasal cannula.  Switch to Argatroban overnight by hematology.

  Okayed with vascular surgery.  Pain in left lower extremity slowly improving 

day by day.





Subjective





3/3: Resting in bed in some mild respiratory distress.  Complaining of 

pleuritic chest pain specifically in the right flank region.  Worse with deep 

inspiration.  Reproducible with palpation.  CT brain/CT pulmonary angiogram 

ordered.  No neurological deficits noted on examination.





Objective





Vital Signs








  Date Time  Temp Pulse Resp B/P (MAP) Pulse Ox O2 Delivery O2 Flow Rate FiO2


 


3/3/18 09:45     94 Nasal Cannula 2.00 


 


3/3/18 08:48   18     


 


3/3/18 06:00  108      


 


3/3/18 04:00 98.6   104/63 (77)    


 


3/1/18 20:10        21














Intake and Output   


 


 3/3/18 3/3/18 3/4/18





 08:00 16:00 00:00


 


Intake Total 607.5 ml  


 


Output Total 700 ml  


 


Balance -92.5 ml  








Result Diagram:  


3/3/18 0345                                                                    

            3/3/18 0345





Other Results





Microbiology








 Date/Time


Source Procedure


Growth Status


 


 


 3/2/18 05:43


Blood Peripheral Aerobic Blood Culture


Pending Received


 


 3/2/18 05:43


Blood Peripheral Anaerobic Blood Culture


Pending Received


 


 2/25/18 21:00


Stool Stool Stool Occult Blood (GILA) - Final


HEMOCCULT NEGATIVE Complete


 


 2/23/18 22:30


Urine Suprapubic Urine Urine Culture - Final


NO GROWTH IN 48 HOURS. Complete








Imaging





Last Impressions








Chest X-Ray 3/1/18 0600 Signed





Impressions: 





 Service Date/Time:  Thursday, March 1, 2018 03:05 - CONCLUSION:  1. Stable 





 patchy bilateral lower lung zone airspace disease. 2. No significant interval 





 change.     Rj Bozorgmanesh, MD 


 


Chest CT 2/26/18 0000 Signed





Impressions: 





 Service Date/Time:  Monday, February 26, 2018 12:41 - CONCLUSION:  1. There 

are 





 at least 5 pulmonary nodules present bilaterally with the largest measuring 19 





 mm. None demonstrate cavitation but it is possible that these represent septic 





 emboli. Suggest followup to assess for change. 2. Splenomegaly with subtle 





 wedge-shaped areas of low-density in the mid spleen which could represent 





 infarcts. 3. Mild cardiomegaly in this patient post aortic valve replacement. 





 Low density of the cardiac blood pool suggests anemia.      Ron Melgar MD 


 


Aorta w/Runoff CTA 2/25/18 0000 Signed





Impressions: 





 Service Date/Time:  Sunday, February 25, 2018 12:47 - CONCLUSION:  Left renal 





 cortical infarction. 5-6 cm length total occlusion of the left superficial 





 femoral artery in the proximal thigh. Nodular parenchymal opacities in the 

lung 





 bases bilaterally See above discussion.     Ron Cruz MD 


 


Renal Ultrasound 2/24/18 0000 Signed





Impressions: 





 Service Date/Time:  Saturday, February 24, 2018 10:53 - CONCLUSION:  1. No 





 evidence of hydronephrosis. 2. Thickening of the urinary bladder wall a 1.1 

cm. 





 3. Prominent splenomegaly.     Elgin Walton MD 


 


Breast Ultrasound 2/24/18 0000 Signed





Impressions: 





 Service Date/Time:  Saturday, February 24, 2018 10:48 - CONCLUSION:  Negative 





 targeted right breast ultrasound examination.     Ron Brown MD 


 


Lower Extremity Ultrasound 2/23/18 0000 Signed





Impressions: 





 Service Date/Time:  Friday, February 23, 2018 21:22 - CONCLUSION:  1. No 





 sonographic evidence for lower extremity DVT. 2. Incidental note of bilateral 





 inguinal adenopathy with the largest on the right measuring 3.3 cm and the 





 largest on the left measuring 2.6 cm. This finding is nonspecific but is 





 typically reactive in etiology.     Rj Whitten MD 








Objective Remarks


GENERAL: 37-year-old  female resting in bed in no acute distress


SKIN: Warm and dry.  Patient does appear to have puncture wounds from where she 

states been injecting recently in her arms, bilateral upper breast.


HEAD: Atraumatic. Normocephalic. 


EYES: Pupils equal and round. No scleral icterus. No injection or drainage. 


ENT: No nasal bleeding or discharge.  Mucous membranes pink and moist.  


NECK: Trachea midline. No JVD. 


CHEST: Bilateral upper breast has puncture wounds from injection.  Pain to 

point palpation left upper thorax


CARDIOVASCULAR: 2/6 pansystolic murmurat the left lower sternal border.  


RESPIRATORY: Few fine crackles present bilaterally.  No wheezing


GASTROINTESTINAL: Abdomen soft, tender to deep palpation.  No guarding 

rigidity.  Positive hepatosplenomegaly


MUSCULOSKELETAL: 2+ pitting edema to the lower extremities.  Erythematous 

purpuric rash of the left lower extremity anterior shin and foot, punctate 

lesion plantar right great toe. Left leg extremely tender below the left knee.  

Dopplerable DP pulses bilaterally


NEUROLOGICAL: Awake and alert. Motor grossly within normal limits. Five out of 

5 muscle strength in the arms and legs.  Normal speech.


Urinary Catheter:  No


Assessment to:  Continue


Vascular Central Line Catheter:  Yes


Assessment to:  Continue


Date of Insertion:  Feb 24, 2018


Line:  Central Venous Catheter


Side:  Right


Location:  Internal, Jugular





A/P


Assessment and Plan


Neuro/Psych:





IVDU -hydromorphone


History of THC use





- Continue pain control hydrocodone/acetaminophen 10/325 1 tablet every 6 hours 

as needed pain 1 through 5 with hydromorphone 2 mg IV every 4 hours as needed 

pain 6-10 (patient has severe pain from ischemic leg)


- Acetaminophen 650 mg p.o. every 6 hours as needed fever


CT brain 3/3 order while on Argatroban





[facility]





Septic  shock


Infective endocarditis involving aortic prosthetic valve


Tricuspid valve endocarditis


Multiple septic emboli including pulmonary, splenic and vascular


History of fungal and bacterial prosthetic valve endocarditis


Strep viridans bacteremia


5-6 cm length total occlusion of the left superficial femoral artery in the 

proximal thigh





- Recurrent aortic prosthetic valve endocarditis -initially placed 2011 with 

redo 2016. - Now with strep viridans in blood cultures 2/23


- Broad-spectrum antibiotic with ampicillin and gentamicin.


   Previously treated with vancomycin and ceftriaxone


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 

April 2017 for tricuspid valve PVE  - Candida parapsilosis , Streptococci


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


- Vascular surgery Dr. Sears following


- Anticoagulation with argatroban gtt at 0.75 mcg/kg/min


- Infectious disease Dr. Carr


- Extensive counselling given about IP Rehab





Microbiology





2/27 -blood cultures 2 -no growth


2/23 -urine culture -no growth


2/23 -blood cultures 2 -strep viridians





Resp:





Acute respiratory insufficiency


5 pulmonary nodules likely septic emboli





-Nasal cannula to keep oxygen saturation above 92% currently on 4 L


-Incentives spirometry while awake


-As needed albuterol aerosols every 2 hours.  Dyspnea


- CT thorax revealed 5 bilateral pulmonary lesions/nodules largest which is 19 

mm.  No cavitation noted but likely septic emboli.


CT pulmonary angiogram 3/3





GI:





Hepatitis C


Splenic infarction


Hypoalbuminemia





- Heart healthy diet


-Famotidine for GI prophylaxis


-Docusate sodium/senna 1 tablet twice daily for bowel regimen








Renal/:





Acute on chronic kidney disease


Left renal cortical infarction





- Acute renal failure secondary to ATN and dehydration


- CT imaging revealed left renal cortical infarction


- Renal ultrasound-thickening of urinary bladder





Endo:





Sliding scale insulin if indicated to maintain euglycemia





Heme:





Microcytic anemia


Leukocytosis





- Monitor CBC CMP and coags


-Does not meet transfusion threshold at this time





FEN:





Hyponatremia





Replace electrolytes as clinically indicated.  





MSK:





History of right breast abscess


Elevated BMI





- Ultrasound of the left lower extremity negative for DVT


- Right breast ultrasound negative for abscess


-Weight loss encouraged





DVT GI prophylaxis





- argatroban gtt


- famotidine





Level 3 follow-up





CT pulmonary angiogram revealed bilateral pulmonary emboli nature unclear.  

Right lower lobe occlusion.





CT brain revealed right parietal lacunar infarct possibly with infarct left 

centrum semiovale possibly septic emboli.  Versus thrombus.  MRI brain ordered





Stat echocardiogram ordered.  This revealed Aortic prosthesis with prolapse and 

possible vegetation.  The estimated pulmonary arterial pressure is 65.7 mmHg.  

Tricuspid valve prosthesis  Mobile vegetation seen on the tricuspid valve. 2x4 

cm.  Discussed with infectious disease.  Recommended vancomycin and possible 

FLORENCIO early next week





Discussed with Dr. Langford.  Patient is currently on Therapeutic argatroban.  

Agreed with recommendation of follow-up results of stat echocardiogram.











Bryson Bustos MD Mar 3, 2018 09:56

## 2018-03-03 NOTE — PD.ONC.PN
Subjective


Subjective


Remarks


Tmax 102.1 overnight


Pt has just returned from CT scan- reports she has had increased SOB since last 

night


No bleeding on the Argatroban





Objective


Data











  Date Time  Temp Pulse Resp B/P (MAP) Pulse Ox O2 Delivery O2 Flow Rate FiO2


 


3/3/18 14:00  108      


 


3/3/18 12:27   22     


 


3/3/18 12:00  114      


 


3/3/18 12:00  123      


 


3/3/18 12:00 99.6 114 23 96/61 (73) 96   


 


3/3/18 11:58   22     


 


3/3/18 10:00  123      


 


3/3/18 09:45     94 Nasal Cannula 2.00 


 


3/3/18 08:48   18     


 


3/3/18 08:00  126      


 


3/3/18 08:00 99.5 126 41 100/68 (79) 98   


 


3/3/18 06:00  108      


 


3/3/18 04:00 98.6 107 26 104/63 (77) 98   


 


3/3/18 04:00  107      


 


3/3/18 02:00  118      


 


3/3/18 00:57   28     


 


3/3/18 00:00 102.1 128 29 94/64 (74) 95   


 


3/3/18 00:00  128      


 


3/2/18 22:00  127      


 


3/2/18 20:27     95 Nasal Cannula 3.00 


 


3/2/18 20:00  116      


 


3/2/18 20:00 99.4 116 30 97/62 (74) 100   


 


3/2/18 18:00  109      


 


3/2/18 17:46  180      


 


3/2/18 16:00 99.5 115 18 98/69 (79) 94   


 


3/2/18 16:00  115      














 3/3/18 3/3/18 3/3/18





 07:00 15:00 23:00


 


Intake Total 607.5 ml  


 


Output Total 700 ml  


 


Balance -92.5 ml  








Result Diagram:  


3/3/18 0345                                                                    

            3/3/18 0345





Laboratory Results





Laboratory Tests








Test


  3/3/18


03:45


 


White Blood Count 17.7 TH/MM3 


 


Red Blood Count 3.27 MIL/MM3 


 


Hemoglobin 7.4 GM/DL 


 


Hematocrit 23.0 % 


 


Mean Corpuscular Volume 70.3 FL 


 


Mean Corpuscular Hemoglobin 22.7 PG 


 


Mean Corpuscular Hemoglobin


Concent 32.3 % 


 


 


Red Cell Distribution Width 17.9 % 


 


Platelet Count 272 TH/MM3 


 


Mean Platelet Volume 8.5 FL 


 


Prothrombin Time 21.3 SEC 


 


Prothromb Time International


Ratio 2.1 RATIO 


 


 


Activated Partial


Thromboplast Time 60.2 SEC 


 


 


Blood Urea Nitrogen 10 MG/DL 


 


Creatinine 0.73 MG/DL 


 


Random Glucose 113 MG/DL 


 


Calcium Level 8.5 MG/DL 


 


Sodium Level 135 MEQ/L 


 


Potassium Level 4.5 MEQ/L 


 


Chloride Level 100 MEQ/L 


 


Carbon Dioxide Level 28.2 MEQ/L 


 


Anion Gap 7 MEQ/L 


 


Estimat Glomerular Filtration


Rate 91 ML/MIN 


 








Culture Results





Microbiology








 Date/Time


Source Procedure


Growth Status


 


 


 3/2/18 05:43


Blood Peripheral Aerobic Blood Culture - Preliminary


NO GROWTH IN 1 DAY Resulted


 


 3/2/18 05:43


Blood Peripheral Anaerobic Blood Culture - Preliminary


NO GROWTH IN 1 DAY Resulted





 3/2/18 05:36


Blood Peripheral Aerobic Blood Culture - Preliminary


NO GROWTH IN 1 DAY Resulted


 


 3/2/18 05:36


Blood Peripheral Anaerobic Blood Culture - Preliminary


NO GROWTH IN 1 DAY Resulted








Imaging Studies





Last 24 hours Impressions








Head CT 3/3/18 0000 Signed





Impressions: 





 Service Date/Time:  Saturday, March 3, 2018 10:22 - CONCLUSION:  1. Focal area 





 of decreased density involving the right parietal lobe. As a new finding from 





 the prior exam. This could relate to a small infarct. MRI of the brain with 





 gadolinium suggested to further evaluate. 2. Small lacunar infarction 

involving 





 the left centrum semiovale.     Scooter Dawson Jr., MD 


 


CT Angiography 3/3/18 0000 Signed





Impressions: 





 Service Date/Time:  Saturday, March 3, 2018 10:28 - CONCLUSION:  There 

extensive 





 pulmonary emboli bilaterally. There is near complete cut off of the right 

lower 





 lobe pulmonary artery. Prominent filling defects on the left as well. These 





 findings were relayed to Dr. Bustos immediately at the completion of the study.  





 Scattered pulmonary infiltrates, small pleural effusion and extensive 





 mediastinal lymphadenopathy as described above.      Won Sharp MD 











Administered Medications








 Medications


  (Trade)  Dose


 Ordered  Sig/Nikhil


 Route


 PRN Reason  Start Time


 Stop Time Status Last Admin


Dose Admin


 


 Sodium Chloride


  (NS Flush)  2 ml  BID


 IV FLUSH


   2/24/18 09:00


    3/3/18 08:18


 


 


 Acetaminophen


  (Tylenol)  650 mg  Q6H  PRN


 PO


 fever  2/23/18 22:00


    3/2/18 23:57


 


 


 Temazepam


  (Restoril)  15 mg  HS  PRN


 PO


 INSOMNIA  2/23/18 22:00


    3/1/18 23:04


 


 


 Chlorhexidine


 Gluconate


  (Chlorhexidine


 2% Cloth)  


 Taper  DAILY@04


 TOP


   2/24/18 04:00


 2/20/19 03:59  3/1/18 20:59


 


 


 Senna/Docusate


 Sodium


  (Arlen-Colace)  1 tab  BID


 PO


   2/24/18 09:00


    3/1/18 20:11


 


 


 Ampicillin Sodium


 2000 mg/Sodium


 Chloride  100 ml @ 


 400 mls/hr  Q6H


 IV


   2/24/18 14:00


    3/3/18 13:38


 


 


 Hydromorphone HCl


  (Dilaudid Pf Inj)  2 mg  Q4H  PRN


 IV PUSH


 PAIN SCALE 6 TO 10  2/25/18 15:00


    3/3/18 08:18


 


 


 Loperamide HCl


  (Imodium)  2 mg  Q4H  PRN


 PO


 Diarrhea  2/26/18 16:15


    2/28/18 14:32


 


 


 Gentamicin


 Sulfate 100 mg/


 Sodium Chloride  102.5 ml @ 


 100 mls/hr  Q8H


 IV


   2/26/18 21:00


    3/3/18 11:57


 


 


 Albuterol Sulfate


  (Albuterol Neb)  2.5 mg  Q2HR NEB  PRN


 NEB


 dyspnea  2/27/18 11:30


    3/2/18 02:47


 


 


 Acetaminophen/


 Hydrocodone Bitart


  (Norco  Mg)  1 tab  Q6H  PRN


 PO


 pain 1-5  2/27/18 11:30


    3/3/18 10:08


 


 


 Famotidine


  (Pepcid)  20 mg  BID


 PO


   2/27/18 21:00


    3/3/18 08:18


 


 


 Argatroban 250 mg/


 Sodium Chloride  252.5 ml @ 


 3.11 mls/hr  TITRATE  PRN


 IV


 aPTT < 50  3/1/18 19:15


    3/1/18 20:14


 








Objective Remarks


GENERAL: Overweight younger female, resting in bed on nasal cannula who is 

obviously SOB.


SKIN: Warm and dry.


HEAD: Normocephalic.


EYES: No scleral icterus. No injection or drainage. 


NECK: No JVD or lymphadenopathy.


CARDIOVASCULAR: Tachycardia


RESPIRATORY: Coarse lung sounds


GASTROINTESTINAL: Abdomen soft, non-tender, nondistended. 


EXTREMITIES: Cyanosis to LLE


MUSCULOSKELETAL: Adequate muscle tone.


NEUROLOGICAL: No obvious focal deficit. Awake, alert, and oriented x3.





Assessment/Plan


Problem List:  


(1) arterial blood clot


Plan:  --on Argatroban (direct thrombin inhibitor)


-- no significant family history of thrombotic events to suggest hereditary 

antithrombin deficiency.  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 

impaired production from her liver disease. 


--has chronic active hepatitis C.  ++increased consumption from disseminated 

intravascular coagulation and acute illness, bacterial endocarditis.  


--Protein losses are also considered.  She had mild proteinuria when she first 

was admitted.


--Thrombin inactivates antithrombin.  This would abrogate the effect of 

unfractionated heparin and low molecular weight heparin.





(2) Acute septic pulmonary embolism


ICD Codes:  I26.90 - Septic pulmonary embolism without acute cor pulmonale


Plan:  -- Echo ordered


-- Continue argatroban gtt for now





Assessment


38y/o female admitted with recurrent endocarditis. hematology consulted for 

anticoagulation assistance.


h/o Recurrent bacterial endocarditis, bacteremia, history of prosthetic aortic 

valve and subsequent redo, chronic active hepatitis C, intravenous drug use, 

microcytic anemia, consistent with iron deficiency, left lower extremity 

arterial event, bacterial endocarditis with viridans streptococcus.





HPI (brought forward for continuity of care): history of IV drug abuse and  

recurrent endocarditis. had a relapse of her activity. has history of pulmonary 

embolism and infected valves. +significant congestive heart failure and 

presented to the emergency room on 02/23/2018, with sepsis. is followed by 

Infectious Disease for her endocarditis.  previously received antibiotic 

therapy for enterococcus faecalis and staph aureus. There is questionable 

compliance. Her current blood 


culture from 02/23/2018, shows viridans streptococcus.  Two out of two bottles 

were positive.  She is on ampicillin and gentamicin. course is complicated by 

cold left lower extremity, evaluated by  Vascular Surgery.  A CT angiogram 

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 

the proximal thigh.  There is satisfactory calf runoff present.  For this reason

, conservative management with anticoagulation is continued. was placed on 

unfractionated heparin.  Her heparin dose has been titrated from 1000 units/

hour to 2500 units/hour with a PTT remaining subtherapeutic.  Her last PTT is 

32 seconds from 03/01/2018, at 2 p.m.  For this reason, Hematology/Oncology is 

consulted.


Plan


1. Await results of Echocardiogram


2. Continue argatroban for now.


3. Monitor CBC, coags











Janessa Samson Mar 3, 2018 14:26

## 2018-03-04 VITALS — HEART RATE: 118 BPM

## 2018-03-04 VITALS
HEART RATE: 114 BPM | RESPIRATION RATE: 32 BRPM | DIASTOLIC BLOOD PRESSURE: 67 MMHG | TEMPERATURE: 99.8 F | OXYGEN SATURATION: 100 % | SYSTOLIC BLOOD PRESSURE: 97 MMHG

## 2018-03-04 VITALS — HEART RATE: 95 BPM

## 2018-03-04 VITALS
HEART RATE: 109 BPM | DIASTOLIC BLOOD PRESSURE: 64 MMHG | RESPIRATION RATE: 32 BRPM | SYSTOLIC BLOOD PRESSURE: 96 MMHG | OXYGEN SATURATION: 88 % | TEMPERATURE: 97.6 F

## 2018-03-04 VITALS — OXYGEN SATURATION: 96 %

## 2018-03-04 VITALS — HEART RATE: 104 BPM

## 2018-03-04 VITALS
DIASTOLIC BLOOD PRESSURE: 68 MMHG | RESPIRATION RATE: 30 BRPM | TEMPERATURE: 98.7 F | OXYGEN SATURATION: 97 % | HEART RATE: 124 BPM | SYSTOLIC BLOOD PRESSURE: 96 MMHG

## 2018-03-04 VITALS
RESPIRATION RATE: 29 BRPM | HEART RATE: 100 BPM | TEMPERATURE: 98.4 F | OXYGEN SATURATION: 97 % | DIASTOLIC BLOOD PRESSURE: 64 MMHG | SYSTOLIC BLOOD PRESSURE: 94 MMHG

## 2018-03-04 VITALS
RESPIRATION RATE: 31 BRPM | OXYGEN SATURATION: 96 % | DIASTOLIC BLOOD PRESSURE: 64 MMHG | HEART RATE: 105 BPM | TEMPERATURE: 99.1 F | SYSTOLIC BLOOD PRESSURE: 96 MMHG

## 2018-03-04 VITALS
HEART RATE: 113 BPM | RESPIRATION RATE: 33 BRPM | OXYGEN SATURATION: 98 % | TEMPERATURE: 99.5 F | SYSTOLIC BLOOD PRESSURE: 86 MMHG | DIASTOLIC BLOOD PRESSURE: 62 MMHG

## 2018-03-04 VITALS — HEART RATE: 124 BPM

## 2018-03-04 VITALS — OXYGEN SATURATION: 97 %

## 2018-03-04 VITALS — HEART RATE: 120 BPM

## 2018-03-04 VITALS — HEART RATE: 113 BPM

## 2018-03-04 VITALS — OXYGEN SATURATION: 100 %

## 2018-03-04 LAB
ALBUMIN SERPL-MCNC: 2.4 GM/DL (ref 3.4–5)
ALP SERPL-CCNC: 113 U/L (ref 45–117)
ALT SERPL-CCNC: 11 U/L (ref 10–53)
AST SERPL-CCNC: 17 U/L (ref 15–37)
BASOPHILS # BLD AUTO: 0.1 TH/MM3 (ref 0–0.2)
BASOPHILS NFR BLD: 0.5 % (ref 0–2)
BILIRUB SERPL-MCNC: 0.7 MG/DL (ref 0.2–1)
BUN SERPL-MCNC: 10 MG/DL (ref 7–18)
CALCIUM SERPL-MCNC: 8.7 MG/DL (ref 8.5–10.1)
CHLORIDE SERPL-SCNC: 99 MEQ/L (ref 98–107)
CREAT SERPL-MCNC: 0.79 MG/DL (ref 0.5–1)
EOSINOPHIL # BLD: 0.1 TH/MM3 (ref 0–0.4)
EOSINOPHIL NFR BLD: 0.4 % (ref 0–4)
ERYTHROCYTE [DISTWIDTH] IN BLOOD BY AUTOMATED COUNT: 17.9 % (ref 11.6–17.2)
GFR SERPLBLD BASED ON 1.73 SQ M-ARVRAT: 83 ML/MIN (ref 89–?)
GLUCOSE SERPL-MCNC: 119 MG/DL (ref 74–106)
HCO3 BLD-SCNC: 29.3 MEQ/L (ref 21–32)
HCT VFR BLD CALC: 23 % (ref 35–46)
HGB BLD-MCNC: 7.4 GM/DL (ref 11.6–15.3)
LYMPHOCYTES # BLD AUTO: 1.2 TH/MM3 (ref 1–4.8)
LYMPHOCYTES NFR BLD AUTO: 5.7 % (ref 9–44)
MAGNESIUM SERPL-MCNC: 2.1 MG/DL (ref 1.5–2.5)
MCH RBC QN AUTO: 22.4 PG (ref 27–34)
MCHC RBC AUTO-ENTMCNC: 32 % (ref 32–36)
MCV RBC AUTO: 69.8 FL (ref 80–100)
MONOCYTE #: 1.9 TH/MM3 (ref 0–0.9)
MONOCYTES NFR BLD: 8.8 % (ref 0–8)
NEUTROPHILS # BLD AUTO: 17.8 TH/MM3 (ref 1.8–7.7)
NEUTROPHILS NFR BLD AUTO: 84.6 % (ref 16–70)
PHOSPHATE SERPL-MCNC: 4.3 MG/DL (ref 2.5–4.9)
PLATELET # BLD: 291 TH/MM3 (ref 150–450)
PMV BLD AUTO: 8.4 FL (ref 7–11)
PROT SERPL-MCNC: 6.8 GM/DL (ref 6.4–8.2)
RBC # BLD AUTO: 3.3 MIL/MM3 (ref 4–5.3)
SODIUM SERPL-SCNC: 134 MEQ/L (ref 136–145)
WBC # BLD AUTO: 21.1 TH/MM3 (ref 4–11)

## 2018-03-04 RX ADMIN — HYDROCODONE BITARTRATE AND ACETAMINOPHEN PRN TAB: 10; 325 TABLET ORAL at 12:37

## 2018-03-04 RX ADMIN — HYDROMORPHONE HYDROCHLORIDE PRN MG: 2 INJECTION INTRAMUSCULAR; INTRAVENOUS; SUBCUTANEOUS at 04:59

## 2018-03-04 RX ADMIN — HYDROMORPHONE HYDROCHLORIDE PRN MG: 2 INJECTION INTRAMUSCULAR; INTRAVENOUS; SUBCUTANEOUS at 09:14

## 2018-03-04 RX ADMIN — HYDROMORPHONE HYDROCHLORIDE PRN MG: 2 INJECTION INTRAMUSCULAR; INTRAVENOUS; SUBCUTANEOUS at 13:50

## 2018-03-04 RX ADMIN — HYDROMORPHONE HYDROCHLORIDE PRN MG: 2 INJECTION INTRAMUSCULAR; INTRAVENOUS; SUBCUTANEOUS at 00:27

## 2018-03-04 RX ADMIN — CLINDAMYCIN PHOSPHATE SCH MLS/HR: 150 INJECTION, SOLUTION INTRAMUSCULAR; INTRAVENOUS at 20:25

## 2018-03-04 RX ADMIN — AMPICILLIN SODIUM SCH MLS/HR: 2 INJECTION, POWDER, FOR SOLUTION INTRAMUSCULAR; INTRAVENOUS at 01:49

## 2018-03-04 RX ADMIN — TEMAZEPAM PRN MG: 15 CAPSULE ORAL at 00:27

## 2018-03-04 RX ADMIN — CLINDAMYCIN PHOSPHATE SCH MLS/HR: 150 INJECTION, SOLUTION INTRAMUSCULAR; INTRAVENOUS at 12:38

## 2018-03-04 RX ADMIN — AMPICILLIN SODIUM SCH MLS/HR: 2 INJECTION, POWDER, FOR SOLUTION INTRAMUSCULAR; INTRAVENOUS at 12:37

## 2018-03-04 RX ADMIN — Medication SCH ML: at 08:24

## 2018-03-04 RX ADMIN — CHLORHEXIDINE GLUCONATE SCH PACK: 500 CLOTH TOPICAL at 01:50

## 2018-03-04 RX ADMIN — SODIUM CHLORIDE SCH MLS/HR: 900 INJECTION INTRAVENOUS at 09:14

## 2018-03-04 RX ADMIN — STANDARDIZED SENNA CONCENTRATE AND DOCUSATE SODIUM SCH TAB: 8.6; 5 TABLET, FILM COATED ORAL at 20:25

## 2018-03-04 RX ADMIN — FAMOTIDINE SCH MG: 20 TABLET, FILM COATED ORAL at 08:22

## 2018-03-04 RX ADMIN — SODIUM CHLORIDE SCH MLS/HR: 900 INJECTION INTRAVENOUS at 01:50

## 2018-03-04 RX ADMIN — CLINDAMYCIN PHOSPHATE SCH MLS/HR: 150 INJECTION, SOLUTION INTRAMUSCULAR; INTRAVENOUS at 04:58

## 2018-03-04 RX ADMIN — AMPICILLIN SODIUM SCH MLS/HR: 2 INJECTION, POWDER, FOR SOLUTION INTRAMUSCULAR; INTRAVENOUS at 08:23

## 2018-03-04 RX ADMIN — FAMOTIDINE SCH MG: 20 TABLET, FILM COATED ORAL at 20:25

## 2018-03-04 RX ADMIN — STANDARDIZED SENNA CONCENTRATE AND DOCUSATE SODIUM SCH TAB: 8.6; 5 TABLET, FILM COATED ORAL at 08:24

## 2018-03-04 RX ADMIN — HYDROCODONE BITARTRATE AND ACETAMINOPHEN PRN TAB: 10; 325 TABLET ORAL at 20:25

## 2018-03-04 RX ADMIN — HYDROMORPHONE HYDROCHLORIDE PRN MG: 2 INJECTION INTRAMUSCULAR; INTRAVENOUS; SUBCUTANEOUS at 20:26

## 2018-03-04 RX ADMIN — AMPICILLIN SODIUM SCH MLS/HR: 2 INJECTION, POWDER, FOR SOLUTION INTRAMUSCULAR; INTRAVENOUS at 20:25

## 2018-03-04 NOTE — HHI.IDPN
Note


Infectious Disease Note


ID xcover for 





 is a 38 y/o CF, prosthetic aortic valve replacement in 2011 and then 

redo aortic valve replacement in June 2016, and prior h/o endocarditis who was 

on Doxy oral suppression. The patient developed shortness of breath, fever and 

chills, and  presented to the emergency department.  The patient is noted to be 

actively using IV drugs.  She has history of significant CHF from prior 

valvular heart disease related to endocarditis.  She states that she started 

feeling short of breath about a week ago and the shortness of breath worsened.  

After she completed IV antibiotics in October she was put on doxycycline 

prophylaxis.  She reports that she has not been taking the doxycycline.  





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 Patient complained of 

chest pain overnight.


CT chest repeated and significantly worse with increased septic emboli multiple 

on both sides.


Tmax 102 F


Complains of CP on deep inspiration.


No sputum production.


Shortness of breath with minimal exertion. 


Continues to have pain in the left leg and left foot.  


Awake and alert. Follows simple commands.


No vision changes.


No B/B incontinence.


 


PAST MEDICAL HISTORY:


Hepatitis C.


IV drug use.


prosthetic aortic valve replacement in 2011 and then redo aortic valve 

replacement in June 2016 





MEDICATIONS:


1. Gentamicin.


2. Ampicillin.





Current Medications








 Medications


  (Trade)  Dose


 Ordered  Sig/Nikhil


 Route  Start Time


 Stop Time Status Last Admin


 


  (NS Flush)  2 ml  UNSCH  PRN


 IV FLUSH  2/23/18 22:00


     


 


 


  (NS Flush)  2 ml  BID


 IV FLUSH  2/24/18 09:00


    3/4/18 08:24


 


 


  (Tylenol)  650 mg  Q6H  PRN


 PO  2/23/18 22:00


    3/3/18 21:31


 


 


  (Zofran Inj)  4 mg  Q6H  PRN


 IV PUSH  2/23/18 22:00


     


 


 


  (Restoril)  15 mg  HS  PRN


 PO  2/23/18 22:00


    3/4/18 00:27


 


 


 Miscellaneous


 Information  1  Q361D


 XX  2/23/18 22:00


     


 


 


  (Chlorhexidine


 2% Cloth)  


 Taper  DAILY@04


 TOP  2/24/18 04:00


 2/20/19 03:59  3/4/18 01:50


 


 


  (Chlorhexidine


 2% Cloth)  3 pack  UNSCH  PRN


 TOP  2/23/18 22:00


     


 


 


  (Arlen-Colace)  1 tab  BID


 PO  2/24/18 09:00


    3/3/18 21:31


 


 


  (Milk Of


 Magnesia Liq)  30 ml  Q12H  PRN


 PO  2/23/18 22:00


     


 


 


  (Senokot)  17.2 mg  Q12H  PRN


 PO  2/23/18 22:00


     


 


 


  (Dulcolax Supp)  10 mg  DAILY  PRN


 RECTAL  2/23/18 22:00


     


 


 


  (Lactulose Liq)  30 ml  DAILY  PRN


 PO  2/23/18 22:00


     


 


 


 Ampicillin Sodium


 2000 mg/Sodium


 Chloride  100 ml @ 


 400 mls/hr  Q6H


 IV  2/24/18 14:00


    3/4/18 08:23


 


 


  (Dilaudid Pf Inj)  2 mg  Q4H  PRN


 IV PUSH  2/25/18 15:00


    3/4/18 09:14


 


 


  (Imodium)  2 mg  Q4H  PRN


 PO  2/26/18 16:15


    2/28/18 14:32


 


 


 Gentamicin


 Sulfate 100 mg/


 Sodium Chloride  102.5 ml @ 


 100 mls/hr  Q8H


 IV  2/26/18 21:00


    3/4/18 04:58


 


 


  (Albuterol Neb)  2.5 mg  Q2HR NEB  PRN


 NEB  2/27/18 11:30


    3/2/18 02:47


 


 


  (Norco  Mg)  1 tab  Q6H  PRN


 PO  2/27/18 11:30


    3/3/18 21:31


 


 


  (Pepcid)  20 mg  BID


 PO  2/27/18 21:00


    3/4/18 08:22


 


 


 Argatroban 250 mg/


 Sodium Chloride  252.5 ml @ 


 3.11 mls/hr  TITRATE  PRN


 IV  3/1/18 19:15


    3/3/18 18:58


 


 


 Pharmacy Profile


 Note  0 ml @ 0


 mls/hr  UNSCH


 OTHER  3/3/18 16:45


     


 


 


 Vancomycin HCl


 1500 mg/Sodium


 Chloride  515 ml @ 


 257.5 mls/


 hr  Q8H


 IV  3/4/18 02:00


    3/4/18 09:14


 


 


 Miscellaneous


 Information  SPECIFIC LAB TO BE


 DRAWN:VANCOMYCIN


 TROUGH DATE TO...  ONCE  ONCE


 .XX  3/4/18 17:45


 3/4/18 17:46   


 








SOCIAL HISTORY:


Positive occasional alcohol.  No tobacco. IV drug use in the form of Dilaudid,


oxycodone.  The patient also uses marijuana.





OBJECTIVE:





Vital Signs








  Date Time  Temp Pulse Resp B/P (MAP) Pulse Ox O2 Delivery O2 Flow Rate FiO2


 


3/4/18 10:00  120      


 


3/4/18 09:49     100 Venturi Mask  35


 


3/4/18 09:44   22     


 


3/4/18 08:00 99.8 114 32 97/67 (77) 100   


 


3/4/18 08:00  114      


 


3/4/18 07:00     100 Nasal Cannula 3.00 


 


3/4/18 06:00  118      


 


3/4/18 04:00  100      


 


3/4/18 04:00 98.4 100 29 94/64 (74) 97   


 


3/4/18 02:00  95      


 


3/4/18 01:00     97 Venturi Mask 5.00 35


 


3/4/18 00:00  109      


 


3/4/18 00:00     98 Venturi Mask 35.00 


 


3/4/18 00:00 97.6 109 32 96/64 (75) 88   


 


3/3/18 22:00     97 Nasal Cannula 3.00 


 


3/3/18 22:00  125      


 


3/3/18 20:00     89 Nasal Cannula 4.00 


 


3/3/18 20:00  129      


 


3/3/18 20:00 102.2 129 38 109/78 (88) 87   


 


3/3/18 19:27   33     


 


3/3/18 18:00  112      


 


3/3/18 16:00  110      


 


3/3/18 16:00 99.2 111 30 106/73 (84) 99   


 


3/3/18 14:00  108      


 


3/3/18 12:27   22     


 


3/3/18 12:00  114      


 


3/3/18 12:00  123      


 


3/3/18 12:00 99.6 114 23 96/61 (73) 96   


 


3/3/18 11:58   22     











 Laboratory Tests








Test


  3/1/18


14:00 3/1/18


19:20 3/1/18


23:51 3/2/18


03:50


 


Activated Partial


Thromboplast Time 32.0 SEC 


  32.2 SEC 


  58.7 SEC 


  


 


 


Prothrombin Time  12.3 SEC   


 


Prothromb Time International


Ratio 


  1.2 RATIO 


  


  


 


 


White Blood Count    13.6 TH/MM3 


 


Red Blood Count    3.22 MIL/MM3 


 


Hemoglobin    7.3 GM/DL 


 


Hematocrit    22.6 % 


 


Mean Corpuscular Volume    70.1 FL 


 


Mean Corpuscular Hemoglobin    22.8 PG 


 


Mean Corpuscular Hemoglobin


Concent 


  


  


  32.6 % 


 


 


Red Cell Distribution Width    17.6 % 


 


Platelet Count    250 TH/MM3 


 


Mean Platelet Volume    8.5 FL 


 


Blood Urea Nitrogen    9 MG/DL 


 


Creatinine    0.72 MG/DL 


 


Random Glucose    141 MG/DL 


 


Calcium Level    8.4 MG/DL 


 


Phosphorus Level    4.8 MG/DL 


 


Magnesium Level    1.8 MG/DL 


 


Sodium Level    137 MEQ/L 


 


Potassium Level    4.2 MEQ/L 


 


Chloride Level    102 MEQ/L 


 


Carbon Dioxide Level    29.0 MEQ/L 


 


Anion Gap    6 MEQ/L 


 


Estimat Glomerular Filtration


Rate 


  


  


  92 ML/MIN 


 


 


Test


  3/2/18


05:36 3/3/18


03:45 3/3/18


21:50 3/4/18


05:00


 


Activated Partial


Thromboplast Time 53.8 SEC 


  60.2 SEC 


  


  77.9 SEC 


 


 


White Blood Count  17.7 TH/MM3   21.1 TH/MM3 


 


Red Blood Count  3.27 MIL/MM3   3.30 MIL/MM3 


 


Hemoglobin  7.4 GM/DL   7.4 GM/DL 


 


Hematocrit  23.0 %   23.0 % 


 


Mean Corpuscular Volume  70.3 FL   69.8 FL 


 


Mean Corpuscular Hemoglobin  22.7 PG   22.4 PG 


 


Mean Corpuscular Hemoglobin


Concent 


  32.3 % 


  


  32.0 % 


 


 


Red Cell Distribution Width  17.9 %   17.9 % 


 


Platelet Count  272 TH/MM3   291 TH/MM3 


 


Mean Platelet Volume  8.5 FL   8.4 FL 


 


Prothrombin Time  21.3 SEC   


 


Prothromb Time International


Ratio 


  2.1 RATIO 


  


  


 


 


Blood Urea Nitrogen  10 MG/DL   10 MG/DL 


 


Creatinine  0.73 MG/DL   0.79 MG/DL 


 


Random Glucose  113 MG/DL   119 MG/DL 


 


Calcium Level  8.5 MG/DL   8.7 MG/DL 


 


Sodium Level  135 MEQ/L   134 MEQ/L 


 


Potassium Level  4.5 MEQ/L   4.4 MEQ/L 


 


Chloride Level  100 MEQ/L   99 MEQ/L 


 


Carbon Dioxide Level  28.2 MEQ/L   29.3 MEQ/L 


 


Anion Gap  7 MEQ/L   6 MEQ/L 


 


Estimat Glomerular Filtration


Rate 


  91 ML/MIN 


  


  83 ML/MIN 


 


 


Urine Opiates Screen   POS  


 


Urine Barbiturates Screen   NEG  


 


Urine Amphetamines Screen   NEG  


 


Urine Benzodiazepines Screen   NEG  


 


Urine Cocaine Screen   NEG  


 


Urine Cannabinoids Screen   NEG  


 


Neutrophils (%) (Auto)    84.6 % 


 


Lymphocytes (%) (Auto)    5.7 % 


 


Monocytes (%) (Auto)    8.8 % 


 


Eosinophils (%) (Auto)    0.4 % 


 


Basophils (%) (Auto)    0.5 % 


 


Neutrophils # (Auto)    17.8 TH/MM3 


 


Lymphocytes # (Auto)    1.2 TH/MM3 


 


Monocytes # (Auto)    1.9 TH/MM3 


 


Eosinophils # (Auto)    0.1 TH/MM3 


 


Basophils # (Auto)    0.1 TH/MM3 


 


CBC Comment    DIFF FINAL 


 


Differential Comment     


 


Total Protein    6.8 GM/DL 


 


Albumin    2.4 GM/DL 


 


Phosphorus Level    4.3 MG/DL 


 


Magnesium Level    2.1 MG/DL 


 


Alkaline Phosphatase    113 U/L 


 


Aspartate Amino Transf


(AST/SGOT) 


  


  


  17 U/L 


 


 


Alanine Aminotransferase


(ALT/SGPT) 


  


  


  11 U/L 


 


 


Total Bilirubin    0.7 MG/DL 


 


Test


  3/4/18


10:00 


  


  


 


 


Activated Partial


Thromboplast Time 57.3 SEC 


  


  


  


 








Microbiology








 Date/Time


Source Procedure


Growth Status


 


 


 3/4/18 05:50


Blood Peripheral Aerobic Blood Culture


Pending Received


 


 3/4/18 05:50


Blood Peripheral Anaerobic Blood Culture


Pending Received


 


 2/25/18 21:00


Stool Stool Stool Occult Blood (GILA) - Final


HEMOCCULT NEGATIVE Complete





 3/3/18 21:50


Sputum Expectorated Sputum Gram Stain - Final Resulted


 


 3/3/18 21:50


Sputum Expectorated Sputum Sputum Culture


Pending Resulted


 


 2/23/18 22:30


Urine Suprapubic Urine Urine Culture - Final


NO GROWTH IN 48 HOURS. Complete

















 Date/Time


Source Procedure


Growth Status


 


 


 3/2/18 05:43


Blood Peripheral Aerobic Blood Culture - Preliminary


NO GROWTH IN 1 DAY Resulted


 


 3/2/18 05:43


Blood Peripheral Anaerobic Blood Culture - Preliminary


NO GROWTH IN 1 DAY Resulted


 


 2/25/18 21:00


Stool Stool Stool Occult Blood (GILA) - Final


HEMOCCULT NEGATIVE Complete


 


 2/23/18 22:30


Urine Suprapubic Urine Urine Culture - Final


NO GROWTH IN 48 HOURS. Complete








IMAGING:





Last Impressions








Head CT 3/3/18 0000 Signed





Impressions: 





 Service Date/Time:  Saturday, March 3, 2018 10:22 - CONCLUSION:  1. Focal area 





 of decreased density involving the right parietal lobe. As a new finding from 





 the prior exam. This could relate to a small infarct. MRI of the brain with 





 gadolinium suggested to further evaluate. 2. Small lacunar infarction 

involving 





 the left centrum semiovale.     Scooter Dawson Jr., MD 


 


CT Angiography 3/3/18 0000 Signed





Impressions: 





 Service Date/Time:  Saturday, March 3, 2018 10:28 - CONCLUSION:  There 

extensive 





 pulmonary emboli bilaterally. There is near complete cut off of the right 

lower 





 lobe pulmonary artery. Prominent filling defects on the left as well. These 





 findings were relayed to Dr. Bustos immediately at the completion of the study.  





 Scattered pulmonary infiltrates, small pleural effusion and extensive 





 mediastinal lymphadenopathy as described above.      Won Sharp MD 


 


Brain MRI 3/3/18 0000 Signed





Impressions: 





 Service Date/Time:  Saturday, March 3, 2018 18:04 - CONCLUSION:  Deterioration 





 in the appearance of the scan.  At least 3 focal areas of abnormal contrast 





 enhancement are present scattered to in both hemispheres.  Given the history 





 this most likely represents developing areas cerebritis.  Exam is compromised 

by 





 an uncooperative patient and motion artifact.  These 2 factors make detection 

of 





 other abnormalities such as cortical cephalitis and meningitis difficult to 





 exclude.  Cannot exclude hemorrhagic lesions as well.  There is no mass effect 





 evident.      Ej Ewing MD 


 


Chest X-Ray 3/1/18 0600 Signed





Impressions: 





 Service Date/Time:  Thursday, March 1, 2018 03:05 - CONCLUSION:  1. Stable 





 patchy bilateral lower lung zone airspace disease. 2. No significant interval 





 change.     Rj Whitten MD 


 


Chest CT 2/26/18 0000 Signed





Impressions: 





 Service Date/Time:  Monday, February 26, 2018 12:41 - CONCLUSION:  1. There 

are 





 at least 5 pulmonary nodules present bilaterally with the largest measuring 19 





 mm. None demonstrate cavitation but it is possible that these represent septic 





 emboli. Suggest followup to assess for change. 2. Splenomegaly with subtle 





 wedge-shaped areas of low-density in the mid spleen which could represent 





 infarcts. 3. Mild cardiomegaly in this patient post aortic valve replacement. 





 Low density of the cardiac blood pool suggests anemia.      Ron Melgar MD 


 


Aorta w/Runoff CTA 2/25/18 0000 Signed





Impressions: 





 Service Date/Time:  Sunday, February 25, 2018 12:47 - CONCLUSION:  Left renal 





 cortical infarction. 5-6 cm length total occlusion of the left superficial 





 femoral artery in the proximal thigh. Nodular parenchymal opacities in the 

lung 





 bases bilaterally See above discussion.     Ron Cruz MD 


 


Renal Ultrasound 2/24/18 0000 Signed





Impressions: 





 Service Date/Time:  Saturday, February 24, 2018 10:53 - CONCLUSION:  1. No 





 evidence of hydronephrosis. 2. Thickening of the urinary bladder wall a 1.1 

cm. 





 3. Prominent splenomegaly.     Elgin Walton MD 


 


Breast Ultrasound 2/24/18 0000 Signed





Impressions: 





 Service Date/Time:  Saturday, February 24, 2018 10:48 - CONCLUSION:  Negative 





 targeted right breast ultrasound examination.     Ron Brown MD 


 


Lower Extremity Ultrasound 2/23/18 0000 Signed





Impressions: 





 Service Date/Time:  Friday, February 23, 2018 21:22 - CONCLUSION:  1. No 





 sonographic evidence for lower extremity DVT. 2. Incidental note of bilateral 





 inguinal adenopathy with the largest on the right measuring 3.3 cm and the 





 largest on the left measuring 2.6 cm. This finding is nonspecific but is 





 typically reactive in etiology.     Rj Whitten MD 








PHYSICAL EXAMINATION


GENERAL: No acute distress. 


HEENT:  Head atraumatic.  Extraocular movements grossly intact.  Pupils


reactive to light.  No icterus.  Oropharynx moist mucosa, no lesions.


Neck:  Supple without adenopathy.


Lungs: Coarse bilateral rhonchi.  Mild wheezing at the bases.


Heart: 5/6 blowing systolic murmur at the upper right sternal border.


Abdomen: Bowel sounds present, soft, obese, nontender.


Extremities: Diffuse 2+ edema of the lower extremities. Lesions at Left tibia 


distally and left foot and great toe are more red and not purpuric as before.


Toes 3 and 5 are cyanotic. The leg is tender on palpation.  It is warm.  


No calf tenderness.   No nail bed hemorrhages.  


SKIN: no diffuse rash.


Neuro: No gross focal findings.


Psychiatric: calm and cooperative.





IMPRESSION


Sepsis. Strep Viridans. 


Bacteremia: Strep viridans.


Septic pulmonary emboli multiple.


TV valve endocarditis.


History of prosthetic aortic valve and so likely recurrent prosthetic valve


endocarditis.


Left renal cortical and pulmonary and lower extremity septic emboli. 


Leukocytosis secondary to sepsis from showering of emboli


from endocarditis.


Acute renal failure. Kidney function improved. Stable. 


 


RECOMMENDATIONS


Continue Ampicillin intravenous.


Continue Gentamicin IV. Now that the kidney function is improved but follow the 

renal function. 


Repeat Blood cultures (1 PIV and 1 RIJ CL)


Continue Vanco IV (target 15-20)


Severe TR on repeat ECHO with new vegetations and increased pulm emboli.


Recommend FLORENCIO to look for size, number of lesions and if any perivalvular 

abscess given severe TR.


If further change in mentation consider repeat MRI brain(cerebritis can turn 

into abscess) and LP(initial stages of cerebritis LP can be negative but if AMS 

worsens consider LP).


If abdominal discomfort persists and leucocytosis persistent or worsening 

consider CT Abd/pelvis with contrast.


If any vision change consider MRI brain and opthalm consult to r.o 

endopthalmitis.


angel Velez


Critical thinking and decision making.


Dr Siddiqui to resume care in am.














Olesya Lee MD Mar 4, 2018 11:07

## 2018-03-04 NOTE — HHI.CCPN
Subjective


Remarks/Hospital Course


37-year-old female that presents to the ED for evaluation of shortness of 

breath and swelling.  Patient has a significant history of endocarditis, IV 

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 

continues use of IV drug use that presents to the ED for evaluation of acute 

onset of shortness of breath with swelling.  Patient initially did not mention 

to anybody that she was doing drugs but she did tell me that she injected 

herself about 4 days ago.  She uses Dilaudid.  She states that she's been 

having fevers.  Patient is somewhat somnolent and hard to assess she doesn't 

really provide much information.  Family members at the bedside and he provides 

more information about the heart.  Per patient she'll he takes 2 medications.  

Patient asked if she is taking any antibiotics and she said yes but when I ask 

her what kind is she is taking currently she states that she has not been on 

antibiotics for some time.  Patient apparently does follow with a local 

cardiologist but she cannot tell me the name of it.  She states that she's been 

having swelling to her legs for the past month.  She denies any recent travel 

or injury.  No other medical issues at this time.  No allergies to medication.





2/24: remains critical on Dopamine 10 mcg/ min. Appears ill. Limited bedside 

echo did not show any large vegetation, but exam was limited, tricuspid and 

aortic valve not well visualized. Also states that had right breast abscess 2 

months ago, drained by herself. Now may have abscess vs scar tissue. Injects 

upper arm and bilateral breasts. US of right breast ordered


2/25: Remains on 3 mcg/min of Levophed.  Complaints of severe left lower 

extremity pain.  Erythematous purpuric purple discoloration of the left lower 

anterior segment left dorsum of the foot with cyanotic nailbeds. Weak DP pulses+

. 2D Echo failed to reveal definite vegetation. Will consider FLORENCIO


2/26: Continues to be on Levophed 3 mcg/min, remains critical.  We are left 

lower extremity pain but slightly better.  Currently on heparin not therapeutic 

yet.  CTA with runoff showed Left renal cortical infarction. 5-6 cm length 

total occlusion of the left superficial femoral artery in the proximal thigh 

but good distal opacification.  Dr. Souza recommended IV heparin no surgical 

intervention at this time.  Patient is still at high risk for further embolic 

episodes.  Blood cultures growing strep viridans.  Also patient complains about 

epigastric and left-sided abdominal pain and left-sided chest pain which is 

more pleuritic.  Pain is severe on deep inspiration


2/27: Resting in bed in no acute distress.  Dopplerable lower extremity pulses 

noted.  Remains on heparin drip.  Vascular surgery continues to follow.


2/28: Resting in bed in mild respiratory distress.  Continues to cough.  

Nonproductive.  Left lower extremity leg improved.  Reviewed vascular surgeries 

note.  Likely will require long-term medical regulation post hospitalization


3/1: Improve respiratory status today.  Pain in left lower extremity much 

improved.  Tolerating diet.


3/2: Currently on nasal cannula.  Switch to Argatroban overnight by hematology.

  Okayed with vascular surgery.  Pain in left lower extremity slowly improving 

day by day.





Subjective





3/3: Resting in bed in some mild respiratory distress.  Complaining of 

pleuritic chest pain specifically in the right flank region.  Worse with deep 

inspiration.  Reproducible with palpation.  CT brain/CT pulmonary angiogram 

ordered.  No neurological deficits noted on examination.


3/4: CT pulmonary angiogram revealed emboli bilateral lower lobes as well as 

views of abnormal contrast bilateral cerebral hemispheres.  Patient complains 

still of pleuritic chest pain.  Frequency increased on Lortab.  CTS in for 

evaluation patient deemed inoperable.  Palliative care consulted appreciate 

recommendations.





Objective





Vital Signs








  Date Time  Temp Pulse Resp B/P (MAP) Pulse Ox O2 Delivery O2 Flow Rate FiO2


 


3/4/18 14:20   38     


 


3/4/18 10:00  120      


 


3/4/18 09:49     100 Venturi Mask  35


 


3/4/18 08:00 99.8   97/67 (77)    


 


3/4/18 07:00       3.00 














Intake and Output   


 


 3/4/18 3/4/18 3/4/18





 07:59 15:59 23:59


 


Intake Total 1300.0 ml 100 ml 


 


Output Total 1000 ml  


 


Balance 300.0 ml 100 ml 








Result Diagram:  


3/4/18 0500                                                                    

            3/4/18 0500





Imaging





Last Impressions








Head CT 3/3/18 0000 Signed





Impressions: 





 Service Date/Time:  Saturday, March 3, 2018 10:22 - CONCLUSION:  1. Focal area 





 of decreased density involving the right parietal lobe. As a new finding from 





 the prior exam. This could relate to a small infarct. MRI of the brain with 





 gadolinium suggested to further evaluate. 2. Small lacunar infarction 

involving 





 the left centrum semiovale.     Scooter Dawson Jr., MD 


 


CT Angiography 3/3/18 0000 Signed





Impressions: 





 Service Date/Time:  Saturday, March 3, 2018 10:28 - CONCLUSION:  There 

extensive 





 pulmonary emboli bilaterally. There is near complete cut off of the right 

lower 





 lobe pulmonary artery. Prominent filling defects on the left as well. These 





 findings were relayed to Dr. Bustos immediately at the completion of the study.  





 Scattered pulmonary infiltrates, small pleural effusion and extensive 





 mediastinal lymphadenopathy as described above.      Won Sharp MD 


 


Brain MRI 3/3/18 0000 Signed





Impressions: 





 Service Date/Time:  Saturday, March 3, 2018 18:04 - CONCLUSION:  Deterioration 





 in the appearance of the scan.  At least 3 focal areas of abnormal contrast 





 enhancement are present scattered to in both hemispheres.  Given the history 





 this most likely represents developing areas cerebritis.  Exam is compromised 

by 





 an uncooperative patient and motion artifact.  These 2 factors make detection 

of 





 other abnormalities such as cortical cephalitis and meningitis difficult to 





 exclude.  Cannot exclude hemorrhagic lesions as well.  There is no mass effect 





 evident.      Ej Ewing MD 


 


Chest X-Ray 3/1/18 0600 Signed





Impressions: 





 Service Date/Time:  Thursday, March 1, 2018 03:05 - CONCLUSION:  1. Stable 





 patchy bilateral lower lung zone airspace disease. 2. No significant interval 





 change.     Rj Whitten MD 


 


Chest CT 2/26/18 0000 Signed





Impressions: 





 Service Date/Time:  Monday, February 26, 2018 12:41 - CONCLUSION:  1. There 

are 





 at least 5 pulmonary nodules present bilaterally with the largest measuring 19 





 mm. None demonstrate cavitation but it is possible that these represent septic 





 emboli. Suggest followup to assess for change. 2. Splenomegaly with subtle 





 wedge-shaped areas of low-density in the mid spleen which could represent 





 infarcts. 3. Mild cardiomegaly in this patient post aortic valve replacement. 





 Low density of the cardiac blood pool suggests anemia.      Ron Melgar MD 


 


Aorta w/Runoff CTA 2/25/18 0000 Signed





Impressions: 





 Service Date/Time:  Sunday, February 25, 2018 12:47 - CONCLUSION:  Left renal 





 cortical infarction. 5-6 cm length total occlusion of the left superficial 





 femoral artery in the proximal thigh. Nodular parenchymal opacities in the 

lung 





 bases bilaterally See above discussion.     Ron Cruz MD 


 


Renal Ultrasound 2/24/18 0000 Signed





Impressions: 





 Service Date/Time:  Saturday, February 24, 2018 10:53 - CONCLUSION:  1. No 





 evidence of hydronephrosis. 2. Thickening of the urinary bladder wall a 1.1 

cm. 





 3. Prominent splenomegaly.     Elgin Walton MD 


 


Breast Ultrasound 2/24/18 0000 Signed





Impressions: 





 Service Date/Time:  Saturday, February 24, 2018 10:48 - CONCLUSION:  Negative 





 targeted right breast ultrasound examination.     Ron Brown MD 


 


Lower Extremity Ultrasound 2/23/18 0000 Signed





Impressions: 





 Service Date/Time:  Friday, February 23, 2018 21:22 - CONCLUSION:  1. No 





 sonographic evidence for lower extremity DVT. 2. Incidental note of bilateral 





 inguinal adenopathy with the largest on the right measuring 3.3 cm and the 





 largest on the left measuring 2.6 cm. This finding is nonspecific but is 





 typically reactive in etiology.     Rj Whitten MD 








Last Impressions








Chest X-Ray 3/1/18 0600 Signed





Impressions: 





 Service Date/Time:  Thursday, March 1, 2018 03:05 - CONCLUSION:  1. Stable 





 patchy bilateral lower lung zone airspace disease. 2. No significant interval 





 change.     Rj Whitten MD 


 


Chest CT 2/26/18 0000 Signed





Impressions: 





 Service Date/Time:  Monday, February 26, 2018 12:41 - CONCLUSION:  1. There 

are 





 at least 5 pulmonary nodules present bilaterally with the largest measuring 19 





 mm. None demonstrate cavitation but it is possible that these represent septic 





 emboli. Suggest followup to assess for change. 2. Splenomegaly with subtle 





 wedge-shaped areas of low-density in the mid spleen which could represent 





 infarcts. 3. Mild cardiomegaly in this patient post aortic valve replacement. 





 Low density of the cardiac blood pool suggests anemia.      Ron Melgar MD 


 


Aorta w/Runoff CTA 2/25/18 0000 Signed





Impressions: 





 Service Date/Time:  Sunday, February 25, 2018 12:47 - CONCLUSION:  Left renal 





 cortical infarction. 5-6 cm length total occlusion of the left superficial 





 femoral artery in the proximal thigh. Nodular parenchymal opacities in the 

lung 





 bases bilaterally See above discussion.     Ron Cruz MD 


 


Renal Ultrasound 2/24/18 0000 Signed





Impressions: 





 Service Date/Time:  Saturday, February 24, 2018 10:53 - CONCLUSION:  1. No 





 evidence of hydronephrosis. 2. Thickening of the urinary bladder wall a 1.1 

cm. 





 3. Prominent splenomegaly.     Elgin Walton MD 


 


Breast Ultrasound 2/24/18 0000 Signed





Impressions: 





 Service Date/Time:  Saturday, February 24, 2018 10:48 - CONCLUSION:  Negative 





 targeted right breast ultrasound examination.     Ron Brown MD 


 


Lower Extremity Ultrasound 2/23/18 0000 Signed





Impressions: 





 Service Date/Time:  Friday, February 23, 2018 21:22 - CONCLUSION:  1. No 





 sonographic evidence for lower extremity DVT. 2. Incidental note of bilateral 





 inguinal adenopathy with the largest on the right measuring 3.3 cm and the 





 largest on the left measuring 2.6 cm. This finding is nonspecific but is 





 typically reactive in etiology.     Rj Whitten MD 








Objective Remarks


GENERAL: 37-year-old  female resting in bed in no acute distress


SKIN: Warm and dry.  Patient does appear to have puncture wounds from where she 

states been injecting recently in her arms, bilateral upper breast.


HEAD: Atraumatic. Normocephalic. 


EYES: Pupils equal and round. No scleral icterus. No injection or drainage. 


ENT: No nasal bleeding or discharge.  Mucous membranes pink and moist.  


NECK: Trachea midline. No JVD. 


CHEST: Bilateral upper breast has puncture wounds from injection.  Pain to 

point palpation left upper thorax


CARDIOVASCULAR: 2/6 pansystolic murmurat the left lower sternal border.  


RESPIRATORY: Few fine crackles present bilaterally.  No wheezing


GASTROINTESTINAL: Abdomen soft, tender to deep palpation.  No guarding 

rigidity.  Positive hepatosplenomegaly


MUSCULOSKELETAL: 2+ pitting edema to the lower extremities.  Erythematous 

purpuric rash of the left lower extremity anterior shin and foot, punctate 

lesion plantar right great toe. Left leg extremely tender below the left knee.  

Dopplerable DP pulses bilaterally


NEUROLOGICAL: Awake and alert. Motor grossly within normal limits. Five out of 

5 muscle strength in the arms and legs.  Normal speech.


Date of Insertion:  Feb 24, 2018


Line:  Central Venous Catheter


Side:  Right


Location:  Internal, Jugular





A/P


Assessment and Plan


Neuro/Psych:





IVDU -hydromorphone


History of THC use





- Continue pain control hydrocodone/acetaminophen 10/325 1 tablet every 4 hours 

as needed pain 1 through 5 with hydromorphone 2 mg IV every 4 hours as needed 

pain 6-10 (patient has severe pain from ischemic leg)


- Acetaminophen 650 mg p.o. every 6 hours as needed fever


CT brain 3/3 order while on Argatroban-3 focal areas of abnormal contrast 

enhancement scattered within both hemispheres





CVS/ID





Septic  shock


Infective endocarditis involving aortic prosthetic valve


Tricuspid valve endocarditis


Multiple septic emboli including pulmonary, splenic and vascular


History of fungal and bacterial prosthetic valve endocarditis


Strep viridans bacteremia


5-6 cm length total occlusion of the left superficial femoral artery in the 

proximal thigh





- Recurrent aortic prosthetic valve endocarditis -initially placed 2011 with 

redo 2016. - Now with strep viridans in blood cultures 2/23


- Broad-spectrum antibiotic with ampicillin and gentamicin.


   Previously treated with vancomycin and ceftriaxone


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 

April 2017 for tricuspid valve PVE  - Candida parapsilosis , Streptococci


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


- Vascular surgery Dr. Sears following


- Anticoagulation with argatroban gtt at 0.75 mcg/kg/min


- Infectious disease Dr. Carr


- Extensive counselling given about IP Rehab


-CTS evaluation Dr. Bauer-no surgical intervention planned





Microbiology





2/27 -blood cultures 2 -no growth


2/23 -urine culture -no growth


2/23 -blood cultures 2 -strep viridians





Resp:





Acute respiratory insufficiency


5 pulmonary nodules likely septic emboli





-Nasal cannula to keep oxygen saturation above 92% currently on 4 L


-Incentives spirometry while awake


-As needed albuterol aerosols every 2 hours.  Dyspnea


- CT thorax revealed 5 bilateral pulmonary lesions/nodules largest which is 19 

mm.  No cavitation noted but likely septic emboli.


CT pulmonary angiogram 3/3-pulmonary emboli bilateral lower lobes





GI:





Hepatitis C


Splenic infarction


Hypoalbuminemia





- Heart healthy diet


-Famotidine for GI prophylaxis


-Docusate sodium/senna 1 tablet twice daily for bowel regimen








Renal/:





Acute on chronic kidney disease


Left renal cortical infarction





- Acute renal failure secondary to ATN and dehydration


- CT imaging revealed left renal cortical infarction


- Renal ultrasound-thickening of urinary bladder





Endo:





Sliding scale insulin if indicated to maintain euglycemia





Heme:





Microcytic anemia


Leukocytosis





- Monitor CBC CMP and coags


-Does not meet transfusion threshold at this time





FEN:





Hyponatremia





Replace electrolytes as clinically indicated.  





MSK:





History of right breast abscess


Elevated BMI





- Ultrasound of the left lower extremity negative for DVT


- Right breast ultrasound negative for abscess


-Weight loss encouraged





DVT GI prophylaxis





- argatroban gtt


- famotidine





Level 3 follow-up





CT pulmonary angiogram revealed bilateral pulmonary emboli nature unclear.  

Right lower lobe occlusion.





CT brain revealed right parietal lacunar infarct possibly with infarct left 

centrum semiovale possibly septic emboli.  Versus thrombus.  MRI brain ordered





Stat echocardiogram ordered.  This revealed Aortic prosthesis with prolapse and 

possible vegetation.  The estimated pulmonary arterial pressure is 65.7 mmHg.  

Tricuspid valve prosthesis  Mobile vegetation seen on the tricuspid valve. 2x4 

cm.  Discussed with infectious disease.  Recommended vancomycin and possible 

FLORENCIO early next week


Patient is currently on therapeutic argatroban.


Physician


Julia Lewis MD Mar 4, 2018 14:57

## 2018-03-04 NOTE — PD.ONC.PN
Subjective


Subjective


Remarks


Tmax 102 last night at around 8 PM; blood cultures 1 are pending


Patient reports pain with taking a deep breath is unchanged


Shortness of breath unchanged


No bleeding





Objective


Data











  Date Time  Temp Pulse Resp B/P (MAP) Pulse Ox O2 Delivery O2 Flow Rate FiO2


 


3/4/18 09:49     100 Venturi Mask  35


 


3/4/18 06:00  118      


 


3/4/18 04:00  100      


 


3/4/18 04:00 98.4 100 29 94/64 (74) 97   


 


3/4/18 02:00  95      


 


3/4/18 01:00     97 Venturi Mask 5.00 35


 


3/4/18 00:00  109      


 


3/4/18 00:00     98 Venturi Mask 35.00 


 


3/4/18 00:00 97.6 109 32 96/64 (75) 88   


 


3/3/18 22:00     97 Nasal Cannula 3.00 


 


3/3/18 22:00  125      


 


3/3/18 20:00     89 Nasal Cannula 4.00 


 


3/3/18 20:00  129      


 


3/3/18 20:00 102.2 129 38 109/78 (88) 87   


 


3/3/18 19:27   33     


 


3/3/18 18:00  112      


 


3/3/18 16:00  110      


 


3/3/18 16:00 99.2 111 30 106/73 (84) 99   


 


3/3/18 15:00   22     


 


3/3/18 14:00  108      


 


3/3/18 12:27   22     


 


3/3/18 12:00  114      


 


3/3/18 12:00  123      


 


3/3/18 12:00 99.6 114 23 96/61 (73) 96   


 


3/3/18 11:58   22     














 3/4/18 3/4/18 3/4/18





 07:00 15:00 23:00


 


Intake Total 1300.0 ml  


 


Output Total 1000 ml  


 


Balance 300.0 ml  








Result Diagram:  


3/4/18 0500                                                                    

            3/4/18 0500





Laboratory Results





Laboratory Tests








Test


  3/3/18


21:50 3/4/18


05:00


 


Urine Opiates Screen POS  


 


Urine Barbiturates Screen NEG  


 


Urine Amphetamines Screen NEG  


 


Urine Benzodiazepines Screen NEG  


 


Urine Cocaine Screen NEG  


 


Urine Cannabinoids Screen NEG  


 


White Blood Count  21.1 TH/MM3 


 


Red Blood Count  3.30 MIL/MM3 


 


Hemoglobin  7.4 GM/DL 


 


Hematocrit  23.0 % 


 


Mean Corpuscular Volume  69.8 FL 


 


Mean Corpuscular Hemoglobin  22.4 PG 


 


Mean Corpuscular Hemoglobin


Concent 


  32.0 % 


 


 


Red Cell Distribution Width  17.9 % 


 


Platelet Count  291 TH/MM3 


 


Mean Platelet Volume  8.4 FL 


 


Neutrophils (%) (Auto)  84.6 % 


 


Lymphocytes (%) (Auto)  5.7 % 


 


Monocytes (%) (Auto)  8.8 % 


 


Eosinophils (%) (Auto)  0.4 % 


 


Basophils (%) (Auto)  0.5 % 


 


Neutrophils # (Auto)  17.8 TH/MM3 


 


Lymphocytes # (Auto)  1.2 TH/MM3 


 


Monocytes # (Auto)  1.9 TH/MM3 


 


Eosinophils # (Auto)  0.1 TH/MM3 


 


Basophils # (Auto)  0.1 TH/MM3 


 


CBC Comment  DIFF FINAL 


 


Differential Comment   


 


Activated Partial


Thromboplast Time 


  77.9 SEC 


 


 


Blood Urea Nitrogen  10 MG/DL 


 


Creatinine  0.79 MG/DL 


 


Random Glucose  119 MG/DL 


 


Total Protein  6.8 GM/DL 


 


Albumin  2.4 GM/DL 


 


Calcium Level  8.7 MG/DL 


 


Phosphorus Level  4.3 MG/DL 


 


Magnesium Level  2.1 MG/DL 


 


Alkaline Phosphatase  113 U/L 


 


Aspartate Amino Transf


(AST/SGOT) 


  17 U/L 


 


 


Alanine Aminotransferase


(ALT/SGPT) 


  11 U/L 


 


 


Total Bilirubin  0.7 MG/DL 


 


Sodium Level  134 MEQ/L 


 


Potassium Level  4.4 MEQ/L 


 


Chloride Level  99 MEQ/L 


 


Carbon Dioxide Level  29.3 MEQ/L 


 


Anion Gap  6 MEQ/L 


 


Estimat Glomerular Filtration


Rate 


  83 ML/MIN 


 








Culture Results





Microbiology








 Date/Time


Source Procedure


Growth Status


 


 


 3/4/18 05:50


Blood Peripheral Aerobic Blood Culture


Pending Received


 


 3/4/18 05:50


Blood Peripheral Anaerobic Blood Culture


Pending Received





 3/3/18 21:20


Blood Line Aerobic Blood Culture


Pending Received


 


 3/3/18 21:20


Blood Line Anaerobic Blood Culture


Pending Received





 3/2/18 05:43


Blood Peripheral Aerobic Blood Culture - Preliminary


NO GROWTH IN 1 DAY Resulted


 


 3/2/18 05:43


Blood Peripheral Anaerobic Blood Culture - Preliminary


NO GROWTH IN 1 DAY Resulted





 3/2/18 05:36


Blood Peripheral Aerobic Blood Culture - Preliminary


NO GROWTH IN 1 DAY Resulted


 


 3/2/18 05:36


Blood Peripheral Anaerobic Blood Culture - Preliminary


NO GROWTH IN 1 DAY Resulted





 3/3/18 21:50


Sputum Expectorated Sputum Gram Stain - Final Resulted


 


 3/3/18 21:50


Sputum Expectorated Sputum Sputum Culture


Pending Resulted











Administered Medications








 Medications


  (Trade)  Dose


 Ordered  Sig/Nikhil


 Route


 PRN Reason  Start Time


 Stop Time Status Last Admin


Dose Admin


 


 Sodium Chloride


  (NS Flush)  2 ml  BID


 IV FLUSH


   2/24/18 09:00


    3/4/18 08:24


 


 


 Acetaminophen


  (Tylenol)  650 mg  Q6H  PRN


 PO


 fever  2/23/18 22:00


    3/3/18 21:31


 


 


 Temazepam


  (Restoril)  15 mg  HS  PRN


 PO


 INSOMNIA  2/23/18 22:00


    3/4/18 00:27


 


 


 Chlorhexidine


 Gluconate


  (Chlorhexidine


 2% Cloth)  


 Taper  DAILY@04


 TOP


   2/24/18 04:00


 2/20/19 03:59  3/4/18 01:50


 


 


 Senna/Docusate


 Sodium


  (Arlen-Colace)  1 tab  BID


 PO


   2/24/18 09:00


    3/3/18 21:31


 


 


 Ampicillin Sodium


 2000 mg/Sodium


 Chloride  100 ml @ 


 400 mls/hr  Q6H


 IV


   2/24/18 14:00


    3/4/18 08:23


 


 


 Hydromorphone HCl


  (Dilaudid Pf Inj)  2 mg  Q4H  PRN


 IV PUSH


 PAIN SCALE 6 TO 10  2/25/18 15:00


    3/4/18 09:14


 


 


 Loperamide HCl


  (Imodium)  2 mg  Q4H  PRN


 PO


 Diarrhea  2/26/18 16:15


    2/28/18 14:32


 


 


 Gentamicin


 Sulfate 100 mg/


 Sodium Chloride  102.5 ml @ 


 100 mls/hr  Q8H


 IV


   2/26/18 21:00


    3/4/18 04:58


 


 


 Albuterol Sulfate


  (Albuterol Neb)  2.5 mg  Q2HR NEB  PRN


 NEB


 dyspnea  2/27/18 11:30


    3/2/18 02:47


 


 


 Acetaminophen/


 Hydrocodone Bitart


  (Norco  Mg)  1 tab  Q6H  PRN


 PO


 pain 1-5  2/27/18 11:30


    3/3/18 21:31


 


 


 Famotidine


  (Pepcid)  20 mg  BID


 PO


   2/27/18 21:00


    3/4/18 08:22


 


 


 Argatroban 250 mg/


 Sodium Chloride  252.5 ml @ 


 3.11 mls/hr  TITRATE  PRN


 IV


 aPTT < 50  3/1/18 19:15


    3/3/18 18:58


 


 


 Vancomycin HCl


 1500 mg/Sodium


 Chloride  515 ml @ 


 257.5 mls/


 hr  Q8H


 IV


   3/4/18 02:00


    3/4/18 09:14


 








Objective Remarks


GENERAL: Overweight younger female, asleep in bed on approach in no obvious 

distress.


SKIN: Warm and dry.


HEAD: Normocephalic.


EYES: No scleral icterus. No injection or drainage. 


NECK: No JVD or lymphadenopathy.


CARDIOVASCULAR: Tachycardia


RESPIRATORY: Coarse lung sounds


GASTROINTESTINAL: Abdomen soft, non-tender, nondistended. 


EXTREMITIES: Cyanosis to LLE


MUSCULOSKELETAL: Adequate muscle tone.


NEUROLOGICAL: No obvious focal deficit. Awake, alert, and oriented x3.





Assessment/Plan


Problem List:  


(1) arterial blood clot


Plan:  --on Argatroban (direct thrombin inhibitor)


-- no significant family history of thrombotic events to suggest hereditary 

antithrombin deficiency.  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 

impaired production from her liver disease. 


--has chronic active hepatitis C.  ++increased consumption from disseminated 

intravascular coagulation and acute illness, bacterial endocarditis.  


--Protein losses are also considered.  She had mild proteinuria when she first 

was admitted.


--Thrombin inactivates antithrombin.  This would abrogate the effect of 

unfractionated heparin and low molecular weight heparin.





(2) Acute septic pulmonary embolism


ICD Codes:  I26.90 - Septic pulmonary embolism without acute cor pulmonale


Plan:  -- The echocardiogram on 3/3 showed an estimated EF of 50-55%.  There 

was severe tricuspid regurgitation with prosthesis in place.  2 x 4 cm mobile 

vegetation was seen on the valve.  The right atrium and right ventricle were 

normal in size and function





Assessment


38y/o female admitted with recurrent endocarditis. hematology consulted for 

anticoagulation assistance.


h/o Recurrent bacterial endocarditis, bacteremia, history of prosthetic aortic 

valve and subsequent redo, chronic active hepatitis C, intravenous drug use, 

microcytic anemia, consistent with iron deficiency, left lower extremity 

arterial event, bacterial endocarditis with viridans streptococcus.





HPI (brought forward for continuity of care): history of IV drug abuse and  

recurrent endocarditis. had a relapse of her activity. has history of pulmonary 

embolism and infected valves. +significant congestive heart failure and 

presented to the emergency room on 02/23/2018, with sepsis. is followed by 

Infectious Disease for her endocarditis.  previously received antibiotic 

therapy for enterococcus faecalis and staph aureus. There is questionable 

compliance. Her current blood 


culture from 02/23/2018, shows viridans streptococcus.  Two out of two bottles 

were positive.  She is on ampicillin and gentamicin. course is complicated by 

cold left lower extremity, evaluated by  Vascular Surgery.  A CT angiogram 

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 

the proximal thigh.  There is satisfactory calf runoff present.  For this reason

, conservative management with anticoagulation is continued. was placed on 

unfractionated heparin.  Her heparin dose has been titrated from 1000 units/

hour to 2500 units/hour with a PTT remaining subtherapeutic.  Her last PTT is 

32 seconds from 03/01/2018, at 2 p.m.  For this reason, Hematology/Oncology is 

consulted.


Plan


1. Echocardiogram showed severe tricuspid regurgitation


2. Recommend to continue the argatroban


3.  Continue antibiotics per infectious disease


4.  Monitor CBC, Janessa Stephens Mar 4, 2018 10:10

## 2018-03-04 NOTE — PD.CONS
History of Present Illness


Service


CT Surgery


Consult Requested By


Dr. Bustos


Reason for Consult


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 

IV drug use


Primary Care Physician


Ron Jordan MD


Diagnoses:  


(1) Endocarditis


(2) Tricuspid regurgitation


(3) Polysubstance abuse


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


(5) Congestive heart failure due to valvular disease


History of Present Illness


Unfortunate 36y/o female with ongoing IV drug abuse who is already s/p TWO 

STERNOTOMIES FOR AVR AND REDO AVR in 2010 and 2016 presents with sepsis, 

pulmonary, and septic emboli from continued IV drug abuse.  She had an ECHO 

within the last 2 days which shows severe tricuspid regurgitation with 

vegetation  and possible prosthetic aortic valve involvement.  Blood cultures 

grew strep viridans.  Currently anticoagulated on argatroban with ongoing fever 

up to 102.





Review of Systems


Constitutional:  COMPLAINS OF: Diaphoretic episodes, Fatigue, Fever, Chills, 

Night Sweats, DENIES: Weight gain, Weight loss, Dizziness, Change in appetite


Endocrine:  DENIES: Abnorml menstrual pattern, Heat/cold intolerance, Polydipsia

, Polyuria, Polyphagia


Eyes:  DENIES: Blurred vision, Diplopia, Eye inflammation, Eye pain, Vision loss

, Photosensitivity, Double Vision


Ears, nose, mouth, throat:  DENIES: Tinnitus, Hearing loss, Vertigo, Nasal 

discharge, Oral lesions, Throat pain, Hoarseness, Ear Pain, Running Nose, 

Epistaxis, Sinus Pain, Toothache, Odynophagia


Respiratory:  COMPLAINS OF: Cough, Shortness of breath, DENIES: Apneas, Snoring

, Wheezing, Hemoptysis, Sputum production


Cardiovascular:  COMPLAINS OF: Dyspnea on Exertion, Lower Extremity Edema, 

DENIES: Chest pain, Palpitations, Syncope, PND, Orthopnea, Claudication


Gastrointestinal:  DENIES: Abdominal pain, Black stools, Bloody stools, 

Constipation, Diarrhea, Nausea, Vomiting, Difficulty Swallowing, Anorexia


Musculoskeletal:  COMPLAINS OF: Muscle aches, Back pain, DENIES: Joint pain, 

Stiffness, Joint Swelling, Neck pain


Integumentary:  DENIES: Abnormal pigmentation, Pruritus, Rash, Nail changes, 

Breast masses, Breast skin changes, Nipple discharge


Hematologic/lymphatic:  DENIES: Bruising, Lymphadenopathy


Immunologic/allergic:  DENIES: Eczema, Urticaria


Psychiatric:  COMPLAINS OF: Anxiety, Mood changes, Depression, DENIES: Confusion

, Hallucinations, Agitation, Suicidal Ideation, Homicidal Ideation, Delusions





Past Family Social History


Allergies:  


Coded Allergies:  


     No Known Allergies (Verified  Allergy, Unknown, 2/23/18)


Past Medical History


Past Medical History


Hepatitis C


IV drug abuse


Narcotic dependency


Bacterial endocarditis


Past Surgical History


Left forearm surgery


Reported Medications


none


Active Ordered Medications





Current Medications








 Medications


  (Trade)  Dose


 Ordered  Sig/Nikhil


 Route  Start Time


 Stop Time Status Last Admin


 


  (NS Flush)  2 ml  UNSCH  PRN


 IV FLUSH  2/23/18 22:00


     


 


 


  (NS Flush)  2 ml  BID


 IV FLUSH  2/24/18 09:00


    3/4/18 08:24


 


 


  (Tylenol)  650 mg  Q6H  PRN


 PO  2/23/18 22:00


    3/3/18 21:31


 


 


  (Zofran Inj)  4 mg  Q6H  PRN


 IV PUSH  2/23/18 22:00


     


 


 


  (Restoril)  15 mg  HS  PRN


 PO  2/23/18 22:00


    3/4/18 00:27


 


 


 Miscellaneous


 Information  1  Q361D


 XX  2/23/18 22:00


     


 


 


  (Chlorhexidine


 2% Cloth)  


 Taper  DAILY@04


 TOP  2/24/18 04:00


 2/20/19 03:59  3/4/18 01:50


 


 


  (Chlorhexidine


 2% Cloth)  3 pack  UNSCH  PRN


 TOP  2/23/18 22:00


     


 


 


  (Arlen-Colace)  1 tab  BID


 PO  2/24/18 09:00


    3/3/18 21:31


 


 


  (Milk Of


 Magnesia Liq)  30 ml  Q12H  PRN


 PO  2/23/18 22:00


     


 


 


  (Senokot)  17.2 mg  Q12H  PRN


 PO  2/23/18 22:00


     


 


 


  (Dulcolax Supp)  10 mg  DAILY  PRN


 RECTAL  2/23/18 22:00


     


 


 


  (Lactulose Liq)  30 ml  DAILY  PRN


 PO  2/23/18 22:00


     


 


 


 Ampicillin Sodium


 2000 mg/Sodium


 Chloride  100 ml @ 


 400 mls/hr  Q6H


 IV  2/24/18 14:00


    3/4/18 08:23


 


 


  (Dilaudid Pf Inj)  2 mg  Q4H  PRN


 IV PUSH  2/25/18 15:00


    3/4/18 09:14


 


 


  (Imodium)  2 mg  Q4H  PRN


 PO  2/26/18 16:15


    2/28/18 14:32


 


 


 Gentamicin


 Sulfate 100 mg/


 Sodium Chloride  102.5 ml @ 


 100 mls/hr  Q8H


 IV  2/26/18 21:00


    3/4/18 04:58


 


 


  (Albuterol Neb)  2.5 mg  Q2HR NEB  PRN


 NEB  2/27/18 11:30


    3/2/18 02:47


 


 


  (Norco  Mg)  1 tab  Q6H  PRN


 PO  2/27/18 11:30


    3/3/18 21:31


 


 


  (Pepcid)  20 mg  BID


 PO  2/27/18 21:00


    3/4/18 08:22


 


 


 Argatroban 250 mg/


 Sodium Chloride  252.5 ml @ 


 3.11 mls/hr  TITRATE  PRN


 IV  3/1/18 19:15


    3/3/18 18:58


 


 


 Pharmacy Profile


 Note  0 ml @ 0


 mls/hr  UNSCH


 OTHER  3/3/18 16:45


     


 


 


 Vancomycin HCl


 1500 mg/Sodium


 Chloride  515 ml @ 


 257.5 mls/


 hr  Q8H


 IV  3/4/18 02:00


    3/4/18 09:14


 


 


 Miscellaneous


 Information  SPECIFIC LAB TO BE


 DRAWN:VANCOMYCIN


 TROUGH DATE TO...  ONCE  ONCE


 .XX  3/4/18 17:45


 3/4/18 17:46   


 








Family History


unremarkable


Social History


Polysubstance abuse





Physical Exam


Vital Signs





Vital Signs








  Date Time  Temp Pulse Resp B/P (MAP) Pulse Ox O2 Delivery O2 Flow Rate FiO2


 


3/4/18 10:00  120      


 


3/4/18 09:49     100 Venturi Mask  35


 


3/4/18 09:44   22     


 


3/4/18 08:00 99.8 114 32 97/67 (77) 100   


 


3/4/18 08:00  114      


 


3/4/18 07:00     100 Nasal Cannula 3.00 


 


3/4/18 06:00  118      


 


3/4/18 04:00  100      


 


3/4/18 04:00 98.4 100 29 94/64 (74) 97   


 


3/4/18 02:00  95      


 


3/4/18 01:00     97 Venturi Mask 5.00 35


 


3/4/18 00:00  109      


 


3/4/18 00:00     98 Venturi Mask 35.00 


 


3/4/18 00:00 97.6 109 32 96/64 (75) 88   


 


3/3/18 22:00     97 Nasal Cannula 3.00 


 


3/3/18 22:00  125      


 


3/3/18 20:00     89 Nasal Cannula 4.00 


 


3/3/18 20:00  129      


 


3/3/18 20:00 102.2 129 38 109/78 (88) 87   


 


3/3/18 19:27   33     


 


3/3/18 18:00  112      


 


3/3/18 16:00  110      


 


3/3/18 16:00 99.2 111 30 106/73 (84) 99   


 


3/3/18 14:00  108      


 


3/3/18 12:27   22     


 


3/3/18 12:00  114      


 


3/3/18 12:00  123      


 


3/3/18 12:00 99.6 114 23 96/61 (73) 96   


 


3/3/18 11:58   22     








Physical Exam


GENERAL: This is a well-nourished, well-developed patient, who appears 

lethargic and somewhat dyspneic.


SKIN: Lower extremity ecchymosis, edema, and purpura.


HEAD: Atraumatic. No temporal or scalp tenderness.


EYES: Pupils equal round and reactive. Extraocular motions intact. No scleral 

icterus. No injection or drainage. 


ENT: Nose without bleeding, purulent drainage or septal hematoma. Throat 

without erythema, tonsillar hypertrophy or exudate. Uvula midline. Airway 

patent.


NECK: Trachea midline. No JVD or lymphadenopathy. Supple, nontender, no 

meningeal signs.


CARDIOVASCULAR: Regular rate and rhythm with S4 gallop, tachycardic. 


RESPIRATORY: bilateral crackles. Breath sounds equal bilaterally. 


GASTROINTESTINAL: Abdomen soft, mildly tender, nondistended.


MUSCULOSKELETAL: Extremities without clubbing, cyanosis, 1+ edema. No joint 

tenderness, effusion, or edema noted.


NEUROLOGICAL: Lethargic. Cranial nerves II through XII intact.  Motor and 

sensory grossly within normal limits. Five out of 5 muscle strength in all 

muscle groups.  Slurred speech.


Laboratory





Laboratory Tests








Test


  3/3/18


21:50 3/4/18


05:00 3/4/18


10:00


 


Urine Opiates Screen POS   


 


Urine Barbiturates Screen NEG   


 


Urine Amphetamines Screen NEG   


 


Urine Benzodiazepines Screen NEG   


 


Urine Cocaine Screen NEG   


 


Urine Cannabinoids Screen NEG   


 


White Blood Count  21.1  


 


Red Blood Count  3.30  


 


Hemoglobin  7.4  


 


Hematocrit  23.0  


 


Mean Corpuscular Volume  69.8  


 


Mean Corpuscular Hemoglobin  22.4  


 


Mean Corpuscular Hemoglobin


Concent 


  32.0 


  


 


 


Red Cell Distribution Width  17.9  


 


Platelet Count  291  


 


Mean Platelet Volume  8.4  


 


Neutrophils (%) (Auto)  84.6  


 


Lymphocytes (%) (Auto)  5.7  


 


Monocytes (%) (Auto)  8.8  


 


Eosinophils (%) (Auto)  0.4  


 


Basophils (%) (Auto)  0.5  


 


Neutrophils # (Auto)  17.8  


 


Lymphocytes # (Auto)  1.2  


 


Monocytes # (Auto)  1.9  


 


Eosinophils # (Auto)  0.1  


 


Basophils # (Auto)  0.1  


 


CBC Comment  DIFF FINAL  


 


Differential Comment    


 


Activated Partial


Thromboplast Time 


  77.9 


  57.3 


 


 


Blood Urea Nitrogen  10  


 


Creatinine  0.79  


 


Random Glucose  119  


 


Total Protein  6.8  


 


Albumin  2.4  


 


Calcium Level  8.7  


 


Phosphorus Level  4.3  


 


Magnesium Level  2.1  


 


Alkaline Phosphatase  113  


 


Aspartate Amino Transf


(AST/SGOT) 


  17 


  


 


 


Alanine Aminotransferase


(ALT/SGPT) 


  11 


  


 


 


Total Bilirubin  0.7  


 


Sodium Level  134  


 


Potassium Level  4.4  


 


Chloride Level  99  


 


Carbon Dioxide Level  29.3  


 


Anion Gap  6  


 


Estimat Glomerular Filtration


Rate 


  83 


  


 














 Date/Time


Source Procedure


Growth Status


 


 


 3/4/18 05:50


Blood Peripheral Aerobic Blood Culture


Pending Received


 


 3/4/18 05:50


Blood Peripheral Anaerobic Blood Culture


Pending Received


 


 2/25/18 21:00


Stool Stool Stool Occult Blood (GILA) - Final


HEMOCCULT NEGATIVE Complete





 3/3/18 21:50


Sputum Expectorated Sputum Gram Stain - Final Resulted


 


 3/3/18 21:50


Sputum Expectorated Sputum Sputum Culture


Pending Resulted


 


 2/23/18 22:30


Urine Suprapubic Urine Urine Culture - Final


NO GROWTH IN 48 HOURS. Complete








Result Diagram:  


3/4/18 0500                                                                    

            3/4/18 0500





Imaging





Last Impressions








Head CT 3/3/18 0000 Signed





Impressions: 





 Service Date/Time:  Saturday, March 3, 2018 10:22 - CONCLUSION:  1. Focal area 





 of decreased density involving the right parietal lobe. As a new finding from 





 the prior exam. This could relate to a small infarct. MRI of the brain with 





 gadolinium suggested to further evaluate. 2. Small lacunar infarction 

involving 





 the left centrum semiovale.     Scooter Dawson Jr., MD 


 


CT Angiography 3/3/18 0000 Signed





Impressions: 





 Service Date/Time:  Saturday, March 3, 2018 10:28 - CONCLUSION:  There 

extensive 





 pulmonary emboli bilaterally. There is near complete cut off of the right 

lower 





 lobe pulmonary artery. Prominent filling defects on the left as well. These 





 findings were relayed to Dr. Bustos immediately at the completion of the study.  





 Scattered pulmonary infiltrates, small pleural effusion and extensive 





 mediastinal lymphadenopathy as described above.      Won Sharp MD 


 


Brain MRI 3/3/18 0000 Signed





Impressions: 





 Service Date/Time:  Saturday, March 3, 2018 18:04 - CONCLUSION:  Deterioration 





 in the appearance of the scan.  At least 3 focal areas of abnormal contrast 





 enhancement are present scattered to in both hemispheres.  Given the history 





 this most likely represents developing areas cerebritis.  Exam is compromised 

by 





 an uncooperative patient and motion artifact.  These 2 factors make detection 

of 





 other abnormalities such as cortical cephalitis and meningitis difficult to 





 exclude.  Cannot exclude hemorrhagic lesions as well.  There is no mass effect 





 evident.      Ej Ewing MD 


 


Chest X-Ray 3/1/18 0600 Signed





Impressions: 





 Service Date/Time:  Thursday, March 1, 2018 03:05 - CONCLUSION:  1. Stable 





 patchy bilateral lower lung zone airspace disease. 2. No significant interval 





 change.     Rj Whitten MD 


 


Chest CT 2/26/18 0000 Signed





Impressions: 





 Service Date/Time:  Monday, February 26, 2018 12:41 - CONCLUSION:  1. There 

are 





 at least 5 pulmonary nodules present bilaterally with the largest measuring 19 





 mm. None demonstrate cavitation but it is possible that these represent septic 





 emboli. Suggest followup to assess for change. 2. Splenomegaly with subtle 





 wedge-shaped areas of low-density in the mid spleen which could represent 





 infarcts. 3. Mild cardiomegaly in this patient post aortic valve replacement. 





 Low density of the cardiac blood pool suggests anemia.      Ron Melgar MD 


 


Aorta w/Runoff CTA 2/25/18 0000 Signed





Impressions: 





 Service Date/Time:  Sunday, February 25, 2018 12:47 - CONCLUSION:  Left renal 





 cortical infarction. 5-6 cm length total occlusion of the left superficial 





 femoral artery in the proximal thigh. Nodular parenchymal opacities in the 

lung 





 bases bilaterally See above discussion.     Ron Cruz MD 


 


Renal Ultrasound 2/24/18 0000 Signed





Impressions: 





 Service Date/Time:  Saturday, February 24, 2018 10:53 - CONCLUSION:  1. No 





 evidence of hydronephrosis. 2. Thickening of the urinary bladder wall a 1.1 

cm. 





 3. Prominent splenomegaly.     Elgin Walton MD 


 


Breast Ultrasound 2/24/18 0000 Signed





Impressions: 





 Service Date/Time:  Saturday, February 24, 2018 10:48 - CONCLUSION:  Negative 





 targeted right breast ultrasound examination.     Ron Brown MD 


 


Lower Extremity Ultrasound 2/23/18 0000 Signed





Impressions: 





 Service Date/Time:  Friday, February 23, 2018 21:22 - CONCLUSION:  1. No 





 sonographic evidence for lower extremity DVT. 2. Incidental note of bilateral 





 inguinal adenopathy with the largest on the right measuring 3.3 cm and the 





 largest on the left measuring 2.6 cm. This finding is nonspecific but is 





 typically reactive in etiology.     Rj Whitten MD 








Course


Patient remains in critical condition in the ICU





Assessment and Plan


Problem List:  


(1) Congestive heart failure due to valvular disease


ICD Codes:  I50.9 - Heart failure, unspecified; I38 - Endocarditis, valve 

unspecified


Status:  Acute


(2) Tricuspid regurgitation


ICD Codes:  I07.1 - Rheumatic tricuspid insufficiency


Status:  Acute


(3) Polysubstance abuse


ICD Codes:  F19.10 - Other psychoactive substance abuse, uncomplicated


Status:  Chronic


(4) Prosthetic valve endocarditis


ICD Codes:  T82.6XXA - Infection and inflammatory reaction due to cardiac valve 

prosthesis, initial encounter


Status:  Acute


Assessment and Plan


38 y/o female with ongoing IV drug use s/p AVR and REDO AVR in 2010 and 2016 

respectively.  She now has recurrent endocarditis from recent IV drug use 

involving the tricuspid and prosthetic aortic valves.  Her previous cardiac 

surgery procedures were performed in [place].  STS Risk as follows:





 Risk Model and Variables - STS Adult Cardiac Surgery Database Version 2.81


 RISK SCORES


 About the STS Risk Calculator


 Procedure: AV Replacement 


Risk of Mortality: 20.15% 


Morbidity or Mortality: 59.73% 


Long Length of Stay: 52.286% 


Short Length of Stay: 4.188% 


Permanent Stroke: 2.242% 


Prolonged Ventilation: 53.515% 


DSW Infection: 0.351% 


Renal Failure: 14.322% 


Reoperation: 19.653% 





This risk assessment does not take into account her need for tricuspid valve 

replacement.  She is inoperable.


Given her ongoing drug use despite 2 prior AVRs, she is unlikely to benefit 

from further cardiac surgery.  Her prognosis is poor and she is unlikely to 

survive.  Consider Palliative Care consultation.





Problem Qualifiers





(1) Endocarditis:  


Qualified Codes:  I33.0 - Acute and subacute infective endocarditis


(2) Tricuspid regurgitation:  


Qualified Codes:  I36.1 - Nonrheumatic tricuspid (valve) insufficiency


(3) Prosthetic valve endocarditis:  


Qualified Codes:  T82.6XXA - Infection and inflammatory reaction due to cardiac 

valve prosthesis, initial encounter








Karis Bauer MD Mar 4, 2018 12:04

## 2018-03-04 NOTE — RADRPT
EXAM DATE/TIME:  03/03/2018 18:04 

 

CORRECTION

Corrected on: March 04, 2018; Correcting Radiologist name from [facility] E[facility]wng, mele [facility] COMPARISON:     

CT BRAIN W/O CONTRAST, March 03, 2018, 10:22.  MRI BRAIN  W & W/O CONTRAST, August 25, 2017, 17:07.

       

 

 

INDICATIONS :     

***Abnormal CT.

                     

 

CONTRAST:     

20 cc Omniscan (gadodiamide) IV

                     

 

MEDICAL HISTORY :           

Endocarditis.

 

SURGICAL HISTORY :           

Heart valvel replacement.

 

ENCOUNTER:     

Initial

 

ACUITY:     

1 day

 

PAIN SCORE:     

0/10

 

LOCATION:       

cranial 

 

TECHNIQUE:     

Multiplanar, multisequence MRI of the brain was performed both prior to and following the administrat
ion of paramagnetic contrast.  Exam is limited because the patient refused further scanning.  Necessa
ry pulse sequences were not completed.

 

FINDINGS:     

 

 

     Moderate motion artifact is present.  There has been deterioration appearance of the scan.  Ther
e's been an increased amount of periventricular white matter changes.  These are scattered in both th
e right and left hemispheres.  Posterior fossa is spared at the moment.  There is no associated paren
chymal hemorrhage.  Largest of these is in the right occipital region.  This measures approximately 1
.6 cm.  2 smaller  similar lesions are seen in the vasoganglia on the left.

 

     On the postcontrast images there is focal enhancement of the lesion in the left the right occipi
jewel region.  4 mm enhancing lesion is seen high in the left centrum semiovale

     There is subtle enhancement in the vasoganglia on the left measuring 6 mm.

     The gradient echo images are degraded by motion.  Diffusion images were obtained.

     

CONCLUSION:     

Deterioration in the appearance of the scan.  At least 3 focal areas of abnormal contrast enhancement
 are present scattered to in both hemispheres.  Given the history this most likely represents develop
ing areas cerebritis.  Exam is compromised by an uncooperative patient and motion artifact.  These 2 
factors make detection of other abnormalities such as cortical cephalitis and meningitis difficult to
 exclude.  Cannot exclude hemorrhagic lesions as well.

 

There is no mass effect evident.  

 

 

 Jorge Diane MD, FACS on March 03, 2018 at 20:34           

Board Certified Radiologist.

 This report was verified electronically.  on March 04, 2018 at 20:42  

 

     

Board Certified Radiologist.

 This report was verified electronically.

## 2018-03-05 VITALS
TEMPERATURE: 98.2 F | SYSTOLIC BLOOD PRESSURE: 110 MMHG | RESPIRATION RATE: 26 BRPM | DIASTOLIC BLOOD PRESSURE: 59 MMHG | OXYGEN SATURATION: 98 % | HEART RATE: 104 BPM

## 2018-03-05 VITALS
OXYGEN SATURATION: 98 % | SYSTOLIC BLOOD PRESSURE: 86 MMHG | HEART RATE: 104 BPM | DIASTOLIC BLOOD PRESSURE: 67 MMHG | RESPIRATION RATE: 25 BRPM | TEMPERATURE: 98.4 F

## 2018-03-05 VITALS
TEMPERATURE: 101.5 F | DIASTOLIC BLOOD PRESSURE: 55 MMHG | RESPIRATION RATE: 29 BRPM | SYSTOLIC BLOOD PRESSURE: 108 MMHG | OXYGEN SATURATION: 94 % | HEART RATE: 120 BPM

## 2018-03-05 VITALS — HEART RATE: 122 BPM

## 2018-03-05 VITALS
SYSTOLIC BLOOD PRESSURE: 97 MMHG | DIASTOLIC BLOOD PRESSURE: 67 MMHG | OXYGEN SATURATION: 97 % | TEMPERATURE: 98.9 F | HEART RATE: 110 BPM | RESPIRATION RATE: 30 BRPM

## 2018-03-05 VITALS — HEART RATE: 102 BPM

## 2018-03-05 VITALS
SYSTOLIC BLOOD PRESSURE: 100 MMHG | TEMPERATURE: 98.9 F | DIASTOLIC BLOOD PRESSURE: 65 MMHG | OXYGEN SATURATION: 100 % | HEART RATE: 107 BPM | RESPIRATION RATE: 25 BRPM

## 2018-03-05 VITALS
OXYGEN SATURATION: 95 % | TEMPERATURE: 99.9 F | HEART RATE: 120 BPM | SYSTOLIC BLOOD PRESSURE: 100 MMHG | RESPIRATION RATE: 30 BRPM | DIASTOLIC BLOOD PRESSURE: 71 MMHG

## 2018-03-05 VITALS — HEART RATE: 109 BPM

## 2018-03-05 VITALS — OXYGEN SATURATION: 98 %

## 2018-03-05 VITALS
RESPIRATION RATE: 28 BRPM | DIASTOLIC BLOOD PRESSURE: 66 MMHG | SYSTOLIC BLOOD PRESSURE: 95 MMHG | TEMPERATURE: 98.5 F | OXYGEN SATURATION: 96 % | HEART RATE: 106 BPM

## 2018-03-05 VITALS
RESPIRATION RATE: 28 BRPM | SYSTOLIC BLOOD PRESSURE: 86 MMHG | HEART RATE: 114 BPM | DIASTOLIC BLOOD PRESSURE: 59 MMHG | TEMPERATURE: 98.9 F | OXYGEN SATURATION: 94 %

## 2018-03-05 VITALS
SYSTOLIC BLOOD PRESSURE: 90 MMHG | RESPIRATION RATE: 28 BRPM | OXYGEN SATURATION: 95 % | TEMPERATURE: 98.5 F | HEART RATE: 109 BPM | DIASTOLIC BLOOD PRESSURE: 57 MMHG

## 2018-03-05 VITALS
HEART RATE: 107 BPM | DIASTOLIC BLOOD PRESSURE: 67 MMHG | OXYGEN SATURATION: 97 % | TEMPERATURE: 98.5 F | SYSTOLIC BLOOD PRESSURE: 95 MMHG | RESPIRATION RATE: 32 BRPM

## 2018-03-05 VITALS — HEART RATE: 116 BPM

## 2018-03-05 VITALS
DIASTOLIC BLOOD PRESSURE: 66 MMHG | RESPIRATION RATE: 25 BRPM | HEART RATE: 103 BPM | SYSTOLIC BLOOD PRESSURE: 95 MMHG | TEMPERATURE: 98.5 F | OXYGEN SATURATION: 96 %

## 2018-03-05 VITALS — HEART RATE: 107 BPM

## 2018-03-05 VITALS — OXYGEN SATURATION: 97 %

## 2018-03-05 VITALS — HEART RATE: 106 BPM

## 2018-03-05 LAB
% SATURATION IRON PROFILE: 7.6 % (ref 20–50)
BASOPHILS # BLD AUTO: 0.1 TH/MM3 (ref 0–0.2)
BASOPHILS # BLD AUTO: 0.1 TH/MM3 (ref 0–0.2)
BASOPHILS NFR BLD: 0.5 % (ref 0–2)
BASOPHILS NFR BLD: 0.7 % (ref 0–2)
BUN SERPL-MCNC: 10 MG/DL (ref 7–18)
CALCIUM SERPL-MCNC: 8.2 MG/DL (ref 8.5–10.1)
CHLORIDE SERPL-SCNC: 98 MEQ/L (ref 98–107)
CREAT SERPL-MCNC: 0.85 MG/DL (ref 0.5–1)
EOSINOPHIL # BLD: 0.1 TH/MM3 (ref 0–0.4)
EOSINOPHIL # BLD: 0.1 TH/MM3 (ref 0–0.4)
EOSINOPHIL NFR BLD: 0.5 % (ref 0–4)
EOSINOPHIL NFR BLD: 0.6 % (ref 0–4)
ERYTHROCYTE [DISTWIDTH] IN BLOOD BY AUTOMATED COUNT: 18.5 % (ref 11.6–17.2)
ERYTHROCYTE [DISTWIDTH] IN BLOOD BY AUTOMATED COUNT: 18.8 % (ref 11.6–17.2)
FIBRINOGEN PPP-MCNC: 485 MG/DL (ref 227–377)
FOLATE SERPL-MCNC: 12.4 NG/ML (ref 3.1–17.5)
GFR SERPLBLD BASED ON 1.73 SQ M-ARVRAT: 76 ML/MIN (ref 89–?)
GLUCOSE SERPL-MCNC: 137 MG/DL (ref 74–106)
HCO3 BLD-SCNC: 28.3 MEQ/L (ref 21–32)
HCT VFR BLD CALC: 20.5 % (ref 35–46)
HCT VFR BLD CALC: 20.6 % (ref 35–46)
HGB BLD-MCNC: 6.6 GM/DL (ref 11.6–15.3)
HGB BLD-MCNC: 6.6 GM/DL (ref 11.6–15.3)
INR PPP: 2.1 RATIO
IRON (FE): 22 MCG/DL (ref 50–170)
LYMPHOCYTES # BLD AUTO: 0.9 TH/MM3 (ref 1–4.8)
LYMPHOCYTES # BLD AUTO: 1.1 TH/MM3 (ref 1–4.8)
LYMPHOCYTES NFR BLD AUTO: 5.1 % (ref 9–44)
LYMPHOCYTES NFR BLD AUTO: 5.8 % (ref 9–44)
MAGNESIUM SERPL-MCNC: 2 MG/DL (ref 1.5–2.5)
MCH RBC QN AUTO: 22.7 PG (ref 27–34)
MCH RBC QN AUTO: 22.8 PG (ref 27–34)
MCHC RBC AUTO-ENTMCNC: 32.1 % (ref 32–36)
MCHC RBC AUTO-ENTMCNC: 32.2 % (ref 32–36)
MCV RBC AUTO: 70.7 FL (ref 80–100)
MCV RBC AUTO: 70.7 FL (ref 80–100)
MONOCYTE #: 1 TH/MM3 (ref 0–0.9)
MONOCYTE #: 1.8 TH/MM3 (ref 0–0.9)
MONOCYTES NFR BLD: 5.8 % (ref 0–8)
MONOCYTES NFR BLD: 9.6 % (ref 0–8)
NEUTROPHILS # BLD AUTO: 14.8 TH/MM3 (ref 1.8–7.7)
NEUTROPHILS # BLD AUTO: 15.8 TH/MM3 (ref 1.8–7.7)
NEUTROPHILS NFR BLD AUTO: 83.4 % (ref 16–70)
NEUTROPHILS NFR BLD AUTO: 88 % (ref 16–70)
PHOSPHATE SERPL-MCNC: 3.9 MG/DL (ref 2.5–4.9)
PLATELET # BLD: 313 TH/MM3 (ref 150–450)
PLATELET # BLD: 315 TH/MM3 (ref 150–450)
PMV BLD AUTO: 8.2 FL (ref 7–11)
PMV BLD AUTO: 8.3 FL (ref 7–11)
PROTHROMBIN TIME: 21.2 SEC (ref 9.8–11.6)
RBC # BLD AUTO: 2.89 MIL/MM3 (ref 4–5.3)
RBC # BLD AUTO: 2.91 MIL/MM3 (ref 4–5.3)
SODIUM SERPL-SCNC: 133 MEQ/L (ref 136–145)
TIBC SERPL-MCNC: 288 MCG/DL (ref 250–450)
VIT B12 SERPL-MCNC: 842 PG/ML (ref 193–986)
WBC # BLD AUTO: 16.8 TH/MM3 (ref 4–11)
WBC # BLD AUTO: 19 TH/MM3 (ref 4–11)

## 2018-03-05 RX ADMIN — AMPICILLIN SODIUM SCH MLS/HR: 2 INJECTION, POWDER, FOR SOLUTION INTRAMUSCULAR; INTRAVENOUS at 09:26

## 2018-03-05 RX ADMIN — SODIUM CHLORIDE SCH MLS/HR: 900 INJECTION INTRAVENOUS at 16:03

## 2018-03-05 RX ADMIN — Medication SCH ML: at 22:10

## 2018-03-05 RX ADMIN — HYDROMORPHONE HYDROCHLORIDE PRN MG: 2 INJECTION INTRAMUSCULAR; INTRAVENOUS; SUBCUTANEOUS at 04:52

## 2018-03-05 RX ADMIN — CLINDAMYCIN PHOSPHATE SCH MLS/HR: 150 INJECTION, SOLUTION INTRAMUSCULAR; INTRAVENOUS at 04:51

## 2018-03-05 RX ADMIN — HYDROMORPHONE HYDROCHLORIDE PRN MG: 2 INJECTION INTRAMUSCULAR; INTRAVENOUS; SUBCUTANEOUS at 17:58

## 2018-03-05 RX ADMIN — HYDROCODONE BITARTRATE AND ACETAMINOPHEN PRN TAB: 10; 325 TABLET ORAL at 04:52

## 2018-03-05 RX ADMIN — Medication SCH ML: at 01:20

## 2018-03-05 RX ADMIN — CLINDAMYCIN PHOSPHATE SCH MLS/HR: 150 INJECTION, SOLUTION INTRAMUSCULAR; INTRAVENOUS at 22:10

## 2018-03-05 RX ADMIN — FAMOTIDINE SCH MG: 20 TABLET, FILM COATED ORAL at 09:27

## 2018-03-05 RX ADMIN — Medication SCH ML: at 09:27

## 2018-03-05 RX ADMIN — HYDROMORPHONE HYDROCHLORIDE PRN MG: 2 INJECTION INTRAMUSCULAR; INTRAVENOUS; SUBCUTANEOUS at 01:06

## 2018-03-05 RX ADMIN — SODIUM CHLORIDE SCH MLS/HR: 900 INJECTION INTRAVENOUS at 01:19

## 2018-03-05 RX ADMIN — ACETAMINOPHEN PRN MG: 325 TABLET ORAL at 16:02

## 2018-03-05 RX ADMIN — AMPICILLIN SODIUM SCH MLS/HR: 2 INJECTION, POWDER, FOR SOLUTION INTRAMUSCULAR; INTRAVENOUS at 01:05

## 2018-03-05 RX ADMIN — STANDARDIZED SENNA CONCENTRATE AND DOCUSATE SODIUM SCH TAB: 8.6; 5 TABLET, FILM COATED ORAL at 09:00

## 2018-03-05 RX ADMIN — CHLORHEXIDINE GLUCONATE SCH PACK: 500 CLOTH TOPICAL at 03:05

## 2018-03-05 RX ADMIN — HYDROCODONE BITARTRATE AND ACETAMINOPHEN PRN TAB: 10; 325 TABLET ORAL at 01:05

## 2018-03-05 RX ADMIN — HYDROMORPHONE HYDROCHLORIDE PRN MG: 2 INJECTION INTRAMUSCULAR; INTRAVENOUS; SUBCUTANEOUS at 09:27

## 2018-03-05 RX ADMIN — FAMOTIDINE SCH MG: 20 TABLET, FILM COATED ORAL at 22:10

## 2018-03-05 RX ADMIN — HYDROMORPHONE HYDROCHLORIDE PRN MG: 2 INJECTION INTRAMUSCULAR; INTRAVENOUS; SUBCUTANEOUS at 13:36

## 2018-03-05 RX ADMIN — HYDROMORPHONE HYDROCHLORIDE PRN MG: 2 INJECTION INTRAMUSCULAR; INTRAVENOUS; SUBCUTANEOUS at 22:12

## 2018-03-05 RX ADMIN — AMPICILLIN SODIUM SCH MLS/HR: 2 INJECTION, POWDER, FOR SOLUTION INTRAMUSCULAR; INTRAVENOUS at 22:10

## 2018-03-05 RX ADMIN — AMPICILLIN SODIUM SCH MLS/HR: 2 INJECTION, POWDER, FOR SOLUTION INTRAMUSCULAR; INTRAVENOUS at 12:46

## 2018-03-05 RX ADMIN — CLINDAMYCIN PHOSPHATE SCH MLS/HR: 150 INJECTION, SOLUTION INTRAMUSCULAR; INTRAVENOUS at 12:45

## 2018-03-05 RX ADMIN — STANDARDIZED SENNA CONCENTRATE AND DOCUSATE SODIUM SCH TAB: 8.6; 5 TABLET, FILM COATED ORAL at 21:00

## 2018-03-05 NOTE — RADRPT
EXAM DATE/TIME:  03/05/2018 03:52 

 

[facility] COMPARISON:     

CHEST SINGLE AP, March 01, 2018, 3:05.

 

                     

INDICATIONS :     

Shortness of breath, possible pulmonary.

                     

 

MEDICAL HISTORY :     

Cardiovascular disease.  Congestive heart failure.  Hepatitis C.      

 

SURGICAL HISTORY :        

AVR

 

ENCOUNTER:     

Subsequent                                        

 

ACUITY:     

2 weeks      

 

PAIN SCORE:     

0/10

 

LOCATION:     

Bilateral chest 

 

FINDINGS:     

Portable AP view of the chest demonstrates mildly enlarged cardiac silhouette in this patient post me
supriya sternotomy. Right IJ line distal tip is in the right atrium. Lungs are underinflated and there i
s atelectasis at the left lung base. There is a small right basilar pleural-parenchymal opacity. No p
neumothorax is seen. The bones and soft tissues demonstrate no acute finding.

 

CONCLUSION:     

There is a small to moderate size right pleural effusion with associated volume loss and/or airspace 
consolidation. The pleural effusion has increased from the prior examination.

 

 

 

 Ron Melgar MD on March 05, 2018 at 5:10           

Board Certified Radiologist.

 This report was verified electronically.

## 2018-03-05 NOTE — PD.CONS
Consult


Service


Palliative Care


.


Consult Requested By


Dr. Burnham


.


Primary Care Physician


Ron Jordan MD


.


Reason for Consultation


   a.  To assist with evaluation and management of symptoms including: chest 

pain, dyspnea. 


   b.  To assist medical decision maker(s) with: better understanding of 

current medical conditions; weighing benefits/burdens of medical treatment 

options; making        


        medical treatment decisions.


.





HPI


History of Present Illness


Miss Hagna is a 37 year old female with past medical history of endocarditis, 

IV drug use, CHF, pulmonary embolism, pneumonia and anxiety.  Patient has a 

history of arteritis status post valve replacement in July 2016 at [facility]. Patient was on [facility] admitted on 8/10/17 and 

revoked 8/22/17. In review of Hospice notes patient elected FULL CODE 

indicating she wanted life support for 2 weeks. No written advanced directives 

completed, the family indicates patient may have completed at [facility].

  Hospice notes indicate patient wanted to name her stepfather Won as 

healthcare surrogate though documentation was never completed.  Patient was 

previously hospitalized at [facility] in April 2017 for tricuspid 

prosthetic valve endocarditis, [place] parapsolosis (March 15 thru April 15) , 

Streptococci (treated with combination Diflucan and Micafungin and ambisionme 

for 2 weeks). She was admitted to [facility] 8/21/17-10/5/17 for sepsis, 

recurrent MSSA endocarditis, known to palliative care service goals remained 

aggressive during this admission. She was discharged home with family.    





Patient presented to [facility] on 2/23/18 with shortness of breath and 

swelling. She admitted to IVDU (dilaudid) 4 days prior to presentation while in 

the ED. She reported fever. She also stated she had LE edema for 4 weeks. 

Uncertain if she was taking oral antibiotics or not as previously prescribed. 

Initial findings included:


* WBC 18.8, hemoglobin 8.3, hematocrit 25.2, platelet count 119, neutrophil 88.7

%


* Sodium 125, potassium 3.9, chloride 91, carbon dioxide 21.8, BUN 57, 

creatinine 4.50, glucose 105


* Troponin 0 0.26


* Total Protein 6.3, Albumin 2.5


* Calcium Level 8.1, Magnesium Level 1.9, 


* Alkaline Phosphatase 125, AST 24, ALT LESS THAN 6, Total Bilirubin 0.9


* CXR - no acute cardiopulmonary disease


* EKG


* PT 13.1, INR 1.3, APTT 32


* Toxicology screen positive opiates.


* Urinalysis negative


* Lower extremity ultra sound - negative for DVT, incidental note of bilateral 

inguinal adenopathy largest on the right measuring 3.3 cm left measuring 2.6 cm.


* Renal ultrasound no evidence of hydronephrosis, thickening of the urinary 

bladder wall 1.1 cm, prominent splenomegaly.





Patient was admitted to ICU with sepsis, CHF, endocarditis, acute renal failure.





Additional tests since admission:


* aorta with runoff - revealed left renal cortical infarction 5-6cm length 

total occlusion of the left superficial femoral artery in the proximal thigh, 

nodular parenchymal opacities in the lung bases bilaterally. 


* CT head - focal area of decreased density involving the right parietal lobe, 

could be small infarct, MRI recommended, small lacunar infarction involving the 

left centrum semiovale. 


* MRI brain - revealed deterioration in the appearance of the scan, 3 focal 

areas of abnormal contrast enhancement scattered in both hemispheres, likely 

represents developing areas of cerebritis, exam is compromised by uncooperative 

patient and motion artifact -2 factors making detection of other abnormality 

such as cortical cephalitis and meningitis difficult to exclude, cannot exclude 

hemorrhagic lesions, no mass-effect evident.  


* CTA - extensive bilateral pulmonary emboli, near cut off of the right lower 

lobe pulmonary artery, scattered pulmonary infiltrates, small pleural effusions 

and extensive mediastinal adenopathy. 


* Echocardiogram - EF 50 - 55%, severe tricuspid regurgitation, aortic 

prosthesis with prolapse and possible vegetation, tricuspid valve prosthesis 

mobile vegetation seen on tricuspid valve 2 x 4 cm, small pericardial effusion.





Infectious disease, Dr. Carr was consulted for sepsis, endocarditis - 

notes indicate poor prognosis in this patient with repeated prosthetic valve 

endocarditis.  On vancomycin ampicillin and gentamicin. 





Patient found to have cold left foot. Vascular surgery, Dr. Bauer was 

consulted patient is not a candidate for surgery recommended conservative 

management with anticoagulation.  Dr. Henny Smith was consulted to evaluate 

for anticoagulation recommendations as patient is heparin refractory.  Dr. 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 

production from her liver disease (chronic active hepatitis C), increased 

consumptive from disseminated intravascular coagulation, bacterial endocarditis 

and acute illness.  Patient was placed on argatroban.  





Palliative care is consulted to assist with further clarification of treatment 

goals.


.


Function/Cognitive Trajectory


Was living at home prior to admission with increased shortness of breath and 

edema recently. 


.





Review of Systems


ROS Limitations:  Altered Mental Status


Constitutional:  COMPLAINS OF: Fatigue, Change in appetite (decreased), Pain, 

Generalized weakness


Respiratory:  COMPLAINS OF: Cough, Shortness of breath


Cardiovascular:  COMPLAINS OF: Chest pain, Dyspnea on Exertion, Lower Extremity 

Edema


Hematologic/Lymphatics:  COMPLAINS OF: Bruising


Psychiatric:  COMPLAINS OF: Anxiety, Confusion, Depression





Past Family Social History


Coded Allergies:  


     No Known Allergies (Verified  Allergy, Unknown, 2/23/18)


Past Medical History


MSSA aortic valve Endocarditis 2011 


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


CHF


Hepatitis C


IV drug abuse


Anxiety


Pneumonia


.


Past Surgical History


Aortic valve replacement 2011


Redo aortic valve replacement 6/2016 -  [facility]


Left wrist cyst removed 


.


Reported Medications


Reported Meds & Active Scripts


Active


Active Prescriptions or Reported Medications Unobtainable





Current Medications








 Medications


  (Trade)  Dose


 Ordered  Sig/Nikhil


 Route  Start Time


 Stop Time Status Last Admin


 


  (NS Flush)  2 ml  UNSCH  PRN


 IV FLUSH  2/23/18 22:00


     


 


 


  (NS Flush)  2 ml  BID


 IV FLUSH  2/24/18 09:00


    3/5/18 09:27


 


 


  (Tylenol)  650 mg  Q6H  PRN


 PO  2/23/18 22:00


    3/3/18 21:31


 


 


  (Zofran Inj)  4 mg  Q6H  PRN


 IV PUSH  2/23/18 22:00


     


 


 


  (Restoril)  15 mg  HS  PRN


 PO  2/23/18 22:00


    3/4/18 00:27


 


 


 Miscellaneous


 Information  1  Q361D


 XX  2/23/18 22:00


     


 


 


  (Chlorhexidine


 2% Cloth)  


 Taper  DAILY@04


 TOP  2/24/18 04:00


 2/20/19 03:59  3/5/18 03:05


 


 


  (Chlorhexidine


 2% Cloth)  3 pack  UNSCH  PRN


 TOP  2/23/18 22:00


     


 


 


  (Arlen-Colace)  1 tab  BID


 PO  2/24/18 09:00


    3/4/18 20:25


 


 


  (Milk Of


 Magnesia Liq)  30 ml  Q12H  PRN


 PO  2/23/18 22:00


     


 


 


  (Senokot)  17.2 mg  Q12H  PRN


 PO  2/23/18 22:00


     


 


 


  (Dulcolax Supp)  10 mg  DAILY  PRN


 RECTAL  2/23/18 22:00


     


 


 


  (Lactulose Liq)  30 ml  DAILY  PRN


 PO  2/23/18 22:00


     


 


 


 Ampicillin Sodium


 2000 mg/Sodium


 Chloride  100 ml @ 


 400 mls/hr  Q6H


 IV  2/24/18 14:00


    3/5/18 09:26


 


 


  (Dilaudid Pf Inj)  2 mg  Q4H  PRN


 IV PUSH  2/25/18 15:00


    3/5/18 09:27


 


 


  (Imodium)  2 mg  Q4H  PRN


 PO  2/26/18 16:15


    2/28/18 14:32


 


 


 Gentamicin


 Sulfate 100 mg/


 Sodium Chloride  102.5 ml @ 


 100 mls/hr  Q8H


 IV  2/26/18 21:00


    3/5/18 04:51


 


 


  (Albuterol Neb)  2.5 mg  Q2HR NEB  PRN


 NEB  2/27/18 11:30


    3/2/18 02:47


 


 


  (Pepcid)  20 mg  BID


 PO  2/27/18 21:00


    3/5/18 09:27


 


 


 Argatroban 250 mg/


 Sodium Chloride  252.5 ml @ 


 3.11 mls/hr  TITRATE  PRN


 IV  3/1/18 19:15


    3/3/18 18:58


 


 


 Pharmacy Profile


 Note  0 ml @ 0


 mls/hr  UNSCH


 OTHER  3/3/18 16:45


     


 


 


  (Norco  Mg)  1 tab  Q4H  PRN


 PO  3/4/18 15:00


    3/5/18 04:52


 


 


 Vancomycin HCl


 1750 mg/Sodium


 Chloride  517.5 ml @ 


 260 mls/hr  Q12H


 IV  3/5/18 02:00


    3/5/18 01:19


 


 


 Miscellaneous


 Information  SPECIFIC


 LAB TO BE


 ...  ONCE  ONCE


 .XX  3/6/18 13:45


 3/6/18 13:46   


 








Family History


Mother alive. Has 2 sons (11 and 8). 


.


Substance Use


Tobacco: Smokes daily. 


Alcohol: Drinks 3-5 alcoholic beverages daily. 


Prescription med abuse: Has prescription meds, uses more than prescribed on a 

regular basis. 


Illicits: IV drug abuse and Marijuana. Has used drugs since 20 years of age. 


.


Psychosocial History


From [place]. High school graduate. No college. IV drug use since she was 20. 

Recently worked at [employer]. Single, has a boyfriend Humble. Has 2 juvenile 

children, ages 11 (pts mother has had custody of him since birth) and 8 lives 

with patient (his biologic father not involved, her boyfriend Humble has been 

caring for the child and is planning to get temporary custody). She is 

supported by her mother, Kelly (in [place]), her ex-step-father, Dilshad (lives local) 

and boyfriend, Humble Lives local. 


.


Spiritual/Cultural Factors


Unknown. 


.


Living Will:  Never completed


Health Care Surrogate:  Never completed


Durable Power of :  Never completed


Health Care Surrogate(s):


No written advanced directives, patient refused to complete advanced directives 

during last admission. Patient a single. Children are juvenile. In the absence 

of written advanced directives, according to [place] [facility] health [facility] proxy 

decision-making falls to a parent. 


.


Today's verbally stated goals:


Patient desires continued aggressive care including FULL CODE. 


.


Family/friends goals:


Mother supports patient wishes, understanding she may have to make decisions 

for patient in the coming days. 


.


Ethical and Legal Issues


No written advanced directives, patient refused to complete advanced directives 

during last admission. Patient a single. Children are juvenile. In the absence 

of written advanced directives, according to [place] statutes health care proxy 

decision-making falls to a parent. 


.





Physical Exam





Vital Signs








  Date Time  Temp Pulse Resp B/P (MAP) Pulse Ox O2 Delivery O2 Flow Rate FiO2


 


3/5/18 09:02     97 Nasal Cannula 3.00 


 


3/5/18 06:00  106      


 


3/5/18 04:00  110      


 


3/5/18 04:00 98.9 110 30 97/67 (77) 97   


 


3/5/18 02:00  109      


 


3/5/18 00:00  114      


 


3/5/18 00:00 98.9 114 28 86/59 (68) 94   


 


3/4/18 22:00  124      


 


3/4/18 20:33     96 Nasal Cannula 3.00 


 


3/4/18 20:00 98.7 124 30 96/68 (77) 97   


 


3/4/18 20:00  124      


 


3/4/18 19:00     98 Nasal Cannula 4.00 


 


3/4/18 18:00  104      


 


3/4/18 16:00  105      


 


3/4/18 16:00 99.1 105 31 96/64 (75) 96   


 


3/4/18 14:20   38     


 


3/4/18 14:00  113      


 


3/4/18 13:37   23     


 


3/4/18 12:00  113      


 


3/4/18 12:00 99.5 113 33 86/62 (70) 98   


 


3/4/18 10:00  120      








Exam


CONSTITUTIONAL/GENERAL: This is an adequately nourished patient, with mildly 

labored respirations, worsens with conversation. 


TUBES/LINES/DRAINS: NC, right IJ central line, Purewick catheter. 


SKIN: Ecchymoses on extremities. No wounds seen anteriorly. Skin temperature 

appropriate. Not diaphoretic. 


HEAD: Atraumatic. Normocephalic.


EYES: Pupils equal and round and reactive. 


ENT: Hearing grossly normal. Nose without bleeding or purulent drainage. Throat 

without visible erythema, exudates, masses, or lesions.


NECK: Trachea midline. Supple, nontender. 


CARDIOVASCULAR: systolic murmur noted. tachycardic. 


RESPIRATORY/CHEST: Cough noted. Labored respirations at rest, worsens with 

conversation. Bilateral course breath sounds.   


GASTROINTESTINAL: Abdomen soft, rounded, non-tender, nondistended. No guarding. 

Bowel sounds present.


GENITOURINARY: Without palpable bladder distension. Purewick catheter in place.


MUSCULOSKELETAL: Extremities with 1+ edema. lesions noted Left foot and LE. LLE 

tender to light palpation below the knee to foot. 


LYMPHATICS: No palpable cervical or supraclavicular adenopathy.


NEUROLOGICAL: Awake, lethargic. Follows commands. Moves all extremities.


PSYCHIATRIC: Calm. + hallucination, saw a bug on her arm during my visit, 

though knew it wasn't really there. 


.





Diagnostic Tests


Laboratory





Laboratory Tests








Test


  3/3/18


03:45 3/3/18


21:50 3/4/18


05:00 3/4/18


10:00


 


White Blood Count


  17.7 TH/MM3


(4.0-11.0) 


  21.1 TH/MM3


(4.0-11.0) 


 


 


Red Blood Count


  3.27 MIL/MM3


(4.00-5.30) 


  3.30 MIL/MM3


(4.00-5.30) 


 


 


Hemoglobin


  7.4 GM/DL


(11.6-15.3) 


  7.4 GM/DL


(11.6-15.3) 


 


 


Hematocrit


  23.0 %


(35.0-46.0) 


  23.0 %


(35.0-46.0) 


 


 


Mean Corpuscular Volume


  70.3 FL


(80.0-100.0) 


  69.8 FL


(80.0-100.0) 


 


 


Mean Corpuscular Hemoglobin


  22.7 PG


(27.0-34.0) 


  22.4 PG


(27.0-34.0) 


 


 


Mean Corpuscular Hemoglobin


Concent 32.3 %


(32.0-36.0) 


  32.0 %


(32.0-36.0) 


 


 


Red Cell Distribution Width


  17.9 %


(11.6-17.2) 


  17.9 %


(11.6-17.2) 


 


 


Platelet Count


  272 TH/MM3


(150-450) 


  291 TH/MM3


(150-450) 


 


 


Mean Platelet Volume


  8.5 FL


(7.0-11.0) 


  8.4 FL


(7.0-11.0) 


 


 


Prothrombin Time


  21.3 SEC


(9.8-11.6) 


  


  


 


 


Prothromb Time International


Ratio 2.1 RATIO 


  


  


  


 


 


Activated Partial


Thromboplast Time 60.2 SEC


(24.3-30.1) 


  77.9 SEC


(24.3-30.1) 57.3 SEC


(24.3-30.1)


 


Blood Urea Nitrogen


  10 MG/DL


(7-18) 


  10 MG/DL


(7-18) 


 


 


Creatinine


  0.73 MG/DL


(0.50-1.00) 


  0.79 MG/DL


(0.50-1.00) 


 


 


Random Glucose


  113 MG/DL


() 


  119 MG/DL


() 


 


 


Calcium Level


  8.5 MG/DL


(8.5-10.1) 


  8.7 MG/DL


(8.5-10.1) 


 


 


Sodium Level


  135 MEQ/L


(136-145) 


  134 MEQ/L


(136-145) 


 


 


Potassium Level


  4.5 MEQ/L


(3.5-5.1) 


  4.4 MEQ/L


(3.5-5.1) 


 


 


Chloride Level


  100 MEQ/L


() 


  99 MEQ/L


() 


 


 


Carbon Dioxide Level


  28.2 MEQ/L


(21.0-32.0) 


  29.3 MEQ/L


(21.0-32.0) 


 


 


Anion Gap 7 MEQ/L (5-15)   6 MEQ/L (5-15)  


 


Estimat Glomerular Filtration


Rate 91 ML/MIN


(>89) 


  83 ML/MIN


(>89) 


 


 


Urine Opiates Screen  POS (NEG)   


 


Urine Barbiturates Screen  NEG (NEG)   


 


Urine Amphetamines Screen  NEG (NEG)   


 


Urine Benzodiazepines Screen  NEG (NEG)   


 


Urine Cocaine Screen  NEG (NEG)   


 


Urine Cannabinoids Screen  NEG (NEG)   


 


Neutrophils (%) (Auto)


  


  


  84.6 %


(16.0-70.0) 


 


 


Lymphocytes (%) (Auto)


  


  


  5.7 %


(9.0-44.0) 


 


 


Monocytes (%) (Auto)


  


  


  8.8 %


(0.0-8.0) 


 


 


Eosinophils (%) (Auto)


  


  


  0.4 %


(0.0-4.0) 


 


 


Basophils (%) (Auto)


  


  


  0.5 %


(0.0-2.0) 


 


 


Neutrophils # (Auto)


  


  


  17.8 TH/MM3


(1.8-7.7) 


 


 


Lymphocytes # (Auto)


  


  


  1.2 TH/MM3


(1.0-4.8) 


 


 


Monocytes # (Auto)


  


  


  1.9 TH/MM3


(0-0.9) 


 


 


Eosinophils # (Auto)


  


  


  0.1 TH/MM3


(0-0.4) 


 


 


Basophils # (Auto)


  


  


  0.1 TH/MM3


(0-0.2) 


 


 


CBC Comment   DIFF FINAL  


 


Differential Comment     


 


Total Protein


  


  


  6.8 GM/DL


(6.4-8.2) 


 


 


Albumin


  


  


  2.4 GM/DL


(3.4-5.0) 


 


 


Phosphorus Level


  


  


  4.3 MG/DL


(2.5-4.9) 


 


 


Magnesium Level


  


  


  2.1 MG/DL


(1.5-2.5) 


 


 


Alkaline Phosphatase


  


  


  113 U/L


() 


 


 


Aspartate Amino Transf


(AST/SGOT) 


  


  17 U/L (15-37) 


  


 


 


Alanine Aminotransferase


(ALT/SGPT) 


  


  11 U/L (10-53) 


  


 


 


Total Bilirubin


  


  


  0.7 MG/DL


(0.2-1.0) 


 


 


Test


  3/4/18


18:15 3/5/18


05:00 


  


 


 


Vancomycin Level Trough


  25.8 MCG/ML


(5.0-10.0) 


  


  


 


 


White Blood Count


  


  19.0 TH/MM3


(4.0-11.0) 


  


 


 


Red Blood Count


  


  2.91 MIL/MM3


(4.00-5.30) 


  


 


 


Hemoglobin


  


  6.6 GM/DL


(11.6-15.3) 


  


 


 


Hematocrit


  


  20.6 %


(35.0-46.0) 


  


 


 


Mean Corpuscular Volume


  


  70.7 FL


(80.0-100.0) 


  


 


 


Mean Corpuscular Hemoglobin


  


  22.7 PG


(27.0-34.0) 


  


 


 


Mean Corpuscular Hemoglobin


Concent 


  32.1 %


(32.0-36.0) 


  


 


 


Red Cell Distribution Width


  


  18.5 %


(11.6-17.2) 


  


 


 


Platelet Count


  


  315 TH/MM3


(150-450) 


  


 


 


Mean Platelet Volume


  


  8.2 FL


(7.0-11.0) 


  


 


 


Neutrophils (%) (Auto)


  


  83.4 %


(16.0-70.0) 


  


 


 


Lymphocytes (%) (Auto)


  


  5.8 %


(9.0-44.0) 


  


 


 


Monocytes (%) (Auto)


  


  9.6 %


(0.0-8.0) 


  


 


 


Eosinophils (%) (Auto)


  


  0.5 %


(0.0-4.0) 


  


 


 


Basophils (%) (Auto)


  


  0.7 %


(0.0-2.0) 


  


 


 


Neutrophils # (Auto)


  


  15.8 TH/MM3


(1.8-7.7) 


  


 


 


Lymphocytes # (Auto)


  


  1.1 TH/MM3


(1.0-4.8) 


  


 


 


Monocytes # (Auto)


  


  1.8 TH/MM3


(0-0.9) 


  


 


 


Eosinophils # (Auto)


  


  0.1 TH/MM3


(0-0.4) 


  


 


 


Basophils # (Auto)


  


  0.1 TH/MM3


(0-0.2) 


  


 


 


CBC Comment  DIFF FINAL   


 


Differential Comment     


 


Activated Partial


Thromboplast Time 


  62.3 SEC


(24.3-30.1) 


  


 


 


Blood Urea Nitrogen


  


  10 MG/DL


(7-18) 


  


 


 


Creatinine


  


  0.85 MG/DL


(0.50-1.00) 


  


 


 


Random Glucose


  


  137 MG/DL


() 


  


 


 


Calcium Level


  


  8.2 MG/DL


(8.5-10.1) 


  


 


 


Phosphorus Level


  


  3.9 MG/DL


(2.5-4.9) 


  


 


 


Magnesium Level


  


  2.0 MG/DL


(1.5-2.5) 


  


 


 


Sodium Level


  


  133 MEQ/L


(136-145) 


  


 


 


Potassium Level


  


  3.8 MEQ/L


(3.5-5.1) 


  


 


 


Chloride Level


  


  98 MEQ/L


() 


  


 


 


Carbon Dioxide Level


  


  28.3 MEQ/L


(21.0-32.0) 


  


 


 


Anion Gap  7 MEQ/L (5-15)   


 


Estimat Glomerular Filtration


Rate 


  76 ML/MIN


(>89) 


  


 








Result Diagram:  


3/5/18 0500                                                                    

            3/5/18 0500





Microbiology





Microbiology








 Date/Time


Source Procedure


Growth Status


 


 


 3/4/18 05:50


Blood Peripheral Aerobic Blood Culture


Pending Received


 


 3/4/18 05:50


Blood Peripheral Anaerobic Blood Culture


Pending Received





 3/3/18 21:20


Blood Line Aerobic Blood Culture - Preliminary


NO GROWTH IN 1 DAY Resulted


 


 3/3/18 21:20


Blood Line Anaerobic Blood Culture - Preliminary


NO GROWTH IN 1 DAY Resulted





 3/3/18 21:50


Sputum Expectorated Sputum Gram Stain - Final Resulted


 


 3/3/18 21:50


Sputum Expectorated Sputum Sputum Culture - Preliminary


LIGHT GROWTH NORMAL RESPIRATORY MYLENE... Resulted








Imaging


Last Impressions








Chest X-Ray 3/5/18 0600 Signed





Impressions: 





 Service Date/Time:  Monday, March 5, 2018 03:52 - CONCLUSION:  There is a 

small 





 to moderate size right pleural effusion with associated volume loss and/or 





 airspace consolidation. The pleural effusion has increased from the prior 





 examination.     Ron Melgar MD 


 


Head CT 3/3/18 0000 Signed





Impressions: 





 Service Date/Time:  Saturday, March 3, 2018 10:22 - CONCLUSION:  1. Focal area 





 of decreased density involving the right parietal lobe. As a new finding from 





 the prior exam. This could relate to a small infarct. MRI of the brain with 





 gadolinium suggested to further evaluate. 2. Small lacunar infarction 

involving 





 the left centrum semiovale.     Scooter Dawson Jr., MD 


 


CT Angiography 3/3/18 0000 Signed





Impressions: 





 Service Date/Time:  Saturday, March 3, 2018 10:28 - CONCLUSION:  There 

extensive 





 pulmonary emboli bilaterally. There is near complete cut off of the right 

lower 





 lobe pulmonary artery. Prominent filling defects on the left as well. These 





 findings were relayed to Dr. Bustos immediately at the completion of the study.  





 Scattered pulmonary infiltrates, small pleural effusion and extensive 





 mediastinal lymphadenopathy as described above.      Won Sharp MD 


 


Brain MRI 3/3/18 0000 Signed





Impressions: 





 Service Date/Time:  Saturday, March 3, 2018 18:04 - CONCLUSION:  Deterioration 





 in the appearance of the scan.  At least 3 focal areas of abnormal contrast 





 enhancement are present scattered to in both hemispheres.  Given the history 





 this most likely represents developing areas cerebritis.  Exam is compromised 

by 





 an uncooperative patient and motion artifact.  These 2 factors make detection 

of 





 other abnormalities such as cortical cephalitis and meningitis difficult to 





 exclude.  Cannot exclude hemorrhagic lesions as well.  There is no mass effect 





 evident.      Jorge Diane MD 


 


Chest CT 2/26/18 0000 Signed





Impressions: 





 Service Date/Time:  Monday, February 26, 2018 12:41 - CONCLUSION:  1. There 

are 





 at least 5 pulmonary nodules present bilaterally with the largest measuring 19 





 mm. None demonstrate cavitation but it is possible that these represent septic 





 emboli. Suggest followup to assess for change. 2. Splenomegaly with subtle 





 wedge-shaped areas of low-density in the mid spleen which could represent 





 infarcts. 3. Mild cardiomegaly in this patient post aortic valve replacement. 





 Low density of the cardiac blood pool suggests anemia.      Ron Melgar MD 


 


Aorta w/Runoff CTA 2/25/18 0000 Signed





Impressions: 





 Service Date/Time:  Sunday, February 25, 2018 12:47 - CONCLUSION:  Left renal 





 cortical infarction. 5-6 cm length total occlusion of the left superficial 





 femoral artery in the proximal thigh. Nodular parenchymal opacities in the 

lung 





 bases bilaterally See above discussion.     Ron Cruz MD 


 


Renal Ultrasound 2/24/18 0000 Signed





Impressions: 





 Service Date/Time:  Saturday, February 24, 2018 10:53 - CONCLUSION:  1. No 





 evidence of hydronephrosis. 2. Thickening of the urinary bladder wall a 1.1 

cm. 





 3. Prominent splenomegaly.     Elgin Walton MD 


 


Breast Ultrasound 2/24/18 0000 Signed





Impressions: 





 Service Date/Time:  Saturday, February 24, 2018 10:48 - CONCLUSION:  Negative 





 targeted right breast ultrasound examination.     Ron Brown MD 


 


Lower Extremity Ultrasound 2/23/18 0000 Signed





Impressions: 





 Service Date/Time:  Friday, February 23, 2018 21:22 - CONCLUSION:  1. No 





 sonographic evidence for lower extremity DVT. 2. Incidental note of bilateral 





 inguinal adenopathy with the largest on the right measuring 3.3 cm and the 





 largest on the left measuring 2.6 cm. This finding is nonspecific but is 





 typically reactive in etiology.     Rj Whitten MD 





.


Procedures


* 2/24/17 - right IJ central line placement





Patient/Family Conference


Present at Family Conference:


Zoila Dawson and I met with patient at bedside. Called to speak with mother via 

phone to provide update. 


.


Family Conference Time (mins):  60 (30 minutes with pt30 minutes via phone with 

mother. )


Family Conference Location:  Bedside, Telephone


Issues Discussed:


* Palliative care role, purpose, approach


* Additional medical, psychosocial, and spiritual history


* Patients general health, functional status, and cognitive changes in the 

months leading up to the current hospitalization


* Patient/family understanding of the current medical problems


* Patient/family understanding of prognosis


* Patients goals of care as best understood from advance directives and/or 

conversations and/or values


* Current medical treatment options and benefits/burdens of those options


* Likely scenarios comparing ongoing aggressive care with a transition to 

comfort measures only


* Questions answered to the best of my ability


* Palliative care contact information provided





Assessment and Plan


Disease Oriented Problem List:  


(1) Prosthetic valve endocarditis


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


(3) Polysubstance abuse


(4) Acute septic pulmonary embolism


(5) Acute kidney injury


(6) CHF (congestive heart failure), NYHA class IV


(7) Severe sepsis


(8) Elevated troponin


Symptom Scale:  


(1) Pain


0-10 Scale:  10





(2) Encephalopathy


0-10 Scale:  Unable to quantify





(3) Dyspnea


0-10 Scale:  Unable to quantify





Pertinent Non-Medical Issues


Psychosocial: single.  2 juvenile children. Supported by her mother, step-

father and boyfriend. 


Spiritual: Unknown.


Legal: No known written advanced directives, family is going to try to find HCS 

paperwork they think pt previously completed.  Patient a single.  Children are 

juvenile.  If no written advanced directives, according to [place] statutes 

health care proxy decision-making falls to a parent. 


Ethical issues impacting care: No known concerns at this time. 


.


Important Contacts


* Kelly Le, mother/ HCP: [phone]


* Won Le, stepfather: [phone]


.


Prognosis


Patient has grim prognosis. 


.


Code Status:  Full Code


Plan


* Decision Maker: No written advanced directives, patient refused to complete 

advanced directives during last admission. Patient a single. Children are 

juvenile. In the absence of written advanced directives, according to [place] 

statutes health care proxy decision-making falls to her mother, Kelly Le. 

Won Le is a step-father.  


* FULL CODE. 


* Patient desires continued aggressive care including FULL CODE. Patient does 

not want to complete advanced directives again today, stating "do we have to 

talk about this?"  I advised her in the absence of written advanced directives 

decision making would fall to her mother, not her step-father. She verbalizes 

understanding. She has some understanding of current medical problems, though 

having difficulty carrying on conversation due to dyspnea.  


* Called to provide medical update to patient's mother, Kelly. She remembers 

talking to me during Milly's last admission. She is very appreciative and 

understands her daughter is critically ill and she may be left in a position to 

have to make difficult decisions for her daughter. She asks that I keep her 

informed. 


* Palliative care number provided. 


* SYMPTOMS: Pain: Potential sources include endocarditis, bedbound status, 

tubes etc. Patient with likely high tolerance given history of IV drug use, 

will monitor effective medications.  Dyspnea: secondary to endocarditis, 

pulmonary emboli. Hallucination: patient thought she saw a bug on her arm 

during my visit, though knew it "wasn't really there." No medication 

recommendations at this time.


* Palliative care will continue to follow throughout hospital course to assist 

with symptom management and clarification of goals as needed. 


.





Thank you for the opportunity to participate in the care of Ms. Hagan.





Attestation


To help prompt me to consider important information that might be impacting 

today's encounter and assessment, information from prior notes written by 

myself or my colleagues may have been "brought forward" into today's note.  My 

signature on this note, however, is an attestation that I personally performed 

the exam, history, and/or decision-making noted today, and, unless otherwise 

indicated, the interactions with patient, family, and staff as well as the 

review of records all occurred today.  I also attest that the listed assessment 

and stated plan reflect my best clinical judgment today based on the 

combination of historical information, prior notes, and today's exam/ 

interactions.  When time spent is documented, it refers only to time spent 

today by the signer, or if indicated, combined time spent today by 

collaborating physician/nurse practitioner.











Ileana Dang Mar 5, 2018 10:21

## 2018-03-05 NOTE — [facility].IDPN
Note


Infectious Disease Note








Weekend events noted.


Patient states that she feels okay.  She looks somewhat drowsy.


She had elevated temperature to 102 over the weekend.


Repeat blood cultures are negative so far.


She was noted to have increased shortness of breath.


MR of the brain showed evidence of cerebritis and head CT showed


Area of decreased density involving the right parietal lobe.


CT scan of the chest of extensive emboli bilaterally.


Patient denies headache.


Denies chills.


Feels that the shortness of breath is the same.


Denies chest pain.


She is afebrile.


She denies pain in the left leg currently.





37-year-old white female who has a history of endocarditis and has


been admitted several times for the same.  The patient developed shortness of


breath, fever and chills, and  presented to the emergency department.  The


patient is noted to be actively using IV drugs.  She has history of significant


CHF from prior valvular heart disease related to endocarditis.  She states that


she started feeling short of breath about a week ago and the shortness of


breath worsened.  After she completed IV antibiotics in October she was


put on doxycycline prophylaxis.  She reports that she has not been taking the


doxycycline.  


 


PAST MEDICAL HISTORY:


Hepatitis C.


IV drug use.


prosthetic aortic valve replacement in 2011 and then redo aortic


valve replacement in June 2016 





MEDICATIONS:


1. Gentamicin.


2. Ampicillin.


3. Vancomycin.








Current Medications








 Medications


  (Trade)  Dose


 Ordered  Sig/Nikhil


 Route


 PRN Reason  Start Time


 Stop Time Status Last Admin


Dose Admin


 


 Sodium Chloride


  (NS Flush)  2 ml  UNSCH  PRN


 IV FLUSH


 FLUSH AFTER USING IV ACCESS  2/23/18 22:00


     


 


 


 Sodium Chloride


  (NS Flush)  2 ml  BID


 IV FLUSH


   2/24/18 09:00


    3/5/18 09:27


 


 


 Acetaminophen


  (Tylenol)  650 mg  Q6H  PRN


 PO


 fever  2/23/18 22:00


    3/3/18 21:31


 


 


 Ondansetron HCl


  (Zofran Inj)  4 mg  Q6H  PRN


 IV PUSH


 NAUSEA OR VOMITING  2/23/18 22:00


     


 


 


 Temazepam


  (Restoril)  15 mg  HS  PRN


 PO


 INSOMNIA  2/23/18 22:00


    3/4/18 00:27


 


 


 Miscellaneous


 Information  1  Q361D


 XX


   2/23/18 22:00


     


 


 


 Chlorhexidine


 Gluconate


  (Chlorhexidine


 2% Cloth)  


 Taper  DAILY@04


 TOP


   2/24/18 04:00


 2/20/19 03:59  3/5/18 03:05


 


 


 Chlorhexidine


 Gluconate


  (Chlorhexidine


 2% Cloth)  3 pack  UNSCH  PRN


 TOP


 HYGIENIC CARE  2/23/18 22:00


     


 


 


 Senna/Docusate


 Sodium


  (Arlen-Colace)  1 tab  BID


 PO


   2/24/18 09:00


    3/4/18 20:25


 


 


 Magnesium


 Hydroxide


  (Milk Of


 Magnesia Liq)  30 ml  Q12H  PRN


 PO


 Mild constipation  2/23/18 22:00


     


 


 


 Sennosides


  (Senokot)  17.2 mg  Q12H  PRN


 PO


 Moderate constipation  2/23/18 22:00


     


 


 


 Bisacodyl


  (Dulcolax Supp)  10 mg  DAILY  PRN


 RECTAL


 SEVERE CONSITIPATION  2/23/18 22:00


     


 


 


 Lactulose


  (Lactulose Liq)  30 ml  DAILY  PRN


 PO


 SEVERE CONSITIPATION  2/23/18 22:00


     


 


 


 Ampicillin Sodium


 2000 mg/Sodium


 Chloride  100 ml @ 


 400 mls/hr  Q6H


 IV


   2/24/18 14:00


    3/5/18 09:26


 


 


 Hydromorphone HCl


  (Dilaudid Pf Inj)  2 mg  Q4H  PRN


 IV PUSH


 PAIN SCALE 6 TO 10  2/25/18 15:00


    3/5/18 09:27


 


 


 Loperamide HCl


  (Imodium)  2 mg  Q4H  PRN


 PO


 Diarrhea  2/26/18 16:15


    2/28/18 14:32


 


 


 Gentamicin


 Sulfate 100 mg/


 Sodium Chloride  102.5 ml @ 


 100 mls/hr  Q8H


 IV


   2/26/18 21:00


    3/5/18 04:51


 


 


 Albuterol Sulfate


  (Albuterol Neb)  2.5 mg  Q2HR NEB  PRN


 NEB


 dyspnea  2/27/18 11:30


    3/2/18 02:47


 


 


 Famotidine


  (Pepcid)  20 mg  BID


 PO


   2/27/18 21:00


    3/5/18 09:27


 


 


 Argatroban 250 mg/


 Sodium Chloride  252.5 ml @ 


 3.11 mls/hr  TITRATE  PRN


 IV


 aPTT < 50  3/1/18 19:15


    3/3/18 18:58


 


 


 Pharmacy Profile


 Note  0 ml @ 0


 mls/hr  UNSCH


 OTHER


   3/3/18 16:45


     


 


 


 Acetaminophen/


 Hydrocodone Bitart


  (Norco  Mg)  1 tab  Q4H  PRN


 PO


 pain 1-5  3/4/18 15:00


    3/5/18 04:52


 


 


 Vancomycin HCl


 1750 mg/Sodium


 Chloride  517.5 ml @ 


 260 mls/hr  Q12H


 IV


   3/5/18 02:00


    3/5/18 01:19


 


 


 Miscellaneous


 Information  SPECIFIC


 LAB TO BE


 RICCARDO...  ONCE  ONCE


 .XX


   3/6/18 13:45


 3/6/18 13:46   


 











SOCIAL HISTORY:


Positive occasional alcohol.  No tobacco. IV drug use in the form of Dilaudid,


oxycodone.  The patient also uses marijuana.


 








OBJECTIVE:








Vital Signs








  Date Time  Temp Pulse Resp B/P (MAP) Pulse Ox O2 Delivery O2 Flow Rate FiO2


 


3/5/18 10:00  109      


 


3/5/18 09:57   28     


 


3/5/18 09:02     97 Nasal Cannula 3.00 


 


3/5/18 09:00  102      


 


3/5/18 08:00  107      


 


3/5/18 07:00     100 Nasal Cannula 3.00 


 


3/5/18 06:00  106      


 


3/5/18 04:00  110      


 


3/5/18 04:00 98.9 110 30 97/67 (77) 97   


 


3/5/18 02:00  109      


 


3/5/18 00:00  114      


 


3/5/18 00:00 98.9 114 28 86/59 (68) 94   


 


3/4/18 22:00  124      


 


3/4/18 20:33     96 Nasal Cannula 3.00 


 


3/4/18 20:00 98.7 124 30 96/68 (77) 97   


 


3/4/18 20:00  124      


 


3/4/18 19:00     98 Nasal Cannula 4.00 


 


3/4/18 18:00  104      


 


3/4/18 16:00  105      


 


3/4/18 16:00 99.1 105 31 96/64 (75) 96   


 


3/4/18 14:00  113      


 


3/4/18 13:37   23     


 


3/4/18 12:00  113      


 


3/4/18 12:00 99.5 113 33 86/62 (70) 98   








Laboratory Tests








Test


  3/4/18


05:00 3/5/18


05:00 3/5/18


09:30


 


White Blood Count 21.1 TH/MM3  19.0 TH/MM3  16.8 TH/MM3 


 


Red Blood Count 3.30 MIL/MM3  2.91 MIL/MM3  2.89 MIL/MM3 


 


Hemoglobin 7.4 GM/DL  6.6 GM/DL  6.6 GM/DL 


 


Hematocrit 23.0 %  20.6 %  20.5 % 


 


Mean Corpuscular Volume 69.8 FL  70.7 FL  70.7 FL 


 


Mean Corpuscular Hemoglobin 22.4 PG  22.7 PG  22.8 PG 


 


Mean Corpuscular Hemoglobin


Concent 32.0 % 


  32.1 % 


  32.2 % 


 


 


Red Cell Distribution Width 17.9 %  18.5 %  18.8 % 


 


Platelet Count 291 TH/MM3  315 TH/MM3  313 TH/MM3 


 


Mean Platelet Volume 8.4 FL  8.2 FL  8.3 FL 


 


Neutrophils (%) (Auto) 84.6 %  83.4 %  88.0 % 


 


Lymphocytes (%) (Auto) 5.7 %  5.8 %  5.1 % 


 


Monocytes (%) (Auto) 8.8 %  9.6 %  5.8 % 


 


Eosinophils (%) (Auto) 0.4 %  0.5 %  0.6 % 


 


Basophils (%) (Auto) 0.5 %  0.7 %  0.5 % 


 


Neutrophils # (Auto) 17.8 TH/MM3  15.8 TH/MM3  14.8 TH/MM3 


 


Lymphocytes # (Auto) 1.2 TH/MM3  1.1 TH/MM3  0.9 TH/MM3 


 


Monocytes # (Auto) 1.9 TH/MM3  1.8 TH/MM3  1.0 TH/MM3 


 


Eosinophils # (Auto) 0.1 TH/MM3  0.1 TH/MM3  0.1 TH/MM3 


 


Basophils # (Auto) 0.1 TH/MM3  0.1 TH/MM3  0.1 TH/MM3 


 


CBC Comment DIFF FINAL  DIFF FINAL  DIFF FINAL 


 


Differential Comment      








Laboratory Tests








Test


  3/4/18


05:00 3/5/18


05:00


 


Blood Urea Nitrogen 10 MG/DL  10 MG/DL 


 


Creatinine 0.79 MG/DL  0.85 MG/DL 


 


Random Glucose 119 MG/DL  137 MG/DL 


 


Total Protein 6.8 GM/DL  


 


Albumin 2.4 GM/DL  


 


Calcium Level 8.7 MG/DL  8.2 MG/DL 


 


Phosphorus Level 4.3 MG/DL  3.9 MG/DL 


 


Magnesium Level 2.1 MG/DL  2.0 MG/DL 


 


Alkaline Phosphatase 113 U/L  


 


Aspartate Amino Transf


(AST/SGOT) 17 U/L 


  


 


 


Alanine Aminotransferase


(ALT/SGPT) 11 U/L 


  


 


 


Total Bilirubin 0.7 MG/DL  


 


Sodium Level 134 MEQ/L  133 MEQ/L 


 


Potassium Level 4.4 MEQ/L  3.8 MEQ/L 


 


Chloride Level 99 MEQ/L  98 MEQ/L 


 


Carbon Dioxide Level 29.3 MEQ/L  28.3 MEQ/L 


 


Anion Gap 6 MEQ/L  7 MEQ/L 


 


Estimat Glomerular Filtration


Rate 83 ML/MIN 


  76 ML/MIN 


 








Microbiology








 Date/Time


She is afebrile.





Source Procedure


Growth Status


 


 


 3/4/18 05:50


Blood Peripheral Aerobic Blood Culture - Preliminary


NO GROWTH IN 1 DAY Resulted


 


 3/4/18 05:50


Blood Peripheral Anaerobic Blood Culture - Preliminary


NO GROWTH IN 1 DAY Resulted





 3/3/18 21:20


Blood Line Aerobic Blood Culture - Preliminary


NO GROWTH IN 2 DAYS Resulted


 


 3/3/18 21:20


Blood Line Anaerobic Blood Culture - Preliminary


NO GROWTH IN 2 DAYS Resulted





 3/3/18 21:50


Sputum Expectorated Sputum Gram Stain - Final Resulted


 


 3/3/18 21:50


Sputum Expectorated Sputum Sputum Culture - Preliminary


LIGHT GROWTH NORMAL RESPIRATORY MYLENE... Resulted














Microbiology








 Date/Time


Source Procedure


Growth Status


 


 


 2/27/18 04:25


Blood Peripheral Aerobic Blood Culture - Preliminary


NO GROWTH IN 2 DAYS Resulted


 


 2/27/18 04:25


Blood Peripheral Anaerobic Blood Culture - Preliminary


NO GROWTH IN 2 DAYS Resulted





 2/27/18 04:20


Blood Peripheral Aerobic Blood Culture - Preliminary


NO GROWTH IN 2 DAYS Resulted


 


 2/27/18 04:20


Blood Peripheral Anaerobic Blood Culture - Preliminary


NO GROWTH IN 2 DAYS Resulted











Microbiology








 Date/Time


Source Procedure


Growth Status


 


 


 2/23/18 20:15


Blood Peripheral Aerobic Blood Culture - Final


Viridans Streptococcus Grp Complete


 


 2/23/18 20:15 Anaerobic Blood Culture - Final


Viridans Streptococcus Grp Complete





 2/23/18 20:10


Blood Peripheral Aerobic Blood Culture - Final


Viridans Streptococcus Grp Complete


 


 2/23/18 20:10 Anaerobic Blood Culture - Final


Viridans Streptococcus Grp Complete


 


 2/25/18 21:00


Stool Stool Stool Occult Blood (GILA) - Final


HEMOCCULT NEGATIVE Complete


 


 2/23/18 22:30


Urine Suprapubic Urine Urine Culture - Final


NO GROWTH IN 48 HOURS. Complete











IMAGING:














Chest X-Ray 3/5/18 0600 Signed





Impressions: 





 Service Date/Time:  Monday, March 5, 2018 03:52 - CONCLUSION:  There is a 

small 





 to moderate size right pleural effusion with associated volume loss and/or 





 airspace consolidation. The pleural effusion has increased from the prior 





 examination.     Ron Melgar MD 


 


Head CT 3/3/18 0000 Signed





Impressions: 





 Service Date/Time:  Saturday, March 3, 2018 10:22 - CONCLUSION:  1. Focal area 





 of decreased density involving the right parietal lobe. As a new finding from 





 the prior exam. This could relate to a small infarct. MRI of the brain with 





 gadolinium suggested to further evaluate. 2. Small lacunar infarction 

involving 





 the left centrum semiovale.     Scooter Dawson Jr., MD 


 


CT Angiography 3/3/18 0000 Signed





Impressions: 





 Service Date/Time:  Saturday, March 3, 2018 10:28 - CONCLUSION:  There 

extensive 





 pulmonary emboli bilaterally. There is near complete cut off of the right 

lower 





 lobe pulmonary artery. Prominent filling defects on the left as well. These 





 findings were relayed to Dr. Bustos immediately at the completion of the study.  





 Scattered pulmonary infiltrates, small pleural effusion and extensive 





 mediastinal lymphadenopathy as described above.      Won Sharp MD 


 


Brain MRI 3/3/18 0000 Signed





Impressions: 





 Service Date/Time:  Saturday, March 3, 2018 18:04 - CONCLUSION:  Deterioration 





 in the appearance of the scan.  At least 3 focal areas of abnormal contrast 





 enhancement are present scattered to in both hemispheres.  Given the history 





 this most likely represents developing areas cerebritis.  Exam is compromised 

by 





 an uncooperative patient and motion artifact.  These 2 factors make detection 

of 





 other abnormalities such as cortical cephalitis and meningitis difficult to 





 exclude.  Cannot exclude hemorrhagic lesions as well.  There is no mass effect 





 evident.      Jorge Diane MD 




















Renal Ultrasound 2/24/18 0000 Signed





Impressions: 





 Service Date/Time:  Saturday, February 24, 2018 10:53 - CONCLUSION:  1. No 





 evidence of hydronephrosis. 2. Thickening of the urinary bladder wall a 1.1 

cm. 





 3. Prominent splenomegaly.     Elgin Walton MD 


 


Chest X-Ray 2/24/18 0000 Signed





Impressions: 





 Service Date/Time:  Saturday, February 24, 2018 09:36 - CONCLUSION:  Right 





 internal jugular central line in place with the tip overlying the SVC. A 





 pneumothorax is not seen.     Ron Brown MD 


 


Breast Ultrasound 2/24/18 0000 Signed





Impressions: 





 Service Date/Time:  Saturday, February 24, 2018 10:48 - CONCLUSION:  Negative 





 targeted right breast ultrasound examination.     Ron Brown MD 


 


Lower Extremity Ultrasound 2/23/18 0000 Signed





Impressions: 





 Service Date/Time:  Friday, February 23, 2018 21:22 - CONCLUSION:  1. No 





 sonographic evidence for lower extremity DVT. 2. Incidental note of bilateral 





 inguinal adenopathy with the largest on the right measuring 3.3 cm and the 





 largest on the left measuring 2.6 cm. This finding is nonspecific but is 





 typically reactive in etiology.     Rj Whitten MD 














PHYSICAL EXAMINATION


GENERAL: No acute distress. 


HEENT:  Head atraumatic.  Extraocular movements grossly intact.  Pupils


reactive to light.  No icterus.  Oropharynx moist mucosa, no lesions.


Neck:  Supple without adenopathy.


Lungs: Coarse bilateral rhonchi.  


Heart: 5/6 blowing systolic murmur at the upper right sternal border.


Abdomen: Bowel sounds present, soft, obese, nontender.


Extremities: Diffuse 1+ edema of the lower extremities. Lesions at Left tibia 


distally and left foot and great toe are more red and not purpuric as before.


Toes 3 and 5 are cyanotic. The leg is tender on palpation.  It is warm.  


No calf tenderness.   No nail bed hemorrhages.  


SKIN: no diffuse rash.


Neuro: No gross focal findings.


Psychiatric: calm and cooperative.





 


IMPRESSION


1. Sepsis. Strep Viridans. 


2.  Tricuspid valve endocarditis.  Large vegetation.  Tricuspid regurgitation.


Patient evaluated by cardiovascular surgery and felt not to be a surgical 

candidate.


3.  Bacteremia.  Strep viridans.


Repeat blood cultures are pending.  Previous repeat blood cultures were 

negative.


4. History of prosthetic aortic valve and so likely recurrent prosthetic valve


endocarditis.


5.  Cerebritis on MRI.  Also has parietal infarct noted on CT scan of the brain.


6. Left renal cortical and pulmonary and lower extremity septic emboli. 


7. Leukocytosis secondary to sepsis from showering of emboli


from endocarditis.


8. Acute renal failure. Kidney function improved. Stable. 


Patient remains very critically ill.  





RECOMMENDATIONS


1. Continue Ampicillin intravenous.


2. Continue Gentamicin. now that the kidney function is improved but follow the 

renal function. 


3. Continue vancomycin.  Pharmacy managing.


4. Monitor clinical response.


5. Continue to monitor the left leg lesions.


6. Consider lumbar puncture if the mentation deteriorates.  She may be 

developing abscess if


Her mental status deteriorates.














Robinson Carr MD Mar 5, 2018 11:35

## 2018-03-05 NOTE — HHI.CCPN
Subjective


Remarks/Hospital Course


37-year-old female that presents to the ED for evaluation of shortness of 

breath and swelling.  Patient has a significant history of endocarditis, IV 

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 

continues use of IV drug use that presents to the ED for evaluation of acute 

onset of shortness of breath with swelling.  Patient initially did not mention 

to anybody that she was doing drugs but she did tell me that she injected 

herself about 4 days ago.  She uses Dilaudid.  She states that she's been 

having fevers.  Patient is somewhat somnolent and hard to assess she doesn't 

really provide much information.  Family members at the bedside and he provides 

more information about the heart.  Per patient she'll he takes 2 medications.  

Patient asked if she is taking any antibiotics and she said yes but when I ask 

her what kind is she is taking currently she states that she has not been on 

antibiotics for some time.  Patient apparently does follow with a local 

cardiologist but she cannot tell me the name of it.  She states that she's been 

having swelling to her legs for the past month.  She denies any recent travel 

or injury.  No other medical issues at this time.  No allergies to medication.





2/24: remains critical on Dopamine 10 mcg/ min. Appears ill. Limited bedside 

echo did not show any large vegetation, but exam was limited, tricuspid and 

aortic valve not well visualized. Also states that had right breast abscess 2 

months ago, drained by herself. Now may have abscess vs scar tissue. Injects 

upper arm and bilateral breasts. US of right breast ordered


2/25: Remains on 3 mcg/min of Levophed.  Complaints of severe left lower 

extremity pain.  Erythematous purpuric purple discoloration of the left lower 

anterior segment left dorsum of the foot with cyanotic nailbeds. Weak DP pulses+

. 2D Echo failed to reveal definite vegetation. Will consider FLORENCIO


2/26: Continues to be on Levophed 3 mcg/min, remains critical.  We are left 

lower extremity pain but slightly better.  Currently on heparin not therapeutic 

yet.  CTA with runoff showed Left renal cortical infarction. 5-6 cm length 

total occlusion of the left superficial femoral artery in the proximal thigh 

but good distal opacification.  Dr. Souza recommended IV heparin no surgical 

intervention at this time.  Patient is still at high risk for further embolic 

episodes.  Blood cultures growing strep viridans.  Also patient complains about 

epigastric and left-sided abdominal pain and left-sided chest pain which is 

more pleuritic.  Pain is severe on deep inspiration


2/27: Resting in bed in no acute distress.  Dopplerable lower extremity pulses 

noted.  Remains on heparin drip.  Vascular surgery continues to follow.


2/28: Resting in bed in mild respiratory distress.  Continues to cough.  

Nonproductive.  Left lower extremity leg improved.  Reviewed vascular surgeries 

note.  Likely will require long-term medical regulation post hospitalization


3/1: Improve respiratory status today.  Pain in left lower extremity much 

improved.  Tolerating diet.


3/2: Currently on nasal cannula.  Switch to Argatroban overnight by hematology.

  Okayed with vascular surgery.  Pain in left lower extremity slowly improving 

day by day.





Subjective





3/3: Resting in bed in some mild respiratory distress.  Complaining of 

pleuritic chest pain specifically in the right flank region.  Worse with deep 

inspiration.  Reproducible with palpation.  CT brain/CT pulmonary angiogram 

ordered.  No neurological deficits noted on examination.


3/4: CT pulmonary angiogram revealed emboli bilateral lower lobes as well as 

views of abnormal contrast bilateral cerebral hemispheres.  Patient complains 

still of pleuritic chest pain.  Frequency increased on Lortab.  CTS in for 

evaluation patient deemed inoperable.  Palliative care consulted appreciate 

recommendations.


3/5: Leukocytosis resolving.  Patient continues in mild respiratory distress.  

Denies chest pain at this time.  Continue mild wheezing, patient continues to 

refuse nebulizer treatments.  Case management consult to [facility] regarding possible transfer pending.  A chin noted to have 1 g 

deciliter dropped in hemoglobin, repeat hemoglobin unchanged, currently 6.6.  

Patient  will be transfused 1 unit of packed red blood cells.  Close monitoring 

of hemoglobin and hematocrit .Concern for possible bleeding, Hemoccult and labs 

pending.





Objective





Vital Signs








  Date Time  Temp Pulse Resp B/P (MAP) Pulse Ox O2 Delivery O2 Flow Rate FiO2


 


3/5/18 14:39 99.9 120 30 100/71 95   


 


3/5/18 09:02      Nasal Cannula 3.00 


 


3/4/18 09:49        35














Intake and Output   


 


 3/5/18 3/5/18 3/6/18





 08:00 16:00 00:00


 


Intake Total 1200.0 ml  


 


Output Total 600 ml  


 


Balance 600.0 ml  








Result Diagram:  


3/5/18 0930                                                                    

            3/5/18 0500





Other Results





Microbiology








 Date/Time


Source Procedure


Growth Status


 


 


 3/3/18 21:50


Sputum Expectorated Sputum Gram Stain - Final Complete


 


 3/3/18 21:50


Sputum Expectorated Sputum Sputum Culture - Final


HEAVY GROWTH NORMAL RESPIRATORY MYLENE Complete








Imaging





Last Impressions








Chest X-Ray 3/5/18 0600 Signed





Impressions: 





 Service Date/Time:  Monday, March 5, 2018 03:52 - CONCLUSION:  There is a 

small 





 to moderate size right pleural effusion with associated volume loss and/or 





 airspace consolidation. The pleural effusion has increased from the prior 





 examination.     Ron Melgar MD 


 


Head CT 3/3/18 0000 Signed





Impressions: 





 Service Date/Time:  Saturday, March 3, 2018 10:22 - CONCLUSION:  1. Focal area 





 of decreased density involving the right parietal lobe. As a new finding from 





 the prior exam. This could relate to a small infarct. MRI of the brain with 





 gadolinium suggested to further evaluate. 2. Small lacunar infarction 

involving 





 the left centrum semiovale.     Scooter Dawson Jr., MD 


 


CT Angiography 3/3/18 0000 Signed





Impressions: 





 Service Date/Time:  Saturday, March 3, 2018 10:28 - CONCLUSION:  There 

extensive 





 pulmonary emboli bilaterally. There is near complete cut off of the right 

lower 





 lobe pulmonary artery. Prominent filling defects on the left as well. These 





 findings were relayed to Dr. Bustos immediately at the completion of the study.  





 Scattered pulmonary infiltrates, small pleural effusion and extensive 





 mediastinal lymphadenopathy as described above.      Won Sharp MD 


 


Brain MRI 3/3/18 0000 Signed





Impressions: 





 Service Date/Time:  Saturday, March 3, 2018 18:04 - CONCLUSION:  Deterioration 





 in the appearance of the scan.  At least 3 focal areas of abnormal contrast 





 enhancement are present scattered to in both hemispheres.  Given the history 





 this most likely represents developing areas cerebritis.  Exam is compromised 

by 





 an uncooperative patient and motion artifact.  These 2 factors make detection 

of 





 other abnormalities such as cortical cephalitis and meningitis difficult to 





 exclude.  Cannot exclude hemorrhagic lesions as well.  There is no mass effect 





 evident.      Jorge Diane MD 


 


Chest CT 2/26/18 0000 Signed





Impressions: 





 Service Date/Time:  Monday, February 26, 2018 12:41 - CONCLUSION:  1. There 

are 





 at least 5 pulmonary nodules present bilaterally with the largest measuring 19 





 mm. None demonstrate cavitation but it is possible that these represent septic 





 emboli. Suggest followup to assess for change. 2. Splenomegaly with subtle 





 wedge-shaped areas of low-density in the mid spleen which could represent 





 infarcts. 3. Mild cardiomegaly in this patient post aortic valve replacement. 





 Low density of the cardiac blood pool suggests anemia.      Ron Melgar MD 


 


Aorta w/Runoff CTA 2/25/18 0000 Signed





Impressions: 





 Service Date/Time:  Sunday, February 25, 2018 12:47 - CONCLUSION:  Left renal 





 cortical infarction. 5-6 cm length total occlusion of the left superficial 





 femoral artery in the proximal thigh. Nodular parenchymal opacities in the 

lung 





 bases bilaterally See above discussion.     Ron Cruz MD 


 


Renal Ultrasound 2/24/18 0000 Signed





Impressions: 





 Service Date/Time:  Saturday, February 24, 2018 10:53 - CONCLUSION:  1. No 





 evidence of hydronephrosis. 2. Thickening of the urinary bladder wall a 1.1 

cm. 





 3. Prominent splenomegaly.     Elgin Walton MD 


 


Breast Ultrasound 2/24/18 0000 Signed





Impressions: 





 Service Date/Time:  Saturday, February 24, 2018 10:48 - CONCLUSION:  Negative 





 targeted right breast ultrasound examination.     Ron Brown MD 


 


Lower Extremity Ultrasound 2/23/18 0000 Signed





Impressions: 





 Service Date/Time:  Friday, February 23, 2018 21:22 - CONCLUSION:  1. No 





 sonographic evidence for lower extremity DVT. 2. Incidental note of bilateral 





 inguinal adenopathy with the largest on the right measuring 3.3 cm and the 





 largest on the left measuring 2.6 cm. This finding is nonspecific but is 





 typically reactive in etiology.     Rj Whitten MD 








Last Impressions








Head CT 3/3/18 0000 Signed





Impressions: 





 Service Date/Time:  Saturday, March 3, 2018 10:22 - CONCLUSION:  1. Focal area 





 of decreased density involving the right parietal lobe. As a new finding from 





 the prior exam. This could relate to a small infarct. MRI of the brain with 





 gadolinium suggested to further evaluate. 2. Small lacunar infarction 

involving 





 the left centrum semiovale.     Scooter Dawson Jr., MD 


 


CT Angiography 3/3/18 0000 Signed





Impressions: 





 Service Date/Time:  Saturday, March 3, 2018 10:28 - CONCLUSION:  There 

extensive 





 pulmonary emboli bilaterally. There is near complete cut off of the right 

lower 





 lobe pulmonary artery. Prominent filling defects on the left as well. These 





 findings were relayed to Dr. Bustos immediately at the completion of the study.  





 Scattered pulmonary infiltrates, small pleural effusion and extensive 





 mediastinal lymphadenopathy as described above.      Won Sharp MD 


 


Brain MRI 3/3/18 0000 Signed





Impressions: 





 Service Date/Time:  Saturday, March 3, 2018 18:04 - CONCLUSION:  Deterioration 





 in the appearance of the scan.  At least 3 focal areas of abnormal contrast 





 enhancement are present scattered to in both hemispheres.  Given the history 





 this most likely represents developing areas cerebritis.  Exam is compromised 

by 





 an uncooperative patient and motion artifact.  These 2 factors make detection 

of 





 other abnormalities such as cortical cephalitis and meningitis difficult to 





 exclude.  Cannot exclude hemorrhagic lesions as well.  There is no mass effect 





 evident.      Ej Ewing MD 


 


Chest X-Ray 3/1/18 0600 Signed





Impressions: 





 Service Date/Time:  Thursday, March 1, 2018 03:05 - CONCLUSION:  1. Stable 





 patchy bilateral lower lung zone airspace disease. 2. No significant interval 





 change.     Rj Whitten MD 


 


Chest CT 2/26/18 0000 Signed





Impressions: 





 Service Date/Time:  Monday, February 26, 2018 12:41 - CONCLUSION:  1. There 

are 





 at least 5 pulmonary nodules present bilaterally with the largest measuring 19 





 mm. None demonstrate cavitation but it is possible that these represent septic 





 emboli. Suggest followup to assess for change. 2. Splenomegaly with subtle 





 wedge-shaped areas of low-density in the mid spleen which could represent 





 infarcts. 3. Mild cardiomegaly in this patient post aortic valve replacement. 





 Low density of the cardiac blood pool suggests anemia.      Ron Melgar MD 


 


Aorta w/Runoff CTA 2/25/18 0000 Signed





Impressions: 





 Service Date/Time:  Sunday, February 25, 2018 12:47 - CONCLUSION:  Left renal 





 cortical infarction. 5-6 cm length total occlusion of the left superficial 





 femoral artery in the proximal thigh. Nodular parenchymal opacities in the 

lung 





 bases bilaterally See above discussion.     Ron Cruz MD 


 


Renal Ultrasound 2/24/18 0000 Signed





Impressions: 





 Service Date/Time:  Saturday, February 24, 2018 10:53 - CONCLUSION:  1. No 





 evidence of hydronephrosis. 2. Thickening of the urinary bladder wall a 1.1 

cm. 





 3. Prominent splenomegaly.     Elgin Walton MD 


 


Breast Ultrasound 2/24/18 0000 Signed





Impressions: 





 Service Date/Time:  Saturday, February 24, 2018 10:48 - CONCLUSION:  Negative 





 targeted right breast ultrasound examination.     Ron Brown MD 


 


Lower Extremity Ultrasound 2/23/18 0000 Signed





Impressions: 





 Service Date/Time:  Friday, February 23, 2018 21:22 - CONCLUSION:  1. No 





 sonographic evidence for lower extremity DVT. 2. Incidental note of bilateral 





 inguinal adenopathy with the largest on the right measuring 3.3 cm and the 





 largest on the left measuring 2.6 cm. This finding is nonspecific but is 





 typically reactive in etiology.     Rj Whitten MD 








Last Impressions








Chest X-Ray 3/1/18 0600 Signed





Impressions: 





 Service Date/Time:  Thursday, March 1, 2018 03:05 - CONCLUSION:  1. Stable 





 patchy bilateral lower lung zone airspace disease. 2. No significant interval 





 change.     Rj Whitten MD 


 


Chest CT 2/26/18 0000 Signed





Impressions: 





 Service Date/Time:  Monday, February 26, 2018 12:41 - CONCLUSION:  1. There 

are 





 at least 5 pulmonary nodules present bilaterally with the largest measuring 19 





 mm. None demonstrate cavitation but it is possible that these represent septic 





 emboli. Suggest followup to assess for change. 2. Splenomegaly with subtle 





 wedge-shaped areas of low-density in the mid spleen which could represent 





 infarcts. 3. Mild cardiomegaly in this patient post aortic valve replacement. 





 Low density of the cardiac blood pool suggests anemia.      Ron Melgar MD 


 


Aorta w/Runoff CTA 2/25/18 0000 Signed





Impressions: 





 Service Date/Time:  Sunday, February 25, 2018 12:47 - CONCLUSION:  Left renal 





 cortical infarction. 5-6 cm length total occlusion of the left superficial 





 femoral artery in the proximal thigh. Nodular parenchymal opacities in the 

lung 





 bases bilaterally See above discussion.     Ron Cruz MD 


 


Renal Ultrasound 2/24/18 0000 Signed





Impressions: 





 Service Date/Time:  Saturday, February 24, 2018 10:53 - CONCLUSION:  1. No 





 evidence of hydronephrosis. 2. Thickening of the urinary bladder wall a 1.1 

cm. 





 3. Prominent splenomegaly.     Elgin Walton MD 


 


Breast Ultrasound 2/24/18 0000 Signed





Impressions: 





 Service Date/Time:  Saturday, February 24, 2018 10:48 - CONCLUSION:  Negative 





 targeted right breast ultrasound examination.     Ron Brown MD 


 


Lower Extremity Ultrasound 2/23/18 0000 Signed





Impressions: 





 Service Date/Time:  Friday, February 23, 2018 21:22 - CONCLUSION:  1. No 





 sonographic evidence for lower extremity DVT. 2. Incidental note of bilateral 





 inguinal adenopathy with the largest on the right measuring 3.3 cm and the 





 largest on the left measuring 2.6 cm. This finding is nonspecific but is 





 typically reactive in etiology.     Rj Whitten MD 








Objective Remarks


GENERAL: 37-year-old  female resting in bed in no acute distress


SKIN: Warm and dry.  Patient does appear to have puncture wounds from where she 

states been injecting recently in her arms, bilateral upper breast.


HEAD: Atraumatic. Normocephalic. 


EYES: Pupils equal and round. No scleral icterus. No injection or drainage. 


ENT: No nasal bleeding or discharge.  Mucous membranes pink and moist.  


NECK: Trachea midline. No JVD. 


CHEST: Bilateral upper breast has puncture wounds from injection.  Pain to 

point palpation left upper thorax


CARDIOVASCULAR: 2/6 pansystolic murmur at the left lower sternal border.  


RESPIRATORY: Few fine crackles present bilaterally.  Mild  wheezing noted


GASTROINTESTINAL: Abdomen soft, tender to deep palpation.  No guarding 

rigidity.  Positive hepatosplenomegaly.  Normoactive bowel sounds


MUSCULOSKELETAL: 2+ pitting edema to the lower extremities.  Erythematous 

purpuric rash of the left lower extremity anterior shin and foot, punctate 

lesion plantar right great toe. Left leg extremely tender below the left knee.  

Dopplerable DP pulses bilaterally


NEUROLOGICAL: Awake and alert. Motor grossly within normal limits. 5/5 muscle 

strength in the arms and legs.  Normal speech.


Date of Insertion:  Feb 24, 2018


Line:  Central Venous Catheter


Side:  Right


Location:  Internal, Jugular





A/P


Assessment and Plan


Neuro/Psych:





IVDU -hydromorphone


History of THC use





- Continue pain control hydrocodone/acetaminophen 10/325 1 tablet every 4 hours 

as needed pain 1 through 5 with hydromorphone 2 mg IV every 4 hours as needed 

pain 6-10 (patient has severe pain from ischemic leg)


- Acetaminophen 650 mg p.o. every 6 hours as needed fever


CT brain 3/3 order while on Argatroban-3 focal areas of abnormal contrast 

enhancement scattered within both hemispheres, cerebritis





CVS/ID





Septic  shock


Infective endocarditis involving aortic prosthetic valve


Tricuspid valve endocarditis


Multiple septic emboli including pulmonary, splenic and vascular


History of fungal and bacterial prosthetic valve endocarditis


Strep viridans bacteremia


5-6 cm length total occlusion of the left superficial femoral artery in the 

proximal thigh





- Recurrent aortic prosthetic valve endocarditis -initially placed 2011 with 

redo 2016. - Now with strep viridans in blood cultures 2/23


- Broad-spectrum antibiotic with ampicillin and gentamicin.


   Previously treated with vancomycin and ceftriaxone


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 

April 2017 for tricuspid valve PVE  - Candida parapsilosis , Streptococci


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


- Vascular surgery Dr. Sears following


- Anticoagulation with argatroban gtt at 0.75 mcg/kg/min


- Infectious disease Dr. Carr


- Extensive counselling given about IP Rehab


-CTS evaluation Dr. Bauer-no surgical intervention planned


- Case management consulted with [facility], for possible 

transfer 





Microbiology





2/27 -blood cultures 2 -no growth


2/23 -urine culture -no growth


2/23 -blood cultures 2 -strep viridians





Resp:





Acute respiratory insufficiency


5 pulmonary nodules likely septic emboli


Pleural effusions





-Nasal cannula to keep oxygen saturation above 92% currently on 4 L


-Incentives spirometry while awake


-As needed albuterol aerosols every 2 hours.  Dyspnea


- CT thorax revealed 5 bilateral pulmonary lesions/nodules largest which is 19 

mm.  No cavitation noted but likely septic emboli.


CT pulmonary angiogram 3/3-pulmonary emboli bilateral lower lobes





GI:





Hepatitis C


Splenic infarction


Hypoalbuminemia





- Heart healthy diet


-Famotidine for GI prophylaxis


-Docusate sodium/senna 1 tablet twice daily for bowel regimen








Renal/:





Acute on chronic kidney disease


Left renal cortical infarction





- Acute renal failure secondary to ATN and dehydration


- CT imaging revealed left renal cortical infarction


- Renal ultrasound-thickening of urinary bladder


- Creatinine 0.8-will give 40 mg Lasix IV 1 dose





Endo:





Sliding scale insulin if indicated to maintain euglycemia





Heme:





Microcytic anemia


Leukocytosis





- Monitor CBC CMP and coags


-Hemoglobin 6.6.  Transfuse1 unit of packed red blood cells


-Obtain Hemoccult


-Obtain LDH, haptoglobin, fibrinogen, PT and INR levels


-Heme-Onc following


-Monitor H&H every 12 hours, consider CT of the abdomen and pelvis if continued 

decrease in levels





FEN:





Hyponatremia





Replace electrolytes as clinically indicated.  





MSK:





History of right breast abscess


Elevated BMI





- Ultrasound of the left lower extremity negative for DVT


- Right breast ultrasound negative for abscess


-Weight loss encouraged





DVT GI prophylaxis





- argatroban gtt


- famotidine





Level 3 follow-up





CT pulmonary angiogram revealed bilateral pulmonary emboli nature unclear.  

Right lower lobe occlusion.





CT brain revealed right parietal lacunar infarct possibly with infarct left 

centrum semiovale possibly septic emboli.  Versus thrombus.  MRI brain ordered





Stat echocardiogram ordered.  This revealed Aortic prosthesis with prolapse and 

possible vegetation.  The estimated pulmonary arterial pressure is 65.7 mmHg.  

Tricuspid valve prosthesis  Mobile vegetation seen on the tricuspid valve. 2x4 

cm.  Discussed with infectious disease.  Recommended vancomycin and possible 

FLORENCIO early next week


Patient is currently on therapeutic argatroban. 





3/5-Case management consulted, possible transfer to [facility] 

were patient received prosthetic aortic valve.I spoke with Dr. Anglin CT 

Surgeon , will not accept pt for transfer, patient still active IVDU,


Per report attempted transferred to [facility] - patient was not accepted .D

/W patient,and ICU RN at bedside (Nilay)


Physician


Julia Lewis MD Mar 5, 2018 15:36

## 2018-03-05 NOTE — PD.ONC.PN
Subjective


Subjective


Remarks


Afebrile overnight. 


Patient lethargic/somnolent at times during interview.  Per nurse she recently 

received her dilaudid shot. 


patient still having pain in left leg. no change. 


no bleeding.





Objective


Data











  Date Time  Temp Pulse Resp B/P (MAP) Pulse Ox O2 Delivery O2 Flow Rate FiO2


 


3/5/18 10:00  109      


 


3/5/18 09:57   28     


 


3/5/18 09:02     97 Nasal Cannula 3.00 


 


3/5/18 09:00  102      


 


3/5/18 08:00 98.5 107 32 95/67 (76) 97   


 


3/5/18 08:00  107      


 


3/5/18 07:00     100 Nasal Cannula 3.00 


 


3/5/18 06:00  106      


 


3/5/18 04:00  110      


 


3/5/18 04:00 98.9 110 30 97/67 (77) 97   


 


3/5/18 02:00  109      


 


3/5/18 00:00  114      


 


3/5/18 00:00 98.9 114 28 86/59 (68) 94   


 


3/4/18 22:00  124      


 


3/4/18 20:33     96 Nasal Cannula 3.00 


 


3/4/18 20:00 98.7 124 30 96/68 (77) 97   


 


3/4/18 20:00  124      


 


3/4/18 19:00     98 Nasal Cannula 4.00 


 


3/4/18 18:00  104      


 


3/4/18 16:00  105      


 


3/4/18 16:00 99.1 105 31 96/64 (75) 96   














 3/5/18 3/5/18 3/5/18





 07:00 15:00 23:00


 


Intake Total 1200.0 ml  


 


Output Total 600 ml  


 


Balance 600.0 ml  








Result Diagram:  


3/5/18 0930                                                                    

            3/5/18 0500





Laboratory Results





Laboratory Tests








Test


  3/4/18


18:15 3/5/18


05:00 3/5/18


09:30 3/5/18


12:20


 


Vancomycin Level Trough 25.8 MCG/ML    


 


White Blood Count  19.0 TH/MM3  16.8 TH/MM3  


 


Red Blood Count  2.91 MIL/MM3  2.89 MIL/MM3  


 


Hemoglobin  6.6 GM/DL  6.6 GM/DL  


 


Hematocrit  20.6 %  20.5 %  


 


Mean Corpuscular Volume  70.7 FL  70.7 FL  


 


Mean Corpuscular Hemoglobin  22.7 PG  22.8 PG  


 


Mean Corpuscular Hemoglobin


Concent 


  32.1 % 


  32.2 % 


  


 


 


Red Cell Distribution Width  18.5 %  18.8 %  


 


Platelet Count  315 TH/MM3  313 TH/MM3  


 


Mean Platelet Volume  8.2 FL  8.3 FL  


 


Neutrophils (%) (Auto)  83.4 %  88.0 %  


 


Lymphocytes (%) (Auto)  5.8 %  5.1 %  


 


Monocytes (%) (Auto)  9.6 %  5.8 %  


 


Eosinophils (%) (Auto)  0.5 %  0.6 %  


 


Basophils (%) (Auto)  0.7 %  0.5 %  


 


Neutrophils # (Auto)  15.8 TH/MM3  14.8 TH/MM3  


 


Lymphocytes # (Auto)  1.1 TH/MM3  0.9 TH/MM3  


 


Monocytes # (Auto)  1.8 TH/MM3  1.0 TH/MM3  


 


Eosinophils # (Auto)  0.1 TH/MM3  0.1 TH/MM3  


 


Basophils # (Auto)  0.1 TH/MM3  0.1 TH/MM3  


 


CBC Comment  DIFF FINAL  DIFF FINAL  


 


Differential Comment      


 


Activated Partial


Thromboplast Time 


  62.3 SEC 


  


  


 


 


Blood Urea Nitrogen  10 MG/DL   


 


Creatinine  0.85 MG/DL   


 


Random Glucose  137 MG/DL   


 


Calcium Level  8.2 MG/DL   


 


Phosphorus Level  3.9 MG/DL   


 


Magnesium Level  2.0 MG/DL   


 


Sodium Level  133 MEQ/L   


 


Potassium Level  3.8 MEQ/L   


 


Chloride Level  98 MEQ/L   


 


Carbon Dioxide Level  28.3 MEQ/L   


 


Anion Gap  7 MEQ/L   


 


Estimat Glomerular Filtration


Rate 


  76 ML/MIN 


  


  


 


 


Prothrombin Time    21.2 SEC 


 


Prothromb Time International


Ratio 


  


  


  2.1 RATIO 


 


 


Fibrinogen    485 mg/dL 








Culture Results





Microbiology








 Date/Time


Source Procedure


Growth Status


 


 


 3/4/18 05:50


Blood Peripheral Aerobic Blood Culture - Preliminary


NO GROWTH IN 1 DAY Resulted


 


 3/4/18 05:50


Blood Peripheral Anaerobic Blood Culture - Preliminary


NO GROWTH IN 1 DAY Resulted





 3/3/18 21:20


Blood Line Aerobic Blood Culture - Preliminary


NO GROWTH IN 2 DAYS Resulted


 


 3/3/18 21:20


Blood Line Anaerobic Blood Culture - Preliminary


NO GROWTH IN 2 DAYS Resulted


 


 3/5/18 12:28


Stool Stool Stool Occult Blood (GILA)


Pending Received





 3/3/18 21:50


Sputum Expectorated Sputum Gram Stain - Final Complete


 


 3/3/18 21:50


Sputum Expectorated Sputum Sputum Culture - Final


HEAVY GROWTH NORMAL RESPIRATORY MYLENE Complete








Imaging Studies





Last 24 hours Impressions








Chest X-Ray 3/5/18 0600 Signed





Impressions: 





 Service Date/Time:  Monday, March 5, 2018 03:52 - CONCLUSION:  There is a 

small 





 to moderate size right pleural effusion with associated volume loss and/or 





 airspace consolidation. The pleural effusion has increased from the prior 





 examination.     Ron Melgar MD 











Administered Medications








 Medications


  (Trade)  Dose


 Ordered  Sig/Nikhil


 Route


 PRN Reason  Start Time


 Stop Time Status Last Admin


Dose Admin


 


 Sodium Chloride


  (NS Flush)  2 ml  BID


 IV FLUSH


   2/24/18 09:00


    3/5/18 09:27


 


 


 Acetaminophen


  (Tylenol)  650 mg  Q6H  PRN


 PO


 fever  2/23/18 22:00


    3/3/18 21:31


 


 


 Temazepam


  (Restoril)  15 mg  HS  PRN


 PO


 INSOMNIA  2/23/18 22:00


    3/4/18 00:27


 


 


 Chlorhexidine


 Gluconate


  (Chlorhexidine


 2% Cloth)  


 Taper  DAILY@04


 TOP


   2/24/18 04:00


 2/20/19 03:59  3/5/18 03:05


 


 


 Senna/Docusate


 Sodium


  (Arlen-Colace)  1 tab  BID


 PO


   2/24/18 09:00


    3/4/18 20:25


 


 


 Ampicillin Sodium


 2000 mg/Sodium


 Chloride  100 ml @ 


 400 mls/hr  Q6H


 IV


   2/24/18 14:00


    3/5/18 12:46


 


 


 Hydromorphone HCl


  (Dilaudid Pf Inj)  2 mg  Q4H  PRN


 IV PUSH


 PAIN SCALE 6 TO 10  2/25/18 15:00


    3/5/18 13:36


 


 


 Loperamide HCl


  (Imodium)  2 mg  Q4H  PRN


 PO


 Diarrhea  2/26/18 16:15


    2/28/18 14:32


 


 


 Gentamicin


 Sulfate 100 mg/


 Sodium Chloride  102.5 ml @ 


 100 mls/hr  Q8H


 IV


   2/26/18 21:00


    3/5/18 12:45


 


 


 Albuterol Sulfate


  (Albuterol Neb)  2.5 mg  Q2HR NEB  PRN


 NEB


 dyspnea  2/27/18 11:30


    3/2/18 02:47


 


 


 Famotidine


  (Pepcid)  20 mg  BID


 PO


   2/27/18 21:00


    3/5/18 09:27


 


 


 Argatroban 250 mg/


 Sodium Chloride  252.5 ml @ 


 3.11 mls/hr  TITRATE  PRN


 IV


 aPTT < 50  3/1/18 19:15


    3/3/18 18:58


 


 


 Acetaminophen/


 Hydrocodone Bitart


  (Norco  Mg)  1 tab  Q4H  PRN


 PO


 pain 1-5  3/4/18 15:00


    3/5/18 04:52


 


 


 Vancomycin HCl


 1750 mg/Sodium


 Chloride  517.5 ml @ 


 260 mls/hr  Q12H


 IV


   3/5/18 02:00


    3/5/18 01:19


 








Objective Remarks


GENERAL: Young woman, resting in hospital bed


SKIN: Warm and dry.


HEAD: Normocephalic.


EYES: No injection or drainage. 


NECK: Supple, trachea midline.


CARDIOVASCULAR: +S1/S2


RESPIRATORY: anterior fields clear. on O2 via NC


GASTROINTESTINAL: Abdomen soft, non-tender, nondistended. 


EXTREMITIES: left leg warm and well perfused but tender to palpation. pulses 

palpable in both legs. 


NEUROLOGICAL: awake and alert. normal speech.





Assessment/Plan


Problem List:  


(1) arterial blood clot


Plan:  --on Argatroban (direct thrombin inhibitor)


-- no significant family history of thrombotic events to suggest hereditary 

antithrombin deficiency.  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 

impaired production from her liver disease. 


--has chronic active hepatitis C.  ++increased consumption from disseminated 

intravascular coagulation and acute illness, bacterial endocarditis.  


--Protein losses are also considered.  She had mild proteinuria when she first 

was admitted.


--Thrombin inactivates antithrombin.  This would abrogate the effect of 

unfractionated heparin and low molecular weight heparin.





(2) Acute septic pulmonary embolism


ICD Codes:  I26.90 - Septic pulmonary embolism without acute cor pulmonale


Plan:  -- The echocardiogram on 3/3 showed an estimated EF of 50-55%.  There 

was severe tricuspid regurgitation with prosthesis in place.  2 x 4 cm mobile 

vegetation was seen on the valve.  The right atrium and right ventricle were 

normal in size and function





(3) Microcytic anemia


ICD Codes:  D50.9 - Iron deficiency anemia, unspecified


Plan:  --elevated ferritin


--no clinically obvious bleeding. 


--will obtain anemia studies, stool Hemoccult





Assessment


36y/o female admitted with recurrent endocarditis. hematology consulted for 

anticoagulation assistance.


h/o Recurrent bacterial endocarditis, bacteremia, history of prosthetic aortic 

valve and subsequent redo, chronic active hepatitis C, intravenous drug use, 

microcytic anemia, consistent with iron deficiency, left lower extremity 

arterial event, bacterial endocarditis with viridans streptococcus.





HPI (brought forward for continuity of care): history of IV drug abuse and  

recurrent endocarditis. had a relapse of her activity. has history of pulmonary 

embolism and infected valves. +significant congestive heart failure and 

presented to the emergency room on 02/23/2018, with sepsis. is followed by 

Infectious Disease for her endocarditis.  previously received antibiotic 

therapy for enterococcus faecalis and staph aureus. There is questionable 

compliance. Her current blood 


culture from 02/23/2018, shows viridans streptococcus.  Two out of two bottles 

were positive.  She is on ampicillin and gentamicin. course is complicated by 

cold left lower extremity, evaluated by  Vascular Surgery.  A CT angiogram 

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 

the proximal thigh.  There is satisfactory calf runoff present.  For this reason

, conservative management with anticoagulation is continued. was placed on 

unfractionated heparin.  Her heparin dose has been titrated from 1000 units/

hour to 2500 units/hour with a PTT remaining subtherapeutic.  Her last PTT is 

32 seconds from 03/01/2018, at 2 p.m.  For this reason, Hematology/Oncology is 

consulted.


Plan


1. continue Argatroban. 


2. agree with pRBC transfusion


3. obtain anemia studies: haptoglobin/LDH, iron studies may be difficult as she 

has already has blood transfusion, B12/folate;


Attending Statement


The exam, history, and the medical decision-making described in the above note 

were completed with the assistance of the mid-level provider. I reviewed and 

agree with the findings presented.  I attest that I had a face-to-face 

encounter with the patient on the same day, and personally performed and 

documented my assessment and findings in the medical record.





Pt seen and examined.  ATIII still pending.


Stable on argatroban, no overt bleeding but gradual decrease in hgb.


Noted febrile reaction to blood transfusion, transfusion stopped and evaluation 

by blood bank on going.


Fever now resolved.





Discussed longer term anticoagulation with Coumadin.


PT is familiar with Coumadin.


Anticipate starting Coumadin tomorrow bridge with Argatroban pending Hgb stable.











Zoe Chau Mar 5, 2018 14:48


Henny Smith MD Mar 5, 2018 17:52

## 2018-03-05 NOTE — PD.VS.PN
Subjective


Subjective/Hospital Course


Improved L LE appearance and less subjective discomfort





Objective


Vitals/I&O











  Date Time  Temp Pulse Resp B/P (MAP) Pulse Ox O2 Delivery O2 Flow Rate FiO2


 


3/5/18 06:00  106      


 


3/5/18 04:00  110      


 


3/5/18 04:00 98.9 110 30 97/67 (77) 97   


 


3/5/18 02:00  109      


 


3/5/18 00:00  114      


 


3/5/18 00:00 98.9 114 28 86/59 (68) 94   


 


3/4/18 22:00  124      


 


3/4/18 20:33     96 Nasal Cannula 3.00 


 


3/4/18 20:00 98.7 124 30 96/68 (77) 97   


 


3/4/18 20:00  124      


 


3/4/18 19:00     98 Nasal Cannula 4.00 


 


3/4/18 18:00  104      


 


3/4/18 16:00  105      


 


3/4/18 16:00 99.1 105 31 96/64 (75) 96   


 


3/4/18 14:20   38     


 


3/4/18 14:00  113      


 


3/4/18 13:37   23     


 


3/4/18 12:00  113      


 


3/4/18 12:00 99.5 113 33 86/62 (70) 98   


 


3/4/18 10:00  120      


 


3/4/18 09:49     100 Venturi Mask  35


 


3/4/18 08:00 99.8 114 32 97/67 (77) 100   


 


3/4/18 08:00  114      














 3/5/18 3/5/18 3/5/18





 07:00 15:00 23:00


 


Intake Total 1200.0 ml  


 


Output Total 600 ml  


 


Balance 600.0 ml  








Physical Exam


motor intact


minimal edema


skin improved


motor intact


Laboratory





Laboratory Tests








Test


  3/4/18


10:00 3/4/18


18:15 3/5/18


05:00


 


Activated Partial


Thromboplast Time 57.3 


  


  62.3 


 


 


Vancomycin Level Trough  25.8  


 


White Blood Count   19.0 


 


Red Blood Count   2.91 


 


Hemoglobin   6.6 


 


Hematocrit   20.6 


 


Mean Corpuscular Volume   70.7 


 


Mean Corpuscular Hemoglobin   22.7 


 


Mean Corpuscular Hemoglobin


Concent 


  


  32.1 


 


 


Red Cell Distribution Width   18.5 


 


Platelet Count   315 


 


Mean Platelet Volume   8.2 


 


Neutrophils (%) (Auto)   83.4 


 


Lymphocytes (%) (Auto)   5.8 


 


Monocytes (%) (Auto)   9.6 


 


Eosinophils (%) (Auto)   0.5 


 


Basophils (%) (Auto)   0.7 


 


Neutrophils # (Auto)   15.8 


 


Lymphocytes # (Auto)   1.1 


 


Monocytes # (Auto)   1.8 


 


Eosinophils # (Auto)   0.1 


 


Basophils # (Auto)   0.1 


 


CBC Comment   DIFF FINAL 


 


Differential Comment    


 


Blood Urea Nitrogen   10 


 


Creatinine   0.85 


 


Random Glucose   137 


 


Calcium Level   8.2 


 


Phosphorus Level   3.9 


 


Magnesium Level   2.0 


 


Sodium Level   133 


 


Potassium Level   3.8 


 


Chloride Level   98 


 


Carbon Dioxide Level   28.3 


 


Anion Gap   7 


 


Estimat Glomerular Filtration


Rate 


  


  76 


 














 Date/Time


Source Procedure


Growth Status


 


 


 3/4/18 05:50


Blood Peripheral Aerobic Blood Culture


Pending Received


 


 3/4/18 05:50


Blood Peripheral Anaerobic Blood Culture


Pending Received


 


 2/25/18 21:00


Stool Stool Stool Occult Blood (GILA) - Final


HEMOCCULT NEGATIVE Complete





 3/3/18 21:50


Sputum Expectorated Sputum Gram Stain - Final Resulted


 


 3/3/18 21:50


Sputum Expectorated Sputum Sputum Culture - Preliminary


LIGHT GROWTH NORMAL RESPIRATORY MYLENE... Resulted


 


 2/23/18 22:30


Urine Suprapubic Urine Urine Culture - Final


NO GROWTH IN 48 HOURS. Complete








Imaging





Last 48 hours Impressions








Chest X-Ray 3/5/18 0600 Signed





Impressions: 





 Service Date/Time:  Monday, March 5, 2018 03:52 - CONCLUSION:  There is a 

small 





 to moderate size right pleural effusion with associated volume loss and/or 





 airspace consolidation. The pleural effusion has increased from the prior 





 examination.     Ron Melgar MD 











Assessment and Plan


Plan


Likely embolic from BE





needs aggressive anticoagulation but defer to primary service/hematology of 

choice of agents


antibiotics per primary service


continue neurovasc checks


Discharge Planning


on anticoagulation but anytime from vascular surgery standpoint


ok to WBAT


Will arrange f/u in 2-3 weeks with Michi Burgos MD Mar 5, 2018 08:00

## 2018-03-06 VITALS
OXYGEN SATURATION: 97 % | SYSTOLIC BLOOD PRESSURE: 100 MMHG | DIASTOLIC BLOOD PRESSURE: 68 MMHG | RESPIRATION RATE: 27 BRPM | HEART RATE: 101 BPM

## 2018-03-06 VITALS — HEART RATE: 106 BPM

## 2018-03-06 VITALS
SYSTOLIC BLOOD PRESSURE: 103 MMHG | OXYGEN SATURATION: 97 % | RESPIRATION RATE: 26 BRPM | HEART RATE: 109 BPM | DIASTOLIC BLOOD PRESSURE: 74 MMHG

## 2018-03-06 VITALS
RESPIRATION RATE: 28 BRPM | SYSTOLIC BLOOD PRESSURE: 100 MMHG | HEART RATE: 104 BPM | OXYGEN SATURATION: 100 % | DIASTOLIC BLOOD PRESSURE: 74 MMHG

## 2018-03-06 VITALS
SYSTOLIC BLOOD PRESSURE: 103 MMHG | DIASTOLIC BLOOD PRESSURE: 74 MMHG | HEART RATE: 100 BPM | TEMPERATURE: 98.8 F | OXYGEN SATURATION: 95 % | RESPIRATION RATE: 21 BRPM

## 2018-03-06 VITALS
RESPIRATION RATE: 22 BRPM | OXYGEN SATURATION: 98 % | HEART RATE: 100 BPM | SYSTOLIC BLOOD PRESSURE: 84 MMHG | DIASTOLIC BLOOD PRESSURE: 50 MMHG

## 2018-03-06 VITALS
HEART RATE: 114 BPM | DIASTOLIC BLOOD PRESSURE: 65 MMHG | OXYGEN SATURATION: 79 % | SYSTOLIC BLOOD PRESSURE: 103 MMHG | RESPIRATION RATE: 33 BRPM

## 2018-03-06 VITALS
OXYGEN SATURATION: 88 % | HEART RATE: 105 BPM | RESPIRATION RATE: 25 BRPM | DIASTOLIC BLOOD PRESSURE: 61 MMHG | SYSTOLIC BLOOD PRESSURE: 89 MMHG

## 2018-03-06 VITALS
OXYGEN SATURATION: 100 % | RESPIRATION RATE: 25 BRPM | DIASTOLIC BLOOD PRESSURE: 75 MMHG | TEMPERATURE: 99.2 F | SYSTOLIC BLOOD PRESSURE: 96 MMHG | HEART RATE: 107 BPM

## 2018-03-06 VITALS
DIASTOLIC BLOOD PRESSURE: 58 MMHG | RESPIRATION RATE: 29 BRPM | OXYGEN SATURATION: 97 % | HEART RATE: 109 BPM | SYSTOLIC BLOOD PRESSURE: 87 MMHG

## 2018-03-06 VITALS
SYSTOLIC BLOOD PRESSURE: 93 MMHG | OXYGEN SATURATION: 94 % | RESPIRATION RATE: 35 BRPM | HEART RATE: 113 BPM | TEMPERATURE: 98.4 F | DIASTOLIC BLOOD PRESSURE: 62 MMHG

## 2018-03-06 VITALS
HEART RATE: 100 BPM | RESPIRATION RATE: 24 BRPM | OXYGEN SATURATION: 97 % | DIASTOLIC BLOOD PRESSURE: 74 MMHG | SYSTOLIC BLOOD PRESSURE: 102 MMHG

## 2018-03-06 VITALS
OXYGEN SATURATION: 93 % | HEART RATE: 103 BPM | SYSTOLIC BLOOD PRESSURE: 102 MMHG | RESPIRATION RATE: 32 BRPM | DIASTOLIC BLOOD PRESSURE: 73 MMHG

## 2018-03-06 VITALS
SYSTOLIC BLOOD PRESSURE: 85 MMHG | OXYGEN SATURATION: 98 % | DIASTOLIC BLOOD PRESSURE: 66 MMHG | HEART RATE: 101 BPM | RESPIRATION RATE: 46 BRPM

## 2018-03-06 VITALS
SYSTOLIC BLOOD PRESSURE: 108 MMHG | OXYGEN SATURATION: 99 % | HEART RATE: 112 BPM | DIASTOLIC BLOOD PRESSURE: 68 MMHG | RESPIRATION RATE: 30 BRPM

## 2018-03-06 VITALS
HEART RATE: 117 BPM | DIASTOLIC BLOOD PRESSURE: 73 MMHG | TEMPERATURE: 98.2 F | OXYGEN SATURATION: 94 % | RESPIRATION RATE: 31 BRPM | SYSTOLIC BLOOD PRESSURE: 101 MMHG

## 2018-03-06 VITALS
OXYGEN SATURATION: 95 % | SYSTOLIC BLOOD PRESSURE: 103 MMHG | DIASTOLIC BLOOD PRESSURE: 74 MMHG | HEART RATE: 100 BPM | RESPIRATION RATE: 24 BRPM

## 2018-03-06 VITALS
SYSTOLIC BLOOD PRESSURE: 88 MMHG | RESPIRATION RATE: 23 BRPM | HEART RATE: 104 BPM | DIASTOLIC BLOOD PRESSURE: 53 MMHG | OXYGEN SATURATION: 95 %

## 2018-03-06 VITALS
RESPIRATION RATE: 25 BRPM | DIASTOLIC BLOOD PRESSURE: 76 MMHG | HEART RATE: 105 BPM | SYSTOLIC BLOOD PRESSURE: 115 MMHG | OXYGEN SATURATION: 96 %

## 2018-03-06 VITALS
TEMPERATURE: 100 F | OXYGEN SATURATION: 99 % | DIASTOLIC BLOOD PRESSURE: 51 MMHG | HEART RATE: 122 BPM | RESPIRATION RATE: 28 BRPM | SYSTOLIC BLOOD PRESSURE: 92 MMHG

## 2018-03-06 VITALS
DIASTOLIC BLOOD PRESSURE: 54 MMHG | OXYGEN SATURATION: 98 % | HEART RATE: 111 BPM | RESPIRATION RATE: 37 BRPM | SYSTOLIC BLOOD PRESSURE: 90 MMHG

## 2018-03-06 VITALS
SYSTOLIC BLOOD PRESSURE: 93 MMHG | OXYGEN SATURATION: 93 % | RESPIRATION RATE: 30 BRPM | HEART RATE: 105 BPM | TEMPERATURE: 98.6 F | DIASTOLIC BLOOD PRESSURE: 63 MMHG

## 2018-03-06 VITALS
DIASTOLIC BLOOD PRESSURE: 79 MMHG | SYSTOLIC BLOOD PRESSURE: 101 MMHG | TEMPERATURE: 98.4 F | RESPIRATION RATE: 25 BRPM | HEART RATE: 100 BPM | OXYGEN SATURATION: 96 %

## 2018-03-06 VITALS
SYSTOLIC BLOOD PRESSURE: 106 MMHG | HEART RATE: 101 BPM | OXYGEN SATURATION: 97 % | RESPIRATION RATE: 25 BRPM | DIASTOLIC BLOOD PRESSURE: 67 MMHG

## 2018-03-06 VITALS
HEART RATE: 104 BPM | DIASTOLIC BLOOD PRESSURE: 78 MMHG | RESPIRATION RATE: 30 BRPM | SYSTOLIC BLOOD PRESSURE: 102 MMHG | OXYGEN SATURATION: 94 %

## 2018-03-06 VITALS
HEART RATE: 107 BPM | DIASTOLIC BLOOD PRESSURE: 64 MMHG | TEMPERATURE: 98.4 F | RESPIRATION RATE: 26 BRPM | OXYGEN SATURATION: 98 % | SYSTOLIC BLOOD PRESSURE: 110 MMHG

## 2018-03-06 VITALS — RESPIRATION RATE: 24 BRPM | OXYGEN SATURATION: 88 % | HEART RATE: 101 BPM

## 2018-03-06 VITALS
OXYGEN SATURATION: 96 % | RESPIRATION RATE: 24 BRPM | DIASTOLIC BLOOD PRESSURE: 68 MMHG | SYSTOLIC BLOOD PRESSURE: 96 MMHG | HEART RATE: 101 BPM

## 2018-03-06 VITALS
RESPIRATION RATE: 46 BRPM | HEART RATE: 103 BPM | SYSTOLIC BLOOD PRESSURE: 99 MMHG | OXYGEN SATURATION: 96 % | DIASTOLIC BLOOD PRESSURE: 67 MMHG

## 2018-03-06 VITALS — HEART RATE: 113 BPM

## 2018-03-06 VITALS — HEART RATE: 109 BPM

## 2018-03-06 LAB
BUN SERPL-MCNC: 10 MG/DL (ref 7–18)
CALCIUM SERPL-MCNC: 8.3 MG/DL (ref 8.5–10.1)
CHLORIDE SERPL-SCNC: 95 MEQ/L (ref 98–107)
CREAT SERPL-MCNC: 0.84 MG/DL (ref 0.5–1)
ERYTHROCYTE [DISTWIDTH] IN BLOOD BY AUTOMATED COUNT: 19.2 % (ref 11.6–17.2)
FIBRINOGEN PPP-MCNC: 485 MG/DL (ref 227–377)
GFR SERPLBLD BASED ON 1.73 SQ M-ARVRAT: 77 ML/MIN (ref 89–?)
GLUCOSE SERPL-MCNC: 126 MG/DL (ref 74–106)
HCO3 BLD-SCNC: 29.4 MEQ/L (ref 21–32)
HCT VFR BLD CALC: 21.1 % (ref 35–46)
HCT VFR BLD CALC: 24.1 % (ref 35–46)
HGB BLD-MCNC: 6.8 GM/DL (ref 11.6–15.3)
HGB BLD-MCNC: 7.7 GM/DL (ref 11.6–15.3)
MCH RBC QN AUTO: 23 PG (ref 27–34)
MCHC RBC AUTO-ENTMCNC: 32.2 % (ref 32–36)
MCV RBC AUTO: 71.6 FL (ref 80–100)
PLATELET # BLD: 342 TH/MM3 (ref 150–450)
PMV BLD AUTO: 8.3 FL (ref 7–11)
RBC # BLD AUTO: 2.94 MIL/MM3 (ref 4–5.3)
RETIC #: 104.4 MIL/L (ref 20–150)
RETICS/RBC NFR: 3.6 % (ref 0.4–3)
SODIUM SERPL-SCNC: 133 MEQ/L (ref 136–145)
VANCOMYCIN TROUGH SERPL-MCNC: 19.2 MCG/ML (ref 5–10)
WBC # BLD AUTO: 16.3 TH/MM3 (ref 4–11)

## 2018-03-06 RX ADMIN — Medication SCH ML: at 19:50

## 2018-03-06 RX ADMIN — FAMOTIDINE SCH MG: 20 TABLET, FILM COATED ORAL at 20:54

## 2018-03-06 RX ADMIN — AMPICILLIN SODIUM SCH MLS/HR: 2 INJECTION, POWDER, FOR SOLUTION INTRAMUSCULAR; INTRAVENOUS at 13:24

## 2018-03-06 RX ADMIN — AMPICILLIN SODIUM SCH MLS/HR: 2 INJECTION, POWDER, FOR SOLUTION INTRAMUSCULAR; INTRAVENOUS at 01:47

## 2018-03-06 RX ADMIN — Medication PRN ML: at 22:08

## 2018-03-06 RX ADMIN — HYDROCODONE BITARTRATE AND ACETAMINOPHEN PRN TAB: 10; 325 TABLET ORAL at 08:00

## 2018-03-06 RX ADMIN — SODIUM CHLORIDE SCH MLS/HR: 900 INJECTION INTRAVENOUS at 14:01

## 2018-03-06 RX ADMIN — HYDROMORPHONE HYDROCHLORIDE PRN MG: 2 INJECTION INTRAMUSCULAR; INTRAVENOUS; SUBCUTANEOUS at 14:20

## 2018-03-06 RX ADMIN — HYDROMORPHONE HYDROCHLORIDE PRN MG: 2 INJECTION INTRAMUSCULAR; INTRAVENOUS; SUBCUTANEOUS at 01:47

## 2018-03-06 RX ADMIN — SODIUM CHLORIDE SCH MLS/HR: 900 INJECTION INTRAVENOUS at 02:19

## 2018-03-06 RX ADMIN — Medication SCH ML: at 08:01

## 2018-03-06 RX ADMIN — AMPICILLIN SODIUM SCH MLS/HR: 2 INJECTION, POWDER, FOR SOLUTION INTRAMUSCULAR; INTRAVENOUS at 20:51

## 2018-03-06 RX ADMIN — TEMAZEPAM PRN MG: 15 CAPSULE ORAL at 22:08

## 2018-03-06 RX ADMIN — HYDROMORPHONE HYDROCHLORIDE PRN MG: 2 INJECTION INTRAMUSCULAR; INTRAVENOUS; SUBCUTANEOUS at 17:59

## 2018-03-06 RX ADMIN — HYDROMORPHONE HYDROCHLORIDE PRN MG: 2 INJECTION INTRAMUSCULAR; INTRAVENOUS; SUBCUTANEOUS at 10:13

## 2018-03-06 RX ADMIN — HYDROCODONE BITARTRATE AND ACETAMINOPHEN PRN TAB: 10; 325 TABLET ORAL at 17:25

## 2018-03-06 RX ADMIN — FAMOTIDINE SCH MG: 20 TABLET, FILM COATED ORAL at 07:59

## 2018-03-06 RX ADMIN — AMPICILLIN SODIUM SCH MLS/HR: 2 INJECTION, POWDER, FOR SOLUTION INTRAMUSCULAR; INTRAVENOUS at 08:00

## 2018-03-06 RX ADMIN — HYDROMORPHONE HYDROCHLORIDE PRN MG: 2 INJECTION INTRAMUSCULAR; INTRAVENOUS; SUBCUTANEOUS at 22:08

## 2018-03-06 RX ADMIN — CHLORHEXIDINE GLUCONATE SCH PACK: 500 CLOTH TOPICAL at 03:54

## 2018-03-06 RX ADMIN — CLINDAMYCIN PHOSPHATE SCH MLS/HR: 150 INJECTION, SOLUTION INTRAMUSCULAR; INTRAVENOUS at 20:50

## 2018-03-06 RX ADMIN — ALBUTEROL SULFATE PRN MG: 2.5 SOLUTION RESPIRATORY (INHALATION) at 10:51

## 2018-03-06 RX ADMIN — CLINDAMYCIN PHOSPHATE SCH MLS/HR: 150 INJECTION, SOLUTION INTRAMUSCULAR; INTRAVENOUS at 05:06

## 2018-03-06 RX ADMIN — STANDARDIZED SENNA CONCENTRATE AND DOCUSATE SODIUM SCH TAB: 8.6; 5 TABLET, FILM COATED ORAL at 20:51

## 2018-03-06 RX ADMIN — CLINDAMYCIN PHOSPHATE SCH MLS/HR: 150 INJECTION, SOLUTION INTRAMUSCULAR; INTRAVENOUS at 13:24

## 2018-03-06 RX ADMIN — ARGATROBAN PRN MLS/HR: 100 INJECTION, SOLUTION INTRAVENOUS at 21:31

## 2018-03-06 RX ADMIN — HYDROMORPHONE HYDROCHLORIDE PRN MG: 2 INJECTION INTRAMUSCULAR; INTRAVENOUS; SUBCUTANEOUS at 06:12

## 2018-03-06 RX ADMIN — STANDARDIZED SENNA CONCENTRATE AND DOCUSATE SODIUM SCH TAB: 8.6; 5 TABLET, FILM COATED ORAL at 07:59

## 2018-03-06 RX ADMIN — HYDROCODONE BITARTRATE AND ACETAMINOPHEN PRN TAB: 10; 325 TABLET ORAL at 21:27

## 2018-03-06 NOTE — HHI.IDPN
Note


Infectious Disease Note














Patient is in no acute distress.


She feels okay.


She had a temperature of 101 yesterday afternoon.


States that her breathing is okay.  Denies headache.


She is afebrile.








37-year-old white female who has a history of endocarditis and has


been admitted several times for the same.  The patient developed shortness of


breath, fever and chills, and  presented to the emergency department.  The


patient is noted to be actively using IV drugs.  She has history of significant


CHF from prior valvular heart disease related to endocarditis.  She states that


she started feeling short of breath about a week ago and the shortness of


breath worsened.  After she completed IV antibiotics in October she was


put on doxycycline prophylaxis.  She reports that she has not been taking the


doxycycline.  


 


PAST MEDICAL HISTORY:


Hepatitis C.


IV drug use.


prosthetic aortic valve replacement in 2011 and then redo aortic


valve replacement in June 2016 





MEDICATIONS:


1. Gentamicin.


2. Ampicillin.


3. Vancomycin.








Current Medications








 Medications


  (Trade)  Dose


 Ordered  Sig/Nikhil


 Route


 PRN Reason  Start Time


 Stop Time Status Last Admin


Dose Admin


 


 Sodium Chloride


  (NS Flush)  2 ml  UNSCH  PRN


 IV FLUSH


 FLUSH AFTER USING IV ACCESS  2/23/18 22:00


     


 


 


 Sodium Chloride


  (NS Flush)  2 ml  BID


 IV FLUSH


   2/24/18 09:00


    3/6/18 08:01


 


 


 Acetaminophen


  (Tylenol)  650 mg  Q6H  PRN


 PO


 fever  2/23/18 22:00


    3/5/18 16:02


 


 


 Ondansetron HCl


  (Zofran Inj)  4 mg  Q6H  PRN


 IV PUSH


 NAUSEA OR VOMITING  2/23/18 22:00


     


 


 


 Temazepam


  (Restoril)  15 mg  HS  PRN


 PO


 INSOMNIA  2/23/18 22:00


    3/4/18 00:27


 


 


 Miscellaneous


 Information  1  Q361D


 XX


   2/23/18 22:00


     


 


 


 Chlorhexidine


 Gluconate


  (Chlorhexidine


 2% Cloth)  


 Taper  DAILY@04


 TOP


   2/24/18 04:00


 2/20/19 03:59  3/6/18 03:54


 


 


 Chlorhexidine


 Gluconate


  (Chlorhexidine


 2% Cloth)  3 pack  UNSCH  PRN


 TOP


 HYGIENIC CARE  2/23/18 22:00


     


 


 


 Senna/Docusate


 Sodium


  (Arlen-Colace)  1 tab  BID


 PO


   2/24/18 09:00


    3/4/18 20:25


 


 


 Magnesium


 Hydroxide


  (Milk Of


 Magnesia Liq)  30 ml  Q12H  PRN


 PO


 Mild constipation  2/23/18 22:00


     


 


 


 Sennosides


  (Senokot)  17.2 mg  Q12H  PRN


 PO


 Moderate constipation  2/23/18 22:00


     


 


 


 Bisacodyl


  (Dulcolax Supp)  10 mg  DAILY  PRN


 RECTAL


 SEVERE CONSITIPATION  2/23/18 22:00


     


 


 


 Lactulose


  (Lactulose Liq)  30 ml  DAILY  PRN


 PO


 SEVERE CONSITIPATION  2/23/18 22:00


     


 


 


 Ampicillin Sodium


 2000 mg/Sodium


 Chloride  100 ml @ 


 400 mls/hr  Q6H


 IV


   2/24/18 14:00


    3/6/18 13:24


 


 


 Hydromorphone HCl


  (Dilaudid Pf Inj)  2 mg  Q4H  PRN


 IV PUSH


 PAIN SCALE 6 TO 10  2/25/18 15:00


    3/6/18 14:20


 


 


 Loperamide HCl


  (Imodium)  2 mg  Q4H  PRN


 PO


 Diarrhea  2/26/18 16:15


    2/28/18 14:32


 


 


 Gentamicin


 Sulfate 100 mg/


 Sodium Chloride  102.5 ml @ 


 100 mls/hr  Q8H


 IV


   2/26/18 21:00


    3/6/18 13:24


 


 


 Albuterol Sulfate


  (Albuterol Neb)  2.5 mg  Q2HR NEB  PRN


 NEB


 dyspnea  2/27/18 11:30


    3/6/18 10:51


 


 


 Famotidine


  (Pepcid)  20 mg  BID


 PO


   2/27/18 21:00


    3/6/18 07:59


 


 


 Argatroban 250 mg/


 Sodium Chloride  252.5 ml @ 


 3.11 mls/hr  TITRATE  PRN


 IV


 aPTT < 50  3/1/18 19:15


    3/3/18 18:58


 


 


 Pharmacy Profile


 Note  0 ml @ 0


 mls/hr  UNSCH


 OTHER


   3/3/18 16:45


     


 


 


 Acetaminophen/


 Hydrocodone Bitart


  (Norco  Mg)  1 tab  Q4H  PRN


 PO


 pain 1-5  3/4/18 15:00


    3/6/18 08:00


 


 


 Vancomycin HCl


 1750 mg/Sodium


 Chloride  517.5 ml @ 


 260 mls/hr  Q12H


 IV


   3/5/18 02:00


    3/6/18 14:01


 


 


 Sodium Chloride  250 ml @ 


 15 mls/hr  ONCE  ONCE


 IV


   3/6/18 13:00


 3/7/18 05:39  3/6/18 14:21


 


 


 Acetaminophen


  (Tylenol)  650 mg  Q4H  PRN


 PO


 SEE LABEL COMMENTS  3/6/18 13:00


 3/6/18 23:59   


 


 


 Diphenhydramine


 HCl


  (Benadryl)  25 mg  Q4H  PRN


 PO


 SEE LABEL COMMENTS  3/6/18 13:00


 3/6/18 23:59  3/6/18 14:21


 








SOCIAL HISTORY:


Positive occasional alcohol.  No tobacco. IV drug use in the form of Dilaudid,


oxycodone.  The patient also uses marijuana.


 








OBJECTIVE:








Vital Signs








  Date Time  Temp Pulse Resp B/P (MAP) Pulse Ox O2 Delivery O2 Flow Rate FiO2


 


3/6/18 14:43 98.8 100 21 103/74 95   


 


3/6/18 13:30  101 25 106/67 (80) 97   


 


3/6/18 13:00  105 25 115/76 (89) 96   


 


3/6/18 12:30  101 24 96/68 (77) 96   


 


3/6/18 12:00 98.6 105 30 93/63 (73) 93   


 


3/6/18 11:30  104 23 88/53 (65) 95   


 


3/6/18 11:16  100 22 84/50 (61) 98   


 


3/6/18 11:00  101 24  88   


 


3/6/18 10:30  105 25 89/61 (70) 88   


 


3/6/18 10:00  101 46 85/66 (72) 98   


 


3/6/18 09:30  109 29 87/58 (68) 97   


 


3/6/18 09:00  111 37 90/54 (66) 98   


 


3/6/18 08:31  112 30 108/68 (81) 99   


 


3/6/18 08:00 99.2 107 25 96/75 (82) 100   


 


3/6/18 07:00     98 Nasal Cannula 3.00 


 


3/6/18 06:00  106      


 


3/6/18 04:00  107      


 


3/6/18 04:00 98.4 107 26 110/64 (79) 98   


 


3/6/18 02:00  113      


 


3/6/18 00:00 100.0 122 28 92/51 (65) 99   


 


3/6/18 00:00  122      


 


3/5/18 22:00  122      


 


3/5/18 20:38 98.2 104 26 110/59 98   


 


3/5/18 20:20 98.4 104 25 86/67 98   


 


3/5/18 20:03 98.5 106 28 95/66 96   


 


3/5/18 20:00  103      


 


3/5/18 20:00 98.5 103 25 95/66 (76) 96   


 


3/5/18 19:05     98 Nasal Cannula 3.00 


 


3/5/18 19:00     96 Nasal Cannula 3.00 


 


3/5/18 18:28   26     


 


3/5/18 18:00  107      


 


3/5/18 17:30 98.5 109 28 90/57 (68) 95   


 


3/5/18 16:00  120      


 


3/5/18 16:00 101.5 120 29 108/55 (72) 94   








Laboratory Tests








Test


  3/5/18


05:00 3/5/18


09:30 3/5/18


12:20 3/6/18


04:50


 


White Blood Count 19.0 TH/MM3  16.8 TH/MM3   16.3 TH/MM3 


 


Red Blood Count 2.91 MIL/MM3  2.89 MIL/MM3   2.94 MIL/MM3 


 


Hemoglobin 6.6 GM/DL  6.6 GM/DL   6.8 GM/DL 


 


Hematocrit 20.6 %  20.5 %   21.1 % 


 


Mean Corpuscular Volume 70.7 FL  70.7 FL   71.6 FL 


 


Mean Corpuscular Hemoglobin 22.7 PG  22.8 PG   23.0 PG 


 


Mean Corpuscular Hemoglobin


Concent 32.1 % 


  32.2 % 


  


  32.2 % 


 


 


Red Cell Distribution Width 18.5 %  18.8 %   19.2 % 


 


Platelet Count 315 TH/MM3  313 TH/MM3   342 TH/MM3 


 


Mean Platelet Volume 8.2 FL  8.3 FL   8.3 FL 


 


Neutrophils (%) (Auto) 83.4 %  88.0 %   


 


Lymphocytes (%) (Auto) 5.8 %  5.1 %   


 


Monocytes (%) (Auto) 9.6 %  5.8 %   


 


Eosinophils (%) (Auto) 0.5 %  0.6 %   


 


Basophils (%) (Auto) 0.7 %  0.5 %   


 


Neutrophils # (Auto) 15.8 TH/MM3  14.8 TH/MM3   


 


Lymphocytes # (Auto) 1.1 TH/MM3  0.9 TH/MM3   


 


Monocytes # (Auto) 1.8 TH/MM3  1.0 TH/MM3   


 


Eosinophils # (Auto) 0.1 TH/MM3  0.1 TH/MM3   


 


Basophils # (Auto) 0.1 TH/MM3  0.1 TH/MM3   


 


CBC Comment DIFF FINAL  DIFF FINAL   


 


Differential Comment      


 


Haptoglobin   256 MG/DL  


 


Reticulocyte Count    3.6 % 


 


Absolute Reticulocyte Count    104.4 MIL/L 








Laboratory Tests








Test


  3/5/18


05:00 3/5/18


12:20 3/5/18


15:25


 


Blood Urea Nitrogen 10 MG/DL   


 


Creatinine 0.85 MG/DL   


 


Random Glucose 137 MG/DL   


 


Calcium Level 8.2 MG/DL   


 


Phosphorus Level 3.9 MG/DL   


 


Magnesium Level 2.0 MG/DL   


 


Sodium Level 133 MEQ/L   


 


Potassium Level 3.8 MEQ/L   


 


Chloride Level 98 MEQ/L   


 


Carbon Dioxide Level 28.3 MEQ/L   


 


Anion Gap 7 MEQ/L   


 


Estimat Glomerular Filtration


Rate 76 ML/MIN 


  


  


 


 


Lactate Dehydrogenase  405 U/L  


 


Iron Level   22 MCG/DL 


 


Total Iron Binding Capacity   288 MCG/DL 


 


Percent Iron Saturation   7.6 % 


 


Vitamin B12 Level   842 PG/ML 


 


Folate   12.4 NG/ML 








Microbiology








 Date/Time


Source Procedure


Growth Status


 


 


 3/4/18 05:50


Blood Peripheral Aerobic Blood Culture - Preliminary


NO GROWTH IN 2 DAYS Resulted


 


 3/4/18 05:50


Blood Peripheral Anaerobic Blood Culture - Preliminary


NO GROWTH IN 2 DAYS Resulted





 3/3/18 21:20


Blood Line Aerobic Blood Culture - Preliminary


NO GROWTH IN 3 DAYS Resulted


 


 3/3/18 21:20


Blood Line Anaerobic Blood Culture - Preliminary


NO GROWTH IN 3 DAYS Resulted


 


 3/5/18 12:28


Stool Stool Stool Occult Blood (GILA) - Final


HEMOCCULT NEGATIVE Complete





 3/3/18 21:50


Sputum Expectorated Sputum Gram Stain - Final Complete


 


 3/3/18 21:50


Sputum Expectorated Sputum Sputum Culture - Final


HEAVY GROWTH NORMAL RESPIRATORY MYLENE Complete














IMAGING:














Chest X-Ray 3/5/18 0600 Signed





Impressions: 





 Service Date/Time:  Monday, March 5, 2018 03:52 - CONCLUSION:  There is a 

small 





 to moderate size right pleural effusion with associated volume loss and/or 





 airspace consolidation. The pleural effusion has increased from the prior 





 examination.     Ron Melgar MD 


 


Head CT 3/3/18 0000 Signed





Impressions: 





 Service Date/Time:  Saturday, March 3, 2018 10:22 - CONCLUSION:  1. Focal area 





 of decreased density involving the right parietal lobe. As a new finding from 





 the prior exam. This could relate to a small infarct. MRI of the brain with 





 gadolinium suggested to further evaluate. 2. Small lacunar infarction 

involving 





 the left centrum semiovale.     Scooter Dawson Jr., MD 


 


CT Angiography 3/3/18 0000 Signed





Impressions: 





 Service Date/Time:  Saturday, March 3, 2018 10:28 - CONCLUSION:  There 

extensive 





 pulmonary emboli bilaterally. There is near complete cut off of the right 

lower 





 lobe pulmonary artery. Prominent filling defects on the left as well. These 





 findings were relayed to Dr. Bustos immediately at the completion of the study.  





 Scattered pulmonary infiltrates, small pleural effusion and extensive 





 mediastinal lymphadenopathy as described above.      Won Sharp MD 


 


Brain MRI 3/3/18 0000 Signed





Impressions: 





 Service Date/Time:  Saturday, March 3, 2018 18:04 - CONCLUSION:  Deterioration 





 in the appearance of the scan.  At least 3 focal areas of abnormal contrast 





 enhancement are present scattered to in both hemispheres.  Given the history 





 this most likely represents developing areas cerebritis.  Exam is compromised 

by 





 an uncooperative patient and motion artifact.  These 2 factors make detection 

of 





 other abnormalities such as cortical cephalitis and meningitis difficult to 





 exclude.  Cannot exclude hemorrhagic lesions as well.  There is no mass effect 





 evident.      Jorge Diane MD 




















Renal Ultrasound 2/24/18 0000 Signed





Impressions: 





 Service Date/Time:  Saturday, February 24, 2018 10:53 - CONCLUSION:  1. No 





 evidence of hydronephrosis. 2. Thickening of the urinary bladder wall a 1.1 

cm. 





 3. Prominent splenomegaly.     Elgin Walton MD 


 


Chest X-Ray 2/24/18 0000 Signed





Impressions: 





 Service Date/Time:  Saturday, February 24, 2018 09:36 - CONCLUSION:  Right 





 internal jugular central line in place with the tip overlying the SVC. A 





 pneumothorax is not seen.     Ron Brown MD 


 


Breast Ultrasound 2/24/18 0000 Signed





Impressions: 





 Service Date/Time:  Saturday, February 24, 2018 10:48 - CONCLUSION:  Negative 





 targeted right breast ultrasound examination.     Ron Brown MD 


 


Lower Extremity Ultrasound 2/23/18 0000 Signed





Impressions: 





 Service Date/Time:  Friday, February 23, 2018 21:22 - CONCLUSION:  1. No 





 sonographic evidence for lower extremity DVT. 2. Incidental note of bilateral 





 inguinal adenopathy with the largest on the right measuring 3.3 cm and the 





 largest on the left measuring 2.6 cm. This finding is nonspecific but is 





 typically reactive in etiology.     Rj Whitten MD 














PHYSICAL EXAMINATION


GENERAL: No acute distress. 


HEENT:  No icterus.  Oropharynx moist mucosa, no lesions.


Neck:  Supple without adenopathy.


Lungs: Coarse bilateral rhonchi.  


Heart: 5/6 blowing systolic murmur at the upper right sternal border.


Abdomen: Bowel sounds present, soft, obese, nontender.


Extremities: Diffuse 1+ edema of the lower extremities. Lesions at Left tibia 


distally and left foot and great toe are more red and not purpuric as before.


Toes 3 and 5 are less cyanotic. The leg is less tender on palpation.  It is 

warm.  


No calf tenderness.   No nail bed hemorrhages.  


SKIN: no diffuse rash.


Neuro: No gross focal findings.


Psychiatric: calm and cooperative.





 


IMPRESSION


1. Sepsis. Strep Viridans. 


2.  Tricuspid valve endocarditis.  Large vegetation.  Tricuspid regurgitation.


Patient evaluated by cardiovascular surgery and felt not to be a surgical 

candidate.


3.  Bacteremia.  Strep viridans.


Repeat blood cultures are pending.  Previous repeat blood cultures were 

negative.


4. History of prosthetic aortic valve and so likely recurrent prosthetic valve


endocarditis.


5.  Cerebritis on MRI.  Also has parietal infarct noted on CT scan of the brain.


6. Left renal cortical and pulmonary and lower extremity septic emboli. 


7. Leukocytosis secondary to sepsis from showering of emboli


from endocarditis.


8. Acute renal failure. Kidney function improved. 


Patient remains very critically ill.  





RECOMMENDATIONS


1. Continue Ampicillin intravenous.


2. Continue Gentamicin. now that the kidney function is improved but follow the 

renal function. 


3. Continue vancomycin.  Pharmacy managing.


4. Monitor clinical response.


5. Continue to monitor the left leg lesions.


6. Consider lumbar puncture if the mentation deteriorates.  She may be 

developing abscess if


Her mental status deteriorates.














Robinson Carr MD Mar 6, 2018 15:11

## 2018-03-06 NOTE — PD.ONC.PN
Subjective


Subjective


Remarks


Afebrile overnight. 


Patient resting in room. 


she is feeling short of breath today. 


no obvious blood in stool.





Objective


Data











  Date Time  Temp Pulse Resp B/P (MAP) Pulse Ox O2 Delivery O2 Flow Rate FiO2


 


3/6/18 10:30  105 25 89/61 (70) 88   


 


3/6/18 10:00  101 46 85/66 (72) 98   


 


3/6/18 09:30  109 29 87/58 (68) 97   


 


3/6/18 09:00  111 37 90/54 (66) 98   


 


3/6/18 08:31  112 30 108/68 (81) 99   


 


3/6/18 08:00 99.2 107 25 96/75 (82) 100   


 


3/6/18 07:00     98 Nasal Cannula 3.00 


 


3/6/18 06:00  106      


 


3/6/18 04:00  107      


 


3/6/18 04:00 98.4 107 26 110/64 (79) 98   


 


3/6/18 02:00  113      


 


3/6/18 00:00 100.0 122 28 92/51 (65) 99   


 


3/6/18 00:00  122      


 


3/5/18 22:00  122      


 


3/5/18 20:38 98.2 104 26 110/59 98   


 


3/5/18 20:20 98.4 104 25 86/67 98   


 


3/5/18 20:03 98.5 106 28 95/66 96   


 


3/5/18 20:00  103      


 


3/5/18 20:00 98.5 103 25 95/66 (76) 96   


 


3/5/18 19:05     98 Nasal Cannula 3.00 


 


3/5/18 19:00     96 Nasal Cannula 3.00 


 


3/5/18 18:28   26     


 


3/5/18 18:00  107      


 


3/5/18 17:30 98.5 109 28 90/57 (68) 95   


 


3/5/18 16:00  120      


 


3/5/18 16:00 101.5 120 29 108/55 (72) 94   


 


3/5/18 14:39 99.9 120 30 100/71 95   


 


3/5/18 14:00  116      














 3/6/18 3/6/18 3/6/18





 07:00 15:00 23:00


 


Intake Total 1402.5 ml  


 


Output Total 700 ml  


 


Balance 702.5 ml  








Result Diagram:  


3/6/18 0450                                                                    

            3/5/18 0500





Laboratory Results





Laboratory Tests








Test


  3/5/18


15:25 3/5/18


16:28 3/6/18


04:50


 


Iron Level 22 MCG/DL   


 


Total Iron Binding Capacity 288 MCG/DL   


 


Percent Iron Saturation 7.6 %   


 


Vitamin B12 Level 842 PG/ML   


 


Folate 12.4 NG/ML   


 


Urine Occult Blood  SMALL  


 


White Blood Count   16.3 TH/MM3 


 


Red Blood Count   2.94 MIL/MM3 


 


Hemoglobin   6.8 GM/DL 


 


Hematocrit   21.1 % 


 


Mean Corpuscular Volume   71.6 FL 


 


Mean Corpuscular Hemoglobin   23.0 PG 


 


Mean Corpuscular Hemoglobin


Concent 


  


  32.2 % 


 


 


Red Cell Distribution Width   19.2 % 


 


Platelet Count   342 TH/MM3 


 


Mean Platelet Volume   8.3 FL 


 


Reticulocyte Count   3.6 % 


 


Absolute Reticulocyte Count   104.4 MIL/L 


 


Activated Partial


Thromboplast Time 


  


  58.5 SEC 


 


 


Fibrinogen   485 mg/dL 








Culture Results





Microbiology








 Date/Time


Source Procedure


Growth Status


 


 


 3/4/18 05:50


Blood Peripheral Aerobic Blood Culture - Preliminary


NO GROWTH IN 2 DAYS Resulted


 


 3/4/18 05:50


Blood Peripheral Anaerobic Blood Culture - Preliminary


NO GROWTH IN 2 DAYS Resulted





 3/3/18 21:20


Blood Line Aerobic Blood Culture - Preliminary


NO GROWTH IN 3 DAYS Resulted


 


 3/3/18 21:20


Blood Line Anaerobic Blood Culture - Preliminary


NO GROWTH IN 3 DAYS Resulted


 


 3/5/18 12:28


Stool Stool Stool Occult Blood (GILA) - Final


HEMOCCULT NEGATIVE Complete





 3/3/18 21:50


Sputum Expectorated Sputum Gram Stain - Final Complete


 


 3/3/18 21:50


Sputum Expectorated Sputum Sputum Culture - Final


HEAVY GROWTH NORMAL RESPIRATORY MYLENE Complete











Administered Medications








 Medications


  (Trade)  Dose


 Ordered  Sig/Nikhil


 Route


 PRN Reason  Start Time


 Stop Time Status Last Admin


Dose Admin


 


 Sodium Chloride


  (NS Flush)  2 ml  BID


 IV FLUSH


   2/24/18 09:00


    3/6/18 08:01


 


 


 Acetaminophen


  (Tylenol)  650 mg  Q6H  PRN


 PO


 fever  2/23/18 22:00


    3/5/18 16:02


 


 


 Temazepam


  (Restoril)  15 mg  HS  PRN


 PO


 INSOMNIA  2/23/18 22:00


    3/4/18 00:27


 


 


 Chlorhexidine


 Gluconate


  (Chlorhexidine


 2% Cloth)  


 Taper  DAILY@04


 TOP


   2/24/18 04:00


 2/20/19 03:59  3/6/18 03:54


 


 


 Senna/Docusate


 Sodium


  (Arlen-Colace)  1 tab  BID


 PO


   2/24/18 09:00


    3/4/18 20:25


 


 


 Ampicillin Sodium


 2000 mg/Sodium


 Chloride  100 ml @ 


 400 mls/hr  Q6H


 IV


   2/24/18 14:00


    3/6/18 08:00


 


 


 Hydromorphone HCl


  (Dilaudid Pf Inj)  2 mg  Q4H  PRN


 IV PUSH


 PAIN SCALE 6 TO 10  2/25/18 15:00


    3/6/18 10:13


 


 


 Loperamide HCl


  (Imodium)  2 mg  Q4H  PRN


 PO


 Diarrhea  2/26/18 16:15


    2/28/18 14:32


 


 


 Gentamicin


 Sulfate 100 mg/


 Sodium Chloride  102.5 ml @ 


 100 mls/hr  Q8H


 IV


   2/26/18 21:00


    3/6/18 05:06


 


 


 Albuterol Sulfate


  (Albuterol Neb)  2.5 mg  Q2HR NEB  PRN


 NEB


 dyspnea  2/27/18 11:30


    3/6/18 10:51


 


 


 Famotidine


  (Pepcid)  20 mg  BID


 PO


   2/27/18 21:00


    3/6/18 07:59


 


 


 Argatroban 250 mg/


 Sodium Chloride  252.5 ml @ 


 3.11 mls/hr  TITRATE  PRN


 IV


 aPTT < 50  3/1/18 19:15


    3/3/18 18:58


 


 


 Acetaminophen/


 Hydrocodone Bitart


  (Norco  Mg)  1 tab  Q4H  PRN


 PO


 pain 1-5  3/4/18 15:00


    3/6/18 08:00


 


 


 Vancomycin HCl


 1750 mg/Sodium


 Chloride  517.5 ml @ 


 260 mls/hr  Q12H


 IV


   3/5/18 02:00


    3/6/18 02:19


 








Objective Remarks


GENERAL: chronically ill appearing young woman, sitting up in bed appears 

dyspneic. 


SKIN: Warm and dry.


HEAD: Normocephalic.


EYES: No injection or drainage. 


NECK: Supple, trachea midline. 


CARDIOVASCULAR: Regular rate and rhythm


RESPIRATORY: scattered rhonchi. on 3L O2 via NC


GASTROINTESTINAL: Abdomen soft, non-tender, nondistended. 


EXTREMITIES: No cyanosis, or edema. 


NEUROLOGICAL: awake and alert. normal speech. moving all extremities.





Assessment/Plan


Problem List:  


(1) arterial blood clot


Plan:  --on Argatroban (direct thrombin inhibitor)


-- no significant family history of thrombotic events to suggest hereditary 

antithrombin deficiency.  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 

impaired production from her liver disease. 


--has chronic active hepatitis C.  ++increased consumption from disseminated 

intravascular coagulation and acute illness, bacterial endocarditis.  


--Protein losses are also considered.  She had mild proteinuria when she first 

was admitted.


--Thrombin inactivates antithrombin.  This would abrogate the effect of 

unfractionated heparin and low molecular weight heparin.





(2) Acute septic pulmonary embolism


ICD Codes:  I26.90 - Septic pulmonary embolism without acute cor pulmonale


Plan:  -- The echocardiogram on 3/3 showed an estimated EF of 50-55%.  There 

was severe tricuspid regurgitation with prosthesis in place.  2 x 4 cm mobile 

vegetation was seen on the valve.  The right atrium and right ventricle were 

normal in size and function





(3) Microcytic anemia


ICD Codes:  D50.9 - Iron deficiency anemia, unspecified


Plan:  --likely d/t inflammation/infection. no evidence of bleeding or 

hemolysis. 


--B12/folate WNL


--low iron and percent saturation. normal TIBC, elevated ferritin-->more 

consistent with anemia of chronic disease. 


--stool Hemoccult negative. 


--no evidence of hemolysis





Assessment


36y/o female admitted with recurrent endocarditis. hematology consulted for 

anticoagulation assistance.


h/o Recurrent bacterial endocarditis, bacteremia, history of prosthetic aortic 

valve and subsequent redo, chronic active hepatitis C, intravenous drug use, 

microcytic anemia, consistent with iron deficiency, left lower extremity 

arterial event, bacterial endocarditis with viridans streptococcus.





HPI (brought forward for continuity of care): history of IV drug abuse and  

recurrent endocarditis. had a relapse of her activity. has history of pulmonary 

embolism and infected valves. +significant congestive heart failure and 

presented to the emergency room on 02/23/2018, with sepsis. is followed by 

Infectious Disease for her endocarditis.  previously received antibiotic 

therapy for enterococcus faecalis and staph aureus. There is questionable 

compliance. Her current blood 


culture from 02/23/2018, shows viridans streptococcus.  Two out of two bottles 

were positive.  She is on ampicillin and gentamicin. course is complicated by 

cold left lower extremity, evaluated by  Vascular Surgery.  A CT angiogram 

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 

the proximal thigh.  There is satisfactory calf runoff present.  For this reason

, conservative management with anticoagulation is continued. was placed on 

unfractionated heparin.  Her heparin dose has been titrated from 1000 units/

hour to 2500 units/hour with a PTT remaining subtherapeutic.  Her last PTT is 

32 seconds from 03/01/2018, at 2 p.m.  For this reason, Hematology/Oncology is 

consulted.


Plan


1. continue Argatroban. 


2. give 1unit pRBC


3. will start coumadin once hgb stable.


Attending Statement


Agree with above.  Continue with plans.  Noted MRI with artifact.











Zoe Chau Mar 6, 2018 13:17


Henny Smith MD Mar 6, 2018 18:07

## 2018-03-06 NOTE — HHI.CCPN
Subjective


Remarks/Hospital Course


37-year-old female that presents to the ED for evaluation of shortness of 

breath and swelling.  Patient has a significant history of endocarditis, IV 

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 

continues use of IV drug use that presents to the ED for evaluation of acute 

onset of shortness of breath with swelling.  Patient initially did not mention 

to anybody that she was doing drugs but she did tell me that she injected 

herself about 4 days ago.  She uses Dilaudid.  She states that she's been 

having fevers.  Patient is somewhat somnolent and hard to assess she doesn't 

really provide much information.  Family members at the bedside and he provides 

more information about the heart.  Per patient she'll he takes 2 medications.  

Patient asked if she is taking any antibiotics and she said yes but when I ask 

her what kind is she is taking currently she states that she has not been on 

antibiotics for some time.  Patient apparently does follow with a local 

cardiologist but she cannot tell me the name of it.  She states that she's been 

having swelling to her legs for the past month.  She denies any recent travel 

or injury.  No other medical issues at this time.  No allergies to medication.





2/24: remains critical on Dopamine 10 mcg/ min. Appears ill. Limited bedside 

echo did not show any large vegetation, but exam was limited, tricuspid and 

aortic valve not well visualized. Also states that had right breast abscess 2 

months ago, drained by herself. Now may have abscess vs scar tissue. Injects 

upper arm and bilateral breasts. US of right breast ordered


2/25: Remains on 3 mcg/min of Levophed.  Complaints of severe left lower 

extremity pain.  Erythematous purpuric purple discoloration of the left lower 

anterior segment left dorsum of the foot with cyanotic nailbeds. Weak DP pulses+

. 2D Echo failed to reveal definite vegetation. Will consider FLORENCIO


2/26: Continues to be on Levophed 3 mcg/min, remains critical.  We are left 

lower extremity pain but slightly better.  Currently on heparin not therapeutic 

yet.  CTA with runoff showed Left renal cortical infarction. 5-6 cm length 

total occlusion of the left superficial femoral artery in the proximal thigh 

but good distal opacification.  Dr. Souza recommended IV heparin no surgical 

intervention at this time.  Patient is still at high risk for further embolic 

episodes.  Blood cultures growing strep viridans.  Also patient complains about 

epigastric and left-sided abdominal pain and left-sided chest pain which is 

more pleuritic.  Pain is severe on deep inspiration


2/27: Resting in bed in no acute distress.  Dopplerable lower extremity pulses 

noted.  Remains on heparin drip.  Vascular surgery continues to follow.


2/28: Resting in bed in mild respiratory distress.  Continues to cough.  

Nonproductive.  Left lower extremity leg improved.  Reviewed vascular surgeries 

note.  Likely will require long-term medical regulation post hospitalization


3/1: Improve respiratory status today.  Pain in left lower extremity much 

improved.  Tolerating diet.


3/2: Currently on nasal cannula.  Switch to Argatroban overnight by hematology.

  Okayed with vascular surgery.  Pain in left lower extremity slowly improving 

day by day.





Subjective





3/3: Resting in bed in some mild respiratory distress.  Complaining of 

pleuritic chest pain specifically in the right flank region.  Worse with deep 

inspiration.  Reproducible with palpation.  CT brain/CT pulmonary angiogram 

ordered.  No neurological deficits noted on examination.


3/4: CT pulmonary angiogram revealed emboli bilateral lower lobes as well as 

views of abnormal contrast bilateral cerebral hemispheres.  Patient complains 

still of pleuritic chest pain.  Frequency increased on Lortab.  CTS in for 

evaluation patient deemed inoperable.  Palliative care consulted appreciate 

recommendations.


3/5: Leukocytosis resolving.  Patient continues in mild respiratory distress.  

Denies chest pain at this time.  Continue mild wheezing, patient continues to 

refuse nebulizer treatments.  Case management consult to [facility] regarding possible transfer pending.  Patient  noted to have 1 g 

deciliter dropped in hemoglobin, repeat hemoglobin unchanged, currently 6.6.  

Patient  will be transfused 1 unit of packed red blood cells.  Close monitoring 

of hemoglobin and hematocrit .Concern for possible bleeding, Hemoccult and labs 

pending.


3/6: Late entry note.  Hemoglobin 6.8 patient being transfused 1 unit of packed 

red blood cells.  Plan to continue Argatroban until hemoglobin stabilizes and 

then will transition to Coumadin.  O2 saturation improved O2 at 3 L nasal 

cannula O2 saturation 96-97%.  Patient mobilizing out of bed to chair.





Objective





Vital Signs








  Date Time  Temp Pulse Resp B/P (MAP) Pulse Ox O2 Delivery O2 Flow Rate FiO2


 


3/6/18 18:00 98.4 113 35 93/62 (72) 94   


 


3/6/18 07:00      Nasal Cannula 3.00 


 


3/4/18 09:49        35














Intake and Output   


 


 3/6/18 3/6/18 3/7/18





 08:00 16:00 00:00


 


Intake Total 1200.0 ml 15 ml 2075.0 ml


 


Output Total 700 ml  900 ml


 


Balance 500.0 ml 15 ml 1175.0 ml








Result Diagram:  


3/6/18 0450                                                                    

            3/6/18 1350





Other Results





Microbiology








 Date/Time


Source Procedure


Growth Status


 


 


 3/5/18 12:28


Stool Stool Stool Occult Blood (GILA) - Final


HEMOCCULT NEGATIVE Complete





 3/3/18 21:50


Sputum Expectorated Sputum Gram Stain - Final Complete


 


 3/3/18 21:50


Sputum Expectorated Sputum Sputum Culture - Final


HEAVY GROWTH NORMAL RESPIRATORY MYLENE Complete








Imaging





Last Impressions








Chest X-Ray 3/5/18 0600 Signed





Impressions: 





 Service Date/Time:  Monday, March 5, 2018 03:52 - CONCLUSION:  There is a 

small 





 to moderate size right pleural effusion with associated volume loss and/or 





 airspace consolidation. The pleural effusion has increased from the prior 





 examination.     Ron Melgar MD 


 


Head CT 3/3/18 0000 Signed





Impressions: 





 Service Date/Time:  Saturday, March 3, 2018 10:22 - CONCLUSION:  1. Focal area 





 of decreased density involving the right parietal lobe. As a new finding from 





 the prior exam. This could relate to a small infarct. MRI of the brain with 





 gadolinium suggested to further evaluate. 2. Small lacunar infarction 

involving 





 the left centrum semiovale.     Scooter aDwson Jr., MD 


 


CT Angiography 3/3/18 0000 Signed





Impressions: 





 Service Date/Time:  Saturday, March 3, 2018 10:28 - CONCLUSION:  There 

extensive 





 pulmonary emboli bilaterally. There is near complete cut off of the right 

lower 





 lobe pulmonary artery. Prominent filling defects on the left as well. These 





 findings were relayed to Dr. Bustos immediately at the completion of the study.  





 Scattered pulmonary infiltrates, small pleural effusion and extensive 





 mediastinal lymphadenopathy as described above.      Won Sharp MD 


 


Brain MRI 3/3/18 0000 Signed





Impressions: 





 Service Date/Time:  Saturday, March 3, 2018 18:04 - CONCLUSION:  Deterioration 





 in the appearance of the scan.  At least 3 focal areas of abnormal contrast 





 enhancement are present scattered to in both hemispheres.  Given the history 





 this most likely represents developing areas cerebritis.  Exam is compromised 

by 





 an uncooperative patient and motion artifact.  These 2 factors make detection 

of 





 other abnormalities such as cortical cephalitis and meningitis difficult to 





 exclude.  Cannot exclude hemorrhagic lesions as well.  There is no mass effect 





 evident.      Jorge Diane MD 


 


Chest CT 2/26/18 0000 Signed





Impressions: 





 Service Date/Time:  Monday, February 26, 2018 12:41 - CONCLUSION:  1. There 

are 





 at least 5 pulmonary nodules present bilaterally with the largest measuring 19 





 mm. None demonstrate cavitation but it is possible that these represent septic 





 emboli. Suggest followup to assess for change. 2. Splenomegaly with subtle 





 wedge-shaped areas of low-density in the mid spleen which could represent 





 infarcts. 3. Mild cardiomegaly in this patient post aortic valve replacement. 





 Low density of the cardiac blood pool suggests anemia.      Ron Melgar MD 


 


Aorta w/Runoff CTA 2/25/18 0000 Signed





Impressions: 





 Service Date/Time:  Sunday, February 25, 2018 12:47 - CONCLUSION:  Left renal 





 cortical infarction. 5-6 cm length total occlusion of the left superficial 





 femoral artery in the proximal thigh. Nodular parenchymal opacities in the 

lung 





 bases bilaterally See above discussion.     Ron Cruz MD 


 


Renal Ultrasound 2/24/18 0000 Signed





Impressions: 





 Service Date/Time:  Saturday, February 24, 2018 10:53 - CONCLUSION:  1. No 





 evidence of hydronephrosis. 2. Thickening of the urinary bladder wall a 1.1 

cm. 





 3. Prominent splenomegaly.     Elgin Walton MD 


 


Breast Ultrasound 2/24/18 0000 Signed





Impressions: 





 Service Date/Time:  Saturday, February 24, 2018 10:48 - CONCLUSION:  Negative 





 targeted right breast ultrasound examination.     Ron Brown MD 


 


Lower Extremity Ultrasound 2/23/18 0000 Signed





Impressions: 





 Service Date/Time:  Friday, February 23, 2018 21:22 - CONCLUSION:  1. No 





 sonographic evidence for lower extremity DVT. 2. Incidental note of bilateral 





 inguinal adenopathy with the largest on the right measuring 3.3 cm and the 





 largest on the left measuring 2.6 cm. This finding is nonspecific but is 





 typically reactive in etiology.     Rj Whitten MD 








Last Impressions








Head CT 3/3/18 0000 Signed





Impressions: 





 Service Date/Time:  Saturday, March 3, 2018 10:22 - CONCLUSION:  1. Focal area 





 of decreased density involving the right parietal lobe. As a new finding from 





 the prior exam. This could relate to a small infarct. MRI of the brain with 





 gadolinium suggested to further evaluate. 2. Small lacunar infarction 

involving 





 the left centrum semiovale.     Scooter Dawson Jr., MD 


 


CT Angiography 3/3/18 0000 Signed





Impressions: 





 Service Date/Time:  Saturday, March 3, 2018 10:28 - CONCLUSION:  There 

extensive 





 pulmonary emboli bilaterally. There is near complete cut off of the right 

lower 





 lobe pulmonary artery. Prominent filling defects on the left as well. These 





 findings were relayed to Dr. Bustos immediately at the completion of the study.  





 Scattered pulmonary infiltrates, small pleural effusion and extensive 





 mediastinal lymphadenopathy as described above.      Won Sharp MD 


 


Brain MRI 3/3/18 0000 Signed





Impressions: 





 Service Date/Time:  Saturday, March 3, 2018 18:04 - CONCLUSION:  Deterioration 





 in the appearance of the scan.  At least 3 focal areas of abnormal contrast 





 enhancement are present scattered to in both hemispheres.  Given the history 





 this most likely represents developing areas cerebritis.  Exam is compromised 

by 





 an uncooperative patient and motion artifact.  These 2 factors make detection 

of 





 other abnormalities such as cortical cephalitis and meningitis difficult to 





 exclude.  Cannot exclude hemorrhagic lesions as well.  There is no mass effect 





 evident.      Ej Ewing MD 


 


Chest X-Ray 3/1/18 0600 Signed





Impressions: 





 Service Date/Time:  Thursday, March 1, 2018 03:05 - CONCLUSION:  1. Stable 





 patchy bilateral lower lung zone airspace disease. 2. No significant interval 





 change.     Rj Whitten MD 


 


Chest CT 2/26/18 0000 Signed





Impressions: 





 Service Date/Time:  Monday, February 26, 2018 12:41 - CONCLUSION:  1. There 

are 





 at least 5 pulmonary nodules present bilaterally with the largest measuring 19 





 mm. None demonstrate cavitation but it is possible that these represent septic 





 emboli. Suggest followup to assess for change. 2. Splenomegaly with subtle 





 wedge-shaped areas of low-density in the mid spleen which could represent 





 infarcts. 3. Mild cardiomegaly in this patient post aortic valve replacement. 





 Low density of the cardiac blood pool suggests anemia.      Ron Melgar MD 


 


Aorta w/Runoff CTA 2/25/18 0000 Signed





Impressions: 





 Service Date/Time:  Sunday, February 25, 2018 12:47 - CONCLUSION:  Left renal 





 cortical infarction. 5-6 cm length total occlusion of the left superficial 





 femoral artery in the proximal thigh. Nodular parenchymal opacities in the 

lung 





 bases bilaterally See above discussion.     Ron Cruz MD 


 


Renal Ultrasound 2/24/18 0000 Signed





Impressions: 





 Service Date/Time:  Saturday, February 24, 2018 10:53 - CONCLUSION:  1. No 





 evidence of hydronephrosis. 2. Thickening of the urinary bladder wall a 1.1 

cm. 





 3. Prominent splenomegaly.     Elgin Walton MD 


 


Breast Ultrasound 2/24/18 0000 Signed





Impressions: 





 Service Date/Time:  Saturday, February 24, 2018 10:48 - CONCLUSION:  Negative 





 targeted right breast ultrasound examination.     Ron Brown MD 


 


Lower Extremity Ultrasound 2/23/18 0000 Signed





Impressions: 





 Service Date/Time:  Friday, February 23, 2018 21:22 - CONCLUSION:  1. No 





 sonographic evidence for lower extremity DVT. 2. Incidental note of bilateral 





 inguinal adenopathy with the largest on the right measuring 3.3 cm and the 





 largest on the left measuring 2.6 cm. This finding is nonspecific but is 





 typically reactive in etiology.     Rj Whitten MD 








Last Impressions








Chest X-Ray 3/1/18 0600 Signed





Impressions: 





 Service Date/Time:  Thursday, March 1, 2018 03:05 - CONCLUSION:  1. Stable 





 patchy bilateral lower lung zone airspace disease. 2. No significant interval 





 change.     Rj Whitten MD 


 


Chest CT 2/26/18 0000 Signed





Impressions: 





 Service Date/Time:  Monday, February 26, 2018 12:41 - CONCLUSION:  1. There 

are 





 at least 5 pulmonary nodules present bilaterally with the largest measuring 19 





 mm. None demonstrate cavitation but it is possible that these represent septic 





 emboli. Suggest followup to assess for change. 2. Splenomegaly with subtle 





 wedge-shaped areas of low-density in the mid spleen which could represent 





 infarcts. 3. Mild cardiomegaly in this patient post aortic valve replacement. 





 Low density of the cardiac blood pool suggests anemia.      Ron Melgar MD 


 


Aorta w/Runoff CTA 2/25/18 0000 Signed





Impressions: 





 Service Date/Time:  Sunday, February 25, 2018 12:47 - CONCLUSION:  Left renal 





 cortical infarction. 5-6 cm length total occlusion of the left superficial 





 femoral artery in the proximal thigh. Nodular parenchymal opacities in the 

lung 





 bases bilaterally See above discussion.     oRn Cruz MD 


 


Renal Ultrasound 2/24/18 0000 Signed





Impressions: 





 Service Date/Time:  Saturday, February 24, 2018 10:53 - CONCLUSION:  1. No 





 evidence of hydronephrosis. 2. Thickening of the urinary bladder wall a 1.1 

cm. 





 3. Prominent splenomegaly.     Elgin Walton MD 


 


Breast Ultrasound 2/24/18 0000 Signed





Impressions: 





 Service Date/Time:  Saturday, February 24, 2018 10:48 - CONCLUSION:  Negative 





 targeted right breast ultrasound examination.     Ron Brown MD 


 


Lower Extremity Ultrasound 2/23/18 0000 Signed





Impressions: 





 Service Date/Time:  Friday, February 23, 2018 21:22 - CONCLUSION:  1. No 





 sonographic evidence for lower extremity DVT. 2. Incidental note of bilateral 





 inguinal adenopathy with the largest on the right measuring 3.3 cm and the 





 largest on the left measuring 2.6 cm. This finding is nonspecific but is 





 typically reactive in etiology.     Rj Whitten MD 








Objective Remarks


GENERAL: 37-year-old  female resting in bed in no acute distress


SKIN: Warm and dry.  Patient does appear to have puncture wounds from where she 

states been injecting recently in her arms, bilateral upper breast.


HEAD: Atraumatic. Normocephalic. 


EYES: Pupils equal and round. No scleral icterus. No injection or drainage. 


ENT: No nasal bleeding or discharge.  Mucous membranes pink and moist.  


NECK: Trachea midline. No JVD. 


CHEST: Bilateral upper breast has puncture wounds from injection.  Pain to 

point palpation left upper thorax


CARDIOVASCULAR: 2/6 pansystolic murmur at the left lower sternal border.  


RESPIRATORY: Few fine crackles present bilaterally.  No wheezing noted


GASTROINTESTINAL: Abdomen soft, tender to deep palpation.  No guarding 

rigidity.  Positive hepatosplenomegaly.  Normoactive bowel sounds


MUSCULOSKELETAL: 2+ pitting edema to the lower extremities.  Erythematous 

purpuric rash of the left lower extremity anterior shin and foot, punctate 

lesion plantar right great toe. Left leg extremely tender below the left knee.  

Dopplerable DP pulses bilaterally


NEUROLOGICAL: Awake and alert. Motor grossly within normal limits. 5/5 muscle 

strength in the arms and legs.  Normal speech.


Date of Insertion:  Feb 24, 2018


Line:  Central Venous Catheter


Side:  Right


Location:  Internal, Jugular





A/P


Assessment and Plan


Neuro/Psych:





IVDU -hydromorphone


History of THC use





- Continue pain control hydrocodone/acetaminophen 10/325 1 tablet every 4 hours 

as needed pain 1 through 5 with hydromorphone 2 mg IV every 4 hours as needed 

pain 6-10 (patient has severe pain from ischemic leg)


- Acetaminophen 650 mg p.o. every 6 hours as needed fever


CT brain 3/3 order while on Argatroban-3 focal areas of abnormal contrast 

enhancement scattered within both hemispheres, cerebritis





CVS/ID





Septic  shock


Infective endocarditis involving aortic prosthetic valve


Tricuspid valve endocarditis


Multiple septic emboli including pulmonary, splenic and vascular


History of fungal and bacterial prosthetic valve endocarditis


Strep viridans bacteremia


5-6 cm length total occlusion of the left superficial femoral artery in the 

proximal thigh





- Recurrent aortic prosthetic valve endocarditis -initially placed 2011 with 

redo 2016. - Now with strep viridans in blood cultures 2/23


- Broad-spectrum antibiotic with ampicillin and gentamicin.


   Previously treated with vancomycin and ceftriaxone


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 

April 2017 for tricuspid valve PVE  - Candida parapsilosis , Streptococci


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


- Vascular surgery Dr. Sears following


- Anticoagulation with argatroban gtt at 0.75 mcg/kg/min


- Infectious disease Dr. Carr


- Extensive counselling given about IP Rehab


-CTS evaluation Dr. Bauer-no surgical intervention planned


- Case management consulted with [facility]-denied for transfer 

no surgical intervention planned





Microbiology





2/27 -blood cultures 2 -no growth


2/23 -urine culture -no growth


2/23 -blood cultures 2 -strep viridians





Resp:





Acute respiratory insufficiency


5 pulmonary nodules likely septic emboli


Pleural effusions





-Nasal cannula to keep oxygen saturation above 92% currently on 4 L


-Incentives spirometry while awake


-As needed albuterol aerosols every 2 hours.  Dyspnea


- CT thorax revealed 5 bilateral pulmonary lesions/nodules largest which is 19 

mm.  No cavitation noted but likely septic emboli.


CT pulmonary angiogram 3/3-pulmonary emboli bilateral lower lobes





GI:





Hepatitis C


Splenic infarction


Hypoalbuminemia





- Heart healthy diet


-Famotidine for GI prophylaxis


-Docusate sodium/senna 1 tablet twice daily for bowel regimen








Renal/:





Acute on chronic kidney disease


Left renal cortical infarction





- Acute renal failure secondary to ATN and dehydration


- CT imaging revealed left renal cortical infarction


- Renal ultrasound-thickening of urinary bladder


- Creatinine 0.8-will give 40 mg Lasix IV 1 dose





Endo:





Sliding scale insulin if indicated to maintain euglycemia





Heme:





Microcytic anemia


Leukocytosis





- Monitor CBC CMP and coags


-Hemoglobin 6.6.  Transfuse1 unit of packed red blood cells


-Obtain Hemoccult


-Obtain LDH, haptoglobin, fibrinogen, PT and INR levels


-Heme-Onc following


-Monitor H&H every 12 hours, consider CT of the abdomen and pelvis if continued 

decrease in levels





FEN:





Hyponatremia





Replace electrolytes as clinically indicated.  





MSK:





History of right breast abscess


Elevated BMI





- Ultrasound of the left lower extremity negative for DVT


- Right breast ultrasound negative for abscess


-Weight loss encouraged





DVT GI prophylaxis





- argatroban gtt


- famotidine





Level 2 follow[facility]up[facility] 

transfer to [facility] floor when bed available





CT pulmonary angiogram revealed bilateral pulmonary emboli nature unclear.  

Right lower lobe occlusion.





CT brain revealed right parietal lacunar infarct possibly with infarct left 

centrum semiovale possibly septic emboli.  Versus thrombus.  MRI brain ordered





Stat echocardiogram ordered.  This revealed Aortic prosthesis with prolapse and 

possible vegetation.  The estimated pulmonary arterial pressure is 65.7 mmHg.  

Tricuspid valve prosthesis  Mobile vegetation seen on the tricuspid valve. 2x4 

cm.  Discussed with infectious disease.  Recommended vancomycin and possible 

FLORENCIO early next week


Patient is currently on therapeutic argatroban. 





3/5-Case management consulted, possible transfer to [facility] 

were patient received prosthetic aortic valve.I spoke with Dr. Anglin CT 

Surgeon , will not accept pt for transfer, patient still active IVDU,


Per report attempted transferred to [facility] - patient was not accepted .D

/W patient,and ICU RN at bedside (Nilay)


Physician


Julia Lewis MD Mar 6, 2018 18:57

## 2018-03-06 NOTE — HHI.HCPN
Reason for visit


   a.  To assist with evaluation and management of symptoms including: chest 

pain, dyspnea. 


   b.  To assist medical decision maker(s) with: better understanding of 

current medical conditions; weighing benefits/burdens of medical treatment 

options; making        


        medical treatment decisions.


.





Subjective/Interval History


Patient seen and examined in ICU.  No family at bedside.  Patient is awake and 

alert.  She appears dyspneic at rest, worsens with conversation, remains on 

oxygen via nasal cannula 3LPM.  Vital signs stable.  Afebrile.  WBC remains 

elevated 16.3, hemoglobin 6.8 (getting PRBCs during my visit), platelets 342.  

Hemoccult negative.  3/4/17 blood culture no growth in 2 days.  3/3/18 sputum 

culture heavy growth normal respiratory amada.  Patient with persistent cough.  

No new imaging.  Infectious disease following.  Patient remains critically ill.

  Patient was able to get out of bed to chair with PT earlier today. 





Patient reports persistent chest pain and right side pain, currently rates her 

pain 8 out of 10.  Reports some relief with as needed hydromorphone 2 mg IV 

every 4 hours as needed, she has had 6 doses in the past 24 hours.





Conversation with patient to provide medical update.  Advised that I spoke with 

her mother Kelly via telephone on 3/5/18 to provide medical update.  She 

verbalizes appreciation, patient acknowledges that it has been difficult to 

keep her mother informed given her shortness of breath.  Goals remain 

aggressive. 


.


Family/friend interactions


No family at bedside.  Palliative care number provided to patient's mother on 3/

5/18.  No significant clinical change will continue to periodically update 

patient's mother as needed.


.





Advance Directives


Living Will:  Never completed


Health Care Surrogate:  Never completed


Durable Power of :  Never completed


Advance Directive Specifics


Health Care Surrogate(s):


No written advanced directives, patient refused to complete advanced directives 

during last admission. Patient a single. Children are juvenile. In the absence 

of written advanced directives, according to [place] statutes health care proxy 

decision-making falls to a parent. 


.


Significant change in goals:


FULL CODE.  Patient's goals remain aggressive.


.





Objective





Vital Signs








  Date Time  Temp Pulse Resp B/P (MAP) Pulse Ox O2 Delivery O2 Flow Rate FiO2


 


3/6/18 15:00 98.4 100 21 101/87 96   


 


3/6/18 14:43 98.8 100 21 103/74 95   


 


3/6/18 13:30  101 25 106/67 (80) 97   


 


3/6/18 13:00  105 25 115/76 (89) 96   


 


3/6/18 12:30  101 24 96/68 (77) 96   


 


3/6/18 12:00 98.6 105 30 93/63 (73) 93   


 


3/6/18 11:30  104 23 88/53 (65) 95   


 


3/6/18 11:16  100 22 84/50 (61) 98   


 


3/6/18 11:00  101 24  88   


 


3/6/18 10:30  105 25 89/61 (70) 88   


 


3/6/18 10:00  101 46 85/66 (72) 98   


 


3/6/18 09:30  109 29 87/58 (68) 97   


 


3/6/18 09:00  111 37 90/54 (66) 98   


 


3/6/18 08:31  112 30 108/68 (81) 99   


 


3/6/18 08:00 99.2 107 25 96/75 (82) 100   


 


3/6/18 07:00     98 Nasal Cannula 3.00 


 


3/6/18 06:00  106      


 


3/6/18 04:00  107      


 


3/6/18 04:00 98.4 107 26 110/64 (79) 98   


 


3/6/18 02:00  113      


 


3/6/18 00:00 100.0 122 28 92/51 (65) 99   


 


3/6/18 00:00  122      


 


3/5/18 22:00  122      


 


3/5/18 20:38 98.2 104 26 110/59 98   


 


3/5/18 20:20 98.4 104 25 86/67 98   


 


3/5/18 20:03 98.5 106 28 95/66 96   


 


3/5/18 20:00  103      


 


3/5/18 20:00 98.5 103 25 95/66 (76) 96   


 


3/5/18 19:05     98 Nasal Cannula 3.00 


 


3/5/18 19:00     96 Nasal Cannula 3.00 


 


3/5/18 18:28   26     


 


3/5/18 18:00  107      














Intake & Output  


 


 3/6/18 3/6/18





 07:00 19:00


 


Intake Total 1802.5 ml 15 ml


 


Output Total 700 ml 


 


Balance 1102.5 ml 15 ml


 


  


 


Intake Oral 480 ml 


 


IV Total 922.5 ml 


 


Packed Cells 400 ml 


 


Blood Product IV Normal Saline Flush  15 ml


 


Output Urine Total 700 ml 


 


# Bowel Movements 0 








Physical Exam


CONSTITUTIONAL/GENERAL: This is an adequately nourished patient, with mildly 

labored respirations, worsens with conversation. 


TUBES/LINES/DRAINS: NC, right IJ central line, Purewick catheter. 


SKIN: Ecchymoses on extremities. No wounds seen anteriorly. Skin temperature 

appropriate. Not diaphoretic. 


EYES: Pupils equal and round and reactive. 


CARDIOVASCULAR: systolic murmur noted. tachycardic. 


RESPIRATORY/CHEST: Cough noted. Labored respirations at rest, worsens with 

conversation. Bilateral course breath sounds.   


GASTROINTESTINAL: Abdomen soft, rounded, non-tender, nondistended. No guarding. 

Bowel sounds present.


GENITOURINARY: Without palpable bladder distension. Purewick catheter in place.


MUSCULOSKELETAL: Extremities with 1+ edema. lesions noted Left foot and LE. LLE 

tender to light palpation below the knee to foot. 


NEUROLOGICAL: Awake, lethargic. Follows commands. Moves all extremities.


PSYCHIATRIC: Calm.  Denies hallucinations.   


.





Diagnostic Tests


Laboratory





Laboratory Tests








Test


  3/3/18


21:50 3/4/18


05:00 3/4/18


10:00 3/4/18


18:15


 


Urine Opiates Screen POS (NEG)    


 


Urine Barbiturates Screen NEG (NEG)    


 


Urine Amphetamines Screen NEG (NEG)    


 


Urine Benzodiazepines Screen NEG (NEG)    


 


Urine Cocaine Screen NEG (NEG)    


 


Urine Cannabinoids Screen NEG (NEG)    


 


White Blood Count


  


  21.1 TH/MM3


(4.0-11.0) 


  


 


 


Red Blood Count


  


  3.30 MIL/MM3


(4.00-5.30) 


  


 


 


Hemoglobin


  


  7.4 GM/DL


(11.6-15.3) 


  


 


 


Hematocrit


  


  23.0 %


(35.0-46.0) 


  


 


 


Mean Corpuscular Volume


  


  69.8 FL


(80.0-100.0) 


  


 


 


Mean Corpuscular Hemoglobin


  


  22.4 PG


(27.0-34.0) 


  


 


 


Mean Corpuscular Hemoglobin


Concent 


  32.0 %


(32.0-36.0) 


  


 


 


Red Cell Distribution Width


  


  17.9 %


(11.6-17.2) 


  


 


 


Platelet Count


  


  291 TH/MM3


(150-450) 


  


 


 


Mean Platelet Volume


  


  8.4 FL


(7.0-11.0) 


  


 


 


Neutrophils (%) (Auto)


  


  84.6 %


(16.0-70.0) 


  


 


 


Lymphocytes (%) (Auto)


  


  5.7 %


(9.0-44.0) 


  


 


 


Monocytes (%) (Auto)


  


  8.8 %


(0.0-8.0) 


  


 


 


Eosinophils (%) (Auto)


  


  0.4 %


(0.0-4.0) 


  


 


 


Basophils (%) (Auto)


  


  0.5 %


(0.0-2.0) 


  


 


 


Neutrophils # (Auto)


  


  17.8 TH/MM3


(1.8-7.7) 


  


 


 


Lymphocytes # (Auto)


  


  1.2 TH/MM3


(1.0-4.8) 


  


 


 


Monocytes # (Auto)


  


  1.9 TH/MM3


(0-0.9) 


  


 


 


Eosinophils # (Auto)


  


  0.1 TH/MM3


(0-0.4) 


  


 


 


Basophils # (Auto)


  


  0.1 TH/MM3


(0-0.2) 


  


 


 


CBC Comment  DIFF FINAL   


 


Differential Comment     


 


Activated Partial


Thromboplast Time 


  77.9 SEC


(24.3-30.1) 57.3 SEC


(24.3-30.1) 


 


 


Blood Urea Nitrogen


  


  10 MG/DL


(7-18) 


  


 


 


Creatinine


  


  0.79 MG/DL


(0.50-1.00) 


  


 


 


Random Glucose


  


  119 MG/DL


() 


  


 


 


Total Protein


  


  6.8 GM/DL


(6.4-8.2) 


  


 


 


Albumin


  


  2.4 GM/DL


(3.4-5.0) 


  


 


 


Calcium Level


  


  8.7 MG/DL


(8.5-10.1) 


  


 


 


Phosphorus Level


  


  4.3 MG/DL


(2.5-4.9) 


  


 


 


Magnesium Level


  


  2.1 MG/DL


(1.5-2.5) 


  


 


 


Alkaline Phosphatase


  


  113 U/L


() 


  


 


 


Aspartate Amino Transf


(AST/SGOT) 


  17 U/L (15-37) 


  


  


 


 


Alanine Aminotransferase


(ALT/SGPT) 


  11 U/L (10-53) 


  


  


 


 


Total Bilirubin


  


  0.7 MG/DL


(0.2-1.0) 


  


 


 


Sodium Level


  


  134 MEQ/L


(136-145) 


  


 


 


Potassium Level


  


  4.4 MEQ/L


(3.5-5.1) 


  


 


 


Chloride Level


  


  99 MEQ/L


() 


  


 


 


Carbon Dioxide Level


  


  29.3 MEQ/L


(21.0-32.0) 


  


 


 


Anion Gap  6 MEQ/L (5-15)   


 


Estimat Glomerular Filtration


Rate 


  83 ML/MIN


(>89) 


  


 


 


Vancomycin Level Trough


  


  


  


  25.8 MCG/ML


(5.0-10.0)


 


Test


  3/5/18


05:00 3/5/18


09:30 3/5/18


12:20 3/5/18


15:25


 


White Blood Count


  19.0 TH/MM3


(4.0-11.0) 16.8 TH/MM3


(4.0-11.0) 


  


 


 


Red Blood Count


  2.91 MIL/MM3


(4.00-5.30) 2.89 MIL/MM3


(4.00-5.30) 


  


 


 


Hemoglobin


  6.6 GM/DL


(11.6-15.3) 6.6 GM/DL


(11.6-15.3) 


  


 


 


Hematocrit


  20.6 %


(35.0-46.0) 20.5 %


(35.0-46.0) 


  


 


 


Mean Corpuscular Volume


  70.7 FL


(80.0-100.0) 70.7 FL


(80.0-100.0) 


  


 


 


Mean Corpuscular Hemoglobin


  22.7 PG


(27.0-34.0) 22.8 PG


(27.0-34.0) 


  


 


 


Mean Corpuscular Hemoglobin


Concent 32.1 %


(32.0-36.0) 32.2 %


(32.0-36.0) 


  


 


 


Red Cell Distribution Width


  18.5 %


(11.6-17.2) 18.8 %


(11.6-17.2) 


  


 


 


Platelet Count


  315 TH/MM3


(150-450) 313 TH/MM3


(150-450) 


  


 


 


Mean Platelet Volume


  8.2 FL


(7.0-11.0) 8.3 FL


(7.0-11.0) 


  


 


 


Neutrophils (%) (Auto)


  83.4 %


(16.0-70.0) 88.0 %


(16.0-70.0) 


  


 


 


Lymphocytes (%) (Auto)


  5.8 %


(9.0-44.0) 5.1 %


(9.0-44.0) 


  


 


 


Monocytes (%) (Auto)


  9.6 %


(0.0-8.0) 5.8 %


(0.0-8.0) 


  


 


 


Eosinophils (%) (Auto)


  0.5 %


(0.0-4.0) 0.6 %


(0.0-4.0) 


  


 


 


Basophils (%) (Auto)


  0.7 %


(0.0-2.0) 0.5 %


(0.0-2.0) 


  


 


 


Neutrophils # (Auto)


  15.8 TH/MM3


(1.8-7.7) 14.8 TH/MM3


(1.8-7.7) 


  


 


 


Lymphocytes # (Auto)


  1.1 TH/MM3


(1.0-4.8) 0.9 TH/MM3


(1.0-4.8) 


  


 


 


Monocytes # (Auto)


  1.8 TH/MM3


(0-0.9) 1.0 TH/MM3


(0-0.9) 


  


 


 


Eosinophils # (Auto)


  0.1 TH/MM3


(0-0.4) 0.1 TH/MM3


(0-0.4) 


  


 


 


Basophils # (Auto)


  0.1 TH/MM3


(0-0.2) 0.1 TH/MM3


(0-0.2) 


  


 


 


CBC Comment DIFF FINAL  DIFF FINAL   


 


Differential Comment      


 


Activated Partial


Thromboplast Time 62.3 SEC


(24.3-30.1) 


  


  


 


 


Blood Urea Nitrogen


  10 MG/DL


(7-18) 


  


  


 


 


Creatinine


  0.85 MG/DL


(0.50-1.00) 


  


  


 


 


Random Glucose


  137 MG/DL


() 


  


  


 


 


Calcium Level


  8.2 MG/DL


(8.5-10.1) 


  


  


 


 


Phosphorus Level


  3.9 MG/DL


(2.5-4.9) 


  


  


 


 


Magnesium Level


  2.0 MG/DL


(1.5-2.5) 


  


  


 


 


Sodium Level


  133 MEQ/L


(136-145) 


  


  


 


 


Potassium Level


  3.8 MEQ/L


(3.5-5.1) 


  


  


 


 


Chloride Level


  98 MEQ/L


() 


  


  


 


 


Carbon Dioxide Level


  28.3 MEQ/L


(21.0-32.0) 


  


  


 


 


Anion Gap 7 MEQ/L (5-15)    


 


Estimat Glomerular Filtration


Rate 76 ML/MIN


(>89) 


  


  


 


 


Haptoglobin


  


  


  256 MG/DL


() 


 


 


Prothrombin Time


  


  


  21.2 SEC


(9.8-11.6) 


 


 


Prothromb Time International


Ratio 


  


  2.1 RATIO 


  


 


 


Fibrinogen


  


  


  485 mg/dL


(227-377) 


 


 


Lactate Dehydrogenase


  


  


  405 U/L


() 


 


 


Iron Level


  


  


  


  22 MCG/DL


()


 


Total Iron Binding Capacity


  


  


  


  288 MCG/DL


(250-450)


 


Percent Iron Saturation    7.6 % (20-50) 


 


Vitamin B12 Level


  


  


  


  842 PG/ML


(193-986)


 


Folate


  


  


  


  12.4 NG/ML


(3.1-17.5)


 


Test


  3/5/18


16:28 3/6/18


04:50 3/6/18


13:50 


 


 


Urine Occult Blood SMALL (NEG)    


 


White Blood Count


  


  16.3 TH/MM3


(4.0-11.0) 


  


 


 


Red Blood Count


  


  2.94 MIL/MM3


(4.00-5.30) 


  


 


 


Hemoglobin


  


  6.8 GM/DL


(11.6-15.3) 


  


 


 


Hematocrit


  


  21.1 %


(35.0-46.0) 


  


 


 


Mean Corpuscular Volume


  


  71.6 FL


(80.0-100.0) 


  


 


 


Mean Corpuscular Hemoglobin


  


  23.0 PG


(27.0-34.0) 


  


 


 


Mean Corpuscular Hemoglobin


Concent 


  32.2 %


(32.0-36.0) 


  


 


 


Red Cell Distribution Width


  


  19.2 %


(11.6-17.2) 


  


 


 


Platelet Count


  


  342 TH/MM3


(150-450) 


  


 


 


Mean Platelet Volume


  


  8.3 FL


(7.0-11.0) 


  


 


 


Reticulocyte Count


  


  3.6 %


(0.4-3.0) 


  


 


 


Absolute Reticulocyte Count


  


  104.4 MIL/L


(20.0-150.0) 


  


 


 


Activated Partial


Thromboplast Time 


  58.5 SEC


(24.3-30.1) 


  


 


 


Fibrinogen


  


  485 mg/dL


(227-377) 


  


 


 


Blood Urea Nitrogen


  


  


  10 MG/DL


(7-18) 


 


 


Creatinine


  


  


  0.84 MG/DL


(0.50-1.00) 


 


 


Random Glucose


  


  


  126 MG/DL


() 


 


 


Calcium Level


  


  


  8.3 MG/DL


(8.5-10.1) 


 


 


Sodium Level


  


  


  133 MEQ/L


(136-145) 


 


 


Potassium Level


  


  


  3.0 MEQ/L


(3.5-5.1) 


 


 


Chloride Level


  


  


  95 MEQ/L


() 


 


 


Carbon Dioxide Level


  


  


  29.4 MEQ/L


(21.0-32.0) 


 


 


Anion Gap   9 MEQ/L (5-15)  


 


Estimat Glomerular Filtration


Rate 


  


  77 ML/MIN


(>89) 


 


 


Vancomycin Level Trough


  


  


  19.2 MCG/ML


(5.0-10.0) 


 








Result Diagram:  


3/6/18 9160                                                                    

            3/6/18 1330





Microbiology





Microbiology








 Date/Time


Source Procedure


Growth Status


 


 


 3/4/18 05:50


Blood Peripheral Aerobic Blood Culture - Preliminary


NO GROWTH IN 2 DAYS Resulted


 


 3/4/18 05:50


Blood Peripheral Anaerobic Blood Culture - Preliminary


NO GROWTH IN 2 DAYS Resulted





 3/3/18 21:20


Blood Line Aerobic Blood Culture - Preliminary


NO GROWTH IN 3 DAYS Resulted


 


 3/3/18 21:20


Blood Line Anaerobic Blood Culture - Preliminary


NO GROWTH IN 3 DAYS Resulted


 


 3/5/18 12:28


Stool Stool Stool Occult Blood (GILA) - Final


HEMOCCULT NEGATIVE Complete





 3/3/18 21:50


Sputum Expectorated Sputum Gram Stain - Final Complete


 


 3/3/18 21:50


Sputum Expectorated Sputum Sputum Culture - Final


HEAVY GROWTH NORMAL RESPIRATORY AMADA Complete








Imaging





Last Impressions








Chest X-Ray 3/5/18 0600 Signed





Impressions: 





 Service Date/Time:  Monday, March 5, 2018 03:52 - CONCLUSION:  There is a 

small 





 to moderate size right pleural effusion with associated volume loss and/or 





 airspace consolidation. The pleural effusion has increased from the prior 





 examination.     Ron Melgar MD 


 


Head CT 3/3/18 0000 Signed





Impressions: 





 Service Date/Time:  Saturday, March 3, 2018 10:22 - CONCLUSION:  1. Focal area 





 of decreased density involving the right parietal lobe. As a new finding from 





 the prior exam. This could relate to a small infarct. MRI of the brain with 





 gadolinium suggested to further evaluate. 2. Small lacunar infarction 

involving 





 the left centrum semiovale.     Scooter Dawson Jr., MD 


 


CT Angiography 3/3/18 0000 Signed





Impressions: 





 Service Date/Time:  Saturday, March 3, 2018 10:28 - CONCLUSION:  There 

extensive 





 pulmonary emboli bilaterally. There is near complete cut off of the right 

lower 





 lobe pulmonary artery. Prominent filling defects on the left as well. These 





 findings were relayed to Dr. Bustos immediately at the completion of the study.  





 Scattered pulmonary infiltrates, small pleural effusion and extensive 





 mediastinal lymphadenopathy as described above.      Won Sharp MD 


 


Brain MRI 3/3/18 0000 Signed





Impressions: 





 Service Date/Time:  Saturday, March 3, 2018 18:04 - CONCLUSION:  Deterioration 





 in the appearance of the scan.  At least 3 focal areas of abnormal contrast 





 enhancement are present scattered to in both hemispheres.  Given the history 





 this most likely represents developing areas cerebritis.  Exam is compromised 

by 





 an uncooperative patient and motion artifact.  These 2 factors make detection 

of 





 other abnormalities such as cortical cephalitis and meningitis difficult to 





 exclude.  Cannot exclude hemorrhagic lesions as well.  There is no mass effect 





 evident.      Jorge Diane MD 


 


Chest CT 2/26/18 0000 Signed





Impressions: 





 Service Date/Time:  Monday, February 26, 2018 12:41 - CONCLUSION:  1. There 

are 





 at least 5 pulmonary nodules present bilaterally with the largest measuring 19 





 mm. None demonstrate cavitation but it is possible that these represent septic 





 emboli. Suggest followup to assess for change. 2. Splenomegaly with subtle 





 wedge-shaped areas of low-density in the mid spleen which could represent 





 infarcts. 3. Mild cardiomegaly in this patient post aortic valve replacement. 





 Low density of the cardiac blood pool suggests anemia.      Ron Melgar MD 


 


Aorta w/Runoff CTA 2/25/18 0000 Signed





Impressions: 





 Service Date/Time:  Sunday, February 25, 2018 12:47 - CONCLUSION:  Left renal 





 cortical infarction. 5-6 cm length total occlusion of the left superficial 





 femoral artery in the proximal thigh. Nodular parenchymal opacities in the 

lung 





 bases bilaterally See above discussion.     Ron Cruz MD 


 


Renal Ultrasound 2/24/18 0000 Signed





Impressions: 





 Service Date/Time:  Saturday, February 24, 2018 10:53 - CONCLUSION:  1. No 





 evidence of hydronephrosis. 2. Thickening of the urinary bladder wall a 1.1 

cm. 





 3. Prominent splenomegaly.     Elgin Walton MD 


 


Breast Ultrasound 2/24/18 0000 Signed





Impressions: 





 Service Date/Time:  Saturday, February 24, 2018 10:48 - CONCLUSION:  Negative 





 targeted right breast ultrasound examination.     Ron Brown MD 


 


Lower Extremity Ultrasound 2/23/18 0000 Signed





Impressions: 





 Service Date/Time:  Friday, February 23, 2018 21:22 - CONCLUSION:  1. No 





 sonographic evidence for lower extremity DVT. 2. Incidental note of bilateral 





 inguinal adenopathy with the largest on the right measuring 3.3 cm and the 





 largest on the left measuring 2.6 cm. This finding is nonspecific but is 





 typically reactive in etiology.     Rj Whitten MD 








Procedures


* 2/24/17 - right IJ central line placement





Assessment and Plan


Disease Oriented Problem List:  


(1) Prosthetic valve endocarditis


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


(3) Polysubstance abuse


(4) Acute septic pulmonary embolism


(5) Acute kidney injury


(6) CHF (congestive heart failure), NYHA class IV


(7) Severe sepsis


(8) Elevated troponin


Symptom Scale:  


(1) Pain


0-10 Scale:  8





(2) Encephalopathy


0-10 Scale:  Unable to quantify





(3) Dyspnea


0-10 Scale:  Unable to quantify


Comment:  On oxygen via nasal cannula





Pertinent Non-Medical Issues


Psychosocial: single.  2 juvenile children. Supported by her mother, step-

father and boyfriend. 


Spiritual: Unknown.


Legal: No known written advanced directives, family is going to try to find [employer] 

paperwork they think pt previously completed.  Patient a single.  Children are 

juvenile.  If no written advanced directives, according to [place] statutes 

health care proxy decision-making falls to a parent. 


Ethical issues impacting care: No known concerns at this time. 


.


Important Contacts


* Kelly Le, mother/ HCP: [phone]


* Won Le, stepfather: [phone]


.


Prognosis


Patient has grim prognosis. 


.


Code Status:  Full Code


Plan


* Decision Maker: No written advanced directives, patient refused to complete 

advanced directives during last admission. Patient a single. Children are 

juvenile. In the absence of written advanced directives, according to [place] 

statutes health care proxy decision-making falls to her mother, Kelly Le. 

Won Le is a step-father.  Declines completion of written advance 

directives 3/5/18.


* FULL CODE. 


* Patient desires continued aggressive care including FULL CODE.


* Will continue to provide periodic medical updates to patient's mother, Kelly. 

Did not call 3/6/18 as there is no significant clinical change.  Palliative 

care number previously provided. 


* SYMPTOMS: Pain: Potential sources include endocarditis, bedbound status, 

tubes etc. Patient with likely high tolerance given history of IV drug use, 

will monitor effective medications.  Dyspnea: secondary to endocarditis, 

pulmonary emboli. Hallucination: patient thought she saw a bug on her arm 

during my visit, though knew it "wasn't really there." No medication 

recommendations at this time.


* Palliative care will continue to follow throughout hospital course to assist 

with symptom management and clarification of goals as needed. 


.





Attestation


To help prompt me to consider important information that might be impacting 

today's encounter and assessment, information from prior notes written by 

myself or my colleagues may have been "brought forward" into today's note.  My 

signature on this note, however, is an attestation that I personally performed 

the exam, history, and/or decision-making noted today, and, unless otherwise 

indicated, the interactions with patient, family, and staff as well as the 

review of records all occurred today.  I also attest that the listed assessment 

and stated plan reflect my best clinical judgment today based on the 

combination of historical information, prior notes, and today's exam/ 

interactions.  When time spent is documented, it refers only to time spent 

today by the signer, or if indicated, combined time spent today by 

collaborating physician/nurse practitioner.











Ileana Dang Mar 6, 2018 17:44

## 2018-03-07 VITALS
TEMPERATURE: 98.3 F | RESPIRATION RATE: 28 BRPM | SYSTOLIC BLOOD PRESSURE: 105 MMHG | DIASTOLIC BLOOD PRESSURE: 69 MMHG | HEART RATE: 108 BPM | OXYGEN SATURATION: 88 %

## 2018-03-07 VITALS
OXYGEN SATURATION: 94 % | HEART RATE: 100 BPM | DIASTOLIC BLOOD PRESSURE: 76 MMHG | TEMPERATURE: 98.5 F | RESPIRATION RATE: 28 BRPM | SYSTOLIC BLOOD PRESSURE: 107 MMHG

## 2018-03-07 VITALS
SYSTOLIC BLOOD PRESSURE: 97 MMHG | OXYGEN SATURATION: 94 % | RESPIRATION RATE: 31 BRPM | DIASTOLIC BLOOD PRESSURE: 60 MMHG | TEMPERATURE: 98.1 F | HEART RATE: 108 BPM

## 2018-03-07 VITALS — OXYGEN SATURATION: 94 %

## 2018-03-07 VITALS
SYSTOLIC BLOOD PRESSURE: 112 MMHG | HEART RATE: 102 BPM | RESPIRATION RATE: 20 BRPM | TEMPERATURE: 98.3 F | OXYGEN SATURATION: 96 % | DIASTOLIC BLOOD PRESSURE: 70 MMHG

## 2018-03-07 VITALS
RESPIRATION RATE: 28 BRPM | DIASTOLIC BLOOD PRESSURE: 68 MMHG | OXYGEN SATURATION: 94 % | SYSTOLIC BLOOD PRESSURE: 107 MMHG | HEART RATE: 104 BPM | TEMPERATURE: 98.5 F

## 2018-03-07 VITALS
HEART RATE: 109 BPM | TEMPERATURE: 98.5 F | OXYGEN SATURATION: 94 % | RESPIRATION RATE: 28 BRPM | SYSTOLIC BLOOD PRESSURE: 102 MMHG | DIASTOLIC BLOOD PRESSURE: 76 MMHG

## 2018-03-07 VITALS — HEART RATE: 123 BPM

## 2018-03-07 VITALS — HEART RATE: 112 BPM

## 2018-03-07 VITALS — HEART RATE: 106 BPM

## 2018-03-07 VITALS — HEART RATE: 109 BPM

## 2018-03-07 VITALS — HEART RATE: 105 BPM

## 2018-03-07 VITALS — HEART RATE: 101 BPM

## 2018-03-07 VITALS — OXYGEN SATURATION: 99 %

## 2018-03-07 LAB
ALBUMIN SERPL-MCNC: 2.2 GM/DL (ref 3.4–5)
BASOPHILS # BLD AUTO: 0.1 TH/MM3 (ref 0–0.2)
BASOPHILS NFR BLD: 0.9 % (ref 0–2)
BUN SERPL-MCNC: 9 MG/DL (ref 7–18)
CALCIUM SERPL-MCNC: 7.9 MG/DL (ref 8.5–10.1)
CHLORIDE SERPL-SCNC: 96 MEQ/L (ref 98–107)
CREAT SERPL-MCNC: 0.94 MG/DL (ref 0.5–1)
EOSINOPHIL # BLD: 0.1 TH/MM3 (ref 0–0.4)
EOSINOPHIL NFR BLD: 0.8 % (ref 0–4)
ERYTHROCYTE [DISTWIDTH] IN BLOOD BY AUTOMATED COUNT: 20.4 % (ref 11.6–17.2)
GFR SERPLBLD BASED ON 1.73 SQ M-ARVRAT: 68 ML/MIN (ref 89–?)
GLUCOSE SERPL-MCNC: 142 MG/DL (ref 74–106)
HCO3 BLD-SCNC: 29.7 MEQ/L (ref 21–32)
HCT VFR BLD CALC: 23.8 % (ref 35–46)
HCT VFR BLD CALC: 24.1 % (ref 35–46)
HGB BLD-MCNC: 7.8 GM/DL (ref 11.6–15.3)
HGB BLD-MCNC: 7.9 GM/DL (ref 11.6–15.3)
LYMPHOCYTES # BLD AUTO: 1.1 TH/MM3 (ref 1–4.8)
LYMPHOCYTES NFR BLD AUTO: 7.9 % (ref 9–44)
MAGNESIUM SERPL-MCNC: 1.8 MG/DL (ref 1.5–2.5)
MCH RBC QN AUTO: 23.9 PG (ref 27–34)
MCHC RBC AUTO-ENTMCNC: 32.9 % (ref 32–36)
MCV RBC AUTO: 72.7 FL (ref 80–100)
MONOCYTE #: 1 TH/MM3 (ref 0–0.9)
MONOCYTES NFR BLD: 6.9 % (ref 0–8)
NEUTROPHILS # BLD AUTO: 12 TH/MM3 (ref 1.8–7.7)
NEUTROPHILS NFR BLD AUTO: 83.5 % (ref 16–70)
PHOSPHATE SERPL-MCNC: 3.9 MG/DL (ref 2.5–4.9)
PLATELET # BLD: 331 TH/MM3 (ref 150–450)
PMV BLD AUTO: 7.9 FL (ref 7–11)
RBC # BLD AUTO: 3.31 MIL/MM3 (ref 4–5.3)
SODIUM SERPL-SCNC: 133 MEQ/L (ref 136–145)
WBC # BLD AUTO: 14.4 TH/MM3 (ref 4–11)

## 2018-03-07 RX ADMIN — ALBUTEROL SULFATE PRN MG: 2.5 SOLUTION RESPIRATORY (INHALATION) at 13:58

## 2018-03-07 RX ADMIN — AMPICILLIN SODIUM SCH MLS/HR: 2 INJECTION, POWDER, FOR SOLUTION INTRAMUSCULAR; INTRAVENOUS at 09:43

## 2018-03-07 RX ADMIN — CLINDAMYCIN PHOSPHATE SCH MLS/HR: 150 INJECTION, SOLUTION INTRAMUSCULAR; INTRAVENOUS at 04:35

## 2018-03-07 RX ADMIN — FAMOTIDINE SCH MG: 20 TABLET, FILM COATED ORAL at 21:28

## 2018-03-07 RX ADMIN — AMPICILLIN SODIUM SCH MLS/HR: 2 INJECTION, POWDER, FOR SOLUTION INTRAMUSCULAR; INTRAVENOUS at 13:52

## 2018-03-07 RX ADMIN — STANDARDIZED SENNA CONCENTRATE AND DOCUSATE SODIUM SCH TAB: 8.6; 5 TABLET, FILM COATED ORAL at 09:00

## 2018-03-07 RX ADMIN — CLINDAMYCIN PHOSPHATE SCH MLS/HR: 150 INJECTION, SOLUTION INTRAMUSCULAR; INTRAVENOUS at 21:29

## 2018-03-07 RX ADMIN — AMPICILLIN SODIUM SCH MLS/HR: 2 INJECTION, POWDER, FOR SOLUTION INTRAMUSCULAR; INTRAVENOUS at 02:17

## 2018-03-07 RX ADMIN — HYDROCODONE BITARTRATE AND ACETAMINOPHEN PRN TAB: 10; 325 TABLET ORAL at 04:35

## 2018-03-07 RX ADMIN — HYDROMORPHONE HYDROCHLORIDE PRN MG: 2 INJECTION INTRAMUSCULAR; INTRAVENOUS; SUBCUTANEOUS at 18:48

## 2018-03-07 RX ADMIN — SODIUM CHLORIDE SCH MLS/HR: 900 INJECTION INTRAVENOUS at 13:53

## 2018-03-07 RX ADMIN — SODIUM CHLORIDE SCH MLS/HR: 900 INJECTION INTRAVENOUS at 02:22

## 2018-03-07 RX ADMIN — HYDROMORPHONE HYDROCHLORIDE PRN MG: 2 INJECTION INTRAMUSCULAR; INTRAVENOUS; SUBCUTANEOUS at 02:17

## 2018-03-07 RX ADMIN — HYDROMORPHONE HYDROCHLORIDE PRN MG: 2 INJECTION INTRAMUSCULAR; INTRAVENOUS; SUBCUTANEOUS at 10:13

## 2018-03-07 RX ADMIN — HYDROCODONE BITARTRATE AND ACETAMINOPHEN PRN TAB: 10; 325 TABLET ORAL at 21:24

## 2018-03-07 RX ADMIN — ALBUTEROL SULFATE PRN MG: 2.5 SOLUTION RESPIRATORY (INHALATION) at 21:05

## 2018-03-07 RX ADMIN — CHLORHEXIDINE GLUCONATE SCH PACK: 500 CLOTH TOPICAL at 04:00

## 2018-03-07 RX ADMIN — AMPICILLIN SODIUM SCH MLS/HR: 2 INJECTION, POWDER, FOR SOLUTION INTRAMUSCULAR; INTRAVENOUS at 21:25

## 2018-03-07 RX ADMIN — STANDARDIZED SENNA CONCENTRATE AND DOCUSATE SODIUM SCH TAB: 8.6; 5 TABLET, FILM COATED ORAL at 21:24

## 2018-03-07 RX ADMIN — HYDROMORPHONE HYDROCHLORIDE PRN MG: 2 INJECTION INTRAMUSCULAR; INTRAVENOUS; SUBCUTANEOUS at 14:44

## 2018-03-07 RX ADMIN — HYDROMORPHONE HYDROCHLORIDE PRN MG: 2 INJECTION INTRAMUSCULAR; INTRAVENOUS; SUBCUTANEOUS at 06:11

## 2018-03-07 RX ADMIN — Medication SCH ML: at 21:26

## 2018-03-07 RX ADMIN — Medication PRN ML: at 23:05

## 2018-03-07 RX ADMIN — WARFARIN SODIUM SCH MG: 2 TABLET ORAL at 14:44

## 2018-03-07 RX ADMIN — FAMOTIDINE SCH MG: 20 TABLET, FILM COATED ORAL at 09:42

## 2018-03-07 RX ADMIN — CLINDAMYCIN PHOSPHATE SCH MLS/HR: 150 INJECTION, SOLUTION INTRAMUSCULAR; INTRAVENOUS at 12:43

## 2018-03-07 RX ADMIN — HYDROMORPHONE HYDROCHLORIDE PRN MG: 2 INJECTION INTRAMUSCULAR; INTRAVENOUS; SUBCUTANEOUS at 23:04

## 2018-03-07 RX ADMIN — Medication SCH ML: at 09:43

## 2018-03-07 NOTE — HHI.PR
Subjective


Remarks


37-year-old female that presents to the ED for evaluation of shortness of 

breath and swelling.  Patient has a significant history of endocarditis, IV 

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 

continues use of IV drug use that presents to the ED for evaluation of acute 

onset of shortness of breath with swelling.  Patient initially did not mention 

to anybody that she was doing drugs but she did tell me that she injected 

herself about 4 days ago.  She uses Dilaudid.  She states that she's been 

having fevers.  Patient is somewhat somnolent and hard to assess she doesn't 

really provide much information.  Family members at the bedside and he provides 

more information about the heart.  Per patient she'll he takes 2 medications.  

Patient asked if she is taking any antibiotics and she said yes but when I ask 

her what kind is she is taking currently she states that she has not been on 

antibiotics for some time.  Patient apparently does follow with a local 

cardiologist but she cannot tell me the name of it.  She states that she's been 

having swelling to her legs for the past month.  She denies any recent travel 

or injury.  No other medical issues at this time.  No allergies to medication.





2/24: remains critical on Dopamine 10 mcg/ min. Appears ill. Limited bedside 

echo did not show any large vegetation, but exam was limited, tricuspid and 

aortic valve not well visualized. Also states that had right breast abscess 2 

months ago, drained by herself. Now may have abscess vs scar tissue. Injects 

upper arm and bilateral breasts. US of right breast ordered


2/25: Remains on 3 mcg/min of Levophed.  Complaints of severe left lower 

extremity pain.  Erythematous purpuric purple discoloration of the left lower 

anterior segment left dorsum of the foot with cyanotic nailbeds. Weak DP pulses+

. 2D Echo failed to reveal definite vegetation. Will consider FLORENCIO


2/26: Continues to be on Levophed 3 mcg/min, remains critical.  We are left 

lower extremity pain but slightly better.  Currently on heparin not therapeutic 

yet.  CTA with runoff showed Left renal cortical infarction. 5-6 cm length 

total occlusion of the left superficial femoral artery in the proximal thigh 

but good distal opacification.  Dr. Souza recommended IV heparin no surgical 

intervention at this time.  Patient is still at high risk for further embolic 

episodes.  Blood cultures growing strep viridans.  Also patient complains about 

epigastric and left-sided abdominal pain and left-sided chest pain which is 

more pleuritic.  Pain is severe on deep inspiration


2/27: Resting in bed in no acute distress.  Dopplerable lower extremity pulses 

noted.  Remains on heparin drip.  Vascular surgery continues to follow.


2/28: Resting in bed in mild respiratory distress.  Continues to cough.  

Nonproductive.  Left lower extremity leg improved.  Reviewed vascular surgeries 

note.  Likely will require long-term medical regulation post hospitalization


3/1: Improve respiratory status today.  Pain in left lower extremity much 

improved.  Tolerating diet.


3/2: Currently on nasal cannula.  Switch to Argatroban overnight by hematology.

  Okayed with vascular surgery.  Pain in left lower extremity slowly improving 

day by day.





Subjective





3/3: Resting in bed in some mild respiratory distress.  Complaining of 

pleuritic chest pain specifically in the right flank region.  Worse with deep 

inspiration.  Reproducible with palpation.  CT brain/CT pulmonary angiogram 

ordered.  No neurological deficits noted on examination.


3/4: CT pulmonary angiogram revealed emboli bilateral lower lobes as well as 

views of abnormal contrast bilateral cerebral hemispheres.  Patient complains 

still of pleuritic chest pain.  Frequency increased on Lortab.  CTS in for 

evaluation patient deemed inoperable.  Palliative care consulted appreciate 

recommendations.


3/5: Leukocytosis resolving.  Patient continues in mild respiratory distress.  

Denies chest pain at this time.  Continue mild wheezing, patient continues to 

refuse nebulizer treatments.  Case management consult to [facility] regarding possible transfer pending.  Patient  noted to have 1 g 

deciliter dropped in hemoglobin, repeat hemoglobin unchanged, currently 6.6.  

Patient  will be transfused 1 unit of packed red blood cells.  Close monitoring 

of hemoglobin and hematocrit .Concern for possible bleeding, Hemoccult and labs 

pending.


3/6: Late entry note.  Hemoglobin 6.8 patient being transfused 1 unit of packed 

red blood cells.  Plan to continue Argatroban until hemoglobin stabilizes and 

then will transition to Coumadin.  O2 saturation improved O2 at 3 L nasal 

cannula O2 saturation 96-97%.  Patient mobilizing out of bed to chair.


3/7.  Patient seen this morning around 9 AM.  He says she is feeling all right.

  Denies any chest pain.  Denies any bleeding.  Denies any nausea or vomiting.  

She does note slight increase in bilateral lower extremity edema, however she 

has been sitting up on edge of bed this morning.





Objective





Vital Signs








  Date Time  Temp Pulse Resp B/P (MAP) Pulse Ox O2 Delivery O2 Flow Rate FiO2


 


3/7/18 16:00 98.5 100 28 107/70 (82) 94   


 


3/7/18 16:00  104      


 


3/7/18 16:00 98.9 104 25 98/68 (78) 96   


 


3/7/18 14:01     94 Nasal Cannula 2.00 


 


3/7/18 14:00  106      


 


3/7/18 12:00 98.5 100 28 107/70 (82) 94   


 


3/7/18 12:00  100      


 


3/7/18 12:00  105      


 


3/7/18 12:00 98.5 105 28 102/76 (85) 94   


 


3/7/18 10:43   20     


 


3/7/18 10:00  105      


 


3/7/18 08:00 98.5 105 28 102/76 (85) 94   


 


3/7/18 08:00  109      


 


3/7/18 07:00     98 Nasal Cannula 3.00 


 


3/7/18 06:00  123      


 


3/7/18 04:00  108      


 


3/7/18 04:00 98.3 107 28 105/69 (81) 88   


 


3/7/18 02:00  109      


 


3/7/18 00:00 98.1 108 31 97/60 (72) 94   


 


3/7/18 00:00  108      


 


3/6/18 22:00  109      


 


3/6/18 20:00 98.2 117 31 101/73 (82) 94   


 


3/6/18 20:00  117      


 


3/6/18 19:00     96 Nasal Cannula 3.00 


 


3/6/18 18:00 98.4 113 35 93/62 (72) 94   


 


3/6/18 17:30  114 33 103/65 (78) 79   














I/O      


 


 3/6/18 3/6/18 3/6/18 3/7/18 3/7/18 3/7/18





 07:00 15:00 23:00 07:00 15:00 23:00


 


Intake Total 1402.5 ml 15 ml 2075.0 ml 1200 ml 302.5 ml 


 


Output Total 700 ml  900 ml 1200 ml  


 


Balance 702.5 ml 15 ml 1175.0 ml 0 ml 302.5 ml 


 


      


 


Intake Oral 480 ml  900 ml 1200 ml  


 


IV Total 922.5 ml  775.0 ml  302.5 ml 


 


Packed Cells   400 ml   


 


Blood Product IV Normal Saline Flush  15 ml    


 


Output Urine Total 700 ml  900 ml 1200 ml  


 


# Voids   2   


 


# Bowel Movements 0  2 1  








Result Diagram:  


3/7/18 0955                                                                    

            3/7/18 0955





Objective Remarks


GENERAL: patient sitting up on edge of bed.  Appears couple.


SKIN: Warm and dry.


HEAD: Normocephalic.


EYES: No scleral icterus. No injection or drainage. 


NECK: Supple, trachea midline. No JVD or lymphadenopathy.


CARDIOVASCULAR: Regular rate and rhythm without murmurs, gallops, or rubs. 


RESPIRATORY: Breath sounds equal bilaterally. No accessory muscle use.


GASTROINTESTINAL: Abdomen soft, non-tender, nondistended. 


MUSCULOSKELETAL: No cyanosis.  +2 edema left lower extremity.  +1 edema right 

lower extremity.  Nontender.  No broken skin.


BACK: Nontender without obvious deformity. No CVA tenderness.








A/P


Assessment and Plan











Neuro/Psych:





//IVDU -hydromorphone


//History of THC use





- Continue pain control hydrocodone/acetaminophen 10/325 1 tablet every 4 hours 

as needed pain 1 through 5 with hydromorphone 2 mg IV every 4 hours as needed 

pain 6-10 (patient has severe pain from ischemic leg)


- Acetaminophen 650 mg p.o. every 6 hours as needed fever


CT brain 3/3 order while on Argatroban-3 focal areas of abnormal contrast 

enhancement scattered within both hemispheres, cerebritis


= 3/7.  Decrease Dilaudid from 2 mg IV to 1 mg IV every 4 hours.  Switched to 

by mouth tomorrow.





CVS/ID





//Septic  shock


//Infective endocarditis involving aortic prosthetic valve


//Tricuspid valve endocarditis


//Multiple septic emboli including pulmonary, splenic and vascular


//History of fungal and bacterial prosthetic valve endocarditis


//Strep viridans bacteremia


//5-6 cm length total occlusion of the left superficial femoral artery in the 

proximal thigh





- Recurrent aortic prosthetic valve endocarditis -initially placed 2011 with 

redo 2016. - Now with strep viridans in blood cultures 2/23


- Broad-spectrum antibiotic with ampicillin and gentamicin.


   Previously treated with vancomycin and ceftriaxone


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 

April 2017 for tricuspid valve PVE  - Candida parapsilosis , Streptococci


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


- Vascular surgery Dr. Sears following


- Anticoagulation with argatroban gtt at 0.75 mcg/kg/min


- Infectious disease Dr. Carr


- Extensive counselling given about IP Rehab


-CTS evaluation Dr. Bauer-no surgical intervention planned


- Case management consulted with [facility]-denied for transfer 

no surgical intervention planned


= Infectious disease and hematology following.  Appreciate assistance.  

Continue IV antibiotics as per ID.  Continue her gastric band with transition 

to warfarin as per hematology.  Appreciate assistance.





Microbiology





2/27 -blood cultures 2 -no growth


2/23 -urine culture -no growth


2/23 -blood cultures 2 -strep viridians





Resp:





//Acute respiratory insufficiency


//5 pulmonary nodules likely septic emboli


//Pleural effusions





-Nasal cannula to keep oxygen saturation above 92% currently on 4 L


-Incentives spirometry while awake


-As needed albuterol aerosols every 2 hours.  Dyspnea


- CT thorax revealed 5 bilateral pulmonary lesions/nodules largest which is 19 

mm.  No cavitation noted but likely septic emboli.


CT pulmonary angiogram 3/3-pulmonary emboli bilateral lower lobes





GI:





//Hepatitis C


//Splenic infarction


//Hypoalbuminemia





- Heart healthy diet


-Famotidine for GI prophylaxis


-Docusate sodium/senna 1 tablet twice daily for bowel regimen








Renal/:





//Acute on chronic kidney disease


//Left renal cortical infarction





- Acute renal failure secondary to ATN and dehydration


- CT imaging revealed left renal cortical infarction


- Renal ultrasound-thickening of urinary bladder


- Creatinine 0.8-will give 40 mg Lasix IV 1 dose


= Acute kidney injury has resolved.





Endo:





Sliding scale insulin if indicated to maintain euglycemia





Heme:





//Microcytic anemia


//Leukocytosis





- Monitor CBC CMP and coags


-Hemoglobin 6.6.  Transfuse1 unit of packed red blood cells


-Obtain Hemoccult


-Obtain LDH, haptoglobin, fibrinogen, PT and INR levels


-Heme-Onc following


-Monitor H&H every 12 hours, consider CT of the abdomen and pelvis if continued 

decrease in levels


= 3/7.  Hemoglobin improved from 6.8-7.7 Status post transfusion yesterday.  

Hemoglobin is stable at 7.9 from 7.7 yesterday.  Continue to monitor.  Suspect 

that there is a large component of anemia of inflammation.  Haptoglobin 

pending.  Rescreen pending.  Hemoglobin electrophoresis pending.





FEN:





//Hyponatremia





//Hypokalemia


3/7.  Potassium 2.8 down from 3.0 yesterday having not been replaced.  Replaced 

today.  Recheck tomorrow.





MSK:





//History of right breast abscess


//Elevated BMI





- Ultrasound of the left lower extremity negative for DVT


- Right breast ultrasound negative for abscess


-Weight loss encouraged





//DVT GI prophylaxis





- argatroban gtt


- famotidine








3/5-Case management consulted, possible transfer to [facility] 

were patient received prosthetic aortic valve.I spoke with Dr. Anglin CT 

Surgeon , will not accept pt for transfer, patient still active IVDU,


Per report attempted transferred to [facility] - patient was not accepted .D

/W patient,and ICU RN at bedside (Nilay)


Discharge Planning


continued IV antibiotics.  Transfer to floor.











Hunter Mario MD Mar 7, 2018 17:14

## 2018-03-07 NOTE — PD.ONC.PN
Subjective


Subjective


Remarks


Afebrile overnight. 


Patient resting in bed in nad. 


Tolerating Argatroban drip. 


Received pRBC transfusion yesterday. 


Remains dyspneic. 


otherwise without complaints.





Objective


Data











  Date Time  Temp Pulse Resp B/P (MAP) Pulse Ox O2 Delivery O2 Flow Rate FiO2


 


3/7/18 12:00 98.5 100 28 107/70 (82) 94   


 


3/7/18 12:00  100      


 


3/7/18 12:00  105      


 


3/7/18 12:00 98.5 105 28 102/76 (85) 94   


 


3/7/18 10:43   20     


 


3/7/18 10:00  105      


 


3/7/18 08:00 98.5 105 28 102/76 (85) 94   


 


3/7/18 08:00  109      


 


3/7/18 07:00     98 Nasal Cannula 3.00 


 


3/7/18 06:00  123      


 


3/7/18 04:00  108      


 


3/7/18 04:00 98.3 107 28 105/69 (81) 88   


 


3/7/18 02:00  109      


 


3/7/18 00:00 98.1 108 31 97/60 (72) 94   


 


3/7/18 00:00  108      


 


3/6/18 22:00  109      


 


3/6/18 20:00 98.2 117 31 101/73 (82) 94   


 


3/6/18 20:00  117      


 


3/6/18 19:00     96 Nasal Cannula 3.00 


 


3/6/18 18:00 98.4 113 35 93/62 (72) 94   


 


3/6/18 17:30  114 33 103/65 (78) 79   


 


3/6/18 17:00  109 26 103/74 (84) 97   


 


3/6/18 16:30  103 46 99/67 (78) 96   


 


3/6/18 16:00  104 28 100/74 (83) 100   


 


3/6/18 15:46  104 30 102/78 (86) 94   


 


3/6/18 15:30  103 32 102/73 (83) 93   


 


3/6/18 15:15  100 24 102/74 (83) 97   


 


3/6/18 15:00  100 25 101/79 (86) 96   


 


3/6/18 15:00 98.4 100 21 101/87 96   


 


3/6/18 14:43 98.8 100 21 103/74 95   


 


3/6/18 14:30  100 24 103/74 (84) 95   


 


3/6/18 14:00  101 27 100/68 (79) 97   


 


3/6/18 13:30  101 25 106/67 (80) 97   














 3/7/18 3/7/18 3/7/18





 07:00 15:00 23:00


 


Intake Total 1200 ml  


 


Output Total 1200 ml  


 


Balance 0 ml  








Result Diagram:  


3/7/18 0955                                                                    

            3/7/18 0955





Laboratory Results





Laboratory Tests








Test


  3/6/18


13:50 3/6/18


19:15 3/7/18


04:45 3/7/18


09:55


 


Blood Urea Nitrogen 10 MG/DL    9 MG/DL 


 


Creatinine 0.84 MG/DL    0.94 MG/DL 


 


Random Glucose 126 MG/DL    142 MG/DL 


 


Calcium Level 8.3 MG/DL    7.9 MG/DL 


 


Sodium Level 133 MEQ/L    133 MEQ/L 


 


Potassium Level 3.0 MEQ/L    2.8 MEQ/L 


 


Chloride Level 95 MEQ/L    96 MEQ/L 


 


Carbon Dioxide Level 29.4 MEQ/L    29.7 MEQ/L 


 


Anion Gap 9 MEQ/L    7 MEQ/L 


 


Estimat Glomerular Filtration


Rate 77 ML/MIN 


  


  


  68 ML/MIN 


 


 


Vancomycin Level Trough 19.2 MCG/ML    


 


Hemoglobin  7.7 GM/DL  7.8 GM/DL  7.9 GM/DL 


 


Hematocrit  24.1 %  23.8 %  24.1 % 


 


Activated Partial


Thromboplast Time 


  


  51.0 SEC 


  


 


 


White Blood Count    14.4 TH/MM3 


 


Red Blood Count    3.31 MIL/MM3 


 


Mean Corpuscular Volume    72.7 FL 


 


Mean Corpuscular Hemoglobin    23.9 PG 


 


Mean Corpuscular Hemoglobin


Concent 


  


  


  32.9 % 


 


 


Red Cell Distribution Width    20.4 % 


 


Platelet Count    331 TH/MM3 


 


Mean Platelet Volume    7.9 FL 


 


Neutrophils (%) (Auto)    83.5 % 


 


Lymphocytes (%) (Auto)    7.9 % 


 


Monocytes (%) (Auto)    6.9 % 


 


Eosinophils (%) (Auto)    0.8 % 


 


Basophils (%) (Auto)    0.9 % 


 


Neutrophils # (Auto)    12.0 TH/MM3 


 


Lymphocytes # (Auto)    1.1 TH/MM3 


 


Monocytes # (Auto)    1.0 TH/MM3 


 


Eosinophils # (Auto)    0.1 TH/MM3 


 


Basophils # (Auto)    0.1 TH/MM3 


 


CBC Comment    DIFF FINAL 


 


Differential Comment     


 


Albumin    2.2 GM/DL 


 


Phosphorus Level    3.9 MG/DL 


 


Magnesium Level    1.8 MG/DL 








Culture Results





Microbiology








 Date/Time


Source Procedure


Growth Status


 


 


 3/5/18 12:28


Stool Stool Stool Occult Blood (GILA) - Final


HEMOCCULT NEGATIVE Complete











Administered Medications








 Medications


  (Trade)  Dose


 Ordered  Sig/Nikhil


 Route


 PRN Reason  Start Time


 Stop Time Status Last Admin


Dose Admin


 


 Sodium Chloride


  (NS Flush)  2 ml  UNSCH  PRN


 IV FLUSH


 FLUSH AFTER USING IV ACCESS  2/23/18 22:00


    3/6/18 22:08


 


 


 Sodium Chloride


  (NS Flush)  2 ml  BID


 IV FLUSH


   2/24/18 09:00


    3/7/18 09:43


 


 


 Acetaminophen


  (Tylenol)  650 mg  Q6H  PRN


 PO


 fever  2/23/18 22:00


    3/5/18 16:02


 


 


 Temazepam


  (Restoril)  15 mg  HS  PRN


 PO


 INSOMNIA  2/23/18 22:00


    3/6/18 22:08


 


 


 Chlorhexidine


 Gluconate


  (Chlorhexidine


 2% Cloth)  


 Taper  DAILY@04


 TOP


   2/24/18 04:00


 2/20/19 03:59  3/6/18 03:54


 


 


 Senna/Docusate


 Sodium


  (Arlen-Colace)  1 tab  BID


 PO


   2/24/18 09:00


    3/4/18 20:25


 


 


 Ampicillin Sodium


 2000 mg/Sodium


 Chloride  100 ml @ 


 400 mls/hr  Q6H


 IV


   2/24/18 14:00


    3/7/18 09:43


 


 


 Hydromorphone HCl


  (Dilaudid Pf Inj)  2 mg  Q4H  PRN


 IV PUSH


 PAIN SCALE 6 TO 10  2/25/18 15:00


    3/7/18 10:13


 


 


 Loperamide HCl


  (Imodium)  2 mg  Q4H  PRN


 PO


 Diarrhea  2/26/18 16:15


    2/28/18 14:32


 


 


 Gentamicin


 Sulfate 100 mg/


 Sodium Chloride  102.5 ml @ 


 100 mls/hr  Q8H


 IV


   2/26/18 21:00


    3/7/18 12:43


 


 


 Albuterol Sulfate


  (Albuterol Neb)  2.5 mg  Q2HR NEB  PRN


 NEB


 dyspnea  2/27/18 11:30


    3/6/18 10:51


 


 


 Famotidine


  (Pepcid)  20 mg  BID


 PO


   2/27/18 21:00


    3/7/18 09:42


 


 


 Argatroban 250 mg/


 Sodium Chloride  252.5 ml @ 


 3.11 mls/hr  TITRATE  PRN


 IV


 aPTT < 50  3/1/18 19:15


    3/6/18 21:31


 


 


 Acetaminophen/


 Hydrocodone Bitart


  (Norco  Mg)  1 tab  Q4H  PRN


 PO


 pain 1-5  3/4/18 15:00


    3/7/18 04:35


 


 


 Vancomycin HCl


 1750 mg/Sodium


 Chloride  517.5 ml @ 


 260 mls/hr  Q12H


 IV


   3/5/18 02:00


    3/7/18 02:22


 








Objective Remarks


GENERAL: chronically ill female, lying in bed, resting in nad. On 3L O2 via NC


SKIN: Warm and dry.


HEAD: Normocephalic.


EYES: No injection or drainage. 


NECK: Supple, trachea midline. 


CARDIOVASCULAR: +S1, S2, tachy


RESPIRATORY: scattered rhonchi. on 3L O2 via NC


GASTROINTESTINAL: Abdomen soft, non-tender, nondistended. 


EXTREMITIES: No cyanosis. bilateral lower extremities with edema, left greater 

than right. bilateral dorsalis pedis pulses palpable. 


NEUROLOGICAL: awake, alert. normal speech.





Assessment/Plan


Problem List:  


(1) arterial blood clot


Plan:  --on Argatroban (direct thrombin inhibitor)


-- no significant family history of thrombotic events to suggest hereditary 

antithrombin deficiency.  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 

impaired production from her liver disease. 


--has chronic active hepatitis C.  ++increased consumption from disseminated 

intravascular coagulation and acute illness, bacterial endocarditis.  


--Protein losses are also considered.  She had mild proteinuria when she first 

was admitted.


--Thrombin inactivates antithrombin.  This would abrogate the effect of 

unfractionated heparin and low molecular weight heparin.





(2) Acute septic pulmonary embolism


ICD Codes:  I26.90 - Septic pulmonary embolism without acute cor pulmonale


Plan:  -- The echocardiogram on 3/3 showed an estimated EF of 50-55%.  There 

was severe tricuspid regurgitation with prosthesis in place.  2 x 4 cm mobile 

vegetation was seen on the valve.  The right atrium and right ventricle were 

normal in size and function





(3) Microcytic anemia


ICD Codes:  D50.9 - Iron deficiency anemia, unspecified


Plan:  --likely d/t inflammation/infection. no evidence of bleeding or 

hemolysis. 


--B12/folate WNL


--low iron and percent saturation. normal TIBC, elevated ferritin-->more 

consistent with anemia of chronic disease. 


--stool Hemoccult negative. 


--no evidence of hemolysis





Assessment


36y/o female admitted with recurrent endocarditis. hematology consulted for 

anticoagulation assistance.


h/o Recurrent bacterial endocarditis, bacteremia, history of prosthetic aortic 

valve and subsequent redo, chronic active hepatitis C, intravenous drug use, 

microcytic anemia, consistent with iron deficiency, left lower extremity 

arterial event, bacterial endocarditis with viridans streptococcus.





HPI (brought forward for continuity of care): history of IV drug abuse and  

recurrent endocarditis. had a relapse of her activity. has history of pulmonary 

embolism and infected valves. +significant congestive heart failure and 

presented to the emergency room on 02/23/2018, with sepsis. is followed by 

Infectious Disease for her endocarditis.  previously received antibiotic 

therapy for enterococcus faecalis and staph aureus. There is questionable 

compliance. Her current blood 


culture from 02/23/2018, shows viridans streptococcus.  Two out of two bottles 

were positive.  She is on ampicillin and gentamicin. course is complicated by 

cold left lower extremity, evaluated by  Vascular Surgery.  A CT angiogram 

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 

the proximal thigh.  There is satisfactory calf runoff present.  For this reason

, conservative management with anticoagulation is continued. was placed on 

unfractionated heparin.  Her heparin dose has been titrated from 1000 units/

hour to 2500 units/hour with a PTT remaining subtherapeutic.  Her last PTT is 

32 seconds from 03/01/2018, at 2 p.m.  For this reason, Hematology/Oncology is 

consulted.


Plan


1. continue Argatroban. 


2. start coumadin. 


3. continue Argatroban and coumadin until INR is greater than 4.


Attending Statement


The exam, history, and the medical decision-making described in the above note 

were completed with the assistance of the mid-level provider. I reviewed and 

agree with the findings presented.  I attest that I had a face-to-face 

encounter with the patient on the same day, and personally performed and 

documented my assessment and findings in the medical record.





c/o both legs swelling.


R leg looks improved. Tolerating Coumadin.


Goal INR 2-3.


Still critically ill, tachypnic and tachycardic.


ATIII low, acquired deficiency.











Zoe Chau Mar 7, 2018 13:07


Henny Smith MD Mar 7, 2018 17:59

## 2018-03-07 NOTE — HHI.IDPN
Note


Infectious Disease Note














Patient complains of headache and shortness of breath.


Has cough without sputum production.


Afebrile.


Notes mild pain in the left leg.











37-year-old white female who has a history of endocarditis and has


been admitted several times for the same.  The patient developed shortness of


breath, fever and chills, and  presented to the emergency department.  The


patient is noted to be actively using IV drugs.  She has history of significant


CHF from prior valvular heart disease related to endocarditis.  She states that


she started feeling short of breath about a week ago and the shortness of


breath worsened.  After she completed IV antibiotics in October she was


put on doxycycline prophylaxis.  She reports that she has not been taking the


doxycycline.  


 


PAST MEDICAL HISTORY:


Hepatitis C.


IV drug use.


prosthetic aortic valve replacement in 2011 and then redo aortic


valve replacement in June 2016 





MEDICATIONS:


1. Gentamicin.


2. Ampicillin.


3. Vancomycin.








Current Medications








 Medications


  (Trade)  Dose


 Ordered  Sig/Nikhil


 Route


 PRN Reason  Start Time


 Stop Time Status Last Admin


Dose Admin


 


 Sodium Chloride


  (NS Flush)  2 ml  UNSCH  PRN


 IV FLUSH


 FLUSH AFTER USING IV ACCESS  2/23/18 22:00


    3/6/18 22:08


 


 


 Sodium Chloride


  (NS Flush)  2 ml  BID


 IV FLUSH


   2/24/18 09:00


    3/7/18 09:43


 


 


 Acetaminophen


  (Tylenol)  650 mg  Q6H  PRN


 PO


 fever  2/23/18 22:00


    3/5/18 16:02


 


 


 Ondansetron HCl


  (Zofran Inj)  4 mg  Q6H  PRN


 IV PUSH


 NAUSEA OR VOMITING  2/23/18 22:00


     


 


 


 Temazepam


  (Restoril)  15 mg  HS  PRN


 PO


 INSOMNIA  2/23/18 22:00


    3/6/18 22:08


 


 


 Miscellaneous


 Information  1  Q361D


 XX


   2/23/18 22:00


     


 


 


 Chlorhexidine


 Gluconate


  (Chlorhexidine


 2% Cloth)  


 Taper  DAILY@04


 TOP


   2/24/18 04:00


 2/20/19 03:59  3/6/18 03:54


 


 


 Chlorhexidine


 Gluconate


  (Chlorhexidine


 2% Cloth)  3 pack  UNSCH  PRN


 TOP


 HYGIENIC CARE  2/23/18 22:00


     


 


 


 Senna/Docusate


 Sodium


  (Arlen-Colace)  1 tab  BID


 PO


   2/24/18 09:00


    3/4/18 20:25


 


 


 Magnesium


 Hydroxide


  (Milk Of


 Magnesia Liq)  30 ml  Q12H  PRN


 PO


 Mild constipation  2/23/18 22:00


     


 


 


 Sennosides


  (Senokot)  17.2 mg  Q12H  PRN


 PO


 Moderate constipation  2/23/18 22:00


     


 


 


 Bisacodyl


  (Dulcolax Supp)  10 mg  DAILY  PRN


 RECTAL


 SEVERE CONSITIPATION  2/23/18 22:00


     


 


 


 Lactulose


  (Lactulose Liq)  30 ml  DAILY  PRN


 PO


 SEVERE CONSITIPATION  2/23/18 22:00


     


 


 


 Ampicillin Sodium


 2000 mg/Sodium


 Chloride  100 ml @ 


 400 mls/hr  Q6H


 IV


   2/24/18 14:00


    3/7/18 09:43


 


 


 Hydromorphone HCl


  (Dilaudid Pf Inj)  2 mg  Q4H  PRN


 IV PUSH


 PAIN SCALE 6 TO 10  2/25/18 15:00


    3/7/18 10:13


 


 


 Loperamide HCl


  (Imodium)  2 mg  Q4H  PRN


 PO


 Diarrhea  2/26/18 16:15


    2/28/18 14:32


 


 


 Gentamicin


 Sulfate 100 mg/


 Sodium Chloride  102.5 ml @ 


 100 mls/hr  Q8H


 IV


   2/26/18 21:00


    3/7/18 04:35


 


 


 Albuterol Sulfate


  (Albuterol Neb)  2.5 mg  Q2HR NEB  PRN


 NEB


 dyspnea  2/27/18 11:30


    3/6/18 10:51


 


 


 Famotidine


  (Pepcid)  20 mg  BID


 PO


   2/27/18 21:00


    3/7/18 09:42


 


 


 Argatroban 250 mg/


 Sodium Chloride  252.5 ml @ 


 3.11 mls/hr  TITRATE  PRN


 IV


 aPTT < 50  3/1/18 19:15


    3/6/18 21:31


 


 


 Pharmacy Profile


 Note  0 ml @ 0


 mls/hr  UNSCH


 OTHER


   3/3/18 16:45


     


 


 


 Acetaminophen/


 Hydrocodone Bitart


  (Norco  Mg)  1 tab  Q4H  PRN


 PO


 pain 1-5  3/4/18 15:00


    3/7/18 04:35


 


 


 Vancomycin HCl


 1750 mg/Sodium


 Chloride  517.5 ml @ 


 260 mls/hr  Q12H


 IV


   3/5/18 02:00


    3/7/18 02:22


 


 


 Miscellaneous


 Information  SPECIFIC


 LAB TO BE


 RICCARDO...  ONCE  ONCE


 .XX


   3/8/18 01:45


 3/8/18 01:46   


 











SOCIAL HISTORY:


Positive occasional alcohol.  No tobacco. IV drug use in the form of Dilaudid,


oxycodone.  The patient also uses marijuana.


 








OBJECTIVE:








Vital Signs








  Date Time  Temp Pulse Resp B/P (MAP) Pulse Ox O2 Delivery O2 Flow Rate FiO2


 


3/7/18 12:00 98.5 100 28 107/70 (82) 94   


 


3/7/18 12:00  100      


 


3/7/18 12:00  105      


 


3/7/18 12:00 98.5 105 28 102/76 (85) 94   


 


3/7/18 10:43   20     


 


3/7/18 10:00  105      


 


3/7/18 08:00 98.5 105 28 102/76 (85) 94   


 


3/7/18 08:00  109      


 


3/7/18 07:00     98 Nasal Cannula 3.00 


 


3/7/18 06:00  123      


 


3/7/18 04:00  108      


 


3/7/18 04:00 98.3 107 28 105/69 (81) 88   


 


3/7/18 02:00  109      


 


3/7/18 00:00 98.1 108 31 97/60 (72) 94   


 


3/7/18 00:00  108      


 


3/6/18 22:00  109      


 


3/6/18 20:00 98.2 117 31 101/73 (82) 94   


 


3/6/18 20:00  117      


 


3/6/18 19:00     96 Nasal Cannula 3.00 


 


3/6/18 18:00 98.4 113 35 93/62 (72) 94   


 


3/6/18 17:30  114 33 103/65 (78) 79   


 


3/6/18 17:00  109 26 103/74 (84) 97   


 


3/6/18 16:30  103 46 99/67 (78) 96   


 


3/6/18 16:00  104 28 100/74 (83) 100   


 


3/6/18 15:46  104 30 102/78 (86) 94   


 


3/6/18 15:30  103 32 102/73 (83) 93   


 


3/6/18 15:15  100 24 102/74 (83) 97   


 


3/6/18 15:00  100 25 101/79 (86) 96   


 


3/6/18 15:00 98.4 100 21 101/87 96   


 


3/6/18 14:43 98.8 100 21 103/74 95   


 


3/6/18 14:30  100 24 103/74 (84) 95   


 


3/6/18 14:00  101 27 100/68 (79) 97   


 


3/6/18 13:30  101 25 106/67 (80) 97   


 


3/6/18 13:00  105 25 115/76 (89) 96   











Laboratory Tests








Test


  3/6/18


04:50 3/6/18


19:15 3/7/18


04:45 3/7/18


09:55


 


White Blood Count 16.3 TH/MM3    14.4 TH/MM3 


 


Red Blood Count 2.94 MIL/MM3    3.31 MIL/MM3 


 


Hemoglobin 6.8 GM/DL  7.7 GM/DL  7.8 GM/DL  7.9 GM/DL 


 


Hematocrit 21.1 %  24.1 %  23.8 %  24.1 % 


 


Mean Corpuscular Volume 71.6 FL    72.7 FL 


 


Mean Corpuscular Hemoglobin 23.0 PG    23.9 PG 


 


Mean Corpuscular Hemoglobin


Concent 32.2 % 


  


  


  32.9 % 


 


 


Red Cell Distribution Width 19.2 %    20.4 % 


 


Platelet Count 342 TH/MM3    331 TH/MM3 


 


Mean Platelet Volume 8.3 FL    7.9 FL 


 


Reticulocyte Count 3.6 %    


 


Absolute Reticulocyte Count 104.4 MIL/L    


 


Neutrophils (%) (Auto)    83.5 % 


 


Lymphocytes (%) (Auto)    7.9 % 


 


Monocytes (%) (Auto)    6.9 % 


 


Eosinophils (%) (Auto)    0.8 % 


 


Basophils (%) (Auto)    0.9 % 


 


Neutrophils # (Auto)    12.0 TH/MM3 


 


Lymphocytes # (Auto)    1.1 TH/MM3 


 


Monocytes # (Auto)    1.0 TH/MM3 


 


Eosinophils # (Auto)    0.1 TH/MM3 


 


Basophils # (Auto)    0.1 TH/MM3 


 


CBC Comment    DIFF FINAL 


 


Differential Comment     








Laboratory Tests








Test


  3/5/18


15:25 3/6/18


13:50 3/7/18


09:55


 


Iron Level 22 MCG/DL   


 


Total Iron Binding Capacity 288 MCG/DL   


 


Percent Iron Saturation 7.6 %   


 


Vitamin B12 Level 842 PG/ML   


 


Folate 12.4 NG/ML   


 


Blood Urea Nitrogen  10 MG/DL  9 MG/DL 


 


Creatinine  0.84 MG/DL  0.94 MG/DL 


 


Random Glucose  126 MG/DL  142 MG/DL 


 


Calcium Level  8.3 MG/DL  7.9 MG/DL 


 


Sodium Level  133 MEQ/L  133 MEQ/L 


 


Potassium Level  3.0 MEQ/L  2.8 MEQ/L 


 


Chloride Level  95 MEQ/L  96 MEQ/L 


 


Carbon Dioxide Level  29.4 MEQ/L  29.7 MEQ/L 


 


Anion Gap  9 MEQ/L  7 MEQ/L 


 


Estimat Glomerular Filtration


Rate 


  77 ML/MIN 


  68 ML/MIN 


 


 


Albumin   2.2 GM/DL 


 


Phosphorus Level   3.9 MG/DL 


 


Magnesium Level   1.8 MG/DL 








Microbiology








 Date/Time


Source Procedure


Growth Status


 


 


 3/5/18 12:28


Stool Stool Stool Occult Blood (GILA) - Final


HEMOCCULT NEGATIVE Complete











IMAGING:














Chest X-Ray 3/5/18 0600 Signed





Impressions: 





 Service Date/Time:  Monday, March 5, 2018 03:52 - CONCLUSION:  There is a 

small 





 to moderate size right pleural effusion with associated volume loss and/or 





 airspace consolidation. The pleural effusion has increased from the prior 





 examination.     Ron Melgar MD 


 


Head CT 3/3/18 0000 Signed





Impressions: 





 Service Date/Time:  Saturday, March 3, 2018 10:22 - CONCLUSION:  1. Focal area 





 of decreased density involving the right parietal lobe. As a new finding from 





 the prior exam. This could relate to a small infarct. MRI of the brain with 





 gadolinium suggested to further evaluate. 2. Small lacunar infarction 

involving 





 the left centrum semiovale.     Scooter Dawson Jr., MD 


 


CT Angiography 3/3/18 0000 Signed





Impressions: 





 Service Date/Time:  Saturday, March 3, 2018 10:28 - CONCLUSION:  There 

extensive 





 pulmonary emboli bilaterally. There is near complete cut off of the right 

lower 





 lobe pulmonary artery. Prominent filling defects on the left as well. These 





 findings were relayed to Dr. Bustos immediately at the completion of the study.  





 Scattered pulmonary infiltrates, small pleural effusion and extensive 





 mediastinal lymphadenopathy as described above.      Won Sharp MD 


 


Brain MRI 3/3/18 0000 Signed





Impressions: 





 Service Date/Time:  Saturday, March 3, 2018 18:04 - CONCLUSION:  Deterioration 





 in the appearance of the scan.  At least 3 focal areas of abnormal contrast 





 enhancement are present scattered to in both hemispheres.  Given the history 





 this most likely represents developing areas cerebritis.  Exam is compromised 

by 





 an uncooperative patient and motion artifact.  These 2 factors make detection 

of 





 other abnormalities such as cortical cephalitis and meningitis difficult to 





 exclude.  Cannot exclude hemorrhagic lesions as well.  There is no mass effect 





 evident.      Jorge Diane MD 




















Renal Ultrasound 2/24/18 0000 Signed





Impressions: 





 Service Date/Time:  Saturday, February 24, 2018 10:53 - CONCLUSION:  1. No 





 evidence of hydronephrosis. 2. Thickening of the urinary bladder wall a 1.1 

cm. 





 3. Prominent splenomegaly.     Elgin Walton MD 


 


Chest X-Ray 2/24/18 0000 Signed





Impressions: 





 Service Date/Time:  Saturday, February 24, 2018 09:36 - CONCLUSION:  Right 





 internal jugular central line in place with the tip overlying the SVC. A 





 pneumothorax is not seen.     Ron Brown MD 


 


Breast Ultrasound 2/24/18 0000 Signed





Impressions: 





 Service Date/Time:  Saturday, February 24, 2018 10:48 - CONCLUSION:  Negative 





 targeted right breast ultrasound examination.     Ron Brown MD 


 


Lower Extremity Ultrasound 2/23/18 0000 Signed





Impressions: 





 Service Date/Time:  Friday, February 23, 2018 21:22 - CONCLUSION:  1. No 





 sonographic evidence for lower extremity DVT. 2. Incidental note of bilateral 





 inguinal adenopathy with the largest on the right measuring 3.3 cm and the 





 largest on the left measuring 2.6 cm. This finding is nonspecific but is 





 typically reactive in etiology.     Rj Whitten MD 














PHYSICAL EXAMINATION


GENERAL: No acute distress. 


HEENT:  No icterus.  Oropharynx moist mucosa, no lesions.


Neck:  Supple without adenopathy.


Lungs: Coarse bilateral rhonchi.  


Heart: 5/6 blowing systolic murmur at the upper right sternal border.


Abdomen: Bowel sounds present, soft, obese, nontender.


Extremities: Diffuse 1+ edema of the lower extremities. Lesions at Left tibia 


distally and left foot and great toe are more red and not purpuric as before.


Toes 3 and 5 are no longer cyanotic.  Pupils are rate lesion at the tuft of the 


left fifth toe.  Left leg warm.


No calf tenderness.   No nail bed hemorrhages.  


SKIN: no diffuse rash.


Neuro: No gross focal findings.


Psychiatric: calm and cooperative.





 


IMPRESSION


1. Sepsis. Strep Viridans. 


2.  Tricuspid valve endocarditis.  Large vegetation.  Tricuspid regurgitation.


Patient evaluated by cardiovascular surgery and felt not to be a surgical 

candidate.


3.  Bacteremia.  Strep viridans.


Repeat blood cultures are pending.  Previous repeat blood cultures were 

negative.


4. History of prosthetic aortic valve and so likely recurrent prosthetic valve


endocarditis.


5.  Cerebritis on MRI.  Also has parietal infarct noted on CT scan of the brain.


6. Left renal cortical and pulmonary and lower extremity septic emboli. 


7. Leukocytosis secondary to sepsis from showering of emboli from endocarditis.


8. Acute renal failure. Kidney function improved. 


Patient remains critically ill.  





RECOMMENDATIONS


1. Continue Ampicillin intravenous.


2. Continue Gentamicin. now that the kidney function is improved but follow the 

renal function. 


3. Continue vancomycin.  Pharmacy managing.


4. Monitor clinical response.


5. Continue to monitor the left leg lesions.


6. Consider lumbar puncture if the mentation deteriorates.  














Robinson Carr MD Mar 7, 2018 12:48

## 2018-03-08 VITALS — SYSTOLIC BLOOD PRESSURE: 91 MMHG | HEART RATE: 107 BPM | OXYGEN SATURATION: 94 % | DIASTOLIC BLOOD PRESSURE: 65 MMHG

## 2018-03-08 VITALS — OXYGEN SATURATION: 97 % | HEART RATE: 105 BPM

## 2018-03-08 VITALS
OXYGEN SATURATION: 97 % | HEART RATE: 109 BPM | DIASTOLIC BLOOD PRESSURE: 59 MMHG | SYSTOLIC BLOOD PRESSURE: 109 MMHG | TEMPERATURE: 99.8 F | RESPIRATION RATE: 22 BRPM

## 2018-03-08 VITALS
HEART RATE: 116 BPM | TEMPERATURE: 99.8 F | OXYGEN SATURATION: 98 % | SYSTOLIC BLOOD PRESSURE: 93 MMHG | DIASTOLIC BLOOD PRESSURE: 73 MMHG | RESPIRATION RATE: 24 BRPM

## 2018-03-08 VITALS
HEART RATE: 96 BPM | SYSTOLIC BLOOD PRESSURE: 98 MMHG | TEMPERATURE: 98.8 F | OXYGEN SATURATION: 95 % | DIASTOLIC BLOOD PRESSURE: 66 MMHG | RESPIRATION RATE: 24 BRPM

## 2018-03-08 VITALS — HEART RATE: 108 BPM

## 2018-03-08 VITALS
SYSTOLIC BLOOD PRESSURE: 103 MMHG | OXYGEN SATURATION: 99 % | TEMPERATURE: 98.3 F | RESPIRATION RATE: 22 BRPM | HEART RATE: 100 BPM | DIASTOLIC BLOOD PRESSURE: 72 MMHG

## 2018-03-08 VITALS — OXYGEN SATURATION: 77 % | HEART RATE: 124 BPM

## 2018-03-08 VITALS
DIASTOLIC BLOOD PRESSURE: 59 MMHG | SYSTOLIC BLOOD PRESSURE: 105 MMHG | OXYGEN SATURATION: 100 % | RESPIRATION RATE: 20 BRPM | TEMPERATURE: 98.5 F | HEART RATE: 102 BPM

## 2018-03-08 VITALS
HEART RATE: 100 BPM | SYSTOLIC BLOOD PRESSURE: 102 MMHG | TEMPERATURE: 98.7 F | DIASTOLIC BLOOD PRESSURE: 66 MMHG | OXYGEN SATURATION: 96 %

## 2018-03-08 VITALS — OXYGEN SATURATION: 98 %

## 2018-03-08 VITALS — HEART RATE: 100 BPM

## 2018-03-08 VITALS — HEART RATE: 96 BPM

## 2018-03-08 VITALS — HEART RATE: 104 BPM

## 2018-03-08 VITALS — HEART RATE: 109 BPM

## 2018-03-08 LAB
ALBUMIN SERPL-MCNC: 2 GM/DL (ref 3.4–5)
BASOPHILS # BLD AUTO: 0.1 TH/MM3 (ref 0–0.2)
BASOPHILS NFR BLD: 0.7 % (ref 0–2)
BUN SERPL-MCNC: 8 MG/DL (ref 7–18)
CALCIUM SERPL-MCNC: 8.1 MG/DL (ref 8.5–10.1)
CHLORIDE SERPL-SCNC: 99 MEQ/L (ref 98–107)
CREAT SERPL-MCNC: 0.84 MG/DL (ref 0.5–1)
EOSINOPHIL # BLD: 0.2 TH/MM3 (ref 0–0.4)
EOSINOPHIL NFR BLD: 1.2 % (ref 0–4)
ERYTHROCYTE [DISTWIDTH] IN BLOOD BY AUTOMATED COUNT: 21 % (ref 11.6–17.2)
GFR SERPLBLD BASED ON 1.73 SQ M-ARVRAT: 77 ML/MIN (ref 89–?)
GLUCOSE SERPL-MCNC: 106 MG/DL (ref 74–106)
HCO3 BLD-SCNC: 30 MEQ/L (ref 21–32)
HCT VFR BLD CALC: 22.6 % (ref 35–46)
HGB BLD-MCNC: 7.4 GM/DL (ref 11.6–15.3)
INR PPP: 2.1 RATIO
LYMPHOCYTES # BLD AUTO: 1.5 TH/MM3 (ref 1–4.8)
LYMPHOCYTES NFR BLD AUTO: 10.8 % (ref 9–44)
MAGNESIUM SERPL-MCNC: 1.9 MG/DL (ref 1.5–2.5)
MCH RBC QN AUTO: 24.1 PG (ref 27–34)
MCHC RBC AUTO-ENTMCNC: 32.8 % (ref 32–36)
MCV RBC AUTO: 73.4 FL (ref 80–100)
MONOCYTE #: 1.1 TH/MM3 (ref 0–0.9)
MONOCYTES NFR BLD: 8.4 % (ref 0–8)
NEUTROPHILS # BLD AUTO: 10.7 TH/MM3 (ref 1.8–7.7)
NEUTROPHILS NFR BLD AUTO: 78.9 % (ref 16–70)
PHOSPHATE SERPL-MCNC: 3.8 MG/DL (ref 2.5–4.9)
PLATELET # BLD: 317 TH/MM3 (ref 150–450)
PMV BLD AUTO: 7.8 FL (ref 7–11)
PROTHROMBIN TIME: 21.2 SEC (ref 9.8–11.6)
RBC # BLD AUTO: 3.08 MIL/MM3 (ref 4–5.3)
SODIUM SERPL-SCNC: 137 MEQ/L (ref 136–145)
VANCOMYCIN TROUGH SERPL-MCNC: 18.6 MCG/ML (ref 5–10)
WBC # BLD AUTO: 13.6 TH/MM3 (ref 4–11)

## 2018-03-08 RX ADMIN — HYDROCODONE BITARTRATE AND ACETAMINOPHEN PRN TAB: 10; 325 TABLET ORAL at 10:24

## 2018-03-08 RX ADMIN — CLINDAMYCIN PHOSPHATE SCH MLS/HR: 150 INJECTION, SOLUTION INTRAMUSCULAR; INTRAVENOUS at 05:09

## 2018-03-08 RX ADMIN — STANDARDIZED SENNA CONCENTRATE AND DOCUSATE SODIUM SCH TAB: 8.6; 5 TABLET, FILM COATED ORAL at 07:21

## 2018-03-08 RX ADMIN — CLINDAMYCIN PHOSPHATE SCH MLS/HR: 150 INJECTION, SOLUTION INTRAMUSCULAR; INTRAVENOUS at 13:57

## 2018-03-08 RX ADMIN — HYDROMORPHONE HYDROCHLORIDE PRN MG: 2 INJECTION INTRAMUSCULAR; INTRAVENOUS; SUBCUTANEOUS at 21:21

## 2018-03-08 RX ADMIN — AMPICILLIN SODIUM SCH MLS/HR: 2 INJECTION, POWDER, FOR SOLUTION INTRAMUSCULAR; INTRAVENOUS at 21:22

## 2018-03-08 RX ADMIN — AMPICILLIN SODIUM SCH MLS/HR: 2 INJECTION, POWDER, FOR SOLUTION INTRAMUSCULAR; INTRAVENOUS at 03:28

## 2018-03-08 RX ADMIN — FAMOTIDINE SCH MG: 20 TABLET, FILM COATED ORAL at 21:21

## 2018-03-08 RX ADMIN — Medication SCH ML: at 21:21

## 2018-03-08 RX ADMIN — ALBUTEROL SULFATE PRN MG: 2.5 SOLUTION RESPIRATORY (INHALATION) at 16:59

## 2018-03-08 RX ADMIN — SODIUM CHLORIDE SCH MLS/HR: 900 INJECTION INTRAVENOUS at 14:58

## 2018-03-08 RX ADMIN — POTASSIUM CHLORIDE SCH MEQ: 20 TABLET, EXTENDED RELEASE ORAL at 21:30

## 2018-03-08 RX ADMIN — AMPICILLIN SODIUM SCH MLS/HR: 2 INJECTION, POWDER, FOR SOLUTION INTRAMUSCULAR; INTRAVENOUS at 12:53

## 2018-03-08 RX ADMIN — STANDARDIZED SENNA CONCENTRATE AND DOCUSATE SODIUM SCH TAB: 8.6; 5 TABLET, FILM COATED ORAL at 21:21

## 2018-03-08 RX ADMIN — FAMOTIDINE SCH MG: 20 TABLET, FILM COATED ORAL at 08:13

## 2018-03-08 RX ADMIN — WARFARIN SODIUM SCH MG: 2 TABLET ORAL at 14:57

## 2018-03-08 RX ADMIN — CLINDAMYCIN PHOSPHATE SCH MLS/HR: 150 INJECTION, SOLUTION INTRAMUSCULAR; INTRAVENOUS at 21:22

## 2018-03-08 RX ADMIN — AMPICILLIN SODIUM SCH MLS/HR: 2 INJECTION, POWDER, FOR SOLUTION INTRAMUSCULAR; INTRAVENOUS at 08:13

## 2018-03-08 RX ADMIN — HYDROMORPHONE HYDROCHLORIDE PRN MG: 2 INJECTION INTRAMUSCULAR; INTRAVENOUS; SUBCUTANEOUS at 17:10

## 2018-03-08 RX ADMIN — ALBUTEROL SULFATE PRN MG: 2.5 SOLUTION RESPIRATORY (INHALATION) at 22:07

## 2018-03-08 RX ADMIN — HYDROMORPHONE HYDROCHLORIDE PRN MG: 2 INJECTION INTRAMUSCULAR; INTRAVENOUS; SUBCUTANEOUS at 12:54

## 2018-03-08 RX ADMIN — Medication PRN ML: at 08:18

## 2018-03-08 RX ADMIN — HYDROCODONE BITARTRATE AND ACETAMINOPHEN PRN TAB: 10; 325 TABLET ORAL at 19:05

## 2018-03-08 RX ADMIN — HYDROMORPHONE HYDROCHLORIDE PRN MG: 2 INJECTION INTRAMUSCULAR; INTRAVENOUS; SUBCUTANEOUS at 08:14

## 2018-03-08 RX ADMIN — HYDROCODONE BITARTRATE AND ACETAMINOPHEN PRN TAB: 10; 325 TABLET ORAL at 05:07

## 2018-03-08 RX ADMIN — SODIUM CHLORIDE SCH MLS/HR: 900 INJECTION INTRAVENOUS at 03:28

## 2018-03-08 RX ADMIN — Medication SCH ML: at 08:22

## 2018-03-08 RX ADMIN — HYDROMORPHONE HYDROCHLORIDE PRN MG: 2 INJECTION INTRAMUSCULAR; INTRAVENOUS; SUBCUTANEOUS at 03:29

## 2018-03-08 RX ADMIN — HYDROCODONE BITARTRATE AND ACETAMINOPHEN PRN TAB: 10; 325 TABLET ORAL at 14:58

## 2018-03-08 RX ADMIN — HYDROCODONE BITARTRATE AND ACETAMINOPHEN PRN TAB: 10; 325 TABLET ORAL at 23:57

## 2018-03-08 RX ADMIN — CHLORHEXIDINE GLUCONATE SCH PACK: 500 CLOTH TOPICAL at 04:00

## 2018-03-08 NOTE — PD.ONC.PN
Subjective


Subjective


Remarks


Afebrile overnight. 


Patient resting in room in nad. 


getting ready to be transferred to a med/surg floor. 


states she feels the same as every other day. 


tired of being in the hospital. 


started her period yesterday.





Objective


Data











  Date Time  Temp Pulse Resp B/P (MAP) Pulse Ox O2 Delivery O2 Flow Rate FiO2


 


3/8/18 10:00  108      


 


3/8/18 09:00  124      


 


3/8/18 09:00  124   77   


 


3/8/18 08:01  107  91/65 (74) 94   


 


3/8/18 08:00  108      


 


3/8/18 07:00  105   95   


 


3/8/18 07:00     97 Nasal Cannula 3.00 


 


3/8/18 07:00  105      


 


3/8/18 06:00  104      


 


3/8/18 04:00 98.8 96 24 98/66 (77) 95   


 


3/8/18 04:00  102      


 


3/8/18 02:00  96      


 


3/8/18 00:00 98.7 100  102/66 (78) 96   


 


3/8/18 00:00  97      


 


3/7/18 22:00  101      


 


3/7/18 21:07     99 Nasal Cannula 2.00 


 


3/7/18 20:00 98.3 110 20 112/70 (84) 96   


 


3/7/18 20:00  102      


 


3/7/18 19:00     97 Nasal Cannula 3.00 


 


3/7/18 18:00  112      


 


3/7/18 16:00 98.5 100 28 107/70 (82) 94   


 


3/7/18 16:00  104      


 


3/7/18 16:00 98.9 104 25 98/68 (78) 96   


 


3/7/18 14:01     94 Nasal Cannula 2.00 


 


3/7/18 14:00  106      


 


3/7/18 12:00 98.5 100 28 107/70 (82) 94   


 


3/7/18 12:00  100      


 


3/7/18 12:00  105      


 


3/7/18 12:00 98.5 105 28 102/76 (85) 94   














 3/8/18 3/8/18 3/8/18





 07:00 15:00 23:00


 


Intake Total 1620.0 ml  


 


Output Total 1700 ml  


 


Balance -80.0 ml  








Result Diagram:  


3/8/18 0350                                                                    

            3/8/18 0350





Laboratory Results





Laboratory Tests








Test


  3/7/18


17:45 3/7/18


21:45 3/8/18


03:50 3/8/18


06:55


 


Potassium Level  3.4 MEQ/L  3.2 MEQ/L  


 


White Blood Count   13.6 TH/MM3  


 


Red Blood Count   3.08 MIL/MM3  


 


Hemoglobin   7.4 GM/DL  


 


Hematocrit   22.6 %  


 


Mean Corpuscular Volume   73.4 FL  


 


Mean Corpuscular Hemoglobin   24.1 PG  


 


Mean Corpuscular Hemoglobin


Concent 


  


  32.8 % 


  


 


 


Red Cell Distribution Width   21.0 %  


 


Platelet Count   317 TH/MM3  


 


Mean Platelet Volume   7.8 FL  


 


Neutrophils (%) (Auto)   78.9 %  


 


Lymphocytes (%) (Auto)   10.8 %  


 


Monocytes (%) (Auto)   8.4 %  


 


Eosinophils (%) (Auto)   1.2 %  


 


Basophils (%) (Auto)   0.7 %  


 


Neutrophils # (Auto)   10.7 TH/MM3  


 


Lymphocytes # (Auto)   1.5 TH/MM3  


 


Monocytes # (Auto)   1.1 TH/MM3  


 


Eosinophils # (Auto)   0.2 TH/MM3  


 


Basophils # (Auto)   0.1 TH/MM3  


 


CBC Comment   DIFF FINAL  


 


Differential Comment     


 


Activated Partial


Thromboplast Time 


  


  40.1 SEC 


  53.9 SEC 


 


 


Blood Urea Nitrogen   8 MG/DL  


 


Creatinine   0.84 MG/DL  


 


Random Glucose   106 MG/DL  


 


Albumin   2.0 GM/DL  


 


Calcium Level   8.1 MG/DL  


 


Phosphorus Level   3.8 MG/DL  


 


Magnesium Level   1.9 MG/DL  


 


Sodium Level   137 MEQ/L  


 


Chloride Level   99 MEQ/L  


 


Carbon Dioxide Level   30.0 MEQ/L  


 


Anion Gap   8 MEQ/L  


 


Estimat Glomerular Filtration


Rate 


  


  77 ML/MIN 


  


 


 


Vancomycin Level Trough   18.6 MCG/ML  








Culture Results





Microbiology








 Date/Time


Source Procedure


Growth Status


 


 


 3/5/18 12:28


Stool Stool Stool Occult Blood (GILA) - Final


HEMOCCULT NEGATIVE Complete











Administered Medications








 Medications


  (Trade)  Dose


 Ordered  Sig/Nikhil


 Route


 PRN Reason  Start Time


 Stop Time Status Last Admin


Dose Admin


 


 Sodium Chloride


  (NS Flush)  2 ml  UNSCH  PRN


 IV FLUSH


 FLUSH AFTER USING IV ACCESS  2/23/18 22:00


    3/8/18 08:18


 


 


 Sodium Chloride


  (NS Flush)  2 ml  BID


 IV FLUSH


   2/24/18 09:00


    3/8/18 08:22


 


 


 Acetaminophen


  (Tylenol)  650 mg  Q6H  PRN


 PO


 fever  2/23/18 22:00


    3/5/18 16:02


 


 


 Temazepam


  (Restoril)  15 mg  HS  PRN


 PO


 INSOMNIA  2/23/18 22:00


    3/6/18 22:08


 


 


 Chlorhexidine


 Gluconate


  (Chlorhexidine


 2% Cloth)  


 Taper  DAILY@04


 TOP


   2/24/18 04:00


 2/20/19 03:59  3/6/18 03:54


 


 


 Senna/Docusate


 Sodium


  (Arlen-Colace)  1 tab  BID


 PO


   2/24/18 09:00


    3/7/18 21:24


 


 


 Ampicillin Sodium


 2000 mg/Sodium


 Chloride  100 ml @ 


 400 mls/hr  Q6H


 IV


   2/24/18 14:00


    3/8/18 08:13


 


 


 Loperamide HCl


  (Imodium)  2 mg  Q4H  PRN


 PO


 Diarrhea  2/26/18 16:15


    2/28/18 14:32


 


 


 Gentamicin


 Sulfate 100 mg/


 Sodium Chloride  102.5 ml @ 


 100 mls/hr  Q8H


 IV


   2/26/18 21:00


    3/8/18 05:09


 


 


 Albuterol Sulfate


  (Albuterol Neb)  2.5 mg  Q2HR NEB  PRN


 NEB


 dyspnea  2/27/18 11:30


    3/7/18 21:05


 


 


 Famotidine


  (Pepcid)  20 mg  BID


 PO


   2/27/18 21:00


    3/8/18 08:13


 


 


 Argatroban 250 mg/


 Sodium Chloride  252.5 ml @ 


 3.11 mls/hr  TITRATE  PRN


 IV


 aPTT < 50  3/1/18 19:15


    3/6/18 21:31


 


 


 Acetaminophen/


 Hydrocodone Bitart


  (Norco  Mg)  1 tab  Q4H  PRN


 PO


 pain 1-5  3/4/18 15:00


    3/8/18 10:24


 


 


 Vancomycin HCl


 1750 mg/Sodium


 Chloride  517.5 ml @ 


 260 mls/hr  Q12H


 IV


   3/5/18 02:00


    3/8/18 03:28


 


 


 Warfarin Sodium


  (Coumadin)  2 mg  DAILY@16


 PO


   3/7/18 16:00


    3/7/18 14:44


 


 


 Hydromorphone HCl


  (Dilaudid Pf Inj)  1 mg  Q4H  PRN


 IV PUSH


 PAIN SCALE 6 TO 10  3/7/18 19:00


    3/8/18 08:14


 








Objective Remarks


GENERAL: chronically ill female sitting up in wheelchair, appears chronically 

dyspneic. 


SKIN: Warm and dry.


HEAD: Normocephalic.


EYES: No injection or drainage. 


NECK: Supple, trachea midline. 


CARDIOVASCULAR: +S1, S2, tachy


RESPIRATORY: diminished at bases, occasional rhonchi. on O2 via NC


GASTROINTESTINAL: Abdomen soft, non-tender, nondistended. 


EXTREMITIES: bilateral dorsalis pedis pulses palpable. 


NEUROLOGICAL: awake, alert. normal speech. able to move extremities.





Assessment/Plan


Problem List:  


(1) arterial blood clot


Plan:  --on Argatroban bridge to coumadin. 


-- no significant family history of thrombotic events to suggest hereditary 

antithrombin deficiency.  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 

impaired production from her liver disease. 


--has chronic active hepatitis C.  ++increased consumption from disseminated 

intravascular coagulation and acute illness, bacterial endocarditis.  


--Protein losses are also considered.  She had mild proteinuria when she first 

was admitted.


--Thrombin inactivates antithrombin.  This would abrogate the effect of 

unfractionated heparin and low molecular weight heparin.





(2) Acute septic pulmonary embolism


ICD Codes:  I26.90 - Septic pulmonary embolism without acute cor pulmonale


Plan:  -- The echocardiogram on 3/3 showed an estimated EF of 50-55%.  There 

was severe tricuspid regurgitation with prosthesis in place.  2 x 4 cm mobile 

vegetation was seen on the valve.  The right atrium and right ventricle were 

normal in size and function





(3) Microcytic anemia


ICD Codes:  D50.9 - Iron deficiency anemia, unspecified


Plan:  --likely d/t inflammation/infection. no evidence of bleeding or 

hemolysis. 


--B12/folate WNL


--low iron and percent saturation. normal TIBC, elevated ferritin-->more 

consistent with anemia of chronic disease. 


--stool Hemoccult negative. 


--no evidence of hemolysis





Assessment


36y/o female admitted with recurrent endocarditis. hematology consulted for 

anticoagulation assistance.


h/o Recurrent bacterial endocarditis, bacteremia, history of prosthetic aortic 

valve and subsequent redo, chronic active hepatitis C, intravenous drug use, 

microcytic anemia, consistent with iron deficiency, left lower extremity 

arterial event, bacterial endocarditis with viridans streptococcus.





HPI (brought forward for continuity of care): history of IV drug abuse and  

recurrent endocarditis. had a relapse of her activity. has history of pulmonary 

embolism and infected valves. +significant congestive heart failure and 

presented to the emergency room on 02/23/2018, with sepsis. is followed by 

Infectious Disease for her endocarditis.  previously received antibiotic 

therapy for enterococcus faecalis and staph aureus. There is questionable 

compliance. Her current blood 


culture from 02/23/2018, shows viridans streptococcus.  Two out of two bottles 

were positive.  She is on ampicillin and gentamicin. course is complicated by 

cold left lower extremity, evaluated by  Vascular Surgery.  A CT angiogram 

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 

the proximal thigh.  There is satisfactory calf runoff present.  For this reason

, conservative management with anticoagulation is continued. was placed on 

unfractionated heparin.  Her heparin dose has been titrated from 1000 units/

hour to 2500 units/hour with a PTT remaining subtherapeutic.  Her last PTT is 

32 seconds from 03/01/2018, at 2 p.m.  For this reason, Hematology/Oncology is 

consulted.


Plan


1. continue Argatroban bridge to coumadin (INR should be greater than 4.0 

before argatroban is stopped)


2. monitor INR daily.


Attending Statement


The exam, history, and the medical decision-making described in the above note 

were completed with the assistance of the mid-level provider. I reviewed and 

agree with the findings presented.  I attest that I had a face-to-face 

encounter with the patient on the same day, and personally performed and 

documented my assessment and findings in the medical record.





Pt seen and examined.


Sitting a side of bed, very SOB with simple activity of getting back to bed.


Leg feels better, leaning on it as she scuttles up in bed.


BL edema unchanged.


Discussed plan to continue anticoagulant therapy bridge to therapeutic InR w/ 

Coumadin.











Zoe Chau Mar 8, 2018 11:28


Henny Smith MD Mar 8, 2018 13:53

## 2018-03-08 NOTE — PD.VS.PN
Subjective


Subjective/Hospital Course


Pt w/ Improved L LE swelling, discomfort and appearance





Objective


Vitals/I&O











  Date Time  Temp Pulse Resp B/P (MAP) Pulse Ox O2 Delivery O2 Flow Rate FiO2


 


3/8/18 10:00  108      


 


3/8/18 09:00  124      


 


3/8/18 09:00  124   77   


 


3/8/18 08:01  107  91/65 (74) 94   


 


3/8/18 08:00  108      


 


3/8/18 07:00  105   95   


 


3/8/18 07:00     97 Nasal Cannula 3.00 


 


3/8/18 07:00  105      


 


3/8/18 06:00  104      


 


3/8/18 04:00 98.8 96 24 98/66 (77) 95   


 


3/8/18 04:00  102      


 


3/8/18 02:00  96      


 


3/8/18 00:00 98.7 100  102/66 (78) 96   


 


3/8/18 00:00  97      


 


3/7/18 22:00  101      


 


3/7/18 21:07     99 Nasal Cannula 2.00 


 


3/7/18 20:00 98.3 110 20 112/70 (84) 96   


 


3/7/18 20:00  102      


 


3/7/18 19:00     97 Nasal Cannula 3.00 


 


3/7/18 18:00  112      


 


3/7/18 16:00 98.5 100 28 107/70 (82) 94   


 


3/7/18 16:00  104      


 


3/7/18 16:00 98.9 104 25 98/68 (78) 96   


 


3/7/18 14:01     94 Nasal Cannula 2.00 


 


3/7/18 14:00  106      


 


3/7/18 12:00 98.5 100 28 107/70 (82) 94   


 


3/7/18 12:00  100      


 


3/7/18 12:00  105      


 


3/7/18 12:00 98.5 105 28 102/76 (85) 94   














 3/8/18 3/8/18 3/8/18





 07:00 15:00 23:00


 


Intake Total 1620.0 ml  


 


Output Total 1700 ml  


 


Balance -80.0 ml  








Physical Exam


GENERAL: A&OX3,NAD,GCS15


SKIN: LE Warm and dry w/ motor intact 


HEAD: Normocephalic.


EYES: No scleral icterus. No injection or drainage. 


NECK: Supple, trachea midline. No JVD or lymphadenopathy.


CARDIOVASCULAR: Regular rate and rhythm without murmurs, gallops, or rubs. 


RESPIRATORY: Breath sounds equal bilaterally. No accessory muscle use.


Pt with stable improved L LE swelling and discoloration


Strong multiphasic R DP/PT heard via Doppler


Laboratory





Laboratory Tests








Test


  3/7/18


17:45 3/7/18


21:45 3/8/18


03:50 3/8/18


06:55


 


Potassium Level  3.4  3.2  


 


White Blood Count   13.6  


 


Red Blood Count   3.08  


 


Hemoglobin   7.4  


 


Hematocrit   22.6  


 


Mean Corpuscular Volume   73.4  


 


Mean Corpuscular Hemoglobin   24.1  


 


Mean Corpuscular Hemoglobin


Concent 


  


  32.8 


  


 


 


Red Cell Distribution Width   21.0  


 


Platelet Count   317  


 


Mean Platelet Volume   7.8  


 


Neutrophils (%) (Auto)   78.9  


 


Lymphocytes (%) (Auto)   10.8  


 


Monocytes (%) (Auto)   8.4  


 


Eosinophils (%) (Auto)   1.2  


 


Basophils (%) (Auto)   0.7  


 


Neutrophils # (Auto)   10.7  


 


Lymphocytes # (Auto)   1.5  


 


Monocytes # (Auto)   1.1  


 


Eosinophils # (Auto)   0.2  


 


Basophils # (Auto)   0.1  


 


CBC Comment   DIFF FINAL  


 


Differential Comment     


 


Activated Partial


Thromboplast Time 


  


  40.1 


  53.9 


 


 


Blood Urea Nitrogen   8  


 


Creatinine   0.84  


 


Random Glucose   106  


 


Albumin   2.0  


 


Calcium Level   8.1  


 


Phosphorus Level   3.8  


 


Magnesium Level   1.9  


 


Sodium Level   137  


 


Chloride Level   99  


 


Carbon Dioxide Level   30.0  


 


Anion Gap   8  


 


Estimat Glomerular Filtration


Rate 


  


  77 


  


 


 


Vancomycin Level Trough   18.6  














 Date/Time


Source Procedure


Growth Status


 


 


 3/4/18 05:50


Blood Peripheral Aerobic Blood Culture - Preliminary


NO GROWTH IN 4 DAYS Resulted


 


 3/4/18 05:50


Blood Peripheral Anaerobic Blood Culture - Preliminary


NO GROWTH IN 4 DAYS Resulted


 


 3/5/18 12:28


Stool Stool Stool Occult Blood (GILA) - Final


HEMOCCULT NEGATIVE Complete





 3/3/18 21:50


Sputum Expectorated Sputum Gram Stain - Final Complete


 


 3/3/18 21:50


Sputum Expectorated Sputum Sputum Culture - Final


HEAVY GROWTH NORMAL RESPIRATORY MYLENE Complete


 


 2/23/18 22:30


Urine Suprapubic Urine Urine Culture - Final


NO GROWTH IN 48 HOURS. Complete











Assessment and Plan


Plan


37/F w/ focal SFA occlusion but good distal opacification.


LE warm w/ motor intact and strong distal pulses present 


Pt w/ improved swelling and discoloration (L LE)





Plan


Pt with significant L LE improvement


Pt remains motor intact 


Continue anticoagulation per hematology 


Pt clear for D/C from a vascular standpoint 


Arranged out pt f/u with surveillance studies 


Pt made aware 


Pt agrees w/ plan





Sommer Smith NP


[facility]/[employer]


[phone]


Discharge Planning


Pt on anticoagulation but anytime from vascular surgery standpoint ok to WBAT


Arrange f/u in 2-3 weeks with Sommer Bustillo Mar 8, 2018 11:15

## 2018-03-08 NOTE — HHI.PR
Subjective


Remarks


Follow-up for endocarditis, septic emboli in a patient with previous episodes 

of endocarditis.  Patient is resting in bed.  Complains of shortness of breath.

  She is tolerating diet well.  No fever or chills.





Objective


Vitals





Vital Signs








  Date Time  Temp Pulse Resp B/P (MAP) Pulse Ox O2 Delivery O2 Flow Rate FiO2


 


3/8/18 12:52   18     


 


3/8/18 12:00 98.3 100 22 103/72 (82) 99   


 


3/8/18 10:00  108      


 


3/8/18 09:00  124      


 


3/8/18 09:00  124   77   


 


3/8/18 08:01  107  91/65 (74) 94   


 


3/8/18 08:00  108      


 


3/8/18 07:00  105   95   


 


3/8/18 07:00     97 Nasal Cannula 3.00 


 


3/8/18 07:00  105      


 


3/8/18 06:00  104      


 


3/8/18 04:00 98.8 96 24 98/66 (77) 95   


 


3/8/18 04:00  102      


 


3/8/18 02:00  96      


 


3/8/18 00:00 98.7 100  102/66 (78) 96   


 


3/8/18 00:00  97      


 


3/7/18 22:00  101      


 


3/7/18 21:07     99 Nasal Cannula 2.00 


 


3/7/18 20:00 98.3 110 20 112/70 (84) 96   


 


3/7/18 20:00  102      


 


3/7/18 19:00     97 Nasal Cannula 3.00 


 


3/7/18 18:00  112      


 


3/7/18 16:00 98.5 100 28 107/70 (82) 94   


 


3/7/18 16:00  104      


 


3/7/18 16:00 98.9 104 25 98/68 (78) 96   














I/O      


 


 3/7/18 3/7/18 3/7/18 3/8/18 3/8/18 3/8/18





 07:00 15:00 23:00 07:00 15:00 23:00


 


Intake Total 1200 ml 302.5 ml 2046.5 ml 1620.0 ml  


 


Output Total 1200 ml  1875 ml 1700 ml  


 


Balance 0 ml 302.5 ml 171.5 ml -80.0 ml  


 


      


 


Intake Oral 1200 ml  900 ml 900 ml  


 


IV Total  302.5 ml 1146.5 ml 720.0 ml  


 


Output Urine Total 1200 ml  1875 ml 1700 ml  


 


# Bowel Movements 1  1 0  








Result Diagram:  


3/8/18 0350                                                                    

            3/8/18 0350





Imaging





Last Impressions








Chest X-Ray 3/5/18 0600 Signed





Impressions: 





 Service Date/Time:  Monday, March 5, 2018 03:52 - CONCLUSION:  There is a 

small 





 to moderate size right pleural effusion with associated volume loss and/or 





 airspace consolidation. The pleural effusion has increased from the prior 





 examination.     Ron Melgar MD 


 


Head CT 3/3/18 0000 Signed





Impressions: 





 Service Date/Time:  Saturday, March 3, 2018 10:22 - CONCLUSION:  1. Focal area 





 of decreased density involving the right parietal lobe. As a new finding from 





 the prior exam. This could relate to a small infarct. MRI of the brain with 





 gadolinium suggested to further evaluate. 2. Small lacunar infarction 

involving 





 the left centrum semiovale.     Scooter Dawson Jr., MD 


 


CT Angiography 3/3/18 0000 Signed





Impressions: 





 Service Date/Time:  Saturday, March 3, 2018 10:28 - CONCLUSION:  There 

extensive 





 pulmonary emboli bilaterally. There is near complete cut off of the right 

lower 





 lobe pulmonary artery. Prominent filling defects on the left as well. These 





 findings were relayed to Dr. Bustos immediately at the completion of the study.  





 Scattered pulmonary infiltrates, small pleural effusion and extensive 





 mediastinal lymphadenopathy as described above.      Won Sharp MD 


 


Brain MRI 3/3/18 0000 Signed





Impressions: 





 Service Date/Time:  Saturday, March 3, 2018 18:04 - CONCLUSION:  Deterioration 





 in the appearance of the scan.  At least 3 focal areas of abnormal contrast 





 enhancement are present scattered to in both hemispheres.  Given the history 





 this most likely represents developing areas cerebritis.  Exam is compromised 

by 





 an uncooperative patient and motion artifact.  These 2 factors make detection 

of 





 other abnormalities such as cortical cephalitis and meningitis difficult to 





 exclude.  Cannot exclude hemorrhagic lesions as well.  There is no mass effect 





 evident.      Jorge Diane MD 


 


Chest CT 2/26/18 0000 Signed





Impressions: 





 Service Date/Time:  Monday, February 26, 2018 12:41 - CONCLUSION:  1. There 

are 





 at least 5 pulmonary nodules present bilaterally with the largest measuring 19 





 mm. None demonstrate cavitation but it is possible that these represent septic 





 emboli. Suggest followup to assess for change. 2. Splenomegaly with subtle 





 wedge-shaped areas of low-density in the mid spleen which could represent 





 infarcts. 3. Mild cardiomegaly in this patient post aortic valve replacement. 





 Low density of the cardiac blood pool suggests anemia.      Ron Melgar MD 


 


Aorta w/Runoff CTA 2/25/18 0000 Signed





Impressions: 





 Service Date/Time:  Sunday, February 25, 2018 12:47 - CONCLUSION:  Left renal 





 cortical infarction. 5-6 cm length total occlusion of the left superficial 





 femoral artery in the proximal thigh. Nodular parenchymal opacities in the 

lung 





 bases bilaterally See above discussion.     Ron Cruz MD 


 


Renal Ultrasound 2/24/18 0000 Signed





Impressions: 





 Service Date/Time:  Saturday, February 24, 2018 10:53 - CONCLUSION:  1. No 





 evidence of hydronephrosis. 2. Thickening of the urinary bladder wall a 1.1 

cm. 





 3. Prominent splenomegaly.     Elgin Walton MD 


 


Breast Ultrasound 2/24/18 0000 Signed





Impressions: 





 Service Date/Time:  Saturday, February 24, 2018 10:48 - CONCLUSION:  Negative 





 targeted right breast ultrasound examination.     Ron Brown MD 


 


Lower Extremity Ultrasound 2/23/18 0000 Signed





Impressions: 





 Service Date/Time:  Friday, February 23, 2018 21:22 - CONCLUSION:  1. No 





 sonographic evidence for lower extremity DVT. 2. Incidental note of bilateral 





 inguinal adenopathy with the largest on the right measuring 3.3 cm and the 





 largest on the left measuring 2.6 cm. This finding is nonspecific but is 





 typically reactive in etiology.     Rj Whitten MD 








Objective Remarks


GENERAL: 


SKIN: Warm and dry.


HEAD: Normocephalic.


EYES: No scleral icterus. No injection or drainage. 


NECK: Supple, trachea midline. No JVD or lymphadenopathy.


CARDIOVASCULAR: Regular rate and rhythm without, gallops, or rubs.  Systolic 

murmur appreciated.


RESPIRATORY: Breath sounds equal bilaterally. No accessory muscle use.


GASTROINTESTINAL: Abdomen soft, non-tender, nondistended. 


MUSCULOSKELETAL: No cyanosis.  1+ lower extremity edema.


BACK: Nontender without obvious deformity. No CVA tenderness.





Date of Insertion:  Feb 24, 2018


Line:  Central Venous Catheter


Side:  Right


Location:  Internal, Jugular





A/P


Problem List:  


(1) Prosthetic valve endocarditis


ICD Code:  T82.6XXA - Infection and inflammatory reaction due to cardiac valve 

prosthesis, initial encounter


Status:  Acute


(2) Bacteremia


ICD Code:  R78.81 - Bacteremia


(3) Septic pulmonary embolism


ICD Code:  I26.90 - Septic pulmonary embolism without acute cor pulmonale


Status:  Acute


(4) IVDU (intravenous drug user)


ICD Code:  F19.90 - Other psychoactive substance use, unspecified, uncomplicated


Assessment and Plan


Ms. Hagan is a 37-year-old female with a history of IV drug use, previous 

endocarditis who presented to the emergency department on 2/23/2018 due to 

shortness of breath and leg swelling.  She has bioprosthesis valves both aortic 

and tricuspid.  She admitted that she injected herself 4 days prior to this 

hospitalization.  She apparently completed IV antibiotics course in October and 

after that she was on doxycycline prophylaxis.  She reports that she has not 

been taking her antibiotics doxycycline.  Patient was initially managed in the 

ICU where she required pressors.  2D echo shows both aortic valve and tricuspid 

valve prosthesis endocarditis.  Cardiothoracic surgery evaluated and determined 

that patient is not a good surgical candidate.  During ICU stay, patient was 

found to have septic emboli as well.  Hematology was consulted for 

recommendations regarding anticoagulation.  Patient was started on argatroban 

bridging to warfarin.





-Bioprosthetic aortic valve endocarditis


-Bioprosthetic tricuspid valve endocarditis


-Bacteremia with viridans species


-Cerebritis


   -Patient is currently on ampicillin 2 g every 6 hours, gentamicin pharmacy 

to dose.


   -Patient is also currently on vancomycin pharmacy to dose.


   -Continue on Atrovent to warfarin bridge





-Septic pulmonary embolism


-Pulmonary hypertension


-Acute exacerbation of diastolic congestive heart failure with preserved left 

ventricular ejection fraction


   -We will consider diuresis due to shortness of breath.


   -Start Lasix IV 20 mg twice daily. 





- Acute kidney injury - resolved. 


- Hypokalemia


   - K+ 3.2. Will replace with PO KCL. 





Full code. Argatroban, Warfarin. INR 2.1. Continue Argatroban until INR > 4.0. 





Overall poor prognosis. Palliative care following.





Problem Qualifiers





(1) Prosthetic valve endocarditis:  


Qualified Codes:  T82.6XXA - Infection and inflammatory reaction due to cardiac 

valve prosthesis, initial encounter








Miguel A Valentine DO Mar 8, 2018 14:37

## 2018-03-08 NOTE — HHI.IDPN
Note


Infectious Disease Note














Patient complains of shortness of breath.


Occasional cough.


Afebrile.


Notes mild pain in the left leg.











37-year-old white female who has a history of endocarditis and has


been admitted several times for the same.  The patient developed shortness of


breath, fever and chills, and  presented to the emergency department.  The


patient is noted to be actively using IV drugs.  She has history of significant


CHF from prior valvular heart disease related to endocarditis.  She states that


she started feeling short of breath about a week ago and the shortness of


breath worsened.  After she completed IV antibiotics in October she was


put on doxycycline prophylaxis.  She reports that she has not been taking the


doxycycline.  


 


PAST MEDICAL HISTORY:


Hepatitis C.


IV drug use.


prosthetic aortic valve replacement in 2011 and then redo aortic


valve replacement in June 2016 





MEDICATIONS:


1. Gentamicin.


2. Ampicillin.


3. Vancomycin.








SOCIAL HISTORY:


Positive occasional alcohol.  No tobacco. IV drug use in the form of Dilaudid,


oxycodone.  The patient also uses marijuana.


 








OBJECTIVE:





Vital Signs








  Date Time  Temp Pulse Resp B/P (MAP) Pulse Ox O2 Delivery O2 Flow Rate FiO2


 


3/8/18 17:54   20     


 


3/8/18 16:59     98 Nasal Cannula 3.00 


 


3/8/18 16:56   18     


 


3/8/18 16:00 98.5 102 20 105/59 (74) 100   


 


3/8/18 12:00 98.3 100 22 103/72 (82) 99   


 


3/8/18 10:00  108      


 


3/8/18 09:00  124      


 


3/8/18 09:00  124   77   


 


3/8/18 08:01  107  91/65 (74) 94   


 


3/8/18 08:00  108      


 


3/8/18 07:00  105   95   


 


3/8/18 07:00     97 Nasal Cannula 3.00 


 


3/8/18 07:00  105      


 


3/8/18 06:00  104      


 


3/8/18 04:00 98.8 96 24 98/66 (77) 95   


 


3/8/18 04:00  102      


 


3/8/18 02:00  96      


 


3/8/18 00:00 98.7 100  102/66 (78) 96   


 


3/8/18 00:00  97      


 


3/7/18 22:00  101      


 


3/7/18 21:07     99 Nasal Cannula 2.00 


 


3/7/18 20:00 98.3 110 20 112/70 (84) 96   


 


3/7/18 20:00  102      


 


3/7/18 19:00     97 Nasal Cannula 3.00 








Laboratory Tests








Test


  3/6/18


19:15 3/7/18


04:45 3/7/18


09:55 3/7/18


17:45


 


Hemoglobin 7.7 GM/DL  7.8 GM/DL  7.9 GM/DL  


 


Hematocrit 24.1 %  23.8 %  24.1 %  


 


White Blood Count   14.4 TH/MM3  


 


Red Blood Count   3.31 MIL/MM3  


 


Mean Corpuscular Volume   72.7 FL  


 


Mean Corpuscular Hemoglobin   23.9 PG  


 


Mean Corpuscular Hemoglobin


Concent 


  


  32.9 % 


  


 


 


Red Cell Distribution Width   20.4 %  


 


Platelet Count   331 TH/MM3  


 


Mean Platelet Volume   7.9 FL  


 


Neutrophils (%) (Auto)   83.5 %  


 


Lymphocytes (%) (Auto)   7.9 %  


 


Monocytes (%) (Auto)   6.9 %  


 


Eosinophils (%) (Auto)   0.8 %  


 


Basophils (%) (Auto)   0.9 %  


 


Neutrophils # (Auto)   12.0 TH/MM3  


 


Lymphocytes # (Auto)   1.1 TH/MM3  


 


Monocytes # (Auto)   1.0 TH/MM3  


 


Eosinophils # (Auto)   0.1 TH/MM3  


 


Basophils # (Auto)   0.1 TH/MM3  


 


CBC Comment   DIFF FINAL  


 


Differential Comment     


 


Test


  3/8/18


03:50 


  


  


 


 


White Blood Count 13.6 TH/MM3    


 


Red Blood Count 3.08 MIL/MM3    


 


Hemoglobin 7.4 GM/DL    


 


Hematocrit 22.6 %    


 


Mean Corpuscular Volume 73.4 FL    


 


Mean Corpuscular Hemoglobin 24.1 PG    


 


Mean Corpuscular Hemoglobin


Concent 32.8 % 


  


  


  


 


 


Red Cell Distribution Width 21.0 %    


 


Platelet Count 317 TH/MM3    


 


Mean Platelet Volume 7.8 FL    


 


Neutrophils (%) (Auto) 78.9 %    


 


Lymphocytes (%) (Auto) 10.8 %    


 


Monocytes (%) (Auto) 8.4 %    


 


Eosinophils (%) (Auto) 1.2 %    


 


Basophils (%) (Auto) 0.7 %    


 


Neutrophils # (Auto) 10.7 TH/MM3    


 


Lymphocytes # (Auto) 1.5 TH/MM3    


 


Monocytes # (Auto) 1.1 TH/MM3    


 


Eosinophils # (Auto) 0.2 TH/MM3    


 


Basophils # (Auto) 0.1 TH/MM3    


 


CBC Comment DIFF FINAL    


 


Differential Comment     








Laboratory Tests








Test


  3/7/18


09:55 3/7/18


21:45 3/8/18


03:50


 


Blood Urea Nitrogen 9 MG/DL   8 MG/DL 


 


Creatinine 0.94 MG/DL   0.84 MG/DL 


 


Random Glucose 142 MG/DL   106 MG/DL 


 


Albumin 2.2 GM/DL   2.0 GM/DL 


 


Calcium Level 7.9 MG/DL   8.1 MG/DL 


 


Phosphorus Level 3.9 MG/DL   3.8 MG/DL 


 


Magnesium Level 1.8 MG/DL   1.9 MG/DL 


 


Sodium Level 133 MEQ/L   137 MEQ/L 


 


Potassium Level 2.8 MEQ/L  3.4 MEQ/L  3.2 MEQ/L 


 


Chloride Level 96 MEQ/L   99 MEQ/L 


 


Carbon Dioxide Level 29.7 MEQ/L   30.0 MEQ/L 


 


Anion Gap 7 MEQ/L   8 MEQ/L 


 


Estimat Glomerular Filtration


Rate 68 ML/MIN 


  


  77 ML/MIN 


 














IMAGING:














Chest X-Ray 3/5/18 0600 Signed





Impressions: 





 Service Date/Time:  Monday, March 5, 2018 03:52 - CONCLUSION:  There is a 

small 





 to moderate size right pleural effusion with associated volume loss and/or 





 airspace consolidation. The pleural effusion has increased from the prior 





 examination.     Ron Melgar MD 


 


Head CT 3/3/18 0000 Signed





Impressions: 





 Service Date/Time:  Saturday, March 3, 2018 10:22 - CONCLUSION:  1. Focal area 





 of decreased density involving the right parietal lobe. As a new finding from 





 the prior exam. This could relate to a small infarct. MRI of the brain with 





 gadolinium suggested to further evaluate. 2. Small lacunar infarction 

involving 





 the left centrum semiovale.     Scooter Dawson Jr., MD 


 


CT Angiography 3/3/18 0000 Signed





Impressions: 





 Service Date/Time:  Saturday, March 3, 2018 10:28 - CONCLUSION:  There 

extensive 





 pulmonary emboli bilaterally. There is near complete cut off of the right 

lower 





 lobe pulmonary artery. Prominent filling defects on the left as well. These 





 findings were relayed to Dr. Bustos immediately at the completion of the study.  





 Scattered pulmonary infiltrates, small pleural effusion and extensive 





 mediastinal lymphadenopathy as described above.      Won Sharp MD 


 


Brain MRI 3/3/18 0000 Signed





Impressions: 





 Service Date/Time:  Saturday, March 3, 2018 18:04 - CONCLUSION:  Deterioration 





 in the appearance of the scan.  At least 3 focal areas of abnormal contrast 





 enhancement are present scattered to in both hemispheres.  Given the history 





 this most likely represents developing areas cerebritis.  Exam is compromised 

by 





 an uncooperative patient and motion artifact.  These 2 factors make detection 

of 





 other abnormalities such as cortical cephalitis and meningitis difficult to 





 exclude.  Cannot exclude hemorrhagic lesions as well.  There is no mass effect 





 evident.      Jorge Diane MD 




















Renal Ultrasound 2/24/18 0000 Signed





Impressions: 





 Service Date/Time:  Saturday, February 24, 2018 10:53 - CONCLUSION:  1. No 





 evidence of hydronephrosis. 2. Thickening of the urinary bladder wall a 1.1 

cm. 





 3. Prominent splenomegaly.     Elgin Walton MD 


 


Chest X-Ray 2/24/18 0000 Signed





Impressions: 





 Service Date/Time:  Saturday, February 24, 2018 09:36 - CONCLUSION:  Right 





 internal jugular central line in place with the tip overlying the SVC. A 





 pneumothorax is not seen.     Ron Brown MD 


 


Breast Ultrasound 2/24/18 0000 Signed





Impressions: 





 Service Date/Time:  Saturday, February 24, 2018 10:48 - CONCLUSION:  Negative 





 targeted right breast ultrasound examination.     Ron Brown MD 


 


Lower Extremity Ultrasound 2/23/18 0000 Signed





Impressions: 





 Service Date/Time:  Friday, February 23, 2018 21:22 - CONCLUSION:  1. No 





 sonographic evidence for lower extremity DVT. 2. Incidental note of bilateral 





 inguinal adenopathy with the largest on the right measuring 3.3 cm and the 





 largest on the left measuring 2.6 cm. This finding is nonspecific but is 





 typically reactive in etiology.     Rj Whitten MD 














PHYSICAL EXAMINATION


GENERAL: No acute distress. 


HEENT:  No icterus.  Oropharynx moist mucosa, no lesions.


Neck:  Supple without adenopathy.


Lungs: Coarse bilateral rhonchi.  


Heart: 5/6 blowing systolic murmur at the upper right sternal border.


Abdomen: Bowel sounds present, soft, obese, nontender.


Extremities: Diffuse 1+ edema of the lower extremities. Lesions at Left tibia 


distally and left foot and great toe are more red and not purpuric as before.


Toes 3 and 5 are no longer cyanotic.  Pupils are rate lesion at the tuft of the 


left fifth toe.  Left leg warm.


No calf tenderness.   No nail hemorrhages.  


SKIN: no diffuse rash.


Neuro: No gross focal findings.


Psychiatric: calm and cooperative.





 


IMPRESSION


1. Sepsis. Strep Viridans. 


2. Tricuspid valve endocarditis.  Large vegetation.  Tricuspid regurgitation.


Patient evaluated by cardiovascular surgery and felt not to be a surgical 

candidate.


3.  Bacteremia.  Strep viridans.


Repeat blood cultures are pending.  Previous repeat blood cultures were 

negative.


4. History of prosthetic aortic valve and so likely recurrent prosthetic valve


endocarditis.


5.  Cerebritis on MRI.  Also has parietal infarct noted on CT scan of the brain.


6. Left renal cortical and pulmonary and lower extremity septic emboli. 


7. Leukocytosis secondary to sepsis from showering of emboli from endocarditis.


8. Acute renal failure. Kidney function improved. 


She did appears to be more stable.  However she continues to experience 

breathing difficulty.





RECOMMENDATIONS


1. Continue Ampicillin intravenous.


2. Continue Gentamicin. now that the kidney function is improved but follow the 

renal function. 


3. Continue vancomycin.  Pharmacy managing.


4. Monitor clinical status.


5. Continue to monitor the left leg lesions.


6. Consider lumbar puncture if the mentation deteriorates.  














Robinson Carr MD Mar 8, 2018 18:30

## 2018-03-09 VITALS
TEMPERATURE: 98.5 F | SYSTOLIC BLOOD PRESSURE: 97 MMHG | RESPIRATION RATE: 24 BRPM | OXYGEN SATURATION: 97 % | HEART RATE: 82 BPM | DIASTOLIC BLOOD PRESSURE: 67 MMHG

## 2018-03-09 VITALS
OXYGEN SATURATION: 98 % | TEMPERATURE: 98.8 F | HEART RATE: 108 BPM | RESPIRATION RATE: 24 BRPM | DIASTOLIC BLOOD PRESSURE: 60 MMHG | SYSTOLIC BLOOD PRESSURE: 104 MMHG

## 2018-03-09 VITALS
TEMPERATURE: 97.6 F | OXYGEN SATURATION: 98 % | HEART RATE: 104 BPM | SYSTOLIC BLOOD PRESSURE: 104 MMHG | RESPIRATION RATE: 14 BRPM | DIASTOLIC BLOOD PRESSURE: 61 MMHG

## 2018-03-09 VITALS — HEART RATE: 112 BPM

## 2018-03-09 VITALS
DIASTOLIC BLOOD PRESSURE: 55 MMHG | SYSTOLIC BLOOD PRESSURE: 90 MMHG | RESPIRATION RATE: 22 BRPM | TEMPERATURE: 98.8 F | HEART RATE: 103 BPM | OXYGEN SATURATION: 100 %

## 2018-03-09 VITALS
SYSTOLIC BLOOD PRESSURE: 104 MMHG | OXYGEN SATURATION: 99 % | RESPIRATION RATE: 18 BRPM | HEART RATE: 97 BPM | DIASTOLIC BLOOD PRESSURE: 67 MMHG | TEMPERATURE: 98 F

## 2018-03-09 VITALS — HEART RATE: 105 BPM

## 2018-03-09 VITALS — HEART RATE: 113 BPM

## 2018-03-09 VITALS — OXYGEN SATURATION: 98 %

## 2018-03-09 VITALS — OXYGEN SATURATION: 96 %

## 2018-03-09 LAB
BASOPHILS # BLD AUTO: 0.1 TH/MM3 (ref 0–0.2)
BASOPHILS NFR BLD: 0.8 % (ref 0–2)
EOSINOPHIL # BLD: 0.2 TH/MM3 (ref 0–0.4)
EOSINOPHIL NFR BLD: 1.2 % (ref 0–4)
ERYTHROCYTE [DISTWIDTH] IN BLOOD BY AUTOMATED COUNT: 21.2 % (ref 11.6–17.2)
HCT VFR BLD CALC: 22.8 % (ref 35–46)
HGB BLD-MCNC: 7.4 GM/DL (ref 11.6–15.3)
INR PPP: 2.5 RATIO
LYMPHOCYTES # BLD AUTO: 1.1 TH/MM3 (ref 1–4.8)
LYMPHOCYTES NFR BLD AUTO: 8.2 % (ref 9–44)
MCH RBC QN AUTO: 24.1 PG (ref 27–34)
MCHC RBC AUTO-ENTMCNC: 32.6 % (ref 32–36)
MCV RBC AUTO: 74.1 FL (ref 80–100)
MONOCYTE #: 1.1 TH/MM3 (ref 0–0.9)
MONOCYTES NFR BLD: 8.4 % (ref 0–8)
NEUTROPHILS # BLD AUTO: 11 TH/MM3 (ref 1.8–7.7)
NEUTROPHILS NFR BLD AUTO: 81.4 % (ref 16–70)
PLATELET # BLD: 325 TH/MM3 (ref 150–450)
PMV BLD AUTO: 8 FL (ref 7–11)
PROTHROMBIN TIME: 25 SEC (ref 9.8–11.6)
RBC # BLD AUTO: 3.08 MIL/MM3 (ref 4–5.3)
WBC # BLD AUTO: 13.5 TH/MM3 (ref 4–11)

## 2018-03-09 RX ADMIN — AMPICILLIN SODIUM SCH MLS/HR: 2 INJECTION, POWDER, FOR SOLUTION INTRAMUSCULAR; INTRAVENOUS at 20:30

## 2018-03-09 RX ADMIN — HYDROMORPHONE HYDROCHLORIDE PRN MG: 2 INJECTION INTRAMUSCULAR; INTRAVENOUS; SUBCUTANEOUS at 11:56

## 2018-03-09 RX ADMIN — SODIUM CHLORIDE SCH MLS/HR: 900 INJECTION INTRAVENOUS at 16:09

## 2018-03-09 RX ADMIN — CLINDAMYCIN PHOSPHATE SCH MLS/HR: 150 INJECTION, SOLUTION INTRAMUSCULAR; INTRAVENOUS at 11:55

## 2018-03-09 RX ADMIN — STANDARDIZED SENNA CONCENTRATE AND DOCUSATE SODIUM SCH TAB: 8.6; 5 TABLET, FILM COATED ORAL at 20:31

## 2018-03-09 RX ADMIN — AMPICILLIN SODIUM SCH MLS/HR: 2 INJECTION, POWDER, FOR SOLUTION INTRAMUSCULAR; INTRAVENOUS at 09:52

## 2018-03-09 RX ADMIN — POTASSIUM CHLORIDE SCH MEQ: 20 TABLET, EXTENDED RELEASE ORAL at 20:31

## 2018-03-09 RX ADMIN — AMPICILLIN SODIUM SCH MLS/HR: 2 INJECTION, POWDER, FOR SOLUTION INTRAMUSCULAR; INTRAVENOUS at 02:10

## 2018-03-09 RX ADMIN — ALBUTEROL SULFATE PRN MG: 2.5 SOLUTION RESPIRATORY (INHALATION) at 09:54

## 2018-03-09 RX ADMIN — WARFARIN SODIUM SCH MG: 2 TABLET ORAL at 16:09

## 2018-03-09 RX ADMIN — ARGATROBAN PRN MLS/HR: 100 INJECTION, SOLUTION INTRAVENOUS at 18:32

## 2018-03-09 RX ADMIN — Medication SCH ML: at 09:53

## 2018-03-09 RX ADMIN — STANDARDIZED SENNA CONCENTRATE AND DOCUSATE SODIUM SCH TAB: 8.6; 5 TABLET, FILM COATED ORAL at 09:00

## 2018-03-09 RX ADMIN — AMPICILLIN SODIUM SCH MLS/HR: 2 INJECTION, POWDER, FOR SOLUTION INTRAMUSCULAR; INTRAVENOUS at 16:08

## 2018-03-09 RX ADMIN — HYDROMORPHONE HYDROCHLORIDE PRN MG: 2 INJECTION INTRAMUSCULAR; INTRAVENOUS; SUBCUTANEOUS at 06:48

## 2018-03-09 RX ADMIN — FAMOTIDINE SCH MG: 20 TABLET, FILM COATED ORAL at 09:52

## 2018-03-09 RX ADMIN — Medication SCH ML: at 20:31

## 2018-03-09 RX ADMIN — HYDROMORPHONE HYDROCHLORIDE PRN MG: 2 INJECTION INTRAMUSCULAR; INTRAVENOUS; SUBCUTANEOUS at 20:30

## 2018-03-09 RX ADMIN — HYDROMORPHONE HYDROCHLORIDE PRN MG: 2 INJECTION INTRAMUSCULAR; INTRAVENOUS; SUBCUTANEOUS at 16:10

## 2018-03-09 RX ADMIN — FUROSEMIDE SCH MG: 10 INJECTION, SOLUTION INTRAMUSCULAR; INTRAVENOUS at 16:08

## 2018-03-09 RX ADMIN — HYDROMORPHONE HYDROCHLORIDE PRN MG: 2 INJECTION INTRAMUSCULAR; INTRAVENOUS; SUBCUTANEOUS at 02:09

## 2018-03-09 RX ADMIN — POTASSIUM CHLORIDE SCH MEQ: 20 TABLET, EXTENDED RELEASE ORAL at 09:53

## 2018-03-09 RX ADMIN — HYDROCODONE BITARTRATE AND ACETAMINOPHEN PRN TAB: 10; 325 TABLET ORAL at 05:02

## 2018-03-09 RX ADMIN — CLINDAMYCIN PHOSPHATE SCH MLS/HR: 150 INJECTION, SOLUTION INTRAMUSCULAR; INTRAVENOUS at 05:02

## 2018-03-09 RX ADMIN — CLINDAMYCIN PHOSPHATE SCH MLS/HR: 150 INJECTION, SOLUTION INTRAMUSCULAR; INTRAVENOUS at 22:53

## 2018-03-09 RX ADMIN — SODIUM CHLORIDE SCH MLS/HR: 900 INJECTION INTRAVENOUS at 02:09

## 2018-03-09 RX ADMIN — CHLORHEXIDINE GLUCONATE SCH PACK: 500 CLOTH TOPICAL at 04:00

## 2018-03-09 NOTE — HHI.PR
Subjective


Remarks


37-year-old female with a history of IV drug use, previous endocarditis who 

presented to the emergency department on 2/23/2018 due to shortness of breath 

and leg swelling.  She has bioprosthesis valves both aortic and tricuspid.  She 

admitted that she injected herself 4 days prior to this hospitalization.  She 

apparently completed IV antibiotics course in October and after that she was on 

doxycycline prophylaxis.  She reports that she has not been taking her 

antibiotics doxycycline.  Patient was initially managed in the ICU where she 

required pressors.  2D echo shows both aortic valve and tricuspid valve 

prosthesis endocarditis.  Cardiothoracic surgery evaluated and determined that 

patient is not a good surgical candidate.  During ICU stay, patient was found 

to have septic emboli as well.  Hematology was consulted for recommendations 

regarding anticoagulation.  Patient was started on argatroban bridging to 

warfarin.








Nursing denies any deterioration since last night.  Patient herself says there 

is been no real changes in her symptoms since yesterday except for minimally 

increased lower extremity swelling since yesterday despite being started on 

lasix.





Objective





Vital Signs








  Date Time  Temp Pulse Resp B/P (MAP) Pulse Ox O2 Delivery O2 Flow Rate FiO2


 


3/9/18 09:57     98 Nasal Cannula 3.00 


 


3/9/18 07:18   18     


 


3/9/18 06:02   16     


 


3/9/18 04:00 98.5 116 24 97/67 (77) 97   


 


3/9/18 04:00  111      


 


3/9/18 00:00  112      


 


3/8/18 23:48 99.8 116 24 93/73 (80) 98   


 


3/8/18 20:15  109      


 


3/8/18 20:15      Nasal Cannula 4.00 35





      Humidified  


 


3/8/18 19:55 99.8 109 22 109/59 (76) 97   


 


3/8/18 19:15  100      


 


3/8/18 16:59     98 Nasal Cannula 3.00 


 


3/8/18 16:00 98.5 102 20 105/59 (74) 100   


 


3/8/18 12:00 98.3 100 22 103/72 (82) 99   














I/O      


 


 3/8/18 3/8/18 3/8/18 3/9/18 3/9/18 3/9/18





 07:00 15:00 23:00 07:00 15:00 23:00


 


Intake Total 1620.0 ml 200 ml 980 ml 720 ml  


 


Output Total 1700 ml  600 ml   


 


Balance -80.0 ml 200 ml 380 ml 720 ml  


 


      


 


Intake Oral 900 ml  480 ml 720 ml  


 


IV Total 720.0 ml 200 ml 500 ml   


 


Output Urine Total 1700 ml  600 ml   


 


# Voids    4  


 


# Bowel Movements 0  1 0  


 


# Sanitary Pads   3 Pads   








Result Diagram:  


3/9/18 0600                                                                    

            3/8/18 0350





Objective Remarks


Young white female, lying in bed


Clear lung sounds bilaterally, unlabored breathing, no cyanosis, on nasal 

cannula


Moderate to severe lower extremity edema





A/P


Assessment and Plan





Bioprosthetic aortic valve endocarditis


Bioprosthetic tricuspid valve endocarditis


Bacteremia with viridans species


Cerebritis


   -ampicillin, vanc, and gentamicin; ID following


   -Continue on argatroban to warfarin bridge





Septic pulmonary embolism


Pulmonary hypertension





Acute exacerbation of diastolic congestive heart failure with preserved left 

ventricular ejection fraction


   -increasing Lasix to 40 BID


            -BMP in AM. 





Hypokalemia


-monitor and replace accordingly





Full code. Bridging warfarin and argatroban, warfarin consult placed. 





Overall poor prognosis. Palliative care following.











Mir Mills MD Mar 9, 2018 10:09

## 2018-03-09 NOTE — PD.ONC.PN
Subjective


Subjective


Remarks


Tmax 99.8 overnight. 


Patient resting in room.


complaining of swelling in both her legs and shortness of breath. 


she is waiting on her 11AM dose of pain medication (patient seen at 1130AM)





Objective


Data











  Date Time  Temp Pulse Resp B/P (MAP) Pulse Ox O2 Delivery O2 Flow Rate FiO2


 


3/9/18 09:57     98 Nasal Cannula 3.00 


 


3/9/18 08:00 97.6 104 14 104/61 (75) 98   


 


3/9/18 08:00      Nasal Cannula 4.00 





      Humidified  


 


3/9/18 07:18   18     


 


3/9/18 06:02   16     


 


3/9/18 04:00 98.5 116 24 97/67 (77) 97   


 


3/9/18 04:00  111      


 


3/9/18 00:00  112      


 


3/8/18 23:48 99.8 116 24 93/73 (80) 98   


 


3/8/18 20:15  109      


 


3/8/18 20:15      Nasal Cannula 4.00 35





      Humidified  


 


3/8/18 19:55 99.8 109 22 109/59 (76) 97   


 


3/8/18 19:15  100      


 


3/8/18 16:59     98 Nasal Cannula 3.00 


 


3/8/18 16:00 98.5 102 20 105/59 (74) 100   














 3/9/18 3/9/18 3/9/18





 07:00 15:00 23:00


 


Intake Total 720 ml  


 


Balance 720 ml  








Result Diagram:  


3/9/18 0600                                                                    

            3/8/18 0350





Laboratory Results





Laboratory Tests








Test


  3/9/18


06:00


 


White Blood Count 13.5 TH/MM3 


 


Red Blood Count 3.08 MIL/MM3 


 


Hemoglobin 7.4 GM/DL 


 


Hematocrit 22.8 % 


 


Mean Corpuscular Volume 74.1 FL 


 


Mean Corpuscular Hemoglobin 24.1 PG 


 


Mean Corpuscular Hemoglobin


Concent 32.6 % 


 


 


Red Cell Distribution Width 21.2 % 


 


Platelet Count 325 TH/MM3 


 


Mean Platelet Volume 8.0 FL 


 


Neutrophils (%) (Auto) 81.4 % 


 


Lymphocytes (%) (Auto) 8.2 % 


 


Monocytes (%) (Auto) 8.4 % 


 


Eosinophils (%) (Auto) 1.2 % 


 


Basophils (%) (Auto) 0.8 % 


 


Neutrophils # (Auto) 11.0 TH/MM3 


 


Lymphocytes # (Auto) 1.1 TH/MM3 


 


Monocytes # (Auto) 1.1 TH/MM3 


 


Eosinophils # (Auto) 0.2 TH/MM3 


 


Basophils # (Auto) 0.1 TH/MM3 


 


CBC Comment DIFF FINAL 


 


Differential Comment  


 


Prothrombin Time 25.0 SEC 


 


Prothromb Time International


Ratio 2.5 RATIO 


 


 


Activated Partial


Thromboplast Time 54.6 SEC 


 











Administered Medications








 Medications


  (Trade)  Dose


 Ordered  Sig/Nikhil


 Route


 PRN Reason  Start Time


 Stop Time Status Last Admin


Dose Admin


 


 Sodium Chloride


  (NS Flush)  2 ml  UNSCH  PRN


 IV FLUSH


 FLUSH AFTER USING IV ACCESS  2/23/18 22:00


    3/8/18 08:18


 


 


 Sodium Chloride


  (NS Flush)  2 ml  BID


 IV FLUSH


   2/24/18 09:00


    3/9/18 09:53


 


 


 Acetaminophen


  (Tylenol)  650 mg  Q6H  PRN


 PO


 fever  2/23/18 22:00


    3/5/18 16:02


 


 


 Ondansetron HCl


  (Zofran Inj)  4 mg  Q6H  PRN


 IV PUSH


 NAUSEA OR VOMITING  2/23/18 22:00


    3/8/18 21:22


 


 


 Temazepam


  (Restoril)  15 mg  HS  PRN


 PO


 INSOMNIA  2/23/18 22:00


    3/6/18 22:08


 


 


 Chlorhexidine


 Gluconate


  (Chlorhexidine


 2% Cloth)  


 Taper  DAILY@04


 TOP


   2/24/18 04:00


 2/20/19 03:59  3/6/18 03:54


 


 


 Senna/Docusate


 Sodium


  (Arlen-Colace)  1 tab  BID


 PO


   2/24/18 09:00


    3/8/18 21:21


 


 


 Ampicillin Sodium


 2000 mg/Sodium


 Chloride  100 ml @ 


 400 mls/hr  Q6H


 IV


   2/24/18 14:00


    3/9/18 09:52


 


 


 Loperamide HCl


  (Imodium)  2 mg  Q4H  PRN


 PO


 Diarrhea  2/26/18 16:15


    2/28/18 14:32


 


 


 Gentamicin


 Sulfate 100 mg/


 Sodium Chloride  102.5 ml @ 


 100 mls/hr  Q8H


 IV


   2/26/18 21:00


    3/9/18 11:55


 


 


 Albuterol Sulfate


  (Albuterol Neb)  2.5 mg  Q2HR NEB  PRN


 NEB


 dyspnea  2/27/18 11:30


    3/9/18 09:54


 


 


 Argatroban 250 mg/


 Sodium Chloride  252.5 ml @ 


 3.11 mls/hr  TITRATE  PRN


 IV


 aPTT < 50  3/1/18 19:15


    3/6/18 21:31


 


 


 Acetaminophen/


 Hydrocodone Bitart


  (Norco  Mg)  1 tab  Q4H  PRN


 PO


 pain 1-5  3/4/18 15:00


    3/9/18 05:02


 


 


 Warfarin Sodium


  (Coumadin)  2 mg  DAILY@16


 PO


   3/7/18 16:00


    3/8/18 14:57


 


 


 Hydromorphone HCl


  (Dilaudid Pf Inj)  1 mg  Q4H  PRN


 IV PUSH


 PAIN SCALE 6 TO 10  3/7/18 19:00


    3/9/18 11:56


 


 


 Vancomycin HCl


 1500 mg/Sodium


 Chloride  515 ml @ 


 257.5 mls/


 hr  Q12H


 IV


   3/8/18 15:00


    3/9/18 02:09


 


 


 Potassium Chloride


  (KCl)  20 meq  Q12HR


 PO


   3/8/18 21:00


 3/11/18 20:59  3/9/18 09:53


 








Objective Remarks


GENERAL: chronically ill female very dyspneic climbing back into bed from 

bedside commode. 


SKIN: Warm and dry.


HEAD: Normocephalic.


EYES: No injection or drainage. 


NECK: Supple, trachea midline. 


CARDIOVASCULAR: +S1, S2, tachy


RESPIRATORY: scattered rhonchi. on O2 via NC


GASTROINTESTINAL: Abdomen soft, non-tender, nondistended. 


EXTREMITIES: bilateral lower extremities with 2+ pitting edema. pulses palpable 

bilaterally. 


NEUROLOGICAL: awake and alert. normal speech. moving extremities.





Assessment/Plan


Problem List:  


(1) arterial blood clot


Plan:  --on Argatroban bridge to coumadin. 


-- no significant family history of thrombotic events to suggest hereditary 

antithrombin deficiency.  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 

impaired production from her liver disease. 


--has chronic active hepatitis C.  ++increased consumption from disseminated 

intravascular coagulation and acute illness, bacterial endocarditis.  


--Protein losses are also considered.  She had mild proteinuria when she first 

was admitted.


--Thrombin inactivates antithrombin.  This would abrogate the effect of 

unfractionated heparin and low molecular weight heparin.





(2) Acute septic pulmonary embolism


ICD Codes:  I26.90 - Septic pulmonary embolism without acute cor pulmonale


Plan:  -- The echocardiogram on 3/3 showed an estimated EF of 50-55%.  


++severe tricuspid regurgitation with prosthesis in place.  


++2 x 4 cm mobile vegetation was seen on the valve.  The right atrium and right 

ventricle were normal in size and function





(3) Microcytic anemia


ICD Codes:  D50.9 - Iron deficiency anemia, unspecified


Plan:  --likely d/t inflammation/infection. no evidence of bleeding or 

hemolysis. 


--B12/folate WNL


--low iron and percent saturation. normal TIBC, elevated ferritin-->more 

consistent with anemia of chronic disease. 


--stool Hemoccult negative. 


--no evidence of hemolysis





(4) Bacterial endocarditis


ICD Codes:  I33.0 - Acute and subacute infective endocarditis


Status:  Acute


Plan:  --on multiple antibiotics, ID following. 





Assessment


36y/o female admitted with recurrent endocarditis. hematology consulted for 

anticoagulation assistance.


h/o Recurrent bacterial endocarditis, bacteremia, history of prosthetic aortic 

valve and subsequent redo, chronic active hepatitis C, intravenous drug use, 

microcytic anemia, consistent with iron deficiency, left lower extremity 

arterial event, bacterial endocarditis with viridans streptococcus.





HPI (brought forward for continuity of care): history of IV drug abuse and  

recurrent endocarditis. had a relapse of her activity. has history of pulmonary 

embolism and infected valves. +significant congestive heart failure and 

presented to the emergency room on 02/23/2018, with sepsis. is followed by 

Infectious Disease for her endocarditis.  previously received antibiotic 

therapy for enterococcus faecalis and staph aureus. There is questionable 

compliance. Her current blood 


culture from 02/23/2018, shows viridans streptococcus.  Two out of two bottles 

were positive.  She is on ampicillin and gentamicin. course is complicated by 

cold left lower extremity, evaluated by  Vascular Surgery.  A CT angiogram 

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 

the proximal thigh.  There is satisfactory calf runoff present.  For this reason

, conservative management with anticoagulation is continued. was placed on 

unfractionated heparin.  Her heparin dose has been titrated from 1000 units/

hour to 2500 units/hour with a PTT remaining subtherapeutic.  Her last PTT is 

32 seconds from 03/01/2018, at 2 p.m.  For this reason, Hematology/Oncology was 

consulted.


Plan


1. continue Argatroban and coumadin. 


2. monitor INR daily until INR greater than 4.0, then hold Argatroban for four 

hours and recheck INR off Argatroban.


Attending Statement


The exam, history, and the medical decision-making described in the above note 

were completed with the assistance of the mid-level provider. I reviewed and 

agree with the findings presented.  I attest that I had a face-to-face 

encounter with the patient on the same day, and personally performed and 

documented my assessment and findings in the medical record.





c/o SOB and leg pain.


PE


On argatroban and heparin


will follow.











Zoe Chau Mar 9, 2018 12:15


Melissa Apple MD Mar 10, 2018 00:12

## 2018-03-09 NOTE — HHI.IDPN
Note


Infectious Disease Note














Patient states that her shortness of breath is the same.


Notes that her legs are more swollen than before.


She states that the legs became swollen overnight.


afebrile.


Notes mild pain in the left leg.


Denies chest pain.








37-year-old white female who has a history of endocarditis and has


been admitted several times for the same.  The patient developed shortness of


breath, fever and chills, and  presented to the emergency department.  The


patient is noted to be actively using IV drugs.  She has history of significant


CHF from prior valvular heart disease related to endocarditis.  She states that


she started feeling short of breath about a week ago and the shortness of


breath worsened.  After she completed IV antibiotics in October she was


put on doxycycline prophylaxis.  She reports that she has not been taking the


doxycycline.  


 


PAST MEDICAL HISTORY:


Hepatitis C.


IV drug use.


prosthetic aortic valve replacement in 2011 and then redo aortic


valve replacement in June 2016 





MEDICATIONS:


1. Gentamicin.


2. Ampicillin.


3. Vancomycin.








Current Medications








 Medications


  (Trade)  Dose


 Ordered  Sig/Nikhil


 Route


 PRN Reason  Start Time


 Stop Time Status Last Admin


Dose Admin


 


 Sodium Chloride


  (NS Flush)  2 ml  UNSCH  PRN


 IV FLUSH


 FLUSH AFTER USING IV ACCESS  2/23/18 22:00


    3/8/18 08:18


 


 


 Sodium Chloride


  (NS Flush)  2 ml  BID


 IV FLUSH


   2/24/18 09:00


    3/9/18 09:53


 


 


 Acetaminophen


  (Tylenol)  650 mg  Q6H  PRN


 PO


 fever  2/23/18 22:00


    3/5/18 16:02


 


 


 Ondansetron HCl


  (Zofran Inj)  4 mg  Q6H  PRN


 IV PUSH


 NAUSEA OR VOMITING  2/23/18 22:00


    3/8/18 21:22


 


 


 Temazepam


  (Restoril)  15 mg  HS  PRN


 PO


 INSOMNIA  2/23/18 22:00


    3/6/18 22:08


 


 


 Miscellaneous


 Information  1  Q361D


 XX


   2/23/18 22:00


     


 


 


 Chlorhexidine


 Gluconate


  (Chlorhexidine


 2% Cloth)  


 Taper  DAILY@04


 TOP


   2/24/18 04:00


 2/20/19 03:59  3/6/18 03:54


 


 


 Chlorhexidine


 Gluconate


  (Chlorhexidine


 2% Cloth)  3 pack  UNSCH  PRN


 TOP


 HYGIENIC CARE  2/23/18 22:00


     


 


 


 Senna/Docusate


 Sodium


  (Arlen-Colace)  1 tab  BID


 PO


   2/24/18 09:00


    3/8/18 21:21


 


 


 Magnesium


 Hydroxide


  (Milk Of


 Magnesia Liq)  30 ml  Q12H  PRN


 PO


 Mild constipation  2/23/18 22:00


     


 


 


 Sennosides


  (Senokot)  17.2 mg  Q12H  PRN


 PO


 Moderate constipation  2/23/18 22:00


     


 


 


 Bisacodyl


  (Dulcolax Supp)  10 mg  DAILY  PRN


 RECTAL


 SEVERE CONSITIPATION  2/23/18 22:00


     


 


 


 Lactulose


  (Lactulose Liq)  30 ml  DAILY  PRN


 PO


 SEVERE CONSITIPATION  2/23/18 22:00


     


 


 


 Ampicillin Sodium


 2000 mg/Sodium


 Chloride  100 ml @ 


 400 mls/hr  Q6H


 IV


   2/24/18 14:00


    3/9/18 16:08


 


 


 Loperamide HCl


  (Imodium)  2 mg  Q4H  PRN


 PO


 Diarrhea  2/26/18 16:15


    2/28/18 14:32


 


 


 Gentamicin


 Sulfate 100 mg/


 Sodium Chloride  102.5 ml @ 


 100 mls/hr  Q8H


 IV


   2/26/18 21:00


    3/9/18 11:55


 


 


 Albuterol Sulfate


  (Albuterol Neb)  2.5 mg  Q2HR NEB  PRN


 NEB


 dyspnea  2/27/18 11:30


    3/9/18 09:54


 


 


 Argatroban 250 mg/


 Sodium Chloride  252.5 ml @ 


 3.11 mls/hr  TITRATE  PRN


 IV


 aPTT < 50  3/1/18 19:15


    3/9/18 18:32


 


 


 Pharmacy Profile


 Note  0 ml @ 0


 mls/hr  UNSCH


 OTHER


   3/3/18 16:45


     


 


 


 Acetaminophen/


 Hydrocodone Bitart


  (Norco  Mg)  1 tab  Q4H  PRN


 PO


 pain 1-5  3/4/18 15:00


    3/9/18 05:02


 


 


 Warfarin Sodium


  (Coumadin)  2 mg  DAILY@16


 PO


   3/7/18 16:00


    3/9/18 16:09


 


 


 Hydromorphone HCl


  (Dilaudid Pf Inj)  1 mg  Q4H  PRN


 IV PUSH


 PAIN SCALE 6 TO 10  3/7/18 19:00


    3/9/18 16:10


 


 


 Info  1  UNSCH


 .XX


   3/8/18 11:15


     


 


 


 Vancomycin HCl


 1500 mg/Sodium


 Chloride  515 ml @ 


 257.5 mls/


 hr  Q12H


 IV


   3/8/18 15:00


    3/9/18 16:09


 


 


 Miscellaneous


 Information  SPECIFIC LAB TO


 BE DRAWN:VANCO


 TROUGH DATE TO...  ONCE  ONCE


 .XX


   3/10/18 02:45


 3/10/18 02:46   


 


 


 Potassium Chloride


  (KCl)  20 meq  Q12HR


 PO


   3/8/18 21:00


 3/11/18 20:59  3/9/18 09:53


 


 


 Furosemide


  (Lasix Inj)  40 mg  BID@09,18


 IV PUSH


   3/9/18 18:00


    3/9/18 16:08


 


 


 Pharmacy Profile


 Note  0 ml @ 0


 mls/hr  UNSCH


 OTHER


   3/9/18 10:15


     


 








SOCIAL HISTORY:


Positive occasional alcohol.  No tobacco. IV drug use in the form of Dilaudid,


oxycodone.  The patient also uses marijuana.


 








OBJECTIVE:





Vital Signs








  Date Time  Temp Pulse Resp B/P (MAP) Pulse Ox O2 Delivery O2 Flow Rate FiO2


 


3/9/18 16:00 98.8 103 22 90/55 (67) 100   


 


3/9/18 12:00 98.0 97 18 104/67 (79) 99   


 


3/9/18 09:57     98 Nasal Cannula 3.00 


 


3/9/18 08:00 97.6 104 14 104/61 (75) 98   


 


3/9/18 08:00      Nasal Cannula 4.00 





      Humidified  


 


3/9/18 07:18   18     


 


3/9/18 06:02   16     


 


3/9/18 04:00 98.5 116 24 97/67 (77) 97   


 


3/9/18 04:00  111      


 


3/9/18 00:00  112      


 


3/8/18 23:48 99.8 116 24 93/73 (80) 98   


 


3/8/18 20:15  109      


 


3/8/18 20:15      Nasal Cannula 4.00 35





      Humidified  


 


3/8/18 19:55 99.8 109 22 109/59 (76) 97   


 


3/8/18 19:15  100      











Laboratory Tests








Test


  3/8/18


03:50 3/9/18


06:00


 


White Blood Count 13.6 TH/MM3  13.5 TH/MM3 


 


Red Blood Count 3.08 MIL/MM3  3.08 MIL/MM3 


 


Hemoglobin 7.4 GM/DL  7.4 GM/DL 


 


Hematocrit 22.6 %  22.8 % 


 


Mean Corpuscular Volume 73.4 FL  74.1 FL 


 


Mean Corpuscular Hemoglobin 24.1 PG  24.1 PG 


 


Mean Corpuscular Hemoglobin


Concent 32.8 % 


  32.6 % 


 


 


Red Cell Distribution Width 21.0 %  21.2 % 


 


Platelet Count 317 TH/MM3  325 TH/MM3 


 


Mean Platelet Volume 7.8 FL  8.0 FL 


 


Neutrophils (%) (Auto) 78.9 %  81.4 % 


 


Lymphocytes (%) (Auto) 10.8 %  8.2 % 


 


Monocytes (%) (Auto) 8.4 %  8.4 % 


 


Eosinophils (%) (Auto) 1.2 %  1.2 % 


 


Basophils (%) (Auto) 0.7 %  0.8 % 


 


Neutrophils # (Auto) 10.7 TH/MM3  11.0 TH/MM3 


 


Lymphocytes # (Auto) 1.5 TH/MM3  1.1 TH/MM3 


 


Monocytes # (Auto) 1.1 TH/MM3  1.1 TH/MM3 


 


Eosinophils # (Auto) 0.2 TH/MM3  0.2 TH/MM3 


 


Basophils # (Auto) 0.1 TH/MM3  0.1 TH/MM3 


 


CBC Comment DIFF FINAL  DIFF FINAL 


 


Differential Comment    








Laboratory Tests








Test


  3/7/18


21:45 3/8/18


03:50


 


Potassium Level 3.4 MEQ/L  3.2 MEQ/L 


 


Blood Urea Nitrogen  8 MG/DL 


 


Creatinine  0.84 MG/DL 


 


Random Glucose  106 MG/DL 


 


Albumin  2.0 GM/DL 


 


Calcium Level  8.1 MG/DL 


 


Phosphorus Level  3.8 MG/DL 


 


Magnesium Level  1.9 MG/DL 


 


Sodium Level  137 MEQ/L 


 


Chloride Level  99 MEQ/L 


 


Carbon Dioxide Level  30.0 MEQ/L 


 


Anion Gap  8 MEQ/L 


 


Estimat Glomerular Filtration


Rate 


  77 ML/MIN 


 








IMAGING:














Chest X-Ray 3/5/18 0600 Signed





Impressions: 





 Service Date/Time:  Monday, March 5, 2018 03:52 - CONCLUSION:  There is a 

small 





 to moderate size right pleural effusion with associated volume loss and/or 





 airspace consolidation. The pleural effusion has increased from the prior 





 examination.     Ron Melgar MD 


 


Head CT 3/3/18 0000 Signed





Impressions: 





 Service Date/Time:  Saturday, March 3, 2018 10:22 - CONCLUSION:  1. Focal area 





 of decreased density involving the right parietal lobe. As a new finding from 





 the prior exam. This could relate to a small infarct. MRI of the brain with 





 gadolinium suggested to further evaluate. 2. Small lacunar infarction 

involving 





 the left centrum semiovale.     Scooter Dawson Jr., MD 


 


CT Angiography 3/3/18 0000 Signed





Impressions: 





 Service Date/Time:  Saturday, March 3, 2018 10:28 - CONCLUSION:  There 

extensive 





 pulmonary emboli bilaterally. There is near complete cut off of the right 

lower 





 lobe pulmonary artery. Prominent filling defects on the left as well. These 





 findings were relayed to Dr. Bustos immediately at the completion of the study.  





 Scattered pulmonary infiltrates, small pleural effusion and extensive 





 mediastinal lymphadenopathy as described above.      Won Sharp MD 


 


Brain MRI 3/3/18 0000 Signed





Impressions: 





 Service Date/Time:  Saturday, March 3, 2018 18:04 - CONCLUSION:  Deterioration 





 in the appearance of the scan.  At least 3 focal areas of abnormal contrast 





 enhancement are present scattered to in both hemispheres.  Given the history 





 this most likely represents developing areas cerebritis.  Exam is compromised 

by 





 an uncooperative patient and motion artifact.  These 2 factors make detection 

of 





 other abnormalities such as cortical cephalitis and meningitis difficult to 





 exclude.  Cannot exclude hemorrhagic lesions as well.  There is no mass effect 





 evident.      Jorge Diane MD 




















Renal Ultrasound 2/24/18 0000 Signed





Impressions: 





 Service Date/Time:  Saturday, February 24, 2018 10:53 - CONCLUSION:  1. No 





 evidence of hydronephrosis. 2. Thickening of the urinary bladder wall a 1.1 

cm. 





 3. Prominent splenomegaly.     Elgin Walton MD 


 


Chest X-Ray 2/24/18 0000 Signed





Impressions: 





 Service Date/Time:  Saturday, February 24, 2018 09:36 - CONCLUSION:  Right 





 internal jugular central line in place with the tip overlying the SVC. A 





 pneumothorax is not seen.     Ron Brown MD 


 


Breast Ultrasound 2/24/18 0000 Signed





Impressions: 





 Service Date/Time:  Saturday, February 24, 2018 10:48 - CONCLUSION:  Negative 





 targeted right breast ultrasound examination.     Ron Brown MD 


 


Lower Extremity Ultrasound 2/23/18 0000 Signed





Impressions: 





 Service Date/Time:  Friday, February 23, 2018 21:22 - CONCLUSION:  1. No 





 sonographic evidence for lower extremity DVT. 2. Incidental note of bilateral 





 inguinal adenopathy with the largest on the right measuring 3.3 cm and the 





 largest on the left measuring 2.6 cm. This finding is nonspecific but is 





 typically reactive in etiology.     Rj Whitten MD 














PHYSICAL EXAMINATION


GENERAL: No acute distress. 


HEENT:  No icterus.  Oropharynx moist mucosa, no lesions.


Neck:  Supple without adenopathy.


Lungs: Coarse bilateral rhonchi.  


Heart: 5/6 blowing systolic murmur at the upper right sternal border.


Abdomen: Bowel sounds present, soft, obese, nontender.


Extremities: Diffuse 3+ edema of the lower extremities. Lesions at Left tibia 


distally and left foot and great toe are more red and not purpuric as before.


Toes 3 and 5 are no longer cyanotic.  Purple lesion at the tuft of the 


left fifth toe.  Left leg warm.


No calf tenderness.   No nail hemorrhages.  


SKIN: no diffuse rash.


Neuro: No gross focal findings.


Psychiatric: calm and cooperative.





 


IMPRESSION


1. Sepsis. Strep Viridans. 


2. Tricuspid valve endocarditis.  Large vegetation.  Tricuspid regurgitation.


Patient evaluated by cardiovascular surgery and felt not to be a surgical 

candidate.


3.  Bacteremia.  Strep viridans.  Blood cultures have remained negative on 

follow-up.


Last positive blood culture was on 2/23.


4. History of prosthetic aortic valve and so likely recurrent prosthetic valve


endocarditis.


5.  Cerebritis on MRI.  Also has parietal infarct noted on CT scan of the brain.


6. Left renal cortical and pulmonary and lower extremity septic emboli. 


7. Leukocytosis secondary to sepsis from showering of emboli from endocarditis.


8. Acute renal failure. Kidney function improved. 


She did appears to be more stable.  However she continues to experience 

breathing difficulty.





RECOMMENDATIONS


1. Continue Ampicillin intravenous.


2. Continue Gentamicin. now that the kidney function is improved but follow the 

renal function. 


3.  Stop vancomycin.  She is receiving total fluids with antibiotics and it 

could be contributing to the


Increased edema.  


4. Monitor clinical status.


5. Continue to monitor the left leg lesions.


6. Consider lumbar puncture if the mentation deteriorates.  














Robinson Carr MD Mar 9, 2018 18:45

## 2018-03-10 VITALS
SYSTOLIC BLOOD PRESSURE: 101 MMHG | DIASTOLIC BLOOD PRESSURE: 63 MMHG | TEMPERATURE: 98.4 F | RESPIRATION RATE: 18 BRPM | OXYGEN SATURATION: 94 % | HEART RATE: 106 BPM

## 2018-03-10 VITALS
OXYGEN SATURATION: 98 % | RESPIRATION RATE: 20 BRPM | HEART RATE: 111 BPM | TEMPERATURE: 98.6 F | SYSTOLIC BLOOD PRESSURE: 98 MMHG | DIASTOLIC BLOOD PRESSURE: 55 MMHG

## 2018-03-10 VITALS
SYSTOLIC BLOOD PRESSURE: 92 MMHG | HEART RATE: 108 BPM | TEMPERATURE: 99.4 F | OXYGEN SATURATION: 97 % | DIASTOLIC BLOOD PRESSURE: 55 MMHG | RESPIRATION RATE: 20 BRPM

## 2018-03-10 VITALS
DIASTOLIC BLOOD PRESSURE: 59 MMHG | SYSTOLIC BLOOD PRESSURE: 96 MMHG | RESPIRATION RATE: 18 BRPM | HEART RATE: 115 BPM | OXYGEN SATURATION: 99 % | TEMPERATURE: 98.7 F

## 2018-03-10 VITALS
OXYGEN SATURATION: 99 % | TEMPERATURE: 98.7 F | SYSTOLIC BLOOD PRESSURE: 98 MMHG | RESPIRATION RATE: 18 BRPM | DIASTOLIC BLOOD PRESSURE: 65 MMHG | HEART RATE: 101 BPM

## 2018-03-10 VITALS
HEART RATE: 115 BPM | SYSTOLIC BLOOD PRESSURE: 94 MMHG | RESPIRATION RATE: 20 BRPM | DIASTOLIC BLOOD PRESSURE: 59 MMHG | TEMPERATURE: 97.7 F | OXYGEN SATURATION: 97 %

## 2018-03-10 VITALS — OXYGEN SATURATION: 99 %

## 2018-03-10 VITALS — OXYGEN SATURATION: 98 %

## 2018-03-10 LAB
BASOPHILS # BLD AUTO: 0.1 TH/MM3 (ref 0–0.2)
BASOPHILS NFR BLD: 0.8 % (ref 0–2)
BUN SERPL-MCNC: 9 MG/DL (ref 7–18)
CALCIUM SERPL-MCNC: 8.2 MG/DL (ref 8.5–10.1)
CHLORIDE SERPL-SCNC: 88 MEQ/L (ref 98–107)
CREAT SERPL-MCNC: 1.13 MG/DL (ref 0.5–1)
EOSINOPHIL # BLD: 0.1 TH/MM3 (ref 0–0.4)
EOSINOPHIL NFR BLD: 0.8 % (ref 0–4)
ERYTHROCYTE [DISTWIDTH] IN BLOOD BY AUTOMATED COUNT: 21.5 % (ref 11.6–17.2)
GFR SERPLBLD BASED ON 1.73 SQ M-ARVRAT: 55 ML/MIN (ref 89–?)
GLUCOSE SERPL-MCNC: 86 MG/DL (ref 74–106)
HCO3 BLD-SCNC: 34.8 MEQ/L (ref 21–32)
HCT VFR BLD CALC: 24.4 % (ref 35–46)
HGB BLD-MCNC: 7.8 GM/DL (ref 11.6–15.3)
INR PPP: 2.7 RATIO
LYMPHOCYTES # BLD AUTO: 1.3 TH/MM3 (ref 1–4.8)
LYMPHOCYTES NFR BLD AUTO: 9.8 % (ref 9–44)
MCH RBC QN AUTO: 23.5 PG (ref 27–34)
MCHC RBC AUTO-ENTMCNC: 32 % (ref 32–36)
MCV RBC AUTO: 73.4 FL (ref 80–100)
MONOCYTE #: 1.2 TH/MM3 (ref 0–0.9)
MONOCYTES NFR BLD: 9.3 % (ref 0–8)
NEUTROPHILS # BLD AUTO: 10.4 TH/MM3 (ref 1.8–7.7)
NEUTROPHILS NFR BLD AUTO: 79.3 % (ref 16–70)
PLATELET # BLD: 376 TH/MM3 (ref 150–450)
PMV BLD AUTO: 8.1 FL (ref 7–11)
PROTHROMBIN TIME: 27.5 SEC (ref 9.8–11.6)
RBC # BLD AUTO: 3.32 MIL/MM3 (ref 4–5.3)
SODIUM SERPL-SCNC: 133 MEQ/L (ref 136–145)
WBC # BLD AUTO: 13.2 TH/MM3 (ref 4–11)

## 2018-03-10 RX ADMIN — Medication SCH ML: at 21:03

## 2018-03-10 RX ADMIN — ALBUTEROL SULFATE PRN MG: 2.5 SOLUTION RESPIRATORY (INHALATION) at 10:12

## 2018-03-10 RX ADMIN — CLINDAMYCIN PHOSPHATE SCH MLS/HR: 150 INJECTION, SOLUTION INTRAMUSCULAR; INTRAVENOUS at 13:44

## 2018-03-10 RX ADMIN — CLINDAMYCIN PHOSPHATE SCH MLS/HR: 150 INJECTION, SOLUTION INTRAMUSCULAR; INTRAVENOUS at 04:14

## 2018-03-10 RX ADMIN — POTASSIUM CHLORIDE SCH MEQ: 20 TABLET, EXTENDED RELEASE ORAL at 21:03

## 2018-03-10 RX ADMIN — POTASSIUM CHLORIDE SCH MEQ: 20 TABLET, EXTENDED RELEASE ORAL at 10:00

## 2018-03-10 RX ADMIN — AMPICILLIN SODIUM SCH MLS/HR: 2 INJECTION, POWDER, FOR SOLUTION INTRAMUSCULAR; INTRAVENOUS at 21:02

## 2018-03-10 RX ADMIN — AMPICILLIN SODIUM SCH MLS/HR: 2 INJECTION, POWDER, FOR SOLUTION INTRAMUSCULAR; INTRAVENOUS at 14:50

## 2018-03-10 RX ADMIN — STANDARDIZED SENNA CONCENTRATE AND DOCUSATE SODIUM SCH TAB: 8.6; 5 TABLET, FILM COATED ORAL at 09:00

## 2018-03-10 RX ADMIN — AMPICILLIN SODIUM SCH MLS/HR: 2 INJECTION, POWDER, FOR SOLUTION INTRAMUSCULAR; INTRAVENOUS at 03:19

## 2018-03-10 RX ADMIN — HYDROMORPHONE HYDROCHLORIDE PRN MG: 2 INJECTION INTRAMUSCULAR; INTRAVENOUS; SUBCUTANEOUS at 23:41

## 2018-03-10 RX ADMIN — HYDROMORPHONE HYDROCHLORIDE PRN MG: 2 INJECTION INTRAMUSCULAR; INTRAVENOUS; SUBCUTANEOUS at 20:12

## 2018-03-10 RX ADMIN — HYDROMORPHONE HYDROCHLORIDE PRN MG: 2 INJECTION INTRAMUSCULAR; INTRAVENOUS; SUBCUTANEOUS at 14:42

## 2018-03-10 RX ADMIN — HYDROMORPHONE HYDROCHLORIDE PRN MG: 2 INJECTION INTRAMUSCULAR; INTRAVENOUS; SUBCUTANEOUS at 04:15

## 2018-03-10 RX ADMIN — FUROSEMIDE SCH MG: 10 INJECTION, SOLUTION INTRAMUSCULAR; INTRAVENOUS at 09:49

## 2018-03-10 RX ADMIN — CHLORHEXIDINE GLUCONATE SCH PACK: 500 CLOTH TOPICAL at 03:19

## 2018-03-10 RX ADMIN — AMPICILLIN SODIUM SCH MLS/HR: 2 INJECTION, POWDER, FOR SOLUTION INTRAMUSCULAR; INTRAVENOUS at 09:49

## 2018-03-10 RX ADMIN — ALBUTEROL SULFATE PRN MG: 2.5 SOLUTION RESPIRATORY (INHALATION) at 21:26

## 2018-03-10 RX ADMIN — HYDROMORPHONE HYDROCHLORIDE PRN MG: 2 INJECTION INTRAMUSCULAR; INTRAVENOUS; SUBCUTANEOUS at 10:07

## 2018-03-10 RX ADMIN — FUROSEMIDE SCH MG: 10 INJECTION, SOLUTION INTRAMUSCULAR; INTRAVENOUS at 17:59

## 2018-03-10 RX ADMIN — Medication SCH ML: at 09:50

## 2018-03-10 RX ADMIN — HYDROMORPHONE HYDROCHLORIDE PRN MG: 2 INJECTION INTRAMUSCULAR; INTRAVENOUS; SUBCUTANEOUS at 00:10

## 2018-03-10 RX ADMIN — CLINDAMYCIN PHOSPHATE SCH MLS/HR: 150 INJECTION, SOLUTION INTRAMUSCULAR; INTRAVENOUS at 21:03

## 2018-03-10 RX ADMIN — STANDARDIZED SENNA CONCENTRATE AND DOCUSATE SODIUM SCH TAB: 8.6; 5 TABLET, FILM COATED ORAL at 21:00

## 2018-03-10 RX ADMIN — ALBUTEROL SULFATE PRN MG: 2.5 SOLUTION RESPIRATORY (INHALATION) at 16:22

## 2018-03-10 NOTE — HHI.PR
Subjective


Remarks


37-year-old female with a history of IV drug use, previous endocarditis who 

presented to the emergency department on 2/23/2018 due to shortness of breath 

and leg swelling.  She has bioprosthesis valves both aortic and tricuspid.  She 

admitted that she injected herself 4 days prior to this hospitalization.  She 

apparently completed IV antibiotics course in October and after that she was on 

doxycycline prophylaxis.  She reports that she has not been taking her 

antibiotics doxycycline.  Patient was initially managed in the ICU where she 

required pressors.  2D echo shows both aortic valve and tricuspid valve 

prosthesis endocarditis.  Cardiothoracic surgery evaluated and determined that 

patient is not a good surgical candidate.  During ICU stay, patient was found 

to have septic emboli as well.  Hematology was consulted for recommendations 

regarding anticoagulation.  Patient was started on argatroban bridging to 

warfarin.





Nursing denies any deterioration since last night.  Patient herself says that 

her legs are still painful despite double dosing of Lasix yesterday which she 

says she was urinating a lot.  She does verbally affirm the gravity of her 

situation and her prognosis is poor since she is not a cardiothoracic surgery 

candidate.





Objective





Vital Signs








  Date Time  Temp Pulse Resp B/P (MAP) Pulse Ox O2 Delivery O2 Flow Rate FiO2


 


3/10/18 08:00 98.4 110 18 101/63 (76) 94   


 


3/10/18 04:00      Nasal Cannula 4.00 


 


3/10/18 04:00  108      


 


3/10/18 04:00 99.4 107 20 92/55 (67) 97   


 


3/10/18 00:00 98.6 108 20 98/55 (69) 98   


 


3/10/18 00:00  111      


 


3/10/18 00:00      Nasal Cannula 4.00 


 


3/9/18 21:54     96 Nasal Cannula 3.00 


 


3/9/18 21:14  113      


 


3/9/18 20:00 98.8 108 24 104/60 (75) 98   


 


3/9/18 19:00      Nasal Cannula 4.00 


 


3/9/18 16:17  105      


 


3/9/18 16:00      Nasal Cannula 4.00 





      Humidified  


 


3/9/18 16:00 98.8 103 22 90/55 (67) 100   


 


3/9/18 12:00      Nasal Cannula 4.00 





      Humidified  


 


3/9/18 12:00 98.0 97 18 104/67 (79) 99   














I/O      


 


 3/9/18 3/9/18 3/9/18 3/10/18 3/10/18 3/10/18





 06:59 14:59 22:59 06:59 14:59 22:59


 


Intake Total 720 ml  100 ml 540 ml  


 


Balance 720 ml  100 ml 540 ml  


 


      


 


Intake Oral 720 ml   240 ml  


 


IV Total   100 ml 300 ml  


 


# Voids 4  4 6  


 


# Bowel Movements 0  3 0  








Result Diagram:  


3/10/18 0628                                                                   

             3/8/18 0350





Objective Remarks


Clear lung sounds bilaterally, unlabored breathing, no cyanosis, on nasal 

cannula


4/6 ejection murmur


severe lower extremity edema





A/P


Assessment and Plan


Bioprosthetic aortic valve endocarditis


Bioprosthetic tricuspid valve endocarditis


Bacteremia with viridans species


septic pulm embolism


Cerebritis


-ampicillin, vanc, and gentamicin; ID following





Pulmonary hypertension





Acute exacerbation of diastolic congestive heart failure with preserved left 

ventricular ejection fraction


-continue Lasix to 40 BID; I and Os; bmp pending





occlusion of left superficial femoral artery


-Continue on argatroban to warfarin bridge. heme/onco following, appreciate recs





Hypokalemia


-bmp ordered today; f/u 





Full code. 





Overall poor prognosis. Palliative care following.


Discharge Planning


Poor prognosis from cardiothoracic surgery standpoint.  Only medical management 

is option for right now which may be futile.  Palliative care following. 

continue antibiotics; bridging coumadin











Mir Mills MD Mar 10, 2018 10:02

## 2018-03-10 NOTE — PD.ONC.PN
Subjective


Subjective


Remarks


Afebrile


Patient reports she has significant shortness of breath that is somewhat 

relieved with breathing treatments


No bleeding





Objective


Data











  Date Time  Temp Pulse Resp B/P (MAP) Pulse Ox O2 Delivery O2 Flow Rate FiO2


 


3/10/18 10:13     99 Nasal Cannula 4.00 


 


3/10/18 08:00 98.4 110 18 101/63 (76) 94   


 


3/10/18 04:00      Nasal Cannula 4.00 


 


3/10/18 04:00  108      


 


3/10/18 04:00 99.4 107 20 92/55 (67) 97   


 


3/10/18 00:00 98.6 108 20 98/55 (69) 98   


 


3/10/18 00:00  111      


 


3/10/18 00:00      Nasal Cannula 4.00 


 


3/9/18 21:54     96 Nasal Cannula 3.00 


 


3/9/18 21:14  113      


 


3/9/18 20:00 98.8 108 24 104/60 (75) 98   


 


3/9/18 19:00      Nasal Cannula 4.00 


 


3/9/18 16:17  105      


 


3/9/18 16:00      Nasal Cannula 4.00 





      Humidified  


 


3/9/18 16:00 98.8 103 22 90/55 (67) 100   


 


3/9/18 12:00      Nasal Cannula 4.00 





      Humidified  


 


3/9/18 12:00 98.0 97 18 104/67 (79) 99   














 3/10/18 3/10/18 3/10/18





 07:00 15:00 23:00


 


Intake Total 540 ml  


 


Balance 540 ml  








Result Diagram:  


3/10/18 0628                                                                   

             3/8/18 0350





Laboratory Results





Laboratory Tests








Test


  3/10/18


03:16 3/10/18


06:28


 


Vancomycin Level Trough 23.9 MCG/ML  


 


White Blood Count  13.2 TH/MM3 


 


Red Blood Count  3.32 MIL/MM3 


 


Hemoglobin  7.8 GM/DL 


 


Hematocrit  24.4 % 


 


Mean Corpuscular Volume  73.4 FL 


 


Mean Corpuscular Hemoglobin  23.5 PG 


 


Mean Corpuscular Hemoglobin


Concent 


  32.0 % 


 


 


Red Cell Distribution Width  21.5 % 


 


Platelet Count  376 TH/MM3 


 


Mean Platelet Volume  8.1 FL 


 


Neutrophils (%) (Auto)  79.3 % 


 


Lymphocytes (%) (Auto)  9.8 % 


 


Monocytes (%) (Auto)  9.3 % 


 


Eosinophils (%) (Auto)  0.8 % 


 


Basophils (%) (Auto)  0.8 % 


 


Neutrophils # (Auto)  10.4 TH/MM3 


 


Lymphocytes # (Auto)  1.3 TH/MM3 


 


Monocytes # (Auto)  1.2 TH/MM3 


 


Eosinophils # (Auto)  0.1 TH/MM3 


 


Basophils # (Auto)  0.1 TH/MM3 


 


CBC Comment  DIFF FINAL 


 


Differential Comment   


 


Prothrombin Time  27.5 SEC 


 


Prothromb Time International


Ratio 


  2.7 RATIO 


 


 


Activated Partial


Thromboplast Time 


  61.8 SEC 


 











Administered Medications








 Medications


  (Trade)  Dose


 Ordered  Sig/Nikhil


 Route


 PRN Reason  Start Time


 Stop Time Status Last Admin


Dose Admin


 


 Sodium Chloride


  (NS Flush)  2 ml  UNSCH  PRN


 IV FLUSH


 FLUSH AFTER USING IV ACCESS  2/23/18 22:00


    3/8/18 08:18


 


 


 Sodium Chloride


  (NS Flush)  2 ml  BID


 IV FLUSH


   2/24/18 09:00


    3/10/18 09:50


 


 


 Acetaminophen


  (Tylenol)  650 mg  Q6H  PRN


 PO


 fever  2/23/18 22:00


    3/5/18 16:02


 


 


 Ondansetron HCl


  (Zofran Inj)  4 mg  Q6H  PRN


 IV PUSH


 NAUSEA OR VOMITING  2/23/18 22:00


    3/8/18 21:22


 


 


 Temazepam


  (Restoril)  15 mg  HS  PRN


 PO


 INSOMNIA  2/23/18 22:00


    3/6/18 22:08


 


 


 Chlorhexidine


 Gluconate


  (Chlorhexidine


 2% Cloth)  


 Taper  DAILY@04


 TOP


   2/24/18 04:00


 2/20/19 03:59  3/6/18 03:54


 


 


 Senna/Docusate


 Sodium


  (Arlen-Colace)  1 tab  BID


 PO


   2/24/18 09:00


    3/8/18 21:21


 


 


 Ampicillin Sodium


 2000 mg/Sodium


 Chloride  100 ml @ 


 400 mls/hr  Q6H


 IV


   2/24/18 14:00


    3/10/18 09:49


 


 


 Loperamide HCl


  (Imodium)  2 mg  Q4H  PRN


 PO


 Diarrhea  2/26/18 16:15


    2/28/18 14:32


 


 


 Gentamicin


 Sulfate 100 mg/


 Sodium Chloride  102.5 ml @ 


 100 mls/hr  Q8H


 IV


   2/26/18 21:00


    3/10/18 04:14


 


 


 Albuterol Sulfate


  (Albuterol Neb)  2.5 mg  Q2HR NEB  PRN


 NEB


 dyspnea  2/27/18 11:30


    3/10/18 10:12


 


 


 Argatroban 250 mg/


 Sodium Chloride  252.5 ml @ 


 3.11 mls/hr  TITRATE  PRN


 IV


 aPTT < 50  3/1/18 19:15


    3/9/18 18:32


 


 


 Acetaminophen/


 Hydrocodone Bitart


  (Norco  Mg)  1 tab  Q4H  PRN


 PO


 pain 1-5  3/4/18 15:00


    3/9/18 05:02


 


 


 Warfarin Sodium


  (Coumadin)  2 mg  DAILY@16


 PO


   3/7/18 16:00


    3/9/18 16:09


 


 


 Hydromorphone HCl


  (Dilaudid Pf Inj)  1 mg  Q4H  PRN


 IV PUSH


 PAIN SCALE 6 TO 10  3/7/18 19:00


    3/10/18 10:07


 


 


 Potassium Chloride


  (KCl)  20 meq  Q12HR


 PO


   3/8/18 21:00


 3/11/18 20:59  3/10/18 10:00


 


 


 Furosemide


  (Lasix Inj)  40 mg  BID@09,18


 IV PUSH


   3/9/18 18:00


    3/10/18 09:49


 








Objective Remarks


GENERAL: Overweight younger female, sitting up in bed; she appears to doze off 

during exam


SKIN: Warm and dry.


HEAD: Normocephalic.


EYES: No scleral icterus. No injection or drainage. 


NECK: No JVD or lymphadenopathy.


CARDIOVASCULAR: Tachycardia; mild systolic murmur noted


RESPIRATORY: Coarse lung sounds


GASTROINTESTINAL: Abdomen soft, non-tender, nondistended. 


EXTREMITIES: Cyanosis to LLE; 4+ edema to bilateral lower extremities


MUSCULOSKELETAL: Adequate muscle tone.


NEUROLOGICAL: No obvious focal deficit. Awake, alert, and oriented x3.





Assessment/Plan


Problem List:  


(1) arterial blood clot


Plan:  --on Argatroban bridge to coumadin. 


-- no significant family history of thrombotic events to suggest hereditary 

antithrombin deficiency.  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 

impaired production from her liver disease. 


--has chronic active hepatitis C.  ++increased consumption from disseminated 

intravascular coagulation and acute illness, bacterial endocarditis.  


--Protein losses are also considered.  She had mild proteinuria when she first 

was admitted.


--Thrombin inactivates antithrombin.  This would abrogate the effect of 

unfractionated heparin and low molecular weight heparin.





(2) Acute septic pulmonary embolism


ICD Codes:  I26.90 - Septic pulmonary embolism without acute cor pulmonale


Plan:  -- The echocardiogram on 3/3 showed an estimated EF of 50-55%.  


++severe tricuspid regurgitation with prosthesis in place.  


++2 x 4 cm mobile vegetation was seen on the valve.  The right atrium and right 

ventricle were normal in size and function





(3) Microcytic anemia


ICD Codes:  D50.9 - Iron deficiency anemia, unspecified


Plan:  --likely d/t inflammation/infection. no evidence of bleeding or 

hemolysis. 


--B12/folate WNL


--low iron and percent saturation. normal TIBC, elevated ferritin-->more 

consistent with anemia of chronic disease. 


--stool Hemoccult negative. 


--no evidence of hemolysis





(4) Bacterial endocarditis


ICD Codes:  I33.0 - Acute and subacute infective endocarditis


Status:  Acute


Plan:  --on multiple antibiotics, ID following. 





Assessment


36y/o female admitted with recurrent endocarditis. hematology consulted for 

anticoagulation assistance.


h/o Recurrent bacterial endocarditis, bacteremia, history of prosthetic aortic 

valve and subsequent redo, chronic active hepatitis C, intravenous drug use, 

microcytic anemia, consistent with iron deficiency, left lower extremity 

arterial event, bacterial endocarditis with viridans streptococcus.





HPI (brought forward for continuity of care): history of IV drug abuse and  

recurrent endocarditis. had a relapse of her activity. has history of pulmonary 

embolism and infected valves. +significant congestive heart failure and 

presented to the emergency room on 02/23/2018, with sepsis. is followed by 

Infectious Disease for her endocarditis.  previously received antibiotic 

therapy for enterococcus faecalis and staph aureus. There is questionable 

compliance. Her current blood 


culture from 02/23/2018, shows viridans streptococcus.  Two out of two bottles 

were positive.  She is on ampicillin and gentamicin. course is complicated by 

cold left lower extremity, evaluated by  Vascular Surgery.  A CT angiogram 

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 

the proximal thigh.  There is satisfactory calf runoff present.  For this reason

, conservative management with anticoagulation is continued. was placed on 

unfractionated heparin.  Her heparin dose has been titrated from 1000 units/

hour to 2500 units/hour with a PTT remaining subtherapeutic.  Her last PTT is 

32 seconds from 03/01/2018, at 2 p.m.  For this reason, Hematology/Oncology was 

consulted.


Plan


1.  Pharmacy dosing Coumadin


2.  Continue argatroban to Coumadin bridge; once INR is greater than 4, will 

hold argatroban for 4 hours and recheck INR at that time.  If INR is greater 

than 2 we will keep argatroban off.  If it is less than 2 we will resume.


3.  Monitor CBC, coags


Attending Statement


The exam, history, and the medical decision-making described in the above note 

were completed with the assistance of the mid-level provider. I reviewed and 

agree with the findings presented.  I attest that I had a face-to-face 

encounter with the patient on the same day, and personally performed and 

documented my assessment and findings in the medical record.





No C/O 


INR <4. Increase Coumadin. Continue argatroban till INR >4


D/W Janessa Perla Mar 10, 2018 10:59


Melissa Apple MD Mar 11, 2018 00:33

## 2018-03-11 VITALS
HEART RATE: 128 BPM | RESPIRATION RATE: 22 BRPM | TEMPERATURE: 99.1 F | SYSTOLIC BLOOD PRESSURE: 92 MMHG | DIASTOLIC BLOOD PRESSURE: 69 MMHG | OXYGEN SATURATION: 92 %

## 2018-03-11 VITALS
HEART RATE: 119 BPM | OXYGEN SATURATION: 97 % | TEMPERATURE: 99.1 F | DIASTOLIC BLOOD PRESSURE: 66 MMHG | RESPIRATION RATE: 18 BRPM | SYSTOLIC BLOOD PRESSURE: 93 MMHG

## 2018-03-11 VITALS
RESPIRATION RATE: 19 BRPM | TEMPERATURE: 98.9 F | OXYGEN SATURATION: 99 % | SYSTOLIC BLOOD PRESSURE: 98 MMHG | DIASTOLIC BLOOD PRESSURE: 56 MMHG | HEART RATE: 75 BPM

## 2018-03-11 VITALS
HEART RATE: 102 BPM | TEMPERATURE: 98.1 F | DIASTOLIC BLOOD PRESSURE: 64 MMHG | RESPIRATION RATE: 17 BRPM | OXYGEN SATURATION: 96 % | SYSTOLIC BLOOD PRESSURE: 90 MMHG

## 2018-03-11 VITALS
OXYGEN SATURATION: 97 % | DIASTOLIC BLOOD PRESSURE: 57 MMHG | SYSTOLIC BLOOD PRESSURE: 100 MMHG | HEART RATE: 115 BPM | TEMPERATURE: 98.9 F | RESPIRATION RATE: 18 BRPM

## 2018-03-11 VITALS
HEART RATE: 112 BPM | RESPIRATION RATE: 17 BRPM | DIASTOLIC BLOOD PRESSURE: 61 MMHG | TEMPERATURE: 98.6 F | SYSTOLIC BLOOD PRESSURE: 93 MMHG | OXYGEN SATURATION: 99 %

## 2018-03-11 VITALS — OXYGEN SATURATION: 97 %

## 2018-03-11 VITALS — HEART RATE: 114 BPM

## 2018-03-11 VITALS — OXYGEN SATURATION: 92 %

## 2018-03-11 LAB
BASOPHILS # BLD AUTO: 0.1 TH/MM3 (ref 0–0.2)
BASOPHILS NFR BLD: 0.7 % (ref 0–2)
EOSINOPHIL # BLD: 0.1 TH/MM3 (ref 0–0.4)
EOSINOPHIL NFR BLD: 0.6 % (ref 0–4)
ERYTHROCYTE [DISTWIDTH] IN BLOOD BY AUTOMATED COUNT: 22.2 % (ref 11.6–17.2)
HCT VFR BLD CALC: 23.6 % (ref 35–46)
HGB BLD-MCNC: 7.7 GM/DL (ref 11.6–15.3)
INR PPP: 2.7 RATIO
LYMPHOCYTES # BLD AUTO: 1.4 TH/MM3 (ref 1–4.8)
LYMPHOCYTES NFR BLD AUTO: 10.7 % (ref 9–44)
MCH RBC QN AUTO: 23.7 PG (ref 27–34)
MCHC RBC AUTO-ENTMCNC: 32.5 % (ref 32–36)
MCV RBC AUTO: 73.1 FL (ref 80–100)
MONOCYTE #: 1.3 TH/MM3 (ref 0–0.9)
MONOCYTES NFR BLD: 9.7 % (ref 0–8)
NEUTROPHILS # BLD AUTO: 10.5 TH/MM3 (ref 1.8–7.7)
NEUTROPHILS NFR BLD AUTO: 78.3 % (ref 16–70)
PLATELET # BLD: 366 TH/MM3 (ref 150–450)
PMV BLD AUTO: 8 FL (ref 7–11)
PROTHROMBIN TIME: 27.5 SEC (ref 9.8–11.6)
RBC # BLD AUTO: 3.23 MIL/MM3 (ref 4–5.3)
WBC # BLD AUTO: 13.3 TH/MM3 (ref 4–11)

## 2018-03-11 RX ADMIN — AMPICILLIN SODIUM SCH MLS/HR: 2 INJECTION, POWDER, FOR SOLUTION INTRAMUSCULAR; INTRAVENOUS at 21:06

## 2018-03-11 RX ADMIN — STANDARDIZED SENNA CONCENTRATE AND DOCUSATE SODIUM SCH TAB: 8.6; 5 TABLET, FILM COATED ORAL at 21:00

## 2018-03-11 RX ADMIN — CLINDAMYCIN PHOSPHATE SCH MLS/HR: 150 INJECTION, SOLUTION INTRAMUSCULAR; INTRAVENOUS at 04:14

## 2018-03-11 RX ADMIN — HYDROMORPHONE HYDROCHLORIDE PRN MG: 2 INJECTION INTRAMUSCULAR; INTRAVENOUS; SUBCUTANEOUS at 04:42

## 2018-03-11 RX ADMIN — ALBUTEROL SULFATE PRN MG: 2.5 SOLUTION RESPIRATORY (INHALATION) at 01:14

## 2018-03-11 RX ADMIN — ALBUTEROL SULFATE PRN MG: 2.5 SOLUTION RESPIRATORY (INHALATION) at 20:38

## 2018-03-11 RX ADMIN — AMPICILLIN SODIUM SCH MLS/HR: 2 INJECTION, POWDER, FOR SOLUTION INTRAMUSCULAR; INTRAVENOUS at 13:09

## 2018-03-11 RX ADMIN — AMPICILLIN SODIUM SCH MLS/HR: 2 INJECTION, POWDER, FOR SOLUTION INTRAMUSCULAR; INTRAVENOUS at 01:07

## 2018-03-11 RX ADMIN — Medication SCH ML: at 21:05

## 2018-03-11 RX ADMIN — ALBUTEROL SULFATE PRN MG: 2.5 SOLUTION RESPIRATORY (INHALATION) at 17:22

## 2018-03-11 RX ADMIN — HYDROMORPHONE HYDROCHLORIDE PRN MG: 2 INJECTION INTRAMUSCULAR; INTRAVENOUS; SUBCUTANEOUS at 21:04

## 2018-03-11 RX ADMIN — HYDROMORPHONE HYDROCHLORIDE PRN MG: 2 INJECTION INTRAMUSCULAR; INTRAVENOUS; SUBCUTANEOUS at 09:13

## 2018-03-11 RX ADMIN — FUROSEMIDE SCH MG: 10 INJECTION, SOLUTION INTRAMUSCULAR; INTRAVENOUS at 09:12

## 2018-03-11 RX ADMIN — FUROSEMIDE SCH MG: 10 INJECTION, SOLUTION INTRAMUSCULAR; INTRAVENOUS at 16:52

## 2018-03-11 RX ADMIN — Medication SCH ML: at 09:22

## 2018-03-11 RX ADMIN — POTASSIUM CHLORIDE SCH MEQ: 20 TABLET, EXTENDED RELEASE ORAL at 09:13

## 2018-03-11 RX ADMIN — HYDROMORPHONE HYDROCHLORIDE PRN MG: 2 INJECTION INTRAMUSCULAR; INTRAVENOUS; SUBCUTANEOUS at 17:07

## 2018-03-11 RX ADMIN — WARFARIN SODIUM SCH MG: 5 TABLET ORAL at 16:51

## 2018-03-11 RX ADMIN — HYDROMORPHONE HYDROCHLORIDE PRN MG: 2 INJECTION INTRAMUSCULAR; INTRAVENOUS; SUBCUTANEOUS at 13:07

## 2018-03-11 RX ADMIN — STANDARDIZED SENNA CONCENTRATE AND DOCUSATE SODIUM SCH TAB: 8.6; 5 TABLET, FILM COATED ORAL at 09:00

## 2018-03-11 RX ADMIN — ALBUTEROL SULFATE PRN MG: 2.5 SOLUTION RESPIRATORY (INHALATION) at 08:44

## 2018-03-11 RX ADMIN — CHLORHEXIDINE GLUCONATE SCH PACK: 500 CLOTH TOPICAL at 01:57

## 2018-03-11 RX ADMIN — AMPICILLIN SODIUM SCH MLS/HR: 2 INJECTION, POWDER, FOR SOLUTION INTRAMUSCULAR; INTRAVENOUS at 09:12

## 2018-03-11 NOTE — HHI.PR
Subjective


Remarks


37-year-old female with a history of IV drug use, previous endocarditis who 

presented to the emergency department on 2/23/2018 due to shortness of breath 

and leg swelling.  She has bioprosthesis valves both aortic and tricuspid.  She 

admitted that she injected herself 4 days prior to this hospitalization.  She 

apparently completed IV antibiotics course in October and after that she was on 

doxycycline prophylaxis.  She reports that she has not been taking her 

antibiotics doxycycline.  Patient was initially managed in the ICU where she 

required pressors.  2D echo shows both aortic valve and tricuspid valve 

prosthesis endocarditis.  Cardiothoracic surgery evaluated and determined that 

patient is not a good surgical candidate.  During ICU stay, patient was found 

to have septic emboli as well.  Hematology was consulted for recommendations 

regarding anticoagulation.  Patient was started on argatroban bridging to 

warfarin.





Nursing denies any deterioration since last night.  Patient herself appears to 

be an unpleasant mood overall, not very social today.  Patient does not think 

her edema in her legs has improved.  When asked if she has given thought 

regarding comfort care, she asks and return if I was referring to hospice.  I 

informed her hospice was just part of it, she was receptive to having another 

discussion with palliative care.





Objective





Vital Signs








  Date Time  Temp Pulse Resp B/P (MAP) Pulse Ox O2 Delivery O2 Flow Rate FiO2


 


3/11/18 12:00 98.6 112 17 93/61 (72) 99   


 


3/11/18 08:45     92 Nasal Cannula 3.00 


 


3/11/18 08:00 98.9 115 18 100/57 (71) 97   


 


3/11/18 04:00 99.1 128 22 92/69 (77) 92   


 


3/11/18 04:00      Nasal Cannula 4.00 


 


3/11/18 04:00  112      


 


3/11/18 00:00 98.9 75 19 98/56 (70) 99   


 


3/11/18 00:00      Nasal Cannula 4.00 


 


3/11/18 00:00  118      


 


3/10/18 21:29     98 Nasal Cannula 4.00 


 


3/10/18 20:00 97.7 109 20 94/59 (71) 97   


 


3/10/18 20:00      Nasal Cannula 4.00 


 


3/10/18 20:00  115      


 


3/10/18 16:00 98.7 107 18 96/59 (71) 99   


 


3/10/18 16:00  115      


 


3/10/18 16:00      Nasal Cannula 4.00 














I/O      


 


 3/10/18 3/10/18 3/10/18 3/11/18 3/11/18 3/11/18





 07:00 15:00 23:00 07:00 15:00 23:00


 


Intake Total 540 ml  202.5 ml 260.1 ml  


 


Balance 540 ml  202.5 ml 260.1 ml  


 


      


 


Intake Oral 240 ml     


 


IV Total 300 ml  202.5 ml 260.1 ml  


 


# Voids 6     


 


# Bowel Movements 0     








Result Diagram:  


3/11/18 0455                                                                   

             3/10/18 2012





Objective Remarks


Clear lung sounds bilaterally, unlabored breathing, no cyanosis, on nasal 

cannula


4/6 ejection murmur


Unchanged severe lower extremity edema


Sitting up at the side of the bed





A/P


Assessment and Plan


Bioprosthetic aortic valve endocarditis


Bioprosthetic tricuspid valve endocarditis


Bacteremia with viridans species


septic pulm embolism


Cerebritis


-ampicillin, vanc, and gentamicin; ID following





Pulmonary hypertension





Acute exacerbation of diastolic congestive heart failure with preserved left 

ventricular ejection fraction


-continue Lasix to 40 BID; I and Os; bmp pending





occlusion of left superficial femoral artery


-Continue on argatroban to warfarin bridge. heme/onco following, appreciate recs





Hypokalemia


-monitor, replace as necessary





Full code. 





Overall poor prognosis. Palliative care following.


Discharge Planning


Poor prognosis from cardiothoracic surgery standpoint.  Only medical management 

is option for right now which may be futile.  Patient willing to engage in more 

discussion regarding comfort care.











Mir Mills MD Mar 11, 2018 15:38

## 2018-03-11 NOTE — PD.ONC.PN
Subjective


Subjective


Remarks


Afebrile


Patient sitting up in chair at bedside in no distress


She again appears to be falling asleep throughout exam


No bleeding





Objective


Data











  Date Time  Temp Pulse Resp B/P (MAP) Pulse Ox O2 Delivery O2 Flow Rate FiO2


 


3/11/18 12:00 98.6 112 17 93/61 (72) 99   


 


3/11/18 08:45     92 Nasal Cannula 3.00 


 


3/11/18 08:00 98.9 115 18 100/57 (71) 97   


 


3/11/18 04:00 99.1 128 22 92/69 (77) 92   


 


3/11/18 04:00      Nasal Cannula 4.00 


 


3/11/18 04:00  112      


 


3/11/18 00:00 98.9 75 19 98/56 (70) 99   


 


3/11/18 00:00      Nasal Cannula 4.00 


 


3/11/18 00:00  118      


 


3/10/18 21:29     98 Nasal Cannula 4.00 


 


3/10/18 20:00 97.7 109 20 94/59 (71) 97   


 


3/10/18 20:00      Nasal Cannula 4.00 


 


3/10/18 20:00  115      


 


3/10/18 16:00 98.7 107 18 96/59 (71) 99   


 


3/10/18 16:00  115      


 


3/10/18 16:00      Nasal Cannula 4.00 














 3/11/18 3/11/18 3/11/18





 07:00 15:00 23:00


 


Intake Total 260.1 ml  


 


Balance 260.1 ml  








Result Diagram:  


3/11/18 0455                                                                   

             3/10/18 2012





Laboratory Results





Laboratory Tests








Test


  3/10/18


20:12 3/11/18


04:55


 


Blood Urea Nitrogen 9 MG/DL  


 


Creatinine 1.13 MG/DL  


 


Random Glucose 86 MG/DL  


 


Calcium Level 8.2 MG/DL  


 


Sodium Level 133 MEQ/L  


 


Potassium Level 4.0 MEQ/L  


 


Chloride Level 88 MEQ/L  


 


Carbon Dioxide Level 34.8 MEQ/L  


 


Anion Gap 10 MEQ/L  


 


Estimat Glomerular Filtration


Rate 55 ML/MIN 


  


 


 


White Blood Count  13.3 TH/MM3 


 


Red Blood Count  3.23 MIL/MM3 


 


Hemoglobin  7.7 GM/DL 


 


Hematocrit  23.6 % 


 


Mean Corpuscular Volume  73.1 FL 


 


Mean Corpuscular Hemoglobin  23.7 PG 


 


Mean Corpuscular Hemoglobin


Concent 


  32.5 % 


 


 


Red Cell Distribution Width  22.2 % 


 


Platelet Count  366 TH/MM3 


 


Mean Platelet Volume  8.0 FL 


 


Neutrophils (%) (Auto)  78.3 % 


 


Lymphocytes (%) (Auto)  10.7 % 


 


Monocytes (%) (Auto)  9.7 % 


 


Eosinophils (%) (Auto)  0.6 % 


 


Basophils (%) (Auto)  0.7 % 


 


Neutrophils # (Auto)  10.5 TH/MM3 


 


Lymphocytes # (Auto)  1.4 TH/MM3 


 


Monocytes # (Auto)  1.3 TH/MM3 


 


Eosinophils # (Auto)  0.1 TH/MM3 


 


Basophils # (Auto)  0.1 TH/MM3 


 


CBC Comment  DIFF FINAL 


 


Differential Comment   


 


Prothrombin Time  27.5 SEC 


 


Prothromb Time International


Ratio 


  2.7 RATIO 


 


 


Activated Partial


Thromboplast Time 


  65.3 SEC 


 











Administered Medications








 Medications


  (Trade)  Dose


 Ordered  Sig/Nikhil


 Route


 PRN Reason  Start Time


 Stop Time Status Last Admin


Dose Admin


 


 Sodium Chloride


  (NS Flush)  2 ml  UNSCH  PRN


 IV FLUSH


 FLUSH AFTER USING IV ACCESS  2/23/18 22:00


    3/8/18 08:18


 


 


 Sodium Chloride


  (NS Flush)  2 ml  BID


 IV FLUSH


   2/24/18 09:00


    3/11/18 09:22


 


 


 Acetaminophen


  (Tylenol)  650 mg  Q6H  PRN


 PO


 fever  2/23/18 22:00


    3/5/18 16:02


 


 


 Ondansetron HCl


  (Zofran Inj)  4 mg  Q6H  PRN


 IV PUSH


 NAUSEA OR VOMITING  2/23/18 22:00


    3/8/18 21:22


 


 


 Temazepam


  (Restoril)  15 mg  HS  PRN


 PO


 INSOMNIA  2/23/18 22:00


    3/6/18 22:08


 


 


 Chlorhexidine


 Gluconate


  (Chlorhexidine


 2% Cloth)  


 Taper  DAILY@04


 TOP


   2/24/18 04:00


 2/20/19 03:59  3/6/18 03:54


 


 


 Senna/Docusate


 Sodium


  (Arlen-Colace)  1 tab  BID


 PO


   2/24/18 09:00


    3/8/18 21:21


 


 


 Ampicillin Sodium


 2000 mg/Sodium


 Chloride  100 ml @ 


 400 mls/hr  Q6H


 IV


   2/24/18 14:00


    3/11/18 13:09


 


 


 Loperamide HCl


  (Imodium)  2 mg  Q4H  PRN


 PO


 Diarrhea  2/26/18 16:15


    2/28/18 14:32


 


 


 Gentamicin


 Sulfate 100 mg/


 Sodium Chloride  102.5 ml @ 


 100 mls/hr  Q8H


 IV


   2/26/18 21:00


   Future Hold 3/11/18 04:14


 


 


 Albuterol Sulfate


  (Albuterol Neb)  2.5 mg  Q2HR NEB  PRN


 NEB


 dyspnea  2/27/18 11:30


    3/11/18 08:44


 


 


 Argatroban 250 mg/


 Sodium Chloride  252.5 ml @ 


 3.11 mls/hr  TITRATE  PRN


 IV


 aPTT < 50  3/1/18 19:15


    3/9/18 18:32


 


 


 Acetaminophen/


 Hydrocodone Bitart


  (Norco  Mg)  1 tab  Q4H  PRN


 PO


 pain 1-5  3/4/18 15:00


    3/9/18 05:02


 


 


 Hydromorphone HCl


  (Dilaudid Pf Inj)  1 mg  Q4H  PRN


 IV PUSH


 PAIN SCALE 6 TO 10  3/7/18 19:00


    3/11/18 13:07


 


 


 Potassium Chloride


  (KCl)  20 meq  Q12HR


 PO


   3/8/18 21:00


 3/11/18 20:59  3/11/18 09:13


 


 


 Furosemide


  (Lasix Inj)  40 mg  BID@09,18


 IV PUSH


   3/9/18 18:00


    3/11/18 09:12


 








Objective Remarks


GENERAL: Overweight younger female sitting in chair at bedside in no obvious 

distress


SKIN: Warm and dry.


HEAD: Normocephalic.


EYES: No scleral icterus. No injection or drainage. 


NECK: No JVD or lymphadenopathy.


CARDIOVASCULAR: Tachycardia; mild systolic murmur noted


RESPIRATORY: Coarse lung sounds


GASTROINTESTINAL: Abdomen soft, non-tender, nondistended. 


EXTREMITIES: Cyanosis to LLE; 4+ edema to bilateral lower extremities


MUSCULOSKELETAL: Adequate muscle tone.


NEUROLOGICAL: No obvious focal deficit. Awake, somewhat lethargic and oriented 

x3.





Assessment/Plan


Problem List:  


(1) arterial blood clot


Plan:  --on Argatroban bridge to coumadin. 


-- no significant family history of thrombotic events to suggest hereditary 

antithrombin deficiency.  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 

impaired production from her liver disease. 


--has chronic active hepatitis C.  ++increased consumption from disseminated 

intravascular coagulation and acute illness, bacterial endocarditis.  


--Protein losses are also considered.  She had mild proteinuria when she first 

was admitted.


--Thrombin inactivates antithrombin.  This would abrogate the effect of 

unfractionated heparin and low molecular weight heparin.





(2) Acute septic pulmonary embolism


ICD Codes:  I26.90 - Septic pulmonary embolism without acute cor pulmonale


Plan:  -- The echocardiogram on 3/3 showed an estimated EF of 50-55%.  


++severe tricuspid regurgitation with prosthesis in place.  


++2 x 4 cm mobile vegetation was seen on the valve.  The right atrium and right 

ventricle were normal in size and function





(3) Microcytic anemia


ICD Codes:  D50.9 - Iron deficiency anemia, unspecified


Plan:  --likely d/t inflammation/infection. no evidence of bleeding or 

hemolysis. 


--B12/folate WNL


--low iron and percent saturation. normal TIBC, elevated ferritin-->more 

consistent with anemia of chronic disease. 


--stool Hemoccult negative. 


--no evidence of hemolysis





(4) Bacterial endocarditis


ICD Codes:  I33.0 - Acute and subacute infective endocarditis


Status:  Acute


Plan:  --on ampicillin, ID following. 





Assessment


36y/o female admitted with recurrent endocarditis. hematology consulted for 

anticoagulation assistance.


h/o Recurrent bacterial endocarditis, bacteremia, history of prosthetic aortic 

valve and subsequent redo, chronic active hepatitis C, intravenous drug use, 

microcytic anemia, consistent with iron deficiency, left lower extremity 

arterial event, bacterial endocarditis with viridans streptococcus.





HPI (brought forward for continuity of care): history of IV drug abuse and  

recurrent endocarditis. had a relapse of her activity. has history of pulmonary 

embolism and infected valves. +significant congestive heart failure and 

presented to the emergency room on 02/23/2018, with sepsis. is followed by 

Infectious Disease for her endocarditis.  previously received antibiotic 

therapy for enterococcus faecalis and staph aureus. There is questionable 

compliance. Her current blood 


culture from 02/23/2018, shows viridans streptococcus.  Two out of two bottles 

were positive.  She is on ampicillin and gentamicin. course is complicated by 

cold left lower extremity, evaluated by  Vascular Surgery.  A CT angiogram 

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 

the proximal thigh.  There is satisfactory calf runoff present.  For this reason

, conservative management with anticoagulation is continued. was placed on 

unfractionated heparin.  Her heparin dose has been titrated from 1000 units/

hour to 2500 units/hour with a PTT remaining subtherapeutic.  Her last PTT is 

32 seconds from 03/01/2018, at 2 p.m.  For this reason, Hematology/Oncology was 

consulted.


Plan


1.  Increase Coumadin to 5 mg daily


2.  Continue argatroban to Coumadin bridge; once INR is greater than 4, will 

hold argatroban for 4 hours and recheck INR at that time.  If INR is greater 

than 2 we will keep argatroban off.  If it is less than 2 we will resume.


3.  Monitor CBC, coags


Attending Statement


The exam, history, and the medical decision-making described in the above note 

were completed with the assistance of the mid-level provider. I reviewed and 

agree with the findings presented.  I attest that I had a face-to-face 

encounter with the patient on the same day, and personally performed and 

documented my assessment and findings in the medical record.





Feels little better today.


No new c/o


INR still <4. Increase coumadin and repeat INR in Am .


stop argatroban when INR >4











Janessa Samson Mar 11, 2018 15:41


Melissa Apple MD Mar 11, 2018 23:11

## 2018-03-12 VITALS — HEART RATE: 112 BPM

## 2018-03-12 VITALS
SYSTOLIC BLOOD PRESSURE: 94 MMHG | HEART RATE: 103 BPM | DIASTOLIC BLOOD PRESSURE: 66 MMHG | RESPIRATION RATE: 18 BRPM | OXYGEN SATURATION: 98 % | TEMPERATURE: 98.1 F

## 2018-03-12 VITALS
HEART RATE: 117 BPM | OXYGEN SATURATION: 97 % | SYSTOLIC BLOOD PRESSURE: 110 MMHG | TEMPERATURE: 98.7 F | DIASTOLIC BLOOD PRESSURE: 73 MMHG | RESPIRATION RATE: 18 BRPM

## 2018-03-12 VITALS
OXYGEN SATURATION: 98 % | HEART RATE: 111 BPM | RESPIRATION RATE: 18 BRPM | SYSTOLIC BLOOD PRESSURE: 97 MMHG | DIASTOLIC BLOOD PRESSURE: 69 MMHG | TEMPERATURE: 98.2 F

## 2018-03-12 VITALS
OXYGEN SATURATION: 91 % | HEART RATE: 110 BPM | DIASTOLIC BLOOD PRESSURE: 54 MMHG | SYSTOLIC BLOOD PRESSURE: 88 MMHG | TEMPERATURE: 99 F | RESPIRATION RATE: 16 BRPM

## 2018-03-12 VITALS
DIASTOLIC BLOOD PRESSURE: 66 MMHG | RESPIRATION RATE: 20 BRPM | SYSTOLIC BLOOD PRESSURE: 110 MMHG | HEART RATE: 116 BPM | TEMPERATURE: 98 F | OXYGEN SATURATION: 95 %

## 2018-03-12 VITALS — OXYGEN SATURATION: 93 %

## 2018-03-12 VITALS
OXYGEN SATURATION: 98 % | DIASTOLIC BLOOD PRESSURE: 66 MMHG | HEART RATE: 106 BPM | TEMPERATURE: 98 F | RESPIRATION RATE: 19 BRPM | SYSTOLIC BLOOD PRESSURE: 93 MMHG

## 2018-03-12 VITALS — OXYGEN SATURATION: 92 %

## 2018-03-12 VITALS — HEART RATE: 110 BPM

## 2018-03-12 VITALS — HEART RATE: 116 BPM

## 2018-03-12 VITALS — HEART RATE: 113 BPM

## 2018-03-12 LAB
BASOPHILS # BLD AUTO: 0.1 TH/MM3 (ref 0–0.2)
BASOPHILS NFR BLD: 1 % (ref 0–2)
BUN SERPL-MCNC: 14 MG/DL (ref 7–18)
CALCIUM SERPL-MCNC: 8 MG/DL (ref 8.5–10.1)
CHLORIDE SERPL-SCNC: 85 MEQ/L (ref 98–107)
CREAT SERPL-MCNC: 1.38 MG/DL (ref 0.5–1)
EOSINOPHIL # BLD: 0.1 TH/MM3 (ref 0–0.4)
EOSINOPHIL NFR BLD: 0.5 % (ref 0–4)
ERYTHROCYTE [DISTWIDTH] IN BLOOD BY AUTOMATED COUNT: 22 % (ref 11.6–17.2)
GFR SERPLBLD BASED ON 1.73 SQ M-ARVRAT: 43 ML/MIN (ref 89–?)
GLUCOSE SERPL-MCNC: 177 MG/DL (ref 74–106)
HCO3 BLD-SCNC: 35 MEQ/L (ref 21–32)
HCT VFR BLD CALC: 25 % (ref 35–46)
HGB BLD-MCNC: 8.2 GM/DL (ref 11.6–15.3)
INR PPP: 3 RATIO
LYMPHOCYTES # BLD AUTO: 1.1 TH/MM3 (ref 1–4.8)
LYMPHOCYTES NFR BLD AUTO: 8 % (ref 9–44)
MCH RBC QN AUTO: 24 PG (ref 27–34)
MCHC RBC AUTO-ENTMCNC: 32.6 % (ref 32–36)
MCV RBC AUTO: 73.7 FL (ref 80–100)
MONOCYTE #: 0.7 TH/MM3 (ref 0–0.9)
MONOCYTES NFR BLD: 5.2 % (ref 0–8)
NEUTROPHILS # BLD AUTO: 11.4 TH/MM3 (ref 1.8–7.7)
NEUTROPHILS NFR BLD AUTO: 85.3 % (ref 16–70)
PLATELET # BLD: 329 TH/MM3 (ref 150–450)
PMV BLD AUTO: 7.9 FL (ref 7–11)
PROTHROMBIN TIME: 30.1 SEC (ref 9.8–11.6)
RBC # BLD AUTO: 3.4 MIL/MM3 (ref 4–5.3)
SODIUM SERPL-SCNC: 131 MEQ/L (ref 136–145)
WBC # BLD AUTO: 13.4 TH/MM3 (ref 4–11)

## 2018-03-12 RX ADMIN — POTASSIUM CHLORIDE SCH MLS/HR: 200 INJECTION, SOLUTION INTRAVENOUS at 11:22

## 2018-03-12 RX ADMIN — STANDARDIZED SENNA CONCENTRATE AND DOCUSATE SODIUM SCH TAB: 8.6; 5 TABLET, FILM COATED ORAL at 21:00

## 2018-03-12 RX ADMIN — HYDROMORPHONE HYDROCHLORIDE PRN MG: 2 INJECTION INTRAMUSCULAR; INTRAVENOUS; SUBCUTANEOUS at 13:01

## 2018-03-12 RX ADMIN — STANDARDIZED SENNA CONCENTRATE AND DOCUSATE SODIUM SCH TAB: 8.6; 5 TABLET, FILM COATED ORAL at 08:58

## 2018-03-12 RX ADMIN — HYDROMORPHONE HYDROCHLORIDE PRN MG: 2 INJECTION INTRAMUSCULAR; INTRAVENOUS; SUBCUTANEOUS at 17:25

## 2018-03-12 RX ADMIN — AMPICILLIN SODIUM SCH MLS/HR: 2 INJECTION, POWDER, FOR SOLUTION INTRAMUSCULAR; INTRAVENOUS at 08:53

## 2018-03-12 RX ADMIN — POTASSIUM BICARBONATE AND POTASSIUM CHLORIDE EFFERVESCENT TABLETS FOR ORAL SOLUTION SCH MEQ: 1.25; .7; 1.5 TABLET, EFFERVESCENT ORAL at 08:57

## 2018-03-12 RX ADMIN — Medication SCH ML: at 08:58

## 2018-03-12 RX ADMIN — ALBUTEROL SULFATE PRN MG: 2.5 SOLUTION RESPIRATORY (INHALATION) at 20:36

## 2018-03-12 RX ADMIN — HYDROMORPHONE HYDROCHLORIDE PRN MG: 2 INJECTION INTRAMUSCULAR; INTRAVENOUS; SUBCUTANEOUS at 04:55

## 2018-03-12 RX ADMIN — AMPICILLIN SODIUM SCH MLS/HR: 2 INJECTION, POWDER, FOR SOLUTION INTRAMUSCULAR; INTRAVENOUS at 12:58

## 2018-03-12 RX ADMIN — Medication SCH ML: at 22:03

## 2018-03-12 RX ADMIN — CHLORHEXIDINE GLUCONATE SCH PACK: 500 CLOTH TOPICAL at 03:25

## 2018-03-12 RX ADMIN — Medication PRN ML: at 01:03

## 2018-03-12 RX ADMIN — HYDROMORPHONE HYDROCHLORIDE PRN MG: 2 INJECTION INTRAMUSCULAR; INTRAVENOUS; SUBCUTANEOUS at 01:03

## 2018-03-12 RX ADMIN — HYDROMORPHONE HYDROCHLORIDE PRN MG: 2 INJECTION INTRAMUSCULAR; INTRAVENOUS; SUBCUTANEOUS at 22:03

## 2018-03-12 RX ADMIN — AMPICILLIN SODIUM SCH MLS/HR: 2 INJECTION, POWDER, FOR SOLUTION INTRAMUSCULAR; INTRAVENOUS at 01:02

## 2018-03-12 RX ADMIN — POTASSIUM CHLORIDE SCH MLS/HR: 200 INJECTION, SOLUTION INTRAVENOUS at 08:58

## 2018-03-12 RX ADMIN — HYDROMORPHONE HYDROCHLORIDE PRN MG: 2 INJECTION INTRAMUSCULAR; INTRAVENOUS; SUBCUTANEOUS at 08:58

## 2018-03-12 RX ADMIN — ALBUTEROL SULFATE PRN MG: 2.5 SOLUTION RESPIRATORY (INHALATION) at 14:14

## 2018-03-12 RX ADMIN — AMPICILLIN SODIUM SCH MLS/HR: 2 INJECTION, POWDER, FOR SOLUTION INTRAMUSCULAR; INTRAVENOUS at 22:03

## 2018-03-12 RX ADMIN — WARFARIN SODIUM SCH MG: 5 TABLET ORAL at 17:24

## 2018-03-12 NOTE — PD.ONC.PN
Subjective


Subjective


Remarks


Afebrile overnight. 


No overnight events. 


Patient resting in room in nad.


 no complaints.





Objective


Data











  Date Time  Temp Pulse Resp B/P (MAP) Pulse Ox O2 Delivery O2 Flow Rate FiO2


 


3/12/18 08:09 99.0 110 16 88/54 (65) 91   


 


3/12/18 08:00      Nasal Cannula 3.00 


 


3/12/18 08:00  112      


 


3/12/18 04:53 98.7 117 18 110/73 (85) 97   


 


3/12/18 04:05  113      


 


3/12/18 00:58 98.2 111 18 97/69 (78) 98   


 


3/12/18 00:58      Nasal Cannula 3.00 


 


3/12/18 00:04  110      


 


3/11/18 21:00 99.1 119 18 93/66 (75) 97   


 


3/11/18 20:38     97 Nasal Cannula 2.00 


 


3/11/18 19:44  114      


 


3/11/18 16:00  102      


 


3/11/18 16:00 98.1 108 17 90/64 (73) 96   














 3/12/18 3/12/18 3/12/18





 07:00 15:00 23:00


 


Output Total 800 ml  


 


Balance -800 ml  








Result Diagram:  


3/12/18 0645                                                                   

             3/12/18 0645





Laboratory Results





Laboratory Tests








Test


  3/12/18


06:45


 


White Blood Count 13.4 TH/MM3 


 


Red Blood Count 3.40 MIL/MM3 


 


Hemoglobin 8.2 GM/DL 


 


Hematocrit 25.0 % 


 


Mean Corpuscular Volume 73.7 FL 


 


Mean Corpuscular Hemoglobin 24.0 PG 


 


Mean Corpuscular Hemoglobin


Concent 32.6 % 


 


 


Red Cell Distribution Width 22.0 % 


 


Platelet Count 329 TH/MM3 


 


Mean Platelet Volume 7.9 FL 


 


Neutrophils (%) (Auto) 85.3 % 


 


Lymphocytes (%) (Auto) 8.0 % 


 


Monocytes (%) (Auto) 5.2 % 


 


Eosinophils (%) (Auto) 0.5 % 


 


Basophils (%) (Auto) 1.0 % 


 


Neutrophils # (Auto) 11.4 TH/MM3 


 


Lymphocytes # (Auto) 1.1 TH/MM3 


 


Monocytes # (Auto) 0.7 TH/MM3 


 


Eosinophils # (Auto) 0.1 TH/MM3 


 


Basophils # (Auto) 0.1 TH/MM3 


 


CBC Comment DIFF FINAL 


 


Differential Comment  


 


Prothrombin Time 30.1 SEC 


 


Prothromb Time International


Ratio 3.0 RATIO 


 


 


Activated Partial


Thromboplast Time 67.9 SEC 


 


 


Blood Urea Nitrogen 14 MG/DL 


 


Creatinine 1.38 MG/DL 


 


Random Glucose 177 MG/DL 


 


Calcium Level 8.0 MG/DL 


 


Sodium Level 131 MEQ/L 


 


Potassium Level 2.4 MEQ/L 


 


Chloride Level 85 MEQ/L 


 


Carbon Dioxide Level 35.0 MEQ/L 


 


Anion Gap 11 MEQ/L 


 


Estimat Glomerular Filtration


Rate 43 ML/MIN 


 


 


Magnesium Level 1.4 MG/DL 











Administered Medications








 Medications


  (Trade)  Dose


 Ordered  Sig/Nikhil


 Route


 PRN Reason  Start Time


 Stop Time Status Last Admin


Dose Admin


 


 Sodium Chloride


  (NS Flush)  2 ml  UNSCH  PRN


 IV FLUSH


 FLUSH AFTER USING IV ACCESS  2/23/18 22:00


    3/12/18 01:03


 


 


 Sodium Chloride


  (NS Flush)  2 ml  BID


 IV FLUSH


   2/24/18 09:00


    3/12/18 08:58


 


 


 Acetaminophen


  (Tylenol)  650 mg  Q6H  PRN


 PO


 fever  2/23/18 22:00


    3/5/18 16:02


 


 


 Ondansetron HCl


  (Zofran Inj)  4 mg  Q6H  PRN


 IV PUSH


 NAUSEA OR VOMITING  2/23/18 22:00


    3/8/18 21:22


 


 


 Temazepam


  (Restoril)  15 mg  HS  PRN


 PO


 INSOMNIA  2/23/18 22:00


    3/6/18 22:08


 


 


 Chlorhexidine


 Gluconate


  (Chlorhexidine


 2% Cloth)  


 Taper  DAILY@04


 TOP


   2/24/18 04:00


 2/20/19 03:59  3/6/18 03:54


 


 


 Senna/Docusate


 Sodium


  (Arlen-Colace)  1 tab  BID


 PO


   2/24/18 09:00


    3/8/18 21:21


 


 


 Ampicillin Sodium


 2000 mg/Sodium


 Chloride  100 ml @ 


 400 mls/hr  Q6H


 IV


   2/24/18 14:00


    3/12/18 12:58


 


 


 Loperamide HCl


  (Imodium)  2 mg  Q4H  PRN


 PO


 Diarrhea  2/26/18 16:15


    2/28/18 14:32


 


 


 Gentamicin


 Sulfate 100 mg/


 Sodium Chloride  102.5 ml @ 


 100 mls/hr  Q8H


 IV


   2/26/18 21:00


   Future Hold 3/11/18 04:14


 


 


 Albuterol Sulfate


  (Albuterol Neb)  2.5 mg  Q2HR NEB  PRN


 NEB


 dyspnea  2/27/18 11:30


    3/11/18 20:38


 


 


 Argatroban 250 mg/


 Sodium Chloride  252.5 ml @ 


 3.11 mls/hr  TITRATE  PRN


 IV


 aPTT < 50  3/1/18 19:15


    3/9/18 18:32


 


 


 Acetaminophen/


 Hydrocodone Bitart


  (Norco  Mg)  1 tab  Q4H  PRN


 PO


 pain 1-5  3/4/18 15:00


    3/9/18 05:02


 


 


 Hydromorphone HCl


  (Dilaudid Pf Inj)  1 mg  Q4H  PRN


 IV PUSH


 PAIN SCALE 6 TO 10  3/7/18 19:00


    3/12/18 13:01


 


 


 Warfarin Sodium


  (Coumadin)  5 mg  DAILY@1600


 PO


   3/11/18 16:00


    3/11/18 16:51


 


 


 Potassium Bicarb/


 Potassium Chloride


  (K-Lyte Cl  Eff)  25 meq  DAILY


 PO


   3/12/18 09:00


    3/12/18 08:57


 








Objective Remarks


GENERAL: chronically ill female sitting up in chair next to bed. dyspneic, but 

otherwise in nad. 


SKIN: Warm and dry.


HEAD: Normocephalic.


EYES: No injection or drainage. 


NECK: Supple, trachea midline. 


CARDIOVASCULAR: +S1, S2, tachy


RESPIRATORY: scattered rhonchi. on O2 via NC


GASTROINTESTINAL: Abdomen soft, non-tender, nondistended. 


EXTREMITIES: ble with 2+ pitting edema. +distal extremities are warm and well 

perfused. 


NEUROLOGICAL: awake, alert, normal speech.





Assessment/Plan


Problem List:  


(1) arterial blood clot


Plan:  --on Argatroban bridge to Coumadin. 


-- no significant family history of thrombotic events to suggest hereditary 

antithrombin deficiency.  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 

impaired production from her liver disease. 


--has chronic active hepatitis C.  ++increased consumption from disseminated 

intravascular coagulation and acute illness, bacterial endocarditis.  


--Protein losses are also considered.  She had mild proteinuria when she first 

was admitted.


--Thrombin inactivates antithrombin.  This would abrogate the effect of 

unfractionated heparin and low molecular weight heparin.





(2) Acute septic pulmonary embolism


ICD Codes:  I26.90 - Septic pulmonary embolism without acute cor pulmonale


Plan:  -- echocardiogram on 3/3 showed an estimated EF of 50-55%.  


++severe tricuspid regurgitation with prosthesis in place.  


++2 x 4 cm mobile vegetation was seen on the valve.  The right atrium and right 

ventricle were normal in size and function





(3) Microcytic anemia


ICD Codes:  D50.9 - Iron deficiency anemia, unspecified


Plan:  --likely d/t inflammation/infection. no evidence of bleeding or 

hemolysis. 


--B12/folate WNL


--low iron and percent saturation. normal TIBC, elevated ferritin-->more 

consistent with anemia of chronic disease. 


--stool Hemoccult negative. 


--no evidence of hemolysis





(4) Bacterial endocarditis


ICD Codes:  I33.0 - Acute and subacute infective endocarditis


Status:  Acute


Plan:  --on ampicillin, ID following. 





Assessment


38y/o female admitted with recurrent endocarditis. hematology consulted for 

anticoagulation assistance.


h/o Recurrent bacterial endocarditis, bacteremia, history of prosthetic aortic 

valve and subsequent redo, chronic active hepatitis C, intravenous drug use, 

microcytic anemia, consistent with iron deficiency, left lower extremity 

arterial event, bacterial endocarditis with viridans streptococcus.





HPI (brought forward for continuity of care): history of IV drug abuse and  

recurrent endocarditis. had a relapse of her activity. has history of pulmonary 

embolism and infected valves. +significant congestive heart failure and 

presented to the emergency room on 02/23/2018, with sepsis. is followed by 

Infectious Disease for her endocarditis.  previously received antibiotic 

therapy for enterococcus faecalis and staph aureus. There is questionable 

compliance. Her current blood 


culture from 02/23/2018, shows viridans streptococcus.  Two out of two bottles 

were positive.  She is on ampicillin and gentamicin. course is complicated by 

cold left lower extremity, evaluated by  Vascular Surgery.  A CT angiogram 

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 

the proximal thigh.  There is satisfactory calf runoff present.  For this reason

, conservative management with anticoagulation is continued. was placed on 

unfractionated heparin.  Her heparin dose has been titrated from 1000 units/

hour to 2500 units/hour with a PTT remaining subtherapeutic.  Her last PTT is 

32 seconds from 03/01/2018, at 2 p.m.  For this reason, Hematology/Oncology was 

consulted.


Plan


1. continue Coumadin at 5 mg daily


2. monitor INR daily. once INR is greater than 4, will hold argatroban for 4 

hours and recheck INR at that time.  If INR is greater than 2 we will keep 

argatroban off.  If it is less than 2 we will resume.


Attending Statement


Agree with above.  Case discussed.











Zoe Chau Mar 12, 2018 13:30


Henny Smith MD Mar 12, 2018 19:02

## 2018-03-12 NOTE — HHI.PR
Subjective


Remarks


37-year-old female with a history of IV drug use, previous endocarditis who 

presented to the emergency department on 2/23/2018 due to shortness of breath 

and leg swelling.  She has bioprosthesis valves both aortic and tricuspid.  She 

admitted that she injected herself 4 days prior to this hospitalization.  She 

apparently completed IV antibiotics course in October and after that she was on 

doxycycline prophylaxis.  She reports that she has not been taking her 

antibiotics doxycycline.  Patient was initially managed in the ICU where she 

required pressors.  2D echo shows both aortic valve and tricuspid valve 

prosthesis endocarditis.  Cardiothoracic surgery evaluated and determined that 

patient is not a good surgical candidate.  During ICU stay, patient was found 

to have septic emboli as well.  Hematology was consulted for recommendations 

regarding anticoagulation.  Patient was started on argatroban bridging to 

warfarin.





Nursing denies any deterioration since last night.  Patient has no new 

complaints.  Thinks her edema is unchanged.





Objective





Vital Signs








  Date Time  Temp Pulse Resp B/P (MAP) Pulse Ox O2 Delivery O2 Flow Rate FiO2


 


3/12/18 08:09 99.0 110 16 88/54 (65) 91   


 


3/12/18 08:00      Nasal Cannula 3.00 


 


3/12/18 08:00  112      


 


3/12/18 04:53 98.7 117 18 110/73 (85) 97   


 


3/12/18 04:05  113      


 


3/12/18 00:58 98.2 111 18 97/69 (78) 98   


 


3/12/18 00:58      Nasal Cannula 3.00 


 


3/12/18 00:04  110      


 


3/11/18 21:00 99.1 119 18 93/66 (75) 97   


 


3/11/18 20:38     97 Nasal Cannula 2.00 


 


3/11/18 19:44  114      


 


3/11/18 16:00  102      


 


3/11/18 16:00 98.1 108 17 90/64 (73) 96   


 


3/11/18 12:00 98.6 112 17 93/61 (72) 99   


 


3/11/18 12:00  112      














I/O      


 


 3/11/18 3/11/18 3/11/18 3/12/18 3/12/18 3/12/18





 07:00 15:00 23:00 07:00 15:00 23:00


 


Intake Total 260.1 ml  1820 ml   


 


Output Total   1300 ml 800 ml  


 


Balance 260.1 ml  520 ml -800 ml  


 


      


 


Intake Oral   1820 ml   


 


IV Total 260.1 ml     


 


Output Urine Total   1300 ml 800 ml  


 


# Voids   2   


 


# Bowel Movements   2   








Result Diagram:  


3/12/18 0645                                                                   

             3/12/18 0645





Objective Remarks


Clear lung sounds bilaterally, unlabored breathing, no cyanosis, on nasal 

cannula


4/6 ejection murmur


Unchanged severe lower extremity edema


Sitting up in chair today with legs propped up





A/P


Assessment and Plan


Bioprosthetic aortic valve endocarditis


Bioprosthetic tricuspid valve endocarditis


Bacteremia with viridans species


septic pulm embolism


Cerebritis


-ampicillin, vanc, and gentamicin; ID following





Pulmonary hypertension





Acute exacerbation of diastolic congestive heart failure with preserved left 

ventricular ejection fraction


-will stop IV lasix, resume po lasix once daily hitesh given worsening RF





occlusion of left superficial femoral artery


-Continue on argatroban to warfarin bridge. heme/onco following, appreciate recs





Hypokalemia


-Worsening likely due to Lasix, will replenish both IV and p.o.  Checking 

magnesium, rule recheck in a.m.





Full code. 





Overall poor prognosis. Palliative care following.


Discharge Planning


Poor prognosis from cardiothoracic surgery standpoint.  Only medical management 

is option for right now which may be futile.  Patient willing to engage in more 

discussion regarding comfort care.











Mir Mills MD Mar 12, 2018 11:47

## 2018-03-12 NOTE — HHI.HCPN
Reason for visit


   a.  To assist with evaluation and management of symptoms including: chest 

pain, dyspnea. 


   b.  To assist medical decision maker(s) with: better understanding of 

current medical conditions; weighing benefits/burdens of medical treatment 

options; making        


        medical treatment decisions.


.





Subjective/Interval History


Patient seen to follow-up on comfort, goals.  Seen earlier today by palliative 

,  patient indicating still with some pain with respiratory 

effort. Dr Mills Medical attending also requesting palliative follow up 

regarding pt possibly interested in hospice.





Patient stable.  H&H stable, hematology continues to follow.  WBC 13, no 

significant change.  Hypokalemia 2.4, repletion per medical attending.  On 3/7 

hydromorphone 2 mg IV every 4 hours prn was changed to 1 mg IV every 4 hours 

prn.  Patient has required 3 doses so far today.  On 3/11 required 5 doses, on 3

/6 required 6 doses, has been using it about every 4 hours.  Last dose of oral 

Norco 10 mg 3/9.





Patient seen in room no visitors present, she is placing order for lunch tray.  

She is flat and with very short replies/feedback. She indicates her appetite is 

"okay ", and she's been eating part of her meals but does not have a very big 

appetite.  She denies nausea or vomiting.  She endorses ongoing shortness of 

breath especially with activity.  She endorses ongoing pain to her right chest 

through back region, up to an 8 out of 10 this worsens with any activity or 

cough, sneeze etc.  She indicates it feels "much better" after use of prn 

hydromorphone but the pain increases again in the hour before she is due for 

next dose. She has not used the PO norco in a few days not sure if that was 

working for her pain.  She feels the edema to BLE is about the same as it has 

been. Explore use of PRN PO med for pain to determine effectiveness, with IV 

med if ineffective. 





Explore current condition, goals. She is flat and with very short replies/

feedback.  She indicates she is tired of being in the hospital, she just wants 

to be home. Explore that her conditions will require ongoing tx, likely 

indefinitely and that she could get sicker. She understands this. She tells me 

she has been on hospice in the past but wants to be able to decide if she wants 

treatment does not want to be "killed or let die". review of hospice services, 

philosophy for terminal/ES conditions and provision of comfort measures for 

EOL. She tells me she is thinking about alot of things and that she hasn't 

excluded any options, however does NOT want hospice at this time. She is tired 

of being in the hospital and wants to be well.


. Ask if she has been in touch with her mother, she advises she has, and 

declines me to call her at this time.








.





Advance Directives


Living Will:  Never completed


Health Care Surrogate:  Never completed


Durable Power of :  Never completed


Advance Directive Specifics


Health Care Surrogate(s):


No written advanced directives, patient refused to complete advanced directives 

during last admission. Patient a single. Children are juvenile. In the absence 

of written advanced directives, according to [place] statutes health care proxy 

decision-making falls to a parent. 


.





Objective





Vital Signs








  Date Time  Temp Pulse Resp B/P (MAP) Pulse Ox O2 Delivery O2 Flow Rate FiO2


 


3/12/18 08:09 99.0 110 16 88/54 (65) 91   


 


3/12/18 08:00      Nasal Cannula 3.00 


 


3/12/18 08:00  112      


 


3/12/18 04:53 98.7 117 18 110/73 (85) 97   


 


3/12/18 04:05  113      


 


3/12/18 00:58 98.2 111 18 97/69 (78) 98   


 


3/12/18 00:58      Nasal Cannula 3.00 


 


3/12/18 00:04  110      


 


3/11/18 21:00 99.1 119 18 93/66 (75) 97   


 


3/11/18 20:38     97 Nasal Cannula 2.00 


 


3/11/18 19:44  114      


 


3/11/18 16:00  102      


 


3/11/18 16:00 98.1 108 17 90/64 (73) 96   














Intake & Output  


 


 3/12/18 3/12/18





 06:59 18:59


 


Intake Total 1000 ml 


 


Output Total 800 ml 


 


Balance 200 ml 


 


  


 


Intake Oral 1000 ml 


 


Output Urine Total 800 ml 


 


# Voids 2 


 


# Bowel Movements 1 








Physical Exam


CONSTITUTIONAL/GENERAL: This is an adequately nourished patient, with mildly 

labored respirations, worsens with conversation. 


TUBES/LINES/DRAINS: NC, right IJ central line


SKIN: Ecchymoses on extremities. + scabbing/lesion  LLE- ankle, dorsal foot. 

Skin warm/dry. +3+ Edema BLE.  


CARDIOVASCULAR: systolic murmur noted. tachycardic. 


RESPIRATORY/CHEST: Frequent dry cough.  Mildly dyspneic at rest, worsens with 

conversation, cough increases with conversation. Bilateral course breath 

sounds.   


GASTROINTESTINAL: Abdomen soft, rounded, non-tender, nondistended. No guarding. 

Bowel sounds present.


GENITOURINARY: Without palpable bladder distension.  Voids bedside as needed


MUSCULOSKELETAL: Lower extremities 3+ edema. lesions noted Left foot and LE. 


NEUROLOGICAL: Awake, lethargic/flat.  Oriented, some insight into 

hospitalization.  Moves all 4 extremities.  


PSYCHIATRIC: Flat affect.


.





Diagnostic Tests


Laboratory





Laboratory Tests








Test


  3/10/18


03:16 3/10/18


06:28 3/10/18


20:12 3/11/18


04:55


 


Vancomycin Level Trough


  23.9 MCG/ML


(5.0-10.0) 


  


  


 


 


White Blood Count


  


  13.2 TH/MM3


(4.0-11.0) 


  13.3 TH/MM3


(4.0-11.0)


 


Red Blood Count


  


  3.32 MIL/MM3


(4.00-5.30) 


  3.23 MIL/MM3


(4.00-5.30)


 


Hemoglobin


  


  7.8 GM/DL


(11.6-15.3) 


  7.7 GM/DL


(11.6-15.3)


 


Hematocrit


  


  24.4 %


(35.0-46.0) 


  23.6 %


(35.0-46.0)


 


Mean Corpuscular Volume


  


  73.4 FL


(80.0-100.0) 


  73.1 FL


(80.0-100.0)


 


Mean Corpuscular Hemoglobin


  


  23.5 PG


(27.0-34.0) 


  23.7 PG


(27.0-34.0)


 


Mean Corpuscular Hemoglobin


Concent 


  32.0 %


(32.0-36.0) 


  32.5 %


(32.0-36.0)


 


Red Cell Distribution Width


  


  21.5 %


(11.6-17.2) 


  22.2 %


(11.6-17.2)


 


Platelet Count


  


  376 TH/MM3


(150-450) 


  366 TH/MM3


(150-450)


 


Mean Platelet Volume


  


  8.1 FL


(7.0-11.0) 


  8.0 FL


(7.0-11.0)


 


Neutrophils (%) (Auto)


  


  79.3 %


(16.0-70.0) 


  78.3 %


(16.0-70.0)


 


Lymphocytes (%) (Auto)


  


  9.8 %


(9.0-44.0) 


  10.7 %


(9.0-44.0)


 


Monocytes (%) (Auto)


  


  9.3 %


(0.0-8.0) 


  9.7 %


(0.0-8.0)


 


Eosinophils (%) (Auto)


  


  0.8 %


(0.0-4.0) 


  0.6 %


(0.0-4.0)


 


Basophils (%) (Auto)


  


  0.8 %


(0.0-2.0) 


  0.7 %


(0.0-2.0)


 


Neutrophils # (Auto)


  


  10.4 TH/MM3


(1.8-7.7) 


  10.5 TH/MM3


(1.8-7.7)


 


Lymphocytes # (Auto)


  


  1.3 TH/MM3


(1.0-4.8) 


  1.4 TH/MM3


(1.0-4.8)


 


Monocytes # (Auto)


  


  1.2 TH/MM3


(0-0.9) 


  1.3 TH/MM3


(0-0.9)


 


Eosinophils # (Auto)


  


  0.1 TH/MM3


(0-0.4) 


  0.1 TH/MM3


(0-0.4)


 


Basophils # (Auto)


  


  0.1 TH/MM3


(0-0.2) 


  0.1 TH/MM3


(0-0.2)


 


CBC Comment  DIFF FINAL   DIFF FINAL 


 


Differential Comment      


 


Prothrombin Time


  


  27.5 SEC


(9.8-11.6) 


  27.5 SEC


(9.8-11.6)


 


Prothromb Time International


Ratio 


  2.7 RATIO 


  


  2.7 RATIO 


 


 


Activated Partial


Thromboplast Time 


  61.8 SEC


(24.3-30.1) 


  65.3 SEC


(24.3-30.1)


 


Blood Urea Nitrogen   9 MG/DL (7-18)  


 


Creatinine


  


  


  1.13 MG/DL


(0.50-1.00) 


 


 


Random Glucose


  


  


  86 MG/DL


() 


 


 


Calcium Level


  


  


  8.2 MG/DL


(8.5-10.1) 


 


 


Sodium Level


  


  


  133 MEQ/L


(136-145) 


 


 


Potassium Level


  


  


  4.0 MEQ/L


(3.5-5.1) 


 


 


Chloride Level


  


  


  88 MEQ/L


() 


 


 


Carbon Dioxide Level


  


  


  34.8 MEQ/L


(21.0-32.0) 


 


 


Anion Gap


  


  


  10 MEQ/L


(5-15) 


 


 


Estimat Glomerular Filtration


Rate 


  


  55 ML/MIN


(>89) 


 


 


Test


  3/12/18


06:45 


  


  


 


 


White Blood Count


  13.4 TH/MM3


(4.0-11.0) 


  


  


 


 


Red Blood Count


  3.40 MIL/MM3


(4.00-5.30) 


  


  


 


 


Hemoglobin


  8.2 GM/DL


(11.6-15.3) 


  


  


 


 


Hematocrit


  25.0 %


(35.0-46.0) 


  


  


 


 


Mean Corpuscular Volume


  73.7 FL


(80.0-100.0) 


  


  


 


 


Mean Corpuscular Hemoglobin


  24.0 PG


(27.0-34.0) 


  


  


 


 


Mean Corpuscular Hemoglobin


Concent 32.6 %


(32.0-36.0) 


  


  


 


 


Red Cell Distribution Width


  22.0 %


(11.6-17.2) 


  


  


 


 


Platelet Count


  329 TH/MM3


(150-450) 


  


  


 


 


Mean Platelet Volume


  7.9 FL


(7.0-11.0) 


  


  


 


 


Neutrophils (%) (Auto)


  85.3 %


(16.0-70.0) 


  


  


 


 


Lymphocytes (%) (Auto)


  8.0 %


(9.0-44.0) 


  


  


 


 


Monocytes (%) (Auto)


  5.2 %


(0.0-8.0) 


  


  


 


 


Eosinophils (%) (Auto)


  0.5 %


(0.0-4.0) 


  


  


 


 


Basophils (%) (Auto)


  1.0 %


(0.0-2.0) 


  


  


 


 


Neutrophils # (Auto)


  11.4 TH/MM3


(1.8-7.7) 


  


  


 


 


Lymphocytes # (Auto)


  1.1 TH/MM3


(1.0-4.8) 


  


  


 


 


Monocytes # (Auto)


  0.7 TH/MM3


(0-0.9) 


  


  


 


 


Eosinophils # (Auto)


  0.1 TH/MM3


(0-0.4) 


  


  


 


 


Basophils # (Auto)


  0.1 TH/MM3


(0-0.2) 


  


  


 


 


CBC Comment DIFF FINAL    


 


Differential Comment     


 


Prothrombin Time


  30.1 SEC


(9.8-11.6) 


  


  


 


 


Prothromb Time International


Ratio 3.0 RATIO 


  


  


  


 


 


Activated Partial


Thromboplast Time 67.9 SEC


(24.3-30.1) 


  


  


 


 


Blood Urea Nitrogen


  14 MG/DL


(7-18) 


  


  


 


 


Creatinine


  1.38 MG/DL


(0.50-1.00) 


  


  


 


 


Random Glucose


  177 MG/DL


() 


  


  


 


 


Calcium Level


  8.0 MG/DL


(8.5-10.1) 


  


  


 


 


Sodium Level


  131 MEQ/L


(136-145) 


  


  


 


 


Potassium Level


  2.4 MEQ/L


(3.5-5.1) 


  


  


 


 


Chloride Level


  85 MEQ/L


() 


  


  


 


 


Carbon Dioxide Level


  35.0 MEQ/L


(21.0-32.0) 


  


  


 


 


Anion Gap


  11 MEQ/L


(5-15) 


  


  


 


 


Estimat Glomerular Filtration


Rate 43 ML/MIN


(>89) 


  


  


 


 


Magnesium Level


  1.4 MG/DL


(1.5-2.5) 


  


  


 








Result Diagram:  


3/12/18 0645                                                                   

             3/12/18 0645





Microbiology





Microbiology








 Date/Time


Source Procedure


Growth Status


 


 


 3/4/18 05:50


Blood Peripheral Aerobic Blood Culture - Final


NO GROWTH IN 5 DAYS Complete


 


 3/4/18 05:50


Blood Peripheral Anaerobic Blood Culture - Final


NO GROWTH IN 5 DAYS Complete


 


 3/5/18 12:28


Stool Stool Stool Occult Blood (GILA) - Final


HEMOCCULT NEGATIVE Complete





 3/3/18 21:50


Sputum Expectorated Sputum Gram Stain - Final Complete


 


 3/3/18 21:50


Sputum Expectorated Sputum Sputum Culture - Final


HEAVY GROWTH NORMAL RESPIRATORY MYLENE Complete


 


 2/23/18 22:30


Urine Suprapubic Urine Urine Culture - Final


NO GROWTH IN 48 HOURS. Complete








Procedures


* 2/24/17 - right IJ central line placement





Assessment and Plan


Disease Oriented Problem List:  


(1) Prosthetic valve endocarditis


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


(3) Polysubstance abuse


(4) Acute septic pulmonary embolism


(5) Acute kidney injury


(6) CHF (congestive heart failure), NYHA class IV


(7) Severe sepsis


(8) Elevated troponin


Symptom Scale:  


(1) Pain


0-10 Scale:  8





(2) Encephalopathy


0-10 Scale:  Unable to quantify





(3) Dyspnea


0-10 Scale:  Unable to quantify


Comment:  On oxygen via nasal cannula





(4) Depression


Pertinent Non-Medical Issues


Psychosocial: single.  2 juvenile children. Supported by her mother, step-

father and boyfriend. 


Spiritual: Unknown.


Legal: No known written advanced directives, family is going to try to find [employer] 

paperwork they think pt previously completed.  Patient a single.  Children are 

juvenile.  If no written advanced directives, according to [place] statutes 

health care proxy decision-making falls to a parent. 


Ethical issues impacting care: No known concerns at this time. 


.


Important Contacts


* Kelly Le, mother/ HCP: [phone]


* Won Le, stepfather: [phone]


.


Prognosis


Patient has grim prognosis. 


.


Code Status:  Full Code


Plan


* Decision Maker: No written advanced directives, patient refused to complete 

advanced directives during last admission. Patient a single. Children are 

juvenile. In the absence of written advanced directives, according to [place] 

statutes health care proxy decision-making falls to her mother, Kelly Le. 

Won Le is a step-father.  Declines completion of written advance 

directives 3/5/18.





* FULL CODE. 





* Patient desires continued aggressive care including FULL CODE. she is NOT 

interested in hospice at this time. 





* Will continue to provide periodic medical updates to patient's mother, Kelly 

as needed, pt indicates she has been in touch w her 3/12/18. Palliative care 

number previously provided. 





* SYMPTOMS: 


* Pain: Potential sources include endocarditis, bedbound status, tubes etc. 

Patient with likely high tolerance given history of IV drug use, will monitor 

effective medications.  Has been using hydromorphone 1 mg IV about every 4 

hours for the last few days.  (is ordered prn every 4 hours). Dose was 

decreased from 2mg to 1mg on 3/7/18.   Has oral Norco 10 mg ordered last dose 3/

9. d/w nursing-- nursing indicates that in between prn doses pt generally 

appears to be sleeping. discuss try PO PRN, and if ineffective use IV. Pt will 

become tolerant to opiate doses. 


*  Dyspnea: secondary to endocarditis, pulmonary emboli. +cont to have ongoing 

dyspnea, worsens w activity. 


*  Hallucination: patient thought she saw a bug on her arm during prior 

palliative care visit, though knew it "wasn't really there." none reported 

today. No medication recommendations at this time.


* Depression: Patient with flat affect, voicing she is "tired of being sick, 

tired of being in the hospital ", + situational--recurrent hospitalizations.  

She is going to continue to be in acutely ill situation with ongoing treatment; 

may benefit from SSRI if amenable, as well as ongoing psychotherapy support 

which is not available in current acute care setting





* Palliative care will continue to follow throughout hospital course to assist 

with symptom management and clarification of goals as needed. 


.





Attestation


To help prompt me to consider important information that might be impacting 

today's encounter and assessment, information from prior notes written by 

myself or my colleagues may have been "brought forward" into today's note.  My 

signature on this note, however, is an attestation that I personally performed 

the exam, history, and/or decision-making noted today, and, unless otherwise 

indicated, the interactions with patient, family, and staff as well as the 

review of records all occurred today.  I also attest that the listed assessment 

and stated plan reflect my best clinical judgment today based on the 

combination of historical information, prior notes, and today's exam/ 

interactions.  When time spent is documented, it refers only to time spent 

today by the signer, or if indicated, combined time spent today by 

collaborating physician/nurse practitioner.











Kala Leslie Mar 12, 2018 13:27

## 2018-03-12 NOTE — HHI.HCPN
Met with Ms. Hagan in her room 1410. She is currently sitting up on her bedside

, eating breakfast. Appropriate in conversation although does not engage much 

outside of simple answers to questions. She is able to make her needs known. 

When asked about her weekend she states "it was a weekend in the hospital, 

yesterday was my birthday". Offered emotional support. Chief complaint at this 

time is her breathing and pain when breathing. Reports the pain medication "is 

barely" working when administered. Will ask palliative care MD/ARNP to review 

medications and make recommendations/adjustments if able. Ms. Jones presents 

with increased edema in lower extremities from last visit. She tells me she is 

almost able to tolerate that pain/discomfort but the breathing pain is her main 

concern. 





Gently reviewed with her comfort focused care, she inquires if this means 

hospice. At this time goals have not changed. While she does wish to get out of 

the hospital she desires to continue with trying to get her pain managed and 

continue with IV antibiotics. No change in CODE STATUS at this time. She is 

open to continued conversation with palliative care and medical team regarding 

comfort options but at this time her main concern is optimizing treatment. 





Discussed with RN and provided palliative care contact information. Palliative 

care will continue to follow throughout hospitalization.











Zoila Dawson MSW, PUJAW Mar 12, 2018 10:59

## 2018-03-13 VITALS
TEMPERATURE: 99.2 F | SYSTOLIC BLOOD PRESSURE: 103 MMHG | OXYGEN SATURATION: 98 % | RESPIRATION RATE: 20 BRPM | DIASTOLIC BLOOD PRESSURE: 75 MMHG | HEART RATE: 118 BPM

## 2018-03-13 VITALS
SYSTOLIC BLOOD PRESSURE: 97 MMHG | RESPIRATION RATE: 20 BRPM | TEMPERATURE: 99.7 F | HEART RATE: 109 BPM | DIASTOLIC BLOOD PRESSURE: 60 MMHG | OXYGEN SATURATION: 93 %

## 2018-03-13 VITALS
DIASTOLIC BLOOD PRESSURE: 61 MMHG | OXYGEN SATURATION: 98 % | HEART RATE: 106 BPM | RESPIRATION RATE: 20 BRPM | SYSTOLIC BLOOD PRESSURE: 91 MMHG | TEMPERATURE: 98.7 F

## 2018-03-13 VITALS
OXYGEN SATURATION: 100 % | TEMPERATURE: 98.8 F | SYSTOLIC BLOOD PRESSURE: 101 MMHG | RESPIRATION RATE: 20 BRPM | DIASTOLIC BLOOD PRESSURE: 65 MMHG | HEART RATE: 109 BPM

## 2018-03-13 VITALS — OXYGEN SATURATION: 92 %

## 2018-03-13 VITALS
OXYGEN SATURATION: 95 % | SYSTOLIC BLOOD PRESSURE: 130 MMHG | RESPIRATION RATE: 20 BRPM | TEMPERATURE: 98 F | DIASTOLIC BLOOD PRESSURE: 58 MMHG | HEART RATE: 110 BPM

## 2018-03-13 VITALS
DIASTOLIC BLOOD PRESSURE: 54 MMHG | OXYGEN SATURATION: 95 % | HEART RATE: 107 BPM | SYSTOLIC BLOOD PRESSURE: 96 MMHG | TEMPERATURE: 98.7 F | RESPIRATION RATE: 20 BRPM

## 2018-03-13 VITALS — HEART RATE: 115 BPM

## 2018-03-13 VITALS — HEART RATE: 110 BPM

## 2018-03-13 VITALS — OXYGEN SATURATION: 96 %

## 2018-03-13 LAB
BASOPHILS # BLD AUTO: 0.1 TH/MM3 (ref 0–0.2)
BASOPHILS NFR BLD: 0.8 % (ref 0–2)
BUN SERPL-MCNC: 13 MG/DL (ref 7–18)
CALCIUM SERPL-MCNC: 8.3 MG/DL (ref 8.5–10.1)
CHLORIDE SERPL-SCNC: 86 MEQ/L (ref 98–107)
CREAT SERPL-MCNC: 1.26 MG/DL (ref 0.5–1)
EOSINOPHIL # BLD: 0.1 TH/MM3 (ref 0–0.4)
EOSINOPHIL NFR BLD: 1 % (ref 0–4)
ERYTHROCYTE [DISTWIDTH] IN BLOOD BY AUTOMATED COUNT: 22.1 % (ref 11.6–17.2)
GFR SERPLBLD BASED ON 1.73 SQ M-ARVRAT: 48 ML/MIN (ref 89–?)
GLUCOSE SERPL-MCNC: 109 MG/DL (ref 74–106)
HCO3 BLD-SCNC: 35.3 MEQ/L (ref 21–32)
HCT VFR BLD CALC: 25.2 % (ref 35–46)
HGB BLD-MCNC: 8.2 GM/DL (ref 11.6–15.3)
INR PPP: 3.7 RATIO
LYMPHOCYTES # BLD AUTO: 1.2 TH/MM3 (ref 1–4.8)
LYMPHOCYTES NFR BLD AUTO: 9.3 % (ref 9–44)
MCH RBC QN AUTO: 24 PG (ref 27–34)
MCHC RBC AUTO-ENTMCNC: 32.4 % (ref 32–36)
MCV RBC AUTO: 74 FL (ref 80–100)
MONOCYTE #: 0.8 TH/MM3 (ref 0–0.9)
MONOCYTES NFR BLD: 6.4 % (ref 0–8)
NEUTROPHILS # BLD AUTO: 10.9 TH/MM3 (ref 1.8–7.7)
NEUTROPHILS NFR BLD AUTO: 82.5 % (ref 16–70)
PLATELET # BLD: 350 TH/MM3 (ref 150–450)
PMV BLD AUTO: 8.1 FL (ref 7–11)
PROTHROMBIN TIME: 37.7 SEC (ref 9.8–11.6)
RBC # BLD AUTO: 3.41 MIL/MM3 (ref 4–5.3)
SODIUM SERPL-SCNC: 130 MEQ/L (ref 136–145)
WBC # BLD AUTO: 13.2 TH/MM3 (ref 4–11)

## 2018-03-13 RX ADMIN — STANDARDIZED SENNA CONCENTRATE AND DOCUSATE SODIUM SCH TAB: 8.6; 5 TABLET, FILM COATED ORAL at 09:33

## 2018-03-13 RX ADMIN — ALBUTEROL SULFATE PRN MG: 2.5 SOLUTION RESPIRATORY (INHALATION) at 19:32

## 2018-03-13 RX ADMIN — HYDROMORPHONE HYDROCHLORIDE PRN MG: 2 INJECTION INTRAMUSCULAR; INTRAVENOUS; SUBCUTANEOUS at 01:34

## 2018-03-13 RX ADMIN — WARFARIN SODIUM SCH MG: 5 TABLET ORAL at 17:36

## 2018-03-13 RX ADMIN — AMPICILLIN SODIUM SCH MLS/HR: 2 INJECTION, POWDER, FOR SOLUTION INTRAMUSCULAR; INTRAVENOUS at 01:35

## 2018-03-13 RX ADMIN — HYDROMORPHONE HYDROCHLORIDE PRN MG: 2 INJECTION INTRAMUSCULAR; INTRAVENOUS; SUBCUTANEOUS at 17:36

## 2018-03-13 RX ADMIN — CHLORHEXIDINE GLUCONATE SCH PACK: 500 CLOTH TOPICAL at 04:00

## 2018-03-13 RX ADMIN — HYDROMORPHONE HYDROCHLORIDE PRN MG: 2 INJECTION INTRAMUSCULAR; INTRAVENOUS; SUBCUTANEOUS at 09:36

## 2018-03-13 RX ADMIN — Medication SCH ML: at 21:06

## 2018-03-13 RX ADMIN — AMPICILLIN SODIUM SCH MLS/HR: 2 INJECTION, POWDER, FOR SOLUTION INTRAMUSCULAR; INTRAVENOUS at 21:05

## 2018-03-13 RX ADMIN — POTASSIUM BICARBONATE AND POTASSIUM CHLORIDE EFFERVESCENT TABLETS FOR ORAL SOLUTION SCH MEQ: 1.25; .7; 1.5 TABLET, EFFERVESCENT ORAL at 09:32

## 2018-03-13 RX ADMIN — STANDARDIZED SENNA CONCENTRATE AND DOCUSATE SODIUM SCH TAB: 8.6; 5 TABLET, FILM COATED ORAL at 21:00

## 2018-03-13 RX ADMIN — AMPICILLIN SODIUM SCH MLS/HR: 2 INJECTION, POWDER, FOR SOLUTION INTRAMUSCULAR; INTRAVENOUS at 13:34

## 2018-03-13 RX ADMIN — POTASSIUM BICARBONATE AND POTASSIUM CHLORIDE EFFERVESCENT TABLETS FOR ORAL SOLUTION SCH MEQ: 1.25; .7; 1.5 TABLET, EFFERVESCENT ORAL at 09:00

## 2018-03-13 RX ADMIN — AMPICILLIN SODIUM SCH MLS/HR: 2 INJECTION, POWDER, FOR SOLUTION INTRAMUSCULAR; INTRAVENOUS at 09:32

## 2018-03-13 RX ADMIN — HYDROMORPHONE HYDROCHLORIDE PRN MG: 2 INJECTION INTRAMUSCULAR; INTRAVENOUS; SUBCUTANEOUS at 21:34

## 2018-03-13 RX ADMIN — HYDROMORPHONE HYDROCHLORIDE PRN MG: 2 INJECTION INTRAMUSCULAR; INTRAVENOUS; SUBCUTANEOUS at 05:45

## 2018-03-13 RX ADMIN — Medication SCH ML: at 09:32

## 2018-03-13 RX ADMIN — HYDROMORPHONE HYDROCHLORIDE PRN MG: 2 INJECTION INTRAMUSCULAR; INTRAVENOUS; SUBCUTANEOUS at 13:36

## 2018-03-13 NOTE — HHI.IDPN
Note


Infectious Disease Note














Patient states she feels okay but has a usual shortness of breath.


Afebrile.


Legs remain swollen.


Denies chest pain.


Not getting out of bed and ambulating.  States that her legs hurt when she 

stands on them.





37-year-old white female who has a history of endocarditis and has


been admitted several times for the same.  The patient developed shortness of


breath, fever and chills, and  presented to the emergency department.  The


patient is noted to be actively using IV drugs.  She has history of significant


CHF from prior valvular heart disease related to endocarditis.  She states that


she started feeling short of breath about a week ago and the shortness of


breath worsened.  After she completed IV antibiotics in October she was


put on doxycycline prophylaxis.  She reports that she has not been taking the


doxycycline.  


 


PAST MEDICAL HISTORY:


Hepatitis C.


IV drug use.


prosthetic aortic valve replacement in 2011 and then redo aortic


valve replacement in June 2016 





ANTIBIOTICS:


Ampicillin.








Current Medications








 Medications


  (Trade)  Dose


 Ordered  Sig/Nikhil


 Route


 PRN Reason  Start Time


 Stop Time Status Last Admin


Dose Admin


 


 Sodium Chloride


  (NS Flush)  2 ml  UNSCH  PRN


 IV FLUSH


 FLUSH AFTER USING IV ACCESS  2/23/18 22:00


    3/12/18 01:03


 


 


 Sodium Chloride


  (NS Flush)  2 ml  BID


 IV FLUSH


   2/24/18 09:00


    3/13/18 09:32


 


 


 Acetaminophen


  (Tylenol)  650 mg  Q6H  PRN


 PO


 fever  2/23/18 22:00


    3/5/18 16:02


 


 


 Ondansetron HCl


  (Zofran Inj)  4 mg  Q6H  PRN


 IV PUSH


 NAUSEA OR VOMITING  2/23/18 22:00


    3/8/18 21:22


 


 


 Temazepam


  (Restoril)  15 mg  HS  PRN


 PO


 INSOMNIA  2/23/18 22:00


    3/6/18 22:08


 


 


 Miscellaneous


 Information  1  Q361D


 XX


   2/23/18 22:00


     


 


 


 Chlorhexidine


 Gluconate


  (Chlorhexidine


 2% Cloth)  


 Taper  DAILY@04


 TOP


   2/24/18 04:00


 2/20/19 03:59  3/6/18 03:54


 


 


 Chlorhexidine


 Gluconate


  (Chlorhexidine


 2% Cloth)  3 pack  UNSCH  PRN


 TOP


 HYGIENIC CARE  2/23/18 22:00


     


 


 


 Senna/Docusate


 Sodium


  (Arlen-Colace)  1 tab  BID


 PO


   2/24/18 09:00


    3/8/18 21:21


 


 


 Magnesium


 Hydroxide


  (Milk Of


 Magnesia Liq)  30 ml  Q12H  PRN


 PO


 Mild constipation  2/23/18 22:00


     


 


 


 Sennosides


  (Senokot)  17.2 mg  Q12H  PRN


 PO


 Moderate constipation  2/23/18 22:00


     


 


 


 Bisacodyl


  (Dulcolax Supp)  10 mg  DAILY  PRN


 RECTAL


 SEVERE CONSITIPATION  2/23/18 22:00


     


 


 


 Lactulose


  (Lactulose Liq)  30 ml  DAILY  PRN


 PO


 SEVERE CONSITIPATION  2/23/18 22:00


     


 


 


 Ampicillin Sodium


 2000 mg/Sodium


 Chloride  100 ml @ 


 400 mls/hr  Q6H


 IV


   2/24/18 14:00


    3/13/18 09:32


 


 


 Loperamide HCl


  (Imodium)  2 mg  Q4H  PRN


 PO


 Diarrhea  2/26/18 16:15


    2/28/18 14:32


 


 


 Albuterol Sulfate


  (Albuterol Neb)  2.5 mg  Q2HR NEB  PRN


 NEB


 dyspnea  2/27/18 11:30


    3/12/18 20:36


 


 


 Argatroban 250 mg/


 Sodium Chloride  252.5 ml @ 


 3.11 mls/hr  TITRATE  PRN


 IV


 aPTT < 50  3/1/18 19:15


    3/9/18 18:32


 


 


 Acetaminophen/


 Hydrocodone Bitart


  (Norco  Mg)  1 tab  Q4H  PRN


 PO


 pain 1-5  3/4/18 15:00


    3/9/18 05:02


 


 


 Hydromorphone HCl


  (Dilaudid Pf Inj)  1 mg  Q4H  PRN


 IV PUSH


 PAIN SCALE 6 TO 10  3/7/18 19:00


    3/13/18 09:36


 


 


 Info  1  UNSCH


 .XX


   3/8/18 11:15


     


 


 


 Pharmacy Profile


 Note  0 ml @ 0


 mls/hr  UNSCH


 OTHER


   3/9/18 10:15


     


 


 


 Warfarin Sodium


  (Coumadin)  5 mg  DAILY@1600


 PO


   3/11/18 16:00


    3/12/18 17:24


 


 


 Potassium Bicarb/


 Potassium Chloride


  (K-Lyte Cl  Eff)  25 meq  DAILY


 PO


   3/12/18 09:00


    3/12/18 08:57


 














SOCIAL HISTORY:


Positive occasional alcohol.  No tobacco. IV drug use in the form of Dilaudid,


oxycodone.  The patient also uses marijuana.


 








OBJECTIVE:





Vital Signs








  Date Time  Temp Pulse Resp B/P (MAP) Pulse Ox O2 Delivery O2 Flow Rate FiO2


 


3/13/18 09:00     96   21


 


3/13/18 08:00 99.7 112 20 97/60 (72) 93   


 


3/13/18 06:00 99.2 118 20 103/75 (84) 98   


 


3/13/18 04:36  115      


 


3/13/18 00:08  110      


 


3/13/18 00:00 98.0 110 20 130/58 (82) 95   


 


3/12/18 20:36     92   21


 


3/12/18 20:00 98.0 116 20 110/66 (81) 95   


 


3/12/18 19:00      Nasal Cannula 3.00 


 


3/12/18 16:09 98.1 103 18 94/66 (75) 98   


 


3/12/18 16:00  116      


 


3/12/18 14:17     93 Nasal Cannula 2.00 


 


3/12/18 12:09 98.0 106 19 93/66 (75) 98   


 


3/12/18 12:00  112      








Laboratory Tests








Test


  3/12/18


06:45 3/13/18


05:48


 


White Blood Count 13.4 TH/MM3  13.2 TH/MM3 


 


Red Blood Count 3.40 MIL/MM3  3.41 MIL/MM3 


 


Hemoglobin 8.2 GM/DL  8.2 GM/DL 


 


Hematocrit 25.0 %  25.2 % 


 


Mean Corpuscular Volume 73.7 FL  74.0 FL 


 


Mean Corpuscular Hemoglobin 24.0 PG  24.0 PG 


 


Mean Corpuscular Hemoglobin


Concent 32.6 % 


  32.4 % 


 


 


Red Cell Distribution Width 22.0 %  22.1 % 


 


Platelet Count 329 TH/MM3  350 TH/MM3 


 


Mean Platelet Volume 7.9 FL  8.1 FL 


 


Neutrophils (%) (Auto) 85.3 %  82.5 % 


 


Lymphocytes (%) (Auto) 8.0 %  9.3 % 


 


Monocytes (%) (Auto) 5.2 %  6.4 % 


 


Eosinophils (%) (Auto) 0.5 %  1.0 % 


 


Basophils (%) (Auto) 1.0 %  0.8 % 


 


Neutrophils # (Auto) 11.4 TH/MM3  10.9 TH/MM3 


 


Lymphocytes # (Auto) 1.1 TH/MM3  1.2 TH/MM3 


 


Monocytes # (Auto) 0.7 TH/MM3  0.8 TH/MM3 


 


Eosinophils # (Auto) 0.1 TH/MM3  0.1 TH/MM3 


 


Basophils # (Auto) 0.1 TH/MM3  0.1 TH/MM3 


 


CBC Comment DIFF FINAL  DIFF FINAL 


 


Differential Comment    








Laboratory Tests








Test


  3/12/18


06:45


 


Blood Urea Nitrogen 14 MG/DL 


 


Creatinine 1.38 MG/DL 


 


Random Glucose 177 MG/DL 


 


Calcium Level 8.0 MG/DL 


 


Sodium Level 131 MEQ/L 


 


Potassium Level 2.4 MEQ/L 


 


Chloride Level 85 MEQ/L 


 


Carbon Dioxide Level 35.0 MEQ/L 


 


Anion Gap 11 MEQ/L 


 


Estimat Glomerular Filtration


Rate 43 ML/MIN 


 


 


Magnesium Level 1.4 MG/DL 











IMAGING:














Chest X-Ray 3/5/18 0600 Signed





Impressions: 





 Service Date/Time:  Monday, March 5, 2018 03:52 - CONCLUSION:  There is a 

small 





 to moderate size right pleural effusion with associated volume loss and/or 





 airspace consolidation. The pleural effusion has increased from the prior 





 examination.     Ron Melgar MD 


 


Head CT 3/3/18 0000 Signed





Impressions: 





 Service Date/Time:  Saturday, March 3, 2018 10:22 - CONCLUSION:  1. Focal area 





 of decreased density involving the right parietal lobe. As a new finding from 





 the prior exam. This could relate to a small infarct. MRI of the brain with 





 gadolinium suggested to further evaluate. 2. Small lacunar infarction 

involving 





 the left centrum semiovale.     Scooter Dawson Jr., MD 


 


CT Angiography 3/3/18 0000 Signed





Impressions: 





 Service Date/Time:  Saturday, March 3, 2018 10:28 - CONCLUSION:  There 

extensive 





 pulmonary emboli bilaterally. There is near complete cut off of the right 

lower 





 lobe pulmonary artery. Prominent filling defects on the left as well. These 





 findings were relayed to Dr. Bustos immediately at the completion of the study.  





 Scattered pulmonary infiltrates, small pleural effusion and extensive 





 mediastinal lymphadenopathy as described above.      Won Sharp MD 


 


Brain MRI 3/3/18 0000 Signed





Impressions: 





 Service Date/Time:  Saturday, March 3, 2018 18:04 - CONCLUSION:  Deterioration 





 in the appearance of the scan.  At least 3 focal areas of abnormal contrast 





 enhancement are present scattered to in both hemispheres.  Given the history 





 this most likely represents developing areas cerebritis.  Exam is compromised 

by 





 an uncooperative patient and motion artifact.  These 2 factors make detection 

of 





 other abnormalities such as cortical cephalitis and meningitis difficult to 





 exclude.  Cannot exclude hemorrhagic lesions as well.  There is no mass effect 





 evident.      Jorge Diane MD 




















Renal Ultrasound 2/24/18 0000 Signed





Impressions: 





 Service Date/Time:  Saturday, February 24, 2018 10:53 - CONCLUSION:  1. No 





 evidence of hydronephrosis. 2. Thickening of the urinary bladder wall a 1.1 

cm. 





 3. Prominent splenomegaly.     Elgin Walton MD 


 


Chest X-Ray 2/24/18 0000 Signed





Impressions: 





 Service Date/Time:  Saturday, February 24, 2018 09:36 - CONCLUSION:  Right 





 internal jugular central line in place with the tip overlying the SVC. A 





 pneumothorax is not seen.     Ron Brown MD 


 


Breast Ultrasound 2/24/18 0000 Signed





Impressions: 





 Service Date/Time:  Saturday, February 24, 2018 10:48 - CONCLUSION:  Negative 





 targeted right breast ultrasound examination.     Ron Brown MD 


 


Lower Extremity Ultrasound 2/23/18 0000 Signed





Impressions: 





 Service Date/Time:  Friday, February 23, 2018 21:22 - CONCLUSION:  1. No 





 sonographic evidence for lower extremity DVT. 2. Incidental note of bilateral 





 inguinal adenopathy with the largest on the right measuring 3.3 cm and the 





 largest on the left measuring 2.6 cm. This finding is nonspecific but is 





 typically reactive in etiology.     Rj Whitten MD 














PHYSICAL EXAMINATION


GENERAL: No acute distress. 


HEENT:  No icterus.  Oropharynx moist mucosa, no lesions.


Neck:  Supple without adenopathy.


Lungs: Coarse bilateral rhonchi.  


Heart: 5/6 blowing systolic murmur at the upper right sternal border.


Abdomen: Bowel sounds present, soft, obese, nontender.


Extremities: Diffuse 3+ edema of the lower extremities. Lesions at Left tibia 


distally and left foot and great toe are faded. blister at the left foot over 

the 


previous area of erythema. 


Toes 3 and 5 are no longer cyanotic.  Purple lesion at the tuft of the 


left fifth toe is faded.  Left leg warm.


No calf tenderness.   No nail hemorrhages.  


SKIN: no diffuse rash.


Neuro: No gross focal findings.


Psychiatric: calm and cooperative.





 


IMPRESSION


1. Sepsis. Strep Viridans. 


2. Tricuspid valve endocarditis.  Large vegetation.  Tricuspid regurgitation.


Patient evaluated by cardiovascular surgery and felt not to be a surgical 

candidate.


3.  Bacteremia.  Strep viridans.  Blood cultures have remained negative on 

follow-up.


Last positive blood culture was on 2/23.


4. History of prosthetic aortic valve and so likely recurrent prosthetic valve


endocarditis.


5.  Cerebritis on MRI.  Also has parietal infarct noted on CT scan of the brain.


6. Left renal cortical and pulmonary and lower extremity septic emboli. 


7. Leukocytosis secondary to sepsis from showering of emboli from endocarditis.


8. Acute renal failure. Kidney function improved. 


She did appears to be more stable.  However she continues to experience 

breathing difficulty.





RECOMMENDATIONS


1.  Continue Ampicillin intravenous.


2.  Gentamicin discontinued because of renal function.


3.  Monitor clinical status.


4.  Patient encouraged to elevate her legs.  


5.  Continue to monitor the left leg lesions.


Plan on intravenous antibiotics until April 6.  Which will be 6 weeks after the 

last 


positive blood culture.  After that she should continue with prophylactic oral 


antibiotics indefinitely. Continue to monitor her blood counts and renal 

function.














Robinson Carr MD Mar 13, 2018 11:25

## 2018-03-13 NOTE — PD.ONC.PN
Subjective


Subjective


Remarks


Tmax 99.7 overnight. 


Patient resting in bed in nad. 


No complaints. 


tired of being in the hospital.





Objective


Data











  Date Time  Temp Pulse Resp B/P (MAP) Pulse Ox O2 Delivery O2 Flow Rate FiO2


 


3/13/18 09:00     96   21


 


3/13/18 08:00 99.7 112 20 97/60 (72) 93   


 


3/13/18 08:00      Nasal Cannula 3.00 


 


3/13/18 08:00  109      


 


3/13/18 06:00 99.2 118 20 103/75 (84) 98   


 


3/13/18 04:36  115      


 


3/13/18 00:08  110      


 


3/13/18 00:00 98.0 110 20 130/58 (82) 95   


 


3/12/18 20:36     92   21


 


3/12/18 20:00 98.0 116 20 110/66 (81) 95   


 


3/12/18 19:00      Nasal Cannula 3.00 


 


3/12/18 16:09 98.1 103 18 94/66 (75) 98   


 


3/12/18 16:00  116      


 


3/12/18 14:17     93 Nasal Cannula 2.00 


 


3/12/18 12:09 98.0 106 19 93/66 (75) 98   


 


3/12/18 12:00  112      














 3/13/18 3/13/18 3/13/18





 07:00 15:00 23:00


 


Intake Total 100 ml  


 


Output Total 500 ml  


 


Balance -400 ml  








Result Diagram:  


3/13/18 0548                                                                   

             3/12/18 0645





Laboratory Results





Laboratory Tests








Test


  3/13/18


05:48


 


White Blood Count 13.2 TH/MM3 


 


Red Blood Count 3.41 MIL/MM3 


 


Hemoglobin 8.2 GM/DL 


 


Hematocrit 25.2 % 


 


Mean Corpuscular Volume 74.0 FL 


 


Mean Corpuscular Hemoglobin 24.0 PG 


 


Mean Corpuscular Hemoglobin


Concent 32.4 % 


 


 


Red Cell Distribution Width 22.1 % 


 


Platelet Count 350 TH/MM3 


 


Mean Platelet Volume 8.1 FL 


 


Neutrophils (%) (Auto) 82.5 % 


 


Lymphocytes (%) (Auto) 9.3 % 


 


Monocytes (%) (Auto) 6.4 % 


 


Eosinophils (%) (Auto) 1.0 % 


 


Basophils (%) (Auto) 0.8 % 


 


Neutrophils # (Auto) 10.9 TH/MM3 


 


Lymphocytes # (Auto) 1.2 TH/MM3 


 


Monocytes # (Auto) 0.8 TH/MM3 


 


Eosinophils # (Auto) 0.1 TH/MM3 


 


Basophils # (Auto) 0.1 TH/MM3 


 


CBC Comment DIFF FINAL 


 


Differential Comment  


 


Prothrombin Time 37.7 SEC 


 


Prothromb Time International


Ratio 3.7 RATIO 


 


 


Activated Partial


Thromboplast Time 71.8 SEC 


 











Administered Medications








 Medications


  (Trade)  Dose


 Ordered  Sig/Nikhil


 Route


 PRN Reason  Start Time


 Stop Time Status Last Admin


Dose Admin


 


 Sodium Chloride


  (NS Flush)  2 ml  UNSCH  PRN


 IV FLUSH


 FLUSH AFTER USING IV ACCESS  2/23/18 22:00


    3/12/18 01:03


 


 


 Sodium Chloride


  (NS Flush)  2 ml  BID


 IV FLUSH


   2/24/18 09:00


    3/13/18 09:32


 


 


 Acetaminophen


  (Tylenol)  650 mg  Q6H  PRN


 PO


 fever  2/23/18 22:00


    3/5/18 16:02


 


 


 Ondansetron HCl


  (Zofran Inj)  4 mg  Q6H  PRN


 IV PUSH


 NAUSEA OR VOMITING  2/23/18 22:00


    3/8/18 21:22


 


 


 Temazepam


  (Restoril)  15 mg  HS  PRN


 PO


 INSOMNIA  2/23/18 22:00


    3/6/18 22:08


 


 


 Chlorhexidine


 Gluconate


  (Chlorhexidine


 2% Cloth)  


 Taper  DAILY@04


 TOP


   2/24/18 04:00


 2/20/19 03:59  3/6/18 03:54


 


 


 Senna/Docusate


 Sodium


  (Arlen-Colace)  1 tab  BID


 PO


   2/24/18 09:00


    3/8/18 21:21


 


 


 Ampicillin Sodium


 2000 mg/Sodium


 Chloride  100 ml @ 


 400 mls/hr  Q6H


 IV


   2/24/18 14:00


    3/13/18 09:32


 


 


 Loperamide HCl


  (Imodium)  2 mg  Q4H  PRN


 PO


 Diarrhea  2/26/18 16:15


    2/28/18 14:32


 


 


 Albuterol Sulfate


  (Albuterol Neb)  2.5 mg  Q2HR NEB  PRN


 NEB


 dyspnea  2/27/18 11:30


    3/12/18 20:36


 


 


 Argatroban 250 mg/


 Sodium Chloride  252.5 ml @ 


 3.11 mls/hr  TITRATE  PRN


 IV


 aPTT < 50  3/1/18 19:15


    3/9/18 18:32


 


 


 Acetaminophen/


 Hydrocodone Bitart


  (Norco  Mg)  1 tab  Q4H  PRN


 PO


 pain 1-5  3/4/18 15:00


    3/9/18 05:02


 


 


 Hydromorphone HCl


  (Dilaudid Pf Inj)  1 mg  Q4H  PRN


 IV PUSH


 PAIN SCALE 6 TO 10  3/7/18 19:00


    3/13/18 09:36


 


 


 Warfarin Sodium


  (Coumadin)  5 mg  DAILY@1600


 PO


   3/11/18 16:00


    3/12/18 17:24


 


 


 Potassium Bicarb/


 Potassium Chloride


  (K-Lyte Cl  Eff)  25 meq  DAILY


 PO


   3/12/18 09:00


    3/12/18 08:57


 








Objective Remarks


GENERAL: chronically ill female lying in bed, appears tired, dyspneic, 

resigned. 


SKIN: Warm and dry.


HEAD: Normocephalic.


EYES: No injection or drainage. 


NECK: Supple, trachea midline. 


CARDIOVASCULAR: +S1, S2, tachy


RESPIRATORY: scattered rhonchi. 


GASTROINTESTINAL: Abdomen soft, non-tender, nondistended. 


EXTREMITIES: 


NEUROLOGICAL: awake, alert, dyspneic speech. moving all extremities.





Assessment/Plan


Problem List:  


(1) arterial blood clot


Plan:  --on Argatroban bridge to Coumadin. 


-- no significant family history of thrombotic events to suggest hereditary 

antithrombin deficiency.  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 

impaired production from her liver disease. 


--has chronic active hepatitis C.  ++increased consumption from disseminated 

intravascular coagulation and acute illness, bacterial endocarditis.  


--Protein losses are also considered.  She had mild proteinuria when she first 

was admitted.


--Thrombin inactivates antithrombin.  This would abrogate the effect of 

unfractionated heparin and low molecular weight heparin.





(2) Acute septic pulmonary embolism


ICD Codes:  I26.90 - Septic pulmonary embolism without acute cor pulmonale


Plan:  -- echocardiogram on 3/3 showed an estimated EF of 50-55%.  


++severe tricuspid regurgitation with prosthesis in place.  


++2 x 4 cm mobile vegetation was seen on the valve.  The right atrium and right 

ventricle were normal in size and function





(3) Microcytic anemia


ICD Codes:  D50.9 - Iron deficiency anemia, unspecified


Plan:  --likely d/t inflammation/infection. no evidence of bleeding or 

hemolysis. 


--B12/folate WNL


--low iron and percent saturation. normal TIBC, elevated ferritin-->more 

consistent with anemia of chronic disease. 


--stool Hemoccult negative. 


--no evidence of hemolysis





(4) Bacterial endocarditis


ICD Codes:  I33.0 - Acute and subacute infective endocarditis


Status:  Acute


Plan:  --on ampicillin, ID following. 





Assessment


36y/o female admitted with recurrent endocarditis. hematology consulted for 

anticoagulation assistance.


h/o Recurrent bacterial endocarditis, bacteremia, history of prosthetic aortic 

valve and subsequent redo, chronic active hepatitis C, intravenous drug use, 

microcytic anemia, consistent with iron deficiency, left lower extremity 

arterial event, bacterial endocarditis with viridans streptococcus.





HPI (brought forward for continuity of care): history of IV drug abuse and  

recurrent endocarditis. had a relapse of her activity. has history of pulmonary 

embolism and infected valves. +significant congestive heart failure and 

presented to the emergency room on 02/23/2018, with sepsis. is followed by 

Infectious Disease for her endocarditis.  previously received antibiotic 

therapy for enterococcus faecalis and staph aureus. There is questionable 

compliance. Her current blood 


culture from 02/23/2018, shows viridans streptococcus.  Two out of two bottles 

were positive.  She is on ampicillin and gentamicin. course is complicated by 

cold left lower extremity, evaluated by  Vascular Surgery.  A CT angiogram 

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 

the proximal thigh.  There is satisfactory calf runoff present.  For this reason

, conservative management with anticoagulation is continued. was placed on 

unfractionated heparin.  Her heparin dose has been titrated from 1000 units/

hour to 2500 units/hour with a PTT remaining subtherapeutic.  Her last PTT is 

32 seconds from 03/01/2018, at 2 p.m.  For this reason, Hematology/Oncology was 

consulted.


Plan


1. continue coumadin


2. monitor INR. 


3. once INR reaches greater than 4.0, will stop Argatroban, hold for four hours

, then recheck INR.


Attending Statement


The exam, history, and the medical decision-making described in the above note 

were completed with the assistance of the mid-level provider. I reviewed and 

agree with the findings presented.  I attest that I had a face-to-face 

encounter with the patient on the same day, and personally performed and 

documented my assessment and findings in the medical record.





Family at bedside. 


Discussed that INR on argatroban 3.7, almost therapeutic.


Check pt/inr tomorrow, if still low <INR 4, will need to increase Coumadin dose.


Monitor for bleeding.


Noted BL leg swelling, c/o burning sensation in both legs.


Discussed trial support hose for venous insufficiency.











Zoe Chau Mar 13, 2018 11:34


Henny Smith MD Mar 13, 2018 19:18

## 2018-03-13 NOTE — HHI.PR
Subjective


Remarks


37-year-old female with a history of IV drug use, previous endocarditis who 

presented to the emergency department on 2/23/2018 due to shortness of breath 

and leg swelling.  She has bioprosthesis valves both aortic and tricuspid.  She 

admitted that she injected herself 4 days prior to this hospitalization.  She 

apparently completed IV antibiotics course in October and after that she was on 

doxycycline prophylaxis.  She reports that she has not been taking her 

antibiotics doxycycline.  Patient was initially managed in the ICU where she 

required pressors.  2D echo shows both aortic valve and tricuspid valve 

prosthesis endocarditis.  Cardiothoracic surgery evaluated and determined that 

patient is not a good surgical candidate.  During ICU stay, patient was found 

to have septic emboli as well.  Hematology was consulted for recommendations 

regarding anticoagulation.  Patient was started on argatroban bridging to 

warfarin.





Nursing denies any deterioration since last night.  Patient has no new 

complaints.  Says her edema stable, not any worse.





Objective





Vital Signs








  Date Time  Temp Pulse Resp B/P (MAP) Pulse Ox O2 Delivery O2 Flow Rate FiO2


 


3/13/18 12:00 98.7 105 20 91/61 (71) 98   


 


3/13/18 12:00  106      


 


3/13/18 09:00     96   21


 


3/13/18 08:00 99.7 112 20 97/60 (72) 93   


 


3/13/18 08:00      Nasal Cannula 3.00 


 


3/13/18 08:00  109      


 


3/13/18 06:00 99.2 118 20 103/75 (84) 98   


 


3/13/18 04:36  115      


 


3/13/18 00:08  110      


 


3/13/18 00:00 98.0 110 20 130/58 (82) 95   


 


3/12/18 20:36     92   21


 


3/12/18 20:00 98.0 116 20 110/66 (81) 95   


 


3/12/18 19:00      Nasal Cannula 3.00 


 


3/12/18 16:09 98.1 103 18 94/66 (75) 98   


 


3/12/18 16:00  116      














I/O      


 


 3/12/18 3/12/18 3/12/18 3/13/18 3/13/18 3/13/18





 07:00 15:00 23:00 07:00 15:00 23:00


 


Intake Total   520 ml 100 ml  


 


Output Total 800 ml   500 ml  


 


Balance -800 ml  520 ml -400 ml  


 


      


 


Intake Oral   520 ml 100 ml  


 


Output Urine Total 800 ml   500 ml  


 


# Voids   3   


 


# Bowel Movements   0 1  








Result Diagram:  


3/13/18 0548                                                                   

             3/13/18 1127





Objective Remarks


Clear lung sounds bilaterally, unlabored breathing, no cyanosis, on nasal 

cannula


4/6 ejection murmur


Unchanged severe lower extremity edema


Lying in bed today, family at bedside





A/P


Assessment and Plan


Bioprosthetic aortic valve endocarditis


Bioprosthetic tricuspid valve endocarditis


Bacteremia with viridans species


septic pulm embolism


Cerebritis


-ampicillin, vanc, and gentamicin; ID following





Pulmonary hypertension





Acute exacerbation of diastolic congestive heart failure with preserved left 

ventricular ejection fraction


-P.o. Lasix





occlusion of left superficial femoral artery


-Continue on argatroban to warfarin bridge. heme/onco following, appreciate recs





Hypokalemia


- due to Lasix, will replenish





AK I


-Improving with p.o. Lasix as opposed to IV





Full code. 





Overall poor prognosis. Palliative care following.


Discharge Planning


Poor prognosis from cardiothoracic surgery standpoint.  Continue with medical 

management











Mir Mills MD Mar 13, 2018 15:36

## 2018-03-14 VITALS
DIASTOLIC BLOOD PRESSURE: 71 MMHG | OXYGEN SATURATION: 92 % | TEMPERATURE: 98.3 F | SYSTOLIC BLOOD PRESSURE: 97 MMHG | HEART RATE: 114 BPM | RESPIRATION RATE: 20 BRPM

## 2018-03-14 VITALS
OXYGEN SATURATION: 95 % | DIASTOLIC BLOOD PRESSURE: 80 MMHG | RESPIRATION RATE: 20 BRPM | TEMPERATURE: 98.3 F | SYSTOLIC BLOOD PRESSURE: 108 MMHG | HEART RATE: 117 BPM

## 2018-03-14 VITALS
DIASTOLIC BLOOD PRESSURE: 75 MMHG | RESPIRATION RATE: 20 BRPM | SYSTOLIC BLOOD PRESSURE: 102 MMHG | OXYGEN SATURATION: 96 % | TEMPERATURE: 97.9 F | HEART RATE: 110 BPM

## 2018-03-14 VITALS
SYSTOLIC BLOOD PRESSURE: 96 MMHG | HEART RATE: 119 BPM | TEMPERATURE: 98.6 F | OXYGEN SATURATION: 97 % | RESPIRATION RATE: 20 BRPM | DIASTOLIC BLOOD PRESSURE: 77 MMHG

## 2018-03-14 VITALS — OXYGEN SATURATION: 96 %

## 2018-03-14 VITALS
OXYGEN SATURATION: 94 % | RESPIRATION RATE: 20 BRPM | HEART RATE: 115 BPM | TEMPERATURE: 97.9 F | SYSTOLIC BLOOD PRESSURE: 102 MMHG | DIASTOLIC BLOOD PRESSURE: 71 MMHG

## 2018-03-14 VITALS
OXYGEN SATURATION: 92 % | DIASTOLIC BLOOD PRESSURE: 67 MMHG | SYSTOLIC BLOOD PRESSURE: 95 MMHG | RESPIRATION RATE: 18 BRPM | HEART RATE: 113 BPM | TEMPERATURE: 98.3 F

## 2018-03-14 VITALS — HEART RATE: 119 BPM

## 2018-03-14 LAB
BUN SERPL-MCNC: 16 MG/DL (ref 7–18)
CALCIUM SERPL-MCNC: 7.7 MG/DL (ref 8.5–10.1)
CHLORIDE SERPL-SCNC: 88 MEQ/L (ref 98–107)
CREAT SERPL-MCNC: 1.29 MG/DL (ref 0.5–1)
GFR SERPLBLD BASED ON 1.73 SQ M-ARVRAT: 47 ML/MIN (ref 89–?)
GLUCOSE SERPL-MCNC: 106 MG/DL (ref 74–106)
HCO3 BLD-SCNC: 32.9 MEQ/L (ref 21–32)
INR PPP: 5.3 RATIO
INR PPP: 5.3 RATIO
PROTHROMBIN TIME: 52.9 SEC (ref 9.8–11.6)
PROTHROMBIN TIME: 52.9 SEC (ref 9.8–11.6)
SODIUM SERPL-SCNC: 132 MEQ/L (ref 136–145)

## 2018-03-14 RX ADMIN — STANDARDIZED SENNA CONCENTRATE AND DOCUSATE SODIUM SCH TAB: 8.6; 5 TABLET, FILM COATED ORAL at 21:00

## 2018-03-14 RX ADMIN — WARFARIN SODIUM SCH MG: 5 TABLET ORAL at 16:51

## 2018-03-14 RX ADMIN — AMPICILLIN SODIUM SCH MLS/HR: 2 INJECTION, POWDER, FOR SOLUTION INTRAMUSCULAR; INTRAVENOUS at 21:06

## 2018-03-14 RX ADMIN — HYDROMORPHONE HYDROCHLORIDE PRN MG: 2 INJECTION INTRAMUSCULAR; INTRAVENOUS; SUBCUTANEOUS at 18:49

## 2018-03-14 RX ADMIN — AMPICILLIN SODIUM SCH MLS/HR: 2 INJECTION, POWDER, FOR SOLUTION INTRAMUSCULAR; INTRAVENOUS at 14:16

## 2018-03-14 RX ADMIN — FUROSEMIDE SCH MG: 40 TABLET ORAL at 08:49

## 2018-03-14 RX ADMIN — POTASSIUM CHLORIDE SCH MEQ: 750 TABLET, FILM COATED, EXTENDED RELEASE ORAL at 21:04

## 2018-03-14 RX ADMIN — AMPICILLIN SODIUM SCH MLS/HR: 2 INJECTION, POWDER, FOR SOLUTION INTRAMUSCULAR; INTRAVENOUS at 08:48

## 2018-03-14 RX ADMIN — STANDARDIZED SENNA CONCENTRATE AND DOCUSATE SODIUM SCH TAB: 8.6; 5 TABLET, FILM COATED ORAL at 08:49

## 2018-03-14 RX ADMIN — Medication SCH ML: at 21:04

## 2018-03-14 RX ADMIN — AMPICILLIN SODIUM SCH MLS/HR: 2 INJECTION, POWDER, FOR SOLUTION INTRAMUSCULAR; INTRAVENOUS at 01:36

## 2018-03-14 RX ADMIN — HYDROMORPHONE HYDROCHLORIDE PRN MG: 2 INJECTION INTRAMUSCULAR; INTRAVENOUS; SUBCUTANEOUS at 10:18

## 2018-03-14 RX ADMIN — Medication SCH ML: at 08:49

## 2018-03-14 RX ADMIN — HYDROMORPHONE HYDROCHLORIDE PRN MG: 2 INJECTION INTRAMUSCULAR; INTRAVENOUS; SUBCUTANEOUS at 05:59

## 2018-03-14 RX ADMIN — HYDROMORPHONE HYDROCHLORIDE PRN MG: 2 INJECTION INTRAMUSCULAR; INTRAVENOUS; SUBCUTANEOUS at 22:34

## 2018-03-14 RX ADMIN — HYDROMORPHONE HYDROCHLORIDE PRN MG: 2 INJECTION INTRAMUSCULAR; INTRAVENOUS; SUBCUTANEOUS at 01:36

## 2018-03-14 RX ADMIN — CHLORHEXIDINE GLUCONATE SCH PACK: 500 CLOTH TOPICAL at 04:00

## 2018-03-14 RX ADMIN — ALBUTEROL SULFATE PRN MG: 2.5 SOLUTION RESPIRATORY (INHALATION) at 21:26

## 2018-03-14 RX ADMIN — POTASSIUM CHLORIDE SCH MEQ: 750 TABLET, FILM COATED, EXTENDED RELEASE ORAL at 11:03

## 2018-03-14 RX ADMIN — ALBUTEROL SULFATE PRN MG: 2.5 SOLUTION RESPIRATORY (INHALATION) at 14:21

## 2018-03-14 RX ADMIN — HYDROMORPHONE HYDROCHLORIDE PRN MG: 2 INJECTION INTRAMUSCULAR; INTRAVENOUS; SUBCUTANEOUS at 14:16

## 2018-03-14 NOTE — PD.ONC.PN
Subjective


Subjective


Remarks


"They put the socks on to get me out of bed"


Poor appetite.


Denies any bleeding.





Objective


Data











  Date Time  Temp Pulse Resp B/P (MAP) Pulse Ox O2 Delivery O2 Flow Rate FiO2


 


3/14/18 16:00 98.3 117 20 108/80 (89) 95   


 


3/14/18 14:24     96 Nasal Cannula 2.50 


 


3/14/18 12:00 98.6 119 20 96/77 (83) 97   


 


3/14/18 08:00 98.3 114 20 97/71 (80) 92   


 


3/14/18 04:00 97.9 109 20 102/71 (81) 94   


 


3/14/18 04:00  115      


 


3/14/18 00:00 97.9 110 20 102/75 (84) 96   


 


3/14/18 00:00  112      


 


3/13/18 20:00 98.8 109 20 101/65 (77) 100   


 


3/13/18 19:53  110      


 


3/13/18 19:34     92 Nasal Cannula 2.50 


 


3/13/18 19:00      Nasal Cannula 3.00 














 3/14/18 3/14/18 3/14/18





 07:00 15:00 23:00


 


Intake Total 200 ml  


 


Balance 200 ml  








Result Diagram:  


3/13/18 0548                                                                   

             3/14/18 0506





Laboratory Results





Laboratory Tests








Test


  3/14/18


05:06 3/14/18


05:54 3/14/18


15:25


 


Blood Urea Nitrogen 16 MG/DL   


 


Creatinine 1.29 MG/DL   


 


Random Glucose 106 MG/DL   


 


Calcium Level 7.7 MG/DL   


 


Sodium Level 132 MEQ/L   


 


Potassium Level 2.7 MEQ/L   


 


Chloride Level 88 MEQ/L   


 


Carbon Dioxide Level 32.9 MEQ/L   


 


Anion Gap 11 MEQ/L   


 


Estimat Glomerular Filtration


Rate 47 ML/MIN 


  


  


 


 


Magnesium Level 1.7 MG/DL   


 


Prothrombin Time  52.9 SEC  52.9 SEC 


 


Prothromb Time International


Ratio 


  5.3 RATIO 


  5.3 RATIO 


 


 


Activated Partial


Thromboplast Time 


  79.7 SEC 


  54.6 SEC 


 











Administered Medications








 Medications


  (Trade)  Dose


 Ordered  Sig/Nikhil


 Route


 PRN Reason  Start Time


 Stop Time Status Last Admin


Dose Admin


 


 Sodium Chloride


  (NS Flush)  2 ml  UNSCH  PRN


 IV FLUSH


 FLUSH AFTER USING IV ACCESS  2/23/18 22:00


    3/12/18 01:03


 


 


 Sodium Chloride


  (NS Flush)  2 ml  BID


 IV FLUSH


   2/24/18 09:00


    3/14/18 08:49


 


 


 Acetaminophen


  (Tylenol)  650 mg  Q6H  PRN


 PO


 fever  2/23/18 22:00


    3/5/18 16:02


 


 


 Ondansetron HCl


  (Zofran Inj)  4 mg  Q6H  PRN


 IV PUSH


 NAUSEA OR VOMITING  2/23/18 22:00


    3/8/18 21:22


 


 


 Temazepam


  (Restoril)  15 mg  HS  PRN


 PO


 INSOMNIA  2/23/18 22:00


    3/6/18 22:08


 


 


 Chlorhexidine


 Gluconate


  (Chlorhexidine


 2% Cloth)  


 Taper  DAILY@04


 TOP


   2/24/18 04:00


 2/20/19 03:59  3/6/18 03:54


 


 


 Senna/Docusate


 Sodium


  (Arlen-Colace)  1 tab  BID


 PO


   2/24/18 09:00


    3/14/18 08:49


 


 


 Ampicillin Sodium


 2000 mg/Sodium


 Chloride  100 ml @ 


 400 mls/hr  Q6H


 IV


   2/24/18 14:00


    3/14/18 14:16


 


 


 Loperamide HCl


  (Imodium)  2 mg  Q4H  PRN


 PO


 Diarrhea  2/26/18 16:15


    2/28/18 14:32


 


 


 Albuterol Sulfate


  (Albuterol Neb)  2.5 mg  Q2HR NEB  PRN


 NEB


 dyspnea  2/27/18 11:30


    3/14/18 14:21


 


 


 Acetaminophen/


 Hydrocodone Bitart


  (Norco  Mg)  1 tab  Q4H  PRN


 PO


 pain 1-5  3/4/18 15:00


    3/9/18 05:02


 


 


 Hydromorphone HCl


  (Dilaudid Pf Inj)  1 mg  Q4H  PRN


 IV PUSH


 PAIN SCALE 6 TO 10  3/7/18 19:00


    3/14/18 14:16


 


 


 Warfarin Sodium


  (Coumadin)  5 mg  DAILY@1600


 PO


   3/11/18 16:00


   Future Hold 3/14/18 16:51


 


 


 Furosemide


  (Lasix)  40 mg  DAILY


 PO


   3/14/18 09:00


    3/14/18 08:49


 


 


 Potassium Chloride


  (KCl)  60 meq  Q12HR


 PO


   3/14/18 09:45


    3/14/18 11:03


 








Objective Remarks





GENERAL: chronically tired appearing, sleepy female, sitting up HOB elevated. 


SKIN: Warm and dry.


HEAD: Normocephalic.


EYES: No injection or drainage. 


NECK: Supple, trachea midline. 


CARDIOVASCULAR: +S1, S2, tachy


RESPIRATORY: scattered rhonchi. 


GASTROINTESTINAL: Abdomen soft, non-tender, nondistended. 


EXTREMITIES: Edema BL LE.


NEUROLOGICAL: awake, alert, dyspneic speech. moving all extremities.





Assessment/Plan


Problem List:  


(1) arterial blood clot


Plan:  3/14/18.  INR >4.0, Argatroban stopped.


PT/PTT checked off argatroban, noted prolonged PTT.


Received Coumadin today.


Monitor for bleeding.


Check coag tomorrow.








--on Argatroban bridge to Coumadin. 


-- no significant family history of thrombotic events to suggest hereditary 

antithrombin deficiency.  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 

impaired production from her liver disease. 


--has chronic active hepatitis C.  ++increased consumption from disseminated 

intravascular coagulation and acute illness, bacterial endocarditis.  


--Protein losses are also considered.  She had mild proteinuria when she first 

was admitted.


--Thrombin inactivates antithrombin.  This would abrogate the effect of 

unfractionated heparin and low molecular weight heparin.





(2) Acute septic pulmonary embolism


ICD Codes:  I26.90 - Septic pulmonary embolism without acute cor pulmonale


Plan:  3/14/18.  Cont anticoagulation and antibiotics.


Noted surgery evaluation, no surgery offered.





-- echocardiogram on 3/3 showed an estimated EF of 50-55%.  


++severe tricuspid regurgitation with prosthesis in place.  


++2 x 4 cm mobile vegetation was seen on the valve.  The right atrium and right 

ventricle were normal in size and function





(3) Microcytic anemia


ICD Codes:  D50.9 - Iron deficiency anemia, unspecified


Plan:  3/14/18.  Unable to exclude iron deficiency with microcytic anemia.


Menstruation irregular.


Monitor for now.





--likely d/t inflammation/infection. no evidence of bleeding or hemolysis. 


--B12/folate WNL


--low iron and percent saturation. normal TIBC, elevated ferritin-->more 

consistent with anemia of chronic disease. 


--stool Hemoccult negative. 


--no evidence of hemolysis





(4) Bacterial endocarditis


ICD Codes:  I33.0 - Acute and subacute infective endocarditis


Status:  Acute


Plan:  2/14/18.  IVDA. 


Abx tx per ID.





--on ampicillin, ID following. 





Assessment


36y/o female admitted with recurrent endocarditis. hematology consulted for 

anticoagulation assistance.


h/o Recurrent bacterial endocarditis, bacteremia, history of prosthetic aortic 

valve and subsequent redo, chronic active hepatitis C, intravenous drug use, 

microcytic anemia, consistent with iron deficiency, left lower extremity 

arterial event, bacterial endocarditis with viridans streptococcus.





HPI (brought forward for continuity of care): history of IV drug abuse and  

recurrent endocarditis. had a relapse of her activity. has history of pulmonary 

embolism and infected valves. +significant congestive heart failure and 

presented to the emergency room on 02/23/2018, with sepsis. is followed by 

Infectious Disease for her endocarditis.  previously received antibiotic 

therapy for enterococcus faecalis and staph aureus. There is questionable 

compliance. Her current blood 


culture from 02/23/2018, shows viridans streptococcus.  Two out of two bottles 

were positive.  She is on ampicillin and gentamicin. course is complicated by 

cold left lower extremity, evaluated by  Vascular Surgery.  A CT angiogram 

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 

the proximal thigh.  There is satisfactory calf runoff present.  For this reason

, conservative management with anticoagulation is continued. was placed on 

unfractionated heparin.  Her heparin dose has been titrated from 1000 units/

hour to 2500 units/hour with a PTT remaining subtherapeutic.  Her last PTT is 

32 seconds from 03/01/2018, at 2 p.m.  For this reason, Hematology/Oncology was 

consulted.


Plan


1. Hold Coumadin until coag in AM, anticipate need to decrease dose.


2. Stop Argatroban


3. Monitor for bleeding.











Henny Smith MD Mar 14, 2018 18:30

## 2018-03-14 NOTE — HHI.PR
Subjective


Remarks


37-year-old female with a history of IV drug use, previous endocarditis who 

presented to the emergency department on 2/23/2018 due to shortness of breath 

and leg swelling.  She has bioprosthesis valves both aortic and tricuspid.  She 

admitted that she injected herself 4 days prior to this hospitalization.  She 

apparently completed IV antibiotics course in October and after that she was on 

doxycycline prophylaxis.  She reports that she has not been taking her 

antibiotics doxycycline.  Patient was initially managed in the ICU where she 

required pressors.  2D echo shows both aortic valve and tricuspid valve 

prosthesis endocarditis.  Cardiothoracic surgery evaluated and determined that 

patient is not a good surgical candidate.  During ICU stay, patient was found 

to have septic emboli as well.  Hematology was consulted for recommendations 

regarding anticoagulation.  Patient was started on argatroban bridging to 

warfarin.





Nursing denies any deterioration since last night.  Patient has no new 

complaints.  Says her shortness of breath and leg swelling about the same as 

yesterday.





Objective





Vital Signs








  Date Time  Temp Pulse Resp B/P (MAP) Pulse Ox O2 Delivery O2 Flow Rate FiO2


 


3/14/18 08:00 98.3 114 20 97/71 (80) 92   


 


3/14/18 04:00 97.9 109 20 102/71 (81) 94   


 


3/14/18 04:00  115      


 


3/14/18 00:00 97.9 110 20 102/75 (84) 96   


 


3/14/18 00:00  112      


 


3/13/18 20:00 98.8 109 20 101/65 (77) 100   


 


3/13/18 19:53  110      


 


3/13/18 19:34     92 Nasal Cannula 2.50 


 


3/13/18 19:00      Nasal Cannula 3.00 


 


3/13/18 16:00  107      


 


3/13/18 16:00 98.7 106 20 96/54 (68) 95   


 


3/13/18 12:00 98.7 105 20 91/61 (71) 98   


 


3/13/18 12:00  106      














I/O      


 


 3/13/18 3/13/18 3/13/18 3/14/18 3/14/18 3/14/18





 07:00 15:00 23:00 07:00 15:00 23:00


 


Intake Total 100 ml  600 ml 200 ml  


 


Output Total 500 ml     


 


Balance -400 ml  600 ml 200 ml  


 


      


 


Intake Oral 100 ml  600 ml 200 ml  


 


Output Urine Total 500 ml     


 


# Voids   2 4  


 


# Bowel Movements 1  2   








Result Diagram:  


3/13/18 0548                                                                   

             3/14/18 0506





Objective Remarks


Clear lung sounds bilaterally, unlabored breathing, no cyanosis, on nasal 

cannula


4/6 ejection murmur


Unchanged severe lower extremity edema


Lying in bed today





A/P


Assessment and Plan


Bioprosthetic aortic valve endocarditis


Bioprosthetic tricuspid valve endocarditis


Bacteremia with viridans species


septic pulm embolism


Cerebritis


-ID has de-escalate antibiotics to just ampicillin, has remained afebrile





Pulmonary hypertension





Acute exacerbation of diastolic congestive heart failure with preserved left 

ventricular ejection fraction


-P.o. Lasix, intake and output





occlusion of left superficial femoral artery


-INR is now greater than 4, will stop her argatroban t continue warfarin per 

hematology, hematology following





Hypokalemia


- due to Lasix, will replenish, rechecking mg





NOÉ


-Improving slowly, continue low dose lasix





Full code.


Discharge Planning


Poor prognosis from cardiothoracic surgery standpoint.  Continue with medical 

management per ID at this time. Abx until 4/6/18 per ID.











Mir Mills MD Mar 14, 2018 11:26

## 2018-03-15 VITALS
TEMPERATURE: 98.5 F | SYSTOLIC BLOOD PRESSURE: 98 MMHG | HEART RATE: 114 BPM | OXYGEN SATURATION: 97 % | RESPIRATION RATE: 20 BRPM | DIASTOLIC BLOOD PRESSURE: 65 MMHG

## 2018-03-15 VITALS
SYSTOLIC BLOOD PRESSURE: 98 MMHG | TEMPERATURE: 98.6 F | OXYGEN SATURATION: 98 % | HEART RATE: 110 BPM | DIASTOLIC BLOOD PRESSURE: 59 MMHG | RESPIRATION RATE: 20 BRPM

## 2018-03-15 VITALS
SYSTOLIC BLOOD PRESSURE: 100 MMHG | HEART RATE: 104 BPM | RESPIRATION RATE: 20 BRPM | DIASTOLIC BLOOD PRESSURE: 63 MMHG | OXYGEN SATURATION: 98 % | TEMPERATURE: 97.7 F

## 2018-03-15 VITALS
SYSTOLIC BLOOD PRESSURE: 95 MMHG | DIASTOLIC BLOOD PRESSURE: 66 MMHG | TEMPERATURE: 97.9 F | OXYGEN SATURATION: 93 % | RESPIRATION RATE: 22 BRPM | HEART RATE: 112 BPM

## 2018-03-15 VITALS — HEART RATE: 93 BPM

## 2018-03-15 VITALS
TEMPERATURE: 98.2 F | HEART RATE: 114 BPM | RESPIRATION RATE: 22 BRPM | DIASTOLIC BLOOD PRESSURE: 60 MMHG | OXYGEN SATURATION: 92 % | SYSTOLIC BLOOD PRESSURE: 95 MMHG

## 2018-03-15 VITALS
SYSTOLIC BLOOD PRESSURE: 98 MMHG | DIASTOLIC BLOOD PRESSURE: 52 MMHG | TEMPERATURE: 99.5 F | RESPIRATION RATE: 20 BRPM | HEART RATE: 113 BPM | OXYGEN SATURATION: 97 %

## 2018-03-15 VITALS — HEART RATE: 106 BPM

## 2018-03-15 VITALS — OXYGEN SATURATION: 97 %

## 2018-03-15 VITALS — OXYGEN SATURATION: 98 %

## 2018-03-15 VITALS — HEART RATE: 105 BPM

## 2018-03-15 LAB
BUN SERPL-MCNC: 20 MG/DL (ref 7–18)
CALCIUM SERPL-MCNC: 8.8 MG/DL (ref 8.5–10.1)
CHLORIDE SERPL-SCNC: 91 MEQ/L (ref 98–107)
CREAT SERPL-MCNC: 1.54 MG/DL (ref 0.5–1)
ERYTHROCYTE [DISTWIDTH] IN BLOOD BY AUTOMATED COUNT: 22.3 % (ref 11.6–17.2)
GFR SERPLBLD BASED ON 1.73 SQ M-ARVRAT: 38 ML/MIN (ref 89–?)
GLUCOSE SERPL-MCNC: 122 MG/DL (ref 74–106)
HCO3 BLD-SCNC: 28 MEQ/L (ref 21–32)
HCT VFR BLD CALC: 25.3 % (ref 35–46)
HGB BLD-MCNC: 8.2 GM/DL (ref 11.6–15.3)
INR PPP: 3.8 RATIO
MCH RBC QN AUTO: 24.1 PG (ref 27–34)
MCHC RBC AUTO-ENTMCNC: 32.6 % (ref 32–36)
MCV RBC AUTO: 74 FL (ref 80–100)
PLATELET # BLD: 370 TH/MM3 (ref 150–450)
PMV BLD AUTO: 8.1 FL (ref 7–11)
PROTHROMBIN TIME: 38.6 SEC (ref 9.8–11.6)
RBC # BLD AUTO: 3.42 MIL/MM3 (ref 4–5.3)
SODIUM SERPL-SCNC: 129 MEQ/L (ref 136–145)
WBC # BLD AUTO: 14.8 TH/MM3 (ref 4–11)

## 2018-03-15 RX ADMIN — POTASSIUM CHLORIDE SCH MEQ: 750 TABLET, FILM COATED, EXTENDED RELEASE ORAL at 08:52

## 2018-03-15 RX ADMIN — AMPICILLIN SODIUM SCH MLS/HR: 2 INJECTION, POWDER, FOR SOLUTION INTRAMUSCULAR; INTRAVENOUS at 02:00

## 2018-03-15 RX ADMIN — AMPICILLIN SODIUM SCH MLS/HR: 2 INJECTION, POWDER, FOR SOLUTION INTRAMUSCULAR; INTRAVENOUS at 08:53

## 2018-03-15 RX ADMIN — HYDROMORPHONE HYDROCHLORIDE PRN MG: 2 INJECTION INTRAMUSCULAR; INTRAVENOUS; SUBCUTANEOUS at 02:05

## 2018-03-15 RX ADMIN — ALBUTEROL SULFATE PRN MG: 2.5 SOLUTION RESPIRATORY (INHALATION) at 06:12

## 2018-03-15 RX ADMIN — STANDARDIZED SENNA CONCENTRATE AND DOCUSATE SODIUM SCH TAB: 8.6; 5 TABLET, FILM COATED ORAL at 09:00

## 2018-03-15 RX ADMIN — HYDROMORPHONE HYDROCHLORIDE PRN MG: 2 INJECTION INTRAMUSCULAR; INTRAVENOUS; SUBCUTANEOUS at 05:51

## 2018-03-15 RX ADMIN — STANDARDIZED SENNA CONCENTRATE AND DOCUSATE SODIUM SCH TAB: 8.6; 5 TABLET, FILM COATED ORAL at 20:52

## 2018-03-15 RX ADMIN — CHLORHEXIDINE GLUCONATE SCH PACK: 500 CLOTH TOPICAL at 04:00

## 2018-03-15 RX ADMIN — Medication SCH ML: at 08:53

## 2018-03-15 RX ADMIN — POTASSIUM CHLORIDE SCH MEQ: 750 TABLET, FILM COATED, EXTENDED RELEASE ORAL at 20:50

## 2018-03-15 RX ADMIN — HYDROMORPHONE HYDROCHLORIDE PRN MG: 2 INJECTION INTRAMUSCULAR; INTRAVENOUS; SUBCUTANEOUS at 18:03

## 2018-03-15 RX ADMIN — HYDROMORPHONE HYDROCHLORIDE PRN MG: 2 INJECTION INTRAMUSCULAR; INTRAVENOUS; SUBCUTANEOUS at 21:50

## 2018-03-15 RX ADMIN — FUROSEMIDE SCH MG: 40 TABLET ORAL at 08:53

## 2018-03-15 RX ADMIN — HYDROMORPHONE HYDROCHLORIDE PRN MG: 2 INJECTION INTRAMUSCULAR; INTRAVENOUS; SUBCUTANEOUS at 10:13

## 2018-03-15 RX ADMIN — Medication SCH ML: at 20:51

## 2018-03-15 RX ADMIN — AMPICILLIN SODIUM SCH MLS/HR: 2 INJECTION, POWDER, FOR SOLUTION INTRAMUSCULAR; INTRAVENOUS at 20:50

## 2018-03-15 RX ADMIN — ALBUTEROL SULFATE PRN MG: 2.5 SOLUTION RESPIRATORY (INHALATION) at 20:25

## 2018-03-15 RX ADMIN — HYDROMORPHONE HYDROCHLORIDE PRN MG: 2 INJECTION INTRAMUSCULAR; INTRAVENOUS; SUBCUTANEOUS at 14:23

## 2018-03-15 RX ADMIN — AMPICILLIN SODIUM SCH MLS/HR: 2 INJECTION, POWDER, FOR SOLUTION INTRAMUSCULAR; INTRAVENOUS at 14:24

## 2018-03-15 NOTE — PD.ONC.PN
Subjective


Subjective


Remarks


Afebrile overnight. 


Late entry, patient seen at ~11AM. 


Patient fatigued and reports continued pain in feet and difficulty breathing. 


no bleeding.





Objective


Data











  Date Time  Temp Pulse Resp B/P (MAP) Pulse Ox O2 Delivery O2 Flow Rate FiO2


 


3/15/18 10:25     97 Nasal Cannula 3.00 


 


3/15/18 08:00 98.2 114 22 95/60 (72) 92   


 


3/15/18 04:00 99.5 113 20 98/52 (67) 97   


 


3/15/18 04:00  116      


 


3/15/18 04:00      Nasal Cannula 2.00 


 


3/15/18 00:00 98.5 114 20 98/65 (76) 97   


 


3/15/18 00:00      Nasal Cannula 2.00 


 


3/15/18 00:00  111      


 


3/14/18 21:34     96 Nasal Cannula 2.50 


 


3/14/18 20:00      Nasal Cannula 2.00 


 


3/14/18 20:00  110      


 


3/14/18 20:00 98.3 113 18 95/67 (76) 92   


 


3/14/18 19:19   20     


 


3/14/18 16:00 98.3 117 20 108/80 (89) 95   


 


3/14/18 15:57  119      


 


3/14/18 14:24     96 Nasal Cannula 2.50 














 3/15/18 3/15/18 3/15/18





 07:00 15:00 23:00


 


Intake Total 600 ml  


 


Balance 600 ml  








Result Diagram:  


3/15/18 0553                                                                   

             3/15/18 0553





Laboratory Results





Laboratory Tests








Test


  3/14/18


15:25 3/15/18


05:53


 


Prothrombin Time 52.9 SEC  38.6 SEC 


 


Prothromb Time International


Ratio 5.3 RATIO 


  3.8 RATIO 


 


 


Activated Partial


Thromboplast Time 54.6 SEC 


  48.1 SEC 


 


 


White Blood Count  14.8 TH/MM3 


 


Red Blood Count  3.42 MIL/MM3 


 


Hemoglobin  8.2 GM/DL 


 


Hematocrit  25.3 % 


 


Mean Corpuscular Volume  74.0 FL 


 


Mean Corpuscular Hemoglobin  24.1 PG 


 


Mean Corpuscular Hemoglobin


Concent 


  32.6 % 


 


 


Red Cell Distribution Width  22.3 % 


 


Platelet Count  370 TH/MM3 


 


Mean Platelet Volume  8.1 FL 


 


Blood Urea Nitrogen  20 MG/DL 


 


Creatinine  1.54 MG/DL 


 


Random Glucose  122 MG/DL 


 


Calcium Level  8.8 MG/DL 


 


Sodium Level  129 MEQ/L 


 


Potassium Level  4.1 MEQ/L 


 


Chloride Level  91 MEQ/L 


 


Carbon Dioxide Level  28.0 MEQ/L 


 


Anion Gap  10 MEQ/L 


 


Estimat Glomerular Filtration


Rate 


  38 ML/MIN 


 











Administered Medications








 Medications


  (Trade)  Dose


 Ordered  Sig/Nikhil


 Route


 PRN Reason  Start Time


 Stop Time Status Last Admin


Dose Admin


 


 Sodium Chloride


  (NS Flush)  2 ml  UNSCH  PRN


 IV FLUSH


 FLUSH AFTER USING IV ACCESS  2/23/18 22:00


    3/12/18 01:03


 


 


 Sodium Chloride


  (NS Flush)  2 ml  BID


 IV FLUSH


   2/24/18 09:00


    3/15/18 08:53


 


 


 Acetaminophen


  (Tylenol)  650 mg  Q6H  PRN


 PO


 fever  2/23/18 22:00


    3/5/18 16:02


 


 


 Ondansetron HCl


  (Zofran Inj)  4 mg  Q6H  PRN


 IV PUSH


 NAUSEA OR VOMITING  2/23/18 22:00


    3/8/18 21:22


 


 


 Temazepam


  (Restoril)  15 mg  HS  PRN


 PO


 INSOMNIA  2/23/18 22:00


    3/6/18 22:08


 


 


 Chlorhexidine


 Gluconate


  (Chlorhexidine


 2% Cloth)  3 pack


 Taper  DAILY@04


 TOP


   2/24/18 04:00


 2/20/19 03:59  3/6/18 03:54


 


 


 Senna/Docusate


 Sodium


  (Arlen-Colace)  1 tab  BID


 PO


   2/24/18 09:00


    3/14/18 08:49


 


 


 Ampicillin Sodium


 2000 mg/Sodium


 Chloride  100 ml @ 


 400 mls/hr  Q6H


 IV


   2/24/18 14:00


    3/15/18 08:53


 


 


 Loperamide HCl


  (Imodium)  2 mg  Q4H  PRN


 PO


 Diarrhea  2/26/18 16:15


    2/28/18 14:32


 


 


 Albuterol Sulfate


  (Albuterol Neb)  2.5 mg  Q2HR NEB  PRN


 NEB


 dyspnea  2/27/18 11:30


    3/15/18 06:12


 


 


 Acetaminophen/


 Hydrocodone Bitart


  (Norco  Mg)  1 tab  Q4H  PRN


 PO


 pain 1-5  3/4/18 15:00


    3/9/18 05:02


 


 


 Hydromorphone HCl


  (Dilaudid Pf Inj)  1 mg  Q4H  PRN


 IV PUSH


 PAIN SCALE 6 TO 10  3/7/18 19:00


    3/15/18 10:13


 


 


 Potassium Chloride


  (KCl)  60 meq  Q12HR


 PO


   3/14/18 09:45


    3/15/18 08:52


 








Objective Remarks


GENERAL: chronically ill female, upright in bed, dyspneic. 


SKIN: Warm and dry.


HEAD: Normocephalic.


EYES: No injection or drainage. 


NECK: Supple, trachea midline. 


CARDIOVASCULAR: +S1, S2, tachy


RESPIRATORY: scattered rhonchi all lung fields. 


GASTROINTESTINAL: Abdomen soft, non-tender, nondistended. 


EXTREMITIES: bilateral feet and edematous (3+), distal toes are warm and well 

perfused. 


NEUROLOGICAL: awake alert. no obvious focal deficit.





Assessment/Plan


Problem List:  


(1) arterial blood clot


Plan:  3/15: INR =3.8. continue to hold coumadin until closer to 2.0-3.0 than 

resume at lower dose. will ask nurses to perform doppler on patient's pedal 

pulses q shift and document. 


3/14/18.  INR >4.0, Argatroban stopped.


PT/PTT checked off argatroban, noted prolonged PTT.





(2) Microcytic anemia


ICD Codes:  D50.9 - Iron deficiency anemia, unspecified


Plan:  3/15: no transfusion needed today. monitor closely.


--likely d/t inflammation/infection. no evidence of bleeding or hemolysis. 


--monitor and transfuse as needed. 





(3) Bacterial endocarditis


ICD Codes:  I33.0 - Acute and subacute infective endocarditis


Status:  Acute


Plan:  -on ampicillin, ID following. 





(4) Acute septic pulmonary embolism


ICD Codes:  I26.90 - Septic pulmonary embolism without acute cor pulmonale


Plan:  -- echocardiogram on 3/3 showed an estimated EF of 50-55%.  


++severe tricuspid regurgitation with prosthesis in place.  


++2 x 4 cm mobile vegetation was seen on the valve.  The right atrium and right 

ventricle were normal in size and function





Assessment


36y/o female admitted with recurrent endocarditis. hematology consulted for 

anticoagulation assistance.


h/o Recurrent bacterial endocarditis, bacteremia, history of prosthetic aortic 

valve and subsequent redo, chronic active hepatitis C, intravenous drug use, 

microcytic anemia, consistent with iron deficiency, left lower extremity 

arterial event, bacterial endocarditis with viridans streptococcus.





HPI (brought forward for continuity of care): history of IV drug abuse and  

recurrent endocarditis. had a relapse of her activity. has history of pulmonary 

embolism and infected valves. +significant congestive heart failure and 

presented to the emergency room on 02/23/2018, with sepsis. is followed by 

Infectious Disease for her endocarditis.  previously received antibiotic 

therapy for enterococcus faecalis and staph aureus. There is questionable 

compliance. Her current blood 


culture from 02/23/2018, shows viridans streptococcus.  Two out of two bottles 

were positive.  She is on ampicillin and gentamicin. course is complicated by 

cold left lower extremity, evaluated by  Vascular Surgery.  A CT angiogram 

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 

the proximal thigh.  There is satisfactory calf runoff present.  For this reason

, conservative management with anticoagulation is continued. was placed on 

unfractionated heparin.  Her heparin dose has been titrated from 1000 units/

hour to 2500 units/hour with a PTT remaining subtherapeutic.  Her last PTT is 

32 seconds from 03/01/2018, at 2 p.m.  For this reason, Hematology/Oncology was 

consulted.


Plan


1. decrease Coumadin today


2. monitor INR


3. serial pedal pulse checks/dopplers by nurses requested.


Attending Statement


The exam, history, and the medical decision-making described in the above note 

were completed with the assistance of the mid-level provider. I reviewed and 

agree with the findings presented.  I attest that I had a face-to-face 

encounter with the patient on the same day, and personally performed and 

documented my assessment and findings in the medical record.





BL feet swelling.


Difficult to palpate pulses.


Requested doppler.


INR 3.8, decrease Coumadin 3mg daily.


No bleeding, off argatroban.


Check pt/inr in AM.











Zoe Chau Mar 15, 2018 12:50


Henny Smith MD Mar 15, 2018 20:03

## 2018-03-15 NOTE — HHI.HCPN
Met with Ms. Hagan for ongoing palliative care support and assistance with 

symptom management. Ms. Hagan is currently lying in bed watching television. 

She is awake, somewhat lethargic, able to make her needs known and fairly 

pleasant in conversation. She presents with flat affect and does not engage 

much in conversation as she becomes short of breath and exhibits increase in 

coughing. Ms. Hagan reports the swelling in lower extremities continues to 

increase, pain continues to be an issue and at times medication is not as 

effective. She remains frustrated with hospital stay and wants to go home. 

Understands she needs to continue with IV antibiotics. Offered emotional 

support. 





Palliative care will continue to follow throughout hospitalization.











Zoila Dawson, PUJAW Mar 15, 2018 14:17

## 2018-03-15 NOTE — HHI.PR
Subjective


Remarks


37-year-old female with a history of IV drug use, previous endocarditis who 

presented to the emergency department on 2/23/2018 due to shortness of breath 

and leg swelling.  She has bioprosthesis valves both aortic and tricuspid.  She 

admitted that she injected herself 4 days prior to this hospitalization.  She 

apparently completed IV antibiotics course in October and after that she was on 

doxycycline prophylaxis.  She reports that she has not been taking her 

antibiotics doxycycline.  Patient was initially managed in the ICU where she 

required pressors.  2D echo shows both aortic valve and tricuspid valve 

prosthesis endocarditis.  Cardiothoracic surgery evaluated and determined that 

patient is not a good surgical candidate.  During ICU stay, patient was found 

to have septic emboli as well.  Hematology was consulted for recommendations 

regarding anticoagulation.  Patient was started on argatroban bridging to 

warfarin.





Nursing denies any deterioration since last night.  Patient has no new 

complaints.  Says her shortness of breath unchanged since yesterday.





Objective





Vital Signs








  Date Time  Temp Pulse Resp B/P (MAP) Pulse Ox O2 Delivery O2 Flow Rate FiO2


 


3/15/18 10:25     97 Nasal Cannula 3.00 


 


3/15/18 08:00 98.2 114 22 95/60 (72) 92   


 


3/15/18 04:00 99.5 113 20 98/52 (67) 97   


 


3/15/18 04:00  116      


 


3/15/18 04:00      Nasal Cannula 2.00 


 


3/15/18 00:00 98.5 114 20 98/65 (76) 97   


 


3/15/18 00:00      Nasal Cannula 2.00 


 


3/15/18 00:00  111      


 


3/14/18 21:34     96 Nasal Cannula 2.50 


 


3/14/18 20:00      Nasal Cannula 2.00 


 


3/14/18 20:00  110      


 


3/14/18 20:00 98.3 113 18 95/67 (76) 92   


 


3/14/18 19:19   20     


 


3/14/18 16:00 98.3 117 20 108/80 (89) 95   


 


3/14/18 15:57  119      


 


3/14/18 14:24     96 Nasal Cannula 2.50 


 


3/14/18 12:00 98.6 119 20 96/77 (83) 97   














I/O      


 


 3/14/18 3/14/18 3/14/18 3/15/18 3/15/18 3/15/18





 07:00 15:00 23:00 07:00 15:00 23:00


 


Intake Total 200 ml  480 ml 600 ml  


 


Balance 200 ml  480 ml 600 ml  


 


      


 


Intake Oral 200 ml  480 ml 400 ml  


 


IV Total    200 ml  


 


# Voids 4  3   


 


# Bowel Movements   1   








Result Diagram:  


3/15/18 0553                                                                   

             3/15/18 0553





Objective Remarks


Clear lung sounds bilaterally, unlabored breathing, on nasal cannula


4/6 ejection murmur


Unchanged severe moderate to severe lower extremity edema


Lying in bed today





A/P


Assessment and Plan


Bioprosthetic aortic valve endocarditis


Bioprosthetic tricuspid valve endocarditis


Bacteremia with viridans species


septic pulm embolism


Cerebritis


-per ID ampicillin, has remained afebrile





Pulmonary hypertension





Acute exacerbation of diastolic congestive heart failure with preserved left 

ventricular ejection fraction


-P.o. Lasix, intake and output





occlusion of left superficial femoral artery


-heme managing coumadin/adjusting to maintain therapeutic levels





Hypokalemia


- due to Lasix, will replenish, rechecking mg





NOÉ


-Improving slowly, continue low dose lasix





Full code.


Discharge Planning


Poor prognosis from cardiothoracic surgery standpoint.  Continue with medical 

management per ID at this time. Abx until 4/6/18 per ID.











Mir Mills MD Mar 15, 2018 11:18

## 2018-03-16 VITALS
OXYGEN SATURATION: 93 % | HEART RATE: 116 BPM | SYSTOLIC BLOOD PRESSURE: 101 MMHG | DIASTOLIC BLOOD PRESSURE: 62 MMHG | TEMPERATURE: 96.4 F | RESPIRATION RATE: 24 BRPM

## 2018-03-16 VITALS
OXYGEN SATURATION: 94 % | RESPIRATION RATE: 20 BRPM | SYSTOLIC BLOOD PRESSURE: 101 MMHG | DIASTOLIC BLOOD PRESSURE: 58 MMHG | TEMPERATURE: 97.4 F | HEART RATE: 101 BPM

## 2018-03-16 VITALS
SYSTOLIC BLOOD PRESSURE: 98 MMHG | OXYGEN SATURATION: 98 % | HEART RATE: 100 BPM | DIASTOLIC BLOOD PRESSURE: 69 MMHG | TEMPERATURE: 99 F | RESPIRATION RATE: 22 BRPM

## 2018-03-16 VITALS
SYSTOLIC BLOOD PRESSURE: 90 MMHG | TEMPERATURE: 97.5 F | OXYGEN SATURATION: 99 % | RESPIRATION RATE: 20 BRPM | DIASTOLIC BLOOD PRESSURE: 59 MMHG | HEART RATE: 108 BPM

## 2018-03-16 VITALS — HEART RATE: 106 BPM

## 2018-03-16 VITALS
TEMPERATURE: 97.2 F | SYSTOLIC BLOOD PRESSURE: 110 MMHG | DIASTOLIC BLOOD PRESSURE: 57 MMHG | HEART RATE: 86 BPM | RESPIRATION RATE: 18 BRPM | OXYGEN SATURATION: 96 %

## 2018-03-16 VITALS
DIASTOLIC BLOOD PRESSURE: 69 MMHG | TEMPERATURE: 97.3 F | SYSTOLIC BLOOD PRESSURE: 99 MMHG | RESPIRATION RATE: 24 BRPM | OXYGEN SATURATION: 93 % | HEART RATE: 112 BPM

## 2018-03-16 VITALS — HEART RATE: 114 BPM

## 2018-03-16 VITALS — OXYGEN SATURATION: 97 %

## 2018-03-16 VITALS — OXYGEN SATURATION: 98 %

## 2018-03-16 VITALS — HEART RATE: 112 BPM

## 2018-03-16 VITALS — HEART RATE: 111 BPM

## 2018-03-16 LAB
BASOPHILS # BLD AUTO: 0.2 TH/MM3 (ref 0–0.2)
BASOPHILS NFR BLD: 1.3 % (ref 0–2)
BUN SERPL-MCNC: 23 MG/DL (ref 7–18)
CALCIUM SERPL-MCNC: 8.2 MG/DL (ref 8.5–10.1)
CHLORIDE SERPL-SCNC: 92 MEQ/L (ref 98–107)
CREAT SERPL-MCNC: 1.7 MG/DL (ref 0.5–1)
EOSINOPHIL # BLD: 0.4 TH/MM3 (ref 0–0.4)
EOSINOPHIL NFR BLD: 2.6 % (ref 0–4)
ERYTHROCYTE [DISTWIDTH] IN BLOOD BY AUTOMATED COUNT: 22.3 % (ref 11.6–17.2)
GFR SERPLBLD BASED ON 1.73 SQ M-ARVRAT: 34 ML/MIN (ref 89–?)
GLUCOSE SERPL-MCNC: 108 MG/DL (ref 74–106)
HCO3 BLD-SCNC: 30.5 MEQ/L (ref 21–32)
HCT VFR BLD CALC: 26.4 % (ref 35–46)
HGB BLD-MCNC: 8.3 GM/DL (ref 11.6–15.3)
INR PPP: 3.9 RATIO
LYMPHOCYTES # BLD AUTO: 1.3 TH/MM3 (ref 1–4.8)
LYMPHOCYTES NFR BLD AUTO: 9.2 % (ref 9–44)
MCH RBC QN AUTO: 23.6 PG (ref 27–34)
MCHC RBC AUTO-ENTMCNC: 31.4 % (ref 32–36)
MCV RBC AUTO: 75.2 FL (ref 80–100)
MONOCYTE #: 1.1 TH/MM3 (ref 0–0.9)
MONOCYTES NFR BLD: 7.5 % (ref 0–8)
NEUTROPHILS # BLD AUTO: 11.2 TH/MM3 (ref 1.8–7.7)
NEUTROPHILS NFR BLD AUTO: 79.4 % (ref 16–70)
PLATELET # BLD: 349 TH/MM3 (ref 150–450)
PMV BLD AUTO: 8.4 FL (ref 7–11)
PROTHROMBIN TIME: 39.6 SEC (ref 9.8–11.6)
RBC # BLD AUTO: 3.51 MIL/MM3 (ref 4–5.3)
SODIUM SERPL-SCNC: 131 MEQ/L (ref 136–145)
WBC # BLD AUTO: 14.1 TH/MM3 (ref 4–11)

## 2018-03-16 RX ADMIN — POTASSIUM CHLORIDE SCH MEQ: 750 TABLET, FILM COATED, EXTENDED RELEASE ORAL at 08:07

## 2018-03-16 RX ADMIN — HYDROMORPHONE HYDROCHLORIDE PRN MG: 2 INJECTION INTRAMUSCULAR; INTRAVENOUS; SUBCUTANEOUS at 03:30

## 2018-03-16 RX ADMIN — HYDROMORPHONE HYDROCHLORIDE PRN MG: 2 INJECTION INTRAMUSCULAR; INTRAVENOUS; SUBCUTANEOUS at 16:28

## 2018-03-16 RX ADMIN — HYDROMORPHONE HYDROCHLORIDE PRN MG: 2 INJECTION INTRAMUSCULAR; INTRAVENOUS; SUBCUTANEOUS at 08:07

## 2018-03-16 RX ADMIN — HYDROMORPHONE HYDROCHLORIDE PRN MG: 2 INJECTION INTRAMUSCULAR; INTRAVENOUS; SUBCUTANEOUS at 20:02

## 2018-03-16 RX ADMIN — STANDARDIZED SENNA CONCENTRATE AND DOCUSATE SODIUM SCH TAB: 8.6; 5 TABLET, FILM COATED ORAL at 20:21

## 2018-03-16 RX ADMIN — AMPICILLIN SODIUM SCH MLS/HR: 2 INJECTION, POWDER, FOR SOLUTION INTRAMUSCULAR; INTRAVENOUS at 13:03

## 2018-03-16 RX ADMIN — Medication SCH ML: at 20:02

## 2018-03-16 RX ADMIN — ALBUTEROL SULFATE PRN MG: 2.5 SOLUTION RESPIRATORY (INHALATION) at 16:48

## 2018-03-16 RX ADMIN — CHLORHEXIDINE GLUCONATE SCH PACK: 500 CLOTH TOPICAL at 04:00

## 2018-03-16 RX ADMIN — AMPICILLIN SODIUM SCH MLS/HR: 2 INJECTION, POWDER, FOR SOLUTION INTRAMUSCULAR; INTRAVENOUS at 20:03

## 2018-03-16 RX ADMIN — AMPICILLIN SODIUM SCH MLS/HR: 2 INJECTION, POWDER, FOR SOLUTION INTRAMUSCULAR; INTRAVENOUS at 02:00

## 2018-03-16 RX ADMIN — AMPICILLIN SODIUM SCH MLS/HR: 2 INJECTION, POWDER, FOR SOLUTION INTRAMUSCULAR; INTRAVENOUS at 08:08

## 2018-03-16 RX ADMIN — Medication SCH ML: at 07:25

## 2018-03-16 RX ADMIN — POTASSIUM CHLORIDE SCH MEQ: 750 TABLET, FILM COATED, EXTENDED RELEASE ORAL at 20:02

## 2018-03-16 RX ADMIN — HYDROMORPHONE HYDROCHLORIDE PRN MG: 2 INJECTION INTRAMUSCULAR; INTRAVENOUS; SUBCUTANEOUS at 12:17

## 2018-03-16 RX ADMIN — STANDARDIZED SENNA CONCENTRATE AND DOCUSATE SODIUM SCH TAB: 8.6; 5 TABLET, FILM COATED ORAL at 08:07

## 2018-03-16 NOTE — MB
cc:

Melissa Layne MD

****

 

 

DATE OF CONSULT:

03/16/2018

 

REASON FOR CONSULTATION:

Acute kidney injury with elevated BUN and creatinine and edema.

 

HISTORY OF PRESENT ILLNESS:

This is a 38-year-old female with past medical history of IV drug 

abuse, history of hepatitis C, narcotic dependency, came to the 

hospital with complaint of shortness of breath and swelling.  I was 

called to see the patient because of elevated BUN and creatinine.  The

patient has creatinine of 4.5 on admission which improved and it was 

staying in the range of 0.6-0.8 until 6 days ago, then it started 

going up again and now it is 1.7.  The patient has increased swelling 

of the legs and the patient was diagnosed with endocarditis and she 

has blood culture positive for Strep viridans.  Patient has been 

following with the ID and she was getting furosemide, which was 

stopped today this morning because of increased creatinine and she was

on vancomycin and rifampicin.  The last dose of vancomycin seems to be

given possibly on 03/04/2018 or even later, but her level was high and

the highest level we have was 25.8, which was on 03/04/2018 but on 

03/10/2018 it was 23.9.  The patient has been actively using IV drugs 

before she came to the hospital and noticed to have more swelling in 

her legs.  She is complaining of generalized body pain.  There is no 

nausea, vomiting.  Denies any diarrhea.  Her appetite is normal.

 

PAST MEDICAL HISTORY:

IV drug abuse, hepatitis C, narcotic dependency, history of bacterial 

endocarditis.

 

PAST SURGICAL HISTORY:

Left forearm surgery.

 

REVIEW OF SYSTEMS:

The patient has generalized weakness, feeling tired, has generalized 

body pain.  Denies any headache.  She occasionally has nausea.  There 

is no vomiting.  She has mild shortness of breath on exertion, 

complaining of generalized body swelling also more in the legs.  No 

dysuria or hematuria.  Did not notice any decrease in the urine 

output.  There is no history of diarrhea.

 

SOCIAL HISTORY:

History of IV drug abuse and also use of marijuana.

 

FAMILY HISTORY:

Noncontributory.

 

ALLERGIES:

SHE HAS NO KNOWN DRUG ALLERGIES.

 

MEDICATIONS:

Currently she is on the following medications:

Arlen-Colace 1 tablet b.i.d.

Potassium chloride 60 mEq q. 12 hours.

Ampicillin 2 grams IV q. 6 hours.

Zofran as needed.

Restoril as needed.

Lactulose as needed.

Imodium as needed.

 

PHYSICAL EXAMINATION:

GENERAL:   The patient is awake, alert.  She is not in acute distress.

VITAL SIGNS:  Her last blood pressure was 101/58.  Blood pressure is 

on the lower side with systolic in the 90s and sometimes in the 80s, 

temperature is 97.4, oxygen saturation on 3 liters nasal cannula is 

94-98%.

HEENT:  Pupils are mid constricted.  Nonicteric sclerae.  Conjunctivae

pale.

NECK:   Supple.  JVD is not elevated.

LUNGS:  The patient has bilateral decreased air entry with scattered 

wheezing.

HEART:    S1, S2.  Regular rhythm.

ABDOMEN:  Distended, soft lax.  There is no tenderness.

EXTREMITIES:   She has bilateral 3+ edema with some superficial 

abrasions in both lower legs and foot.

 

INVESTIGATIONS:

WBC count is 14.8, hemoglobin 8.2, platelet count of 370, neutrophils 

82.5%, eosinophils 1%.

Sodium is 131, potassium 4.4, chloride 92, bicarbonate 30.5, BUN 23, 

creatinine 1.7, calcium 8.2.

INR 3.9.

Urinalysis showing protein of 30, but this was done on admission.

Last vancomycin level was 23.9 on 03/10/2018.

Initial culture was positive for Strep viridans but now the repeat 

ones are all negative.

 

IMAGING STUDY:

The patient has a CT angiogram done on 03/03/2018 which shows 

extensive pulmonary embolism bilaterally, pulmonary infiltrate and 

also effusion.

 

MRI of the brain was done which shows 3 focal areas of abnormal 

contrast enhancement in both hemispheres.  There is no mass effect, 

possibly cerebritis.

 

Chest x-ray was done today and it shows right basilar patchy 

infiltrate, possible pneumonia, cardiomegaly.

 

Last echocardiogram was done on 03/03/2018 which shows ejection 

fraction of 50-55%, severe tricuspid regurgitation, aortic prosthesis 

and prolapse and possible vegetation.

 

ASSESSMENT AND PLAN:

1.  Acute kidney injury.

2.  Endocarditis.

3.  Hyponatremia and hypokalemia.

4.  Edema and anasarca.

5.  History of hepatitis C.

6.  Anemia.

7.  Pulmonary embolism.

 

The patient has acute kidney injury and developed edema.  The edema is

contributed to partly by the low albumin.  The acute kidney injury 

differential diagnosis will be either ATN from the vancomycin toxicity

or from the infection, but the possibility of interstitial nephritis 

or possibility of postinfectious glomerulonephritis.  I will check the

urine sodium and osmolality and also the urine for eosinophils and 

ultrasound of the kidneys.  We will wait for some improvement in the 

renal function before some diuretics can be given.  Also, I will check

the complements, follow the urine output and the BUN and creatinine.  

Avoid any nephrotoxins.

 

Thank you for the consultation.

 

 

__________________________________

MD ROWENA Raymundo/SHIRLEY

D: 03/16/2018, 04:29 PM

T: 03/16/2018, 05:07 PM

Visit #: [id number]

Job #: [id number]

## 2018-03-16 NOTE — HHI.IDPN
Note


Infectious Disease Note














Patient says she is okay.  She looks drowsy.


Still having shortness of breath.


On nasal cannula.


Afebrile.


Denies chills.  Legs remain markedly swollen.


Renal function slow to improve.





37-year-old white female who has a history of endocarditis and has


been admitted several times for the same.  The patient developed shortness of


breath, fever and chills, and  presented to the emergency department.  The


patient is noted to be actively using IV drugs.  She has history of significant


CHF from prior valvular heart disease related to endocarditis.  She states that


she started feeling short of breath about a week ago and the shortness of


breath worsened.  After she completed IV antibiotics in October she was


put on doxycycline prophylaxis.  She reports that she has not been taking the


doxycycline.  


 


PAST MEDICAL HISTORY:


Hepatitis C.


IV drug use.


prosthetic aortic valve replacement in 2011 and then redo aortic


valve replacement in June 2016 





ANTIBIOTICS:


Ampicillin.











Current Medications








 Medications


  (Trade)  Dose


 Ordered  Sig/Nikhil


 Route


 PRN Reason  Start Time


 Stop Time Status Last Admin


Dose Admin


 


 Sodium Chloride


  (NS Flush)  2 ml  UNSCH  PRN


 IV FLUSH


 FLUSH AFTER USING IV ACCESS  2/23/18 22:00


    3/12/18 01:03


 


 


 Sodium Chloride


  (NS Flush)  2 ml  BID


 IV FLUSH


   2/24/18 09:00


    3/16/18 07:25


 


 


 Acetaminophen


  (Tylenol)  650 mg  Q6H  PRN


 PO


 fever  2/23/18 22:00


    3/5/18 16:02


 


 


 Ondansetron HCl


  (Zofran Inj)  4 mg  Q6H  PRN


 IV PUSH


 NAUSEA OR VOMITING  2/23/18 22:00


    3/8/18 21:22


 


 


 Temazepam


  (Restoril)  15 mg  HS  PRN


 PO


 INSOMNIA  2/23/18 22:00


    3/6/18 22:08


 


 


 Miscellaneous


 Information  1  Q361D


 XX


   2/23/18 22:00


     


 


 


 Chlorhexidine


 Gluconate


  (Chlorhexidine


 2% Cloth)  3 pack


 Taper  DAILY@04


 TOP


   2/24/18 04:00


 2/20/19 03:59  3/6/18 03:54


 


 


 Chlorhexidine


 Gluconate


  (Chlorhexidine


 2% Cloth)  3 pack  UNSCH  PRN


 TOP


 HYGIENIC CARE  2/23/18 22:00


     


 


 


 Senna/Docusate


 Sodium


  (Arlen-Colace)  1 tab  BID


 PO


   2/24/18 09:00


    3/16/18 08:07


 


 


 Magnesium


 Hydroxide


  (Milk Of


 Magnesia Liq)  30 ml  Q12H  PRN


 PO


 Mild constipation  2/23/18 22:00


     


 


 


 Sennosides


  (Senokot)  17.2 mg  Q12H  PRN


 PO


 Moderate constipation  2/23/18 22:00


     


 


 


 Bisacodyl


  (Dulcolax Supp)  10 mg  DAILY  PRN


 RECTAL


 SEVERE CONSITIPATION  2/23/18 22:00


     


 


 


 Lactulose


  (Lactulose Liq)  30 ml  DAILY  PRN


 PO


 SEVERE CONSITIPATION  2/23/18 22:00


     


 


 


 Ampicillin Sodium


 2000 mg/Sodium


 Chloride  100 ml @ 


 400 mls/hr  Q6H


 IV


   2/24/18 14:00


    3/16/18 08:08


 


 


 Loperamide HCl


  (Imodium)  2 mg  Q4H  PRN


 PO


 Diarrhea  2/26/18 16:15


    2/28/18 14:32


 


 


 Albuterol Sulfate


  (Albuterol Neb)  2.5 mg  Q2HR NEB  PRN


 NEB


 dyspnea  2/27/18 11:30


    3/15/18 20:25


 


 


 Acetaminophen/


 Hydrocodone Bitart


  (Norco  Mg)  1 tab  Q4H  PRN


 PO


 pain 1-5  3/4/18 15:00


    3/9/18 05:02


 


 


 Hydromorphone HCl


  (Dilaudid Pf Inj)  1 mg  Q4H  PRN


 IV PUSH


 PAIN SCALE 6 TO 10  3/7/18 19:00


    3/16/18 08:07


 


 


 Potassium Chloride


  (KCl)  60 meq  Q12HR


 PO


   3/14/18 09:45


    3/16/18 08:07


 


 


 Warfarin Sodium


  (Coumadin)  3 mg  DAILY@1600


 PO


   3/15/18 16:00


    3/15/18 18:04


 


 


 Sodium Chloride  250 ml @ 


 75 mls/hr  BOLUS  ONCE


 IV


   3/16/18 11:00


 3/16/18 14:19  3/16/18 11:39


 














SOCIAL HISTORY:


Positive occasional alcohol.  No tobacco. IV drug use in the form of Dilaudid,


oxycodone.  The patient also uses marijuana.


 








OBJECTIVE:








Vital Signs








  Date Time  Temp Pulse Resp B/P (MAP) Pulse Ox O2 Delivery O2 Flow Rate FiO2


 


3/16/18 08:47     98 Nasal Cannula 3.00 


 


3/16/18 08:00 99.0 100 22 98/69 (79) 98   


 


3/16/18 04:03 96.4 116 24 101/62 (75) 93   


 


3/16/18 04:00      Room Air  


 


3/16/18 04:00  111      


 


3/16/18 00:04 97.3 112 24 99/69 (79) 93   


 


3/16/18 00:00  112      


 


3/16/18 00:00      Room Air  


 


3/15/18 20:29     98 Nasal Cannula 3.00 


 


3/15/18 20:00  106      


 


3/15/18 20:00      Room Air  


 


3/15/18 20:00  106      


 


3/15/18 18:03 98.6 110 20 98/59 (72) 98   


 


3/15/18 16:00 98.0 108 18 95/66 (76) 93   


 


3/15/18 15:57  93      


 


3/15/18 15:55  105      








Laboratory Tests








Test


  3/15/18


05:53


 


White Blood Count 14.8 TH/MM3 


 


Red Blood Count 3.42 MIL/MM3 


 


Hemoglobin 8.2 GM/DL 


 


Hematocrit 25.3 % 


 


Mean Corpuscular Volume 74.0 FL 


 


Mean Corpuscular Hemoglobin 24.1 PG 


 


Mean Corpuscular Hemoglobin


Concent 32.6 % 


 


 


Red Cell Distribution Width 22.3 % 


 


Platelet Count 370 TH/MM3 


 


Mean Platelet Volume 8.1 FL 








Laboratory Tests








Test


  3/15/18


05:53 3/16/18


06:13


 


Blood Urea Nitrogen 20 MG/DL  23 MG/DL 


 


Creatinine 1.54 MG/DL  1.70 MG/DL 


 


Random Glucose 122 MG/DL  108 MG/DL 


 


Calcium Level 8.8 MG/DL  8.2 MG/DL 


 


Sodium Level 129 MEQ/L  131 MEQ/L 


 


Potassium Level 4.1 MEQ/L  4.4 MEQ/L 


 


Chloride Level 91 MEQ/L  92 MEQ/L 


 


Carbon Dioxide Level 28.0 MEQ/L  30.5 MEQ/L 


 


Anion Gap 10 MEQ/L  9 MEQ/L 


 


Estimat Glomerular Filtration


Rate 38 ML/MIN 


  34 ML/MIN 


 








IMAGING:














Chest X-Ray 3/5/18 0600 Signed





Impressions: 





 Service Date/Time:  Monday, March 5, 2018 03:52 - CONCLUSION:  There is a 

small 





 to moderate size right pleural effusion with associated volume loss and/or 





 airspace consolidation. The pleural effusion has increased from the prior 





 examination.     Ron Melgar MD 


 


Head CT 3/3/18 0000 Signed





Impressions: 





 Service Date/Time:  Saturday, March 3, 2018 10:22 - CONCLUSION:  1. Focal area 





 of decreased density involving the right parietal lobe. As a new finding from 





 the prior exam. This could relate to a small infarct. MRI of the brain with 





 gadolinium suggested to further evaluate. 2. Small lacunar infarction 

involving 





 the left centrum semiovale.     Scooter Dawson Jr., MD 


 


CT Angiography 3/3/18 0000 Signed





Impressions: 





 Service Date/Time:  Saturday, March 3, 2018 10:28 - CONCLUSION:  There 

extensive 





 pulmonary emboli bilaterally. There is near complete cut off of the right 

lower 





 lobe pulmonary artery. Prominent filling defects on the left as well. These 





 findings were relayed to Dr. Bustos immediately at the completion of the study.  





 Scattered pulmonary infiltrates, small pleural effusion and extensive 





 mediastinal lymphadenopathy as described above.      Won Sharp MD 


 


Brain MRI 3/3/18 0000 Signed





Impressions: 





 Service Date/Time:  Saturday, March 3, 2018 18:04 - CONCLUSION:  Deterioration 





 in the appearance of the scan.  At least 3 focal areas of abnormal contrast 





 enhancement are present scattered to in both hemispheres.  Given the history 





 this most likely represents developing areas cerebritis.  Exam is compromised 

by 





 an uncooperative patient and motion artifact.  These 2 factors make detection 

of 





 other abnormalities such as cortical cephalitis and meningitis difficult to 





 exclude.  Cannot exclude hemorrhagic lesions as well.  There is no mass effect 





 evident.      Jorge Diane MD 




















Renal Ultrasound 2/24/18 0000 Signed





Impressions: 





 Service Date/Time:  Saturday, February 24, 2018 10:53 - CONCLUSION:  1. No 





 evidence of hydronephrosis. 2. Thickening of the urinary bladder wall a 1.1 

cm. 





 3. Prominent splenomegaly.     Elgin Walton MD 


 


Chest X-Ray 2/24/18 0000 Signed





Impressions: 





 Service Date/Time:  Saturday, February 24, 2018 09:36 - CONCLUSION:  Right 





 internal jugular central line in place with the tip overlying the SVC. A 





 pneumothorax is not seen.     Ron Brown MD 


 


Breast Ultrasound 2/24/18 0000 Signed





Impressions: 





 Service Date/Time:  Saturday, February 24, 2018 10:48 - CONCLUSION:  Negative 





 targeted right breast ultrasound examination.     Ron Brown MD 


 


Lower Extremity Ultrasound 2/23/18 0000 Signed





Impressions: 





 Service Date/Time:  Friday, February 23, 2018 21:22 - CONCLUSION:  1. No 





 sonographic evidence for lower extremity DVT. 2. Incidental note of bilateral 





 inguinal adenopathy with the largest on the right measuring 3.3 cm and the 





 largest on the left measuring 2.6 cm. This finding is nonspecific but is 





 typically reactive in etiology.     Rj Whitten MD 














PHYSICAL EXAMINATION


GENERAL: No acute distress. 


HEENT:  No icterus.  Oropharynx moist mucosa, no lesions.


Neck:  Supple without adenopathy.


Lungs: Coarse bilateral rhonchi.  


Heart: 5/6 blowing systolic murmur at the upper right sternal border.


Abdomen: Bowel sounds present, soft, obese, nontender.


Extremities: Diffuse 3+ edema of the lower extremities. Lesions at Left tibia 


distally and left foot and great toe are faded. 


Toes 3 and 5 are no longer cyanotic.  Purple lesion at the tuft of the 


left fifth toe is faded.  


No calf tenderness.   No nail hemorrhages.  


SKIN: no diffuse rash.  Warm and moist.


Neuro: No gross focal findings.


Psychiatric: calm and cooperative.





 


IMPRESSION


1. Sepsis. Strep Viridans. 


2. Tricuspid valve endocarditis.  Large vegetation.  Severe tricuspid 

regurgitation.


Patient evaluated by cardiovascular surgery and felt not to be a surgical 

candidate.


3.  Bacteremia.  Strep viridans.  Blood cultures have remained negative on 

follow-up.


Last positive blood culture was on 2/23.


4. History of prosthetic aortic valve and so likely recurrent prosthetic valve


endocarditis.


5.  Cerebritis on MRI.  Also has parietal infarct noted on CT scan of the brain.


6. Left renal cortical and pulmonary and lower extremity septic emboli. 


7. Leukocytosis secondary to sepsis from showering of emboli from endocarditis.


8. Acute renal failure. Kidney function slow to improve.





RECOMMENDATIONS


1.  Continue Ampicillin intravenous.


2.  Nephrology consult for the acute renal failure.


3.  Monitor clinical status.


4.  Patient encouraged to elevate her legs.  


5.  Obtain CXR. ?CHF. 





Plan on intravenous antibiotics until April 6.  Which will be 6 weeks after the 

last 


positive blood culture.  After that she should continue with prophylactic oral 


antibiotics indefinitely. Continue to monitor her blood counts and renal 

function.














Robinson Carr MD Mar 16, 2018 12:13

## 2018-03-16 NOTE — PD.ONC.PN
Subjective


Subjective


Remarks


Afebrile overnight. 


Patient resting in bed. 


complaining of pain in the toes of her left feet.


remains severely dyspneic.





Objective


Data











  Date Time  Temp Pulse Resp B/P (MAP) Pulse Ox O2 Delivery O2 Flow Rate FiO2


 


3/16/18 08:47     98 Nasal Cannula 3.00 


 


3/16/18 08:00 99.0 100 22 98/69 (79) 98   


 


3/16/18 04:03 96.4 116 24 101/62 (75) 93   


 


3/16/18 04:00      Room Air  


 


3/16/18 04:00  111      


 


3/16/18 00:04 97.3 112 24 99/69 (79) 93   


 


3/16/18 00:00  112      


 


3/16/18 00:00      Room Air  


 


3/15/18 20:29     98 Nasal Cannula 3.00 


 


3/15/18 20:00  106      


 


3/15/18 20:00      Room Air  


 


3/15/18 20:00  106      


 


3/15/18 18:03 98.6 110 20 98/59 (72) 98   


 


3/15/18 16:00 98.0 108 18 95/66 (76) 93   


 


3/15/18 15:57  93      


 


3/15/18 15:55  105      


 


3/15/18 12:00  106      


 


3/15/18 12:00 97.7 104 20 100/63 (75) 98   














 3/16/18 3/16/18 3/16/18





 07:00 15:00 23:00


 


Intake Total 860 ml  


 


Balance 860 ml  








Result Diagram:  


3/15/18 0553                                                                   

             3/16/18 0613





Laboratory Results





Laboratory Tests








Test


  3/16/18


06:13


 


Activated Partial


Thromboplast Time 55.9 SEC 


 


 


Blood Urea Nitrogen 23 MG/DL 


 


Creatinine 1.70 MG/DL 


 


Random Glucose 108 MG/DL 


 


Calcium Level 8.2 MG/DL 


 


Sodium Level 131 MEQ/L 


 


Potassium Level 4.4 MEQ/L 


 


Chloride Level 92 MEQ/L 


 


Carbon Dioxide Level 30.5 MEQ/L 


 


Anion Gap 9 MEQ/L 


 


Estimat Glomerular Filtration


Rate 34 ML/MIN 


 











Administered Medications








 Medications


  (Trade)  Dose


 Ordered  Sig/Nikhil


 Route


 PRN Reason  Start Time


 Stop Time Status Last Admin


Dose Admin


 


 Sodium Chloride


  (NS Flush)  2 ml  UNSCH  PRN


 IV FLUSH


 FLUSH AFTER USING IV ACCESS  2/23/18 22:00


    3/12/18 01:03


 


 


 Sodium Chloride


  (NS Flush)  2 ml  BID


 IV FLUSH


   2/24/18 09:00


    3/16/18 07:25


 


 


 Acetaminophen


  (Tylenol)  650 mg  Q6H  PRN


 PO


 fever  2/23/18 22:00


    3/5/18 16:02


 


 


 Ondansetron HCl


  (Zofran Inj)  4 mg  Q6H  PRN


 IV PUSH


 NAUSEA OR VOMITING  2/23/18 22:00


    3/8/18 21:22


 


 


 Temazepam


  (Restoril)  15 mg  HS  PRN


 PO


 INSOMNIA  2/23/18 22:00


    3/6/18 22:08


 


 


 Chlorhexidine


 Gluconate


  (Chlorhexidine


 2% Cloth)  3 pack


 Taper  DAILY@04


 TOP


   2/24/18 04:00


 2/20/19 03:59  3/6/18 03:54


 


 


 Senna/Docusate


 Sodium


  (Arlen-Colace)  1 tab  BID


 PO


   2/24/18 09:00


    3/16/18 08:07


 


 


 Ampicillin Sodium


 2000 mg/Sodium


 Chloride  100 ml @ 


 400 mls/hr  Q6H


 IV


   2/24/18 14:00


    3/16/18 08:08


 


 


 Loperamide HCl


  (Imodium)  2 mg  Q4H  PRN


 PO


 Diarrhea  2/26/18 16:15


    2/28/18 14:32


 


 


 Albuterol Sulfate


  (Albuterol Neb)  2.5 mg  Q2HR NEB  PRN


 NEB


 dyspnea  2/27/18 11:30


    3/15/18 20:25


 


 


 Acetaminophen/


 Hydrocodone Bitart


  (Norco  Mg)  1 tab  Q4H  PRN


 PO


 pain 1-5  3/4/18 15:00


    3/9/18 05:02


 


 


 Hydromorphone HCl


  (Dilaudid Pf Inj)  1 mg  Q4H  PRN


 IV PUSH


 PAIN SCALE 6 TO 10  3/7/18 19:00


    3/16/18 08:07


 


 


 Potassium Chloride


  (KCl)  60 meq  Q12HR


 PO


   3/14/18 09:45


    3/16/18 08:07


 


 


 Warfarin Sodium


  (Coumadin)  3 mg  DAILY@1600


 PO


   3/15/18 16:00


    3/15/18 18:04


 


 


 Sodium Chloride  250 ml @ 


 75 mls/hr  BOLUS  ONCE


 IV


   3/16/18 11:00


 3/16/18 14:19  3/16/18 11:39


 








Objective Remarks


GENERAL: chronically ill female, sitting up in bed in nad. 


SKIN: Warm and dry.


HEAD: Normocephalic.


EYES: No injection or drainage. 


NECK: Supple, trachea midline. 


CARDIOVASCULAR: +S1, S2, tachy


RESPIRATORY: scattered rhonchi all lung fields. 


GASTROINTESTINAL: Abdomen soft, non-tender, nondistended. 


EXTREMITIES: 3+ edema bilaterally lower extremities. bilateral pedal pulses are 

present, both through palpation and doppler. 


NEUROLOGICAL: awake and alert.





Assessment/Plan


Problem List:  


(1) arterial blood clot


Plan:  3/16: INR 3.9 today. hold coumadin. goal INR between 2.0-3.0


3/15: INR =3.8. continue to hold coumadin until closer to 2.0-3.0 than resume 

at lower dose. will ask nurses to perform doppler on patient's pedal pulses q 

shift and document. 


3/14/18.  INR >4.0, Argatroban stopped.


PT/PTT checked off argatroban, noted prolonged PTT.





(2) Microcytic anemia


ICD Codes:  D50.9 - Iron deficiency anemia, unspecified


Plan:  


--likely d/t inflammation/infection. no evidence of bleeding or hemolysis. 


--monitor and transfuse as needed. 





(3) Bacterial endocarditis


ICD Codes:  I33.0 - Acute and subacute infective endocarditis


Status:  Acute


Plan:  -on ampicillin, ID following. 





(4) Acute septic pulmonary embolism


ICD Codes:  I26.90 - Septic pulmonary embolism without acute cor pulmonale


Plan:  -- echocardiogram on 3/3 showed an estimated EF of 50-55%.  


++severe tricuspid regurgitation with prosthesis in place.  


++2 x 4 cm mobile vegetation was seen on the valve.  The right atrium and right 

ventricle were normal in size and function





Assessment


38y/o female admitted with recurrent endocarditis. hematology consulted for 

anticoagulation assistance.


h/o Recurrent bacterial endocarditis, bacteremia, history of prosthetic aortic 

valve and subsequent redo, chronic active hepatitis C, intravenous drug use, 

microcytic anemia, consistent with iron deficiency, left lower extremity 

arterial event, bacterial endocarditis with viridans streptococcus.





HPI (brought forward for continuity of care): history of IV drug abuse and  

recurrent endocarditis. had a relapse of her activity. has history of pulmonary 

embolism and infected valves. +significant congestive heart failure and 

presented to the emergency room on 02/23/2018, with sepsis. is followed by 

Infectious Disease for her endocarditis.  previously received antibiotic 

therapy for enterococcus faecalis and staph aureus. There is questionable 

compliance. Her current blood 


culture from 02/23/2018, shows viridans streptococcus.  Two out of two bottles 

were positive.  She is on ampicillin and gentamicin. course is complicated by 

cold left lower extremity, evaluated by  Vascular Surgery.  A CT angiogram 

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 

the proximal thigh.  There is satisfactory calf runoff present.  For this reason

, conservative management with anticoagulation is continued. was placed on 

unfractionated heparin.  Her heparin dose has been titrated from 1000 units/

hour to 2500 units/hour with a PTT remaining subtherapeutic.  Her last PTT is 

32 seconds from 03/01/2018, at 2 p.m.  For this reason, Hematology/Oncology was 

consulted.


Plan


1. await CBC, hold coumadin


2. continue supportive care.


Attending Statement


The exam, history, and the medical decision-making described in the above note 

were completed with the assistance of the mid-level provider. I reviewed and 

agree with the findings presented.  I attest that I had a face-to-face 

encounter with the patient on the same day, and personally performed and 

documented my assessment and findings in the medical record.








PT seen and examined.


Any effort makes her tired and SOB, sitting up to eat dinner.


LE swelling worse given dependent position of feet, however pulses were present.


Titrate anticoagulation goal InR 2-3.


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 

ATIII deficiency.


Suggest decrease dose of Coumadin rather than holding it completely.


INR 3.9- no bleeding, eating well, noted PTT also prolonged ? contamination vs 

liver disease


Coumadin decreased to 3mg yesterday, decrease further to 2mg today.


Cont check coags.











Zoe Chau Mar 16, 2018 11:47


Henny Smith MD Mar 16, 2018 18:38

## 2018-03-16 NOTE — RADRPT
EXAM DATE/TIME:  03/16/2018 13:30 

 

[facility] COMPARISON:     

CHEST SINGLE AP, March 05, 2018, 3:52.

 

                     

INDICATIONS :     

Congestive heart failure.

                     

 

MEDICAL HISTORY :     

Venous insufficiency.       Cardiovascular disease. Congestive heart failure. Hepatitis C.   

 

SURGICAL HISTORY :        

AVR.

 

ENCOUNTER:     

Subsequent                                        

 

ACUITY:     

1 day      

 

PAIN SCORE:     

Non-responsive.

 

LOCATION:     

Bilateral chest 

 

FINDINGS:     

Patchy opacity is noted within the right lung base consistent with probable pneumonia. The heart is e
nlarged. Right internal jugular central line has its tip in the right atrium. No pneumothorax is note
d. No pulmonary edema is noted.

 

CONCLUSION:     

1. Right basilar patchy discussed with probable pneumonia. 

2. Cardiomegaly. 

 

 

 Michi Johnson MD on March 16, 2018 at 14:28           

Board Certified Radiologist.

 This report was verified electronically.

## 2018-03-17 VITALS
RESPIRATION RATE: 20 BRPM | DIASTOLIC BLOOD PRESSURE: 66 MMHG | TEMPERATURE: 97.7 F | SYSTOLIC BLOOD PRESSURE: 90 MMHG | OXYGEN SATURATION: 99 % | HEART RATE: 108 BPM

## 2018-03-17 VITALS — HEART RATE: 108 BPM

## 2018-03-17 VITALS
HEART RATE: 109 BPM | OXYGEN SATURATION: 100 % | RESPIRATION RATE: 20 BRPM | SYSTOLIC BLOOD PRESSURE: 93 MMHG | DIASTOLIC BLOOD PRESSURE: 65 MMHG | TEMPERATURE: 98.1 F

## 2018-03-17 VITALS
TEMPERATURE: 97.2 F | SYSTOLIC BLOOD PRESSURE: 97 MMHG | HEART RATE: 100 BPM | RESPIRATION RATE: 20 BRPM | OXYGEN SATURATION: 100 % | DIASTOLIC BLOOD PRESSURE: 66 MMHG

## 2018-03-17 VITALS
HEART RATE: 108 BPM | DIASTOLIC BLOOD PRESSURE: 59 MMHG | OXYGEN SATURATION: 99 % | SYSTOLIC BLOOD PRESSURE: 108 MMHG | RESPIRATION RATE: 20 BRPM | TEMPERATURE: 97.2 F

## 2018-03-17 VITALS
DIASTOLIC BLOOD PRESSURE: 73 MMHG | RESPIRATION RATE: 22 BRPM | HEART RATE: 107 BPM | TEMPERATURE: 98.4 F | SYSTOLIC BLOOD PRESSURE: 98 MMHG | OXYGEN SATURATION: 100 %

## 2018-03-17 VITALS — HEART RATE: 112 BPM

## 2018-03-17 VITALS
SYSTOLIC BLOOD PRESSURE: 138 MMHG | RESPIRATION RATE: 20 BRPM | DIASTOLIC BLOOD PRESSURE: 64 MMHG | TEMPERATURE: 98 F | OXYGEN SATURATION: 96 % | HEART RATE: 111 BPM

## 2018-03-17 VITALS — OXYGEN SATURATION: 97 %

## 2018-03-17 VITALS — HEART RATE: 103 BPM

## 2018-03-17 VITALS — OXYGEN SATURATION: 96 %

## 2018-03-17 LAB
BASOPHILS # BLD AUTO: 0.2 TH/MM3 (ref 0–0.2)
BASOPHILS NFR BLD: 1.3 % (ref 0–2)
BUN SERPL-MCNC: 24 MG/DL (ref 7–18)
C3 SERPL-MCNC: 118 MG/DL (ref 90–180)
C4 SERPL-MCNC: 24 MG/DL (ref 10–40)
CALCIUM SERPL-MCNC: 8.3 MG/DL (ref 8.5–10.1)
CHLORIDE SERPL-SCNC: 93 MEQ/L (ref 98–107)
COLOR UR: YELLOW
CREAT SERPL-MCNC: 1.52 MG/DL (ref 0.5–1)
EOSINOPHIL # BLD: 0.3 TH/MM3 (ref 0–0.4)
EOSINOPHIL NFR BLD: 1.9 % (ref 0–4)
ERYTHROCYTE [DISTWIDTH] IN BLOOD BY AUTOMATED COUNT: 22.9 % (ref 11.6–17.2)
GFR SERPLBLD BASED ON 1.73 SQ M-ARVRAT: 39 ML/MIN (ref 89–?)
GLUCOSE SERPL-MCNC: 100 MG/DL (ref 74–106)
GLUCOSE UR STRIP-MCNC: (no result) MG/DL
HCO3 BLD-SCNC: 26.6 MEQ/L (ref 21–32)
HCT VFR BLD CALC: 26.2 % (ref 35–46)
HGB BLD-MCNC: 8.3 GM/DL (ref 11.6–15.3)
HGB UR QL STRIP: (no result)
INR PPP: 3.3 RATIO
KETONES UR STRIP-MCNC: (no result) MG/DL
LYMPHOCYTES # BLD AUTO: 1.6 TH/MM3 (ref 1–4.8)
LYMPHOCYTES NFR BLD AUTO: 10.7 % (ref 9–44)
MCH RBC QN AUTO: 23.8 PG (ref 27–34)
MCHC RBC AUTO-ENTMCNC: 31.5 % (ref 32–36)
MCV RBC AUTO: 75.4 FL (ref 80–100)
MONOCYTE #: 1.1 TH/MM3 (ref 0–0.9)
MONOCYTES NFR BLD: 7.2 % (ref 0–8)
NEUTROPHILS # BLD AUTO: 12.1 TH/MM3 (ref 1.8–7.7)
NEUTROPHILS NFR BLD AUTO: 78.9 % (ref 16–70)
NITRITE UR QL STRIP: (no result)
OSMOLALITY,URINE: 306 MOSM/KG (ref 300–1300)
PLATELET # BLD: 364 TH/MM3 (ref 150–450)
PMV BLD AUTO: 8.5 FL (ref 7–11)
PROTHROMBIN TIME: 32.8 SEC (ref 9.8–11.6)
RBC # BLD AUTO: 3.48 MIL/MM3 (ref 4–5.3)
SODIUM SERPL-SCNC: 131 MEQ/L (ref 136–145)
SODIUM,RANDOM URINE: 9 MEQ/L
SP GR UR STRIP: 1.01 (ref 1–1.03)
SQUAMOUS #/AREA URNS HPF: 1 /HPF (ref 0–5)
TRANS CELLS #/AREA URNS HPF: <1 /HPF
URINE LEUKOCYTE ESTERASE: (no result)
WBC # BLD AUTO: 15.3 TH/MM3 (ref 4–11)

## 2018-03-17 RX ADMIN — STANDARDIZED SENNA CONCENTRATE AND DOCUSATE SODIUM SCH TAB: 8.6; 5 TABLET, FILM COATED ORAL at 20:43

## 2018-03-17 RX ADMIN — WARFARIN SODIUM SCH MG: 2 TABLET ORAL at 16:16

## 2018-03-17 RX ADMIN — ALBUTEROL SULFATE PRN MG: 2.5 SOLUTION RESPIRATORY (INHALATION) at 18:29

## 2018-03-17 RX ADMIN — HYDROMORPHONE HYDROCHLORIDE PRN MG: 2 INJECTION INTRAMUSCULAR; INTRAVENOUS; SUBCUTANEOUS at 20:42

## 2018-03-17 RX ADMIN — HYDROMORPHONE HYDROCHLORIDE PRN MG: 2 INJECTION INTRAMUSCULAR; INTRAVENOUS; SUBCUTANEOUS at 07:59

## 2018-03-17 RX ADMIN — HYDROMORPHONE HYDROCHLORIDE PRN MG: 2 INJECTION INTRAMUSCULAR; INTRAVENOUS; SUBCUTANEOUS at 12:14

## 2018-03-17 RX ADMIN — ALBUTEROL SULFATE PRN MG: 2.5 SOLUTION RESPIRATORY (INHALATION) at 12:17

## 2018-03-17 RX ADMIN — POTASSIUM CHLORIDE SCH MEQ: 750 TABLET, FILM COATED, EXTENDED RELEASE ORAL at 20:41

## 2018-03-17 RX ADMIN — AMPICILLIN SODIUM SCH MLS/HR: 2 INJECTION, POWDER, FOR SOLUTION INTRAMUSCULAR; INTRAVENOUS at 07:52

## 2018-03-17 RX ADMIN — AMPICILLIN SODIUM SCH MLS/HR: 2 INJECTION, POWDER, FOR SOLUTION INTRAMUSCULAR; INTRAVENOUS at 13:50

## 2018-03-17 RX ADMIN — HYDROMORPHONE HYDROCHLORIDE PRN MG: 2 INJECTION INTRAMUSCULAR; INTRAVENOUS; SUBCUTANEOUS at 04:04

## 2018-03-17 RX ADMIN — AMPICILLIN SODIUM SCH MLS/HR: 2 INJECTION, POWDER, FOR SOLUTION INTRAMUSCULAR; INTRAVENOUS at 03:06

## 2018-03-17 RX ADMIN — CHLORHEXIDINE GLUCONATE SCH PACK: 500 CLOTH TOPICAL at 03:17

## 2018-03-17 RX ADMIN — HYDROMORPHONE HYDROCHLORIDE PRN MG: 2 INJECTION INTRAMUSCULAR; INTRAVENOUS; SUBCUTANEOUS at 16:21

## 2018-03-17 RX ADMIN — Medication SCH ML: at 20:43

## 2018-03-17 RX ADMIN — HYDROMORPHONE HYDROCHLORIDE PRN MG: 2 INJECTION INTRAMUSCULAR; INTRAVENOUS; SUBCUTANEOUS at 00:35

## 2018-03-17 RX ADMIN — POTASSIUM CHLORIDE SCH MEQ: 750 TABLET, FILM COATED, EXTENDED RELEASE ORAL at 07:52

## 2018-03-17 RX ADMIN — ALBUTEROL SULFATE PRN MG: 2.5 SOLUTION RESPIRATORY (INHALATION) at 00:49

## 2018-03-17 RX ADMIN — AMPICILLIN SODIUM SCH MLS/HR: 2 INJECTION, POWDER, FOR SOLUTION INTRAMUSCULAR; INTRAVENOUS at 20:40

## 2018-03-17 RX ADMIN — Medication SCH ML: at 07:22

## 2018-03-17 RX ADMIN — STANDARDIZED SENNA CONCENTRATE AND DOCUSATE SODIUM SCH TAB: 8.6; 5 TABLET, FILM COATED ORAL at 07:22

## 2018-03-17 NOTE — PD.ONC.PN
Subjective


Subjective


Remarks


Afebrile overnight. 


Patient reports feet are hurting a lot today. 


discouraged. 


no bleeding.





Objective


Data











  Date Time  Temp Pulse Resp B/P (MAP) Pulse Ox O2 Delivery O2 Flow Rate FiO2


 


3/17/18 15:14  112      


 


3/17/18 12:19     97 Nasal Cannula 3.00 


 


3/17/18 12:00 97.7 108 20 90/66 (74) 99   


 


3/17/18 08:00 98.0 111 20 138/64 (88) 96   


 


3/17/18 04:00 98.1 109 20 93/65 (74) 100   


 


3/17/18 03:46  112      


 


3/17/18 00:50     96 Nasal Cannula 3.00 


 


3/17/18 00:00 97.2 108 20 108/59 (75) 99   


 


3/16/18 23:44  106      


 


3/16/18 20:00 97.2 86 18 110/57 (74) 96   


 


3/16/18 19:44  106      


 


3/16/18 19:00      Nasal Cannula 2.00 


 


3/16/18 16:48     97 Nasal Cannula 3.00 


 


3/16/18 16:00 97.4 101 20 101/58 (72) 94   


 


3/16/18 16:00  103      














 3/17/18 3/17/18 3/17/18





 07:00 15:00 23:00


 


Intake Total 820 ml 100 ml 


 


Balance 820 ml 100 ml 








Result Diagram:  


3/17/18 0402                                                                   

             3/17/18 0402





Laboratory Results





Laboratory Tests








Test


  3/16/18


20:00 3/17/18


04:02 3/17/18


08:00


 


White Blood Count 14.1 TH/MM3  15.3 TH/MM3  


 


Red Blood Count 3.51 MIL/MM3  3.48 MIL/MM3  


 


Hemoglobin 8.3 GM/DL  8.3 GM/DL  


 


Hematocrit 26.4 %  26.2 %  


 


Mean Corpuscular Volume 75.2 FL  75.4 FL  


 


Mean Corpuscular Hemoglobin 23.6 PG  23.8 PG  


 


Mean Corpuscular Hemoglobin


Concent 31.4 % 


  31.5 % 


  


 


 


Red Cell Distribution Width 22.3 %  22.9 %  


 


Platelet Count 349 TH/MM3  364 TH/MM3  


 


Mean Platelet Volume 8.4 FL  8.5 FL  


 


Neutrophils (%) (Auto) 79.4 %  78.9 %  


 


Lymphocytes (%) (Auto) 9.2 %  10.7 %  


 


Monocytes (%) (Auto) 7.5 %  7.2 %  


 


Eosinophils (%) (Auto) 2.6 %  1.9 %  


 


Basophils (%) (Auto) 1.3 %  1.3 %  


 


Neutrophils # (Auto) 11.2 TH/MM3  12.1 TH/MM3  


 


Lymphocytes # (Auto) 1.3 TH/MM3  1.6 TH/MM3  


 


Monocytes # (Auto) 1.1 TH/MM3  1.1 TH/MM3  


 


Eosinophils # (Auto) 0.4 TH/MM3  0.3 TH/MM3  


 


Basophils # (Auto) 0.2 TH/MM3  0.2 TH/MM3  


 


CBC Comment DIFF FINAL  DIFF FINAL  


 


Differential Comment     


 


Prothrombin Time  32.8 SEC  


 


Prothromb Time International


Ratio 


  3.3 RATIO 


  


 


 


Blood Urea Nitrogen  24 MG/DL  


 


Creatinine  1.52 MG/DL  


 


Random Glucose  100 MG/DL  


 


Calcium Level  8.3 MG/DL  


 


Sodium Level  131 MEQ/L  


 


Potassium Level  4.2 MEQ/L  


 


Chloride Level  93 MEQ/L  


 


Carbon Dioxide Level  26.6 MEQ/L  


 


Anion Gap  11 MEQ/L  


 


Estimat Glomerular Filtration


Rate 


  39 ML/MIN 


  


 


 


Complement C3  118 MG/DL  


 


Complement C4  24 MG/DL  


 


Urine Color   YELLOW 


 


Urine Turbidity   CLEAR 


 


Urine pH   6.5 


 


Urine Specific Gravity   1.012 


 


Urine Protein   30 mg/dL 


 


Urine Glucose (UA)   NEG mg/dL 


 


Urine Ketones   NEG mg/dL 


 


Urine Occult Blood   TRACE 


 


Urine Nitrite   NEG 


 


Urine Bilirubin   NEG 


 


Urine Urobilinogen


  


  


  LESS THAN 2.0


MG/DL


 


Urine Leukocyte Esterase   NEG 


 


Urine RBC   1 /hpf 


 


Urine WBC   2 /hpf 


 


Urine Squamous Epithelial


Cells 


  


  1 /hpf 


 


 


Urine Transitional Epithelial


Cells 


  


  <1 /hpf 


 


 


Microscopic Urinalysis Comment


  


  


  CULT NOT


INDICATED


 


Urine Eosinophils   NONE SEEN /HPF 


 


Urine Osmolality   306 MOSM/KG 


 


Urine Random Sodium   9 MEQ/L 











Administered Medications








 Medications


  (Trade)  Dose


 Ordered  Sig/Nikhil


 Route


 PRN Reason  Start Time


 Stop Time Status Last Admin


Dose Admin


 


 Sodium Chloride


  (NS Flush)  2 ml  UNSCH  PRN


 IV FLUSH


 FLUSH AFTER USING IV ACCESS  2/23/18 22:00


    3/12/18 01:03


 


 


 Sodium Chloride


  (NS Flush)  2 ml  BID


 IV FLUSH


   2/24/18 09:00


    3/17/18 07:22


 


 


 Acetaminophen


  (Tylenol)  650 mg  Q6H  PRN


 PO


 fever  2/23/18 22:00


    3/5/18 16:02


 


 


 Ondansetron HCl


  (Zofran Inj)  4 mg  Q6H  PRN


 IV PUSH


 NAUSEA OR VOMITING  2/23/18 22:00


    3/8/18 21:22


 


 


 Temazepam


  (Restoril)  15 mg  HS  PRN


 PO


 INSOMNIA  2/23/18 22:00


    3/6/18 22:08


 


 


 Chlorhexidine


 Gluconate


  (Chlorhexidine


 2% Cloth)  3 pack


 Taper  DAILY@04


 TOP


   2/24/18 04:00


 2/20/19 03:59  3/6/18 03:54


 


 


 Senna/Docusate


 Sodium


  (Arlen-Colace)  1 tab  BID


 PO


   2/24/18 09:00


    3/16/18 08:07


 


 


 Ampicillin Sodium


 2000 mg/Sodium


 Chloride  100 ml @ 


 400 mls/hr  Q6H


 IV


   2/24/18 14:00


    3/17/18 13:50


 


 


 Loperamide HCl


  (Imodium)  2 mg  Q4H  PRN


 PO


 Diarrhea  2/26/18 16:15


    2/28/18 14:32


 


 


 Albuterol Sulfate


  (Albuterol Neb)  2.5 mg  Q2HR NEB  PRN


 NEB


 dyspnea  2/27/18 11:30


    3/17/18 12:17


 


 


 Acetaminophen/


 Hydrocodone Bitart


  (Norco  Mg)  1 tab  Q4H  PRN


 PO


 pain 1-5  3/4/18 15:00


    3/9/18 05:02


 


 


 Hydromorphone HCl


  (Dilaudid Pf Inj)  1 mg  Q4H  PRN


 IV PUSH


 PAIN SCALE 6 TO 10  3/7/18 19:00


    3/17/18 12:14


 


 


 Potassium Chloride


  (KCl)  60 meq  Q12HR


 PO


   3/14/18 09:45


    3/17/18 07:52


 








Objective Remarks


GENERAL: chronically ill female, lying in bed. 


SKIN: Warm and dry.


HEAD: Normocephalic.


EYES: No injection or drainage. 


NECK: Supple, trachea midline. 


CARDIOVASCULAR: +S1, S2, tachy


RESPIRATORY: anterior fields with scattered rhonchi.


GASTROINTESTINAL: Abdomen soft, non-tender, nondistended. 


EXTREMITIES: 3-4+ pitting edema in bilateral legs. pedal pulses palpable. 


NEUROLOGICAL: awake and alert. normal, although somewhat dyspneic speech.





Assessment/Plan


Assessment


36y/o female admitted with recurrent endocarditis. hematology consulted for 

anticoagulation assistance.


h/o Recurrent bacterial endocarditis, bacteremia, history of prosthetic aortic 

valve and subsequent redo, chronic active hepatitis C, intravenous drug use, 

microcytic anemia, consistent with iron deficiency, left lower extremity 

arterial event, bacterial endocarditis with viridans streptococcus.





HPI (brought forward for continuity of care): history of IV drug abuse and  

recurrent endocarditis. had a relapse of her activity. has history of pulmonary 

embolism and infected valves. +significant congestive heart failure and 

presented to the emergency room on 02/23/2018, with sepsis. is followed by 

Infectious Disease for her endocarditis.  previously received antibiotic 

therapy for enterococcus faecalis and staph aureus. There is questionable 

compliance. Her current blood 


culture from 02/23/2018, shows viridans streptococcus.  Two out of two bottles 

were positive.  She is on ampicillin and gentamicin. course is complicated by 

cold left lower extremity, evaluated by  Vascular Surgery.  A CT angiogram 

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 

the proximal thigh.  There is satisfactory calf runoff present.  For this reason

, conservative management with anticoagulation is continued. was placed on 

unfractionated heparin.  Her heparin dose has been titrated from 1000 units/

hour to 2500 units/hour with a PTT remaining subtherapeutic.  Her last PTT is 

32 seconds from 03/01/2018, at 2 p.m.  For this reason, Hematology/Oncology was 

consulted.


Plan


1. right superficial femoral artery occlusion: continue coumadin.  INR 

supratherapeutic. instead of holding coumadin, will just reduce dose to 2mg PO 

today


2. monitor CBC


Attending Statement


The exam, history, and the medical decision-making described in the above note 

were completed with the assistance of the mid-level provider. I reviewed and 

agree with the findings presented.  I attest that I had a face-to-face 

encounter with the patient on the same day, and personally performed and 

documented my assessment and findings in the medical record.


stable but very ill.  both legs are edematous.  will resume coumadin at dose of 

2 mg per day and aim at INR between 2-3.  if further problems would add 

aspirin.  check inr in am.











Zoe Chau Mar 17, 2018 15:57


Ephraim Robles MD Mar 17, 2018 16:05

## 2018-03-17 NOTE — HHI.NPPN
Subjective


Additional Remarks


no acute complaints





Objective Data


Data











 3/17/18 3/18/18





 19:00 07:00


 


Intake Total 200 ml 


 


Balance 200 ml 


 


  


 


IV Total 200 ml 


 


# Voids 4 


 


# Bowel Movements 1 











Vital Signs








  Date Time  Temp Pulse Resp B/P (MAP) Pulse Ox O2 Delivery O2 Flow Rate FiO2


 


3/17/18 16:36  103      


 


3/17/18 16:00 97.2 100 20 97/66 (76) 100   


 


3/17/18 15:14  112      


 


3/17/18 12:19     97 Nasal Cannula 3.00 


 


3/17/18 12:00 97.7 108 20 90/66 (74) 99   


 


3/17/18 08:00 98.0 111 20 138/64 (88) 96   


 


3/17/18 04:00 98.1 109 20 93/65 (74) 100   


 


3/17/18 03:46  112      


 


3/17/18 00:50     96 Nasal Cannula 3.00 


 


3/17/18 00:00 97.2 108 20 108/59 (75) 99   


 


3/16/18 23:44  106      


 


3/16/18 20:00 97.2 86 18 110/57 (74) 96   


 


3/16/18 19:44  106      


 


3/16/18 19:00      Nasal Cannula 2.00 








-:  


3/17/18 0402                                                                   

             3/17/18 0402








Physical Exam


General


Appearance:  No Acute Distress





Eyes


Eye Exam:  Pupils Equal





Throat


Throat Exam:  Oral Mucosa Pink & Moist





Neck


Neck Exam:  Neck Supple





Pulmonary


Resp Exam:  Clear Bilaterally





Cardiology


CV Exam:  Regular, Normal Sinus Rhythm





Gastrointestinal/Abdomen


GI Exam:  Soft, Non-Tender





Integumentary


Skin Exam:  Dry, Intact





Extremeties


Extremities Exam:  No Edema





Neurologic


Neuro Exam:  Alert, Awake, Oriented





Assessment/Plan


Problem List:  


(1) Acute kidney injury


ICD Codes:  N17.9 - Acute kidney failure, unspecified


Status:  Acute


Plan:  


NOÉ likely due to vancomycin toxicity, possible post-infectious GN





Urine eosinophils negative, complements wnl





Creatinine improving from 1.7 -> 1.5 today.








Edema due to malnutrition, albumin level of 2 last week. 





If stable tomorrow, can start lasix with albumin.








(2) Prosthetic valve endocarditis


ICD Codes:  T82.6XXA - Infection and inflammatory reaction due to cardiac valve 

prosthesis, initial encounter


Status:  Acute


Plan:  septic endocarditis with strep viridans


IV drug use, pulmonary emboli as well.





Continues ampicillin per ID, continue renal dosing antibiotics.





(3) Peripheral arterial occlusive disease


ICD Codes:  I77.9 - Disorder of arteries and arterioles, unspecified


Plan:  seen with vascular, continue medical management


Hold contrast studies as possible.





(4) Hepatitis C


ICD Codes:  B19.20 - Unspecified viral hepatitis C without hepatic coma


Status:  Chronic





Problem Qualifiers





(1) Prosthetic valve endocarditis:  


Qualified Codes:  T82.6XXA - Infection and inflammatory reaction due to cardiac 

valve prosthesis, initial encounter








Felipe Fontanez MD Mar 17, 2018 17:13

## 2018-03-17 NOTE — HHI.PR
Subjective


Remarks


Denies cp/sob.


Afebrile





Objective


Vitals





Vital Signs








  Date Time  Temp Pulse Resp B/P (MAP) Pulse Ox O2 Delivery O2 Flow Rate FiO2


 


3/17/18 12:19     97 Nasal Cannula 3.00 


 


3/17/18 12:00 97.7 108 20 90/66 (74) 99   


 


3/17/18 08:00 98.0 111 20 138/64 (88) 96   


 


3/17/18 04:00 98.1 109 20 93/65 (74) 100   


 


3/17/18 03:46  112      


 


3/17/18 00:50     96 Nasal Cannula 3.00 


 


3/17/18 00:00 97.2 108 20 108/59 (75) 99   


 


3/16/18 23:44  106      


 


3/16/18 20:00 97.2 86 18 110/57 (74) 96   


 


3/16/18 19:44  106      


 


3/16/18 19:00      Nasal Cannula 2.00 


 


3/16/18 16:48     97 Nasal Cannula 3.00 


 


3/16/18 16:00 97.4 101 20 101/58 (72) 94   


 


3/16/18 16:00  103      














I/O      


 


 3/16/18 3/16/18 3/16/18 3/17/18 3/17/18 3/17/18





 07:00 15:00 23:00 07:00 15:00 23:00


 


Intake Total 860 ml 350 ml 820 ml 820 ml 100 ml 


 


Balance 860 ml 350 ml 820 ml 820 ml 100 ml 


 


      


 


Intake Oral 760 ml  720 ml 720 ml  


 


IV Total 100 ml 350 ml 100 ml 100 ml 100 ml 


 


# Voids 2  5 3  


 


# Bowel Movements   1 1  








Result Diagram:  


3/17/18 0402                                                                   

             3/17/18 0402





Imaging





Last Impressions








Chest X-Ray 3/16/18 0000 Signed





Impressions: 





 Service Date/Time:  Friday, March 16, 2018 13:30 - CONCLUSION:  1. Right 

basilar 





 patchy discussed with probable pneumonia.  2. Cardiomegaly.     Michi Johnson MD 


 


Head CT 3/3/18 0000 Signed





Impressions: 





 Service Date/Time:  Saturday, March 3, 2018 10:22 - CONCLUSION:  1. Focal area 





 of decreased density involving the right parietal lobe. As a new finding from 





 the prior exam. This could relate to a small infarct. MRI of the brain with 





 gadolinium suggested to further evaluate. 2. Small lacunar infarction 

involving 





 the left centrum semiovale.     Scooter Dawson Jr., MD 


 


CT Angiography 3/3/18 0000 Signed





Impressions: 





 Service Date/Time:  Saturday, March 3, 2018 10:28 - CONCLUSION:  There 

extensive 





 pulmonary emboli bilaterally. There is near complete cut off of the right 

lower 





 lobe pulmonary artery. Prominent filling defects on the left as well. These 





 findings were relayed to Dr. Bustos immediately at the completion of the study.  





 Scattered pulmonary infiltrates, small pleural effusion and extensive 





 mediastinal lymphadenopathy as described above.      Won Sharp MD 


 


Brain MRI 3/3/18 0000 Signed





Impressions: 





 Service Date/Time:  Saturday, March 3, 2018 18:04 - CONCLUSION:  Deterioration 





 in the appearance of the scan.  At least 3 focal areas of abnormal contrast 





 enhancement are present scattered to in both hemispheres.  Given the history 





 this most likely represents developing areas cerebritis.  Exam is compromised 

by 





 an uncooperative patient and motion artifact.  These 2 factors make detection 

of 





 other abnormalities such as cortical cephalitis and meningitis difficult to 





 exclude.  Cannot exclude hemorrhagic lesions as well.  There is no mass effect 





 evident.      Jorge Diane MD 


 


Chest CT 2/26/18 0000 Signed





Impressions: 





 Service Date/Time:  Monday, February 26, 2018 12:41 - CONCLUSION:  1. There 

are 





 at least 5 pulmonary nodules present bilaterally with the largest measuring 19 





 mm. None demonstrate cavitation but it is possible that these represent septic 





 emboli. Suggest followup to assess for change. 2. Splenomegaly with subtle 





 wedge-shaped areas of low-density in the mid spleen which could represent 





 infarcts. 3. Mild cardiomegaly in this patient post aortic valve replacement. 





 Low density of the cardiac blood pool suggests anemia.      Ron Melgar MD 


 


Aorta w/Runoff CTA 2/25/18 0000 Signed





Impressions: 





 Service Date/Time:  Sunday, February 25, 2018 12:47 - CONCLUSION:  Left renal 





 cortical infarction. 5-6 cm length total occlusion of the left superficial 





 femoral artery in the proximal thigh. Nodular parenchymal opacities in the 

lung 





 bases bilaterally See above discussion.     Ron Cruz MD 


 


Renal Ultrasound 2/24/18 0000 Signed





Impressions: 





 Service Date/Time:  Saturday, February 24, 2018 10:53 - CONCLUSION:  1. No 





 evidence of hydronephrosis. 2. Thickening of the urinary bladder wall a 1.1 

cm. 





 3. Prominent splenomegaly.     Elgin J. Siragusa, MD 


 


Breast Ultrasound 2/24/18 0000 Signed





Impressions: 





 Service Date/Time:  Saturday, February 24, 2018 10:48 - CONCLUSION:  Negative 





 targeted right breast ultrasound examination.     Ron Brown MD 


 


Lower Extremity Ultrasound 2/23/18 0000 Signed





Impressions: 





 Service Date/Time:  Friday, February 23, 2018 21:22 - CONCLUSION:  1. No 





 sonographic evidence for lower extremity DVT. 2. Incidental note of bilateral 





 inguinal adenopathy with the largest on the right measuring 3.3 cm and the 





 largest on the left measuring 2.6 cm. This finding is nonspecific but is 





 typically reactive in etiology.     Rj Whitten MD 








Objective Remarks


AAOx3


NAD


Clear lungs BL


S1S2 4/6 systolic ejection murmur


+3 BL lower extremity edema without erythema.


Medications and IVs





Current Medications








 Medications


  (Trade)  Dose


 Ordered  Sig/Nikhil


 Route  Start Time


 Stop Time Status Last Admin


 


  (NS Flush)  2 ml  UNSCH  PRN


 IV FLUSH  2/23/18 22:00


    3/12/18 01:03


 


 


  (NS Flush)  2 ml  BID


 IV FLUSH  2/24/18 09:00


    3/17/18 07:22


 


 


  (Tylenol)  650 mg  Q6H  PRN


 PO  2/23/18 22:00


    3/5/18 16:02


 


 


  (Zofran Inj)  4 mg  Q6H  PRN


 IV PUSH  2/23/18 22:00


    3/8/18 21:22


 


 


  (Restoril)  15 mg  HS  PRN


 PO  2/23/18 22:00


    3/6/18 22:08


 


 


 Miscellaneous


 Information  1  Q361D


 XX  2/23/18 22:00


     


 


 


  (Chlorhexidine


 2% Cloth)  3 pack


 Taper  DAILY@04


 TOP  2/24/18 04:00


 2/20/19 03:59  3/6/18 03:54


 


 


  (Chlorhexidine


 2% Cloth)  3 pack  UNSCH  PRN


 TOP  2/23/18 22:00


     


 


 


  (Arlen-Colace)  1 tab  BID


 PO  2/24/18 09:00


    3/16/18 08:07


 


 


  (Milk Of


 Magnesia Liq)  30 ml  Q12H  PRN


 PO  2/23/18 22:00


     


 


 


  (Senokot)  17.2 mg  Q12H  PRN


 PO  2/23/18 22:00


     


 


 


  (Dulcolax Supp)  10 mg  DAILY  PRN


 RECTAL  2/23/18 22:00


     


 


 


  (Lactulose Liq)  30 ml  DAILY  PRN


 PO  2/23/18 22:00


     


 


 


 Ampicillin Sodium


 2000 mg/Sodium


 Chloride  100 ml @ 


 400 mls/hr  Q6H


 IV  2/24/18 14:00


    3/17/18 13:50


 


 


  (Imodium)  2 mg  Q4H  PRN


 PO  2/26/18 16:15


    2/28/18 14:32


 


 


  (Albuterol Neb)  2.5 mg  Q2HR NEB  PRN


 NEB  2/27/18 11:30


    3/17/18 12:17


 


 


  (Norco  Mg)  1 tab  Q4H  PRN


 PO  3/4/18 15:00


    3/9/18 05:02


 


 


  (Dilaudid Pf Inj)  1 mg  Q4H  PRN


 IV PUSH  3/7/18 19:00


    3/17/18 12:14


 


 


  (KCl)  60 meq  Q12HR


 PO  3/14/18 09:45


    3/17/18 07:52


 


 


  (Coumadin)  2 mg  DAILY@16


 PO  3/16/18 18:45


   Future hold  


 








Date of Insertion:  Feb 24, 2018


Line:  Central Venous Catheter


Side:  Right


Location:  Internal, Jugular





A/P


Problem List:  


(1) Prosthetic valve endocarditis


ICD Code:  T82.6XXA - Infection and inflammatory reaction due to cardiac valve 

prosthesis, initial encounter


Status:  Acute


Plan:  Continue antibiotics as per ID.


Patient on IV ampicillin.


Continue to hold coumadin until INR < 3 - goal 2-3.





(2) Bacteremia


ICD Code:  R78.81 - Bacteremia


Plan:  Blood cultures obtained on 2/23 growing strep viridans.


Subsequent repeat blood cultures negative.





(3) Septic pulmonary embolism


ICD Code:  I26.90 - Septic pulmonary embolism without acute cor pulmonale


Status:  Acute


Plan:  As observed on a CT angiography of the chest obtained on 3/3/18.


chocardiogram on 3/3 showed an estimated EF of 50-55%.  


++severe tricuspid regurgitation with prosthesis in place.  


++2 x 4 cm mobile vegetation was seen on the valve.  The right atrium and right 

ventricle were normal in size and function


Hematology following.


Continue to hold Coumadin until INR <3 - goal INR 2-3.





(4) IVDU (intravenous drug user)


ICD Code:  F19.90 - Other psychoactive substance use, unspecified, uncomplicated


Plan:  Counseled on drug cessation.





(5) Anemia


ICD Code:  D64.9 - Anemia, unspecified


Status:  Chronic


Plan:  Likely secondary to inflammation/infection.  No evidence of bleeding or 

hemolysis.


Monitor hemoglobin and transfuse as needed.








Problem Qualifiers





(1) Prosthetic valve endocarditis:  


Qualified Codes:  T82.6XXA - Infection and inflammatory reaction due to cardiac 

valve prosthesis, initial encounter


(2) Anemia:  


Qualified Codes:  D63.8 - Anemia in other chronic diseases classified elsewhere








Jesus Mae MD Mar 17, 2018 14:46

## 2018-03-18 VITALS
DIASTOLIC BLOOD PRESSURE: 62 MMHG | TEMPERATURE: 98.4 F | OXYGEN SATURATION: 95 % | HEART RATE: 109 BPM | SYSTOLIC BLOOD PRESSURE: 96 MMHG | RESPIRATION RATE: 20 BRPM

## 2018-03-18 VITALS
TEMPERATURE: 97.5 F | SYSTOLIC BLOOD PRESSURE: 99 MMHG | RESPIRATION RATE: 20 BRPM | DIASTOLIC BLOOD PRESSURE: 77 MMHG | OXYGEN SATURATION: 100 % | HEART RATE: 109 BPM

## 2018-03-18 VITALS — HEART RATE: 101 BPM

## 2018-03-18 VITALS
TEMPERATURE: 97 F | SYSTOLIC BLOOD PRESSURE: 95 MMHG | OXYGEN SATURATION: 96 % | DIASTOLIC BLOOD PRESSURE: 60 MMHG | HEART RATE: 105 BPM | RESPIRATION RATE: 20 BRPM

## 2018-03-18 VITALS
OXYGEN SATURATION: 98 % | DIASTOLIC BLOOD PRESSURE: 75 MMHG | TEMPERATURE: 98.6 F | RESPIRATION RATE: 21 BRPM | SYSTOLIC BLOOD PRESSURE: 100 MMHG | HEART RATE: 110 BPM

## 2018-03-18 VITALS
DIASTOLIC BLOOD PRESSURE: 59 MMHG | OXYGEN SATURATION: 95 % | HEART RATE: 104 BPM | TEMPERATURE: 96.8 F | RESPIRATION RATE: 20 BRPM | SYSTOLIC BLOOD PRESSURE: 93 MMHG

## 2018-03-18 VITALS
HEART RATE: 111 BPM | RESPIRATION RATE: 20 BRPM | TEMPERATURE: 97.8 F | SYSTOLIC BLOOD PRESSURE: 103 MMHG | DIASTOLIC BLOOD PRESSURE: 81 MMHG | OXYGEN SATURATION: 94 %

## 2018-03-18 VITALS — OXYGEN SATURATION: 98 %

## 2018-03-18 VITALS — OXYGEN SATURATION: 96 %

## 2018-03-18 VITALS — OXYGEN SATURATION: 99 %

## 2018-03-18 VITALS — HEART RATE: 110 BPM

## 2018-03-18 LAB
ALBUMIN SERPL-MCNC: 2.4 GM/DL (ref 3.4–5)
ALP SERPL-CCNC: 177 U/L (ref 45–117)
ALT SERPL-CCNC: 113 U/L (ref 10–53)
AST SERPL-CCNC: 395 U/L (ref 15–37)
BASOPHILS # BLD AUTO: 0.1 TH/MM3 (ref 0–0.2)
BASOPHILS NFR BLD: 0.4 % (ref 0–2)
BILIRUB SERPL-MCNC: 0.9 MG/DL (ref 0.2–1)
BUN SERPL-MCNC: 23 MG/DL (ref 7–18)
CALCIUM SERPL-MCNC: 8.5 MG/DL (ref 8.5–10.1)
CHLORIDE SERPL-SCNC: 95 MEQ/L (ref 98–107)
CREAT SERPL-MCNC: 1.57 MG/DL (ref 0.5–1)
EOSINOPHIL # BLD: 0.2 TH/MM3 (ref 0–0.4)
EOSINOPHIL NFR BLD: 1.3 % (ref 0–4)
ERYTHROCYTE [DISTWIDTH] IN BLOOD BY AUTOMATED COUNT: 22.5 % (ref 11.6–17.2)
GFR SERPLBLD BASED ON 1.73 SQ M-ARVRAT: 37 ML/MIN (ref 89–?)
GLUCOSE SERPL-MCNC: 90 MG/DL (ref 74–106)
HCO3 BLD-SCNC: 25 MEQ/L (ref 21–32)
HCT VFR BLD CALC: 26.4 % (ref 35–46)
HGB BLD-MCNC: 8.4 GM/DL (ref 11.6–15.3)
INR PPP: 3.1 RATIO
LYMPHOCYTES # BLD AUTO: 1.6 TH/MM3 (ref 1–4.8)
LYMPHOCYTES NFR BLD AUTO: 10.8 % (ref 9–44)
MAGNESIUM SERPL-MCNC: 1.6 MG/DL (ref 1.5–2.5)
MCH RBC QN AUTO: 24.2 PG (ref 27–34)
MCHC RBC AUTO-ENTMCNC: 32 % (ref 32–36)
MCV RBC AUTO: 75.6 FL (ref 80–100)
MONOCYTE #: 1 TH/MM3 (ref 0–0.9)
MONOCYTES NFR BLD: 6.9 % (ref 0–8)
NEUTROPHILS # BLD AUTO: 12.1 TH/MM3 (ref 1.8–7.7)
NEUTROPHILS NFR BLD AUTO: 80.6 % (ref 16–70)
PHOSPHATE SERPL-MCNC: 3.6 MG/DL (ref 2.5–4.9)
PLATELET # BLD: 308 TH/MM3 (ref 150–450)
PMV BLD AUTO: 8.2 FL (ref 7–11)
PROT SERPL-MCNC: 7 GM/DL (ref 6.4–8.2)
PROTHROMBIN TIME: 31.2 SEC (ref 9.8–11.6)
RBC # BLD AUTO: 3.49 MIL/MM3 (ref 4–5.3)
SODIUM SERPL-SCNC: 130 MEQ/L (ref 136–145)
WBC # BLD AUTO: 15 TH/MM3 (ref 4–11)

## 2018-03-18 RX ADMIN — HYDROMORPHONE HYDROCHLORIDE PRN MG: 2 INJECTION INTRAMUSCULAR; INTRAVENOUS; SUBCUTANEOUS at 13:33

## 2018-03-18 RX ADMIN — HYDROMORPHONE HYDROCHLORIDE PRN MG: 2 INJECTION INTRAMUSCULAR; INTRAVENOUS; SUBCUTANEOUS at 09:11

## 2018-03-18 RX ADMIN — ALBUTEROL SULFATE PRN MG: 2.5 SOLUTION RESPIRATORY (INHALATION) at 00:58

## 2018-03-18 RX ADMIN — AMPICILLIN SODIUM SCH MLS/HR: 2 INJECTION, POWDER, FOR SOLUTION INTRAMUSCULAR; INTRAVENOUS at 13:34

## 2018-03-18 RX ADMIN — Medication SCH ML: at 09:11

## 2018-03-18 RX ADMIN — Medication SCH ML: at 21:44

## 2018-03-18 RX ADMIN — ALBUTEROL SULFATE PRN MG: 2.5 SOLUTION RESPIRATORY (INHALATION) at 14:13

## 2018-03-18 RX ADMIN — FUROSEMIDE SCH MG: 10 INJECTION, SOLUTION INTRAMUSCULAR; INTRAVENOUS at 19:14

## 2018-03-18 RX ADMIN — AMPICILLIN SODIUM SCH MLS/HR: 2 INJECTION, POWDER, FOR SOLUTION INTRAMUSCULAR; INTRAVENOUS at 02:35

## 2018-03-18 RX ADMIN — POTASSIUM CHLORIDE SCH MEQ: 750 TABLET, FILM COATED, EXTENDED RELEASE ORAL at 21:41

## 2018-03-18 RX ADMIN — AMPICILLIN SODIUM SCH MLS/HR: 2 INJECTION, POWDER, FOR SOLUTION INTRAMUSCULAR; INTRAVENOUS at 21:40

## 2018-03-18 RX ADMIN — STANDARDIZED SENNA CONCENTRATE AND DOCUSATE SODIUM SCH TAB: 8.6; 5 TABLET, FILM COATED ORAL at 21:00

## 2018-03-18 RX ADMIN — AMPICILLIN SODIUM SCH MLS/HR: 2 INJECTION, POWDER, FOR SOLUTION INTRAMUSCULAR; INTRAVENOUS at 09:10

## 2018-03-18 RX ADMIN — ALBUTEROL SULFATE PRN MG: 2.5 SOLUTION RESPIRATORY (INHALATION) at 20:19

## 2018-03-18 RX ADMIN — HYDROMORPHONE HYDROCHLORIDE PRN MG: 2 INJECTION INTRAMUSCULAR; INTRAVENOUS; SUBCUTANEOUS at 00:49

## 2018-03-18 RX ADMIN — ALBUMIN HUMAN SCH MLS/HR: 0.25 SOLUTION INTRAVENOUS at 19:14

## 2018-03-18 RX ADMIN — SODIUM CHLORIDE SCH MLS/HR: 900 INJECTION INTRAVENOUS at 17:34

## 2018-03-18 RX ADMIN — HYDROMORPHONE HYDROCHLORIDE PRN MG: 2 INJECTION INTRAMUSCULAR; INTRAVENOUS; SUBCUTANEOUS at 21:42

## 2018-03-18 RX ADMIN — WARFARIN SODIUM SCH MG: 2 TABLET ORAL at 17:33

## 2018-03-18 RX ADMIN — ALBUTEROL SULFATE PRN MG: 2.5 SOLUTION RESPIRATORY (INHALATION) at 08:54

## 2018-03-18 RX ADMIN — HYDROMORPHONE HYDROCHLORIDE PRN MG: 2 INJECTION INTRAMUSCULAR; INTRAVENOUS; SUBCUTANEOUS at 05:01

## 2018-03-18 RX ADMIN — STANDARDIZED SENNA CONCENTRATE AND DOCUSATE SODIUM SCH TAB: 8.6; 5 TABLET, FILM COATED ORAL at 09:10

## 2018-03-18 RX ADMIN — HYDROMORPHONE HYDROCHLORIDE PRN MG: 2 INJECTION INTRAMUSCULAR; INTRAVENOUS; SUBCUTANEOUS at 17:33

## 2018-03-18 RX ADMIN — POTASSIUM CHLORIDE SCH MEQ: 750 TABLET, FILM COATED, EXTENDED RELEASE ORAL at 09:10

## 2018-03-18 RX ADMIN — CHLORHEXIDINE GLUCONATE SCH PACK: 500 CLOTH TOPICAL at 04:00

## 2018-03-18 NOTE — HHI.IDPN
Note


Infectious Disease Note














Patient says she is okay.


She is awake and alert.


Feels such as shortness of breath is stable.


Sitting up in bedside recliner.


On nasal cannula.


Afebrile.


Denies chills.  Legs remain markedly swollen.


Renal function slow to improve.





37-year-old white female who has a history of endocarditis and has


been admitted several times for the same.  The patient developed shortness of


breath, fever and chills, and  presented to the emergency department.  The


patient is noted to be actively using IV drugs.  She has history of significant


CHF from prior valvular heart disease related to endocarditis.  She states that


she started feeling short of breath about a week ago and the shortness of


breath worsened.  After she completed IV antibiotics in October she was


put on doxycycline prophylaxis.  She reports that she has not been taking the


doxycycline.  


 


PAST MEDICAL HISTORY:


Hepatitis C.


IV drug use.


prosthetic aortic valve replacement in 2011 and then redo aortic


valve replacement in June 2016 





ANTIBIOTICS:


Ampicillin.














SOCIAL HISTORY:


Positive occasional alcohol.  No tobacco. IV drug use in the form of Dilaudid,


oxycodone.  The patient also uses marijuana.


 








OBJECTIVE:





Vital Signs








  Date Time  Temp Pulse Resp B/P (MAP) Pulse Ox O2 Delivery O2 Flow Rate FiO2


 


3/18/18 12:00 96.8 104 20 93/59 (70) 95   


 


3/18/18 12:00  104      


 


3/18/18 08:56     96 Nasal Cannula 3.00 


 


3/18/18 08:00  109      


 


3/18/18 08:00 98.4 105 20 96/62 (73) 95   


 


3/18/18 04:01  110      


 


3/18/18 04:00 97.8 111 20 103/81 (88) 94   


 


3/18/18 01:01     98 Nasal Cannula 2.00 


 


3/18/18 00:23 97.5 109 20 99/77 (84) 100   


 


3/17/18 23:54  108      


 


3/17/18 20:00  107      


 


3/17/18 20:00 98.4 111 22 98/73 (81) 100   


 


3/17/18 19:45      Nasal Cannula 2.00 





      Humidified  


 


3/17/18 19:39  108      


 


3/17/18 16:36  103      


 


3/17/18 16:00 97.2 100 20 97/66 (76) 100   


 


3/17/18 15:14  112      











Laboratory Tests








Test


  3/16/18


20:00 3/17/18


04:02 3/18/18


04:05


 


White Blood Count 14.1 TH/MM3  15.3 TH/MM3  15.0 TH/MM3 


 


Red Blood Count 3.51 MIL/MM3  3.48 MIL/MM3  3.49 MIL/MM3 


 


Hemoglobin 8.3 GM/DL  8.3 GM/DL  8.4 GM/DL 


 


Hematocrit 26.4 %  26.2 %  26.4 % 


 


Mean Corpuscular Volume 75.2 FL  75.4 FL  75.6 FL 


 


Mean Corpuscular Hemoglobin 23.6 PG  23.8 PG  24.2 PG 


 


Mean Corpuscular Hemoglobin


Concent 31.4 % 


  31.5 % 


  32.0 % 


 


 


Red Cell Distribution Width 22.3 %  22.9 %  22.5 % 


 


Platelet Count 349 TH/MM3  364 TH/MM3  308 TH/MM3 


 


Mean Platelet Volume 8.4 FL  8.5 FL  8.2 FL 


 


Neutrophils (%) (Auto) 79.4 %  78.9 %  80.6 % 


 


Lymphocytes (%) (Auto) 9.2 %  10.7 %  10.8 % 


 


Monocytes (%) (Auto) 7.5 %  7.2 %  6.9 % 


 


Eosinophils (%) (Auto) 2.6 %  1.9 %  1.3 % 


 


Basophils (%) (Auto) 1.3 %  1.3 %  0.4 % 


 


Neutrophils # (Auto) 11.2 TH/MM3  12.1 TH/MM3  12.1 TH/MM3 


 


Lymphocytes # (Auto) 1.3 TH/MM3  1.6 TH/MM3  1.6 TH/MM3 


 


Monocytes # (Auto) 1.1 TH/MM3  1.1 TH/MM3  1.0 TH/MM3 


 


Eosinophils # (Auto) 0.4 TH/MM3  0.3 TH/MM3  0.2 TH/MM3 


 


Basophils # (Auto) 0.2 TH/MM3  0.2 TH/MM3  0.1 TH/MM3 


 


CBC Comment DIFF FINAL  DIFF FINAL  DIFF FINAL 


 


Differential Comment      








Laboratory Tests








Test


  3/17/18


04:02 3/18/18


04:05


 


Blood Urea Nitrogen 24 MG/DL  23 MG/DL 


 


Creatinine 1.52 MG/DL  1.57 MG/DL 


 


Random Glucose 100 MG/DL  90 MG/DL 


 


Calcium Level 8.3 MG/DL  8.5 MG/DL 


 


Sodium Level 131 MEQ/L  130 MEQ/L 


 


Potassium Level 4.2 MEQ/L  4.7 MEQ/L 


 


Chloride Level 93 MEQ/L  95 MEQ/L 


 


Carbon Dioxide Level 26.6 MEQ/L  25.0 MEQ/L 


 


Anion Gap 11 MEQ/L  10 MEQ/L 


 


Estimat Glomerular Filtration


Rate 39 ML/MIN 


  37 ML/MIN 


 


 


Total Protein  7.0 GM/DL 


 


Albumin  2.4 GM/DL 


 


Phosphorus Level  3.6 MG/DL 


 


Magnesium Level  1.6 MG/DL 


 


Alkaline Phosphatase  177 U/L 


 


Aspartate Amino Transf


(AST/SGOT) 


  395 U/L 


 


 


Alanine Aminotransferase


(ALT/SGPT) 


  113 U/L 


 


 


Total Bilirubin  0.9 MG/DL 











IMAGING:














Chest X-Ray 3/16/18 0000 Signed





Impressions: 





 Service Date/Time:  Friday, March 16, 2018 13:30 - CONCLUSION:  1. Right 

basilar 





 patchy discussed with probable pneumonia.  2. Cardiomegaly.     Michi Johnson MD 

















Chest X-Ray 3/5/18 0600 Signed





Impressions: 





 Service Date/Time:  Monday, March 5, 2018 03:52 - CONCLUSION:  There is a 

small 





 to moderate size right pleural effusion with associated volume loss and/or 





 airspace consolidation. The pleural effusion has increased from the prior 





 examination.     Ron Melgar MD 


 


Head CT 3/3/18 0000 Signed





Impressions: 





 Service Date/Time:  Saturday, March 3, 2018 10:22 - CONCLUSION:  1. Focal area 





 of decreased density involving the right parietal lobe. As a new finding from 





 the prior exam. This could relate to a small infarct. MRI of the brain with 





 gadolinium suggested to further evaluate. 2. Small lacunar infarction 

involving 





 the left centrum semiovale.     Scooter Dawson Jr., MD 


 


CT Angiography 3/3/18 0000 Signed





Impressions: 





 Service Date/Time:  Saturday, March 3, 2018 10:28 - CONCLUSION:  There 

extensive 





 pulmonary emboli bilaterally. There is near complete cut off of the right 

lower 





 lobe pulmonary artery. Prominent filling defects on the left as well. These 





 findings were relayed to Dr. Bustos immediately at the completion of the study.  





 Scattered pulmonary infiltrates, small pleural effusion and extensive 





 mediastinal lymphadenopathy as described above.      Won Sharp MD 


 


Brain MRI 3/3/18 0000 Signed





Impressions: 





 Service Date/Time:  Saturday, March 3, 2018 18:04 - CONCLUSION:  Deterioration 





 in the appearance of the scan.  At least 3 focal areas of abnormal contrast 





 enhancement are present scattered to in both hemispheres.  Given the history 





 this most likely represents developing areas cerebritis.  Exam is compromised 

by 





 an uncooperative patient and motion artifact.  These 2 factors make detection 

of 





 other abnormalities such as cortical cephalitis and meningitis difficult to 





 exclude.  Cannot exclude hemorrhagic lesions as well.  There is no mass effect 





 evident.      Jorge Diane MD 




















Renal Ultrasound 2/24/18 0000 Signed





Impressions: 





 Service Date/Time:  Saturday, February 24, 2018 10:53 - CONCLUSION:  1. No 





 evidence of hydronephrosis. 2. Thickening of the urinary bladder wall a 1.1 

cm. 





 3. Prominent splenomegaly.     Elgin Walton MD 


 


Chest X-Ray 2/24/18 0000 Signed





Impressions: 





 Service Date/Time:  Saturday, February 24, 2018 09:36 - CONCLUSION:  Right 





 internal jugular central line in place with the tip overlying the SVC. A 





 pneumothorax is not seen.     Ron Brown MD 


 


Breast Ultrasound 2/24/18 0000 Signed





Impressions: 





 Service Date/Time:  Saturday, February 24, 2018 10:48 - CONCLUSION:  Negative 





 targeted right breast ultrasound examination.     Ron Brown MD 


 


Lower Extremity Ultrasound 2/23/18 0000 Signed





Impressions: 





 Service Date/Time:  Friday, February 23, 2018 21:22 - CONCLUSION:  1. No 





 sonographic evidence for lower extremity DVT. 2. Incidental note of bilateral 





 inguinal adenopathy with the largest on the right measuring 3.3 cm and the 





 largest on the left measuring 2.6 cm. This finding is nonspecific but is 





 typically reactive in etiology.     Rj Whitten MD 














PHYSICAL EXAMINATION


GENERAL: No acute distress. 


HEENT:  No icterus.  Oropharynx moist mucosa, no lesions.


Neck:  Supple without adenopathy.


Lungs: Bilateral basilar rhonchi.  Good air movement.


Heart: 5/6 blowing systolic murmur at the upper right sternal border.


Abdomen: Bowel sounds present, soft, obese, nontender.


Extremities: Diffuse 3+ edema of the lower extremities. Lesions at Left tibia 


distally and left foot and great toe are faded. 


No calf tenderness.   No nail hemorrhages.  


SKIN: no diffuse rash.  Warm and moist.


Neuro: No gross focal findings.


Psychiatric: calm and cooperative.





 


IMPRESSION


1. Sepsis. Strep Viridans. 


2. Tricuspid valve endocarditis.  Large vegetation.  Severe tricuspid 

regurgitation.


Patient evaluated by cardiovascular surgery and felt not to be a surgical 

candidate.


3.  Bacteremia.  Strep viridans.  Blood cultures have remained negative on 

follow-up.


Last positive blood culture was on 2/23.


4. History of prosthetic aortic valve and so likely recurrent prosthetic valve


endocarditis.


5.  Cerebritis on MRI.  Also has parietal infarct noted on CT scan of the brain.


6. Left renal cortical and pulmonary and lower extremity septic emboli. 


7. Leukocytosis secondary to sepsis from showering of emboli from endocarditis.


8. Acute renal failure. Kidney function slow to improve.





RECOMMENDATIONS


1.  Continue Ampicillin intravenous.


2.  Add ceftriaxone for pulmonary coverage.


3.  Central line change.


4.  Monitor clinical status.





Plan on intravenous antibiotics until April 6.  Which will be 6 weeks after the 

last 


positive blood culture.  After that she should continue with prophylactic oral 


antibiotics indefinitely. Continue to monitor her blood counts and renal 

function.














Robinson Carr MD Mar 18, 2018 15:16

## 2018-03-18 NOTE — HHI.NPPN
Subjective


Additional Remarks


no acute complaints





Objective Data


Data











 3/18/18 3/19/18





 19:00 07:00


 


  


 


# Voids 4 


 


# Bowel Movements 1 











Vital Signs








  Date Time  Temp Pulse Resp B/P (MAP) Pulse Ox O2 Delivery O2 Flow Rate FiO2


 


3/18/18 17:57  101      


 


3/18/18 16:00 97.0 105 20 95/60 (72) 96   


 


3/18/18 12:00 96.8 104 20 93/59 (70) 95   


 


3/18/18 12:00  104      


 


3/18/18 08:56     96 Nasal Cannula 3.00 


 


3/18/18 08:00  109      


 


3/18/18 08:00      Nasal Cannula 3.00 


 


3/18/18 08:00 98.4 105 20 96/62 (73) 95   


 


3/18/18 04:01  110      


 


3/18/18 04:00 97.8 111 20 103/81 (88) 94   


 


3/18/18 01:01     98 Nasal Cannula 2.00 


 


3/18/18 00:23 97.5 109 20 99/77 (84) 100   


 


3/17/18 23:54  108      


 


3/17/18 20:00  107      


 


3/17/18 20:00 98.4 111 22 98/73 (81) 100   


 


3/17/18 19:45      Nasal Cannula 2.00 





      Humidified  


 


3/17/18 19:39  108      








-:  


3/18/18 0405                                                                   

             3/18/18 0405








Physical Exam


General


Appearance:  No Acute Distress





Eyes


Eye Exam:  Pupils Equal





Throat


Throat Exam:  Oral Mucosa Pink & Moist





Neck


Neck Exam:  Neck Supple





Pulmonary


Resp Exam:  Clear Bilaterally





Cardiology


CV Exam:  Regular, Normal Sinus Rhythm





Gastrointestinal/Abdomen


GI Exam:  Soft, Non-Tender





Integumentary


Skin Exam:  Dry, Intact





Extremeties


Extremities Exam:  No Edema





Neurologic


Neuro Exam:  Alert, Awake, Oriented





Assessment/Plan


Problem List:  


(1) Acute kidney injury


ICD Codes:  N17.9 - Acute kidney failure, unspecified


Status:  Acute


Plan:  


NOÉ likely due to vancomycin toxicity, possible post-infectious GN





Urine eosinophils negative, complements wnl





Creatinine improving from 1.7 -> 1.5 -> 1.5 today.








Edema due to malnutrition, albumin level of 2 last week. 





Will start lasix 20mg IV BID with albumin for edema.








(2) Prosthetic valve endocarditis


ICD Codes:  T82.6XXA - Infection and inflammatory reaction due to cardiac valve 

prosthesis, initial encounter


Status:  Acute


Plan:  septic endocarditis with strep viridans


IV drug use, pulmonary emboli as well.





Continues ampicillin per ID, started ceftriaxone. 


continue renal dosing antibiotics.





(3) Peripheral arterial occlusive disease


ICD Codes:  I77.9 - Disorder of arteries and arterioles, unspecified


Plan:  seen with vascular, continue medical management


Hold contrast studies as possible.





(4) Hepatitis C


ICD Codes:  B19.20 - Unspecified viral hepatitis C without hepatic coma


Status:  Chronic





Problem Qualifiers





(1) Prosthetic valve endocarditis:  


Qualified Codes:  T82.6XXA - Infection and inflammatory reaction due to cardiac 

valve prosthesis, initial encounter








Felipe Fontanez MD Mar 18, 2018 18:00

## 2018-03-18 NOTE — HHI.PR
Subjective


Remarks


The patient denies chest pain, feels short of breath on exertion.


Please of edema in the lower extremities.


Denies fevers or chills.


Patient has been a febrile.


Creatinine with slight elevation from 1.52-1.57.





Objective


Vitals





Vital Signs








  Date Time  Temp Pulse Resp B/P (MAP) Pulse Ox O2 Delivery O2 Flow Rate FiO2


 


3/18/18 12:00 96.8 104 20 93/59 (70) 95   


 


3/18/18 08:56     96 Nasal Cannula 3.00 


 


3/18/18 08:00  109      


 


3/18/18 08:00 98.4 105 20 96/62 (73) 95   


 


3/18/18 04:01  110      


 


3/18/18 04:00 97.8 111 20 103/81 (88) 94   


 


3/18/18 01:01     98 Nasal Cannula 2.00 


 


3/18/18 00:23 97.5 109 20 99/77 (84) 100   


 


3/17/18 23:54  108      


 


3/17/18 20:00  107      


 


3/17/18 20:00 98.4 111 22 98/73 (81) 100   


 


3/17/18 19:45      Nasal Cannula 2.00 





      Humidified  


 


3/17/18 19:39  108      


 


3/17/18 16:36  103      


 


3/17/18 16:00 97.2 100 20 97/66 (76) 100   


 


3/17/18 15:14  112      














I/O      


 


 3/17/18 3/17/18 3/17/18 3/18/18 3/18/18 3/18/18





 07:00 15:00 23:00 07:00 15:00 23:00


 


Intake Total 820 ml 100 ml 210 ml 1115 ml  


 


Balance 820 ml 100 ml 210 ml 1115 ml  


 


      


 


Intake Oral 720 ml   1000 ml  


 


IV Total 100 ml 100 ml 210 ml 115 ml  


 


# Voids 3  4 3  


 


# Bowel Movements 1  1 2  








Result Diagram:  


3/18/18 0405                                                                   

             3/18/18 0405





Imaging





Last Impressions








Chest X-Ray 3/16/18 0000 Signed





Impressions: 





 Service Date/Time:  Friday, March 16, 2018 13:30 - CONCLUSION:  1. Right 

basilar 





 patchy discussed with probable pneumonia.  2. Cardiomegaly.     Michi Johnson MD 


 


Head CT 3/3/18 0000 Signed





Impressions: 





 Service Date/Time:  Saturday, March 3, 2018 10:22 - CONCLUSION:  1. Focal area 





 of decreased density involving the right parietal lobe. As a new finding from 





 the prior exam. This could relate to a small infarct. MRI of the brain with 





 gadolinium suggested to further evaluate. 2. Small lacunar infarction 

involving 





 the left centrum semiovale.     Scooter Dawson Jr., MD 


 


CT Angiography 3/3/18 0000 Signed





Impressions: 





 Service Date/Time:  Saturday, March 3, 2018 10:28 - CONCLUSION:  There 

extensive 





 pulmonary emboli bilaterally. There is near complete cut off of the right 

lower 





 lobe pulmonary artery. Prominent filling defects on the left as well. These 





 findings were relayed to Dr. Bustos immediately at the completion of the study.  





 Scattered pulmonary infiltrates, small pleural effusion and extensive 





 mediastinal lymphadenopathy as described above.      Won Sharp MD 


 


Brain MRI 3/3/18 0000 Signed





Impressions: 





 Service Date/Time:  Saturday, March 3, 2018 18:04 - CONCLUSION:  Deterioration 





 in the appearance of the scan.  At least 3 focal areas of abnormal contrast 





 enhancement are present scattered to in both hemispheres.  Given the history 





 this most likely represents developing areas cerebritis.  Exam is compromised 

by 





 an uncooperative patient and motion artifact.  These 2 factors make detection 

of 





 other abnormalities such as cortical cephalitis and meningitis difficult to 





 exclude.  Cannot exclude hemorrhagic lesions as well.  There is no mass effect 





 evident.      Jorge Diane MD 


 


Chest CT 2/26/18 0000 Signed





Impressions: 





 Service Date/Time:  Monday, February 26, 2018 12:41 - CONCLUSION:  1. There 

are 





 at least 5 pulmonary nodules present bilaterally with the largest measuring 19 





 mm. None demonstrate cavitation but it is possible that these represent septic 





 emboli. Suggest followup to assess for change. 2. Splenomegaly with subtle 





 wedge-shaped areas of low-density in the mid spleen which could represent 





 infarcts. 3. Mild cardiomegaly in this patient post aortic valve replacement. 





 Low density of the cardiac blood pool suggests anemia.      Ron Melgar MD 


 


Aorta w/Runoff CTA 2/25/18 0000 Signed





Impressions: 





 Service Date/Time:  Sunday, February 25, 2018 12:47 - CONCLUSION:  Left renal 





 cortical infarction. 5-6 cm length total occlusion of the left superficial 





 femoral artery in the proximal thigh. Nodular parenchymal opacities in the 

lung 





 bases bilaterally See above discussion.     Ron Cruz MD 


 


Renal Ultrasound 2/24/18 0000 Signed





Impressions: 





 Service Date/Time:  Saturday, February 24, 2018 10:53 - CONCLUSION:  1. No 





 evidence of hydronephrosis. 2. Thickening of the urinary bladder wall a 1.1 

cm. 





 3. Prominent splenomegaly.     Elgin Walton MD 


 


Breast Ultrasound 2/24/18 0000 Signed





Impressions: 





 Service Date/Time:  Saturday, February 24, 2018 10:48 - CONCLUSION:  Negative 





 targeted right breast ultrasound examination.     Ron Brown MD 


 


Lower Extremity Ultrasound 2/23/18 0000 Signed





Impressions: 





 Service Date/Time:  Friday, February 23, 2018 21:22 - CONCLUSION:  1. No 





 sonographic evidence for lower extremity DVT. 2. Incidental note of bilateral 





 inguinal adenopathy with the largest on the right measuring 3.3 cm and the 





 largest on the left measuring 2.6 cm. This finding is nonspecific but is 





 typically reactive in etiology.     Rj Whitten MD 








Objective Remarks


AAOx3


NAD


Clear lungs BL


S1S2 4/6 systolic ejection murmur


+3 BL lower extremity edema without erythema.


Medications and IVs





Current Medications








 Medications


  (Trade)  Dose


 Ordered  Sig/Nikhil


 Route  Start Time


 Stop Time Status Last Admin


 


  (NS Flush)  2 ml  UNSCH  PRN


 IV FLUSH  2/23/18 22:00


    3/12/18 01:03


 


 


  (NS Flush)  2 ml  BID


 IV FLUSH  2/24/18 09:00


    3/18/18 09:11


 


 


  (Tylenol)  650 mg  Q6H  PRN


 PO  2/23/18 22:00


    3/5/18 16:02


 


 


  (Zofran Inj)  4 mg  Q6H  PRN


 IV PUSH  2/23/18 22:00


    3/8/18 21:22


 


 


  (Restoril)  15 mg  HS  PRN


 PO  2/23/18 22:00


    3/6/18 22:08


 


 


 Miscellaneous


 Information  1  Q361D


 XX  2/23/18 22:00


     


 


 


  (Chlorhexidine


 2% Cloth)  3 pack


 Taper  DAILY@04


 TOP  2/24/18 04:00


 2/20/19 03:59  3/6/18 03:54


 


 


  (Chlorhexidine


 2% Cloth)  3 pack  UNSCH  PRN


 TOP  2/23/18 22:00


     


 


 


  (Arlen-Colace)  1 tab  BID


 PO  2/24/18 09:00


    3/18/18 09:10


 


 


  (Milk Of


 Magnesia Liq)  30 ml  Q12H  PRN


 PO  2/23/18 22:00


     


 


 


  (Senokot)  17.2 mg  Q12H  PRN


 PO  2/23/18 22:00


     


 


 


  (Dulcolax Supp)  10 mg  DAILY  PRN


 RECTAL  2/23/18 22:00


     


 


 


  (Lactulose Liq)  30 ml  DAILY  PRN


 PO  2/23/18 22:00


     


 


 


 Ampicillin Sodium


 2000 mg/Sodium


 Chloride  100 ml @ 


 400 mls/hr  Q6H


 IV  2/24/18 14:00


    3/18/18 13:34


 


 


  (Imodium)  2 mg  Q4H  PRN


 PO  2/26/18 16:15


    2/28/18 14:32


 


 


  (Albuterol Neb)  2.5 mg  Q2HR NEB  PRN


 NEB  2/27/18 11:30


    3/18/18 08:54


 


 


  (Norco  Mg)  1 tab  Q4H  PRN


 PO  3/4/18 15:00


    3/9/18 05:02


 


 


  (Dilaudid Pf Inj)  1 mg  Q4H  PRN


 IV PUSH  3/7/18 19:00


    3/18/18 13:33


 


 


  (KCl)  60 meq  Q12HR


 PO  3/14/18 09:45


    3/18/18 09:10


 


 


  (Coumadin)  2 mg  DAILY@16


 PO  3/16/18 18:45


   Future hold 3/17/18 16:16


 








Vascular Central Line Catheter:  Yes


Assessment to:  Continue


Date of Insertion:  Feb 24, 2018


Line:  Central Venous Catheter


Side:  Right


Location:  Internal, Jugular


Reason for Continuation


IV antibiotics.





A/P


Problem List:  


(1) Prosthetic valve endocarditis


ICD Code:  T82.6XXA - Infection and inflammatory reaction due to cardiac valve 

prosthesis, initial encounter


Status:  Acute


(2) Bacteremia


ICD Code:  R78.81 - Bacteremia


(3) Septic pulmonary embolism


ICD Code:  I26.90 - Septic pulmonary embolism without acute cor pulmonale


Status:  Acute


(4) IVDU (intravenous drug user)


ICD Code:  F19.90 - Other psychoactive substance use, unspecified, uncomplicated


(5) Anemia


ICD Code:  D64.9 - Anemia, unspecified


Status:  Chronic


(6) NOÉ (acute kidney injury)


ICD Code:  N17.9 - Acute kidney failure, unspecified


Plan:  Creatinine slightly worsened from 1.52-1.57.


Nephrology consulted.


Possible ATN from vancomycin toxicity or from infection.


Urine eosinophils negative.


Continue to hold diuretics as per nephrology.  Nephrology recommends waiting 

for some improvement in renal function before some diuretics could be 

administered.


Continue to monitor BUN and creatinine, avoid nephrotoxins.





Assessment and Plan


(1) Prosthetic valve endocarditis


Plan:  Continue antibiotics as per ID.


Patient on IV ampicillin.


Continue to hold coumadin until INR < 3 - goal 2-3.





(2) Bacteremia


Plan:  Blood cultures obtained on 2/23 growing strep viridans.


Subsequent repeat blood cultures negative.


Patient has a right IJ central line which has been there for





(3) Septic pulmonary embolism


Plan:  As observed on a CT angiography of the chest obtained on 3/3/18.


chocardiogram on 3/3 showed an estimated EF of 50-55%.  


++severe tricuspid regurgitation with prosthesis in place.  


++2 x 4 cm mobile vegetation was seen on the valve.  The right atrium and right 

ventricle were normal in size and function


Hematology following.


Management of Coumadin as per hematology recommendations.


Goal INR 2-3.





(4) IVDU (intravenous drug user)


Plan:  Counseled on drug cessation.





(5) Anemia


Plan:  Likely secondary to inflammation/infection.  No evidence of bleeding or 

hemolysis.


Monitor hemoglobin and transfuse as needed.








I will consult interventional radiology for a right central IJ line exchange.  

Patient has a central line that has been there for 2 weeks.  Discussed with Dr. Feliciano danielle from infectious disease.





Problem Qualifiers





(1) Prosthetic valve endocarditis:  


Qualified Codes:  T82.6XXA - Infection and inflammatory reaction due to cardiac 

valve prosthesis, initial encounter


(2) Anemia:  


Qualified Codes:  D63.8 - Anemia in other chronic diseases classified elsewhere








Jesus Mae MD Mar 18, 2018 13:47

## 2018-03-19 VITALS
DIASTOLIC BLOOD PRESSURE: 70 MMHG | SYSTOLIC BLOOD PRESSURE: 98 MMHG | OXYGEN SATURATION: 96 % | TEMPERATURE: 97.8 F | RESPIRATION RATE: 20 BRPM | HEART RATE: 104 BPM

## 2018-03-19 VITALS
TEMPERATURE: 97.7 F | SYSTOLIC BLOOD PRESSURE: 97 MMHG | RESPIRATION RATE: 18 BRPM | HEART RATE: 107 BPM | DIASTOLIC BLOOD PRESSURE: 67 MMHG | OXYGEN SATURATION: 93 %

## 2018-03-19 VITALS
DIASTOLIC BLOOD PRESSURE: 79 MMHG | RESPIRATION RATE: 18 BRPM | OXYGEN SATURATION: 97 % | HEART RATE: 109 BPM | SYSTOLIC BLOOD PRESSURE: 98 MMHG | TEMPERATURE: 97.8 F

## 2018-03-19 VITALS
SYSTOLIC BLOOD PRESSURE: 99 MMHG | TEMPERATURE: 99.8 F | OXYGEN SATURATION: 91 % | DIASTOLIC BLOOD PRESSURE: 57 MMHG | HEART RATE: 119 BPM | RESPIRATION RATE: 20 BRPM

## 2018-03-19 VITALS
RESPIRATION RATE: 18 BRPM | OXYGEN SATURATION: 95 % | SYSTOLIC BLOOD PRESSURE: 94 MMHG | HEART RATE: 108 BPM | DIASTOLIC BLOOD PRESSURE: 73 MMHG | TEMPERATURE: 97.8 F

## 2018-03-19 VITALS — HEART RATE: 106 BPM

## 2018-03-19 VITALS — HEART RATE: 114 BPM

## 2018-03-19 VITALS
DIASTOLIC BLOOD PRESSURE: 71 MMHG | TEMPERATURE: 97.4 F | SYSTOLIC BLOOD PRESSURE: 96 MMHG | RESPIRATION RATE: 18 BRPM | HEART RATE: 94 BPM | OXYGEN SATURATION: 94 %

## 2018-03-19 VITALS — TEMPERATURE: 100.3 F

## 2018-03-19 VITALS — HEART RATE: 100 BPM

## 2018-03-19 VITALS — HEART RATE: 92 BPM

## 2018-03-19 VITALS — HEART RATE: 112 BPM

## 2018-03-19 LAB
ALBUMIN SERPL-MCNC: 2.3 GM/DL (ref 3.4–5)
ALP SERPL-CCNC: 150 U/L (ref 45–117)
ALT SERPL-CCNC: 93 U/L (ref 10–53)
AST SERPL-CCNC: 201 U/L (ref 15–37)
BASOPHILS # BLD AUTO: 0.2 TH/MM3 (ref 0–0.2)
BASOPHILS NFR BLD: 1.1 % (ref 0–2)
BILIRUB SERPL-MCNC: 0.7 MG/DL (ref 0.2–1)
BUN SERPL-MCNC: 22 MG/DL (ref 7–18)
CALCIUM SERPL-MCNC: 8 MG/DL (ref 8.5–10.1)
CHLORIDE SERPL-SCNC: 99 MEQ/L (ref 98–107)
CREAT SERPL-MCNC: 1.62 MG/DL (ref 0.5–1)
EOSINOPHIL # BLD: 0.2 TH/MM3 (ref 0–0.4)
EOSINOPHIL NFR BLD: 1.6 % (ref 0–4)
ERYTHROCYTE [DISTWIDTH] IN BLOOD BY AUTOMATED COUNT: 23.1 % (ref 11.6–17.2)
GFR SERPLBLD BASED ON 1.73 SQ M-ARVRAT: 36 ML/MIN (ref 89–?)
GLUCOSE SERPL-MCNC: 116 MG/DL (ref 74–106)
HCO3 BLD-SCNC: 26.6 MEQ/L (ref 21–32)
HCT VFR BLD CALC: 25.1 % (ref 35–46)
HGB BLD-MCNC: 8 GM/DL (ref 11.6–15.3)
INR PPP: 3.7 RATIO
LYMPHOCYTES # BLD AUTO: 1.2 TH/MM3 (ref 1–4.8)
LYMPHOCYTES NFR BLD AUTO: 8.5 % (ref 9–44)
MCH RBC QN AUTO: 24.3 PG (ref 27–34)
MCHC RBC AUTO-ENTMCNC: 32 % (ref 32–36)
MCV RBC AUTO: 76.1 FL (ref 80–100)
MONOCYTE #: 1 TH/MM3 (ref 0–0.9)
MONOCYTES NFR BLD: 6.9 % (ref 0–8)
NEUTROPHILS # BLD AUTO: 11.4 TH/MM3 (ref 1.8–7.7)
NEUTROPHILS NFR BLD AUTO: 81.9 % (ref 16–70)
PLATELET # BLD: 211 TH/MM3 (ref 150–450)
PMV BLD AUTO: 8.1 FL (ref 7–11)
PROT SERPL-MCNC: 6.9 GM/DL (ref 6.4–8.2)
PROTHROMBIN TIME: 37.5 SEC (ref 9.8–11.6)
RBC # BLD AUTO: 3.3 MIL/MM3 (ref 4–5.3)
SODIUM SERPL-SCNC: 134 MEQ/L (ref 136–145)
WBC # BLD AUTO: 14 TH/MM3 (ref 4–11)

## 2018-03-19 PROCEDURE — 02PYX3Z REMOVAL OF INFUSION DEVICE FROM GREAT VESSEL, EXTERNAL APPROACH: ICD-10-PCS | Performed by: RADIOLOGY

## 2018-03-19 PROCEDURE — 02HV33Z INSERTION OF INFUSION DEVICE INTO SUPERIOR VENA CAVA, PERCUTANEOUS APPROACH: ICD-10-PCS | Performed by: RADIOLOGY

## 2018-03-19 RX ADMIN — ALBUTEROL SULFATE PRN MG: 2.5 SOLUTION RESPIRATORY (INHALATION) at 21:49

## 2018-03-19 RX ADMIN — HYDROMORPHONE HYDROCHLORIDE PRN MG: 2 INJECTION INTRAMUSCULAR; INTRAVENOUS; SUBCUTANEOUS at 10:20

## 2018-03-19 RX ADMIN — Medication PRN ML: at 22:21

## 2018-03-19 RX ADMIN — STANDARDIZED SENNA CONCENTRATE AND DOCUSATE SODIUM SCH TAB: 8.6; 5 TABLET, FILM COATED ORAL at 20:43

## 2018-03-19 RX ADMIN — HYDROMORPHONE HYDROCHLORIDE PRN MG: 2 INJECTION INTRAMUSCULAR; INTRAVENOUS; SUBCUTANEOUS at 01:47

## 2018-03-19 RX ADMIN — STANDARDIZED SENNA CONCENTRATE AND DOCUSATE SODIUM SCH TAB: 8.6; 5 TABLET, FILM COATED ORAL at 08:44

## 2018-03-19 RX ADMIN — ALBUMIN HUMAN SCH MLS/HR: 0.25 SOLUTION INTRAVENOUS at 06:21

## 2018-03-19 RX ADMIN — AMPICILLIN SODIUM SCH MLS/HR: 2 INJECTION, POWDER, FOR SOLUTION INTRAMUSCULAR; INTRAVENOUS at 14:17

## 2018-03-19 RX ADMIN — POTASSIUM CHLORIDE SCH MEQ: 750 TABLET, FILM COATED, EXTENDED RELEASE ORAL at 08:45

## 2018-03-19 RX ADMIN — FUROSEMIDE SCH MG: 10 INJECTION, SOLUTION INTRAMUSCULAR; INTRAVENOUS at 08:44

## 2018-03-19 RX ADMIN — Medication SCH ML: at 08:45

## 2018-03-19 RX ADMIN — HYDROMORPHONE HYDROCHLORIDE PRN MG: 2 INJECTION INTRAMUSCULAR; INTRAVENOUS; SUBCUTANEOUS at 06:20

## 2018-03-19 RX ADMIN — Medication SCH ML: at 20:45

## 2018-03-19 RX ADMIN — ACETAMINOPHEN PRN MG: 325 TABLET ORAL at 01:19

## 2018-03-19 RX ADMIN — CHLORHEXIDINE GLUCONATE SCH PACK: 500 CLOTH TOPICAL at 03:41

## 2018-03-19 RX ADMIN — HYDROMORPHONE HYDROCHLORIDE PRN MG: 2 INJECTION INTRAMUSCULAR; INTRAVENOUS; SUBCUTANEOUS at 18:18

## 2018-03-19 RX ADMIN — POTASSIUM CHLORIDE SCH MEQ: 750 TABLET, FILM COATED, EXTENDED RELEASE ORAL at 20:44

## 2018-03-19 RX ADMIN — HYDROMORPHONE HYDROCHLORIDE PRN MG: 2 INJECTION INTRAMUSCULAR; INTRAVENOUS; SUBCUTANEOUS at 22:20

## 2018-03-19 RX ADMIN — HYDROMORPHONE HYDROCHLORIDE PRN MG: 2 INJECTION INTRAMUSCULAR; INTRAVENOUS; SUBCUTANEOUS at 14:17

## 2018-03-19 RX ADMIN — ALBUTEROL SULFATE PRN MG: 2.5 SOLUTION RESPIRATORY (INHALATION) at 12:57

## 2018-03-19 RX ADMIN — AMPICILLIN SODIUM SCH MLS/HR: 2 INJECTION, POWDER, FOR SOLUTION INTRAMUSCULAR; INTRAVENOUS at 20:45

## 2018-03-19 RX ADMIN — ALBUMIN HUMAN SCH MLS/HR: 0.25 SOLUTION INTRAVENOUS at 17:36

## 2018-03-19 RX ADMIN — FUROSEMIDE SCH MG: 10 INJECTION, SOLUTION INTRAMUSCULAR; INTRAVENOUS at 17:36

## 2018-03-19 RX ADMIN — AMPICILLIN SODIUM SCH MLS/HR: 2 INJECTION, POWDER, FOR SOLUTION INTRAMUSCULAR; INTRAVENOUS at 01:50

## 2018-03-19 RX ADMIN — AMPICILLIN SODIUM SCH MLS/HR: 2 INJECTION, POWDER, FOR SOLUTION INTRAMUSCULAR; INTRAVENOUS at 08:44

## 2018-03-19 RX ADMIN — SODIUM CHLORIDE SCH MLS/HR: 900 INJECTION INTRAVENOUS at 17:35

## 2018-03-19 NOTE — HHI.PR
Subjective


Remarks


Patient denies cp, mild sob


Denies fevers or chills


Bp low 90's systolic





Objective


Vitals





Vital Signs








  Date Time  Temp Pulse Resp B/P (MAP) Pulse Ox O2 Delivery O2 Flow Rate FiO2


 


3/19/18 12:10 97.8 108 18 94/73 (80) 95   


 


3/19/18 08:10 97.4 94 18 96/71 (79) 94   


 


3/19/18 08:00  92      


 


3/19/18 08:00      Nasal Cannula 3.00 


 


3/19/18 04:19 97.7 107 18 97/67 (77) 93   


 


3/19/18 04:01  100      


 


3/19/18 01:16 100.3       


 


3/19/18 00:40  112      


 


3/19/18 00:00 97.8 104 20 98/70 (79) 96   


 


3/18/18 20:19     99 Nasal Cannula 2.00 


 


3/18/18 20:00      Nasal Cannula 3.00 





      Humidified  


 


3/18/18 20:00 98.6 110 21 100/75 (83) 98   


 


3/18/18 17:57  101      


 


3/18/18 16:00 97.0 105 20 95/60 (72) 96   














I/O      


 


 3/18/18 3/18/18 3/18/18 3/19/18 3/19/18 3/19/18





 07:00 15:00 23:00 07:00 15:00 23:00


 


Intake Total 1115 ml  210 ml 790 ml  


 


Balance 1115 ml  210 ml 790 ml  


 


      


 


Intake Oral 1000 ml   580 ml  


 


IV Total 115 ml  210 ml 210 ml  


 


# Voids 3 4  5  


 


# Bowel Movements 2 1    








Result Diagram:  


3/19/18 0615                                                                   

             3/19/18 0615





Imaging





Last Impressions








Catheter Placement X-Ray 3/19/18 0000 Signed





Impressions: 





 Service Date/Time:  Monday, March 19, 2018 11:16 - CONCLUSION: Uncomplicated 





 venous catheter change as above.     Rj Whitten MD 


 


Chest X-Ray 3/16/18 0000 Signed





Impressions: 





 Service Date/Time:  Friday, March 16, 2018 13:30 - CONCLUSION:  1. Right 

basilar 





 patchy discussed with probable pneumonia.  2. Cardiomegaly.     Michi Johnson MD 


 


Head CT 3/3/18 0000 Signed





Impressions: 





 Service Date/Time:  Saturday, March 3, 2018 10:22 - CONCLUSION:  1. Focal area 





 of decreased density involving the right parietal lobe. As a new finding from 





 the prior exam. This could relate to a small infarct. MRI of the brain with 





 gadolinium suggested to further evaluate. 2. Small lacunar infarction 

involving 





 the left centrum semiovale.     Scooter Dawson Jr., MD 


 


CT Angiography 3/3/18 0000 Signed





Impressions: 





 Service Date/Time:  Saturday, March 3, 2018 10:28 - CONCLUSION:  There 

extensive 





 pulmonary emboli bilaterally. There is near complete cut off of the right 

lower 





 lobe pulmonary artery. Prominent filling defects on the left as well. These 





 findings were relayed to Dr. Bustos immediately at the completion of the study.  





 Scattered pulmonary infiltrates, small pleural effusion and extensive 





 mediastinal lymphadenopathy as described above.      Won Sharp MD 


 


Brain MRI 3/3/18 0000 Signed





Impressions: 





 Service Date/Time:  Saturday, March 3, 2018 18:04 - CONCLUSION:  Deterioration 





 in the appearance of the scan.  At least 3 focal areas of abnormal contrast 





 enhancement are present scattered to in both hemispheres.  Given the history 





 this most likely represents developing areas cerebritis.  Exam is compromised 

by 





 an uncooperative patient and motion artifact.  These 2 factors make detection 

of 





 other abnormalities such as cortical cephalitis and meningitis difficult to 





 exclude.  Cannot exclude hemorrhagic lesions as well.  There is no mass effect 





 evident.      Jorge Diane MD 


 


Chest CT 2/26/18 0000 Signed





Impressions: 





 Service Date/Time:  Monday, February 26, 2018 12:41 - CONCLUSION:  1. There 

are 





 at least 5 pulmonary nodules present bilaterally with the largest measuring 19 





 mm. None demonstrate cavitation but it is possible that these represent septic 





 emboli. Suggest followup to assess for change. 2. Splenomegaly with subtle 





 wedge-shaped areas of low-density in the mid spleen which could represent 





 infarcts. 3. Mild cardiomegaly in this patient post aortic valve replacement. 





 Low density of the cardiac blood pool suggests anemia.      Ron Melgar MD 


 


Aorta w/Runoff CTA 2/25/18 0000 Signed





Impressions: 





 Service Date/Time:  Sunday, February 25, 2018 12:47 - CONCLUSION:  Left renal 





 cortical infarction. 5-6 cm length total occlusion of the left superficial 





 femoral artery in the proximal thigh. Nodular parenchymal opacities in the 

lung 





 bases bilaterally See above discussion.     Ron Cruz MD 


 


Renal Ultrasound 2/24/18 0000 Signed





Impressions: 





 Service Date/Time:  Saturday, February 24, 2018 10:53 - CONCLUSION:  1. No 





 evidence of hydronephrosis. 2. Thickening of the urinary bladder wall a 1.1 

cm. 





 3. Prominent splenomegaly.     Elgin Walton MD 


 


Breast Ultrasound 2/24/18 0000 Signed





Impressions: 





 Service Date/Time:  Saturday, February 24, 2018 10:48 - CONCLUSION:  Negative 





 targeted right breast ultrasound examination.     Ron Brown MD 


 


Lower Extremity Ultrasound 2/23/18 0000 Signed





Impressions: 





 Service Date/Time:  Friday, February 23, 2018 21:22 - CONCLUSION:  1. No 





 sonographic evidence for lower extremity DVT. 2. Incidental note of bilateral 





 inguinal adenopathy with the largest on the right measuring 3.3 cm and the 





 largest on the left measuring 2.6 cm. This finding is nonspecific but is 





 typically reactive in etiology.     Rj Whitten MD 








Objective Remarks


AAOx3


NAD


Clear lungs BL


S1S2 4/6 systolic ejection murmur


+3 BL lower extremity edema without erythema.


Medications and IVs





Current Medications








 Medications


  (Trade)  Dose


 Ordered  Sig/Nikhil


 Route  Start Time


 Stop Time Status Last Admin


 


  (NS Flush)  2 ml  UNSCH  PRN


 IV FLUSH  2/23/18 22:00


    3/12/18 01:03


 


 


  (NS Flush)  2 ml  BID


 IV FLUSH  2/24/18 09:00


    3/19/18 08:45


 


 


  (Tylenol)  650 mg  Q6H  PRN


 PO  2/23/18 22:00


    3/19/18 01:19


 


 


  (Zofran Inj)  4 mg  Q6H  PRN


 IV PUSH  2/23/18 22:00


    3/8/18 21:22


 


 


  (Restoril)  15 mg  HS  PRN


 PO  2/23/18 22:00


    3/6/18 22:08


 


 


 Miscellaneous


 Information  1  Q361D


 XX  2/23/18 22:00


     


 


 


  (Chlorhexidine


 2% Cloth)  3 pack


 Taper  DAILY@04


 TOP  2/24/18 04:00


 2/20/19 03:59  3/6/18 03:54


 


 


  (Chlorhexidine


 2% Cloth)  3 pack  UNSCH  PRN


 TOP  2/23/18 22:00


     


 


 


  (Arlen-Colace)  1 tab  BID


 PO  2/24/18 09:00


    3/19/18 08:44


 


 


  (Milk Of


 Magnesia Liq)  30 ml  Q12H  PRN


 PO  2/23/18 22:00


     


 


 


  (Senokot)  17.2 mg  Q12H  PRN


 PO  2/23/18 22:00


     


 


 


  (Dulcolax Supp)  10 mg  DAILY  PRN


 RECTAL  2/23/18 22:00


     


 


 


  (Lactulose Liq)  30 ml  DAILY  PRN


 PO  2/23/18 22:00


     


 


 


 Ampicillin Sodium


 2000 mg/Sodium


 Chloride  100 ml @ 


 400 mls/hr  Q6H


 IV  2/24/18 14:00


    3/19/18 14:17


 


 


  (Imodium)  2 mg  Q4H  PRN


 PO  2/26/18 16:15


    2/28/18 14:32


 


 


  (Albuterol Neb)  2.5 mg  Q2HR NEB  PRN


 NEB  2/27/18 11:30


    3/19/18 12:57


 


 


  (Norco  Mg)  1 tab  Q4H  PRN


 PO  3/4/18 15:00


    3/9/18 05:02


 


 


  (Dilaudid Pf Inj)  1 mg  Q4H  PRN


 IV PUSH  3/7/18 19:00


    3/19/18 14:17


 


 


  (KCl)  60 meq  Q12HR


 PO  3/14/18 09:45


    3/19/18 08:45


 


 


  (Coumadin)  2 mg  DAILY@16


 PO  3/16/18 18:45


   Future Hold 3/18/18 17:33


 


 


 Ceftriaxone


 Sodium 1000 mg/


 Sodium Chloride  100 ml @ 


 200 mls/hr  Q24H


 IV  3/18/18 16:00


    3/18/18 17:34


 


 


  (Lasix Inj)  20 mg  BID@09,18


 IV PUSH  3/18/18 18:00


    3/19/18 08:44


 


 


 Albumin Human  100 ml @ 


 60 mls/hr  Q12H


 IV  3/18/18 18:00


    3/19/18 06:21


 








Urinary Catheter:  No


Vascular Central Line Catheter:  Yes


Assessment to:  Continue


Date of Insertion:  Mar 19, 2018


Line:  Central Venous Catheter


Side:  Right


Location:  Internal, Jugular





A/P


Problem List:  


(1) Prosthetic valve endocarditis


ICD Code:  T82.6XXA - Infection and inflammatory reaction due to cardiac valve 

prosthesis, initial encounter


Status:  Acute


(2) Bacteremia


ICD Code:  R78.81 - Bacteremia


(3) Septic pulmonary embolism


ICD Code:  I26.90 - Septic pulmonary embolism without acute cor pulmonale


Status:  Acute


(4) IVDU (intravenous drug user)


ICD Code:  F19.90 - Other psychoactive substance use, unspecified, uncomplicated


(5) Anemia


ICD Code:  D64.9 - Anemia, unspecified


Status:  Chronic


(6) NOÉ (acute kidney injury)


ICD Code:  N17.9 - Acute kidney failure, unspecified


Plan:  Creatinine slightly worsened from 1.52-1.57.


Nephrology consulted.


Possible ATN from vancomycin toxicity or from infection.


Urine eosinophils negative.


Continue to hold diuretics as per nephrology.  Nephrology recommends waiting 

for some improvement in renal function before some diuretics could be 

administered.


Continue to monitor BUN and creatinine, avoid nephrotoxins.


3/19 As per Nephrology continue Lasix 10 mg IV BID with albumin.  Creatinine 

slightly elevated from 1.57 - 1.62.





Assessment and Plan


(1) Prosthetic valve endocarditis


Plan:  Continue antibiotics as per ID.


Patient on IV ampicillin.


Continue to hold coumadin until INR < 3 - goal 2-3.


3/19 INR elevated from 3.1 to 3.7. Hematology had decreased dose of Coumadin, I 

will hold and continue to monitor PT/INR.


Continue Ampicllin, Rocephin added for pulmonary coverage on 3/18 as per ID. 


Continue antibiotics as per ID recommendations.





(2) Bacteremia


Plan:  Blood cultures obtained on 2/23 growing strep viridans.


Subsequent repeat blood cultures negative.


3/19 sp Exchange of Right IJ central line. 





(3) Septic pulmonary embolism


Plan:  As observed on a CT angiography of the chest obtained on 3/3/18.


chocardiogram on 3/3 showed an estimated EF of 50-55%.  


++severe tricuspid regurgitation with prosthesis in place.  


++2 x 4 cm mobile vegetation was seen on the valve.  The right atrium and right 

ventricle were normal in size and function


Hematology following.


Management of Coumadin as per hematology recommendations.


Goal INR 2-3.





(4) IVDU (intravenous drug user)


Plan:  Counseled on drug cessation.





(5) Anemia


Plan:  Likely secondary to inflammation/infection.  No evidence of bleeding or 

hemolysis.


Monitor hemoglobin and transfuse as needed.





Problem Qualifiers





(1) Prosthetic valve endocarditis:  


Qualified Codes:  T82.6XXA - Infection and inflammatory reaction due to cardiac 

valve prosthesis, initial encounter


(2) Anemia:  


Qualified Codes:  D63.8 - Anemia in other chronic diseases classified elsewhere








Jesus Mae MD Mar 19, 2018 15:42

## 2018-03-19 NOTE — HHI.NPPN
Subjective


History of Present Illness


This is a 38-year-old female with past medical history of IV drug abuse, 

history of hepatitis C, narcotic dependency, came to the hospital with 

complaint of shortness of breath and swelling.  Nephrology called to see the 

patient because of elevated BUN and creatinine.  The patient has creatinine of 

4.5 on admission which improved and it was staying in the range of 0.6-0.8 

until 6 days ago, then it started going up again and now it is 1.7.  The 

patient has increased swelling 


of the legs and the patient was diagnosed with endocarditis and she has blood 

culture positive for Strep viridans.  Patient has been following with the ID 

and she was getting furosemide, which was stopped today this morning because of 

increased creatinine and she was on vancomycin and rifampicin.  The last dose 

of vancomycin seems to be given possibly on 03/04/2018 or even later, but her 

level was high and the highest level we have was 25.8, which was on 03/04/2018 

but on 03/10/2018 it was 23.9.  The patient has been actively using IV drugs 

before she came to the hospital and noticed to have more swelling in her legs.  

She is complaining of generalized body pain.  There is no nausea, vomiting.  

Denies any diarrhea.  Her appetite is normal.


Additional Remarks


Reports mild SOB.  Bilateral lower extremity edema. 


 (Gellermann,Diane M. ARNP)





Review of Systems


Respiratory


Lungs:  SOB


 (Gellermann,Diane M. ARNP)





Cardiovascular


Cardiac Remarks


Denies CP


 (Gellermann,Diane M. ARNP)





Gastrointestinal


GI Remarks


denies abdominal pain


 (Gellermann,Diane M. ARNP)





Objective Data


Data





Vital Signs








  Date Time  Temp Pulse Resp B/P (MAP) Pulse Ox O2 Delivery O2 Flow Rate FiO2


 


3/19/18 08:10 97.4 94 18 96/71 (79) 94   


 


3/19/18 04:19 97.7 107 18 97/67 (77) 93   


 


3/19/18 04:01  100      


 


3/19/18 01:16 100.3       


 


3/19/18 00:40  112      


 


3/19/18 00:00 97.8 104 20 98/70 (79) 96   


 


3/18/18 20:19     99 Nasal Cannula 2.00 


 


3/18/18 20:00      Nasal Cannula 3.00 





      Humidified  


 


3/18/18 20:00 98.6 110 21 100/75 (83) 98   


 


3/18/18 17:57  101      


 


3/18/18 16:00 97.0 105 20 95/60 (72) 96   


 


3/18/18 14:03   2     


 


3/18/18 12:00 96.8 104 20 93/59 (70) 95   


 


3/18/18 12:00  104      








 (PadminicorbyEmma murillone GTAndry VILLARREAL)


-:  


3/19/18 0615                                                                   

             3/19/18 0615





Imaging





Last Impressions








Chest X-Ray 3/16/18 0000 Signed





Impressions: 





 Service Date/Time:  Friday, March 16, 2018 13:30 - CONCLUSION:  1. Right 

basilar 





 patchy discussed with probable pneumonia.  2. Cardiomegaly.     Michi Johnson MD 


 


Head CT 3/3/18 0000 Signed





Impressions: 





 Service Date/Time:  Saturday, March 3, 2018 10:22 - CONCLUSION:  1. Focal area 





 of decreased density involving the right parietal lobe. As a new finding from 





 the prior exam. This could relate to a small infarct. MRI of the brain with 





 gadolinium suggested to further evaluate. 2. Small lacunar infarction 

involving 





 the left centrum semiovale.     Scooter Dawson Jr., MD 


 


CT Angiography 3/3/18 0000 Signed





Impressions: 





 Service Date/Time:  Saturday, March 3, 2018 10:28 - CONCLUSION:  There 

extensive 





 pulmonary emboli bilaterally. There is near complete cut off of the right 

lower 





 lobe pulmonary artery. Prominent filling defects on the left as well. These 





 findings were relayed to Dr. Bustos immediately at the completion of the study.  





 Scattered pulmonary infiltrates, small pleural effusion and extensive 





 mediastinal lymphadenopathy as described above.      Won Sharp MD 


 


Brain MRI 3/3/18 0000 Signed





Impressions: 





 Service Date/Time:  Saturday, March 3, 2018 18:04 - CONCLUSION:  Deterioration 





 in the appearance of the scan.  At least 3 focal areas of abnormal contrast 





 enhancement are present scattered to in both hemispheres.  Given the history 





 this most likely represents developing areas cerebritis.  Exam is compromised 

by 





 an uncooperative patient and motion artifact.  These 2 factors make detection 

of 





 other abnormalities such as cortical cephalitis and meningitis difficult to 





 exclude.  Cannot exclude hemorrhagic lesions as well.  There is no mass effect 





 evident.      Jorge Diane MD 


 


Chest CT 2/26/18 0000 Signed





Impressions: 





 Service Date/Time:  Monday, February 26, 2018 12:41 - CONCLUSION:  1. There 

are 





 at least 5 pulmonary nodules present bilaterally with the largest measuring 19 





 mm. None demonstrate cavitation but it is possible that these represent septic 





 emboli. Suggest followup to assess for change. 2. Splenomegaly with subtle 





 wedge-shaped areas of low-density in the mid spleen which could represent 





 infarcts. 3. Mild cardiomegaly in this patient post aortic valve replacement. 





 Low density of the cardiac blood pool suggests anemia.      Ron Melgar MD 


 


Aorta w/Runoff CTA 2/25/18 0000 Signed





Impressions: 





 Service Date/Time:  Sunday, February 25, 2018 12:47 - CONCLUSION:  Left renal 





 cortical infarction. 5-6 cm length total occlusion of the left superficial 





 femoral artery in the proximal thigh. Nodular parenchymal opacities in the 

lung 





 bases bilaterally See above discussion.     Ron Cruz MD 


 


Renal Ultrasound 2/24/18 0000 Signed





Impressions: 





 Service Date/Time:  Saturday, February 24, 2018 10:53 - CONCLUSION:  1. No 





 evidence of hydronephrosis. 2. Thickening of the urinary bladder wall a 1.1 

cm. 





 3. Prominent splenomegaly.     Elgin Walton MD 


 


Breast Ultrasound 2/24/18 0000 Signed





Impressions: 





 Service Date/Time:  Saturday, February 24, 2018 10:48 - CONCLUSION:  Negative 





 targeted right breast ultrasound examination.     Ron Brown MD 


 


Lower Extremity Ultrasound 2/23/18 0000 Signed





Impressions: 





 Service Date/Time:  Friday, February 23, 2018 21:22 - CONCLUSION:  1. No 





 sonographic evidence for lower extremity DVT. 2. Incidental note of bilateral 





 inguinal adenopathy with the largest on the right measuring 3.3 cm and the 





 largest on the left measuring 2.6 cm. This finding is nonspecific but is 





 typically reactive in etiology.     Rj Whitten MD 








 (Gellermann,Diane M. ARNP)





Physical Exam


General


Appearance:  No Acute Distress


 (Gellermann,Diane M. ARNP)





Eyes


Eye Exam:  Pupils Equal


 (Gellermann,Diane M. ARNP)





Throat


Throat Exam:  Oral Mucosa Pink & Moist


 (Gellermann,Diane M. ARNP)





Neck


Neck Exam:  Neck Supple


 (Gellermann,Diane M. ARNP)





Pulmonary


Resp Exam:  Clear Bilaterally


 (Gellermann,Diane M. ARNP)





Cardiology


CV Exam:  Regular, Normal Sinus Rhythm


 (Gellermann,Diane M. ARNP)





Gastrointestinal/Abdomen


GI Exam:  Soft, Non-Tender


 (Gellermann,Diane M. ARNP)





Integumentary


Skin Exam:  Dry, Intact


 (Gellermann,Diane M. ARNP)





Extremeties


Extremities Exam:  Moderate Edema


 (Gellermann,Diane M. ARNP)





Neurologic


Neuro Exam:  Alert, Awake, Oriented


 (Gellermann,Diane M. ARNP)





Psychiatric


Psych Exam:  Appropriate Responses


 (Gellermann,Diane M. ARNP)





Assessment/Plan


Discussed Condition With:  Patient


Assessment Summary:  NOÉ/Acute Renal Failure


Problem List:  


(1) Acute kidney injury


ICD Codes:  N17.9 - Acute kidney failure, unspecified


Status:  Acute


Plan:  


NOÉ likely due to vancomycin toxicity, possible post-infectious GN





Urine eosinophils negative, complements wnl





Creatinine stable from 1.7 -> 1.5 -> 1.6 today.








Plan


Continue lasix 20mg IV BID with albumin for edema.


Continue to monitor BMP and UOP


Avoid nephrotoxins. 








(2) Prosthetic valve endocarditis


ICD Codes:  T82.6XXA - Infection and inflammatory reaction due to cardiac valve 

prosthesis, initial encounter


Status:  Acute


Plan:  septic endocarditis with strep viridans


IV drug use, pulmonary emboli as well.





Continues ampicillin per ID, started ceftriaxone. 


continue renal dosing antibiotics.





(3) Peripheral arterial occlusive disease


ICD Codes:  I77.9 - Disorder of arteries and arterioles, unspecified


Plan:  seen with vascular, continue medical management


Hold contrast studies as possible.





(4) Hepatitis C


ICD Codes:  B19.20 - Unspecified viral hepatitis C without hepatic coma


Status:  Chronic (Gellermann,Diane M. ARNP)


Problem List:  


(1) Acute kidney injury


ICD Codes:  N17.9 - Acute kidney failure, unspecified


Status:  Acute


Plan:  


NOÉ likely due to vancomycin toxicity,  post-infectious GN is unlikely.


Possibility of ATN or pre renal.


Urine eosinophils negative, complements wnl





Creatinine stable from 1.7 -> 1.5 -> 1.6 today.








Plan


Continue lasix 20mg IV BID with albumin for edema.


Continue to monitor BMP and UOP


Avoid nephrotoxins. 


Patient seen and examined, agree with above.








(2) Prosthetic valve endocarditis


ICD Codes:  T82.6XXA - Infection and inflammatory reaction due to cardiac valve 

prosthesis, initial encounter


Status:  Acute


Plan:  septic endocarditis with strep viridans


IV drug use, pulmonary emboli as well.





Continues ampicillin per ID, started ceftriaxone. 


continue renal dosing antibiotics.





(3) Peripheral arterial occlusive disease


ICD Codes:  I77.9 - Disorder of arteries and arterioles, unspecified


Plan:  seen with vascular, continue medical management


Hold contrast studies as possible.





(4) Hepatitis C


ICD Codes:  B19.20 - Unspecified viral hepatitis C without hepatic coma


Status:  Chronic (Melissa Layne MD)





Problem Qualifiers





(1) Prosthetic valve endocarditis:  


Qualified Codes:  T82.6XXA - Infection and inflammatory reaction due to cardiac 

valve prosthesis, initial encounter








Gellermann,Diane M. ARNP Mar 19, 2018 10:38


Melissa Layne MD Mar 19, 2018 19:07

## 2018-03-19 NOTE — PD.ONC.PN
Subjective


Subjective


Remarks


Tmax 100.3 overnight


Pt reports she noticed some blood on tissue when wiping


States she does not think it is due to her menstrual cycle, however she states 

her cycle is not regular.


Breathing is "about the same"





Objective


Data











  Date Time  Temp Pulse Resp B/P (MAP) Pulse Ox O2 Delivery O2 Flow Rate FiO2


 


3/19/18 12:10 97.8 108 18 94/73 (80) 95   


 


3/19/18 08:10 97.4 94 18 96/71 (79) 94   


 


3/19/18 08:00  92      


 


3/19/18 08:00      Nasal Cannula 3.00 


 


3/19/18 04:19 97.7 107 18 97/67 (77) 93   


 


3/19/18 04:01  100      


 


3/19/18 01:16 100.3       


 


3/19/18 00:40  112      


 


3/19/18 00:00 97.8 104 20 98/70 (79) 96   


 


3/18/18 20:19     99 Nasal Cannula 2.00 


 


3/18/18 20:00      Nasal Cannula 3.00 





      Humidified  


 


3/18/18 20:00 98.6 110 21 100/75 (83) 98   


 


3/18/18 17:57  101      














 3/19/18 3/19/18 3/19/18





 07:00 15:00 23:00


 


Intake Total 790 ml  


 


Balance 790 ml  








Result Diagram:  


3/19/18 0615                                                                   

             3/19/18 0615





Laboratory Results





Laboratory Tests








Test


  3/19/18


06:15


 


White Blood Count 14.0 TH/MM3 


 


Red Blood Count 3.30 MIL/MM3 


 


Hemoglobin 8.0 GM/DL 


 


Hematocrit 25.1 % 


 


Mean Corpuscular Volume 76.1 FL 


 


Mean Corpuscular Hemoglobin 24.3 PG 


 


Mean Corpuscular Hemoglobin


Concent 32.0 % 


 


 


Red Cell Distribution Width 23.1 % 


 


Platelet Count 211 TH/MM3 


 


Mean Platelet Volume 8.1 FL 


 


Neutrophils (%) (Auto) 81.9 % 


 


Lymphocytes (%) (Auto) 8.5 % 


 


Monocytes (%) (Auto) 6.9 % 


 


Eosinophils (%) (Auto) 1.6 % 


 


Basophils (%) (Auto) 1.1 % 


 


Neutrophils # (Auto) 11.4 TH/MM3 


 


Lymphocytes # (Auto) 1.2 TH/MM3 


 


Monocytes # (Auto) 1.0 TH/MM3 


 


Eosinophils # (Auto) 0.2 TH/MM3 


 


Basophils # (Auto) 0.2 TH/MM3 


 


CBC Comment DIFF FINAL 


 


Differential Comment  


 


Prothrombin Time 37.5 SEC 


 


Prothromb Time International


Ratio 3.7 RATIO 


 


 


Blood Urea Nitrogen 22 MG/DL 


 


Creatinine 1.62 MG/DL 


 


Random Glucose 116 MG/DL 


 


Total Protein 6.9 GM/DL 


 


Albumin 2.3 GM/DL 


 


Calcium Level 8.0 MG/DL 


 


Alkaline Phosphatase 150 U/L 


 


Aspartate Amino Transf


(AST/SGOT) 201 U/L 


 


 


Alanine Aminotransferase


(ALT/SGPT) 93 U/L 


 


 


Total Bilirubin 0.7 MG/DL 


 


Sodium Level 134 MEQ/L 


 


Potassium Level 4.0 MEQ/L 


 


Chloride Level 99 MEQ/L 


 


Carbon Dioxide Level 26.6 MEQ/L 


 


Anion Gap 8 MEQ/L 


 


Estimat Glomerular Filtration


Rate 36 ML/MIN 


 








Imaging Studies





Last 24 hours Impressions








Catheter Placement X-Ray 3/19/18 0000 Signed





Impressions: 





 Service Date/Time:  Monday, March 19, 2018 11:16 - CONCLUSION: Uncomplicated 





 venous catheter change as above.     Rj Whitten MD 











Administered Medications








 Medications


  (Trade)  Dose


 Ordered  Sig/Nikhil


 Route


 PRN Reason  Start Time


 Stop Time Status Last Admin


Dose Admin


 


 Sodium Chloride


  (NS Flush)  2 ml  UNSCH  PRN


 IV FLUSH


 FLUSH AFTER USING IV ACCESS  2/23/18 22:00


    3/12/18 01:03


 


 


 Sodium Chloride


  (NS Flush)  2 ml  BID


 IV FLUSH


   2/24/18 09:00


    3/19/18 08:45


 


 


 Acetaminophen


  (Tylenol)  650 mg  Q6H  PRN


 PO


 fever  2/23/18 22:00


    3/19/18 01:19


 


 


 Ondansetron HCl


  (Zofran Inj)  4 mg  Q6H  PRN


 IV PUSH


 NAUSEA OR VOMITING  2/23/18 22:00


    3/8/18 21:22


 


 


 Temazepam


  (Restoril)  15 mg  HS  PRN


 PO


 INSOMNIA  2/23/18 22:00


    3/6/18 22:08


 


 


 Chlorhexidine


 Gluconate


  (Chlorhexidine


 2% Cloth)  3 pack


 Taper  DAILY@04


 TOP


   2/24/18 04:00


 2/20/19 03:59  3/6/18 03:54


 


 


 Senna/Docusate


 Sodium


  (Arlen-Colace)  1 tab  BID


 PO


   2/24/18 09:00


    3/19/18 08:44


 


 


 Ampicillin Sodium


 2000 mg/Sodium


 Chloride  100 ml @ 


 400 mls/hr  Q6H


 IV


   2/24/18 14:00


    3/19/18 14:17


 


 


 Loperamide HCl


  (Imodium)  2 mg  Q4H  PRN


 PO


 Diarrhea  2/26/18 16:15


    2/28/18 14:32


 


 


 Albuterol Sulfate


  (Albuterol Neb)  2.5 mg  Q2HR NEB  PRN


 NEB


 dyspnea  2/27/18 11:30


    3/19/18 12:57


 


 


 Acetaminophen/


 Hydrocodone Bitart


  (Norco  Mg)  1 tab  Q4H  PRN


 PO


 pain 1-5  3/4/18 15:00


    3/9/18 05:02


 


 


 Hydromorphone HCl


  (Dilaudid Pf Inj)  1 mg  Q4H  PRN


 IV PUSH


 PAIN SCALE 6 TO 10  3/7/18 19:00


    3/19/18 14:17


 


 


 Potassium Chloride


  (KCl)  60 meq  Q12HR


 PO


   3/14/18 09:45


    3/19/18 08:45


 


 


 Warfarin Sodium


  (Coumadin)  2 mg  DAILY@16


 PO


   3/16/18 18:45


   Future Hold 3/18/18 17:33


 


 


 Ceftriaxone


 Sodium 1000 mg/


 Sodium Chloride  100 ml @ 


 200 mls/hr  Q24H


 IV


   3/18/18 16:00


    3/18/18 17:34


 


 


 Furosemide


  (Lasix Inj)  20 mg  BID@09,18


 IV PUSH


   3/18/18 18:00


    3/19/18 08:44


 


 


 Albumin Human  100 ml @ 


 60 mls/hr  Q12H


 IV


   3/18/18 18:00


    3/19/18 06:21


 








Objective Remarks


GENERAL: Overweight younger female sitting in chair at bedside in no obvious 

distress


SKIN: Warm and dry.


HEAD: Normocephalic.


EYES: No scleral icterus. No injection or drainage. 


NECK: No JVD or lymphadenopathy.


CARDIOVASCULAR: Tachycardia; mild systolic murmur noted


RESPIRATORY: Coarse lung sounds


GASTROINTESTINAL: Abdomen soft, non-tender, nondistended. 


EXTREMITIES: Generalized edema to BLE


MUSCULOSKELETAL: Adequate muscle tone.


NEUROLOGICAL: No obvious focal deficit. Awake and oriented x3.





Assessment/Plan


Problem List:  


(1) arterial blood clot


Plan:  3/19: Pt's INR 3.7 today. Hold coumadin today and resume at 1mg when 

closer to 3.


3/16: INR 3.9 today. hold coumadin. goal INR between 2.0-3.0


3/15: INR =3.8. continue to hold coumadin until closer to 2.0-3.0 than resume 

at lower dose. will ask nurses to perform doppler on patient's pedal pulses q 

shift and document. 


3/14/18.  INR >4.0, Argatroban stopped.


PT/PTT checked off argatroban, noted prolonged PTT.





(2) Microcytic anemia


ICD Codes:  D50.9 - Iron deficiency anemia, unspecified


Plan:  


--likely d/t inflammation/infection. no evidence of bleeding or hemolysis. 


--monitor and transfuse as needed. 





(3) Bacterial endocarditis


ICD Codes:  I33.0 - Acute and subacute infective endocarditis


Status:  Acute


Plan:  -on ampicillin, ID following. 





(4) Acute septic pulmonary embolism


ICD Codes:  I26.90 - Septic pulmonary embolism without acute cor pulmonale


Plan:  -- echocardiogram on 3/3 showed an estimated EF of 50-55%.  


++severe tricuspid regurgitation with prosthesis in place.  


++2 x 4 cm mobile vegetation was seen on the valve.  The right atrium and right 

ventricle were normal in size and function





Assessment


38y/o female admitted with recurrent endocarditis. hematology consulted for 

anticoagulation assistance.


h/o Recurrent bacterial endocarditis, bacteremia, history of prosthetic aortic 

valve and subsequent redo, chronic active hepatitis C, intravenous drug use, 

microcytic anemia, consistent with iron deficiency, left lower extremity 

arterial event, bacterial endocarditis with viridans streptococcus.





HPI (brought forward for continuity of care): history of IV drug abuse and  

recurrent endocarditis. had a relapse of her activity. has history of pulmonary 

embolism and infected valves. +significant congestive heart failure and 

presented to the emergency room on 02/23/2018, with sepsis. is followed by 

Infectious Disease for her endocarditis.  previously received antibiotic 

therapy for enterococcus faecalis and staph aureus. There is questionable 

compliance. Her current blood 


culture from 02/23/2018, shows viridans streptococcus.  Two out of two bottles 

were positive.  She is on ampicillin and gentamicin. course is complicated by 

cold left lower extremity, evaluated by  Vascular Surgery.  A CT angiogram 

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 

the proximal thigh.  There is satisfactory calf runoff present.  For this reason

, conservative management with anticoagulation is continued. was placed on 

unfractionated heparin.  Her heparin dose has been titrated from 1000 units/

hour to 2500 units/hour with a PTT remaining subtherapeutic.  Her last PTT is 

32 seconds from 03/01/2018, at 2 p.m.  For this reason, Hematology/Oncology was 

consulted.


Plan


1. Coumadin on hold today as INR increasing. 


2. Resume coumadin at 1mg tomorrow if INR closer to 3.


Attending Statement


Agree with above, titrate anticoagulation goal INR 2-3, monitor for bleeding.











Janessa Samson Mar 19, 2018 16:14


Henny Smith MD Mar 19, 2018 17:48

## 2018-03-19 NOTE — RADRPT
EXAM DATE/TIME:  03/19/2018 11:16 

 

INDICATIONS :               

Patient needs Right central line exchanged.

                     

 

MEDICAL HISTORY :     

1. Hep C

2. IV drug use

3. aortic valve replaced in 2011 and 2016

4. bacterial endocarditis

 

SURGICAL HISTORY :     

1. left forarm surgery

 

ENCOUNTER:     

Initial

 

ACUITY:     

2 days

 

PAIN SCORE:     

0/10

                    

                     

 

FLUORO TIME:     

0.5 minutes

 

IMAGE SERIES:      

2

                     

 

ACCESS:     

Right  

 

      

DEVICE(S):      

1.)  7 Irish triple lumen 16 cm Arrow central line      

 

 

PROCEDURE:     

CENTRAL VENOUS CATHETER REPLACEMENT, RT

 

1.  Fluoroscopically guided central venous catheter exchange.

 

The risks, benefits and alternatives to the procedure were explained and verbal and written consent w
as obtained.  The site was prepped in sterile fashion.  Full sterile technique was used, including ca
p, mask, sterile gloves and gown and a large sterile sheet.  Hand hygiene and 2% chlorhexidine and/or
 betadine/alcohol prep was utilized per protocol for cutaneous antisepsis.  The skin and subcutaneous
 tissues were infiltrated with local anesthetic solution.

 

With fluoroscopic guidance the previously placed central venous catheter was exchanged for the prescr
ibed catheter as above. Post procedure image demonstrates satisfactory position of the tube. The cath
eter was sutured in place.

 

 

CONCLUSION:     Uncomplicated venous catheter change as above.

 

 

 

 Rj Whitten MD on March 19, 2018 at 13:11           

Board Certified Radiologist.

 This report was verified electronically.

## 2018-03-20 VITALS
DIASTOLIC BLOOD PRESSURE: 70 MMHG | OXYGEN SATURATION: 98 % | RESPIRATION RATE: 22 BRPM | HEART RATE: 112 BPM | SYSTOLIC BLOOD PRESSURE: 90 MMHG | TEMPERATURE: 98.7 F

## 2018-03-20 VITALS
HEART RATE: 117 BPM | TEMPERATURE: 99 F | DIASTOLIC BLOOD PRESSURE: 57 MMHG | SYSTOLIC BLOOD PRESSURE: 105 MMHG | RESPIRATION RATE: 20 BRPM | OXYGEN SATURATION: 96 %

## 2018-03-20 VITALS — SYSTOLIC BLOOD PRESSURE: 97 MMHG | DIASTOLIC BLOOD PRESSURE: 74 MMHG

## 2018-03-20 VITALS
SYSTOLIC BLOOD PRESSURE: 93 MMHG | RESPIRATION RATE: 20 BRPM | HEART RATE: 112 BPM | OXYGEN SATURATION: 92 % | TEMPERATURE: 97.8 F | DIASTOLIC BLOOD PRESSURE: 61 MMHG

## 2018-03-20 VITALS
HEART RATE: 107 BPM | SYSTOLIC BLOOD PRESSURE: 91 MMHG | RESPIRATION RATE: 18 BRPM | OXYGEN SATURATION: 97 % | TEMPERATURE: 99.1 F | DIASTOLIC BLOOD PRESSURE: 62 MMHG

## 2018-03-20 VITALS — HEART RATE: 109 BPM

## 2018-03-20 VITALS
OXYGEN SATURATION: 98 % | TEMPERATURE: 98 F | SYSTOLIC BLOOD PRESSURE: 101 MMHG | RESPIRATION RATE: 20 BRPM | HEART RATE: 113 BPM | DIASTOLIC BLOOD PRESSURE: 72 MMHG

## 2018-03-20 VITALS
DIASTOLIC BLOOD PRESSURE: 53 MMHG | HEART RATE: 117 BPM | SYSTOLIC BLOOD PRESSURE: 103 MMHG | RESPIRATION RATE: 22 BRPM | TEMPERATURE: 100.9 F | OXYGEN SATURATION: 95 %

## 2018-03-20 VITALS — HEART RATE: 108 BPM

## 2018-03-20 VITALS — SYSTOLIC BLOOD PRESSURE: 92 MMHG | DIASTOLIC BLOOD PRESSURE: 68 MMHG

## 2018-03-20 VITALS — HEART RATE: 118 BPM

## 2018-03-20 LAB
BASOPHILS # BLD AUTO: 0.1 TH/MM3 (ref 0–0.2)
BASOPHILS NFR BLD: 0.9 % (ref 0–2)
CREAT SERPL-MCNC: 1.57 MG/DL (ref 0.5–1)
EOSINOPHIL # BLD: 0.1 TH/MM3 (ref 0–0.4)
EOSINOPHIL NFR BLD: 0.9 % (ref 0–4)
ERYTHROCYTE [DISTWIDTH] IN BLOOD BY AUTOMATED COUNT: 23.5 % (ref 11.6–17.2)
GFR SERPLBLD BASED ON 1.73 SQ M-ARVRAT: 37 ML/MIN (ref 89–?)
HCT VFR BLD CALC: 25.1 % (ref 35–46)
HGB BLD-MCNC: 7.9 GM/DL (ref 11.6–15.3)
INR PPP: 3.1 RATIO
LYMPHOCYTES # BLD AUTO: 1.4 TH/MM3 (ref 1–4.8)
LYMPHOCYTES NFR BLD AUTO: 9.2 % (ref 9–44)
MCH RBC QN AUTO: 24 PG (ref 27–34)
MCHC RBC AUTO-ENTMCNC: 31.6 % (ref 32–36)
MCV RBC AUTO: 75.9 FL (ref 80–100)
MONOCYTE #: 1 TH/MM3 (ref 0–0.9)
MONOCYTES NFR BLD: 6.7 % (ref 0–8)
NEUTROPHILS # BLD AUTO: 12 TH/MM3 (ref 1.8–7.7)
NEUTROPHILS NFR BLD AUTO: 82.3 % (ref 16–70)
PLATELET # BLD: 196 TH/MM3 (ref 150–450)
PMV BLD AUTO: 8.2 FL (ref 7–11)
PROTHROMBIN TIME: 30.9 SEC (ref 9.8–11.6)
RBC # BLD AUTO: 3.31 MIL/MM3 (ref 4–5.3)
WBC # BLD AUTO: 14.6 TH/MM3 (ref 4–11)

## 2018-03-20 RX ADMIN — POTASSIUM CHLORIDE SCH MEQ: 750 TABLET, FILM COATED, EXTENDED RELEASE ORAL at 20:24

## 2018-03-20 RX ADMIN — HYDROMORPHONE HYDROCHLORIDE PRN MG: 2 INJECTION INTRAMUSCULAR; INTRAVENOUS; SUBCUTANEOUS at 10:19

## 2018-03-20 RX ADMIN — STANDARDIZED SENNA CONCENTRATE AND DOCUSATE SODIUM SCH TAB: 8.6; 5 TABLET, FILM COATED ORAL at 08:18

## 2018-03-20 RX ADMIN — AMPICILLIN SODIUM SCH MLS/HR: 2 INJECTION, POWDER, FOR SOLUTION INTRAMUSCULAR; INTRAVENOUS at 20:26

## 2018-03-20 RX ADMIN — SODIUM CHLORIDE SCH MLS/HR: 900 INJECTION INTRAVENOUS at 17:36

## 2018-03-20 RX ADMIN — ALBUMIN HUMAN SCH MLS/HR: 0.25 SOLUTION INTRAVENOUS at 17:36

## 2018-03-20 RX ADMIN — Medication PRN ML: at 02:22

## 2018-03-20 RX ADMIN — ALBUMIN HUMAN SCH MLS/HR: 0.25 SOLUTION INTRAVENOUS at 06:23

## 2018-03-20 RX ADMIN — FUROSEMIDE SCH MG: 10 INJECTION, SOLUTION INTRAMUSCULAR; INTRAVENOUS at 17:37

## 2018-03-20 RX ADMIN — MORPHINE SULFATE SCH MG: 15 TABLET, EXTENDED RELEASE ORAL at 21:42

## 2018-03-20 RX ADMIN — ALBUTEROL SULFATE PRN MG: 2.5 SOLUTION RESPIRATORY (INHALATION) at 05:55

## 2018-03-20 RX ADMIN — POTASSIUM CHLORIDE SCH MEQ: 750 TABLET, FILM COATED, EXTENDED RELEASE ORAL at 08:07

## 2018-03-20 RX ADMIN — AMPICILLIN SODIUM SCH MLS/HR: 2 INJECTION, POWDER, FOR SOLUTION INTRAMUSCULAR; INTRAVENOUS at 13:34

## 2018-03-20 RX ADMIN — Medication PRN ML: at 22:44

## 2018-03-20 RX ADMIN — FUROSEMIDE SCH MG: 10 INJECTION, SOLUTION INTRAMUSCULAR; INTRAVENOUS at 08:07

## 2018-03-20 RX ADMIN — Medication SCH ML: at 08:07

## 2018-03-20 RX ADMIN — CHLORHEXIDINE GLUCONATE SCH PACK: 500 CLOTH TOPICAL at 04:00

## 2018-03-20 RX ADMIN — ALBUTEROL SULFATE PRN MG: 2.5 SOLUTION RESPIRATORY (INHALATION) at 13:04

## 2018-03-20 RX ADMIN — STANDARDIZED SENNA CONCENTRATE AND DOCUSATE SODIUM SCH TAB: 8.6; 5 TABLET, FILM COATED ORAL at 20:24

## 2018-03-20 RX ADMIN — HYDROMORPHONE HYDROCHLORIDE PRN MG: 2 INJECTION INTRAMUSCULAR; INTRAVENOUS; SUBCUTANEOUS at 06:18

## 2018-03-20 RX ADMIN — AMPICILLIN SODIUM SCH MLS/HR: 2 INJECTION, POWDER, FOR SOLUTION INTRAMUSCULAR; INTRAVENOUS at 02:21

## 2018-03-20 RX ADMIN — HYDROMORPHONE HYDROCHLORIDE PRN MG: 2 INJECTION INTRAMUSCULAR; INTRAVENOUS; SUBCUTANEOUS at 14:20

## 2018-03-20 RX ADMIN — AMPICILLIN SODIUM SCH MLS/HR: 2 INJECTION, POWDER, FOR SOLUTION INTRAMUSCULAR; INTRAVENOUS at 08:08

## 2018-03-20 RX ADMIN — HYDROMORPHONE HYDROCHLORIDE PRN MG: 2 INJECTION INTRAMUSCULAR; INTRAVENOUS; SUBCUTANEOUS at 18:25

## 2018-03-20 RX ADMIN — Medication PRN ML: at 06:20

## 2018-03-20 RX ADMIN — WARFARIN SODIUM SCH MG: 1 TABLET ORAL at 22:02

## 2018-03-20 RX ADMIN — Medication SCH ML: at 20:24

## 2018-03-20 RX ADMIN — HYDROMORPHONE HYDROCHLORIDE PRN MG: 2 INJECTION INTRAMUSCULAR; INTRAVENOUS; SUBCUTANEOUS at 02:22

## 2018-03-20 RX ADMIN — HYDROMORPHONE HYDROCHLORIDE PRN MG: 2 INJECTION INTRAMUSCULAR; INTRAVENOUS; SUBCUTANEOUS at 22:43

## 2018-03-20 NOTE — HHI.PR
Subjective


Remarks


Deferred entry, the patient was seen earlier at 11:15 AM.


The patient states that she feels crappy, she states that her feet are very 

swollen and hurt.


Patient otherwise denies chest pain, feels mild shortness of breath.





Objective


Vitals





Vital Signs








  Date Time  Temp Pulse Resp B/P (MAP) Pulse Ox O2 Delivery O2 Flow Rate FiO2


 


3/20/18 12:00 98.7 112 22 90/70 (77) 98   


 


3/20/18 12:00  111      


 


3/20/18 12:00      Nasal Cannula 3.00 


 


3/20/18 08:00      Nasal Cannula 3.00 


 


3/20/18 08:00 97.8 115 20 93/61 (72) 92   


 


3/20/18 08:00  112      


 


3/20/18 04:00 100.9 117 22 103/53 (70) 95   


 


3/20/18 03:43  118      


 


3/20/18 00:00 99.0 117 20 105/57 (73) 96   


 


3/19/18 23:41  114      


 


3/19/18 21:51      Nasal Cannula 2.00 


 


3/19/18 20:00      Nasal Cannula 3.00 


 


3/19/18 20:00 99.8 119 20 99/57 (71) 91   


 


3/19/18 19:43  114      


 


3/19/18 19:00   22     


 


3/19/18 16:10 97.8 109 18 98/79 (85) 97   


 


3/19/18 16:00  106      














I/O      


 


 3/19/18 3/19/18 3/19/18 3/20/18 3/20/18 3/20/18





 07:00 15:00 23:00 07:00 15:00 23:00


 


Intake Total 790 ml  840 ml 960 ml  


 


Output Total    850 ml  


 


Balance 790 ml  840 ml 110 ml  


 


      


 


Intake Oral 580 ml  840 ml 960 ml  


 


IV Total 210 ml     


 


Output Urine Total    850 ml  


 


# Voids 5  3  1 


 


# Bowel Movements   1 0 1 








Result Diagram:  


3/20/18 0625                                                                   

             3/19/18 0615





Imaging





Last Impressions








Catheter Placement X-Ray 3/19/18 0000 Signed





Impressions: 





 Service Date/Time:  Monday, March 19, 2018 11:16 - CONCLUSION: Uncomplicated 





 venous catheter change as above.     Rj Whitten MD 


 


Chest X-Ray 3/16/18 0000 Signed





Impressions: 





 Service Date/Time:  Friday, March 16, 2018 13:30 - CONCLUSION:  1. Right 

basilar 





 patchy discussed with probable pneumonia.  2. Cardiomegaly.     Michi Johnson MD 


 


Head CT 3/3/18 0000 Signed





Impressions: 





 Service Date/Time:  Saturday, March 3, 2018 10:22 - CONCLUSION:  1. Focal area 





 of decreased density involving the right parietal lobe. As a new finding from 





 the prior exam. This could relate to a small infarct. MRI of the brain with 





 gadolinium suggested to further evaluate. 2. Small lacunar infarction 

involving 





 the left centrum semiovale.     Scooter Dawson Jr., MD 


 


CT Angiography 3/3/18 0000 Signed





Impressions: 





 Service Date/Time:  Saturday, March 3, 2018 10:28 - CONCLUSION:  There 

extensive 





 pulmonary emboli bilaterally. There is near complete cut off of the right 

lower 





 lobe pulmonary artery. Prominent filling defects on the left as well. These 





 findings were relayed to Dr. Bustos immediately at the completion of the study.  





 Scattered pulmonary infiltrates, small pleural effusion and extensive 





 mediastinal lymphadenopathy as described above.      Won Sharp MD 


 


Brain MRI 3/3/18 0000 Signed





Impressions: 





 Service Date/Time:  Saturday, March 3, 2018 18:04 - CONCLUSION:  Deterioration 





 in the appearance of the scan.  At least 3 focal areas of abnormal contrast 





 enhancement are present scattered to in both hemispheres.  Given the history 





 this most likely represents developing areas cerebritis.  Exam is compromised 

by 





 an uncooperative patient and motion artifact.  These 2 factors make detection 

of 





 other abnormalities such as cortical cephalitis and meningitis difficult to 





 exclude.  Cannot exclude hemorrhagic lesions as well.  There is no mass effect 





 evident.      Jorge Diane MD 


 


Chest CT 2/26/18 0000 Signed





Impressions: 





 Service Date/Time:  Monday, February 26, 2018 12:41 - CONCLUSION:  1. There 

are 





 at least 5 pulmonary nodules present bilaterally with the largest measuring 19 





 mm. None demonstrate cavitation but it is possible that these represent septic 





 emboli. Suggest followup to assess for change. 2. Splenomegaly with subtle 





 wedge-shaped areas of low-density in the mid spleen which could represent 





 infarcts. 3. Mild cardiomegaly in this patient post aortic valve replacement. 





 Low density of the cardiac blood pool suggests anemia.      Ron Melgar MD 


 


Aorta w/Runoff CTA 2/25/18 0000 Signed





Impressions: 





 Service Date/Time:  Sunday, February 25, 2018 12:47 - CONCLUSION:  Left renal 





 cortical infarction. 5-6 cm length total occlusion of the left superficial 





 femoral artery in the proximal thigh. Nodular parenchymal opacities in the 

lung 





 bases bilaterally See above discussion.     Ron Cruz MD 


 


Renal Ultrasound 2/24/18 0000 Signed





Impressions: 





 Service Date/Time:  Saturday, February 24, 2018 10:53 - CONCLUSION:  1. No 





 evidence of hydronephrosis. 2. Thickening of the urinary bladder wall a 1.1 

cm. 





 3. Prominent splenomegaly.     Elgin Walton MD 


 


Breast Ultrasound 2/24/18 0000 Signed





Impressions: 





 Service Date/Time:  Saturday, February 24, 2018 10:48 - CONCLUSION:  Negative 





 targeted right breast ultrasound examination.     Ron Brown MD 


 


Lower Extremity Ultrasound 2/23/18 0000 Signed





Impressions: 





 Service Date/Time:  Friday, February 23, 2018 21:22 - CONCLUSION:  1. No 





 sonographic evidence for lower extremity DVT. 2. Incidental note of bilateral 





 inguinal adenopathy with the largest on the right measuring 3.3 cm and the 





 largest on the left measuring 2.6 cm. This finding is nonspecific but is 





 typically reactive in etiology.     Rj Whitten MD 








Objective Remarks


AAOx3


NAD


Clear lungs BL


S1S2 4/6 systolic ejection murmur


+3 BL lower extremity edema without erythema.


Medications and IVs





Current Medications








 Medications


  (Trade)  Dose


 Ordered  Sig/Nikhil


 Route  Start Time


 Stop Time Status Last Admin


 


  (NS Flush)  2 ml  UNSCH  PRN


 IV FLUSH  2/23/18 22:00


    3/20/18 06:20


 


 


  (NS Flush)  2 ml  BID


 IV FLUSH  2/24/18 09:00


    3/20/18 08:07


 


 


  (Tylenol)  650 mg  Q6H  PRN


 PO  2/23/18 22:00


    3/19/18 01:19


 


 


  (Zofran Inj)  4 mg  Q6H  PRN


 IV PUSH  2/23/18 22:00


    3/8/18 21:22


 


 


  (Restoril)  15 mg  HS  PRN


 PO  2/23/18 22:00


    3/6/18 22:08


 


 


 Miscellaneous


 Information  1  Q361D


 XX  2/23/18 22:00


     


 


 


  (Chlorhexidine


 2% Cloth)  


 Taper  DAILY@04


 TOP  2/24/18 04:00


 2/20/19 03:59  3/6/18 03:54


 


 


  (Chlorhexidine


 2% Cloth)  3 pack  UNSCH  PRN


 TOP  2/23/18 22:00


     


 


 


  (Arlen-Colace)  1 tab  BID


 PO  2/24/18 09:00


    3/19/18 08:44


 


 


  (Milk Of


 Magnesia Liq)  30 ml  Q12H  PRN


 PO  2/23/18 22:00


     


 


 


  (Senokot)  17.2 mg  Q12H  PRN


 PO  2/23/18 22:00


     


 


 


  (Dulcolax Supp)  10 mg  DAILY  PRN


 RECTAL  2/23/18 22:00


     


 


 


  (Lactulose Liq)  30 ml  DAILY  PRN


 PO  2/23/18 22:00


     


 


 


 Ampicillin Sodium


 2000 mg/Sodium


 Chloride  100 ml @ 


 400 mls/hr  Q6H


 IV  2/24/18 14:00


    3/20/18 13:34


 


 


  (Imodium)  2 mg  Q4H  PRN


 PO  2/26/18 16:15


    2/28/18 14:32


 


 


  (Albuterol Neb)  2.5 mg  Q2HR NEB  PRN


 NEB  2/27/18 11:30


    3/20/18 13:04


 


 


  (Norco  Mg)  1 tab  Q4H  PRN


 PO  3/4/18 15:00


    3/9/18 05:02


 


 


  (Dilaudid Pf Inj)  1 mg  Q4H  PRN


 IV PUSH  3/7/18 19:00


    3/20/18 14:20


 


 


  (KCl)  60 meq  Q12HR


 PO  3/14/18 09:45


    3/20/18 08:07


 


 


  (Coumadin)  2 mg  DAILY@16


 PO  3/16/18 18:45


   Future Hold 3/18/18 17:33


 


 


 Ceftriaxone


 Sodium 1000 mg/


 Sodium Chloride  100 ml @ 


 200 mls/hr  Q24H


 IV  3/18/18 16:00


    3/19/18 17:35


 


 


  (Lasix Inj)  20 mg  BID@09,18


 IV PUSH  3/18/18 18:00


    3/20/18 08:07


 


 


 Albumin Human  100 ml @ 


 60 mls/hr  Q12H


 IV  3/18/18 18:00


    3/20/18 06:23


 








Vascular Central Line Catheter:  Yes


Assessment to:  Continue


Date of Insertion:  Mar 19, 2018


Line:  Central Venous Catheter


Side:  Right


Location:  Internal, Jugular


Reason for Continuation


IV medication administration.  Patient with very poor vasculature.





A/P


Problem List:  


(1) Prosthetic valve endocarditis


ICD Code:  T82.6XXA - Infection and inflammatory reaction due to cardiac valve 

prosthesis, initial encounter


Status:  Acute


(2) Bacteremia


ICD Code:  R78.81 - Bacteremia


(3) Septic pulmonary embolism


ICD Code:  I26.90 - Septic pulmonary embolism without acute cor pulmonale


Status:  Acute


(4) IVDU (intravenous drug user)


ICD Code:  F19.90 - Other psychoactive substance use, unspecified, uncomplicated


(5) Anemia


ICD Code:  D64.9 - Anemia, unspecified


Status:  Chronic


(6) NOÉ (acute kidney injury)


ICD Code:  N17.9 - Acute kidney failure, unspecified


Plan:  Creatinine slightly worsened from 1.52-1.57.


Nephrology consulted.


Possible ATN from vancomycin toxicity or from infection.


Urine eosinophils negative.


Continue to hold diuretics as per nephrology.  Nephrology recommends waiting 

for some improvement in renal function before some diuretics could be 

administered.


Continue to monitor BUN and creatinine, avoid nephrotoxins.


3/19 As per Nephrology continue Lasix 20 mg IV BID with albumin.  Creatinine 

slightly elevated from 1.57 - 1.62.


3/20 continue IV diuretics as above.  Continue to monitor BUN and creatinine.  

Creatinine pending.





Assessment and Plan


(1) Prosthetic valve endocarditis


Plan:  Continue antibiotics as per ID.


Patient on IV ampicillin.


Continue to hold coumadin until INR < 3 - goal 2-3.


3/19 INR elevated from 3.1 to 3.7. Hematology had decreased dose of Coumadin, I 

will hold and continue to monitor PT/INR.


Continue Ampicllin, Rocephin added for pulmonary coverage on 3/18 as per ID. 


Continue antibiotics as per ID recommendations.


3/20 palliative care consulted by hematology.  As per hematology documentation 

the patient requested to talk to palliative care to see if she is a hospice 

candidate.


Discussed with palliative care who states that the patient's goals are 

aggressive and she wants to get better.  The patient definitely is a hospice 

candidate, however her goals are not hospice appropriate.  I will start the 

patient on Oramorph 50 mg p.o. twice daily which will probably need to be 

titrated in order to provide some pain control for the patient.





(2) Bacteremia


Plan:  Blood cultures obtained on 2/23 growing strep viridans.


Subsequent repeat blood cultures negative.


3/19 sp Exchange of Right IJ central line. 


3/20 continue IV antibiotics as per ID recommendations.





(3) Septic pulmonary embolism


Plan:  As observed on a CT angiography of the chest obtained on 3/3/18.


chocardiogram on 3/3 showed an estimated EF of 50-55%.  


++severe tricuspid regurgitation with prosthesis in place.  


++2 x 4 cm mobile vegetation was seen on the valve.  The right atrium and right 

ventricle were normal in size and function


Hematology following.


Management of Coumadin as per hematology recommendations.


Goal INR 2-3.





(4) IVDU (intravenous drug user)


Plan:  Counseled on drug cessation.





(5) Anemia


Plan:  Likely secondary to inflammation/infection.  No evidence of bleeding or 

hemolysis.


Monitor hemoglobin and transfuse as needed.


Discharge Planning


Continue to monitor on the medical floor.  Pending palliative care consultation.





Problem Qualifiers





(1) Prosthetic valve endocarditis:  


Qualified Codes:  T82.6XXA - Infection and inflammatory reaction due to cardiac 

valve prosthesis, initial encounter


(2) Anemia:  


Qualified Codes:  D63.8 - Anemia in other chronic diseases classified elsewhere








Jesus Mae MD Mar 20, 2018 15:29

## 2018-03-20 NOTE — PD.ONC.PN
Subjective


Subjective


Remarks


Tmax 100.9 overnight. 


Patient resting in room. 


she says she feels awful. 


her legs are swollen more then they have ever been before. 


she has difficulty speaking d/t her dyspnea. 


her feet hurt d/t the swelling.





Objective


Data











  Date Time  Temp Pulse Resp B/P (MAP) Pulse Ox O2 Delivery O2 Flow Rate FiO2


 


3/20/18 08:00 97.8 115 20 93/61 (72) 92   


 


3/20/18 08:00  112      


 


3/20/18 04:00 100.9 117 22 103/53 (70) 95   


 


3/20/18 03:43  118      


 


3/20/18 00:00 99.0 117 20 105/57 (73) 96   


 


3/19/18 23:41  114      


 


3/19/18 21:51      Nasal Cannula 2.00 


 


3/19/18 20:00      Nasal Cannula 3.00 


 


3/19/18 20:00 99.8 119 20 99/57 (71) 91   


 


3/19/18 19:43  114      


 


3/19/18 19:00   22     


 


3/19/18 16:10 97.8 109 18 98/79 (85) 97   


 


3/19/18 16:00  106      














 3/20/18 3/20/18 3/20/18





 07:00 15:00 23:00


 


Intake Total 960 ml  


 


Output Total 850 ml  


 


Balance 110 ml  








Result Diagram:  


3/20/18 0625                                                                   

             3/19/18 0615





Laboratory Results





Laboratory Tests








Test


  3/20/18


06:25


 


White Blood Count 14.6 TH/MM3 


 


Red Blood Count 3.31 MIL/MM3 


 


Hemoglobin 7.9 GM/DL 


 


Hematocrit 25.1 % 


 


Mean Corpuscular Volume 75.9 FL 


 


Mean Corpuscular Hemoglobin 24.0 PG 


 


Mean Corpuscular Hemoglobin


Concent 31.6 % 


 


 


Red Cell Distribution Width 23.5 % 


 


Platelet Count 196 TH/MM3 


 


Mean Platelet Volume 8.2 FL 


 


Neutrophils (%) (Auto) 82.3 % 


 


Lymphocytes (%) (Auto) 9.2 % 


 


Monocytes (%) (Auto) 6.7 % 


 


Eosinophils (%) (Auto) 0.9 % 


 


Basophils (%) (Auto) 0.9 % 


 


Neutrophils # (Auto) 12.0 TH/MM3 


 


Lymphocytes # (Auto) 1.4 TH/MM3 


 


Monocytes # (Auto) 1.0 TH/MM3 


 


Eosinophils # (Auto) 0.1 TH/MM3 


 


Basophils # (Auto) 0.1 TH/MM3 


 


CBC Comment DIFF FINAL 


 


Differential Comment  


 


Prothrombin Time 30.9 SEC 


 


Prothromb Time International


Ratio 3.1 RATIO 


 











Administered Medications








 Medications


  (Trade)  Dose


 Ordered  Sig/Nikhli


 Route


 PRN Reason  Start Time


 Stop Time Status Last Admin


Dose Admin


 


 Sodium Chloride


  (NS Flush)  2 ml  UNSCH  PRN


 IV FLUSH


 FLUSH AFTER USING IV ACCESS  2/23/18 22:00


    3/20/18 06:20


 


 


 Sodium Chloride


  (NS Flush)  2 ml  BID


 IV FLUSH


   2/24/18 09:00


    3/20/18 08:07


 


 


 Acetaminophen


  (Tylenol)  650 mg  Q6H  PRN


 PO


 fever  2/23/18 22:00


    3/19/18 01:19


 


 


 Ondansetron HCl


  (Zofran Inj)  4 mg  Q6H  PRN


 IV PUSH


 NAUSEA OR VOMITING  2/23/18 22:00


    3/8/18 21:22


 


 


 Temazepam


  (Restoril)  15 mg  HS  PRN


 PO


 INSOMNIA  2/23/18 22:00


    3/6/18 22:08


 


 


 Chlorhexidine


 Gluconate


  (Chlorhexidine


 2% Cloth)  


 Taper  DAILY@04


 TOP


   2/24/18 04:00


 2/20/19 03:59  3/6/18 03:54


 


 


 Senna/Docusate


 Sodium


  (Arlen-Colace)  1 tab  BID


 PO


   2/24/18 09:00


    3/19/18 08:44


 


 


 Ampicillin Sodium


 2000 mg/Sodium


 Chloride  100 ml @ 


 400 mls/hr  Q6H


 IV


   2/24/18 14:00


    3/20/18 08:08


 


 


 Loperamide HCl


  (Imodium)  2 mg  Q4H  PRN


 PO


 Diarrhea  2/26/18 16:15


    2/28/18 14:32


 


 


 Albuterol Sulfate


  (Albuterol Neb)  2.5 mg  Q2HR NEB  PRN


 NEB


 dyspnea  2/27/18 11:30


    3/20/18 05:55


 


 


 Acetaminophen/


 Hydrocodone Bitart


  (Norco  Mg)  1 tab  Q4H  PRN


 PO


 pain 1-5  3/4/18 15:00


    3/9/18 05:02


 


 


 Hydromorphone HCl


  (Dilaudid Pf Inj)  1 mg  Q4H  PRN


 IV PUSH


 PAIN SCALE 6 TO 10  3/7/18 19:00


    3/20/18 10:19


 


 


 Potassium Chloride


  (KCl)  60 meq  Q12HR


 PO


   3/14/18 09:45


    3/20/18 08:07


 


 


 Warfarin Sodium


  (Coumadin)  2 mg  DAILY@16


 PO


   3/16/18 18:45


   Future Hold 3/18/18 17:33


 


 


 Ceftriaxone


 Sodium 1000 mg/


 Sodium Chloride  100 ml @ 


 200 mls/hr  Q24H


 IV


   3/18/18 16:00


    3/19/18 17:35


 


 


 Furosemide


  (Lasix Inj)  20 mg  BID@09,18


 IV PUSH


   3/18/18 18:00


    3/20/18 08:07


 


 


 Albumin Human  100 ml @ 


 60 mls/hr  Q12H


 IV


   3/18/18 18:00


    3/20/18 06:23


 








Objective Remarks


GENERAL: chronically ill female, supine in bed.


SKIN: Warm and dry.


HEAD: Normocephalic.


EYES: No injection or drainage. 


NECK: Supple, trachea midline. 


CARDIOVASCULAR: +S1, S2, tachy


RESPIRATORY: scattered rhonchi.


GASTROINTESTINAL: Abdomen soft, non-tender, nondistended. 


EXTREMITIES: 4+ pitting edema, ble. doppler pulses audible in bilateral 

dorsalis pedis


NEUROLOGICAL: awake. alert. moving extremities. facial movements symmetric.





Assessment/Plan


Problem List:  


(1) arterial blood clot


Plan:  3/20/17: INR =3.1 hold coumadin. 


3/19: Pt's INR 3.7 today. Hold coumadin today and resume at 1mg when closer to 

3.


3/16: INR 3.9 today. hold coumadin. goal INR between 2.0-3.0


3/15: INR =3.8. continue to hold coumadin until closer to 2.0-3.0 than resume 

at lower dose. will ask nurses to perform doppler on patient's pedal pulses q 

shift and document. 


3/14/18.  INR >4.0, Argatroban stopped.


PT/PTT checked off argatroban, noted prolonged PTT.





(2) Microcytic anemia


ICD Codes:  D50.9 - Iron deficiency anemia, unspecified


Plan:  


--likely d/t inflammation/infection. no evidence of bleeding or hemolysis. 


--monitor and transfuse as needed. 





(3) Bacterial endocarditis


ICD Codes:  I33.0 - Acute and subacute infective endocarditis


Status:  Acute


Plan:  -on ampicillin, ID following. 





(4) Acute septic pulmonary embolism


ICD Codes:  I26.90 - Septic pulmonary embolism without acute cor pulmonale


Plan:  -- echocardiogram on 3/3 showed an estimated EF of 50-55%.  


++severe tricuspid regurgitation with prosthesis in place.  


++2 x 4 cm mobile vegetation was seen on the valve.  The right atrium and right 

ventricle were normal in size and function





Assessment


38y/o female admitted with recurrent endocarditis. hematology consulted for 

anticoagulation assistance.


h/o Recurrent bacterial endocarditis, bacteremia, history of prosthetic aortic 

valve and subsequent redo, chronic active hepatitis C, intravenous drug use, 

microcytic anemia, consistent with iron deficiency, left lower extremity 

arterial event, bacterial endocarditis with viridans streptococcus.





HPI (brought forward for continuity of care): history of IV drug abuse and  

recurrent endocarditis. had a relapse of her activity. has history of pulmonary 

embolism and infected valves. +significant congestive heart failure and 

presented to the emergency room on 02/23/2018, with sepsis. is followed by 

Infectious Disease for her endocarditis.  previously received antibiotic 

therapy for enterococcus faecalis and staph aureus. There is questionable 

compliance. Her current blood 


culture from 02/23/2018, shows viridans streptococcus.  Two out of two bottles 

were positive.  She is on ampicillin and gentamicin. course is complicated by 

cold left lower extremity, evaluated by  Vascular Surgery.  A CT angiogram 

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 

the proximal thigh.  There is satisfactory calf runoff present.  For this reason

, conservative management with anticoagulation is continued. was placed on 

unfractionated heparin.  Her heparin dose has been titrated from 1000 units/

hour to 2500 units/hour with a PTT remaining subtherapeutic.  Her last PTT is 

32 seconds from 03/01/2018, at 2 p.m.  For this reason, Hematology/Oncology was 

consulted.


Plan


1. hold coumadin


2. monitor INR


3. patient would like to speak with palliative care to see if she is a 

candidate for hospice.


Attending Statement


The exam, history, and the medical decision-making described in the above note 

were completed with the assistance of the mid-level provider. I reviewed and 

agree with the findings presented.  I attest that I had a face-to-face 

encounter with the patient on the same day, and personally performed and 

documented my assessment and findings in the medical record.





Pt seen later in evening.


Sitting in dark at bedside.


LE swelling appear worse, noted dusk color of L leg, feels she's getting worse.


Noted INR 3.1.


Prefer to decrease dose rather than holding completely.


Continue on Coumadin 1mg daily, start this evening.


Monitor leg swelling.


Prognosis over all poor.











Zoe Chau Mar 20, 2018 12:29


Henny Smith MD Mar 20, 2018 19:57

## 2018-03-20 NOTE — HHI.NPPN
Subjective


History of Present Illness


This is a 38-year-old female with past medical history of IV drug abuse, 

history of hepatitis C, narcotic dependency, came to the hospital with 

complaint of shortness of breath and swelling.  Nephrology called to see the 

patient because of elevated BUN and creatinine.  The patient has creatinine of 

4.5 on admission which improved and it was staying in the range of 0.6-0.8 

until 6 days ago, then it started going up again and now it is 1.7.  The 

patient has increased swelling 


of the legs and the patient was diagnosed with endocarditis and she has blood 

culture positive for Strep viridans.  Patient has been following with the ID 

and she was getting furosemide, which was stopped today this morning because of 

increased creatinine and she was on vancomycin and rifampicin.  The last dose 

of vancomycin seems to be given possibly on 03/04/2018 or even later, but her 

level was high and the highest level we have was 25.8, which was on 03/04/2018 

but on 03/10/2018 it was 23.9.  The patient has been actively using IV drugs 

before she came to the hospital and noticed to have more swelling in her legs.  

She is complaining of generalized body pain.  There is no nausea, vomiting.  

Denies any diarrhea.  Her appetite is normal.


Additional Remarks


Reports mild SOB.  Bilateral lower extremity edema and complains of increased 

pain


 (Gellermann,Diane M. ARNP)





Review of Systems


Respiratory


Lungs:  SOB


 (Gellermann,Diane M. ARNP)





Cardiovascular


Cardiac Remarks


Denies CP


 (Gellermann,Diane M. ARNP)





Gastrointestinal


GI Remarks


denies abdominal pain


 (Gellermann,Diane M. ARNP)





Objective Data


Data











 3/20/18 3/21/18





 19:00 07:00


 


  


 


# Voids 1 


 


# Bowel Movements 1 











Vital Signs








  Date Time  Temp Pulse Resp B/P (MAP) Pulse Ox O2 Delivery O2 Flow Rate FiO2


 


3/20/18 12:00 98.7 112 22 90/70 (77) 98   


 


3/20/18 12:00  111      


 


3/20/18 12:00      Nasal Cannula 3.00 


 


3/20/18 08:00      Nasal Cannula 3.00 


 


3/20/18 08:00 97.8 115 20 93/61 (72) 92   


 


3/20/18 08:00  112      


 


3/20/18 04:00 100.9 117 22 103/53 (70) 95   


 


3/20/18 03:43  118      


 


3/20/18 00:00 99.0 117 20 105/57 (73) 96   


 


3/19/18 23:41  114      


 


3/19/18 21:51      Nasal Cannula 2.00 


 


3/19/18 20:00      Nasal Cannula 3.00 


 


3/19/18 20:00 99.8 119 20 99/57 (71) 91   


 


3/19/18 19:43  114      


 


3/19/18 19:00   22     


 


3/19/18 16:10 97.8 109 18 98/79 (85) 97   


 


3/19/18 16:00  106      








 (Gellermann,Diane M. ARNP)


-:  


3/20/18 0625                                                                   

             3/19/18 0615








Physical Exam


General


Appearance:  No Acute Distress


 (Gellermann,Diane M. ARNP)





Eyes


Eye Exam:  Pupils Equal


 (Gellermann,Diane M. ARNP)





Throat


Throat Exam:  Oral Mucosa Pink & Moist


 (Gellermann,Diane M. ARNP)





Neck


Neck Exam:  Neck Supple


 (Gellermann,Diane M. ARNP)





Pulmonary


Resp Exam:  Clear Bilaterally


 (Gellermann,Diane M. ARNP)





Cardiology


CV Exam:  Regular, Normal Sinus Rhythm


 (Gellermann,Diane M. ARNP)





Gastrointestinal/Abdomen


GI Exam:  Soft, Non-Tender


 (Gellermann,Diane M. ARNP)





Integumentary


Skin Exam:  Dry, Intact


 (Gellermann,Diane M. ARNP)





Extremeties


Extremities Exam:  Pitting Edema


 (Gellermann,Diane M. ARNP)





Neurologic


Neuro Exam:  Alert, Awake, Oriented


 (Gellermann,Diane M. ARNP)





Psychiatric


Psych Exam:  Appropriate Responses


 (Gellermann,Diane M. ARNP)





Assessment/Plan


Discussed Condition With:  Patient


Assessment Summary:  NOÉ/Acute Renal Failure


Problem List:  


(1) Acute kidney injury


ICD Codes:  N17.9 - Acute kidney failure, unspecified


Status:  Acute


Plan:  


NOÉ likely due to vancomycin toxicity,  post-infectious GN is unlikely.


Possibility of ATN or pre renal.


Urine eosinophils negative, complements wnl





Creatinine stable from 1.6


Plan


Continue lasix 20mg IV BID with albumin for edema.


Continue to monitor BMP and UOP


Avoid nephrotoxins. 





(2) Prosthetic valve endocarditis


ICD Codes:  T82.6XXA - Infection and inflammatory reaction due to cardiac valve 

prosthesis, initial encounter


Status:  Acute


Plan:  septic endocarditis with strep viridans


IV drug use, pulmonary emboli as well.





Continues ampicillin per ID, started ceftriaxone. 


continue renal dosing antibiotics.





(3) Peripheral arterial occlusive disease


ICD Codes:  I77.9 - Disorder of arteries and arterioles, unspecified


Plan:  seen with vascular, continue medical management


Hold contrast studies as possible.





(4) Hepatitis C


ICD Codes:  B19.20 - Unspecified viral hepatitis C without hepatic coma


Status:  Chronic (Gellermann,Diane M. ARNP)





Problem Qualifiers





(1) Prosthetic valve endocarditis:  


Qualified Codes:  T82.6XXA - Infection and inflammatory reaction due to cardiac 

valve prosthesis, initial encounter








Gellermann,Diane M. ARNP Mar 20, 2018 15:28


Melissa Layne MD Mar 20, 2018 18:27

## 2018-03-20 NOTE — HHI.HCPN
Reason for visit


   a.  To assist with evaluation and management of symptoms including: chest 

pain, dyspnea. 


   b.  To assist medical decision maker(s) with: better understanding of 

current medical conditions; weighing benefits/burdens of medical treatment 

options; making        


        medical treatment decisions.


.





Subjective/Interval History


Patient seen to follow-up on comfort, goals.  Reconsulted by oncology, pt may 

wish to discuss hospice further. Patient still being followed by palliative.








Remained stable.  Low-grade fevers 100.9 MAXIMUM TEMPERATURE.  Some spotting, 

possible menstrual cycle.  H&H low though generally stable 7.9/25.1. Oncology 

following INR, coumadin.  Continues on ampicillin, ceftriaxone.





Patient seen in room no visitors present.  She is seated in bed with legs 

elevated.  Review with her palliative care consultation, ongoing support.  She 

remembers me from prior visits.  Explore her current condition.  She is tearful 

she indicates she is weak, and she is very tired of being so sick.  She 

indicates she is having difficulty getting her legs in bed without manually 

lifting them with her hands.  She indicates she continues to have difficulty 

breathing with any activity.  She continues to have ongoing pain to her legs, 

her chest and middle back with breathing.  She feels like the IV pain 

medication helps for a little while but then before she is due to receive more 

the pain comes back.  She is tearful at times.  He is tired of being in the 

hospital.  Supportive listening provided.  She denies nausea or vomiting.  

Endorses poor appetite just doesn't feel hungry.  Endorses ongoing shortness of 

breath worsening with any activity.  Endorses ongoing weakness and difficulty 

walking and lifting her legs into the bed.


Gently explore with her that if she does not desire ongoing hospitalization 

that hospice may be an option however hospice care would mean no further IV 

antibiotics she asks about transition to oral antibiotics.  Advise that there 

is not an oral equivalent to what she is getting and that likely he would not 

keep her infection under control and she would experience worsening infection 

and death and potentially days weeks or months.  Explore that with comfort 

oriented goals she may achieve a little bit better pain relief however she 

would experience limited life expectancy and absence of ongoing aggressive 

hospital measures.  Further explore that even with ongoing aggressive 

interventions in the hospital she does remain at risk for ongoing complications 

and setbacks and potential death, though she certainly may survive longer with 

ongoing hospital-based aggressive treatment.  She is tearful.  She endorses 

that she is not ready to die from her illness however she is tired of feeling 

so terribly.  She wants to "get out of here alive ", and indicates that if she 

elected hospice she would die from this.  She is not interested in hospice at 

this time.  Discuss with patient possible may make further adjustments to pain 

regimen however due to extensive illness and underlying pathology she will 

continue to experience some level of discomfort even with additional pain 

medication. 








Discussed with medical attending pain regimen and possible adjustments.








.





Advance Directives


Living Will:  Never completed


Health Care Surrogate:  Never completed


Durable Power of :  Never completed


Advance Directive Specifics


Health Care Surrogate(s):


No written advanced directives, patient refused to complete advanced directives 

during last admission. Patient a single. Children are juvenile. In the absence 

of written advanced directives, according to [place] statutes health care proxy 

decision-making falls to a parent. 


.





Objective





Vital Signs








  Date Time  Temp Pulse Resp B/P (MAP) Pulse Ox O2 Delivery O2 Flow Rate FiO2


 


3/20/18 12:00 98.7 112 22 90/70 (77) 98   


 


3/20/18 12:00  111      


 


3/20/18 12:00      Nasal Cannula 3.00 


 


3/20/18 08:00      Nasal Cannula 3.00 


 


3/20/18 08:00 97.8 115 20 93/61 (72) 92   


 


3/20/18 08:00  112      


 


3/20/18 04:00 100.9 117 22 103/53 (70) 95   


 


3/20/18 03:43  118      


 


3/20/18 00:00 99.0 117 20 105/57 (73) 96   


 


3/19/18 23:41  114      


 


3/19/18 21:51      Nasal Cannula 2.00 


 


3/19/18 20:00      Nasal Cannula 3.00 


 


3/19/18 20:00 99.8 119 20 99/57 (71) 91   


 


3/19/18 19:43  114      


 


3/19/18 19:00   22     














Intake & Output  


 


 3/20/18 3/20/18





 07:00 19:00


 


Intake Total 960 ml 


 


Output Total 850 ml 


 


Balance 110 ml 


 


  


 


Intake Oral 960 ml 


 


Output Urine Total 850 ml 


 


# Voids  1


 


# Bowel Movements 0 1








Physical Exam


CONSTITUTIONAL/GENERAL: This is an adequately nourished patient, with mildly 

labored respirations, worsens with conversation. 


TUBES/LINES/DRAINS: NC, right IJ central line


SKIN: Ecchymoses on extremities. + scabbing/lesion  LLE- ankle, dorsal foot. 

Skin warm/dry. +4+ Edema BLE.  


CARDIOVASCULAR: systolic murmur noted. tachycardic. 


RESPIRATORY/CHEST: On NC.   Mildly dyspneic at rest, worsens with conversation. 

Bilateral course breath sounds.   


GASTROINTESTINAL: Abdomen soft, rounded, non-tender, nondistended. No guarding. 

Bowel sounds present.


GENITOURINARY: Without palpable bladder distension.  Voids bedside as needed


MUSCULOSKELETAL: Lower extremities 4+ edema. lesions noted Left foot and LE. 


NEUROLOGICAL: Awake, flat affect.  Oriented, some insight into hospitalization.

  Moves all 4 extremities.  


PSYCHIATRIC: Flat affect, tearful


.





Diagnostic Tests


Laboratory





Laboratory Tests








Test


  3/18/18


04:05 3/19/18


06:15 3/20/18


06:25


 


White Blood Count


  15.0 TH/MM3


(4.0-11.0) 14.0 TH/MM3


(4.0-11.0) 14.6 TH/MM3


(4.0-11.0)


 


Red Blood Count


  3.49 MIL/MM3


(4.00-5.30) 3.30 MIL/MM3


(4.00-5.30) 3.31 MIL/MM3


(4.00-5.30)


 


Hemoglobin


  8.4 GM/DL


(11.6-15.3) 8.0 GM/DL


(11.6-15.3) 7.9 GM/DL


(11.6-15.3)


 


Hematocrit


  26.4 %


(35.0-46.0) 25.1 %


(35.0-46.0) 25.1 %


(35.0-46.0)


 


Mean Corpuscular Volume


  75.6 FL


(80.0-100.0) 76.1 FL


(80.0-100.0) 75.9 FL


(80.0-100.0)


 


Mean Corpuscular Hemoglobin


  24.2 PG


(27.0-34.0) 24.3 PG


(27.0-34.0) 24.0 PG


(27.0-34.0)


 


Mean Corpuscular Hemoglobin


Concent 32.0 %


(32.0-36.0) 32.0 %


(32.0-36.0) 31.6 %


(32.0-36.0)


 


Red Cell Distribution Width


  22.5 %


(11.6-17.2) 23.1 %


(11.6-17.2) 23.5 %


(11.6-17.2)


 


Platelet Count


  308 TH/MM3


(150-450) 211 TH/MM3


(150-450) 196 TH/MM3


(150-450)


 


Mean Platelet Volume


  8.2 FL


(7.0-11.0) 8.1 FL


(7.0-11.0) 8.2 FL


(7.0-11.0)


 


Neutrophils (%) (Auto)


  80.6 %


(16.0-70.0) 81.9 %


(16.0-70.0) 82.3 %


(16.0-70.0)


 


Lymphocytes (%) (Auto)


  10.8 %


(9.0-44.0) 8.5 %


(9.0-44.0) 9.2 %


(9.0-44.0)


 


Monocytes (%) (Auto)


  6.9 %


(0.0-8.0) 6.9 %


(0.0-8.0) 6.7 %


(0.0-8.0)


 


Eosinophils (%) (Auto)


  1.3 %


(0.0-4.0) 1.6 %


(0.0-4.0) 0.9 %


(0.0-4.0)


 


Basophils (%) (Auto)


  0.4 %


(0.0-2.0) 1.1 %


(0.0-2.0) 0.9 %


(0.0-2.0)


 


Neutrophils # (Auto)


  12.1 TH/MM3


(1.8-7.7) 11.4 TH/MM3


(1.8-7.7) 12.0 TH/MM3


(1.8-7.7)


 


Lymphocytes # (Auto)


  1.6 TH/MM3


(1.0-4.8) 1.2 TH/MM3


(1.0-4.8) 1.4 TH/MM3


(1.0-4.8)


 


Monocytes # (Auto)


  1.0 TH/MM3


(0-0.9) 1.0 TH/MM3


(0-0.9) 1.0 TH/MM3


(0-0.9)


 


Eosinophils # (Auto)


  0.2 TH/MM3


(0-0.4) 0.2 TH/MM3


(0-0.4) 0.1 TH/MM3


(0-0.4)


 


Basophils # (Auto)


  0.1 TH/MM3


(0-0.2) 0.2 TH/MM3


(0-0.2) 0.1 TH/MM3


(0-0.2)


 


CBC Comment DIFF FINAL  DIFF FINAL  DIFF FINAL 


 


Differential Comment      


 


Prothrombin Time


  31.2 SEC


(9.8-11.6) 37.5 SEC


(9.8-11.6) 30.9 SEC


(9.8-11.6)


 


Prothromb Time International


Ratio 3.1 RATIO 


  3.7 RATIO 


  3.1 RATIO 


 


 


Blood Urea Nitrogen


  23 MG/DL


(7-18) 22 MG/DL


(7-18) 


 


 


Creatinine


  1.57 MG/DL


(0.50-1.00) 1.62 MG/DL


(0.50-1.00) 


 


 


Random Glucose


  90 MG/DL


() 116 MG/DL


() 


 


 


Total Protein


  7.0 GM/DL


(6.4-8.2) 6.9 GM/DL


(6.4-8.2) 


 


 


Albumin


  2.4 GM/DL


(3.4-5.0) 2.3 GM/DL


(3.4-5.0) 


 


 


Calcium Level


  8.5 MG/DL


(8.5-10.1) 8.0 MG/DL


(8.5-10.1) 


 


 


Phosphorus Level


  3.6 MG/DL


(2.5-4.9) 


  


 


 


Magnesium Level


  1.6 MG/DL


(1.5-2.5) 


  


 


 


Alkaline Phosphatase


  177 U/L


() 150 U/L


() 


 


 


Aspartate Amino Transf


(AST/SGOT) 395 U/L


(15-37) 201 U/L


(15-37) 


 


 


Alanine Aminotransferase


(ALT/SGPT) 113 U/L


(10-53) 93 U/L (10-53) 


  


 


 


Total Bilirubin


  0.9 MG/DL


(0.2-1.0) 0.7 MG/DL


(0.2-1.0) 


 


 


Sodium Level


  130 MEQ/L


(136-145) 134 MEQ/L


(136-145) 


 


 


Potassium Level


  4.7 MEQ/L


(3.5-5.1) 4.0 MEQ/L


(3.5-5.1) 


 


 


Chloride Level


  95 MEQ/L


() 99 MEQ/L


() 


 


 


Carbon Dioxide Level


  25.0 MEQ/L


(21.0-32.0) 26.6 MEQ/L


(21.0-32.0) 


 


 


Anion Gap


  10 MEQ/L


(5-15) 8 MEQ/L (5-15) 


  


 


 


Estimat Glomerular Filtration


Rate 37 ML/MIN


(>89) 36 ML/MIN


(>89) 


 








Result Diagram:  


3/20/18 0625                                                                   

             3/19/18 0615





Imaging


To help prompt me to consider important information that might be impacting 

today's encounter and assessment, information from prior notes written by 

myself or my colleagues may have been "brought forward" into today's note.  My 

signature on this note, however, is an attestation that I personally performed 

the exam, history, and/or decision-making noted today, and, unless otherwise 

indicated, the interactions with patient, family, and staff as well as the 

review of records all occurred today.  I also attest that the listed assessment 

and stated plan reflect my best clinical judgment today based on the 

combination of historical information, prior notes, and today's exam/ 

interactions.  When time spent is documented, it refers only to time spent 

today by the signer, or if indicated, combined time spent today by 

collaborating physician/nurse practitioner.


Procedures


* 2/24/17 - right IJ central line placement





Assessment and Plan


Disease Oriented Problem List:  


(1) Prosthetic valve endocarditis


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


(3) Polysubstance abuse


(4) Acute septic pulmonary embolism


(5) Acute kidney injury


(6) CHF (congestive heart failure), NYHA class IV


(7) Severe sepsis


(8) Elevated troponin


Symptom Scale:  


(1) Pain


0-10 Scale:  8





(2) Encephalopathy


0-10 Scale:  Unable to quantify





(3) Dyspnea


0-10 Scale:  Unable to quantify


Comment:  On oxygen via nasal cannula





(4) Depression


0-10 Scale:  Unable to quantify





Pertinent Non-Medical Issues


Psychosocial: single.  2 juvenile children. Supported by her mother, step-

father and boyfriend. 


Spiritual: Unknown.


Legal: No known written advanced directives, family is going to try to find HCS 

paperwork they think pt previously completed.  Patient a single.  Children are 

juvenile.  If no written advanced directives, according to [place] statutes 

health care proxy decision-making falls to a parent. 


Ethical issues impacting care: No known concerns at this time. 


.


Important Contacts


* Kelly Le, mother/ HCP: [phone]


* Won Le, stepfather: [phone]


.


Prognosis


Patient has grim prognosis. 


.


Code Status:  Full Code


Plan


* Decision Maker: No written advanced directives, patient refused to complete 

advanced directives during last admission. Patient a single. Children are 

juvenile. In the absence of written advanced directives, [facility] [place] 

[facility] to her mother, Kelly Le. 

Won Le is a step-father.  Declines completion of written advance 

directives 3/5/18.





* FULL CODE. 





* Patient desires continued aggressive care including FULL CODE. she is NOT 

interested in hospice at this time. [she would be appropriate for hospice if 

goals compatible] She would like better control of pain, recommendations 

discussed with medical attending.  








* SYMPTOMS: 


* Pain: Potential sources include endocarditis, bedbound status, tubes etc. 

Patient with likely high tolerance given history of IV drug use, will monitor 

effective medications.  Has been using hydromorphone 1 mg IV about every 4 

hours for the last many days (is ordered prn every 4 hours). Dose was decreased 

from 2mg to 1mg on 3/7/18.   Previously on oral morphine 30mg BID outpatient 

while under hospice services.  Pt will become tolerant to opiate doses.  

Hydromorphone dosing is 5-6 mg every 24 hours== this is equivalent to 100 oral 

morphine equivalents.  Recommend starting on 30mg oral morphine long acting @ 

12 hr. 


*  Dyspnea: secondary to endocarditis, pulmonary emboli. +cont to have ongoing 

dyspnea, worsens w activity. 


*  Hallucination: patient thought she saw a bug on her arm during prior 

palliative care visit, though knew it "wasn't really there." none reported 

today. No medication recommendations at this time.


* Depression: Patient with flat affect, voicing she is "tired of being sick, 

tired of being in the hospital ", + situational--recurrent hospitalizations.  

She is going to continue to be in acutely ill situation with ongoing treatment; 

may benefit from SSRI if amenable, as well as ongoing psychotherapy support 

which is not available in current acute care setting





* Palliative care will continue to follow throughout hospital course to assist 

with symptom management and clarification of goals as needed. 


.





Attestation


To help prompt me to consider important information that might be impacting 

today's encounter and assessment, information from prior notes written by 

myself or my colleagues may have been "brought forward" into today's note.  My 

signature on this note, however, is an attestation that I personally performed 

the exam, history, and/or decision-making noted today, and, unless otherwise 

indicated, the interactions with patient, family, and staff as well as the 

review of records all occurred today.  I also attest that the listed assessment 

and stated plan reflect my best clinical judgment today based on the 

combination of historical information, prior notes, and today's exam/ 

interactions.  When time spent is documented, it refers only to time spent 

today by the signer, or if indicated, combined time spent today by 

collaborating physician/nurse practitioner.











Kala Leslie Mar 20, 2018 16:36

## 2018-03-21 VITALS
OXYGEN SATURATION: 99 % | DIASTOLIC BLOOD PRESSURE: 61 MMHG | SYSTOLIC BLOOD PRESSURE: 128 MMHG | HEART RATE: 109 BPM | RESPIRATION RATE: 18 BRPM | TEMPERATURE: 98.8 F

## 2018-03-21 VITALS
TEMPERATURE: 98.5 F | OXYGEN SATURATION: 96 % | DIASTOLIC BLOOD PRESSURE: 62 MMHG | SYSTOLIC BLOOD PRESSURE: 114 MMHG | HEART RATE: 112 BPM | RESPIRATION RATE: 18 BRPM

## 2018-03-21 VITALS — RESPIRATION RATE: 22 BRPM | OXYGEN SATURATION: 94 % | HEART RATE: 115 BPM | TEMPERATURE: 99 F

## 2018-03-21 VITALS
SYSTOLIC BLOOD PRESSURE: 101 MMHG | RESPIRATION RATE: 22 BRPM | HEART RATE: 116 BPM | DIASTOLIC BLOOD PRESSURE: 79 MMHG | TEMPERATURE: 98.9 F | OXYGEN SATURATION: 95 %

## 2018-03-21 VITALS
OXYGEN SATURATION: 98 % | HEART RATE: 100 BPM | SYSTOLIC BLOOD PRESSURE: 94 MMHG | RESPIRATION RATE: 22 BRPM | DIASTOLIC BLOOD PRESSURE: 73 MMHG | TEMPERATURE: 98 F

## 2018-03-21 VITALS — DIASTOLIC BLOOD PRESSURE: 76 MMHG | SYSTOLIC BLOOD PRESSURE: 110 MMHG

## 2018-03-21 VITALS
HEART RATE: 110 BPM | DIASTOLIC BLOOD PRESSURE: 60 MMHG | RESPIRATION RATE: 23 BRPM | SYSTOLIC BLOOD PRESSURE: 99 MMHG | OXYGEN SATURATION: 97 % | TEMPERATURE: 98 F

## 2018-03-21 VITALS — OXYGEN SATURATION: 95 %

## 2018-03-21 VITALS — OXYGEN SATURATION: 100 %

## 2018-03-21 VITALS — HEART RATE: 109 BPM

## 2018-03-21 VITALS — OXYGEN SATURATION: 99 %

## 2018-03-21 LAB
BUN SERPL-MCNC: 20 MG/DL (ref 7–18)
CALCIUM SERPL-MCNC: 8.4 MG/DL (ref 8.5–10.1)
CHLORIDE SERPL-SCNC: 93 MEQ/L (ref 98–107)
CREAT SERPL-MCNC: 1.53 MG/DL (ref 0.5–1)
ERYTHROCYTE [DISTWIDTH] IN BLOOD BY AUTOMATED COUNT: 23.8 % (ref 11.6–17.2)
GFR SERPLBLD BASED ON 1.73 SQ M-ARVRAT: 38 ML/MIN (ref 89–?)
GLUCOSE SERPL-MCNC: 79 MG/DL (ref 74–106)
HCO3 BLD-SCNC: 25.5 MEQ/L (ref 21–32)
HCT VFR BLD CALC: 25.2 % (ref 35–46)
HGB BLD-MCNC: 8 GM/DL (ref 11.6–15.3)
INR PPP: 3.2 RATIO
MCH RBC QN AUTO: 24.2 PG (ref 27–34)
MCHC RBC AUTO-ENTMCNC: 31.5 % (ref 32–36)
MCV RBC AUTO: 76.8 FL (ref 80–100)
PLATELET # BLD: 174 TH/MM3 (ref 150–450)
PMV BLD AUTO: 8.4 FL (ref 7–11)
PROTHROMBIN TIME: 32.1 SEC (ref 9.8–11.6)
RBC # BLD AUTO: 3.29 MIL/MM3 (ref 4–5.3)
SODIUM SERPL-SCNC: 130 MEQ/L (ref 136–145)
WBC # BLD AUTO: 14.1 TH/MM3 (ref 4–11)

## 2018-03-21 RX ADMIN — HYDROMORPHONE HYDROCHLORIDE PRN MG: 2 INJECTION INTRAMUSCULAR; INTRAVENOUS; SUBCUTANEOUS at 22:52

## 2018-03-21 RX ADMIN — Medication PRN ML: at 02:38

## 2018-03-21 RX ADMIN — HYDROMORPHONE HYDROCHLORIDE PRN MG: 2 INJECTION INTRAMUSCULAR; INTRAVENOUS; SUBCUTANEOUS at 02:37

## 2018-03-21 RX ADMIN — HYDROMORPHONE HYDROCHLORIDE PRN MG: 2 INJECTION INTRAMUSCULAR; INTRAVENOUS; SUBCUTANEOUS at 18:55

## 2018-03-21 RX ADMIN — POTASSIUM CHLORIDE SCH MEQ: 750 TABLET, FILM COATED, EXTENDED RELEASE ORAL at 21:53

## 2018-03-21 RX ADMIN — FUROSEMIDE SCH MG: 10 INJECTION, SOLUTION INTRAMUSCULAR; INTRAVENOUS at 08:07

## 2018-03-21 RX ADMIN — FUROSEMIDE SCH MG: 10 INJECTION, SOLUTION INTRAMUSCULAR; INTRAVENOUS at 18:52

## 2018-03-21 RX ADMIN — MORPHINE SULFATE SCH MG: 15 TABLET, EXTENDED RELEASE ORAL at 21:52

## 2018-03-21 RX ADMIN — STANDARDIZED SENNA CONCENTRATE AND DOCUSATE SODIUM SCH TAB: 8.6; 5 TABLET, FILM COATED ORAL at 08:08

## 2018-03-21 RX ADMIN — Medication PRN ML: at 22:52

## 2018-03-21 RX ADMIN — CHLORHEXIDINE GLUCONATE SCH PACK: 500 CLOTH TOPICAL at 04:00

## 2018-03-21 RX ADMIN — HYDROMORPHONE HYDROCHLORIDE PRN MG: 2 INJECTION INTRAMUSCULAR; INTRAVENOUS; SUBCUTANEOUS at 15:03

## 2018-03-21 RX ADMIN — MORPHINE SULFATE SCH MG: 15 TABLET, EXTENDED RELEASE ORAL at 08:08

## 2018-03-21 RX ADMIN — WARFARIN SODIUM SCH MG: 1 TABLET ORAL at 15:03

## 2018-03-21 RX ADMIN — ALBUTEROL SULFATE PRN MG: 2.5 SOLUTION RESPIRATORY (INHALATION) at 00:22

## 2018-03-21 RX ADMIN — Medication PRN ML: at 06:36

## 2018-03-21 RX ADMIN — POTASSIUM CHLORIDE SCH MEQ: 750 TABLET, FILM COATED, EXTENDED RELEASE ORAL at 08:07

## 2018-03-21 RX ADMIN — ALBUMIN HUMAN SCH MLS/HR: 0.25 SOLUTION INTRAVENOUS at 06:33

## 2018-03-21 RX ADMIN — AMPICILLIN SODIUM SCH MLS/HR: 2 INJECTION, POWDER, FOR SOLUTION INTRAMUSCULAR; INTRAVENOUS at 08:09

## 2018-03-21 RX ADMIN — ALBUMIN HUMAN SCH MLS/HR: 0.25 SOLUTION INTRAVENOUS at 18:47

## 2018-03-21 RX ADMIN — SODIUM CHLORIDE SCH MLS/HR: 900 INJECTION INTRAVENOUS at 15:04

## 2018-03-21 RX ADMIN — HYDROMORPHONE HYDROCHLORIDE PRN MG: 2 INJECTION INTRAMUSCULAR; INTRAVENOUS; SUBCUTANEOUS at 10:33

## 2018-03-21 RX ADMIN — AMPICILLIN SODIUM SCH MLS/HR: 2 INJECTION, POWDER, FOR SOLUTION INTRAMUSCULAR; INTRAVENOUS at 15:03

## 2018-03-21 RX ADMIN — ALBUTEROL SULFATE PRN MG: 2.5 SOLUTION RESPIRATORY (INHALATION) at 08:42

## 2018-03-21 RX ADMIN — HYDROMORPHONE HYDROCHLORIDE PRN MG: 2 INJECTION INTRAMUSCULAR; INTRAVENOUS; SUBCUTANEOUS at 06:35

## 2018-03-21 RX ADMIN — AMPICILLIN SODIUM SCH MLS/HR: 2 INJECTION, POWDER, FOR SOLUTION INTRAMUSCULAR; INTRAVENOUS at 21:52

## 2018-03-21 RX ADMIN — AMPICILLIN SODIUM SCH MLS/HR: 2 INJECTION, POWDER, FOR SOLUTION INTRAMUSCULAR; INTRAVENOUS at 02:38

## 2018-03-21 RX ADMIN — Medication SCH ML: at 08:09

## 2018-03-21 RX ADMIN — Medication SCH ML: at 21:53

## 2018-03-21 RX ADMIN — STANDARDIZED SENNA CONCENTRATE AND DOCUSATE SODIUM SCH TAB: 8.6; 5 TABLET, FILM COATED ORAL at 21:00

## 2018-03-21 NOTE — RADRPT
EXAM DATE/TIME:  03/21/2018 11:32 

 

[facility] COMPARISON:     

CHEST SINGLE AP, March 16, 2018, 13:30.

 

                     

INDICATIONS :     

Shortness of breath.

                     

 

MEDICAL HISTORY :     

Hepatitis C.       Cardiovascular disease. Congestive heart failure.   

 

SURGICAL HISTORY :        

AVR.

 

ENCOUNTER:     

Subsequent                                        

 

ACUITY:     

1 month      

 

PAIN SCORE:     

0/10

 

LOCATION:     

Bilateral chest 

 

FINDINGS:     

Right IJ central line in stable position. Improving patchy right lower lung zone airspace consolidati
on. Cardiac lead is enlarged. Remainder of exam is unchanged.

 

CONCLUSION:     

1. Improving patchy right lower lung zone airspace consolidation.

 

 

 

 Rj Whitten MD on March 21, 2018 at 11:55           

Board Certified Radiologist.

 This report was verified electronically.

## 2018-03-21 NOTE — HHI.NPPN
Subjective


History of Present Illness


This is a 38-year-old female with past medical history of IV drug abuse, 

history of hepatitis C, narcotic dependency, came to the hospital with 

complaint of shortness of breath and swelling.  Nephrology called to see the 

patient because of elevated BUN and creatinine.  The patient has creatinine of 

4.5 on admission which improved and it was staying in the range of 0.6-0.8 

until 6 days ago, then it started going up again and now it is 1.7.  The 

patient has increased swelling 


of the legs and the patient was diagnosed with endocarditis and she has blood 

culture positive for Strep viridans.  Patient has been following with the ID 

and she was getting furosemide, which was stopped today this morning because of 

increased creatinine and she was on vancomycin and rifampicin.  The last dose 

of vancomycin seems to be given possibly on 03/04/2018 or even later, but her 

level was high and the highest level we have was 25.8, which was on 03/04/2018 

but on 03/10/2018 it was 23.9.  The patient has been actively using IV drugs 

before she came to the hospital and noticed to have more swelling in her legs.  

She is complaining of generalized body pain.  There is no nausea, vomiting.  

Denies any diarrhea.  Her appetite is normal.


Additional Remarks


Reports continued mild SOB.  Bilateral lower extremity edema.  Lasix increased 

yesterday. 


 (Gellermann,Diane M. ARNP)





Review of Systems


General


Constitutional:  Fatigue


 (Gellermann,Diane M. ARNP)





Respiratory


Lungs:  SOB


 (Gellermann,Diane M. ARNP)





Cardiovascular


Cardiac:  Edema, SCOTT


Cardiac Remarks


Denies CP


 (Gellermann,Diane M. ARNP)





Gastrointestinal


GI Remarks


denies abdominal pain


 (Gellermann,Diane M. ARNP)





Objective Data


Data











 3/21/18 3/22/18





 19:00 07:00


 


  


 


# Voids 2 











Vital Signs








  Date Time  Temp Pulse Resp B/P (MAP) Pulse Ox O2 Delivery O2 Flow Rate FiO2


 


3/21/18 08:42     95   


 


3/21/18 08:05    110/76 (87)    





    Manual Cuff/Auscultation    





    Automatic Cuff    


 


3/21/18 08:00 99.0 115 22  94   


 


3/21/18 08:00      Nasal Cannula 3.00 


 


3/21/18 04:04  109      


 


3/21/18 04:00 98.8 109 18 128/61 (83) 99   


 


3/21/18 04:00      Nasal Cannula 3.00 


 


3/21/18 00:22     99 Nasal Cannula 1.00 


 


3/21/18 00:00 98.5 112 18 114/62 (79) 96   


 


3/21/18 00:00      Nasal Cannula 3.00 


 


3/20/18 23:44  109      


 


3/20/18 21:42    97/74 (82)    


 


3/20/18 20:53    92/68 (76)    


 


3/20/18 20:00 99.1 107 18 91/62 (72) 97   


 


3/20/18 20:00 99.1 107 18 91/62 (72) 97   


 


3/20/18 20:00      Nasal Cannula 3.00 


 


3/20/18 19:44  108      


 


3/20/18 16:00 98.0 110 20 101/72 (82) 98   


 


3/20/18 16:00  113      


 


3/20/18 16:00      Nasal Cannula 3.00 


 


3/20/18 12:00 98.7 112 22 90/70 (77) 98   


 


3/20/18 12:00  111      


 


3/20/18 12:00      Nasal Cannula 3.00 








 (Gellermann,Diane M. ARNP)


-:  


3/21/18 0630                                                                   

             3/21/18 0630








Physical Exam


General


Appearance:  No Acute Distress


 (Gellermann,Diane M. ARNP)





Eyes


Eye Exam:  Pupils Equal


 (Gellermann,Diane M. ARNP)





Throat


Throat Exam:  Oral Mucosa Pink & Moist


 (Gellermann,Diane M. ARNP)





Neck


Neck Exam:  Neck Supple


 (Gellermann,Diane M. ARNP)





Pulmonary


Resp Exam:  Clear Bilaterally


 (Gellermann,Diane M. ARNP)





Cardiology


CV Exam:  Regular, Normal Sinus Rhythm


 (Gellermann,Diane M. ARNP)





Gastrointestinal/Abdomen


GI Exam:  Soft, Non-Tender


 (Gellermann,Diane M. ARNP)





Integumentary


Skin Exam:  Dry, Intact


 (Gellermann,Diane M. ARNP)





Extremeties


Extremities Exam:  Pitting Edema


 (Gellermann,Diane M. ARNP)





Neurologic


Neuro Exam:  Alert, Awake, Oriented


 (Gellermann,Diane M. ARNP)





Psychiatric


Psych Exam:  Appropriate Responses


 (Gellermann,Diane M. ARNP)





Assessment/Plan


Discussed Condition With:  Patient


Assessment Summary:  NOÉ/Acute Renal Failure


Problem List:  


(1) Acute kidney injury


ICD Codes:  N17.9 - Acute kidney failure, unspecified


Status:  Acute


Plan:  


NOÉ likely due to vancomycin toxicity,  post-infectious GN is unlikely.


Possibility of ATN or pre renal.


Urine eosinophils negative, complements wnl


Creatinine stable from 1.5


Good UOP





Plan


Continue lasix 40 mg IV BID  for edema.


Continue to monitor BMP and UOP


Avoid nephrotoxins. 





(2) Prosthetic valve endocarditis


ICD Codes:  T82.6XXA - Infection and inflammatory reaction due to cardiac valve 

prosthesis, initial encounter


Status:  Acute


Plan:  septic endocarditis with strep viridans


IV drug use, pulmonary emboli as well.





Continues ampicillin per ID, started ceftriaxone. 


continue renal dosing antibiotics.





(3) Peripheral arterial occlusive disease


ICD Codes:  I77.9 - Disorder of arteries and arterioles, unspecified


Plan:  seen with vascular, continue medical management


Hold contrast studies as possible.





(4) Hepatitis C


ICD Codes:  B19.20 - Unspecified viral hepatitis C without hepatic coma


Status:  Chronic (Gellermann,Diane M. ARNP)


Problem List:  


(1) Acute kidney injury


ICD Codes:  N17.9 - Acute kidney failure, unspecified


Status:  Acute


Plan:  


NOÉ likely due to vancomycin toxicity,  post-infectious GN is unlikely.


Possibility of ATN or pre renal.


Urine eosinophils negative, complements wnl


Creatinine stable from 1.5


Good UOP





Plan


Continue lasix 40 mg IV BID  for edema.


Continue to monitor BMP and UOP


Avoid nephrotoxins. 


Patient seen and examined, agree with above.


Lasix increased, follow the BMP.





(2) Prosthetic valve endocarditis


ICD Codes:  T82.6XXA - Infection and inflammatory reaction due to cardiac valve 

prosthesis, initial encounter


Status:  Acute


Plan:  septic endocarditis with strep viridans


IV drug use, pulmonary emboli as well.





Continues ampicillin per ID, started ceftriaxone. 


continue renal dosing antibiotics.





(3) Peripheral arterial occlusive disease


ICD Codes:  I77.9 - Disorder of arteries and arterioles, unspecified


Plan:  seen with vascular, continue medical management


Hold contrast studies as possible.





(4) Hepatitis C


ICD Codes:  B19.20 - Unspecified viral hepatitis C without hepatic coma


Status:  Chronic (Melissa Layne MD)





Problem Qualifiers





(1) Prosthetic valve endocarditis:  


Qualified Codes:  T82.6XXA - Infection and inflammatory reaction due to cardiac 

valve prosthesis, initial encounter








Gellermann,Diane M. ARNP Mar 21, 2018 11:18


Melissa Layne MD Mar 21, 2018 22:57

## 2018-03-21 NOTE — HHI.PR
Subjective


Remarks


Deferred entry, the patient was seen earlier at 11:15 AM.


The patient states for short of breath, however seems to be slightly better.


The patient denies cough, fevers or chills.


Bilateral extremities are still swollen, states it is hard for her to move the 

bathroom.


Noted some erythema on the left lower extremity today, left lower extremity is 

tender.





Objective


Vitals





Vital Signs








  Date Time  Temp Pulse Resp B/P (MAP) Pulse Ox O2 Delivery O2 Flow Rate FiO2


 


3/21/18 12:00      Nasal Cannula 3.00 


 


3/21/18 12:00 98.9 116 22 101/79 (86) 95   





    Manual Cuff/Auscultation    


 


3/21/18 08:42     95   


 


3/21/18 08:05    110/76 (87)    





    Manual Cuff/Auscultation    





    Automatic Cuff    


 


3/21/18 08:00  116      


 


3/21/18 08:00 99.0 115 22  94   


 


3/21/18 08:00      Nasal Cannula 3.00 


 


3/21/18 04:04  109      


 


3/21/18 04:00 98.8 109 18 128/61 (83) 99   


 


3/21/18 04:00      Nasal Cannula 3.00 


 


3/21/18 00:22     99 Nasal Cannula 1.00 


 


3/21/18 00:00 98.5 112 18 114/62 (79) 96   


 


3/21/18 00:00      Nasal Cannula 3.00 


 


3/20/18 23:44  109      


 


3/20/18 21:42    97/74 (82)    


 


3/20/18 20:53    92/68 (76)    


 


3/20/18 20:00 99.1 107 18 91/62 (72) 97   


 


3/20/18 20:00 99.1 107 18 91/62 (72) 97   


 


3/20/18 20:00      Nasal Cannula 3.00 


 


3/20/18 19:44  108      














I/O      


 


 3/20/18 3/20/18 3/20/18 3/21/18 3/21/18 3/21/18





 07:00 15:00 23:00 07:00 15:00 23:00


 


Intake Total 960 ml  720 ml   


 


Output Total 850 ml  300 ml   


 


Balance 110 ml  420 ml   


 


      


 


Intake Oral 960 ml  720 ml   


 


Output Urine Total 850 ml  300 ml   


 


# Voids  1 2  2 


 


# Bowel Movements 0 1    








Result Diagram:  


3/21/18 0630                                                                   

             3/21/18 0630





Imaging





Last Impressions








Chest X-Ray 3/21/18 0000 Signed





Impressions: 





 Service Date/Time:  Wednesday, March 21, 2018 11:32 - CONCLUSION:  1. 

Improving 





 patchy right lower lung zone airspace consolidation.     Rj Whitten MD 


 


Catheter Placement X-Ray 3/19/18 0000 Signed





Impressions: 





 Service Date/Time:  Monday, March 19, 2018 11:16 - CONCLUSION: Uncomplicated 





 venous catheter change as above.     Rj Whitten MD 


 


Head CT 3/3/18 0000 Signed





Impressions: 





 Service Date/Time:  Saturday, March 3, 2018 10:22 - CONCLUSION:  1. Focal area 





 of decreased density involving the right parietal lobe. As a new finding from 





 the prior exam. This could relate to a small infarct. MRI of the brain with 





 gadolinium suggested to further evaluate. 2. Small lacunar infarction 

involving 





 the left centrum semiovale.     Scooter Dawson Jr., MD 


 


CT Angiography 3/3/18 0000 Signed





Impressions: 





 Service Date/Time:  Saturday, March 3, 2018 10:28 - CONCLUSION:  There 

extensive 





 pulmonary emboli bilaterally. There is near complete cut off of the right 

lower 





 lobe pulmonary artery. Prominent filling defects on the left as well. These 





 findings were relayed to Dr. Bustos immediately at the completion of the study.  





 Scattered pulmonary infiltrates, small pleural effusion and extensive 





 mediastinal lymphadenopathy as described above.      Won Sharp MD 


 


Brain MRI 3/3/18 0000 Signed





Impressions: 





 Service Date/Time:  Saturday, March 3, 2018 18:04 - CONCLUSION:  Deterioration 





 in the appearance of the scan.  At least 3 focal areas of abnormal contrast 





 enhancement are present scattered to in both hemispheres.  Given the history 





 this most likely represents developing areas cerebritis.  Exam is compromised 

by 





 an uncooperative patient and motion artifact.  These 2 factors make detection 

of 





 other abnormalities such as cortical cephalitis and meningitis difficult to 





 exclude.  Cannot exclude hemorrhagic lesions as well.  There is no mass effect 





 evident.      Jorge Diane MD 


 


Chest CT 2/26/18 0000 Signed





Impressions: 





 Service Date/Time:  Monday, February 26, 2018 12:41 - CONCLUSION:  1. There 

are 





 at least 5 pulmonary nodules present bilaterally with the largest measuring 19 





 mm. None demonstrate cavitation but it is possible that these represent septic 





 emboli. Suggest followup to assess for change. 2. Splenomegaly with subtle 





 wedge-shaped areas of low-density in the mid spleen which could represent 





 infarcts. 3. Mild cardiomegaly in this patient post aortic valve replacement. 





 Low density of the cardiac blood pool suggests anemia.      Ron Melgar MD 


 


Aorta w/Runoff CTA 2/25/18 0000 Signed





Impressions: 





 Service Date/Time:  Sunday, February 25, 2018 12:47 - CONCLUSION:  Left renal 





 cortical infarction. 5-6 cm length total occlusion of the left superficial 





 femoral artery in the proximal thigh. Nodular parenchymal opacities in the 

lung 





 bases bilaterally See above discussion.     Ron Cruz MD 


 


Renal Ultrasound 2/24/18 0000 Signed





Impressions: 





 Service Date/Time:  Saturday, February 24, 2018 10:53 - CONCLUSION:  1. No 





 evidence of hydronephrosis. 2. Thickening of the urinary bladder wall a 1.1 

cm. 





 3. Prominent splenomegaly.     Elgin Walton MD 


 


Breast Ultrasound 2/24/18 0000 Signed





Impressions: 





 Service Date/Time:  Saturday, February 24, 2018 10:48 - CONCLUSION:  Negative 





 targeted right breast ultrasound examination.     Ron Brown MD 


 


Lower Extremity Ultrasound 2/23/18 0000 Signed





Impressions: 





 Service Date/Time:  Friday, February 23, 2018 21:22 - CONCLUSION:  1. No 





 sonographic evidence for lower extremity DVT. 2. Incidental note of bilateral 





 inguinal adenopathy with the largest on the right measuring 3.3 cm and the 





 largest on the left measuring 2.6 cm. This finding is nonspecific but is 





 typically reactive in etiology.     Rj Whitten MD 








Objective Remarks


AAOx3


NAD


Clear lungs BL


S1S2 4/6 systolic ejection murmur


+3 BL lower extremity edema.  Erythema, increased warmth and tenderness to 

palpation of the left lower leg


Medications and IVs





Current Medications








 Medications


  (Trade)  Dose


 Ordered  Sig/Nikhil


 Route  Start Time


 Stop Time Status Last Admin


 


  (NS Flush)  2 ml  UNSCH  PRN


 IV FLUSH  2/23/18 22:00


    3/21/18 06:36


 


 


  (NS Flush)  2 ml  BID


 IV FLUSH  2/24/18 09:00


    3/21/18 08:09


 


 


  (Tylenol)  650 mg  Q6H  PRN


 PO  2/23/18 22:00


    3/19/18 01:19


 


 


  (Zofran Inj)  4 mg  Q6H  PRN


 IV PUSH  2/23/18 22:00


    3/8/18 21:22


 


 


  (Restoril)  15 mg  HS  PRN


 PO  2/23/18 22:00


    3/6/18 22:08


 


 


 Miscellaneous


 Information  1  Q361D


 XX  2/23/18 22:00


     


 


 


  (Chlorhexidine


 2% Cloth)  


 Taper  DAILY@04


 TOP  2/24/18 04:00


 2/20/19 03:59  3/6/18 03:54


 


 


  (Chlorhexidine


 2% Cloth)  3 pack  UNSCH  PRN


 TOP  2/23/18 22:00


     


 


 


  (Arlen-Colace)  1 tab  BID


 PO  2/24/18 09:00


    3/19/18 08:44


 


 


  (Milk Of


 Magnesia Liq)  30 ml  Q12H  PRN


 PO  2/23/18 22:00


     


 


 


  (Senokot)  17.2 mg  Q12H  PRN


 PO  2/23/18 22:00


     


 


 


  (Dulcolax Supp)  10 mg  DAILY  PRN


 RECTAL  2/23/18 22:00


     


 


 


  (Lactulose Liq)  30 ml  DAILY  PRN


 PO  2/23/18 22:00


     


 


 


 Ampicillin Sodium


 2000 mg/Sodium


 Chloride  100 ml @ 


 400 mls/hr  Q6H


 IV  2/24/18 14:00


    3/21/18 15:03


 


 


  (Imodium)  2 mg  Q4H  PRN


 PO  2/26/18 16:15


    2/28/18 14:32


 


 


  (Albuterol Neb)  2.5 mg  Q2HR NEB  PRN


 NEB  2/27/18 11:30


    3/21/18 08:42


 


 


  (Norco  Mg)  1 tab  Q4H  PRN


 PO  3/4/18 15:00


    3/9/18 05:02


 


 


  (Dilaudid Pf Inj)  1 mg  Q4H  PRN


 IV PUSH  3/7/18 19:00


    3/21/18 15:03


 


 


  (KCl)  60 meq  Q12HR


 PO  3/14/18 09:45


    3/21/18 08:07


 


 


  (Coumadin)  2 mg  DAILY@16


 PO  3/16/18 18:45


   Future Hold 3/18/18 17:33


 


 


 Ceftriaxone


 Sodium 1000 mg/


 Sodium Chloride  100 ml @ 


 200 mls/hr  Q24H


 IV  3/18/18 16:00


    3/21/18 15:04


 


 


 Albumin Human  100 ml @ 


 60 mls/hr  Q12H


 IV  3/18/18 18:00


    3/21/18 06:33


 


 


  (Oramorph Sr)  15 mg  Q12HR


 PO  3/20/18 21:00


    3/21/18 08:08


 


 


  (Lasix Inj)  40 mg  BID@09,18


 IV PUSH  3/21/18 09:00


    3/21/18 08:07


 


 


  (Coumadin)  1 mg  DAILY@16


 PO  3/20/18 21:00


    3/21/18 15:03


 








Date of Insertion:  Mar 19, 2018


Line:  Central Venous Catheter


Side:  Right


Location:  Internal, Jugular





A/P


Problem List:  


(1) Prosthetic valve endocarditis


ICD Code:  T82.6XXA - Infection and inflammatory reaction due to cardiac valve 

prosthesis, initial encounter


Status:  Acute


(2) Bacteremia


ICD Code:  R78.81 - Bacteremia


(3) Septic pulmonary embolism


ICD Code:  I26.90 - Septic pulmonary embolism without acute cor pulmonale


Status:  Acute


(4) IVDU (intravenous drug user)


ICD Code:  F19.90 - Other psychoactive substance use, unspecified, uncomplicated


(5) Anemia


ICD Code:  D64.9 - Anemia, unspecified


Status:  Chronic


(6) NOÉ (acute kidney injury)


ICD Code:  N17.9 - Acute kidney failure, unspecified


Plan:  Creatinine slightly worsened from 1.52-1.57.


Nephrology consulted.


Possible ATN from vancomycin toxicity or from infection.


Urine eosinophils negative.


Continue to hold diuretics as per nephrology.  Nephrology recommends waiting 

for some improvement in renal function before some diuretics could be 

administered.


Continue to monitor BUN and creatinine, avoid nephrotoxins.


3/19 As per Nephrology continue Lasix 20 mg IV BID with albumin.  Creatinine 

slightly elevated from 1.57 - 1.62.


3/20 continue IV diuretics as above.  Continue to monitor BUN and creatinine.  

Creatinine pending.


3/21 creatinine is trending down to 1.53.  Continue to follow-up management as 

per nephrology.  Continue with diuresis with Lasix which has been increased 

from 20 mg IV twice daily to 40 mg IV twice daily.





(7) Cellulitis of left lower extremity


ICD Code:  L03.116 - Cellulitis of left lower limb


Plan:  The patient has erythema, increased warmth and tenderness to palpation 

on the left lower extremity.


The patient likely is developing a left lower extremity cellulitis.


3/21 discussed the case with Dr. Dontfraid from infectious disease.  Recommends 

observation for now.





Assessment and Plan


(1) Prosthetic valve endocarditis


Plan:  Continue antibiotics as per ID.


Patient on IV ampicillin.


Continue to hold coumadin until INR < 3 - goal 2-3.


3/19 INR elevated from 3.1 to 3.7. Hematology had decreased dose of Coumadin, I 

will hold and continue to monitor PT/INR.


Continue Ampicllin, Rocephin added for pulmonary coverage on 3/18 as per ID. 


Continue antibiotics as per ID recommendations.


3/20 palliative care consulted by hematology.  As per hematology documentation 

the patient requested to talk to palliative care to see if she is a hospice 

candidate.


Discussed with palliative care who states that the patient's goals are 

aggressive and she wants to get better.  The patient definitely is a hospice 

candidate, however her goals are not hospice appropriate.  I will start the 

patient on Oramorph 50 mg p.o. twice daily which will probably need to be 

titrated in order to provide some pain control for the patient.





(2) Bacteremia


Plan:  Blood cultures obtained on 2/23 growing strep viridans.


Subsequent repeat blood cultures negative.


3/19 sp Exchange of Right IJ central line. 


3/20 continue IV antibiotics as per ID recommendations.





(3) Septic pulmonary embolism


Plan:  As observed on a CT angiography of the chest obtained on 3/3/18.


chocardiogram on 3/3 showed an estimated EF of 50-55%.  


++severe tricuspid regurgitation with prosthesis in place.  


++2 x 4 cm mobile vegetation was seen on the valve.  The right atrium and right 

ventricle were normal in size and function


Hematology following.


Management of Coumadin as per hematology recommendations.


Goal INR 2-3.





(4) IVDU (intravenous drug user)


Plan:  Counseled on drug cessation.





(5) Anemia


Plan:  Likely secondary to inflammation/infection.  No evidence of bleeding or 

hemolysis.


Monitor hemoglobin and transfuse as needed.


Discharge Planning


Continue to monitor on the medical floor.  Pending palliative care consultation.





Problem Qualifiers





(1) Prosthetic valve endocarditis:  


Qualified Codes:  T82.6XXA - Infection and inflammatory reaction due to cardiac 

valve prosthesis, initial encounter


(2) Anemia:  


Qualified Codes:  D63.8 - Anemia in other chronic diseases classified elsewhere








Jesus Mae MD Mar 21, 2018 17:08

## 2018-03-21 NOTE — HHI.IDPN
Note


Infectious Disease Note











Patient seen earlier today.  Delayed note entry. 


She is sitting up in a chair.  She has no complaints.  


I was notified about redness of the left lower extremity.


She denies pain at the left lower extremity.  


She has pain in the legs when she tries to ambulate.


Has some shortness of breath but states that it is no worse.


Afebrile.


No new complaints.











37-year-old white female who has a history of endocarditis and has


been admitted several times for the same.  The patient developed shortness of


breath, fever and chills, and  presented to the emergency department.  The


patient is noted to be actively using IV drugs.  She has history of significant


CHF from prior valvular heart disease related to endocarditis.  She states that


she started feeling short of breath about a week ago and the shortness of


breath worsened.  After she completed IV antibiotics in October she was


put on doxycycline prophylaxis.  She reports that she had not been taking the


doxycycline.  


 


PAST MEDICAL HISTORY:


Hepatitis C.


IV drug use.


prosthetic aortic valve replacement in 2011 and then redo aortic


valve replacement in June 2016 





MEDICATIONS:








Current Medications








 Medications


  (Trade)  Dose


 Ordered  Sig/Nikhil


 Route


 PRN Reason  Start Time


 Stop Time Status Last Admin


Dose Admin


 


 Sodium Chloride


  (NS Flush)  2 ml  UNSCH  PRN


 IV FLUSH


 FLUSH AFTER USING IV ACCESS  2/23/18 22:00


    3/21/18 06:36


 


 


 Sodium Chloride


  (NS Flush)  2 ml  BID


 IV FLUSH


   2/24/18 09:00


    3/21/18 08:09


 


 


 Acetaminophen


  (Tylenol)  650 mg  Q6H  PRN


 PO


 fever  2/23/18 22:00


    3/19/18 01:19


 


 


 Ondansetron HCl


  (Zofran Inj)  4 mg  Q6H  PRN


 IV PUSH


 NAUSEA OR VOMITING  2/23/18 22:00


    3/8/18 21:22


 


 


 Temazepam


  (Restoril)  15 mg  HS  PRN


 PO


 INSOMNIA  2/23/18 22:00


    3/6/18 22:08


 


 


 Miscellaneous


 Information  1  Q361D


 XX


   2/23/18 22:00


     


 


 


 Chlorhexidine


 Gluconate


  (Chlorhexidine


 2% Cloth)  


 Taper  DAILY@04


 TOP


   2/24/18 04:00


 2/20/19 03:59  3/6/18 03:54


 


 


 Chlorhexidine


 Gluconate


  (Chlorhexidine


 2% Cloth)  3 pack  UNSCH  PRN


 TOP


 HYGIENIC CARE  2/23/18 22:00


     


 


 


 Senna/Docusate


 Sodium


  (Arlen-Colace)  1 tab  BID


 PO


   2/24/18 09:00


    3/19/18 08:44


 


 


 Magnesium


 Hydroxide


  (Milk Of


 Magnesia Liq)  30 ml  Q12H  PRN


 PO


 Mild constipation  2/23/18 22:00


     


 


 


 Sennosides


  (Senokot)  17.2 mg  Q12H  PRN


 PO


 Moderate constipation  2/23/18 22:00


     


 


 


 Bisacodyl


  (Dulcolax Supp)  10 mg  DAILY  PRN


 RECTAL


 SEVERE CONSITIPATION  2/23/18 22:00


     


 


 


 Lactulose


  (Lactulose Liq)  30 ml  DAILY  PRN


 PO


 SEVERE CONSITIPATION  2/23/18 22:00


     


 


 


 Ampicillin Sodium


 2000 mg/Sodium


 Chloride  100 ml @ 


 400 mls/hr  Q6H


 IV


   2/24/18 14:00


    3/21/18 15:03


 


 


 Loperamide HCl


  (Imodium)  2 mg  Q4H  PRN


 PO


 Diarrhea  2/26/18 16:15


    2/28/18 14:32


 


 


 Albuterol Sulfate


  (Albuterol Neb)  2.5 mg  Q2HR NEB  PRN


 NEB


 dyspnea  2/27/18 11:30


    3/21/18 08:42


 


 


 Acetaminophen/


 Hydrocodone Bitart


  (Norco  Mg)  1 tab  Q4H  PRN


 PO


 pain 1-5  3/4/18 15:00


    3/9/18 05:02


 


 


 Hydromorphone HCl


  (Dilaudid Pf Inj)  1 mg  Q4H  PRN


 IV PUSH


 PAIN SCALE 6 TO 10  3/7/18 19:00


    3/21/18 15:03


 


 


 Potassium Chloride


  (KCl)  60 meq  Q12HR


 PO


   3/14/18 09:45


    3/21/18 08:07


 


 


 Ceftriaxone


 Sodium 1000 mg/


 Sodium Chloride  100 ml @ 


 200 mls/hr  Q24H


 IV


   3/18/18 16:00


    3/21/18 15:04


 


 


 Albumin Human  100 ml @ 


 60 mls/hr  Q12H


 IV


   3/18/18 18:00


    3/21/18 06:33


 


 


 Morphine Sulfate


  (Oramorph Sr)  15 mg  Q12HR


 PO


   3/20/18 21:00


    3/21/18 08:08


 


 


 Furosemide


  (Lasix Inj)  40 mg  BID@09,18


 IV PUSH


   3/21/18 09:00


    3/21/18 08:07


 


 


 Warfarin Sodium


  (Coumadin)  1 mg  DAILY@16


 PO


   3/20/18 21:00


    3/21/18 15:03


 

















OBJECTIVE:





Vital Signs








  Date Time  Temp Pulse Resp B/P (MAP) Pulse Ox O2 Delivery O2 Flow Rate FiO2


 


3/21/18 16:00      Nasal Cannula 3.00 


 


3/21/18 16:00  117      


 


3/21/18 12:00      Nasal Cannula 3.00 


 


3/21/18 12:00 98.9 116 22 101/79 (86) 95   





    Manual Cuff/Auscultation    


 


3/21/18 08:42     95   


 


3/21/18 08:05    110/76 (87)    





    Manual Cuff/Auscultation    





    Automatic Cuff    


 


3/21/18 08:00  116      


 


3/21/18 08:00 99.0 115 22  94   


 


3/21/18 08:00      Nasal Cannula 3.00 


 


3/21/18 04:04  109      


 


3/21/18 04:00 98.8 109 18 128/61 (83) 99   


 


3/21/18 04:00      Nasal Cannula 3.00 


 


3/21/18 00:22     99 Nasal Cannula 1.00 


 


3/21/18 00:00 98.5 112 18 114/62 (79) 96   


 


3/21/18 00:00      Nasal Cannula 3.00 


 


3/20/18 23:44  109      


 


3/20/18 21:42    97/74 (82)    


 


3/20/18 20:53    92/68 (76)    


 


3/20/18 20:00 99.1 107 18 91/62 (72) 97   


 


3/20/18 20:00 99.1 107 18 91/62 (72) 97   


 


3/20/18 20:00      Nasal Cannula 3.00 


 


3/20/18 19:44  108      








Laboratory Tests








Test


  3/20/18


06:25 3/21/18


06:30


 


White Blood Count 14.6 TH/MM3  14.1 TH/MM3 


 


Red Blood Count 3.31 MIL/MM3  3.29 MIL/MM3 


 


Hemoglobin 7.9 GM/DL  8.0 GM/DL 


 


Hematocrit 25.1 %  25.2 % 


 


Mean Corpuscular Volume 75.9 FL  76.8 FL 


 


Mean Corpuscular Hemoglobin 24.0 PG  24.2 PG 


 


Mean Corpuscular Hemoglobin


Concent 31.6 % 


  31.5 % 


 


 


Red Cell Distribution Width 23.5 %  23.8 % 


 


Platelet Count 196 TH/MM3  174 TH/MM3 


 


Mean Platelet Volume 8.2 FL  8.4 FL 


 


Neutrophils (%) (Auto) 82.3 %  


 


Lymphocytes (%) (Auto) 9.2 %  


 


Monocytes (%) (Auto) 6.7 %  


 


Eosinophils (%) (Auto) 0.9 %  


 


Basophils (%) (Auto) 0.9 %  


 


Neutrophils # (Auto) 12.0 TH/MM3  


 


Lymphocytes # (Auto) 1.4 TH/MM3  


 


Monocytes # (Auto) 1.0 TH/MM3  


 


Eosinophils # (Auto) 0.1 TH/MM3  


 


Basophils # (Auto) 0.1 TH/MM3  


 


CBC Comment DIFF FINAL  


 


Differential Comment   








Laboratory Tests








Test


  3/20/18


18:00 3/21/18


06:30


 


Creatinine 1.57 MG/DL  1.53 MG/DL 


 


Estimat Glomerular Filtration


Rate 37 ML/MIN 


  38 ML/MIN 


 


 


Blood Urea Nitrogen  20 MG/DL 


 


Random Glucose  79 MG/DL 


 


Calcium Level  8.4 MG/DL 


 


Sodium Level  130 MEQ/L 


 


Potassium Level  3.9 MEQ/L 


 


Chloride Level  93 MEQ/L 


 


Carbon Dioxide Level  25.5 MEQ/L 


 


Anion Gap  12 MEQ/L 











IMAGING:














Chest X-Ray 3/21/18 0000 Signed





Impressions: 





 Service Date/Time:  Wednesday, March 21, 2018 11:32 - CONCLUSION:  1. 

Improving 





 patchy right lower lung zone airspace consolidation.     Rj Whitten MD 

















Chest X-Ray 3/5/18 0600 Signed





Impressions: 





 Service Date/Time:  Monday, March 5, 2018 03:52 - CONCLUSION:  There is a 

small 





 to moderate size right pleural effusion with associated volume loss and/or 





 airspace consolidation. The pleural effusion has increased from the prior 





 examination.     Ron Melgar MD 


 


Head CT 3/3/18 0000 Signed





Impressions: 





 Service Date/Time:  Saturday, March 3, 2018 10:22 - CONCLUSION:  1. Focal area 





 of decreased density involving the right parietal lobe. As a new finding from 





 the prior exam. This could relate to a small infarct. MRI of the brain with 





 gadolinium suggested to further evaluate. 2. Small lacunar infarction 

involving 





 the left centrum semiovale.     Scooter Dawson Jr., MD 


 


CT Angiography 3/3/18 0000 Signed





Impressions: 





 Service Date/Time:  Saturday, March 3, 2018 10:28 - CONCLUSION:  There 

extensive 





 pulmonary emboli bilaterally. There is near complete cut off of the right 

lower 





 lobe pulmonary artery. Prominent filling defects on the left as well. These 





 findings were relayed to Dr. Bustos immediately at the completion of the study.  





 Scattered pulmonary infiltrates, small pleural effusion and extensive 





 mediastinal lymphadenopathy as described above.      Won Sharp MD 


 


Brain MRI 3/3/18 0000 Signed





Impressions: 





 Service Date/Time:  Saturday, March 3, 2018 18:04 - CONCLUSION:  Deterioration 





 in the appearance of the scan.  At least 3 focal areas of abnormal contrast 





 enhancement are present scattered to in both hemispheres.  Given the history 





 this most likely represents developing areas cerebritis.  Exam is compromised 

by 





 an uncooperative patient and motion artifact.  These 2 factors make detection 

of 





 other abnormalities such as cortical cephalitis and meningitis difficult to 





 exclude.  Cannot exclude hemorrhagic lesions as well.  There is no mass effect 





 evident.      Jorge Diane MD 




















Renal Ultrasound 2/24/18 0000 Signed





Impressions: 





 Service Date/Time:  Saturday, February 24, 2018 10:53 - CONCLUSION:  1. No 





 evidence of hydronephrosis. 2. Thickening of the urinary bladder wall a 1.1 

cm. 





 3. Prominent splenomegaly.     Elgin Walton MD 


 


Chest X-Ray 2/24/18 0000 Signed





Impressions: 





 Service Date/Time:  Saturday, February 24, 2018 09:36 - CONCLUSION:  Right 





 internal jugular central line in place with the tip overlying the SVC. A 





 pneumothorax is not seen.     Ron Brown MD 


 


Breast Ultrasound 2/24/18 0000 Signed





Impressions: 





 Service Date/Time:  Saturday, February 24, 2018 10:48 - CONCLUSION:  Negative 





 targeted right breast ultrasound examination.     Ron Brown MD 


 


Lower Extremity Ultrasound 2/23/18 0000 Signed





Impressions: 





 Service Date/Time:  Friday, February 23, 2018 21:22 - CONCLUSION:  1. No 





 sonographic evidence for lower extremity DVT. 2. Incidental note of bilateral 





 inguinal adenopathy with the largest on the right measuring 3.3 cm and the 





 largest on the left measuring 2.6 cm. This finding is nonspecific but is 





 typically reactive in etiology.     Rj Whitten MD 














PHYSICAL EXAMINATION


GENERAL: No acute distress. 


HEENT:  No icterus.  Oropharynx moist mucosa, no lesions.


Neck:  Supple without adenopathy.


Lungs: Rhonchi at both lungs decreased.


Heart: 5/6 blowing systolic murmur at the upper right sternal border.


Abdomen: Bowel sounds present, soft, obese, nontender.


Extremities: Diffuse 3+ edema of the lower extremities.  Left lower extremity 

has mild erythema. 


Lesions at Left tibia distally and left foot and great toe 2.  No longer 

purpuric.


Toes 3 and 5 are no longer cyanotic.  Purple lesion at the tuft of the 


left fifth toe improved.  Left leg warm.


No calf tenderness.   No nail hemorrhages.  


SKIN: no diffuse rash.


Neuro: No gross focal findings.


Psychiatric: calm and cooperative.





 


IMPRESSION


1. Sepsis. Strep Viridans. 


2. Tricuspid valve endocarditis.  Large vegetation.  Tricuspid regurgitation.


Patient evaluated by cardiovascular surgery and felt not to be a surgical 

candidate.


3.  Bacteremia.  Strep viridans.  Blood cultures have remained negative on 

follow-up.


Last positive blood culture was on 2/23.


4. History of prosthetic aortic valve and so likely recurrent prosthetic valve


endocarditis.


5.  Cerebritis on MRI.  Also has parietal infarct noted on CT scan of the brain.


6. Left renal cortical and pulmonary and lower extremity septic emboli. 


7. Leukocytosis secondary to sepsis from showering of emboli from endocarditis.


8. Acute renal failure. Kidney function improved. 


9.  Area of mild erythema at the left leg.  I do not think this is cellulitis.  


However she has significant edema and that may be disposal to develop 

cellulitis.





RECOMMENDATIONS


1. Continue Ampicillin intravenous.


2. Continue ceftriaxone.


3. Monitor clinical status.


4. Continue to monitor the left leg.





Plan on intravenous antibiotics until April 6.  Which will be 6 weeks after the 

last 


positive blood culture.  After that she should continue with prophylactic oral 


antibiotics indefinitely. Continue to monitor her blood counts and renal 

function.














Robinson Carr MD Mar 21, 2018 18:16

## 2018-03-21 NOTE — PD.ONC.PN
Subjective


Subjective


Remarks


Afebrile overnight. 


Patient resting in bed. 


she noticed some redness on her left leg this morning. 


both her legs continue to be painful.





Objective


Data











  Date Time  Temp Pulse Resp B/P (MAP) Pulse Ox O2 Delivery O2 Flow Rate FiO2


 


3/21/18 08:42     95   


 


3/21/18 08:05    110/76 (87)    





    Manual Cuff/Auscultation    





    Automatic Cuff    


 


3/21/18 08:00  116      


 


3/21/18 08:00 99.0 115 22  94   


 


3/21/18 08:00      Nasal Cannula 3.00 


 


3/21/18 04:04  109      


 


3/21/18 04:00 98.8 109 18 128/61 (83) 99   


 


3/21/18 04:00      Nasal Cannula 3.00 


 


3/21/18 00:22     99 Nasal Cannula 1.00 


 


3/21/18 00:00 98.5 112 18 114/62 (79) 96   


 


3/21/18 00:00      Nasal Cannula 3.00 


 


3/20/18 23:44  109      


 


3/20/18 21:42    97/74 (82)    


 


3/20/18 20:53    92/68 (76)    


 


3/20/18 20:00 99.1 107 18 91/62 (72) 97   


 


3/20/18 20:00 99.1 107 18 91/62 (72) 97   


 


3/20/18 20:00      Nasal Cannula 3.00 


 


3/20/18 19:44  108      


 


3/20/18 16:00 98.0 110 20 101/72 (82) 98   


 


3/20/18 16:00  113      


 


3/20/18 16:00      Nasal Cannula 3.00 








Result Diagram:  


3/21/18 0630                                                                   

             3/21/18 0630





Laboratory Results





Laboratory Tests








Test


  3/20/18


18:00 3/21/18


06:30


 


Creatinine 1.57 MG/DL  1.53 MG/DL 


 


Estimat Glomerular Filtration


Rate 37 ML/MIN 


  38 ML/MIN 


 


 


White Blood Count  14.1 TH/MM3 


 


Red Blood Count  3.29 MIL/MM3 


 


Hemoglobin  8.0 GM/DL 


 


Hematocrit  25.2 % 


 


Mean Corpuscular Volume  76.8 FL 


 


Mean Corpuscular Hemoglobin  24.2 PG 


 


Mean Corpuscular Hemoglobin


Concent 


  31.5 % 


 


 


Red Cell Distribution Width  23.8 % 


 


Platelet Count  174 TH/MM3 


 


Mean Platelet Volume  8.4 FL 


 


Blood Urea Nitrogen  20 MG/DL 


 


Random Glucose  79 MG/DL 


 


Calcium Level  8.4 MG/DL 


 


Sodium Level  130 MEQ/L 


 


Potassium Level  3.9 MEQ/L 


 


Chloride Level  93 MEQ/L 


 


Carbon Dioxide Level  25.5 MEQ/L 


 


Anion Gap  12 MEQ/L 











Administered Medications








 Medications


  (Trade)  Dose


 Ordered  Sig/Nikhil


 Route


 PRN Reason  Start Time


 Stop Time Status Last Admin


Dose Admin


 


 Sodium Chloride


  (NS Flush)  2 ml  UNSCH  PRN


 IV FLUSH


 FLUSH AFTER USING IV ACCESS  2/23/18 22:00


    3/21/18 06:36


 


 


 Sodium Chloride


  (NS Flush)  2 ml  BID


 IV FLUSH


   2/24/18 09:00


    3/21/18 08:09


 


 


 Acetaminophen


  (Tylenol)  650 mg  Q6H  PRN


 PO


 fever  2/23/18 22:00


    3/19/18 01:19


 


 


 Ondansetron HCl


  (Zofran Inj)  4 mg  Q6H  PRN


 IV PUSH


 NAUSEA OR VOMITING  2/23/18 22:00


    3/8/18 21:22


 


 


 Temazepam


  (Restoril)  15 mg  HS  PRN


 PO


 INSOMNIA  2/23/18 22:00


    3/6/18 22:08


 


 


 Chlorhexidine


 Gluconate


  (Chlorhexidine


 2% Cloth)  


 Taper  DAILY@04


 TOP


   2/24/18 04:00


 2/20/19 03:59  3/6/18 03:54


 


 


 Senna/Docusate


 Sodium


  (Arlen-Colace)  1 tab  BID


 PO


   2/24/18 09:00


    3/19/18 08:44


 


 


 Ampicillin Sodium


 2000 mg/Sodium


 Chloride  100 ml @ 


 400 mls/hr  Q6H


 IV


   2/24/18 14:00


    3/21/18 08:09


 


 


 Loperamide HCl


  (Imodium)  2 mg  Q4H  PRN


 PO


 Diarrhea  2/26/18 16:15


    2/28/18 14:32


 


 


 Albuterol Sulfate


  (Albuterol Neb)  2.5 mg  Q2HR NEB  PRN


 NEB


 dyspnea  2/27/18 11:30


    3/21/18 08:42


 


 


 Acetaminophen/


 Hydrocodone Bitart


  (Norco  Mg)  1 tab  Q4H  PRN


 PO


 pain 1-5  3/4/18 15:00


    3/9/18 05:02


 


 


 Hydromorphone HCl


  (Dilaudid Pf Inj)  1 mg  Q4H  PRN


 IV PUSH


 PAIN SCALE 6 TO 10  3/7/18 19:00


    3/21/18 10:33


 


 


 Potassium Chloride


  (KCl)  60 meq  Q12HR


 PO


   3/14/18 09:45


    3/21/18 08:07


 


 


 Warfarin Sodium


  (Coumadin)  2 mg  DAILY@16


 PO


   3/16/18 18:45


   Future Hold 3/18/18 17:33


 


 


 Ceftriaxone


 Sodium 1000 mg/


 Sodium Chloride  100 ml @ 


 200 mls/hr  Q24H


 IV


   3/18/18 16:00


    3/20/18 17:36


 


 


 Albumin Human  100 ml @ 


 60 mls/hr  Q12H


 IV


   3/18/18 18:00


    3/21/18 06:33


 


 


 Morphine Sulfate


  (Oramorph Sr)  15 mg  Q12HR


 PO


   3/20/18 21:00


    3/21/18 08:08


 


 


 Furosemide


  (Lasix Inj)  40 mg  BID@09,18


 IV PUSH


   3/21/18 09:00


    3/21/18 08:07


 


 


 Warfarin Sodium


  (Coumadin)  1 mg  DAILY@16


 PO


   3/20/18 21:00


    3/20/18 22:02


 








Objective Remarks


GENERAL: chronically ill female, upright in bed, dyspneic. on O2 via NC


SKIN: Warm and dry.


HEAD: Normocephalic.


EYES: No injection or drainage. 


NECK: Supple, trachea midline. 


CARDIOVASCULAR: +S1, S2, tachy


RESPIRATORY: diminished at bases, scattered rhonchi.


GASTROINTESTINAL: Abdomen soft, non-tender, nondistended. 


EXTREMITIES: 4+ pitting edema, in both legs. left leg with shiny erythema 


NEUROLOGICAL: awake. alert. no obvious focal deficit.





Assessment/Plan


Problem List:  


(1) arterial blood clot


Plan:  3/21: await INR


3/20/17: INR =3.1. give 1mg coumadin


3/19: Pt's INR 3.7 today. Hold coumadin today and resume at 1mg when closer to 

3.


3/16: INR 3.9 today. hold coumadin. goal INR between 2.0-3.0


3/15: INR =3.8. continue to hold coumadin until closer to 2.0-3.0 than resume 

at lower dose. will ask nurses to perform doppler on patient's pedal pulses q 

shift and document. 


3/14/18.  INR >4.0, Argatroban stopped.


PT/PTT checked off argatroban, noted prolonged PTT.





(2) Microcytic anemia


ICD Codes:  D50.9 - Iron deficiency anemia, unspecified


Plan:  


--likely d/t inflammation/infection. no evidence of bleeding or hemolysis. 


--monitor and transfuse as needed. 





(3) Bacterial endocarditis


ICD Codes:  I33.0 - Acute and subacute infective endocarditis


Status:  Acute


Plan:  -on ampicillin, ID following. 





(4) Acute septic pulmonary embolism


ICD Codes:  I26.90 - Septic pulmonary embolism without acute cor pulmonale


Plan:  -- echocardiogram on 3/3 showed an estimated EF of 50-55%.  


++severe tricuspid regurgitation with prosthesis in place.  


++2 x 4 cm mobile vegetation was seen on the valve.  The right atrium and right 

ventricle were normal in size and function





Assessment


38y/o female admitted with recurrent endocarditis. hematology consulted for 

anticoagulation assistance.


h/o Recurrent bacterial endocarditis, bacteremia, history of prosthetic aortic 

valve and subsequent redo, chronic active hepatitis C, intravenous drug use, 

microcytic anemia, consistent with iron deficiency, left lower extremity 

arterial event, bacterial endocarditis with viridans streptococcus.





HPI (brought forward for continuity of care): history of IV drug abuse and  

recurrent endocarditis. had a relapse of her activity. has history of pulmonary 

embolism and infected valves. +significant congestive heart failure and 

presented to the emergency room on 02/23/2018, with sepsis. is followed by 

Infectious Disease for her endocarditis.  previously received antibiotic 

therapy for enterococcus faecalis and staph aureus. There is questionable 

compliance. Her current blood 


culture from 02/23/2018, shows viridans streptococcus.  Two out of two bottles 

were positive.  She is on ampicillin and gentamicin. course is complicated by 

cold left lower extremity, evaluated by  Vascular Surgery.  A CT angiogram 

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 

the proximal thigh.  There is satisfactory calf runoff present.  For this reason

, conservative management with anticoagulation is continued. was placed on 

unfractionated heparin.  Her heparin dose has been titrated from 1000 units/

hour to 2500 units/hour with a PTT remaining subtherapeutic.  Her last PTT is 

32 seconds from 03/01/2018, at 2 p.m.  For this reason, Hematology/Oncology was 

consulted.


Plan


1. await INR


2. ask infectious disease to reassess patient antibiotics, re: new cellulitis, 

left leg.











Zoe Chau Mar 21, 2018 12:16

## 2018-03-22 VITALS
OXYGEN SATURATION: 93 % | HEART RATE: 103 BPM | DIASTOLIC BLOOD PRESSURE: 55 MMHG | RESPIRATION RATE: 20 BRPM | TEMPERATURE: 99 F | SYSTOLIC BLOOD PRESSURE: 99 MMHG

## 2018-03-22 VITALS
TEMPERATURE: 98 F | DIASTOLIC BLOOD PRESSURE: 63 MMHG | SYSTOLIC BLOOD PRESSURE: 96 MMHG | HEART RATE: 121 BPM | OXYGEN SATURATION: 94 % | RESPIRATION RATE: 23 BRPM

## 2018-03-22 VITALS
OXYGEN SATURATION: 94 % | TEMPERATURE: 98.4 F | HEART RATE: 104 BPM | SYSTOLIC BLOOD PRESSURE: 95 MMHG | DIASTOLIC BLOOD PRESSURE: 59 MMHG | RESPIRATION RATE: 22 BRPM

## 2018-03-22 VITALS
RESPIRATION RATE: 22 BRPM | OXYGEN SATURATION: 95 % | HEART RATE: 121 BPM | DIASTOLIC BLOOD PRESSURE: 55 MMHG | TEMPERATURE: 98.8 F | SYSTOLIC BLOOD PRESSURE: 113 MMHG

## 2018-03-22 VITALS
SYSTOLIC BLOOD PRESSURE: 101 MMHG | DIASTOLIC BLOOD PRESSURE: 64 MMHG | OXYGEN SATURATION: 92 % | RESPIRATION RATE: 22 BRPM | HEART RATE: 114 BPM | TEMPERATURE: 98.7 F

## 2018-03-22 VITALS
RESPIRATION RATE: 24 BRPM | TEMPERATURE: 97.8 F | HEART RATE: 122 BPM | DIASTOLIC BLOOD PRESSURE: 51 MMHG | OXYGEN SATURATION: 93 % | SYSTOLIC BLOOD PRESSURE: 117 MMHG

## 2018-03-22 VITALS — HEART RATE: 105 BPM

## 2018-03-22 VITALS — HEART RATE: 112 BPM

## 2018-03-22 LAB
INR PPP: 2.4 RATIO
PROTHROMBIN TIME: 24.4 SEC (ref 9.8–11.6)

## 2018-03-22 RX ADMIN — CHLORHEXIDINE GLUCONATE SCH PACK: 500 CLOTH TOPICAL at 03:30

## 2018-03-22 RX ADMIN — WARFARIN SODIUM SCH MG: 1 TABLET ORAL at 15:30

## 2018-03-22 RX ADMIN — HYDROMORPHONE HYDROCHLORIDE PRN MG: 2 INJECTION INTRAMUSCULAR; INTRAVENOUS; SUBCUTANEOUS at 15:30

## 2018-03-22 RX ADMIN — MORPHINE SULFATE SCH MG: 15 TABLET, EXTENDED RELEASE ORAL at 08:55

## 2018-03-22 RX ADMIN — MORPHINE SULFATE SCH MG: 15 TABLET, EXTENDED RELEASE ORAL at 22:18

## 2018-03-22 RX ADMIN — SODIUM CHLORIDE SCH MLS/HR: 900 INJECTION INTRAVENOUS at 15:31

## 2018-03-22 RX ADMIN — DAPTOMYCIN SCH MLS/HR: 500 INJECTION, POWDER, LYOPHILIZED, FOR SOLUTION INTRAVENOUS at 19:57

## 2018-03-22 RX ADMIN — METOLAZONE SCH MG: 2.5 TABLET ORAL at 22:27

## 2018-03-22 RX ADMIN — ALBUMIN HUMAN SCH MLS/HR: 0.25 SOLUTION INTRAVENOUS at 06:17

## 2018-03-22 RX ADMIN — HYDROMORPHONE HYDROCHLORIDE PRN MG: 2 INJECTION INTRAMUSCULAR; INTRAVENOUS; SUBCUTANEOUS at 11:19

## 2018-03-22 RX ADMIN — AMPICILLIN SODIUM SCH MLS/HR: 2 INJECTION, POWDER, FOR SOLUTION INTRAMUSCULAR; INTRAVENOUS at 08:53

## 2018-03-22 RX ADMIN — Medication SCH ML: at 08:53

## 2018-03-22 RX ADMIN — STANDARDIZED SENNA CONCENTRATE AND DOCUSATE SODIUM SCH TAB: 8.6; 5 TABLET, FILM COATED ORAL at 08:55

## 2018-03-22 RX ADMIN — AMPICILLIN SODIUM SCH MLS/HR: 2 INJECTION, POWDER, FOR SOLUTION INTRAMUSCULAR; INTRAVENOUS at 19:57

## 2018-03-22 RX ADMIN — POTASSIUM CHLORIDE SCH MEQ: 750 TABLET, FILM COATED, EXTENDED RELEASE ORAL at 22:17

## 2018-03-22 RX ADMIN — Medication SCH ML: at 19:58

## 2018-03-22 RX ADMIN — FUROSEMIDE SCH MG: 10 INJECTION, SOLUTION INTRAMUSCULAR; INTRAVENOUS at 08:53

## 2018-03-22 RX ADMIN — AMPICILLIN SODIUM SCH MLS/HR: 2 INJECTION, POWDER, FOR SOLUTION INTRAMUSCULAR; INTRAVENOUS at 02:46

## 2018-03-22 RX ADMIN — STANDARDIZED SENNA CONCENTRATE AND DOCUSATE SODIUM SCH TAB: 8.6; 5 TABLET, FILM COATED ORAL at 21:00

## 2018-03-22 RX ADMIN — Medication PRN ML: at 06:48

## 2018-03-22 RX ADMIN — HYDROMORPHONE HYDROCHLORIDE PRN MG: 2 INJECTION INTRAMUSCULAR; INTRAVENOUS; SUBCUTANEOUS at 02:46

## 2018-03-22 RX ADMIN — HYDROMORPHONE HYDROCHLORIDE PRN MG: 2 INJECTION INTRAMUSCULAR; INTRAVENOUS; SUBCUTANEOUS at 19:56

## 2018-03-22 RX ADMIN — FUROSEMIDE SCH MG: 10 INJECTION, SOLUTION INTRAMUSCULAR; INTRAVENOUS at 17:16

## 2018-03-22 RX ADMIN — ALBUMIN HUMAN SCH MLS/HR: 0.25 SOLUTION INTRAVENOUS at 17:16

## 2018-03-22 RX ADMIN — AMPICILLIN SODIUM SCH MLS/HR: 2 INJECTION, POWDER, FOR SOLUTION INTRAMUSCULAR; INTRAVENOUS at 15:30

## 2018-03-22 RX ADMIN — POTASSIUM CHLORIDE SCH MEQ: 750 TABLET, FILM COATED, EXTENDED RELEASE ORAL at 08:54

## 2018-03-22 RX ADMIN — Medication PRN ML: at 02:47

## 2018-03-22 RX ADMIN — HYDROMORPHONE HYDROCHLORIDE PRN MG: 2 INJECTION INTRAMUSCULAR; INTRAVENOUS; SUBCUTANEOUS at 06:48

## 2018-03-22 NOTE — HHI.NPPN
Subjective


History of Present Illness


This is a 38-year-old female with past medical history of IV drug abuse, 

history of hepatitis C, narcotic dependency, came to the hospital with 

complaint of shortness of breath and swelling.  Nephrology called to see the 

patient because of elevated BUN and creatinine.  The patient has creatinine of 

4.5 on admission which improved and it was staying in the range of 0.6-0.8 

until 6 days ago, then it started going up again and now it is 1.7.  The 

patient has increased swelling 


of the legs and the patient was diagnosed with endocarditis and she has blood 

culture positive for Strep viridans.  Patient has been following with the ID 

and she was getting furosemide, which was stopped today this morning because of 

increased creatinine and she was on vancomycin and rifampicin.  The last dose 

of vancomycin seems to be given possibly on 03/04/2018 or even later, but her 

level was high and the highest level we have was 25.8, which was on 03/04/2018 

but on 03/10/2018 it was 23.9.  The patient has been actively using IV drugs 

before she came to the hospital and noticed to have more swelling in her legs.  

She is complaining of generalized body pain.  There is no nausea, vomiting.  

Denies any diarrhea.  Her appetite is normal.


Additional Remarks


Reports continued mild SOB.  Bilateral lower extremity edema. 


 (Gellermann,Diane M. ARNP)





Review of Systems


General


Constitutional:  Fatigue


 (Gellermann,Diane M. ARNP)





Respiratory


Lungs:  SOB


 (Gellermann,Diane M. ARNP)





Cardiovascular


Cardiac:  Edema, SCOTT


Cardiac Remarks


Denies CP


 (Gellermann,Diane M. ARNP)





Gastrointestinal


GI Remarks


denies abdominal pain


 (Gellermann,Diane M. ARNP)





Objective Data


Data





Vital Signs








  Date Time  Temp Pulse Resp B/P (MAP) Pulse Ox O2 Delivery O2 Flow Rate FiO2


 


3/22/18 12:11 98.7 114 22 101/64 (76) 92   


 


3/22/18 11:54   18     


 


3/22/18 11:48     95 Nasal Cannula 2.00 





      Humidified  


 


3/22/18 10:02   18     


 


3/22/18 08:00 98.8 118 22 113/55 (74) 95   


 


3/22/18 08:00  121      


 


3/22/18 04:00 97.8 122 24 117/51 (73) 93   


 


3/22/18 04:00      Nasal Cannula 2.00 





      Humidified  


 


3/22/18 04:00  122      


 


3/22/18 00:00 98.0 118 23 96/63 (74) 94   


 


3/22/18 00:00      Nasal Cannula 2.00 





      Humidified  


 


3/22/18 00:00  121      


 


3/21/18 20:24     100 Nasal Cannula 2.00 


 


3/21/18 20:00 98.0 110 23 99/60 (73) 97   


 


3/21/18 20:00  111      


 


3/21/18 20:00      Nasal Cannula 2.00 





      Humidified  


 


3/21/18 16:00      Nasal Cannula 3.00 


 


3/21/18 16:00  117      


 


3/21/18 16:00 98.0 100 22 94/73 (80) 98   








 (Gellermann,Diane M. ARNP)


-:  


3/21/18 0630                                                                   

             3/21/18 0630








Physical Exam


General


Appearance:  No Acute Distress


 (Gellermann,Diane M. ARNP)





Eyes


Eye Exam:  Pupils Equal


 (Gellermann,Diane M. ARNP)





Throat


Throat Exam:  Oral Mucosa Pink & Moist


 (Gellermann,Diane M. ARNP)





Neck


Neck Exam:  Neck Supple


 (Gellermann,Diane M. ARNP)





Pulmonary


Resp Exam:  Clear Bilaterally


 (Gellermann,Diane M. ARNP)





Cardiology


CV Exam:  Regular, Normal Sinus Rhythm


 (Gellermann,Diane M. ARNP)





Gastrointestinal/Abdomen


GI Exam:  Soft, Non-Tender


 (Gellermann,Diane M. ARNP)





Integumentary


Skin Exam:  Dry, Intact


 (Gellermann,Diane M. ARNP)





Extremeties


Extremities Exam:  Pitting Edema


 (Gellermann,Diane M. ARNP)





Neurologic


Neuro Exam:  Alert, Awake, Oriented


 (Gellermann,Diane M. ARNP)





Psychiatric


Psych Exam:  Appropriate Responses


 (Gellermann,Diane M. ARNP)





Assessment/Plan


Discussed Condition With:  Patient


Assessment Summary:  NOÉ/Acute Renal Failure


Problem List:  


(1) Acute kidney injury


ICD Codes:  N17.9 - Acute kidney failure, unspecified


Status:  Acute


Plan:  


NOÉ likely due to vancomycin toxicity,  post-infectious GN is unlikely.


Possibility of ATN or pre renal.


Urine eosinophils negative, complements wnl


Creatinine stable


Good UOP





Plan


Continue lasix 40 mg IV BID  for edema.


Continue to monitor BMP and UOP


Encouraged to elevate legs throughout day


Avoid nephrotoxins. 





(2) Prosthetic valve endocarditis


ICD Codes:  T82.6XXA - Infection and inflammatory reaction due to cardiac valve 

prosthesis, initial encounter


Status:  Acute


Plan:  septic endocarditis with strep viridans


IV drug use, pulmonary emboli as well.





Continues ampicillin per ID, started ceftriaxone. 


continue renal dosing antibiotics.





(3) Peripheral arterial occlusive disease


ICD Codes:  I77.9 - Disorder of arteries and arterioles, unspecified


Plan:  seen with vascular, continue medical management


Hold contrast studies as possible.





(4) Hepatitis C


ICD Codes:  B19.20 - Unspecified viral hepatitis C without hepatic coma


Status:  Chronic (Gellermann,Diane M. ARNP)


Problem List:  


(1) Acute kidney injury


ICD Codes:  N17.9 - Acute kidney failure, unspecified


Status:  Acute


Plan:  


NOÉ likely due to vancomycin toxicity,  post-infectious GN is unlikely.


Possibility of ATN or pre renal.


Urine eosinophils negative, complements wnl


Creatinine stable


Good UOP





Plan


Continue lasix 40 mg IV BID  for edema.


Continue to monitor BMP and UOP


Encouraged to elevate legs throughout day


Avoid nephrotoxins. 


Patient seen and examined, agree with above.


I will add Metolazone.





(2) Prosthetic valve endocarditis


ICD Codes:  T82.6XXA - Infection and inflammatory reaction due to cardiac valve 

prosthesis, initial encounter


Status:  Acute


Plan:  septic endocarditis with strep viridans


IV drug use, pulmonary emboli as well.





Continues ampicillin per ID, started ceftriaxone. 


continue renal dosing antibiotics.





(3) Peripheral arterial occlusive disease


ICD Codes:  I77.9 - Disorder of arteries and arterioles, unspecified


Plan:  seen with vascular, continue medical management


Hold contrast studies as possible.





(4) Hepatitis C


ICD Codes:  B19.20 - Unspecified viral hepatitis C without hepatic coma


Status:  Chronic (Melissa Layne MD)





Problem Qualifiers





(1) Prosthetic valve endocarditis:  


Qualified Codes:  T82.6XXA - Infection and inflammatory reaction due to cardiac 

valve prosthesis, initial encounter








Gellermann,Diane M. ARNP Mar 22, 2018 14:28


Melissa Layne MD Mar 22, 2018 21:55

## 2018-03-22 NOTE — HHI.PR
Subjective


Remarks


Breathing status improved as per patient.


Denies cp


still with BL lower extremity edema and difficulty ambulating.


No complaining as much about pain in lower extremities.





Objective


Vitals





Vital Signs








  Date Time  Temp Pulse Resp B/P (MAP) Pulse Ox O2 Delivery O2 Flow Rate FiO2


 


3/22/18 12:11 98.7 114 22 101/64 (76) 92   


 


3/22/18 11:54   18     


 


3/22/18 11:48     95 Nasal Cannula 2.00 





      Humidified  


 


3/22/18 10:02   18     


 


3/22/18 08:00 98.8 118 22 113/55 (74) 95   


 


3/22/18 08:00  121      


 


3/22/18 04:00 97.8 122 24 117/51 (73) 93   


 


3/22/18 04:00      Nasal Cannula 2.00 





      Humidified  


 


3/22/18 04:00  122      


 


3/22/18 00:00 98.0 118 23 96/63 (74) 94   


 


3/22/18 00:00      Nasal Cannula 2.00 





      Humidified  


 


3/22/18 00:00  121      


 


3/21/18 20:24     100 Nasal Cannula 2.00 


 


3/21/18 20:00 98.0 110 23 99/60 (73) 97   


 


3/21/18 20:00  111      


 


3/21/18 20:00      Nasal Cannula 2.00 





      Humidified  


 


3/21/18 16:00      Nasal Cannula 3.00 


 


3/21/18 16:00  117      


 


3/21/18 16:00 98.0 100 22 94/73 (80) 98   














I/O      


 


 3/21/18 3/21/18 3/21/18 3/22/18 3/22/18 3/22/18





 07:00 15:00 23:00 07:00 15:00 23:00


 


Intake Total   920 ml 2250 ml  


 


Output Total   2000 ml 2450 ml  


 


Balance   -1080 ml -200 ml  


 


      


 


Intake Oral   720 ml 2050 ml  


 


IV Total   200 ml 200 ml  


 


Output Urine Total   2000 ml 2450 ml  


 


# Voids  2  3  


 


# Bowel Movements   1 0  








Result Diagram:  


3/21/18 0630                                                                   

             3/21/18 0630





Imaging





Last Impressions








Chest X-Ray 3/21/18 0000 Signed





Impressions: 





 Service Date/Time:  Wednesday, March 21, 2018 11:32 - CONCLUSION:  1. 

Improving 





 patchy right lower lung zone airspace consolidation.     Rj Whitten MD 


 


Catheter Placement X-Ray 3/19/18 0000 Signed





Impressions: 





 Service Date/Time:  Monday, March 19, 2018 11:16 - CONCLUSION: Uncomplicated 





 venous catheter change as above.     Rj Whitten MD 


 


Head CT 3/3/18 0000 Signed





Impressions: 





 Service Date/Time:  Saturday, March 3, 2018 10:22 - CONCLUSION:  1. Focal area 





 of decreased density involving the right parietal lobe. As a new finding from 





 the prior exam. This could relate to a small infarct. MRI of the brain with 





 gadolinium suggested to further evaluate. 2. Small lacunar infarction 

involving 





 the left centrum semiovale.     Scooter Dawson Jr., MD 


 


CT Angiography 3/3/18 0000 Signed





Impressions: 





 Service Date/Time:  Saturday, March 3, 2018 10:28 - CONCLUSION:  There 

extensive 





 pulmonary emboli bilaterally. There is near complete cut off of the right 

lower 





 lobe pulmonary artery. Prominent filling defects on the left as well. These 





 findings were relayed to Dr. Bustos immediately at the completion of the study.  





 Scattered pulmonary infiltrates, small pleural effusion and extensive 





 mediastinal lymphadenopathy as described above.      Won Sharp MD 


 


Brain MRI 3/3/18 0000 Signed





Impressions: 





 Service Date/Time:  Saturday, March 3, 2018 18:04 - CONCLUSION:  Deterioration 





 in the appearance of the scan.  At least 3 focal areas of abnormal contrast 





 enhancement are present scattered to in both hemispheres.  Given the history 





 this most likely represents developing areas cerebritis.  Exam is compromised 

by 





 an uncooperative patient and motion artifact.  These 2 factors make detection 

of 





 other abnormalities such as cortical cephalitis and meningitis difficult to 





 exclude.  Cannot exclude hemorrhagic lesions as well.  There is no mass effect 





 evident.      Jorge Diane MD 


 


Chest CT 2/26/18 0000 Signed





Impressions: 





 Service Date/Time:  Monday, February 26, 2018 12:41 - CONCLUSION:  1. There 

are 





 at least 5 pulmonary nodules present bilaterally with the largest measuring 19 





 mm. None demonstrate cavitation but it is possible that these represent septic 





 emboli. Suggest followup to assess for change. 2. Splenomegaly with subtle 





 wedge-shaped areas of low-density in the mid spleen which could represent 





 infarcts. 3. Mild cardiomegaly in this patient post aortic valve replacement. 





 Low density of the cardiac blood pool suggests anemia.      Ron Melgar MD 


 


Aorta w/Runoff CTA 2/25/18 0000 Signed





Impressions: 





 Service Date/Time:  Sunday, February 25, 2018 12:47 - CONCLUSION:  Left renal 





 cortical infarction. 5-6 cm length total occlusion of the left superficial 





 femoral artery in the proximal thigh. Nodular parenchymal opacities in the 

lung 





 bases bilaterally See above discussion.     Ron Cruz MD 


 


Renal Ultrasound 2/24/18 0000 Signed





Impressions: 





 Service Date/Time:  Saturday, February 24, 2018 10:53 - CONCLUSION:  1. No 





 evidence of hydronephrosis. 2. Thickening of the urinary bladder wall a 1.1 

cm. 





 3. Prominent splenomegaly.     Elgin Walton MD 


 


Breast Ultrasound 2/24/18 0000 Signed





Impressions: 





 Service Date/Time:  Saturday, February 24, 2018 10:48 - CONCLUSION:  Negative 





 targeted right breast ultrasound examination.     Ron Brown MD 


 


Lower Extremity Ultrasound 2/23/18 0000 Signed





Impressions: 





 Service Date/Time:  Friday, February 23, 2018 21:22 - CONCLUSION:  1. No 





 sonographic evidence for lower extremity DVT. 2. Incidental note of bilateral 





 inguinal adenopathy with the largest on the right measuring 3.3 cm and the 





 largest on the left measuring 2.6 cm. This finding is nonspecific but is 





 typically reactive in etiology.     Rj Whitten MD 








Objective Remarks


AAOx3


NAD


Clear lungs BL


S1S2 4/6 systolic ejection murmur


+4 BL lower extremity edema.  Erythema, increased warmth and tenderness to 

palpation of the left lower leg. 


Dark blue discoloration of toes of left leg. Pedal and tial pulses not palpable 

due to edema. Good capillary refill.


Medications and IVs





Current Medications








 Medications


  (Trade)  Dose


 Ordered  Sig/Nikhil


 Route  Start Time


 Stop Time Status Last Admin


 


  (NS Flush)  2 ml  UNSCH  PRN


 IV FLUSH  2/23/18 22:00


    3/22/18 06:48


 


 


  (NS Flush)  2 ml  BID


 IV FLUSH  2/24/18 09:00


    3/22/18 19:58


 


 


  (Tylenol)  650 mg  Q6H  PRN


 PO  2/23/18 22:00


    3/19/18 01:19


 


 


  (Zofran Inj)  4 mg  Q6H  PRN


 IV PUSH  2/23/18 22:00


    3/8/18 21:22


 


 


  (Restoril)  15 mg  HS  PRN


 PO  2/23/18 22:00


    3/6/18 22:08


 


 


 Miscellaneous


 Information  1  Q361D


 XX  2/23/18 22:00


     


 


 


  (Chlorhexidine


 2% Cloth)  


 Taper  DAILY@04


 TOP  2/24/18 04:00


 2/20/19 03:59  3/6/18 03:54


 


 


  (Chlorhexidine


 2% Cloth)  3 pack  UNSCH  PRN


 TOP  2/23/18 22:00


     


 


 


  (Arlen-Colace)  1 tab  BID


 PO  2/24/18 09:00


    3/22/18 08:55


 


 


  (Milk Of


 Magnesia Liq)  30 ml  Q12H  PRN


 PO  2/23/18 22:00


     


 


 


  (Senokot)  17.2 mg  Q12H  PRN


 PO  2/23/18 22:00


     


 


 


  (Dulcolax Supp)  10 mg  DAILY  PRN


 RECTAL  2/23/18 22:00


     


 


 


  (Lactulose Liq)  30 ml  DAILY  PRN


 PO  2/23/18 22:00


     


 


 


 Ampicillin Sodium


 2000 mg/Sodium


 Chloride  100 ml @ 


 400 mls/hr  Q6H


 IV  2/24/18 14:00


    3/22/18 19:57


 


 


  (Imodium)  2 mg  Q4H  PRN


 PO  2/26/18 16:15


    2/28/18 14:32


 


 


  (Albuterol Neb)  2.5 mg  Q2HR NEB  PRN


 NEB  2/27/18 11:30


    3/21/18 08:42


 


 


  (Norco  Mg)  1 tab  Q4H  PRN


 PO  3/4/18 15:00


    3/9/18 05:02


 


 


  (Dilaudid Pf Inj)  1 mg  Q4H  PRN


 IV PUSH  3/7/18 19:00


    3/22/18 19:56


 


 


  (KCl)  60 meq  Q12HR


 PO  3/14/18 09:45


    3/22/18 22:17


 


 


 Albumin Human  100 ml @ 


 60 mls/hr  Q12H


 IV  3/18/18 18:00


    3/22/18 17:16


 


 


  (Oramorph Sr)  15 mg  Q12HR


 PO  3/20/18 21:00


    3/22/18 22:18


 


 


  (Lasix Inj)  40 mg  BID@09,18


 IV PUSH  3/21/18 09:00


    3/22/18 17:16


 


 


  (Coumadin)  1 mg  DAILY@16


 PO  3/20/18 21:00


    3/22/18 15:30


 


 


 Daptomycin 500 mg/


 Sodium Chloride  100 ml @ 


 200 mls/hr  Q24H


 IV  3/22/18 20:00


    3/22/18 19:57


 


 


  (Zaroxolyn)  2.5 mg  BID


 PO  3/22/18 22:00


    3/22/18 22:27


 








Vascular Central Line Catheter:  Yes


Assessment to:  Continue


Date of Insertion:  Mar 19, 2018


Line:  Central Venous Catheter


Side:  Right


Location:  Internal, Jugular


Reason for Continuation


Access - Medications administration





A/P


Problem List:  


(1) Prosthetic valve endocarditis


ICD Code:  T82.6XXA - Infection and inflammatory reaction due to cardiac valve 

prosthesis, initial encounter


Status:  Acute


(2) Bacteremia


ICD Code:  R78.81 - Bacteremia


(3) Septic pulmonary embolism


ICD Code:  I26.90 - Septic pulmonary embolism without acute cor pulmonale


Status:  Acute


(4) IVDU (intravenous drug user)


ICD Code:  F19.90 - Other psychoactive substance use, unspecified, uncomplicated


(5) Anemia


ICD Code:  D64.9 - Anemia, unspecified


Status:  Chronic


(6) NOÉ (acute kidney injury)


ICD Code:  N17.9 - Acute kidney failure, unspecified


Plan:  Creatinine slightly worsened from 1.52-1.57.


Nephrology consulted.


Possible ATN from vancomycin toxicity or from infection.


Urine eosinophils negative.


Continue to hold diuretics as per nephrology.  Nephrology recommends waiting 

for some improvement in renal function before some diuretics could be 

administered.


Continue to monitor BUN and creatinine, avoid nephrotoxins.


3/19 As per Nephrology continue Lasix 20 mg IV BID with albumin.  Creatinine 

slightly elevated from 1.57 - 1.62.


3/20 continue IV diuretics as above.  Continue to monitor BUN and creatinine.  

Creatinine pending.


3/22 creatinine is trending down to 1.53.  Continue to follow-up management as 

per nephrology.  Continue with diuresis with Lasix which has been increased 

from 20 mg IV twice daily to 40 mg IV twice daily.


Metolazone added to the regime.





(7) Cellulitis of left lower extremity


ICD Code:  L03.116 - Cellulitis of left lower limb


Plan:  The patient has erythema, increased warmth and tenderness to palpation 

on the left lower extremity.


The patient likely is developing a left lower extremity cellulitis.


3/21 discussed the case with Dr. Carr from infectious disease.  Recommends 

observation for now.


3/22 Leg looks more erythematous and toes in lower extremities have a darker 

discoloration. Rocephin discontinued by ID and patient placed on Daptomycin.


Monitor for  response. Will order check of pedal pulses with arterial doppler. 

Discussed with RN.





Assessment and Plan


(1) Prosthetic valve endocarditis


Plan:  Continue antibiotics as per ID.


Patient on IV ampicillin.


Continue to hold coumadin until INR < 3 - goal 2-3.


3/19 INR elevated from 3.1 to 3.7. Hematology had decreased dose of Coumadin, I 

will hold and continue to monitor PT/INR.


Continue Ampicllin, Rocephin added for pulmonary coverage on 3/18 as per ID. 


Continue antibiotics as per ID recommendations.


3/20 palliative care consulted by hematology.  As per hematology documentation 

the patient requested to talk to palliative care to see if she is a hospice 

candidate.


Discussed with palliative care who states that the patient's goals are 

aggressive and she wants to get better.  The patient definitely is a hospice 

candidate, however her goals are not hospice appropriate.  I will start the 

patient on Oramorph 50 mg p.o. twice daily which will probably need to be 

titrated in order to provide some pain control for the patient.





(2) Bacteremia


Plan:  Blood cultures obtained on 2/23 growing strep viridans.


Subsequent repeat blood cultures negative.


3/19 sp Exchange of Right IJ central line. 


3/20 continue IV antibiotics as per ID recommendations.





(3) Septic pulmonary embolism


Plan:  As observed on a CT angiography of the chest obtained on 3/3/18.


chocardiogram on 3/3 showed an estimated EF of 50-55%.  


++severe tricuspid regurgitation with prosthesis in place.  


++2 x 4 cm mobile vegetation was seen on the valve.  The right atrium and right 

ventricle were normal in size and function


Hematology following.


Management of Coumadin as per hematology recommendations.


Goal INR 2-3.





(4) IVDU (intravenous drug user)


Plan:  Counseled on drug cessation.





(5) Anemia


Plan:  Likely secondary to inflammation/infection.  No evidence of bleeding or 

hemolysis.


Monitor hemoglobin and transfuse as needed.


Discharge Planning


Continue to monitor on the medical floor.





Problem Qualifiers





(1) Prosthetic valve endocarditis:  


Qualified Codes:  T82.6XXA - Infection and inflammatory reaction due to cardiac 

valve prosthesis, initial encounter


(2) Anemia:  


Qualified Codes:  D63.8 - Anemia in other chronic diseases classified elsewhere








Jesus Mae MD Mar 22, 2018 14:03

## 2018-03-23 VITALS — HEART RATE: 109 BPM

## 2018-03-23 VITALS — OXYGEN SATURATION: 94 %

## 2018-03-23 VITALS
OXYGEN SATURATION: 92 % | DIASTOLIC BLOOD PRESSURE: 58 MMHG | TEMPERATURE: 98.5 F | HEART RATE: 103 BPM | RESPIRATION RATE: 17 BRPM | SYSTOLIC BLOOD PRESSURE: 92 MMHG

## 2018-03-23 VITALS
SYSTOLIC BLOOD PRESSURE: 107 MMHG | OXYGEN SATURATION: 93 % | RESPIRATION RATE: 20 BRPM | TEMPERATURE: 98.6 F | DIASTOLIC BLOOD PRESSURE: 60 MMHG | HEART RATE: 115 BPM

## 2018-03-23 VITALS
HEART RATE: 105 BPM | TEMPERATURE: 98.6 F | SYSTOLIC BLOOD PRESSURE: 96 MMHG | DIASTOLIC BLOOD PRESSURE: 59 MMHG | RESPIRATION RATE: 18 BRPM | OXYGEN SATURATION: 93 %

## 2018-03-23 VITALS
TEMPERATURE: 97.2 F | HEART RATE: 79 BPM | DIASTOLIC BLOOD PRESSURE: 67 MMHG | OXYGEN SATURATION: 96 % | SYSTOLIC BLOOD PRESSURE: 109 MMHG | RESPIRATION RATE: 17 BRPM

## 2018-03-23 VITALS — HEART RATE: 108 BPM

## 2018-03-23 VITALS
RESPIRATION RATE: 18 BRPM | HEART RATE: 107 BPM | DIASTOLIC BLOOD PRESSURE: 57 MMHG | OXYGEN SATURATION: 94 % | TEMPERATURE: 97.4 F | SYSTOLIC BLOOD PRESSURE: 99 MMHG

## 2018-03-23 VITALS — HEART RATE: 112 BPM

## 2018-03-23 VITALS
SYSTOLIC BLOOD PRESSURE: 103 MMHG | DIASTOLIC BLOOD PRESSURE: 76 MMHG | OXYGEN SATURATION: 94 % | TEMPERATURE: 99.1 F | RESPIRATION RATE: 18 BRPM | HEART RATE: 112 BPM

## 2018-03-23 VITALS — OXYGEN SATURATION: 99 %

## 2018-03-23 LAB
BUN SERPL-MCNC: 17 MG/DL (ref 7–18)
CALCIUM SERPL-MCNC: 7.9 MG/DL (ref 8.5–10.1)
CHLORIDE SERPL-SCNC: 91 MEQ/L (ref 98–107)
CREAT SERPL-MCNC: 1.34 MG/DL (ref 0.5–1)
GFR SERPLBLD BASED ON 1.73 SQ M-ARVRAT: 45 ML/MIN (ref 89–?)
GLUCOSE SERPL-MCNC: 113 MG/DL (ref 74–106)
HCO3 BLD-SCNC: 28.7 MEQ/L (ref 21–32)
INR PPP: 2 RATIO
INR PPP: 2 RATIO
MAGNESIUM SERPL-MCNC: 1.4 MG/DL (ref 1.5–2.5)
PHOSPHATE SERPL-MCNC: 3 MG/DL (ref 2.5–4.9)
PROTHROMBIN TIME: 20.4 SEC (ref 9.8–11.6)
PROTHROMBIN TIME: 20.7 SEC (ref 9.8–11.6)
SODIUM SERPL-SCNC: 131 MEQ/L (ref 136–145)

## 2018-03-23 RX ADMIN — FUROSEMIDE SCH MG: 10 INJECTION, SOLUTION INTRAMUSCULAR; INTRAVENOUS at 08:25

## 2018-03-23 RX ADMIN — POTASSIUM CHLORIDE SCH MLS/HR: 200 INJECTION, SOLUTION INTRAVENOUS at 08:24

## 2018-03-23 RX ADMIN — HYDROMORPHONE HYDROCHLORIDE PRN MG: 2 INJECTION INTRAMUSCULAR; INTRAVENOUS; SUBCUTANEOUS at 23:58

## 2018-03-23 RX ADMIN — CHLORHEXIDINE GLUCONATE SCH PACK: 500 CLOTH TOPICAL at 03:54

## 2018-03-23 RX ADMIN — STANDARDIZED SENNA CONCENTRATE AND DOCUSATE SODIUM SCH TAB: 8.6; 5 TABLET, FILM COATED ORAL at 08:26

## 2018-03-23 RX ADMIN — WARFARIN SODIUM SCH MG: 1 TABLET ORAL at 15:01

## 2018-03-23 RX ADMIN — METOLAZONE SCH MG: 2.5 TABLET ORAL at 12:25

## 2018-03-23 RX ADMIN — POTASSIUM CHLORIDE SCH MEQ: 750 TABLET, FILM COATED, EXTENDED RELEASE ORAL at 20:04

## 2018-03-23 RX ADMIN — POTASSIUM CHLORIDE SCH MLS/HR: 200 INJECTION, SOLUTION INTRAVENOUS at 11:28

## 2018-03-23 RX ADMIN — Medication SCH ML: at 08:25

## 2018-03-23 RX ADMIN — HYDROMORPHONE HYDROCHLORIDE PRN MG: 2 INJECTION INTRAMUSCULAR; INTRAVENOUS; SUBCUTANEOUS at 20:05

## 2018-03-23 RX ADMIN — FUROSEMIDE SCH MG: 10 INJECTION, SOLUTION INTRAMUSCULAR; INTRAVENOUS at 17:59

## 2018-03-23 RX ADMIN — AMPICILLIN SODIUM SCH MLS/HR: 2 INJECTION, POWDER, FOR SOLUTION INTRAMUSCULAR; INTRAVENOUS at 01:40

## 2018-03-23 RX ADMIN — HYDROMORPHONE HYDROCHLORIDE PRN MG: 2 INJECTION INTRAMUSCULAR; INTRAVENOUS; SUBCUTANEOUS at 04:07

## 2018-03-23 RX ADMIN — METOLAZONE SCH MG: 2.5 TABLET ORAL at 22:08

## 2018-03-23 RX ADMIN — HYDROMORPHONE HYDROCHLORIDE PRN MG: 2 INJECTION INTRAMUSCULAR; INTRAVENOUS; SUBCUTANEOUS at 15:59

## 2018-03-23 RX ADMIN — Medication SCH ML: at 20:09

## 2018-03-23 RX ADMIN — HYDROMORPHONE HYDROCHLORIDE PRN MG: 2 INJECTION INTRAMUSCULAR; INTRAVENOUS; SUBCUTANEOUS at 00:20

## 2018-03-23 RX ADMIN — HYDROMORPHONE HYDROCHLORIDE PRN MG: 2 INJECTION INTRAMUSCULAR; INTRAVENOUS; SUBCUTANEOUS at 12:19

## 2018-03-23 RX ADMIN — POTASSIUM CHLORIDE SCH MEQ: 750 TABLET, FILM COATED, EXTENDED RELEASE ORAL at 08:26

## 2018-03-23 RX ADMIN — ALBUMIN HUMAN SCH MLS/HR: 0.25 SOLUTION INTRAVENOUS at 05:25

## 2018-03-23 RX ADMIN — MAGNESIUM SULFATE IN DEXTROSE SCH MLS/HR: 10 INJECTION, SOLUTION INTRAVENOUS at 17:58

## 2018-03-23 RX ADMIN — STANDARDIZED SENNA CONCENTRATE AND DOCUSATE SODIUM SCH TAB: 8.6; 5 TABLET, FILM COATED ORAL at 20:09

## 2018-03-23 RX ADMIN — MORPHINE SULFATE SCH MG: 15 TABLET, EXTENDED RELEASE ORAL at 22:07

## 2018-03-23 RX ADMIN — HEPARIN SODIUM PRN MLS/HR: 10000 INJECTION, SOLUTION INTRAVENOUS at 22:18

## 2018-03-23 RX ADMIN — ALBUMIN HUMAN SCH MLS/HR: 0.25 SOLUTION INTRAVENOUS at 17:58

## 2018-03-23 RX ADMIN — AMPICILLIN SODIUM SCH MLS/HR: 2 INJECTION, POWDER, FOR SOLUTION INTRAMUSCULAR; INTRAVENOUS at 15:01

## 2018-03-23 RX ADMIN — AMPICILLIN SODIUM SCH MLS/HR: 2 INJECTION, POWDER, FOR SOLUTION INTRAMUSCULAR; INTRAVENOUS at 19:49

## 2018-03-23 RX ADMIN — AMPICILLIN SODIUM SCH MLS/HR: 2 INJECTION, POWDER, FOR SOLUTION INTRAMUSCULAR; INTRAVENOUS at 10:23

## 2018-03-23 RX ADMIN — DAPTOMYCIN SCH MLS/HR: 500 INJECTION, POWDER, LYOPHILIZED, FOR SOLUTION INTRAVENOUS at 20:14

## 2018-03-23 RX ADMIN — MAGNESIUM SULFATE IN DEXTROSE SCH MLS/HR: 10 INJECTION, SOLUTION INTRAVENOUS at 18:16

## 2018-03-23 RX ADMIN — MORPHINE SULFATE SCH MG: 15 TABLET, EXTENDED RELEASE ORAL at 08:26

## 2018-03-23 NOTE — HHI.NPPN
Subjective


History of Present Illness


This is a 38-year-old female with past medical history of IV drug abuse, 

history of hepatitis C, narcotic dependency, came to the hospital with 

complaint of shortness of breath and swelling.  Nephrology called to see the 

patient because of elevated BUN and creatinine.  The patient has creatinine of 

4.5 on admission which improved and it was staying in the range of 0.6-0.8 

until 6 days ago, then it started going up again and now it is 1.7.  The 

patient has increased swelling 


of the legs and the patient was diagnosed with endocarditis and she has blood 

culture positive for Strep viridans.  Patient has been following with the ID 

and she was getting furosemide, which was stopped today this morning because of 

increased creatinine and she was on vancomycin and rifampicin.  The last dose 

of vancomycin seems to be given possibly on 03/04/2018 or even later, but her 

level was high and the highest level we have was 25.8, which was on 03/04/2018 

but on 03/10/2018 it was 23.9.  The patient has been actively using IV drugs 

before she came to the hospital and noticed to have more swelling in her legs.  

She is complaining of generalized body pain.  There is no nausea, vomiting.  

Denies any diarrhea.  Her appetite is normal.


Additional Remarks


Reports continued mild SOB.  Continue with bilateral lower extremity edema. 


 (Gellermann,Diane M. ARNP)





Review of Systems


General


Constitutional:  Fatigue


 (Gellermann,Diane M. ARNP)





Respiratory


Lungs:  SOB


 (Gellermann,Diane M. ARNP)





Cardiovascular


Cardiac:  Edema, SCOTT


Cardiac Remarks


Denies CP


 (Gellermann,Diane M. ARNP)





Gastrointestinal


GI Remarks


denies abdominal pain


 (Gellermann,Diane M. ARNP)





Objective Data


Data











 3/23/18 3/24/18





 19:00 07:00


 


Intake Total 100 ml 


 


Balance 100 ml 


 


  


 


IV Total 100 ml 











Vital Signs








  Date Time  Temp Pulse Resp B/P (MAP) Pulse Ox O2 Delivery O2 Flow Rate FiO2


 


3/23/18 11:00 98.5 103 17 92/58 (69) 92   


 


3/23/18 10:24   18     


 


3/23/18 10:18  109      


 


3/23/18 09:43     99 Nasal Cannula 2.00 


 


3/23/18 09:04     96 Nasal Cannula 2.00 





      Humidified  


 


3/23/18 07:00 97.2 79 17 109/67 (81) 96   


 


3/23/18 06:09   18     


 


3/23/18 04:00  115      


 


3/23/18 04:00 98.6 115 20 107/60 (76) 93   


 


3/23/18 00:00 99.1 112 18 103/76 (85) 94   


 


3/23/18 00:00  107      


 


3/22/18 22:10      Room Air  


 


3/22/18 20:00 99.0 103 20 99/55 (70) 93   


 


3/22/18 18:13  105      


 


3/22/18 16:00 98.4 104 22 95/59 (71) 94   


 


3/22/18 12:11 98.7 114 22 101/64 (76) 92   


 


3/22/18 12:00  112      


 


3/22/18 11:48     95 Nasal Cannula 2.00 





      Humidified  








 (Gellermann,Diane M. ARNP)


-:  


3/21/18 0630                                                                   

             3/23/18 0525





Imaging





Last Impressions








Chest X-Ray 3/21/18 0000 Signed





Impressions: 





 Service Date/Time:  Wednesday, March 21, 2018 11:32 - CONCLUSION:  1. 

Improving 





 patchy right lower lung zone airspace consolidation.     Rj Whitten MD 


 


Catheter Placement X-Ray 3/19/18 0000 Signed





Impressions: 





 Service Date/Time:  Monday, March 19, 2018 11:16 - CONCLUSION: Uncomplicated 





 venous catheter change as above.     Rj Whitten MD 


 


Head CT 3/3/18 0000 Signed





Impressions: 





 Service Date/Time:  Saturday, March 3, 2018 10:22 - CONCLUSION:  1. Focal area 





 of decreased density involving the right parietal lobe. As a new finding from 





 the prior exam. This could relate to a small infarct. MRI of the brain with 





 gadolinium suggested to further evaluate. 2. Small lacunar infarction 

involving 





 the left centrum semiovale.     Scooter Dawson Jr., MD 


 


CT Angiography 3/3/18 0000 Signed





Impressions: 





 Service Date/Time:  Saturday, March 3, 2018 10:28 - CONCLUSION:  There 

extensive 





 pulmonary emboli bilaterally. There is near complete cut off of the right 

lower 





 lobe pulmonary artery. Prominent filling defects on the left as well. These 





 findings were relayed to Dr. Bustos immediately at the completion of the study.  





 Scattered pulmonary infiltrates, small pleural effusion and extensive 





 mediastinal lymphadenopathy as described above.      Won Sharp MD 


 


Brain MRI 3/3/18 0000 Signed





Impressions: 





 Service Date/Time:  Saturday, March 3, 2018 18:04 - CONCLUSION:  Deterioration 





 in the appearance of the scan.  At least 3 focal areas of abnormal contrast 





 enhancement are present scattered to in both hemispheres.  Given the history 





 this most likely represents developing areas cerebritis.  Exam is compromised 

by 





 an uncooperative patient and motion artifact.  These 2 factors make detection 

of 





 other abnormalities such as cortical cephalitis and meningitis difficult to 





 exclude.  Cannot exclude hemorrhagic lesions as well.  There is no mass effect 





 evident.      Jorge Diane MD 


 


Chest CT 2/26/18 0000 Signed





Impressions: 





 Service Date/Time:  Monday, February 26, 2018 12:41 - CONCLUSION:  1. There 

are 





 at least 5 pulmonary nodules present bilaterally with the largest measuring 19 





 mm. None demonstrate cavitation but it is possible that these represent septic 





 emboli. Suggest followup to assess for change. 2. Splenomegaly with subtle 





 wedge-shaped areas of low-density in the mid spleen which could represent 





 infarcts. 3. Mild cardiomegaly in this patient post aortic valve replacement. 





 Low density of the cardiac blood pool suggests anemia.      Ron Melgar MD 


 


Aorta w/Runoff CTA 2/25/18 0000 Signed





Impressions: 





 Service Date/Time:  Sunday, February 25, 2018 12:47 - CONCLUSION:  Left renal 





 cortical infarction. 5-6 cm length total occlusion of the left superficial 





 femoral artery in the proximal thigh. Nodular parenchymal opacities in the 

lung 





 bases bilaterally See above discussion.     Ron Cruz MD 


 


Renal Ultrasound 2/24/18 0000 Signed





Impressions: 





 Service Date/Time:  Saturday, February 24, 2018 10:53 - CONCLUSION:  1. No 





 evidence of hydronephrosis. 2. Thickening of the urinary bladder wall a 1.1 

cm. 





 3. Prominent splenomegaly.     Elgin Walton MD 


 


Breast Ultrasound 2/24/18 0000 Signed





Impressions: 





 Service Date/Time:  Saturday, February 24, 2018 10:48 - CONCLUSION:  Negative 





 targeted right breast ultrasound examination.     Ron Brown MD 


 


Lower Extremity Ultrasound 2/23/18 0000 Signed





Impressions: 





 Service Date/Time:  Friday, February 23, 2018 21:22 - CONCLUSION:  1. No 





 sonographic evidence for lower extremity DVT. 2. Incidental note of bilateral 





 inguinal adenopathy with the largest on the right measuring 3.3 cm and the 





 largest on the left measuring 2.6 cm. This finding is nonspecific but is 





 typically reactive in etiology.     Rj Whitten MD 








 (Gellermann,Diane M. ARNP)





Physical Exam


General


Appearance:  No Acute Distress, Anxious


 (Gellermann,Diane M. ARNP)





Eyes


Eye Exam:  Pupils Equal


 (Gellermann,Diane M. ARNP)





Throat


Throat Exam:  Oral Mucosa Pink & Moist


 (Gellermann,Diane M. ARNP)





Neck


Neck Exam:  Neck Supple


 (Gellermann,Diane M. ARNP)





Pulmonary


Resp Exam:  Clear Bilaterally


 (Gellermann,Diane M. ARNP)





Cardiology


CV Exam:  Regular, Normal Sinus Rhythm


 (Gellermann,Diane M. ARNP)





Gastrointestinal/Abdomen


GI Exam:  Soft, Non-Tender


 (Gellermann,Diane M. ARNP)





Integumentary


Skin Exam:  Dry, Intact


 (Gellermann,Diane M. ARNP)





Extremeties


Extremities Exam:  Pitting Edema


 (Gellermann,Diane M. ARNP)





Neurologic


Neuro Exam:  Alert, Awake, Oriented


 (Gellermann,Diane M. ARNP)





Psychiatric


Psych Exam:  Appropriate Responses


 (Gellermann,Diane M. ARNP)





Assessment/Plan


Discussed Condition With:  Patient


Assessment Summary:  NOÉ/Acute Renal Failure


Problem List:  


(1) Acute kidney injury


ICD Codes:  N17.9 - Acute kidney failure, unspecified


Status:  Acute


Plan:  


NOÉ likely due to vancomycin toxicity,  post-infectious GN is unlikely.


Possibility of ATN or pre renal.


Urine eosinophils negative, complements wnl


Creatinine stable


Good UOP





Plan


Creatinine is stable at 1.34


Hypokalemia replacement given


Continue Lasix 40 mg IV BID  and metolazone for edema.


Continue to monitor BMP and UOP


Encouraged to elevate legs throughout day


Avoid nephrotoxins. 





(2) Prosthetic valve endocarditis


ICD Codes:  T82.6XXA - Infection and inflammatory reaction due to cardiac valve 

prosthesis, initial encounter


Status:  Acute


Plan:  septic endocarditis with strep viridans


IV drug use, pulmonary emboli as well.





Continues antibiotics per ID


continue renal dosing antibiotics.





(3) Peripheral arterial occlusive disease


ICD Codes:  I77.9 - Disorder of arteries and arterioles, unspecified


Plan:  seen with vascular, continue medical management


Hold contrast studies as possible.





(4) Hepatitis C


ICD Codes:  B19.20 - Unspecified viral hepatitis C without hepatic coma


Status:  Chronic (Gellermann,Diane M. ARNP)


Problem List:  


(1) Acute kidney injury


ICD Codes:  N17.9 - Acute kidney failure, unspecified


Status:  Acute


Plan:  


NOÉ likely due to vancomycin toxicity,  post-infectious GN is unlikely.


Possibility of ATN or pre renal.


Urine eosinophils negative, complements wnl


Creatinine stable


Good UOP





Plan


Creatinine is stable at 1.34


Hypokalemia replacement given


Continue Lasix 40 mg IV BID  and metolazone for edema.


Continue to monitor BMP and UOP


Encouraged to elevate legs throughout day


Avoid nephrotoxins. 


Patient seen and examined, agree with above.


Continue diuretics and follow the BMP and urine out put.





(2) Prosthetic valve endocarditis


ICD Codes:  T82.6XXA - Infection and inflammatory reaction due to cardiac valve 

prosthesis, initial encounter


Status:  Acute


Plan:  septic endocarditis with strep viridans


IV drug use, pulmonary emboli as well.





Continues antibiotics per ID


continue renal dosing antibiotics.





(3) Peripheral arterial occlusive disease


ICD Codes:  I77.9 - Disorder of arteries and arterioles, unspecified


Plan:  seen with vascular, continue medical management


Hold contrast studies as possible.





(4) Hepatitis C


ICD Codes:  B19.20 - Unspecified viral hepatitis C without hepatic coma


Status:  Chronic (Melissa Layne MD)





Problem Qualifiers





(1) Prosthetic valve endocarditis:  


Qualified Codes:  T82.6XXA - Infection and inflammatory reaction due to cardiac 

valve prosthesis, initial encounter








Gellermann,Diane M. ARNP Mar 23, 2018 11:19


Melissa Layne MD Mar 26, 2018 19:25

## 2018-03-23 NOTE — HHI.PR
Subjective


Remarks


Left toes have purple discoloration


Patient Denies cp/sob


Left leg very tender to palpation and reddened.





Objective


Vitals





Vital Signs








  Date Time  Temp Pulse Resp B/P (MAP) Pulse Ox O2 Delivery O2 Flow Rate FiO2


 


3/23/18 12:56   18     


 


3/23/18 12:00  108      


 


3/23/18 11:00 98.5 103 17 92/58 (69) 92   


 


3/23/18 10:24   18     


 


3/23/18 10:18  109      


 


3/23/18 09:43     99 Nasal Cannula 2.00 


 


3/23/18 09:04     96 Nasal Cannula 2.00 





      Humidified  


 


3/23/18 07:00 97.2 79 17 109/67 (81) 96   


 


3/23/18 04:00  115      


 


3/23/18 04:00 98.6 115 20 107/60 (76) 93   


 


3/23/18 00:00 99.1 112 18 103/76 (85) 94   


 


3/23/18 00:00  107      


 


3/22/18 22:10      Room Air  


 


3/22/18 20:00 99.0 103 20 99/55 (70) 93   


 


3/22/18 18:13  105      














I/O      


 


 3/22/18 3/22/18 3/22/18 3/23/18 3/23/18 3/23/18





 07:00 15:00 23:00 07:00 15:00 23:00


 


Intake Total 2250 ml  2200 ml 620 ml 200 ml 100 ml


 


Output Total 2450 ml  1700 ml   


 


Balance -200 ml  500 ml 620 ml 200 ml 100 ml


 


      


 


Intake Oral 2050 ml  1700 ml 420 ml  


 


IV Total 200 ml  500 ml 200 ml 200 ml 100 ml


 


Output Urine Total 2450 ml  1700 ml   


 


# Voids 3   4  


 


# Bowel Movements 0  1 0  








Result Diagram:  


3/21/18 0630                                                                   

             3/23/18 0525





Imaging





Last Impressions








Chest X-Ray 3/21/18 0000 Signed





Impressions: 





 Service Date/Time:  Wednesday, March 21, 2018 11:32 - CONCLUSION:  1. 

Improving 





 patchy right lower lung zone airspace consolidation.     Rj Whitten MD 


 


Catheter Placement X-Ray 3/19/18 0000 Signed





Impressions: 





 Service Date/Time:  Monday, March 19, 2018 11:16 - CONCLUSION: Uncomplicated 





 venous catheter change as above.     Rj Whitten MD 


 


Head CT 3/3/18 0000 Signed





Impressions: 





 Service Date/Time:  Saturday, March 3, 2018 10:22 - CONCLUSION:  1. Focal area 





 of decreased density involving the right parietal lobe. As a new finding from 





 the prior exam. This could relate to a small infarct. MRI of the brain with 





 gadolinium suggested to further evaluate. 2. Small lacunar infarction 

involving 





 the left centrum semiovale.     Scooter Dawson Jr., MD 


 


CT Angiography 3/3/18 0000 Signed





Impressions: 





 Service Date/Time:  Saturday, March 3, 2018 10:28 - CONCLUSION:  There 

extensive 





 pulmonary emboli bilaterally. There is near complete cut off of the right 

lower 





 lobe pulmonary artery. Prominent filling defects on the left as well. These 





 findings were relayed to Dr. Bustos immediately at the completion of the study.  





 Scattered pulmonary infiltrates, small pleural effusion and extensive 





 mediastinal lymphadenopathy as described above.      Won Sharp MD 


 


Brain MRI 3/3/18 0000 Signed





Impressions: 





 Service Date/Time:  Saturday, March 3, 2018 18:04 - CONCLUSION:  Deterioration 





 in the appearance of the scan.  At least 3 focal areas of abnormal contrast 





 enhancement are present scattered to in both hemispheres.  Given the history 





 this most likely represents developing areas cerebritis.  Exam is compromised 

by 





 an uncooperative patient and motion artifact.  These 2 factors make detection 

of 





 other abnormalities such as cortical cephalitis and meningitis difficult to 





 exclude.  Cannot exclude hemorrhagic lesions as well.  There is no mass effect 





 evident.      Jorge Diane MD 


 


Chest CT 2/26/18 0000 Signed





Impressions: 





 Service Date/Time:  Monday, February 26, 2018 12:41 - CONCLUSION:  1. There 

are 





 at least 5 pulmonary nodules present bilaterally with the largest measuring 19 





 mm. None demonstrate cavitation but it is possible that these represent septic 





 emboli. Suggest followup to assess for change. 2. Splenomegaly with subtle 





 wedge-shaped areas of low-density in the mid spleen which could represent 





 infarcts. 3. Mild cardiomegaly in this patient post aortic valve replacement. 





 Low density of the cardiac blood pool suggests anemia.      Ron Melgar MD 


 


Aorta w/Runoff CTA 2/25/18 0000 Signed





Impressions: 





 Service Date/Time:  Sunday, February 25, 2018 12:47 - CONCLUSION:  Left renal 





 cortical infarction. 5-6 cm length total occlusion of the left superficial 





 femoral artery in the proximal thigh. Nodular parenchymal opacities in the 

lung 





 bases bilaterally See above discussion.     Ron Cruz MD 


 


Renal Ultrasound 2/24/18 0000 Signed





Impressions: 





 Service Date/Time:  Saturday, February 24, 2018 10:53 - CONCLUSION:  1. No 





 evidence of hydronephrosis. 2. Thickening of the urinary bladder wall a 1.1 

cm. 





 3. Prominent splenomegaly.     Elgin Walton MD 


 


Breast Ultrasound 2/24/18 0000 Signed





Impressions: 





 Service Date/Time:  Saturday, February 24, 2018 10:48 - CONCLUSION:  Negative 





 targeted right breast ultrasound examination.     Ron Brown MD 


 


Lower Extremity Ultrasound 2/23/18 0000 Signed





Impressions: 





 Service Date/Time:  Friday, February 23, 2018 21:22 - CONCLUSION:  1. No 





 sonographic evidence for lower extremity DVT. 2. Incidental note of bilateral 





 inguinal adenopathy with the largest on the right measuring 3.3 cm and the 





 largest on the left measuring 2.6 cm. This finding is nonspecific but is 





 typically reactive in etiology.     Rj Whitten MD 








Objective Remarks


AAOx3


NAD


Clear lungs BL


S1S2 4/6 systolic ejection murmur


+4 BL lower extremity edema.  Erythema, increased warmth and tenderness to 

palpation of the left lower leg. 


Dark blue discoloration of toes of left leg. Pedal and tial pulses not palpable 

due to edema. Good capillary refill.


Medications and IVs





Current Medications








 Medications


  (Trade)  Dose


 Ordered  Sig/Nikhil


 Route  Start Time


 Stop Time Status Last Admin


 


  (NS Flush)  2 ml  UNSCH  PRN


 IV FLUSH  2/23/18 22:00


    3/22/18 06:48


 


 


  (NS Flush)  2 ml  BID


 IV FLUSH  2/24/18 09:00


    3/23/18 08:25


 


 


  (Tylenol)  650 mg  Q6H  PRN


 PO  2/23/18 22:00


    3/19/18 01:19


 


 


  (Zofran Inj)  4 mg  Q6H  PRN


 IV PUSH  2/23/18 22:00


    3/8/18 21:22


 


 


  (Restoril)  15 mg  HS  PRN


 PO  2/23/18 22:00


    3/6/18 22:08


 


 


 Miscellaneous


 Information  1  Q361D


 XX  2/23/18 22:00


     


 


 


  (Chlorhexidine


 2% Cloth)  


 Taper  DAILY@04


 TOP  2/24/18 04:00


 2/20/19 03:59  3/6/18 03:54


 


 


  (Chlorhexidine


 2% Cloth)  3 pack  UNSCH  PRN


 TOP  2/23/18 22:00


     


 


 


  (Arlen-Colace)  1 tab  BID


 PO  2/24/18 09:00


    3/23/18 08:26


 


 


  (Milk Of


 Magnesia Liq)  30 ml  Q12H  PRN


 PO  2/23/18 22:00


     


 


 


  (Senokot)  17.2 mg  Q12H  PRN


 PO  2/23/18 22:00


     


 


 


  (Dulcolax Supp)  10 mg  DAILY  PRN


 RECTAL  2/23/18 22:00


     


 


 


  (Lactulose Liq)  30 ml  DAILY  PRN


 PO  2/23/18 22:00


     


 


 


 Ampicillin Sodium


 2000 mg/Sodium


 Chloride  100 ml @ 


 400 mls/hr  Q6H


 IV  2/24/18 14:00


    3/23/18 15:01


 


 


  (Imodium)  2 mg  Q4H  PRN


 PO  2/26/18 16:15


    2/28/18 14:32


 


 


  (Albuterol Neb)  2.5 mg  Q2HR NEB  PRN


 NEB  2/27/18 11:30


    3/21/18 08:42


 


 


  (Norco  Mg)  1 tab  Q4H  PRN


 PO  3/4/18 15:00


    3/9/18 05:02


 


 


  (Dilaudid Pf Inj)  1 mg  Q4H  PRN


 IV PUSH  3/7/18 19:00


    3/23/18 15:59


 


 


  (KCl)  60 meq  Q12HR


 PO  3/14/18 09:45


    3/23/18 08:26


 


 


 Albumin Human  100 ml @ 


 60 mls/hr  Q12H


 IV  3/18/18 18:00


    3/23/18 05:25


 


 


  (Oramorph Sr)  15 mg  Q12HR


 PO  3/20/18 21:00


    3/23/18 08:26


 


 


  (Lasix Inj)  40 mg  BID@09,18


 IV PUSH  3/21/18 09:00


    3/23/18 08:25


 


 


  (Coumadin)  1 mg  DAILY@16


 PO  3/20/18 21:00


    3/23/18 15:01


 


 


 Daptomycin 500 mg/


 Sodium Chloride  100 ml @ 


 200 mls/hr  Q24H


 IV  3/22/18 20:00


    3/22/18 19:57


 


 


  (Zaroxolyn)  2.5 mg  BID


 PO  3/22/18 22:00


    3/23/18 12:25


 








Date of Insertion:  Mar 19, 2018


Line:  Central Venous Catheter


Side:  Right


Location:  Internal, Jugular





A/P


Problem List:  


(1) Prosthetic valve endocarditis


ICD Code:  T82.6XXA - Infection and inflammatory reaction due to cardiac valve 

prosthesis, initial encounter


Status:  Acute


(2) Bacteremia


ICD Code:  R78.81 - Bacteremia


(3) Septic pulmonary embolism


ICD Code:  I26.90 - Septic pulmonary embolism without acute cor pulmonale


Status:  Acute


(4) IVDU (intravenous drug user)


ICD Code:  F19.90 - Other psychoactive substance use, unspecified, uncomplicated


(5) Anemia


ICD Code:  D64.9 - Anemia, unspecified


Status:  Chronic


(6) NOÉ (acute kidney injury)


ICD Code:  N17.9 - Acute kidney failure, unspecified


Plan:  Creatinine slightly worsened from 1.52-1.57.


Nephrology consulted.


Possible ATN from vancomycin toxicity or from infection.


Urine eosinophils negative.


Continue to hold diuretics as per nephrology.  Nephrology recommends waiting 

for some improvement in renal function before some diuretics could be 

administered.


Continue to monitor BUN and creatinine, avoid nephrotoxins.


3/19 As per Nephrology continue Lasix 20 mg IV BID with albumin.  Creatinine 

slightly elevated from 1.57 - 1.62.


3/20 continue IV diuretics as above.  Continue to monitor BUN and creatinine.  

Creatinine pending.


3/22 creatinine is trending down to 1.53.  Continue to follow-up management as 

per nephrology.  Continue with diuresis with Lasix which has been increased 

from 20 mg IV twice daily to 40 mg IV twice daily.


Metolazone added to the regime.


3/23 creatinine continues to trend down and today is 1.34.  However up of 

review of daily balance patient is 1 liter positive.  I will place the patient 

on fluid restriction on an effort to have the patient with a negative balance 

tomorrow.  Continue to monitor BUN and creatinine, avoid nephrotoxins.  

Continue diuretics as per nephrology.  The patient currently on Lasix 40 mg IV 

twice daily and metolazone.  Continue to monitor strict I's and O's.





(7) Cellulitis of left lower extremity


ICD Code:  L03.116 - Cellulitis of left lower limb


Plan:  The patient has erythema, increased warmth and tenderness to palpation 

on the left lower extremity.


The patient likely is developing a left lower extremity cellulitis.


3/21 discussed the case with Dr. Carr from infectious disease.  Recommends 

observation for now.


3/23 Leg looks more erythematous and toes in lower extremities have a darker 

discoloration. Rocephin discontinued by ID and patient placed on Daptomycin.


Monitor for  response. Will order check of pedal pulses with arterial Doppler. 

Discussed with RN.





(8) Purple toe syndrome


ICD Code:  I75.029 - Atheroembolism of unspecified lower extremity


Plan:  Patient has a blue/purple discoloration of the left toes.  The patient 

is on Coumadin however INR is 2.0 today.  


The patient is on Coumadin 1 mg p.o. daily, will give an extra dose of 1 mg of 

Coumadin today.  Consult pharmacy for INR dosing.


Monitor PT/INR daily, goal INR should be 2.5-3.5 given the patient has an 

aortic prosthetic valve.





Assessment and Plan


(1) Prosthetic valve endocarditis


Plan:  Continue antibiotics as per ID.


The patient was initially on IV ampicillin.  On 3/19 ID started Rocephin for 

pulmonary coverage since chest x-ray showed a developing infiltrate.


On 3/20 palliative care consulted by hematology.  As per hematology 

documentation the patient requested to talk to palliative care to see if she is 

a hospice candidate.


Discussed with palliative care who states that the patient's goals are 

aggressive and she wants to get better.  The patient definitely is a hospice 

candidate, however her goals are not hospice appropriate.  Patient was started 

on Oramorph SR 30 mg p.o. twice a day for pain control.


3/23 patient with what I suspect purple toe syndrome. Start the patient on a 

heparin drip until the patient's INR reaches 2.5.  Goal INR should be 2.5-3.5 

since the patient has an aortic prosthetic valve.  Will reconsult vascular 

surgery.  Pedal pulses present by Doppler -obtained by me.





(2) Bacteremia


Plan:  Blood cultures obtained on 2/23 growing strep viridans.


Subsequent repeat blood cultures negative.


3/19 sp Exchange of Right IJ central line. 


Continue antibiotics as per ID recommendations.





(3) Septic pulmonary embolism


Plan:  As observed on a CT angiography of the chest obtained on 3/3/18.


chocardiogram on 3/3 showed an estimated EF of 50-55%.  


++severe tricuspid regurgitation with prosthesis in place.  


++2 x 4 cm mobile vegetation was seen on the valve.  The right atrium and right 

ventricle were normal in size and function


Hematology following.


Management of Coumadin as per hematology recommendations.


Goal INR 2.5-3.5 given the presence of a prosthetic aortic valve.





(4) IVDU (intravenous drug user)


Plan:  Counseled on drug cessation.





(5) Anemia


Plan:  Likely secondary to inflammation/infection.  No evidence of bleeding or 

hemolysis.


Monitor hemoglobin and transfuse as needed.


Discharge Planning


Continue to monitor on the medical floor.





Problem Qualifiers





(1) Prosthetic valve endocarditis:  


Qualified Codes:  T82.6XXA - Infection and inflammatory reaction due to cardiac 

valve prosthesis, initial encounter


(2) Anemia:  


Qualified Codes:  D63.8 - Anemia in other chronic diseases classified elsewhere


(3) Purple toe syndrome:  


Qualified Codes:  I75.022 - Atheroembolism of left lower extremity








Jesus Mae MD Mar 23, 2018 16:15

## 2018-03-23 NOTE — HHI.IDPN
Note


Infectious Disease Note











Patient sitting up in a chair.


Patient has some shortness of breath.


Notes pain in the left leg.


Afebrile.


Still has significant redness of the left lower extremity.





37-year-old white female who has a history of endocarditis and has


been admitted several times for the same.  The patient developed shortness of


breath, fever and chills, and  presented to the emergency department.  The


patient is noted to be actively using IV drugs.  She has history of significant


CHF from prior valvular heart disease related to endocarditis.  She states that


she started feeling short of breath about a week ago and the shortness of


breath worsened.  After she completed IV antibiotics in October she was


put on doxycycline prophylaxis.  She reports that she had not been taking the


doxycycline.  


 


PAST MEDICAL HISTORY:


Hepatitis C.


IV drug use.


prosthetic aortic valve replacement in 2011 and then redo aortic


valve replacement in June 2016 








Antibiotics.


Daptomycin.


Ampicillin.








OBJECTIVE:








Vital Signs








  Date Time  Temp Pulse Resp B/P (MAP) Pulse Ox O2 Delivery O2 Flow Rate FiO2


 


3/23/18 16:00 97.4 107 18 99/57 (71) 94   


 


3/23/18 12:56   18     


 


3/23/18 12:00  108      


 


3/23/18 11:00 98.5 103 17 92/58 (69) 92   


 


3/23/18 10:24   18     


 


3/23/18 10:18  109      


 


3/23/18 09:43     99 Nasal Cannula 2.00 


 


3/23/18 09:04     96 Nasal Cannula 2.00 





      Humidified  


 


3/23/18 07:00 97.2 79 17 109/67 (81) 96   


 


3/23/18 04:00  115      


 


3/23/18 04:00 98.6 115 20 107/60 (76) 93   


 


3/23/18 00:00 99.1 112 18 103/76 (85) 94   


 


3/23/18 00:00  107      


 


3/22/18 22:10      Room Air  


 


3/22/18 20:00 99.0 103 20 99/55 (70) 93   


 


3/22/18 18:13  105      








Laboratory Tests








Test


  3/23/18


05:25


 


Blood Urea Nitrogen 17 MG/DL 


 


Creatinine 1.34 MG/DL 


 


Random Glucose 113 MG/DL 


 


Calcium Level 7.9 MG/DL 


 


Sodium Level 131 MEQ/L 


 


Potassium Level 2.6 MEQ/L 


 


Chloride Level 91 MEQ/L 


 


Carbon Dioxide Level 28.7 MEQ/L 


 


Anion Gap 11 MEQ/L 


 


Estimat Glomerular Filtration


Rate 45 ML/MIN 


 


 


Phosphorus Level 3.0 MG/DL 


 


Magnesium Level 1.4 MG/DL 








IMAGING:














Chest X-Ray 3/21/18 0000 Signed





Impressions: 





 Service Date/Time:  Wednesday, March 21, 2018 11:32 - CONCLUSION:  1. 

Improving 





 patchy right lower lung zone airspace consolidation.     Rj Whitten MD 

















Chest X-Ray 3/5/18 0600 Signed





Impressions: 





 Service Date/Time:  Monday, March 5, 2018 03:52 - CONCLUSION:  There is a 

small 





 to moderate size right pleural effusion with associated volume loss and/or 





 airspace consolidation. The pleural effusion has increased from the prior 





 examination.     Ron Melgar MD 


 


Head CT 3/3/18 0000 Signed





Impressions: 





 Service Date/Time:  Saturday, March 3, 2018 10:22 - CONCLUSION:  1. Focal area 





 of decreased density involving the right parietal lobe. As a new finding from 





 the prior exam. This could relate to a small infarct. MRI of the brain with 





 gadolinium suggested to further evaluate. 2. Small lacunar infarction 

involving 





 the left centrum semiovale.     Scooter Dawson Jr., MD 


 


CT Angiography 3/3/18 0000 Signed





Impressions: 





 Service Date/Time:  Saturday, March 3, 2018 10:28 - CONCLUSION:  There 

extensive 





 pulmonary emboli bilaterally. There is near complete cut off of the right 

lower 





 lobe pulmonary artery. Prominent filling defects on the left as well. These 





 findings were relayed to Dr. Bustos immediately at the completion of the study.  





 Scattered pulmonary infiltrates, small pleural effusion and extensive 





 mediastinal lymphadenopathy as described above.      Won Sharp MD 


 


Brain MRI 3/3/18 0000 Signed





Impressions: 





 Service Date/Time:  Saturday, March 3, 2018 18:04 - CONCLUSION:  Deterioration 





 in the appearance of the scan.  At least 3 focal areas of abnormal contrast 





 enhancement are present scattered to in both hemispheres.  Given the history 





 this most likely represents developing areas cerebritis.  Exam is compromised 

by 





 an uncooperative patient and motion artifact.  These 2 factors make detection 

of 





 other abnormalities such as cortical cephalitis and meningitis difficult to 





 exclude.  Cannot exclude hemorrhagic lesions as well.  There is no mass effect 





 evident.      Jorge Diane MD 




















Renal Ultrasound 2/24/18 0000 Signed





Impressions: 





 Service Date/Time:  Saturday, February 24, 2018 10:53 - CONCLUSION:  1. No 





 evidence of hydronephrosis. 2. Thickening of the urinary bladder wall a 1.1 

cm. 





 3. Prominent splenomegaly.     Elgin Walton MD 


 


Chest X-Ray 2/24/18 0000 Signed





Impressions: 





 Service Date/Time:  Saturday, February 24, 2018 09:36 - CONCLUSION:  Right 





 internal jugular central line in place with the tip overlying the SVC. A 





 pneumothorax is not seen.     Ron Brown MD 


 


Breast Ultrasound 2/24/18 0000 Signed





Impressions: 





 Service Date/Time:  Saturday, February 24, 2018 10:48 - CONCLUSION:  Negative 





 targeted right breast ultrasound examination.     Ron Brown MD 


 


Lower Extremity Ultrasound 2/23/18 0000 Signed





Impressions: 





 Service Date/Time:  Friday, February 23, 2018 21:22 - CONCLUSION:  1. No 





 sonographic evidence for lower extremity DVT. 2. Incidental note of bilateral 





 inguinal adenopathy with the largest on the right measuring 3.3 cm and the 





 largest on the left measuring 2.6 cm. This finding is nonspecific but is 





 typically reactive in etiology.     Rj Whitten MD 














PHYSICAL EXAMINATION


GENERAL: No acute distress. 


HEENT:  No icterus.  Oropharynx moist mucosa, no lesions.


Neck:  Supple without adenopathy.


Lungs: Rhonchi at both lungs.


Heart: 5/6 blowing systolic murmur at the upper right sternal border.


Abdomen: Bowel sounds present, soft, obese, nontender.


Extremities: Diffuse 3+ edema of the lower extremities.  


Left lower extremity has increased erythema.  Increased warmth.


No calf tenderness.   Left third and fifth toes have cyanosis at the tuft.


SKIN: no diffuse rash.


Neuro: No gross focal findings.


Psychiatric: calm and cooperative.





 


IMPRESSION


1. Sepsis. Strep Viridans. 


2. Tricuspid valve endocarditis.  Large vegetation.  Tricuspid regurgitation.


Patient evaluated by cardiovascular surgery and felt not to be a surgical 

candidate.


3.  Bacteremia.  Strep viridans.  Blood cultures have remained negative on 

follow-up.


Last positive blood culture was on 2/23.


4. History of prosthetic aortic valve and so likely recurrent prosthetic valve


endocarditis.


5.  Cerebritis on MRI.  Also has parietal infarct noted on CT scan of the brain.


6. Left renal cortical and pulmonary and lower extremity septic emboli. 


7. Leukocytosis secondary to sepsis from showering of emboli from endocarditis.


8. Acute renal failure. Kidney function improved. 


9.  Cellulitis of the left lower extremity.


10.  Cyanosis of the fifth and third toes on the left foot.  Questionable new 

embolic lesions.





RECOMMENDATIONS


1. Continue Ampicillin intravenous.


2.  Continue daptomycin.


3.  Monitor clinical status.


4. Continue to monitor the left leg.


5.  Consider repeat echocardiogram.


6.  Repeat blood cultures if temperature becomes elevated.





Plan on intravenous antibiotics until April 6, for endocarditis.  Which will be 

6 weeks after the last 


positive blood culture.  After that she should continue with prophylactic oral 


antibiotics indefinitely. Continue to monitor her blood counts and renal 

function.














Robinson Carr MD Mar 23, 2018 17:05

## 2018-03-24 VITALS — HEART RATE: 105 BPM

## 2018-03-24 VITALS
SYSTOLIC BLOOD PRESSURE: 100 MMHG | OXYGEN SATURATION: 95 % | RESPIRATION RATE: 20 BRPM | DIASTOLIC BLOOD PRESSURE: 56 MMHG | HEART RATE: 105 BPM | TEMPERATURE: 98.1 F

## 2018-03-24 VITALS
DIASTOLIC BLOOD PRESSURE: 52 MMHG | TEMPERATURE: 97.5 F | OXYGEN SATURATION: 94 % | RESPIRATION RATE: 20 BRPM | HEART RATE: 103 BPM | SYSTOLIC BLOOD PRESSURE: 96 MMHG

## 2018-03-24 VITALS
TEMPERATURE: 98.2 F | DIASTOLIC BLOOD PRESSURE: 65 MMHG | SYSTOLIC BLOOD PRESSURE: 106 MMHG | HEART RATE: 107 BPM | OXYGEN SATURATION: 94 % | RESPIRATION RATE: 19 BRPM

## 2018-03-24 VITALS
RESPIRATION RATE: 20 BRPM | HEART RATE: 103 BPM | SYSTOLIC BLOOD PRESSURE: 95 MMHG | OXYGEN SATURATION: 96 % | DIASTOLIC BLOOD PRESSURE: 60 MMHG | TEMPERATURE: 98.1 F

## 2018-03-24 VITALS
OXYGEN SATURATION: 92 % | TEMPERATURE: 98.7 F | SYSTOLIC BLOOD PRESSURE: 104 MMHG | RESPIRATION RATE: 20 BRPM | HEART RATE: 96 BPM | DIASTOLIC BLOOD PRESSURE: 55 MMHG

## 2018-03-24 VITALS — HEART RATE: 97 BPM

## 2018-03-24 VITALS
TEMPERATURE: 98.1 F | OXYGEN SATURATION: 93 % | DIASTOLIC BLOOD PRESSURE: 65 MMHG | HEART RATE: 106 BPM | RESPIRATION RATE: 19 BRPM | SYSTOLIC BLOOD PRESSURE: 106 MMHG

## 2018-03-24 VITALS — HEART RATE: 104 BPM

## 2018-03-24 LAB
BUN SERPL-MCNC: 18 MG/DL (ref 7–18)
CALCIUM SERPL-MCNC: 8.2 MG/DL (ref 8.5–10.1)
CHLORIDE SERPL-SCNC: 89 MEQ/L (ref 98–107)
CREAT SERPL-MCNC: 1.37 MG/DL (ref 0.5–1)
ERYTHROCYTE [DISTWIDTH] IN BLOOD BY AUTOMATED COUNT: 23.2 % (ref 11.6–17.2)
GFR SERPLBLD BASED ON 1.73 SQ M-ARVRAT: 44 ML/MIN (ref 89–?)
GLUCOSE SERPL-MCNC: 98 MG/DL (ref 74–106)
HCO3 BLD-SCNC: 30.5 MEQ/L (ref 21–32)
HCT VFR BLD CALC: 25.9 % (ref 35–46)
HGB BLD-MCNC: 8.3 GM/DL (ref 11.6–15.3)
INR PPP: 2 RATIO
MCH RBC QN AUTO: 24.4 PG (ref 27–34)
MCHC RBC AUTO-ENTMCNC: 32.2 % (ref 32–36)
MCV RBC AUTO: 75.7 FL (ref 80–100)
PLATELET # BLD: 188 TH/MM3 (ref 150–450)
PMV BLD AUTO: 8.5 FL (ref 7–11)
PROTHROMBIN TIME: 20.3 SEC (ref 9.8–11.6)
RBC # BLD AUTO: 3.42 MIL/MM3 (ref 4–5.3)
SODIUM SERPL-SCNC: 131 MEQ/L (ref 136–145)
WBC # BLD AUTO: 14.3 TH/MM3 (ref 4–11)

## 2018-03-24 RX ADMIN — AMPICILLIN SODIUM SCH MLS/HR: 2 INJECTION, POWDER, FOR SOLUTION INTRAMUSCULAR; INTRAVENOUS at 13:04

## 2018-03-24 RX ADMIN — METOLAZONE SCH MG: 2.5 TABLET ORAL at 08:12

## 2018-03-24 RX ADMIN — HYDROMORPHONE HYDROCHLORIDE PRN MG: 2 INJECTION INTRAMUSCULAR; INTRAVENOUS; SUBCUTANEOUS at 12:34

## 2018-03-24 RX ADMIN — ALBUMIN HUMAN SCH MLS/HR: 0.25 SOLUTION INTRAVENOUS at 06:03

## 2018-03-24 RX ADMIN — STANDARDIZED SENNA CONCENTRATE AND DOCUSATE SODIUM SCH TAB: 8.6; 5 TABLET, FILM COATED ORAL at 08:14

## 2018-03-24 RX ADMIN — HYDROMORPHONE HYDROCHLORIDE PRN MG: 2 INJECTION INTRAMUSCULAR; INTRAVENOUS; SUBCUTANEOUS at 08:16

## 2018-03-24 RX ADMIN — Medication SCH ML: at 20:50

## 2018-03-24 RX ADMIN — POTASSIUM CHLORIDE SCH MLS/HR: 200 INJECTION, SOLUTION INTRAVENOUS at 06:03

## 2018-03-24 RX ADMIN — AMPICILLIN SODIUM SCH MLS/HR: 2 INJECTION, POWDER, FOR SOLUTION INTRAMUSCULAR; INTRAVENOUS at 02:20

## 2018-03-24 RX ADMIN — AMPICILLIN SODIUM SCH MLS/HR: 2 INJECTION, POWDER, FOR SOLUTION INTRAMUSCULAR; INTRAVENOUS at 08:14

## 2018-03-24 RX ADMIN — WARFARIN SODIUM SCH MG: 1 TABLET ORAL at 16:30

## 2018-03-24 RX ADMIN — FUROSEMIDE SCH MG: 10 INJECTION, SOLUTION INTRAMUSCULAR; INTRAVENOUS at 08:14

## 2018-03-24 RX ADMIN — ALBUMIN HUMAN SCH MLS/HR: 0.25 SOLUTION INTRAVENOUS at 17:14

## 2018-03-24 RX ADMIN — MORPHINE SULFATE SCH MG: 15 TABLET, EXTENDED RELEASE ORAL at 09:34

## 2018-03-24 RX ADMIN — STANDARDIZED SENNA CONCENTRATE AND DOCUSATE SODIUM SCH TAB: 8.6; 5 TABLET, FILM COATED ORAL at 20:50

## 2018-03-24 RX ADMIN — METOLAZONE SCH MG: 2.5 TABLET ORAL at 20:43

## 2018-03-24 RX ADMIN — HYDROMORPHONE HYDROCHLORIDE PRN MG: 2 INJECTION INTRAMUSCULAR; INTRAVENOUS; SUBCUTANEOUS at 21:58

## 2018-03-24 RX ADMIN — Medication SCH ML: at 08:18

## 2018-03-24 RX ADMIN — AMPICILLIN SODIUM SCH MLS/HR: 2 INJECTION, POWDER, FOR SOLUTION INTRAMUSCULAR; INTRAVENOUS at 20:43

## 2018-03-24 RX ADMIN — CHLORHEXIDINE GLUCONATE SCH PACK: 500 CLOTH TOPICAL at 03:55

## 2018-03-24 RX ADMIN — HYDROMORPHONE HYDROCHLORIDE PRN MG: 2 INJECTION INTRAMUSCULAR; INTRAVENOUS; SUBCUTANEOUS at 17:15

## 2018-03-24 RX ADMIN — HYDROMORPHONE HYDROCHLORIDE PRN MG: 2 INJECTION INTRAMUSCULAR; INTRAVENOUS; SUBCUTANEOUS at 04:01

## 2018-03-24 RX ADMIN — DAPTOMYCIN SCH MLS/HR: 500 INJECTION, POWDER, LYOPHILIZED, FOR SOLUTION INTRAVENOUS at 21:17

## 2018-03-24 RX ADMIN — POTASSIUM CHLORIDE SCH MEQ: 750 TABLET, FILM COATED, EXTENDED RELEASE ORAL at 20:42

## 2018-03-24 RX ADMIN — FUROSEMIDE SCH MG: 10 INJECTION, SOLUTION INTRAMUSCULAR; INTRAVENOUS at 17:14

## 2018-03-24 RX ADMIN — POTASSIUM CHLORIDE SCH MEQ: 750 TABLET, FILM COATED, EXTENDED RELEASE ORAL at 08:12

## 2018-03-24 RX ADMIN — POTASSIUM CHLORIDE SCH MLS/HR: 200 INJECTION, SOLUTION INTRAVENOUS at 08:21

## 2018-03-24 RX ADMIN — MORPHINE SULFATE SCH MG: 15 TABLET, EXTENDED RELEASE ORAL at 20:43

## 2018-03-24 NOTE — RADRPT
EXAM DATE/TIME:  03/24/2018 00:00 

 

[facility] COMPARISON:     

No previous studies available for comparison.

        

 

INDICATIONS :     

Severe sepsis, endocarditis, chf

 

 

TECHNIQUE:    

Four-cuff ankle and brachial pressures were obtained.  Pulse cuff waveform

tracings of the ankles were recorded, and ankle-brachial indices were calculated.

 

PRESSURES (mmHg):     

 

Brachial (arm):         

Right  iv site     Left   89

 

Ankle:     

Right  70      Left   66 

 

DELONTE:     

Right  0.79     Left   0.74

 

TBI:      

No pulse at the first toes were identified on either side 

 

PULSED CUFF WAVEFORMS:     

Demonstrate normal amplitude bilaterally.

 

CONCLUSION:     

Ankle-brachial indices are decreased and below normal. The toe brachial indices could not be obtained
 given the pulses could not be obtained in the first digits.

 

 

 

 Ron Brown MD on March 24, 2018 at 12:13           

Board Certified Radiologist.

 This report was verified electronically.

## 2018-03-24 NOTE — HHI.PR
Subjective


Remarks


The patient denies chest pain, shortness of breath is improving.


Patient is a febrile


States she is urinating well.


Still slightly tachycardic





Objective


Vitals





Vital Signs








  Date Time  Temp Pulse Resp B/P (MAP) Pulse Ox O2 Delivery O2 Flow Rate FiO2


 


3/24/18 12:00  104      


 


3/24/18 12:00 98.1 105 20 100/56 (71) 95   


 


3/24/18 08:00  110      


 


3/24/18 08:00      Room Air 2.00 21


 


3/24/18 08:00 98.7 96 20 104/55 (71) 92   


 


3/24/18 04:57   19     


 


3/24/18 04:13  104      


 


3/24/18 04:00 98.1 106 19 106/56 (73) 93   


 


3/24/18 00:00      Room Air  


 


3/24/18 00:00 98.2 107 19 106/65 (79) 94   


 


3/23/18 23:45  109      


 


3/23/18 23:17   20     


 


3/23/18 20:00 98.6 105 18 96/59 (71) 93   


 


3/23/18 20:00      Room Air  


 


3/23/18 19:00  112      


 


3/23/18 18:04     94 Nasal Cannula 2.00 


 


3/23/18 16:00  105      


 


3/23/18 16:00 97.4 107 18 99/57 (71) 94   














I/O      


 


 3/23/18 3/23/18 3/23/18 3/24/18 3/24/18 3/24/18





 07:00 15:00 23:00 07:00 15:00 23:00


 


Intake Total 620 ml 200 ml 1220 ml 1100 ml 100 ml 


 


Output Total   1400 ml 1700 ml  


 


Balance 620 ml 200 ml -180 ml -600 ml 100 ml 


 


      


 


Intake Oral 420 ml  720 ml 1000 ml  


 


IV Total 200 ml 200 ml 500 ml 100 ml 100 ml 


 


Output Urine Total   1400 ml 1700 ml  


 


# Voids 4     


 


# Bowel Movements 0     


 


# Sanitary Pads     3 Pads 








Result Diagram:  


3/24/18 0430                                                                   

             3/24/18 0430





Imaging





Last Impressions








Chest X-Ray 3/21/18 0000 Signed





Impressions: 





 Service Date/Time:  Wednesday, March 21, 2018 11:32 - CONCLUSION:  1. 

Improving 





 patchy right lower lung zone airspace consolidation.     Rj Whitten MD 


 


Catheter Placement X-Ray 3/19/18 0000 Signed





Impressions: 





 Service Date/Time:  Monday, March 19, 2018 11:16 - CONCLUSION: Uncomplicated 





 venous catheter change as above.     Rj Whitten MD 


 


Head CT 3/3/18 0000 Signed





Impressions: 





 Service Date/Time:  Saturday, March 3, 2018 10:22 - CONCLUSION:  1. Focal area 





 of decreased density involving the right parietal lobe. As a new finding from 





 the prior exam. This could relate to a small infarct. MRI of the brain with 





 gadolinium suggested to further evaluate. 2. Small lacunar infarction 

involving 





 the left centrum semiovale.     Scooter Dawson Jr., MD 


 


CT Angiography 3/3/18 0000 Signed





Impressions: 





 Service Date/Time:  Saturday, March 3, 2018 10:28 - CONCLUSION:  There 

extensive 





 pulmonary emboli bilaterally. There is near complete cut off of the right 

lower 





 lobe pulmonary artery. Prominent filling defects on the left as well. These 





 findings were relayed to Dr. Bustos immediately at the completion of the study.  





 Scattered pulmonary infiltrates, small pleural effusion and extensive 





 mediastinal lymphadenopathy as described above.      Won Sharp MD 


 


Brain MRI 3/3/18 0000 Signed





Impressions: 





 Service Date/Time:  Saturday, March 3, 2018 18:04 - CONCLUSION:  Deterioration 





 in the appearance of the scan.  At least 3 focal areas of abnormal contrast 





 enhancement are present scattered to in both hemispheres.  Given the history 





 this most likely represents developing areas cerebritis.  Exam is compromised 

by 





 an uncooperative patient and motion artifact.  These 2 factors make detection 

of 





 other abnormalities such as cortical cephalitis and meningitis difficult to 





 exclude.  Cannot exclude hemorrhagic lesions as well.  There is no mass effect 





 evident.      Jorge Diane MD 


 


Chest CT 2/26/18 0000 Signed





Impressions: 





 Service Date/Time:  Monday, February 26, 2018 12:41 - CONCLUSION:  1. There 

are 





 at least 5 pulmonary nodules present bilaterally with the largest measuring 19 





 mm. None demonstrate cavitation but it is possible that these represent septic 





 emboli. Suggest followup to assess for change. 2. Splenomegaly with subtle 





 wedge-shaped areas of low-density in the mid spleen which could represent 





 infarcts. 3. Mild cardiomegaly in this patient post aortic valve replacement. 





 Low density of the cardiac blood pool suggests anemia.      Ron Melgar MD 


 


Aorta w/Runoff CTA 2/25/18 0000 Signed





Impressions: 





 Service Date/Time:  Sunday, February 25, 2018 12:47 - CONCLUSION:  Left renal 





 cortical infarction. 5-6 cm length total occlusion of the left superficial 





 femoral artery in the proximal thigh. Nodular parenchymal opacities in the 

lung 





 bases bilaterally See above discussion.     Ron Cruz MD 


 


Renal Ultrasound 2/24/18 0000 Signed





Impressions: 





 Service Date/Time:  Saturday, February 24, 2018 10:53 - CONCLUSION:  1. No 





 evidence of hydronephrosis. 2. Thickening of the urinary bladder wall a 1.1 

cm. 





 3. Prominent splenomegaly.     Elgin Walton MD 


 


Breast Ultrasound 2/24/18 0000 Signed





Impressions: 





 Service Date/Time:  Saturday, February 24, 2018 10:48 - CONCLUSION:  Negative 





 targeted right breast ultrasound examination.     Ron Brown MD 


 


Lower Extremity Ultrasound 2/23/18 0000 Signed





Impressions: 





 Service Date/Time:  Friday, February 23, 2018 21:22 - CONCLUSION:  1. No 





 sonographic evidence for lower extremity DVT. 2. Incidental note of bilateral 





 inguinal adenopathy with the largest on the right measuring 3.3 cm and the 





 largest on the left measuring 2.6 cm. This finding is nonspecific but is 





 typically reactive in etiology.     Rj Whitten MD 








Objective Remarks


AAOx3


NAD


Clear lungs BL


S1S2 4/6 systolic ejection murmur


+4 BL lower extremity edema.  Erythema, increased warmth and tenderness to 

palpation of the left lower leg. 


Dark blue discoloration of toes of left leg. Pedal and tial pulses not palpable 

due to edema. Good capillary refill.


Medications and IVs





Current Medications








 Medications


  (Trade)  Dose


 Ordered  Sig/Nikhil


 Route  Start Time


 Stop Time Status Last Admin


 


  (NS Flush)  2 ml  UNSCH  PRN


 IV FLUSH  2/23/18 22:00


    3/22/18 06:48


 


 


  (NS Flush)  2 ml  BID


 IV FLUSH  2/24/18 09:00


    3/24/18 08:18


 


 


  (Tylenol)  650 mg  Q6H  PRN


 PO  2/23/18 22:00


    3/19/18 01:19


 


 


  (Zofran Inj)  4 mg  Q6H  PRN


 IV PUSH  2/23/18 22:00


    3/8/18 21:22


 


 


  (Restoril)  15 mg  HS  PRN


 PO  2/23/18 22:00


    3/6/18 22:08


 


 


 Miscellaneous


 Information  1  Q361D


 XX  2/23/18 22:00


     


 


 


  (Chlorhexidine


 2% Cloth)  


 Taper  DAILY@04


 TOP  2/24/18 04:00


 2/20/19 03:59  3/6/18 03:54


 


 


  (Chlorhexidine


 2% Cloth)  3 pack  UNSCH  PRN


 TOP  2/23/18 22:00


     


 


 


  (Arlen-Colace)  1 tab  BID


 PO  2/24/18 09:00


    3/23/18 08:26


 


 


  (Milk Of


 Magnesia Liq)  30 ml  Q12H  PRN


 PO  2/23/18 22:00


     


 


 


  (Senokot)  17.2 mg  Q12H  PRN


 PO  2/23/18 22:00


     


 


 


  (Dulcolax Supp)  10 mg  DAILY  PRN


 RECTAL  2/23/18 22:00


     


 


 


  (Lactulose Liq)  30 ml  DAILY  PRN


 PO  2/23/18 22:00


     


 


 


 Ampicillin Sodium


 2000 mg/Sodium


 Chloride  100 ml @ 


 400 mls/hr  Q6H


 IV  2/24/18 14:00


    3/24/18 13:04


 


 


  (Imodium)  2 mg  Q4H  PRN


 PO  2/26/18 16:15


    2/28/18 14:32


 


 


  (Albuterol Neb)  2.5 mg  Q2HR NEB  PRN


 NEB  2/27/18 11:30


    3/21/18 08:42


 


 


  (Norco  Mg)  1 tab  Q4H  PRN


 PO  3/4/18 15:00


    3/9/18 05:02


 


 


  (Dilaudid Pf Inj)  1 mg  Q4H  PRN


 IV PUSH  3/7/18 19:00


    3/24/18 12:34


 


 


  (KCl)  60 meq  Q12HR


 PO  3/14/18 09:45


    3/24/18 08:12


 


 


 Albumin Human  100 ml @ 


 60 mls/hr  Q12H


 IV  3/18/18 18:00


    3/24/18 06:03


 


 


  (Oramorph Sr)  15 mg  Q12HR


 PO  3/20/18 21:00


    3/24/18 09:34


 


 


  (Lasix Inj)  40 mg  BID@09,18


 IV PUSH  3/21/18 09:00


    3/24/18 08:14


 


 


  (Coumadin)  1 mg  DAILY@16


 PO  3/20/18 21:00


    3/23/18 15:01


 


 


 Daptomycin 500 mg/


 Sodium Chloride  100 ml @ 


 200 mls/hr  Q24H


 IV  3/22/18 20:00


    3/23/18 20:14


 


 


  (Zaroxolyn)  2.5 mg  BID


 PO  3/22/18 22:00


    3/24/18 08:12


 


 


 Pharmacy Profile


 Note  0 ml @ 0


 mls/hr  UNSCH


 OTHER  3/23/18 16:15


     


 


 


 Heparin Sodium/


 Dextrose  250 ml @ 


 18 mls/hr  TITRATE  PRN


 IV  3/23/18 16:30


    3/23/18 22:18


 








Date of Insertion:  Mar 19, 2018


Line:  Central Venous Catheter


Side:  Right


Location:  Internal, Jugular





A/P


Problem List:  


(1) Prosthetic valve endocarditis


ICD Code:  T82.6XXA - Infection and inflammatory reaction due to cardiac valve 

prosthesis, initial encounter


Status:  Acute


(2) Bacteremia


ICD Code:  R78.81 - Bacteremia


(3) Septic pulmonary embolism


ICD Code:  I26.90 - Septic pulmonary embolism without acute cor pulmonale


Status:  Acute


(4) IVDU (intravenous drug user)


ICD Code:  F19.90 - Other psychoactive substance use, unspecified, uncomplicated


(5) Anemia


ICD Code:  D64.9 - Anemia, unspecified


Status:  Chronic


(6) NOÉ (acute kidney injury)


ICD Code:  N17.9 - Acute kidney failure, unspecified


Plan:  Creatinine initially worsening from 1.52-1.57.  Nephrology consulted.  

Possible ATN from vancomycin toxicity or infection.  Urine eosinophils were 

ordered and negative.  Diuretics managed by nephrology.  Initially on Lasix 20 

mg IV twice daily along with albumin.  Creatinine Worsening on 3/19 for 1.57-

1.62.  Later Lasix dose was increased to 40 mg IV twice daily  and metolazone 

added to the regimen.  Creatinine then started trending down to 1.34 on 3/23.


3/24 creatinine slightly elevated however stable at 1.37.  Continue diuretics 

as per nephrology.  Patient was placed on 1 L fluid restriction on an effort to 

keep the patient on an negative fluid balance.


Continue to monitor BUN and creatinine, strict I's and O's, avoid nephrotoxins.





(7) Cellulitis of left lower extremity


ICD Code:  L03.116 - Cellulitis of left lower limb


Plan:  The patient has erythema, increased warmth and tenderness to palpation 

on the left lower extremity.


The patient likely is developing a left lower extremity cellulitis.


3/21 discussed the case with Dr. Carr from infectious disease.  Recommends 

observation for now.


3/23 Leg looks more erythematous and toes in lower extremities have a darker 

discoloration. Rocephin discontinued by ID and patient placed on Daptomycin.


Monitor for  response. Will order check of pedal pulses with arterial Doppler. 

Discussed with RN.


3/24 erythema seems to be improving.  Continue antibiotics as per ID 

recommendations.  The patient currently on IV daptomycin and IV ampicillin.





(8) Purple toe syndrome


ICD Code:  I75.029 - Atheroembolism of unspecified lower extremity


Plan:  Patient has a blue/purple discoloration of the left toes.  


3/24 INR 2.0.  Dosing as per pharmacy.  Continue IV heparin bridge until INR is 

more than 2.5.


Appreciate vascular surgery recommendations.  Follow-up ABIs, continue 

neurovascular checks.  Out of bed ad jordin., continue anticoagulation.





Assessment and Plan


(1) Prosthetic valve endocarditis


Plan:  Continue antibiotics as per ID.


The patient was initially on IV ampicillin.  On 3/19 ID started Rocephin for 

pulmonary coverage since chest x-ray showed a developing infiltrate.


On 3/20 palliative care consulted by hematology.  As per hematology 

documentation the patient requested to talk to palliative care to see if she is 

a hospice candidate.


Discussed with palliative care who states that the patient's goals are 

aggressive and she wants to get better.  The patient definitely is a hospice 

candidate, however her goals are not hospice appropriate.  Patient was started 

on Oramorph SR 30 mg p.o. twice a day for pain control.


3/23 patient with what I suspected purple toe syndrome. Start the patient on a 

heparin drip until the patient's INR reaches 2.5.  Goal INR should be 2.5-3.5 

since the patient has an aortic prosthetic valve.  Will reconsult vascular 

surgery.  Pedal pulses present by Doppler -obtained by me.





(2) Bacteremia


Plan:  Blood cultures obtained on 2/23 growing strep viridans.


Subsequent repeat blood cultures negative.


3/19 sp Exchange of Right IJ central line. 


Continue antibiotics as per ID recommendations.





(3) Septic pulmonary embolism


Plan:  As observed on a CT angiography of the chest obtained on 3/3/18.


chocardiogram on 3/3 showed an estimated EF of 50-55%.  


++severe tricuspid regurgitation with prosthesis in place.  


++2 x 4 cm mobile vegetation was seen on the valve.  The right atrium and right 

ventricle were normal in size and function


Hematology following.


Management of Coumadin as per hematology recommendations.


Goal INR 2.5-3.5 given the presence of a prosthetic aortic valve.





(4) IVDU (intravenous drug user)


Plan:  Counseled on drug cessation.





(5) Anemia


Plan:  Likely secondary to inflammation/infection.  No evidence of bleeding or 

hemolysis.


Monitor hemoglobin and transfuse as needed.


Discharge Planning


Continue to monitor on the medical floor.





Problem Qualifiers





(1) Prosthetic valve endocarditis:  


Qualified Codes:  T82.6XXA - Infection and inflammatory reaction due to cardiac 

valve prosthesis, initial encounter


(2) Anemia:  


Qualified Codes:  D63.8 - Anemia in other chronic diseases classified elsewhere


(3) Purple toe syndrome:  


Qualified Codes:  I75.022 - Atheroembolism of left lower extremity








Jesus Mae MD Mar 24, 2018 14:39

## 2018-03-24 NOTE — PD.VS.PN
Subjective


Subjective/Hospital Course


Pt known to me and has L SFA occlusion likely bacterial emboli


perfusion was reasonable in MICU and motor intact so elected for aggressive 

anticoagulation which she needs anyways for prosthetic valve


called back bc of blue toes





Objective


Vitals/I&O











  Date Time  Temp Pulse Resp B/P (MAP) Pulse Ox O2 Delivery O2 Flow Rate FiO2


 


3/24/18 04:57   19     


 


3/24/18 04:13  104      


 


3/24/18 04:00 98.1 106 19 106/56 (73) 93   


 


3/24/18 00:00      Room Air  


 


3/24/18 00:00 98.2 107 19 106/65 (79) 94   


 


3/23/18 23:45  109      


 


3/23/18 23:17   20     


 


3/23/18 20:00 98.6 105 18 96/59 (71) 93   


 


3/23/18 20:00      Room Air  


 


3/23/18 19:00  112      


 


3/23/18 18:04     94 Nasal Cannula 2.00 


 


3/23/18 16:00  105      


 


3/23/18 16:00 97.4 107 18 99/57 (71) 94   


 


3/23/18 12:00  108      


 


3/23/18 11:00 98.5 103 17 92/58 (69) 92   


 


3/23/18 10:18  109      


 


3/23/18 09:43     99 Nasal Cannula 2.00 














 3/24/18 3/24/18 3/24/18





 07:00 15:00 23:00


 


Intake Total 1100 ml 100 ml 


 


Output Total 1700 ml  


 


Balance -600 ml 100 ml 








Physical Exam


B LE markedly more edematous


toes L foot cyanotic but more c/w venous congestion


motor intact


+ Doppler signals


Laboratory





Laboratory Tests








Test


  3/23/18


18:00 3/24/18


04:30


 


Prothrombin Time 20.4  20.3 


 


Prothromb Time International


Ratio 2.0 


  2.0 


 


 


Activated Partial


Thromboplast Time 32.0 


  31.3 


 


 


White Blood Count  14.3 


 


Red Blood Count  3.42 


 


Hemoglobin  8.3 


 


Hematocrit  25.9 


 


Mean Corpuscular Volume  75.7 


 


Mean Corpuscular Hemoglobin  24.4 


 


Mean Corpuscular Hemoglobin


Concent 


  32.2 


 


 


Red Cell Distribution Width  23.2 


 


Platelet Count  188 


 


Mean Platelet Volume  8.5 


 


Blood Urea Nitrogen  18 


 


Creatinine  1.37 


 


Random Glucose  98 


 


Calcium Level  8.2 


 


Sodium Level  131 


 


Potassium Level  2.5 


 


Chloride Level  89 


 


Carbon Dioxide Level  30.5 


 


Anion Gap  12 


 


Estimat Glomerular Filtration


Rate 


  44 


 


 


Magnesium Level  1.5 














 Date/Time


Source Procedure


Growth Status


 


 


 3/4/18 05:50


Blood Peripheral Aerobic Blood Culture - Final


NO GROWTH IN 5 DAYS Complete


 


 3/4/18 05:50


Blood Peripheral Anaerobic Blood Culture - Final


NO GROWTH IN 5 DAYS Complete


 


 3/5/18 12:28


Stool Stool Stool Occult Blood (GILA) - Final


HEMOCCULT NEGATIVE Complete





 3/3/18 21:50


Sputum Expectorated Sputum Gram Stain - Final Complete


 


 3/3/18 21:50


Sputum Expectorated Sputum Sputum Culture - Final


HEAVY GROWTH NORMAL RESPIRATORY MYLENE Complete


 


 2/23/18 22:30


Urine Suprapubic Urine Urine Culture - Final


NO GROWTH IN 48 HOURS. Complete











Assessment and Plan


Plan


37/F w/ focal SFA occlusion but good distal opacification on CTA 


New discoloration of toes likely venous engorgement





1. Will check ABIs


2. Continue neurovascular checks


3. Continue OOB/ad jordin


4. Continue anticoagulation


5. If ABIs reasonable, then ok to use light compression





will follow


Discharge Planning


Pt on anticoagulation but anytime from vascular surgery standpoint ok to WBAT


Arrange f/u in 2-3 weeks with ABIs











Michi Sears MD Mar 24, 2018 09:20

## 2018-03-24 NOTE — HHI.NPPN
Subjective


History of Present Illness


This is a 38-year-old female with past medical history of IV drug abuse, 

history of hepatitis C, narcotic dependency, came to the hospital with 

complaint of shortness of breath and swelling.  Nephrology called to see the 

patient because of elevated BUN and creatinine.  The patient has creatinine of 

4.5 on admission which improved and it was staying in the range of 0.6-0.8 

until 6 days ago, then it started going up again and now it is 1.7.  The 

patient has increased swelling 


of the legs and the patient was diagnosed with endocarditis and she has blood 

culture positive for Strep viridans.  Patient has been following with the ID 

and she was getting furosemide, which was stopped today this morning because of 

increased creatinine and she was on vancomycin and rifampicin.  The last dose 

of vancomycin seems to be given possibly on 03/04/2018 or even later, but her 

level was high and the highest level we have was 25.8, which was on 03/04/2018 

but on 03/10/2018 it was 23.9.  The patient has been actively using IV drugs 

before she came to the hospital and noticed to have more swelling in her legs.  

She is complaining of generalized body pain.  There is no nausea, vomiting.  

Denies any diarrhea.  Her appetite is normal.


Additional Remarks


Reports continued mild SOB.  Continue with bilateral lower extremity edema.





Review of Systems


General


Constitutional:  Fatigue





Respiratory


Lungs:  SOB





Cardiovascular


Cardiac:  Edema, SCOTT


Cardiac Remarks


Denies CP





Gastrointestinal


GI Remarks


denies abdominal pain





Objective Data


Data











 3/24/18 3/25/18





 18:59 06:59


 


Intake Total 100 ml 


 


Balance 100 ml 


 


  


 


IV Total 100 ml 


 


# Sanitary Pads 3 Pads 











Vital Signs








  Date Time  Temp Pulse Resp B/P (MAP) Pulse Ox O2 Delivery O2 Flow Rate FiO2


 


3/24/18 16:00 97.5 99 20 96/52 (67) 94   


 


3/24/18 12:00  104      


 


3/24/18 12:00 98.1 105 20 100/56 (71) 95   


 


3/24/18 08:00  110      


 


3/24/18 08:00      Room Air 2.00 21


 


3/24/18 08:00 98.7 96 20 104/55 (71) 92   


 


3/24/18 04:57   19     


 


3/24/18 04:13  104      


 


3/24/18 04:00 98.1 106 19 106/56 (73) 93   


 


3/24/18 00:00      Room Air  


 


3/24/18 00:00 98.2 107 19 106/65 (79) 94   


 


3/23/18 23:45  109      


 


3/23/18 23:17   20     


 


3/23/18 20:00 98.6 105 18 96/59 (71) 93   


 


3/23/18 20:00      Room Air  


 


3/23/18 19:00  112      


 


3/23/18 18:04     94 Nasal Cannula 2.00 








-:  


3/24/18 0430                                                                   

             3/24/18 0430








Physical Exam


General


Appearance:  No Acute Distress, Anxious





Eyes


Eye Exam:  Pupils Equal





Throat


Throat Exam:  Oral Mucosa Pink & Moist





Neck


Neck Exam:  Neck Supple





Pulmonary


Resp Exam:  Clear Bilaterally





Cardiology


CV Exam:  Regular, Normal Sinus Rhythm





Gastrointestinal/Abdomen


GI Exam:  Soft, Non-Tender





Integumentary


Skin Exam:  Dry, Intact





Extremeties


Extremities Exam:  Pitting Edema





Neurologic


Neuro Exam:  Alert, Awake, Oriented





Psychiatric


Psych Exam:  Appropriate Responses





Assessment/Plan


Discussed Condition With:  Patient


Assessment Summary:  NOÉ/Acute Renal Failure


Problem List:  


(1) Acute kidney injury


ICD Codes:  N17.9 - Acute kidney failure, unspecified


Status:  Acute


Plan:  


NOÉ likely due to vancomycin toxicity,  post-infectious GN is unlikely.


Possibility of ATN or pre renal.


Urine eosinophils negative, complements wnl


Creatinine stable


Good UOP





Plan


Creatinine is stable at 1.37


Hypokalemia replacement given K was 2.5


Continue Lasix 40 mg IV BID  and metolazone for edema.


Continue to monitor BMP and UOP


Encouraged to elevate legs throughout day


Avoid nephrotoxins. 





(2) Prosthetic valve endocarditis


ICD Codes:  T82.6XXA - Infection and inflammatory reaction due to cardiac valve 

prosthesis, initial encounter


Status:  Acute


Plan:  septic endocarditis with strep viridans


IV drug use, pulmonary emboli as well.





Continues antibiotics per ID


continue renal dosing antibiotics.





(3) Peripheral arterial occlusive disease


ICD Codes:  I77.9 - Disorder of arteries and arterioles, unspecified


Plan:  seen with vascular, continue medical management


Hold contrast studies as possible.





(4) Hepatitis C


ICD Codes:  B19.20 - Unspecified viral hepatitis C without hepatic coma


Status:  Chronic





Problem Qualifiers





(1) Prosthetic valve endocarditis:  


Qualified Codes:  T82.6XXA - Infection and inflammatory reaction due to cardiac 

valve prosthesis, initial encounter








Gigi Langford MD Mar 24, 2018 16:49

## 2018-03-25 VITALS
DIASTOLIC BLOOD PRESSURE: 56 MMHG | SYSTOLIC BLOOD PRESSURE: 98 MMHG | TEMPERATURE: 98.3 F | RESPIRATION RATE: 20 BRPM | HEART RATE: 103 BPM | OXYGEN SATURATION: 92 %

## 2018-03-25 VITALS
SYSTOLIC BLOOD PRESSURE: 90 MMHG | TEMPERATURE: 99.4 F | DIASTOLIC BLOOD PRESSURE: 52 MMHG | HEART RATE: 103 BPM | RESPIRATION RATE: 24 BRPM | OXYGEN SATURATION: 94 %

## 2018-03-25 VITALS
SYSTOLIC BLOOD PRESSURE: 89 MMHG | HEART RATE: 101 BPM | TEMPERATURE: 97.5 F | DIASTOLIC BLOOD PRESSURE: 65 MMHG | OXYGEN SATURATION: 95 % | RESPIRATION RATE: 20 BRPM

## 2018-03-25 VITALS
HEART RATE: 102 BPM | DIASTOLIC BLOOD PRESSURE: 57 MMHG | OXYGEN SATURATION: 93 % | RESPIRATION RATE: 18 BRPM | SYSTOLIC BLOOD PRESSURE: 94 MMHG | TEMPERATURE: 98.7 F

## 2018-03-25 VITALS
SYSTOLIC BLOOD PRESSURE: 96 MMHG | DIASTOLIC BLOOD PRESSURE: 52 MMHG | OXYGEN SATURATION: 94 % | TEMPERATURE: 99 F | HEART RATE: 109 BPM | RESPIRATION RATE: 20 BRPM

## 2018-03-25 VITALS — HEART RATE: 101 BPM

## 2018-03-25 VITALS — HEART RATE: 110 BPM

## 2018-03-25 VITALS
OXYGEN SATURATION: 95 % | SYSTOLIC BLOOD PRESSURE: 97 MMHG | DIASTOLIC BLOOD PRESSURE: 62 MMHG | HEART RATE: 100 BPM | RESPIRATION RATE: 20 BRPM | TEMPERATURE: 98.4 F

## 2018-03-25 VITALS
HEART RATE: 101 BPM | RESPIRATION RATE: 20 BRPM | OXYGEN SATURATION: 93 % | TEMPERATURE: 97.8 F | DIASTOLIC BLOOD PRESSURE: 65 MMHG | SYSTOLIC BLOOD PRESSURE: 90 MMHG

## 2018-03-25 VITALS — HEART RATE: 102 BPM

## 2018-03-25 VITALS — HEART RATE: 98 BPM

## 2018-03-25 VITALS — OXYGEN SATURATION: 96 %

## 2018-03-25 VITALS — HEART RATE: 99 BPM

## 2018-03-25 LAB
BUN SERPL-MCNC: 18 MG/DL (ref 7–18)
CALCIUM SERPL-MCNC: 8.1 MG/DL (ref 8.5–10.1)
CHLORIDE SERPL-SCNC: 87 MEQ/L (ref 98–107)
CREAT SERPL-MCNC: 1.23 MG/DL (ref 0.5–1)
ERYTHROCYTE [DISTWIDTH] IN BLOOD BY AUTOMATED COUNT: 23 % (ref 11.6–17.2)
GFR SERPLBLD BASED ON 1.73 SQ M-ARVRAT: 49 ML/MIN (ref 89–?)
GLUCOSE SERPL-MCNC: 120 MG/DL (ref 74–106)
HCO3 BLD-SCNC: 35.4 MEQ/L (ref 21–32)
HCT VFR BLD CALC: 25.3 % (ref 35–46)
HGB BLD-MCNC: 8 GM/DL (ref 11.6–15.3)
INR PPP: 1.7 RATIO
MCH RBC QN AUTO: 23.8 PG (ref 27–34)
MCHC RBC AUTO-ENTMCNC: 31.7 % (ref 32–36)
MCV RBC AUTO: 75.2 FL (ref 80–100)
PLATELET # BLD: 175 TH/MM3 (ref 150–450)
PMV BLD AUTO: 8.8 FL (ref 7–11)
PROTHROMBIN TIME: 17.1 SEC (ref 9.8–11.6)
RBC # BLD AUTO: 3.37 MIL/MM3 (ref 4–5.3)
SODIUM SERPL-SCNC: 131 MEQ/L (ref 136–145)
WBC # BLD AUTO: 12.2 TH/MM3 (ref 4–11)

## 2018-03-25 RX ADMIN — POTASSIUM CHLORIDE SCH MEQ: 750 TABLET, FILM COATED, EXTENDED RELEASE ORAL at 09:01

## 2018-03-25 RX ADMIN — STANDARDIZED SENNA CONCENTRATE AND DOCUSATE SODIUM SCH TAB: 8.6; 5 TABLET, FILM COATED ORAL at 09:00

## 2018-03-25 RX ADMIN — MORPHINE SULFATE SCH MG: 15 TABLET, EXTENDED RELEASE ORAL at 20:27

## 2018-03-25 RX ADMIN — AMPICILLIN SODIUM SCH MLS/HR: 2 INJECTION, POWDER, FOR SOLUTION INTRAMUSCULAR; INTRAVENOUS at 13:07

## 2018-03-25 RX ADMIN — WARFARIN SODIUM SCH MG: 1 TABLET ORAL at 15:39

## 2018-03-25 RX ADMIN — HYDROMORPHONE HYDROCHLORIDE PRN MG: 2 INJECTION INTRAMUSCULAR; INTRAVENOUS; SUBCUTANEOUS at 06:32

## 2018-03-25 RX ADMIN — HYDROMORPHONE HYDROCHLORIDE PRN MG: 2 INJECTION INTRAMUSCULAR; INTRAVENOUS; SUBCUTANEOUS at 22:16

## 2018-03-25 RX ADMIN — POTASSIUM CHLORIDE SCH MEQ: 750 TABLET, FILM COATED, EXTENDED RELEASE ORAL at 20:27

## 2018-03-25 RX ADMIN — STANDARDIZED SENNA CONCENTRATE AND DOCUSATE SODIUM SCH TAB: 8.6; 5 TABLET, FILM COATED ORAL at 20:27

## 2018-03-25 RX ADMIN — CHLORHEXIDINE GLUCONATE SCH PACK: 500 CLOTH TOPICAL at 04:00

## 2018-03-25 RX ADMIN — AMPICILLIN SODIUM SCH MLS/HR: 2 INJECTION, POWDER, FOR SOLUTION INTRAMUSCULAR; INTRAVENOUS at 08:59

## 2018-03-25 RX ADMIN — FUROSEMIDE SCH MG: 10 INJECTION, SOLUTION INTRAMUSCULAR; INTRAVENOUS at 17:27

## 2018-03-25 RX ADMIN — HYDROMORPHONE HYDROCHLORIDE PRN MG: 2 INJECTION INTRAMUSCULAR; INTRAVENOUS; SUBCUTANEOUS at 15:38

## 2018-03-25 RX ADMIN — HEPARIN SODIUM PRN MLS/HR: 10000 INJECTION, SOLUTION INTRAVENOUS at 04:01

## 2018-03-25 RX ADMIN — POTASSIUM CHLORIDE SCH MLS/HR: 200 INJECTION, SOLUTION INTRAVENOUS at 14:06

## 2018-03-25 RX ADMIN — POTASSIUM CHLORIDE SCH MLS/HR: 200 INJECTION, SOLUTION INTRAVENOUS at 15:39

## 2018-03-25 RX ADMIN — HYDROMORPHONE HYDROCHLORIDE PRN MG: 2 INJECTION INTRAMUSCULAR; INTRAVENOUS; SUBCUTANEOUS at 10:39

## 2018-03-25 RX ADMIN — HYDROMORPHONE HYDROCHLORIDE PRN MG: 2 INJECTION INTRAMUSCULAR; INTRAVENOUS; SUBCUTANEOUS at 02:22

## 2018-03-25 RX ADMIN — HEPARIN SODIUM PRN MLS/HR: 10000 INJECTION, SOLUTION INTRAVENOUS at 13:10

## 2018-03-25 RX ADMIN — DAPTOMYCIN SCH MLS/HR: 500 INJECTION, POWDER, LYOPHILIZED, FOR SOLUTION INTRAVENOUS at 20:26

## 2018-03-25 RX ADMIN — METOLAZONE SCH MG: 2.5 TABLET ORAL at 09:02

## 2018-03-25 RX ADMIN — Medication SCH ML: at 20:28

## 2018-03-25 RX ADMIN — Medication SCH ML: at 09:00

## 2018-03-25 RX ADMIN — AMPICILLIN SODIUM SCH MLS/HR: 2 INJECTION, POWDER, FOR SOLUTION INTRAMUSCULAR; INTRAVENOUS at 21:01

## 2018-03-25 RX ADMIN — FUROSEMIDE SCH MG: 10 INJECTION, SOLUTION INTRAMUSCULAR; INTRAVENOUS at 09:01

## 2018-03-25 RX ADMIN — ALBUMIN HUMAN SCH MLS/HR: 0.25 SOLUTION INTRAVENOUS at 17:25

## 2018-03-25 RX ADMIN — ALBUMIN HUMAN SCH MLS/HR: 0.25 SOLUTION INTRAVENOUS at 06:12

## 2018-03-25 RX ADMIN — MORPHINE SULFATE SCH MG: 15 TABLET, EXTENDED RELEASE ORAL at 09:01

## 2018-03-25 RX ADMIN — AMPICILLIN SODIUM SCH MLS/HR: 2 INJECTION, POWDER, FOR SOLUTION INTRAMUSCULAR; INTRAVENOUS at 02:24

## 2018-03-25 RX ADMIN — Medication PRN ML: at 06:32

## 2018-03-25 NOTE — HHI.NPPN
Subjective


History of Present Illness


This is a 38-year-old female with past medical history of IV drug abuse, 

history of hepatitis C, narcotic dependency, came to the hospital with 

complaint of shortness of breath and swelling.  Nephrology called to see the 

patient because of elevated BUN and creatinine.  The patient has creatinine of 

4.5 on admission which improved and it was staying in the range of 0.6-0.8 

until 6 days ago, then it started going up again and now it is 1.7.  The 

patient has increased swelling 


of the legs and the patient was diagnosed with endocarditis and she has blood 

culture positive for Strep viridans.  Patient has been following with the ID 

and she was getting furosemide, which was stopped today this morning because of 

increased creatinine and she was on vancomycin and rifampicin.  The last dose 

of vancomycin seems to be given possibly on 03/04/2018 or even later, but her 

level was high and the highest level we have was 25.8, which was on 03/04/2018 

but on 03/10/2018 it was 23.9.  The patient has been actively using IV drugs 

before she came to the hospital and noticed to have more swelling in her legs.  

She is complaining of generalized body pain.  There is no nausea, vomiting.  

Denies any diarrhea.  Her appetite is normal.


Additional Remarks


Continue with bilateral lower extremity edema.





Review of Systems


General


Constitutional:  Fatigue





Respiratory


Lungs:  SOB





Cardiovascular


Cardiac:  Edema, SCOTT


Cardiac Remarks


Denies CP





Gastrointestinal


GI Remarks


denies abdominal pain





Objective Data


Data











 3/25/18 3/26/18





 19:00 07:00


 


Intake Total 100 ml 


 


Balance 100 ml 


 


  


 


IV Total 100 ml 


 


# Sanitary Pads 3 Pads 











Vital Signs








  Date Time  Temp Pulse Resp B/P (MAP) Pulse Ox O2 Delivery O2 Flow Rate FiO2


 


3/25/18 12:00 97.8 100 20 90/65 (73) 93   


 


3/25/18 12:00  101      


 


3/25/18 08:10     96 Nasal Cannula 2.00 


 


3/25/18 08:00 99.4 103 24 90/52 (65) 94   


 


3/25/18 08:00  105      


 


3/25/18 08:00      Room Air 2.00 21


 


3/25/18 04:00 98.3 103 20 98/56 (70) 92   


 


3/25/18 03:40  102      


 


3/25/18 00:01  110      


 


3/25/18 00:00 99.0 109 20 96/52 (67) 94   


 


3/24/18 23:55  105      


 


3/24/18 21:56      Room Air  


 


3/24/18 20:00 98.1 103 20 95/60 (72) 96   


 


3/24/18 19:42  97      


 


3/24/18 16:00  103      


 


3/24/18 16:00 97.5 99 20 96/52 (67) 94   








-:  


3/25/18 1235                                                                   

             3/25/18 1127








Physical Exam


General


Appearance:  No Acute Distress, Anxious





Eyes


Eye Exam:  Pupils Equal





Throat


Throat Exam:  Oral Mucosa Pink & Moist





Neck


Neck Exam:  Neck Supple





Pulmonary


Resp Exam:  Clear Bilaterally





Cardiology


CV Exam:  Regular, Normal Sinus Rhythm





Gastrointestinal/Abdomen


GI Exam:  Soft, Non-Tender





Integumentary


Skin Exam:  Dry, Intact





Extremeties


Extremities Exam:  Pitting Edema





Neurologic


Neuro Exam:  Alert, Awake, Oriented





Psychiatric


Psych Exam:  Appropriate Responses





Assessment/Plan


Discussed Condition With:  Patient


Assessment Summary:  NOÉ/Acute Renal Failure


Problem List:  


(1) Acute kidney injury


ICD Codes:  N17.9 - Acute kidney failure, unspecified


Status:  Acute


Plan:  


NOÉ likely due to vancomycin toxicity,  post-infectious GN is unlikely.


Possibility of ATN or pre renal.


Urine eosinophils negative, complements wnl


Creatinine stable


Good UOP





Plan


Creatinine is stable at 1.2


Hypokalemia replacement given K was 2.5 given extra potassium on 60 mEq twice


Continue Lasix 40 mg IV BID  and metolazone for edema.


Continue to monitor BMP and UOP


Encouraged to elevate legs throughout day


Avoid nephrotoxins. 


Stop metolazone as developing metabolic alkalosis


Dr. Layne to follow





(2) Prosthetic valve endocarditis


ICD Codes:  T82.6XXA - Infection and inflammatory reaction due to cardiac valve 

prosthesis, initial encounter


Status:  Acute


Plan:  septic endocarditis with strep viridans


IV drug use, pulmonary emboli as well.





Continues antibiotics per ID


continue renal dosing antibiotics.





(3) Peripheral arterial occlusive disease


ICD Codes:  I77.9 - Disorder of arteries and arterioles, unspecified


Plan:  seen with vascular, continue medical management


Hold contrast studies as possible.





(4) Hepatitis C


ICD Codes:  B19.20 - Unspecified viral hepatitis C without hepatic coma


Status:  Chronic





Problem Qualifiers





(1) Prosthetic valve endocarditis:  


Qualified Codes:  T82.6XXA - Infection and inflammatory reaction due to cardiac 

valve prosthesis, initial encounter








Gigi Langford MD Mar 25, 2018 14:27

## 2018-03-25 NOTE — HHI.PR
Subjective


Remarks


sob much improved


states edema in lower extremities slightly improved


denies cp.





Objective


Vitals





Vital Signs








  Date Time  Temp Pulse Resp B/P (MAP) Pulse Ox O2 Delivery O2 Flow Rate FiO2


 


3/25/18 12:00 97.8 100 20 90/65 (73) 93   


 


3/25/18 08:10     96 Nasal Cannula 2.00 


 


3/25/18 08:00 99.4 103 24 90/52 (65) 94   


 


3/25/18 08:00  105      


 


3/25/18 08:00      Room Air 2.00 21


 


3/25/18 04:00 98.3 103 20 98/56 (70) 92   


 


3/25/18 03:40  102      


 


3/25/18 00:01  110      


 


3/25/18 00:00 99.0 109 20 96/52 (67) 94   


 


3/24/18 23:55  105      


 


3/24/18 21:56      Room Air  


 


3/24/18 20:00 98.1 103 20 95/60 (72) 96   


 


3/24/18 19:42  97      


 


3/24/18 16:00  103      


 


3/24/18 16:00 97.5 99 20 96/52 (67) 94   














I/O      


 


 3/24/18 3/24/18 3/24/18 3/25/18 3/25/18 3/25/18





 07:00 15:00 23:00 07:00 15:00 23:00


 


Intake Total 1100 ml 400 ml 1020 ml 350 ml 100 ml 


 


Output Total 1700 ml  2000 ml   


 


Balance -600 ml 400 ml -980 ml 350 ml 100 ml 


 


      


 


Intake Oral 1000 ml  720 ml   


 


IV Total 100 ml 400 ml 300 ml 350 ml 100 ml 


 


Output Urine Total 1700 ml  2000 ml   


 


# Voids   6 1  


 


# Bowel Movements   2   


 


# Sanitary Pads  3 Pads   3 Pads 








Result Diagram:  


3/24/18 0430                                                                   

             3/25/18 1127





Imaging





Last Impressions








Chest X-Ray 3/21/18 0000 Signed





Impressions: 





 Service Date/Time:  Wednesday, March 21, 2018 11:32 - CONCLUSION:  1. 

Improving 





 patchy right lower lung zone airspace consolidation.     Rj Whitten MD 


 


Catheter Placement X-Ray 3/19/18 0000 Signed





Impressions: 





 Service Date/Time:  Monday, March 19, 2018 11:16 - CONCLUSION: Uncomplicated 





 venous catheter change as above.     Rj Whitten MD 


 


Head CT 3/3/18 0000 Signed





Impressions: 





 Service Date/Time:  Saturday, March 3, 2018 10:22 - CONCLUSION:  1. Focal area 





 of decreased density involving the right parietal lobe. As a new finding from 





 the prior exam. This could relate to a small infarct. MRI of the brain with 





 gadolinium suggested to further evaluate. 2. Small lacunar infarction 

involving 





 the left centrum semiovale.     Scooter Dawson Jr., MD 


 


CT Angiography 3/3/18 0000 Signed





Impressions: 





 Service Date/Time:  Saturday, March 3, 2018 10:28 - CONCLUSION:  There 

extensive 





 pulmonary emboli bilaterally. There is near complete cut off of the right 

lower 





 lobe pulmonary artery. Prominent filling defects on the left as well. These 





 findings were relayed to Dr. Bustos immediately at the completion of the study.  





 Scattered pulmonary infiltrates, small pleural effusion and extensive 





 mediastinal lymphadenopathy as described above.      Won Sharp MD 


 


Brain MRI 3/3/18 0000 Signed





Impressions: 





 Service Date/Time:  Saturday, March 3, 2018 18:04 - CONCLUSION:  Deterioration 





 in the appearance of the scan.  At least 3 focal areas of abnormal contrast 





 enhancement are present scattered to in both hemispheres.  Given the history 





 this most likely represents developing areas cerebritis.  Exam is compromised 

by 





 an uncooperative patient and motion artifact.  These 2 factors make detection 

of 





 other abnormalities such as cortical cephalitis and meningitis difficult to 





 exclude.  Cannot exclude hemorrhagic lesions as well.  There is no mass effect 





 evident.      Jorge Diane MD 


 


Chest CT 2/26/18 0000 Signed





Impressions: 





 Service Date/Time:  Monday, February 26, 2018 12:41 - CONCLUSION:  1. There 

are 





 at least 5 pulmonary nodules present bilaterally with the largest measuring 19 





 mm. None demonstrate cavitation but it is possible that these represent septic 





 emboli. Suggest followup to assess for change. 2. Splenomegaly with subtle 





 wedge-shaped areas of low-density in the mid spleen which could represent 





 infarcts. 3. Mild cardiomegaly in this patient post aortic valve replacement. 





 Low density of the cardiac blood pool suggests anemia.      Ron Melgar MD 


 


Aorta w/Runoff CTA 2/25/18 0000 Signed





Impressions: 





 Service Date/Time:  Sunday, February 25, 2018 12:47 - CONCLUSION:  Left renal 





 cortical infarction. 5-6 cm length total occlusion of the left superficial 





 femoral artery in the proximal thigh. Nodular parenchymal opacities in the 

lung 





 bases bilaterally See above discussion.     Ron Cruz MD 


 


Renal Ultrasound 2/24/18 0000 Signed





Impressions: 





 Service Date/Time:  Saturday, February 24, 2018 10:53 - CONCLUSION:  1. No 





 evidence of hydronephrosis. 2. Thickening of the urinary bladder wall a 1.1 

cm. 





 3. Prominent splenomegaly.     Elgin Walton MD 


 


Breast Ultrasound 2/24/18 0000 Signed





Impressions: 





 Service Date/Time:  Saturday, February 24, 2018 10:48 - CONCLUSION:  Negative 





 targeted right breast ultrasound examination.     Ron Brown MD 


 


Lower Extremity Ultrasound 2/23/18 0000 Signed





Impressions: 





 Service Date/Time:  Friday, February 23, 2018 21:22 - CONCLUSION:  1. No 





 sonographic evidence for lower extremity DVT. 2. Incidental note of bilateral 





 inguinal adenopathy with the largest on the right measuring 3.3 cm and the 





 largest on the left measuring 2.6 cm. This finding is nonspecific but is 





 typically reactive in etiology.     Rj Whitten MD 








Objective Remarks


AAOx3


NAD


Clear lungs BL


S1S2 4/6 systolic ejection murmur


+4 BL lower extremity edema.  Erythema, increased warmth and tenderness to 

palpation of the left lower leg. 


Dark blue discoloration of toes of left leg. Pedal and tial pulses not palpable 

due to edema. Good capillary refill.


Medications and IVs





Current Medications








 Medications


  (Trade)  Dose


 Ordered  Sig/Nikhil


 Route  Start Time


 Stop Time Status Last Admin


 


  (NS Flush)  2 ml  UNSCH  PRN


 IV FLUSH  2/23/18 22:00


    3/25/18 06:32


 


 


  (NS Flush)  2 ml  BID


 IV FLUSH  2/24/18 09:00


    3/25/18 09:00


 


 


  (Tylenol)  650 mg  Q6H  PRN


 PO  2/23/18 22:00


    3/19/18 01:19


 


 


  (Zofran Inj)  4 mg  Q6H  PRN


 IV PUSH  2/23/18 22:00


    3/8/18 21:22


 


 


  (Restoril)  15 mg  HS  PRN


 PO  2/23/18 22:00


    3/6/18 22:08


 


 


 Miscellaneous


 Information  1  Q361D


 XX  2/23/18 22:00


     


 


 


  (Chlorhexidine


 2% Cloth)  


 Taper  DAILY@04


 TOP  2/24/18 04:00


 2/20/19 03:59  3/6/18 03:54


 


 


  (Chlorhexidine


 2% Cloth)  3 pack  UNSCH  PRN


 TOP  2/23/18 22:00


     


 


 


  (Arlen-Colace)  1 tab  BID


 PO  2/24/18 09:00


    3/24/18 20:50


 


 


  (Milk Of


 Magnesia Liq)  30 ml  Q12H  PRN


 PO  2/23/18 22:00


     


 


 


  (Senokot)  17.2 mg  Q12H  PRN


 PO  2/23/18 22:00


     


 


 


  (Dulcolax Supp)  10 mg  DAILY  PRN


 RECTAL  2/23/18 22:00


     


 


 


  (Lactulose Liq)  30 ml  DAILY  PRN


 PO  2/23/18 22:00


     


 


 


 Ampicillin Sodium


 2000 mg/Sodium


 Chloride  100 ml @ 


 400 mls/hr  Q6H


 IV  2/24/18 14:00


    3/25/18 13:07


 


 


  (Imodium)  2 mg  Q4H  PRN


 PO  2/26/18 16:15


    2/28/18 14:32


 


 


  (Albuterol Neb)  2.5 mg  Q2HR NEB  PRN


 NEB  2/27/18 11:30


    3/21/18 08:42


 


 


  (Norco  Mg)  1 tab  Q4H  PRN


 PO  3/4/18 15:00


    3/9/18 05:02


 


 


  (Dilaudid Pf Inj)  1 mg  Q4H  PRN


 IV PUSH  3/7/18 19:00


    3/25/18 15:38


 


 


  (KCl)  60 meq  Q12HR


 PO  3/14/18 09:45


    3/25/18 09:01


 


 


 Albumin Human  100 ml @ 


 60 mls/hr  Q12H


 IV  3/18/18 18:00


    3/25/18 17:25


 


 


  (Oramorph Sr)  15 mg  Q12HR


 PO  3/20/18 21:00


    3/25/18 09:01


 


 


  (Lasix Inj)  40 mg  BID@09,18


 IV PUSH  3/21/18 09:00


    3/25/18 17:27


 


 


 Daptomycin 500 mg/


 Sodium Chloride  100 ml @ 


 200 mls/hr  Q24H


 IV  3/22/18 20:00


    3/24/18 21:17


 


 


 Pharmacy Profile


 Note  0 ml @ 0


 mls/hr  UNSCH


 OTHER  3/23/18 16:15


     


 


 


 Heparin Sodium/


 Dextrose  250 ml @ 


 18 mls/hr  TITRATE  PRN


 IV  3/23/18 16:30


    3/25/18 13:10


 


 


  (Coumadin)  1.5 mg  DAILY@16


 PO  3/25/18 16:00


    3/25/18 15:39


 


 


  (Pill Splitter)  1 ea  UNSCH  PRN


 OTHER  3/25/18 11:15


     


 








Date of Insertion:  Mar 19, 2018


Line:  Central Venous Catheter


Side:  Right


Location:  Internal, Jug[facility]





A/P


Problem List:  


(1) Prosthetic valve endocarditis


ICD Code:  T82.6XXA - Infection and inflammatory reaction due to cardiac valve 

prosthesis, initial encounter


Status:  Acute


(2) Bacteremia


ICD Code:  R78.81 - Bacteremia


(3) Septic pulmonary embolism


ICD Code:  I26.90 - Septic pulmonary embolism without acute cor pulmonale


Status:  Acute


(4) IVDU (intravenous drug user)


ICD Code:  F19.90 - Other psychoactive substance use, unspecified, uncomplicated


(5) Anemia


ICD Code:  D64.9 - Anemia, unspecified


Status:  Chronic


(6) NOÉ (acute kidney injury)


ICD Code:  N17.9 - Acute kidney failure, unspecified


Plan:  Creatinine initially worsening from 1.52-1.57.  Nephrology consulted.  

Possible ATN from vancomycin toxicity or infection.  Urine eosinophils were 

ordered and negative.  Diuretics managed by nephrology.  Initially on Lasix 20 

mg IV twice daily along with albumin.  Creatinine Worsening on 3/19 for 1.57-

1.62.  Later Lasix dose was increased to 40 mg IV twice daily  and metolazone 

added to the regimen.  Creatinine then started trending down to 1.34 on 3/23.


3/24 creatinine slightly elevated however stable at 1.37.  Continue diuretics 

as per nephrology.  Patient was placed on 1 L fluid restriction on an effort to 

keep the patient on an negative fluid balance.


Continue to monitor BUN and creatinine, strict I's and O's, avoid nephrotoxins.


3/25 Creatinine continues to trend down. management as per nephrology





(7) Cellulitis of left lower extremity


ICD Code:  L03.116 - Cellulitis of left lower limb


(8) Purple toe syndrome


ICD Code:  I75.029 - Atheroembolism of unspecified lower extremity


Plan:  Patient has a blue/purple discoloration of the left toes.  


3/24 INR 2.0.  Dosing as per pharmacy.  Continue IV heparin bridge until INR is 

more than 2.5.


Appreciate vascular surgery recommendations.  Follow-up ABIs, continue 

neurovascular checks.  Out of bed ad jordin., continue anticoagulation.


3/25 Ankle-brachial indices are decreased and below normal. The toe brachial 

indices could not be obtained given the pulses could not be obtained in the 

first digits.


Continue heparin bridge to Caumadin. Goal inr 2.5 to 3.5. Management as per 

vascular surgery.





Assessment and Plan


(1) Prosthetic valve endocarditis


Plan:  Continue antibiotics as per ID.


The patient was initially on IV ampicillin.  On 3/19 ID started Rocephin for 

pulmonary coverage since chest x-ray showed a developing infiltrate.


On 3/20 palliative care consulted by hematology.  As per hematology 

documentation the patient requested to talk to palliative care to see if she is 

a hospice candidate.


Discussed with palliative care who states that the patient's goals are 

aggressive and she wants to get better.  The patient definitely is a hospice 

candidate, however her goals are not hospice appropriate.  Patient was started 

on Oramorph SR 30 mg p.o. twice a day for pain control.


3/23 patient with what I suspected purple toe syndrome. Start the patient on a 

heparin drip until the patient's INR reaches 2.5.  Goal INR should be 2.5-3.5 

since the patient has an aortic prosthetic valve.  Will reconsult vascular 

surgery.  Pedal pulses present by Doppler -obtained by me.





(2) Bacteremia


Plan:  Blood cultures obtained on 2/23 growing strep viridans.


Subsequent repeat blood cultures negative.


3/19 sp Exchange of Right IJ central line. 


Continue antibiotics as per ID recommendations.





(3) Septic pulmonary embolism


Plan:  As observed on a CT angiography of the chest obtained on 3/3/18.


chocardiogram on 3/3 showed an estimated EF of 50-55%.  


++severe tricuspid regurgitation with prosthesis in place.  


++2 x 4 cm mobile vegetation was seen on the valve.  The right atrium and right 

ventricle were normal in size and function


Hematology following.


Management of Coumadin as per hematology recommendations.


Goal INR 2.5-3.5 given the presence of a prosthetic aortic valve.





(4) IVDU (intravenous drug user)


Plan:  Counseled on drug cessation.





(5) Anemia


Plan:  Likely secondary to inflammation/infection.  No evidence of bleeding or 

hemolysis.


Monitor hemoglobin and transfuse as needed.


Discharge Planning


Continue to monitor on the medical floor.





Problem Qualifiers





(1) Prosthetic valve endocarditis:  


Qualified Codes:  T82.6XXA - Infection and inflammatory reaction due to cardiac 

valve prosthesis, initial encounter


(2) Anemia:  


Qualified Codes:  D63.8 - Anemia in other chronic diseases classified elsewhere


(3) Purple toe syndrome:  


Qualified Codes:  I75.022 - Atheroembolism of left lower extremity








Jesus Mae MD Mar 25, 2018 12:52

## 2018-03-26 VITALS
DIASTOLIC BLOOD PRESSURE: 66 MMHG | HEART RATE: 104 BPM | TEMPERATURE: 97.4 F | SYSTOLIC BLOOD PRESSURE: 102 MMHG | OXYGEN SATURATION: 94 % | RESPIRATION RATE: 20 BRPM

## 2018-03-26 VITALS
SYSTOLIC BLOOD PRESSURE: 107 MMHG | RESPIRATION RATE: 20 BRPM | TEMPERATURE: 98.6 F | OXYGEN SATURATION: 97 % | DIASTOLIC BLOOD PRESSURE: 63 MMHG | HEART RATE: 102 BPM

## 2018-03-26 VITALS — HEART RATE: 100 BPM

## 2018-03-26 VITALS
SYSTOLIC BLOOD PRESSURE: 96 MMHG | RESPIRATION RATE: 19 BRPM | DIASTOLIC BLOOD PRESSURE: 54 MMHG | OXYGEN SATURATION: 95 % | HEART RATE: 99 BPM | TEMPERATURE: 99.6 F

## 2018-03-26 VITALS
TEMPERATURE: 98 F | SYSTOLIC BLOOD PRESSURE: 96 MMHG | RESPIRATION RATE: 20 BRPM | HEART RATE: 108 BPM | DIASTOLIC BLOOD PRESSURE: 59 MMHG | OXYGEN SATURATION: 93 %

## 2018-03-26 VITALS
OXYGEN SATURATION: 99 % | DIASTOLIC BLOOD PRESSURE: 66 MMHG | TEMPERATURE: 97.9 F | HEART RATE: 104 BPM | RESPIRATION RATE: 20 BRPM | SYSTOLIC BLOOD PRESSURE: 96 MMHG

## 2018-03-26 LAB
BUN SERPL-MCNC: 18 MG/DL (ref 7–18)
CALCIUM SERPL-MCNC: 8.3 MG/DL (ref 8.5–10.1)
CHLORIDE SERPL-SCNC: 88 MEQ/L (ref 98–107)
CREAT SERPL-MCNC: 1.12 MG/DL (ref 0.5–1)
ERYTHROCYTE [DISTWIDTH] IN BLOOD BY AUTOMATED COUNT: 23 % (ref 11.6–17.2)
GFR SERPLBLD BASED ON 1.73 SQ M-ARVRAT: 55 ML/MIN (ref 89–?)
GLUCOSE SERPL-MCNC: 99 MG/DL (ref 74–106)
HCO3 BLD-SCNC: 35 MEQ/L (ref 21–32)
HCT VFR BLD CALC: 24 % (ref 35–46)
HGB BLD-MCNC: 7.7 GM/DL (ref 11.6–15.3)
INR PPP: 1.8 RATIO
MAGNESIUM SERPL-MCNC: 1.4 MG/DL (ref 1.5–2.5)
MCH RBC QN AUTO: 24.1 PG (ref 27–34)
MCHC RBC AUTO-ENTMCNC: 32 % (ref 32–36)
MCV RBC AUTO: 75.3 FL (ref 80–100)
PHOSPHATE SERPL-MCNC: 3.2 MG/DL (ref 2.5–4.9)
PLATELET # BLD: 148 TH/MM3 (ref 150–450)
PMV BLD AUTO: 8.3 FL (ref 7–11)
PROTHROMBIN TIME: 18.1 SEC (ref 9.8–11.6)
RBC # BLD AUTO: 3.18 MIL/MM3 (ref 4–5.3)
SODIUM SERPL-SCNC: 131 MEQ/L (ref 136–145)
WBC # BLD AUTO: 11.7 TH/MM3 (ref 4–11)

## 2018-03-26 RX ADMIN — HYDROMORPHONE HYDROCHLORIDE PRN MG: 2 INJECTION INTRAMUSCULAR; INTRAVENOUS; SUBCUTANEOUS at 14:43

## 2018-03-26 RX ADMIN — POTASSIUM CHLORIDE SCH MLS/HR: 200 INJECTION, SOLUTION INTRAVENOUS at 09:46

## 2018-03-26 RX ADMIN — STANDARDIZED SENNA CONCENTRATE AND DOCUSATE SODIUM SCH TAB: 8.6; 5 TABLET, FILM COATED ORAL at 09:00

## 2018-03-26 RX ADMIN — AMPICILLIN SODIUM SCH MLS/HR: 2 INJECTION, POWDER, FOR SOLUTION INTRAMUSCULAR; INTRAVENOUS at 02:21

## 2018-03-26 RX ADMIN — FUROSEMIDE SCH MG: 10 INJECTION, SOLUTION INTRAMUSCULAR; INTRAVENOUS at 17:50

## 2018-03-26 RX ADMIN — CHLORHEXIDINE GLUCONATE SCH PACK: 500 CLOTH TOPICAL at 04:00

## 2018-03-26 RX ADMIN — DAPTOMYCIN SCH MLS/HR: 500 INJECTION, POWDER, LYOPHILIZED, FOR SOLUTION INTRAVENOUS at 21:11

## 2018-03-26 RX ADMIN — AMPICILLIN SODIUM SCH MLS/HR: 2 INJECTION, POWDER, FOR SOLUTION INTRAMUSCULAR; INTRAVENOUS at 21:11

## 2018-03-26 RX ADMIN — HYDROMORPHONE HYDROCHLORIDE PRN MG: 2 INJECTION INTRAMUSCULAR; INTRAVENOUS; SUBCUTANEOUS at 02:21

## 2018-03-26 RX ADMIN — HEPARIN SODIUM PRN MLS/HR: 10000 INJECTION, SOLUTION INTRAVENOUS at 16:13

## 2018-03-26 RX ADMIN — Medication SCH ML: at 21:00

## 2018-03-26 RX ADMIN — HYDROMORPHONE HYDROCHLORIDE PRN MG: 2 INJECTION INTRAMUSCULAR; INTRAVENOUS; SUBCUTANEOUS at 23:24

## 2018-03-26 RX ADMIN — ALBUMIN HUMAN SCH MLS/HR: 0.25 SOLUTION INTRAVENOUS at 17:50

## 2018-03-26 RX ADMIN — STANDARDIZED SENNA CONCENTRATE AND DOCUSATE SODIUM SCH TAB: 8.6; 5 TABLET, FILM COATED ORAL at 21:00

## 2018-03-26 RX ADMIN — HEPARIN SODIUM PRN MLS/HR: 10000 INJECTION, SOLUTION INTRAVENOUS at 03:55

## 2018-03-26 RX ADMIN — MORPHINE SULFATE SCH MG: 15 TABLET, EXTENDED RELEASE ORAL at 09:46

## 2018-03-26 RX ADMIN — AMPICILLIN SODIUM SCH MLS/HR: 2 INJECTION, POWDER, FOR SOLUTION INTRAMUSCULAR; INTRAVENOUS at 09:45

## 2018-03-26 RX ADMIN — POTASSIUM CHLORIDE SCH MEQ: 750 TABLET, FILM COATED, EXTENDED RELEASE ORAL at 09:45

## 2018-03-26 RX ADMIN — HYDROMORPHONE HYDROCHLORIDE PRN MG: 2 INJECTION INTRAMUSCULAR; INTRAVENOUS; SUBCUTANEOUS at 18:56

## 2018-03-26 RX ADMIN — MORPHINE SULFATE SCH MG: 30 TABLET, EXTENDED RELEASE ORAL at 21:10

## 2018-03-26 RX ADMIN — AMPICILLIN SODIUM SCH MLS/HR: 2 INJECTION, POWDER, FOR SOLUTION INTRAMUSCULAR; INTRAVENOUS at 13:04

## 2018-03-26 RX ADMIN — ALBUMIN HUMAN SCH MLS/HR: 0.25 SOLUTION INTRAVENOUS at 05:11

## 2018-03-26 RX ADMIN — POTASSIUM CHLORIDE SCH MEQ: 750 TABLET, FILM COATED, EXTENDED RELEASE ORAL at 21:09

## 2018-03-26 RX ADMIN — FUROSEMIDE SCH MG: 10 INJECTION, SOLUTION INTRAMUSCULAR; INTRAVENOUS at 09:00

## 2018-03-26 RX ADMIN — Medication SCH ML: at 09:00

## 2018-03-26 RX ADMIN — HYDROMORPHONE HYDROCHLORIDE PRN MG: 2 INJECTION INTRAMUSCULAR; INTRAVENOUS; SUBCUTANEOUS at 06:31

## 2018-03-26 RX ADMIN — HYDROMORPHONE HYDROCHLORIDE PRN MG: 2 INJECTION INTRAMUSCULAR; INTRAVENOUS; SUBCUTANEOUS at 10:22

## 2018-03-26 RX ADMIN — POTASSIUM CHLORIDE SCH MLS/HR: 200 INJECTION, SOLUTION INTRAVENOUS at 06:16

## 2018-03-26 RX ADMIN — WARFARIN SODIUM SCH MG: 1 TABLET ORAL at 17:50

## 2018-03-26 NOTE — HHI.PR
Subjective


Remarks


The patient complains of right lower side chest pain/abdominal pain which is 

nonradiating and worsened by taking deep breaths.


Patient is a febrile and still tachycardic.


Potassium noted to be very low at 2.8.


Creatinine is trending down


Patient states she has some shortness of breath.





Objective


Vitals





Vital Signs








  Date Time  Temp Pulse Resp B/P (MAP) Pulse Ox O2 Delivery O2 Flow Rate FiO2


 


3/26/18 08:00 98.0 108 20 96/59 (71) 93   


 


3/26/18 08:00      Room Air  


 


3/26/18 03:41  100      


 


3/26/18 03:41 98.6 102 20 107/63 (78) 97   


 


3/25/18 23:49      Room Air  


 


3/25/18 23:48 98.7 102 18 94/57 (69) 93   


 


3/25/18 23:41  99      


 


3/25/18 20:59 98.4 100 20 97/62 (74) 95   


 


3/25/18 20:58     95 Room Air  


 


3/25/18 20:00      Room Air  


 


3/25/18 19:43  98      


 


3/25/18 19:00  101      


 


3/25/18 16:00 97.5 101 20 89/65 (73) 95   














I/O      


 


 3/25/18 3/25/18 3/25/18 3/26/18 3/26/18 3/26/18





 07:00 15:00 23:00 07:00 15:00 23:00


 


Intake Total 350 ml 400 ml 880 ml 550 ml  


 


Balance 350 ml 400 ml 880 ml 550 ml  


 


      


 


Intake Oral   480 ml   


 


IV Total 350 ml 400 ml 400 ml 550 ml  


 


# Voids 1  6   


 


# Bowel Movements   2   


 


# Sanitary Pads  3 Pads    








Result Diagram:  


3/26/18 0330                                                                   

             3/26/18 0330





Imaging





Last Impressions








Chest X-Ray 3/21/18 0000 Signed





Impressions: 





 Service Date/Time:  Wednesday, March 21, 2018 11:32 - CONCLUSION:  1. 

Improving 





 patchy right lower lung zone airspace consolidation.     Rj Whitten MD 


 


Catheter Placement X-Ray 3/19/18 0000 Signed





Impressions: 





 Service Date/Time:  Monday, March 19, 2018 11:16 - CONCLUSION: Uncomplicated 





 venous catheter change as above.     Rj Whitten MD 


 


Head CT 3/3/18 0000 Signed





Impressions: 





 Service Date/Time:  Saturday, March 3, 2018 10:22 - CONCLUSION:  1. Focal area 





 of decreased density involving the right parietal lobe. As a new finding from 





 the prior exam. This could relate to a small infarct. MRI of the brain with 





 gadolinium suggested to further evaluate. 2. Small lacunar infarction 

involving 





 the left centrum semiovale.     Scooter Dawson Jr., MD 


 


CT Angiography 3/3/18 0000 Signed





Impressions: 





 Service Date/Time:  Saturday, March 3, 2018 10:28 - CONCLUSION:  There 

extensive 





 pulmonary emboli bilaterally. There is near complete cut off of the right 

lower 





 lobe pulmonary artery. Prominent filling defects on the left as well. These 





 findings were relayed to Dr. Bustos immediately at the completion of the study.  





 Scattered pulmonary infiltrates, small pleural effusion and extensive 





 mediastinal lymphadenopathy as described above.      Won Sharp MD 


 


Brain MRI 3/3/18 0000 Signed





Impressions: 





 Service Date/Time:  Saturday, March 3, 2018 18:04 - CONCLUSION:  Deterioration 





 in the appearance of the scan.  At least 3 focal areas of abnormal contrast 





 enhancement are present scattered to in both hemispheres.  Given the history 





 this most likely represents developing areas cerebritis.  Exam is compromised 

by 





 an uncooperative patient and motion artifact.  These 2 factors make detection 

of 





 other abnormalities such as cortical cephalitis and meningitis difficult to 





 exclude.  Cannot exclude hemorrhagic lesions as well.  There is no mass effect 





 evident.      Jorge Diane MD 


 


Chest CT 2/26/18 0000 Signed





Impressions: 





 Service Date/Time:  Monday, February 26, 2018 12:41 - CONCLUSION:  1. There 

are 





 at least 5 pulmonary nodules present bilaterally with the largest measuring 19 





 mm. None demonstrate cavitation but it is possible that these represent septic 





 emboli. Suggest followup to assess for change. 2. Splenomegaly with subtle 





 wedge-shaped areas of low-density in the mid spleen which could represent 





 infarcts. 3. Mild cardiomegaly in this patient post aortic valve replacement. 





 Low density of the cardiac blood pool suggests anemia.      Ron Melgar MD 


 


Aorta w/Runoff CTA 2/25/18 0000 Signed





Impressions: 





 Service Date/Time:  Sunday, February 25, 2018 12:47 - CONCLUSION:  Left renal 





 cortical infarction. 5-6 cm length total occlusion of the left superficial 





 femoral artery in the proximal thigh. Nodular parenchymal opacities in the 

lung 





 bases bilaterally See above discussion.     Ron Cruz MD 


 


Renal Ultrasound 2/24/18 0000 Signed





Impressions: 





 Service Date/Time:  Saturday, February 24, 2018 10:53 - CONCLUSION:  1. No 





 evidence of hydronephrosis. 2. Thickening of the urinary bladder wall a 1.1 

cm. 





 3. Prominent splenomegaly.     Elgin Walton MD 


 


Breast Ultrasound 2/24/18 0000 Signed





Impressions: 





 Service Date/Time:  Saturday, February 24, 2018 10:48 - CONCLUSION:  Negative 





 targeted right breast ultrasound examination.     Ron Brown MD 


 


Lower Extremity Ultrasound 2/23/18 0000 Signed





Impressions: 





 Service Date/Time:  Friday, February 23, 2018 21:22 - CONCLUSION:  1. No 





 sonographic evidence for lower extremity DVT. 2. Incidental note of bilateral 





 inguinal adenopathy with the largest on the right measuring 3.3 cm and the 





 largest on the left measuring 2.6 cm. This finding is nonspecific but is 





 typically reactive in etiology.     Rj Whitten MD 








Objective Remarks


AAOx3


NAD


Clear lungs BL


S1S2 4/6 systolic ejection murmur


+3 BL lower extremity edema.  Erythema, increased warmth and tenderness to 

palpation of the left lower leg. 


Dark blue discoloration of toes of left leg. Pedal and tial pulses not palpable 

due to edema. Good capillary refill.


Medications and IVs





Current Medications








 Medications


  (Trade)  Dose


 Ordered  Sig/Nikhil


 Route  Start Time


 Stop Time Status Last Admin


 


  (NS Flush)  2 ml  UNSCH  PRN


 IV FLUSH  2/23/18 22:00


    3/25/18 06:32


 


 


  (NS Flush)  2 ml  BID


 IV FLUSH  2/24/18 09:00


    3/25/18 20:28


 


 


  (Tylenol)  650 mg  Q6H  PRN


 PO  2/23/18 22:00


    3/19/18 01:19


 


 


  (Zofran Inj)  4 mg  Q6H  PRN


 IV PUSH  2/23/18 22:00


    3/8/18 21:22


 


 


  (Restoril)  15 mg  HS  PRN


 PO  2/23/18 22:00


    3/6/18 22:08


 


 


 Miscellaneous


 Information  1  Q361D


 XX  2/23/18 22:00


     


 


 


  (Chlorhexidine


 2% Cloth)  


 Taper  DAILY@04


 TOP  2/24/18 04:00


 2/20/19 03:59  3/6/18 03:54


 


 


  (Chlorhexidine


 2% Cloth)  3 pack  UNSCH  PRN


 TOP  2/23/18 22:00


     


 


 


  (Arlen-Colace)  1 tab  BID


 PO  2/24/18 09:00


    3/24/18 20:50


 


 


  (Milk Of


 Magnesia Liq)  30 ml  Q12H  PRN


 PO  2/23/18 22:00


     


 


 


  (Senokot)  17.2 mg  Q12H  PRN


 PO  2/23/18 22:00


     


 


 


  (Dulcolax Supp)  10 mg  DAILY  PRN


 RECTAL  2/23/18 22:00


     


 


 


  (Lactulose Liq)  30 ml  DAILY  PRN


 PO  2/23/18 22:00


     


 


 


 Ampicillin Sodium


 2000 mg/Sodium


 Chloride  100 ml @ 


 400 mls/hr  Q6H


 IV  2/24/18 14:00


    3/26/18 13:04


 


 


  (Imodium)  2 mg  Q4H  PRN


 PO  2/26/18 16:15


    2/28/18 14:32


 


 


  (Albuterol Neb)  2.5 mg  Q2HR NEB  PRN


 NEB  2/27/18 11:30


    3/21/18 08:42


 


 


  (Norco  Mg)  1 tab  Q4H  PRN


 PO  3/4/18 15:00


    3/9/18 05:02


 


 


  (Dilaudid Pf Inj)  1 mg  Q4H  PRN


 IV PUSH  3/7/18 19:00


    3/26/18 10:22


 


 


  (KCl)  60 meq  Q12HR


 PO  3/14/18 09:45


    3/26/18 09:45


 


 


 Albumin Human  100 ml @ 


 60 mls/hr  Q12H


 IV  3/18/18 18:00


    3/26/18 05:11


 


 


  (Oramorph Sr)  15 mg  Q12HR


 PO  3/20/18 21:00


    3/26/18 09:46


 


 


  (Lasix Inj)  40 mg  BID@09,18


 IV PUSH  3/21/18 09:00


    3/25/18 17:27


 


 


 Daptomycin 500 mg/


 Sodium Chloride  100 ml @ 


 200 mls/hr  Q24H


 IV  3/22/18 20:00


    3/25/18 20:26


 


 


 Pharmacy Profile


 Note  0 ml @ 0


 mls/hr  UNSCH


 OTHER  3/23/18 16:15


     


 


 


 Heparin Sodium/


 Dextrose  250 ml @ 


 18 mls/hr  TITRATE  PRN


 IV  3/23/18 16:30


    3/26/18 03:55


 


 


  (Coumadin)  1.5 mg  DAILY@16


 PO  3/25/18 16:00


    3/25/18 15:39


 


 


  (Pill Splitter)  1 ea  UNSCH  PRN


 OTHER  3/25/18 11:15


     


 








Urinary Catheter:  No


Vascular Central Line Catheter:  Yes


Assessment to:  Continue


Date of Insertion:  Mar 19, 2018


Line:  Central Venous Catheter


Side:  Right


Location:  Internal, Jugular





A/P


Problem List:  


(1) Prosthetic valve endocarditis


ICD Code:  T82.6XXA - Infection and inflammatory reaction due to cardiac valve 

prosthesis, initial encounter


Status:  Acute


(2) Bacteremia


ICD Code:  R78.81 - Bacteremia


(3) Septic pulmonary embolism


ICD Code:  I26.90 - Septic pulmonary embolism without acute cor pulmonale


Status:  Acute


(4) IVDU (intravenous drug user)


ICD Code:  F19.90 - Other psychoactive substance use, unspecified, uncomplicated


(5) Anemia


ICD Code:  D64.9 - Anemia, unspecified


Status:  Chronic


(6) NOÉ (acute kidney injury)


ICD Code:  N17.9 - Acute kidney failure, unspecified


Plan:  Creatinine initially worsening from 1.52-1.57.  Nephrology consulted.  

Possible ATN from vancomycin toxicity or infection.  Urine eosinophils were 

ordered and negative.  Diuretics managed by nephrology.  Initially on Lasix 20 

mg IV twice daily along with albumin.  Creatinine Worsening on 3/19 for 1.57-

1.62.  Later Lasix dose was increased to 40 mg IV twice daily  and metolazone 

added to the regimen.  Creatinine then started trending down to 1.34 on 3/23.


3/24 creatinine slightly elevated however stable at 1.37.  Continue diuretics 

as per nephrology.  Patient was placed on 1 L fluid restriction on an effort to 

keep the patient on an negative fluid balance.


Continue to monitor BUN and creatinine, strict I's and O's, avoid nephrotoxins.


3/26 Creatinine continues to trend down. management as per nephrology. INR 1.12





(7) Cellulitis of left lower extremity


ICD Code:  L03.116 - Cellulitis of left lower limb


Plan:  Continue IV antibiotics as per ID.


Cellulitis seems to be improving.





(8) Purple toe syndrome


ICD Code:  I75.029 - Atheroembolism of unspecified lower extremity


Plan:  Patient has a blue/purple discoloration of the left toes.  


3/24 INR 2.0.  Dosing as per pharmacy.  Continue IV heparin bridge until INR is 

more than 2.5.


Appreciate vascular surgery recommendations.  Follow-up ABIs, continue 

neurovascular checks.  Out of bed ad jordin., continue anticoagulation.


3/25 Ankle-brachial indices are decreased and below normal. The toe brachial 

indices could not be obtained given the pulses could not be obtained in the 

first digits.


Continue heparin bridge to Caumadin. Goal inr 2.5 to 3.5. Management as per 

vascular surgery.





(9) Pleuritic chest pain


ICD Code:  R07.81 - Pleurodynia


Plan:  Obtain a chest x-ray.


Patient currently on morphine sulfate 15 mg p.o. every 12 hours.  I will 

increase to 30 minutes p.o. every 12 hours.  Continue Norco as needed as well 

as Dilaudid.





Assessment and Plan


(1) Prosthetic valve endocarditis


Plan:  Continue antibiotics as per ID.


The patient was initially on IV ampicillin.  On 3/19 ID started Rocephin for 

pulmonary coverage since chest x-ray showed a developing infiltrate.


On 3/20 palliative care consulted by hematology.  As per hematology 

documentation the patient requested to talk to palliative care to see if she is 

a hospice candidate.


Discussed with palliative care who states that the patient's goals are 

aggressive and she wants to get better.  The patient definitely is a hospice 

candidate, however her goals are not hospice appropriate.  Patient was started 

on Oramorph SR 30 mg p.o. twice a day for pain control.


3/23 patient with what I suspected purple toe syndrome. Start the patient on a 

heparin drip until the patient's INR reaches 2.5.  Goal INR should be 2.5-3.5 

since the patient has an aortic prosthetic valve.  Will reconsult vascular 

surgery.  Pedal pulses present by Doppler -obtained by me.





(2) Bacteremia


Plan:  Blood cultures obtained on 2/23 growing strep viridans.


Subsequent repeat blood cultures negative.


3/19 sp Exchange of Right IJ central line. 


Continue antibiotics as per ID recommendations.





(3) Septic pulmonary embolism


Plan:  As observed on a CT angiography of the chest obtained on 3/3/18.


chocardiogram on 3/3 showed an estimated EF of 50-55%.  


++severe tricuspid regurgitation with prosthesis in place.  


++2 x 4 cm mobile vegetation was seen on the valve.  The right atrium and right 

ventricle were normal in size and function


Hematology following.


Management of Coumadin as per hematology recommendations.


Goal INR 2.5-3.5 given the presence of a prosthetic aortic valve.





(4) IVDU (intravenous drug user)


Plan:  Counseled on drug cessation.





(5) Anemia


Plan:  Likely secondary to inflammation/infection.  No evidence of bleeding or 

hemolysis.


Monitor hemoglobin and transfuse as needed.


Discharge Planning


Continue to monitor on the medical floor.





Problem Qualifiers





(1) Prosthetic valve endocarditis:  


Qualified Codes:  T82.6XXA - Infection and inflammatory reaction due to cardiac 

valve prosthesis, initial encounter


(2) Anemia:  


Qualified Codes:  D63.8 - Anemia in other chronic diseases classified elsewhere


(3) Purple toe syndrome:  


Qualified Codes:  I75.022 - Atheroembolism of left lower extremity








Jesus Mae MD Mar 26, 2018 13:35

## 2018-03-26 NOTE — RADRPT
EXAM DATE/TIME:  03/26/2018 13:53 

 

[facility] COMPARISON:     

CHEST SINGLE AP, March 21, 2018, 11:32.

 

                     

INDICATIONS :     

Chest pain. Followup right lower lobe consolidation.

                     

 

MEDICAL HISTORY :            

Hepatitis C. Cardiovascular disease. Congestive heart failure.   

 

SURGICAL HISTORY :        

AVR.

 

ENCOUNTER:     

Initial                                        

 

ACUITY:     

1 day      

 

PAIN SCORE:     

5/10

 

LOCATION:     

Bilateral chest 

 

FINDINGS:     

A single AP erect portable view of the chest was obtained and demonstrates the patient is status post
 median sternotomy. The right internal jugular central venous line remains in place. There is an miladys
ficial heart valve again noted. The heart size appears mildly enlarged with no perihilar edema. There
 is abnormal patchy opacity in the right lung base which does not appear significantly changed. The b
monique thorax remains intact.

 

CONCLUSION:     No significant change in the right lower lobe infiltrate. 

 

 

 Ollie Paige MD on March 26, 2018 at 14:17           

Board Certified Radiologist.

 This report was verified electronically.

## 2018-03-26 NOTE — HHI.NPPN
Subjective


History of Present Illness


This is a 38-year-old female with past medical history of IV drug abuse, 

history of hepatitis C, narcotic dependency, came to the hospital with 

complaint of shortness of breath and swelling.  Nephrology called to see the 

patient because of elevated BUN and creatinine.  The patient has creatinine of 

4.5 on admission which improved and it was staying in the range of 0.6-0.8 

until 6 days ago, then it started going up again and now it is 1.7.  The 

patient has increased swelling 


of the legs and the patient was diagnosed with endocarditis and she has blood 

culture positive for Strep viridans.  Patient has been following with the ID 

and she was getting furosemide, which was stopped today this morning because of 

increased creatinine and she was on vancomycin and rifampicin.  The last dose 

of vancomycin seems to be given possibly on 03/04/2018 or even later, but her 

level was high and the highest level we have was 25.8, which was on 03/04/2018 

but on 03/10/2018 it was 23.9.  The patient has been actively using IV drugs 

before she came to the hospital and noticed to have more swelling in her legs.  

She is complaining of generalized body pain.  There is no nausea, vomiting.  

Denies any diarrhea.  Her appetite is normal.


Additional Remarks


Continue with bilateral lower extremity edema however slightly improved. 


 (Gellermann,Diane M. ARNP)





Review of Systems


General


Constitutional:  Fatigue


 (Gellermann,Diane M. ARNP)





Respiratory


Lungs:  SOB


Respiratory Remarks


improving


 (Gellermann,Diane M. ARNP)





Cardiovascular


Cardiac:  Edema, SCOTT


Cardiac Remarks


Denies CP


 (Gellermann,Diane M. ARNP)





Gastrointestinal


GI Remarks


denies abdominal pain


 (Gellermann,Diane M. ARNP)





Objective Data


Data





Vital Signs








  Date Time  Temp Pulse Resp B/P (MAP) Pulse Ox O2 Delivery O2 Flow Rate FiO2


 


3/26/18 12:00      Room Air  


 


3/26/18 12:00     93 Nasal Cannula 2.00 


 


3/26/18 12:00 97.9 104 20 96/66 (76) 99   


 


3/26/18 08:00 98.0 108 20 96/59 (71) 93   


 


3/26/18 08:00      Room Air  


 


3/26/18 03:41  100      


 


3/26/18 03:41 98.6 102 20 107/63 (78) 97   


 


3/25/18 23:49      Room Air  


 


3/25/18 23:48 98.7 102 18 94/57 (69) 93   


 


3/25/18 23:41  99      


 


3/25/18 20:59 98.4 100 20 97/62 (74) 95   


 


3/25/18 20:58     95 Room Air  


 


3/25/18 20:00      Room Air  


 


3/25/18 19:43  98      


 


3/25/18 19:00  101      


 


3/25/18 16:00 97.5 101 20 89/65 (73) 95   








 (Gellermann,Diane M. ARNP)


-:  


3/26/18 0330                                                                   

             3/26/18 0330








Physical Exam


General


Appearance:  No Acute Distress, Anxious


 (Gellermann,Diane M. ARNP)





Eyes


Eye Exam:  Pupils Equal


 (Gellermann,Diane M. ARNP)





Throat


Throat Exam:  Oral Mucosa Pink & Moist


 (Gellermann,Diane M. ARNP)





Neck


Neck Exam:  Neck Supple


 (Gellermann,Diane M. ARNP)





Pulmonary


Resp Exam:  Clear Bilaterally


 (Gellermann,Diane M. ARNP)





Cardiology


CV Exam:  Regular, Normal Sinus Rhythm


 (Gellermann,Diane M. ARNP)





Gastrointestinal/Abdomen


GI Exam:  Soft, Non-Tender


 (Gellermann,Diane M. ARNP)





Integumentary


Skin Exam:  Dry, Intact


 (Gellermann,Diane M. ARNP)





Extremeties


Extremities Exam:  Pitting Edema


 (Gellermann,Diane M. ARNP)





Neurologic


Neuro Exam:  Alert, Awake, Oriented


 (Gellermann,Diane M. ARNP)





Psychiatric


Psych Exam:  Appropriate Responses


 (Gellermann,Diane M. ARNP)





Assessment/Plan


Discussed Condition With:  Patient


Assessment Summary:  NOÉ/Acute Renal Failure


Problem List:  


(1) Acute kidney injury


ICD Codes:  N17.9 - Acute kidney failure, unspecified


Status:  Acute


Plan:  


NOÉ likely due to vancomycin toxicity,  post-infectious GN is unlikely.


Possibility of ATN or pre renal.


Urine eosinophils negative, complements wnl


Creatinine stable


Good UOP





Plan


Creatinine is stable 


Hypokalemia replacement given 


Continue Lasix 40 mg IV BID  


Continue to monitor BMP and UOP


Encouraged to elevate legs throughout day


Avoid nephrotoxins when possible





(2) Prosthetic valve endocarditis


ICD Codes:  T82.6XXA - Infection and inflammatory reaction due to cardiac valve 

prosthesis, initial encounter


Status:  Acute


Plan:  septic endocarditis with strep viridans


IV drug use, pulmonary emboli as well.





Continues antibiotics per ID


continue renal dosing antibiotics.





(3) Peripheral arterial occlusive disease


ICD Codes:  I77.9 - Disorder of arteries and arterioles, unspecified


Plan:  seen with vascular, continue medical management


Hold contrast studies as possible.





(4) Hepatitis C


ICD Codes:  B19.20 - Unspecified viral hepatitis C without hepatic coma


Status:  Chronic (Gellermann,Diane M. ARNP)


Problem List:  


(1) Acute kidney injury


ICD Codes:  N17.9 - Acute kidney failure, unspecified


Status:  Acute


Plan:  


NOÉ likely due to vancomycin toxicity,  post-infectious GN is unlikely.


Possibility of ATN or pre renal.


Urine eosinophils negative, complements wnl


Creatinine stable


Good UOP





Plan


Creatinine is stable 


Hypokalemia replacement given 


Continue Lasix 40 mg IV BID  


Continue to monitor BMP and UOP


Encouraged to elevate legs throughout day


Avoid nephrotoxins when possible.


Patient seen and examine, agree with above.


Continue diuretics and follow the BMP.





(2) Prosthetic valve endocarditis


ICD Codes:  T82.6XXA - Infection and inflammatory reaction due to cardiac valve 

prosthesis, initial encounter


Status:  Acute


Plan:  septic endocarditis with strep viridans


IV drug use, pulmonary emboli as well.





Continues antibiotics per ID


continue renal dosing antibiotics.





(3) Peripheral arterial occlusive disease


ICD Codes:  I77.9 - Disorder of arteries and arterioles, unspecified


Plan:  seen with vascular, continue medical management


Hold contrast studies as possible.





(4) Hepatitis C


ICD Codes:  B19.20 - Unspecified viral hepatitis C without hepatic coma


Status:  Chronic (Melissa Layne MD)





Problem Qualifiers





(1) Prosthetic valve endocarditis:  


Qualified Codes:  T82.6XXA - Infection and inflammatory reaction due to cardiac 

valve prosthesis, initial encounter








Gellermann,Diane M. ARNP Mar 26, 2018 15:10


Melissa Layne MD Mar 27, 2018 21:55

## 2018-03-27 VITALS — HEART RATE: 106 BPM

## 2018-03-27 VITALS
OXYGEN SATURATION: 97 % | HEART RATE: 106 BPM | SYSTOLIC BLOOD PRESSURE: 107 MMHG | DIASTOLIC BLOOD PRESSURE: 68 MMHG | TEMPERATURE: 98.1 F | RESPIRATION RATE: 20 BRPM

## 2018-03-27 VITALS
OXYGEN SATURATION: 96 % | TEMPERATURE: 98.1 F | HEART RATE: 96 BPM | SYSTOLIC BLOOD PRESSURE: 89 MMHG | DIASTOLIC BLOOD PRESSURE: 56 MMHG | RESPIRATION RATE: 20 BRPM

## 2018-03-27 VITALS
HEART RATE: 97 BPM | SYSTOLIC BLOOD PRESSURE: 94 MMHG | RESPIRATION RATE: 20 BRPM | TEMPERATURE: 99.1 F | DIASTOLIC BLOOD PRESSURE: 59 MMHG | OXYGEN SATURATION: 95 %

## 2018-03-27 VITALS
OXYGEN SATURATION: 94 % | SYSTOLIC BLOOD PRESSURE: 99 MMHG | TEMPERATURE: 97.4 F | HEART RATE: 103 BPM | DIASTOLIC BLOOD PRESSURE: 59 MMHG | RESPIRATION RATE: 20 BRPM

## 2018-03-27 VITALS
OXYGEN SATURATION: 94 % | RESPIRATION RATE: 19 BRPM | SYSTOLIC BLOOD PRESSURE: 111 MMHG | TEMPERATURE: 98.2 F | HEART RATE: 109 BPM | DIASTOLIC BLOOD PRESSURE: 75 MMHG

## 2018-03-27 VITALS
HEART RATE: 106 BPM | RESPIRATION RATE: 20 BRPM | TEMPERATURE: 97.6 F | SYSTOLIC BLOOD PRESSURE: 92 MMHG | DIASTOLIC BLOOD PRESSURE: 57 MMHG | OXYGEN SATURATION: 95 %

## 2018-03-27 VITALS — HEART RATE: 100 BPM

## 2018-03-27 VITALS — HEART RATE: 101 BPM

## 2018-03-27 VITALS — HEART RATE: 105 BPM

## 2018-03-27 VITALS — OXYGEN SATURATION: 97 %

## 2018-03-27 LAB
BUN SERPL-MCNC: 19 MG/DL (ref 7–18)
CALCIUM SERPL-MCNC: 9.1 MG/DL (ref 8.5–10.1)
CHLORIDE SERPL-SCNC: 91 MEQ/L (ref 98–107)
CREAT SERPL-MCNC: 1.15 MG/DL (ref 0.5–1)
ERYTHROCYTE [DISTWIDTH] IN BLOOD BY AUTOMATED COUNT: 23.2 % (ref 11.6–17.2)
GFR SERPLBLD BASED ON 1.73 SQ M-ARVRAT: 53 ML/MIN (ref 89–?)
GLUCOSE SERPL-MCNC: 99 MG/DL (ref 74–106)
HCO3 BLD-SCNC: 30.8 MEQ/L (ref 21–32)
HCT VFR BLD CALC: 24.8 % (ref 35–46)
HGB BLD-MCNC: 7.9 GM/DL (ref 11.6–15.3)
INR PPP: 2.4 RATIO
MAGNESIUM SERPL-MCNC: 1.6 MG/DL (ref 1.5–2.5)
MCH RBC QN AUTO: 24 PG (ref 27–34)
MCHC RBC AUTO-ENTMCNC: 31.8 % (ref 32–36)
MCV RBC AUTO: 75.5 FL (ref 80–100)
PHOSPHATE SERPL-MCNC: 3.2 MG/DL (ref 2.5–4.9)
PLATELET # BLD: 146 TH/MM3 (ref 150–450)
PMV BLD AUTO: 8.6 FL (ref 7–11)
PROTHROMBIN TIME: 24.4 SEC (ref 9.8–11.6)
RBC # BLD AUTO: 3.29 MIL/MM3 (ref 4–5.3)
SODIUM SERPL-SCNC: 130 MEQ/L (ref 136–145)
WBC # BLD AUTO: 12.3 TH/MM3 (ref 4–11)

## 2018-03-27 RX ADMIN — ALBUMIN HUMAN SCH MLS/HR: 0.25 SOLUTION INTRAVENOUS at 18:08

## 2018-03-27 RX ADMIN — MORPHINE SULFATE SCH MG: 30 TABLET, EXTENDED RELEASE ORAL at 09:18

## 2018-03-27 RX ADMIN — STANDARDIZED SENNA CONCENTRATE AND DOCUSATE SODIUM SCH TAB: 8.6; 5 TABLET, FILM COATED ORAL at 21:00

## 2018-03-27 RX ADMIN — WARFARIN SODIUM SCH MG: 1 TABLET ORAL at 15:25

## 2018-03-27 RX ADMIN — HYDROMORPHONE HYDROCHLORIDE PRN MG: 2 INJECTION INTRAMUSCULAR; INTRAVENOUS; SUBCUTANEOUS at 18:09

## 2018-03-27 RX ADMIN — HYDROMORPHONE HYDROCHLORIDE PRN MG: 2 INJECTION INTRAMUSCULAR; INTRAVENOUS; SUBCUTANEOUS at 03:28

## 2018-03-27 RX ADMIN — ALBUTEROL SULFATE PRN MG: 2.5 SOLUTION RESPIRATORY (INHALATION) at 11:17

## 2018-03-27 RX ADMIN — STANDARDIZED SENNA CONCENTRATE AND DOCUSATE SODIUM SCH TAB: 8.6; 5 TABLET, FILM COATED ORAL at 09:00

## 2018-03-27 RX ADMIN — AMPICILLIN SODIUM SCH MLS/HR: 2 INJECTION, POWDER, FOR SOLUTION INTRAMUSCULAR; INTRAVENOUS at 14:46

## 2018-03-27 RX ADMIN — Medication SCH ML: at 09:00

## 2018-03-27 RX ADMIN — HEPARIN SODIUM PRN MLS/HR: 10000 INJECTION, SOLUTION INTRAVENOUS at 03:30

## 2018-03-27 RX ADMIN — AMPICILLIN SODIUM SCH MLS/HR: 2 INJECTION, POWDER, FOR SOLUTION INTRAMUSCULAR; INTRAVENOUS at 09:17

## 2018-03-27 RX ADMIN — ALBUMIN HUMAN SCH MLS/HR: 0.25 SOLUTION INTRAVENOUS at 06:43

## 2018-03-27 RX ADMIN — DAPTOMYCIN SCH MLS/HR: 500 INJECTION, POWDER, LYOPHILIZED, FOR SOLUTION INTRAVENOUS at 21:06

## 2018-03-27 RX ADMIN — MORPHINE SULFATE SCH MG: 30 TABLET, EXTENDED RELEASE ORAL at 21:07

## 2018-03-27 RX ADMIN — HYDROMORPHONE HYDROCHLORIDE PRN MG: 2 INJECTION INTRAMUSCULAR; INTRAVENOUS; SUBCUTANEOUS at 08:38

## 2018-03-27 RX ADMIN — POTASSIUM CHLORIDE SCH MEQ: 750 TABLET, FILM COATED, EXTENDED RELEASE ORAL at 09:18

## 2018-03-27 RX ADMIN — Medication SCH ML: at 21:06

## 2018-03-27 RX ADMIN — HYDROMORPHONE HYDROCHLORIDE PRN MG: 2 INJECTION INTRAMUSCULAR; INTRAVENOUS; SUBCUTANEOUS at 12:53

## 2018-03-27 RX ADMIN — HYDROMORPHONE HYDROCHLORIDE PRN MG: 2 INJECTION INTRAMUSCULAR; INTRAVENOUS; SUBCUTANEOUS at 22:11

## 2018-03-27 RX ADMIN — CHLORHEXIDINE GLUCONATE SCH PACK: 500 CLOTH TOPICAL at 03:06

## 2018-03-27 RX ADMIN — FUROSEMIDE SCH MG: 10 INJECTION, SOLUTION INTRAMUSCULAR; INTRAVENOUS at 09:17

## 2018-03-27 RX ADMIN — AMPICILLIN SODIUM SCH MLS/HR: 2 INJECTION, POWDER, FOR SOLUTION INTRAMUSCULAR; INTRAVENOUS at 21:06

## 2018-03-27 RX ADMIN — POTASSIUM CHLORIDE SCH MEQ: 750 TABLET, FILM COATED, EXTENDED RELEASE ORAL at 21:07

## 2018-03-27 RX ADMIN — FUROSEMIDE SCH MG: 10 INJECTION, SOLUTION INTRAMUSCULAR; INTRAVENOUS at 18:09

## 2018-03-27 RX ADMIN — AMPICILLIN SODIUM SCH MLS/HR: 2 INJECTION, POWDER, FOR SOLUTION INTRAMUSCULAR; INTRAVENOUS at 01:41

## 2018-03-27 NOTE — PD.VS.PN
Subjective


Subjective/Hospital Course


Foot still edematous, symmetric with other leg.


Pt able to move and per her, bear weight


no increase in pain





Objective


Vitals/I&O











  Date Time  Temp Pulse Resp B/P (MAP) Pulse Ox O2 Delivery O2 Flow Rate FiO2


 


3/27/18 04:11   18     


 


3/27/18 04:06  105      


 


3/27/18 04:00 98.2 109 19 111/75 (87) 94   


 


3/27/18 00:00  99      


 


3/27/18 00:00 99.1 97 20 94/59 (71) 95   


 


3/26/18 22:16   19     


 


3/26/18 21:10      Room Air  


 


3/26/18 20:14  100      


 


3/26/18 20:00 99.6 99 19 96/54 (68) 95   


 


3/26/18 16:00  104      


 


3/26/18 16:00 97.4 104 20 102/66 (78) 94   


 


3/26/18 16:00      Room Air  


 


3/26/18 12:00      Room Air  


 


3/26/18 12:00  109      


 


3/26/18 12:00     93 Nasal Cannula 2.00 


 


3/26/18 12:00 97.9 104 20 96/66 (76) 99   


 


3/26/18 08:00 98.0 108 20 96/59 (71) 93   


 


3/26/18 08:00  108      


 


3/26/18 08:00      Room Air  














 3/27/18 3/27/18 3/27/18





 07:00 15:00 23:00


 


Intake Total 450 ml  


 


Balance 450 ml  








Physical Exam


B LE edematous with some erythema


cyanosis/congestion of toes improved


motor intact


Laboratory





Laboratory Tests








Test


  3/26/18


20:25 3/27/18


05:35 3/27/18


06:35


 


Potassium Level 3.9  4.2  


 


White Blood Count  12.3  


 


Red Blood Count  3.29  


 


Hemoglobin  7.9  


 


Hematocrit  24.8  


 


Mean Corpuscular Volume  75.5  


 


Mean Corpuscular Hemoglobin  24.0  


 


Mean Corpuscular Hemoglobin


Concent 


  31.8 


  


 


 


Red Cell Distribution Width  23.2  


 


Platelet Count  146  


 


Mean Platelet Volume  8.6  


 


Blood Urea Nitrogen  19  


 


Creatinine  1.15  


 


Random Glucose  99  


 


Calcium Level  9.1  


 


Phosphorus Level  3.2  


 


Magnesium Level  1.6  


 


Sodium Level  130  


 


Chloride Level  91  


 


Carbon Dioxide Level  30.8  


 


Anion Gap  8  


 


Estimat Glomerular Filtration


Rate 


  53 


  


 


 


Prothrombin Time   24.4 


 


Prothromb Time International


Ratio 


  


  2.4 


 


 


Activated Partial


Thromboplast Time 


  


  37.8 


 














 Date/Time


Source Procedure


Growth Status


 


 


 3/4/18 05:50


Blood Peripheral Aerobic Blood Culture - Final


NO GROWTH IN 5 DAYS Complete


 


 3/4/18 05:50


Blood Peripheral Anaerobic Blood Culture - Final


NO GROWTH IN 5 DAYS Complete


 


 3/5/18 12:28


Stool Stool Stool Occult Blood (GILA) - Final


HEMOCCULT NEGATIVE Complete





 3/3/18 21:50


Sputum Expectorated Sputum Gram Stain - Final Complete


 


 3/3/18 21:50


Sputum Expectorated Sputum Sputum Culture - Final


HEAVY GROWTH NORMAL RESPIRATORY MYLENE Complete


 


 2/23/18 22:30


Urine Suprapubic Urine Urine Culture - Final


NO GROWTH IN 48 HOURS. Complete








Imaging





Last 48 hours Impressions








Chest X-Ray 3/26/18 0000 Signed





Impressions: 





 Service Date/Time:  Monday, March 26, 2018 13:53 - CONCLUSION: No significant 





 change in the right lower lobe infiltrate.     Ollie Paige MD 











Assessment and Plan


Plan


37/F w/ focal SFA occlusion but good distal opacification on CTA 


New discoloration of toes likely venous engorgement


ABIs show moderate occlusive disease





1.Anticoagulate for valve: agree with goal INR 2.5-3.5


2. will arrange outpatient f/u with ABIs for PAD surveillance - no indication 

for intervention presently


3. Elevation as you are doing for edema


Discharge Planning


Pt on anticoagulation but anytime from vascular surgery standpoint ok to WBAT


Arrange f/u in 2-3 weeks with ABIs











Michi Sears MD Mar 27, 2018 07:25

## 2018-03-27 NOTE — HHI.PR
Subjective


Remarks


Patient still c/o left side chest pain but improved


mild sob


+ BL lower extremity edema


c/o of throbing pain in LLE.





Objective


Vitals





Vital Signs








  Date Time  Temp Pulse Resp B/P (MAP) Pulse Ox O2 Delivery O2 Flow Rate FiO2


 


3/27/18 12:00  106      


 


3/27/18 12:00 97.6 106 20 92/57 (69) 95   


 


3/27/18 11:05     97 Nasal Cannula 2.00 


 


3/27/18 10:00      Nasal Cannula 2.00 


 


3/27/18 08:00  103      


 


3/27/18 08:00 97.4 104 20 99/59 (72) 94   


 


3/27/18 04:11   18     


 


3/27/18 04:06  105      


 


3/27/18 04:00      Room Air  


 


3/27/18 04:00 98.2 109 19 111/75 (87) 94   


 


3/27/18 00:00  99      


 


3/27/18 00:00      Room Air  


 


3/27/18 00:00 99.1 97 20 94/59 (71) 95   


 


3/26/18 22:16   19     


 


3/26/18 21:10      Room Air  


 


3/26/18 20:14  100      


 


3/26/18 20:00 99.6 99 19 96/54 (68) 95   


 


3/26/18 16:00  104      


 


3/26/18 16:00 97.4 104 20 102/66 (78) 94   


 


3/26/18 16:00      Room Air  














I/O      


 


 3/26/18 3/26/18 3/26/18 3/27/18 3/27/18 3/27/18





 07:00 15:00 23:00 07:00 15:00 23:00


 


Intake Total 550 ml  780 ml 450 ml  


 


Balance 550 ml  780 ml 450 ml  


 


      


 


Intake Oral   480 ml 100 ml  


 


IV Total 550 ml  300 ml 350 ml  


 


# Voids   6 3  


 


# Bowel Movements   2 1  








Result Diagram:  


3/27/18 0535                                                                   

             3/27/18 0535





Imaging





Last Impressions








Chest X-Ray 3/26/18 0000 Signed





Impressions: 





 Service Date/Time:  Monday, March 26, 2018 13:53 - CONCLUSION: No significant 





 change in the right lower lobe infiltrate.     Ollie Paige MD 


 


Catheter Placement X-Ray 3/19/18 0000 Signed





Impressions: 





 Service Date/Time:  Monday, March 19, 2018 11:16 - CONCLUSION: Uncomplicated 





 venous catheter change as above.     Rj Whitten MD 


 


Head CT 3/3/18 0000 Signed





Impressions: 





 Service Date/Time:  Saturday, March 3, 2018 10:22 - CONCLUSION:  1. Focal area 





 of decreased density involving the right parietal lobe. As a new finding from 





 the prior exam. This could relate to a small infarct. MRI of the brain with 





 gadolinium suggested to further evaluate. 2. Small lacunar infarction 

involving 





 the left centrum semiovale.     Scooter Dawson Jr., MD 


 


CT Angiography 3/3/18 0000 Signed





Impressions: 





 Service Date/Time:  Saturday, March 3, 2018 10:28 - CONCLUSION:  There 

extensive 





 pulmonary emboli bilaterally. There is near complete cut off of the right 

lower 





 lobe pulmonary artery. Prominent filling defects on the left as well. These 





 findings were relayed to Dr. Bustos immediately at the completion of the study.  





 Scattered pulmonary infiltrates, small pleural effusion and extensive 





 mediastinal lymphadenopathy as described above.      Won Sharp MD 


 


Brain MRI 3/3/18 0000 Signed





Impressions: 





 Service Date/Time:  Saturday, March 3, 2018 18:04 - CONCLUSION:  Deterioration 





 in the appearance of the scan.  At least 3 focal areas of abnormal contrast 





 enhancement are present scattered to in both hemispheres.  Given the history 





 this most likely represents developing areas cerebritis.  Exam is compromised 

by 





 an uncooperative patient and motion artifact.  These 2 factors make detection 

of 





 other abnormalities such as cortical cephalitis and meningitis difficult to 





 exclude.  Cannot exclude hemorrhagic lesions as well.  There is no mass effect 





 evident.      Jorge Diane MD 


 


Chest CT 2/26/18 0000 Signed





Impressions: 





 Service Date/Time:  Monday, February 26, 2018 12:41 - CONCLUSION:  1. There 

are 





 at least 5 pulmonary nodules present bilaterally with the largest measuring 19 





 mm. None demonstrate cavitation but it is possible that these represent septic 





 emboli. Suggest followup to assess for change. 2. Splenomegaly with subtle 





 wedge-shaped areas of low-density in the mid spleen which could represent 





 infarcts. 3. Mild cardiomegaly in this patient post aortic valve replacement. 





 Low density of the cardiac blood pool suggests anemia.      Ron Melgar MD 


 


Aorta w/Runoff CTA 2/25/18 0000 Signed





Impressions: 





 Service Date/Time:  Sunday, February 25, 2018 12:47 - CONCLUSION:  Left renal 





 cortical infarction. 5-6 cm length total occlusion of the left superficial 





 femoral artery in the proximal thigh. Nodular parenchymal opacities in the 

lung 





 bases bilaterally See above discussion.     Ron Cruz MD 


 


Renal Ultrasound 2/24/18 0000 Signed





Impressions: 





 Service Date/Time:  Saturday, February 24, 2018 10:53 - CONCLUSION:  1. No 





 evidence of hydronephrosis. 2. Thickening of the urinary bladder wall a 1.1 

cm. 





 3. Prominent splenomegaly.     Elgin Walton MD 


 


Breast Ultrasound 2/24/18 0000 Signed





Impressions: 





 Service Date/Time:  Saturday, February 24, 2018 10:48 - CONCLUSION:  Negative 





 targeted right breast ultrasound examination.     Ron Brown MD 


 


Lower Extremity Ultrasound 2/23/18 0000 Signed





Impressions: 





 Service Date/Time:  Friday, February 23, 2018 21:22 - CONCLUSION:  1. No 





 sonographic evidence for lower extremity DVT. 2. Incidental note of bilateral 





 inguinal adenopathy with the largest on the right measuring 3.3 cm and the 





 largest on the left measuring 2.6 cm. This finding is nonspecific but is 





 typically reactive in etiology.     Rj Whitten MD 








Objective Remarks


AAOx3


NAD


Clear lungs BL


S1S2 4/6 systolic ejection murmur


+3 BL lower extremity edema. Erythema improved, left foot very tender to 

palpation.


. Dark discoloration resolved. pulses not palpable due to edema. Good capillary 

refill.


Medications and IVs





Current Medications








 Medications


  (Trade)  Dose


 Ordered  Sig/Nikhil


 Route  Start Time


 Stop Time Status Last Admin


 


  (NS Flush)  2 ml  UNSCH  PRN


 IV FLUSH  2/23/18 22:00


    3/25/18 06:32


 


 


  (NS Flush)  2 ml  BID


 IV FLUSH  2/24/18 09:00


    3/25/18 20:28


 


 


  (Tylenol)  650 mg  Q6H  PRN


 PO  2/23/18 22:00


    3/19/18 01:19


 


 


  (Zofran Inj)  4 mg  Q6H  PRN


 IV PUSH  2/23/18 22:00


    3/8/18 21:22


 


 


  (Restoril)  15 mg  HS  PRN


 PO  2/23/18 22:00


    3/6/18 22:08


 


 


 Miscellaneous


 Information  1  Q361D


 XX  2/23/18 22:00


     


 


 


  (Chlorhexidine


 2% Cloth)  


 Taper  DAILY@04


 TOP  2/24/18 04:00


 2/20/19 03:59  3/6/18 03:54


 


 


  (Chlorhexidine


 2% Cloth)  3 pack  UNSCH  PRN


 TOP  2/23/18 22:00


     


 


 


  (Arlen-Colace)  1 tab  BID


 PO  2/24/18 09:00


    3/24/18 20:50


 


 


  (Milk Of


 Magnesia Liq)  30 ml  Q12H  PRN


 PO  2/23/18 22:00


     


 


 


  (Senokot)  17.2 mg  Q12H  PRN


 PO  2/23/18 22:00


     


 


 


  (Dulcolax Supp)  10 mg  DAILY  PRN


 RECTAL  2/23/18 22:00


     


 


 


  (Lactulose Liq)  30 ml  DAILY  PRN


 PO  2/23/18 22:00


     


 


 


 Ampicillin Sodium


 2000 mg/Sodium


 Chloride  100 ml @ 


 400 mls/hr  Q6H


 IV  2/24/18 14:00


    3/27/18 14:46


 


 


  (Imodium)  2 mg  Q4H  PRN


 PO  2/26/18 16:15


    2/28/18 14:32


 


 


  (Albuterol Neb)  2.5 mg  Q2HR NEB  PRN


 NEB  2/27/18 11:30


    3/27/18 11:17


 


 


  (Norco  Mg)  1 tab  Q4H  PRN


 PO  3/4/18 15:00


    3/9/18 05:02


 


 


  (Dilaudid Pf Inj)  1 mg  Q4H  PRN


 IV PUSH  3/7/18 19:00


    3/27/18 12:53


 


 


  (KCl)  60 meq  Q12HR


 PO  3/14/18 09:45


    3/27/18 09:18


 


 


 Albumin Human  100 ml @ 


 60 mls/hr  Q12H


 IV  3/18/18 18:00


    3/27/18 06:43


 


 


  (Lasix Inj)  40 mg  BID@09,18


 IV PUSH  3/21/18 09:00


    3/27/18 09:17


 


 


 Daptomycin 500 mg/


 Sodium Chloride  100 ml @ 


 200 mls/hr  Q24H


 IV  3/22/18 20:00


    3/26/18 21:11


 


 


 Pharmacy Profile


 Note  0 ml @ 0


 mls/hr  UNSCH


 OTHER  3/23/18 16:15


     


 


 


 Heparin Sodium/


 Dextrose  250 ml @ 


 18 mls/hr  TITRATE  PRN


 IV  3/23/18 16:30


    3/27/18 03:30


 


 


  (Pill Splitter)  1 ea  UNSCH  PRN


 OTHER  3/25/18 11:15


     


 


 


  (Oramorph Sr)  30 mg  Q12HR


 PO  3/26/18 21:00


    3/27/18 09:18


 


 


  (Coumadin)  1 mg  DAILY@16


 PO  3/27/18 16:00


     


 








Date of Insertion:  Mar 19, 2018


Line:  Central Venous Catheter


Side:  Right


Location:  Internal, Jugular





A/P


Problem List:  


(1) Prosthetic valve endocarditis


ICD Code:  T82.6XXA - Infection and inflammatory reaction due to cardiac valve 

prosthesis, initial encounter


Status:  Acute


(2) Bacteremia


ICD Code:  R78.81 - Bacteremia


(3) Septic pulmonary embolism


ICD Code:  I26.90 - Septic pulmonary embolism without acute cor pulmonale


Status:  Acute


(4) IVDU (intravenous drug user)


ICD Code:  F19.90 - Other psychoactive substance use, unspecified, uncomplicated


(5) Anemia


ICD Code:  D64.9 - Anemia, unspecified


Status:  Chronic


(6) NOÉ (acute kidney injury)


ICD Code:  N17.9 - Acute kidney failure, unspecified


Plan:  Creatinine initially worsening from 1.52-1.57.  Nephrology consulted.  

Possible ATN from vancomycin toxicity or infection.  Urine eosinophils were 

ordered and negative.  Diuretics managed by nephrology.  Initially on Lasix 20 

mg IV twice daily along with albumin.  Creatinine Worsening on 3/19 for 1.57-

1.62.  Later Lasix dose was increased to 40 mg IV twice daily  and metolazone 

added to the regimen.  Creatinine then started trending down to 1.34 on 3/23.


3/24 creatinine slightly elevated however stable at 1.37.  Continue diuretics 

as per nephrology.  Patient was placed on 1 L fluid restriction on an effort to 

keep the patient on an negative fluid balance.


Continue to monitor BUN and creatinine, strict I's and O's, avoid nephrotoxins.


3/27 Creatinine stable at 1.15. Good urine output.





(7) Cellulitis of left lower extremity


ICD Code:  L03.116 - Cellulitis of left lower limb


Plan:  Continue IV antibiotics as per ID.


Cellulitis seems to be improving.





(8) Purple toe syndrome


ICD Code:  I75.029 - Atheroembolism of unspecified lower extremity


Plan:  Patient has a blue/purple discoloration of the left toes.  


3/24 INR 2.0.  Dosing as per pharmacy.  Continue IV heparin bridge until INR is 

more than 2.5.


Appreciate vascular surgery recommendations.  Follow-up ABIs, continue 

neurovascular checks.  Out of bed ad jordin., continue anticoagulation.


3/25 Ankle-brachial indices are decreased and below normal. The toe brachial 

indices could not be obtained given the pulses could not be obtained in the 

first digits.


Continue heparin bridge to Coumadin. Goal inr 2.5 to 3.5. Management as per 

vascular surgery.





(9) Pleuritic chest pain


ICD Code:  R07.81 - Pleurodynia


Plan:  Obtain a chest x-ray.


Patient currently on morphine sulfate 15 mg p.o. every 12 hours.  I will 

increase to 30 minutes p.o. every 12 hours.  Continue Norco as needed as well 

as Dilaudid.





Assessment and Plan


(1) Prosthetic valve endocarditis


Plan:  Continue antibiotics as per ID.


The patient was initially on IV ampicillin.  On 3/19 ID started Rocephin for 

pulmonary coverage since chest x-ray showed a developing infiltrate.


On 3/20 palliative care consulted by hematology.  As per hematology 

documentation the patient requested to talk to palliative care to see if she is 

a hospice candidate.


Discussed with palliative care who states that the patient's goals are 

aggressive and she wants to get better.  The patient definitely is a hospice 

candidate, however her goals are not hospice appropriate.  Patient was started 

on Oramorph SR 30 mg p.o. twice a day for pain control.


Continue heparin drip until the patient's INR reaches 2.5.  Goal INR should be 

2.5-3.5 since the patient has an aortic prosthetic valve.  Will reconsult 

vascular surgery.  Pedal pulses present by Doppler -obtained by me.





(2) Bacteremia


Plan:  Blood cultures obtained on 2/23 growing strep viridans.


Subsequent repeat blood cultures negative.


3/19 sp Exchange of Right IJ central line. 


Continue antibiotics as per ID recommendations.





(3) Septic pulmonary embolism


Plan:  As observed on a CT angiography of the chest obtained on 3/3/18.


chocardiogram on 3/3 showed an estimated EF of 50-55%.  


++severe tricuspid regurgitation with prosthesis in place.  


++2 x 4 cm mobile vegetation was seen on the valve.  The right atrium and right 

ventricle were normal in size and function


Hematology following.


Management of Coumadin as per hematology recommendations.


Goal INR 2.5-3.5 given the presence of a prosthetic aortic valve.





(4) IVDU (intravenous drug user)


Plan:  Counseled on drug cessation.





(5) Anemia


Plan:  Likely secondary to inflammation/infection.  No evidence of bleeding or 

hemolysis.


Monitor hemoglobin and transfuse as needed.


Discharge Planning


Continue to monitor on the medical floor.





Problem Qualifiers





(1) Prosthetic valve endocarditis:  


Qualified Codes:  T82.6XXA - Infection and inflammatory reaction due to cardiac 

valve prosthesis, initial encounter


(2) Anemia:  


Qualified Codes:  D63.8 - Anemia in other chronic diseases classified elsewhere


(3) Purple toe syndrome:  


Qualified Codes:  I75.022 - Atheroembolism of left lower extremity








Jesus Mae MD Mar 27, 2018 15:08

## 2018-03-27 NOTE — HHI.NPPN
Subjective


History of Present Illness


This is a 38-year-old female with past medical history of IV drug abuse, 

history of hepatitis C, narcotic dependency, came to the hospital with 

complaint of shortness of breath and swelling.  Nephrology called to see the 

patient because of elevated BUN and creatinine.  The patient has creatinine of 

4.5 on admission which improved and it was staying in the range of 0.6-0.8 

until 6 days ago, then it started going up again and now it is 1.7.  The 

patient has increased swelling 


of the legs and the patient was diagnosed with endocarditis and she has blood 

culture positive for Strep viridans.  Patient has been following with the ID 

and she was getting furosemide, which was stopped today this morning because of 

increased creatinine and she was on vancomycin and rifampicin.  The last dose 

of vancomycin seems to be given possibly on 03/04/2018 or even later, but her 

level was high and the highest level we have was 25.8, which was on 03/04/2018 

but on 03/10/2018 it was 23.9.  The patient has been actively using IV drugs 

before she came to the hospital and noticed to have more swelling in her legs.  

She is complaining of generalized body pain.  There is no nausea, vomiting.  

Denies any diarrhea.  Her appetite is normal.


Additional Remarks


Continue with bilateral lower extremity edema however slightly improved, not in 

distress.





Review of Systems


General


Constitutional:  Fatigue





Respiratory


Lungs:  SOB


Respiratory Remarks


improving





Cardiovascular


Cardiac:  Edema, SCOTT


Cardiac Remarks


Denies CP





Gastrointestinal


GI Remarks


denies abdominal pain





Objective Data


Data











 3/27/18 3/28/18





 19:00 07:00


 


  


 


# Voids 4 


 


# Bowel Movements 1 











Vital Signs








  Date Time  Temp Pulse Resp B/P (MAP) Pulse Ox O2 Delivery O2 Flow Rate FiO2


 


3/27/18 21:29      Nasal Cannula 2.00 


 


3/27/18 18:51      Nasal Cannula 2.00 


 


3/27/18 16:17  101      


 


3/27/18 16:00 98.1 96 20 89/56 (67) 96   


 


3/27/18 14:00      Nasal Cannula 2.00 


 


3/27/18 12:00  106      


 


3/27/18 12:00 97.6 106 20 92/57 (69) 95   


 


3/27/18 11:05     97 Nasal Cannula 2.00 


 


3/27/18 10:00      Nasal Cannula 2.00 


 


3/27/18 08:00  103      


 


3/27/18 08:00 97.4 104 20 99/59 (72) 94   


 


3/27/18 04:11   18     


 


3/27/18 04:06  105      


 


3/27/18 04:00      Room Air  


 


3/27/18 04:00 98.2 109 19 111/75 (87) 94   


 


3/27/18 00:00  99      


 


3/27/18 00:00      Room Air  


 


3/27/18 00:00 99.1 97 20 94/59 (71) 95   


 


3/26/18 22:16   19     








-:  


3/27/18 0535                                                                   

             3/27/18 0535








Physical Exam


General


Appearance:  No Acute Distress, Anxious





Eyes


Eye Exam:  Pupils Equal





Throat


Throat Exam:  Oral Mucosa Pink & Moist





Neck


Neck Exam:  Neck Supple





Pulmonary


Resp Exam:  Clear Bilaterally





Cardiology


CV Exam:  Regular, Normal Sinus Rhythm





Gastrointestinal/Abdomen


GI Exam:  Soft, Non-Tender





Integumentary


Skin Exam:  Dry, Intact





Extremeties


Extremities Exam:  Pitting Edema





Neurologic


Neuro Exam:  Alert, Awake, Oriented





Psychiatric


Psych Exam:  Appropriate Responses





Assessment/Plan


Discussed Condition With:  Patient


Assessment Summary:  NOÉ/Acute Renal Failure


Problem List:  


(1) Acute kidney injury


ICD Codes:  N17.9 - Acute kidney failure, unspecified


Status:  Acute


Plan:  


NOÉ likely due to vancomycin toxicity,  post-infectious GN is unlikely.


Possibility of ATN or pre renal.


Urine eosinophils negative, complements wnl


Creatinine stable


Good UOP





Plan


Creatinine is stable 


Hypokalemia replacement given 


Continue Lasix 40 mg IV BID  


Continue to monitor BMP and UOP


Encouraged to elevate legs throughout day


Avoid nephrotoxins when possible.


Continue diuretics, told to restrict fluid intake.





(2) Prosthetic valve endocarditis


ICD Codes:  T82.6XXA - Infection and inflammatory reaction due to cardiac valve 

prosthesis, initial encounter


Status:  Acute


Plan:  septic endocarditis with strep viridans


IV drug use, pulmonary emboli as well.





Continues antibiotics per ID


continue renal dosing antibiotics.





(3) Peripheral arterial occlusive disease


ICD Codes:  I77.9 - Disorder of arteries and arterioles, unspecified


Plan:  seen with vascular, continue medical management


Hold contrast studies as possible.





(4) Hepatitis C


ICD Codes:  B19.20 - Unspecified viral hepatitis C without hepatic coma


Status:  Chronic





Problem Qualifiers





(1) Prosthetic valve endocarditis:  


Qualified Codes:  T82.6XXA - Infection and inflammatory reaction due to cardiac 

valve prosthesis, initial encounter








Melissa Layne MD Mar 27, 2018 22:04

## 2018-03-28 VITALS
OXYGEN SATURATION: 98 % | TEMPERATURE: 97.7 F | HEART RATE: 107 BPM | DIASTOLIC BLOOD PRESSURE: 65 MMHG | SYSTOLIC BLOOD PRESSURE: 102 MMHG | RESPIRATION RATE: 18 BRPM

## 2018-03-28 VITALS
RESPIRATION RATE: 20 BRPM | HEART RATE: 102 BPM | SYSTOLIC BLOOD PRESSURE: 107 MMHG | OXYGEN SATURATION: 90 % | TEMPERATURE: 98.3 F | DIASTOLIC BLOOD PRESSURE: 57 MMHG

## 2018-03-28 VITALS — HEART RATE: 98 BPM

## 2018-03-28 VITALS
RESPIRATION RATE: 20 BRPM | DIASTOLIC BLOOD PRESSURE: 58 MMHG | OXYGEN SATURATION: 92 % | SYSTOLIC BLOOD PRESSURE: 94 MMHG | HEART RATE: 107 BPM

## 2018-03-28 VITALS — HEART RATE: 108 BPM

## 2018-03-28 VITALS
DIASTOLIC BLOOD PRESSURE: 55 MMHG | OXYGEN SATURATION: 100 % | RESPIRATION RATE: 20 BRPM | SYSTOLIC BLOOD PRESSURE: 112 MMHG | HEART RATE: 107 BPM | TEMPERATURE: 97.8 F

## 2018-03-28 VITALS
DIASTOLIC BLOOD PRESSURE: 57 MMHG | RESPIRATION RATE: 20 BRPM | HEART RATE: 113 BPM | OXYGEN SATURATION: 97 % | TEMPERATURE: 98 F | SYSTOLIC BLOOD PRESSURE: 102 MMHG

## 2018-03-28 VITALS
OXYGEN SATURATION: 92 % | DIASTOLIC BLOOD PRESSURE: 72 MMHG | HEART RATE: 102 BPM | SYSTOLIC BLOOD PRESSURE: 96 MMHG | TEMPERATURE: 97.6 F | RESPIRATION RATE: 18 BRPM

## 2018-03-28 VITALS — HEART RATE: 110 BPM

## 2018-03-28 LAB
BUN SERPL-MCNC: 18 MG/DL (ref 7–18)
CALCIUM SERPL-MCNC: 8.6 MG/DL (ref 8.5–10.1)
CHLORIDE SERPL-SCNC: 90 MEQ/L (ref 98–107)
CREAT SERPL-MCNC: 1.18 MG/DL (ref 0.5–1)
ERYTHROCYTE [DISTWIDTH] IN BLOOD BY AUTOMATED COUNT: 23.2 % (ref 11.6–17.2)
GFR SERPLBLD BASED ON 1.73 SQ M-ARVRAT: 52 ML/MIN (ref 89–?)
GLUCOSE SERPL-MCNC: 102 MG/DL (ref 74–106)
HCO3 BLD-SCNC: 31.2 MEQ/L (ref 21–32)
HCT VFR BLD CALC: 23.7 % (ref 35–46)
HGB BLD-MCNC: 7.5 GM/DL (ref 11.6–15.3)
INR PPP: 2.6 RATIO
MCH RBC QN AUTO: 24 PG (ref 27–34)
MCHC RBC AUTO-ENTMCNC: 31.6 % (ref 32–36)
MCV RBC AUTO: 75.9 FL (ref 80–100)
PLATELET # BLD: 106 TH/MM3 (ref 150–450)
PMV BLD AUTO: 8.5 FL (ref 7–11)
PROTHROMBIN TIME: 26.1 SEC (ref 9.8–11.6)
RBC # BLD AUTO: 3.11 MIL/MM3 (ref 4–5.3)
SODIUM SERPL-SCNC: 132 MEQ/L (ref 136–145)
WBC # BLD AUTO: 12.1 TH/MM3 (ref 4–11)

## 2018-03-28 RX ADMIN — ALBUMIN HUMAN SCH MLS/HR: 0.25 SOLUTION INTRAVENOUS at 06:02

## 2018-03-28 RX ADMIN — Medication SCH ML: at 20:18

## 2018-03-28 RX ADMIN — MORPHINE SULFATE SCH MG: 30 TABLET, EXTENDED RELEASE ORAL at 08:50

## 2018-03-28 RX ADMIN — HYDROMORPHONE HYDROCHLORIDE PRN MG: 2 INJECTION INTRAMUSCULAR; INTRAVENOUS; SUBCUTANEOUS at 02:21

## 2018-03-28 RX ADMIN — MORPHINE SULFATE SCH MG: 15 TABLET, EXTENDED RELEASE ORAL at 21:22

## 2018-03-28 RX ADMIN — POTASSIUM CHLORIDE SCH MEQ: 750 TABLET, FILM COATED, EXTENDED RELEASE ORAL at 08:50

## 2018-03-28 RX ADMIN — FUROSEMIDE SCH MG: 10 INJECTION, SOLUTION INTRAMUSCULAR; INTRAVENOUS at 08:50

## 2018-03-28 RX ADMIN — STANDARDIZED SENNA CONCENTRATE AND DOCUSATE SODIUM SCH TAB: 8.6; 5 TABLET, FILM COATED ORAL at 08:50

## 2018-03-28 RX ADMIN — HEPARIN SODIUM PRN MLS/HR: 10000 INJECTION, SOLUTION INTRAVENOUS at 02:29

## 2018-03-28 RX ADMIN — WARFARIN SODIUM SCH MG: 1 TABLET ORAL at 16:09

## 2018-03-28 RX ADMIN — STANDARDIZED SENNA CONCENTRATE AND DOCUSATE SODIUM SCH TAB: 8.6; 5 TABLET, FILM COATED ORAL at 08:51

## 2018-03-28 RX ADMIN — STANDARDIZED SENNA CONCENTRATE AND DOCUSATE SODIUM SCH TAB: 8.6; 5 TABLET, FILM COATED ORAL at 21:22

## 2018-03-28 RX ADMIN — AMPICILLIN SODIUM SCH MLS/HR: 2 INJECTION, POWDER, FOR SOLUTION INTRAMUSCULAR; INTRAVENOUS at 20:18

## 2018-03-28 RX ADMIN — METOLAZONE SCH MG: 2.5 TABLET ORAL at 21:22

## 2018-03-28 RX ADMIN — ALBUMIN HUMAN SCH MLS/HR: 0.25 SOLUTION INTRAVENOUS at 17:30

## 2018-03-28 RX ADMIN — Medication SCH ML: at 08:49

## 2018-03-28 RX ADMIN — AMPICILLIN SODIUM SCH MLS/HR: 2 INJECTION, POWDER, FOR SOLUTION INTRAMUSCULAR; INTRAVENOUS at 08:49

## 2018-03-28 RX ADMIN — POTASSIUM CHLORIDE SCH MEQ: 750 TABLET, FILM COATED, EXTENDED RELEASE ORAL at 21:21

## 2018-03-28 RX ADMIN — HYDROMORPHONE HYDROCHLORIDE PRN MG: 2 INJECTION INTRAMUSCULAR; INTRAVENOUS; SUBCUTANEOUS at 10:59

## 2018-03-28 RX ADMIN — HYDROMORPHONE HYDROCHLORIDE PRN MG: 2 INJECTION INTRAMUSCULAR; INTRAVENOUS; SUBCUTANEOUS at 07:09

## 2018-03-28 RX ADMIN — AMPICILLIN SODIUM SCH MLS/HR: 2 INJECTION, POWDER, FOR SOLUTION INTRAMUSCULAR; INTRAVENOUS at 02:22

## 2018-03-28 RX ADMIN — HYDROMORPHONE HYDROCHLORIDE PRN MG: 2 INJECTION INTRAMUSCULAR; INTRAVENOUS; SUBCUTANEOUS at 16:09

## 2018-03-28 RX ADMIN — FUROSEMIDE SCH MG: 10 INJECTION, SOLUTION INTRAMUSCULAR; INTRAVENOUS at 17:30

## 2018-03-28 RX ADMIN — DAPTOMYCIN SCH MLS/HR: 500 INJECTION, POWDER, LYOPHILIZED, FOR SOLUTION INTRAVENOUS at 21:21

## 2018-03-28 RX ADMIN — HYDROMORPHONE HYDROCHLORIDE PRN MG: 2 INJECTION INTRAMUSCULAR; INTRAVENOUS; SUBCUTANEOUS at 20:18

## 2018-03-28 RX ADMIN — AMPICILLIN SODIUM SCH MLS/HR: 2 INJECTION, POWDER, FOR SOLUTION INTRAMUSCULAR; INTRAVENOUS at 14:26

## 2018-03-28 RX ADMIN — CHLORHEXIDINE GLUCONATE SCH PACK: 500 CLOTH TOPICAL at 03:04

## 2018-03-28 NOTE — HHI.PR
Subjective


Remarks


Patient states she has throbbing 7 out of 10 pain in the left foot


Also states that her left leg is very tender and sensitive.


Pain in right sided chest is improving.


Patient is afebrile.





Objective


Vitals





Vital Signs








  Date Time  Temp Pulse Resp B/P (MAP) Pulse Ox O2 Delivery O2 Flow Rate FiO2


 


3/28/18 12:59      Room Air  


 


3/28/18 12:00 98.0 113 20 102/57 (72) 97   


 


3/28/18 12:00  103      


 


3/28/18 09:20      Room Air  


 


3/28/18 08:00  102      


 


3/28/18 08:00 98.3 107 20 107/57 (74) 90   


 


3/28/18 04:05  108      


 


3/28/18 04:00      Room Air  


 


3/28/18 04:00 97.8 107 20 112/55 (74) 100   


 


3/28/18 03:04   19     


 


3/28/18 00:00 97.7 107 18 102/65 (77) 98   


 


3/28/18 00:00      Room Air  


 


3/27/18 23:58  100      


 


3/27/18 23:08   19     


 


3/27/18 21:29      Nasal Cannula 2.00 


 


3/27/18 21:05      Room Air  


 


3/27/18 20:00 98.1 106 20 107/68 (81) 97   


 


3/27/18 19:50  106      


 


3/27/18 18:51      Nasal Cannula 2.00 


 


3/27/18 16:17  101      


 


3/27/18 16:00 98.1 96 20 89/56 (67) 96   


 


3/27/18 14:00      Nasal Cannula 2.00 














I/O      


 


 3/27/18 3/27/18 3/27/18 3/28/18 3/28/18 3/28/18





 07:00 15:00 23:00 07:00 15:00 23:00


 


Intake Total 450 ml  300 ml 200 ml  


 


Output Total    1700 ml  


 


Balance 450 ml  300 ml -1500 ml  


 


      


 


Intake Oral 100 ml   100 ml  


 


IV Total 350 ml  300 ml 100 ml  


 


Output Urine Total    1700 ml  


 


# Voids 3  4   


 


# Bowel Movements 1  1 1  








Result Diagram:  


3/28/18 0600                                                                   

             3/28/18 0600





Imaging





Last Impressions








Chest X-Ray 3/26/18 0000 Signed





Impressions: 





 Service Date/Time:  Monday, March 26, 2018 13:53 - CONCLUSION: No significant 





 change in the right lower lobe infiltrate.     Ollie Paige MD 


 


Catheter Placement X-Ray 3/19/18 0000 Signed





Impressions: 





 Service Date/Time:  Monday, March 19, 2018 11:16 - CONCLUSION: Uncomplicated 





 venous catheter change as above.     Rj Whitten MD 


 


Head CT 3/3/18 0000 Signed





Impressions: 





 Service Date/Time:  Saturday, March 3, 2018 10:22 - CONCLUSION:  1. Focal area 





 of decreased density involving the right parietal lobe. As a new finding from 





 the prior exam. This could relate to a small infarct. MRI of the brain with 





 gadolinium suggested to further evaluate. 2. Small lacunar infarction 

involving 





 the left centrum semiovale.     Scootre Dawson Jr., MD 


 


CT Angiography 3/3/18 0000 Signed





Impressions: 





 Service Date/Time:  Saturday, March 3, 2018 10:28 - CONCLUSION:  There 

extensive 





 pulmonary emboli bilaterally. There is near complete cut off of the right 

lower 





 lobe pulmonary artery. Prominent filling defects on the left as well. These 





 findings were relayed to Dr. Bustos immediately at the completion of the study.  





 Scattered pulmonary infiltrates, small pleural effusion and extensive 





 mediastinal lymphadenopathy as described above.      Won Sharp MD 


 


Brain MRI 3/3/18 0000 Signed





Impressions: 





 Service Date/Time:  Saturday, March 3, 2018 18:04 - CONCLUSION:  Deterioration 





 in the appearance of the scan.  At least 3 focal areas of abnormal contrast 





 enhancement are present scattered to in both hemispheres.  Given the history 





 this most likely represents developing areas cerebritis.  Exam is compromised 

by 





 an uncooperative patient and motion artifact.  These 2 factors make detection 

of 





 other abnormalities such as cortical cephalitis and meningitis difficult to 





 exclude.  Cannot exclude hemorrhagic lesions as well.  There is no mass effect 





 evident.      Jorge Diane MD 


 


Chest CT 2/26/18 0000 Signed





Impressions: 





 Service Date/Time:  Monday, February 26, 2018 12:41 - CONCLUSION:  1. There 

are 





 at least 5 pulmonary nodules present bilaterally with the largest measuring 19 





 mm. None demonstrate cavitation but it is possible that these represent septic 





 emboli. Suggest followup to assess for change. 2. Splenomegaly with subtle 





 wedge-shaped areas of low-density in the mid spleen which could represent 





 infarcts. 3. Mild cardiomegaly in this patient post aortic valve replacement. 





 Low density of the cardiac blood pool suggests anemia.      Ron Melgar MD 


 


Aorta w/Runoff CTA 2/25/18 0000 Signed





Impressions: 





 Service Date/Time:  Sunday, February 25, 2018 12:47 - CONCLUSION:  Left renal 





 cortical infarction. 5-6 cm length total occlusion of the left superficial 





 femoral artery in the proximal thigh. Nodular parenchymal opacities in the 

lung 





 bases bilaterally See above discussion.     Ron Cruz MD 


 


Renal Ultrasound 2/24/18 0000 Signed





Impressions: 





 Service Date/Time:  Saturday, February 24, 2018 10:53 - CONCLUSION:  1. No 





 evidence of hydronephrosis. 2. Thickening of the urinary bladder wall a 1.1 

cm. 





 3. Prominent splenomegaly.     Elgin Walton MD 


 


Breast Ultrasound 2/24/18 0000 Signed





Impressions: 





 Service Date/Time:  Saturday, February 24, 2018 10:48 - CONCLUSION:  Negative 





 targeted right breast ultrasound examination.     Ron Brown MD 


 


Lower Extremity Ultrasound 2/23/18 0000 Signed





Impressions: 





 Service Date/Time:  Friday, February 23, 2018 21:22 - CONCLUSION:  1. No 





 sonographic evidence for lower extremity DVT. 2. Incidental note of bilateral 





 inguinal adenopathy with the largest on the right measuring 3.3 cm and the 





 largest on the left measuring 2.6 cm. This finding is nonspecific but is 





 typically reactive in etiology.     Rj Whitten MD 








Objective Remarks


AAOx3


NAD


Clear lungs BL


S1S2 4/6 systolic ejection murmur


+3 BL lower extremity edema. Erythema improved, left foot very tender to 

palpation.


. Dark discoloration resolved. pulses not palpable due to edema. Good capillary 

refill.


Medications and IVs





Current Medications








 Medications


  (Trade)  Dose


 Ordered  Sig/Nikhil


 Route  Start Time


 Stop Time Status Last Admin


 


  (NS Flush)  2 ml  UNSCH  PRN


 IV FLUSH  2/23/18 22:00


    3/25/18 06:32


 


 


  (NS Flush)  2 ml  BID


 IV FLUSH  2/24/18 09:00


    3/27/18 21:06


 


 


  (Tylenol)  650 mg  Q6H  PRN


 PO  2/23/18 22:00


    3/19/18 01:19


 


 


  (Zofran Inj)  4 mg  Q6H  PRN


 IV PUSH  2/23/18 22:00


    3/8/18 21:22


 


 


  (Restoril)  15 mg  HS  PRN


 PO  2/23/18 22:00


    3/6/18 22:08


 


 


 Miscellaneous


 Information  1  Q361D


 XX  2/23/18 22:00


     


 


 


  (Chlorhexidine


 2% Cloth)  


 Taper  DAILY@04


 TOP  2/24/18 04:00


 2/20/19 03:59  3/6/18 03:54


 


 


  (Chlorhexidine


 2% Cloth)  3 pack  UNSCH  PRN


 TOP  2/23/18 22:00


     


 


 


  (Arlen-Colace)  1 tab  BID


 PO  2/24/18 09:00


    3/24/18 20:50


 


 


  (Milk Of


 Magnesia Liq)  30 ml  Q12H  PRN


 PO  2/23/18 22:00


     


 


 


  (Senokot)  17.2 mg  Q12H  PRN


 PO  2/23/18 22:00


     


 


 


  (Dulcolax Supp)  10 mg  DAILY  PRN


 RECTAL  2/23/18 22:00


     


 


 


  (Lactulose Liq)  30 ml  DAILY  PRN


 PO  2/23/18 22:00


     


 


 


 Ampicillin Sodium


 2000 mg/Sodium


 Chloride  100 ml @ 


 400 mls/hr  Q6H


 IV  2/24/18 14:00


    3/28/18 08:49


 


 


  (Imodium)  2 mg  Q4H  PRN


 PO  2/26/18 16:15


    2/28/18 14:32


 


 


  (Albuterol Neb)  2.5 mg  Q2HR NEB  PRN


 NEB  2/27/18 11:30


    3/27/18 11:17


 


 


  (Norco  Mg)  1 tab  Q4H  PRN


 PO  3/4/18 15:00


    3/9/18 05:02


 


 


  (Dilaudid Pf Inj)  1 mg  Q4H  PRN


 IV PUSH  3/7/18 19:00


    3/28/18 10:59


 


 


  (KCl)  60 meq  Q12HR


 PO  3/14/18 09:45


    3/28/18 08:50


 


 


 Albumin Human  100 ml @ 


 60 mls/hr  Q12H


 IV  3/18/18 18:00


    3/28/18 06:02


 


 


  (Lasix Inj)  40 mg  BID@09,18


 IV PUSH  3/21/18 09:00


    3/28/18 08:50


 


 


 Daptomycin 500 mg/


 Sodium Chloride  100 ml @ 


 200 mls/hr  Q24H


 IV  3/22/18 20:00


    3/27/18 21:06


 


 


 Pharmacy Profile


 Note  0 ml @ 0


 mls/hr  UNSCH


 OTHER  3/23/18 16:15


     


 


 


  (Pill Splitter)  1 ea  UNSCH  PRN


 OTHER  3/25/18 11:15


     


 


 


  (Oramorph Sr)  30 mg  Q12HR


 PO  3/26/18 21:00


    3/28/18 08:50


 


 


  (Coumadin)  1 mg  DAILY@16


 PO  3/27/18 16:00


    3/27/18 15:25


 








Date of Insertion:  Mar 19, 2018


Line:  Central Venous Catheter


Side:  Right


Location:  Internal, Jugular





A/P


Problem List:  


(1) Prosthetic valve endocarditis


ICD Code:  T82.6XXA - Infection and inflammatory reaction due to cardiac valve 

prosthesis, initial encounter


Status:  Acute


(2) Bacteremia


ICD Code:  R78.81 - Bacteremia


(3) Septic pulmonary embolism


ICD Code:  I26.90 - Septic pulmonary embolism without acute cor pulmonale


Status:  Acute


(4) IVDU (intravenous drug user)


ICD Code:  F19.90 - Other psychoactive substance use, unspecified, uncomplicated


(5) Anemia


ICD Code:  D64.9 - Anemia, unspecified


Status:  Chronic


(6) NOÉ (acute kidney injury)


ICD Code:  N17.9 - Acute kidney failure, unspecified


(7) Cellulitis of left lower extremity


ICD Code:  L03.116 - Cellulitis of left lower limb


(8) Purple toe syndrome


ICD Code:  I75.029 - Atheroembolism of unspecified lower extremity


(9) Pleuritic chest pain


ICD Code:  R07.81 - Pleurodynia


Assessment and Plan


(1) Prosthetic valve endocarditis


Plan:  Continue antibiotics as per ID.


The patient was initially on IV ampicillin.  On 3/19 ID started Rocephin for 

pulmonary coverage since chest x-ray showed a developing infiltrate.


On 3/20 palliative care consulted by hematology.  As per hematology 

documentation the patient requested to talk to palliative care to see if she is 

a hospice candidate.


Discussed with palliative care who states that the patient's goals are 

aggressive and she wants to get better.  The patient definitely is a hospice 

candidate, however her goals are not hospice appropriate.  Patient was started 

on Oramorph SR 30 mg p.o. twice a day for pain control.


Continue heparin drip until the patient's INR reaches 2.5.  Goal INR should be 

2.5-3.5 since the patient has an aortic prosthetic valve.  Will reconsult 

vascular surgery.  Pedal pulses present by Doppler -obtained by me.





(2) Bacteremia


Plan:  Blood cultures obtained on 2/23 growing strep viridans.


Subsequent repeat blood cultures negative.


3/19 sp Exchange of Right IJ central line. 


Continue antibiotics as per ID recommendations.





(3) Septic pulmonary embolism


Plan:  As observed on a CT angiography of the chest obtained on 3/3/18.


chocardiogram on 3/3 showed an estimated EF of 50-55%.  


++severe tricuspid regurgitation with prosthesis in place.  


++2 x 4 cm mobile vegetation was seen on the valve.  The right atrium and right 

ventricle were normal in size and function


Hematology following.


Management of Coumadin as per hematology recommendations.


Goal INR 2.5-3.5 given the presence of a prosthetic aortic valve.





(4) IVDU (intravenous drug user)


Plan:  Counseled on drug cessation.





(5) Anemia


Plan:  Likely secondary to inflammation/infection.  No evidence of bleeding or 

hemolysis.


Monitor hemoglobin and transfuse as needed.





(6) NOÉ (acute kidney injury)


Plan: Nephrology consulted.  Possible ATN from vancomycin toxicity or 

infection.  Urine eosinophils were ordered and negative.  Diuretics managed by 

nephrology.  Initially on Lasix 20 mg IV twice daily along with albumin.  

Creatinine Worsening on 3/19 for 1.57-1.62.  Later Lasix dose was increased to 

40 mg IV twice daily  and metolazone added to the regimen.  Creatinine then 

started trending down to 1.34 on 3/23.


3/28 creatinine stable at 1.18.  Management as per nephrology.  Continue to 

monitor BUN and creatinine, avoid nephrotoxins, history I's and O's.





(7) Cellulitis of left lower extremity


Plan:  Continue IV antibiotics as per ID.


Cellulitis seems to be improving.





(8) Purple toe syndrome


Plan:  Patient has a blue/purple discoloration of the left toes.  


Vascular surgery consulted.  Recommended medical management.  Ankle-brachial 

indices are decreased and below normal. The toe brachial indices could not be 

obtained given the pulses could not be obtained in the first digits.  Patient 

initially started on IV heparin.  Goal INR 2.5-3.5.


2/28 INR therapeutic, discontinue IV heparin.  Monitor PT/INR daily.  Pharmacy 

helping with dosing and monitoring.


Patient also with severe excruciating pain secondary to possible some ischemia.

  Increase dose of morphine sulfate from 30 mg p.o. every 12 hours to 45 mg 

p.o. every 12 hours.








(9) Pleuritic chest pain


Improving control of pleuritic chest pain.  Repeat chest x-ray obtained on 3/26/

18 did not show any significant change in the right lower lobe infiltrate.  

Patient will need a follow-up chest x-ray in a couple days.  Increase morphine 

sulfate as stated above.





Discharge Planning


Patient with static valve endocarditis, bacteremia, septic pulmonary embolism, 

acute kidney injury, denies of the left lower extremity and carpal tunnel 

syndrome currently on IV antibiotics.  Patient still edematous, good urine 

output.  Will need ID clearance prior to discharge.





Problem Qualifiers





(1) Prosthetic valve endocarditis:  


Qualified Codes:  T82.6XXA - Infection and inflammatory reaction due to cardiac 

valve prosthesis, initial encounter


(2) Anemia:  


Qualified Codes:  D63.8 - Anemia in other chronic diseases classified elsewhere


(3) Purple toe syndrome:  


Qualified Codes:  I75.022 - Atheroembolism of left lower extremity








Jesus Mae MD Mar 28, 2018 13:45

## 2018-03-28 NOTE — HHI.NPPN
Subjective


History of Present Illness


This is a 38-year-old female with past medical history of IV drug abuse, 

history of hepatitis C, narcotic dependency, came to the hospital with 

complaint of shortness of breath and swelling.  Nephrology called to see the 

patient because of elevated BUN and creatinine.  The patient has creatinine of 

4.5 on admission which improved and it was staying in the range of 0.6-0.8 

until 6 days ago, then it started going up again and now it is 1.7.  The 

patient has increased swelling 


of the legs and the patient was diagnosed with endocarditis and she has blood 

culture positive for Strep viridans.  Patient has been following with the ID 

and she was getting furosemide, which was stopped today this morning because of 

increased creatinine and she was on vancomycin and rifampicin.  The last dose 

of vancomycin seems to be given possibly on 03/04/2018 or even later, but her 

level was high and the highest level we have was 25.8, which was on 03/04/2018 

but on 03/10/2018 it was 23.9.  The patient has been actively using IV drugs 

before she came to the hospital and noticed to have more swelling in her legs.  

She is complaining of generalized body pain.  There is no nausea, vomiting.  

Denies any diarrhea.  Her appetite is normal.


Additional Remarks


Continue with bilateral lower extremity edema.  Mild SOB


 (Gellermann,Diane M. ARNP)





Review of Systems


General


Constitutional:  Fatigue


 (Gellermann,Diane M. ARNP)





Respiratory


Lungs:  SOB


Respiratory Remarks


improving


 (Gellermann,Diane M. ARNP)





Cardiovascular


Cardiac:  Edema, SCOTT


Cardiac Remarks


Denies CP


 (Gellermann,Diane M. ARNP)





Gastrointestinal


GI Remarks


denies abdominal pain


 (Gellermann,Diane M. ARNP)





Objective Data


Data





Vital Signs








  Date Time  Temp Pulse Resp B/P (MAP) Pulse Ox O2 Delivery O2 Flow Rate FiO2


 


3/28/18 12:59      Room Air  


 


3/28/18 12:00 98.0 113 20 102/57 (72) 97   


 


3/28/18 12:00  103      


 


3/28/18 09:20      Room Air  


 


3/28/18 08:00  102      


 


3/28/18 08:00 98.3 107 20 107/57 (74) 90   


 


3/28/18 04:05  108      


 


3/28/18 04:00      Room Air  


 


3/28/18 04:00 97.8 107 20 112/55 (74) 100   


 


3/28/18 03:04   19     


 


3/28/18 00:00 97.7 107 18 102/65 (77) 98   


 


3/28/18 00:00      Room Air  


 


3/27/18 23:58  100      


 


3/27/18 23:08   19     


 


3/27/18 21:29      Nasal Cannula 2.00 


 


3/27/18 21:05      Room Air  


 


3/27/18 20:00 98.1 106 20 107/68 (81) 97   


 


3/27/18 19:50  106      


 


3/27/18 18:51      Nasal Cannula 2.00 


 


3/27/18 16:17  101      


 


3/27/18 16:00 98.1 96 20 89/56 (67) 96   








 (Gellermann,Diane M. ARNP)


-:  


3/28/18 0600                                                                   

             3/28/18 0600








Physical Exam


General


Appearance:  No Acute Distress, Anxious


 (Gellermann,Diane M. ARNP)





Eyes


Eye Exam:  Pupils Equal


 (Gellermann,Diane M. ARNP)





Throat


Throat Exam:  Oral Mucosa Pink & Moist


 (Gellermann,Diane M. ARNP)





Neck


Neck Exam:  Neck Supple


 (Gellermann,Diane M. ARNP)





Pulmonary


Resp Exam:  Clear Bilaterally


 (Gellermann,Diane M. ARNP)





Cardiology


CV Exam:  Regular, Normal Sinus Rhythm


 (Gellermann,Diane M. ARNP)





Gastrointestinal/Abdomen


GI Exam:  Soft, Non-Tender


 (Gellermann,Diane M. ARNP)





Integumentary


Skin Exam:  Dry, Intact


 (Gellermann,Diane M. ARNP)





Extremeties


Extremities Exam:  Pitting Edema


 (Gellermann,Diane M. ARNP)





Neurologic


Neuro Exam:  Alert, Awake, Oriented


 (Gellermann,Diane M. ARNP)





Psychiatric


Psych Exam:  Appropriate Responses


 (Gellermann,Diane M. ARNP)





Assessment/Plan


Discussed Condition With:  Patient


Assessment Summary:  NOÉ/Acute Renal Failure


Problem List:  


(1) Acute kidney injury


ICD Codes:  N17.9 - Acute kidney failure, unspecified


Status:  Acute


Plan:  


NOÉ likely due to vancomycin toxicity,  post-infectious GN is unlikely.


Possibility of ATN or pre renal.


Urine eosinophils negative, complements wnl


Creatinine stable


Good UOP





Plan


Hypokalemia replacement given 


Continue Lasix 40 mg IV BID lower extremity edema persistent


Continue to monitor BMP and UOP


Encouraged to elevate legs throughout day


Avoid nephrotoxins when possible.


Fluid restriction in place





(2) Prosthetic valve endocarditis


ICD Codes:  T82.6XXA - Infection and inflammatory reaction due to cardiac valve 

prosthesis, initial encounter


Status:  Acute


Plan:  septic endocarditis with strep viridans


IV drug use, pulmonary emboli as well.





Continues antibiotics per ID


continue renal dosing antibiotics.





(3) Peripheral arterial occlusive disease


ICD Codes:  I77.9 - Disorder of arteries and arterioles, unspecified


Plan:  seen with vascular, continue medical management


Hold contrast studies as possible.





(4) Hepatitis C


ICD Codes:  B19.20 - Unspecified viral hepatitis C without hepatic coma


Status:  Chronic (Gellermann,Diane M. ARNP)


Problem List:  


(1) Acute kidney injury


ICD Codes:  N17.9 - Acute kidney failure, unspecified


Status:  Acute


Plan:  


NOÉ likely due to vancomycin toxicity,  post-infectious GN is unlikely.


Possibility of ATN or pre renal.


Urine eosinophils negative, complements wnl


Creatinine stable


Good UOP





Plan


Hypokalemia replacement given 


Continue Lasix 40 mg IV BID lower extremity edema persistent


Continue to monitor BMP and UOP


Encouraged to elevate legs throughout day


Avoid nephrotoxins when possible.


Fluid restriction in place.


Patient seen and examined, agree with above.


On Lasix and Albumin, add Metolazone.





(2) Prosthetic valve endocarditis


ICD Codes:  T82.6XXA - Infection and inflammatory reaction due to cardiac valve 

prosthesis, initial encounter


Status:  Acute


Plan:  septic endocarditis with strep viridans


IV drug use, pulmonary emboli as well.





Continues antibiotics per ID


continue renal dosing antibiotics.





(3) Peripheral arterial occlusive disease


ICD Codes:  I77.9 - Disorder of arteries and arterioles, unspecified


Plan:  seen with vascular, continue medical management


Hold contrast studies as possible.





(4) Hepatitis C


ICD Codes:  B19.20 - Unspecified viral hepatitis C without hepatic coma


Status:  Chronic (Melissa Layne MD)





Problem Qualifiers





(1) Prosthetic valve endocarditis:  


Qualified Codes:  T82.6XXA - Infection and inflammatory reaction due to cardiac 

valve prosthesis, initial encounter








Gellermann,Diane M. ARNP Mar 28, 2018 15:36


Melissa Layne MD Mar 28, 2018 18:41

## 2018-03-29 VITALS
DIASTOLIC BLOOD PRESSURE: 60 MMHG | SYSTOLIC BLOOD PRESSURE: 97 MMHG | RESPIRATION RATE: 20 BRPM | TEMPERATURE: 97 F | OXYGEN SATURATION: 98 % | HEART RATE: 96 BPM

## 2018-03-29 VITALS
HEART RATE: 106 BPM | TEMPERATURE: 99.3 F | RESPIRATION RATE: 20 BRPM | SYSTOLIC BLOOD PRESSURE: 101 MMHG | DIASTOLIC BLOOD PRESSURE: 52 MMHG | OXYGEN SATURATION: 94 %

## 2018-03-29 VITALS
SYSTOLIC BLOOD PRESSURE: 95 MMHG | OXYGEN SATURATION: 100 % | HEART RATE: 101 BPM | RESPIRATION RATE: 20 BRPM | DIASTOLIC BLOOD PRESSURE: 52 MMHG | TEMPERATURE: 99 F

## 2018-03-29 VITALS
OXYGEN SATURATION: 91 % | HEART RATE: 113 BPM | DIASTOLIC BLOOD PRESSURE: 61 MMHG | RESPIRATION RATE: 22 BRPM | SYSTOLIC BLOOD PRESSURE: 93 MMHG | TEMPERATURE: 99 F

## 2018-03-29 VITALS — HEART RATE: 105 BPM

## 2018-03-29 VITALS
DIASTOLIC BLOOD PRESSURE: 55 MMHG | TEMPERATURE: 97.2 F | SYSTOLIC BLOOD PRESSURE: 98 MMHG | OXYGEN SATURATION: 95 % | RESPIRATION RATE: 22 BRPM | HEART RATE: 103 BPM

## 2018-03-29 VITALS — HEART RATE: 98 BPM

## 2018-03-29 VITALS
SYSTOLIC BLOOD PRESSURE: 94 MMHG | TEMPERATURE: 98.3 F | DIASTOLIC BLOOD PRESSURE: 68 MMHG | RESPIRATION RATE: 20 BRPM | OXYGEN SATURATION: 100 % | HEART RATE: 93 BPM

## 2018-03-29 VITALS
SYSTOLIC BLOOD PRESSURE: 92 MMHG | OXYGEN SATURATION: 94 % | TEMPERATURE: 97.5 F | HEART RATE: 97 BPM | DIASTOLIC BLOOD PRESSURE: 58 MMHG | RESPIRATION RATE: 20 BRPM

## 2018-03-29 VITALS — OXYGEN SATURATION: 93 %

## 2018-03-29 VITALS — HEART RATE: 99 BPM

## 2018-03-29 VITALS — HEART RATE: 111 BPM

## 2018-03-29 LAB
ALBUMIN SERPL-MCNC: 3.7 GM/DL (ref 3.4–5)
ALP SERPL-CCNC: 80 U/L (ref 45–117)
ALT SERPL-CCNC: 12 U/L (ref 10–53)
AST SERPL-CCNC: 23 U/L (ref 15–37)
BILIRUB SERPL-MCNC: 1.1 MG/DL (ref 0.2–1)
BUN SERPL-MCNC: 19 MG/DL (ref 7–18)
CALCIUM SERPL-MCNC: 8.6 MG/DL (ref 8.5–10.1)
CHLORIDE SERPL-SCNC: 91 MEQ/L (ref 98–107)
CREAT SERPL-MCNC: 1.2 MG/DL (ref 0.5–1)
ERYTHROCYTE [DISTWIDTH] IN BLOOD BY AUTOMATED COUNT: 23.2 % (ref 11.6–17.2)
GFR SERPLBLD BASED ON 1.73 SQ M-ARVRAT: 51 ML/MIN (ref 89–?)
GLUCOSE SERPL-MCNC: 103 MG/DL (ref 74–106)
HCO3 BLD-SCNC: 32.1 MEQ/L (ref 21–32)
HCT VFR BLD CALC: 23.1 % (ref 35–46)
HGB BLD-MCNC: 7.2 GM/DL (ref 11.6–15.3)
INR PPP: 1.7 RATIO
INR PPP: 1.8 RATIO
MCH RBC QN AUTO: 23.4 PG (ref 27–34)
MCHC RBC AUTO-ENTMCNC: 31.2 % (ref 32–36)
MCV RBC AUTO: 75.2 FL (ref 80–100)
PLATELET # BLD: 102 TH/MM3 (ref 150–450)
PMV BLD AUTO: 8.7 FL (ref 7–11)
PROT SERPL-MCNC: 7.4 GM/DL (ref 6.4–8.2)
PROTHROMBIN TIME: 17.1 SEC (ref 9.8–11.6)
PROTHROMBIN TIME: 18.1 SEC (ref 9.8–11.6)
RBC # BLD AUTO: 3.08 MIL/MM3 (ref 4–5.3)
SODIUM SERPL-SCNC: 133 MEQ/L (ref 136–145)
WBC # BLD AUTO: 12.9 TH/MM3 (ref 4–11)

## 2018-03-29 RX ADMIN — ALBUMIN HUMAN SCH MLS/HR: 0.25 SOLUTION INTRAVENOUS at 05:43

## 2018-03-29 RX ADMIN — METOLAZONE SCH MG: 2.5 TABLET ORAL at 20:21

## 2018-03-29 RX ADMIN — Medication PRN ML: at 05:44

## 2018-03-29 RX ADMIN — HYDROMORPHONE HYDROCHLORIDE PRN MG: 2 INJECTION INTRAMUSCULAR; INTRAVENOUS; SUBCUTANEOUS at 19:46

## 2018-03-29 RX ADMIN — POTASSIUM CHLORIDE SCH MEQ: 750 TABLET, FILM COATED, EXTENDED RELEASE ORAL at 20:20

## 2018-03-29 RX ADMIN — WARFARIN SODIUM SCH MG: 1 TABLET ORAL at 15:24

## 2018-03-29 RX ADMIN — STANDARDIZED SENNA CONCENTRATE AND DOCUSATE SODIUM SCH TAB: 8.6; 5 TABLET, FILM COATED ORAL at 08:43

## 2018-03-29 RX ADMIN — MORPHINE SULFATE SCH MG: 15 TABLET, EXTENDED RELEASE ORAL at 08:44

## 2018-03-29 RX ADMIN — Medication PRN ML: at 23:50

## 2018-03-29 RX ADMIN — HYDROMORPHONE HYDROCHLORIDE PRN MG: 2 INJECTION INTRAMUSCULAR; INTRAVENOUS; SUBCUTANEOUS at 23:50

## 2018-03-29 RX ADMIN — HYDROMORPHONE HYDROCHLORIDE PRN MG: 2 INJECTION INTRAMUSCULAR; INTRAVENOUS; SUBCUTANEOUS at 11:13

## 2018-03-29 RX ADMIN — AMPICILLIN SODIUM SCH MLS/HR: 2 INJECTION, POWDER, FOR SOLUTION INTRAMUSCULAR; INTRAVENOUS at 08:43

## 2018-03-29 RX ADMIN — STANDARDIZED SENNA CONCENTRATE AND DOCUSATE SODIUM SCH TAB: 8.6; 5 TABLET, FILM COATED ORAL at 20:21

## 2018-03-29 RX ADMIN — AMPICILLIN SODIUM SCH MLS/HR: 2 INJECTION, POWDER, FOR SOLUTION INTRAMUSCULAR; INTRAVENOUS at 20:22

## 2018-03-29 RX ADMIN — DAPTOMYCIN SCH MLS/HR: 500 INJECTION, POWDER, LYOPHILIZED, FOR SOLUTION INTRAVENOUS at 19:46

## 2018-03-29 RX ADMIN — Medication SCH ML: at 19:46

## 2018-03-29 RX ADMIN — MORPHINE SULFATE SCH MG: 15 TABLET, EXTENDED RELEASE ORAL at 20:21

## 2018-03-29 RX ADMIN — HYDROMORPHONE HYDROCHLORIDE PRN MG: 2 INJECTION INTRAMUSCULAR; INTRAVENOUS; SUBCUTANEOUS at 01:01

## 2018-03-29 RX ADMIN — METOLAZONE SCH MG: 2.5 TABLET ORAL at 08:41

## 2018-03-29 RX ADMIN — Medication SCH ML: at 08:43

## 2018-03-29 RX ADMIN — AMPICILLIN SODIUM SCH MLS/HR: 2 INJECTION, POWDER, FOR SOLUTION INTRAMUSCULAR; INTRAVENOUS at 02:10

## 2018-03-29 RX ADMIN — HYDROMORPHONE HYDROCHLORIDE PRN MG: 2 INJECTION INTRAMUSCULAR; INTRAVENOUS; SUBCUTANEOUS at 05:44

## 2018-03-29 RX ADMIN — FUROSEMIDE SCH MG: 10 INJECTION, SOLUTION INTRAMUSCULAR; INTRAVENOUS at 17:07

## 2018-03-29 RX ADMIN — POTASSIUM CHLORIDE SCH MEQ: 750 TABLET, FILM COATED, EXTENDED RELEASE ORAL at 08:42

## 2018-03-29 RX ADMIN — Medication PRN ML: at 02:10

## 2018-03-29 RX ADMIN — FUROSEMIDE SCH MG: 10 INJECTION, SOLUTION INTRAMUSCULAR; INTRAVENOUS at 08:42

## 2018-03-29 RX ADMIN — AMPICILLIN SODIUM SCH MLS/HR: 2 INJECTION, POWDER, FOR SOLUTION INTRAMUSCULAR; INTRAVENOUS at 14:13

## 2018-03-29 RX ADMIN — Medication PRN ML: at 01:01

## 2018-03-29 RX ADMIN — HYDROMORPHONE HYDROCHLORIDE PRN MG: 2 INJECTION INTRAMUSCULAR; INTRAVENOUS; SUBCUTANEOUS at 15:25

## 2018-03-29 NOTE — HHI.NPPN
Subjective


General Problems:  Anemia


Renal Failure:  Acute


History of Present Illness


This is a 38-year-old female with past medical history of IV drug abuse, 

history of hepatitis C, narcotic dependency, came to the hospital with 

complaint of shortness of breath and swelling.  Nephrology called to see the 

patient because of elevated BUN and creatinine.  The patient has creatinine of 

4.5 on admission which improved and it was staying in the range of 0.6-0.8 

until 6 days ago, then it started going up again and now it is 1.7.  The 

patient has increased swelling 


of the legs and the patient was diagnosed with endocarditis and she has blood 

culture positive for Strep viridans.  Patient has been following with the ID 

and she was getting furosemide, which was stopped today this morning because of 

increased creatinine and she was on vancomycin and rifampicin.  The last dose 

of vancomycin seems to be given possibly on 03/04/2018 or even later, but her 

level was high and the highest level we have was 25.8, which was on 03/04/2018 

but on 03/10/2018 it was 23.9.  The patient has been actively using IV drugs 

before she came to the hospital and noticed to have more swelling in her legs.  

She is complaining of generalized body pain.  There is no nausea, vomiting.  

Denies any diarrhea.  Her appetite is normal.


Additional Remarks


Continue with bilateral lower extremity edema.  Sitting on side of bed 

comfortably. 


 (Gellermann,Diane M. ARNP)





Review of Systems


General


Constitutional:  Fatigue


 (Gellermann,Diane M. ARNP)





Respiratory


Lungs:  SOB


Respiratory Remarks


improving


 (Gellermann,Diane M. ARNP)





Cardiovascular


Cardiac:  Edema, SCOTT


Cardiac Remarks


Denies CP


 (Gellermann,Diane M. ARNP)





Gastrointestinal


GI Remarks


denies abdominal pain


 (Gellermann,Diane M. ARNP)





Objective Data


Data











 3/29/18 3/30/18





 19:00 07:00


 


  


 


# Sanitary Pads 3 Pads 











Vital Signs








  Date Time  Temp Pulse Resp B/P (MAP) Pulse Ox O2 Delivery O2 Flow Rate FiO2


 


3/29/18 08:00  97      


 


3/29/18 08:00 97.5 97 20 92/58 (69) 94   


 


3/29/18 07:39      Nasal Cannula 3.00 21


 


3/29/18 04:20      Nasal Cannula 2.00 


 


3/29/18 04:20 99.3 106 20 101/52 (68) 94   


 


3/29/18 04:06  98      


 


3/29/18 00:35  111      


 


3/29/18 00:00 99.0 113 22 93/61 (72) 91   


 


3/29/18 00:00      Nasal Cannula 2.00 


 


3/28/18 21:31  107 20 94/58 (70) 92   


 


3/28/18 21:22      Nasal Cannula 2.00 


 


3/28/18 20:11  110      


 


3/28/18 18:08      Nasal Cannula 2.00 


 


3/28/18 16:10 97.6 102 18 96/72 (80) 92   


 


3/28/18 16:00  98      


 


3/28/18 12:59      Room Air  


 


3/28/18 12:00 98.0 113 20 102/57 (72) 97   


 


3/28/18 12:00  103      








 (Gellermann,Diane M. ARNP)


-:  


3/29/18 0545                                                                   

             3/29/18 0545








Physical Exam


General


Appearance:  No Acute Distress, Anxious


 (Gellermann,Diane M. ARNP)





Eyes


Eye Exam:  Pupils Equal


 (Gellermann,Diane M. ARNP)





Throat


Throat Exam:  Oral Mucosa Pink & Moist


 (Gellermann,Diane M. ARNP)





Neck


Neck Exam:  Neck Supple


 (Gellermann,Diane M. ARNP)





Pulmonary


Resp Exam:  Clear Bilaterally


 (Gellermann,Diane M. ARNP)





Cardiology


CV Exam:  Regular, Normal Sinus Rhythm


 (Gellermann,Diane M. ARNP)





Gastrointestinal/Abdomen


GI Exam:  Soft, Non-Tender


 (Gellermann,Diane M. ARNP)





Integumentary


Skin Exam:  Dry, Intact


 (Gellermann,Diane M. ARNP)





Extremeties


Extremities Exam:  Pitting Edema


 (Gellermann,Diane M. ARNP)





Neurologic


Neuro Exam:  Alert, Awake, Oriented


 (Gellermann,Diane M. ARNP)





Psychiatric


Psych Exam:  Appropriate Responses


 (Gellermann,Diane M. ARNP)





Assessment/Plan


Discussed Condition With:  Patient


Assessment Summary:  NOÉ/Acute Renal Failure


Problem List:  


(1) Acute kidney injury


ICD Codes:  N17.9 - Acute kidney failure, unspecified


Status:  Acute


Plan:  


NOÉ likely due to vancomycin toxicity,  post-infectious GN is unlikely.


Possibility of ATN or pre renal.


Urine eosinophils negative, complements wnl


Creatinine stable


Good UOP





Plan


Hypokalemia replacement given 


Continue Lasix 40 mg IV BID 


Albumin discontinued with albumin level of 3.7


Continue to monitor BMP and UOP


Encouraged to elevate legs throughout day


Avoid nephrotoxins when possible.


Fluid restriction in place.








(2) Prosthetic valve endocarditis


ICD Codes:  T82.6XXA - Infection and inflammatory reaction due to cardiac valve 

prosthesis, initial encounter


Status:  Acute


Plan:  septic endocarditis with strep viridans


IV drug use, pulmonary emboli as well.





Continues antibiotics per ID


continue renal dosing antibiotics.





(3) Peripheral arterial occlusive disease


ICD Codes:  I77.9 - Disorder of arteries and arterioles, unspecified


Plan:  seen with vascular, continue medical management


Hold contrast studies as possible.





(4) Hepatitis C


ICD Codes:  B19.20 - Unspecified viral hepatitis C without hepatic coma


Status:  Chronic (Gellermann,Diane M. ARNP)


Problem List:  


(1) Acute kidney injury


ICD Codes:  N17.9 - Acute kidney failure, unspecified


Status:  Acute


Plan:  


NOÉ likely due to vancomycin toxicity,  post-infectious GN is unlikely.


Possibility of ATN or pre renal.


Urine eosinophils negative, complements wnl


Creatinine stable


Good UOP





Plan


Hypokalemia replacement given 


Continue Lasix 40 mg IV BID 


Albumin discontinued with albumin level of 3.7


Continue to monitor BMP and UOP


Encouraged to elevate legs throughout day


Avoid nephrotoxins when possible.


Fluid restriction in place.


Patient seen and examined, agree with above.


Continue Metolazone and Lasix.











(2) Prosthetic valve endocarditis


ICD Codes:  T82.6XXA - Infection and inflammatory reaction due to cardiac valve 

prosthesis, initial encounter


Status:  Acute


Plan:  septic endocarditis with strep viridans


IV drug use, pulmonary emboli as well.





Continues antibiotics per ID


continue renal dosing antibiotics.





(3) Peripheral arterial occlusive disease


ICD Codes:  I77.9 - Disorder of arteries and arterioles, unspecified


Plan:  seen with vascular, continue medical management


Hold contrast studies as possible.





(4) Hepatitis C


ICD Codes:  B19.20 - Unspecified viral hepatitis C without hepatic coma


Status:  Chronic (Melissa Layne MD)





Problem Qualifiers





(1) Prosthetic valve endocarditis:  


Qualified Codes:  T82.6XXA - Infection and inflammatory reaction due to cardiac 

valve prosthesis, initial encounter








Gellermann,Diane M. ARNP Mar 29, 2018 11:00


Melissa Layne MD Mar 29, 2018 18:00

## 2018-03-29 NOTE — HHI.PR
Subjective


Remarks


NOT SEEN





Objective


Vitals





Vital Signs








  Date Time  Temp Pulse Resp B/P (MAP) Pulse Ox O2 Delivery O2 Flow Rate FiO2


 


3/29/18 00:35  111      


 


3/29/18 00:00 99.0 113 22 93/61 (72) 91   


 


3/29/18 00:00      Nasal Cannula 2.00 


 


3/28/18 21:31  107 20 94/58 (70) 92   


 


3/28/18 21:22      Nasal Cannula 2.00 


 


3/28/18 20:11  110      


 


3/28/18 18:08      Nasal Cannula 2.00 


 


3/28/18 16:10 97.6 102 18 96/72 (80) 92   


 


3/28/18 16:00  98      


 


3/28/18 12:59      Room Air  


 


3/28/18 12:00 98.0 113 20 102/57 (72) 97   


 


3/28/18 12:00  103      


 


3/28/18 09:20      Room Air  


 


3/28/18 08:00  102      


 


3/28/18 08:00 98.3 107 20 107/57 (74) 90   


 


3/28/18 04:05  108      


 


3/28/18 04:00      Room Air  


 


3/28/18 04:00 97.8 107 20 112/55 (74) 100   


 


3/28/18 03:04   19     














I/O      


 


 3/28/18 3/28/18 3/28/18 3/29/18 3/29/18 3/29/18





 07:00 15:00 23:00 07:00 15:00 23:00


 


Intake Total 200 ml  600 ml   


 


Output Total 1700 ml  600 ml   


 


Balance -1500 ml  0 ml   


 


      


 


Intake Oral 100 ml  600 ml   


 


IV Total 100 ml     


 


Output Urine Total 1700 ml  600 ml   


 


# Bowel Movements 1  1   








Result Diagram:  


3/28/18 0600                                                                   

             3/28/18 0600





Imaging





Last Impressions








Chest X-Ray 3/26/18 0000 Signed





Impressions: 





 Service Date/Time:  Monday, March 26, 2018 13:53 - CONCLUSION: No significant 





 change in the right lower lobe infiltrate.     Ollie Paige MD 


 


Catheter Placement X-Ray 3/19/18 0000 Signed





Impressions: 





 Service Date/Time:  Monday, March 19, 2018 11:16 - CONCLUSION: Uncomplicated 





 venous catheter change as above.     Rj Whitten MD 


 


Head CT 3/3/18 0000 Signed





Impressions: 





 Service Date/Time:  Saturday, March 3, 2018 10:22 - CONCLUSION:  1. Focal area 





 of decreased density involving the right parietal lobe. As a new finding from 





 the prior exam. This could relate to a small infarct. MRI of the brain with 





 gadolinium suggested to further evaluate. 2. Small lacunar infarction 

involving 





 the left centrum semiovale.     Scooter Dawson Jr., MD 


 


CT Angiography 3/3/18 0000 Signed





Impressions: 





 Service Date/Time:  Saturday, March 3, 2018 10:28 - CONCLUSION:  There 

extensive 





 pulmonary emboli bilaterally. There is near complete cut off of the right 

lower 





 lobe pulmonary artery. Prominent filling defects on the left as well. These 





 findings were relayed to Dr. Bustos immediately at the completion of the study.  





 Scattered pulmonary infiltrates, small pleural effusion and extensive 





 mediastinal lymphadenopathy as described above.      Won Sharp MD 


 


Brain MRI 3/3/18 0000 Signed





Impressions: 





 Service Date/Time:  Saturday, March 3, 2018 18:04 - CONCLUSION:  Deterioration 





 in the appearance of the scan.  At least 3 focal areas of abnormal contrast 





 enhancement are present scattered to in both hemispheres.  Given the history 





 this most likely represents developing areas cerebritis.  Exam is compromised 

by 





 an uncooperative patient and motion artifact.  These 2 factors make detection 

of 





 other abnormalities such as cortical cephalitis and meningitis difficult to 





 exclude.  Cannot exclude hemorrhagic lesions as well.  There is no mass effect 





 evident.      Jorge Diane MD 


 


Chest CT 2/26/18 0000 Signed





Impressions: 





 Service Date/Time:  Monday, February 26, 2018 12:41 - CONCLUSION:  1. There 

are 





 at least 5 pulmonary nodules present bilaterally with the largest measuring 19 





 mm. None demonstrate cavitation but it is possible that these represent septic 





 emboli. Suggest followup to assess for change. 2. Splenomegaly with subtle 





 wedge-shaped areas of low-density in the mid spleen which could represent 





 infarcts. 3. Mild cardiomegaly in this patient post aortic valve replacement. 





 Low density of the cardiac blood pool suggests anemia.      Ron Melgar MD 


 


Aorta w/Runoff CTA 2/25/18 0000 Signed





Impressions: 





 Service Date/Time:  Sunday, February 25, 2018 12:47 - CONCLUSION:  Left renal 





 cortical infarction. 5-6 cm length total occlusion of the left superficial 





 femoral artery in the proximal thigh. Nodular parenchymal opacities in the 

lung 





 bases bilaterally See above discussion.     Ron Cruz MD 


 


Renal Ultrasound 2/24/18 0000 Signed





Impressions: 





 Service Date/Time:  Saturday, February 24, 2018 10:53 - CONCLUSION:  1. No 





 evidence of hydronephrosis. 2. Thickening of the urinary bladder wall a 1.1 

cm. 





 3. Prominent splenomegaly.     Elgin Walton MD 


 


Breast Ultrasound 2/24/18 0000 Signed





Impressions: 





 Service Date/Time:  Saturday, February 24, 2018 10:48 - CONCLUSION:  Negative 





 targeted right breast ultrasound examination.     Ron Brown MD 


 


Lower Extremity Ultrasound 2/23/18 0000 Signed





Impressions: 





 Service Date/Time:  Friday, February 23, 2018 21:22 - CONCLUSION:  1. No 





 sonographic evidence for lower extremity DVT. 2. Incidental note of bilateral 





 inguinal adenopathy with the largest on the right measuring 3.3 cm and the 





 largest on the left measuring 2.6 cm. This finding is nonspecific but is 





 typically reactive in etiology.     Rj Whitten MD 








Objective Remarks


AAOx3


NAD


Clear lungs BL


S1S2 4/6 systolic ejection murmur


+3 BL lower extremity edema. Erythema improved, left foot very tender to 

palpation.


. Dark discoloration resolved. pulses not palpable due to edema. Good capillary 

refill.


Date of Insertion:  Mar 19, 2018


Line:  Central Venous Catheter


Side:  Right


Location:  Internal, Jugular





A/P


Problem List:  


(1) Prosthetic valve endocarditis


ICD Code:  T82.6XXA - Infection and inflammatory reaction due to cardiac valve 

prosthesis, initial encounter


Status:  Acute


(2) Bacteremia


ICD Code:  R78.81 - Bacteremia


(3) Septic pulmonary embolism


ICD Code:  I26.90 - Septic pulmonary embolism without acute cor pulmonale


Status:  Acute


(4) IVDU (intravenous drug user)


ICD Code:  F19.90 - Other psychoactive substance use, unspecified, uncomplicated


(5) Anemia


ICD Code:  D64.9 - Anemia, unspecified


Status:  Chronic


(6) NOÉ (acute kidney injury)


ICD Code:  N17.9 - Acute kidney failure, unspecified


(7) Cellulitis of left lower extremity


ICD Code:  L03.116 - Cellulitis of left lower limb


(8) Purple toe syndrome


ICD Code:  I75.029 - Atheroembolism of unspecified lower extremity


(9) Pleuritic chest pain


ICD Code:  R07.81 - Pleurodynia


Assessment and Plan


(1) Prosthetic valve endocarditis


Plan:  Continue antibiotics as per ID.


The patient was initially on IV ampicillin.  On 3/19 ID started Rocephin for 

pulmonary coverage since chest x-ray showed a developing infiltrate.


On 3/20 palliative care consulted by hematology.  As per hematology 

documentation the patient requested to talk to palliative care to see if she is 

a hospice candidate.


Discussed with palliative care who states that the patient's goals are 

aggressive and she wants to get better.  The patient definitely is a hospice 

candidate, however her goals are not hospice appropriate.  Patient was started 

on Oramorph SR 30 mg p.o. twice a day for pain control.


Heparin drip until the patient's INR reaches 2.5.  Goal INR should be 2.5-3.5 

since the patient has an aortic prosthetic valve.  Will reconsult vascular 

surgery.  Pedal pulses present by Doppler





(2) Bacteremia


Plan:  Blood cultures obtained on 2/23 growing strep viridans.


Subsequent repeat blood cultures negative.


3/19 sp Exchange of Right IJ central line. 


Continue antibiotics as per ID recommendations.





(3) Septic pulmonary embolism


Plan:  As observed on a CT angiography of the chest obtained on 3/3/18.


echocardiogram on 3/3 showed an estimated EF of 50-55%.  


++severe tricuspid regurgitation with prosthesis in place.  


++2 x 4 cm mobile vegetation was seen on the valve.  The right atrium and right 

ventricle were normal in size and function


Hematology following.


Management of Coumadin as per hematology recommendations.


Goal INR 2.5-3.5 given the presence of a prosthetic aortic valve.





(4) IVDU (intravenous drug user)


Plan:  Counseled on drug cessation.





(5) Anemia


Plan:  Likely secondary to inflammation/infection.  No evidence of bleeding or 

hemolysis.


Monitor hemoglobin and transfuse as needed.





(6) NOÉ (acute kidney injury)


Plan: Nephrology consulted.  Possible ATN from vancomycin toxicity or 

infection.  Urine eosinophils were ordered and negative.  Diuretics managed by 

nephrology.  Initially on Lasix 20 mg IV twice daily along with albumin.  

Creatinine Worsening on 3/19 for 1.57-1.62.  Later Lasix dose was increased to 

40 mg IV twice daily  and metolazone added to the regimen.  Creatinine then 

started trending down to 1.34 on 3/23.


3/28 creatinine stable at 1.18.  Management as per nephrology.  Continue to 

monitor BUN and creatinine, avoid nephrotoxins, history I's and O's.





(7) Cellulitis of left lower extremity


Plan:  Continue IV antibiotics as per ID.


Cellulitis seems to be improving.





(8) Purple toe syndrome


Plan:  Patient has a blue/purple discoloration of the left toes.  


Vascular surgery consulted.  Recommended medical management.  Ankle-brachial 

indices are decreased and below normal. The toe brachial indices could not be 

obtained given the pulses could not be obtained in the first digits.  Patient 

initially started on IV heparin.  Goal INR 2.5-3.5.


2/28 INR therapeutic, discontinue IV heparin.  Monitor PT/INR daily.  Pharmacy 

helping with dosing and monitoring.


Patient also with severe excruciating pain secondary to possible some ischemia.

  Increase dose of morphine sulfate from 30 mg p.o. every 12 hours to 45 mg 

p.o. every 12 hours.








(9) Pleuritic chest pain


Improving control of pleuritic chest pain.  Repeat chest x-ray obtained on 3/26/

18 did not show any significant change in the right lower lobe infiltrate.  

Patient will need a follow-up chest x-ray in a couple days.  Increase morphine 

sulfate as stated above.





Discharge Planning


Patient with prosthetic valve endocarditis, bacteremia, septic pulmonary 

embolism and acute kidney injury.  Patient still edematous, good urine output.  

Will need ID clearance prior to discharge.





Problem Qualifiers





(1) Prosthetic valve endocarditis:  


Qualified Codes:  T82.6XXA - Infection and inflammatory reaction due to cardiac 

valve prosthesis, initial encounter


(2) Anemia:  


Qualified Codes:  D63.8 - Anemia in other chronic diseases classified elsewhere


(3) Purple toe syndrome:  


Qualified Codes:  I75.022 - Atheroembolism of left lower extremity








Franklin Kyle MD Mar 29, 2018 02:44

## 2018-03-29 NOTE — HHI.PR
Subjective


Remarks


Follow-up endocarditis.  Patient is emotional stating that she is not getting 

any better.  She wants to talk to hospice again.  Patient indicated on narcotic 

usage that I do not intend to prescribe narcotics after discharge and that we 

need to wean her off





Objective


Vitals





Vital Signs








  Date Time  Temp Pulse Resp B/P (MAP) Pulse Ox O2 Delivery O2 Flow Rate FiO2


 


3/29/18 11:53  98      


 


3/29/18 10:00     93 Nasal Cannula 3.00 


 


3/29/18 08:00  97      


 


3/29/18 08:00 97.5 97 20 92/58 (69) 94   


 


3/29/18 07:39      Nasal Cannula 3.00 21


 


3/29/18 04:20      Nasal Cannula 2.00 


 


3/29/18 04:20 99.3 106 20 101/52 (68) 94   


 


3/29/18 04:06  98      


 


3/29/18 00:35  111      


 


3/29/18 00:00 99.0 113 22 93/61 (72) 91   


 


3/29/18 00:00      Nasal Cannula 2.00 


 


3/28/18 21:31  107 20 94/58 (70) 92   


 


3/28/18 21:22      Nasal Cannula 2.00 


 


3/28/18 20:11  110      


 


3/28/18 18:08      Nasal Cannula 2.00 


 


3/28/18 16:10 97.6 102 18 96/72 (80) 92   


 


3/28/18 16:00  98      














I/O      


 


 3/28/18 3/28/18 3/28/18 3/29/18 3/29/18 3/29/18





 07:00 15:00 23:00 07:00 15:00 23:00


 


Intake Total 200 ml  800 ml 710 ml  


 


Output Total 1700 ml  600 ml 1400 ml  


 


Balance -1500 ml  200 ml -690 ml  


 


      


 


Intake Oral 100 ml  600 ml 510 ml  


 


IV Total 100 ml  200 ml 200 ml  


 


Output Urine Total 1700 ml  600 ml 1400 ml  


 


# Bowel Movements 1  1 1  


 


# Sanitary Pads     3 Pads 








Result Diagram:  


3/29/18 0545                                                                   

             3/29/18 0545





Imaging





Last Impressions








Chest X-Ray 3/26/18 0000 Signed





Impressions: 





 Service Date/Time:  Monday, March 26, 2018 13:53 - CONCLUSION: No significant 





 change in the right lower lobe infiltrate.     Ollie Paige MD 


 


Catheter Placement X-Ray 3/19/18 0000 Signed





Impressions: 





 Service Date/Time:  Monday, March 19, 2018 11:16 - CONCLUSION: Uncomplicated 





 venous catheter change as above.     Rj Whitten MD 


 


Head CT 3/3/18 0000 Signed





Impressions: 





 Service Date/Time:  Saturday, March 3, 2018 10:22 - CONCLUSION:  1. Focal area 





 of decreased density involving the right parietal lobe. As a new finding from 





 the prior exam. This could relate to a small infarct. MRI of the brain with 





 gadolinium suggested to further evaluate. 2. Small lacunar infarction 

involving 





 the left centrum semiovale.     Scooter Dawson Jr., MD 


 


CT Angiography 3/3/18 0000 Signed





Impressions: 





 Service Date/Time:  Saturday, March 3, 2018 10:28 - CONCLUSION:  There 

extensive 





 pulmonary emboli bilaterally. There is near complete cut off of the right 

lower 





 lobe pulmonary artery. Prominent filling defects on the left as well. These 





 findings were relayed to Dr. Bustos immediately at the completion of the study.  





 Scattered pulmonary infiltrates, small pleural effusion and extensive 





 mediastinal lymphadenopathy as described above.      Won Sharp MD 


 


Brain MRI 3/3/18 0000 Signed





Impressions: 





 Service Date/Time:  Saturday, March 3, 2018 18:04 - CONCLUSION:  Deterioration 





 in the appearance of the scan.  At least 3 focal areas of abnormal contrast 





 enhancement are present scattered to in both hemispheres.  Given the history 





 this most likely represents developing areas cerebritis.  Exam is compromised 

by 





 an uncooperative patient and motion artifact.  These 2 factors make detection 

of 





 other abnormalities such as cortical cephalitis and meningitis difficult to 





 exclude.  Cannot exclude hemorrhagic lesions as well.  There is no mass effect 





 evident.      Jorge Diane MD 


 


Chest CT 2/26/18 0000 Signed





Impressions: 





 Service Date/Time:  Monday, February 26, 2018 12:41 - CONCLUSION:  1. There 

are 





 at least 5 pulmonary nodules present bilaterally with the largest measuring 19 





 mm. None demonstrate cavitation but it is possible that these represent septic 





 emboli. Suggest followup to assess for change. 2. Splenomegaly with subtle 





 wedge-shaped areas of low-density in the mid spleen which could represent 





 infarcts. 3. Mild cardiomegaly in this patient post aortic valve replacement. 





 Low density of the cardiac blood pool suggests anemia.      Ron Melgar MD 


 


Aorta w/Runoff CTA 2/25/18 0000 Signed





Impressions: 





 Service Date/Time:  Sunday, February 25, 2018 12:47 - CONCLUSION:  Left renal 





 cortical infarction. 5-6 cm length total occlusion of the left superficial 





 femoral artery in the proximal thigh. Nodular parenchymal opacities in the 

lung 





 bases bilaterally See above discussion.     Ron Cruz MD 


 


Renal Ultrasound 2/24/18 0000 Signed





Impressions: 





 Service Date/Time:  Saturday, February 24, 2018 10:53 - CONCLUSION:  1. No 





 evidence of hydronephrosis. 2. Thickening of the urinary bladder wall a 1.1 

cm. 





 3. Prominent splenomegaly.     Elgin Walton MD 


 


Breast Ultrasound 2/24/18 0000 Signed





Impressions: 





 Service Date/Time:  Saturday, February 24, 2018 10:48 - CONCLUSION:  Negative 





 targeted right breast ultrasound examination.     Ron Brown MD 


 


Lower Extremity Ultrasound 2/23/18 0000 Signed





Impressions: 





 Service Date/Time:  Friday, February 23, 2018 21:22 - CONCLUSION:  1. No 





 sonographic evidence for lower extremity DVT. 2. Incidental note of bilateral 





 inguinal adenopathy with the largest on the right measuring 3.3 cm and the 





 largest on the left measuring 2.6 cm. This finding is nonspecific but is 





 typically reactive in etiology.     Rj Whitten MD 








Objective Remarks


AAOx3


NAD


Clear lungs BL


S1S2 4/6 systolic ejection murmur


+3 BL lower extremity edema. Erythema improved, left foot very tender to 

palpation.


. Dark discoloration left toes lisa 3rd and 5th. pulses not palpable due to 

edema. Good capillary refill.


Procedures


Exchange of right IJ central line


Date of Insertion:  Mar 19, 2018


Line:  Central Venous Catheter


Side:  Right


Location:  Internal, Jugular





A/P


Problem List:  


(1) Prosthetic valve endocarditis


ICD Code:  T82.6XXA - Infection and inflammatory reaction due to cardiac valve 

prosthesis, initial encounter


Status:  Acute


(2) Bacteremia


ICD Code:  R78.81 - Bacteremia


(3) Septic pulmonary embolism


ICD Code:  I26.90 - Septic pulmonary embolism without acute cor pulmonale


Status:  Acute


(4) IVDU (intravenous drug user)


ICD Code:  F19.90 - Other psychoactive substance use, unspecified, uncomplicated


(5) Anemia


ICD Code:  D64.9 - Anemia, unspecified


Status:  Chronic


(6) NOÉ (acute kidney injury)


ICD Code:  N17.9 - Acute kidney failure, unspecified


(7) Cellulitis of left lower extremity


ICD Code:  L03.116 - Cellulitis of left lower limb


(8) Purple toe syndrome


ICD Code:  I75.029 - Atheroembolism of unspecified lower extremity


(9) Pleuritic chest pain


ICD Code:  R07.81 - Pleurodynia


Assessment and Plan


(1) Prosthetic valve endocarditis


Continue IV ampicillin and daptomycin until April 6 per CTS, patient would not 

benefit from further cardiac surgery.  Patient feels he is not getting any 

better and wants to talk to hospice again.  





(2) Bacteremia


Plan:  Blood cultures obtained on 2/23 growing strep viridans.


Subsequent repeat blood cultures negative.


3/19 sp Exchange of Right IJ central line. 


Continue antibiotics as per ID recommendations.





(3) Septic pulmonary embolism


Plan:  As observed on a CT angiography of the chest obtained on 3/3/18.


echocardiogram on 3/3 showed an estimated EF of 50-55%.  


++severe tricuspid regurgitation with prosthesis in place.  


++2 x 4 cm mobile vegetation was seen on the valve.  The right atrium and right 

ventricle were normal in size and function


Hematology following.


Goal INR 2.5-3.5 given the presence of a prosthetic aortic valve.  Status post 

heparin bridging





(4) IVDU (intravenous drug user)


Plan:  Counseled on drug cessation.





(5) Anemia


Plan:  Likely secondary to inflammation/infection.  No evidence of bleeding or 

hemolysis.


Monitor hemoglobin and transfuse as needed.





(6) NOÉ (acute kidney injury)


Plan: Nephrology consulted.  Possible ATN from vancomycin toxicity or 

infection.  Urine eosinophils were ordered and negative.  Diuretics managed by 

nephrology.  Initially on Lasix 20 mg IV twice daily along with albumin.  

Creatinine Worsening on 3/19 for 1.57-1.62.  Later Lasix dose was increased to 

40 mg IV twice daily  and metolazone added to the regimen.  Creatinine then 

started trending down to 1.34 on 3/23.


3/28 creatinine stable at 1.18.  Management as per nephrology.  Continue to 

monitor BUN and creatinine, avoid nephrotoxins, history I's and O's.  

Hemodynamics improving





(7) Cellulitis of left lower extremity


Plan:  Continue IV antibiotics as per ID.


Cellulitis seems to be improving.





(8) Purple toe syndrome


Vascular surgery recommended conservative management continue anticoagulation, 

weightbearing as tolerated and repeat DELONTE in 2-3 weeks


Patient also with severe excruciating pain secondary to possible some ischemia.

  Increase dose of morphine sulfate from 30 mg p.o. every 12 hours to 45 mg 

p.o. every 12 hours.  Counseled regarding narcotic usage








(9) Pleuritic chest pain


Improving control of pleuritic chest pain.  Repeat chest x-ray obtained on 3/26/

18 did not show any significant change in the right lower lobe infiltrate.


Discharge Planning


Patient with prosthetic valve endocarditis, bacteremia, septic pulmonary 

embolism and acute kidney injury.  Patient still edematous, good urine output.  

Will need ID clearance prior to discharge.





Problem Qualifiers





(1) Prosthetic valve endocarditis:  


Qualified Codes:  T82.6XXA - Infection and inflammatory reaction due to cardiac 

valve prosthesis, initial encounter


(2) Anemia:  


Qualified Codes:  D63.8 - Anemia in other chronic diseases classified elsewhere


(3) Purple toe syndrome:  


Qualified Codes:  I75.022 - Atheroembolism of left lower extremity








Franklin Kyle MD Mar 29, 2018 13:27

## 2018-03-30 VITALS
DIASTOLIC BLOOD PRESSURE: 60 MMHG | OXYGEN SATURATION: 97 % | HEART RATE: 95 BPM | SYSTOLIC BLOOD PRESSURE: 94 MMHG | TEMPERATURE: 98 F | RESPIRATION RATE: 22 BRPM

## 2018-03-30 VITALS
SYSTOLIC BLOOD PRESSURE: 106 MMHG | RESPIRATION RATE: 22 BRPM | OXYGEN SATURATION: 94 % | TEMPERATURE: 97.3 F | DIASTOLIC BLOOD PRESSURE: 65 MMHG | HEART RATE: 102 BPM

## 2018-03-30 VITALS
SYSTOLIC BLOOD PRESSURE: 98 MMHG | OXYGEN SATURATION: 97 % | HEART RATE: 95 BPM | DIASTOLIC BLOOD PRESSURE: 60 MMHG | RESPIRATION RATE: 22 BRPM | TEMPERATURE: 98.2 F

## 2018-03-30 VITALS
OXYGEN SATURATION: 95 % | RESPIRATION RATE: 22 BRPM | TEMPERATURE: 98 F | DIASTOLIC BLOOD PRESSURE: 54 MMHG | SYSTOLIC BLOOD PRESSURE: 91 MMHG | HEART RATE: 87 BPM

## 2018-03-30 VITALS
RESPIRATION RATE: 20 BRPM | OXYGEN SATURATION: 95 % | TEMPERATURE: 97.4 F | SYSTOLIC BLOOD PRESSURE: 94 MMHG | HEART RATE: 96 BPM | DIASTOLIC BLOOD PRESSURE: 58 MMHG

## 2018-03-30 VITALS — HEART RATE: 99 BPM

## 2018-03-30 VITALS
HEART RATE: 102 BPM | OXYGEN SATURATION: 94 % | RESPIRATION RATE: 22 BRPM | SYSTOLIC BLOOD PRESSURE: 106 MMHG | DIASTOLIC BLOOD PRESSURE: 65 MMHG | TEMPERATURE: 97.3 F

## 2018-03-30 VITALS
TEMPERATURE: 98.1 F | HEART RATE: 100 BPM | SYSTOLIC BLOOD PRESSURE: 101 MMHG | DIASTOLIC BLOOD PRESSURE: 58 MMHG | OXYGEN SATURATION: 97 % | RESPIRATION RATE: 22 BRPM

## 2018-03-30 VITALS
TEMPERATURE: 98.2 F | RESPIRATION RATE: 22 BRPM | DIASTOLIC BLOOD PRESSURE: 58 MMHG | HEART RATE: 98 BPM | OXYGEN SATURATION: 96 % | SYSTOLIC BLOOD PRESSURE: 94 MMHG

## 2018-03-30 VITALS
RESPIRATION RATE: 20 BRPM | SYSTOLIC BLOOD PRESSURE: 102 MMHG | HEART RATE: 95 BPM | TEMPERATURE: 98.7 F | DIASTOLIC BLOOD PRESSURE: 56 MMHG | OXYGEN SATURATION: 98 %

## 2018-03-30 VITALS
SYSTOLIC BLOOD PRESSURE: 99 MMHG | OXYGEN SATURATION: 98 % | RESPIRATION RATE: 22 BRPM | DIASTOLIC BLOOD PRESSURE: 51 MMHG | HEART RATE: 104 BPM | TEMPERATURE: 98.9 F

## 2018-03-30 VITALS
TEMPERATURE: 97.9 F | SYSTOLIC BLOOD PRESSURE: 98 MMHG | RESPIRATION RATE: 22 BRPM | HEART RATE: 102 BPM | OXYGEN SATURATION: 93 % | DIASTOLIC BLOOD PRESSURE: 69 MMHG

## 2018-03-30 VITALS — HEART RATE: 92 BPM

## 2018-03-30 VITALS — OXYGEN SATURATION: 96 %

## 2018-03-30 VITALS — OXYGEN SATURATION: 98 %

## 2018-03-30 LAB
BASOPHILS # BLD AUTO: 0.1 TH/MM3 (ref 0–0.2)
BASOPHILS NFR BLD: 0.7 % (ref 0–2)
BUN SERPL-MCNC: 20 MG/DL (ref 7–18)
CALCIUM SERPL-MCNC: 8.2 MG/DL (ref 8.5–10.1)
CHLORIDE SERPL-SCNC: 91 MEQ/L (ref 98–107)
CREAT SERPL-MCNC: 1.13 MG/DL (ref 0.5–1)
EOSINOPHIL # BLD: 0.2 TH/MM3 (ref 0–0.4)
EOSINOPHIL NFR BLD: 1.4 % (ref 0–4)
ERYTHROCYTE [DISTWIDTH] IN BLOOD BY AUTOMATED COUNT: 22.9 % (ref 11.6–17.2)
GFR SERPLBLD BASED ON 1.73 SQ M-ARVRAT: 54 ML/MIN (ref 89–?)
GLUCOSE SERPL-MCNC: 99 MG/DL (ref 74–106)
HCO3 BLD-SCNC: 34.3 MEQ/L (ref 21–32)
HCT VFR BLD CALC: 23 % (ref 35–46)
HGB BLD-MCNC: 7.3 GM/DL (ref 11.6–15.3)
INR PPP: 1.5 RATIO
LYMPHOCYTES # BLD AUTO: 1.5 TH/MM3 (ref 1–4.8)
LYMPHOCYTES NFR BLD AUTO: 11.5 % (ref 9–44)
MAGNESIUM SERPL-MCNC: 1.4 MG/DL (ref 1.5–2.5)
MCH RBC QN AUTO: 23.7 PG (ref 27–34)
MCHC RBC AUTO-ENTMCNC: 31.5 % (ref 32–36)
MCV RBC AUTO: 75.2 FL (ref 80–100)
MONOCYTE #: 0.8 TH/MM3 (ref 0–0.9)
MONOCYTES NFR BLD: 6 % (ref 0–8)
NEUTROPHILS # BLD AUTO: 10.7 TH/MM3 (ref 1.8–7.7)
NEUTROPHILS NFR BLD AUTO: 80.4 % (ref 16–70)
PLATELET # BLD: 112 TH/MM3 (ref 150–450)
PMV BLD AUTO: 9.1 FL (ref 7–11)
PROTHROMBIN TIME: 15.5 SEC (ref 9.8–11.6)
RBC # BLD AUTO: 3.06 MIL/MM3 (ref 4–5.3)
SODIUM SERPL-SCNC: 133 MEQ/L (ref 136–145)
WBC # BLD AUTO: 13.3 TH/MM3 (ref 4–11)

## 2018-03-30 RX ADMIN — HYDROMORPHONE HYDROCHLORIDE PRN MG: 2 INJECTION INTRAMUSCULAR; INTRAVENOUS; SUBCUTANEOUS at 21:54

## 2018-03-30 RX ADMIN — HYDROCODONE BITARTRATE AND ACETAMINOPHEN PRN TAB: 10; 325 TABLET ORAL at 18:32

## 2018-03-30 RX ADMIN — MORPHINE SULFATE SCH MG: 15 TABLET, EXTENDED RELEASE ORAL at 20:17

## 2018-03-30 RX ADMIN — STANDARDIZED SENNA CONCENTRATE AND DOCUSATE SODIUM SCH TAB: 8.6; 5 TABLET, FILM COATED ORAL at 09:00

## 2018-03-30 RX ADMIN — POTASSIUM CHLORIDE SCH MEQ: 750 TABLET, FILM COATED, EXTENDED RELEASE ORAL at 08:55

## 2018-03-30 RX ADMIN — Medication PRN ML: at 04:09

## 2018-03-30 RX ADMIN — STANDARDIZED SENNA CONCENTRATE AND DOCUSATE SODIUM SCH TAB: 8.6; 5 TABLET, FILM COATED ORAL at 20:17

## 2018-03-30 RX ADMIN — Medication SCH ML: at 08:56

## 2018-03-30 RX ADMIN — HYDROMORPHONE HYDROCHLORIDE PRN MG: 2 INJECTION INTRAMUSCULAR; INTRAVENOUS; SUBCUTANEOUS at 04:09

## 2018-03-30 RX ADMIN — HYDROMORPHONE HYDROCHLORIDE PRN MG: 2 INJECTION INTRAMUSCULAR; INTRAVENOUS; SUBCUTANEOUS at 08:53

## 2018-03-30 RX ADMIN — AMPICILLIN SODIUM SCH MLS/HR: 2 INJECTION, POWDER, FOR SOLUTION INTRAMUSCULAR; INTRAVENOUS at 01:38

## 2018-03-30 RX ADMIN — METOLAZONE SCH MG: 2.5 TABLET ORAL at 20:17

## 2018-03-30 RX ADMIN — WARFARIN SODIUM SCH MG: 2 TABLET ORAL at 15:38

## 2018-03-30 RX ADMIN — AMPICILLIN SODIUM SCH MLS/HR: 2 INJECTION, POWDER, FOR SOLUTION INTRAMUSCULAR; INTRAVENOUS at 13:10

## 2018-03-30 RX ADMIN — Medication SCH ML: at 20:16

## 2018-03-30 RX ADMIN — FUROSEMIDE SCH MG: 10 INJECTION, SOLUTION INTRAMUSCULAR; INTRAVENOUS at 18:23

## 2018-03-30 RX ADMIN — POTASSIUM CHLORIDE SCH MLS/HR: 200 INJECTION, SOLUTION INTRAVENOUS at 09:36

## 2018-03-30 RX ADMIN — AMPICILLIN SODIUM SCH MLS/HR: 2 INJECTION, POWDER, FOR SOLUTION INTRAMUSCULAR; INTRAVENOUS at 20:00

## 2018-03-30 RX ADMIN — POTASSIUM CHLORIDE SCH MLS/HR: 200 INJECTION, SOLUTION INTRAVENOUS at 09:54

## 2018-03-30 RX ADMIN — AMPICILLIN SODIUM SCH MLS/HR: 2 INJECTION, POWDER, FOR SOLUTION INTRAMUSCULAR; INTRAVENOUS at 08:57

## 2018-03-30 RX ADMIN — MAGNESIUM SULFATE IN DEXTROSE SCH MLS/HR: 10 INJECTION, SOLUTION INTRAVENOUS at 06:49

## 2018-03-30 RX ADMIN — MORPHINE SULFATE SCH MG: 15 TABLET, EXTENDED RELEASE ORAL at 10:00

## 2018-03-30 RX ADMIN — POTASSIUM CHLORIDE SCH MEQ: 750 TABLET, FILM COATED, EXTENDED RELEASE ORAL at 20:16

## 2018-03-30 RX ADMIN — METOLAZONE SCH MG: 2.5 TABLET ORAL at 09:04

## 2018-03-30 RX ADMIN — HYDROMORPHONE HYDROCHLORIDE PRN MG: 2 INJECTION INTRAMUSCULAR; INTRAVENOUS; SUBCUTANEOUS at 14:30

## 2018-03-30 RX ADMIN — MAGNESIUM SULFATE IN DEXTROSE SCH MLS/HR: 10 INJECTION, SOLUTION INTRAVENOUS at 08:57

## 2018-03-30 RX ADMIN — DAPTOMYCIN SCH MLS/HR: 500 INJECTION, POWDER, LYOPHILIZED, FOR SOLUTION INTRAVENOUS at 20:16

## 2018-03-30 RX ADMIN — FUROSEMIDE SCH MG: 10 INJECTION, SOLUTION INTRAMUSCULAR; INTRAVENOUS at 08:56

## 2018-03-30 NOTE — HHI.NPPN
Subjective


General Problems:  Anemia


Renal Failure:  Acute


History of Present Illness


This is a 38-year-old female with past medical history of IV drug abuse, 

history of hepatitis C, narcotic dependency, came to the hospital with 

complaint of shortness of breath and swelling.  Nephrology called to see the 

patient because of elevated BUN and creatinine.  The patient has creatinine of 

4.5 on admission which improved and it was staying in the range of 0.6-0.8 

until 6 days ago, then it started going up again and now it is 1.7.  The 

patient has increased swelling 


of the legs and the patient was diagnosed with endocarditis and she has blood 

culture positive for Strep viridans.  Patient has been following with the ID 

and she was getting furosemide, which was stopped today this morning because of 

increased creatinine and she was on vancomycin and rifampicin.  The last dose 

of vancomycin seems to be given possibly on 03/04/2018 or even later, but her 

level was high and the highest level we have was 25.8, which was on 03/04/2018 

but on 03/10/2018 it was 23.9.  The patient has been actively using IV drugs 

before she came to the hospital and noticed to have more swelling in her legs.  

She is complaining of generalized body pain.  There is no nausea, vomiting.  

Denies any diarrhea.  Her appetite is normal.


Additional Remarks


Mild SOB.  Continues with mild SOB.  Some improvement noted in lower extremity 

edema.   


 (Gellermann,Diane M. ARNP)





Review of Systems


General


Constitutional:  Fatigue


 (Gellermann,Diane M. ARNP)





Respiratory


Lungs:  SOB


Respiratory Remarks


improving


 (Gellermann,Diane M. ARNP)





Cardiovascular


Cardiac:  Edema, SCOTT


Cardiac Remarks


Denies CP


 (Gellermann,Diane M. ARNP)





Gastrointestinal


GI Remarks


denies abdominal pain


 (Gellermann,Diane M. ARNP)





Objective Data


Data











 3/30/18 3/31/18





 19:00 07:00


 


  


 


# Sanitary Pads 3 Pads 











Vital Signs








  Date Time  Temp Pulse Resp B/P (MAP) Pulse Ox O2 Delivery O2 Flow Rate FiO2


 


3/30/18 09:12     98 Nasal Cannula 3.00 


 


3/30/18 08:00  102      


 


3/30/18 08:00 97.9 106 22 98/69 (79) 93   


 


3/30/18 07:37      Nasal Cannula 2.00 21





      Humidified  


 


3/30/18 04:00 98.9 104 22 99/51 (67) 98   


 


3/30/18 04:00      Nasal Cannula 2.00 





      Humidified  


 


3/30/18 03:40  99      


 


3/29/18 23:55 99.0 101 20 95/52 (66) 100   


 


3/29/18 23:55      Nasal Cannula 2.00 





      Humidified  


 


3/29/18 23:45  99      


 


3/29/18 19:53     93 Nasal Cannula 3.00 


 


3/29/18 19:45      Nasal Cannula 2.00 


 


3/29/18 19:45 97.2 103 22 98/55 (69) 95   


 


3/29/18 19:42  105      


 


3/29/18 16:00  108      


 


3/29/18 16:00 98.3 93 20 94/68 (77) 100   


 


3/29/18 12:00 97.0 96 20 97/60 (72) 98   


 


3/29/18 11:53  98      








 (Gellermann,Diane M. ARNP)


-:  


3/30/18 0425                                                                   

             3/30/18 0425








Physical Exam


General


Appearance:  No Acute Distress, Anxious


 (Gellermann,Diane M. ARNP)





Eyes


Eye Exam:  Pupils Equal


 (Gellermann,Diane M. ARNP)





Throat


Throat Exam:  Oral Mucosa Pink & Moist


 (Gellermann,Diane M. ARNP)





Neck


Neck Exam:  Neck Supple


 (Gellermann,Diane M. ARNP)





Pulmonary


Resp Exam:  Clear Bilaterally


 (Gellermann,Diane M. ARNP)





Cardiology


CV Exam:  Regular, Normal Sinus Rhythm


 (Gellermann,Diane M. ARNP)





Gastrointestinal/Abdomen


GI Exam:  Soft, Non-Tender


 (Gellermann,Diane M. ARNP)





Integumentary


Skin Exam:  Dry, Intact


 (Gellermann,Diane M. ARNP)





Extremeties


Extremities Exam:  Pitting Edema


 (Gellermann,Diane M. ARNP)





Neurologic


Neuro Exam:  Alert, Awake, Oriented


 (Gellermann,Diane M. ARNP)





Psychiatric


Psych Exam:  Appropriate Responses


 (Gellermann,Diane M. ARNP)





Assessment/Plan


Discussed Condition With:  Patient


Assessment Summary:  NOÉ/Acute Renal Failure


Problem List:  


(1) Acute kidney injury


ICD Codes:  N17.9 - Acute kidney failure, unspecified


Status:  Acute


Plan:  


NOÉ likely due to vancomycin toxicity,  post-infectious GN is unlikely.


Possibility of ATN or pre renal.


Urine eosinophils negative, complements wnl


Creatinine stable


Good UOP





Plan


Hypokalemia at 2.9 replacement given 


Low magnesium level replacement given


Continue Lasix 40 mg IV BID and metolazone for edema


Continue to monitor BMP and UOP


Encouraged to elevate legs throughout day


Avoid nephrotoxins when possible.


Fluid restriction in place.








(2) Prosthetic valve endocarditis


ICD Codes:  T82.6XXA - Infection and inflammatory reaction due to cardiac valve 

prosthesis, initial encounter


Status:  Acute


Plan:  septic endocarditis with strep viridans


IV drug use, pulmonary emboli as well.





Continues antibiotics per ID


continue renal dosing antibiotics.





(3) Peripheral arterial occlusive disease


ICD Codes:  I77.9 - Disorder of arteries and arterioles, unspecified


Plan:  seen with vascular, continue medical management


Hold contrast studies as possible.





(4) Hepatitis C


ICD Codes:  B19.20 - Unspecified viral hepatitis C without hepatic coma


Status:  Chronic (Gellermann,Diane M. ARNP)


Problem List:  


(1) Acute kidney injury


ICD Codes:  N17.9 - Acute kidney failure, unspecified


Status:  Acute


Plan:  


NOÉ likely due to vancomycin toxicity,  post-infectious GN is unlikely.


Possibility of ATN or pre renal.


Urine eosinophils negative, complements wnl


Creatinine stable


Good UOP





Plan


Hypokalemia at 2.9 replacement given 


Low magnesium level replacement given


Continue Lasix 40 mg IV BID and metolazone for edema


Continue to monitor BMP and UOP


Encouraged to elevate legs throughout day


Avoid nephrotoxins when possible.


Fluid restriction in place.


Patient seen and examined, agree with above.


Continue diuretics.








(2) Prosthetic valve endocarditis


ICD Codes:  T82.6XXA - Infection and inflammatory reaction due to cardiac valve 

prosthesis, initial encounter


Status:  Acute


Plan:  septic endocarditis with strep viridans


IV drug use, pulmonary emboli as well.





Continues antibiotics per ID


continue renal dosing antibiotics.





(3) Peripheral arterial occlusive disease


ICD Codes:  I77.9 - Disorder of arteries and arterioles, unspecified


Plan:  seen with vascular, continue medical management


Hold contrast studies as possible.





(4) Hepatitis C


ICD Codes:  B19.20 - Unspecified viral hepatitis C without hepatic coma


Status:  Chronic (Melissa Layne MD)





Problem Qualifiers





(1) Prosthetic valve endocarditis:  


Qualified Codes:  T82.6XXA - Infection and inflammatory reaction due to cardiac 

valve prosthesis, initial encounter








Gellermann,Diane M. ARNP Mar 30, 2018 11:08


Melissa Layne MD Mar 30, 2018 18:20

## 2018-03-30 NOTE — HHI.PR
Subjective


Remarks


Follow-up endocarditis.  Today she is less emotional.  States she will set up 

with hospice after completion of treatment with IV antibiotics.  Agrees to 

modify pain management.  Discussed with palliative care.





Objective


Vitals





Vital Signs








  Date Time  Temp Pulse Resp B/P (MAP) Pulse Ox O2 Delivery O2 Flow Rate FiO2


 


3/30/18 12:00 98.0 87 22 91/54 (66) 95   


 


3/30/18 09:12     98 Nasal Cannula 3.00 


 


3/30/18 08:00  102      


 


3/30/18 08:00 97.9 106 22 98/69 (79) 93   


 


3/30/18 07:37      Nasal Cannula 2.00 21





      Humidified  


 


3/30/18 04:00 98.9 104 22 99/51 (67) 98   


 


3/30/18 04:00      Nasal Cannula 2.00 





      Humidified  


 


3/30/18 03:40  99      


 


3/29/18 23:55 99.0 101 20 95/52 (66) 100   


 


3/29/18 23:55      Nasal Cannula 2.00 





      Humidified  


 


3/29/18 23:45  99      


 


3/29/18 19:53     93 Nasal Cannula 3.00 


 


3/29/18 19:45      Nasal Cannula 2.00 


 


3/29/18 19:45 97.2 103 22 98/55 (69) 95   


 


3/29/18 19:42  105      


 


3/29/18 16:00  108      


 


3/29/18 16:00 98.3 93 20 94/68 (77) 100   














I/O      


 


 3/29/18 3/29/18 3/29/18 3/30/18 3/30/18 3/30/18





 06:59 14:59 22:59 06:59 14:59 22:59


 


Intake Total 710 ml 100 ml 780 ml 710 ml  


 


Output Total 1400 ml   1850 ml  


 


Balance -690 ml 100 ml 780 ml -1140 ml  


 


      


 


Intake Oral 510 ml  480 ml 610 ml  


 


IV Total 200 ml 100 ml 300 ml 100 ml  


 


Output Urine Total 1400 ml   1850 ml  


 


# Voids   4   


 


# Bowel Movements 1  3 1  


 


# Sanitary Pads  3 Pads   3 Pads 








Result Diagram:  


3/30/18 0425                                                                   

             3/30/18 0425





Imaging





Last Impressions








Chest X-Ray 3/26/18 0000 Signed





Impressions: 





 Service Date/Time:  Monday, March 26, 2018 13:53 - CONCLUSION: No significant 





 change in the right lower lobe infiltrate.     Ollie Paige MD 


 


Catheter Placement X-Ray 3/19/18 0000 Signed





Impressions: 





 Service Date/Time:  Monday, March 19, 2018 11:16 - CONCLUSION: Uncomplicated 





 venous catheter change as above.     Rj Whitten MD 


 


Head CT 3/3/18 0000 Signed





Impressions: 





 Service Date/Time:  Saturday, March 3, 2018 10:22 - CONCLUSION:  1. Focal area 





 of decreased density involving the right parietal lobe. As a new finding from 





 the prior exam. This could relate to a small infarct. MRI of the brain with 





 gadolinium suggested to further evaluate. 2. Small lacunar infarction 

involving 





 the left centrum semiovale.     Scooter Dawson Jr., MD 


 


CT Angiography 3/3/18 0000 Signed





Impressions: 





 Service Date/Time:  Saturday, March 3, 2018 10:28 - CONCLUSION:  There 

extensive 





 pulmonary emboli bilaterally. There is near complete cut off of the right 

lower 





 lobe pulmonary artery. Prominent filling defects on the left as well. These 





 findings were relayed to Dr. Bustos immediately at the completion of the study.  





 Scattered pulmonary infiltrates, small pleural effusion and extensive 





 mediastinal lymphadenopathy as described above.      Won Sharp MD 


 


Brain MRI 3/3/18 0000 Signed





Impressions: 





 Service Date/Time:  Saturday, March 3, 2018 18:04 - CONCLUSION:  Deterioration 





 in the appearance of the scan.  At least 3 focal areas of abnormal contrast 





 enhancement are present scattered to in both hemispheres.  Given the history 





 this most likely represents developing areas cerebritis.  Exam is compromised 

by 





 an uncooperative patient and motion artifact.  These 2 factors make detection 

of 





 other abnormalities such as cortical cephalitis and meningitis difficult to 





 exclude.  Cannot exclude hemorrhagic lesions as well.  There is no mass effect 





 evident.      Jorge Diane MD 


 


Chest CT 2/26/18 0000 Signed





Impressions: 





 Service Date/Time:  Monday, February 26, 2018 12:41 - CONCLUSION:  1. There 

are 





 at least 5 pulmonary nodules present bilaterally with the largest measuring 19 





 mm. None demonstrate cavitation but it is possible that these represent septic 





 emboli. Suggest followup to assess for change. 2. Splenomegaly with subtle 





 wedge-shaped areas of low-density in the mid spleen which could represent 





 infarcts. 3. Mild cardiomegaly in this patient post aortic valve replacement. 





 Low density of the cardiac blood pool suggests anemia.      Ron Melgar MD 


 


Aorta w/Runoff CTA 2/25/18 0000 Signed





Impressions: 





 Service Date/Time:  Sunday, February 25, 2018 12:47 - CONCLUSION:  Left renal 





 cortical infarction. 5-6 cm length total occlusion of the left superficial 





 femoral artery in the proximal thigh. Nodular parenchymal opacities in the 

lung 





 bases bilaterally See above discussion.     Ron Cruz MD 


 


Renal Ultrasound 2/24/18 0000 Signed





Impressions: 





 Service Date/Time:  Saturday, February 24, 2018 10:53 - CONCLUSION:  1. No 





 evidence of hydronephrosis. 2. Thickening of the urinary bladder wall a 1.1 

cm. 





 3. Prominent splenomegaly.     Elgin Walton MD 


 


Breast Ultrasound 2/24/18 0000 Signed





Impressions: 





 Service Date/Time:  Saturday, February 24, 2018 10:48 - CONCLUSION:  Negative 





 targeted right breast ultrasound examination.     Ron Brown MD 


 


Lower Extremity Ultrasound 2/23/18 0000 Signed





Impressions: 





 Service Date/Time:  Friday, February 23, 2018 21:22 - CONCLUSION:  1. No 





 sonographic evidence for lower extremity DVT. 2. Incidental note of bilateral 





 inguinal adenopathy with the largest on the right measuring 3.3 cm and the 





 largest on the left measuring 2.6 cm. This finding is nonspecific but is 





 typically reactive in etiology.     Rj Whitten MD 








Objective Remarks


AAOx3


NAD


Clear lungs BL


S1S2 4/6 systolic ejection murmur


+3 BL lower extremity edema. Erythema improved, left foot very tender to 

palpation.


. Dark discoloration left toes lisa 3rd and 5th. pulses not palpable due to 

edema. Good capillary refill.


Procedures


Exchange of right IJ central line


Date of Insertion:  Mar 19, 2018


Line:  Central Venous Catheter


Side:  Right


Location:  Internal, Jugular





A/P


Problem List:  


(1) Prosthetic valve endocarditis


ICD Code:  T82.6XXA - Infection and inflammatory reaction due to cardiac valve 

prosthesis, initial encounter


Status:  Acute


(2) Bacteremia


ICD Code:  R78.81 - Bacteremia


(3) Septic pulmonary embolism


ICD Code:  I26.90 - Septic pulmonary embolism without acute cor pulmonale


Status:  Acute


(4) IVDU (intravenous drug user)


ICD Code:  F19.90 - Other psychoactive substance use, unspecified, uncomplicated


(5) Anemia


ICD Code:  D64.9 - Anemia, unspecified


Status:  Chronic


(6) NOÉ (acute kidney injury)


ICD Code:  N17.9 - Acute kidney failure, unspecified


(7) Cellulitis of left lower extremity


ICD Code:  L03.116 - Cellulitis of left lower limb


(8) Purple toe syndrome


ICD Code:  I75.029 - Atheroembolism of unspecified lower extremity


(9) Pleuritic chest pain


ICD Code:  R07.81 - Pleurodynia


Assessment and Plan


(1) Prosthetic valve endocarditis


Continue IV ampicillin and daptomycin until April 6 per CTS, patient would not 

benefit from further cardiac surgery.  Patient feels she is not getting any 

better and wants to talk to hospice again.  Hospice will be consulted





(2) Bacteremia


Plan:  Blood cultures obtained on 2/23 growing strep viridans.


Subsequent repeat blood cultures negative.


3/19 sp Exchange of Right IJ central line. 


Continue antibiotics as per ID recommendations.





(3) Septic pulmonary embolism


Plan:  As observed on a CT angiography of the chest obtained on 3/3/18.


echocardiogram on 3/3 showed an estimated EF of 50-55%.  


++severe tricuspid regurgitation with prosthesis in place.  


++2 x 4 cm mobile vegetation was seen on the valve.  The right atrium and right 

ventricle were normal in size and function


Hematology following.


Goal INR 2.5-3.5 given the presence of a prosthetic aortic valve.  Status post 

heparin drip





(4) IVDU (intravenous drug user)


Plan:  Counseled on drug cessation.





(5) Anemia


Plan:  Likely secondary to inflammation/infection.  No evidence of bleeding or 

hemolysis.


Monitor hemoglobin and transfuse as needed.





(6) NOÉ (acute kidney injury)


Plan: Nephrology consulted.  Possible ATN from vancomycin toxicity or 

infection.  Urine eosinophils were ordered and negative.  Diuretics managed by 

nephrology.  Initially on Lasix 20 mg IV twice daily along with albumin.  

Creatinine Worsening on 3/19 for 1.57-1.62.  Later Lasix dose was increased to 

40 mg IV twice daily  and metolazone added to the regimen.  Creatinine then 

started trending down to 1.34 on 3/23.


3/28 creatinine stable  Management as per nephrology.  Continue to monitor BUN 

and creatinine, avoid nephrotoxins, history I's and O's.  Hemodynamics improving





(7) Cellulitis of left lower extremity


Plan:  Continue IV antibiotics as per ID.


Cellulitis seems to be improving.





(8) Purple toe syndrome


Vascular surgery recommended conservative management continue anticoagulation, 

weightbearing as tolerated and repeat DELONTE in 2-3 weeks


Patient also with severe excruciating pain secondary to possible some ischemia.

  Increase dose of morphine sulfate from 30 mg p.o. every 12 hours to 45 mg 

p.o. every 12 hours.  Counseled regarding narcotic usage








(9) Pleuritic chest pain


Improving control of pleuritic chest pain.  Repeat chest x-ray obtained on 3/26/

18 did not show any significant change in the right lower lobe infiltrate.


Discharge Planning


Patient with prosthetic valve endocarditis, bacteremia, septic pulmonary 

embolism and acute kidney injury.  Patient still edematous, good urine output.  

Will need ID clearance prior to discharge.





Problem Qualifiers





(1) Prosthetic valve endocarditis:  


Qualified Codes:  T82.6XXA - Infection and inflammatory reaction due to cardiac 

valve prosthesis, initial encounter


(2) Anemia:  


Qualified Codes:  D63.8 - Anemia in other chronic diseases classified elsewhere


(3) Purple toe syndrome:  


Qualified Codes:  I75.022 - Atheroembolism of left lower extremity








Franklin Kyle MD Mar 30, 2018 14:29

## 2018-03-30 NOTE — HHI.HCPN
Reason for visit


   a.  To assist with evaluation and management of symptoms including: chest 

pain, dyspnea. 


   b.  To assist medical decision maker(s) with: better understanding of 

current medical conditions; weighing benefits/burdens of medical treatment 

options; making        


        medical treatment decisions.


.





Subjective/Interval History


Patient seen to follow-up on comfort, goals.  Hospice was re consulted by 

medical attending yesterday afternoon.





Patient remained stable.  Antibiotics to be completed April 6, next week, may 

discharge at that time pending medical clearance.  Afebrile.  H&H have been 

slowly downtrending 7.3/23.0, ordered for 1 unit RBC per medical attending.  

Hypokalemia 2.8, repletion per medical attending.  Long-acting oral morphine 

uptitrated this week per medical attending based on prn requirements,[15 mg 

Oramorph every 12 hours---> increased to 30 mg every 12 hours---> 3/28 

increased to 45 mg every 12 hours ]  patient still with pain to lower 

extremities.





Patient seen in room, no visitors present.  She is seated on the side of bed.  

No apparent distress.  She is emotional, tearful at times. Extensive discuss 

with her. 


Explore with her conversation with medical attending regarding hospice.  She 

asks me if she agrees to a DNR if she could just go on hospice. 


 Explore what her goals are in terms of comfort measures only, she would not 

want additional labs or IVs or imaging etc., as hospice would manage her 

conditions in the home setting but would not provide those additional more 

invasive diagnostics and treatments.  She asks if they would provide oral 

antibiotics, I did discuss that they can and often do provide oral antibiotics 

but that the types of infection she has had on her heart from the endocarditis 

have not been susceptible to oral antibiotic courses.  She asks if she did not 

have them IV would she be terminal, gently explore that with her acute 

presentation and the 35 day hospital course for acute endocarditis, sepsis, 

that treatment she has had  has likely kept her alive, and that without the 

aggressive therapies that she has had the infection and sequelae of her 

endocarditis would have continued likely resulting in her death.  Advised that 

oral antibiotics that may be used in an outpatient setting under hospice 

direction would not likely decrease her infection from endocarditis, they would 

focus on comfort measures and allowing for a comfortable and natural death in 

the home setting.  


She is conflicted, she indicates on one hand she just wants to be able to enjoy 

whatever time she does have left with her family and children, and she is very 

tired of being in the hospital.  She endorses that even though her infectious 

process is deemed to be stable and improved she does not overall feel better.  

On the other hand she does feel like if she could be a live longer for them and 

to feel as better as possible that she would want to do that ,though also 

acknowledges that she does not necessarily feel better, even though she is alive

, she is spending this time in the hospital. 


She would like to meet with hospice again, will continue to explore this over 

the coming week, she does wish to complete her antibiotic course through April 6 though will be considering home with aggressive interventions versus home 

with hospice over the next week.


Upon review of DNR  vs Full code status she indicates that she would still want 

CPR/life support during ongoing hospital course, does NOT want DNR . 





She endorses the shortness of breath overall is improved, she no longer has the 

sharp right chest and posterior pain that she had been having.  Appetite fair.  

No complaints of nausea vomiting or diarrhea.  Voiding adequately no urinary 

complaints.  She endorses she continues to have severe constant throbbing to 

bilateral feet, but that the toes themselves are numb.  The pain goes up both 

lateral sides of her feet.  She endorses the swelling is about the same though 

it fluctuates and improves some with elevation.  She endorses the pain improves 

a little bit with elevation.  Pain is 8 out of 10 generally, decreases to a 6 

out of 10 after use of prn.  Vascular surgery has been following for purple toes

; Vascular surgery recommended conservative management continue anticoagulation

, weightbearing as tolerated and repeat DELONTE in 2-3 weeks. 








d/w medical attending, call to hospice admissions. 


.





Advance Directives


Living Will:  Never completed


Health Care Surrogate:  Never completed


Durable Power of :  Never completed


Advance Directive Specifics


Health Care Surrogate(s):


No written advanced directives, patient refused to complete advanced directives 

during last admission. Patient a single. Children are juvenile. In the absence 

of written advanced directives, according to [place] statutes health care proxy 

decision-making falls to a parent. 


.





Objective





Vital Signs








  Date Time  Temp Pulse Resp B/P (MAP) Pulse Ox O2 Delivery O2 Flow Rate FiO2


 


3/30/18 09:12     98 Nasal Cannula 3.00 


 


3/30/18 08:00  102      


 


3/30/18 08:00 97.9 106 22 98/69 (79) 93   


 


3/30/18 07:37      Nasal Cannula 2.00 21





      Humidified  


 


3/30/18 04:00 98.9 104 22 99/51 (67) 98   


 


3/30/18 04:00      Nasal Cannula 2.00 





      Humidified  


 


3/30/18 03:40  99      


 


3/29/18 23:55 99.0 101 20 95/52 (66) 100   


 


3/29/18 23:55      Nasal Cannula 2.00 





      Humidified  


 


3/29/18 23:45  99      


 


3/29/18 19:53     93 Nasal Cannula 3.00 


 


3/29/18 19:45      Nasal Cannula 2.00 


 


3/29/18 19:45 97.2 103 22 98/55 (69) 95   


 


3/29/18 19:42  105      


 


3/29/18 16:00  108      


 


3/29/18 16:00 98.3 93 20 94/68 (77) 100   


 


3/29/18 12:00 97.0 96 20 97/60 (72) 98   


 


3/29/18 11:53  98      














Intake & Output  


 


 3/30/18 3/30/18





 07:00 19:00


 


Intake Total 910 ml 


 


Output Total 1850 ml 


 


Balance -940 ml 


 


  


 


Intake Oral 610 ml 


 


IV Total 300 ml 


 


Output Urine Total 1850 ml 


 


# Bowel Movements 1 


 


# Sanitary Pads  3 Pads








Physical Exam


CONSTITUTIONAL/GENERAL: This is an adequately nourished patient, unlabored 

respirations, emotional at times 


TUBES/LINES/DRAINS: NC, right IJ central line


SKIN: Ecchymoses on extremities. + scabbing/lesion  LLE- ankle, dorsal foot-- 

purplish/dark. Skin warm/dry. +4+ Edema BLE.  


CARDIOVASCULAR: systolic murmur noted. tachycardic. 


RESPIRATORY/CHEST: On NC.   Mildly dyspneic with conversation. Bilateral course 

breath sounds.   


GASTROINTESTINAL: Abdomen soft, rounded, non-tender, nondistended. No guarding. 

Bowel sounds present.


GENITOURINARY: Without palpable bladder distension.  Voids bedside as needed- 

observe clear yellow urine present in BSC


MUSCULOSKELETAL: Lower extremities 4+ edema. lesions noted Left foot and LE,

dark purplish color. Unable to palp pulses- reported + via doppler . 


NEUROLOGICAL: Awake, flat affect. Tearful at times.  Oriented, +insight into 

hospitalization and severe illness.  Moves all 4 extremities.  


PSYCHIATRIC: Flat affect, tearful


.





Diagnostic Tests


Laboratory





Laboratory Tests








Test


  3/27/18


18:00 3/28/18


03:30 3/28/18


06:00 3/29/18


05:45


 


Activated Partial


Thromboplast Time 77.3 SEC


(24.3-30.1) 42.4 SEC


(24.3-30.1) 52.9 SEC


(24.3-30.1) 33.6 SEC


(24.3-30.1)


 


White Blood Count


  


  


  12.1 TH/MM3


(4.0-11.0) 12.9 TH/MM3


(4.0-11.0)


 


Red Blood Count


  


  


  3.11 MIL/MM3


(4.00-5.30) 3.08 MIL/MM3


(4.00-5.30)


 


Hemoglobin


  


  


  7.5 GM/DL


(11.6-15.3) 7.2 GM/DL


(11.6-15.3)


 


Hematocrit


  


  


  23.7 %


(35.0-46.0) 23.1 %


(35.0-46.0)


 


Mean Corpuscular Volume


  


  


  75.9 FL


(80.0-100.0) 75.2 FL


(80.0-100.0)


 


Mean Corpuscular Hemoglobin


  


  


  24.0 PG


(27.0-34.0) 23.4 PG


(27.0-34.0)


 


Mean Corpuscular Hemoglobin


Concent 


  


  31.6 %


(32.0-36.0) 31.2 %


(32.0-36.0)


 


Red Cell Distribution Width


  


  


  23.2 %


(11.6-17.2) 23.2 %


(11.6-17.2)


 


Platelet Count


  


  


  106 TH/MM3


(150-450) 102 TH/MM3


(150-450)


 


Mean Platelet Volume


  


  


  8.5 FL


(7.0-11.0) 8.7 FL


(7.0-11.0)


 


Prothrombin Time


  


  


  26.1 SEC


(9.8-11.6) 18.1 SEC


(9.8-11.6)


 


Prothromb Time International


Ratio 


  


  2.6 RATIO 


  1.8 RATIO 


 


 


Blood Urea Nitrogen


  


  


  18 MG/DL


(7-18) 19 MG/DL


(7-18)


 


Creatinine


  


  


  1.18 MG/DL


(0.50-1.00) 1.20 MG/DL


(0.50-1.00)


 


Random Glucose


  


  


  102 MG/DL


() 103 MG/DL


()


 


Calcium Level


  


  


  8.6 MG/DL


(8.5-10.1) 8.6 MG/DL


(8.5-10.1)


 


Sodium Level


  


  


  132 MEQ/L


(136-145) 133 MEQ/L


(136-145)


 


Potassium Level


  


  


  3.4 MEQ/L


(3.5-5.1) 3.4 MEQ/L


(3.5-5.1)


 


Chloride Level


  


  


  90 MEQ/L


() 91 MEQ/L


()


 


Carbon Dioxide Level


  


  


  31.2 MEQ/L


(21.0-32.0) 32.1 MEQ/L


(21.0-32.0)


 


Anion Gap


  


  


  11 MEQ/L


(5-15) 10 MEQ/L


(5-15)


 


Estimat Glomerular Filtration


Rate 


  


  52 ML/MIN


(>89) 51 ML/MIN


(>89)


 


Total Protein


  


  


  


  7.4 GM/DL


(6.4-8.2)


 


Albumin


  


  


  


  3.7 GM/DL


(3.4-5.0)


 


Alkaline Phosphatase


  


  


  


  80 U/L


()


 


Aspartate Amino Transf


(AST/SGOT) 


  


  


  23 U/L (15-37) 


 


 


Alanine Aminotransferase


(ALT/SGPT) 


  


  


  12 U/L (10-53) 


 


 


Total Bilirubin


  


  


  


  1.1 MG/DL


(0.2-1.0)


 


Magnesium Level


  


  


  


  1.4 MG/DL


(1.5-2.5)


 


Test


  3/29/18


09:15 3/30/18


04:25 


  


 


 


Prothrombin Time


  17.1 SEC


(9.8-11.6) 15.5 SEC


(9.8-11.6) 


  


 


 


Prothromb Time International


Ratio 1.7 RATIO 


  1.5 RATIO 


  


  


 


 


White Blood Count


  


  13.3 TH/MM3


(4.0-11.0) 


  


 


 


Red Blood Count


  


  3.06 MIL/MM3


(4.00-5.30) 


  


 


 


Hemoglobin


  


  7.3 GM/DL


(11.6-15.3) 


  


 


 


Hematocrit


  


  23.0 %


(35.0-46.0) 


  


 


 


Mean Corpuscular Volume


  


  75.2 FL


(80.0-100.0) 


  


 


 


Mean Corpuscular Hemoglobin


  


  23.7 PG


(27.0-34.0) 


  


 


 


Mean Corpuscular Hemoglobin


Concent 


  31.5 %


(32.0-36.0) 


  


 


 


Red Cell Distribution Width


  


  22.9 %


(11.6-17.2) 


  


 


 


Platelet Count


  


  112 TH/MM3


(150-450) 


  


 


 


Mean Platelet Volume


  


  9.1 FL


(7.0-11.0) 


  


 


 


Neutrophils (%) (Auto)


  


  80.4 %


(16.0-70.0) 


  


 


 


Lymphocytes (%) (Auto)


  


  11.5 %


(9.0-44.0) 


  


 


 


Monocytes (%) (Auto)


  


  6.0 %


(0.0-8.0) 


  


 


 


Eosinophils (%) (Auto)


  


  1.4 %


(0.0-4.0) 


  


 


 


Basophils (%) (Auto)


  


  0.7 %


(0.0-2.0) 


  


 


 


Neutrophils # (Auto)


  


  10.7 TH/MM3


(1.8-7.7) 


  


 


 


Lymphocytes # (Auto)


  


  1.5 TH/MM3


(1.0-4.8) 


  


 


 


Monocytes # (Auto)


  


  0.8 TH/MM3


(0-0.9) 


  


 


 


Eosinophils # (Auto)


  


  0.2 TH/MM3


(0-0.4) 


  


 


 


Basophils # (Auto)


  


  0.1 TH/MM3


(0-0.2) 


  


 


 


CBC Comment  DIFF FINAL   


 


Differential Comment     


 


Activated Partial


Thromboplast Time 


  28.9 SEC


(24.3-30.1) 


  


 


 


Blood Urea Nitrogen


  


  20 MG/DL


(7-18) 


  


 


 


Creatinine


  


  1.13 MG/DL


(0.50-1.00) 


  


 


 


Random Glucose


  


  99 MG/DL


() 


  


 


 


Calcium Level


  


  8.2 MG/DL


(8.5-10.1) 


  


 


 


Magnesium Level


  


  1.4 MG/DL


(1.5-2.5) 


  


 


 


Sodium Level


  


  133 MEQ/L


(136-145) 


  


 


 


Potassium Level


  


  2.8 MEQ/L


(3.5-5.1) 


  


 


 


Chloride Level


  


  91 MEQ/L


() 


  


 


 


Carbon Dioxide Level


  


  34.3 MEQ/L


(21.0-32.0) 


  


 


 


Anion Gap  8 MEQ/L (5-15)   


 


Estimat Glomerular Filtration


Rate 


  54 ML/MIN


(>89) 


  


 








Result Diagram:  


3/30/18 0425                                                                   

             3/30/18 0425





Imaging





Last Impressions








Chest X-Ray 3/26/18 0000 Signed





Impressions: 





 Service Date/Time:  Monday, March 26, 2018 13:53 - CONCLUSION: No significant 





 change in the right lower lobe infiltrate.     Ollie Paige MD 


 


Catheter Placement X-Ray 3/19/18 0000 Signed





Impressions: 





 Service Date/Time:  Monday, March 19, 2018 11:16 - CONCLUSION: Uncomplicated 





 venous catheter change as above.     Rj Whitten MD 


 


Head CT 3/3/18 0000 Signed





Impressions: 





 Service Date/Time:  Saturday, March 3, 2018 10:22 - CONCLUSION:  1. Focal area 





 of decreased density involving the right parietal lobe. As a new finding from 





 the prior exam. This could relate to a small infarct. MRI of the brain with 





 gadolinium suggested to further evaluate. 2. Small lacunar infarction 

involving 





 the left centrum semiovale.     Scooter Dawson Jr., MD 


 


CT Angiography 3/3/18 0000 Signed





Impressions: 





 Service Date/Time:  Saturday, March 3, 2018 10:28 - CONCLUSION:  There 

extensive 





 pulmonary emboli bilaterally. There is near complete cut off of the right 

lower 





 lobe pulmonary artery. Prominent filling defects on the left as well. These 





 findings were relayed to Dr. Bustos immediately at the completion of the study.  





 Scattered pulmonary infiltrates, small pleural effusion and extensive 





 mediastinal lymphadenopathy as described above.      Won Sharp MD 


 


Brain MRI 3/3/18 0000 Signed





Impressions: 





 Service Date/Time:  Saturday, March 3, 2018 18:04 - CONCLUSION:  Deterioration 





 in the appearance of the scan.  At least 3 focal areas of abnormal contrast 





 enhancement are present scattered to in both hemispheres.  Given the history 





 this most likely represents developing areas cerebritis.  Exam is compromised 

by 





 an uncooperative patient and motion artifact.  These 2 factors make detection 

of 





 other abnormalities such as cortical cephalitis and meningitis difficult to 





 exclude.  Cannot exclude hemorrhagic lesions as well.  There is no mass effect 





 evident.      Jorge Diane MD 


 


Chest CT 2/26/18 0000 Signed





Impressions: 





 Service Date/Time:  Monday, February 26, 2018 12:41 - CONCLUSION:  1. There 

are 





 at least 5 pulmonary nodules present bilaterally with the largest measuring 19 





 mm. None demonstrate cavitation but it is possible that these represent septic 





 emboli. Suggest followup to assess for change. 2. Splenomegaly with subtle 





 wedge-shaped areas of low-density in the mid spleen which could represent 





 infarcts. 3. Mild cardiomegaly in this patient post aortic valve replacement. 





 Low density of the cardiac blood pool suggests anemia.      Ron Melgar MD 


 


Aorta w/Runoff CTA 2/25/18 0000 Signed





Impressions: 





 Service Date/Time:  Sunday, February 25, 2018 12:47 - CONCLUSION:  Left renal 





 cortical infarction. 5-6 cm length total occlusion of the left superficial 





 femoral artery in the proximal thigh. Nodular parenchymal opacities in the 

lung 





 bases bilaterally See above discussion.     Ron Cruz MD 


 


Renal Ultrasound 2/24/18 0000 Signed





Impressions: 





 Service Date/Time:  Saturday, February 24, 2018 10:53 - CONCLUSION:  1. No 





 evidence of hydronephrosis. 2. Thickening of the urinary bladder wall a 1.1 

cm. 





 3. Prominent splenomegaly.     Elgin Walton MD 


 


Breast Ultrasound 2/24/18 0000 Signed





Impressions: 





 Service Date/Time:  Saturday, February 24, 2018 10:48 - CONCLUSION:  Negative 





 targeted right breast ultrasound examination.     Ron Brown MD 


 


Lower Extremity Ultrasound 2/23/18 0000 Signed





Impressions: 





 Service Date/Time:  Friday, February 23, 2018 21:22 - CONCLUSION:  1. No 





 sonographic evidence for lower extremity DVT. 2. Incidental note of bilateral 





 inguinal adenopathy with the largest on the right measuring 3.3 cm and the 





 largest on the left measuring 2.6 cm. This finding is nonspecific but is 





 typically reactive in etiology.     Rj Whitten MD 








Procedures


* 2/24/17 - right IJ central line placement





Assessment and Plan


Disease Oriented Problem List:  


(1) Prosthetic valve endocarditis


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


(3) Polysubstance abuse


(4) Acute septic pulmonary embolism


(5) Acute kidney injury


(6) CHF (congestive heart failure), NYHA class IV


(7) Severe sepsis


(8) Elevated troponin


Symptom Scale:  


(1) Pain


0-10 Scale:  8





(2) Encephalopathy


0-10 Scale:  Unable to quantify





(3) Dyspnea


0-10 Scale:  Unable to quantify


Comment:  On oxygen via nasal cannula





(4) Depression


0-10 Scale:  Unable to quantify





Pertinent Non-Medical Issues


Psychosocial: single.  2 juvenile children. Supported by her mother, step-

father and boyfriend. 


Spiritual: Unknown.


Legal: No known written advanced directives, family is going to try to find [facility] 

paperwork they think pt previously completed.  Patient a single.  Children are 

juvenile.  If no written advanced directives, according to [place] statutes 

health care proxy decision-making falls to a parent. 


Ethical issues impacting care: No known concerns at this time. 


.


Important Contacts


* Kelly Le, mother/ HCP: [phone]


* Won Le, stepfather: [phone]


.


Prognosis


Patient has grim prognosis. 


.


Code Status:  Full Code


Plan


* Decision Maker: No written advanced directives, patient refused to complete 

advanced directives during last admission. Patient a single. Children are 

juvenile. In the absence of written advanced directives, according to [place] 

statutes health care proxy decision-making falls to her mother, Kelly Le. 

Won Le is a step-father.  Declines completion of written advance 

directives 3/5/18.





* FULL CODE. 





* Patient is sad from her prolonged hospitalization and illness which she 

realizes will not get better. She is conflicted-- she is not ready to die from 

her conditions, however she sees her health and quality of live are not 

improved even with ongoing aggressive tx. She wishes to complete abx course 

through April 6 here at the hospital. She wishes to meet with hospice, she is 

considering possibly enrolling w hospice at time of discharge.  [she would be 

appropriate for hospice if goals compatible] If she does NOT d/c home w hospice

, she will need to follow up with pain management outpatient, as she was prev 

referred, she did not get to establish with one. Her pain will be ongoing. 








* SYMPTOMS: 


* Pain:  sources include endocarditis, bedbound status,purple toes syndome, 

etc. Patient with likely high tolerance given history of IV drug use, will 

monitor effective medications.  she continues  using PRN hydromorphone 1 mg IV 

about every 4 hours for the last many days (is ordered prn every 4 hours). Dose 

was decreased from 2mg to 1mg on 3/7/18.   Previously on oral morphine 30mg BID 

outpatient while under hospice services.  Pt will become tolerant to opiate 

doses.  Hydromorphone dosing is 5-6 mg every 24 hours== this is equivalent to 

100 oral morphine equivalents.  Long acting oral morphine has been uptitrated 

by med attending. Pain is somewhat better though still present. We are not 

likely to achieve pain free status.  I suspect some of this is also emotional 

pain. 


*  Dyspnea: secondary to endocarditis, pulmonary emboli. +cont to have ongoing 

dyspnea, some improvement. On NC. 


*  Hallucination: patient thought she saw a bug on her arm during prior 

palliative care visit, though knew it "wasn't really there." none reported 

today. No medication recommendations at this time.


* Depression: Patient with flat affect, voicing she is "tired of being sick, 

tired of being in the hospital ", + situational--recurrent hospitalizations.  

She is going to continue to be in acutely ill situation with ongoing treatment; 

may benefit from SSRI if amenable, as well as ongoing psychotherapy support 

which is not available in current acute care setting





* Palliative care will continue to follow throughout hospital course to assist 

with symptom management and clarification of goals as needed. 


.





Attestation


To help prompt me to consider important information that might be impacting 

today's encounter and assessment, information from prior notes written by 

myself or my colleagues may have been "brought forward" into today's note.  My 

signature on this note, however, is an attestation that I personally performed 

the exam, history, and/or decision-making noted today, and, unless otherwise 

indicated, the interactions with patient, family, and staff as well as the 

review of records all occurred today.  I also attest that the listed assessment 

and stated plan reflect my best clinical judgment today based on the 

combination of historical information, prior notes, and today's exam/ 

interactions.  When time spent is documented, it refers only to time spent 

today by the signer, or if indicated, combined time spent today by 

collaborating physician/nurse practitioner.











Kala Leslie Mar 30, 2018 11:09

## 2018-03-31 VITALS
SYSTOLIC BLOOD PRESSURE: 110 MMHG | OXYGEN SATURATION: 98 % | TEMPERATURE: 98.2 F | HEART RATE: 88 BPM | RESPIRATION RATE: 20 BRPM | DIASTOLIC BLOOD PRESSURE: 58 MMHG

## 2018-03-31 VITALS — OXYGEN SATURATION: 95 %

## 2018-03-31 VITALS
OXYGEN SATURATION: 95 % | HEART RATE: 82 BPM | TEMPERATURE: 97.3 F | SYSTOLIC BLOOD PRESSURE: 101 MMHG | DIASTOLIC BLOOD PRESSURE: 58 MMHG | RESPIRATION RATE: 20 BRPM

## 2018-03-31 VITALS
TEMPERATURE: 97.7 F | OXYGEN SATURATION: 95 % | HEART RATE: 88 BPM | DIASTOLIC BLOOD PRESSURE: 53 MMHG | RESPIRATION RATE: 21 BRPM | SYSTOLIC BLOOD PRESSURE: 84 MMHG

## 2018-03-31 VITALS
OXYGEN SATURATION: 99 % | DIASTOLIC BLOOD PRESSURE: 55 MMHG | SYSTOLIC BLOOD PRESSURE: 90 MMHG | RESPIRATION RATE: 20 BRPM | TEMPERATURE: 97.2 F | HEART RATE: 87 BPM

## 2018-03-31 VITALS — HEART RATE: 84 BPM

## 2018-03-31 VITALS
HEART RATE: 86 BPM | OXYGEN SATURATION: 94 % | SYSTOLIC BLOOD PRESSURE: 107 MMHG | DIASTOLIC BLOOD PRESSURE: 68 MMHG | RESPIRATION RATE: 20 BRPM | TEMPERATURE: 98.2 F

## 2018-03-31 VITALS — OXYGEN SATURATION: 98 %

## 2018-03-31 VITALS
TEMPERATURE: 97.8 F | DIASTOLIC BLOOD PRESSURE: 64 MMHG | RESPIRATION RATE: 18 BRPM | OXYGEN SATURATION: 92 % | HEART RATE: 97 BPM | SYSTOLIC BLOOD PRESSURE: 95 MMHG

## 2018-03-31 VITALS — HEART RATE: 103 BPM

## 2018-03-31 VITALS — HEART RATE: 87 BPM

## 2018-03-31 VITALS — HEART RATE: 88 BPM

## 2018-03-31 LAB
BASOPHILS # BLD AUTO: 0.1 TH/MM3 (ref 0–0.2)
BASOPHILS NFR BLD: 0.5 % (ref 0–2)
BUN SERPL-MCNC: 20 MG/DL (ref 7–18)
CALCIUM SERPL-MCNC: 8.4 MG/DL (ref 8.5–10.1)
CHLORIDE SERPL-SCNC: 89 MEQ/L (ref 98–107)
CREAT SERPL-MCNC: 1.05 MG/DL (ref 0.5–1)
EOSINOPHIL # BLD: 0.3 TH/MM3 (ref 0–0.4)
EOSINOPHIL NFR BLD: 2 % (ref 0–4)
ERYTHROCYTE [DISTWIDTH] IN BLOOD BY AUTOMATED COUNT: 22.5 % (ref 11.6–17.2)
GFR SERPLBLD BASED ON 1.73 SQ M-ARVRAT: 59 ML/MIN (ref 89–?)
GLUCOSE SERPL-MCNC: 100 MG/DL (ref 74–106)
HCO3 BLD-SCNC: 34.6 MEQ/L (ref 21–32)
HCT VFR BLD CALC: 29.9 % (ref 35–46)
HGB BLD-MCNC: 9.5 GM/DL (ref 11.6–15.3)
INR PPP: 1.4 RATIO
LYMPHOCYTES # BLD AUTO: 1.6 TH/MM3 (ref 1–4.8)
LYMPHOCYTES NFR BLD AUTO: 10.8 % (ref 9–44)
MAGNESIUM SERPL-MCNC: 1.5 MG/DL (ref 1.5–2.5)
MCH RBC QN AUTO: 24.7 PG (ref 27–34)
MCHC RBC AUTO-ENTMCNC: 31.8 % (ref 32–36)
MCV RBC AUTO: 77.5 FL (ref 80–100)
MONOCYTE #: 0.8 TH/MM3 (ref 0–0.9)
MONOCYTES NFR BLD: 5.6 % (ref 0–8)
NEUTROPHILS # BLD AUTO: 11.9 TH/MM3 (ref 1.8–7.7)
NEUTROPHILS NFR BLD AUTO: 81.1 % (ref 16–70)
PLATELET # BLD: 119 TH/MM3 (ref 150–450)
PMV BLD AUTO: 9 FL (ref 7–11)
PROTHROMBIN TIME: 14.3 SEC (ref 9.8–11.6)
RBC # BLD AUTO: 3.85 MIL/MM3 (ref 4–5.3)
SODIUM SERPL-SCNC: 132 MEQ/L (ref 136–145)
WBC # BLD AUTO: 14.7 TH/MM3 (ref 4–11)

## 2018-03-31 RX ADMIN — POTASSIUM CHLORIDE SCH MEQ: 750 TABLET, FILM COATED, EXTENDED RELEASE ORAL at 08:17

## 2018-03-31 RX ADMIN — MORPHINE SULFATE SCH MG: 15 TABLET, EXTENDED RELEASE ORAL at 20:56

## 2018-03-31 RX ADMIN — MIDODRINE HYDROCHLORIDE SCH MG: 5 TABLET ORAL at 17:01

## 2018-03-31 RX ADMIN — Medication SCH ML: at 08:18

## 2018-03-31 RX ADMIN — MIDODRINE HYDROCHLORIDE SCH MG: 5 TABLET ORAL at 12:03

## 2018-03-31 RX ADMIN — AMPICILLIN SODIUM SCH MLS/HR: 2 INJECTION, POWDER, FOR SOLUTION INTRAMUSCULAR; INTRAVENOUS at 01:44

## 2018-03-31 RX ADMIN — HYDROCODONE BITARTRATE AND ACETAMINOPHEN PRN TAB: 10; 325 TABLET ORAL at 00:16

## 2018-03-31 RX ADMIN — AMPICILLIN SODIUM SCH MLS/HR: 2 INJECTION, POWDER, FOR SOLUTION INTRAMUSCULAR; INTRAVENOUS at 13:50

## 2018-03-31 RX ADMIN — FUROSEMIDE SCH MG: 10 INJECTION, SOLUTION INTRAMUSCULAR; INTRAVENOUS at 08:08

## 2018-03-31 RX ADMIN — STANDARDIZED SENNA CONCENTRATE AND DOCUSATE SODIUM SCH TAB: 8.6; 5 TABLET, FILM COATED ORAL at 20:57

## 2018-03-31 RX ADMIN — HYDROCODONE BITARTRATE AND ACETAMINOPHEN PRN TAB: 10; 325 TABLET ORAL at 20:58

## 2018-03-31 RX ADMIN — AMPICILLIN SODIUM SCH MLS/HR: 2 INJECTION, POWDER, FOR SOLUTION INTRAMUSCULAR; INTRAVENOUS at 08:19

## 2018-03-31 RX ADMIN — HYDROMORPHONE HYDROCHLORIDE PRN MG: 2 INJECTION INTRAMUSCULAR; INTRAVENOUS; SUBCUTANEOUS at 17:02

## 2018-03-31 RX ADMIN — MORPHINE SULFATE SCH MG: 15 TABLET, EXTENDED RELEASE ORAL at 08:18

## 2018-03-31 RX ADMIN — Medication PRN ML: at 23:08

## 2018-03-31 RX ADMIN — HYDROCODONE BITARTRATE AND ACETAMINOPHEN PRN TAB: 10; 325 TABLET ORAL at 13:50

## 2018-03-31 RX ADMIN — FUROSEMIDE SCH MG: 10 INJECTION, SOLUTION INTRAMUSCULAR; INTRAVENOUS at 17:01

## 2018-03-31 RX ADMIN — Medication SCH ML: at 20:57

## 2018-03-31 RX ADMIN — DAPTOMYCIN SCH MLS/HR: 500 INJECTION, POWDER, LYOPHILIZED, FOR SOLUTION INTRAVENOUS at 20:57

## 2018-03-31 RX ADMIN — HYDROMORPHONE HYDROCHLORIDE PRN MG: 2 INJECTION INTRAMUSCULAR; INTRAVENOUS; SUBCUTANEOUS at 04:11

## 2018-03-31 RX ADMIN — AMPICILLIN SODIUM SCH MLS/HR: 2 INJECTION, POWDER, FOR SOLUTION INTRAMUSCULAR; INTRAVENOUS at 20:57

## 2018-03-31 RX ADMIN — WARFARIN SODIUM SCH MG: 2 TABLET ORAL at 17:01

## 2018-03-31 RX ADMIN — METOLAZONE SCH MG: 2.5 TABLET ORAL at 08:08

## 2018-03-31 RX ADMIN — POTASSIUM CHLORIDE SCH MEQ: 750 TABLET, FILM COATED, EXTENDED RELEASE ORAL at 20:56

## 2018-03-31 RX ADMIN — HYDROMORPHONE HYDROCHLORIDE PRN MG: 2 INJECTION INTRAMUSCULAR; INTRAVENOUS; SUBCUTANEOUS at 10:19

## 2018-03-31 RX ADMIN — HYDROCODONE BITARTRATE AND ACETAMINOPHEN PRN TAB: 10; 325 TABLET ORAL at 06:12

## 2018-03-31 RX ADMIN — METOLAZONE SCH MG: 2.5 TABLET ORAL at 20:56

## 2018-03-31 RX ADMIN — STANDARDIZED SENNA CONCENTRATE AND DOCUSATE SODIUM SCH TAB: 8.6; 5 TABLET, FILM COATED ORAL at 08:16

## 2018-03-31 RX ADMIN — HYDROMORPHONE HYDROCHLORIDE PRN MG: 2 INJECTION INTRAMUSCULAR; INTRAVENOUS; SUBCUTANEOUS at 23:07

## 2018-03-31 RX ADMIN — HEPARIN SODIUM PRN MLS/HR: 10000 INJECTION, SOLUTION INTRAVENOUS at 12:07

## 2018-03-31 NOTE — HHI.NPPN
Subjective


General Problems:  Anemia


Renal Failure:  Acute


History of Present Illness


This is a 38-year-old female with past medical history of IV drug abuse, 

history of hepatitis C, narcotic dependency, came to the hospital with 

complaint of shortness of breath and swelling.  Nephrology called to see the 

patient because of elevated BUN and creatinine.  The patient has creatinine of 

4.5 on admission which improved and it was staying in the range of 0.6-0.8 

until 6 days ago, then it started going up again and now it is 1.7.  The 

patient has increased swelling 


of the legs and the patient was diagnosed with endocarditis and she has blood 

culture positive for Strep viridans.  Patient has been following with the ID 

and she was getting furosemide, which was stopped today this morning because of 

increased creatinine and she was on vancomycin and rifampicin.  The last dose 

of vancomycin seems to be given possibly on 03/04/2018 or even later, but her 

level was high and the highest level we have was 25.8, which was on 03/04/2018 

but on 03/10/2018 it was 23.9.  The patient has been actively using IV drugs 

before she came to the hospital and noticed to have more swelling in her legs.  

She is complaining of generalized body pain.  There is no nausea, vomiting.  

Denies any diarrhea.  Her appetite is normal.


Additional Remarks


Patient is alert, with nasal cannula, mild pain in left leg.





Review of Systems


General


Constitutional:  Fatigue





Respiratory


Lungs:  SOB


Respiratory Remarks


improving





Cardiovascular


Cardiac:  Edema, SCOTT


Cardiac Remarks


Denies CP





Gastrointestinal


GI Remarks


denies abdominal pain





Objective Data


Data





Vital Signs








  Date Time  Temp Pulse Resp B/P (MAP) Pulse Ox O2 Delivery O2 Flow Rate FiO2


 


3/31/18 10:09     95 Nasal Cannula 2.00 


 


3/31/18 08:00      Nasal Cannula 2.00 





      Humidified  


 


3/31/18 08:00 97.7 88 21 84/53 (63) 95   


 


3/31/18 05:18 97.2 87 20 90/55 (67) 99   


 


3/31/18 04:00      Nasal Cannula 2.00 





      Humidified  


 


3/31/18 04:00  84      


 


3/31/18 00:00  103      


 


3/31/18 00:00 97.8 97 18 95/64 (74) 92   


 


3/31/18 00:00      Nasal Cannula 2.00 





      Humidified  


 


3/30/18 21:40     96 Nasal Cannula 3.00 


 


3/30/18 21:20 98.1 100 22 101/58 97   


 


3/30/18 20:00      Nasal Cannula 2.00 





      Humidified  


 


3/30/18 20:00  101      


 


3/30/18 20:00 98.7 95 20 102/56 (71) 98   


 


3/30/18 18:41 98.2 98 22 94/58 96   


 


3/30/18 18:34 98.2 95 22 98/60 97   


 


3/30/18 17:26 97.4 96 20 94/58 95   


 


3/30/18 16:00 97.3 102 22 106/65 (79) 94   


 


3/30/18 15:39 98.0 95 22 94/60 97   


 


3/30/18 15:09 97.3 102 22 106/65 94   


 


3/30/18 15:00  92      


 


3/30/18 12:00 98.0 87 22 91/54 (66) 95   


 


3/30/18 12:00  94      








-:  


3/31/18 0620                                                                   

             3/31/18 0620








Physical Exam


General


Appearance:  No Acute Distress, Anxious





Eyes


Eye Exam:  Pupils Equal





Throat


Throat Exam:  Oral Mucosa Pink & Moist





Neck


Neck Exam:  Neck Supple





Pulmonary


Resp Exam:  Clear Bilaterally





Cardiology


CV Exam:  Regular, Normal Sinus Rhythm





Gastrointestinal/Abdomen


GI Exam:  Soft, Non-Tender





Integumentary


Skin Exam:  Dry, Intact





Extremeties


Extremities Exam:  Moderate Edema (Left leg with mild redness.), Pitting Edema, 

Dependent Edema





Neurologic


Neuro Exam:  Alert, Awake, Oriented





Psychiatric


Psych Exam:  Appropriate Responses





Assessment/Plan


Discussed Condition With:  Patient


Assessment Summary:  NOÉ/Acute Renal Failure


Problem List:  


(1) Acute kidney injury


ICD Codes:  N17.9 - Acute kidney failure, unspecified


Status:  Acute


Plan:  


NOÉ likely due to vancomycin toxicity,  post-infectious GN is unlikely.


Possibility of ATN or pre renal.


Urine eosinophils negative, complements wnl


Creatinine stable


Good UOP





Plan


Hypokalemia at 2.9 replacement given 


Low magnesium level replacement given


Continue Lasix 40 mg IV BID and metolazone for edema


Continue to monitor BMP and UOP


Encouraged to elevate legs throughout day


Avoid nephrotoxins when possible.


Fluid restriction in place.


BP is on lower side, add Midodrine.


Continue diuretics, K is low and replaced.








(2) Prosthetic valve endocarditis


ICD Codes:  T82.6XXA - Infection and inflammatory reaction due to cardiac valve 

prosthesis, initial encounter


Status:  Acute


Plan:  septic endocarditis with strep viridans


IV drug use, pulmonary emboli as well.





Continues antibiotics per ID


continue renal dosing antibiotics.





(3) Peripheral arterial occlusive disease


ICD Codes:  I77.9 - Disorder of arteries and arterioles, unspecified


Plan:  seen with vascular, continue medical management


Hold contrast studies as possible.





(4) Hepatitis C


ICD Codes:  B19.20 - Unspecified viral hepatitis C without hepatic coma


Status:  Chronic





Problem Qualifiers





(1) Prosthetic valve endocarditis:  


Qualified Codes:  T82.6XXA - Infection and inflammatory reaction due to cardiac 

valve prosthesis, initial encounter








Melissa Layne MD Mar 31, 2018 11:18

## 2018-03-31 NOTE — HHI.PR
Subjective


Remarks


Follow-up cellulitis.  Continues to have pain involving the left lower 

extremity.  Discussed with nursing





Objective


Vitals





Vital Signs








  Date Time  Temp Pulse Resp B/P (MAP) Pulse Ox O2 Delivery O2 Flow Rate FiO2


 


3/31/18 12:00 97.3 82 20 101/58 (72) 95   


 


3/31/18 10:09     95 Nasal Cannula 2.00 


 


3/31/18 08:00      Nasal Cannula 2.00 





      Humidified  


 


3/31/18 08:00 97.7 88 21 84/53 (63) 95   


 


3/31/18 05:18 97.2 87 20 90/55 (67) 99   


 


3/31/18 04:00      Nasal Cannula 2.00 





      Humidified  


 


3/31/18 04:00  84      


 


3/31/18 00:00  103      


 


3/31/18 00:00 97.8 97 18 95/64 (74) 92   


 


3/31/18 00:00      Nasal Cannula 2.00 





      Humidified  


 


3/30/18 21:40     96 Nasal Cannula 3.00 


 


3/30/18 21:20 98.1 100 22 101/58 97   


 


3/30/18 20:00      Nasal Cannula 2.00 





      Humidified  


 


3/30/18 20:00  101      


 


3/30/18 20:00 98.7 95 20 102/56 (71) 98   


 


3/30/18 18:41 98.2 98 22 94/58 96   


 


3/30/18 18:34 98.2 95 22 98/60 97   


 


3/30/18 17:26 97.4 96 20 94/58 95   


 


3/30/18 16:00 97.3 102 22 106/65 (79) 94   


 


3/30/18 15:39 98.0 95 22 94/60 97   


 


3/30/18 15:09 97.3 102 22 106/65 94   


 


3/30/18 15:00  92      














I/O      


 


 3/30/18 3/30/18 3/30/18 3/31/18 3/31/18 3/31/18





 07:00 15:00 23:00 07:00 15:00 23:00


 


Intake Total 710 ml 300 ml 1660 ml 850 ml  


 


Output Total 1850 ml   700 ml  


 


Balance -1140 ml 300 ml 1660 ml 150 ml  


 


      


 


Intake Oral 610 ml  400 ml 150 ml  


 


IV Total 100 ml 300 ml 450 ml   


 


Packed Cells   800 ml 700 ml  


 


Blood Product IV Normal Saline Flush   10 ml   


 


Output Urine Total 1850 ml   700 ml  


 


# Voids   6   


 


# Bowel Movements 1     


 


# Sanitary Pads  3 Pads    








Result Diagram:  


3/31/18 0620                                                                   

             3/31/18 0620





Imaging





Last Impressions








Chest X-Ray 3/26/18 0000 Signed





Impressions: 





 Service Date/Time:  Monday, March 26, 2018 13:53 - CONCLUSION: No significant 





 change in the right lower lobe infiltrate.     Ollie Paige MD 


 


Catheter Placement X-Ray 3/19/18 0000 Signed





Impressions: 





 Service Date/Time:  Monday, March 19, 2018 11:16 - CONCLUSION: Uncomplicated 





 venous catheter change as above.     Rj Whitten MD 


 


Head CT 3/3/18 0000 Signed





Impressions: 





 Service Date/Time:  Saturday, March 3, 2018 10:22 - CONCLUSION:  1. Focal area 





 of decreased density involving the right parietal lobe. As a new finding from 





 the prior exam. This could relate to a small infarct. MRI of the brain with 





 gadolinium suggested to further evaluate. 2. Small lacunar infarction 

involving 





 the left centrum semiovale.     Scooter Dawson Jr., MD 


 


CT Angiography 3/3/18 0000 Signed





Impressions: 





 Service Date/Time:  Saturday, March 3, 2018 10:28 - CONCLUSION:  There 

extensive 





 pulmonary emboli bilaterally. There is near complete cut off of the right 

lower 





 lobe pulmonary artery. Prominent filling defects on the left as well. These 





 findings were relayed to Dr. Bustos immediately at the completion of the study.  





 Scattered pulmonary infiltrates, small pleural effusion and extensive 





 mediastinal lymphadenopathy as described above.      Won Sharp MD 


 


Brain MRI 3/3/18 0000 Signed





Impressions: 





 Service Date/Time:  Saturday, March 3, 2018 18:04 - CONCLUSION:  Deterioration 





 in the appearance of the scan.  At least 3 focal areas of abnormal contrast 





 enhancement are present scattered to in both hemispheres.  Given the history 





 this most likely represents developing areas cerebritis.  Exam is compromised 

by 





 an uncooperative patient and motion artifact.  These 2 factors make detection 

of 





 other abnormalities such as cortical cephalitis and meningitis difficult to 





 exclude.  Cannot exclude hemorrhagic lesions as well.  There is no mass effect 





 evident.      Jorge Diane MD 


 


Chest CT 2/26/18 0000 Signed





Impressions: 





 Service Date/Time:  Monday, February 26, 2018 12:41 - CONCLUSION:  1. There 

are 





 at least 5 pulmonary nodules present bilaterally with the largest measuring 19 





 mm. None demonstrate cavitation but it is possible that these represent septic 





 emboli. Suggest followup to assess for change. 2. Splenomegaly with subtle 





 wedge-shaped areas of low-density in the mid spleen which could represent 





 infarcts. 3. Mild cardiomegaly in this patient post aortic valve replacement. 





 Low density of the cardiac blood pool suggests anemia.      Ron Melgar MD 


 


Aorta w/Runoff CTA 2/25/18 0000 Signed





Impressions: 





 Service Date/Time:  Sunday, February 25, 2018 12:47 - CONCLUSION:  Left renal 





 cortical infarction. 5-6 cm length total occlusion of the left superficial 





 femoral artery in the proximal thigh. Nodular parenchymal opacities in the 

lung 





 bases bilaterally See above discussion.     Ron Cruz MD 


 


Renal Ultrasound 2/24/18 0000 Signed





Impressions: 





 Service Date/Time:  Saturday, February 24, 2018 10:53 - CONCLUSION:  1. No 





 evidence of hydronephrosis. 2. Thickening of the urinary bladder wall a 1.1 

cm. 





 3. Prominent splenomegaly.     Elgin Walton MD 


 


Breast Ultrasound 2/24/18 0000 Signed





Impressions: 





 Service Date/Time:  Saturday, February 24, 2018 10:48 - CONCLUSION:  Negative 





 targeted right breast ultrasound examination.     Ron Brown MD 


 


Lower Extremity Ultrasound 2/23/18 0000 Signed





Impressions: 





 Service Date/Time:  Friday, February 23, 2018 21:22 - CONCLUSION:  1. No 





 sonographic evidence for lower extremity DVT. 2. Incidental note of bilateral 





 inguinal adenopathy with the largest on the right measuring 3.3 cm and the 





 largest on the left measuring 2.6 cm. This finding is nonspecific but is 





 typically reactive in etiology.     Rj Whitten MD 








Objective Remarks


AAOx3


NAD


Clear lungs BL


S1S2 4/6 systolic ejection murmur


+3 BL lower extremity edema. Erythema improved, left foot very tender to 

palpation.


. Dark discoloration left toes lisa 3rd and 5th. pulses not palpable due to 

edema. Good capillary refill.


Procedures


Exchange of right IJ central line


Date of Insertion:  Mar 19, 2018


Line:  Central Venous Catheter


Side:  Right


Location:  Internal, Jugular





A/P


Problem List:  


(1) Prosthetic valve endocarditis


ICD Code:  T82.6XXA - Infection and inflammatory reaction due to cardiac valve 

prosthesis, initial encounter


Status:  Acute


(2) Bacteremia


ICD Code:  R78.81 - Bacteremia


(3) Septic pulmonary embolism


ICD Code:  I26.90 - Septic pulmonary embolism without acute cor pulmonale


Status:  Acute


(4) IVDU (intravenous drug user)


ICD Code:  F19.90 - Other psychoactive substance use, unspecified, uncomplicated


(5) Anemia


ICD Code:  D64.9 - Anemia, unspecified


Status:  Chronic


(6) NOÉ (acute kidney injury)


ICD Code:  N17.9 - Acute kidney failure, unspecified


(7) Cellulitis of left lower extremity


ICD Code:  L03.116 - Cellulitis of left lower limb


(8) Purple toe syndrome


ICD Code:  I75.029 - Atheroembolism of unspecified lower extremity


(9) Pleuritic chest pain


ICD Code:  R07.81 - Pleurodynia


Assessment and Plan


(1) Prosthetic valve endocarditis


Continue IV ampicillin and daptomycin until April 6 per CTS, patient would not 

benefit from further cardiac surgery.  Patient feels she is not getting any 

better and wants to talk to hospice again.  Hospice has been reconsulted.  She 

wants to set up with hospice after completion of antibiotics





(2) Bacteremia


Plan:  Blood cultures obtained on 2/23 growing strep viridans.


Subsequent repeat blood cultures negative.


3/19 sp Exchange of Right IJ central line. 


Continue antibiotics as per ID recommendations.





(3) Septic pulmonary embolism


Plan:  As observed on a CT angiography of the chest obtained on 3/3/18.


echocardiogram on 3/3 showed an estimated EF of 50-55%.  


++severe tricuspid regurgitation with prosthesis in place.  


++2 x 4 cm mobile vegetation was seen on the valve.  The right atrium and right 

ventricle were normal in size and function


Hematology following.


Goal INR 2.5-3.5 given the presence of a prosthetic aortic valve.  Restart 

heparin drip since INR subtherapeutic





(4) IVDU (intravenous drug user)


Plan:  Counseled on drug cessation.





(5) Anemia


Plan:  Likely secondary to inflammation/infection.  No evidence of bleeding or 

hemolysis.


Monitor hemoglobin and transfuse as needed.





(6) NOÉ (acute kidney injury)


Plan: Nephrology consulted.  Possible ATN from vancomycin toxicity or 

infection.  Urine eosinophils were ordered and negative.  Diuretics managed by 

nephrology.  Initially on Lasix 20 mg IV twice daily along with albumin.  

Creatinine Worsening on 3/19 for 1.57-1.62.  Later Lasix dose was increased to 

40 mg IV twice daily  and metolazone added to the regimen.  Creatinine then 

started trending down to 1.34 on 3/23.


3/28 creatinine stable  Management as per nephrology.  Continue to monitor BUN 

and creatinine, avoid nephrotoxins, history I's and O's.  





(7) Cellulitis of left lower extremity


Plan:  Continue IV antibiotics as per ID.


Cellulitis seems to be improving.





(8) Purple toe syndrome


Vascular surgery recommended conservative management continue anticoagulation, 

weightbearing as tolerated and repeat DELONTE in 2-3 weeks


Patient also with severe excruciating pain secondary to possible some ischemia.

  Increase dose of morphine sulfate from 30 mg p.o. every 12 hours to 45 mg 

p.o. every 12 hours.  Counseled regarding narcotic usage.  Consider Nitropaste 

on the left lower extremity if BP improved








(9) Pleuritic chest pain


Improving control of pleuritic chest pain.  Repeat chest x-ray obtained on 3/26/

18 did not show any significant change in the right lower lobe infiltrate.


Discharge Planning


Patient with prosthetic valve endocarditis, bacteremia, septic pulmonary 

embolism and acute kidney injury.  Patient still edematous, good urine output.  

Will need ID clearance prior to discharge.





Problem Qualifiers





(1) Prosthetic valve endocarditis:  


Qualified Codes:  T82.6XXA - Infection and inflammatory reaction due to cardiac 

valve prosthesis, initial encounter


(2) Anemia:  


Qualified Codes:  D63.8 - Anemia in other chronic diseases classified elsewhere


(3) Purple toe syndrome:  


Qualified Codes:  I75.022 - Atheroembolism of left lower extremity








Franklin Kyle MD Mar 31, 2018 13:31

## 2018-04-01 VITALS
HEART RATE: 92 BPM | RESPIRATION RATE: 20 BRPM | SYSTOLIC BLOOD PRESSURE: 95 MMHG | TEMPERATURE: 97.6 F | DIASTOLIC BLOOD PRESSURE: 55 MMHG | OXYGEN SATURATION: 94 %

## 2018-04-01 VITALS
HEART RATE: 86 BPM | SYSTOLIC BLOOD PRESSURE: 89 MMHG | RESPIRATION RATE: 22 BRPM | OXYGEN SATURATION: 94 % | TEMPERATURE: 98.6 F | DIASTOLIC BLOOD PRESSURE: 52 MMHG

## 2018-04-01 VITALS
RESPIRATION RATE: 22 BRPM | OXYGEN SATURATION: 94 % | TEMPERATURE: 98.2 F | DIASTOLIC BLOOD PRESSURE: 50 MMHG | HEART RATE: 93 BPM | SYSTOLIC BLOOD PRESSURE: 89 MMHG

## 2018-04-01 VITALS
TEMPERATURE: 97.9 F | RESPIRATION RATE: 16 BRPM | SYSTOLIC BLOOD PRESSURE: 102 MMHG | OXYGEN SATURATION: 96 % | HEART RATE: 93 BPM | DIASTOLIC BLOOD PRESSURE: 77 MMHG

## 2018-04-01 VITALS — HEART RATE: 87 BPM

## 2018-04-01 VITALS
RESPIRATION RATE: 22 BRPM | DIASTOLIC BLOOD PRESSURE: 56 MMHG | TEMPERATURE: 98.5 F | OXYGEN SATURATION: 98 % | HEART RATE: 102 BPM | SYSTOLIC BLOOD PRESSURE: 105 MMHG

## 2018-04-01 VITALS — HEART RATE: 80 BPM

## 2018-04-01 VITALS
HEART RATE: 100 BPM | SYSTOLIC BLOOD PRESSURE: 88 MMHG | TEMPERATURE: 98.3 F | OXYGEN SATURATION: 94 % | DIASTOLIC BLOOD PRESSURE: 56 MMHG | RESPIRATION RATE: 20 BRPM

## 2018-04-01 VITALS — OXYGEN SATURATION: 94 %

## 2018-04-01 VITALS — HEART RATE: 96 BPM

## 2018-04-01 VITALS — DIASTOLIC BLOOD PRESSURE: 58 MMHG | SYSTOLIC BLOOD PRESSURE: 90 MMHG

## 2018-04-01 LAB
BASOPHILS # BLD AUTO: 0.1 TH/MM3 (ref 0–0.2)
BASOPHILS NFR BLD: 0.7 % (ref 0–2)
BUN SERPL-MCNC: 18 MG/DL (ref 7–18)
CALCIUM SERPL-MCNC: 8.6 MG/DL (ref 8.5–10.1)
CHLORIDE SERPL-SCNC: 91 MEQ/L (ref 98–107)
CREAT SERPL-MCNC: 0.92 MG/DL (ref 0.5–1)
EOSINOPHIL # BLD: 0.2 TH/MM3 (ref 0–0.4)
EOSINOPHIL NFR BLD: 1.3 % (ref 0–4)
ERYTHROCYTE [DISTWIDTH] IN BLOOD BY AUTOMATED COUNT: 22.3 % (ref 11.6–17.2)
GFR SERPLBLD BASED ON 1.73 SQ M-ARVRAT: 69 ML/MIN (ref 89–?)
GLUCOSE SERPL-MCNC: 94 MG/DL (ref 74–106)
HCO3 BLD-SCNC: 35.9 MEQ/L (ref 21–32)
HCT VFR BLD CALC: 26.9 % (ref 35–46)
HGB BLD-MCNC: 8.6 GM/DL (ref 11.6–15.3)
INR PPP: 1.5 RATIO
LYMPHOCYTES # BLD AUTO: 1.3 TH/MM3 (ref 1–4.8)
LYMPHOCYTES NFR BLD AUTO: 10.1 % (ref 9–44)
MAGNESIUM SERPL-MCNC: 1.5 MG/DL (ref 1.5–2.5)
MCH RBC QN AUTO: 24.7 PG (ref 27–34)
MCHC RBC AUTO-ENTMCNC: 32 % (ref 32–36)
MCV RBC AUTO: 77.1 FL (ref 80–100)
MONOCYTE #: 0.8 TH/MM3 (ref 0–0.9)
MONOCYTES NFR BLD: 6.1 % (ref 0–8)
NEUTROPHILS # BLD AUTO: 10.8 TH/MM3 (ref 1.8–7.7)
NEUTROPHILS NFR BLD AUTO: 81.8 % (ref 16–70)
PLATELET # BLD: 110 TH/MM3 (ref 150–450)
PMV BLD AUTO: 9.1 FL (ref 7–11)
PROTHROMBIN TIME: 14.7 SEC (ref 9.8–11.6)
RBC # BLD AUTO: 3.49 MIL/MM3 (ref 4–5.3)
SODIUM SERPL-SCNC: 133 MEQ/L (ref 136–145)
WBC # BLD AUTO: 13.2 TH/MM3 (ref 4–11)

## 2018-04-01 RX ADMIN — AMPICILLIN SODIUM SCH MLS/HR: 2 INJECTION, POWDER, FOR SOLUTION INTRAMUSCULAR; INTRAVENOUS at 01:20

## 2018-04-01 RX ADMIN — Medication SCH ML: at 20:05

## 2018-04-01 RX ADMIN — MIDODRINE HYDROCHLORIDE SCH MG: 5 TABLET ORAL at 06:28

## 2018-04-01 RX ADMIN — FUROSEMIDE SCH MG: 10 INJECTION, SOLUTION INTRAMUSCULAR; INTRAVENOUS at 09:00

## 2018-04-01 RX ADMIN — MIDODRINE HYDROCHLORIDE SCH MG: 5 TABLET ORAL at 12:44

## 2018-04-01 RX ADMIN — FUROSEMIDE SCH MG: 10 INJECTION, SOLUTION INTRAMUSCULAR; INTRAVENOUS at 17:34

## 2018-04-01 RX ADMIN — STANDARDIZED SENNA CONCENTRATE AND DOCUSATE SODIUM SCH TAB: 8.6; 5 TABLET, FILM COATED ORAL at 09:00

## 2018-04-01 RX ADMIN — MIDODRINE HYDROCHLORIDE SCH MG: 5 TABLET ORAL at 16:31

## 2018-04-01 RX ADMIN — HYDROCODONE BITARTRATE AND ACETAMINOPHEN PRN TAB: 10; 325 TABLET ORAL at 09:24

## 2018-04-01 RX ADMIN — STANDARDIZED SENNA CONCENTRATE AND DOCUSATE SODIUM SCH TAB: 8.6; 5 TABLET, FILM COATED ORAL at 20:06

## 2018-04-01 RX ADMIN — METOLAZONE SCH MG: 2.5 TABLET ORAL at 09:00

## 2018-04-01 RX ADMIN — HYDROMORPHONE HYDROCHLORIDE PRN MG: 2 INJECTION INTRAMUSCULAR; INTRAVENOUS; SUBCUTANEOUS at 12:44

## 2018-04-01 RX ADMIN — Medication PRN ML: at 06:28

## 2018-04-01 RX ADMIN — WARFARIN SODIUM SCH MG: 2 TABLET ORAL at 16:31

## 2018-04-01 RX ADMIN — POTASSIUM CHLORIDE SCH MEQ: 750 TABLET, FILM COATED, EXTENDED RELEASE ORAL at 20:04

## 2018-04-01 RX ADMIN — POTASSIUM CHLORIDE SCH MEQ: 750 TABLET, FILM COATED, EXTENDED RELEASE ORAL at 09:23

## 2018-04-01 RX ADMIN — HYDROCODONE BITARTRATE AND ACETAMINOPHEN PRN TAB: 10; 325 TABLET ORAL at 22:59

## 2018-04-01 RX ADMIN — AMPICILLIN SODIUM SCH MLS/HR: 2 INJECTION, POWDER, FOR SOLUTION INTRAMUSCULAR; INTRAVENOUS at 09:23

## 2018-04-01 RX ADMIN — DAPTOMYCIN SCH MLS/HR: 500 INJECTION, POWDER, LYOPHILIZED, FOR SOLUTION INTRAVENOUS at 20:05

## 2018-04-01 RX ADMIN — MORPHINE SULFATE SCH MG: 15 TABLET, EXTENDED RELEASE ORAL at 20:10

## 2018-04-01 RX ADMIN — Medication SCH ML: at 09:23

## 2018-04-01 RX ADMIN — HYDROCODONE BITARTRATE AND ACETAMINOPHEN PRN TAB: 10; 325 TABLET ORAL at 16:32

## 2018-04-01 RX ADMIN — METOLAZONE SCH MG: 2.5 TABLET ORAL at 20:05

## 2018-04-01 RX ADMIN — AMPICILLIN SODIUM SCH MLS/HR: 2 INJECTION, POWDER, FOR SOLUTION INTRAMUSCULAR; INTRAVENOUS at 12:44

## 2018-04-01 RX ADMIN — AMPICILLIN SODIUM SCH MLS/HR: 2 INJECTION, POWDER, FOR SOLUTION INTRAMUSCULAR; INTRAVENOUS at 20:05

## 2018-04-01 RX ADMIN — Medication PRN ML: at 01:19

## 2018-04-01 RX ADMIN — MORPHINE SULFATE SCH MG: 15 TABLET, EXTENDED RELEASE ORAL at 09:24

## 2018-04-01 RX ADMIN — HYDROMORPHONE HYDROCHLORIDE PRN MG: 2 INJECTION INTRAMUSCULAR; INTRAVENOUS; SUBCUTANEOUS at 20:09

## 2018-04-01 NOTE — HHI.NPPN
Subjective


General Problems:  Anemia


Renal Failure:  Acute


History of Present Illness


This is a 38-year-old female with past medical history of IV drug abuse, 

history of hepatitis C, narcotic dependency, came to the hospital with 

complaint of shortness of breath and swelling.  Nephrology called to see the 

patient because of elevated BUN and creatinine.  The patient has creatinine of 

4.5 on admission which improved and it was staying in the range of 0.6-0.8 

until 6 days ago, then it started going up again and now it is 1.7.  The 

patient has increased swelling 


of the legs and the patient was diagnosed with endocarditis and she has blood 

culture positive for Strep viridans.  Patient has been following with the ID 

and she was getting furosemide, which was stopped today this morning because of 

increased creatinine and she was on vancomycin and rifampicin.  The last dose 

of vancomycin seems to be given possibly on 03/04/2018 or even later, but her 

level was high and the highest level we have was 25.8, which was on 03/04/2018 

but on 03/10/2018 it was 23.9.  The patient has been actively using IV drugs 

before she came to the hospital and noticed to have more swelling in her legs.  

She is complaining of generalized body pain.  There is no nausea, vomiting.  

Denies any diarrhea.  Her appetite is normal.


Additional Remarks


Patient is alert, with nasal cannula, siting, still complaining of leg edema 

and pain, more in left leg.





Review of Systems


General


Constitutional:  Fatigue





Respiratory


Lungs:  SOB


Respiratory Remarks


improving





Cardiovascular


Cardiac:  Edema, SCOTT


Cardiac Remarks


Denies CP





Gastrointestinal


GI Remarks


denies abdominal pain





Objective Data


Data





Vital Signs








  Date Time  Temp Pulse Resp B/P (MAP) Pulse Ox O2 Delivery O2 Flow Rate FiO2


 


4/1/18 12:00 98.2 93 22 89/50 (63) 94   


 


4/1/18 11:00  96      


 


4/1/18 08:00      Nasal Cannula 2.00 





      Humidified  


 


4/1/18 08:00 98.5 102 22 105/56 (72) 98   


 


4/1/18 07:28  80      


 


4/1/18 05:54 97.6 92 20 95/55 (68) 94   


 


4/1/18 04:00  87      


 


4/1/18 01:25    90/58 (69)    


 


4/1/18 00:00 98.3 100 20 89/56 (67) 94   


 


4/1/18 00:00  100      


 


4/1/18 00:00    88/50 (63)    


 


3/31/18 23:55      Nasal Cannula 2.00 





      Humidified  


 


3/31/18 21:00     98 Nasal Cannula 2.00 


 


3/31/18 20:00  84      


 


3/31/18 20:00 98.2 86 20 107/68 (81) 94   


 


3/31/18 16:05  103      


 


3/31/18 16:00      Nasal Cannula 2.00 





      Humidified  


 


3/31/18 16:00 98.2 88 20 110/58 (75) 98   








-:  


4/1/18 0630                                                                    

            4/1/18 0630








 Microbiology


3/31/18 Stool Occult Blood (GILA) - Final, Complete


          HEMOCCULT NEGATIVE





Physical Exam


General


Appearance:  No Acute Distress, Anxious





Eyes


Eye Exam:  Pupils Equal





Throat


Throat Exam:  Oral Mucosa Pink & Moist





Neck


Neck Exam:  Neck Supple





Pulmonary


Resp Exam:  Clear Bilaterally





Cardiology


CV Exam:  Regular, Normal Sinus Rhythm





Gastrointestinal/Abdomen


GI Exam:  Soft, Non-Tender





Integumentary


Skin Exam:  Dry, Intact





Extremeties


Extremities Exam:  Moderate Edema (Left leg with mild redness.), Pitting Edema, 

Dependent Edema





Neurologic


Neuro Exam:  Alert, Awake, Oriented





Psychiatric


Psych Exam:  Appropriate Responses





Assessment/Plan


Discussed Condition With:  Patient


Assessment Summary:  NOÉ/Acute Renal Failure


Problem List:  


(1) Acute kidney injury


ICD Codes:  N17.9 - Acute kidney failure, unspecified


Status:  Acute


Plan:  


NOÉ likely due to vancomycin toxicity,  post-infectious GN is unlikely.


Possibility of ATN or pre renal.


Urine eosinophils negative, complements wnl


Creatinine stable


Good UOP





Plan


Hypokalemia at 2.9 replacement given 


Low magnesium level replacement given


Continue Lasix 40 mg IV BID and metolazone for edema


Continue to monitor BMP and UOP


Encouraged to elevate legs throughout day


Avoid nephrotoxins when possible.


Fluid restriction in place.


BP is on lower side, added Midodrine.


Continue diuretics, K is low and replaced.


Lasix has been on hold when the BP is low.


Low BP is still not better, on Midodrine.








(2) Prosthetic valve endocarditis


ICD Codes:  T82.6XXA - Infection and inflammatory reaction due to cardiac valve 

prosthesis, initial encounter


Status:  Acute


Plan:  septic endocarditis with strep viridans


IV drug use, pulmonary emboli as well.





Continues antibiotics per ID


continue renal dosing antibiotics.





(3) Peripheral arterial occlusive disease


ICD Codes:  I77.9 - Disorder of arteries and arterioles, unspecified


Plan:  seen with vascular, continue medical management


Hold contrast studies as possible.





(4) Hepatitis C


ICD Codes:  B19.20 - Unspecified viral hepatitis C without hepatic coma


Status:  Chronic





Problem Qualifiers





(1) Prosthetic valve endocarditis:  


Qualified Codes:  T82.6XXA - Infection and inflammatory reaction due to cardiac 

valve prosthesis, initial encounter








Melissa Layne MD Apr 1, 2018 14:32

## 2018-04-01 NOTE — HHI.PR
Subjective


Remarks


Follow-up endocarditis and deconditioning.  Patient has not been getting out of 

bed, patient has been encourage to be more active as discharge date is near 

discussed with RN, PT to see patient daily





Objective


Vitals





Vital Signs








  Date Time  Temp Pulse Resp B/P (MAP) Pulse Ox O2 Delivery O2 Flow Rate FiO2


 


4/1/18 08:00      Nasal Cannula 2.00 





      Humidified  


 


4/1/18 08:00 98.5 102 22 105/56 (72) 98   


 


4/1/18 05:54 97.6 92 20 95/55 (68) 94   


 


4/1/18 04:00  87      


 


4/1/18 01:25    90/58 (69)    


 


4/1/18 00:00 98.3 100 20 89/56 (67) 94   


 


4/1/18 00:00  100      


 


4/1/18 00:00    88/50 (63)    


 


3/31/18 23:55      Nasal Cannula 2.00 





      Humidified  


 


3/31/18 21:00     98 Nasal Cannula 2.00 


 


3/31/18 20:00  84      


 


3/31/18 20:00 98.2 86 20 107/68 (81) 94   


 


3/31/18 16:05  103      


 


3/31/18 16:00      Nasal Cannula 2.00 





      Humidified  


 


3/31/18 16:00 98.2 88 20 110/58 (75) 98   


 


3/31/18 12:16  88      


 


3/31/18 12:00      Nasal Cannula 2.00 





      Humidified  


 


3/31/18 12:00 97.3 82 20 101/58 (72) 95   














I/O      


 


 3/31/18 3/31/18 3/31/18 4/1/18 4/1/18 4/1/18





 07:00 15:00 23:00 07:00 15:00 23:00


 


Intake Total 850 ml 480 ml 200 ml 360 ml  


 


Output Total 700 ml 1000 ml  1500 ml  


 


Balance 150 ml -520 ml 200 ml -1140 ml  


 


      


 


Intake Oral 150 ml 480 ml  360 ml  


 


IV Total   200 ml   


 


Packed Cells 700 ml     


 


Output Urine Total 700 ml 1000 ml  1500 ml  


 


# Bowel Movements  1  1  








Result Diagram:  


4/1/18 0630                                                                    

            4/1/18 0630





Imaging





Last Impressions








Chest X-Ray 3/26/18 0000 Signed





Impressions: 





 Service Date/Time:  Monday, March 26, 2018 13:53 - CONCLUSION: No significant 





 change in the right lower lobe infiltrate.     Ollie Paige MD 


 


Catheter Placement X-Ray 3/19/18 0000 Signed





Impressions: 





 Service Date/Time:  Monday, March 19, 2018 11:16 - CONCLUSION: Uncomplicated 





 venous catheter change as above.     Rj Whitten MD 


 


Head CT 3/3/18 0000 Signed





Impressions: 





 Service Date/Time:  Saturday, March 3, 2018 10:22 - CONCLUSION:  1. Focal area 





 of decreased density involving the right parietal lobe. As a new finding from 





 the prior exam. This could relate to a small infarct. MRI of the brain with 





 gadolinium suggested to further evaluate. 2. Small lacunar infarction 

involving 





 the left centrum semiovale.     Scooter Dawson Jr., MD 


 


CT Angiography 3/3/18 0000 Signed





Impressions: 





 Service Date/Time:  Saturday, March 3, 2018 10:28 - CONCLUSION:  There 

extensive 





 pulmonary emboli bilaterally. There is near complete cut off of the right 

lower 





 lobe pulmonary artery. Prominent filling defects on the left as well. These 





 findings were relayed to Dr. Bustos immediately at the completion of the study.  





 Scattered pulmonary infiltrates, small pleural effusion and extensive 





 mediastinal lymphadenopathy as described above.      Won Sharp MD 


 


Brain MRI 3/3/18 0000 Signed





Impressions: 





 Service Date/Time:  Saturday, March 3, 2018 18:04 - CONCLUSION:  Deterioration 





 in the appearance of the scan.  At least 3 focal areas of abnormal contrast 





 enhancement are present scattered to in both hemispheres.  Given the history 





 this most likely represents developing areas cerebritis.  Exam is compromised 

by 





 an uncooperative patient and motion artifact.  These 2 factors make detection 

of 





 other abnormalities such as cortical cephalitis and meningitis difficult to 





 exclude.  Cannot exclude hemorrhagic lesions as well.  There is no mass effect 





 evident.      Jorge Diane MD 


 


Chest CT 2/26/18 0000 Signed





Impressions: 





 Service Date/Time:  Monday, February 26, 2018 12:41 - CONCLUSION:  1. There 

are 





 at least 5 pulmonary nodules present bilaterally with the largest measuring 19 





 mm. None demonstrate cavitation but it is possible that these represent septic 





 emboli. Suggest followup to assess for change. 2. Splenomegaly with subtle 





 wedge-shaped areas of low-density in the mid spleen which could represent 





 infarcts. 3. Mild cardiomegaly in this patient post aortic valve replacement. 





 Low density of the cardiac blood pool suggests anemia.      Ron Melgar MD 


 


Aorta w/Runoff CTA 2/25/18 0000 Signed





Impressions: 





 Service Date/Time:  Sunday, February 25, 2018 12:47 - CONCLUSION:  Left renal 





 cortical infarction. 5-6 cm length total occlusion of the left superficial 





 femoral artery in the proximal thigh. Nodular parenchymal opacities in the 

lung 





 bases bilaterally See above discussion.     Ron Cruz MD 


 


Renal Ultrasound 2/24/18 0000 Signed





Impressions: 





 Service Date/Time:  Saturday, February 24, 2018 10:53 - CONCLUSION:  1. No 





 evidence of hydronephrosis. 2. Thickening of the urinary bladder wall a 1.1 

cm. 





 3. Prominent splenomegaly.     Elgin Walton MD 


 


Breast Ultrasound 2/24/18 0000 Signed





Impressions: 





 Service Date/Time:  Saturday, February 24, 2018 10:48 - CONCLUSION:  Negative 





 targeted right breast ultrasound examination.     Ron Brown MD 


 


Lower Extremity Ultrasound 2/23/18 0000 Signed





Impressions: 





 Service Date/Time:  Friday, February 23, 2018 21:22 - CONCLUSION:  1. No 





 sonographic evidence for lower extremity DVT. 2. Incidental note of bilateral 





 inguinal adenopathy with the largest on the right measuring 3.3 cm and the 





 largest on the left measuring 2.6 cm. This finding is nonspecific but is 





 typically reactive in etiology.     Rj Whitten MD 








Objective Remarks


AAOx3


NAD


Clear lungs BL


S1S2 4/6 systolic ejection murmur


+3 BL lower extremity edema. Erythema improved, left foot very tender to 

palpation.


. Dark discoloration left toes lisa 3rd and 5th. pulses not palpable due to 

edema. Good capillary refill.


Procedures


Exchange of right IJ central line


Date of Insertion:  Mar 19, 2018


Line:  Central Venous Catheter


Side:  Right


Location:  Internal, Jugular





A/P


Problem List:  


(1) Prosthetic valve endocarditis


ICD Code:  T82.6XXA - Infection and inflammatory reaction due to cardiac valve 

prosthesis, initial encounter


Status:  Acute


(2) Bacteremia


ICD Code:  R78.81 - Bacteremia


(3) Septic pulmonary embolism


ICD Code:  I26.90 - Septic pulmonary embolism without acute cor pulmonale


Status:  Acute


(4) IVDU (intravenous drug user)


ICD Code:  F19.90 - Other psychoactive substance use, unspecified, uncomplicated


(5) Anemia


ICD Code:  D64.9 - Anemia, unspecified


Status:  Chronic


(6) NOÉ (acute kidney injury)


ICD Code:  N17.9 - Acute kidney failure, unspecified


(7) Cellulitis of left lower extremity


ICD Code:  L03.116 - Cellulitis of left lower limb


(8) Purple toe syndrome


ICD Code:  I75.029 - Atheroembolism of unspecified lower extremity


(9) Pleuritic chest pain


ICD Code:  R07.81 - Pleurodynia


Assessment and Plan


(1) Prosthetic valve endocarditis


Continue IV ampicillin and daptomycin until April 6 per CTS, patient would not 

benefit from further cardiac surgery.  Patient feels she is not getting any 

better and wants to talk to hospice again.  Hospice has been reconsulted.  She 

wants to set up with hospice after completion of antibiotics





(2) Bacteremia


Plan:  Blood cultures obtained on 2/23 growing strep viridans.


Subsequent repeat blood cultures negative.


3/19 sp Exchange of Right IJ central line. 


Continue antibiotics as per ID recommendations.





(3) Septic pulmonary embolism


Plan:  As observed on a CT angiography of the chest obtained on 3/3/18.


echocardiogram on 3/3 showed an estimated EF of 50-55%.  


++severe tricuspid regurgitation with prosthesis in place.  


++2 x 4 cm mobile vegetation was seen on the valve.  The right atrium and right 

ventricle were normal in size and function


Hematology following.


Goal INR 2.5-3.5 given the presence of a prosthetic aortic valve.  Restarted 

heparin drip since INR subtherapeutic





(4) IVDU (intravenous drug user)


Plan:  Counseled on drug cessation.





(5) Anemia


Plan:  Likely secondary to inflammation/infection.  No evidence of bleeding or 

hemolysis.


Monitor hemoglobin and transfuse as needed.





(6) NOÉ (acute kidney injury)


Plan: Nephrology consulted.  Possible ATN from vancomycin toxicity or 

infection.  Urine eosinophils were ordered and negative.  Diuretics managed by 

nephrology.  Initially on Lasix 20 mg IV twice daily along with albumin.  

Creatinine Worsening on 3/19 for 1.57-1.62.  Later Lasix dose was increased to 

40 mg IV twice daily  and metolazone added to the regimen.  Creatinine then 

started trending down to 1.34 on 3/23.


3/28 creatinine stable  Management as per nephrology.  Continue to monitor BUN 

and creatinine, avoid nephrotoxins, history I's and O's.  





(7) Cellulitis of left lower extremity


Plan:  Continue IV antibiotics as per ID.


Cellulitis seems to be improving.





(8) Purple toe syndrome/PAD


Vascular surgery recommended conservative management continue anticoagulation, 

weightbearing as tolerated and repeat DELONTE in 2-3 weeks


Patient also with severe excruciating pain secondary to possible some ischemia.

  Increase dose of morphine sulfate from 30 mg p.o. every 12 hours to 45 mg 

p.o. every 12 hours.  Counseled regarding narcotic usage.  Consider Nitropaste 

on the left lower extremity if BP improved








(9) Pleuritic chest pain


Improving control of pleuritic chest pain.  Repeat chest x-ray obtained on 3/26/

18 did not show any significant change in the right lower lobe infiltrate.


Discharge Planning


Patient with prosthetic valve endocarditis, bacteremia, septic pulmonary 

embolism and acute kidney injury.  Patient still edematous, good urine output 

on IV diuresis.  Will need ID clearance prior to discharge.





Problem Qualifiers





(1) Prosthetic valve endocarditis:  


Qualified Codes:  T82.6XXA - Infection and inflammatory reaction due to cardiac 

valve prosthesis, initial encounter


(2) Anemia:  


Qualified Codes:  D63.8 - Anemia in other chronic diseases classified elsewhere


(3) Purple toe syndrome:  


Qualified Codes:  I75.022 - Atheroembolism of left lower extremity








Franklin Kyle MD Apr 1, 2018 10:42

## 2018-04-02 VITALS
HEART RATE: 99 BPM | DIASTOLIC BLOOD PRESSURE: 61 MMHG | TEMPERATURE: 98.3 F | SYSTOLIC BLOOD PRESSURE: 94 MMHG | RESPIRATION RATE: 17 BRPM | OXYGEN SATURATION: 94 %

## 2018-04-02 VITALS
DIASTOLIC BLOOD PRESSURE: 73 MMHG | OXYGEN SATURATION: 91 % | TEMPERATURE: 97.9 F | SYSTOLIC BLOOD PRESSURE: 93 MMHG | RESPIRATION RATE: 18 BRPM | HEART RATE: 98 BPM

## 2018-04-02 VITALS
SYSTOLIC BLOOD PRESSURE: 92 MMHG | RESPIRATION RATE: 18 BRPM | TEMPERATURE: 99.1 F | OXYGEN SATURATION: 90 % | DIASTOLIC BLOOD PRESSURE: 59 MMHG | HEART RATE: 99 BPM

## 2018-04-02 VITALS — OXYGEN SATURATION: 94 %

## 2018-04-02 VITALS — HEART RATE: 97 BPM

## 2018-04-02 LAB
BASOPHILS # BLD AUTO: 0.1 TH/MM3 (ref 0–0.2)
BASOPHILS NFR BLD: 0.6 % (ref 0–2)
BUN SERPL-MCNC: 17 MG/DL (ref 7–18)
CALCIUM SERPL-MCNC: 8.6 MG/DL (ref 8.5–10.1)
CHLORIDE SERPL-SCNC: 96 MEQ/L (ref 98–107)
CREAT SERPL-MCNC: 0.96 MG/DL (ref 0.5–1)
EOSINOPHIL # BLD: 0.1 TH/MM3 (ref 0–0.4)
EOSINOPHIL NFR BLD: 0.8 % (ref 0–4)
ERYTHROCYTE [DISTWIDTH] IN BLOOD BY AUTOMATED COUNT: 23.2 % (ref 11.6–17.2)
GFR SERPLBLD BASED ON 1.73 SQ M-ARVRAT: 66 ML/MIN (ref 89–?)
GLUCOSE SERPL-MCNC: 93 MG/DL (ref 74–106)
HCO3 BLD-SCNC: 27.8 MEQ/L (ref 21–32)
HCT VFR BLD CALC: 28.5 % (ref 35–46)
HGB BLD-MCNC: 8.9 GM/DL (ref 11.6–15.3)
INR PPP: 1.5 RATIO
LYMPHOCYTES # BLD AUTO: 1.8 TH/MM3 (ref 1–4.8)
LYMPHOCYTES NFR BLD AUTO: 12.1 % (ref 9–44)
MAGNESIUM SERPL-MCNC: 1.5 MG/DL (ref 1.5–2.5)
MCH RBC QN AUTO: 24.4 PG (ref 27–34)
MCHC RBC AUTO-ENTMCNC: 31.1 % (ref 32–36)
MCV RBC AUTO: 78.5 FL (ref 80–100)
MONOCYTE #: 0.9 TH/MM3 (ref 0–0.9)
MONOCYTES NFR BLD: 5.8 % (ref 0–8)
NEUTROPHILS # BLD AUTO: 12.2 TH/MM3 (ref 1.8–7.7)
NEUTROPHILS NFR BLD AUTO: 80.7 % (ref 16–70)
PLATELET # BLD: 100 TH/MM3 (ref 150–450)
PMV BLD AUTO: 8.4 FL (ref 7–11)
PROTHROMBIN TIME: 15.6 SEC (ref 9.8–11.6)
RBC # BLD AUTO: 3.63 MIL/MM3 (ref 4–5.3)
SODIUM SERPL-SCNC: 133 MEQ/L (ref 136–145)
WBC # BLD AUTO: 15.1 TH/MM3 (ref 4–11)

## 2018-04-02 PROCEDURE — 5A2204Z RESTORATION OF CARDIAC RHYTHM, SINGLE: ICD-10-PCS | Performed by: INTERNAL MEDICINE

## 2018-04-02 PROCEDURE — 5A1935Z RESPIRATORY VENTILATION, LESS THAN 24 CONSECUTIVE HOURS: ICD-10-PCS | Performed by: INTERNAL MEDICINE

## 2018-04-02 PROCEDURE — 0BH17EZ INSERTION OF ENDOTRACHEAL AIRWAY INTO TRACHEA, VIA NATURAL OR ARTIFICIAL OPENING: ICD-10-PCS | Performed by: INTERNAL MEDICINE

## 2018-04-02 PROCEDURE — 5A12012 PERFORMANCE OF CARDIAC OUTPUT, SINGLE, MANUAL: ICD-10-PCS | Performed by: INTERNAL MEDICINE

## 2018-04-02 RX ADMIN — STANDARDIZED SENNA CONCENTRATE AND DOCUSATE SODIUM SCH TAB: 8.6; 5 TABLET, FILM COATED ORAL at 08:50

## 2018-04-02 RX ADMIN — AMPICILLIN SODIUM SCH MLS/HR: 2 INJECTION, POWDER, FOR SOLUTION INTRAMUSCULAR; INTRAVENOUS at 08:48

## 2018-04-02 RX ADMIN — METOLAZONE SCH MG: 2.5 TABLET ORAL at 08:49

## 2018-04-02 RX ADMIN — FUROSEMIDE SCH MG: 10 INJECTION, SOLUTION INTRAMUSCULAR; INTRAVENOUS at 08:51

## 2018-04-02 RX ADMIN — HYDROMORPHONE HYDROCHLORIDE PRN MG: 2 INJECTION INTRAMUSCULAR; INTRAVENOUS; SUBCUTANEOUS at 02:19

## 2018-04-02 RX ADMIN — HEPARIN SODIUM PRN MLS/HR: 10000 INJECTION, SOLUTION INTRAVENOUS at 00:42

## 2018-04-02 RX ADMIN — AMPICILLIN SODIUM SCH MLS/HR: 2 INJECTION, POWDER, FOR SOLUTION INTRAMUSCULAR; INTRAVENOUS at 01:16

## 2018-04-02 RX ADMIN — MORPHINE SULFATE SCH MG: 15 TABLET, EXTENDED RELEASE ORAL at 08:50

## 2018-04-02 RX ADMIN — HYDROCODONE BITARTRATE AND ACETAMINOPHEN PRN TAB: 10; 325 TABLET ORAL at 12:48

## 2018-04-02 RX ADMIN — ALBUTEROL SULFATE PRN MG: 2.5 SOLUTION RESPIRATORY (INHALATION) at 09:55

## 2018-04-02 RX ADMIN — MIDODRINE HYDROCHLORIDE SCH MG: 5 TABLET ORAL at 06:58

## 2018-04-02 RX ADMIN — Medication SCH ML: at 08:51

## 2018-04-02 RX ADMIN — POTASSIUM CHLORIDE SCH MEQ: 750 TABLET, FILM COATED, EXTENDED RELEASE ORAL at 08:49

## 2018-04-02 NOTE — HHI.PR
Subjective


Remarks


Follow-up endocarditis and left lower extremity cellulitis.  Patient has no new 

complaints continues to have stable left lower extremity pain.  Discussed with 

nursing





Objective


Vitals





Vital Signs








  Date Time  Temp Pulse Resp B/P (MAP) Pulse Ox O2 Delivery O2 Flow Rate FiO2


 


4/2/18 09:58     94 Nasal Cannula 2.00 


 


4/2/18 08:01 98.3 100 17 94/61 (72) 94   


 


4/2/18 08:00      Nasal Cannula 2.00 


 


4/2/18 04:00 97.9 101 18 93/73 (80) 91   


 


4/2/18 04:00  98      


 


4/2/18 00:17      Nasal Cannula 2.00 





      Humidified  


 


4/1/18 20:00 97.9 93 16 102/77 (85) 96   


 


4/1/18 20:00  90      


 


4/1/18 17:49     94 Nasal Cannula 2.00 


 


4/1/18 16:23  87      


 


4/1/18 16:00 98.6 86 22 89/52 (64) 94   


 


4/1/18 16:00      Nasal Cannula 2.00 





      Humidified  


 


4/1/18 12:00      Nasal Cannula 2.00 





      Humidified  


 


4/1/18 12:00 98.2 93 22 89/50 (63) 94   














I/O      


 


 4/1/18 4/1/18 4/1/18 4/2/18 4/2/18 4/2/18





 07:00 15:00 23:00 07:00 15:00 23:00


 


Intake Total 360 ml  680 ml 490 ml  


 


Output Total 1500 ml  800 ml   


 


Balance -1140 ml  -120 ml 490 ml  


 


      


 


Intake Oral 360 ml  480 ml 240 ml  


 


IV Total   200 ml 250 ml  


 


Output Urine Total 1500 ml  800 ml   


 


# Voids    4  


 


# Bowel Movements 1  2 0  








Result Diagram:  


4/2/18 0655                                                                    

            4/2/18 0655





Imaging





Last Impressions








Chest X-Ray 3/26/18 0000 Signed





Impressions: 





 Service Date/Time:  Monday, March 26, 2018 13:53 - CONCLUSION: No significant 





 change in the right lower lobe infiltrate.     Ollie Paige MD 


 


Catheter Placement X-Ray 3/19/18 0000 Signed





Impressions: 





 Service Date/Time:  Monday, March 19, 2018 11:16 - CONCLUSION: Uncomplicated 





 venous catheter change as above.     Rj Whitten MD 


 


Head CT 3/3/18 0000 Signed





Impressions: 





 Service Date/Time:  Saturday, March 3, 2018 10:22 - CONCLUSION:  1. Focal area 





 of decreased density involving the right parietal lobe. As a new finding from 





 the prior exam. This could relate to a small infarct. MRI of the brain with 





 gadolinium suggested to further evaluate. 2. Small lacunar infarction 

involving 





 the left centrum semiovale.     Scooter Dawson Jr., MD 


 


CT Angiography 3/3/18 0000 Signed





Impressions: 





 Service Date/Time:  Saturday, March 3, 2018 10:28 - CONCLUSION:  There 

extensive 





 pulmonary emboli bilaterally. There is near complete cut off of the right 

lower 





 lobe pulmonary artery. Prominent filling defects on the left as well. These 





 findings were relayed to Dr. Bustos immediately at the completion of the study.  





 Scattered pulmonary infiltrates, small pleural effusion and extensive 





 mediastinal lymphadenopathy as described above.      Won Sharp MD 


 


Brain MRI 3/3/18 0000 Signed





Impressions: 





 Service Date/Time:  Saturday, March 3, 2018 18:04 - CONCLUSION:  Deterioration 





 in the appearance of the scan.  At least 3 focal areas of abnormal contrast 





 enhancement are present scattered to in both hemispheres.  Given the history 





 this most likely represents developing areas cerebritis.  Exam is compromised 

by 





 an uncooperative patient and motion artifact.  These 2 factors make detection 

of 





 other abnormalities such as cortical cephalitis and meningitis difficult to 





 exclude.  Cannot exclude hemorrhagic lesions as well.  There is no mass effect 





 evident.      Jorge Diane MD 


 


Chest CT 2/26/18 0000 Signed





Impressions: 





 Service Date/Time:  Monday, February 26, 2018 12:41 - CONCLUSION:  1. There 

are 





 at least 5 pulmonary nodules present bilaterally with the largest measuring 19 





 mm. None demonstrate cavitation but it is possible that these represent septic 





 emboli. Suggest followup to assess for change. 2. Splenomegaly with subtle 





 wedge-shaped areas of low-density in the mid spleen which could represent 





 infarcts. 3. Mild cardiomegaly in this patient post aortic valve replacement. 





 Low density of the cardiac blood pool suggests anemia.      Ron Melgar MD 


 


Aorta w/Runoff CTA 2/25/18 0000 Signed





Impressions: 





 Service Date/Time:  Sunday, February 25, 2018 12:47 - CONCLUSION:  Left renal 





 cortical infarction. 5-6 cm length total occlusion of the left superficial 





 femoral artery in the proximal thigh. Nodular parenchymal opacities in the 

lung 





 bases bilaterally See above discussion.     Ron Cruz MD 


 


Renal Ultrasound 2/24/18 0000 Signed





Impressions: 





 Service Date/Time:  Saturday, February 24, 2018 10:53 - CONCLUSION:  1. No 





 evidence of hydronephrosis. 2. Thickening of the urinary bladder wall a 1.1 

cm. 





 3. Prominent splenomegaly.     Elgin Walton MD 


 


Breast Ultrasound 2/24/18 0000 Signed





Impressions: 





 Service Date/Time:  Saturday, February 24, 2018 10:48 - CONCLUSION:  Negative 





 targeted right breast ultrasound examination.     Ron Brown MD 


 


Lower Extremity Ultrasound 2/23/18 0000 Signed





Impressions: 





 Service Date/Time:  Friday, February 23, 2018 21:22 - CONCLUSION:  1. No 





 sonographic evidence for lower extremity DVT. 2. Incidental note of bilateral 





 inguinal adenopathy with the largest on the right measuring 3.3 cm and the 





 largest on the left measuring 2.6 cm. This finding is nonspecific but is 





 typically reactive in etiology.     Rj Whitten MD 








Objective Remarks


AAOx3


NAD


Clear lungs BL


S1S2 4/6 systolic ejection murmur


+3 BL lower extremity edema. Erythema improved, left  to palpation.


. Dark discoloration left toes lisa 3rd and 5th. pulses not palpable due to 

edema. Good capillary refill.


Procedures


Exchange of right IJ central line


Date of Insertion:  Mar 19, 2018


Line:  Central Venous Catheter


Side:  Right


Location:  Internal, Jugular





A/P


Problem List:  


(1) Prosthetic valve endocarditis


ICD Code:  T82.6XXA - Infection and inflammatory reaction due to cardiac valve 

prosthesis, initial encounter


Status:  Acute


(2) Bacteremia


ICD Code:  R78.81 - Bacteremia


(3) Septic pulmonary embolism


ICD Code:  I26.90 - Septic pulmonary embolism without acute cor pulmonale


Status:  Acute


(4) IVDU (intravenous drug user)


ICD Code:  F19.90 - Other psychoactive substance use, unspecified, uncomplicated


(5) Anemia


ICD Code:  D64.9 - Anemia, unspecified


Status:  Chronic


(6) NOÉ (acute kidney injury)


ICD Code:  N17.9 - Acute kidney failure, unspecified


(7) Cellulitis of left lower extremity


ICD Code:  L03.116 - Cellulitis of left lower limb


(8) Purple toe syndrome


ICD Code:  I75.029 - Atheroembolism of unspecified lower extremity


(9) Pleuritic chest pain


ICD Code:  R07.81 - Pleurodynia


Assessment and Plan


(1) Prosthetic valve endocarditis


Continue IV ampicillin and daptomycin until April 6 per CTS, patient would not 

benefit from further cardiac surgery.  Patient feels she is not getting any 

better and wants to talk to hospice again.  Hospice has been reconsulted.  She 

wants to set up with hospice after completion of antibiotics





(2) Bacteremia


Plan:  Blood cultures obtained on 2/23 growing strep viridans.


Subsequent repeat blood cultures negative.


3/19 sp Exchange of Right IJ central line. 


Continue antibiotics as per ID recommendations.





(3) Septic pulmonary embolism


Plan:  As observed on a CT angiography of the chest obtained on 3/3/18.


echocardiogram on 3/3 showed an estimated EF of 50-55%.  


++severe tricuspid regurgitation with prosthesis in place.  


++2 x 4 cm mobile vegetation was seen on the valve.  The right atrium and right 

ventricle were normal in size and function


Hematology following.


Goal INR 2.5-3.5 given the presence of a prosthetic aortic valve.  Restarted 

heparin drip since INR subtherapeutic





(4) IVDU (intravenous drug user)


Plan:  Counseled on drug cessation.





(5) Anemia


Plan:  Likely secondary to inflammation/infection.  No evidence of bleeding or 

hemolysis.


Monitor hemoglobin and transfuse as needed.





(6) NOÉ (acute kidney injury)


Plan: Nephrology consulted.  Possible ATN from vancomycin toxicity or 

infection.  Urine eosinophils were ordered and negative.  Diuretics managed by 

nephrology.  Initially on Lasix 20 mg IV twice daily along with albumin.  

Creatinine Worsening on 3/19 for 1.57-1.62.  Later Lasix dose was increased to 

40 mg IV twice daily  and metolazone added to the regimen.  Creatinine then 

started trending down to 1.34 on 3/23.


Management as per nephrology.  Continue to monitor BUN and creatinine, avoid 

nephrotoxins, I's and O's.  





(7) Cellulitis of left lower extremity


Plan:  Continue IV antibiotics as per ID.


Cellulitis seems to be improving.





(8) Purple toe syndrome/PAD


Vascular surgery recommended conservative management continue anticoagulation, 

weightbearing as tolerated and repeat DELONTE in 2-3 weeks


Patient also with severe excruciating pain secondary to possible some ischemia.

  Increase dose of morphine sulfate from 30 mg p.o. every 12 hours to 45 mg 

p.o. every 12 hours.  Counseled regarding narcotic usage.  Consider Nitropaste 

on the left lower extremity if BP improved








(9) Pleuritic chest pain


Improving control of pleuritic chest pain.  Repeat chest x-ray obtained on 3/26/

18 did not show any significant change in the right lower lobe infiltrate.


Discharge Planning


Patient with prosthetic valve endocarditis, bacteremia, septic pulmonary 

embolism and acute kidney injury.  Patient still edematous, good urine output 

on IV diuresis.  Will need ID clearance prior to discharge.





Problem Qualifiers





(1) Prosthetic valve endocarditis:  


Qualified Codes:  T82.6XXA - Infection and inflammatory reaction due to cardiac 

valve prosthesis, initial encounter


(2) Anemia:  


Qualified Codes:  D63.8 - Anemia in other chronic diseases classified elsewhere


(3) Purple toe syndrome:  


Qualified Codes:  I75.022 - Atheroembolism of left lower extremity








Franklin Kyle MD Apr 2, 2018 11:20

## 2018-04-02 NOTE — HHI.DS
Death Summary Note


Date of Death:  Apr 2, 2018


Time Of Death:  1458


Admission Date


Feb 23, 2018 at 21:23


Admitting Diagnosis





endocarditis, severe sepsis, CHF, IVDA, Acute kidney failure


Diagnosis at Time of Death:  


(1) Prosthetic valve endocarditis


ICD Code:  T82.6XXA - Infection and inflammatory reaction due to cardiac valve 

prosthesis, initial encounter


Diagnosis:  Principal





(2) Bacteremia


ICD Code:  R78.81 - Bacteremia


Diagnosis:  Principal





(3) Septic pulmonary embolism


ICD Code:  I26.90 - Septic pulmonary embolism without acute cor pulmonale


Diagnosis:  Principal





(4) IVDU (intravenous drug user)


ICD Code:  F19.90 - Other psychoactive substance use, unspecified, uncomplicated


Diagnosis:  Principal





(5) Anemia


ICD Code:  D64.9 - Anemia, unspecified


Diagnosis:  Principal





(6) NOÉ (acute kidney injury)


ICD Code:  N17.9 - Acute kidney failure, unspecified


Diagnosis:  Principal





(7) Cellulitis of left lower extremity


ICD Code:  L03.116 - Cellulitis of left lower limb


Diagnosis:  Principal





(8) Purple toe syndrome


ICD Code:  I75.029 - Atheroembolism of unspecified lower extremity


Diagnosis:  Principal





(9) Pleuritic chest pain


ICD Code:  R07.81 - Pleurodynia


Diagnosis:  Principal





Procedures


Exchange of right IJ central line


Brief History


37-year-old female that presents to the ED for evaluation of shortness of 

breath and swelling.  Patient has a significant history of endocarditis, IV 

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 

continues use of IV drug use that presents to the ED for evaluation of acute 

onset of shortness of breath with swelling.  Patient initially did not mention 

to anybody that she was doing drugs but she did tell me that she injected 

herself about 4 days ago.  She uses Dilaudid.  She states that she's been 

having fevers.  Patient is somewhat somnolent and hard to assess she doesn't 

really provide much information.  Family members at the bedside and he provides 

more information about the heart.  Per patient she'll he takes 2 medications.  

Patient asked if she is taking any antibiotics and she said yes but when I ask 

her what kind is she is taking currently she states that she has not been on 

antibiotics for some time.  Patient apparently does follow with a local 

cardiologist but she cannot tell me the name of it.  She states that she's been 

having swelling to her legs for the past month.  She denies any recent travel 

or injury.  No other medical issues at this time.  No allergies to medication.


CBC/BMP:  


4/2/18 0655                                                                    

            4/2/18 0655





Significant Findings





Laboratory Tests








Test


  3/30/18


18:30 3/31/18


06:20 3/31/18


08:30 3/31/18


18:03


 


Potassium Level


  2.9 MEQ/L


(3.5-5.1) 2.9 MEQ/L


(3.5-5.1) 


  


 


 


White Blood Count


  


  14.7 TH/MM3


(4.0-11.0) 


  


 


 


Red Blood Count


  


  3.85 MIL/MM3


(4.00-5.30) 


  


 


 


Hemoglobin


  


  9.5 GM/DL


(11.6-15.3) 


  


 


 


Hematocrit


  


  29.9 %


(35.0-46.0) 


  


 


 


Mean Corpuscular Volume


  


  77.5 FL


(80.0-100.0) 


  


 


 


Mean Corpuscular Hemoglobin


  


  24.7 PG


(27.0-34.0) 


  


 


 


Mean Corpuscular Hemoglobin


Concent 


  31.8 %


(32.0-36.0) 


  


 


 


Red Cell Distribution Width


  


  22.5 %


(11.6-17.2) 


  


 


 


Platelet Count


  


  119 TH/MM3


(150-450) 


  


 


 


Neutrophils (%) (Auto)


  


  81.1 %


(16.0-70.0) 


  


 


 


Neutrophils # (Auto)


  


  11.9 TH/MM3


(1.8-7.7) 


  


 


 


Blood Urea Nitrogen


  


  20 MG/DL


(7-18) 


  


 


 


Creatinine


  


  1.05 MG/DL


(0.50-1.00) 


  


 


 


Calcium Level


  


  8.4 MG/DL


(8.5-10.1) 


  


 


 


Sodium Level


  


  132 MEQ/L


(136-145) 


  


 


 


Chloride Level


  


  89 MEQ/L


() 


  


 


 


Carbon Dioxide Level


  


  34.6 MEQ/L


(21.0-32.0) 


  


 


 


Estimat Glomerular Filtration


Rate 


  59 ML/MIN


(>89) 


  


 


 


Prothrombin Time


  


  


  14.3 SEC


(9.8-11.6) 


 


 


Activated Partial


Thromboplast Time 


  


  31.0 SEC


(24.3-30.1) 35.5 SEC


(24.3-30.1)


 


Test


  4/1/18


02:00 4/1/18


06:30 4/1/18


09:50 4/1/18


18:20


 


Prothrombin Time


  14.7 SEC


(9.8-11.6) 


  


  


 


 


White Blood Count


  


  13.2 TH/MM3


(4.0-11.0) 


  


 


 


Red Blood Count


  


  3.49 MIL/MM3


(4.00-5.30) 


  


 


 


Hemoglobin


  


  8.6 GM/DL


(11.6-15.3) 


  


 


 


Hematocrit


  


  26.9 %


(35.0-46.0) 


  


 


 


Mean Corpuscular Volume


  


  77.1 FL


(80.0-100.0) 


  


 


 


Mean Corpuscular Hemoglobin


  


  24.7 PG


(27.0-34.0) 


  


 


 


Red Cell Distribution Width


  


  22.3 %


(11.6-17.2) 


  


 


 


Platelet Count


  


  110 TH/MM3


(150-450) 


  


 


 


Neutrophils (%) (Auto)


  


  81.8 %


(16.0-70.0) 


  


 


 


Neutrophils # (Auto)


  


  10.8 TH/MM3


(1.8-7.7) 


  


 


 


Sodium Level


  


  133 MEQ/L


(136-145) 


  


 


 


Potassium Level


  


  2.9 MEQ/L


(3.5-5.1) 


  


 


 


Chloride Level


  


  91 MEQ/L


() 


  


 


 


Carbon Dioxide Level


  


  35.9 MEQ/L


(21.0-32.0) 


  


 


 


Estimat Glomerular Filtration


Rate 


  69 ML/MIN


(>89) 


  


 


 


Activated Partial


Thromboplast Time 


  


  33.7 SEC


(24.3-30.1) 40.1 SEC


(24.3-30.1)


 


Test


  4/2/18


01:20 4/2/18


06:55 4/2/18


13:25 


 


 


White Blood Count


  


  15.1 TH/MM3


(4.0-11.0) 


  


 


 


Red Blood Count


  


  3.63 MIL/MM3


(4.00-5.30) 


  


 


 


Hemoglobin


  


  8.9 GM/DL


(11.6-15.3) 


  


 


 


Hematocrit


  


  28.5 %


(35.0-46.0) 


  


 


 


Mean Corpuscular Volume


  


  78.5 FL


(80.0-100.0) 


  


 


 


Mean Corpuscular Hemoglobin


  


  24.4 PG


(27.0-34.0) 


  


 


 


Mean Corpuscular Hemoglobin


Concent 


  31.1 %


(32.0-36.0) 


  


 


 


Red Cell Distribution Width


  


  23.2 %


(11.6-17.2) 


  


 


 


Platelet Count


  


  100 TH/MM3


(150-450) 


  


 


 


Neutrophils (%) (Auto)


  


  80.7 %


(16.0-70.0) 


  


 


 


Neutrophils # (Auto)


  


  12.2 TH/MM3


(1.8-7.7) 


  


 


 


Prothrombin Time


  


  15.6 SEC


(9.8-11.6) 


  


 


 


Activated Partial


Thromboplast Time 


  42.4 SEC


(24.3-30.1) 37.8 SEC


(24.3-30.1) 


 


 


Sodium Level


  


  133 MEQ/L


(136-145) 


  


 


 


Chloride Level


  


  96 MEQ/L


() 


  


 


 


Estimat Glomerular Filtration


Rate 


  66 ML/MIN


(>89) 


  


 








Imaging





Last Impressions








Chest X-Ray 3/5/18 0600 Signed





Impressions: 





 Service Date/Time:  Monday, March 5, 2018 03:52 - CONCLUSION:  There is a 

small 





 to moderate size right pleural effusion with associated volume loss and/or 





 airspace consolidation. The pleural effusion has increased from the prior 





 examination.     Ron Melgar MD 


 


Head CT 3/3/18 0000 Signed





Impressions: 





 Service Date/Time:  Saturday, March 3, 2018 10:22 - CONCLUSION:  1. Focal area 





 of decreased density involving the right parietal lobe. As a new finding from 





 the prior exam. This could relate to a small infarct. MRI of the brain with 





 gadolinium suggested to further evaluate. 2. Small lacunar infarction 

involving 





 the left centrum semiovale.     Scooter Dawson Jr., MD 


 


CT Angiography 3/3/18 0000 Signed





Impressions: 





 Service Date/Time:  Saturday, March 3, 2018 10:28 - CONCLUSION:  There 

extensive 





 pulmonary emboli bilaterally. There is near complete cut off of the right 

lower 





 lobe pulmonary artery. Prominent filling defects on the left as well. These 





 findings were relayed to Dr. Bustos immediately at the completion of the study.  





 Scattered pulmonary infiltrates, small pleural effusion and extensive 





 mediastinal lymphadenopathy as described above.      Won Sharp MD 


 


Brain MRI 3/3/18 0000 Signed





Impressions: 





 Service Date/Time:  Saturday, March 3, 2018 18:04 - CONCLUSION:  Deterioration 





 in the appearance of the scan.  At least 3 focal areas of abnormal contrast 





 enhancement are present scattered to in both hemispheres.  Given the history 





 this most likely represents developing areas cerebritis.  Exam is compromised 

by 





 an uncooperative patient and motion artifact.  These 2 factors make detection 

of 





 other abnormalities such as cortical cephalitis and meningitis difficult to 





 exclude.  Cannot exclude hemorrhagic lesions as well.  There is no mass effect 





 evident.      Jorge Diane MD 


 


Chest CT 2/26/18 0000 Signed





Impressions: 





 Service Date/Time:  Monday, February 26, 2018 12:41 - CONCLUSION:  1. There 

are 





 at least 5 pulmonary nodules present bilaterally with the largest measuring 19 





 mm. None demonstrate cavitation but it is possible that these represent septic 





 emboli. Suggest followup to assess for change. 2. Splenomegaly with subtle 





 wedge-shaped areas of low-density in the mid spleen which could represent 





 infarcts. 3. Mild cardiomegaly in this patient post aortic valve replacement. 





 Low density of the cardiac blood pool suggests anemia.      Ron Melgar MD 


 


Aorta w/Runoff CTA 2/25/18 0000 Signed





Impressions: 





 Service Date/Time:  Sunday, February 25, 2018 12:47 - CONCLUSION:  Left renal 





 cortical infarction. 5-6 cm length total occlusion of the left superficial 





 femoral artery in the proximal thigh. Nodular parenchymal opacities in the 

lung 





 bases bilaterally See above discussion.     Ron Cruz MD 


 


Renal Ultrasound 2/24/18 0000 Signed





Impressions: 





 Service Date/Time:  Saturday, February 24, 2018 10:53 - CONCLUSION:  1. No 





 evidence of hydronephrosis. 2. Thickening of the urinary bladder wall a 1.1 

cm. 





 3. Prominent splenomegaly.     Elgin Walton MD 


 


Breast Ultrasound 2/24/18 0000 Signed





Impressions: 





 Service Date/Time:  Saturday, February 24, 2018 10:48 - CONCLUSION:  Negative 





 targeted right breast ultrasound examination.     Ron Brown MD 


 


Lower Extremity Ultrasound 2/23/18 0000 Signed





Impressions: 





 Service Date/Time:  Friday, February 23, 2018 21:22 - CONCLUSION:  1. No 





 sonographic evidence for lower extremity DVT. 2. Incidental note of bilateral 





 inguinal adenopathy with the largest on the right measuring 3.3 cm and the 





 largest on the left measuring 2.6 cm. This finding is nonspecific but is 





 typically reactive in etiology.     Rj Whitten MD 








Last Impressions








Head CT 3/3/18 0000 Signed





Impressions: 





 Service Date/Time:  Saturday, March 3, 2018 10:22 - CONCLUSION:  1. Focal area 





 of decreased density involving the right parietal lobe. As a new finding from 





 the prior exam. This could relate to a small infarct. MRI of the brain with 





 gadolinium suggested to further evaluate. 2. Small lacunar infarction 

involving 





 the left centrum semiovale.     Scooter Dawson Jr., MD 


 


CT Angiography 3/3/18 0000 Signed





Impressions: 





 Service Date/Time:  Saturday, March 3, 2018 10:28 - CONCLUSION:  There 

extensive 





 pulmonary emboli bilaterally. There is near complete cut off of the right 

lower 





 lobe pulmonary artery. Prominent filling defects on the left as well. These 





 findings were relayed to Dr. Bustos immediately at the completion of the study.  





 Scattered pulmonary infiltrates, small pleural effusion and extensive 





 mediastinal lymphadenopathy as described above.      Won Sharp MD 


 


Brain MRI 3/3/18 0000 Signed





Impressions: 





 Service Date/Time:  Saturday, March 3, 2018 18:04 - CONCLUSION:  Deterioration 





 in the appearance of the scan.  At least 3 focal areas of abnormal contrast 





 enhancement are present scattered to in both hemispheres.  Given the history 





 this most likely represents developing areas cerebritis.  Exam is compromised 

by 





 an uncooperative patient and motion artifact.  These 2 factors make detection 

of 





 other abnormalities such as cortical cephalitis and meningitis difficult to 





 exclude.  Cannot exclude hemorrhagic lesions as well.  There is no mass effect 





 evident.      Ej Ewing MD 


 


Chest X-Ray 3/1/18 0600 Signed





Impressions: 





 Service Date/Time:  Thursday, March 1, 2018 03:05 - CONCLUSION:  1. Stable 





 patchy bilateral lower lung zone airspace disease. 2. No significant interval 





 change.     Rj Whitten MD 


 


Chest CT 2/26/18 0000 Signed





Impressions: 





 Service Date/Time:  Monday, February 26, 2018 12:41 - CONCLUSION:  1. There 

are 





 at least 5 pulmonary nodules present bilaterally with the largest measuring 19 





 mm. None demonstrate cavitation but it is possible that these represent septic 





 emboli. Suggest followup to assess for change. 2. Splenomegaly with subtle 





 wedge-shaped areas of low-density in the mid spleen which could represent 





 infarcts. 3. Mild cardiomegaly in this patient post aortic valve replacement. 





 Low density of the cardiac blood pool suggests anemia.      Ron Mlegar MD 


 


Aorta w/Runoff CTA 2/25/18 0000 Signed





Impressions: 





 Service Date/Time:  Sunday, February 25, 2018 12:47 - CONCLUSION:  Left renal 





 cortical infarction. 5-6 cm length total occlusion of the left superficial 





 femoral artery in the proximal thigh. Nodular parenchymal opacities in the 

lung 





 bases bilaterally See above discussion.     Ron Cruz MD 


 


Renal Ultrasound 2/24/18 0000 Signed





Impressions: 





 Service Date/Time:  Saturday, February 24, 2018 10:53 - CONCLUSION:  1. No 





 evidence of hydronephrosis. 2. Thickening of the urinary bladder wall a 1.1 

cm. 





 3. Prominent splenomegaly.     Elgin Walton MD 


 


Breast Ultrasound 2/24/18 0000 Signed





Impressions: 





 Service Date/Time:  Saturday, February 24, 2018 10:48 - CONCLUSION:  Negative 





 targeted right breast ultrasound examination.     Ron Brown MD 


 


Lower Extremity Ultrasound 2/23/18 0000 Signed





Impressions: 





 Service Date/Time:  Friday, February 23, 2018 21:22 - CONCLUSION:  1. No 





 sonographic evidence for lower extremity DVT. 2. Incidental note of bilateral 





 inguinal adenopathy with the largest on the right measuring 3.3 cm and the 





 largest on the left measuring 2.6 cm. This finding is nonspecific but is 





 typically reactive in etiology.     Rj Whitten MD 








Last Impressions








Chest X-Ray 3/1/18 0600 Signed





Impressions: 





 Service Date/Time:  Thursday, March 1, 2018 03:05 - CONCLUSION:  1. Stable 





 patchy bilateral lower lung zone airspace disease. 2. No significant interval 





 change.     Rj Whitten MD 


 


Chest CT 2/26/18 0000 Signed





Impressions: 





 Service Date/Time:  Monday, February 26, 2018 12:41 - CONCLUSION:  1. There 

are 





 at least 5 pulmonary nodules present bilaterally with the largest measuring 19 





 mm. None demonstrate cavitation but it is possible that these represent septic 





 emboli. Suggest followup to assess for change. 2. Splenomegaly with subtle 





 wedge-shaped areas of low-density in the mid spleen which could represent 





 infarcts. 3. Mild cardiomegaly in this patient post aortic valve replacement. 





 Low density of the cardiac blood pool suggests anemia.      Ron Melgar MD 


 


Aorta w/Runoff CTA 2/25/18 0000 Signed





Impressions: 





 Service Date/Time:  Sunday, February 25, 2018 12:47 - CONCLUSION:  Left renal 





 cortical infarction. 5-6 cm length total occlusion of the left superficial 





 femoral artery in the proximal thigh. Nodular parenchymal opacities in the 

lung 





 bases bilaterally See above discussion.     Ron Cruz MD 


 


Renal Ultrasound 2/24/18 0000 Signed





Impressions: 





 Service Date/Time:  Saturday, February 24, 2018 10:53 - CONCLUSION:  1. No 





 evidence of hydronephrosis. 2. Thickening of the urinary bladder wall a 1.1 

cm. 





 3. Prominent splenomegaly.     Elgin Walton MD 


 


Breast Ultrasound 2/24/18 0000 Signed





Impressions: 





 Service Date/Time:  Saturday, February 24, 2018 10:48 - CONCLUSION:  Negative 





 targeted right breast ultrasound examination.     Ron Brown MD 


 


Lower Extremity Ultrasound 2/23/18 0000 Signed





Impressions: 





 Service Date/Time:  Friday, February 23, 2018 21:22 - CONCLUSION:  1. No 





 sonographic evidence for lower extremity DVT. 2. Incidental note of bilateral 





 inguinal adenopathy with the largest on the right measuring 3.3 cm and the 





 largest on the left measuring 2.6 cm. This finding is nonspecific but is 





 typically reactive in etiology.     Rj Whitten MD 








Hospital Course


(1) Prosthetic valve endocarditis


Continue IV ampicillin and daptomycin until April 6 per CTS, patient would not 

benefit from further cardiac surgery.  Patient feels she is not getting any 

better and wants to talk to hospice again.  Hospice has been reconsulted.  She 

wants to set up with hospice after completion of antibiotics





(2) Bacteremia


Plan:  Blood cultures obtained on 2/23 growing strep viridans.


Subsequent repeat blood cultures negative.


3/19 sp Exchange of Right IJ central line. 


Continue antibiotics as per ID recommendations.





(3) Septic pulmonary embolism


Plan:  As observed on a CT angiography of the chest obtained on 3/3/18.


echocardiogram on 3/3 showed an estimated EF of 50-55%.  


++severe tricuspid regurgitation with prosthesis in place.  


++2 x 4 cm mobile vegetation was seen on the valve.  The right atrium and right 

ventricle were normal in size and function


Hematology following.


Goal INR 2.5-3.5 given the presence of a prosthetic aortic valve.  Restarted 

heparin drip since INR subtherapeutic





(4) IVDU (intravenous drug user)


Plan:  Counseled on drug cessation.





(5) Anemia


Plan:  Likely secondary to inflammation/infection.  No evidence of bleeding or 

hemolysis.


Monitor hemoglobin and transfuse as needed.





(6) NOÉ (acute kidney injury)


Plan: Nephrology consulted.  Possible ATN from vancomycin toxicity or 

infection.  Urine eosinophils were ordered and negative.  Diuretics managed by 

nephrology.  Initially on Lasix 20 mg IV twice daily along with albumin.  

Creatinine Worsening on 3/19 for 1.57-1.62.  Later Lasix dose was increased to 

40 mg IV twice daily  and metolazone added to the regimen.  Creatinine then 

started trending down to 1.34 on 3/23.


Management as per nephrology.  Continue to monitor BUN and creatinine, avoid 

nephrotoxins, I's and O's.  





(7) Cellulitis of left lower extremity


Plan:  Continue IV antibiotics as per ID.


Cellulitis seems to be improving.





(8) Purple toe syndrome/PAD


Vascular surgery recommended conservative management continue anticoagulation, 

weightbearing as tolerated and repeat DELONTE in 2-3 weeks


Patient also with severe excruciating pain secondary to possible some ischemia.

  Increase dose of morphine sulfate from 30 mg p.o. every 12 hours to 45 mg 

p.o. every 12 hours.  Counseled regarding narcotic usage.  Consider Nitropaste 

on the left lower extremity if BP improved








(9) Pleuritic chest pain


Improving control of pleuritic chest pain.  Repeat chest x-ray obtained on 3/26/

18 did not show any significant change in the right lower lobe infiltrate. 





Patient developed cardiac arrest with asystole. Code blue called. Pt intubated 

and ACLS provided. Pt did not survive and was pronounced at 1458H











Franklin Kyle MD Apr 2, 2018 17:01

## 2018-04-02 NOTE — DEATH SUM
Death Summary Demographics


Date Pronounced :  2018


Time Of Death:  1458


Pronounced By:  Dr. uBstos


Preliminary Cause of Death:  Cardiac arrest











Franklin Kyle MD 2018 16:58

## 2018-04-02 NOTE — HHI.HCPN
Notified of Code Blue event ; pt w PEA arrest, CPR underway. Call to pt mother  

Kelly who is HCS to provide update- review  of CPR in process, pt past week 

condition/tx, and pending d/c w hospice Friday.  Pt met with hospice earlier 

today, and indicated would proceed with enrollment Friday after completion of 

antibiotics.  PT mother wishes to speak w/ pt sig other before making decisions

, requests CPR continue.





Call back from pt mother, she requests CPR continue, pt sig other en route. CPR 

Continues. 





Critical care Dr Bustos called pt mother Kelly, updated her, she request continue 

Full Code. CPR continues.





Resuscitation efforts terminated per critical care.





Pt significant other arrived, updated him, support provided by donna hendrickson. 





.











Kala Leslie Apr 2, 2018 15:17

## 2018-04-02 NOTE — HHI.NPPN
Subjective


General Problems:  Anemia


Renal Failure:  Acute


History of Present Illness


This is a 38-year-old female with past medical history of IV drug abuse, 

history of hepatitis C, narcotic dependency, came to the hospital with 

complaint of shortness of breath and swelling.  Nephrology called to see the 

patient because of elevated BUN and creatinine.  The patient has creatinine of 

4.5 on admission which improved and it was staying in the range of 0.6-0.8 

until 6 days ago, then it started going up again and now it is 1.7.  The 

patient has increased swelling 


of the legs and the patient was diagnosed with endocarditis and she has blood 

culture positive for Strep viridans.  Patient has been following with the ID 

and she was getting furosemide, which was stopped today this morning because of 

increased creatinine and she was on vancomycin and rifampicin.  The last dose 

of vancomycin seems to be given possibly on 03/04/2018 or even later, but her 

level was high and the highest level we have was 25.8, which was on 03/04/2018 

but on 03/10/2018 it was 23.9.  The patient has been actively using IV drugs 

before she came to the hospital and noticed to have more swelling in her legs.  

She is complaining of generalized body pain.  There is no nausea, vomiting.  

Denies any diarrhea.  Her appetite is normal.


Additional Remarks


Patient is complaining of SOB just finished breathing treatment. Lower 

extremity edema and pain. 


 (Gellermann,Diane M. ARNP)





Review of Systems


General


Constitutional:  Fatigue


 (Gellermann,Diane M. ARNP)





Respiratory


Lungs:  SOB


Respiratory Remarks


improving


 (Gellermann,Diane M. ARNP)





Cardiovascular


Cardiac:  Edema, SCOTT


Cardiac Remarks


Denies CP


 (Gellermann,Diane M. ARNP)





Gastrointestinal


GI Remarks


denies abdominal pain


 (Gellermann,Diane M. ARNP)





Objective Data


Data





Vital Signs








  Date Time  Temp Pulse Resp B/P (MAP) Pulse Ox O2 Delivery O2 Flow Rate FiO2


 


4/2/18 12:01 99.1 99 18 92/59 (70) 90   


 


4/2/18 09:58     94 Nasal Cannula 2.00 


 


4/2/18 08:01 98.3 100 17 94/61 (72) 94   


 


4/2/18 08:00      Nasal Cannula 2.00 


 


4/2/18 04:00 97.9 101 18 93/73 (80) 91   


 


4/2/18 04:00  98      


 


4/2/18 00:17      Nasal Cannula 2.00 





      Humidified  


 


4/1/18 20:00 97.9 93 16 102/77 (85) 96   


 


4/1/18 20:00  90      


 


4/1/18 17:49     94 Nasal Cannula 2.00 


 


4/1/18 16:23  87      


 


4/1/18 16:00 98.6 86 22 89/52 (64) 94   


 


4/1/18 16:00      Nasal Cannula 2.00 





      Humidified  








 (Gellermann,Diane M. ARNP)


-:  


4/2/18 0655                                                                    

            4/2/18 0655








Physical Exam


General


Appearance:  No Acute Distress, Anxious


 (Gellermann,Diane M. ARNP)





Eyes


Eye Exam:  Pupils Equal


 (Gellermann,Diane M. ARNP)





Throat


Throat Exam:  Oral Mucosa Pink & Moist


 (Gellermann,Diane M. ARNP)





Neck


Neck Exam:  Neck Supple


 (Gellermann,Diane M. ARNP)





Pulmonary


Resp Exam:  Clear Bilaterally


 (Gellermann,Diane M. ARNP)





Cardiology


CV Exam:  Regular, Normal Sinus Rhythm


 (Gellermann,Diane M. ARNP)





Gastrointestinal/Abdomen


GI Exam:  Soft, Non-Tender


 (Gellermann,Diane M. ARNP)





Integumentary


Skin Exam:  Dry, Intact


 (Gellermann,Diane M. ARNP)





Extremeties


Extremities Exam:  Moderate Edema (Left leg with mild redness.), Pitting Edema, 

Dependent Edema


 (Gellermann,Diane M. ARNP)





Neurologic


Neuro Exam:  Alert, Awake, Oriented


 (Gellermann,Diane M. ARNP)





Psychiatric


Psych Exam:  Appropriate Responses


 (Gellermann,Diane M. ARNP)





Assessment/Plan


Discussed Condition With:  Patient


Assessment Summary:  NOÉ/Acute Renal Failure


Problem List:  


(1) Acute kidney injury


ICD Codes:  N17.9 - Acute kidney failure, unspecified


Status:  Acute


Plan:  


NOÉ likely due to vancomycin toxicity,  post-infectious GN is unlikely.


Possibility of ATN or pre renal.


Urine eosinophils negative, complements wnl


Creatinine stable


Good UOP


SBP in the 90's





Plan


Continue midodrine of hypotension


Continue Lasix 40 mg  BID and metolazone for edema


Lasix is held when Hypotensive


Continue to monitor BMP and UOP


Encouraged to elevate legs throughout day


Avoid nephrotoxins when possible.


Fluid restriction in place.











(2) Prosthetic valve endocarditis


ICD Codes:  T82.6XXA - Infection and inflammatory reaction due to cardiac valve 

prosthesis, initial encounter


Status:  Acute


Plan:  septic endocarditis with strep viridans


IV drug use, pulmonary emboli as well.





Continues antibiotics per ID


continue renal dosing antibiotics.





(3) Peripheral arterial occlusive disease


ICD Codes:  I77.9 - Disorder of arteries and arterioles, unspecified


Plan:  seen with vascular, continue medical management


Hold contrast studies as possible.





(4) Hepatitis C


ICD Codes:  B19.20 - Unspecified viral hepatitis C without hepatic coma


Status:  Chronic (Gellermann,Diane M. ARNP)


Problem List:  


(1) Acute kidney injury


ICD Codes:  N17.9 - Acute kidney failure, unspecified


Status:  Acute


Plan:  


NOÉ likely due to vancomycin toxicity,  post-infectious GN is unlikely.


Possibility of ATN or pre renal.


Urine eosinophils negative, complements wnl


Creatinine stable


Good UOP


SBP in the 90's





Plan


Continue midodrine of hypotension


Continue Lasix 40 mg  BID and metolazone for edema


Lasix is held when Hypotensive


Continue to monitor BMP and UOP


Encouraged to elevate legs throughout day


Avoid nephrotoxins when possible.


Fluid restriction in place.


Creatinine was stable.


Patient develop cardiac arrest, ACLS done and pronounced dead.











(2) Prosthetic valve endocarditis


ICD Codes:  T82.6XXA - Infection and inflammatory reaction due to cardiac valve 

prosthesis, initial encounter


Status:  Acute


Plan:  septic endocarditis with strep viridans


IV drug use, pulmonary emboli as well.





Continues antibiotics per ID


continue renal dosing antibiotics.





(3) Peripheral arterial occlusive disease


ICD Codes:  I77.9 - Disorder of arteries and arterioles, unspecified


Plan:  seen with vascular, continue medical management


Hold contrast studies as possible.





(4) Hepatitis C


ICD Codes:  B19.20 - Unspecified viral hepatitis C without hepatic coma


Status:  Chronic (Melissa Layne MD)





Problem Qualifiers





(1) Prosthetic valve endocarditis:  


Qualified Codes:  T82.6XXA - Infection and inflammatory reaction due to cardiac 

valve prosthesis, initial encounter








Gellermann,Diane M. ARNP Apr 2, 2018 14:57


Melissa Layne MD Apr 2, 2018 18:03

## 2018-04-02 NOTE — PD.PROCEDR
Procedure Note


Procedure


CODE BLUE





Time CODE BLUE called 1422





1425 - asystole.  C1 ambulance epinephrine and CPR initiated.  Patient with 

breathing and actually sat up.  Heart rates in the 110s.  Saturations between 

74 and 87%.  Sinus tach from 1426 -1430.  Due to severe cyanosis patient was 

originally intubated receiving 2 mg midazolam with 8.0 ET tube with 4.0 

Ashley blade.  Positive  color change rest is also clear bilaterally.  1430 

patient asystole.  Patient received 2 ampules of bicarbonate, 1 ampule of 

calcium chloride and 7 ampules epinephrine.  During this time, patient was 

bolused with normal saline and was on norepinephrine drip at 20 g per minute.  

Patient also went into V. fib and was defibrillated with 200 J 2 during this 

code.  Patient received 3 mg epinephrine bolus 1.  Code terminated 1438.  

Discussed with her.  Boyfriend updated bedside.











Bryson Bustos MD Apr 2, 2018 15:09

## 2019-12-09 NOTE — HHI.IDPN
Note


Infectious Disease Note











Patient with no new complaints.


Afebrile.


Redness of the lower extremities worse.


Notes that pain in the left lower extremity is increased.


Continues to have shortness of breath.











37-year-old white female who has a history of endocarditis and has


been admitted several times for the same.  The patient developed shortness of


breath, fever and chills, and  presented to the emergency department.  The


patient is noted to be actively using IV drugs.  She has history of significant


CHF from prior valvular heart disease related to endocarditis.  She states that


she started feeling short of breath about a week ago and the shortness of


breath worsened.  After she completed IV antibiotics in October she was


put on doxycycline prophylaxis.  She reports that she had not been taking the


doxycycline.  


 


PAST MEDICAL HISTORY:


Hepatitis C.


IV drug use.


prosthetic aortic valve replacement in 2011 and then redo aortic


valve replacement in June 2016 








MEDICATIONS:





Current Medications








 Medications


  (Trade)  Dose


 Ordered  Sig/Nikhil


 Route


 PRN Reason  Start Time


 Stop Time Status Last Admin


Dose Admin


 


 Sodium Chloride


  (NS Flush)  2 ml  UNSCH  PRN


 IV FLUSH


 FLUSH AFTER USING IV ACCESS  2/23/18 22:00


    3/22/18 06:48


 


 


 Sodium Chloride


  (NS Flush)  2 ml  BID


 IV FLUSH


   2/24/18 09:00


    3/22/18 08:53


 


 


 Acetaminophen


  (Tylenol)  650 mg  Q6H  PRN


 PO


 fever  2/23/18 22:00


    3/19/18 01:19


 


 


 Ondansetron HCl


  (Zofran Inj)  4 mg  Q6H  PRN


 IV PUSH


 NAUSEA OR VOMITING  2/23/18 22:00


    3/8/18 21:22


 


 


 Temazepam


  (Restoril)  15 mg  HS  PRN


 PO


 INSOMNIA  2/23/18 22:00


    3/6/18 22:08


 


 


 Miscellaneous


 Information  1  Q361D


 XX


   2/23/18 22:00


     


 


 


 Chlorhexidine


 Gluconate


  (Chlorhexidine


 2% Cloth)  


 Taper  DAILY@04


 TOP


   2/24/18 04:00


 2/20/19 03:59  3/6/18 03:54


 


 


 Chlorhexidine


 Gluconate


  (Chlorhexidine


 2% Cloth)  3 pack  UNSCH  PRN


 TOP


 HYGIENIC CARE  2/23/18 22:00


     


 


 


 Senna/Docusate


 Sodium


  (Arlen-Colace)  1 tab  BID


 PO


   2/24/18 09:00


    3/22/18 08:55


 


 


 Magnesium


 Hydroxide


  (Milk Of


 Magnesia Liq)  30 ml  Q12H  PRN


 PO


 Mild constipation  2/23/18 22:00


     


 


 


 Sennosides


  (Senokot)  17.2 mg  Q12H  PRN


 PO


 Moderate constipation  2/23/18 22:00


     


 


 


 Bisacodyl


  (Dulcolax Supp)  10 mg  DAILY  PRN


 RECTAL


 SEVERE CONSITIPATION  2/23/18 22:00


     


 


 


 Lactulose


  (Lactulose Liq)  30 ml  DAILY  PRN


 PO


 SEVERE CONSITIPATION  2/23/18 22:00


     


 


 


 Ampicillin Sodium


 2000 mg/Sodium


 Chloride  100 ml @ 


 400 mls/hr  Q6H


 IV


   2/24/18 14:00


    3/22/18 15:30


 


 


 Loperamide HCl


  (Imodium)  2 mg  Q4H  PRN


 PO


 Diarrhea  2/26/18 16:15


    2/28/18 14:32


 


 


 Albuterol Sulfate


  (Albuterol Neb)  2.5 mg  Q2HR NEB  PRN


 NEB


 dyspnea  2/27/18 11:30


    3/21/18 08:42


 


 


 Acetaminophen/


 Hydrocodone Bitart


  (Norco  Mg)  1 tab  Q4H  PRN


 PO


 pain 1-5  3/4/18 15:00


    3/9/18 05:02


 


 


 Hydromorphone HCl


  (Dilaudid Pf Inj)  1 mg  Q4H  PRN


 IV PUSH


 PAIN SCALE 6 TO 10  3/7/18 19:00


    3/22/18 15:30


 


 


 Potassium Chloride


  (KCl)  60 meq  Q12HR


 PO


   3/14/18 09:45


    3/22/18 08:54


 


 


 Ceftriaxone


 Sodium 1000 mg/


 Sodium Chloride  100 ml @ 


 200 mls/hr  Q24H


 IV


   3/18/18 16:00


    3/22/18 15:31


 


 


 Albumin Human  100 ml @ 


 60 mls/hr  Q12H


 IV


   3/18/18 18:00


    3/22/18 06:17


 


 


 Morphine Sulfate


  (Oramorph Sr)  15 mg  Q12HR


 PO


   3/20/18 21:00


    3/22/18 08:55


 


 


 Furosemide


  (Lasix Inj)  40 mg  BID@09,18


 IV PUSH


   3/21/18 09:00


    3/22/18 08:53


 


 


 Warfarin Sodium


  (Coumadin)  1 mg  DAILY@16


 PO


   3/20/18 21:00


    3/22/18 15:30


 








OBJECTIVE:





Vital Signs








  Date Time  Temp Pulse Resp B/P (MAP) Pulse Ox O2 Delivery O2 Flow Rate FiO2


 


3/22/18 16:46   18     


 


3/22/18 12:11 98.7 114 22 101/64 (76) 92   


 


3/22/18 11:48     95 Nasal Cannula 2.00 





      Humidified  


 


3/22/18 10:02   18     


 


3/22/18 08:00 98.8 118 22 113/55 (74) 95   


 


3/22/18 08:00  121      


 


3/22/18 04:00 97.8 122 24 117/51 (73) 93   


 


3/22/18 04:00      Nasal Cannula 2.00 





      Humidified  


 


3/22/18 04:00  122      


 


3/22/18 00:00 98.0 118 23 96/63 (74) 94   


 


3/22/18 00:00      Nasal Cannula 2.00 





      Humidified  


 


3/22/18 00:00  121      


 


3/21/18 20:24     100 Nasal Cannula 2.00 


 


3/21/18 20:00 98.0 110 23 99/60 (73) 97   


 


3/21/18 20:00  111      


 


3/21/18 20:00      Nasal Cannula 2.00 





      Humidified  











Laboratory Tests








Test


  3/21/18


06:30


 


White Blood Count 14.1 TH/MM3 


 


Red Blood Count 3.29 MIL/MM3 


 


Hemoglobin 8.0 GM/DL 


 


Hematocrit 25.2 % 


 


Mean Corpuscular Volume 76.8 FL 


 


Mean Corpuscular Hemoglobin 24.2 PG 


 


Mean Corpuscular Hemoglobin


Concent 31.5 % 


 


 


Red Cell Distribution Width 23.8 % 


 


Platelet Count 174 TH/MM3 


 


Mean Platelet Volume 8.4 FL 








Laboratory Tests








Test


  3/20/18


18:00 3/21/18


06:30


 


Creatinine 1.57 MG/DL  1.53 MG/DL 


 


Estimat Glomerular Filtration


Rate 37 ML/MIN 


  38 ML/MIN 


 


 


Blood Urea Nitrogen  20 MG/DL 


 


Random Glucose  79 MG/DL 


 


Calcium Level  8.4 MG/DL 


 


Sodium Level  130 MEQ/L 


 


Potassium Level  3.9 MEQ/L 


 


Chloride Level  93 MEQ/L 


 


Carbon Dioxide Level  25.5 MEQ/L 


 


Anion Gap  12 MEQ/L 








IMAGING:














Chest X-Ray 3/21/18 0000 Signed





Impressions: 





 Service Date/Time:  Wednesday, March 21, 2018 11:32 - CONCLUSION:  1. 

Improving 





 patchy right lower lung zone airspace consolidation.     Rj Whitten MD 

















Chest X-Ray 3/5/18 0600 Signed





Impressions: 





 Service Date/Time:  Monday, March 5, 2018 03:52 - CONCLUSION:  There is a 

small 





 to moderate size right pleural effusion with associated volume loss and/or 





 airspace consolidation. The pleural effusion has increased from the prior 





 examination.     Ron Melgar MD 


 


Head CT 3/3/18 0000 Signed





Impressions: 





 Service Date/Time:  Saturday, March 3, 2018 10:22 - CONCLUSION:  1. Focal area 





 of decreased density involving the right parietal lobe. As a new finding from 





 the prior exam. This could relate to a small infarct. MRI of the brain with 





 gadolinium suggested to further evaluate. 2. Small lacunar infarction 

involving 





 the left centrum semiovale.     Scooter Dawson Jr., MD 


 


CT Angiography 3/3/18 0000 Signed





Impressions: 





 Service Date/Time:  Saturday, March 3, 2018 10:28 - CONCLUSION:  There 

extensive 





 pulmonary emboli bilaterally. There is near complete cut off of the right 

lower 





 lobe pulmonary artery. Prominent filling defects on the left as well. These 





 findings were relayed to Dr. Bustos immediately at the completion of the study.  





 Scattered pulmonary infiltrates, small pleural effusion and extensive 





 mediastinal lymphadenopathy as described above.      Won Sharp MD 


 


Brain MRI 3/3/18 0000 Signed





Impressions: 





 Service Date/Time:  Saturday, March 3, 2018 18:04 - CONCLUSION:  Deterioration 





 in the appearance of the scan.  At least 3 focal areas of abnormal contrast 





 enhancement are present scattered to in both hemispheres.  Given the history 





 this most likely represents developing areas cerebritis.  Exam is compromised 

by 





 an uncooperative patient and motion artifact.  These 2 factors make detection 

of 





 other abnormalities such as cortical cephalitis and meningitis difficult to 





 exclude.  Cannot exclude hemorrhagic lesions as well.  There is no mass effect 





 evident.      Jorge Diane MD 




















Renal Ultrasound 2/24/18 0000 Signed





Impressions: 





 Service Date/Time:  Saturday, February 24, 2018 10:53 - CONCLUSION:  1. No 





 evidence of hydronephrosis. 2. Thickening of the urinary bladder wall a 1.1 

cm. 





 3. Prominent splenomegaly.     Elgin Walton MD 


 


Chest X-Ray 2/24/18 0000 Signed





Impressions: 





 Service Date/Time:  Saturday, February 24, 2018 09:36 - CONCLUSION:  Right 





 internal jugular central line in place with the tip overlying the SVC. A 





 pneumothorax is not seen.     Ron Brown MD 


 


Breast Ultrasound 2/24/18 0000 Signed





Impressions: 





 Service Date/Time:  Saturday, February 24, 2018 10:48 - CONCLUSION:  Negative 





 targeted right breast ultrasound examination.     Ron Brown MD 


 


Lower Extremity Ultrasound 2/23/18 0000 Signed





Impressions: 





 Service Date/Time:  Friday, February 23, 2018 21:22 - CONCLUSION:  1. No 





 sonographic evidence for lower extremity DVT. 2. Incidental note of bilateral 





 inguinal adenopathy with the largest on the right measuring 3.3 cm and the 





 largest on the left measuring 2.6 cm. This finding is nonspecific but is 





 typically reactive in etiology.     Rj Whitten MD 














PHYSICAL EXAMINATION


GENERAL: No acute distress. 


HEENT:  No icterus.  Oropharynx moist mucosa, no lesions.


Neck:  Supple without adenopathy.


Lungs: Rhonchi at both lungs.


Heart: 5/6 blowing systolic murmur at the upper right sternal border.


Abdomen: Bowel sounds present, soft, obese, nontender.


Extremities: Diffuse 3+ edema of the lower extremities.  


Left lower extremity has increased erythema.  Increased warmth.


No calf tenderness.   No nail hemorrhages.  


SKIN: no diffuse rash.


Neuro: No gross focal findings.


Psychiatric: calm and cooperative.





 


IMPRESSION


1. Sepsis. Strep Viridans. 


2. Tricuspid valve endocarditis.  Large vegetation.  Tricuspid regurgitation.


Patient evaluated by cardiovascular surgery and felt not to be a surgical 

candidate.


3.  Bacteremia.  Strep viridans.  Blood cultures have remained negative on 

follow-up.


Last positive blood culture was on 2/23.


4. History of prosthetic aortic valve and so likely recurrent prosthetic valve


endocarditis.


5.  Cerebritis on MRI.  Also has parietal infarct noted on CT scan of the brain.


6. Left renal cortical and pulmonary and lower extremity septic emboli. 


7. Leukocytosis secondary to sepsis from showering of emboli from endocarditis.


8. Acute renal failure. Kidney function improved. 


9.  Cellulitis of the left lower extremity.  More evident today.





RECOMMENDATIONS


1. Continue Ampicillin intravenous.


2.  Stop ceftriaxone.


3.  Add daptomycin.


4.  Monitor clinical status.


5. Continue to monitor the left leg.





Plan on intravenous antibiotics until April 6, for endocarditis.  Which will be 

6 weeks after the last 


positive blood culture.  After that she should continue with prophylactic oral 


antibiotics indefinitely. Continue to monitor her blood counts and renal 

function.














Robinson Carr MD Mar 22, 2018 17:19 What Type Of Note Output Would You Prefer (Optional)?: Bullet Format Is The Patient Presenting As Previously Scheduled?: No, they are coming in before their scheduled appointment How Severe Is Your Rash?: mild Is This A New Presentation, Or A Follow-Up?: Rash

## 2021-06-25 NOTE — HHI.HCPN
Reason for visit


   a.  To assist with evaluation and management of symptoms including: dyspnea. 


   b.  To assist medical decision maker(s) with: better understanding of 

current medical conditions; weighing benefits/burdens of medical treatment 

options; making        


        medical treatment decisions.


.





Subjective/Interval History


Patient seen and examined in ICU. Also present Zoila Dawson LCSW. Discussed 

with Dr. Dawson and nursing staff. No family or friends bedside. Patient remains 

sedated Diprivan and Fentanyl on mech vent. Off pressor support, BP 92/68. 





Tmax 101.3. WBC 10.7, hemoglobin 9.8, hematocrit 30.2, platelets 183. 

Creatinine 1.37. No new imaging. Plan for MRI brain later today per neurology. 

Repeat sputum and blood cultures pending. 


.


Family/friend interactions


Called to speak with mother Kelly via phone. Medical update provided. She is 

appropriately tearful. She verbalizes hopes that she doesn't have to make a 

decision to take her off of life support, but states "how long to we keep going.

" I advised she does not have to make this decision today, we agreed to await 

MRI results and to reevaluate status on Monday. She appreciates call. Plan for 

follow conversation regarding goals on Monday 8/28/17. 


.





Advance Directives


Living Will:  Never completed


Health Care Surrogate:  Never completed


Durable Power of :  Never completed


Advance Directive Specifics


Health Care Surrogate(s):


No known written advanced directives, family is going to try to find HCS 

paperwork they think pt previously completed.  Patient a single.  Children are 

juvenile.  If no written advanced directives, according to [place] statutes 

health care proxy decision-making falls to a parent. 


.


Significant change in goals:


NO CODE. Plan for call to mother on 8/28/17 to give update and reevaluate goals 

of care. 


,





Objective





Vital Signs








  Date Time  Temp Pulse Resp B/P (MAP) Pulse Ox O2 Delivery O2 Flow Rate FiO2


 


8/25/17 16:02     97   35


 


8/25/17 13:11     94   35


 


8/25/17 13:00  109      


 


8/25/17 12:00  106      


 


8/25/17 12:00        35


 


8/25/17 12:00 100.8 106 22 92/69 (77) 97   





    89/63 (72)    


 


8/25/17 11:00  101      


 


8/25/17 10:12     97   35


 


8/25/17 10:00 100.0 104 23 94/68 (77) 97   





    90/64 (73)    


 


8/25/17 10:00  104      


 


8/25/17 09:00 99.7 99 23 94/72 (79) 95   





    91/64 (73)    


 


8/25/17 08:00 99.3 93 21 95/68 (77) 97   





    92/65 (74)    


 


8/25/17 08:00        35


 


8/25/17 08:00 99.5       


 


8/25/17 08:00  93      


 


8/25/17 07:46     97   35


 


8/25/17 06:00  93      


 


8/25/17 04:23     98   35


 


8/25/17 04:00        35


 


8/25/17 04:00 99.6 95 19 104/77 (86) 98   





    96/69 (78)    


 


8/25/17 04:00  95      


 


8/25/17 02:00  100      


 


8/25/17 01:35     98   35


 


8/25/17 00:00        35


 


8/25/17 00:00 101.3 102  100/73 (82) 98   





    98/70 (79)    


 


8/25/17 00:00  102      


 


8/24/17 22:28     98   35


 


8/24/17 22:00  104      


 


8/24/17 20:00 101.1 103 22 114/79 (91) 98   





    107/74 (85)    


 


8/24/17 20:00        35


 


8/24/17 20:00  103      


 


8/24/17 19:58     98   35


 


8/24/17 18:20  110      


 


8/24/17 18:00  111      


 


8/24/17 17:40  109      


 


8/24/17 17:20  111      


 


8/24/17 17:00  114      


 


8/24/17 16:40  114      


 


8/24/17 16:20  113      














Intake & Output  


 


 8/25/17 8/25/17





 06:59 18:59


 


Intake Total 2307 ml 1450 ml


 


Output Total 4600 ml 1370 ml


 


Balance -2293 ml 80 ml


 


  


 


Intake Oral 0 ml 


 


IV Total 1857 ml 1450 ml


 


Tube Feeding 450 ml 


 


Output Urine Total 4300 ml 1370 ml


 


Stool Total 300 ml 








Physical Exam


CONSTITUTIONAL/GENERAL: This is an adequately nourished patient, sedated on 

mechanical ventilation.


TUBES/LINES/DRAINS: ETT, NG, left subclavian central line, PIV right AC, Stokes, 

SCDs. 


SKIN: Febrile. Multiple purple lesions bilateral feet.  


EYES: Pupils pinpoint, non reactive equal and round. No scleral icterus. No 

injection or drainage.


ENT: Unable to assess hearing. Nose with NG tube right nare.  Throat difficult 

to visualize due to ETT.   


CARDIOVASCULAR: tachycardic, regular rhythm. 


RESPIRATORY/CHEST: Symmetric, unlabored respirations on vent. Scattered rhonchi 

to auscultation. 


GASTROINTESTINAL: Abdomen soft, moderately distended. Bowel sounds hypoactive. 


GENITOURINARY: Without palpable bladder distension. Stokes catheter in place. 


MUSCULOSKELETAL: Extremities without clubbing, mild cyanosis of toes, 2+ edema. 


NEUROLOGICAL: Sedated. Not following commands off sedation per nursing. 


PSYCHIATRIC: sedated: unable to assess. 


.





Diagnostic Tests


Laboratory





Laboratory Tests








Test


  8/23/17


05:00 8/23/17


09:50 8/23/17


11:50 8/24/17


05:05


 


White Blood Count


  18.1 TH/MM3


(4.0-11.0) 


  


  10.8 TH/MM3


(4.0-11.0)


 


Red Blood Count


  4.13 MIL/MM3


(4.00-5.30) 


  


  3.67 MIL/MM3


(4.00-5.30)


 


Hemoglobin


  9.9 GM/DL


(11.6-15.3) 


  


  9.2 GM/DL


(11.6-15.3)


 


Hematocrit


  30.8 %


(35.0-46.0) 


  


  27.2 %


(35.0-46.0)


 


Mean Corpuscular Volume


  74.6 FL


(80.0-100.0) 


  


  74.3 FL


(80.0-100.0)


 


Mean Corpuscular Hemoglobin


  24.1 PG


(27.0-34.0) 


  


  25.0 PG


(27.0-34.0)


 


Mean Corpuscular Hemoglobin


Concent 32.3 %


(32.0-36.0) 


  


  33.7 %


(32.0-36.0)


 


Red Cell Distribution Width


  15.8 %


(11.6-17.2) 


  


  16.0 %


(11.6-17.2)


 


Platelet Count


  138 TH/MM3


(150-450) 


  


  138 TH/MM3


(150-450)


 


Mean Platelet Volume


  8.9 FL


(7.0-11.0) 


  


  8.9 FL


(7.0-11.0)


 


Blood Urea Nitrogen


  25 MG/DL


(7-18) 


  


  20 MG/DL


(7-18)


 


Creatinine


  1.29 MG/DL


(0.50-1.00) 


  


  1.28 MG/DL


(0.50-1.00)


 


Random Glucose


  132 MG/DL


() 


  


  104 MG/DL


()


 


Calcium Level


  7.8 MG/DL


(8.5-10.1) 


  


  8.0 MG/DL


(8.5-10.1)


 


Sodium Level


  136 MEQ/L


(136-145) 


  


  143 MEQ/L


(136-145)


 


Potassium Level


  3.3 MEQ/L


(3.5-5.1) 


  


  3.2 MEQ/L


(3.5-5.1)


 


Chloride Level


  105 MEQ/L


() 


  


  112 MEQ/L


()


 


Carbon Dioxide Level


  19.9 MEQ/L


(21.0-32.0) 


  


  22.9 MEQ/L


(21.0-32.0)


 


Anion Gap


  11 MEQ/L


(5-15) 


  


  8 MEQ/L (5-15) 


 


 


Estimat Glomerular Filtration


Rate 47 ML/MIN


(>89) 


  


  47 ML/MIN


(>89)


 


Gentamicin Level Trough


  


  1.7 MCG/ML


(0.0-2.0) 


  


 


 


Gentamicin Level Peak


  


  


  3.3 MCG/ML


(4.0-8.0) 


 


 


Test


  8/24/17


11:42 8/24/17


15:15 8/25/17


06:00 8/25/17


10:57


 


Blood Urea Nitrogen


  19 MG/DL


(7-18) 19 MG/DL


(7-18) 22 MG/DL


(7-18) 23 MG/DL


(7-18)


 


Creatinine


  1.29 MG/DL


(0.50-1.00) 1.32 MG/DL


(0.50-1.00) 1.35 MG/DL


(0.50-1.00) 1.37 MG/DL


(0.50-1.00)


 


Random Glucose


  105 MG/DL


() 105 MG/DL


() 106 MG/DL


() 119 MG/DL


()


 


Calcium Level


  8.0 MG/DL


(8.5-10.1) 8.2 MG/DL


(8.5-10.1) 8.0 MG/DL


(8.5-10.1) 8.2 MG/DL


(8.5-10.1)


 


Magnesium Level


  2.1 MG/DL


(1.5-2.5) 2.0 MG/DL


(1.5-2.5) 1.8 MG/DL


(1.5-2.5) 2.1 MG/DL


(1.5-2.5)


 


Sodium Level


  144 MEQ/L


(136-145) 143 MEQ/L


(136-145) 142 MEQ/L


(136-145) 143 MEQ/L


(136-145)


 


Potassium Level


  3.8 MEQ/L


(3.5-5.1) 3.3 MEQ/L


(3.5-5.1) 3.0 MEQ/L


(3.5-5.1) 3.7 MEQ/L


(3.5-5.1)


 


Chloride Level


  114 MEQ/L


() 111 MEQ/L


() 106 MEQ/L


() 105 MEQ/L


()


 


Carbon Dioxide Level


  21.2 MEQ/L


(21.0-32.0) 21.9 MEQ/L


(21.0-32.0) 24.3 MEQ/L


(21.0-32.0) 25.6 MEQ/L


(21.0-32.0)


 


Anion Gap


  9 MEQ/L (5-15) 


  10 MEQ/L


(5-15) 12 MEQ/L


(5-15) 12 MEQ/L


(5-15)


 


Estimat Glomerular Filtration


Rate 47 ML/MIN


(>89) 46 ML/MIN


(>89) 45 ML/MIN


(>89) 44 ML/MIN


(>89)


 


White Blood Count


  


  


  10.7 TH/MM3


(4.0-11.0) 


 


 


Red Blood Count


  


  


  4.10 MIL/MM3


(4.00-5.30) 


 


 


Hemoglobin


  


  


  9.8 GM/DL


(11.6-15.3) 


 


 


Hematocrit


  


  


  30.2 %


(35.0-46.0) 


 


 


Mean Corpuscular Volume


  


  


  73.8 FL


(80.0-100.0) 


 


 


Mean Corpuscular Hemoglobin


  


  


  24.0 PG


(27.0-34.0) 


 


 


Mean Corpuscular Hemoglobin


Concent 


  


  32.5 %


(32.0-36.0) 


 


 


Red Cell Distribution Width


  


  


  16.0 %


(11.6-17.2) 


 


 


Platelet Count


  


  


  183 TH/MM3


(150-450) 


 


 


Mean Platelet Volume


  


  


  8.5 FL


(7.0-11.0) 


 


 


Test


  8/25/17


11:58 


  


  


 


 


Gentamicin Level Trough


  1.7 MCG/ML


(0.0-2.0) 


  


  


 








Result Diagram:  


8/25/17 0600                                                                   

             8/25/17 1057





Microbiology





Microbiology








 Date/Time


Source Procedure


Growth Status


 


 


 8/24/17 09:38


Blood Peripheral Aerobic Blood Culture - Preliminary


NO GROWTH IN 1 DAY Resulted


 


 8/24/17 09:38


Blood Peripheral Anaerobic Blood Culture - Preliminary


NO GROWTH IN 1 DAY Resulted





 8/24/17 09:33


Blood Peripheral Aerobic Blood Culture - Preliminary


NO GROWTH IN 1 DAY Resulted


 


 8/24/17 09:33


Blood Peripheral Anaerobic Blood Culture - Preliminary


NO GROWTH IN 1 DAY Resulted





 8/24/17 21:15


Sputum Endotracheal Gram Stain - Final Resulted


 


 8/24/17 21:15


Sputum Endotracheal Sputum Culture - Preliminary


RESULTS PENDING Resulted








Imaging





Last Impressions








Chest X-Ray 8/22/17 0000 Signed





Impressions: 





 Service Date/Time:  Tuesday, August 22, 2017 11:56 - CONCLUSION:  Left 





 subclavian central line in good position without pneumothorax.     Scooter Dawson Jr., MD 


 


Head CT 8/21/17 0000 Signed





Impressions: 





 Service Date/Time:  Monday, August 21, 2017 19:47 - CONCLUSION:  No evidence 

of 





 acute infarct, hemorrhage, mass or edema.     Kristian Frankel MD 








Procedures


* 8/21/17 - left subclavian central placement. 


* 8/21/17 - lumbar puncture


* 8/21/17 - Intubated. 


.





Assessment and Plan


Disease Oriented Problem List:  


(1) Protein-calorie malnutrition, severe


(2) Elevated troponin


(3) Meningitis


(4) Encephalopathy


(5) Bacterial endocarditis


(6) Polysubstance abuse


(7) Sepsis


(8) Acute kidney injury


Symptom Scale:  


(1) Pain


0-10 Scale:  Unable to quantify





(2) Dyspnea


0-10 Scale:  Unable to quantify





(3) Encephalopathy


0-10 Scale:  Unable to quantify





Pertinent Non-Medical Issues


Psychosocial: single.  2 juvenile children. Supported by mother, stepfather and 

boyfriend. 


Spiritual: Unknown.


Legal: patient is incapacitated.  No written advanced directives.  Patient a 

single.  Children are juvenile.  According to [place] statutes health care 

proxy decision-making falls to a parent.


Ethical issues impacting care: No known concerns at this time. 


.


Important Contacts


* Kelly Le, mother: [phone]


* Won Le, stepfather: [phone]


.


Prognosis


Prognosis poor.


Code Status:  No Code


Plan


* Decision Maker: patient is incapacitated.  No written advanced directives. 

Patient a single. Children are juvenile. According to [place] statutes health 

care proxy decision-making falls to a parent. 


* NO CODE 


* 8/25/17 - Called to speak with mother Kelly via phone. Medical update 

provided. She is appropriately tearful. She verbalizes hopes that she doesn't 

have to make a decision to take her off of life support, but states "how long 

to we keep going." I advised she does not have to make this decision today, we 

agreed to await MRI results and to reevaluate status on Monday. She appreciates 

call. Plan for follow conversation regarding goals on Monday 8/28/17. 


* SYMPTOMS: Pain: No obvious signs of pain.  Potential sources include 

endocarditis, bedbound status, tubes etc.  Currently on fentanyl and propofol.  

Patient with likely high tolerance given history of IV drug use, will monitor 

effective medications.  Dyspnea: remains sedated on mechanical ventilation.  No 

new medication recommendations at this time.


* Palliative care will continue to follow throughout hospital course to assist 

with symptom management and clarification of goals as needed. 


.





Attestation


To help prompt me to consider important information that might be impacting 

today's encounter and assessment, information from prior notes written by 

myself or my colleagues may have been "brought forward" into today's note.  My 

signature on this note, however, is an attestation that I personally performed 

the exam, history, and/or decision-making noted today, and, unless otherwise 

indicated, the interactions with patient, family, and staff as well as the 

review of records all occurred today.  I also attest that the listed assessment 

and stated plan reflect my best clinical judgment today based on the 

combination of historical information, prior notes, and today's exam/ 

interactions.  When time spent is documented, it refers only to time spent 

today by the signer, or if indicated, combined time spent today by 

collaborating physician/nurse practitioner.











Ileana Dang Aug 25, 2017 16:27 This is ordered.